# Patient Record
Sex: MALE | Race: WHITE | Employment: OTHER | URBAN - METROPOLITAN AREA
[De-identification: names, ages, dates, MRNs, and addresses within clinical notes are randomized per-mention and may not be internally consistent; named-entity substitution may affect disease eponyms.]

---

## 2017-01-09 ENCOUNTER — OFFICE VISIT (OUTPATIENT)
Dept: FAMILY MEDICINE CLINIC | Age: 72
End: 2017-01-09

## 2017-01-09 VITALS
DIASTOLIC BLOOD PRESSURE: 80 MMHG | HEART RATE: 56 BPM | BODY MASS INDEX: 34.99 KG/M2 | WEIGHT: 264 LBS | HEIGHT: 73 IN | OXYGEN SATURATION: 95 % | SYSTOLIC BLOOD PRESSURE: 134 MMHG

## 2017-01-09 DIAGNOSIS — M65.341 TRIGGER RING FINGER OF RIGHT HAND: ICD-10-CM

## 2017-01-09 DIAGNOSIS — R06.02 SHORTNESS OF BREATH: ICD-10-CM

## 2017-01-09 DIAGNOSIS — E11.9 TYPE 2 DIABETES MELLITUS WITHOUT COMPLICATION, WITHOUT LONG-TERM CURRENT USE OF INSULIN (HCC): Primary | ICD-10-CM

## 2017-01-09 LAB — HBA1C MFR BLD: 8.7 %

## 2017-01-09 PROCEDURE — 93000 ELECTROCARDIOGRAM COMPLETE: CPT | Performed by: FAMILY MEDICINE

## 2017-01-09 PROCEDURE — 99214 OFFICE O/P EST MOD 30 MIN: CPT | Performed by: FAMILY MEDICINE

## 2017-01-09 PROCEDURE — 83036 HEMOGLOBIN GLYCOSYLATED A1C: CPT | Performed by: FAMILY MEDICINE

## 2017-01-12 ENCOUNTER — OFFICE VISIT (OUTPATIENT)
Dept: ORTHOPEDIC SURGERY | Age: 72
End: 2017-01-12

## 2017-01-12 VITALS — HEIGHT: 73 IN | BODY MASS INDEX: 34.46 KG/M2 | WEIGHT: 260 LBS

## 2017-01-12 DIAGNOSIS — M65.30 TRIGGER FINGER (ACQUIRED): ICD-10-CM

## 2017-01-12 DIAGNOSIS — M72.0 DUPUYTREN'S CONTRACTURE: Primary | ICD-10-CM

## 2017-01-12 PROCEDURE — 20550 NJX 1 TENDON SHEATH/LIGAMENT: CPT | Performed by: ORTHOPAEDIC SURGERY

## 2017-01-12 PROCEDURE — 99203 OFFICE O/P NEW LOW 30 MIN: CPT | Performed by: ORTHOPAEDIC SURGERY

## 2017-02-13 RX ORDER — DAPAGLIFLOZIN 10 MG/1
TABLET, FILM COATED ORAL
Qty: 90 TABLET | Refills: 0 | Status: SHIPPED | OUTPATIENT
Start: 2017-02-13 | End: 2017-05-12 | Stop reason: SDUPTHER

## 2017-05-12 ENCOUNTER — OFFICE VISIT (OUTPATIENT)
Dept: FAMILY MEDICINE CLINIC | Age: 72
End: 2017-05-12

## 2017-05-12 VITALS
HEIGHT: 73 IN | BODY MASS INDEX: 31.14 KG/M2 | OXYGEN SATURATION: 98 % | SYSTOLIC BLOOD PRESSURE: 136 MMHG | WEIGHT: 235 LBS | DIASTOLIC BLOOD PRESSURE: 78 MMHG | HEART RATE: 62 BPM

## 2017-05-12 DIAGNOSIS — E78.2 MIXED HYPERLIPIDEMIA: ICD-10-CM

## 2017-05-12 DIAGNOSIS — I10 ESSENTIAL HYPERTENSION: ICD-10-CM

## 2017-05-12 DIAGNOSIS — E11.9 TYPE 2 DIABETES MELLITUS WITHOUT COMPLICATION, WITHOUT LONG-TERM CURRENT USE OF INSULIN (HCC): Primary | ICD-10-CM

## 2017-05-12 DIAGNOSIS — R20.2 ARM PARESTHESIA, LEFT: ICD-10-CM

## 2017-05-12 LAB — HBA1C MFR BLD: 7.4 %

## 2017-05-12 PROCEDURE — G8510 SCR DEP NEG, NO PLAN REQD: HCPCS | Performed by: FAMILY MEDICINE

## 2017-05-12 PROCEDURE — 99214 OFFICE O/P EST MOD 30 MIN: CPT | Performed by: FAMILY MEDICINE

## 2017-05-12 PROCEDURE — 3288F FALL RISK ASSESSMENT DOCD: CPT | Performed by: FAMILY MEDICINE

## 2017-05-12 PROCEDURE — 83036 HEMOGLOBIN GLYCOSYLATED A1C: CPT | Performed by: FAMILY MEDICINE

## 2017-05-12 RX ORDER — PIOGLITAZONEHYDROCHLORIDE 45 MG/1
45 TABLET ORAL DAILY
Qty: 90 TABLET | Refills: 3 | Status: SHIPPED | OUTPATIENT
Start: 2017-05-12 | End: 2017-09-25 | Stop reason: SDUPTHER

## 2017-05-12 RX ORDER — AMLODIPINE BESYLATE 5 MG/1
5 TABLET ORAL DAILY
Qty: 90 TABLET | Refills: 3 | Status: SHIPPED | OUTPATIENT
Start: 2017-05-12 | End: 2017-08-15

## 2017-05-12 RX ORDER — PRAVASTATIN SODIUM 40 MG
TABLET ORAL
Qty: 90 TABLET | Refills: 3 | Status: SHIPPED | OUTPATIENT
Start: 2017-05-12 | End: 2017-09-25 | Stop reason: SDUPTHER

## 2017-05-12 RX ORDER — GLIMEPIRIDE 4 MG/1
4 TABLET ORAL 2 TIMES DAILY WITH MEALS
Qty: 180 TABLET | Refills: 3 | Status: SHIPPED | OUTPATIENT
Start: 2017-05-12 | End: 2017-09-25 | Stop reason: SDUPTHER

## 2017-05-12 RX ORDER — RAMIPRIL 10 MG/1
10 CAPSULE ORAL DAILY
Qty: 90 CAPSULE | Refills: 3 | Status: SHIPPED | OUTPATIENT
Start: 2017-05-12 | End: 2018-01-26 | Stop reason: SDUPTHER

## 2017-05-12 RX ORDER — MIGLITOL 100 MG/1
100 TABLET, COATED ORAL 2 TIMES DAILY
Qty: 180 TABLET | Refills: 3 | Status: SHIPPED | OUTPATIENT
Start: 2017-05-12 | End: 2017-09-25 | Stop reason: SDUPTHER

## 2017-05-12 ASSESSMENT — PATIENT HEALTH QUESTIONNAIRE - PHQ9
2. FEELING DOWN, DEPRESSED OR HOPELESS: 0
1. LITTLE INTEREST OR PLEASURE IN DOING THINGS: 0
SUM OF ALL RESPONSES TO PHQ9 QUESTIONS 1 & 2: 0
SUM OF ALL RESPONSES TO PHQ QUESTIONS 1-9: 0

## 2017-05-14 ASSESSMENT — ENCOUNTER SYMPTOMS: SHORTNESS OF BREATH: 0

## 2017-05-16 ENCOUNTER — TELEPHONE (OUTPATIENT)
Dept: FAMILY MEDICINE CLINIC | Age: 72
End: 2017-05-16

## 2017-05-16 DIAGNOSIS — R20.2 LEFT HAND PARESTHESIA: Primary | ICD-10-CM

## 2017-05-26 ENCOUNTER — TELEPHONE (OUTPATIENT)
Dept: ORTHOPEDIC SURGERY | Age: 72
End: 2017-05-26

## 2017-06-12 ENCOUNTER — TELEPHONE (OUTPATIENT)
Dept: FAMILY MEDICINE CLINIC | Age: 72
End: 2017-06-12

## 2017-06-12 DIAGNOSIS — R20.2 LEFT HAND PARESTHESIA: Primary | ICD-10-CM

## 2017-06-15 ENCOUNTER — OFFICE VISIT (OUTPATIENT)
Dept: ORTHOPEDIC SURGERY | Age: 72
End: 2017-06-15

## 2017-06-15 VITALS — WEIGHT: 235.01 LBS | HEIGHT: 73 IN | BODY MASS INDEX: 31.15 KG/M2

## 2017-06-15 DIAGNOSIS — M65.341 TRIGGER RING FINGER OF RIGHT HAND: Primary | ICD-10-CM

## 2017-06-15 PROCEDURE — 99024 POSTOP FOLLOW-UP VISIT: CPT | Performed by: ORTHOPAEDIC SURGERY

## 2017-06-19 RX ORDER — DAPAGLIFLOZIN 10 MG/1
TABLET, FILM COATED ORAL
Qty: 90 TABLET | Refills: 0 | Status: SHIPPED | OUTPATIENT
Start: 2017-06-19 | End: 2017-09-25 | Stop reason: SDUPTHER

## 2017-08-15 ENCOUNTER — OFFICE VISIT (OUTPATIENT)
Dept: FAMILY MEDICINE CLINIC | Age: 72
End: 2017-08-15

## 2017-08-15 VITALS
HEART RATE: 67 BPM | OXYGEN SATURATION: 99 % | DIASTOLIC BLOOD PRESSURE: 68 MMHG | SYSTOLIC BLOOD PRESSURE: 120 MMHG | WEIGHT: 222 LBS | BODY MASS INDEX: 29.42 KG/M2 | HEIGHT: 73 IN

## 2017-08-15 DIAGNOSIS — I10 ESSENTIAL HYPERTENSION: ICD-10-CM

## 2017-08-15 DIAGNOSIS — E11.9 TYPE 2 DIABETES MELLITUS WITHOUT COMPLICATION, WITHOUT LONG-TERM CURRENT USE OF INSULIN (HCC): Primary | ICD-10-CM

## 2017-08-15 DIAGNOSIS — E78.2 MIXED HYPERLIPIDEMIA: ICD-10-CM

## 2017-08-15 LAB — HBA1C MFR BLD: 7.8 %

## 2017-08-15 PROCEDURE — 83036 HEMOGLOBIN GLYCOSYLATED A1C: CPT | Performed by: FAMILY MEDICINE

## 2017-08-15 PROCEDURE — 99214 OFFICE O/P EST MOD 30 MIN: CPT | Performed by: FAMILY MEDICINE

## 2017-08-25 ENCOUNTER — CARE COORDINATION (OUTPATIENT)
Dept: CARE COORDINATION | Age: 72
End: 2017-08-25

## 2017-09-09 DIAGNOSIS — E11.9 TYPE 2 DIABETES MELLITUS WITHOUT COMPLICATION, UNSPECIFIED LONG TERM INSULIN USE STATUS: ICD-10-CM

## 2017-09-09 DIAGNOSIS — I10 ESSENTIAL HYPERTENSION: ICD-10-CM

## 2017-09-11 RX ORDER — RAMIPRIL 10 MG/1
CAPSULE ORAL
Qty: 90 CAPSULE | Refills: 3 | Status: SHIPPED | OUTPATIENT
Start: 2017-09-11 | End: 2018-11-12 | Stop reason: SDUPTHER

## 2017-09-11 RX ORDER — AMLODIPINE BESYLATE 5 MG/1
TABLET ORAL
Qty: 90 TABLET | Refills: 3 | Status: SHIPPED | OUTPATIENT
Start: 2017-09-11 | End: 2018-09-12 | Stop reason: ALTCHOICE

## 2017-09-25 RX ORDER — GLIMEPIRIDE 4 MG/1
4 TABLET ORAL 2 TIMES DAILY WITH MEALS
Qty: 180 TABLET | Refills: 3 | Status: SHIPPED | OUTPATIENT
Start: 2017-09-25 | End: 2018-11-12 | Stop reason: SDUPTHER

## 2017-09-25 RX ORDER — PIOGLITAZONEHYDROCHLORIDE 45 MG/1
45 TABLET ORAL DAILY
Qty: 90 TABLET | Refills: 3 | Status: SHIPPED | OUTPATIENT
Start: 2017-09-25 | End: 2018-11-12 | Stop reason: SDUPTHER

## 2017-09-25 RX ORDER — MIGLITOL 100 MG/1
100 TABLET, COATED ORAL 2 TIMES DAILY
Qty: 180 TABLET | Refills: 3 | Status: SHIPPED | OUTPATIENT
Start: 2017-09-25 | End: 2018-04-10

## 2017-09-25 RX ORDER — PRAVASTATIN SODIUM 40 MG
40 TABLET ORAL DAILY
Qty: 90 TABLET | Refills: 3 | Status: SHIPPED | OUTPATIENT
Start: 2017-09-25 | End: 2018-11-12 | Stop reason: SDUPTHER

## 2017-12-13 RX ORDER — DAPAGLIFLOZIN 10 MG/1
TABLET, FILM COATED ORAL
Qty: 90 TABLET | Refills: 0 | Status: SHIPPED | OUTPATIENT
Start: 2017-12-13 | End: 2018-08-06

## 2018-01-26 ENCOUNTER — OFFICE VISIT (OUTPATIENT)
Dept: FAMILY MEDICINE CLINIC | Age: 73
End: 2018-01-26

## 2018-01-26 VITALS
BODY MASS INDEX: 30.51 KG/M2 | DIASTOLIC BLOOD PRESSURE: 64 MMHG | OXYGEN SATURATION: 98 % | SYSTOLIC BLOOD PRESSURE: 130 MMHG | HEIGHT: 73 IN | RESPIRATION RATE: 12 BRPM | HEART RATE: 73 BPM | TEMPERATURE: 97.9 F | WEIGHT: 230.2 LBS

## 2018-01-26 DIAGNOSIS — Z13.6 SCREENING FOR AAA (ABDOMINAL AORTIC ANEURYSM): ICD-10-CM

## 2018-01-26 DIAGNOSIS — Z12.11 COLON CANCER SCREENING: ICD-10-CM

## 2018-01-26 DIAGNOSIS — I10 ESSENTIAL HYPERTENSION: ICD-10-CM

## 2018-01-26 DIAGNOSIS — E78.2 MIXED HYPERLIPIDEMIA: ICD-10-CM

## 2018-01-26 DIAGNOSIS — E11.9 TYPE 2 DIABETES MELLITUS WITHOUT COMPLICATION, WITHOUT LONG-TERM CURRENT USE OF INSULIN (HCC): Primary | ICD-10-CM

## 2018-01-26 LAB — HBA1C MFR BLD: 6.4 %

## 2018-01-26 PROCEDURE — 99214 OFFICE O/P EST MOD 30 MIN: CPT | Performed by: FAMILY MEDICINE

## 2018-01-26 PROCEDURE — 83036 HEMOGLOBIN GLYCOSYLATED A1C: CPT | Performed by: FAMILY MEDICINE

## 2018-01-26 RX ORDER — SILDENAFIL 100 MG/1
100 TABLET, FILM COATED ORAL PRN
Qty: 5 TABLET | Refills: 11 | Status: SHIPPED | OUTPATIENT
Start: 2018-01-26 | End: 2018-05-30 | Stop reason: SDUPTHER

## 2018-02-19 NOTE — TELEPHONE ENCOUNTER
Last OV - 1/26/2018 Return in about 6 months (around 7/26/2018).    Next OV - no pending appointments  Last filled - 5/12/2017

## 2018-02-20 DIAGNOSIS — Z12.11 COLON CANCER SCREENING: ICD-10-CM

## 2018-02-20 LAB
CONTROL: POSITIVE
HEMOCCULT STL QL: NEGATIVE

## 2018-02-20 PROCEDURE — 82274 ASSAY TEST FOR BLOOD FECAL: CPT | Performed by: FAMILY MEDICINE

## 2018-04-10 RX ORDER — ACARBOSE 100 MG/1
100 TABLET ORAL 2 TIMES DAILY WITH MEALS
Qty: 180 TABLET | Refills: 1 | Status: SHIPPED | OUTPATIENT
Start: 2018-04-10 | End: 2018-11-12 | Stop reason: SDUPTHER

## 2018-04-10 RX ORDER — DAPAGLIFLOZIN 10 MG/1
TABLET, FILM COATED ORAL
Qty: 90 TABLET | Refills: 0 | OUTPATIENT
Start: 2018-04-10

## 2018-05-30 RX ORDER — SILDENAFIL 100 MG/1
100 TABLET, FILM COATED ORAL PRN
Qty: 16 TABLET | Refills: 0 | Status: SHIPPED | OUTPATIENT
Start: 2018-05-30 | End: 2019-07-01 | Stop reason: SDUPTHER

## 2018-07-23 NOTE — TELEPHONE ENCOUNTER
Last OV - 1/26/2018   Return in about 6 months (around 7/26/2018).    No pending appointments   [place] 5/12/2017

## 2018-08-06 ENCOUNTER — OFFICE VISIT (OUTPATIENT)
Dept: FAMILY MEDICINE CLINIC | Age: 73
End: 2018-08-06

## 2018-08-06 VITALS
DIASTOLIC BLOOD PRESSURE: 78 MMHG | SYSTOLIC BLOOD PRESSURE: 120 MMHG | HEART RATE: 96 BPM | HEIGHT: 73 IN | BODY MASS INDEX: 30.09 KG/M2 | WEIGHT: 227 LBS | OXYGEN SATURATION: 98 %

## 2018-08-06 DIAGNOSIS — E78.2 MIXED HYPERLIPIDEMIA: ICD-10-CM

## 2018-08-06 DIAGNOSIS — E11.9 TYPE 2 DIABETES MELLITUS WITHOUT COMPLICATION, WITHOUT LONG-TERM CURRENT USE OF INSULIN (HCC): ICD-10-CM

## 2018-08-06 DIAGNOSIS — I10 ESSENTIAL HYPERTENSION: ICD-10-CM

## 2018-08-06 DIAGNOSIS — E11.9 TYPE 2 DIABETES MELLITUS WITHOUT COMPLICATION, WITHOUT LONG-TERM CURRENT USE OF INSULIN (HCC): Primary | ICD-10-CM

## 2018-08-06 LAB
A/G RATIO: 1.6 (ref 1.1–2.2)
ALBUMIN SERPL-MCNC: 4 G/DL (ref 3.4–5)
ALP BLD-CCNC: 59 U/L (ref 40–129)
ALT SERPL-CCNC: 9 U/L (ref 10–40)
ANION GAP SERPL CALCULATED.3IONS-SCNC: 13 MMOL/L (ref 3–16)
AST SERPL-CCNC: 12 U/L (ref 15–37)
BILIRUB SERPL-MCNC: 0.4 MG/DL (ref 0–1)
BUN BLDV-MCNC: 30 MG/DL (ref 7–20)
CALCIUM SERPL-MCNC: 9.6 MG/DL (ref 8.3–10.6)
CHLORIDE BLD-SCNC: 101 MMOL/L (ref 99–110)
CHOLESTEROL, TOTAL: 194 MG/DL (ref 0–199)
CO2: 26 MMOL/L (ref 21–32)
CREAT SERPL-MCNC: 0.8 MG/DL (ref 0.8–1.3)
CREATININE URINE: 102.4 MG/DL (ref 39–259)
GFR AFRICAN AMERICAN: >60
GFR NON-AFRICAN AMERICAN: >60
GLOBULIN: 2.5 G/DL
GLUCOSE BLD-MCNC: 179 MG/DL (ref 70–99)
HDLC SERPL-MCNC: 79 MG/DL (ref 40–60)
LDL CHOLESTEROL CALCULATED: 92 MG/DL
MICROALBUMIN UR-MCNC: 15.3 MG/DL
MICROALBUMIN/CREAT UR-RTO: 149.4 MG/G (ref 0–30)
POTASSIUM SERPL-SCNC: 4.6 MMOL/L (ref 3.5–5.1)
SODIUM BLD-SCNC: 140 MMOL/L (ref 136–145)
TOTAL PROTEIN: 6.5 G/DL (ref 6.4–8.2)
TRIGL SERPL-MCNC: 117 MG/DL (ref 0–150)
VLDLC SERPL CALC-MCNC: 23 MG/DL

## 2018-08-06 PROCEDURE — G8510 SCR DEP NEG, NO PLAN REQD: HCPCS | Performed by: FAMILY MEDICINE

## 2018-08-06 PROCEDURE — 99214 OFFICE O/P EST MOD 30 MIN: CPT | Performed by: FAMILY MEDICINE

## 2018-08-06 PROCEDURE — 3288F FALL RISK ASSESSMENT DOCD: CPT | Performed by: FAMILY MEDICINE

## 2018-08-06 ASSESSMENT — PATIENT HEALTH QUESTIONNAIRE - PHQ9
SUM OF ALL RESPONSES TO PHQ9 QUESTIONS 1 & 2: 0
SUM OF ALL RESPONSES TO PHQ QUESTIONS 1-9: 0
1. LITTLE INTEREST OR PLEASURE IN DOING THINGS: 0
2. FEELING DOWN, DEPRESSED OR HOPELESS: 0

## 2018-08-06 NOTE — PROGRESS NOTES
Subjective:      Patient ID: Isa Apt is a 75 y.o. male. HPI   Pt is a of 75 y.o. male comes in today with   Chief Complaint   Patient presents with    Diabetes     Patient is here for DM check, is fasting. Hit his shin at work 12 weeks ago. Following with wound care. Healing as expected. Has been sedentary and gained wt. Diet okay. Sugars a little high; usually 140 in the am. Up to 180.  a1c in may was 7.6 at wound clinic  Hyperlipidemia and hypertension have been stable. Past Medical History:Reviewed  Medications:Reviewed. Allergies   Allergen Reactions    Dye [Iodides]       Social hx:Reviewed. History   Smoking Status    Former Smoker    Packs/day: 2.00    Years: 40.00    Types: Cigarettes    Quit date: 10/24/2001   Smokeless Tobacco    Never Used     Review of Systems     Vitals:    08/06/18 0905   BP: 120/78   Pulse: 96   SpO2: 98%      Objective:   Physical Exam   Constitutional: He is oriented to person, place, and time. He appears well-developed and well-nourished. HENT:   Head: Normocephalic. Mouth/Throat: Oropharynx is clear and moist.   Eyes: Conjunctivae are normal.   Neck: Neck supple. Cardiovascular: Normal rate and normal heart sounds. No murmur heard. Pulses:       Radial pulses are 2+ on the right side, and 2+ on the left side. Posterior tibial pulses are 2+ on the right side, and 2+ on the left side. Pulmonary/Chest: Effort normal and breath sounds normal. He has no rales. Lymphadenopathy:     He has no cervical adenopathy. Neurological: He is alert and oriented to person, place, and time. No cranial nerve deficit or sensory deficit. Skin: Skin is warm and dry. No cyanosis. Nails show no clubbing. Sensory exam of the foot is normal, tested with the monofilament. No lesions or ulcers. Assessment:       Diagnosis Orders   1.  Type 2 diabetes mellitus without complication, without long-term current use of insulin ([address]  Comprehensive

## 2018-08-07 LAB
ESTIMATED AVERAGE GLUCOSE: 159.9 MG/DL
HBA1C MFR BLD: 7.2 %

## 2018-09-12 ENCOUNTER — OFFICE VISIT (OUTPATIENT)
Dept: FAMILY MEDICINE CLINIC | Age: 73
End: 2018-09-12

## 2018-09-12 VITALS
DIASTOLIC BLOOD PRESSURE: 72 MMHG | HEIGHT: 73 IN | WEIGHT: 230.2 LBS | BODY MASS INDEX: 30.51 KG/M2 | SYSTOLIC BLOOD PRESSURE: 122 MMHG

## 2018-09-12 DIAGNOSIS — M54.50 ACUTE LEFT-SIDED LOW BACK PAIN WITHOUT SCIATICA: Primary | ICD-10-CM

## 2018-09-12 DIAGNOSIS — Z23 NEED FOR PROPHYLACTIC VACCINATION AND INOCULATION AGAINST VARICELLA: ICD-10-CM

## 2018-09-12 DIAGNOSIS — Z23 NEED FOR INFLUENZA VACCINATION: ICD-10-CM

## 2018-09-12 PROCEDURE — 99213 OFFICE O/P EST LOW 20 MIN: CPT | Performed by: FAMILY MEDICINE

## 2018-09-12 PROCEDURE — G0008 ADMIN INFLUENZA VIRUS VAC: HCPCS | Performed by: FAMILY MEDICINE

## 2018-09-12 PROCEDURE — 90662 IIV NO PRSV INCREASED AG IM: CPT | Performed by: FAMILY MEDICINE

## 2018-09-12 RX ORDER — BACLOFEN 10 MG/1
10 TABLET ORAL 3 TIMES DAILY
Qty: 50 TABLET | Refills: 1 | Status: SHIPPED | OUTPATIENT
Start: 2018-09-12 | End: 2018-09-26

## 2018-09-12 NOTE — PROGRESS NOTES
Subjective:      Patient ID: Ramesh Martinez is a 75 y.o. male. HPI   Pt is a of 75 y.o. male comes in today with   Chief Complaint   Patient presents with    Back Pain     pulled something in back Sunday Sunday pulled back. Not sure of injury. Ok to bend over. Hurts to stand up straight  No radiation  No numbness or weakness  Allergies   Allergen Reactions    Dye [Iodides]       Review of Systems  No f/c  Objective:   Physical Exam   Constitutional: He appears well-developed and well-nourished. No distress. HENT:   Head: Normocephalic and atraumatic. Eyes: No scleral icterus. Abdominal: He exhibits no distension. Musculoskeletal:        Lumbar back: He exhibits decreased range of motion, bony tenderness and spasm. Neurological: He is alert. He has normal strength. No cranial nerve deficit or sensory deficit. Reflex Scores:       Patellar reflexes are 2+ on the right side and 2+ on the left side. Achilles reflexes are 2+ on the right side and 2+ on the left side. Skin: Skin is warm and dry. He is not diaphoretic. Psychiatric: He has a normal mood and affect. His behavior is normal. Judgment and thought content normal.       Assessment:       Diagnosis Orders   1. Acute left-sided low back pain without sciatica     2. Need for influenza vaccination  INFLUENZA, HIGH DOSE, 65 YRS +, IM, PF, PREFILL SYR, 0.5ML (FLUZONE HD)   3. Need for prophylactic vaccination and inoculation against varicella  zoster recombinant adjuvanted vaccine (SHINGRIX) 50 MCG SUSR injection          Plan:      Home stretches. Prn baclofen. Medication side affects and adverse reactions reviewed.   Recommended nsaid avoidance        Augustine Tsang MD

## 2018-09-12 NOTE — PROGRESS NOTES
Vaccine Information Sheet, \"Influenza - Inactivated\"  given to Burnice Apt, or parent/legal guardian of  Burnice Apt and verbalized understanding. Patient responses:    Have you ever had a reaction to a flu vaccine? No  Are you able to eat eggs without adverse effects? Yes  Do you have any current illness? No  Have you ever had Guillian [place] Syndrome? No    Flu vaccine given per order. Please see immunization tab.

## 2018-09-24 ENCOUNTER — TELEPHONE (OUTPATIENT)
Dept: FAMILY MEDICINE CLINIC | Age: 73
End: 2018-09-24

## 2018-09-26 ENCOUNTER — TELEPHONE (OUTPATIENT)
Dept: FAMILY MEDICINE CLINIC | Age: 73
End: 2018-09-26

## 2018-09-26 RX ORDER — TIZANIDINE 4 MG/1
4 TABLET ORAL 3 TIMES DAILY
Qty: 30 TABLET | Refills: 0 | Status: SHIPPED | OUTPATIENT
Start: 2018-09-26 | End: 2020-02-04 | Stop reason: ALTCHOICE

## 2018-09-26 NOTE — TELEPHONE ENCOUNTER
Patients back pain is not improving with the (2) baclofen. Is in a lot of pain, would like to know what else he could do. Requesting call back tonight.

## 2018-09-27 NOTE — TELEPHONE ENCOUNTER
Patient is a  and is concerned about DOT authorization with medications. Is concerned that the Tizanidine will cause him to fail his drug test. He also is concerned because the pharmacist warned him about drowsiness and not to operate machinery while on medication. Verbally spoke with PCP who recommended DOT directly to make sure the medication does not violate any restrictions. Patient understood. Also suggested to first try medication when he does not have to drive to see how his body reacts. Patient will contact us with any more questions or concerns.

## 2018-10-02 ENCOUNTER — TELEPHONE (OUTPATIENT)
Dept: FAMILY MEDICINE CLINIC | Age: 73
End: 2018-10-02

## 2018-10-02 DIAGNOSIS — M54.5 ACUTE BILATERAL LOW BACK PAIN, WITH SCIATICA PRESENCE UNSPECIFIED: Primary | ICD-10-CM

## 2018-10-10 NOTE — TELEPHONE ENCOUNTER
Referral is pending, not sure of diagnosis code. Reports received from [address] [place] [address]

## 2018-10-15 ENCOUNTER — TELEPHONE (OUTPATIENT)
Dept: FAMILY MEDICINE CLINIC | Age: 73
End: 2018-10-15

## 2018-10-15 DIAGNOSIS — R20.0 LEFT LEG NUMBNESS: Primary | ICD-10-CM

## 2018-10-15 DIAGNOSIS — M54.10 RADICULAR SYNDROME OF LEFT LEG: ICD-10-CM

## 2018-10-17 ENCOUNTER — HOSPITAL ENCOUNTER (OUTPATIENT)
Dept: MRI IMAGING | Age: 73
Discharge: HOME | End: 2018-10-17
Payer: COMMERCIAL

## 2018-10-17 DIAGNOSIS — S32.010A CLOSED COMPRESSION FRACTURE OF FIRST LUMBAR VERTEBRA, INITIAL ENCOUNTER: ICD-10-CM

## 2018-10-17 DIAGNOSIS — M48.062 SPINAL STENOSIS OF LUMBAR REGION WITH NEUROGENIC CLAUDICATION: ICD-10-CM

## 2018-10-17 PROBLEM — M48.061 SPINAL STENOSIS OF LUMBAR REGION: Status: ACTIVE | Noted: 2018-10-17

## 2018-10-17 PROCEDURE — 72148 MRI LUMBAR SPINE W/O DYE: CPT

## 2018-10-18 ENCOUNTER — TELEPHONE (OUTPATIENT)
Dept: FAMILY MEDICINE CLINIC | Age: 73
End: 2018-10-18

## 2018-11-12 DIAGNOSIS — I10 ESSENTIAL HYPERTENSION: ICD-10-CM

## 2018-11-12 RX ORDER — PIOGLITAZONEHYDROCHLORIDE 45 MG/1
45 TABLET ORAL DAILY
Qty: 90 TABLET | Refills: 3 | Status: SHIPPED | OUTPATIENT
Start: 2018-11-12 | End: 2019-12-10 | Stop reason: SDUPTHER

## 2018-11-12 RX ORDER — PRAVASTATIN SODIUM 40 MG
40 TABLET ORAL DAILY
Qty: 90 TABLET | Refills: 3 | Status: SHIPPED | OUTPATIENT
Start: 2018-11-12 | End: 2019-12-10 | Stop reason: SDUPTHER

## 2018-11-12 RX ORDER — ACARBOSE 100 MG/1
100 TABLET ORAL 2 TIMES DAILY WITH MEALS
Qty: 180 TABLET | Refills: 3 | Status: SHIPPED | OUTPATIENT
Start: 2018-11-12 | End: 2019-12-10 | Stop reason: SDUPTHER

## 2018-11-12 RX ORDER — RAMIPRIL 10 MG/1
10 CAPSULE ORAL DAILY
Qty: 90 CAPSULE | Refills: 3 | Status: SHIPPED | OUTPATIENT
Start: 2018-11-12 | End: 2019-12-10 | Stop reason: SDUPTHER

## 2018-11-12 RX ORDER — GLIMEPIRIDE 4 MG/1
4 TABLET ORAL 2 TIMES DAILY WITH MEALS
Qty: 180 TABLET | Refills: 3 | Status: SHIPPED | OUTPATIENT
Start: 2018-11-12 | End: 2019-12-10 | Stop reason: SDUPTHER

## 2018-11-16 ENCOUNTER — OFFICE VISIT (OUTPATIENT)
Dept: FAMILY MEDICINE CLINIC | Age: 73
End: 2018-11-16
Payer: COMMERCIAL

## 2018-11-16 VITALS
WEIGHT: 230.6 LBS | SYSTOLIC BLOOD PRESSURE: 136 MMHG | DIASTOLIC BLOOD PRESSURE: 68 MMHG | OXYGEN SATURATION: 97 % | HEIGHT: 73 IN | BODY MASS INDEX: 30.56 KG/M2 | HEART RATE: 65 BPM

## 2018-11-16 DIAGNOSIS — E78.2 MIXED HYPERLIPIDEMIA: ICD-10-CM

## 2018-11-16 DIAGNOSIS — E11.9 TYPE 2 DIABETES MELLITUS WITHOUT COMPLICATION, WITHOUT LONG-TERM CURRENT USE OF INSULIN (HCC): ICD-10-CM

## 2018-11-16 DIAGNOSIS — M48.062 SPINAL STENOSIS OF LUMBAR REGION WITH NEUROGENIC CLAUDICATION: ICD-10-CM

## 2018-11-16 DIAGNOSIS — I10 ESSENTIAL HYPERTENSION: ICD-10-CM

## 2018-11-16 DIAGNOSIS — I65.22 CAROTID STENOSIS, LEFT: ICD-10-CM

## 2018-11-16 DIAGNOSIS — S32.010G CLOSED COMPRESSION FRACTURE OF L1 LUMBAR VERTEBRA WITH DELAYED HEALING, SUBSEQUENT ENCOUNTER: ICD-10-CM

## 2018-11-16 DIAGNOSIS — Z01.818 PRE-OP EXAM: Primary | ICD-10-CM

## 2018-11-16 LAB
ABO GROUPING: NORMAL
ANTIBODY SCREEN: NEGATIVE
EKG ATRIAL RATE: 73 MS
EKG I-40 FRONT AXIS: 82 DEG
EKG I-40 HORIZONTAL AXIS: 49 DEG
EKG P DURATION: 121 MS
EKG P FRONT AXIS: 72 DEG
EKG P HORIZONTAL AXIS: 1 DEG
EKG P-R INTERVAL: 158 MS
EKG Q ONSET: 506 MS
EKG Q-T INTERVAL: 392 MS
EKG QRS AXIS: 30 DEG
EKG QRS HORIZONTAL AXIS: -11 DEG
EKG QRSD INTERVAL: 92 MS
EKG QTCB: 432 MS
EKG QTCF: 418 MS
EKG RR INTERVAL: 822 MS
EKG S-T FRONT AXIS: 42 DEG
EKG S-T HORIZONTAL AXIS: 92 DEG
EKG T HORIZONTAL AXIS: 74 DEG
EKG T WAVE AXIS: 53 DEG
EKG T-40 FRONT AXIS: 22 DEG
EKG T-40 HORIZONTAL AXIS: -32 DEG
HEART RATE: 73 BPM
HEIGHT, INCHES: NORMAL IN
IDENTIFICATION: NORMAL
IDENTIFICATION: NORMAL
IMPRESSION: NORMAL
STAPH AUREUS CAPSULAR POLYSACCHARIDE ENZYME GENE: NEGATIVE
STAPH AUREUS.METHICILLIN RESISTANT DNA: NEGATIVE

## 2018-11-16 PROCEDURE — 99213 OFFICE O/P EST LOW 20 MIN: CPT | Performed by: NURSE PRACTITIONER

## 2018-11-16 RX ORDER — OXYCODONE AND ACETAMINOPHEN 10; 325 MG/1; MG/1
1-2 TABLET ORAL
COMMUNITY
End: 2020-02-04 | Stop reason: ALTCHOICE

## 2018-11-16 ASSESSMENT — ENCOUNTER SYMPTOMS
ABDOMINAL DISTENTION: 0
EYE REDNESS: 0
COUGH: 0
VOMITING: 0
CONSTIPATION: 0
WHEEZING: 0
RHINORRHEA: 0
BACK PAIN: 1
NAUSEA: 0
SINUS PRESSURE: 0
EYE PAIN: 0
SHORTNESS OF BREATH: 0
BLOOD IN STOOL: 0
CHEST TIGHTNESS: 0
ABDOMINAL PAIN: 0
DIARRHEA: 0
APNEA: 0

## 2018-11-16 NOTE — PROGRESS NOTES
11/16/2018    This is a 75 y.o. male   Chief Complaint   Patient presents with    Pre-op Exam     11/21, Merari Fonseca, [facility], KYPHOPLASTY WITH BONE BIOPSY L1 - had EKG and labs done this morning. HPI     Nick Lozoya is a 75 y.o.  male who comes for a preoperative exam.  He  is referred by Dr. Merari Fonseca for perioperative risk determination for upcoming surgery for kyphoplasty with bone biopsy L1 on 11/21/2018. Denies taking any asa, blood thinners, fish oil, NSAIDs or herbals. Denies any previous complications with anesthesia. Patient returns to the office for follow up of his diabetes. Hislast hemoglobin A1c was 7.2. He is compliant with His medications but admits to a lot of dietary noncompliance. Patient has no symptoms related to his condition such as blurred vision, slurred speech, chest pain or shortness of breath. Nick Lozoya returns for follow up of hypertension. Patient has been taking His medications as prescribed. Patient's blood pressure is  controlled. Side effects related to taking the medications include no medication side effects noted. Patient returns for follow up of hyperlipidemia. Patient has been taking His medications as prescribed. Patient's lipids are controlled. Side effects related to taking the medications include none. He has not had clinical consequences related to hyperlipidemia including, but not limited to acute coronary syndrome, stroke or chronic kidney disease. Had preadmission testing this am.   Labs and EKG complete- was okay per patient. Past Medical History:   Diagnosis Date    Carotid artery stenosis     Carotid stenosis, left 10/12/2011    Chronic back pain     Erectile dysfunction     Hyperlipidemia     Hypertension     Osteoarthritis     Type II or unspecified type diabetes mellitus without mention of complication, not stated as uncontrolled      No past surgical history on file.     Social History L1 lumbar vertebra with delayed healing, subsequent encounter  Continue with planned surgery. 3. Spinal stenosis of lumbar region with neurogenic claudication  Continue with planned surgery. 4. Type 2 diabetes mellitus without complication, without long-term current use of insulin (HCC)  Stable, controlled on current regimen. 5. Essential hypertension  Stable, controlled on current regimen. 6. Mixed hyperlipidemia  Stable, controlled on current regimen. 7. Carotid stenosis, left  Stable, controlled on current regimen.          Electronically signed by NATASHA Whitmore CNP on 11/16/2018 at 10:18 AM

## 2018-11-19 ENCOUNTER — TELEPHONE (OUTPATIENT)
Dept: FAMILY MEDICINE CLINIC | Age: 73
End: 2018-11-19

## 2019-02-04 ENCOUNTER — OFFICE VISIT (OUTPATIENT)
Dept: FAMILY MEDICINE CLINIC | Age: 74
End: 2019-02-04
Payer: COMMERCIAL

## 2019-02-04 VITALS
WEIGHT: 223.8 LBS | OXYGEN SATURATION: 98 % | DIASTOLIC BLOOD PRESSURE: 88 MMHG | HEIGHT: 73 IN | SYSTOLIC BLOOD PRESSURE: 120 MMHG | BODY MASS INDEX: 29.66 KG/M2 | HEART RATE: 88 BPM

## 2019-02-04 DIAGNOSIS — E78.2 MIXED HYPERLIPIDEMIA: ICD-10-CM

## 2019-02-04 DIAGNOSIS — Z12.11 COLON CANCER SCREENING: ICD-10-CM

## 2019-02-04 DIAGNOSIS — E11.9 TYPE 2 DIABETES MELLITUS WITHOUT COMPLICATION, WITHOUT LONG-TERM CURRENT USE OF INSULIN (HCC): Primary | ICD-10-CM

## 2019-02-04 DIAGNOSIS — I10 ESSENTIAL HYPERTENSION: ICD-10-CM

## 2019-02-04 DIAGNOSIS — E11.9 TYPE 2 DIABETES MELLITUS WITHOUT COMPLICATION, WITHOUT LONG-TERM CURRENT USE OF INSULIN (HCC): ICD-10-CM

## 2019-02-04 LAB
ANION GAP SERPL CALCULATED.3IONS-SCNC: 14 MMOL/L (ref 3–16)
BUN BLDV-MCNC: 27 MG/DL (ref 7–20)
CALCIUM SERPL-MCNC: 9.4 MG/DL (ref 8.3–10.6)
CHLORIDE BLD-SCNC: 102 MMOL/L (ref 99–110)
CO2: 24 MMOL/L (ref 21–32)
CREAT SERPL-MCNC: 0.8 MG/DL (ref 0.8–1.3)
GFR AFRICAN AMERICAN: >60
GFR NON-AFRICAN AMERICAN: >60
GLUCOSE BLD-MCNC: 229 MG/DL (ref 70–99)
POTASSIUM SERPL-SCNC: 5.1 MMOL/L (ref 3.5–5.1)
SODIUM BLD-SCNC: 140 MMOL/L (ref 136–145)

## 2019-02-04 PROCEDURE — 99214 OFFICE O/P EST MOD 30 MIN: CPT | Performed by: FAMILY MEDICINE

## 2019-02-05 LAB
ESTIMATED AVERAGE GLUCOSE: 182.9 MG/DL
HBA1C MFR BLD: 8 %

## 2019-03-04 DIAGNOSIS — Z12.11 COLON CANCER SCREENING: ICD-10-CM

## 2019-03-04 LAB
CONTROL: NORMAL
HEMOCCULT STL QL: NORMAL

## 2019-03-04 PROCEDURE — 82274 ASSAY TEST FOR BLOOD FECAL: CPT | Performed by: FAMILY MEDICINE

## 2019-04-10 ENCOUNTER — TELEPHONE (OUTPATIENT)
Dept: FAMILY MEDICINE CLINIC | Age: 74
End: 2019-04-10

## 2019-04-10 NOTE — TELEPHONE ENCOUNTER
Clarise Fothergill from [facility], [address] [place] [address] and [employer] is trying to make contact with someone to find out the status of this patient's records request to go to them. Fax number is [phone]. She called earlier today and got [facility]'s phone number. She called them and was told that they have not received our request and was told [facility] does not take faxed requests. Please look into this and make contact with Clarise Fothergill. Hearing is pending 4/19 depending on receipt of medical records.

## 2019-04-10 NOTE — TELEPHONE ENCOUNTER
Spoke with [facility], ref number for request is [id number]. Information given to Alyse Gutierrez.

## 2019-07-02 RX ORDER — SILDENAFIL 100 MG/1
100 TABLET, FILM COATED ORAL PRN
Qty: 16 TABLET | Refills: 0 | Status: SHIPPED | OUTPATIENT
Start: 2019-07-02 | End: 2020-07-25 | Stop reason: SDUPTHER

## 2019-07-29 ENCOUNTER — OFFICE VISIT (OUTPATIENT)
Dept: FAMILY MEDICINE CLINIC | Age: 74
End: 2019-07-29
Payer: COMMERCIAL

## 2019-07-29 VITALS
HEIGHT: 73 IN | SYSTOLIC BLOOD PRESSURE: 134 MMHG | DIASTOLIC BLOOD PRESSURE: 76 MMHG | OXYGEN SATURATION: 96 % | HEART RATE: 77 BPM | WEIGHT: 228 LBS | BODY MASS INDEX: 30.22 KG/M2

## 2019-07-29 DIAGNOSIS — I10 ESSENTIAL HYPERTENSION: ICD-10-CM

## 2019-07-29 DIAGNOSIS — E11.9 TYPE 2 DIABETES MELLITUS WITHOUT COMPLICATION, WITHOUT LONG-TERM CURRENT USE OF INSULIN (HCC): Primary | ICD-10-CM

## 2019-07-29 DIAGNOSIS — R06.02 SHORTNESS OF BREATH: ICD-10-CM

## 2019-07-29 LAB — HBA1C MFR BLD: 9.7 %

## 2019-07-29 PROCEDURE — 93000 ELECTROCARDIOGRAM COMPLETE: CPT | Performed by: FAMILY MEDICINE

## 2019-07-29 PROCEDURE — 83036 HEMOGLOBIN GLYCOSYLATED A1C: CPT | Performed by: FAMILY MEDICINE

## 2019-07-29 PROCEDURE — 99214 OFFICE O/P EST MOD 30 MIN: CPT | Performed by: FAMILY MEDICINE

## 2019-07-29 ASSESSMENT — PATIENT HEALTH QUESTIONNAIRE - PHQ9
1. LITTLE INTEREST OR PLEASURE IN DOING THINGS: 0
SUM OF ALL RESPONSES TO PHQ9 QUESTIONS 1 & 2: 0
SUM OF ALL RESPONSES TO PHQ QUESTIONS 1-9: 0
SUM OF ALL RESPONSES TO PHQ QUESTIONS 1-9: 0
2. FEELING DOWN, DEPRESSED OR HOPELESS: 0

## 2019-07-29 ASSESSMENT — ENCOUNTER SYMPTOMS: NAUSEA: 0

## 2019-07-29 NOTE — PROGRESS NOTES
Echocardiogram complete    EKG 12 Lead          Plan:      Samy Armenta was seen today for diabetes. Diagnoses and all orders for this visit:    Type 2 diabetes mellitus without complication, without long-term current use of insulin (HCC)  -     POCT glycosylated hemoglobin (Hb A1C)  -     Lipid Panel; Future  -     Hemoglobin A1C; Future  -     Comprehensive Metabolic Panel; Future  -     EKG 12 Lead; Future  -     (Gxt) Stress Test Exercise W Out Myoview; Future  -     EKG 12 Lead  Not well controlled. 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 Shortness of breath  -     CBC; Future  -     EKG 12 Lead; Future  -     (Gxt) Stress Test Exercise W Out Myoview; Future  -     Echocardiogram complete; Future  -     EKG 12 Lead  ekg nsr.   If echo normal will get stress test           Zulay Evans MD

## 2019-08-01 DIAGNOSIS — E11.9 TYPE 2 DIABETES MELLITUS WITHOUT COMPLICATION, WITHOUT LONG-TERM CURRENT USE OF INSULIN (HCC): ICD-10-CM

## 2019-08-01 DIAGNOSIS — I10 ESSENTIAL HYPERTENSION: ICD-10-CM

## 2019-08-01 DIAGNOSIS — R06.02 SHORTNESS OF BREATH: ICD-10-CM

## 2019-08-02 LAB
A/G RATIO: 1.8 (ref 1.1–2.2)
ALBUMIN SERPL-MCNC: 4.4 G/DL (ref 3.4–5)
ALP BLD-CCNC: 72 U/L (ref 40–129)
ALT SERPL-CCNC: 9 U/L (ref 10–40)
ANION GAP SERPL CALCULATED.3IONS-SCNC: 20 MMOL/L (ref 3–16)
AST SERPL-CCNC: 14 U/L (ref 15–37)
BILIRUB SERPL-MCNC: 0.4 MG/DL (ref 0–1)
BUN BLDV-MCNC: 21 MG/DL (ref 7–20)
CALCIUM SERPL-MCNC: 9.8 MG/DL (ref 8.3–10.6)
CHLORIDE BLD-SCNC: 100 MMOL/L (ref 99–110)
CHOLESTEROL, TOTAL: 202 MG/DL (ref 0–199)
CO2: 22 MMOL/L (ref 21–32)
CREAT SERPL-MCNC: 0.8 MG/DL (ref 0.8–1.3)
ESTIMATED AVERAGE GLUCOSE: 223.1 MG/DL
GFR AFRICAN AMERICAN: >60
GFR NON-AFRICAN AMERICAN: >60
GLOBULIN: 2.4 G/DL
GLUCOSE BLD-MCNC: 223 MG/DL (ref 70–99)
HBA1C MFR BLD: 9.4 %
HCT VFR BLD CALC: 45.2 % (ref 40.5–52.5)
HDLC SERPL-MCNC: 86 MG/DL (ref 40–60)
HEMOGLOBIN: 14.4 G/DL (ref 13.5–17.5)
LDL CHOLESTEROL CALCULATED: 99 MG/DL
MCH RBC QN AUTO: 28.7 PG (ref 26–34)
MCHC RBC AUTO-ENTMCNC: 31.8 G/DL (ref 31–36)
MCV RBC AUTO: 90.4 FL (ref 80–100)
PDW BLD-RTO: 15.6 % (ref 12.4–15.4)
PLATELET # BLD: 292 K/UL (ref 135–450)
PMV BLD AUTO: 9.2 FL (ref 5–10.5)
POTASSIUM SERPL-SCNC: 5 MMOL/L (ref 3.5–5.1)
RBC # BLD: 5 M/UL (ref 4.2–5.9)
SODIUM BLD-SCNC: 142 MMOL/L (ref 136–145)
TOTAL PROTEIN: 6.8 G/DL (ref 6.4–8.2)
TRIGL SERPL-MCNC: 83 MG/DL (ref 0–150)
VLDLC SERPL CALC-MCNC: 17 MG/DL
WBC # BLD: 7.3 K/UL (ref 4–11)

## 2019-08-05 ENCOUNTER — TELEPHONE (OUTPATIENT)
Dept: FAMILY MEDICINE CLINIC | Age: 74
End: 2019-08-05

## 2019-08-09 ENCOUNTER — HOSPITAL ENCOUNTER (OUTPATIENT)
Dept: NON INVASIVE DIAGNOSTICS | Age: 74
Discharge: HOME | End: 2019-08-09
Payer: COMMERCIAL

## 2019-08-09 DIAGNOSIS — R06.02 SHORTNESS OF BREATH: ICD-10-CM

## 2019-08-09 DIAGNOSIS — E11.9 TYPE 2 DIABETES MELLITUS WITHOUT COMPLICATION, WITHOUT LONG-TERM CURRENT USE OF INSULIN (HCC): ICD-10-CM

## 2019-08-09 DIAGNOSIS — I10 ESSENTIAL HYPERTENSION: ICD-10-CM

## 2019-08-09 LAB
LV EF: 58 %
LVEF MODALITY: NORMAL

## 2019-08-09 PROCEDURE — 93306 TTE W/DOPPLER COMPLETE: CPT

## 2019-08-09 PROCEDURE — 93017 CV STRESS TEST TRACING ONLY: CPT

## 2019-12-10 ENCOUNTER — OFFICE VISIT (OUTPATIENT)
Dept: FAMILY MEDICINE CLINIC | Age: 74
End: 2019-12-10
Payer: COMMERCIAL

## 2019-12-10 VITALS
DIASTOLIC BLOOD PRESSURE: 77 MMHG | HEART RATE: 79 BPM | HEIGHT: 73 IN | BODY MASS INDEX: 29.95 KG/M2 | SYSTOLIC BLOOD PRESSURE: 118 MMHG | WEIGHT: 226 LBS | OXYGEN SATURATION: 96 %

## 2019-12-10 DIAGNOSIS — Z00.00 ROUTINE GENERAL MEDICAL EXAMINATION AT A HEALTH CARE FACILITY: Primary | ICD-10-CM

## 2019-12-10 DIAGNOSIS — E11.9 TYPE 2 DIABETES MELLITUS WITHOUT COMPLICATION, WITHOUT LONG-TERM CURRENT USE OF INSULIN (HCC): ICD-10-CM

## 2019-12-10 DIAGNOSIS — I10 ESSENTIAL HYPERTENSION: ICD-10-CM

## 2019-12-10 LAB
A/G RATIO: 1.4 (ref 1.1–2.2)
ALBUMIN SERPL-MCNC: 4 G/DL (ref 3.4–5)
ALP BLD-CCNC: 64 U/L (ref 40–129)
ALT SERPL-CCNC: 8 U/L (ref 10–40)
ANION GAP SERPL CALCULATED.3IONS-SCNC: 14 MMOL/L (ref 3–16)
AST SERPL-CCNC: 12 U/L (ref 15–37)
BILIRUB SERPL-MCNC: 0.3 MG/DL (ref 0–1)
BUN BLDV-MCNC: 24 MG/DL (ref 7–20)
CALCIUM SERPL-MCNC: 9.8 MG/DL (ref 8.3–10.6)
CHLORIDE BLD-SCNC: 98 MMOL/L (ref 99–110)
CHOLESTEROL, TOTAL: 191 MG/DL (ref 0–199)
CO2: 26 MMOL/L (ref 21–32)
CREAT SERPL-MCNC: 0.7 MG/DL (ref 0.8–1.3)
GFR AFRICAN AMERICAN: >60
GFR NON-AFRICAN AMERICAN: >60
GLOBULIN: 2.9 G/DL
GLUCOSE BLD-MCNC: 219 MG/DL (ref 70–99)
HBA1C MFR BLD: 8.1 %
HDLC SERPL-MCNC: 99 MG/DL (ref 40–60)
LDL CHOLESTEROL CALCULATED: 74 MG/DL
POTASSIUM SERPL-SCNC: 4.8 MMOL/L (ref 3.5–5.1)
SODIUM BLD-SCNC: 138 MMOL/L (ref 136–145)
TOTAL PROTEIN: 6.9 G/DL (ref 6.4–8.2)
TRIGL SERPL-MCNC: 92 MG/DL (ref 0–150)
VLDLC SERPL CALC-MCNC: 18 MG/DL

## 2019-12-10 PROCEDURE — G0439 PPPS, SUBSEQ VISIT: HCPCS | Performed by: FAMILY MEDICINE

## 2019-12-10 PROCEDURE — 83036 HEMOGLOBIN GLYCOSYLATED A1C: CPT | Performed by: FAMILY MEDICINE

## 2019-12-10 RX ORDER — ACARBOSE 100 MG/1
100 TABLET ORAL 2 TIMES DAILY WITH MEALS
Qty: 180 TABLET | Refills: 3 | Status: SHIPPED | OUTPATIENT
Start: 2019-12-10 | End: 2021-01-20 | Stop reason: SDUPTHER

## 2019-12-10 RX ORDER — RAMIPRIL 10 MG/1
10 CAPSULE ORAL DAILY
Qty: 90 CAPSULE | Refills: 3 | Status: SHIPPED | OUTPATIENT
Start: 2019-12-10 | End: 2021-02-21 | Stop reason: SDUPTHER

## 2019-12-10 RX ORDER — PRAVASTATIN SODIUM 40 MG
40 TABLET ORAL DAILY
Qty: 90 TABLET | Refills: 3 | Status: SHIPPED | OUTPATIENT
Start: 2019-12-10 | End: 2021-01-20 | Stop reason: SDUPTHER

## 2019-12-10 RX ORDER — GLIMEPIRIDE 4 MG/1
4 TABLET ORAL 2 TIMES DAILY WITH MEALS
Qty: 180 TABLET | Refills: 3 | Status: SHIPPED | OUTPATIENT
Start: 2019-12-10 | End: 2021-01-20 | Stop reason: SDUPTHER

## 2019-12-10 RX ORDER — PIOGLITAZONEHYDROCHLORIDE 45 MG/1
45 TABLET ORAL DAILY
Qty: 90 TABLET | Refills: 3 | Status: SHIPPED | OUTPATIENT
Start: 2019-12-10 | End: 2021-02-21 | Stop reason: SDUPTHER

## 2019-12-10 ASSESSMENT — LIFESTYLE VARIABLES
HOW OFTEN DO YOU HAVE SIX OR MORE DRINKS ON ONE OCCASION: 0
HOW OFTEN DURING THE LAST YEAR HAVE YOU BEEN UNABLE TO REMEMBER WHAT HAPPENED THE NIGHT BEFORE BECAUSE YOU HAD BEEN DRINKING: 0
AUDIT TOTAL SCORE: 2
HOW OFTEN DURING THE LAST YEAR HAVE YOU HAD A FEELING OF GUILT OR REMORSE AFTER DRINKING: 0
HAVE YOU OR SOMEONE ELSE BEEN INJURED AS A RESULT OF YOUR DRINKING: 0
HOW OFTEN DURING THE LAST YEAR HAVE YOU FOUND THAT YOU WERE NOT ABLE TO STOP DRINKING ONCE YOU HAD STARTED: 0
HOW MANY STANDARD DRINKS CONTAINING ALCOHOL DO YOU HAVE ON A TYPICAL DAY: 0
HOW OFTEN DURING THE LAST YEAR HAVE YOU NEEDED AN ALCOHOLIC DRINK FIRST THING IN THE MORNING TO GET YOURSELF GOING AFTER A NIGHT OF HEAVY DRINKING: 0
HAS A RELATIVE, FRIEND, DOCTOR, OR ANOTHER HEALTH PROFESSIONAL EXPRESSED CONCERN ABOUT YOUR DRINKING OR SUGGESTED YOU CUT DOWN: 0
AUDIT-C TOTAL SCORE: 2
HOW OFTEN DURING THE LAST YEAR HAVE YOU FAILED TO DO WHAT WAS NORMALLY EXPECTED FROM YOU BECAUSE OF DRINKING: 0
HOW OFTEN DO YOU HAVE A DRINK CONTAINING ALCOHOL: 2

## 2019-12-10 ASSESSMENT — PATIENT HEALTH QUESTIONNAIRE - PHQ9
SUM OF ALL RESPONSES TO PHQ QUESTIONS 1-9: 2
SUM OF ALL RESPONSES TO PHQ QUESTIONS 1-9: 2

## 2020-01-26 ENCOUNTER — PATIENT MESSAGE (OUTPATIENT)
Dept: FAMILY MEDICINE CLINIC | Age: 75
End: 2020-01-26

## 2020-01-27 NOTE — TELEPHONE ENCOUNTER
From: Joanna Gould  To: Soniya Mazariegos MD  Sent: 1/26/2020 6:30 PM EST  Subject: Prescription Question    I did not receive Arjun Riegelwood with my recent medication refill. Was this discontinued? I didn't think that it was. If not, then please send a new script to [facility]. I am almost out.

## 2020-01-29 NOTE — TELEPHONE ENCOUNTER
Medication pending to [facility] - if you want patient to continue taking.     Last Labs DM   Lab Results   Component Value Date    LABA1C 8.1 12/10/2019

## 2020-02-03 ENCOUNTER — TELEPHONE (OUTPATIENT)
Dept: FAMILY MEDICINE CLINIC | Age: 75
End: 2020-02-03

## 2020-02-04 ENCOUNTER — OFFICE VISIT (OUTPATIENT)
Dept: FAMILY MEDICINE CLINIC | Age: 75
End: 2020-02-04
Payer: COMMERCIAL

## 2020-02-04 VITALS
SYSTOLIC BLOOD PRESSURE: 134 MMHG | TEMPERATURE: 98.7 F | BODY MASS INDEX: 30.22 KG/M2 | OXYGEN SATURATION: 93 % | DIASTOLIC BLOOD PRESSURE: 86 MMHG | HEIGHT: 73 IN | HEART RATE: 77 BPM | WEIGHT: 228 LBS

## 2020-02-04 PROCEDURE — G8510 SCR DEP NEG, NO PLAN REQD: HCPCS | Performed by: NURSE PRACTITIONER

## 2020-02-04 PROCEDURE — 99213 OFFICE O/P EST LOW 20 MIN: CPT | Performed by: NURSE PRACTITIONER

## 2020-02-04 RX ORDER — AZITHROMYCIN 250 MG/1
250 TABLET, FILM COATED ORAL SEE ADMIN INSTRUCTIONS
Qty: 6 TABLET | Refills: 0 | Status: SHIPPED | OUTPATIENT
Start: 2020-02-04 | End: 2020-02-09

## 2020-02-04 ASSESSMENT — PATIENT HEALTH QUESTIONNAIRE - PHQ9
SUM OF ALL RESPONSES TO PHQ QUESTIONS 1-9: 0
SUM OF ALL RESPONSES TO PHQ9 QUESTIONS 1 & 2: 0
2. FEELING DOWN, DEPRESSED OR HOPELESS: 0
SUM OF ALL RESPONSES TO PHQ QUESTIONS 1-9: 0
1. LITTLE INTEREST OR PLEASURE IN DOING THINGS: 0

## 2020-02-04 ASSESSMENT — ENCOUNTER SYMPTOMS
RHINORRHEA: 1
SHORTNESS OF BREATH: 0
SINUS PRESSURE: 0
COUGH: 1
CHEST TIGHTNESS: 0
WHEEZING: 1
SINUS PAIN: 0

## 2020-02-04 NOTE — PROGRESS NOTES
[facility] PRIMARY CARE  [facility] [address]: [phone]         2020     Jacky So (:  1945) is a 75 y.o. male, here for evaluation of the following medical concerns:    Chief Complaint   Patient presents with    Cough     Plugged ears, cough, achey, not able to sleep, nasal drainage, headache. Symptoms started Tuesday last week. HPI  Symptoms started last Tuesday  Plugged ears- popping  Cough- worse at night; coughing fits; seldom drainage  Runny nose and congestion  Wheezing  No SOB or CP  No chest tightening  Feeling achy- taking naproxen- helpful  Cough medication- not helpful  Previously needed cough medication with codeine    Review of Systems   Constitutional: Positive for activity change and appetite change. Negative for chills and fever. HENT: Positive for congestion and rhinorrhea. Negative for postnasal drip, sinus pressure and sinus pain. Respiratory: Positive for cough and wheezing. Negative for chest tightness and shortness of breath. Cardiovascular: Negative for chest pain. Neurological: Positive for headaches. Negative for dizziness. Prior to Visit Medications    Medication Sig Taking?  Authorizing Provider   azithromycin (ZITHROMAX) 250 MG tablet Take 1 tablet by mouth See Admin Instructions for 5 days 500mg on day 1 followed by 250mg on days 2 - 5 Yes NATASHA Lemons CNP   metFORMIN (GLUCOPHAGE) 500 MG tablet Take 2 tablets by mouth 2 times daily (with meals) Yes NATASHA Lemons CNP   dapagliflozin (FARXIGA) 10 MG tablet Take 1 tablet by mouth every morning Yes Andreia Anton MD   pioglitazone (ACTOS) 45 MG tablet Take 1 tablet by mouth daily Yes Andreia Anton MD   acarbose (PRECOSE) 100 MG tablet Take 1 tablet by mouth 2 times daily (with meals) Yes Andreia Anton MD   pravastatin (PRAVACHOL) 40 MG tablet Take 1 tablet by mouth daily Yes Andreia Anton MD

## 2020-02-07 ENCOUNTER — HOSPITAL ENCOUNTER (OUTPATIENT)
Age: 75
Discharge: HOME | End: 2020-02-07
Payer: COMMERCIAL

## 2020-02-07 ENCOUNTER — OFFICE VISIT (OUTPATIENT)
Dept: FAMILY MEDICINE CLINIC | Age: 75
End: 2020-02-07
Payer: COMMERCIAL

## 2020-02-07 ENCOUNTER — HOSPITAL ENCOUNTER (OUTPATIENT)
Dept: GENERAL RADIOLOGY | Age: 75
Discharge: HOME | End: 2020-02-07
Payer: COMMERCIAL

## 2020-02-07 VITALS
OXYGEN SATURATION: 95 % | HEIGHT: 73 IN | DIASTOLIC BLOOD PRESSURE: 72 MMHG | BODY MASS INDEX: 30.22 KG/M2 | WEIGHT: 228 LBS | SYSTOLIC BLOOD PRESSURE: 132 MMHG | HEART RATE: 100 BPM

## 2020-02-07 PROCEDURE — 71046 X-RAY EXAM CHEST 2 VIEWS: CPT

## 2020-02-07 PROCEDURE — 99213 OFFICE O/P EST LOW 20 MIN: CPT | Performed by: NURSE PRACTITIONER

## 2020-02-07 ASSESSMENT — ENCOUNTER SYMPTOMS
CHEST TIGHTNESS: 0
COUGH: 1
SHORTNESS OF BREATH: 0
RHINORRHEA: 1
WHEEZING: 0

## 2020-02-07 NOTE — PROGRESS NOTES
[facility] PRIMARY CARE  [address]: [phone]         2020     Dilan Krishna (:  1945) is a 75 y.o. male, here for evaluation of the following medical concerns:    No chief complaint on file. HPI  Productive cough- not much coming up  Coughing fits- have improved some  No fevers or chills  Almost dizzy this morning after exerting himself  No chest tightening, chest pain or SOB  Ears are feeling better- not popping, still feel clogged  Less runny nose, continues to be congested  Appetite is improving  Trying steam  Taking abx  Feeling better than did    Review of Systems   Constitutional: Negative for appetite change, chills and fever. HENT: Positive for congestion, ear pain and rhinorrhea. Respiratory: Positive for cough. Negative for chest tightness, shortness of breath and wheezing. Cardiovascular: Negative for chest pain. Neurological: Positive for dizziness and headaches. Prior to Visit Medications    Medication Sig Taking?  Authorizing Provider   azithromycin (ZITHROMAX) 250 MG tablet Take 1 tablet by mouth See Admin Instructions for 5 days 500mg on day 1 followed by 250mg on days 2 - 5 Yes NATASHA De Anda CNP   metFORMIN (GLUCOPHAGE) 500 MG tablet Take 2 tablets by mouth 2 times daily (with meals) Yes NATASHA De Anda CNP   dapagliflozin (FARXIGA) 10 MG tablet Take 1 tablet by mouth every morning Yes Robert Arce MD   pioglitazone (ACTOS) 45 MG tablet Take 1 tablet by mouth daily Yes Robert Arce MD   acarbose (PRECOSE) 100 MG tablet Take 1 tablet by mouth 2 times daily (with meals) Yes Robert Arce MD   pravastatin (PRAVACHOL) 40 MG tablet Take 1 tablet by mouth daily Yes Robert Arce MD   glimepiride (AMARYL) 4 MG tablet Take 1 tablet by mouth 2 times daily (with meals) Yes Robert Arce MD   ramipril (ALTACE) 10 MG capsule Take 1 capsule by mouth daily Yes Sharon Donohue MD   sildenafil (VIAGRA) 100 MG tablet Take 1 tablet by mouth as needed for Erectile Dysfunction Yes Sharon Donohue MD   glucose blood VI test strips (FREESTYLE LITE) strip Use to test blood sugar daily as needed. Yes Sharon Donohue MD   baclofen (LIORESAL) 10 MG tablet Take 1 tablet by mouth 3 times daily as needed Yes Sharon Donohue MD   Multiple Vitamin (MULTI VITAMIN MENS PO) Take  by mouth Daily. Yes Historical Provider, MD        Social History     Tobacco Use    Smoking status: Former Smoker     Packs/day: 2.00     Years: 40.00     Pack years: 80.00     Types: Cigarettes     Last attempt to quit: 10/24/2001     Years since quittin.3    Smokeless tobacco: Never Used   Substance Use Topics    Alcohol use: Yes     Alcohol/week: 0.0 standard drinks     Comment: rarely        Vitals:    20 1042 20 1100   BP: 132/72    Site: Right Upper Arm    Position: Sitting    Cuff Size: Large Adult    Pulse: 100    SpO2: 93% 95%   Weight: 228 lb (103.4 kg)    Height: 6' 1\" (1.854 m)      Estimated body mass index is 30.08 kg/m² as calculated from the following:    Height as of this encounter: 6' 1\" (1.854 m). Weight as of this encounter: 228 lb (103.4 kg). Physical Exam  Vitals signs reviewed. Constitutional:       General: He is not in acute distress. Appearance: Normal appearance. HENT:      Head: Normocephalic. Right Ear: Tympanic membrane, ear canal and external ear normal. Decreased hearing noted. Left Ear: Tympanic membrane, ear canal and external ear normal. Decreased hearing noted. Nose: Nose normal.      Mouth/Throat:      Lips: Pink. Mouth: Mucous membranes are moist.      Pharynx: Oropharynx is clear. Uvula midline. Cardiovascular:      Rate and Rhythm: Normal rate and regular rhythm. Pulses: Normal pulses.       Heart sounds: Normal heart sounds, S1 normal and S2 normal.   Pulmonary:      Effort: Pulmonary effort is normal. Breath sounds: Normal air entry. Rhonchi present. Comments: Clears some with coughing  Neurological:      Mental Status: He is alert. Psychiatric:         Mood and Affect: Mood normal.         ASSESSMENT/PLAN:  1. Abnormal breath sounds  Stable;  CXR ordered. Finish zithromax. Appears to be feeling better. Call if worsening symptoms. - XR CHEST STANDARD (2 VW); Future      Follow Up:     Return if symptoms worsen or fail to improve.

## 2020-07-27 RX ORDER — SILDENAFIL 100 MG/1
100 TABLET, FILM COATED ORAL PRN
Qty: 16 TABLET | Refills: 0 | Status: SHIPPED | OUTPATIENT
Start: 2020-07-27 | End: 2020-08-12 | Stop reason: SDUPTHER

## 2020-08-12 ENCOUNTER — TELEPHONE (OUTPATIENT)
Dept: FAMILY MEDICINE CLINIC | Age: 75
End: 2020-08-12

## 2020-08-12 RX ORDER — SILDENAFIL 100 MG/1
100 TABLET, FILM COATED ORAL PRN
Qty: 16 TABLET | Refills: 0 | Status: SHIPPED | OUTPATIENT
Start: 2020-08-12 | End: 2021-05-26 | Stop reason: SDUPTHER

## 2020-12-02 RX ORDER — DAPAGLIFLOZIN 10 MG/1
TABLET, FILM COATED ORAL
Qty: 90 TABLET | Refills: 1 | Status: SHIPPED | OUTPATIENT
Start: 2020-12-02 | End: 2021-05-17

## 2020-12-16 ENCOUNTER — TELEPHONE (OUTPATIENT)
Dept: FAMILY MEDICINE CLINIC | Age: 75
End: 2020-12-16

## 2020-12-16 LAB — SARS-COV-2: DETECTED

## 2020-12-16 NOTE — TELEPHONE ENCOUNTER
Pt received his COVID test results today. Symptoms as of 12/09/20:  Body aches, soreness of skin, loss of appetite, runny nose, light head and chest congestion.

## 2020-12-21 ENCOUNTER — TELEPHONE (OUTPATIENT)
Dept: FAMILY MEDICINE CLINIC | Age: 75
End: 2020-12-21

## 2020-12-21 NOTE — TELEPHONE ENCOUNTER
[facility] called with a question on the pt's test strips. The last fill date they could find was from 2013. Advised pharmacy that I believe  only wanted a 30 day supply called in until the pt NOV on 12/31. Pharmacist states box only comes in a quantity of 50 and they're unable to break the box. Advised pharmacist to give complete box of 50 if insurance would cover it and future Rx's would be discussed at pt's NOV. If this is incorrect, please call and advise pharmacy.

## 2020-12-21 NOTE — TELEPHONE ENCOUNTER
I don't see what needles he'd need. Overdue for follow up. pls make sure he's scheduled.  Let me know if not done in the next month

## 2021-01-07 DIAGNOSIS — E11.9 TYPE 2 DIABETES MELLITUS WITHOUT COMPLICATION, WITHOUT LONG-TERM CURRENT USE OF INSULIN (HCC): ICD-10-CM

## 2021-01-07 LAB
A/G RATIO: 1.5 (ref 1.1–2.2)
ALBUMIN SERPL-MCNC: 4 G/DL (ref 3.4–5)
ALP BLD-CCNC: 81 U/L (ref 40–129)
ALT SERPL-CCNC: 7 U/L (ref 10–40)
ANION GAP SERPL CALCULATED.3IONS-SCNC: 9 MMOL/L (ref 3–16)
AST SERPL-CCNC: 13 U/L (ref 15–37)
BILIRUB SERPL-MCNC: <0.2 MG/DL (ref 0–1)
BUN BLDV-MCNC: 24 MG/DL (ref 7–20)
CALCIUM SERPL-MCNC: 9.7 MG/DL (ref 8.3–10.6)
CHLORIDE BLD-SCNC: 102 MMOL/L (ref 99–110)
CHOLESTEROL, TOTAL: 189 MG/DL (ref 0–199)
CO2: 27 MMOL/L (ref 21–32)
CREAT SERPL-MCNC: 0.9 MG/DL (ref 0.8–1.3)
CREATININE URINE: 60.8 MG/DL (ref 39–259)
GFR AFRICAN AMERICAN: >60
GFR NON-AFRICAN AMERICAN: >60
GLOBULIN: 2.6 G/DL
GLUCOSE BLD-MCNC: 152 MG/DL (ref 70–99)
HDLC SERPL-MCNC: 74 MG/DL (ref 40–60)
LDL CHOLESTEROL CALCULATED: 96 MG/DL
MICROALBUMIN UR-MCNC: 87.5 MG/DL
MICROALBUMIN/CREAT UR-RTO: 1439.1 MG/G (ref 0–30)
POTASSIUM SERPL-SCNC: 5.2 MMOL/L (ref 3.5–5.1)
SODIUM BLD-SCNC: 138 MMOL/L (ref 136–145)
TOTAL PROTEIN: 6.6 G/DL (ref 6.4–8.2)
TRIGL SERPL-MCNC: 96 MG/DL (ref 0–150)
VLDLC SERPL CALC-MCNC: 19 MG/DL

## 2021-01-08 LAB
ESTIMATED AVERAGE GLUCOSE: 240.3 MG/DL
HBA1C MFR BLD: 10 %

## 2021-01-13 ASSESSMENT — LIFESTYLE VARIABLES
HOW OFTEN DURING THE LAST YEAR HAVE YOU HAD A FEELING OF GUILT OR REMORSE AFTER DRINKING: NEVER
HOW OFTEN DURING THE LAST YEAR HAVE YOU FOUND THAT YOU WERE NOT ABLE TO STOP DRINKING ONCE YOU HAD STARTED: NEVER
HOW OFTEN DURING THE LAST YEAR HAVE YOU BEEN UNABLE TO REMEMBER WHAT HAPPENED THE NIGHT BEFORE BECAUSE YOU HAD BEEN DRINKING: NEVER
HOW MANY STANDARD DRINKS CONTAINING ALCOHOL DO YOU HAVE ON A TYPICAL DAY: ONE OR TWO
HOW OFTEN DURING THE LAST YEAR HAVE YOU FAILED TO DO WHAT WAS NORMALLY EXPECTED FROM YOU BECAUSE OF DRINKING: NEVER
HOW OFTEN DO YOU HAVE A DRINK CONTAINING ALCOHOL: TWO TO THREE TIMES A WEEK
HOW OFTEN DURING THE LAST YEAR HAVE YOU NEEDED AN ALCOHOLIC DRINK FIRST THING IN THE MORNING TO GET YOURSELF GOING AFTER A NIGHT OF HEAVY DRINKING: NEVER
HAVE YOU OR SOMEONE ELSE BEEN INJURED AS A RESULT OF YOUR DRINKING: NO
HOW OFTEN DO YOU HAVE SIX OR MORE DRINKS ON ONE OCCASION: NEVER
AUDIT TOTAL SCORE: 0
AUDIT-C TOTAL SCORE: 0
HAS A RELATIVE, FRIEND, DOCTOR, OR ANOTHER HEALTH PROFESSIONAL EXPRESSED CONCERN ABOUT YOUR DRINKING OR SUGGESTED YOU CUT DOWN: NO

## 2021-01-20 ENCOUNTER — OFFICE VISIT (OUTPATIENT)
Dept: FAMILY MEDICINE CLINIC | Age: 76
End: 2021-01-20
Payer: COMMERCIAL

## 2021-01-20 VITALS
HEART RATE: 72 BPM | DIASTOLIC BLOOD PRESSURE: 80 MMHG | OXYGEN SATURATION: 98 % | WEIGHT: 223 LBS | TEMPERATURE: 96.7 F | SYSTOLIC BLOOD PRESSURE: 158 MMHG | BODY MASS INDEX: 29.55 KG/M2 | HEIGHT: 73 IN

## 2021-01-20 DIAGNOSIS — Z00.00 ROUTINE GENERAL MEDICAL EXAMINATION AT A HEALTH CARE FACILITY: Primary | ICD-10-CM

## 2021-01-20 DIAGNOSIS — E11.9 TYPE 2 DIABETES MELLITUS WITHOUT COMPLICATION, WITHOUT LONG-TERM CURRENT USE OF INSULIN (HCC): ICD-10-CM

## 2021-01-20 PROCEDURE — G0439 PPPS, SUBSEQ VISIT: HCPCS | Performed by: FAMILY MEDICINE

## 2021-01-20 RX ORDER — LANCETS 30 GAUGE
1 EACH MISCELLANEOUS 2 TIMES DAILY
Qty: 300 EACH | Refills: 1 | Status: SHIPPED | OUTPATIENT
Start: 2021-01-20 | End: 2022-06-01 | Stop reason: CLARIF

## 2021-01-20 RX ORDER — GLIMEPIRIDE 4 MG/1
4 TABLET ORAL 2 TIMES DAILY WITH MEALS
Qty: 180 TABLET | Refills: 3 | Status: SHIPPED | OUTPATIENT
Start: 2021-01-20 | End: 2021-02-24 | Stop reason: SDUPTHER

## 2021-01-20 RX ORDER — ACARBOSE 100 MG/1
100 TABLET ORAL 2 TIMES DAILY WITH MEALS
Qty: 180 TABLET | Refills: 3 | Status: SHIPPED | OUTPATIENT
Start: 2021-01-20 | End: 2021-03-12

## 2021-01-20 RX ORDER — PRAVASTATIN SODIUM 40 MG
40 TABLET ORAL DAILY
Qty: 90 TABLET | Refills: 3 | Status: SHIPPED | OUTPATIENT
Start: 2021-01-20 | End: 2021-02-24 | Stop reason: SDUPTHER

## 2021-01-20 ASSESSMENT — LIFESTYLE VARIABLES
HOW MANY STANDARD DRINKS CONTAINING ALCOHOL DO YOU HAVE ON A TYPICAL DAY: 0
HAS A RELATIVE, FRIEND, DOCTOR, OR ANOTHER HEALTH PROFESSIONAL EXPRESSED CONCERN ABOUT YOUR DRINKING OR SUGGESTED YOU CUT DOWN: 0
HOW OFTEN DO YOU HAVE SIX OR MORE DRINKS ON ONE OCCASION: 0
AUDIT TOTAL SCORE: 2
HOW OFTEN DURING THE LAST YEAR HAVE YOU BEEN UNABLE TO REMEMBER WHAT HAPPENED THE NIGHT BEFORE BECAUSE YOU HAD BEEN DRINKING: 0
HOW OFTEN DURING THE LAST YEAR HAVE YOU HAD A FEELING OF GUILT OR REMORSE AFTER DRINKING: 0
HOW OFTEN DURING THE LAST YEAR HAVE YOU NEEDED AN ALCOHOLIC DRINK FIRST THING IN THE MORNING TO GET YOURSELF GOING AFTER A NIGHT OF HEAVY DRINKING: 0
AUDIT-C TOTAL SCORE: 2

## 2021-01-20 ASSESSMENT — PATIENT HEALTH QUESTIONNAIRE - PHQ9
SUM OF ALL RESPONSES TO PHQ QUESTIONS 1-9: 0
2. FEELING DOWN, DEPRESSED OR HOPELESS: 0
SUM OF ALL RESPONSES TO PHQ QUESTIONS 1-9: 0
SUM OF ALL RESPONSES TO PHQ QUESTIONS 1-9: 0
SUM OF ALL RESPONSES TO PHQ9 QUESTIONS 1 & 2: 0
1. LITTLE INTEREST OR PLEASURE IN DOING THINGS: 0

## 2021-01-20 NOTE — PROGRESS NOTES
Medicare Annual Wellness Visit  Name: Talya Blount Date: 2021   MRN: <[id number]> Sex: Male   Age: 67 y.o. Ethnicity: Non-/Non    : 1945 Race: Shu Herbert is here for Medicare AWV    Screenings for behavioral, psychosocial and functional/safety risks, and cognitive dysfunction are all negative except as indicated below. These results, as well as other patient data from the [email or web address] form, are documented in Flowsheets linked to this Encounter. Allergies   Allergen Reactions    Dye [Iodides]        Prior to Visit Medications    Medication Sig Taking? Authorizing Provider   Lancets MISC 1 each by Does not apply route 2 times daily Yes Mohit Lundberg MD   blood glucose test strips (ASCENSIA AUTODISC VI;ONE TOUCH ULTRA TEST VI) strip 1 each by In Vitro route daily As needed. Yes Mohit Lundberg MD   FARXIGA 10 MG tablet TAKE 1 TABLET EVERY MORNING Yes Mohit Lundberg MD   sildenafil (VIAGRA) 100 MG tablet Take 1 tablet by mouth as needed for Erectile Dysfunction Max daily dose 100mg. Yes Mohit Lundberg MD   metFORMIN (GLUCOPHAGE) 500 MG tablet Take 2 tablets by mouth 2 times daily (with meals) Yes NATASHA Martinez - CNP   acarbose (PRECOSE) 100 MG tablet Take 1 tablet by mouth 2 times daily (with meals) Yes Mohit Lundberg MD   pravastatin (PRAVACHOL) 40 MG tablet Take 1 tablet by mouth daily Yes Mohit Lundberg MD   glimepiride (AMARYL) 4 MG tablet Take 1 tablet by mouth 2 times daily (with meals) Yes Mohit Lundberg MD   baclofen (LIORESAL) 10 MG tablet Take 1 tablet by mouth 3 times daily as needed Yes Mohit Lundberg MD   Multiple Vitamin (MULTI VITAMIN MENS PO) Take  by mouth Daily.    Yes Historical Provider, MD   pioglitazone (ACTOS) 45 MG tablet Take 1 tablet by mouth daily  Mohit Lundberg MD   ramipril (ALTACE) 10 MG capsule Take 1 capsule by mouth daily  Mohit Lundberg MD       Past Medical History: Diagnosis Date    Carotid artery stenosis     Carotid stenosis, left 10/12/2011    Chronic back pain     Erectile dysfunction     Hyperlipidemia     Hypertension     Osteoarthritis     Type II or unspecified type diabetes mellitus without mention of complication, not stated as uncontrolled        No past surgical history on file. Family History   Problem Relation Age of Onset    Hearing Loss Father     Heart Disease Father 70        MI    High Blood Pressure Father     Diabetes Maternal Grandmother         hypoglycemic    Hearing Loss Maternal Grandmother     Arthritis Maternal Grandfather        CareTeam (Including outside providers/suppliers regularly involved in providing care):   Patient Care Team:  Sirena Jacome MD as PCP - General (Family Medicine)  Si[facility] Jac[facility]  Sirena Jacome MD (Family Medicine)    Wt Readings from Last 3 Encounters:   01/20/21 223 lb (101.2 kg)   02/07/20 228 lb (103.4 kg)   02/04/20 228 lb (103.4 kg)     Vitals:    01/20/21 1305   BP: (!) 160/80   Site: Left Upper Arm   Position: Sitting   Cuff Size: Medium Adult   Pulse: 72   Temp: 96.7 °F (35.9 °C)   TempSrc: Temporal   SpO2: 98%   Weight: 223 lb (101.2 kg)   Height: 6' 1\" (1.854 m)     Body mass index is 29.42 kg/m². Based upon direct observation of the patient, evaluation of cognition reveals remote memory intact, recent memory impaired.     General Appearance: alert and oriented to person, place and time, well-developed and well-nourished, in no acute distress  Pulmonary/Chest: clear to auscultation bilaterally- no wheezes, rales or rhonchi, normal air movement, no respiratory distress  Cardiovascular: normal rate, normal S1 and S2, no gallops, intact distal pulses and no carotid bruits Patient's complete Health Risk Assessment and screening values have been reviewed and are found in Flowsheets. The following problems were reviewed today and where indicated follow up appointments were made and/or referrals ordered. Positive Risk Factor Screenings with Interventions:     Fall Risk:  2 or more falls in past year?: (!) yes  Fall with injury in past year?: no  Fall Risk Interventions:    · starting PT    Cognitive: Words recalled: 2 Words Recalled  Total Score Interpretation: Positive Mini-Cog  Cognitive Impairment Interventions:  · Patient declines any further evaluation/treatment for cognitive impairment       General Health and ACP:  General  In general, how would you say your health is?: Very Good  In the past 7 days, have you experienced any of the following?  New or Increased Pain, New or Increased Fatigue, Loneliness, Social Isolation, Stress or Anger?: None of These  Do you get the social and emotional support that you need?: Yes  Do you have a Living Will?: Yes  Advance Directives     Power of [address] [place] [address] Will ACP-Advance Directive ACP-Power of     Not on File Not on File Not on File Not on File      General Health Risk Interventions:  · Will be starting PT   · Following with pain management for injections    Health Habits/Nutrition:  Health Habits/Nutrition  Do you exercise for at least 20 minutes 2-3 times per week?: (!) No  Have you lost any weight without trying in the past 3 months?: (!) Yes  Do you eat fewer than 2 meals per day?: No  Have you seen a dentist within the past year?: Yes  Body mass index: (!) 29.42  Health Habits/Nutrition Interventions:  · Inadequate physical activity:  patient agrees to exercise for at least 150 minutes/week    Hearing/Vision:  No exam data present  Hearing/Vision  Do you or your family notice any trouble with your hearing?: (!) Yes Do you have difficulty driving, watching TV, or doing any of your daily activities because of your eyesight?: No  Have you had an eye exam within the past year?: Yes  Hearing/Vision Interventions:  · Hearing concerns:  patient declines any further evaluation/treatment for hearing issues      Personalized Preventive Plan   Current Health Maintenance Status  Immunization History   Administered Date(s) Administered    Influenza 10/24/2011    Influenza Virus Vaccine 10/27/2014    Influenza, High Dose (Fluzone 65 yrs and older) 10/15/2015, 10/10/2016, 11/11/2017, 09/12/2018    Pneumococcal Conjugate 13-valent (Hadqtxu77) 06/05/2015    Pneumococcal Conjugate 7-valent (Prevnar7) 09/03/2013    Pneumococcal Polysaccharide ([id number]) 09/03/2013    Tdap (Boostrix, Adacel) 08/20/2012    Zoster Live (Zostavax) 09/01/2013        Health Maintenance   Topic Date Due    Hepatitis C screen  1945    Shingles Vaccine (2 of 3) 10/27/2013    Annual Wellness Visit (AWV)  05/29/2019    Flu vaccine (1) 09/01/2020    Lipid screen  01/07/2022    DTaP/Tdap/Td vaccine (2 - Td) 08/20/2022    Pneumococcal 65+ years Vaccine  Completed    Hepatitis A vaccine  Aged Out    Hib vaccine  Aged Out    Meningococcal (ACWY) vaccine  Aged Out     Recommendations for [employer] Due: see orders and patient instructions/AVS.  . Recommended screening schedule for the next 5-10 years is provided to the patient in written form: see Patient Instructions/AVS.    Ab Brandon was seen today for medicare awv. 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 Will restart with samples of ozempic and recheck in 3-4 months  Other orders  -     Lancets MISC; 1 each by Does not apply route 2 times daily  -     glimepiride (AMARYL) 4 MG tablet; Take 1 tablet by mouth 2 times daily (with meals)  -     pravastatin (PRAVACHOL) 40 MG tablet; Take 1 tablet by mouth daily  -     acarbose (PRECOSE) 100 MG tablet; Take 1 tablet by mouth 2 times daily (with meals)  -     Semaglutide, 1 MG/DOSE, 2 MG/1.5ML SOPN; Inject as directed by physician.

## 2021-01-20 NOTE — LETTER
[facility]  [address] [place] [address],[address]  Phone: [phone]  Fax: [phone]    Silvia Pereira MD        January 20, 2021     Patient: Jamey Henderson   YOB: 1945   Date of Visit: 1/20/2021       To Whom It May Concern: It is my medical opinion that Nedra La requires a disability parking placard for the following reasons:  He has limited walking ability due to an orthopedic condition. Duration of need: 1 year    If you have any questions or concerns, please don't hesitate to call.     Sincerely,    Electronically signed by Silvia Pereira MD on 1/20/2021 at 1:23 PM      Silvia Pereira MD

## 2021-01-20 NOTE — PATIENT INSTRUCTIONS
Personalized Preventive Plan for Alina Running - 1/20/2021  Medicare offers a range of preventive health benefits. Some of the tests and screenings are paid in full while other may be subject to a deductible, co-insurance, and/or copay. Some of these benefits include a comprehensive review of your medical history including lifestyle, illnesses that may run in your family, and various assessments and screenings as appropriate. After reviewing your medical record and screening and assessments performed today your provider may have ordered immunizations, labs, imaging, and/or referrals for you. A list of these orders (if applicable) as well as your Preventive Care list are included within your After Visit Summary for your review. Other Preventive Recommendations:    · A preventive eye exam performed by an eye specialist is recommended every 1-2 years to screen for glaucoma; cataracts, macular degeneration, and other eye disorders. · A preventive dental visit is recommended every 6 months. · Try to get at least 150 minutes of exercise per week or 10,000 steps per day on a pedometer . · Order or download the FREE \"Exercise & Physical Activity: Your Everyday Guide\" from [employer] [place] [employer]. Call 1-485.622.9621 or search [employer] [place] [employer] online. · You need 8034-2222 mg of calcium and 3006-4870 IU of vitamin D per day. It is possible to meet your calcium requirement with diet alone, but a vitamin D supplement is usually necessary to meet this goal.  · When exposed to the sun, use a sunscreen that protects against both UVA and UVB radiation with an SPF of 30 or greater. Reapply every 2 to 3 hours or after sweating, drying off with a towel, or swimming. · Always wear a seat belt when traveling in a car. Always wear a helmet when riding a bicycle or motorcycle.

## 2021-02-21 DIAGNOSIS — I10 ESSENTIAL HYPERTENSION: ICD-10-CM

## 2021-02-22 RX ORDER — ACARBOSE 100 MG/1
100 TABLET ORAL 2 TIMES DAILY WITH MEALS
Qty: 180 TABLET | Refills: 1 | Status: CANCELLED | OUTPATIENT
Start: 2021-02-22

## 2021-02-22 RX ORDER — RAMIPRIL 10 MG/1
10 CAPSULE ORAL DAILY
Qty: 90 CAPSULE | Refills: 1 | Status: SHIPPED | OUTPATIENT
Start: 2021-02-22 | End: 2021-08-25

## 2021-02-22 RX ORDER — PRAVASTATIN SODIUM 40 MG
40 TABLET ORAL DAILY
Qty: 90 TABLET | Refills: 1 | Status: CANCELLED | OUTPATIENT
Start: 2021-02-22

## 2021-02-22 RX ORDER — PIOGLITAZONEHYDROCHLORIDE 45 MG/1
45 TABLET ORAL DAILY
Qty: 90 TABLET | Refills: 1 | Status: SHIPPED | OUTPATIENT
Start: 2021-02-22 | End: 2021-08-25

## 2021-02-22 RX ORDER — GLIMEPIRIDE 4 MG/1
4 TABLET ORAL 2 TIMES DAILY WITH MEALS
Qty: 180 TABLET | Refills: 1 | Status: CANCELLED | OUTPATIENT
Start: 2021-02-22

## 2021-02-22 NOTE — TELEPHONE ENCOUNTER
Precose, Pravastatin & Glimepiride 1/20/21 (90 day w/refills)  Actos & Ramipril - 12/10/19  Last Office Visit 1/20/21   Return for Medicare Annual Wellness Visit in 1 year.   No Pending Appointments

## 2021-02-24 ENCOUNTER — PATIENT MESSAGE (OUTPATIENT)
Dept: FAMILY MEDICINE CLINIC | Age: 76
End: 2021-02-24

## 2021-02-24 DIAGNOSIS — I10 ESSENTIAL HYPERTENSION: ICD-10-CM

## 2021-02-24 RX ORDER — GLIMEPIRIDE 4 MG/1
4 TABLET ORAL 2 TIMES DAILY WITH MEALS
Qty: 180 TABLET | Refills: 3 | Status: SHIPPED | OUTPATIENT
Start: 2021-02-24 | End: 2021-10-20 | Stop reason: SDUPTHER

## 2021-02-24 RX ORDER — RAMIPRIL 10 MG/1
10 CAPSULE ORAL DAILY
Qty: 90 CAPSULE | Refills: 1 | Status: CANCELLED | OUTPATIENT
Start: 2021-02-24 | End: 2022-02-24

## 2021-02-24 RX ORDER — PRAVASTATIN SODIUM 40 MG
40 TABLET ORAL DAILY
Qty: 90 TABLET | Refills: 3 | Status: SHIPPED | OUTPATIENT
Start: 2021-02-24 | End: 2021-10-20 | Stop reason: SDUPTHER

## 2021-02-24 NOTE — TELEPHONE ENCOUNTER
From: Nayana Rios  Sent: 2/24/2021 11:08 AM EST  To: Trent Hurt  Practice Support  Subject: RE: Prescription Question    The problem seams to be that [facility], the mail order co. i order from has all three of those meds listed as \"0\" refills. Now what?

## 2021-03-11 NOTE — TELEPHONE ENCOUNTER
Last OV 1/20/2021   Next OV Visit date not found    Requested Prescriptions     Pending Prescriptions Disp Refills    acarbose (PRECOSE) 100 MG tablet [Pharmacy Med Name: ACARBOSE TAB 100MG] 180 tablet 3     Sig: TAKE 1 TABLET TWICE DAILY  WITH MEALS

## 2021-03-12 RX ORDER — ACARBOSE 100 MG/1
TABLET ORAL
Qty: 180 TABLET | Refills: 3 | Status: SHIPPED | OUTPATIENT
Start: 2021-03-12 | End: 2021-10-20 | Stop reason: SDUPTHER

## 2021-03-15 RX ORDER — BLOOD-GLUCOSE METER
KIT MISCELLANEOUS
Qty: 100 EACH | Refills: 0 | Status: SHIPPED | OUTPATIENT
Start: 2021-03-15 | End: 2021-07-23

## 2021-04-06 ENCOUNTER — OFFICE VISIT (OUTPATIENT)
Dept: FAMILY MEDICINE CLINIC | Age: 76
End: 2021-04-06
Payer: COMMERCIAL

## 2021-04-06 VITALS
DIASTOLIC BLOOD PRESSURE: 78 MMHG | SYSTOLIC BLOOD PRESSURE: 138 MMHG | BODY MASS INDEX: 28.89 KG/M2 | OXYGEN SATURATION: 98 % | WEIGHT: 218 LBS | HEIGHT: 73 IN | HEART RATE: 76 BPM

## 2021-04-06 DIAGNOSIS — Z01.818 PREOP EXAMINATION: Primary | ICD-10-CM

## 2021-04-06 DIAGNOSIS — I10 ESSENTIAL HYPERTENSION: ICD-10-CM

## 2021-04-06 DIAGNOSIS — E11.9 TYPE 2 DIABETES MELLITUS WITHOUT COMPLICATION, WITHOUT LONG-TERM CURRENT USE OF INSULIN (HCC): ICD-10-CM

## 2021-04-06 LAB
ANION GAP SERPL CALCULATED.3IONS-SCNC: 8 MMOL/L (ref 3–16)
BUN BLDV-MCNC: 24 MG/DL (ref 7–20)
CALCIUM SERPL-MCNC: 9.1 MG/DL (ref 8.3–10.6)
CHLORIDE BLD-SCNC: 100 MMOL/L (ref 99–110)
CO2: 30 MMOL/L (ref 21–32)
CREAT SERPL-MCNC: 0.8 MG/DL (ref 0.8–1.3)
ESTIMATED AVERAGE GLUCOSE: 197.3 MG/DL
GFR AFRICAN AMERICAN: >60
GFR NON-AFRICAN AMERICAN: >60
GLUCOSE BLD-MCNC: 179 MG/DL (ref 70–99)
HBA1C MFR BLD: 8.5 %
POTASSIUM SERPL-SCNC: 5.1 MMOL/L (ref 3.5–5.1)
SODIUM BLD-SCNC: 138 MMOL/L (ref 136–145)

## 2021-04-06 PROCEDURE — 99214 OFFICE O/P EST MOD 30 MIN: CPT | Performed by: FAMILY MEDICINE

## 2021-04-06 RX ORDER — SEMAGLUTIDE 1.34 MG/ML
INJECTION, SOLUTION SUBCUTANEOUS
Qty: 3 PEN | Refills: 0 | COMMUNITY
Start: 2021-04-06 | End: 2021-07-12 | Stop reason: SDUPTHER

## 2021-04-06 ASSESSMENT — ENCOUNTER SYMPTOMS: RESPIRATORY NEGATIVE: 1

## 2021-04-06 NOTE — PROGRESS NOTES
capsule Take 1 capsule by mouth daily 90 capsule 1    Lancets MISC 1 each by Does not apply route 2 times daily 300 each 1    FARXIGA 10 MG tablet TAKE 1 TABLET EVERY MORNING 90 tablet 1    sildenafil (VIAGRA) 100 MG tablet Take 1 tablet by mouth as needed for Erectile Dysfunction Max daily dose 100mg. 16 tablet 0    metFORMIN (GLUCOPHAGE) 500 MG tablet Take 2 tablets by mouth 2 times daily (with meals) 360 tablet 3    baclofen (LIORESAL) 10 MG tablet Take 1 tablet by mouth 3 times daily as needed 30 tablet 1    Multiple Vitamin (MULTI VITAMIN MENS PO) Take  by mouth Daily. No current facility-administered medications on file prior to visit. Past Medical History:   Diagnosis Date    Carotid artery stenosis     Carotid stenosis, left 10/12/2011    Chronic back pain     Erectile dysfunction     Hyperlipidemia     Hypertension     Osteoarthritis     Type II or unspecified type diabetes mellitus without mention of complication, not stated as uncontrolled        No past surgical history on file. Social History     Socioeconomic History    Marital status:      Spouse name: None    Number of children: None    Years of education: None    Highest education level: None   Occupational History    None   Social Needs    Financial resource strain: Not hard at all   Ronen-Panfilo insecurity     Worry: Never true     Inability: Never true    Transportation needs     Medical: No     Non-medical: No   Tobacco Use    Smoking status: Former Smoker     Packs/day: 2.00     Years: 40.00     Pack years: 80.00     Types: Cigarettes     Quit date: 10/24/2001     Years since quittin.4    Smokeless tobacco: Never Used   Substance and Sexual Activity    Alcohol use:  Yes     Alcohol/week: 0.0 standard drinks     Comment: rarely    Drug use: No    Sexual activity: Yes     Partners: Female   Lifestyle    Physical activity     Days per week: None     Minutes per session: None    Stress: None cranial nerve deficit. Psychiatric:         Behavior: Behavior normal.         Thought Content: Thought content normal.         Judgment: Judgment normal.                 An electronic signature was used to authenticate this note.     --Sofia Quigley MD

## 2021-04-08 ENCOUNTER — TELEPHONE (OUTPATIENT)
Dept: FAMILY MEDICINE CLINIC | Age: 76
End: 2021-04-08

## 2021-05-17 RX ORDER — DAPAGLIFLOZIN 10 MG/1
TABLET, FILM COATED ORAL
Qty: 90 TABLET | Refills: 1 | Status: SHIPPED | OUTPATIENT
Start: 2021-05-17 | End: 2021-10-20 | Stop reason: SDUPTHER

## 2021-05-17 NOTE — TELEPHONE ENCOUNTER
Last OV 4/6/2021   Next OV 5/17/2021    Requested Prescriptions     Pending Prescriptions Disp Refills    metFORMIN (GLUCOPHAGE) 500 MG tablet [Pharmacy Med Name: METFORMIN TAB 500MG] 360 tablet 3     Sig: TAKE 2 TABLETS 2 TIMES     DAILY WITH MEALS

## 2021-05-26 RX ORDER — SILDENAFIL 100 MG/1
100 TABLET, FILM COATED ORAL PRN
Qty: 16 TABLET | Refills: 0 | Status: SHIPPED | OUTPATIENT
Start: 2021-05-26 | End: 2022-07-01 | Stop reason: SDUPTHER

## 2021-05-27 ENCOUNTER — TELEPHONE (OUTPATIENT)
Dept: FAMILY MEDICINE CLINIC | Age: 76
End: 2021-05-27

## 2021-07-12 DIAGNOSIS — E11.9 TYPE 2 DIABETES MELLITUS WITHOUT COMPLICATION, WITHOUT LONG-TERM CURRENT USE OF INSULIN (HCC): ICD-10-CM

## 2021-07-12 RX ORDER — SEMAGLUTIDE 1.34 MG/ML
INJECTION, SOLUTION SUBCUTANEOUS
Qty: 3 PEN | Refills: 0 | COMMUNITY
Start: 2021-07-12 | End: 2021-10-13 | Stop reason: SDUPTHER

## 2021-07-23 RX ORDER — BLOOD-GLUCOSE METER
KIT MISCELLANEOUS
Qty: 100 EACH | Refills: 0 | Status: SHIPPED | OUTPATIENT
Start: 2021-07-23 | End: 2022-06-01 | Stop reason: CLARIF

## 2021-08-24 DIAGNOSIS — I10 ESSENTIAL HYPERTENSION: ICD-10-CM

## 2021-08-25 RX ORDER — RAMIPRIL 10 MG/1
CAPSULE ORAL
Qty: 90 CAPSULE | Refills: 1 | Status: SHIPPED | OUTPATIENT
Start: 2021-08-25 | End: 2021-10-20 | Stop reason: SDUPTHER

## 2021-08-25 RX ORDER — PIOGLITAZONEHYDROCHLORIDE 45 MG/1
TABLET ORAL
Qty: 90 TABLET | Refills: 1 | Status: SHIPPED | OUTPATIENT
Start: 2021-08-25 | End: 2021-10-20 | Stop reason: SDUPTHER

## 2021-10-13 ENCOUNTER — TELEPHONE (OUTPATIENT)
Dept: FAMILY MEDICINE CLINIC | Age: 76
End: 2021-10-13

## 2021-10-13 DIAGNOSIS — E11.9 TYPE 2 DIABETES MELLITUS WITHOUT COMPLICATION, WITHOUT LONG-TERM CURRENT USE OF INSULIN (HCC): ICD-10-CM

## 2021-10-13 RX ORDER — SEMAGLUTIDE 1.34 MG/ML
INJECTION, SOLUTION SUBCUTANEOUS
Qty: 3 PEN | Refills: 0 | COMMUNITY
Start: 2021-10-13 | End: 2022-01-05 | Stop reason: SDUPTHER

## 2021-10-20 ENCOUNTER — OFFICE VISIT (OUTPATIENT)
Dept: FAMILY MEDICINE CLINIC | Age: 76
End: 2021-10-20
Payer: COMMERCIAL

## 2021-10-20 VITALS
OXYGEN SATURATION: 98 % | BODY MASS INDEX: 34.46 KG/M2 | TEMPERATURE: 98.3 F | RESPIRATION RATE: 18 BRPM | HEIGHT: 73 IN | SYSTOLIC BLOOD PRESSURE: 125 MMHG | HEART RATE: 88 BPM | WEIGHT: 260 LBS | DIASTOLIC BLOOD PRESSURE: 75 MMHG

## 2021-10-20 DIAGNOSIS — E11.9 TYPE 2 DIABETES MELLITUS WITHOUT COMPLICATION, WITHOUT LONG-TERM CURRENT USE OF INSULIN (HCC): Primary | ICD-10-CM

## 2021-10-20 DIAGNOSIS — E11.9 TYPE 2 DIABETES MELLITUS WITHOUT COMPLICATION, WITHOUT LONG-TERM CURRENT USE OF INSULIN (HCC): ICD-10-CM

## 2021-10-20 DIAGNOSIS — I10 PRIMARY HYPERTENSION: ICD-10-CM

## 2021-10-20 DIAGNOSIS — E78.2 MIXED HYPERLIPIDEMIA: ICD-10-CM

## 2021-10-20 LAB
A/G RATIO: 1.2 (ref 1.1–2.2)
ALBUMIN SERPL-MCNC: 3.6 G/DL (ref 3.4–5)
ALP BLD-CCNC: 111 U/L (ref 40–129)
ALT SERPL-CCNC: <5 U/L (ref 10–40)
ANION GAP SERPL CALCULATED.3IONS-SCNC: 11 MMOL/L (ref 3–16)
AST SERPL-CCNC: 11 U/L (ref 15–37)
BILIRUB SERPL-MCNC: <0.2 MG/DL (ref 0–1)
BUN BLDV-MCNC: 21 MG/DL (ref 7–20)
CALCIUM SERPL-MCNC: 9.4 MG/DL (ref 8.3–10.6)
CHLORIDE BLD-SCNC: 100 MMOL/L (ref 99–110)
CO2: 28 MMOL/L (ref 21–32)
CREAT SERPL-MCNC: 0.8 MG/DL (ref 0.8–1.3)
GFR AFRICAN AMERICAN: >60
GFR NON-AFRICAN AMERICAN: >60
GLOBULIN: 3 G/DL
GLUCOSE BLD-MCNC: 213 MG/DL (ref 70–99)
POTASSIUM SERPL-SCNC: 4.9 MMOL/L (ref 3.5–5.1)
SODIUM BLD-SCNC: 139 MMOL/L (ref 136–145)
TOTAL PROTEIN: 6.6 G/DL (ref 6.4–8.2)

## 2021-10-20 PROCEDURE — 99214 OFFICE O/P EST MOD 30 MIN: CPT | Performed by: NURSE PRACTITIONER

## 2021-10-20 PROCEDURE — 3052F HG A1C>EQUAL 8.0%<EQUAL 9.0%: CPT | Performed by: NURSE PRACTITIONER

## 2021-10-20 RX ORDER — PRAVASTATIN SODIUM 40 MG
40 TABLET ORAL DAILY
Qty: 90 TABLET | Refills: 3 | Status: SHIPPED | OUTPATIENT
Start: 2021-10-20

## 2021-10-20 RX ORDER — RAMIPRIL 10 MG/1
CAPSULE ORAL
Qty: 90 CAPSULE | Refills: 1 | Status: SHIPPED | OUTPATIENT
Start: 2021-10-20 | End: 2021-11-13 | Stop reason: SDUPTHER

## 2021-10-20 RX ORDER — GLIMEPIRIDE 4 MG/1
4 TABLET ORAL 2 TIMES DAILY WITH MEALS
Qty: 180 TABLET | Refills: 3 | Status: SHIPPED | OUTPATIENT
Start: 2021-10-20 | End: 2022-06-07

## 2021-10-20 RX ORDER — ACARBOSE 100 MG/1
TABLET ORAL
Qty: 180 TABLET | Refills: 3 | Status: SHIPPED | OUTPATIENT
Start: 2021-10-20 | End: 2022-06-07

## 2021-10-20 RX ORDER — PIOGLITAZONEHYDROCHLORIDE 45 MG/1
TABLET ORAL
Qty: 90 TABLET | Refills: 1 | Status: SHIPPED | OUTPATIENT
Start: 2021-10-20 | End: 2021-11-13 | Stop reason: SDUPTHER

## 2021-10-20 ASSESSMENT — ENCOUNTER SYMPTOMS
CHEST TIGHTNESS: 0
VOMITING: 0
ABDOMINAL PAIN: 0
SORE THROAT: 0
BLOOD IN STOOL: 0
COLOR CHANGE: 0
CONSTIPATION: 0
VOICE CHANGE: 0
STRIDOR: 0
EYE DISCHARGE: 0
COUGH: 0
EYE PAIN: 0
DIARRHEA: 0
SINUS PAIN: 0
EYE ITCHING: 0
BACK PAIN: 0
NAUSEA: 0
TROUBLE SWALLOWING: 0
CHOKING: 0
RHINORRHEA: 0
EYE REDNESS: 0
SHORTNESS OF BREATH: 0
WHEEZING: 0
SINUS PRESSURE: 0
PHOTOPHOBIA: 0

## 2021-10-20 NOTE — PROGRESS NOTES
Chief Complaint   Patient presents with    Medication Refill    Blood Work       HPI:  China Ying is a 67 y.o. (: 1945) here today   for   HPI    Patient's medications, allergies, past medical, surgical, social and family histories were reviewed and updated as appropriate. Denies current concerns and complaints. Chronic Concerns:    DM: He is currently on Ozempic (GLP-1) (got samples yesterday in office), Farxiga 10 mg (SGLT-2), Precose 100 mg ( Alpha Glucosidase inhibitor), Amaryl 4mg BID (Sulfonylurea) , Metformin 500 mg BID (Biguanides),  Actos 45 mg daily. His last A1C was 8.5 in April which improved from 10. Last BUN was slightly elevated but GFR WNL in April. His DM is being managed by PCP. He was doing well on the ozembic 0.5mg and lost 15 lbs but now blood sugars are creeping up slowly. He is slowly gaining weight. He is a  part time. HTN: He is on ramoipril 10 mg. BP well controlled in office. HLD: He is currently on pravastatin 40 mg daily. Last Lipid panel was in January and was essentially normal. Will get cholesterol labs at next visit. He is due for his flu shot but denies at this time, CMP and A1C. He needs refills on all meds. ROS:  Review of Systems   Constitutional: Negative for activity change, appetite change, chills, diaphoresis, fatigue, fever and unexpected weight change. HENT: Negative for congestion, ear discharge, ear pain, hearing loss, nosebleeds, postnasal drip, rhinorrhea, sinus pressure, sinus pain, sneezing, sore throat, tinnitus, trouble swallowing and voice change. Eyes: Negative for photophobia, pain, discharge, redness and itching. Respiratory: Negative for cough, choking, chest tightness, shortness of breath, wheezing and stridor. Cardiovascular: Negative for chest pain, palpitations and leg swelling. Gastrointestinal: Negative for abdominal pain, blood in stool, constipation, diarrhea, nausea and vomiting. Endocrine: Negative for cold intolerance, heat intolerance, polydipsia and polyuria. Genitourinary: Negative for difficulty urinating, dysuria, enuresis, flank pain, frequency, hematuria and urgency. Musculoskeletal: Negative for back pain, gait problem, joint swelling, neck pain and neck stiffness. Skin: Negative for color change, pallor, rash and wound. Allergic/Immunologic: Negative for environmental allergies and food allergies. Neurological: Negative for dizziness, tremors, syncope, speech difficulty, weakness, light-headedness, numbness and headaches. Hematological: Negative for adenopathy. Does not bruise/bleed easily. Psychiatric/Behavioral: Negative for agitation, behavioral problems, confusion, decreased concentration, dysphoric mood, hallucinations, self-injury, sleep disturbance and suicidal ideas. The patient is not nervous/anxious and is not hyperactive.           Hemoglobin A1C (%)   Date Value   04/06/2021 8.5     Microalbumin, Random Urine (mg/dL)   Date Value   01/07/2021 87.50 (H)     LDL Calculated (mg/dL)   Date Value   01/07/2021 96       Past Medical History:   Diagnosis Date    Carotid artery stenosis     Carotid stenosis, left 10/12/2011    Chronic back pain     Erectile dysfunction     Hyperlipidemia     Hypertension     Osteoarthritis     Type II or unspecified type diabetes mellitus without mention of complication, not stated as uncontrolled        Family History   Problem Relation Age of Onset    Hearing Loss Father     Heart Disease Father 70        MI    High Blood Pressure Father     Diabetes Maternal Grandmother         hypoglycemic    Hearing Loss Maternal Grandmother     Arthritis Maternal Grandfather        Social History     Socioeconomic History    Marital status:      Spouse name: Not on file    Number of children: Not on file    Years of education: Not on file    Highest education level: Not on file   Occupational History    Not on file Tobacco Use    Smoking status: Former Smoker     Packs/day: 2.00     Years: 40.00     Pack years: 80.00     Types: Cigarettes     Quit date: 10/24/2001     Years since quittin.0    Smokeless tobacco: Never Used   Substance and Sexual Activity    Alcohol use: Yes     Alcohol/week: 0.0 standard drinks     Comment: rarely    Drug use: No    Sexual activity: Yes     Partners: Female   Other Topics Concern    Not on file   Social History Narrative    Not on file     Social Determinants of Health     Financial Resource Strain: Low Risk     Difficulty of Paying Living Expenses: Not hard at all   Food Insecurity: No Food Insecurity    Worried About [address] in the Last Year: Never true    [address] [employer] in the Last Year: Never true   Transportation Needs: No Transportation Needs    Lack of Transportation (Medical): No    Lack of Transportation (Non-Medical): No   Physical Activity:     Days of Exercise per Week:     Minutes of Exercise per Session:    Stress:     Feeling of Stress :    Social Connections:     Frequency of Communication with Friends and Family:     Frequency of Social Gatherings with Friends and Family:     Attends Religion Services:     Active Member of Clubs or Organizations:     Attends Club or Organization Meetings:     Marital Status:    Intimate Partner Violence:     Fear of Current or Ex-Partner:     Emotionally Abused:     Physically Abused:     Sexually Abused:        Prior to Visit Medications    Medication Sig Taking?  Authorizing Provider   ramipril (ALTACE) 10 MG capsule TAKE 1 CAPSULE DAILY Yes NATASHA Simpson CNP   pioglitazone (ACTOS) 45 MG tablet TAKE 1 TABLET DAILY Yes NATASHA Simpson CNP   metFORMIN (GLUCOPHAGE) 500 MG tablet TAKE 2 TABLETS 2 TIMES     DAILY WITH MEALS Yes NATASHA Simpson CNP   dapagliflozin (FARXIGA) 10 MG tablet TAKE 1 TABLET EVERY MORNING Yes NATASHA Simpson CNP   acarbose (PRECOSE) 100 MG tablet TAKE 1 tenderness or frontal sinus tenderness. Mouth/Throat:      Pharynx: No oropharyngeal exudate. Eyes:      General:         Right eye: No discharge. Left eye: No discharge. Conjunctiva/sclera: Conjunctivae normal.   Neck:      Thyroid: No thyromegaly. Vascular: No JVD. Trachea: No tracheal deviation. Cardiovascular:      Rate and Rhythm: Normal rate and regular rhythm. Heart sounds: Normal heart sounds. No murmur heard. No friction rub. Pulmonary:      Effort: Pulmonary effort is normal. No respiratory distress. Breath sounds: Normal breath sounds. No stridor. No decreased breath sounds, wheezing, rhonchi or rales. Musculoskeletal:         General: No tenderness. Normal range of motion. Cervical back: Normal range of motion. Lymphadenopathy:      Cervical: No cervical adenopathy. Skin:     General: Skin is warm and dry. Capillary Refill: Capillary refill takes less than 2 seconds. Findings: No rash. Neurological:      Mental Status: He is alert and oriented to person, place, and time. Sensory: Sensation is intact. Motor: Motor function is intact. Coordination: Coordination normal.   Psychiatric:         Attention and Perception: Attention and perception normal.         Mood and Affect: Mood normal.         Speech: Speech normal.         Behavior: Behavior normal. Behavior is cooperative. Thought Content: Thought content normal.         Cognition and Memory: Cognition normal.         Judgment: Judgment normal.         ASSESSMENT/PLAN:    1. Type 2 diabetes mellitus without complication, without long-term current use of insulin ([facility])    - Comprehensive Metabolic Panel; Future  - Hemoglobin A1C; Future  - pioglitazone (ACTOS) 45 MG tablet; TAKE 1 TABLET DAILY  Dispense: 90 tablet; Refill: 1  - metFORMIN (GLUCOPHAGE) 500 MG tablet; TAKE 2 TABLETS 2 TIMES     DAILY WITH MEALS  Dispense: 360 tablet;  Refill: 3  - dapagliflozin (FARXIGA) 10 MG tablet; TAKE 1 TABLET EVERY MORNING  Dispense: 90 tablet; Refill: 1  - acarbose (PRECOSE) 100 MG tablet; TAKE 1 TABLET TWICE DAILY  WITH MEALS  Dispense: 180 tablet; Refill: 3  - glimepiride (AMARYL) 4 MG tablet; Take 1 tablet by mouth 2 times daily (with meals)  Dispense: 180 tablet; Refill: 3    2. Primary hypertension    - Comprehensive Metabolic Panel; Future  - ramipril (ALTACE) 10 MG capsule; TAKE 1 CAPSULE DAILY  Dispense: 90 capsule; Refill: 1  - Patient is doing well on current medical regimen and will continue current medical regimen at this time. 3. Mixed hyperlipidemia    - Comprehensive Metabolic Panel; Future  - pravastatin (PRAVACHOL) 40 MG tablet; Take 1 tablet by mouth daily  Dispense: 90 tablet; Refill: 3  - Patient is doing well on current medical regimen and will continue current medical regimen at this time. He will need a 3 month follow up to really stay on top of his DM. We discussed there is minimal wiggle room to increase his Diabetic Meds without him being on insulin. Will get CMP today with the A1C to check his kidneys and if we need to increase metformin will need to recheck CMP and A1C in 3 months. He verbalized understanding and was very pleasant. Return in about 3 months (around 1/20/2022).

## 2021-10-21 LAB
ESTIMATED AVERAGE GLUCOSE: 180 MG/DL
HBA1C MFR BLD: 7.9 %

## 2021-11-01 ENCOUNTER — HOSPITAL ENCOUNTER (OUTPATIENT)
Dept: GENERAL RADIOLOGY | Age: 76
Discharge: HOME | End: 2021-11-01
Payer: COMMERCIAL

## 2021-11-01 ENCOUNTER — OFFICE VISIT (OUTPATIENT)
Dept: URGENT CARE | Age: 76
End: 2021-11-01
Payer: COMMERCIAL

## 2021-11-01 VITALS
BODY MASS INDEX: 28.85 KG/M2 | WEIGHT: 213 LBS | HEART RATE: 80 BPM | SYSTOLIC BLOOD PRESSURE: 138 MMHG | HEIGHT: 72 IN | TEMPERATURE: 98.4 F | DIASTOLIC BLOOD PRESSURE: 86 MMHG | RESPIRATION RATE: 22 BRPM

## 2021-11-01 DIAGNOSIS — S99.912A ANKLE INJURY, LEFT, INITIAL ENCOUNTER: Primary | ICD-10-CM

## 2021-11-01 DIAGNOSIS — S99.912A ANKLE INJURY, LEFT, INITIAL ENCOUNTER: ICD-10-CM

## 2021-11-01 PROCEDURE — 99203 OFFICE O/P NEW LOW 30 MIN: CPT | Performed by: PHYSICIAN ASSISTANT

## 2021-11-01 PROCEDURE — 73610 X-RAY EXAM OF ANKLE: CPT
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
## 2021-11-01 NOTE — RESULT ENCOUNTER NOTE
Positive for fracture. [facility] [employer] [facility] [employer] [facility] in [facility] who stated they could see patient as early as tomorrow and requested boot + complete non-weight-bearing until ortho evaluation. Discussed results with patient, and MA gave referral instructions. Patient voiced understanding and agreement with plan. Statement Selected

## 2021-11-01 NOTE — PATIENT INSTRUCTIONS
Get X-Ray as discussed - we will call with results    Rest injury - try to avoid or reduce activities that cause pain  Apply an ice compress to the affected area 20mins on, 20mins off  Wear a boot, compression sleeve or ace wrap for added stability and support  Take OTC tylenol as needed for pain  Return if symptoms persist or worsen, or if new symptoms appear  We will consider ortho referral pending x-ray results and degree of improvement with conservative treatment

## 2021-11-02 ENCOUNTER — OFFICE VISIT (OUTPATIENT)
Dept: ORTHOPEDIC SURGERY | Age: 76
End: 2021-11-02
Payer: COMMERCIAL

## 2021-11-02 VITALS — WEIGHT: 213 LBS | BODY MASS INDEX: 28.85 KG/M2 | RESPIRATION RATE: 16 BRPM | HEIGHT: 72 IN

## 2021-11-02 DIAGNOSIS — S82.62XA CLOSED DISPLACED FRACTURE OF LATERAL MALLEOLUS OF LEFT FIBULA, INITIAL ENCOUNTER: Primary | ICD-10-CM

## 2021-11-02 PROCEDURE — 27786 TREATMENT OF ANKLE FRACTURE: CPT | Performed by: ORTHOPAEDIC SURGERY

## 2021-11-02 PROCEDURE — 99203 OFFICE O/P NEW LOW 30 MIN: CPT | Performed by: ORTHOPAEDIC SURGERY
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
## 2021-11-13 DIAGNOSIS — E11.9 TYPE 2 DIABETES MELLITUS WITHOUT COMPLICATION, WITHOUT LONG-TERM CURRENT USE OF INSULIN (HCC): ICD-10-CM

## 2021-11-13 DIAGNOSIS — I10 PRIMARY HYPERTENSION: ICD-10-CM

## 2021-11-14 PROBLEM — S82.62XA CLOSED DISPLACED FRACTURE OF LATERAL MALLEOLUS OF LEFT FIBULA: Status: ACTIVE | Noted: 2021-11-14

## 2021-11-15 RX ORDER — PIOGLITAZONEHYDROCHLORIDE 45 MG/1
TABLET ORAL
Qty: 90 TABLET | Refills: 1 | Status: SHIPPED | OUTPATIENT
Start: 2021-11-15 | End: 2022-06-07

## 2021-11-15 RX ORDER — RAMIPRIL 10 MG/1
CAPSULE ORAL
Qty: 90 CAPSULE | Refills: 1 | Status: SHIPPED | OUTPATIENT
Start: 2021-11-15 | End: 2022-07-01

## 2021-11-15 NOTE — TELEPHONE ENCOUNTER
Farxiga 10 mg  Last OV 10/20/2021   Next OV 1/19/2022  Last Fill 10/20/2021      Acots 45 mg  Last OV 10/20/2021   Next OV 1/19/2022  Last Fill 10/20/2021        Altace 10   Last OV 10/20/2021   Next OV 1/19/2022  Last Fill 10/20/2021

## 2021-12-14 ENCOUNTER — OFFICE VISIT (OUTPATIENT)
Dept: ORTHOPEDIC SURGERY | Age: 76
End: 2021-12-14

## 2021-12-14 DIAGNOSIS — S82.62XA CLOSED DISPLACED FRACTURE OF LATERAL MALLEOLUS OF LEFT FIBULA, INITIAL ENCOUNTER: Primary | ICD-10-CM

## 2021-12-14 PROCEDURE — APPNB15 APP NON BILLABLE TIME 0-15 MINS: Performed by: NURSE PRACTITIONER

## 2021-12-14 PROCEDURE — 99024 POSTOP FOLLOW-UP VISIT: CPT | Performed by: NURSE PRACTITIONER
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
## 2022-01-01 ENCOUNTER — APPOINTMENT (OUTPATIENT)
Dept: CT IMAGING | Age: 77
DRG: 003 | End: 2022-01-01
Attending: THORACIC SURGERY (CARDIOTHORACIC VASCULAR SURGERY)
Payer: COMMERCIAL

## 2022-01-01 ENCOUNTER — APPOINTMENT (OUTPATIENT)
Dept: MRI IMAGING | Age: 77
DRG: 003 | End: 2022-01-01
Attending: THORACIC SURGERY (CARDIOTHORACIC VASCULAR SURGERY)
Payer: COMMERCIAL

## 2022-01-01 ENCOUNTER — APPOINTMENT (OUTPATIENT)
Dept: GENERAL RADIOLOGY | Age: 77
DRG: 003 | End: 2022-01-01
Attending: THORACIC SURGERY (CARDIOTHORACIC VASCULAR SURGERY)
Payer: COMMERCIAL

## 2022-01-01 ENCOUNTER — HOSPITAL ENCOUNTER (INPATIENT)
Age: 77
LOS: 44 days | Discharge: STILL ADMITTED OR ADMINISTRATIVE | DRG: 003 | End: 2022-11-02
Attending: THORACIC SURGERY (CARDIOTHORACIC VASCULAR SURGERY) | Admitting: THORACIC SURGERY (CARDIOTHORACIC VASCULAR SURGERY)
Payer: COMMERCIAL

## 2022-01-01 ENCOUNTER — HOSPITAL ENCOUNTER (INPATIENT)
Age: 77
LOS: 1 days | DRG: 003 | End: 2022-11-02
Attending: THORACIC SURGERY (CARDIOTHORACIC VASCULAR SURGERY) | Admitting: THORACIC SURGERY (CARDIOTHORACIC VASCULAR SURGERY)
Payer: COMMERCIAL

## 2022-01-01 ENCOUNTER — TELEPHONE (OUTPATIENT)
Dept: FAMILY MEDICINE CLINIC | Age: 77
End: 2022-01-01

## 2022-01-01 VITALS
RESPIRATION RATE: 15 BRPM | BODY MASS INDEX: 30.19 KG/M2 | TEMPERATURE: 97.7 F | SYSTOLIC BLOOD PRESSURE: 121 MMHG | HEIGHT: 72 IN | WEIGHT: 222.88 LBS | HEART RATE: 82 BPM | DIASTOLIC BLOOD PRESSURE: 56 MMHG | OXYGEN SATURATION: 100 %

## 2022-01-01 VITALS — OXYGEN SATURATION: 96 % | SYSTOLIC BLOOD PRESSURE: 106 MMHG | DIASTOLIC BLOOD PRESSURE: 50 MMHG | TEMPERATURE: 97.4 F

## 2022-01-01 DIAGNOSIS — I48.91 ATRIAL FIBRILLATION, UNSPECIFIED TYPE (HCC): ICD-10-CM

## 2022-01-01 DIAGNOSIS — J96.21 ACUTE ON CHRONIC RESPIRATORY FAILURE WITH HYPOXIA AND HYPERCAPNIA (HCC): ICD-10-CM

## 2022-01-01 DIAGNOSIS — R09.02 HYPOXIA: ICD-10-CM

## 2022-01-01 DIAGNOSIS — J96.22 ACUTE ON CHRONIC RESPIRATORY FAILURE WITH HYPOXIA AND HYPERCAPNIA (HCC): ICD-10-CM

## 2022-01-01 DIAGNOSIS — E11.9 TYPE 2 DIABETES MELLITUS WITHOUT COMPLICATION, WITHOUT LONG-TERM CURRENT USE OF INSULIN (HCC): ICD-10-CM

## 2022-01-01 DIAGNOSIS — I50.32 CHRONIC HEART FAILURE WITH PRESERVED EJECTION FRACTION (HFPEF) (HCC): ICD-10-CM

## 2022-01-01 DIAGNOSIS — Z79.899 LONG TERM USE OF DRUG: ICD-10-CM

## 2022-01-01 DIAGNOSIS — I50.32 CHRONIC HEART FAILURE WITH PRESERVED EJECTION FRACTION (HFPEF) (HCC): Primary | ICD-10-CM

## 2022-01-01 LAB
A/G RATIO: 1.2 (ref 1.1–2.2)
ABO/RH: NORMAL
ACETYLCHOLINE BINDING ANTIBODY: 0 NMOL/L (ref 0–0.4)
ACETYLCHOLINE BLOCKING AB: 22 % (ref 0–26)
ALBUMIN SERPL-MCNC: 1.7 G/DL (ref 3.4–5)
ALBUMIN SERPL-MCNC: 1.8 G/DL (ref 3.4–5)
ALBUMIN SERPL-MCNC: 2 G/DL (ref 3.4–5)
ALBUMIN SERPL-MCNC: 2.2 G/DL (ref 3.4–5)
ALBUMIN SERPL-MCNC: 2.3 G/DL (ref 3.4–5)
ALBUMIN SERPL-MCNC: 2.3 G/DL (ref 3.4–5)
ALBUMIN SERPL-MCNC: 2.4 G/DL (ref 3.1–4.9)
ALBUMIN SERPL-MCNC: 2.4 G/DL (ref 3.4–5)
ALBUMIN SERPL-MCNC: 2.5 G/DL (ref 3.4–5)
ALBUMIN SERPL-MCNC: 2.6 G/DL (ref 3.4–5)
ALBUMIN SERPL-MCNC: 2.7 G/DL (ref 3.4–5)
ALBUMIN SERPL-MCNC: 2.9 G/DL (ref 3.4–5)
ALBUMIN SERPL-MCNC: 2.9 G/DL (ref 3.4–5)
ALBUMIN SERPL-MCNC: 3 G/DL (ref 3.4–5)
ALBUMIN SERPL-MCNC: 3.1 G/DL (ref 3.4–5)
ALBUMIN SERPL-MCNC: 3.2 G/DL (ref 3.4–5)
ALBUMIN SERPL-MCNC: 3.2 G/DL (ref 3.4–5)
ALBUMIN SERPL-MCNC: 3.3 G/DL (ref 3.4–5)
ALBUMIN SERPL-MCNC: 3.4 G/DL (ref 3.4–5)
ALBUMIN SERPL-MCNC: 3.4 G/DL (ref 3.4–5)
ALBUMIN SERPL-MCNC: 3.5 G/DL (ref 3.4–5)
ALBUMIN SERPL-MCNC: 3.5 G/DL (ref 3.4–5)
ALBUMIN SERPL-MCNC: 3.6 G/DL (ref 3.4–5)
ALP BLD-CCNC: 118 U/L (ref 40–129)
ALP BLD-CCNC: 91 U/L (ref 40–129)
ALPHA-1-GLOBULIN: 0.4 G/DL (ref 0.2–0.4)
ALPHA-2-GLOBULIN: 0.9 G/DL (ref 0.4–1.1)
ALT SERPL-CCNC: 6 U/L (ref 10–40)
ALT SERPL-CCNC: <5 U/L (ref 10–40)
ANION GAP SERPL CALCULATED.3IONS-SCNC: 10 MMOL/L (ref 3–16)
ANION GAP SERPL CALCULATED.3IONS-SCNC: 11 MMOL/L (ref 3–16)
ANION GAP SERPL CALCULATED.3IONS-SCNC: 12 MMOL/L (ref 3–16)
ANION GAP SERPL CALCULATED.3IONS-SCNC: 13 MMOL/L (ref 3–16)
ANION GAP SERPL CALCULATED.3IONS-SCNC: 16 MMOL/L (ref 3–16)
ANION GAP SERPL CALCULATED.3IONS-SCNC: 18 MMOL/L (ref 3–16)
ANION GAP SERPL CALCULATED.3IONS-SCNC: 4 MMOL/L (ref 3–16)
ANION GAP SERPL CALCULATED.3IONS-SCNC: 6 MMOL/L (ref 3–16)
ANION GAP SERPL CALCULATED.3IONS-SCNC: 7 MMOL/L (ref 3–16)
ANION GAP SERPL CALCULATED.3IONS-SCNC: 8 MMOL/L (ref 3–16)
ANION GAP SERPL CALCULATED.3IONS-SCNC: 9 MMOL/L (ref 3–16)
ANISOCYTOSIS: ABNORMAL
ANTI-NUCLEAR ANTIBODY (ANA): POSITIVE
ANTIBODY SCREEN: NORMAL
ANTINUCLEAR AB INTERPRETIVE COMMENT: NORMAL
ANTINUCLEAR ANTIBODY, HEP-2, IGG: NORMAL
APPEARANCE FLUID: CLEAR
APPEARANCE FLUID: NORMAL
APTT: 31 SEC (ref 23–34.3)
APTT: 39 SEC (ref 23–34.3)
AST SERPL-CCNC: 10 U/L (ref 15–37)
AST SERPL-CCNC: 12 U/L (ref 15–37)
BANDED NEUTROPHILS RELATIVE PERCENT: 1 % (ref 0–7)
BANDED NEUTROPHILS RELATIVE PERCENT: 2 % (ref 0–7)
BANDED NEUTROPHILS RELATIVE PERCENT: 2 % (ref 0–7)
BASE EXCESS ARTERIAL: 14 (ref -3–3)
BASE EXCESS ARTERIAL: 14.1 MMOL/L (ref -3–3)
BASE EXCESS ARTERIAL: 16 (ref -3–3)
BASE EXCESS ARTERIAL: 16.7 MMOL/L (ref -3–3)
BASE EXCESS ARTERIAL: 17 (ref -3–3)
BASE EXCESS ARTERIAL: 17.2 MMOL/L (ref -3–3)
BASE EXCESS ARTERIAL: 17.2 MMOL/L (ref -3–3)
BASE EXCESS ARTERIAL: 18.3 MMOL/L (ref -3–3)
BASE EXCESS ARTERIAL: 23 (ref -3–3)
BASE EXCESS ARTERIAL: 23.3 MMOL/L (ref -3–3)
BASE EXCESS ARTERIAL: 25 (ref -3–3)
BASE EXCESS ARTERIAL: 8 (ref -3–3)
BASE EXCESS ARTERIAL: 9 (ref -3–3)
BASE EXCESS VENOUS: 10.7 MMOL/L (ref -2–3)
BASE EXCESS VENOUS: 12 (ref -3–3)
BASE EXCESS VENOUS: 14.6 MMOL/L (ref -2–3)
BASE EXCESS VENOUS: 19.6 MMOL/L (ref -2–3)
BASOPHILS ABSOLUTE: 0 K/UL (ref 0–0.2)
BASOPHILS ABSOLUTE: 0.1 K/UL (ref 0–0.2)
BASOPHILS ABSOLUTE: 0.2 K/UL (ref 0–0.2)
BASOPHILS ABSOLUTE: 0.3 K/UL (ref 0–0.2)
BASOPHILS ABSOLUTE: 0.4 K/UL (ref 0–0.2)
BASOPHILS RELATIVE PERCENT: 0 %
BASOPHILS RELATIVE PERCENT: 0.1 %
BASOPHILS RELATIVE PERCENT: 0.2 %
BASOPHILS RELATIVE PERCENT: 0.3 %
BASOPHILS RELATIVE PERCENT: 0.3 %
BASOPHILS RELATIVE PERCENT: 0.4 %
BASOPHILS RELATIVE PERCENT: 0.5 %
BASOPHILS RELATIVE PERCENT: 0.6 %
BASOPHILS RELATIVE PERCENT: 0.7 %
BASOPHILS RELATIVE PERCENT: 0.9 %
BASOPHILS RELATIVE PERCENT: 0.9 %
BASOPHILS RELATIVE PERCENT: 1.1 %
BASOPHILS RELATIVE PERCENT: 1.2 %
BASOPHILS RELATIVE PERCENT: 2 %
BETA GLOBULIN: 0.7 G/DL (ref 0.9–1.6)
BILIRUB SERPL-MCNC: 0.3 MG/DL (ref 0–1)
BILIRUB SERPL-MCNC: 0.3 MG/DL (ref 0–1)
BILIRUBIN DIRECT: <0.2 MG/DL (ref 0–0.3)
BILIRUBIN URINE: NEGATIVE
BILIRUBIN, INDIRECT: ABNORMAL MG/DL (ref 0–1)
BLOOD BANK DISPENSE STATUS: NORMAL
BLOOD BANK PRODUCT CODE: NORMAL
BLOOD, URINE: ABNORMAL
BODY FLUID CULTURE, STERILE: NORMAL
BODY FLUID CULTURE, STERILE: NORMAL
BPU ID: NORMAL
BUN BLDV-MCNC: 101 MG/DL (ref 7–20)
BUN BLDV-MCNC: 102 MG/DL (ref 7–20)
BUN BLDV-MCNC: 103 MG/DL (ref 7–20)
BUN BLDV-MCNC: 105 MG/DL (ref 7–20)
BUN BLDV-MCNC: 107 MG/DL (ref 7–20)
BUN BLDV-MCNC: 108 MG/DL (ref 7–20)
BUN BLDV-MCNC: 108 MG/DL (ref 7–20)
BUN BLDV-MCNC: 109 MG/DL (ref 7–20)
BUN BLDV-MCNC: 110 MG/DL (ref 7–20)
BUN BLDV-MCNC: 110 MG/DL (ref 7–20)
BUN BLDV-MCNC: 111 MG/DL (ref 7–20)
BUN BLDV-MCNC: 112 MG/DL (ref 7–20)
BUN BLDV-MCNC: 112 MG/DL (ref 7–20)
BUN BLDV-MCNC: 113 MG/DL (ref 7–20)
BUN BLDV-MCNC: 114 MG/DL (ref 7–20)
BUN BLDV-MCNC: 119 MG/DL (ref 7–20)
BUN BLDV-MCNC: 119 MG/DL (ref 7–20)
BUN BLDV-MCNC: 123 MG/DL (ref 7–20)
BUN BLDV-MCNC: 124 MG/DL (ref 7–20)
BUN BLDV-MCNC: 125 MG/DL (ref 7–20)
BUN BLDV-MCNC: 125 MG/DL (ref 7–20)
BUN BLDV-MCNC: 15 MG/DL (ref 7–20)
BUN BLDV-MCNC: 15 MG/DL (ref 7–20)
BUN BLDV-MCNC: 19 MG/DL (ref 7–20)
BUN BLDV-MCNC: 29 MG/DL (ref 7–20)
BUN BLDV-MCNC: 33 MG/DL (ref 7–20)
BUN BLDV-MCNC: 36 MG/DL (ref 7–20)
BUN BLDV-MCNC: 46 MG/DL (ref 7–20)
BUN BLDV-MCNC: 50 MG/DL (ref 7–20)
BUN BLDV-MCNC: 54 MG/DL (ref 7–20)
BUN BLDV-MCNC: 56 MG/DL (ref 7–20)
BUN BLDV-MCNC: 57 MG/DL (ref 7–20)
BUN BLDV-MCNC: 57 MG/DL (ref 7–20)
BUN BLDV-MCNC: 58 MG/DL (ref 7–20)
BUN BLDV-MCNC: 58 MG/DL (ref 7–20)
BUN BLDV-MCNC: 60 MG/DL (ref 7–20)
BUN BLDV-MCNC: 62 MG/DL (ref 7–20)
BUN BLDV-MCNC: 69 MG/DL (ref 7–20)
BUN BLDV-MCNC: 71 MG/DL (ref 7–20)
BUN BLDV-MCNC: 71 MG/DL (ref 7–20)
BUN BLDV-MCNC: 72 MG/DL (ref 7–20)
BUN BLDV-MCNC: 72 MG/DL (ref 7–20)
BUN BLDV-MCNC: 74 MG/DL (ref 7–20)
BUN BLDV-MCNC: 74 MG/DL (ref 7–20)
BUN BLDV-MCNC: 77 MG/DL (ref 7–20)
BUN BLDV-MCNC: 79 MG/DL (ref 7–20)
BUN BLDV-MCNC: 83 MG/DL (ref 7–20)
BUN BLDV-MCNC: 86 MG/DL (ref 7–20)
BUN BLDV-MCNC: 90 MG/DL (ref 7–20)
BUN BLDV-MCNC: 94 MG/DL (ref 7–20)
BUN BLDV-MCNC: 98 MG/DL (ref 7–20)
BUN BLDV-MCNC: 99 MG/DL (ref 7–20)
C-REACTIVE PROTEIN: 107.5 MG/L (ref 0–5.1)
C-REACTIVE PROTEIN: 124.4 MG/L (ref 0–5.1)
C-REACTIVE PROTEIN: 172.2 MG/L (ref 0–5.1)
CALCIUM IONIZED: 1.21 MMOL/L (ref 1.12–1.32)
CALCIUM SERPL-MCNC: 6.9 MG/DL (ref 8.3–10.6)
CALCIUM SERPL-MCNC: 7.5 MG/DL (ref 8.3–10.6)
CALCIUM SERPL-MCNC: 7.6 MG/DL (ref 8.3–10.6)
CALCIUM SERPL-MCNC: 7.7 MG/DL (ref 8.3–10.6)
CALCIUM SERPL-MCNC: 7.8 MG/DL (ref 8.3–10.6)
CALCIUM SERPL-MCNC: 7.9 MG/DL (ref 8.3–10.6)
CALCIUM SERPL-MCNC: 8 MG/DL (ref 8.3–10.6)
CALCIUM SERPL-MCNC: 8 MG/DL (ref 8.3–10.6)
CALCIUM SERPL-MCNC: 8.1 MG/DL (ref 8.3–10.6)
CALCIUM SERPL-MCNC: 8.2 MG/DL (ref 8.3–10.6)
CALCIUM SERPL-MCNC: 8.3 MG/DL (ref 8.3–10.6)
CALCIUM SERPL-MCNC: 8.4 MG/DL (ref 8.3–10.6)
CALCIUM SERPL-MCNC: 8.5 MG/DL (ref 8.3–10.6)
CALCIUM SERPL-MCNC: 8.5 MG/DL (ref 8.3–10.6)
CALCIUM SERPL-MCNC: 8.6 MG/DL (ref 8.3–10.6)
CALCIUM SERPL-MCNC: 8.7 MG/DL (ref 8.3–10.6)
CALCIUM SERPL-MCNC: 8.7 MG/DL (ref 8.3–10.6)
CALCIUM SERPL-MCNC: 9 MG/DL (ref 8.3–10.6)
CALCIUM SERPL-MCNC: 9.1 MG/DL (ref 8.3–10.6)
CALCIUM SERPL-MCNC: 9.1 MG/DL (ref 8.3–10.6)
CALCIUM SERPL-MCNC: 9.2 MG/DL (ref 8.3–10.6)
CALCIUM SERPL-MCNC: 9.2 MG/DL (ref 8.3–10.6)
CALCIUM SERPL-MCNC: 9.3 MG/DL (ref 8.3–10.6)
CALCIUM SERPL-MCNC: 9.6 MG/DL (ref 8.3–10.6)
CARBOXYHEMOGLOBIN ARTERIAL: 0.6 % (ref 0–1.5)
CARBOXYHEMOGLOBIN ARTERIAL: 0.6 % (ref 0–1.5)
CARBOXYHEMOGLOBIN ARTERIAL: 0.8 % (ref 0–1.5)
CARBOXYHEMOGLOBIN ARTERIAL: 1.2 % (ref 0–1.5)
CARBOXYHEMOGLOBIN: 0.5 % (ref 0–1.5)
CARBOXYHEMOGLOBIN: 1.3 % (ref 0–1.5)
CARBOXYHEMOGLOBIN: 1.7 % (ref 0–1.5)
CEA: 6.9 NG/ML (ref 0–5)
CELL COUNT FLUID TYPE: NORMAL
CELL COUNT FLUID TYPE: NORMAL
CHLORIDE BLD-SCNC: 100 MMOL/L (ref 99–110)
CHLORIDE BLD-SCNC: 101 MMOL/L (ref 99–110)
CHLORIDE BLD-SCNC: 102 MMOL/L (ref 99–110)
CHLORIDE BLD-SCNC: 103 MMOL/L (ref 99–110)
CHLORIDE BLD-SCNC: 105 MMOL/L (ref 99–110)
CHLORIDE BLD-SCNC: 106 MMOL/L (ref 99–110)
CHLORIDE BLD-SCNC: 106 MMOL/L (ref 99–110)
CHLORIDE BLD-SCNC: 107 MMOL/L (ref 99–110)
CHLORIDE BLD-SCNC: 108 MMOL/L (ref 99–110)
CHLORIDE BLD-SCNC: 108 MMOL/L (ref 99–110)
CHLORIDE BLD-SCNC: 80 MMOL/L (ref 99–110)
CHLORIDE BLD-SCNC: 81 MMOL/L (ref 99–110)
CHLORIDE BLD-SCNC: 81 MMOL/L (ref 99–110)
CHLORIDE BLD-SCNC: 83 MMOL/L (ref 99–110)
CHLORIDE BLD-SCNC: 84 MMOL/L (ref 99–110)
CHLORIDE BLD-SCNC: 84 MMOL/L (ref 99–110)
CHLORIDE BLD-SCNC: 86 MMOL/L (ref 99–110)
CHLORIDE BLD-SCNC: 88 MMOL/L (ref 99–110)
CHLORIDE BLD-SCNC: 88 MMOL/L (ref 99–110)
CHLORIDE BLD-SCNC: 89 MMOL/L (ref 99–110)
CHLORIDE BLD-SCNC: 89 MMOL/L (ref 99–110)
CHLORIDE BLD-SCNC: 90 MMOL/L (ref 99–110)
CHLORIDE BLD-SCNC: 91 MMOL/L (ref 99–110)
CHLORIDE BLD-SCNC: 91 MMOL/L (ref 99–110)
CHLORIDE BLD-SCNC: 92 MMOL/L (ref 99–110)
CHLORIDE BLD-SCNC: 93 MMOL/L (ref 99–110)
CHLORIDE BLD-SCNC: 94 MMOL/L (ref 99–110)
CHLORIDE BLD-SCNC: 95 MMOL/L (ref 99–110)
CHLORIDE BLD-SCNC: 96 MMOL/L (ref 99–110)
CHLORIDE BLD-SCNC: 97 MMOL/L (ref 99–110)
CHLORIDE BLD-SCNC: 98 MMOL/L (ref 99–110)
CHLORIDE BLD-SCNC: 98 MMOL/L (ref 99–110)
CHLORIDE BLD-SCNC: 99 MMOL/L (ref 99–110)
CHOLESTEROL, TOTAL: 198 MG/DL (ref 0–199)
CLARITY: CLEAR
CLOT EVALUATION: NORMAL
CLOT EVALUATION: NORMAL
CO2: 26 MMOL/L (ref 21–32)
CO2: 27 MMOL/L (ref 21–32)
CO2: 28 MMOL/L (ref 21–32)
CO2: 29 MMOL/L (ref 21–32)
CO2: 30 MMOL/L (ref 21–32)
CO2: 30 MMOL/L (ref 21–32)
CO2: 31 MMOL/L (ref 21–32)
CO2: 32 MMOL/L (ref 21–32)
CO2: 33 MMOL/L (ref 21–32)
CO2: 34 MMOL/L (ref 21–32)
CO2: 34 MMOL/L (ref 21–32)
CO2: 35 MMOL/L (ref 21–32)
CO2: 36 MMOL/L (ref 21–32)
CO2: 37 MMOL/L (ref 21–32)
CO2: 37 MMOL/L (ref 21–32)
CO2: 38 MMOL/L (ref 21–32)
CO2: 39 MMOL/L (ref 21–32)
CO2: 40 MMOL/L (ref 21–32)
CO2: 43 MMOL/L (ref 21–32)
CO2: 44 MMOL/L (ref 21–32)
CO2: 45 MMOL/L (ref 21–32)
CO2: >50 MMOL/L (ref 21–32)
COLOR FLUID: NORMAL
COLOR FLUID: YELLOW
COLOR: YELLOW
CREAT SERPL-MCNC: 0.6 MG/DL (ref 0.8–1.3)
CREAT SERPL-MCNC: 0.6 MG/DL (ref 0.8–1.3)
CREAT SERPL-MCNC: 0.7 MG/DL (ref 0.8–1.3)
CREAT SERPL-MCNC: 0.8 MG/DL (ref 0.8–1.3)
CREAT SERPL-MCNC: 0.9 MG/DL (ref 0.8–1.3)
CREAT SERPL-MCNC: 1 MG/DL (ref 0.8–1.3)
CREAT SERPL-MCNC: 1.1 MG/DL (ref 0.8–1.3)
CREAT SERPL-MCNC: 1.2 MG/DL (ref 0.8–1.3)
CREAT SERPL-MCNC: 1.3 MG/DL (ref 0.8–1.3)
CREAT SERPL-MCNC: 1.4 MG/DL (ref 0.8–1.3)
CREAT SERPL-MCNC: 1.5 MG/DL (ref 0.8–1.3)
CREAT SERPL-MCNC: 1.6 MG/DL (ref 0.8–1.3)
CREAT SERPL-MCNC: 1.7 MG/DL (ref 0.8–1.3)
CREAT SERPL-MCNC: 1.8 MG/DL (ref 0.8–1.3)
CREAT SERPL-MCNC: 1.8 MG/DL (ref 0.8–1.3)
CREAT SERPL-MCNC: 1.9 MG/DL (ref 0.8–1.3)
CREATININE URINE: 51.2 MG/DL (ref 39–259)
CREATININE URINE: 63.2 MG/DL (ref 39–259)
CREATININE URINE: 66.3 MG/DL (ref 39–259)
CULTURE, RESPIRATORY: NORMAL
DESCRIPTION BLOOD BANK: NORMAL
EKG ATRIAL RATE: 104 BPM
EKG ATRIAL RATE: 312 BPM
EKG ATRIAL RATE: 340 BPM
EKG ATRIAL RATE: 340 BPM
EKG ATRIAL RATE: 375 BPM
EKG ATRIAL RATE: 63 BPM
EKG ATRIAL RATE: 80 BPM
EKG ATRIAL RATE: 84 BPM
EKG DIAGNOSIS: NORMAL
EKG P AXIS: -86 DEGREES
EKG P AXIS: -89 DEGREES
EKG P AXIS: 244 DEGREES
EKG P AXIS: 55 DEGREES
EKG P AXIS: 63 DEGREES
EKG P AXIS: 68 DEGREES
EKG P-R INTERVAL: 142 MS
EKG P-R INTERVAL: 148 MS
EKG P-R INTERVAL: 166 MS
EKG Q-T INTERVAL: 234 MS
EKG Q-T INTERVAL: 268 MS
EKG Q-T INTERVAL: 328 MS
EKG Q-T INTERVAL: 354 MS
EKG Q-T INTERVAL: 360 MS
EKG Q-T INTERVAL: 400 MS
EKG Q-T INTERVAL: 404 MS
EKG Q-T INTERVAL: 452 MS
EKG QRS DURATION: 82 MS
EKG QRS DURATION: 86 MS
EKG QRS DURATION: 86 MS
EKG QRS DURATION: 88 MS
EKG QRS DURATION: 90 MS
EKG QRS DURATION: 96 MS
EKG QTC CALCULATION (BAZETT): 395 MS
EKG QTC CALCULATION (BAZETT): 398 MS
EKG QTC CALCULATION (BAZETT): 452 MS
EKG QTC CALCULATION (BAZETT): 460 MS
EKG QTC CALCULATION (BAZETT): 461 MS
EKG QTC CALCULATION (BAZETT): 462 MS
EKG QTC CALCULATION (BAZETT): 464 MS
EKG QTC CALCULATION (BAZETT): 485 MS
EKG R AXIS: 22 DEGREES
EKG R AXIS: 23 DEGREES
EKG R AXIS: 37 DEGREES
EKG R AXIS: 49 DEGREES
EKG R AXIS: 58 DEGREES
EKG R AXIS: 60 DEGREES
EKG R AXIS: 78 DEGREES
EKG R AXIS: 87 DEGREES
EKG T AXIS: -43 DEGREES
EKG T AXIS: -85 DEGREES
EKG T AXIS: 104 DEGREES
EKG T AXIS: 11 DEGREES
EKG T AXIS: 201 DEGREES
EKG T AXIS: 24 DEGREES
EKG T AXIS: 252 DEGREES
EKG T AXIS: 46 DEGREES
EKG VENTRICULAR RATE: 100 BPM
EKG VENTRICULAR RATE: 113 BPM
EKG VENTRICULAR RATE: 114 BPM
EKG VENTRICULAR RATE: 131 BPM
EKG VENTRICULAR RATE: 174 BPM
EKG VENTRICULAR RATE: 63 BPM
EKG VENTRICULAR RATE: 78 BPM
EKG VENTRICULAR RATE: 80 BPM
EOSINOPHILS ABSOLUTE: 0 K/UL (ref 0–0.6)
EOSINOPHILS ABSOLUTE: 0.1 K/UL (ref 0–0.6)
EOSINOPHILS ABSOLUTE: 0.3 K/UL (ref 0–0.6)
EOSINOPHILS RELATIVE PERCENT: 0 %
EOSINOPHILS RELATIVE PERCENT: 0.1 %
EOSINOPHILS RELATIVE PERCENT: 0.1 %
EOSINOPHILS RELATIVE PERCENT: 0.2 %
EOSINOPHILS RELATIVE PERCENT: 0.3 %
EOSINOPHILS RELATIVE PERCENT: 0.4 %
EOSINOPHILS RELATIVE PERCENT: 0.5 %
EOSINOPHILS RELATIVE PERCENT: 0.5 %
EOSINOPHILS RELATIVE PERCENT: 0.6 %
EOSINOPHILS RELATIVE PERCENT: 0.7 %
EOSINOPHILS RELATIVE PERCENT: 0.7 %
EOSINOPHILS RELATIVE PERCENT: 0.8 %
EOSINOPHILS RELATIVE PERCENT: 0.9 %
EOSINOPHILS RELATIVE PERCENT: 1 %
EOSINOPHILS RELATIVE PERCENT: 1.1 %
EOSINOPHILS RELATIVE PERCENT: 1.1 %
EOSINOPHILS RELATIVE PERCENT: 1.2 %
EOSINOPHILS RELATIVE PERCENT: 1.4 %
EOSINOPHILS RELATIVE PERCENT: 2 %
ESTIMATED AVERAGE GLUCOSE: 139.9 MG/DL
ESTIMATED AVERAGE GLUCOSE: 185.8 MG/DL
FERRITIN: 792.7 NG/ML (ref 30–400)
FLUID TYPE: NORMAL
FOLATE: 7.09 NG/ML (ref 4.78–24.2)
GAMMA GLOBULIN: 1.1 G/DL (ref 0.6–1.8)
GFR AFRICAN AMERICAN: 42
GFR AFRICAN AMERICAN: 44
GFR AFRICAN AMERICAN: 44
GFR AFRICAN AMERICAN: 47
GFR AFRICAN AMERICAN: 51
GFR AFRICAN AMERICAN: 55
GFR AFRICAN AMERICAN: 59
GFR AFRICAN AMERICAN: >60
GFR NON-AFRICAN AMERICAN: 34
GFR NON-AFRICAN AMERICAN: 37
GFR NON-AFRICAN AMERICAN: 37
GFR NON-AFRICAN AMERICAN: 39
GFR NON-AFRICAN AMERICAN: 42
GFR NON-AFRICAN AMERICAN: 45
GFR NON-AFRICAN AMERICAN: 49
GFR NON-AFRICAN AMERICAN: 53
GFR NON-AFRICAN AMERICAN: >60
GFR SERPL CREATININE-BSD FRML MDRD: 58 ML/MIN/{1.73_M2}
GFR SERPL CREATININE-BSD FRML MDRD: >60 ML/MIN/{1.73_M2}
GLUCOSE BLD-MCNC: 104 MG/DL (ref 70–99)
GLUCOSE BLD-MCNC: 104 MG/DL (ref 70–99)
GLUCOSE BLD-MCNC: 105 MG/DL (ref 70–99)
GLUCOSE BLD-MCNC: 106 MG/DL (ref 70–99)
GLUCOSE BLD-MCNC: 107 MG/DL (ref 70–99)
GLUCOSE BLD-MCNC: 107 MG/DL (ref 70–99)
GLUCOSE BLD-MCNC: 108 MG/DL (ref 70–99)
GLUCOSE BLD-MCNC: 110 MG/DL (ref 70–99)
GLUCOSE BLD-MCNC: 111 MG/DL (ref 70–99)
GLUCOSE BLD-MCNC: 111 MG/DL (ref 70–99)
GLUCOSE BLD-MCNC: 112 MG/DL (ref 70–99)
GLUCOSE BLD-MCNC: 112 MG/DL (ref 70–99)
GLUCOSE BLD-MCNC: 113 MG/DL (ref 70–99)
GLUCOSE BLD-MCNC: 114 MG/DL (ref 70–99)
GLUCOSE BLD-MCNC: 114 MG/DL (ref 70–99)
GLUCOSE BLD-MCNC: 115 MG/DL (ref 70–99)
GLUCOSE BLD-MCNC: 116 MG/DL (ref 70–99)
GLUCOSE BLD-MCNC: 117 MG/DL (ref 70–99)
GLUCOSE BLD-MCNC: 118 MG/DL (ref 70–99)
GLUCOSE BLD-MCNC: 119 MG/DL (ref 70–99)
GLUCOSE BLD-MCNC: 120 MG/DL (ref 70–99)
GLUCOSE BLD-MCNC: 121 MG/DL (ref 70–99)
GLUCOSE BLD-MCNC: 122 MG/DL (ref 70–99)
GLUCOSE BLD-MCNC: 122 MG/DL (ref 70–99)
GLUCOSE BLD-MCNC: 123 MG/DL (ref 70–99)
GLUCOSE BLD-MCNC: 124 MG/DL (ref 70–99)
GLUCOSE BLD-MCNC: 124 MG/DL (ref 70–99)
GLUCOSE BLD-MCNC: 125 MG/DL (ref 70–99)
GLUCOSE BLD-MCNC: 125 MG/DL (ref 70–99)
GLUCOSE BLD-MCNC: 126 MG/DL (ref 70–99)
GLUCOSE BLD-MCNC: 126 MG/DL (ref 70–99)
GLUCOSE BLD-MCNC: 127 MG/DL (ref 70–99)
GLUCOSE BLD-MCNC: 127 MG/DL (ref 70–99)
GLUCOSE BLD-MCNC: 128 MG/DL (ref 70–99)
GLUCOSE BLD-MCNC: 129 MG/DL (ref 70–99)
GLUCOSE BLD-MCNC: 129 MG/DL (ref 70–99)
GLUCOSE BLD-MCNC: 131 MG/DL (ref 70–99)
GLUCOSE BLD-MCNC: 132 MG/DL (ref 70–99)
GLUCOSE BLD-MCNC: 133 MG/DL (ref 70–99)
GLUCOSE BLD-MCNC: 133 MG/DL (ref 70–99)
GLUCOSE BLD-MCNC: 134 MG/DL (ref 70–99)
GLUCOSE BLD-MCNC: 135 MG/DL (ref 70–99)
GLUCOSE BLD-MCNC: 136 MG/DL (ref 70–99)
GLUCOSE BLD-MCNC: 136 MG/DL (ref 70–99)
GLUCOSE BLD-MCNC: 137 MG/DL (ref 70–99)
GLUCOSE BLD-MCNC: 137 MG/DL (ref 70–99)
GLUCOSE BLD-MCNC: 138 MG/DL (ref 70–99)
GLUCOSE BLD-MCNC: 139 MG/DL (ref 70–99)
GLUCOSE BLD-MCNC: 140 MG/DL (ref 70–99)
GLUCOSE BLD-MCNC: 141 MG/DL (ref 70–99)
GLUCOSE BLD-MCNC: 141 MG/DL (ref 70–99)
GLUCOSE BLD-MCNC: 142 MG/DL (ref 70–99)
GLUCOSE BLD-MCNC: 142 MG/DL (ref 70–99)
GLUCOSE BLD-MCNC: 143 MG/DL (ref 70–99)
GLUCOSE BLD-MCNC: 144 MG/DL (ref 70–99)
GLUCOSE BLD-MCNC: 145 MG/DL (ref 70–99)
GLUCOSE BLD-MCNC: 145 MG/DL (ref 70–99)
GLUCOSE BLD-MCNC: 146 MG/DL (ref 70–99)
GLUCOSE BLD-MCNC: 147 MG/DL (ref 70–99)
GLUCOSE BLD-MCNC: 148 MG/DL (ref 70–99)
GLUCOSE BLD-MCNC: 149 MG/DL (ref 70–99)
GLUCOSE BLD-MCNC: 150 MG/DL (ref 70–99)
GLUCOSE BLD-MCNC: 150 MG/DL (ref 70–99)
GLUCOSE BLD-MCNC: 151 MG/DL (ref 70–99)
GLUCOSE BLD-MCNC: 152 MG/DL (ref 70–99)
GLUCOSE BLD-MCNC: 153 MG/DL (ref 70–99)
GLUCOSE BLD-MCNC: 153 MG/DL (ref 70–99)
GLUCOSE BLD-MCNC: 154 MG/DL (ref 70–99)
GLUCOSE BLD-MCNC: 155 MG/DL (ref 70–99)
GLUCOSE BLD-MCNC: 156 MG/DL (ref 70–99)
GLUCOSE BLD-MCNC: 157 MG/DL (ref 70–99)
GLUCOSE BLD-MCNC: 158 MG/DL (ref 70–99)
GLUCOSE BLD-MCNC: 158 MG/DL (ref 70–99)
GLUCOSE BLD-MCNC: 159 MG/DL (ref 70–99)
GLUCOSE BLD-MCNC: 159 MG/DL (ref 70–99)
GLUCOSE BLD-MCNC: 160 MG/DL (ref 70–99)
GLUCOSE BLD-MCNC: 161 MG/DL (ref 70–99)
GLUCOSE BLD-MCNC: 162 MG/DL (ref 70–99)
GLUCOSE BLD-MCNC: 162 MG/DL (ref 70–99)
GLUCOSE BLD-MCNC: 163 MG/DL (ref 70–99)
GLUCOSE BLD-MCNC: 164 MG/DL (ref 70–99)
GLUCOSE BLD-MCNC: 164 MG/DL (ref 70–99)
GLUCOSE BLD-MCNC: 165 MG/DL (ref 70–99)
GLUCOSE BLD-MCNC: 166 MG/DL (ref 70–99)
GLUCOSE BLD-MCNC: 167 MG/DL (ref 70–99)
GLUCOSE BLD-MCNC: 168 MG/DL (ref 70–99)
GLUCOSE BLD-MCNC: 169 MG/DL (ref 70–99)
GLUCOSE BLD-MCNC: 169 MG/DL (ref 70–99)
GLUCOSE BLD-MCNC: 170 MG/DL (ref 70–99)
GLUCOSE BLD-MCNC: 173 MG/DL (ref 70–99)
GLUCOSE BLD-MCNC: 174 MG/DL (ref 70–99)
GLUCOSE BLD-MCNC: 174 MG/DL (ref 70–99)
GLUCOSE BLD-MCNC: 175 MG/DL (ref 70–99)
GLUCOSE BLD-MCNC: 176 MG/DL (ref 70–99)
GLUCOSE BLD-MCNC: 177 MG/DL (ref 70–99)
GLUCOSE BLD-MCNC: 178 MG/DL (ref 70–99)
GLUCOSE BLD-MCNC: 179 MG/DL (ref 70–99)
GLUCOSE BLD-MCNC: 180 MG/DL (ref 70–99)
GLUCOSE BLD-MCNC: 182 MG/DL (ref 70–99)
GLUCOSE BLD-MCNC: 182 MG/DL (ref 70–99)
GLUCOSE BLD-MCNC: 183 MG/DL (ref 70–99)
GLUCOSE BLD-MCNC: 184 MG/DL (ref 70–99)
GLUCOSE BLD-MCNC: 185 MG/DL (ref 70–99)
GLUCOSE BLD-MCNC: 185 MG/DL (ref 70–99)
GLUCOSE BLD-MCNC: 187 MG/DL (ref 70–99)
GLUCOSE BLD-MCNC: 187 MG/DL (ref 70–99)
GLUCOSE BLD-MCNC: 188 MG/DL (ref 70–99)
GLUCOSE BLD-MCNC: 189 MG/DL (ref 70–99)
GLUCOSE BLD-MCNC: 191 MG/DL (ref 70–99)
GLUCOSE BLD-MCNC: 192 MG/DL (ref 70–99)
GLUCOSE BLD-MCNC: 192 MG/DL (ref 70–99)
GLUCOSE BLD-MCNC: 193 MG/DL (ref 70–99)
GLUCOSE BLD-MCNC: 194 MG/DL (ref 70–99)
GLUCOSE BLD-MCNC: 195 MG/DL (ref 70–99)
GLUCOSE BLD-MCNC: 195 MG/DL (ref 70–99)
GLUCOSE BLD-MCNC: 196 MG/DL (ref 70–99)
GLUCOSE BLD-MCNC: 197 MG/DL (ref 70–99)
GLUCOSE BLD-MCNC: 198 MG/DL (ref 70–99)
GLUCOSE BLD-MCNC: 199 MG/DL (ref 70–99)
GLUCOSE BLD-MCNC: 199 MG/DL (ref 70–99)
GLUCOSE BLD-MCNC: 200 MG/DL (ref 70–99)
GLUCOSE BLD-MCNC: 200 MG/DL (ref 70–99)
GLUCOSE BLD-MCNC: 201 MG/DL (ref 70–99)
GLUCOSE BLD-MCNC: 202 MG/DL (ref 70–99)
GLUCOSE BLD-MCNC: 202 MG/DL (ref 70–99)
GLUCOSE BLD-MCNC: 205 MG/DL (ref 70–99)
GLUCOSE BLD-MCNC: 206 MG/DL (ref 70–99)
GLUCOSE BLD-MCNC: 206 MG/DL (ref 70–99)
GLUCOSE BLD-MCNC: 207 MG/DL (ref 70–99)
GLUCOSE BLD-MCNC: 208 MG/DL (ref 70–99)
GLUCOSE BLD-MCNC: 209 MG/DL (ref 70–99)
GLUCOSE BLD-MCNC: 212 MG/DL (ref 70–99)
GLUCOSE BLD-MCNC: 214 MG/DL (ref 70–99)
GLUCOSE BLD-MCNC: 216 MG/DL (ref 70–99)
GLUCOSE BLD-MCNC: 218 MG/DL (ref 70–99)
GLUCOSE BLD-MCNC: 218 MG/DL (ref 70–99)
GLUCOSE BLD-MCNC: 219 MG/DL (ref 70–99)
GLUCOSE BLD-MCNC: 223 MG/DL (ref 70–99)
GLUCOSE BLD-MCNC: 224 MG/DL (ref 70–99)
GLUCOSE BLD-MCNC: 225 MG/DL (ref 70–99)
GLUCOSE BLD-MCNC: 226 MG/DL (ref 70–99)
GLUCOSE BLD-MCNC: 227 MG/DL (ref 70–99)
GLUCOSE BLD-MCNC: 227 MG/DL (ref 70–99)
GLUCOSE BLD-MCNC: 228 MG/DL (ref 70–99)
GLUCOSE BLD-MCNC: 229 MG/DL (ref 70–99)
GLUCOSE BLD-MCNC: 229 MG/DL (ref 70–99)
GLUCOSE BLD-MCNC: 230 MG/DL (ref 70–99)
GLUCOSE BLD-MCNC: 232 MG/DL (ref 70–99)
GLUCOSE BLD-MCNC: 232 MG/DL (ref 70–99)
GLUCOSE BLD-MCNC: 233 MG/DL (ref 70–99)
GLUCOSE BLD-MCNC: 234 MG/DL (ref 70–99)
GLUCOSE BLD-MCNC: 234 MG/DL (ref 70–99)
GLUCOSE BLD-MCNC: 236 MG/DL (ref 70–99)
GLUCOSE BLD-MCNC: 236 MG/DL (ref 70–99)
GLUCOSE BLD-MCNC: 239 MG/DL (ref 70–99)
GLUCOSE BLD-MCNC: 240 MG/DL (ref 70–99)
GLUCOSE BLD-MCNC: 240 MG/DL (ref 70–99)
GLUCOSE BLD-MCNC: 243 MG/DL (ref 70–99)
GLUCOSE BLD-MCNC: 246 MG/DL (ref 70–99)
GLUCOSE BLD-MCNC: 246 MG/DL (ref 70–99)
GLUCOSE BLD-MCNC: 247 MG/DL (ref 70–99)
GLUCOSE BLD-MCNC: 248 MG/DL (ref 70–99)
GLUCOSE BLD-MCNC: 249 MG/DL (ref 70–99)
GLUCOSE BLD-MCNC: 250 MG/DL (ref 70–99)
GLUCOSE BLD-MCNC: 251 MG/DL (ref 70–99)
GLUCOSE BLD-MCNC: 251 MG/DL (ref 70–99)
GLUCOSE BLD-MCNC: 253 MG/DL (ref 70–99)
GLUCOSE BLD-MCNC: 253 MG/DL (ref 70–99)
GLUCOSE BLD-MCNC: 254 MG/DL (ref 70–99)
GLUCOSE BLD-MCNC: 257 MG/DL (ref 70–99)
GLUCOSE BLD-MCNC: 261 MG/DL (ref 70–99)
GLUCOSE BLD-MCNC: 261 MG/DL (ref 70–99)
GLUCOSE BLD-MCNC: 265 MG/DL (ref 70–99)
GLUCOSE BLD-MCNC: 266 MG/DL (ref 70–99)
GLUCOSE BLD-MCNC: 266 MG/DL (ref 70–99)
GLUCOSE BLD-MCNC: 268 MG/DL (ref 70–99)
GLUCOSE BLD-MCNC: 271 MG/DL (ref 70–99)
GLUCOSE BLD-MCNC: 272 MG/DL (ref 70–99)
GLUCOSE BLD-MCNC: 275 MG/DL (ref 70–99)
GLUCOSE BLD-MCNC: 276 MG/DL (ref 70–99)
GLUCOSE BLD-MCNC: 277 MG/DL (ref 70–99)
GLUCOSE BLD-MCNC: 277 MG/DL (ref 70–99)
GLUCOSE BLD-MCNC: 279 MG/DL (ref 70–99)
GLUCOSE BLD-MCNC: 279 MG/DL (ref 70–99)
GLUCOSE BLD-MCNC: 280 MG/DL (ref 70–99)
GLUCOSE BLD-MCNC: 281 MG/DL (ref 70–99)
GLUCOSE BLD-MCNC: 282 MG/DL (ref 70–99)
GLUCOSE BLD-MCNC: 282 MG/DL (ref 70–99)
GLUCOSE BLD-MCNC: 284 MG/DL (ref 70–99)
GLUCOSE BLD-MCNC: 284 MG/DL (ref 70–99)
GLUCOSE BLD-MCNC: 286 MG/DL (ref 70–99)
GLUCOSE BLD-MCNC: 287 MG/DL (ref 70–99)
GLUCOSE BLD-MCNC: 287 MG/DL (ref 70–99)
GLUCOSE BLD-MCNC: 288 MG/DL (ref 70–99)
GLUCOSE BLD-MCNC: 288 MG/DL (ref 70–99)
GLUCOSE BLD-MCNC: 289 MG/DL (ref 70–99)
GLUCOSE BLD-MCNC: 291 MG/DL (ref 70–99)
GLUCOSE BLD-MCNC: 295 MG/DL (ref 70–99)
GLUCOSE BLD-MCNC: 297 MG/DL (ref 70–99)
GLUCOSE BLD-MCNC: 298 MG/DL (ref 70–99)
GLUCOSE BLD-MCNC: 299 MG/DL (ref 70–99)
GLUCOSE BLD-MCNC: 30 MG/DL (ref 70–99)
GLUCOSE BLD-MCNC: 302 MG/DL (ref 70–99)
GLUCOSE BLD-MCNC: 304 MG/DL (ref 70–99)
GLUCOSE BLD-MCNC: 305 MG/DL (ref 70–99)
GLUCOSE BLD-MCNC: 309 MG/DL (ref 70–99)
GLUCOSE BLD-MCNC: 31 MG/DL (ref 70–99)
GLUCOSE BLD-MCNC: 311 MG/DL (ref 70–99)
GLUCOSE BLD-MCNC: 314 MG/DL (ref 70–99)
GLUCOSE BLD-MCNC: 316 MG/DL (ref 70–99)
GLUCOSE BLD-MCNC: 316 MG/DL (ref 70–99)
GLUCOSE BLD-MCNC: 322 MG/DL (ref 70–99)
GLUCOSE BLD-MCNC: 325 MG/DL (ref 70–99)
GLUCOSE BLD-MCNC: 331 MG/DL (ref 70–99)
GLUCOSE BLD-MCNC: 343 MG/DL (ref 70–99)
GLUCOSE BLD-MCNC: 348 MG/DL (ref 70–99)
GLUCOSE BLD-MCNC: 35 MG/DL (ref 70–99)
GLUCOSE BLD-MCNC: 35 MG/DL (ref 70–99)
GLUCOSE BLD-MCNC: 353 MG/DL (ref 70–99)
GLUCOSE BLD-MCNC: 356 MG/DL (ref 70–99)
GLUCOSE BLD-MCNC: 366 MG/DL (ref 70–99)
GLUCOSE BLD-MCNC: 366 MG/DL (ref 70–99)
GLUCOSE BLD-MCNC: 368 MG/DL (ref 70–99)
GLUCOSE BLD-MCNC: 368 MG/DL (ref 70–99)
GLUCOSE BLD-MCNC: 373 MG/DL (ref 70–99)
GLUCOSE BLD-MCNC: 375 MG/DL (ref 70–99)
GLUCOSE BLD-MCNC: 376 MG/DL (ref 70–99)
GLUCOSE BLD-MCNC: 38 MG/DL (ref 70–99)
GLUCOSE BLD-MCNC: 381 MG/DL (ref 70–99)
GLUCOSE BLD-MCNC: 381 MG/DL (ref 70–99)
GLUCOSE BLD-MCNC: 382 MG/DL (ref 70–99)
GLUCOSE BLD-MCNC: 383 MG/DL (ref 70–99)
GLUCOSE BLD-MCNC: 395 MG/DL (ref 70–99)
GLUCOSE BLD-MCNC: 405 MG/DL (ref 70–99)
GLUCOSE BLD-MCNC: 414 MG/DL (ref 70–99)
GLUCOSE BLD-MCNC: 417 MG/DL (ref 70–99)
GLUCOSE BLD-MCNC: 420 MG/DL (ref 70–99)
GLUCOSE BLD-MCNC: 423 MG/DL (ref 70–99)
GLUCOSE BLD-MCNC: 430 MG/DL (ref 70–99)
GLUCOSE BLD-MCNC: 458 MG/DL (ref 70–99)
GLUCOSE BLD-MCNC: 476 MG/DL (ref 70–99)
GLUCOSE BLD-MCNC: 482 MG/DL (ref 70–99)
GLUCOSE BLD-MCNC: 490 MG/DL (ref 70–99)
GLUCOSE BLD-MCNC: 50 MG/DL (ref 70–99)
GLUCOSE BLD-MCNC: 50 MG/DL (ref 70–99)
GLUCOSE BLD-MCNC: 54 MG/DL (ref 70–99)
GLUCOSE BLD-MCNC: 58 MG/DL (ref 70–99)
GLUCOSE BLD-MCNC: 58 MG/DL (ref 70–99)
GLUCOSE BLD-MCNC: 60 MG/DL (ref 70–99)
GLUCOSE BLD-MCNC: 61 MG/DL (ref 70–99)
GLUCOSE BLD-MCNC: 62 MG/DL (ref 70–99)
GLUCOSE BLD-MCNC: 64 MG/DL (ref 70–99)
GLUCOSE BLD-MCNC: 66 MG/DL (ref 70–99)
GLUCOSE BLD-MCNC: 68 MG/DL (ref 70–99)
GLUCOSE BLD-MCNC: 68 MG/DL (ref 70–99)
GLUCOSE BLD-MCNC: 69 MG/DL (ref 70–99)
GLUCOSE BLD-MCNC: 69 MG/DL (ref 70–99)
GLUCOSE BLD-MCNC: 71 MG/DL (ref 70–99)
GLUCOSE BLD-MCNC: 72 MG/DL (ref 70–99)
GLUCOSE BLD-MCNC: 73 MG/DL (ref 70–99)
GLUCOSE BLD-MCNC: 75 MG/DL (ref 70–99)
GLUCOSE BLD-MCNC: 75 MG/DL (ref 70–99)
GLUCOSE BLD-MCNC: 76 MG/DL (ref 70–99)
GLUCOSE BLD-MCNC: 77 MG/DL (ref 70–99)
GLUCOSE BLD-MCNC: 80 MG/DL (ref 70–99)
GLUCOSE BLD-MCNC: 82 MG/DL (ref 70–99)
GLUCOSE BLD-MCNC: 83 MG/DL (ref 70–99)
GLUCOSE BLD-MCNC: 83 MG/DL (ref 70–99)
GLUCOSE BLD-MCNC: 84 MG/DL (ref 70–99)
GLUCOSE BLD-MCNC: 85 MG/DL (ref 70–99)
GLUCOSE BLD-MCNC: 85 MG/DL (ref 70–99)
GLUCOSE BLD-MCNC: 89 MG/DL (ref 70–99)
GLUCOSE BLD-MCNC: 89 MG/DL (ref 70–99)
GLUCOSE BLD-MCNC: 91 MG/DL (ref 70–99)
GLUCOSE BLD-MCNC: 91 MG/DL (ref 70–99)
GLUCOSE BLD-MCNC: 94 MG/DL (ref 70–99)
GLUCOSE BLD-MCNC: 954 MG/DL (ref 70–99)
GLUCOSE BLD-MCNC: 96 MG/DL (ref 70–99)
GLUCOSE BLD-MCNC: 97 MG/DL (ref 70–99)
GLUCOSE BLD-MCNC: 97 MG/DL (ref 70–99)
GLUCOSE BLD-MCNC: 98 MG/DL (ref 70–99)
GLUCOSE BLD-MCNC: 99 MG/DL (ref 70–99)
GLUCOSE URINE: 250 MG/DL
GRAM STAIN RESULT: NORMAL
HAV IGM SER IA-ACNC: NORMAL
HBA1C MFR BLD: 6.5 %
HBA1C MFR BLD: 8.1 %
HCO3 ARTERIAL: 32.1 MMOL/L (ref 21–29)
HCO3 ARTERIAL: 36.2 MMOL/L (ref 21–29)
HCO3 ARTERIAL: 37 MMOL/L (ref 21–29)
HCO3 ARTERIAL: 37.8 MMOL/L (ref 21–29)
HCO3 ARTERIAL: 38.3 MMOL/L (ref 21–29)
HCO3 ARTERIAL: 40.5 MMOL/L (ref 21–29)
HCO3 ARTERIAL: 41 MMOL/L (ref 21–29)
HCO3 ARTERIAL: 41.4 MMOL/L (ref 21–29)
HCO3 ARTERIAL: 41.8 MMOL/L (ref 21–29)
HCO3 ARTERIAL: 42 MMOL/L (ref 21–29)
HCO3 ARTERIAL: 44.5 MMOL/L (ref 21–29)
HCO3 ARTERIAL: 45 MMOL/L (ref 21–29)
HCO3 ARTERIAL: 46 MMOL/L (ref 21–29)
HCO3 ARTERIAL: 51.5 MMOL/L (ref 21–29)
HCO3 ARTERIAL: 51.8 MMOL/L (ref 21–29)
HCO3 ARTERIAL: 52 MMOL/L (ref 21–29)
HCO3 VENOUS: 36.1 MMOL/L (ref 24–28)
HCO3 VENOUS: 37.8 MMOL/L (ref 23–29)
HCO3 VENOUS: 39.9 MMOL/L (ref 24–28)
HCO3 VENOUS: 48.2 MMOL/L (ref 24–28)
HCT VFR BLD CALC: 20.7 % (ref 40.5–52.5)
HCT VFR BLD CALC: 21.3 % (ref 40.5–52.5)
HCT VFR BLD CALC: 21.3 % (ref 40.5–52.5)
HCT VFR BLD CALC: 21.4 % (ref 40.5–52.5)
HCT VFR BLD CALC: 21.7 % (ref 40.5–52.5)
HCT VFR BLD CALC: 22.3 % (ref 40.5–52.5)
HCT VFR BLD CALC: 22.4 % (ref 40.5–52.5)
HCT VFR BLD CALC: 22.6 % (ref 40.5–52.5)
HCT VFR BLD CALC: 22.8 % (ref 40.5–52.5)
HCT VFR BLD CALC: 22.9 % (ref 40.5–52.5)
HCT VFR BLD CALC: 23 % (ref 40.5–52.5)
HCT VFR BLD CALC: 23 % (ref 40.5–52.5)
HCT VFR BLD CALC: 23.1 % (ref 40.5–52.5)
HCT VFR BLD CALC: 23.2 % (ref 40.5–52.5)
HCT VFR BLD CALC: 23.3 % (ref 40.5–52.5)
HCT VFR BLD CALC: 23.5 % (ref 40.5–52.5)
HCT VFR BLD CALC: 23.6 % (ref 40.5–52.5)
HCT VFR BLD CALC: 23.6 % (ref 40.5–52.5)
HCT VFR BLD CALC: 23.7 % (ref 40.5–52.5)
HCT VFR BLD CALC: 23.7 % (ref 40.5–52.5)
HCT VFR BLD CALC: 23.9 % (ref 40.5–52.5)
HCT VFR BLD CALC: 24 % (ref 40.5–52.5)
HCT VFR BLD CALC: 24 % (ref 40.5–52.5)
HCT VFR BLD CALC: 24.1 % (ref 40.5–52.5)
HCT VFR BLD CALC: 24.3 % (ref 40.5–52.5)
HCT VFR BLD CALC: 24.4 % (ref 40.5–52.5)
HCT VFR BLD CALC: 24.5 % (ref 40.5–52.5)
HCT VFR BLD CALC: 24.5 % (ref 40.5–52.5)
HCT VFR BLD CALC: 24.6 % (ref 40.5–52.5)
HCT VFR BLD CALC: 24.7 % (ref 40.5–52.5)
HCT VFR BLD CALC: 24.8 % (ref 40.5–52.5)
HCT VFR BLD CALC: 24.8 % (ref 40.5–52.5)
HCT VFR BLD CALC: 25.1 % (ref 40.5–52.5)
HCT VFR BLD CALC: 25.3 % (ref 40.5–52.5)
HCT VFR BLD CALC: 25.3 % (ref 40.5–52.5)
HCT VFR BLD CALC: 25.4 % (ref 40.5–52.5)
HCT VFR BLD CALC: 25.6 % (ref 40.5–52.5)
HCT VFR BLD CALC: 25.7 % (ref 40.5–52.5)
HCT VFR BLD CALC: 25.8 % (ref 40.5–52.5)
HCT VFR BLD CALC: 26.3 % (ref 40.5–52.5)
HCT VFR BLD CALC: 26.5 % (ref 40.5–52.5)
HCT VFR BLD CALC: 26.7 % (ref 40.5–52.5)
HCT VFR BLD CALC: 27.2 % (ref 40.5–52.5)
HCT VFR BLD CALC: 27.3 % (ref 40.5–52.5)
HCT VFR BLD CALC: 27.7 % (ref 40.5–52.5)
HCT VFR BLD CALC: 28.3 % (ref 40.5–52.5)
HCT VFR BLD CALC: 28.7 % (ref 40.5–52.5)
HCT VFR BLD CALC: 29 % (ref 40.5–52.5)
HCT VFR BLD CALC: 29.3 % (ref 40.5–52.5)
HCT VFR BLD CALC: 42.8 % (ref 40.5–52.5)
HDLC SERPL-MCNC: 81 MG/DL (ref 40–60)
HEMOGLOBIN, ART, EXTENDED: 10.4 G/DL
HEMOGLOBIN, ART, EXTENDED: 7.7 G/DL
HEMOGLOBIN, ART, EXTENDED: 7.9 G/DL
HEMOGLOBIN, ART, EXTENDED: 8.1 G/DL
HEMOGLOBIN, ART, EXTENDED: 8.7 G/DL
HEMOGLOBIN, ART, EXTENDED: 9.2 G/DL
HEMOGLOBIN, VEN, REDUCED: 2.4 %
HEMOGLOBIN, VEN, REDUCED: 3.5 %
HEMOGLOBIN, VEN, REDUCED: 67.8 %
HEMOGLOBIN: 13.5 G/DL (ref 13.5–17.5)
HEMOGLOBIN: 6.6 G/DL (ref 13.5–17.5)
HEMOGLOBIN: 6.6 G/DL (ref 13.5–17.5)
HEMOGLOBIN: 6.8 G/DL (ref 13.5–17.5)
HEMOGLOBIN: 6.9 G/DL (ref 13.5–17.5)
HEMOGLOBIN: 7 G/DL (ref 13.5–17.5)
HEMOGLOBIN: 7.1 G/DL (ref 13.5–17.5)
HEMOGLOBIN: 7.1 G/DL (ref 13.5–17.5)
HEMOGLOBIN: 7.2 G/DL (ref 13.5–17.5)
HEMOGLOBIN: 7.2 G/DL (ref 13.5–17.5)
HEMOGLOBIN: 7.3 G/DL (ref 13.5–17.5)
HEMOGLOBIN: 7.4 G/DL (ref 13.5–17.5)
HEMOGLOBIN: 7.5 G/DL (ref 13.5–17.5)
HEMOGLOBIN: 7.6 G/DL (ref 13.5–17.5)
HEMOGLOBIN: 7.7 G/DL (ref 13.5–17.5)
HEMOGLOBIN: 7.7 G/DL (ref 13.5–17.5)
HEMOGLOBIN: 7.8 G/DL (ref 13.5–17.5)
HEMOGLOBIN: 7.9 G/DL (ref 13.5–17.5)
HEMOGLOBIN: 8 G/DL (ref 13.5–17.5)
HEMOGLOBIN: 8.1 G/DL (ref 13.5–17.5)
HEMOGLOBIN: 8.1 G/DL (ref 13.5–17.5)
HEMOGLOBIN: 8.2 G/DL (ref 13.5–17.5)
HEMOGLOBIN: 8.2 G/DL (ref 13.5–17.5)
HEMOGLOBIN: 8.3 G/DL (ref 13.5–17.5)
HEMOGLOBIN: 8.5 G/DL (ref 13.5–17.5)
HEMOGLOBIN: 8.5 G/DL (ref 13.5–17.5)
HEMOGLOBIN: 8.7 G/DL (ref 13.5–17.5)
HEMOGLOBIN: 8.7 G/DL (ref 13.5–17.5)
HEMOGLOBIN: 8.9 G/DL (ref 13.5–17.5)
HEMOGLOBIN: 9 G/DL (ref 13.5–17.5)
HEMOGLOBIN: 9.1 G/DL (ref 13.5–17.5)
HEPATITIS B CORE IGM ANTIBODY: NORMAL
HEPATITIS B SURFACE ANTIGEN INTERPRETATION: NORMAL
HEPATITIS C ANTIBODY INTERPRETATION: NORMAL
HIV AG/AB: NORMAL
HIV ANTIGEN: NORMAL
HIV-1 ANTIBODY: NORMAL
HIV-2 AB: NORMAL
HOMOCYSTEINE: 11 UMOL/L (ref 0–10)
HYPOCHROMIA: ABNORMAL
HYPOCHROMIA: ABNORMAL
INR BLD: 1.01 (ref 0.87–1.14)
INR BLD: 1.33 (ref 0.87–1.14)
IRON SATURATION: 12 % (ref 20–50)
IRON SATURATION: 18 % (ref 20–50)
IRON: 15 UG/DL (ref 59–158)
IRON: 24 UG/DL (ref 59–158)
KETONES, URINE: NEGATIVE MG/DL
LACTATE DEHYDROGENASE: 238 U/L (ref 100–190)
LACTATE: 0.72 MMOL/L (ref 0.4–2)
LACTATE: 0.87 MMOL/L (ref 0.4–2)
LD, FLUID: 305 U/L
LDL CHOLESTEROL CALCULATED: 99 MG/DL
LEUKOCYTE ESTERASE, URINE: NEGATIVE
LYMPHOCYTES ABSOLUTE: 0.1 K/UL (ref 1–5.1)
LYMPHOCYTES ABSOLUTE: 0.1 K/UL (ref 1–5.1)
LYMPHOCYTES ABSOLUTE: 0.2 K/UL (ref 1–5.1)
LYMPHOCYTES ABSOLUTE: 0.3 K/UL (ref 1–5.1)
LYMPHOCYTES ABSOLUTE: 0.4 K/UL (ref 1–5.1)
LYMPHOCYTES ABSOLUTE: 0.5 K/UL (ref 1–5.1)
LYMPHOCYTES ABSOLUTE: 0.6 K/UL (ref 1–5.1)
LYMPHOCYTES ABSOLUTE: 0.7 K/UL (ref 1–5.1)
LYMPHOCYTES ABSOLUTE: 0.7 K/UL (ref 1–5.1)
LYMPHOCYTES ABSOLUTE: 0.8 K/UL (ref 1–5.1)
LYMPHOCYTES ABSOLUTE: 1.1 K/UL (ref 1–5.1)
LYMPHOCYTES ABSOLUTE: 1.1 K/UL (ref 1–5.1)
LYMPHOCYTES RELATIVE PERCENT: 0.6 %
LYMPHOCYTES RELATIVE PERCENT: 0.8 %
LYMPHOCYTES RELATIVE PERCENT: 0.9 %
LYMPHOCYTES RELATIVE PERCENT: 1 %
LYMPHOCYTES RELATIVE PERCENT: 1.1 %
LYMPHOCYTES RELATIVE PERCENT: 1.2 %
LYMPHOCYTES RELATIVE PERCENT: 1.3 %
LYMPHOCYTES RELATIVE PERCENT: 1.3 %
LYMPHOCYTES RELATIVE PERCENT: 1.4 %
LYMPHOCYTES RELATIVE PERCENT: 1.5 %
LYMPHOCYTES RELATIVE PERCENT: 1.6 %
LYMPHOCYTES RELATIVE PERCENT: 1.8 %
LYMPHOCYTES RELATIVE PERCENT: 1.9 %
LYMPHOCYTES RELATIVE PERCENT: 1.9 %
LYMPHOCYTES RELATIVE PERCENT: 2 %
LYMPHOCYTES RELATIVE PERCENT: 2 %
LYMPHOCYTES RELATIVE PERCENT: 2.1 %
LYMPHOCYTES RELATIVE PERCENT: 2.4 %
LYMPHOCYTES RELATIVE PERCENT: 2.5 %
LYMPHOCYTES RELATIVE PERCENT: 3.4 %
LYMPHOCYTES RELATIVE PERCENT: 3.5 %
LYMPHOCYTES RELATIVE PERCENT: 3.6 %
LYMPHOCYTES RELATIVE PERCENT: 3.7 %
LYMPHOCYTES RELATIVE PERCENT: 3.8 %
LYMPHOCYTES RELATIVE PERCENT: 4 %
LYMPHOCYTES RELATIVE PERCENT: 4 %
LYMPHOCYTES RELATIVE PERCENT: 4.1 %
LYMPHOCYTES RELATIVE PERCENT: 4.2 %
LYMPHOCYTES RELATIVE PERCENT: 4.4 %
LYMPHOCYTES RELATIVE PERCENT: 4.6 %
LYMPHOCYTES RELATIVE PERCENT: 4.7 %
LYMPHOCYTES RELATIVE PERCENT: 4.8 %
LYMPHOCYTES RELATIVE PERCENT: 5.2 %
LYMPHOCYTES RELATIVE PERCENT: 5.2 %
LYMPHOCYTES RELATIVE PERCENT: 5.3 %
LYMPHOCYTES RELATIVE PERCENT: 6 %
LYMPHOCYTES, BODY FLUID: 28 %
LYMPHOCYTES, BODY FLUID: 87 %
M SPIKE 1 SERUM PROTEIN ELEC: 0.3 G/DL
MACROCYTES: ABNORMAL
MACROCYTES: ABNORMAL
MACROPHAGE FLUID: 1 %
MAGNESIUM: 1.8 MG/DL (ref 1.8–2.4)
MAGNESIUM: 2 MG/DL (ref 1.8–2.4)
MAGNESIUM: 2.1 MG/DL (ref 1.8–2.4)
MAGNESIUM: 2.3 MG/DL (ref 1.8–2.4)
MAGNESIUM: 2.3 MG/DL (ref 1.8–2.4)
MAGNESIUM: 2.4 MG/DL (ref 1.8–2.4)
MAGNESIUM: 2.5 MG/DL (ref 1.8–2.4)
MAGNESIUM: 2.6 MG/DL (ref 1.8–2.4)
MAGNESIUM: 2.7 MG/DL (ref 1.8–2.4)
MAGNESIUM: 2.8 MG/DL (ref 1.8–2.4)
MAGNESIUM: 2.9 MG/DL (ref 1.8–2.4)
MAGNESIUM: 2.9 MG/DL (ref 1.8–2.4)
MAGNESIUM: 3 MG/DL (ref 1.8–2.4)
MAGNESIUM: 3.1 MG/DL (ref 1.8–2.4)
MAGNESIUM: 3.2 MG/DL (ref 1.8–2.4)
MAGNESIUM: 3.3 MG/DL (ref 1.8–2.4)
MAGNESIUM: 3.4 MG/DL (ref 1.8–2.4)
MAGNESIUM: 3.5 MG/DL (ref 1.8–2.4)
MAGNESIUM: 3.5 MG/DL (ref 1.8–2.4)
MAGNESIUM: 3.6 MG/DL (ref 1.8–2.4)
MCH RBC QN AUTO: 25.7 PG (ref 26–34)
MCH RBC QN AUTO: 25.9 PG (ref 26–34)
MCH RBC QN AUTO: 25.9 PG (ref 26–34)
MCH RBC QN AUTO: 26 PG (ref 26–34)
MCH RBC QN AUTO: 26.1 PG (ref 26–34)
MCH RBC QN AUTO: 26.2 PG (ref 26–34)
MCH RBC QN AUTO: 26.2 PG (ref 26–34)
MCH RBC QN AUTO: 26.3 PG (ref 26–34)
MCH RBC QN AUTO: 26.3 PG (ref 26–34)
MCH RBC QN AUTO: 26.4 PG (ref 26–34)
MCH RBC QN AUTO: 26.6 PG (ref 26–34)
MCH RBC QN AUTO: 26.6 PG (ref 26–34)
MCH RBC QN AUTO: 26.8 PG (ref 26–34)
MCH RBC QN AUTO: 27.1 PG (ref 26–34)
MCH RBC QN AUTO: 27.1 PG (ref 26–34)
MCH RBC QN AUTO: 27.2 PG (ref 26–34)
MCH RBC QN AUTO: 27.4 PG (ref 26–34)
MCH RBC QN AUTO: 27.5 PG (ref 26–34)
MCH RBC QN AUTO: 27.7 PG (ref 26–34)
MCH RBC QN AUTO: 27.8 PG (ref 26–34)
MCH RBC QN AUTO: 27.8 PG (ref 26–34)
MCH RBC QN AUTO: 28 PG (ref 26–34)
MCH RBC QN AUTO: 28.1 PG (ref 26–34)
MCH RBC QN AUTO: 28.2 PG (ref 26–34)
MCH RBC QN AUTO: 28.2 PG (ref 26–34)
MCH RBC QN AUTO: 28.3 PG (ref 26–34)
MCH RBC QN AUTO: 28.5 PG (ref 26–34)
MCH RBC QN AUTO: 28.6 PG (ref 26–34)
MCH RBC QN AUTO: 28.6 PG (ref 26–34)
MCH RBC QN AUTO: 28.9 PG (ref 26–34)
MCHC RBC AUTO-ENTMCNC: 28.4 G/DL (ref 31–36)
MCHC RBC AUTO-ENTMCNC: 30 G/DL (ref 31–36)
MCHC RBC AUTO-ENTMCNC: 30 G/DL (ref 31–36)
MCHC RBC AUTO-ENTMCNC: 30.2 G/DL (ref 31–36)
MCHC RBC AUTO-ENTMCNC: 30.3 G/DL (ref 31–36)
MCHC RBC AUTO-ENTMCNC: 30.5 G/DL (ref 31–36)
MCHC RBC AUTO-ENTMCNC: 30.7 G/DL (ref 31–36)
MCHC RBC AUTO-ENTMCNC: 30.9 G/DL (ref 31–36)
MCHC RBC AUTO-ENTMCNC: 30.9 G/DL (ref 31–36)
MCHC RBC AUTO-ENTMCNC: 31 G/DL (ref 31–36)
MCHC RBC AUTO-ENTMCNC: 31 G/DL (ref 31–36)
MCHC RBC AUTO-ENTMCNC: 31.1 G/DL (ref 31–36)
MCHC RBC AUTO-ENTMCNC: 31.2 G/DL (ref 31–36)
MCHC RBC AUTO-ENTMCNC: 31.3 G/DL (ref 31–36)
MCHC RBC AUTO-ENTMCNC: 31.4 G/DL (ref 31–36)
MCHC RBC AUTO-ENTMCNC: 31.4 G/DL (ref 31–36)
MCHC RBC AUTO-ENTMCNC: 31.5 G/DL (ref 31–36)
MCHC RBC AUTO-ENTMCNC: 31.6 G/DL (ref 31–36)
MCHC RBC AUTO-ENTMCNC: 31.7 G/DL (ref 31–36)
MCHC RBC AUTO-ENTMCNC: 31.8 G/DL (ref 31–36)
MCHC RBC AUTO-ENTMCNC: 31.8 G/DL (ref 31–36)
MCHC RBC AUTO-ENTMCNC: 31.9 G/DL (ref 31–36)
MCHC RBC AUTO-ENTMCNC: 32 G/DL (ref 31–36)
MCHC RBC AUTO-ENTMCNC: 32.1 G/DL (ref 31–36)
MCHC RBC AUTO-ENTMCNC: 32.1 G/DL (ref 31–36)
MCHC RBC AUTO-ENTMCNC: 32.3 G/DL (ref 31–36)
MCHC RBC AUTO-ENTMCNC: 32.3 G/DL (ref 31–36)
MCHC RBC AUTO-ENTMCNC: 32.5 G/DL (ref 31–36)
MCHC RBC AUTO-ENTMCNC: 32.5 G/DL (ref 31–36)
MCHC RBC AUTO-ENTMCNC: 32.6 G/DL (ref 31–36)
MCHC RBC AUTO-ENTMCNC: 32.7 G/DL (ref 31–36)
MCHC RBC AUTO-ENTMCNC: 32.8 G/DL (ref 31–36)
MCV RBC AUTO: 81.8 FL (ref 80–100)
MCV RBC AUTO: 82 FL (ref 80–100)
MCV RBC AUTO: 82.7 FL (ref 80–100)
MCV RBC AUTO: 82.8 FL (ref 80–100)
MCV RBC AUTO: 82.9 FL (ref 80–100)
MCV RBC AUTO: 83 FL (ref 80–100)
MCV RBC AUTO: 83.4 FL (ref 80–100)
MCV RBC AUTO: 83.4 FL (ref 80–100)
MCV RBC AUTO: 83.5 FL (ref 80–100)
MCV RBC AUTO: 83.6 FL (ref 80–100)
MCV RBC AUTO: 83.7 FL (ref 80–100)
MCV RBC AUTO: 84.4 FL (ref 80–100)
MCV RBC AUTO: 84.6 FL (ref 80–100)
MCV RBC AUTO: 84.7 FL (ref 80–100)
MCV RBC AUTO: 84.8 FL (ref 80–100)
MCV RBC AUTO: 85 FL (ref 80–100)
MCV RBC AUTO: 85 FL (ref 80–100)
MCV RBC AUTO: 85.1 FL (ref 80–100)
MCV RBC AUTO: 85.7 FL (ref 80–100)
MCV RBC AUTO: 85.7 FL (ref 80–100)
MCV RBC AUTO: 85.9 FL (ref 80–100)
MCV RBC AUTO: 85.9 FL (ref 80–100)
MCV RBC AUTO: 86.3 FL (ref 80–100)
MCV RBC AUTO: 86.5 FL (ref 80–100)
MCV RBC AUTO: 86.6 FL (ref 80–100)
MCV RBC AUTO: 86.6 FL (ref 80–100)
MCV RBC AUTO: 86.8 FL (ref 80–100)
MCV RBC AUTO: 86.9 FL (ref 80–100)
MCV RBC AUTO: 87 FL (ref 80–100)
MCV RBC AUTO: 87 FL (ref 80–100)
MCV RBC AUTO: 87.1 FL (ref 80–100)
MCV RBC AUTO: 87.1 FL (ref 80–100)
MCV RBC AUTO: 87.2 FL (ref 80–100)
MCV RBC AUTO: 87.5 FL (ref 80–100)
MCV RBC AUTO: 87.5 FL (ref 80–100)
MCV RBC AUTO: 87.7 FL (ref 80–100)
MCV RBC AUTO: 88 FL (ref 80–100)
MCV RBC AUTO: 88.2 FL (ref 80–100)
MCV RBC AUTO: 88.5 FL (ref 80–100)
MCV RBC AUTO: 88.9 FL (ref 80–100)
MCV RBC AUTO: 88.9 FL (ref 80–100)
MCV RBC AUTO: 89 FL (ref 80–100)
MCV RBC AUTO: 89.2 FL (ref 80–100)
MCV RBC AUTO: 89.7 FL (ref 80–100)
MCV RBC AUTO: 90.8 FL (ref 80–100)
MCV RBC AUTO: 91.6 FL (ref 80–100)
METAMYELOCYTES RELATIVE PERCENT: 1 %
METHEMOGLOBIN ARTERIAL: 0.1 % (ref 0–1.4)
METHEMOGLOBIN ARTERIAL: 0.2 % (ref 0–1.4)
METHEMOGLOBIN ARTERIAL: 0.6 % (ref 0–1.4)
METHEMOGLOBIN ARTERIAL: 0.7 % (ref 0–1.4)
METHEMOGLOBIN VENOUS: 0 % (ref 0–1.5)
METHEMOGLOBIN VENOUS: 0.2 % (ref 0–1.5)
METHEMOGLOBIN VENOUS: 0.5 % (ref 0–1.5)
METHYLMALONIC ACID: 1.17 UMOL/L (ref 0–0.4)
MICROALBUMIN UR-MCNC: 153.9 MG/DL
MICROALBUMIN/CREAT UR-RTO: 2435.1 MG/G (ref 0–30)
MICROCYTES: ABNORMAL
MICROSCOPIC EXAMINATION: YES
MONOCYTE, FLUID: 20 %
MONOCYTE, FLUID: 3 %
MONOCYTES ABSOLUTE: 0.3 K/UL (ref 0–1.3)
MONOCYTES ABSOLUTE: 0.3 K/UL (ref 0–1.3)
MONOCYTES ABSOLUTE: 0.4 K/UL (ref 0–1.3)
MONOCYTES ABSOLUTE: 0.4 K/UL (ref 0–1.3)
MONOCYTES ABSOLUTE: 0.5 K/UL (ref 0–1.3)
MONOCYTES ABSOLUTE: 0.6 K/UL (ref 0–1.3)
MONOCYTES ABSOLUTE: 0.7 K/UL (ref 0–1.3)
MONOCYTES ABSOLUTE: 0.8 K/UL (ref 0–1.3)
MONOCYTES ABSOLUTE: 0.9 K/UL (ref 0–1.3)
MONOCYTES ABSOLUTE: 1 K/UL (ref 0–1.3)
MONOCYTES ABSOLUTE: 1.1 K/UL (ref 0–1.3)
MONOCYTES ABSOLUTE: 1.1 K/UL (ref 0–1.3)
MONOCYTES ABSOLUTE: 1.2 K/UL (ref 0–1.3)
MONOCYTES ABSOLUTE: 1.2 K/UL (ref 0–1.3)
MONOCYTES ABSOLUTE: 1.3 K/UL (ref 0–1.3)
MONOCYTES ABSOLUTE: 1.4 K/UL (ref 0–1.3)
MONOCYTES ABSOLUTE: 1.5 K/UL (ref 0–1.3)
MONOCYTES ABSOLUTE: 1.5 K/UL (ref 0–1.3)
MONOCYTES ABSOLUTE: 1.6 K/UL (ref 0–1.3)
MONOCYTES ABSOLUTE: 1.6 K/UL (ref 0–1.3)
MONOCYTES ABSOLUTE: 1.7 K/UL (ref 0–1.3)
MONOCYTES ABSOLUTE: 2.4 K/UL (ref 0–1.3)
MONOCYTES RELATIVE PERCENT: 1.7 %
MONOCYTES RELATIVE PERCENT: 10 %
MONOCYTES RELATIVE PERCENT: 2 %
MONOCYTES RELATIVE PERCENT: 2.7 %
MONOCYTES RELATIVE PERCENT: 2.8 %
MONOCYTES RELATIVE PERCENT: 3.1 %
MONOCYTES RELATIVE PERCENT: 3.4 %
MONOCYTES RELATIVE PERCENT: 3.8 %
MONOCYTES RELATIVE PERCENT: 3.9 %
MONOCYTES RELATIVE PERCENT: 4.2 %
MONOCYTES RELATIVE PERCENT: 4.2 %
MONOCYTES RELATIVE PERCENT: 4.6 %
MONOCYTES RELATIVE PERCENT: 4.6 %
MONOCYTES RELATIVE PERCENT: 5 %
MONOCYTES RELATIVE PERCENT: 5.2 %
MONOCYTES RELATIVE PERCENT: 5.5 %
MONOCYTES RELATIVE PERCENT: 6.1 %
MONOCYTES RELATIVE PERCENT: 6.2 %
MONOCYTES RELATIVE PERCENT: 6.3 %
MONOCYTES RELATIVE PERCENT: 6.4 %
MONOCYTES RELATIVE PERCENT: 6.7 %
MONOCYTES RELATIVE PERCENT: 7.2 %
MONOCYTES RELATIVE PERCENT: 7.3 %
MONOCYTES RELATIVE PERCENT: 7.4 %
MONOCYTES RELATIVE PERCENT: 7.4 %
MONOCYTES RELATIVE PERCENT: 7.5 %
MONOCYTES RELATIVE PERCENT: 7.6 %
MONOCYTES RELATIVE PERCENT: 7.7 %
MONOCYTES RELATIVE PERCENT: 8 %
MONOCYTES RELATIVE PERCENT: 8.5 %
MONOCYTES RELATIVE PERCENT: 8.6 %
MONOCYTES RELATIVE PERCENT: 8.6 %
MONOCYTES RELATIVE PERCENT: 8.8 %
MONOCYTES RELATIVE PERCENT: 9 %
MONOCYTES RELATIVE PERCENT: 9 %
MONOCYTES RELATIVE PERCENT: 9.1 %
MONOCYTES RELATIVE PERCENT: 9.2 %
MONOCYTES RELATIVE PERCENT: 9.3 %
MONOCYTES RELATIVE PERCENT: 9.6 %
MONOCYTES RELATIVE PERCENT: 9.8 %
MRSA SCREEN RT-PCR: NORMAL
MYELOCYTE PERCENT: 1 %
MYELOCYTE PERCENT: 1 %
MYELOCYTE PERCENT: 2 %
NEUTROPHIL, FLUID: 52 %
NEUTROPHIL, FLUID: 9 %
NEUTROPHILS ABSOLUTE: 11 K/UL (ref 1.7–7.7)
NEUTROPHILS ABSOLUTE: 11.5 K/UL (ref 1.7–7.7)
NEUTROPHILS ABSOLUTE: 12 K/UL (ref 1.7–7.7)
NEUTROPHILS ABSOLUTE: 12.4 K/UL (ref 1.7–7.7)
NEUTROPHILS ABSOLUTE: 12.6 K/UL (ref 1.7–7.7)
NEUTROPHILS ABSOLUTE: 12.7 K/UL (ref 1.7–7.7)
NEUTROPHILS ABSOLUTE: 13 K/UL (ref 1.7–7.7)
NEUTROPHILS ABSOLUTE: 13.2 K/UL (ref 1.7–7.7)
NEUTROPHILS ABSOLUTE: 13.3 K/UL (ref 1.7–7.7)
NEUTROPHILS ABSOLUTE: 14 K/UL (ref 1.7–7.7)
NEUTROPHILS ABSOLUTE: 14.1 K/UL (ref 1.7–7.7)
NEUTROPHILS ABSOLUTE: 14.6 K/UL (ref 1.7–7.7)
NEUTROPHILS ABSOLUTE: 14.8 K/UL (ref 1.7–7.7)
NEUTROPHILS ABSOLUTE: 15 K/UL (ref 1.7–7.7)
NEUTROPHILS ABSOLUTE: 16.3 K/UL (ref 1.7–7.7)
NEUTROPHILS ABSOLUTE: 16.3 K/UL (ref 1.7–7.7)
NEUTROPHILS ABSOLUTE: 16.7 K/UL (ref 1.7–7.7)
NEUTROPHILS ABSOLUTE: 17.6 K/UL (ref 1.7–7.7)
NEUTROPHILS ABSOLUTE: 18 K/UL (ref 1.7–7.7)
NEUTROPHILS ABSOLUTE: 18.1 K/UL (ref 1.7–7.7)
NEUTROPHILS ABSOLUTE: 18.4 K/UL (ref 1.7–7.7)
NEUTROPHILS ABSOLUTE: 18.6 K/UL (ref 1.7–7.7)
NEUTROPHILS ABSOLUTE: 19.6 K/UL (ref 1.7–7.7)
NEUTROPHILS ABSOLUTE: 19.8 K/UL (ref 1.7–7.7)
NEUTROPHILS ABSOLUTE: 20 K/UL (ref 1.7–7.7)
NEUTROPHILS ABSOLUTE: 20.5 K/UL (ref 1.7–7.7)
NEUTROPHILS ABSOLUTE: 21.1 K/UL (ref 1.7–7.7)
NEUTROPHILS ABSOLUTE: 22.5 K/UL (ref 1.7–7.7)
NEUTROPHILS ABSOLUTE: 22.7 K/UL (ref 1.7–7.7)
NEUTROPHILS ABSOLUTE: 23.7 K/UL (ref 1.7–7.7)
NEUTROPHILS ABSOLUTE: 24.2 K/UL (ref 1.7–7.7)
NEUTROPHILS ABSOLUTE: 4.6 K/UL (ref 1.7–7.7)
NEUTROPHILS ABSOLUTE: 5.9 K/UL (ref 1.7–7.7)
NEUTROPHILS ABSOLUTE: 6.1 K/UL (ref 1.7–7.7)
NEUTROPHILS ABSOLUTE: 6.3 K/UL (ref 1.7–7.7)
NEUTROPHILS ABSOLUTE: 6.3 K/UL (ref 1.7–7.7)
NEUTROPHILS ABSOLUTE: 7.7 K/UL (ref 1.7–7.7)
NEUTROPHILS ABSOLUTE: 7.8 K/UL (ref 1.7–7.7)
NEUTROPHILS ABSOLUTE: 8 K/UL (ref 1.7–7.7)
NEUTROPHILS ABSOLUTE: 8.1 K/UL (ref 1.7–7.7)
NEUTROPHILS ABSOLUTE: 8.9 K/UL (ref 1.7–7.7)
NEUTROPHILS ABSOLUTE: 9.9 K/UL (ref 1.7–7.7)
NEUTROPHILS RELATIVE PERCENT: 82 %
NEUTROPHILS RELATIVE PERCENT: 84 %
NEUTROPHILS RELATIVE PERCENT: 84.2 %
NEUTROPHILS RELATIVE PERCENT: 84.2 %
NEUTROPHILS RELATIVE PERCENT: 84.4 %
NEUTROPHILS RELATIVE PERCENT: 84.5 %
NEUTROPHILS RELATIVE PERCENT: 85 %
NEUTROPHILS RELATIVE PERCENT: 85.1 %
NEUTROPHILS RELATIVE PERCENT: 85.6 %
NEUTROPHILS RELATIVE PERCENT: 85.6 %
NEUTROPHILS RELATIVE PERCENT: 85.9 %
NEUTROPHILS RELATIVE PERCENT: 86.7 %
NEUTROPHILS RELATIVE PERCENT: 86.8 %
NEUTROPHILS RELATIVE PERCENT: 86.9 %
NEUTROPHILS RELATIVE PERCENT: 86.9 %
NEUTROPHILS RELATIVE PERCENT: 87.5 %
NEUTROPHILS RELATIVE PERCENT: 88.1 %
NEUTROPHILS RELATIVE PERCENT: 88.2 %
NEUTROPHILS RELATIVE PERCENT: 88.4 %
NEUTROPHILS RELATIVE PERCENT: 88.8 %
NEUTROPHILS RELATIVE PERCENT: 89 %
NEUTROPHILS RELATIVE PERCENT: 90.7 %
NEUTROPHILS RELATIVE PERCENT: 91 %
NEUTROPHILS RELATIVE PERCENT: 91 %
NEUTROPHILS RELATIVE PERCENT: 91.6 %
NEUTROPHILS RELATIVE PERCENT: 91.7 %
NEUTROPHILS RELATIVE PERCENT: 91.7 %
NEUTROPHILS RELATIVE PERCENT: 92.5 %
NEUTROPHILS RELATIVE PERCENT: 93.4 %
NEUTROPHILS RELATIVE PERCENT: 93.7 %
NEUTROPHILS RELATIVE PERCENT: 94.1 %
NEUTROPHILS RELATIVE PERCENT: 94.4 %
NEUTROPHILS RELATIVE PERCENT: 94.6 %
NEUTROPHILS RELATIVE PERCENT: 94.6 %
NEUTROPHILS RELATIVE PERCENT: 94.7 %
NEUTROPHILS RELATIVE PERCENT: 95.4 %
NEUTROPHILS RELATIVE PERCENT: 95.7 %
NEUTROPHILS RELATIVE PERCENT: 95.8 %
NEUTROPHILS RELATIVE PERCENT: 96.2 %
NEUTROPHILS RELATIVE PERCENT: 97.1 %
NITRITE, URINE: NEGATIVE
NUCLEATED CELLS FLUID: 320 /CUMM
NUCLEATED CELLS FLUID: 71 /CUMM
NUMBER OF CELLS COUNTED FLUID: 100
NUMBER OF CELLS COUNTED FLUID: 100
O2 SAT, ARTERIAL: 100 % (ref 93–100)
O2 SAT, ARTERIAL: 62 % (ref 93–100)
O2 SAT, ARTERIAL: 72 % (ref 93–100)
O2 SAT, ARTERIAL: 89 % (ref 93–100)
O2 SAT, ARTERIAL: 93 % (ref 93–100)
O2 SAT, ARTERIAL: 93 % (ref 93–100)
O2 SAT, ARTERIAL: 94 % (ref 93–100)
O2 SAT, ARTERIAL: 95 % (ref 93–100)
O2 SAT, ARTERIAL: 96 % (ref 93–100)
O2 SAT, ARTERIAL: 97 % (ref 93–100)
O2 SAT, ARTERIAL: 98 % (ref 93–100)
O2 SAT, VEN: 31 %
O2 SAT, VEN: 54 %
O2 SAT, VEN: 96 %
O2 SAT, VEN: 98 %
OCCULT BLOOD DIAGNOSTIC: ABNORMAL
OVALOCYTES: ABNORMAL
PCO2 ARTERIAL: 101.2 MM HG (ref 35–45)
PCO2 ARTERIAL: 108.3 MM HG (ref 35–45)
PCO2 ARTERIAL: 150.9 MM HG (ref 35–45)
PCO2 ARTERIAL: 38.7 MM HG (ref 35–45)
PCO2 ARTERIAL: 40.1 MMHG (ref 35–45)
PCO2 ARTERIAL: 43.5 MMHG (ref 35–45)
PCO2 ARTERIAL: 46.9 MMHG (ref 35–45)
PCO2 ARTERIAL: 47.1 MM HG (ref 35–45)
PCO2 ARTERIAL: 53.8 MM HG (ref 35–45)
PCO2 ARTERIAL: 63.3 MM HG (ref 35–45)
PCO2 ARTERIAL: 66.1 MM HG (ref 35–45)
PCO2 ARTERIAL: 68.1 MM HG (ref 35–45)
PCO2 ARTERIAL: 75.9 MMHG (ref 35–45)
PCO2 ARTERIAL: 78.1 MM HG (ref 35–45)
PCO2 ARTERIAL: 85.4 MMHG (ref 35–45)
PCO2 ARTERIAL: 91.1 MMHG (ref 35–45)
PCO2, VEN: 51.8 MMHG (ref 41–51)
PCO2, VEN: 56.1 MMHG (ref 41–51)
PCO2, VEN: 71.5 MM HG (ref 40–50)
PCO2, VEN: 87.9 MMHG (ref 41–51)
PDW BLD-RTO: 15.6 % (ref 12.4–15.4)
PDW BLD-RTO: 15.7 % (ref 12.4–15.4)
PDW BLD-RTO: 15.7 % (ref 12.4–15.4)
PDW BLD-RTO: 15.8 % (ref 12.4–15.4)
PDW BLD-RTO: 15.8 % (ref 12.4–15.4)
PDW BLD-RTO: 15.9 % (ref 12.4–15.4)
PDW BLD-RTO: 16 % (ref 12.4–15.4)
PDW BLD-RTO: 16 % (ref 12.4–15.4)
PDW BLD-RTO: 16.1 % (ref 12.4–15.4)
PDW BLD-RTO: 16.1 % (ref 12.4–15.4)
PDW BLD-RTO: 16.2 % (ref 12.4–15.4)
PDW BLD-RTO: 16.3 % (ref 12.4–15.4)
PDW BLD-RTO: 16.3 % (ref 12.4–15.4)
PDW BLD-RTO: 16.4 % (ref 12.4–15.4)
PDW BLD-RTO: 16.5 % (ref 12.4–15.4)
PDW BLD-RTO: 16.5 % (ref 12.4–15.4)
PDW BLD-RTO: 16.7 % (ref 12.4–15.4)
PDW BLD-RTO: 16.8 % (ref 12.4–15.4)
PDW BLD-RTO: 16.8 % (ref 12.4–15.4)
PDW BLD-RTO: 16.9 % (ref 12.4–15.4)
PDW BLD-RTO: 17 % (ref 12.4–15.4)
PDW BLD-RTO: 17.1 % (ref 12.4–15.4)
PDW BLD-RTO: 17.1 % (ref 12.4–15.4)
PDW BLD-RTO: 17.2 % (ref 12.4–15.4)
PDW BLD-RTO: 17.3 % (ref 12.4–15.4)
PDW BLD-RTO: 17.5 % (ref 12.4–15.4)
PDW BLD-RTO: 17.6 % (ref 12.4–15.4)
PERFORMED ON: ABNORMAL
PERFORMED ON: NORMAL
PH ARTERIAL: 7.14 (ref 7.35–7.45)
PH ARTERIAL: 7.25 (ref 7.35–7.45)
PH ARTERIAL: 7.27 (ref 7.35–7.45)
PH ARTERIAL: 7.29 (ref 7.35–7.45)
PH ARTERIAL: 7.34 (ref 7.35–7.45)
PH ARTERIAL: 7.37 (ref 7.35–7.45)
PH ARTERIAL: 7.38 (ref 7.35–7.45)
PH ARTERIAL: 7.39 (ref 7.35–7.45)
PH ARTERIAL: 7.39 (ref 7.35–7.45)
PH ARTERIAL: 7.42 (ref 7.35–7.45)
PH ARTERIAL: 7.44 (ref 7.35–7.45)
PH ARTERIAL: 7.45 (ref 7.35–7.45)
PH ARTERIAL: 7.56 (ref 7.35–7.45)
PH ARTERIAL: 7.58 (ref 7.35–7.45)
PH ARTERIAL: 7.58 (ref 7.35–7.45)
PH ARTERIAL: 7.6 (ref 7.35–7.45)
PH UA: 6 (ref 5–8)
PH VENOUS: 7.33 (ref 7.35–7.45)
PH VENOUS: 7.35 (ref 7.35–7.45)
PH VENOUS: 7.45 (ref 7.35–7.45)
PH VENOUS: 7.46 (ref 7.35–7.45)
PHOSPHORUS: 1.5 MG/DL (ref 2.5–4.9)
PHOSPHORUS: 2 MG/DL (ref 2.5–4.9)
PHOSPHORUS: 2.4 MG/DL (ref 2.5–4.9)
PHOSPHORUS: 2.5 MG/DL (ref 2.5–4.9)
PHOSPHORUS: 2.6 MG/DL (ref 2.5–4.9)
PHOSPHORUS: 2.6 MG/DL (ref 2.5–4.9)
PHOSPHORUS: 2.7 MG/DL (ref 2.5–4.9)
PHOSPHORUS: 2.8 MG/DL (ref 2.5–4.9)
PHOSPHORUS: 2.8 MG/DL (ref 2.5–4.9)
PHOSPHORUS: 2.9 MG/DL (ref 2.5–4.9)
PHOSPHORUS: 3 MG/DL (ref 2.5–4.9)
PHOSPHORUS: 3.1 MG/DL (ref 2.5–4.9)
PHOSPHORUS: 3.2 MG/DL (ref 2.5–4.9)
PHOSPHORUS: 3.3 MG/DL (ref 2.5–4.9)
PHOSPHORUS: 3.3 MG/DL (ref 2.5–4.9)
PHOSPHORUS: 3.4 MG/DL (ref 2.5–4.9)
PHOSPHORUS: 3.5 MG/DL (ref 2.5–4.9)
PHOSPHORUS: 3.7 MG/DL (ref 2.5–4.9)
PHOSPHORUS: 3.8 MG/DL (ref 2.5–4.9)
PHOSPHORUS: 3.9 MG/DL (ref 2.5–4.9)
PHOSPHORUS: 3.9 MG/DL (ref 2.5–4.9)
PHOSPHORUS: 4 MG/DL (ref 2.5–4.9)
PHOSPHORUS: 4.1 MG/DL (ref 2.5–4.9)
PHOSPHORUS: 4.2 MG/DL (ref 2.5–4.9)
PHOSPHORUS: 4.3 MG/DL (ref 2.5–4.9)
PHOSPHORUS: 4.4 MG/DL (ref 2.5–4.9)
PHOSPHORUS: 4.5 MG/DL (ref 2.5–4.9)
PHOSPHORUS: 4.7 MG/DL (ref 2.5–4.9)
PHOSPHORUS: 4.7 MG/DL (ref 2.5–4.9)
PHOSPHORUS: 5.2 MG/DL (ref 2.5–4.9)
PHOSPHORUS: 5.3 MG/DL (ref 2.5–4.9)
PHOSPHORUS: 6.3 MG/DL (ref 2.5–4.9)
PHOSPHORUS: 6.3 MG/DL (ref 2.5–4.9)
PLATELET # BLD: 238 K/UL (ref 135–450)
PLATELET # BLD: 241 K/UL (ref 135–450)
PLATELET # BLD: 257 K/UL (ref 135–450)
PLATELET # BLD: 261 K/UL (ref 135–450)
PLATELET # BLD: 269 K/UL (ref 135–450)
PLATELET # BLD: 280 K/UL (ref 135–450)
PLATELET # BLD: 287 K/UL (ref 135–450)
PLATELET # BLD: 295 K/UL (ref 135–450)
PLATELET # BLD: 302 K/UL (ref 135–450)
PLATELET # BLD: 302 K/UL (ref 135–450)
PLATELET # BLD: 312 K/UL (ref 135–450)
PLATELET # BLD: 315 K/UL (ref 135–450)
PLATELET # BLD: 324 K/UL (ref 135–450)
PLATELET # BLD: 325 K/UL (ref 135–450)
PLATELET # BLD: 326 K/UL (ref 135–450)
PLATELET # BLD: 327 K/UL (ref 135–450)
PLATELET # BLD: 332 K/UL (ref 135–450)
PLATELET # BLD: 335 K/UL (ref 135–450)
PLATELET # BLD: 342 K/UL (ref 135–450)
PLATELET # BLD: 354 K/UL (ref 135–450)
PLATELET # BLD: 359 K/UL (ref 135–450)
PLATELET # BLD: 362 K/UL (ref 135–450)
PLATELET # BLD: 366 K/UL (ref 135–450)
PLATELET # BLD: 372 K/UL (ref 135–450)
PLATELET # BLD: 374 K/UL (ref 135–450)
PLATELET # BLD: 389 K/UL (ref 135–450)
PLATELET # BLD: 389 K/UL (ref 135–450)
PLATELET # BLD: 403 K/UL (ref 135–450)
PLATELET # BLD: 435 K/UL (ref 135–450)
PLATELET # BLD: 442 K/UL (ref 135–450)
PLATELET # BLD: 444 K/UL (ref 135–450)
PLATELET # BLD: 445 K/UL (ref 135–450)
PLATELET # BLD: 446 K/UL (ref 135–450)
PLATELET # BLD: 449 K/UL (ref 135–450)
PLATELET # BLD: 453 K/UL (ref 135–450)
PLATELET # BLD: 454 K/UL (ref 135–450)
PLATELET # BLD: 458 K/UL (ref 135–450)
PLATELET # BLD: 474 K/UL (ref 135–450)
PLATELET # BLD: 481 K/UL (ref 135–450)
PLATELET # BLD: 486 K/UL (ref 135–450)
PLATELET # BLD: 486 K/UL (ref 135–450)
PLATELET # BLD: 491 K/UL (ref 135–450)
PLATELET # BLD: 493 K/UL (ref 135–450)
PLATELET # BLD: 498 K/UL (ref 135–450)
PLATELET # BLD: 520 K/UL (ref 135–450)
PLATELET # BLD: 533 K/UL (ref 135–450)
PMV BLD AUTO: 6.9 FL (ref 5–10.5)
PMV BLD AUTO: 7.2 FL (ref 5–10.5)
PMV BLD AUTO: 7.2 FL (ref 5–10.5)
PMV BLD AUTO: 7.3 FL (ref 5–10.5)
PMV BLD AUTO: 7.3 FL (ref 5–10.5)
PMV BLD AUTO: 7.4 FL (ref 5–10.5)
PMV BLD AUTO: 7.5 FL (ref 5–10.5)
PMV BLD AUTO: 7.5 FL (ref 5–10.5)
PMV BLD AUTO: 7.6 FL (ref 5–10.5)
PMV BLD AUTO: 7.7 FL (ref 5–10.5)
PMV BLD AUTO: 7.7 FL (ref 5–10.5)
PMV BLD AUTO: 7.8 FL (ref 5–10.5)
PMV BLD AUTO: 7.9 FL (ref 5–10.5)
PMV BLD AUTO: 8 FL (ref 5–10.5)
PMV BLD AUTO: 8.1 FL (ref 5–10.5)
PMV BLD AUTO: 8.2 FL (ref 5–10.5)
PMV BLD AUTO: 8.2 FL (ref 5–10.5)
PMV BLD AUTO: 8.3 FL (ref 5–10.5)
PMV BLD AUTO: 8.4 FL (ref 5–10.5)
PMV BLD AUTO: 8.6 FL (ref 5–10.5)
PMV BLD AUTO: 8.6 FL (ref 5–10.5)
PMV BLD AUTO: 8.9 FL (ref 5–10.5)
PO2 ARTERIAL: 108 MMHG (ref 75–108)
PO2 ARTERIAL: 236.8 MM HG (ref 75–108)
PO2 ARTERIAL: 45.2 MM HG (ref 75–108)
PO2 ARTERIAL: 46.4 MM HG (ref 75–108)
PO2 ARTERIAL: 58.5 MM HG (ref 75–108)
PO2 ARTERIAL: 65.4 MM HG (ref 75–108)
PO2 ARTERIAL: 67 MM HG (ref 75–108)
PO2 ARTERIAL: 69.9 MMHG (ref 75–108)
PO2 ARTERIAL: 72.9 MMHG (ref 75–108)
PO2 ARTERIAL: 77.1 MMHG (ref 75–108)
PO2 ARTERIAL: 81.7 MM HG (ref 75–108)
PO2 ARTERIAL: 83.2 MM HG (ref 75–108)
PO2 ARTERIAL: 89.9 MM HG (ref 75–108)
PO2 ARTERIAL: 93.5 MM HG (ref 75–108)
PO2 ARTERIAL: 96.2 MMHG (ref 75–108)
PO2 ARTERIAL: 99.3 MMHG (ref 75–108)
PO2, VEN: 101 MMHG (ref 25–40)
PO2, VEN: 32 MM HG
PO2, VEN: 79.5 MMHG (ref 25–40)
PO2, VEN: <30 MMHG (ref 25–40)
POC POTASSIUM: 4.2 MMOL/L (ref 3.5–5.1)
POC SAMPLE TYPE: ABNORMAL
POC SODIUM: 136 MMOL/L (ref 136–145)
POIKILOCYTES: ABNORMAL
POLYCHROMASIA: ABNORMAL
POTASSIUM SERPL-SCNC: 3.4 MMOL/L (ref 3.5–5.1)
POTASSIUM SERPL-SCNC: 3.5 MMOL/L (ref 3.5–5.1)
POTASSIUM SERPL-SCNC: 3.6 MMOL/L (ref 3.5–5.1)
POTASSIUM SERPL-SCNC: 3.7 MMOL/L (ref 3.5–5.1)
POTASSIUM SERPL-SCNC: 3.8 MMOL/L (ref 3.5–5.1)
POTASSIUM SERPL-SCNC: 3.9 MMOL/L (ref 3.5–5.1)
POTASSIUM SERPL-SCNC: 4 MMOL/L (ref 3.5–5.1)
POTASSIUM SERPL-SCNC: 4.1 MMOL/L (ref 3.5–5.1)
POTASSIUM SERPL-SCNC: 4.2 MMOL/L (ref 3.5–5.1)
POTASSIUM SERPL-SCNC: 4.3 MMOL/L (ref 3.5–5.1)
POTASSIUM SERPL-SCNC: 4.4 MMOL/L (ref 3.5–5.1)
POTASSIUM SERPL-SCNC: 4.5 MMOL/L (ref 3.5–5.1)
POTASSIUM SERPL-SCNC: 4.6 MMOL/L (ref 3.5–5.1)
POTASSIUM SERPL-SCNC: 4.7 MMOL/L (ref 3.5–5.1)
POTASSIUM SERPL-SCNC: 4.8 MMOL/L (ref 3.5–5.1)
POTASSIUM SERPL-SCNC: 4.8 MMOL/L (ref 3.5–5.1)
POTASSIUM SERPL-SCNC: 4.9 MMOL/L (ref 3.5–5.1)
POTASSIUM SERPL-SCNC: 5 MMOL/L (ref 3.5–5.1)
POTASSIUM SERPL-SCNC: 5 MMOL/L (ref 3.5–5.1)
POTASSIUM SERPL-SCNC: 5.1 MMOL/L (ref 3.5–5.1)
POTASSIUM SERPL-SCNC: 5.1 MMOL/L (ref 3.5–5.1)
POTASSIUM SERPL-SCNC: 5.2 MMOL/L (ref 3.5–5.1)
POTASSIUM SERPL-SCNC: 5.3 MMOL/L (ref 3.5–5.1)
POTASSIUM SERPL-SCNC: 5.4 MMOL/L (ref 3.5–5.1)
POTASSIUM SERPL-SCNC: 5.6 MMOL/L (ref 3.5–5.1)
POTASSIUM SERPL-SCNC: 5.7 MMOL/L (ref 3.5–5.1)
POTASSIUM SERPL-SCNC: 5.8 MMOL/L (ref 3.5–5.1)
PREALBUMIN: 11.2 MG/DL (ref 20–40)
PREALBUMIN: 13.7 MG/DL (ref 20–40)
PREALBUMIN: 16.9 MG/DL (ref 20–40)
PREALBUMIN: 6.2 MG/DL (ref 20–40)
PREALBUMIN: 8.4 MG/DL (ref 20–40)
PRO-BNP: 3428 PG/ML (ref 0–449)
PRO-BNP: 4139 PG/ML (ref 0–449)
PRO-BNP: 5908 PG/ML (ref 0–449)
PRO-BNP: 6764 PG/ML (ref 0–449)
PRO-BNP: 7367 PG/ML (ref 0–449)
PRO-BNP: ABNORMAL PG/ML (ref 0–449)
PROCALCITONIN: 0.36 NG/ML (ref 0–0.15)
PROSTATE SPECIFIC ANTIGEN: 1.15 NG/ML (ref 0–4)
PROTEIN FLUID: 1.2 G/DL
PROTEIN PROTEIN: 14 MG/DL
PROTEIN UA: 30 MG/DL
PROTEIN/CREAT RATIO: 0.3 MG/DL
PROTHROMBIN TIME: 13.2 SEC (ref 11.7–14.5)
PROTHROMBIN TIME: 16.4 SEC (ref 11.7–14.5)
RBC # BLD: 2.34 M/UL (ref 4.2–5.9)
RBC # BLD: 2.46 M/UL (ref 4.2–5.9)
RBC # BLD: 2.47 M/UL (ref 4.2–5.9)
RBC # BLD: 2.5 M/UL (ref 4.2–5.9)
RBC # BLD: 2.54 M/UL (ref 4.2–5.9)
RBC # BLD: 2.57 M/UL (ref 4.2–5.9)
RBC # BLD: 2.57 M/UL (ref 4.2–5.9)
RBC # BLD: 2.6 M/UL (ref 4.2–5.9)
RBC # BLD: 2.61 M/UL (ref 4.2–5.9)
RBC # BLD: 2.63 M/UL (ref 4.2–5.9)
RBC # BLD: 2.64 M/UL (ref 4.2–5.9)
RBC # BLD: 2.66 M/UL (ref 4.2–5.9)
RBC # BLD: 2.66 M/UL (ref 4.2–5.9)
RBC # BLD: 2.67 M/UL (ref 4.2–5.9)
RBC # BLD: 2.68 M/UL (ref 4.2–5.9)
RBC # BLD: 2.7 M/UL (ref 4.2–5.9)
RBC # BLD: 2.71 M/UL (ref 4.2–5.9)
RBC # BLD: 2.73 M/UL (ref 4.2–5.9)
RBC # BLD: 2.77 M/UL (ref 4.2–5.9)
RBC # BLD: 2.78 M/UL (ref 4.2–5.9)
RBC # BLD: 2.78 M/UL (ref 4.2–5.9)
RBC # BLD: 2.8 M/UL (ref 4.2–5.9)
RBC # BLD: 2.81 M/UL (ref 4.2–5.9)
RBC # BLD: 2.82 M/UL (ref 4.2–5.9)
RBC # BLD: 2.85 M/UL (ref 4.2–5.9)
RBC # BLD: 2.89 M/UL (ref 4.2–5.9)
RBC # BLD: 2.9 M/UL (ref 4.2–5.9)
RBC # BLD: 2.9 M/UL (ref 4.2–5.9)
RBC # BLD: 2.91 M/UL (ref 4.2–5.9)
RBC # BLD: 2.91 M/UL (ref 4.2–5.9)
RBC # BLD: 2.95 M/UL (ref 4.2–5.9)
RBC # BLD: 2.97 M/UL (ref 4.2–5.9)
RBC # BLD: 3 M/UL (ref 4.2–5.9)
RBC # BLD: 3.03 M/UL (ref 4.2–5.9)
RBC # BLD: 3.08 M/UL (ref 4.2–5.9)
RBC # BLD: 3.08 M/UL (ref 4.2–5.9)
RBC # BLD: 3.09 M/UL (ref 4.2–5.9)
RBC # BLD: 3.09 M/UL (ref 4.2–5.9)
RBC # BLD: 3.26 M/UL (ref 4.2–5.9)
RBC # BLD: 3.27 M/UL (ref 4.2–5.9)
RBC # BLD: 3.32 M/UL (ref 4.2–5.9)
RBC # BLD: 3.35 M/UL (ref 4.2–5.9)
RBC # BLD: 3.38 M/UL (ref 4.2–5.9)
RBC # BLD: 3.46 M/UL (ref 4.2–5.9)
RBC # BLD: 3.5 M/UL (ref 4.2–5.9)
RBC # BLD: 5.24 M/UL (ref 4.2–5.9)
RBC FLUID: <1000 /CUMM
RBC FLUID: NORMAL /CUMM
RBC UA: NORMAL /HPF (ref 0–4)
RHEUMATOID FACTOR: <10 IU/ML
SCHISTOCYTES: ABNORMAL
SEDIMENTATION RATE, ERYTHROCYTE: 70 MM/HR (ref 0–20)
SODIUM BLD-SCNC: 129 MMOL/L (ref 136–145)
SODIUM BLD-SCNC: 131 MMOL/L (ref 136–145)
SODIUM BLD-SCNC: 132 MMOL/L (ref 136–145)
SODIUM BLD-SCNC: 133 MMOL/L (ref 136–145)
SODIUM BLD-SCNC: 133 MMOL/L (ref 136–145)
SODIUM BLD-SCNC: 134 MMOL/L (ref 136–145)
SODIUM BLD-SCNC: 135 MMOL/L (ref 136–145)
SODIUM BLD-SCNC: 136 MMOL/L (ref 136–145)
SODIUM BLD-SCNC: 137 MMOL/L (ref 136–145)
SODIUM BLD-SCNC: 138 MMOL/L (ref 136–145)
SODIUM BLD-SCNC: 139 MMOL/L (ref 136–145)
SODIUM BLD-SCNC: 140 MMOL/L (ref 136–145)
SODIUM BLD-SCNC: 140 MMOL/L (ref 136–145)
SODIUM BLD-SCNC: 141 MMOL/L (ref 136–145)
SODIUM BLD-SCNC: 142 MMOL/L (ref 136–145)
SODIUM BLD-SCNC: 143 MMOL/L (ref 136–145)
SODIUM BLD-SCNC: 143 MMOL/L (ref 136–145)
SODIUM BLD-SCNC: 144 MMOL/L (ref 136–145)
SODIUM BLD-SCNC: 146 MMOL/L (ref 136–145)
SODIUM BLD-SCNC: 147 MMOL/L (ref 136–145)
SODIUM BLD-SCNC: 148 MMOL/L (ref 136–145)
SODIUM BLD-SCNC: 149 MMOL/L (ref 136–145)
SODIUM BLD-SCNC: 149 MMOL/L (ref 136–145)
SODIUM BLD-SCNC: 151 MMOL/L (ref 136–145)
SODIUM URINE: <20 MMOL/L
SPE/IFE INTERPRETATION: NORMAL
SPECIFIC GRAVITY UA: 1.01 (ref 1–1.03)
T PALLIDUM ANTIBODIES (TP-PA): NON REACTIVE
TARGET CELLS: ABNORMAL
TCO2 ARTERIAL: 34 MMOL/L
TCO2 ARTERIAL: 39 MMOL/L
TCO2 ARTERIAL: 40 MMOL/L
TCO2 ARTERIAL: 42 MMOL/L
TCO2 ARTERIAL: 43 MMOL/L
TCO2 ARTERIAL: 43 MMOL/L
TCO2 ARTERIAL: 44 MMOL/L
TCO2 ARTERIAL: 44 MMOL/L
TCO2 ARTERIAL: 47 MMOL/L
TCO2 ARTERIAL: 48 MMOL/L
TCO2 ARTERIAL: 48 MMOL/L
TCO2 ARTERIAL: 55 MMOL/L
TCO2 ARTERIAL: >50 MMOL/L
TCO2 ARTERIAL: >50 MMOL/L
TCO2 CALC VENOUS: 38 MMOL/L
TCO2 CALC VENOUS: 40 MMOL/L
TCO2 CALC VENOUS: 42 MMOL/L
TCO2 CALC VENOUS: 51 MMOL/L
TOTAL IRON BINDING CAPACITY: 129 UG/DL (ref 260–445)
TOTAL IRON BINDING CAPACITY: 135 UG/DL (ref 260–445)
TOTAL PROTEIN: 5.4 G/DL (ref 6.4–8.2)
TOTAL PROTEIN: 6.2 G/DL (ref 6.4–8.2)
TOTAL PROTEIN: 6.7 G/DL (ref 6.4–8.2)
TOTAL PROTEIN: 6.7 G/DL (ref 6.4–8.2)
TRANSFERRIN: 82 MG/DL (ref 200–360)
TRANSFERRIN: 84 MG/DL (ref 200–360)
TRANSFERRIN: 99 MG/DL (ref 200–360)
TRIGL SERPL-MCNC: 59 MG/DL (ref 0–150)
TRIGL SERPL-MCNC: 63 MG/DL (ref 0–150)
TRIGL SERPL-MCNC: 67 MG/DL (ref 0–150)
TRIGL SERPL-MCNC: 77 MG/DL (ref 0–150)
TRIGL SERPL-MCNC: 78 MG/DL (ref 0–150)
TRIGL SERPL-MCNC: 79 MG/DL (ref 0–150)
TRIGL SERPL-MCNC: 82 MG/DL (ref 0–150)
TRIGL SERPL-MCNC: 89 MG/DL (ref 0–150)
TSH REFLEX: 0.36 UIU/ML (ref 0.27–4.2)
TSH REFLEX: 3.07 UIU/ML (ref 0.27–4.2)
URINE CULTURE, ROUTINE: NORMAL
URINE REFLEX TO CULTURE: ABNORMAL
URINE TYPE: ABNORMAL
UROBILINOGEN, URINE: 0.2 E.U./DL
VANCOMYCIN RANDOM: 13.2 UG/ML
VITAMIN B-12: 843 PG/ML (ref 211–911)
VITAMIN B1 WHOLE BLOOD: 125 NMOL/L (ref 70–180)
VLDLC SERPL CALC-MCNC: 18 MG/DL
WBC # BLD: 10.1 K/UL (ref 4–11)
WBC # BLD: 10.4 K/UL (ref 4–11)
WBC # BLD: 11.6 K/UL (ref 4–11)
WBC # BLD: 12 K/UL (ref 4–11)
WBC # BLD: 13.3 K/UL (ref 4–11)
WBC # BLD: 13.5 K/UL (ref 4–11)
WBC # BLD: 13.6 K/UL (ref 4–11)
WBC # BLD: 14.4 K/UL (ref 4–11)
WBC # BLD: 14.5 K/UL (ref 4–11)
WBC # BLD: 14.7 K/UL (ref 4–11)
WBC # BLD: 15.4 K/UL (ref 4–11)
WBC # BLD: 15.4 K/UL (ref 4–11)
WBC # BLD: 15.5 K/UL (ref 4–11)
WBC # BLD: 15.7 K/UL (ref 4–11)
WBC # BLD: 15.7 K/UL (ref 4–11)
WBC # BLD: 16.4 K/UL (ref 4–11)
WBC # BLD: 16.9 K/UL (ref 4–11)
WBC # BLD: 17.1 K/UL (ref 4–11)
WBC # BLD: 18.3 K/UL (ref 4–11)
WBC # BLD: 19 K/UL (ref 4–11)
WBC # BLD: 19.1 K/UL (ref 4–11)
WBC # BLD: 19.6 K/UL (ref 4–11)
WBC # BLD: 19.7 K/UL (ref 4–11)
WBC # BLD: 20 K/UL (ref 4–11)
WBC # BLD: 20.3 K/UL (ref 4–11)
WBC # BLD: 20.6 K/UL (ref 4–11)
WBC # BLD: 21.6 K/UL (ref 4–11)
WBC # BLD: 21.7 K/UL (ref 4–11)
WBC # BLD: 22.4 K/UL (ref 4–11)
WBC # BLD: 22.4 K/UL (ref 4–11)
WBC # BLD: 22.6 K/UL (ref 4–11)
WBC # BLD: 23.6 K/UL (ref 4–11)
WBC # BLD: 24.1 K/UL (ref 4–11)
WBC # BLD: 25.8 K/UL (ref 4–11)
WBC # BLD: 27 K/UL (ref 4–11)
WBC # BLD: 27.2 K/UL (ref 4–11)
WBC # BLD: 5.4 K/UL (ref 4–11)
WBC # BLD: 6.9 K/UL (ref 4–11)
WBC # BLD: 6.9 K/UL (ref 4–11)
WBC # BLD: 7.1 K/UL (ref 4–11)
WBC # BLD: 7.3 K/UL (ref 4–11)
WBC # BLD: 8 K/UL (ref 4–11)
WBC # BLD: 8.9 K/UL (ref 4–11)
WBC # BLD: 8.9 K/UL (ref 4–11)
WBC # BLD: 9.5 K/UL (ref 4–11)
WBC # BLD: 9.7 K/UL (ref 4–11)
WBC UA: NORMAL /HPF (ref 0–5)

## 2022-01-01 PROCEDURE — 2580000003 HC RX 258: Performed by: STUDENT IN AN ORGANIZED HEALTH CARE EDUCATION/TRAINING PROGRAM

## 2022-01-01 PROCEDURE — 99232 SBSQ HOSP IP/OBS MODERATE 35: CPT | Performed by: SURGERY

## 2022-01-01 PROCEDURE — 94003 VENT MGMT INPAT SUBQ DAY: CPT

## 2022-01-01 PROCEDURE — 99231 SBSQ HOSP IP/OBS SF/LOW 25: CPT | Performed by: SURGERY

## 2022-01-01 PROCEDURE — 6370000000 HC RX 637 (ALT 250 FOR IP): Performed by: STUDENT IN AN ORGANIZED HEALTH CARE EDUCATION/TRAINING PROGRAM

## 2022-01-01 PROCEDURE — 94761 N-INVAS EAR/PLS OXIMETRY MLT: CPT

## 2022-01-01 PROCEDURE — 2580000003 HC RX 258: Performed by: OTOLARYNGOLOGY

## 2022-01-01 PROCEDURE — 87070 CULTURE OTHR SPECIMN AEROBIC: CPT

## 2022-01-01 PROCEDURE — 83735 ASSAY OF MAGNESIUM: CPT

## 2022-01-01 PROCEDURE — 99233 SBSQ HOSP IP/OBS HIGH 50: CPT | Performed by: INTERNAL MEDICINE

## 2022-01-01 PROCEDURE — 92507 TX SP LANG VOICE COMM INDIV: CPT

## 2022-01-01 PROCEDURE — 2580000003 HC RX 258

## 2022-01-01 PROCEDURE — 2500000003 HC RX 250 WO HCPCS

## 2022-01-01 PROCEDURE — 2500000003 HC RX 250 WO HCPCS: Performed by: STUDENT IN AN ORGANIZED HEALTH CARE EDUCATION/TRAINING PROGRAM

## 2022-01-01 PROCEDURE — 6370000000 HC RX 637 (ALT 250 FOR IP): Performed by: SURGERY

## 2022-01-01 PROCEDURE — 86780 TREPONEMA PALLIDUM: CPT

## 2022-01-01 PROCEDURE — 2000000000 HC ICU R&B

## 2022-01-01 PROCEDURE — 37799 UNLISTED PX VASCULAR SURGERY: CPT

## 2022-01-01 PROCEDURE — 84132 ASSAY OF SERUM POTASSIUM: CPT

## 2022-01-01 PROCEDURE — 83880 ASSAY OF NATRIURETIC PEPTIDE: CPT

## 2022-01-01 PROCEDURE — 82803 BLOOD GASES ANY COMBINATION: CPT

## 2022-01-01 PROCEDURE — 2700000000 HC OXYGEN THERAPY PER DAY

## 2022-01-01 PROCEDURE — APPNB15 APP NON BILLABLE TIME 0-15 MINS: Performed by: NURSE PRACTITIONER

## 2022-01-01 PROCEDURE — 6370000000 HC RX 637 (ALT 250 FOR IP): Performed by: OTOLARYNGOLOGY

## 2022-01-01 PROCEDURE — 87641 MR-STAPH DNA AMP PROBE: CPT

## 2022-01-01 PROCEDURE — 87205 SMEAR GRAM STAIN: CPT

## 2022-01-01 PROCEDURE — 99291 CRITICAL CARE FIRST HOUR: CPT | Performed by: INTERNAL MEDICINE

## 2022-01-01 PROCEDURE — 6370000000 HC RX 637 (ALT 250 FOR IP)

## 2022-01-01 PROCEDURE — 6360000002 HC RX W HCPCS: Performed by: STUDENT IN AN ORGANIZED HEALTH CARE EDUCATION/TRAINING PROGRAM

## 2022-01-01 PROCEDURE — 85014 HEMATOCRIT: CPT

## 2022-01-01 PROCEDURE — 80069 RENAL FUNCTION PANEL: CPT

## 2022-01-01 PROCEDURE — 94640 AIRWAY INHALATION TREATMENT: CPT

## 2022-01-01 PROCEDURE — 36620 INSERTION CATHETER ARTERY: CPT

## 2022-01-01 PROCEDURE — 97110 THERAPEUTIC EXERCISES: CPT

## 2022-01-01 PROCEDURE — 6360000002 HC RX W HCPCS

## 2022-01-01 PROCEDURE — 84156 ASSAY OF PROTEIN URINE: CPT

## 2022-01-01 PROCEDURE — 85025 COMPLETE CBC W/AUTO DIFF WBC: CPT

## 2022-01-01 PROCEDURE — 86923 COMPATIBILITY TEST ELECTRIC: CPT

## 2022-01-01 PROCEDURE — 92526 ORAL FUNCTION THERAPY: CPT

## 2022-01-01 PROCEDURE — 6360000002 HC RX W HCPCS: Performed by: OTOLARYNGOLOGY

## 2022-01-01 PROCEDURE — 86431 RHEUMATOID FACTOR QUANT: CPT

## 2022-01-01 PROCEDURE — 3600000004 HC SURGERY LEVEL 4 BASE: Performed by: THORACIC SURGERY (CARDIOTHORACIC VASCULAR SURGERY)

## 2022-01-01 PROCEDURE — 84466 ASSAY OF TRANSFERRIN: CPT

## 2022-01-01 PROCEDURE — 93005 ELECTROCARDIOGRAM TRACING: CPT | Performed by: STUDENT IN AN ORGANIZED HEALTH CARE EDUCATION/TRAINING PROGRAM

## 2022-01-01 PROCEDURE — 6370000000 HC RX 637 (ALT 250 FOR IP): Performed by: INTERNAL MEDICINE

## 2022-01-01 PROCEDURE — 97530 THERAPEUTIC ACTIVITIES: CPT

## 2022-01-01 PROCEDURE — P9016 RBC LEUKOCYTES REDUCED: HCPCS

## 2022-01-01 PROCEDURE — 86900 BLOOD TYPING SEROLOGIC ABO: CPT

## 2022-01-01 PROCEDURE — 6360000002 HC RX W HCPCS: Performed by: INTERNAL MEDICINE

## 2022-01-01 PROCEDURE — 2060000000 HC ICU INTERMEDIATE R&B

## 2022-01-01 PROCEDURE — 36415 COLL VENOUS BLD VENIPUNCTURE: CPT

## 2022-01-01 PROCEDURE — 97167 OT EVAL HIGH COMPLEX 60 MIN: CPT

## 2022-01-01 PROCEDURE — 80076 HEPATIC FUNCTION PANEL: CPT

## 2022-01-01 PROCEDURE — 84155 ASSAY OF PROTEIN SERUM: CPT

## 2022-01-01 PROCEDURE — 85018 HEMOGLOBIN: CPT

## 2022-01-01 PROCEDURE — APPNB45 APP NON BILLABLE 31-45 MINUTES

## 2022-01-01 PROCEDURE — 2720000010 HC SURG SUPPLY STERILE: Performed by: INTERNAL MEDICINE

## 2022-01-01 PROCEDURE — 36556 INSERT NON-TUNNEL CV CATH: CPT

## 2022-01-01 PROCEDURE — 99024 POSTOP FOLLOW-UP VISIT: CPT | Performed by: THORACIC SURGERY (CARDIOTHORACIC VASCULAR SURGERY)

## 2022-01-01 PROCEDURE — 71045 X-RAY EXAM CHEST 1 VIEW: CPT

## 2022-01-01 PROCEDURE — 99232 SBSQ HOSP IP/OBS MODERATE 35: CPT | Performed by: INTERNAL MEDICINE

## 2022-01-01 PROCEDURE — 84157 ASSAY OF PROTEIN OTHER: CPT

## 2022-01-01 PROCEDURE — 7100000001 HC PACU RECOVERY - ADDTL 15 MIN: Performed by: THORACIC SURGERY (CARDIOTHORACIC VASCULAR SURGERY)

## 2022-01-01 PROCEDURE — 99222 1ST HOSP IP/OBS MODERATE 55: CPT | Performed by: SURGERY

## 2022-01-01 PROCEDURE — C9113 INJ PANTOPRAZOLE SODIUM, VIA: HCPCS | Performed by: STUDENT IN AN ORGANIZED HEALTH CARE EDUCATION/TRAINING PROGRAM

## 2022-01-01 PROCEDURE — 82947 ASSAY GLUCOSE BLOOD QUANT: CPT

## 2022-01-01 PROCEDURE — 84425 ASSAY OF VITAMIN B-1: CPT

## 2022-01-01 PROCEDURE — 86140 C-REACTIVE PROTEIN: CPT

## 2022-01-01 PROCEDURE — 3700000000 HC ANESTHESIA ATTENDED CARE: Performed by: INTERNAL MEDICINE

## 2022-01-01 PROCEDURE — P9047 ALBUMIN (HUMAN), 25%, 50ML: HCPCS | Performed by: CLINICAL NURSE SPECIALIST

## 2022-01-01 PROCEDURE — 51701 INSERT BLADDER CATHETER: CPT

## 2022-01-01 PROCEDURE — 3600000014 HC SURGERY LEVEL 4 ADDTL 15MIN: Performed by: THORACIC SURGERY (CARDIOTHORACIC VASCULAR SURGERY)

## 2022-01-01 PROCEDURE — 2580000003 HC RX 258: Performed by: THORACIC SURGERY (CARDIOTHORACIC VASCULAR SURGERY)

## 2022-01-01 PROCEDURE — 6360000002 HC RX W HCPCS: Performed by: CLINICAL NURSE SPECIALIST

## 2022-01-01 PROCEDURE — 74018 RADEX ABDOMEN 1 VIEW: CPT

## 2022-01-01 PROCEDURE — 0DH63UZ INSERTION OF FEEDING DEVICE INTO STOMACH, PERCUTANEOUS APPROACH: ICD-10-PCS | Performed by: INTERNAL MEDICINE

## 2022-01-01 PROCEDURE — A4216 STERILE WATER/SALINE, 10 ML: HCPCS | Performed by: STUDENT IN AN ORGANIZED HEALTH CARE EDUCATION/TRAINING PROGRAM

## 2022-01-01 PROCEDURE — 84478 ASSAY OF TRIGLYCERIDES: CPT

## 2022-01-01 PROCEDURE — 2500000003 HC RX 250 WO HCPCS: Performed by: THORACIC SURGERY (CARDIOTHORACIC VASCULAR SURGERY)

## 2022-01-01 PROCEDURE — 94660 CPAP INITIATION&MGMT: CPT

## 2022-01-01 PROCEDURE — 83036 HEMOGLOBIN GLYCOSYLATED A1C: CPT

## 2022-01-01 PROCEDURE — 51798 US URINE CAPACITY MEASURE: CPT

## 2022-01-01 PROCEDURE — 36592 COLLECT BLOOD FROM PICC: CPT

## 2022-01-01 PROCEDURE — 31645 BRNCHSC W/THER ASPIR 1ST: CPT | Performed by: INTERNAL MEDICINE

## 2022-01-01 PROCEDURE — 3700000000 HC ANESTHESIA ATTENDED CARE: Performed by: OTOLARYNGOLOGY

## 2022-01-01 PROCEDURE — 36430 TRANSFUSION BLD/BLD COMPNT: CPT

## 2022-01-01 PROCEDURE — 3609013300 HC EGD TUBE PLACEMENT: Performed by: INTERNAL MEDICINE

## 2022-01-01 PROCEDURE — 82570 ASSAY OF URINE CREATININE: CPT

## 2022-01-01 PROCEDURE — 93010 ELECTROCARDIOGRAM REPORT: CPT | Performed by: INTERNAL MEDICINE

## 2022-01-01 PROCEDURE — 2500000003 HC RX 250 WO HCPCS: Performed by: OTOLARYNGOLOGY

## 2022-01-01 PROCEDURE — L8501 TRACHEOSTOMY SPEAKING VALVE: HCPCS

## 2022-01-01 PROCEDURE — 6360000002 HC RX W HCPCS: Performed by: SURGERY

## 2022-01-01 PROCEDURE — 71260 CT THORAX DX C+: CPT

## 2022-01-01 PROCEDURE — 99223 1ST HOSP IP/OBS HIGH 75: CPT | Performed by: PSYCHIATRY & NEUROLOGY

## 2022-01-01 PROCEDURE — 03HY32Z INSERTION OF MONITORING DEVICE INTO UPPER ARTERY, PERCUTANEOUS APPROACH: ICD-10-PCS | Performed by: THORACIC SURGERY (CARDIOTHORACIC VASCULAR SURGERY)

## 2022-01-01 PROCEDURE — 89051 BODY FLUID CELL COUNT: CPT

## 2022-01-01 PROCEDURE — 84300 ASSAY OF URINE SODIUM: CPT

## 2022-01-01 PROCEDURE — 2500000003 HC RX 250 WO HCPCS: Performed by: INTERNAL MEDICINE

## 2022-01-01 PROCEDURE — 86039 ANTINUCLEAR ANTIBODIES (ANA): CPT

## 2022-01-01 PROCEDURE — 86850 RBC ANTIBODY SCREEN: CPT

## 2022-01-01 PROCEDURE — 0W9930Z DRAINAGE OF RIGHT PLEURAL CAVITY WITH DRAINAGE DEVICE, PERCUTANEOUS APPROACH: ICD-10-PCS | Performed by: THORACIC SURGERY (CARDIOTHORACIC VASCULAR SURGERY)

## 2022-01-01 PROCEDURE — 86901 BLOOD TYPING SEROLOGIC RH(D): CPT

## 2022-01-01 PROCEDURE — 3700000000 HC ANESTHESIA ATTENDED CARE: Performed by: THORACIC SURGERY (CARDIOTHORACIC VASCULAR SURGERY)

## 2022-01-01 PROCEDURE — 93005 ELECTROCARDIOGRAM TRACING: CPT | Performed by: SURGERY

## 2022-01-01 PROCEDURE — 82728 ASSAY OF FERRITIN: CPT

## 2022-01-01 PROCEDURE — 86701 HIV-1ANTIBODY: CPT

## 2022-01-01 PROCEDURE — A4217 STERILE WATER/SALINE, 500 ML: HCPCS | Performed by: OTOLARYNGOLOGY

## 2022-01-01 PROCEDURE — 83605 ASSAY OF LACTIC ACID: CPT

## 2022-01-01 PROCEDURE — 86038 ANTINUCLEAR ANTIBODIES: CPT

## 2022-01-01 PROCEDURE — 83519 RIA NONANTIBODY: CPT

## 2022-01-01 PROCEDURE — 2580000003 HC RX 258: Performed by: FAMILY MEDICINE

## 2022-01-01 PROCEDURE — 97112 NEUROMUSCULAR REEDUCATION: CPT

## 2022-01-01 PROCEDURE — 6370000000 HC RX 637 (ALT 250 FOR IP): Performed by: NURSE PRACTITIONER

## 2022-01-01 PROCEDURE — 6360000002 HC RX W HCPCS: Performed by: THORACIC SURGERY (CARDIOTHORACIC VASCULAR SURGERY)

## 2022-01-01 PROCEDURE — 97163 PT EVAL HIGH COMPLEX 45 MIN: CPT

## 2022-01-01 PROCEDURE — 2580000003 HC RX 258: Performed by: INTERNAL MEDICINE

## 2022-01-01 PROCEDURE — 6370000000 HC RX 637 (ALT 250 FOR IP): Performed by: THORACIC SURGERY (CARDIOTHORACIC VASCULAR SURGERY)

## 2022-01-01 PROCEDURE — 84134 ASSAY OF PREALBUMIN: CPT

## 2022-01-01 PROCEDURE — 2709999900 HC NON-CHARGEABLE SUPPLY: Performed by: THORACIC SURGERY (CARDIOTHORACIC VASCULAR SURGERY)

## 2022-01-01 PROCEDURE — 3700000001 HC ADD 15 MINUTES (ANESTHESIA): Performed by: THORACIC SURGERY (CARDIOTHORACIC VASCULAR SURGERY)

## 2022-01-01 PROCEDURE — 92611 MOTION FLUOROSCOPY/SWALLOW: CPT

## 2022-01-01 PROCEDURE — 84443 ASSAY THYROID STIM HORMONE: CPT

## 2022-01-01 PROCEDURE — 80202 ASSAY OF VANCOMYCIN: CPT

## 2022-01-01 PROCEDURE — APPNB30 APP NON BILLABLE TIME 0-30 MINS

## 2022-01-01 PROCEDURE — 82607 VITAMIN B-12: CPT

## 2022-01-01 PROCEDURE — 85652 RBC SED RATE AUTOMATED: CPT

## 2022-01-01 PROCEDURE — 92610 EVALUATE SWALLOWING FUNCTION: CPT

## 2022-01-01 PROCEDURE — 6360000002 HC RX W HCPCS: Performed by: NURSE PRACTITIONER

## 2022-01-01 PROCEDURE — 2580000003 HC RX 258: Performed by: SURGERY

## 2022-01-01 PROCEDURE — 86702 HIV-2 ANTIBODY: CPT

## 2022-01-01 PROCEDURE — 94002 VENT MGMT INPAT INIT DAY: CPT

## 2022-01-01 PROCEDURE — 31622 DX BRONCHOSCOPE/WASH: CPT

## 2022-01-01 PROCEDURE — 0W994ZZ DRAINAGE OF RIGHT PLEURAL CAVITY, PERCUTANEOUS ENDOSCOPIC APPROACH: ICD-10-PCS | Performed by: THORACIC SURGERY (CARDIOTHORACIC VASCULAR SURGERY)

## 2022-01-01 PROCEDURE — 32550 INSERT PLEURAL CATH: CPT | Performed by: THORACIC SURGERY (CARDIOTHORACIC VASCULAR SURGERY)

## 2022-01-01 PROCEDURE — 82746 ASSAY OF FOLIC ACID SERUM: CPT

## 2022-01-01 PROCEDURE — 74176 CT ABD & PELVIS W/O CONTRAST: CPT

## 2022-01-01 PROCEDURE — 3E0T3BZ INTRODUCTION OF ANESTHETIC AGENT INTO PERIPHERAL NERVES AND PLEXI, PERCUTANEOUS APPROACH: ICD-10-PCS | Performed by: THORACIC SURGERY (CARDIOTHORACIC VASCULAR SURGERY)

## 2022-01-01 PROCEDURE — 6360000002 HC RX W HCPCS: Performed by: FAMILY MEDICINE

## 2022-01-01 PROCEDURE — 81001 URINALYSIS AUTO W/SCOPE: CPT

## 2022-01-01 PROCEDURE — 2709999900 HC NON-CHARGEABLE SUPPLY: Performed by: OTOLARYNGOLOGY

## 2022-01-01 PROCEDURE — C1729 CATH, DRAINAGE: HCPCS | Performed by: THORACIC SURGERY (CARDIOTHORACIC VASCULAR SURGERY)

## 2022-01-01 PROCEDURE — 99223 1ST HOSP IP/OBS HIGH 75: CPT | Performed by: INTERNAL MEDICINE

## 2022-01-01 PROCEDURE — 02HV33Z INSERTION OF INFUSION DEVICE INTO SUPERIOR VENA CAVA, PERCUTANEOUS APPROACH: ICD-10-PCS | Performed by: THORACIC SURGERY (CARDIOTHORACIC VASCULAR SURGERY)

## 2022-01-01 PROCEDURE — 71250 CT THORAX DX C-: CPT

## 2022-01-01 PROCEDURE — 3600000002 HC SURGERY LEVEL 2 BASE: Performed by: OTOLARYNGOLOGY

## 2022-01-01 PROCEDURE — 83090 ASSAY OF HOMOCYSTEINE: CPT

## 2022-01-01 PROCEDURE — 0BCJ8ZZ EXTIRPATION OF MATTER FROM LEFT LOWER LUNG LOBE, VIA NATURAL OR ARTIFICIAL OPENING ENDOSCOPIC: ICD-10-PCS | Performed by: INTERNAL MEDICINE

## 2022-01-01 PROCEDURE — 32551 INSERTION OF CHEST TUBE: CPT

## 2022-01-01 PROCEDURE — 0BCC8ZZ EXTIRPATION OF MATTER FROM RIGHT UPPER LUNG LOBE, VIA NATURAL OR ARTIFICIAL OPENING ENDOSCOPIC: ICD-10-PCS | Performed by: INTERNAL MEDICINE

## 2022-01-01 PROCEDURE — 93005 ELECTROCARDIOGRAM TRACING: CPT

## 2022-01-01 PROCEDURE — A4217 STERILE WATER/SALINE, 500 ML: HCPCS | Performed by: THORACIC SURGERY (CARDIOTHORACIC VASCULAR SURGERY)

## 2022-01-01 PROCEDURE — 93005 ELECTROCARDIOGRAM TRACING: CPT | Performed by: THORACIC SURGERY (CARDIOTHORACIC VASCULAR SURGERY)

## 2022-01-01 PROCEDURE — 80074 ACUTE HEPATITIS PANEL: CPT

## 2022-01-01 PROCEDURE — 85610 PROTHROMBIN TIME: CPT

## 2022-01-01 PROCEDURE — 84145 PROCALCITONIN (PCT): CPT

## 2022-01-01 PROCEDURE — 3700000001 HC ADD 15 MINUTES (ANESTHESIA): Performed by: OTOLARYNGOLOGY

## 2022-01-01 PROCEDURE — 84153 ASSAY OF PSA TOTAL: CPT

## 2022-01-01 PROCEDURE — 6360000004 HC RX CONTRAST MEDICATION: Performed by: NURSE PRACTITIONER

## 2022-01-01 PROCEDURE — 3E0G76Z INTRODUCTION OF NUTRITIONAL SUBSTANCE INTO UPPER GI, VIA NATURAL OR ARTIFICIAL OPENING: ICD-10-PCS | Performed by: INTERNAL MEDICINE

## 2022-01-01 PROCEDURE — 70551 MRI BRAIN STEM W/O DYE: CPT

## 2022-01-01 PROCEDURE — 2720000010 HC SURG SUPPLY STERILE: Performed by: THORACIC SURGERY (CARDIOTHORACIC VASCULAR SURGERY)

## 2022-01-01 PROCEDURE — 3700000001 HC ADD 15 MINUTES (ANESTHESIA): Performed by: INTERNAL MEDICINE

## 2022-01-01 PROCEDURE — 84100 ASSAY OF PHOSPHORUS: CPT

## 2022-01-01 PROCEDURE — 72125 CT NECK SPINE W/O DYE: CPT

## 2022-01-01 PROCEDURE — 36600 WITHDRAWAL OF ARTERIAL BLOOD: CPT

## 2022-01-01 PROCEDURE — 0B110F4 BYPASS TRACHEA TO CUTANEOUS WITH TRACHEOSTOMY DEVICE, OPEN APPROACH: ICD-10-PCS | Performed by: OTOLARYNGOLOGY

## 2022-01-01 PROCEDURE — 84165 PROTEIN E-PHORESIS SERUM: CPT

## 2022-01-01 PROCEDURE — 51702 INSERT TEMP BLADDER CATH: CPT

## 2022-01-01 PROCEDURE — 72146 MRI CHEST SPINE W/O DYE: CPT

## 2022-01-01 PROCEDURE — 87086 URINE CULTURE/COLONY COUNT: CPT

## 2022-01-01 PROCEDURE — 2500000003 HC RX 250 WO HCPCS: Performed by: SURGERY

## 2022-01-01 PROCEDURE — 92523 SPEECH SOUND LANG COMPREHEN: CPT

## 2022-01-01 PROCEDURE — 32601 THORACOSCOPY DIAGNOSTIC: CPT | Performed by: THORACIC SURGERY (CARDIOTHORACIC VASCULAR SURGERY)

## 2022-01-01 PROCEDURE — 5A1955Z RESPIRATORY VENTILATION, GREATER THAN 96 CONSECUTIVE HOURS: ICD-10-PCS | Performed by: INTERNAL MEDICINE

## 2022-01-01 PROCEDURE — 87040 BLOOD CULTURE FOR BACTERIA: CPT

## 2022-01-01 PROCEDURE — 85027 COMPLETE CBC AUTOMATED: CPT

## 2022-01-01 PROCEDURE — 83540 ASSAY OF IRON: CPT

## 2022-01-01 PROCEDURE — 1200000000 HC SEMI PRIVATE

## 2022-01-01 PROCEDURE — 99233 SBSQ HOSP IP/OBS HIGH 50: CPT | Performed by: SURGERY

## 2022-01-01 PROCEDURE — 31500 INSERT EMERGENCY AIRWAY: CPT | Performed by: INTERNAL MEDICINE

## 2022-01-01 PROCEDURE — 82270 OCCULT BLOOD FECES: CPT

## 2022-01-01 PROCEDURE — 3600000012 HC SURGERY LEVEL 2 ADDTL 15MIN: Performed by: OTOLARYNGOLOGY

## 2022-01-01 PROCEDURE — 72141 MRI NECK SPINE W/O DYE: CPT

## 2022-01-01 PROCEDURE — APPNB60 APP NON BILLABLE TIME 46-60 MINS

## 2022-01-01 PROCEDURE — 87390 HIV-1 AG IA: CPT

## 2022-01-01 PROCEDURE — 0BH17EZ INSERTION OF ENDOTRACHEAL AIRWAY INTO TRACHEA, VIA NATURAL OR ARTIFICIAL OPENING: ICD-10-PCS | Performed by: INTERNAL MEDICINE

## 2022-01-01 PROCEDURE — 70450 CT HEAD/BRAIN W/O DYE: CPT

## 2022-01-01 PROCEDURE — 80048 BASIC METABOLIC PNL TOTAL CA: CPT

## 2022-01-01 PROCEDURE — 84295 ASSAY OF SERUM SODIUM: CPT

## 2022-01-01 PROCEDURE — 72148 MRI LUMBAR SPINE W/O DYE: CPT

## 2022-01-01 PROCEDURE — 83615 LACTATE (LD) (LDH) ENZYME: CPT

## 2022-01-01 PROCEDURE — 7100000000 HC PACU RECOVERY - FIRST 15 MIN: Performed by: THORACIC SURGERY (CARDIOTHORACIC VASCULAR SURGERY)

## 2022-01-01 PROCEDURE — 83516 IMMUNOASSAY NONANTIBODY: CPT

## 2022-01-01 PROCEDURE — 97535 SELF CARE MNGMENT TRAINING: CPT

## 2022-01-01 PROCEDURE — 82378 CARCINOEMBRYONIC ANTIGEN: CPT

## 2022-01-01 PROCEDURE — 83921 ORGANIC ACID SINGLE QUANT: CPT

## 2022-01-01 PROCEDURE — 85730 THROMBOPLASTIN TIME PARTIAL: CPT

## 2022-01-01 PROCEDURE — 0BCF8ZZ EXTIRPATION OF MATTER FROM RIGHT LOWER LUNG LOBE, VIA NATURAL OR ARTIFICIAL OPENING ENDOSCOPIC: ICD-10-PCS | Performed by: INTERNAL MEDICINE

## 2022-01-01 PROCEDURE — 2580000003 HC RX 258: Performed by: ANESTHESIOLOGY

## 2022-01-01 PROCEDURE — 74019 RADEX ABDOMEN 2 VIEWS: CPT

## 2022-01-01 PROCEDURE — 6360000002 HC RX W HCPCS: Performed by: ANESTHESIOLOGY

## 2022-01-01 PROCEDURE — 82330 ASSAY OF CALCIUM: CPT

## 2022-01-01 PROCEDURE — 0BCG8ZZ EXTIRPATION OF MATTER FROM LEFT UPPER LUNG LOBE, VIA NATURAL OR ARTIFICIAL OPENING ENDOSCOPIC: ICD-10-PCS | Performed by: INTERNAL MEDICINE

## 2022-01-01 RX ORDER — INSULIN LISPRO 100 [IU]/ML
0-4 INJECTION, SOLUTION INTRAVENOUS; SUBCUTANEOUS NIGHTLY
Status: DISCONTINUED | OUTPATIENT
Start: 2022-01-01 | End: 2022-01-01

## 2022-01-01 RX ORDER — MORPHINE SULFATE 2 MG/ML
2 INJECTION, SOLUTION INTRAMUSCULAR; INTRAVENOUS
Status: DISCONTINUED | OUTPATIENT
Start: 2022-01-01 | End: 2022-11-03 | Stop reason: HOSPADM

## 2022-01-01 RX ORDER — ALBUTEROL SULFATE 2.5 MG/3ML
2.5 SOLUTION RESPIRATORY (INHALATION) EVERY 4 HOURS PRN
Status: DISCONTINUED | OUTPATIENT
Start: 2022-01-01 | End: 2022-01-01 | Stop reason: SDUPTHER

## 2022-01-01 RX ORDER — 0.9 % SODIUM CHLORIDE 0.9 %
1000 INTRAVENOUS SOLUTION INTRAVENOUS ONCE
Status: DISCONTINUED | OUTPATIENT
Start: 2022-01-01 | End: 2022-01-01

## 2022-01-01 RX ORDER — AMIODARONE HYDROCHLORIDE 200 MG/1
200 TABLET ORAL DAILY
Status: DISCONTINUED | OUTPATIENT
Start: 2022-01-01 | End: 2022-01-01

## 2022-01-01 RX ORDER — MORPHINE SULFATE 4 MG/ML
4 INJECTION, SOLUTION INTRAMUSCULAR; INTRAVENOUS
Status: CANCELLED | OUTPATIENT
Start: 2022-01-01

## 2022-01-01 RX ORDER — ALBUMIN (HUMAN) 12.5 G/50ML
12.5 SOLUTION INTRAVENOUS EVERY 8 HOURS
Status: COMPLETED | OUTPATIENT
Start: 2022-01-01 | End: 2022-01-01

## 2022-01-01 RX ORDER — DEXAMETHASONE SODIUM PHOSPHATE 4 MG/ML
INJECTION, SOLUTION INTRA-ARTICULAR; INTRALESIONAL; INTRAMUSCULAR; INTRAVENOUS; SOFT TISSUE
Status: COMPLETED
Start: 2022-01-01 | End: 2022-01-01

## 2022-01-01 RX ORDER — HALOPERIDOL 5 MG/ML
2 INJECTION INTRAMUSCULAR EVERY 6 HOURS PRN
Status: DISCONTINUED | OUTPATIENT
Start: 2022-01-01 | End: 2022-01-01

## 2022-01-01 RX ORDER — IPRATROPIUM BROMIDE AND ALBUTEROL SULFATE 2.5; .5 MG/3ML; MG/3ML
1 SOLUTION RESPIRATORY (INHALATION)
Status: DISCONTINUED | OUTPATIENT
Start: 2022-01-01 | End: 2022-01-01 | Stop reason: HOSPADM

## 2022-01-01 RX ORDER — MIDAZOLAM HYDROCHLORIDE 5 MG/ML
INJECTION INTRAMUSCULAR; INTRAVENOUS
Status: COMPLETED
Start: 2022-01-01 | End: 2022-01-01

## 2022-01-01 RX ORDER — INSULIN LISPRO 100 [IU]/ML
0-4 INJECTION, SOLUTION INTRAVENOUS; SUBCUTANEOUS
Status: DISCONTINUED | OUTPATIENT
Start: 2022-01-01 | End: 2022-01-01

## 2022-01-01 RX ORDER — SODIUM CHLORIDE 0.9 % (FLUSH) 0.9 %
5-40 SYRINGE (ML) INJECTION EVERY 12 HOURS SCHEDULED
Status: DISCONTINUED | OUTPATIENT
Start: 2022-01-01 | End: 2022-01-01 | Stop reason: HOSPADM

## 2022-01-01 RX ORDER — LEVALBUTEROL INHALATION SOLUTION 0.63 MG/3ML
0.63 SOLUTION RESPIRATORY (INHALATION) EVERY 4 HOURS PRN
Status: DISCONTINUED | OUTPATIENT
Start: 2022-01-01 | End: 2022-01-01 | Stop reason: SDUPTHER

## 2022-01-01 RX ORDER — METHYLPREDNISOLONE SODIUM SUCCINATE 125 MG/2ML
75 INJECTION, POWDER, LYOPHILIZED, FOR SOLUTION INTRAMUSCULAR; INTRAVENOUS EVERY 8 HOURS
Status: DISCONTINUED | OUTPATIENT
Start: 2022-01-01 | End: 2022-01-01

## 2022-01-01 RX ORDER — POLYETHYLENE GLYCOL 3350 17 G/17G
17 POWDER, FOR SOLUTION ORAL DAILY
Status: DISCONTINUED | OUTPATIENT
Start: 2022-01-01 | End: 2022-01-01

## 2022-01-01 RX ORDER — SODIUM CHLORIDE 0.9 % (FLUSH) 0.9 %
5-40 SYRINGE (ML) INJECTION PRN
Status: DISCONTINUED | OUTPATIENT
Start: 2022-01-01 | End: 2022-01-01 | Stop reason: HOSPADM

## 2022-01-01 RX ORDER — OXYCODONE HCL 5 MG/5 ML
7.5 SOLUTION, ORAL ORAL EVERY 4 HOURS PRN
Status: DISCONTINUED | OUTPATIENT
Start: 2022-01-01 | End: 2022-01-01 | Stop reason: HOSPADM

## 2022-01-01 RX ORDER — BISACODYL 10 MG
10 SUPPOSITORY, RECTAL RECTAL DAILY
Status: DISCONTINUED | OUTPATIENT
Start: 2022-01-01 | End: 2022-01-01 | Stop reason: HOSPADM

## 2022-01-01 RX ORDER — MECOBALAMIN 5000 MCG
5 TABLET,DISINTEGRATING ORAL NIGHTLY
Status: DISCONTINUED | OUTPATIENT
Start: 2022-01-01 | End: 2022-01-01 | Stop reason: HOSPADM

## 2022-01-01 RX ORDER — 0.9 % SODIUM CHLORIDE 0.9 %
500 INTRAVENOUS SOLUTION INTRAVENOUS ONCE
Status: COMPLETED | OUTPATIENT
Start: 2022-01-01 | End: 2022-01-01

## 2022-01-01 RX ORDER — MORPHINE SULFATE 4 MG/ML
4 INJECTION, SOLUTION INTRAMUSCULAR; INTRAVENOUS
Status: DISCONTINUED | OUTPATIENT
Start: 2022-01-01 | End: 2022-11-03 | Stop reason: HOSPADM

## 2022-01-01 RX ORDER — POTASSIUM CHLORIDE 7.45 MG/ML
10 INJECTION INTRAVENOUS
Status: COMPLETED | OUTPATIENT
Start: 2022-01-01 | End: 2022-01-01

## 2022-01-01 RX ORDER — MORPHINE SULFATE 2 MG/ML
2 INJECTION, SOLUTION INTRAMUSCULAR; INTRAVENOUS EVERY 4 HOURS PRN
Status: DISCONTINUED | OUTPATIENT
Start: 2022-01-01 | End: 2022-01-01

## 2022-01-01 RX ORDER — POTASSIUM CHLORIDE 29.8 MG/ML
20 INJECTION INTRAVENOUS ONCE
Status: COMPLETED | OUTPATIENT
Start: 2022-01-01 | End: 2022-01-01

## 2022-01-01 RX ORDER — FENTANYL CITRATE-0.9 % NACL/PF 10 MCG/ML
25-200 PLASTIC BAG, INJECTION (ML) INTRAVENOUS CONTINUOUS
Status: DISCONTINUED | OUTPATIENT
Start: 2022-01-01 | End: 2022-01-01

## 2022-01-01 RX ORDER — INSULIN LISPRO 100 [IU]/ML
5 INJECTION, SOLUTION INTRAVENOUS; SUBCUTANEOUS EVERY 8 HOURS
Status: DISCONTINUED | OUTPATIENT
Start: 2022-01-01 | End: 2022-01-01

## 2022-01-01 RX ORDER — OXYCODONE HYDROCHLORIDE 5 MG/1
10 TABLET ORAL PRN
Status: DISCONTINUED | OUTPATIENT
Start: 2022-01-01 | End: 2022-01-01 | Stop reason: HOSPADM

## 2022-01-01 RX ORDER — OXYCODONE HYDROCHLORIDE 5 MG/1
5 TABLET ORAL PRN
Status: DISCONTINUED | OUTPATIENT
Start: 2022-01-01 | End: 2022-01-01 | Stop reason: HOSPADM

## 2022-01-01 RX ORDER — GUAR GUM
1 PACKET (EA) ORAL DAILY
Status: DISCONTINUED | OUTPATIENT
Start: 2022-01-01 | End: 2022-01-01

## 2022-01-01 RX ORDER — CHLORHEXIDINE GLUCONATE 0.12 MG/ML
15 RINSE ORAL 2 TIMES DAILY
Status: DISCONTINUED | OUTPATIENT
Start: 2022-01-01 | End: 2022-01-01

## 2022-01-01 RX ORDER — FENTANYL CITRATE 50 UG/ML
12.5 INJECTION, SOLUTION INTRAMUSCULAR; INTRAVENOUS ONCE
Status: DISCONTINUED | OUTPATIENT
Start: 2022-01-01 | End: 2022-01-01

## 2022-01-01 RX ORDER — AMIODARONE HYDROCHLORIDE 200 MG/1
200 TABLET ORAL DAILY
Status: DISCONTINUED | OUTPATIENT
Start: 2022-01-01 | End: 2022-01-01 | Stop reason: SDUPTHER

## 2022-01-01 RX ORDER — OXYCODONE HYDROCHLORIDE 5 MG/1
5 TABLET ORAL EVERY 4 HOURS PRN
Status: DISCONTINUED | OUTPATIENT
Start: 2022-01-01 | End: 2022-01-01

## 2022-01-01 RX ORDER — POTASSIUM CHLORIDE 7.45 MG/ML
10 INJECTION INTRAVENOUS
Status: DISCONTINUED | OUTPATIENT
Start: 2022-01-01 | End: 2022-01-01

## 2022-01-01 RX ORDER — FENTANYL CITRATE 50 UG/ML
50 INJECTION, SOLUTION INTRAMUSCULAR; INTRAVENOUS EVERY 5 MIN PRN
Status: DISCONTINUED | OUTPATIENT
Start: 2022-01-01 | End: 2022-01-01 | Stop reason: HOSPADM

## 2022-01-01 RX ORDER — METOPROLOL SUCCINATE 50 MG/1
50 TABLET, EXTENDED RELEASE ORAL 2 TIMES DAILY
Status: DISCONTINUED | OUTPATIENT
Start: 2022-01-01 | End: 2022-01-01

## 2022-01-01 RX ORDER — FENTANYL CITRATE 50 UG/ML
25 INJECTION, SOLUTION INTRAMUSCULAR; INTRAVENOUS EVERY 5 MIN PRN
Status: DISCONTINUED | OUTPATIENT
Start: 2022-01-01 | End: 2022-01-01

## 2022-01-01 RX ORDER — QUETIAPINE FUMARATE 25 MG/1
25 TABLET, FILM COATED ORAL 2 TIMES DAILY
Status: DISCONTINUED | OUTPATIENT
Start: 2022-01-01 | End: 2022-01-01

## 2022-01-01 RX ORDER — POTASSIUM CHLORIDE 29.8 MG/ML
20 INJECTION INTRAVENOUS
Status: COMPLETED | OUTPATIENT
Start: 2022-01-01 | End: 2022-01-01

## 2022-01-01 RX ORDER — AMIODARONE HYDROCHLORIDE 200 MG/1
400 TABLET ORAL 2 TIMES DAILY
Status: DISCONTINUED | OUTPATIENT
Start: 2022-01-01 | End: 2022-01-01 | Stop reason: SDUPTHER

## 2022-01-01 RX ORDER — PRAVASTATIN SODIUM 40 MG
40 TABLET ORAL
Status: DISCONTINUED | OUTPATIENT
Start: 2022-01-01 | End: 2022-01-01

## 2022-01-01 RX ORDER — INSULIN LISPRO 100 [IU]/ML
15 INJECTION, SOLUTION INTRAVENOUS; SUBCUTANEOUS ONCE
Status: COMPLETED | OUTPATIENT
Start: 2022-01-01 | End: 2022-01-01

## 2022-01-01 RX ORDER — ACETAMINOPHEN 160 MG/5ML
650 SOLUTION ORAL EVERY 6 HOURS
Status: DISCONTINUED | OUTPATIENT
Start: 2022-01-01 | End: 2022-01-01 | Stop reason: HOSPADM

## 2022-01-01 RX ORDER — INSULIN LISPRO 100 [IU]/ML
0-4 INJECTION, SOLUTION INTRAVENOUS; SUBCUTANEOUS EVERY 6 HOURS
Status: DISCONTINUED | OUTPATIENT
Start: 2022-01-01 | End: 2022-01-01

## 2022-01-01 RX ORDER — FAMOTIDINE 10 MG/ML
INJECTION, SOLUTION INTRAVENOUS
Status: COMPLETED
Start: 2022-01-01 | End: 2022-01-01

## 2022-01-01 RX ORDER — METHYLPREDNISOLONE SODIUM SUCCINATE 40 MG/ML
40 INJECTION, POWDER, LYOPHILIZED, FOR SOLUTION INTRAMUSCULAR; INTRAVENOUS EVERY 12 HOURS
Status: DISCONTINUED | OUTPATIENT
Start: 2022-01-01 | End: 2022-01-01

## 2022-01-01 RX ORDER — INSULIN LISPRO 100 [IU]/ML
0-16 INJECTION, SOLUTION INTRAVENOUS; SUBCUTANEOUS EVERY 4 HOURS
Status: DISCONTINUED | OUTPATIENT
Start: 2022-01-01 | End: 2022-01-01 | Stop reason: HOSPADM

## 2022-01-01 RX ORDER — SODIUM CHLORIDE 9 MG/ML
INJECTION, SOLUTION INTRAVENOUS PRN
Status: DISCONTINUED | OUTPATIENT
Start: 2022-01-01 | End: 2022-01-01 | Stop reason: HOSPADM

## 2022-01-01 RX ORDER — METHOCARBAMOL 500 MG/1
1000 TABLET, FILM COATED ORAL 3 TIMES DAILY
Status: DISCONTINUED | OUTPATIENT
Start: 2022-01-01 | End: 2022-01-01

## 2022-01-01 RX ORDER — METHYLPREDNISOLONE SODIUM SUCCINATE 125 MG/2ML
80 INJECTION, POWDER, LYOPHILIZED, FOR SOLUTION INTRAMUSCULAR; INTRAVENOUS EVERY 8 HOURS
Status: DISCONTINUED | OUTPATIENT
Start: 2022-01-01 | End: 2022-01-01

## 2022-01-01 RX ORDER — SENNA AND DOCUSATE SODIUM 50; 8.6 MG/1; MG/1
1 TABLET, FILM COATED ORAL 2 TIMES DAILY
Status: DISCONTINUED | OUTPATIENT
Start: 2022-01-01 | End: 2022-01-01

## 2022-01-01 RX ORDER — FENTANYL CITRATE 50 UG/ML
100 INJECTION, SOLUTION INTRAMUSCULAR; INTRAVENOUS
Status: ACTIVE | OUTPATIENT
Start: 2022-01-01 | End: 2022-01-01

## 2022-01-01 RX ORDER — FUROSEMIDE 10 MG/ML
20 INJECTION INTRAMUSCULAR; INTRAVENOUS ONCE
Status: COMPLETED | OUTPATIENT
Start: 2022-01-01 | End: 2022-01-01

## 2022-01-01 RX ORDER — MAGNESIUM HYDROXIDE 1200 MG/15ML
LIQUID ORAL CONTINUOUS PRN
Status: COMPLETED | OUTPATIENT
Start: 2022-01-01 | End: 2022-01-01

## 2022-01-01 RX ORDER — METOPROLOL TARTRATE 5 MG/5ML
5 INJECTION INTRAVENOUS EVERY 6 HOURS
Status: DISCONTINUED | OUTPATIENT
Start: 2022-01-01 | End: 2022-01-01 | Stop reason: ALTCHOICE

## 2022-01-01 RX ORDER — ONDANSETRON 4 MG/1
4 TABLET, ORALLY DISINTEGRATING ORAL EVERY 8 HOURS PRN
Status: DISCONTINUED | OUTPATIENT
Start: 2022-01-01 | End: 2022-11-03 | Stop reason: HOSPADM

## 2022-01-01 RX ORDER — FENTANYL CITRATE 50 UG/ML
INJECTION, SOLUTION INTRAMUSCULAR; INTRAVENOUS
Status: COMPLETED
Start: 2022-01-01 | End: 2022-01-01

## 2022-01-01 RX ORDER — BISACODYL 10 MG
10 SUPPOSITORY, RECTAL RECTAL DAILY PRN
Status: DISCONTINUED | OUTPATIENT
Start: 2022-01-01 | End: 2022-01-01

## 2022-01-01 RX ORDER — BUMETANIDE 1 MG/1
1 TABLET ORAL 2 TIMES DAILY
Status: DISCONTINUED | OUTPATIENT
Start: 2022-01-01 | End: 2022-01-01

## 2022-01-01 RX ORDER — OXYCODONE HYDROCHLORIDE 5 MG/1
5 TABLET ORAL PRN
Status: ACTIVE | OUTPATIENT
Start: 2022-01-01 | End: 2022-01-01

## 2022-01-01 RX ORDER — DEXTROSE MONOHYDRATE 50 MG/ML
INJECTION, SOLUTION INTRAVENOUS CONTINUOUS
Status: DISCONTINUED | OUTPATIENT
Start: 2022-01-01 | End: 2022-01-01

## 2022-01-01 RX ORDER — FENTANYL CITRATE 50 UG/ML
INJECTION, SOLUTION INTRAMUSCULAR; INTRAVENOUS
Status: DISCONTINUED
Start: 2022-01-01 | End: 2022-01-01 | Stop reason: WASHOUT

## 2022-01-01 RX ORDER — METHYLPREDNISOLONE SODIUM SUCCINATE 125 MG/2ML
60 INJECTION, POWDER, LYOPHILIZED, FOR SOLUTION INTRAMUSCULAR; INTRAVENOUS EVERY 8 HOURS
Status: DISCONTINUED | OUTPATIENT
Start: 2022-01-01 | End: 2022-01-01

## 2022-01-01 RX ORDER — OXYCODONE HCL 5 MG/5 ML
7.5 SOLUTION, ORAL ORAL EVERY 6 HOURS PRN
Status: DISCONTINUED | OUTPATIENT
Start: 2022-01-01 | End: 2022-01-01

## 2022-01-01 RX ORDER — MORPHINE SULFATE 2 MG/ML
1 INJECTION, SOLUTION INTRAMUSCULAR; INTRAVENOUS EVERY 4 HOURS PRN
Status: DISCONTINUED | OUTPATIENT
Start: 2022-01-01 | End: 2022-01-01

## 2022-01-01 RX ORDER — METHYLPREDNISOLONE SODIUM SUCCINATE 125 MG/2ML
60 INJECTION, POWDER, LYOPHILIZED, FOR SOLUTION INTRAMUSCULAR; INTRAVENOUS EVERY 12 HOURS
Status: DISCONTINUED | OUTPATIENT
Start: 2022-01-01 | End: 2022-01-01

## 2022-01-01 RX ORDER — DEXTROSE, SODIUM CHLORIDE, SODIUM LACTATE, POTASSIUM CHLORIDE, AND CALCIUM CHLORIDE 5; .6; .31; .03; .02 G/100ML; G/100ML; G/100ML; G/100ML; G/100ML
INJECTION, SOLUTION INTRAVENOUS CONTINUOUS
Status: DISCONTINUED | OUTPATIENT
Start: 2022-01-01 | End: 2022-01-01

## 2022-01-01 RX ORDER — MIDAZOLAM HYDROCHLORIDE 1 MG/ML
2 INJECTION INTRAMUSCULAR; INTRAVENOUS
Status: DISCONTINUED | OUTPATIENT
Start: 2022-01-01 | End: 2022-01-01 | Stop reason: HOSPADM

## 2022-01-01 RX ORDER — ONDANSETRON 4 MG/1
4 TABLET, ORALLY DISINTEGRATING ORAL EVERY 8 HOURS PRN
Status: CANCELLED | OUTPATIENT
Start: 2022-01-01

## 2022-01-01 RX ORDER — ALBUTEROL SULFATE 2.5 MG/3ML
2.5 SOLUTION RESPIRATORY (INHALATION) 4 TIMES DAILY
Status: DISCONTINUED | OUTPATIENT
Start: 2022-01-01 | End: 2022-01-01 | Stop reason: HOSPADM

## 2022-01-01 RX ORDER — SODIUM CHLORIDE 9 MG/ML
25 INJECTION, SOLUTION INTRAVENOUS PRN
Status: DISCONTINUED | OUTPATIENT
Start: 2022-01-01 | End: 2022-01-01 | Stop reason: HOSPADM

## 2022-01-01 RX ORDER — ONDANSETRON 2 MG/ML
4 INJECTION INTRAMUSCULAR; INTRAVENOUS
Status: DISCONTINUED | OUTPATIENT
Start: 2022-01-01 | End: 2022-01-01 | Stop reason: HOSPADM

## 2022-01-01 RX ORDER — MORPHINE SULFATE 2 MG/ML
2 INJECTION, SOLUTION INTRAMUSCULAR; INTRAVENOUS
Status: CANCELLED | OUTPATIENT
Start: 2022-01-01

## 2022-01-01 RX ORDER — LANOLIN ALCOHOL/MO/W.PET/CERES
400 CREAM (GRAM) TOPICAL 2 TIMES DAILY
Status: DISCONTINUED | OUTPATIENT
Start: 2022-01-01 | End: 2022-01-01

## 2022-01-01 RX ORDER — FUROSEMIDE 10 MG/ML
80 INJECTION INTRAMUSCULAR; INTRAVENOUS ONCE
Status: COMPLETED | OUTPATIENT
Start: 2022-01-01 | End: 2022-01-01

## 2022-01-01 RX ORDER — SODIUM CHLORIDE 9 MG/ML
INJECTION, SOLUTION INTRAVENOUS PRN
Status: DISCONTINUED | OUTPATIENT
Start: 2022-01-01 | End: 2022-01-01

## 2022-01-01 RX ORDER — SODIUM CHLORIDE, SODIUM LACTATE, POTASSIUM CHLORIDE, CALCIUM CHLORIDE 600; 310; 30; 20 MG/100ML; MG/100ML; MG/100ML; MG/100ML
INJECTION, SOLUTION INTRAVENOUS CONTINUOUS
Status: DISCONTINUED | OUTPATIENT
Start: 2022-01-01 | End: 2022-01-01

## 2022-01-01 RX ORDER — ATROPINE SULFATE 10 MG/ML
2 SOLUTION/ DROPS OPHTHALMIC EVERY 4 HOURS PRN
Status: DISCONTINUED | OUTPATIENT
Start: 2022-01-01 | End: 2022-11-03 | Stop reason: HOSPADM

## 2022-01-01 RX ORDER — FUROSEMIDE 10 MG/ML
40 INJECTION INTRAMUSCULAR; INTRAVENOUS 2 TIMES DAILY
Status: DISCONTINUED | OUTPATIENT
Start: 2022-01-01 | End: 2022-01-01

## 2022-01-01 RX ORDER — PROPOFOL 10 MG/ML
INJECTION, EMULSION INTRAVENOUS
Status: DISPENSED
Start: 2022-01-01 | End: 2022-01-01

## 2022-01-01 RX ORDER — PROPOFOL 10 MG/ML
5-50 INJECTION, EMULSION INTRAVENOUS CONTINUOUS
Status: DISCONTINUED | OUTPATIENT
Start: 2022-01-01 | End: 2022-01-01

## 2022-01-01 RX ORDER — METHYLPREDNISOLONE SODIUM SUCCINATE 125 MG/2ML
125 INJECTION, POWDER, LYOPHILIZED, FOR SOLUTION INTRAMUSCULAR; INTRAVENOUS EVERY 8 HOURS
Status: DISCONTINUED | OUTPATIENT
Start: 2022-01-01 | End: 2022-01-01

## 2022-01-01 RX ORDER — SODIUM CHLORIDE FOR INHALATION 3 %
4 VIAL, NEBULIZER (ML) INHALATION EVERY 4 HOURS
Status: DISCONTINUED | OUTPATIENT
Start: 2022-01-01 | End: 2022-01-01

## 2022-01-01 RX ORDER — LIDOCAINE HYDROCHLORIDE 10 MG/ML
INJECTION, SOLUTION EPIDURAL; INFILTRATION; INTRACAUDAL; PERINEURAL
Status: COMPLETED
Start: 2022-01-01 | End: 2022-01-01

## 2022-01-01 RX ORDER — ONDANSETRON 2 MG/ML
4 INJECTION INTRAMUSCULAR; INTRAVENOUS EVERY 6 HOURS PRN
Status: DISCONTINUED | OUTPATIENT
Start: 2022-01-01 | End: 2022-11-03 | Stop reason: HOSPADM

## 2022-01-01 RX ORDER — SODIUM CHLORIDE FOR INHALATION 3 %
4 VIAL, NEBULIZER (ML) INHALATION ONCE
Status: COMPLETED | OUTPATIENT
Start: 2022-01-01 | End: 2022-01-01

## 2022-01-01 RX ORDER — ONDANSETRON 4 MG/1
4 TABLET, ORALLY DISINTEGRATING ORAL EVERY 8 HOURS PRN
Status: DISCONTINUED | OUTPATIENT
Start: 2022-01-01 | End: 2022-01-01 | Stop reason: HOSPADM

## 2022-01-01 RX ORDER — MORPHINE SULFATE 2 MG/ML
1 INJECTION, SOLUTION INTRAMUSCULAR; INTRAVENOUS ONCE
Status: COMPLETED | OUTPATIENT
Start: 2022-01-01 | End: 2022-01-01

## 2022-01-01 RX ORDER — FUROSEMIDE 10 MG/ML
40 INJECTION INTRAMUSCULAR; INTRAVENOUS ONCE
Status: DISCONTINUED | OUTPATIENT
Start: 2022-01-01 | End: 2022-01-01

## 2022-01-01 RX ORDER — PROPOFOL 10 MG/ML
10 INJECTION, EMULSION INTRAVENOUS
Status: DISCONTINUED | OUTPATIENT
Start: 2022-01-01 | End: 2022-01-01

## 2022-01-01 RX ORDER — LORAZEPAM 2 MG/ML
1 CONCENTRATE ORAL
Status: DISCONTINUED | OUTPATIENT
Start: 2022-01-01 | End: 2022-01-01 | Stop reason: HOSPADM

## 2022-01-01 RX ORDER — POTASSIUM CHLORIDE 20 MEQ/1
40 TABLET, EXTENDED RELEASE ORAL
Status: DISCONTINUED | OUTPATIENT
Start: 2022-01-01 | End: 2022-01-01

## 2022-01-01 RX ORDER — OXYCODONE HCL 5 MG/5 ML
5 SOLUTION, ORAL ORAL EVERY 6 HOURS
Status: DISCONTINUED | OUTPATIENT
Start: 2022-01-01 | End: 2022-01-01

## 2022-01-01 RX ORDER — OXYCODONE HYDROCHLORIDE 5 MG/1
10 TABLET ORAL PRN
Status: ACTIVE | OUTPATIENT
Start: 2022-01-01 | End: 2022-01-01

## 2022-01-01 RX ORDER — LABETALOL HYDROCHLORIDE 5 MG/ML
10 INJECTION, SOLUTION INTRAVENOUS
Status: DISCONTINUED | OUTPATIENT
Start: 2022-01-01 | End: 2022-01-01 | Stop reason: HOSPADM

## 2022-01-01 RX ORDER — MORPHINE SULFATE 4 MG/ML
4 INJECTION, SOLUTION INTRAMUSCULAR; INTRAVENOUS
Status: DISCONTINUED | OUTPATIENT
Start: 2022-01-01 | End: 2022-01-01 | Stop reason: HOSPADM

## 2022-01-01 RX ORDER — LIDOCAINE HYDROCHLORIDE AND EPINEPHRINE 10; 10 MG/ML; UG/ML
20 INJECTION, SOLUTION INFILTRATION; PERINEURAL ONCE
Status: COMPLETED | OUTPATIENT
Start: 2022-01-01 | End: 2022-01-01

## 2022-01-01 RX ORDER — DOXAZOSIN 2 MG/1
1 TABLET ORAL DAILY
Status: DISCONTINUED | OUTPATIENT
Start: 2022-01-01 | End: 2022-01-01 | Stop reason: HOSPADM

## 2022-01-01 RX ORDER — SCOLOPAMINE TRANSDERMAL SYSTEM 1 MG/1
1 PATCH, EXTENDED RELEASE TRANSDERMAL
Status: DISCONTINUED | OUTPATIENT
Start: 2022-11-03 | End: 2022-11-03 | Stop reason: HOSPADM

## 2022-01-01 RX ORDER — OXYCODONE HCL 5 MG/5 ML
5 SOLUTION, ORAL ORAL EVERY 4 HOURS PRN
Status: DISCONTINUED | OUTPATIENT
Start: 2022-01-01 | End: 2022-01-01

## 2022-01-01 RX ORDER — OXYCODONE HCL 5 MG/5 ML
5 SOLUTION, ORAL ORAL EVERY 6 HOURS PRN
Status: DISCONTINUED | OUTPATIENT
Start: 2022-01-01 | End: 2022-01-01

## 2022-01-01 RX ORDER — OXYCODONE HCL 5 MG/5 ML
5 SOLUTION, ORAL ORAL EVERY 4 HOURS
Status: DISCONTINUED | OUTPATIENT
Start: 2022-01-01 | End: 2022-01-01

## 2022-01-01 RX ORDER — FUROSEMIDE 10 MG/ML
40 INJECTION INTRAMUSCULAR; INTRAVENOUS ONCE
Status: COMPLETED | OUTPATIENT
Start: 2022-01-01 | End: 2022-01-01

## 2022-01-01 RX ORDER — SIMETHICONE 80 MG
80 TABLET,CHEWABLE ORAL EVERY 6 HOURS PRN
Status: DISCONTINUED | OUTPATIENT
Start: 2022-01-01 | End: 2022-01-01 | Stop reason: HOSPADM

## 2022-01-01 RX ORDER — OXYCODONE HCL 5 MG/5 ML
10 SOLUTION, ORAL ORAL EVERY 4 HOURS PRN
Status: DISCONTINUED | OUTPATIENT
Start: 2022-01-01 | End: 2022-01-01

## 2022-01-01 RX ORDER — DEXTROSE MONOHYDRATE 100 MG/ML
INJECTION, SOLUTION INTRAVENOUS CONTINUOUS PRN
Status: DISCONTINUED | OUTPATIENT
Start: 2022-01-01 | End: 2022-01-01 | Stop reason: SDUPTHER

## 2022-01-01 RX ORDER — OXYCODONE HYDROCHLORIDE 5 MG/1
10 TABLET ORAL EVERY 4 HOURS PRN
Status: DISCONTINUED | OUTPATIENT
Start: 2022-01-01 | End: 2022-01-01

## 2022-01-01 RX ORDER — SODIUM CHLORIDE 9 MG/ML
INJECTION, SOLUTION INTRAVENOUS CONTINUOUS
Status: DISCONTINUED | OUTPATIENT
Start: 2022-01-01 | End: 2022-01-01

## 2022-01-01 RX ORDER — INSULIN LISPRO 100 [IU]/ML
0-8 INJECTION, SOLUTION INTRAVENOUS; SUBCUTANEOUS
Status: DISCONTINUED | OUTPATIENT
Start: 2022-01-01 | End: 2022-01-01

## 2022-01-01 RX ORDER — AMIODARONE HYDROCHLORIDE 200 MG/1
400 TABLET ORAL 2 TIMES DAILY
Status: DISCONTINUED | OUTPATIENT
Start: 2022-01-01 | End: 2022-01-01

## 2022-01-01 RX ORDER — DIPHENHYDRAMINE HYDROCHLORIDE 50 MG/ML
INJECTION INTRAMUSCULAR; INTRAVENOUS
Status: COMPLETED
Start: 2022-01-01 | End: 2022-01-01

## 2022-01-01 RX ORDER — ALBUTEROL SULFATE 2.5 MG/3ML
2.5 SOLUTION RESPIRATORY (INHALATION) EVERY 4 HOURS
Status: DISCONTINUED | OUTPATIENT
Start: 2022-01-01 | End: 2022-01-01

## 2022-01-01 RX ORDER — ACETAMINOPHEN 500 MG
1000 TABLET ORAL EVERY 6 HOURS
Status: DISCONTINUED | OUTPATIENT
Start: 2022-01-01 | End: 2022-01-01

## 2022-01-01 RX ORDER — INSULIN LISPRO 100 [IU]/ML
0-16 INJECTION, SOLUTION INTRAVENOUS; SUBCUTANEOUS
Status: DISCONTINUED | OUTPATIENT
Start: 2022-01-01 | End: 2022-01-01

## 2022-01-01 RX ORDER — OXYCODONE HCL 5 MG/5 ML
10 SOLUTION, ORAL ORAL EVERY 4 HOURS PRN
Status: DISCONTINUED | OUTPATIENT
Start: 2022-01-01 | End: 2022-01-01 | Stop reason: HOSPADM

## 2022-01-01 RX ORDER — CALCIUM GLUCONATE 94 MG/ML
1000 INJECTION, SOLUTION INTRAVENOUS ONCE
Status: COMPLETED | OUTPATIENT
Start: 2022-01-01 | End: 2022-01-01

## 2022-01-01 RX ORDER — METOCLOPRAMIDE HYDROCHLORIDE 5 MG/ML
10 INJECTION INTRAMUSCULAR; INTRAVENOUS
Status: DISCONTINUED | OUTPATIENT
Start: 2022-01-01 | End: 2022-01-01 | Stop reason: HOSPADM

## 2022-01-01 RX ORDER — DOCUSATE SODIUM 100 MG/1
100 CAPSULE, LIQUID FILLED ORAL 2 TIMES DAILY
Status: DISCONTINUED | OUTPATIENT
Start: 2022-01-01 | End: 2022-01-01

## 2022-01-01 RX ORDER — DEXTROSE MONOHYDRATE 100 MG/ML
INJECTION, SOLUTION INTRAVENOUS CONTINUOUS PRN
Status: DISCONTINUED | OUTPATIENT
Start: 2022-01-01 | End: 2022-01-01 | Stop reason: HOSPADM

## 2022-01-01 RX ORDER — LEVALBUTEROL TARTRATE 45 UG/1
1 AEROSOL, METERED ORAL EVERY 4 HOURS PRN
Status: DISCONTINUED | OUTPATIENT
Start: 2022-01-01 | End: 2022-01-01

## 2022-01-01 RX ORDER — OXYCODONE HCL 5 MG/5 ML
10 SOLUTION, ORAL ORAL EVERY 4 HOURS
Status: DISCONTINUED | OUTPATIENT
Start: 2022-01-01 | End: 2022-01-01

## 2022-01-01 RX ORDER — PROCHLORPERAZINE EDISYLATE 5 MG/ML
5 INJECTION INTRAMUSCULAR; INTRAVENOUS
Status: ACTIVE | OUTPATIENT
Start: 2022-01-01 | End: 2022-01-01

## 2022-01-01 RX ORDER — DIAZEPAM 5 MG/ML
5 INJECTION, SOLUTION INTRAMUSCULAR; INTRAVENOUS EVERY 4 HOURS PRN
Status: DISCONTINUED | OUTPATIENT
Start: 2022-01-01 | End: 2022-11-03 | Stop reason: HOSPADM

## 2022-01-01 RX ORDER — ETOMIDATE 2 MG/ML
0.3 INJECTION INTRAVENOUS ONCE
Status: COMPLETED | OUTPATIENT
Start: 2022-01-01 | End: 2022-01-01

## 2022-01-01 RX ORDER — ONDANSETRON 2 MG/ML
4 INJECTION INTRAMUSCULAR; INTRAVENOUS
Status: ACTIVE | OUTPATIENT
Start: 2022-01-01 | End: 2022-01-01

## 2022-01-01 RX ORDER — INSULIN LISPRO 100 [IU]/ML
5 INJECTION, SOLUTION INTRAVENOUS; SUBCUTANEOUS
Status: DISCONTINUED | OUTPATIENT
Start: 2022-01-01 | End: 2022-01-01

## 2022-01-01 RX ORDER — FAMOTIDINE 20 MG/1
20 TABLET, FILM COATED ORAL DAILY
Status: DISCONTINUED | OUTPATIENT
Start: 2022-01-01 | End: 2022-01-01

## 2022-01-01 RX ORDER — METHOCARBAMOL 500 MG/1
500 TABLET, FILM COATED ORAL 4 TIMES DAILY
Status: DISCONTINUED | OUTPATIENT
Start: 2022-01-01 | End: 2022-01-01 | Stop reason: HOSPADM

## 2022-01-01 RX ORDER — AMIODARONE HYDROCHLORIDE 200 MG/1
400 TABLET ORAL 2 TIMES DAILY
Status: DISPENSED | OUTPATIENT
Start: 2022-01-01 | End: 2022-01-01

## 2022-01-01 RX ORDER — ONDANSETRON 2 MG/ML
4 INJECTION INTRAMUSCULAR; INTRAVENOUS EVERY 6 HOURS PRN
Status: DISCONTINUED | OUTPATIENT
Start: 2022-01-01 | End: 2022-01-01 | Stop reason: HOSPADM

## 2022-01-01 RX ORDER — GUAR GUM
1 PACKET (EA) ORAL 3 TIMES DAILY
Status: DISCONTINUED | OUTPATIENT
Start: 2022-01-01 | End: 2022-01-01 | Stop reason: HOSPADM

## 2022-01-01 RX ORDER — SODIUM CHLORIDE 9 MG/ML
INJECTION, SOLUTION INTRAVENOUS CONTINUOUS
Status: DISCONTINUED | OUTPATIENT
Start: 2022-01-01 | End: 2022-01-01 | Stop reason: HOSPADM

## 2022-01-01 RX ORDER — CASTOR OIL AND BALSAM, PERU 788; 87 MG/G; MG/G
OINTMENT TOPICAL 2 TIMES DAILY
Status: DISCONTINUED | OUTPATIENT
Start: 2022-01-01 | End: 2022-01-01 | Stop reason: HOSPADM

## 2022-01-01 RX ORDER — BUPIVACAINE HYDROCHLORIDE 5 MG/ML
INJECTION, SOLUTION EPIDURAL; INTRACAUDAL PRN
Status: DISCONTINUED | OUTPATIENT
Start: 2022-01-01 | End: 2022-01-01 | Stop reason: HOSPADM

## 2022-01-01 RX ORDER — HYDRALAZINE HYDROCHLORIDE 20 MG/ML
5 INJECTION INTRAMUSCULAR; INTRAVENOUS
Status: DISCONTINUED | OUTPATIENT
Start: 2022-01-01 | End: 2022-01-01 | Stop reason: HOSPADM

## 2022-01-01 RX ORDER — SCOLOPAMINE TRANSDERMAL SYSTEM 1 MG/1
1 PATCH, EXTENDED RELEASE TRANSDERMAL
Status: DISCONTINUED | OUTPATIENT
Start: 2022-01-01 | End: 2022-01-01 | Stop reason: HOSPADM

## 2022-01-01 RX ORDER — LANSOPRAZOLE
30 KIT 2 TIMES DAILY
Status: DISCONTINUED | OUTPATIENT
Start: 2022-01-01 | End: 2022-01-01 | Stop reason: HOSPADM

## 2022-01-01 RX ORDER — OXYCODONE HCL 5 MG/5 ML
7.5 SOLUTION, ORAL ORAL EVERY 4 HOURS
Status: DISCONTINUED | OUTPATIENT
Start: 2022-01-01 | End: 2022-01-01

## 2022-01-01 RX ORDER — ACETAMINOPHEN 160 MG/5ML
650 SOLUTION ORAL EVERY 6 HOURS PRN
Status: DISCONTINUED | OUTPATIENT
Start: 2022-01-01 | End: 2022-01-01

## 2022-01-01 RX ORDER — INSULIN LISPRO 100 [IU]/ML
0-16 INJECTION, SOLUTION INTRAVENOUS; SUBCUTANEOUS EVERY 6 HOURS
Status: DISCONTINUED | OUTPATIENT
Start: 2022-01-01 | End: 2022-01-01

## 2022-01-01 RX ORDER — DEXTROSE MONOHYDRATE 100 MG/ML
250 INJECTION, SOLUTION INTRAVENOUS ONCE
Status: DISCONTINUED | OUTPATIENT
Start: 2022-01-01 | End: 2022-01-01

## 2022-01-01 RX ORDER — LORAZEPAM 2 MG/ML
0.5 INJECTION INTRAMUSCULAR
Status: DISPENSED | OUTPATIENT
Start: 2022-01-01 | End: 2022-01-01

## 2022-01-01 RX ORDER — INSULIN LISPRO 100 [IU]/ML
10 INJECTION, SOLUTION INTRAVENOUS; SUBCUTANEOUS ONCE
Status: COMPLETED | OUTPATIENT
Start: 2022-01-01 | End: 2022-01-01

## 2022-01-01 RX ORDER — DIPHENHYDRAMINE HYDROCHLORIDE 50 MG/ML
12.5 INJECTION INTRAMUSCULAR; INTRAVENOUS
Status: ACTIVE | OUTPATIENT
Start: 2022-01-01 | End: 2022-01-01

## 2022-01-01 RX ORDER — POTASSIUM CHLORIDE 29.8 MG/ML
20 INJECTION INTRAVENOUS
Status: DISCONTINUED | OUTPATIENT
Start: 2022-01-01 | End: 2022-01-01

## 2022-01-01 RX ORDER — MORPHINE SULFATE 4 MG/ML
4 INJECTION, SOLUTION INTRAMUSCULAR; INTRAVENOUS ONCE
Status: DISCONTINUED | OUTPATIENT
Start: 2022-01-01 | End: 2022-11-03 | Stop reason: HOSPADM

## 2022-01-01 RX ORDER — SODIUM CHLORIDE FOR INHALATION 3 %
15 VIAL, NEBULIZER (ML) INHALATION EVERY 4 HOURS
Status: DISCONTINUED | OUTPATIENT
Start: 2022-01-01 | End: 2022-01-01

## 2022-01-01 RX ORDER — HEPARIN SODIUM 5000 [USP'U]/ML
5000 INJECTION, SOLUTION INTRAVENOUS; SUBCUTANEOUS EVERY 8 HOURS SCHEDULED
Status: DISCONTINUED | OUTPATIENT
Start: 2022-01-01 | End: 2022-01-01

## 2022-01-01 RX ORDER — HALOPERIDOL 5 MG/ML
2 INJECTION INTRAMUSCULAR EVERY 6 HOURS PRN
Status: DISCONTINUED | OUTPATIENT
Start: 2022-01-01 | End: 2022-01-01 | Stop reason: ALTCHOICE

## 2022-01-01 RX ORDER — GUAR GUM
1 PACKET (EA) ORAL 2 TIMES DAILY
Status: DISCONTINUED | OUTPATIENT
Start: 2022-01-01 | End: 2022-01-01

## 2022-01-01 RX ORDER — MAGNESIUM HYDROXIDE 1200 MG/15ML
LIQUID ORAL CONTINUOUS PRN
Status: DISCONTINUED | OUTPATIENT
Start: 2022-01-01 | End: 2022-01-01 | Stop reason: HOSPADM

## 2022-01-01 RX ORDER — CLOTRIMAZOLE 1 %
CREAM (GRAM) TOPICAL 2 TIMES DAILY
Status: DISCONTINUED | OUTPATIENT
Start: 2022-01-01 | End: 2022-01-01 | Stop reason: HOSPADM

## 2022-01-01 RX ORDER — MIDAZOLAM HYDROCHLORIDE 1 MG/ML
5 INJECTION, SOLUTION INTRAMUSCULAR; INTRAVENOUS
Status: ACTIVE | OUTPATIENT
Start: 2022-01-01 | End: 2022-01-01

## 2022-01-01 RX ORDER — ONDANSETRON 2 MG/ML
4 INJECTION INTRAMUSCULAR; INTRAVENOUS EVERY 6 HOURS PRN
Status: CANCELLED | OUTPATIENT
Start: 2022-01-01

## 2022-01-01 RX ORDER — SODIUM CHLORIDE, SODIUM LACTATE, POTASSIUM CHLORIDE, CALCIUM CHLORIDE 600; 310; 30; 20 MG/100ML; MG/100ML; MG/100ML; MG/100ML
INJECTION, SOLUTION INTRAVENOUS CONTINUOUS
Status: DISCONTINUED | OUTPATIENT
Start: 2022-01-01 | End: 2022-01-01 | Stop reason: HOSPADM

## 2022-01-01 RX ORDER — PANTOPRAZOLE SODIUM 40 MG/10ML
40 INJECTION, POWDER, LYOPHILIZED, FOR SOLUTION INTRAVENOUS 2 TIMES DAILY
Status: DISCONTINUED | OUTPATIENT
Start: 2022-01-01 | End: 2022-01-01 | Stop reason: SDUPTHER

## 2022-01-01 RX ORDER — MORPHINE SULFATE 2 MG/ML
2 INJECTION, SOLUTION INTRAMUSCULAR; INTRAVENOUS
Status: DISCONTINUED | OUTPATIENT
Start: 2022-01-01 | End: 2022-01-01 | Stop reason: HOSPADM

## 2022-01-01 RX ORDER — LIDOCAINE HYDROCHLORIDE AND EPINEPHRINE 10; 10 MG/ML; UG/ML
INJECTION, SOLUTION INFILTRATION; PERINEURAL PRN
Status: DISCONTINUED | OUTPATIENT
Start: 2022-01-01 | End: 2022-01-01 | Stop reason: ALTCHOICE

## 2022-01-01 RX ORDER — SCOLOPAMINE TRANSDERMAL SYSTEM 1 MG/1
1 PATCH, EXTENDED RELEASE TRANSDERMAL
Status: CANCELLED | OUTPATIENT
Start: 2022-11-03

## 2022-01-01 RX ADMIN — Medication 15 ML: at 21:17

## 2022-01-01 RX ADMIN — POTASSIUM PHOSPHATE, MONOBASIC AND POTASSIUM PHOSPHATE, DIBASIC 20 MMOL: 224; 236 INJECTION, SOLUTION, CONCENTRATE INTRAVENOUS at 09:44

## 2022-01-01 RX ADMIN — METOPROLOL SUCCINATE 50 MG: 50 TABLET, FILM COATED, EXTENDED RELEASE ORAL at 09:32

## 2022-01-01 RX ADMIN — DOCUSATE SODIUM 100 MG: 50 LIQUID ORAL at 21:17

## 2022-01-01 RX ADMIN — SODIUM CHLORIDE: 9 INJECTION, SOLUTION INTRAVENOUS at 23:12

## 2022-01-01 RX ADMIN — NOREPINEPHRINE BITARTRATE 1 MCG/MIN: 1 SOLUTION INTRAVENOUS at 12:30

## 2022-01-01 RX ADMIN — INSULIN LISPRO 16 UNITS: 100 INJECTION, SOLUTION INTRAVENOUS; SUBCUTANEOUS at 11:06

## 2022-01-01 RX ADMIN — ALBUTEROL SULFATE 2.5 MG: 2.5 SOLUTION RESPIRATORY (INHALATION) at 19:49

## 2022-01-01 RX ADMIN — ALBUTEROL SULFATE 2.5 MG: 2.5 SOLUTION RESPIRATORY (INHALATION) at 23:36

## 2022-01-01 RX ADMIN — HYDROMORPHONE HYDROCHLORIDE 0.5 MG: 1 INJECTION, SOLUTION INTRAMUSCULAR; INTRAVENOUS; SUBCUTANEOUS at 06:21

## 2022-01-01 RX ADMIN — Medication 15 ML: at 09:00

## 2022-01-01 RX ADMIN — COLLAGENASE SANTYL: 250 OINTMENT TOPICAL at 09:53

## 2022-01-01 RX ADMIN — Medication 15 ML: at 08:25

## 2022-01-01 RX ADMIN — SODIUM CHLORIDE, PRESERVATIVE FREE 10 ML: 5 INJECTION INTRAVENOUS at 09:52

## 2022-01-01 RX ADMIN — Medication 400 MG: at 12:34

## 2022-01-01 RX ADMIN — INSULIN LISPRO 4 UNITS: 100 INJECTION, SOLUTION INTRAVENOUS; SUBCUTANEOUS at 05:30

## 2022-01-01 RX ADMIN — SODIUM CHLORIDE: 9 INJECTION, SOLUTION INTRAVENOUS at 16:07

## 2022-01-01 RX ADMIN — Medication: at 08:47

## 2022-01-01 RX ADMIN — ALBUTEROL SULFATE 2.5 MG: 2.5 SOLUTION RESPIRATORY (INHALATION) at 08:56

## 2022-01-01 RX ADMIN — ALBUTEROL SULFATE 2.5 MG: 2.5 SOLUTION RESPIRATORY (INHALATION) at 20:51

## 2022-01-01 RX ADMIN — DEXTROSE MONOHYDRATE 125 ML: 10 INJECTION, SOLUTION INTRAVENOUS at 10:27

## 2022-01-01 RX ADMIN — INSULIN LISPRO 4 UNITS: 100 INJECTION, SOLUTION INTRAVENOUS; SUBCUTANEOUS at 12:26

## 2022-01-01 RX ADMIN — DOCUSATE SODIUM 100 MG: 50 LIQUID ORAL at 21:24

## 2022-01-01 RX ADMIN — CLOTRIMAZOLE: 10 CREAM TOPICAL at 20:43

## 2022-01-01 RX ADMIN — CEFEPIME HYDROCHLORIDE 2000 MG: 2 INJECTION, POWDER, FOR SOLUTION INTRAMUSCULAR; INTRAVENOUS at 22:07

## 2022-01-01 RX ADMIN — Medication 15 ML: at 21:01

## 2022-01-01 RX ADMIN — Medication: at 21:16

## 2022-01-01 RX ADMIN — ALBUTEROL SULFATE 2.5 MG: 2.5 SOLUTION RESPIRATORY (INHALATION) at 13:35

## 2022-01-01 RX ADMIN — Medication 15 ML: at 09:02

## 2022-01-01 RX ADMIN — PANTOPRAZOLE SODIUM 40 MG: 40 INJECTION, POWDER, LYOPHILIZED, FOR SOLUTION INTRAVENOUS at 08:31

## 2022-01-01 RX ADMIN — ACETAMINOPHEN 650 MG: 650 SOLUTION ORAL at 03:55

## 2022-01-01 RX ADMIN — OXYCODONE HYDROCHLORIDE 5 MG: 5 SOLUTION ORAL at 18:20

## 2022-01-01 RX ADMIN — CEFEPIME HYDROCHLORIDE 2000 MG: 2 INJECTION, POWDER, FOR SOLUTION INTRAVENOUS at 11:23

## 2022-01-01 RX ADMIN — Medication 15 ML: at 21:29

## 2022-01-01 RX ADMIN — OXYCODONE HYDROCHLORIDE 7.5 MG: 5 SOLUTION ORAL at 04:13

## 2022-01-01 RX ADMIN — FUROSEMIDE 40 MG: 10 INJECTION, SOLUTION INTRAMUSCULAR; INTRAVENOUS at 16:38

## 2022-01-01 RX ADMIN — DOXAZOSIN 1 MG: 2 TABLET ORAL at 09:25

## 2022-01-01 RX ADMIN — Medication: at 21:19

## 2022-01-01 RX ADMIN — ALBUMIN (HUMAN) 12.5 G: 0.25 INJECTION, SOLUTION INTRAVENOUS at 17:42

## 2022-01-01 RX ADMIN — SODIUM CHLORIDE 0.3 MCG/KG/HR: 9 INJECTION, SOLUTION INTRAVENOUS at 11:19

## 2022-01-01 RX ADMIN — FAMOTIDINE 20 MG: 20 TABLET ORAL at 08:27

## 2022-01-01 RX ADMIN — INSULIN LISPRO 4 UNITS: 100 INJECTION, SOLUTION INTRAVENOUS; SUBCUTANEOUS at 01:36

## 2022-01-01 RX ADMIN — TIOTROPIUM BROMIDE AND OLODATEROL 2 PUFF: 3.124; 2.736 SPRAY, METERED RESPIRATORY (INHALATION) at 09:42

## 2022-01-01 RX ADMIN — Medication: at 20:07

## 2022-01-01 RX ADMIN — CLOTRIMAZOLE: 10 CREAM TOPICAL at 08:57

## 2022-01-01 RX ADMIN — MORPHINE SULFATE 1 MG: 2 INJECTION, SOLUTION INTRAMUSCULAR; INTRAVENOUS at 11:34

## 2022-01-01 RX ADMIN — PANTOPRAZOLE SODIUM 40 MG: 40 INJECTION, POWDER, LYOPHILIZED, FOR SOLUTION INTRAVENOUS at 09:03

## 2022-01-01 RX ADMIN — INSULIN LISPRO 16 UNITS: 100 INJECTION, SOLUTION INTRAVENOUS; SUBCUTANEOUS at 21:08

## 2022-01-01 RX ADMIN — INSULIN LISPRO 5 UNITS: 100 INJECTION, SOLUTION INTRAVENOUS; SUBCUTANEOUS at 05:15

## 2022-01-01 RX ADMIN — INSULIN LISPRO 12 UNITS: 100 INJECTION, SOLUTION INTRAVENOUS; SUBCUTANEOUS at 18:33

## 2022-01-01 RX ADMIN — SODIUM CHLORIDE, PRESERVATIVE FREE 10 ML: 5 INJECTION INTRAVENOUS at 20:26

## 2022-01-01 RX ADMIN — CLOTRIMAZOLE: 10 CREAM TOPICAL at 10:12

## 2022-01-01 RX ADMIN — LANSOPRAZOLE 30 MG: KIT at 08:13

## 2022-01-01 RX ADMIN — FAMOTIDINE 20 MG: 20 TABLET ORAL at 08:12

## 2022-01-01 RX ADMIN — LANSOPRAZOLE 30 MG: KIT at 09:00

## 2022-01-01 RX ADMIN — Medication 15 ML: at 20:44

## 2022-01-01 RX ADMIN — Medication 15 ML: at 09:49

## 2022-01-01 RX ADMIN — INSULIN LISPRO 8 UNITS: 100 INJECTION, SOLUTION INTRAVENOUS; SUBCUTANEOUS at 01:31

## 2022-01-01 RX ADMIN — ALBUTEROL SULFATE 2.5 MG: 2.5 SOLUTION RESPIRATORY (INHALATION) at 13:54

## 2022-01-01 RX ADMIN — SODIUM CHLORIDE, PRESERVATIVE FREE 10 ML: 5 INJECTION INTRAVENOUS at 20:52

## 2022-01-01 RX ADMIN — INSULIN GLARGINE 13 UNITS: 100 INJECTION, SOLUTION SUBCUTANEOUS at 21:01

## 2022-01-01 RX ADMIN — ALBUTEROL SULFATE 2.5 MG: 2.5 SOLUTION RESPIRATORY (INHALATION) at 21:03

## 2022-01-01 RX ADMIN — LANSOPRAZOLE 30 MG: KIT at 20:39

## 2022-01-01 RX ADMIN — PANTOPRAZOLE SODIUM 40 MG: 40 INJECTION, POWDER, LYOPHILIZED, FOR SOLUTION INTRAVENOUS at 20:07

## 2022-01-01 RX ADMIN — Medication 15 ML: at 07:58

## 2022-01-01 RX ADMIN — SODIUM CHLORIDE, PRESERVATIVE FREE 10 ML: 5 INJECTION INTRAVENOUS at 08:00

## 2022-01-01 RX ADMIN — SODIUM CHLORIDE, PRESERVATIVE FREE 10 ML: 5 INJECTION INTRAVENOUS at 21:25

## 2022-01-01 RX ADMIN — ALBUTEROL SULFATE 2.5 MG: 2.5 SOLUTION RESPIRATORY (INHALATION) at 19:57

## 2022-01-01 RX ADMIN — Medication: at 09:34

## 2022-01-01 RX ADMIN — HYDROMORPHONE HYDROCHLORIDE 0.25 MG: 1 INJECTION, SOLUTION INTRAMUSCULAR; INTRAVENOUS; SUBCUTANEOUS at 23:40

## 2022-01-01 RX ADMIN — LANSOPRAZOLE 30 MG: KIT at 21:00

## 2022-01-01 RX ADMIN — Medication 5 MG: at 21:24

## 2022-01-01 RX ADMIN — Medication 15 ML: at 09:32

## 2022-01-01 RX ADMIN — Medication: at 10:11

## 2022-01-01 RX ADMIN — INSULIN HUMAN 18 UNITS: 100 INJECTION, SOLUTION PARENTERAL at 14:30

## 2022-01-01 RX ADMIN — SODIUM CHLORIDE, PRESERVATIVE FREE 10 ML: 5 INJECTION INTRAVENOUS at 08:50

## 2022-01-01 RX ADMIN — Medication: at 09:00

## 2022-01-01 RX ADMIN — FAMOTIDINE 20 MG: 20 TABLET ORAL at 08:03

## 2022-01-01 RX ADMIN — APIXABAN 5 MG: 5 TABLET, FILM COATED ORAL at 10:00

## 2022-01-01 RX ADMIN — SODIUM CHLORIDE, PRESERVATIVE FREE 10 ML: 5 INJECTION INTRAVENOUS at 21:15

## 2022-01-01 RX ADMIN — ALBUTEROL SULFATE 2.5 MG: 2.5 SOLUTION RESPIRATORY (INHALATION) at 20:20

## 2022-01-01 RX ADMIN — INSULIN LISPRO 8 UNITS: 100 INJECTION, SOLUTION INTRAVENOUS; SUBCUTANEOUS at 17:49

## 2022-01-01 RX ADMIN — ALBUTEROL SULFATE 2.5 MG: 2.5 SOLUTION RESPIRATORY (INHALATION) at 17:16

## 2022-01-01 RX ADMIN — Medication 15 ML: at 08:40

## 2022-01-01 RX ADMIN — INSULIN HUMAN 10 UNITS: 100 INJECTION, SOLUTION PARENTERAL at 02:41

## 2022-01-01 RX ADMIN — OXYCODONE HYDROCHLORIDE 10 MG: 5 SOLUTION ORAL at 20:08

## 2022-01-01 RX ADMIN — ALBUTEROL SULFATE 2.5 MG: 2.5 SOLUTION RESPIRATORY (INHALATION) at 03:52

## 2022-01-01 RX ADMIN — ACETAMINOPHEN 650 MG: 650 SOLUTION ORAL at 05:23

## 2022-01-01 RX ADMIN — SODIUM CHLORIDE: 9 INJECTION, SOLUTION INTRAVENOUS at 09:25

## 2022-01-01 RX ADMIN — AMIODARONE HYDROCHLORIDE 400 MG: 200 TABLET ORAL at 20:53

## 2022-01-01 RX ADMIN — IOPAMIDOL 75 ML: 755 INJECTION, SOLUTION INTRAVENOUS at 14:20

## 2022-01-01 RX ADMIN — ALBUTEROL SULFATE 2.5 MG: 2.5 SOLUTION RESPIRATORY (INHALATION) at 15:45

## 2022-01-01 RX ADMIN — HYDROMORPHONE HYDROCHLORIDE 0.5 MG: 1 INJECTION, SOLUTION INTRAMUSCULAR; INTRAVENOUS; SUBCUTANEOUS at 12:24

## 2022-01-01 RX ADMIN — ACETAMINOPHEN 1000 MG: 500 TABLET ORAL at 08:33

## 2022-01-01 RX ADMIN — SIMETHICONE 80 MG: 80 TABLET, CHEWABLE ORAL at 13:32

## 2022-01-01 RX ADMIN — Medication: at 21:13

## 2022-01-01 RX ADMIN — OXYCODONE HYDROCHLORIDE 7.5 MG: 5 SOLUTION ORAL at 17:45

## 2022-01-01 RX ADMIN — Medication: at 09:02

## 2022-01-01 RX ADMIN — ALBUTEROL SULFATE 2.5 MG: 2.5 SOLUTION RESPIRATORY (INHALATION) at 11:54

## 2022-01-01 RX ADMIN — ALTEPLASE 1 MG: 2.2 INJECTION, POWDER, LYOPHILIZED, FOR SOLUTION INTRAVENOUS at 12:23

## 2022-01-01 RX ADMIN — CLOTRIMAZOLE: 10 CREAM TOPICAL at 08:13

## 2022-01-01 RX ADMIN — APIXABAN 5 MG: 5 TABLET, FILM COATED ORAL at 21:16

## 2022-01-01 RX ADMIN — Medication: at 09:55

## 2022-01-01 RX ADMIN — QUETIAPINE FUMARATE 25 MG: 25 TABLET ORAL at 20:07

## 2022-01-01 RX ADMIN — INSULIN LISPRO 8 UNITS: 100 INJECTION, SOLUTION INTRAVENOUS; SUBCUTANEOUS at 18:49

## 2022-01-01 RX ADMIN — INSULIN HUMAN 18 UNITS: 100 INJECTION, SOLUTION PARENTERAL at 17:32

## 2022-01-01 RX ADMIN — ETOMIDATE 22.8 MG: 2 INJECTION INTRAVENOUS at 12:43

## 2022-01-01 RX ADMIN — POTASSIUM PHOSPHATE, MONOBASIC AND POTASSIUM PHOSPHATE, DIBASIC 30 MMOL: 224; 236 INJECTION, SOLUTION, CONCENTRATE INTRAVENOUS at 09:04

## 2022-01-01 RX ADMIN — METHOCARBAMOL 500 MG: 500 TABLET ORAL at 20:42

## 2022-01-01 RX ADMIN — ALBUTEROL SULFATE 2.5 MG: 2.5 SOLUTION RESPIRATORY (INHALATION) at 17:08

## 2022-01-01 RX ADMIN — ALBUTEROL SULFATE 2.5 MG: 2.5 SOLUTION RESPIRATORY (INHALATION) at 22:30

## 2022-01-01 RX ADMIN — METHOCARBAMOL 500 MG: 500 TABLET ORAL at 09:34

## 2022-01-01 RX ADMIN — SIMETHICONE 80 MG: 80 TABLET, CHEWABLE ORAL at 22:38

## 2022-01-01 RX ADMIN — METHOCARBAMOL 1000 MG: 500 TABLET ORAL at 21:37

## 2022-01-01 RX ADMIN — Medication 15 ML: at 08:35

## 2022-01-01 RX ADMIN — INSULIN LISPRO 5 UNITS: 100 INJECTION, SOLUTION INTRAVENOUS; SUBCUTANEOUS at 20:37

## 2022-01-01 RX ADMIN — INSULIN LISPRO 8 UNITS: 100 INJECTION, SOLUTION INTRAVENOUS; SUBCUTANEOUS at 01:00

## 2022-01-01 RX ADMIN — AMIODARONE HYDROCHLORIDE 200 MG: 200 TABLET ORAL at 08:25

## 2022-01-01 RX ADMIN — METHYLPREDNISOLONE SODIUM SUCCINATE 80 MG: 125 INJECTION, POWDER, FOR SOLUTION INTRAMUSCULAR; INTRAVENOUS at 00:27

## 2022-01-01 RX ADMIN — POTASSIUM BICARBONATE 40 MEQ: 782 TABLET, EFFERVESCENT ORAL at 16:31

## 2022-01-01 RX ADMIN — Medication: at 08:57

## 2022-01-01 RX ADMIN — SODIUM CHLORIDE, PRESERVATIVE FREE 10 ML: 5 INJECTION INTRAVENOUS at 09:26

## 2022-01-01 RX ADMIN — ALBUTEROL SULFATE 2.5 MG: 2.5 SOLUTION RESPIRATORY (INHALATION) at 08:41

## 2022-01-01 RX ADMIN — METHOCARBAMOL 500 MG: 500 TABLET ORAL at 12:52

## 2022-01-01 RX ADMIN — ALBUTEROL SULFATE 2.5 MG: 2.5 SOLUTION RESPIRATORY (INHALATION) at 23:56

## 2022-01-01 RX ADMIN — METHOCARBAMOL 500 MG: 500 TABLET ORAL at 10:14

## 2022-01-01 RX ADMIN — PROPOFOL 10 MCG/KG/MIN: 10 INJECTION, EMULSION INTRAVENOUS at 03:30

## 2022-01-01 RX ADMIN — Medication: at 10:14

## 2022-01-01 RX ADMIN — METHOCARBAMOL 500 MG: 500 TABLET ORAL at 20:34

## 2022-01-01 RX ADMIN — Medication 15 ML: at 08:33

## 2022-01-01 RX ADMIN — CLOTRIMAZOLE: 10 CREAM TOPICAL at 21:20

## 2022-01-01 RX ADMIN — Medication: at 20:48

## 2022-01-01 RX ADMIN — POTASSIUM CHLORIDE 20 MEQ: 29.8 INJECTION, SOLUTION INTRAVENOUS at 12:21

## 2022-01-01 RX ADMIN — PROPOFOL 10 MCG/KG/MIN: 10 INJECTION, EMULSION INTRAVENOUS at 10:50

## 2022-01-01 RX ADMIN — DOXAZOSIN 1 MG: 2 TABLET ORAL at 12:25

## 2022-01-01 RX ADMIN — HYDROMORPHONE HYDROCHLORIDE 0.5 MG: 1 INJECTION, SOLUTION INTRAMUSCULAR; INTRAVENOUS; SUBCUTANEOUS at 21:29

## 2022-01-01 RX ADMIN — CLOTRIMAZOLE: 10 CREAM TOPICAL at 10:22

## 2022-01-01 RX ADMIN — ALBUTEROL SULFATE 2.5 MG: 2.5 SOLUTION RESPIRATORY (INHALATION) at 18:16

## 2022-01-01 RX ADMIN — VANCOMYCIN HYDROCHLORIDE 1250 MG: 1 INJECTION, POWDER, LYOPHILIZED, FOR SOLUTION INTRAVENOUS at 07:37

## 2022-01-01 RX ADMIN — POTASSIUM BICARBONATE 20 MEQ: 782 TABLET, EFFERVESCENT ORAL at 10:00

## 2022-01-01 RX ADMIN — OXYCODONE HYDROCHLORIDE 7.5 MG: 5 SOLUTION ORAL at 00:25

## 2022-01-01 RX ADMIN — ALBUTEROL SULFATE 2.5 MG: 2.5 SOLUTION RESPIRATORY (INHALATION) at 11:43

## 2022-01-01 RX ADMIN — ACETAMINOPHEN 650 MG: 160 SOLUTION ORAL at 07:27

## 2022-01-01 RX ADMIN — Medication 15 ML: at 22:16

## 2022-01-01 RX ADMIN — PANTOPRAZOLE SODIUM 40 MG: 40 INJECTION, POWDER, LYOPHILIZED, FOR SOLUTION INTRAVENOUS at 21:49

## 2022-01-01 RX ADMIN — SODIUM CHLORIDE 30 MG/ML INHALATION SOLUTION 4 ML: 30 SOLUTION INHALANT at 20:51

## 2022-01-01 RX ADMIN — NOREPINEPHRINE BITARTRATE 5 MCG/MIN: 1 SOLUTION INTRAVENOUS at 15:43

## 2022-01-01 RX ADMIN — Medication 15 ML: at 08:14

## 2022-01-01 RX ADMIN — LANSOPRAZOLE 30 MG: KIT at 08:45

## 2022-01-01 RX ADMIN — INSULIN LISPRO 4 UNITS: 100 INJECTION, SOLUTION INTRAVENOUS; SUBCUTANEOUS at 05:59

## 2022-01-01 RX ADMIN — OXYCODONE HYDROCHLORIDE 7.5 MG: 5 SOLUTION ORAL at 22:07

## 2022-01-01 RX ADMIN — POTASSIUM CHLORIDE 10 MEQ: 7.46 INJECTION, SOLUTION INTRAVENOUS at 10:36

## 2022-01-01 RX ADMIN — METHOCARBAMOL 500 MG: 500 TABLET ORAL at 21:18

## 2022-01-01 RX ADMIN — POLYETHYLENE GLYCOL 3350 17 G: 17 POWDER, FOR SOLUTION ORAL at 07:52

## 2022-01-01 RX ADMIN — MORPHINE SULFATE 4 MG: 4 INJECTION INTRAVENOUS at 16:26

## 2022-01-01 RX ADMIN — Medication 15 ML: at 20:49

## 2022-01-01 RX ADMIN — ACETAMINOPHEN 650 MG: 650 SOLUTION ORAL at 09:35

## 2022-01-01 RX ADMIN — Medication: at 20:47

## 2022-01-01 RX ADMIN — OXYCODONE HYDROCHLORIDE 7.5 MG: 5 SOLUTION ORAL at 01:39

## 2022-01-01 RX ADMIN — LANSOPRAZOLE 30 MG: KIT at 22:01

## 2022-01-01 RX ADMIN — CALCIUM GLUCONATE 1000 MG: 98 INJECTION, SOLUTION INTRAVENOUS at 04:45

## 2022-01-01 RX ADMIN — LANSOPRAZOLE 30 MG: KIT at 09:11

## 2022-01-01 RX ADMIN — Medication 15 ML: at 20:23

## 2022-01-01 RX ADMIN — POTASSIUM CHLORIDE 10 MEQ: 10 INJECTION, SOLUTION INTRAVENOUS at 09:53

## 2022-01-01 RX ADMIN — OXYCODONE HYDROCHLORIDE 7.5 MG: 5 SOLUTION ORAL at 03:14

## 2022-01-01 RX ADMIN — SODIUM CHLORIDE, PRESERVATIVE FREE 10 ML: 5 INJECTION INTRAVENOUS at 19:55

## 2022-01-01 RX ADMIN — Medication 40 MG: at 15:33

## 2022-01-01 RX ADMIN — ACETAMINOPHEN 650 MG: 160 SOLUTION ORAL at 22:43

## 2022-01-01 RX ADMIN — ALBUMIN (HUMAN) 12.5 G: 0.25 INJECTION, SOLUTION INTRAVENOUS at 08:27

## 2022-01-01 RX ADMIN — METHYLPREDNISOLONE SODIUM SUCCINATE 125 MG: 125 INJECTION, POWDER, FOR SOLUTION INTRAMUSCULAR; INTRAVENOUS at 09:39

## 2022-01-01 RX ADMIN — INSULIN HUMAN 9 UNITS: 100 INJECTION, SOLUTION PARENTERAL at 17:14

## 2022-01-01 RX ADMIN — APIXABAN 5 MG: 5 TABLET, FILM COATED ORAL at 08:33

## 2022-01-01 RX ADMIN — SODIUM CHLORIDE, PRESERVATIVE FREE 10 ML: 5 INJECTION INTRAVENOUS at 21:12

## 2022-01-01 RX ADMIN — APIXABAN 5 MG: 5 TABLET, FILM COATED ORAL at 20:23

## 2022-01-01 RX ADMIN — Medication 1 PACKET: at 09:48

## 2022-01-01 RX ADMIN — OXYCODONE HYDROCHLORIDE 7.5 MG: 5 SOLUTION ORAL at 20:43

## 2022-01-01 RX ADMIN — FAMOTIDINE 20 MG: 20 TABLET ORAL at 07:58

## 2022-01-01 RX ADMIN — ALBUTEROL SULFATE 2.5 MG: 2.5 SOLUTION RESPIRATORY (INHALATION) at 16:04

## 2022-01-01 RX ADMIN — ALBUTEROL SULFATE 2.5 MG: 2.5 SOLUTION RESPIRATORY (INHALATION) at 04:26

## 2022-01-01 RX ADMIN — INSULIN LISPRO 4 UNITS: 100 INJECTION, SOLUTION INTRAVENOUS; SUBCUTANEOUS at 21:41

## 2022-01-01 RX ADMIN — INSULIN HUMAN 5 UNITS: 100 INJECTION, SOLUTION PARENTERAL at 18:46

## 2022-01-01 RX ADMIN — Medication 400 MG: at 09:46

## 2022-01-01 RX ADMIN — SODIUM CHLORIDE, PRESERVATIVE FREE 10 ML: 5 INJECTION INTRAVENOUS at 08:42

## 2022-01-01 RX ADMIN — SODIUM CHLORIDE, PRESERVATIVE FREE 10 ML: 5 INJECTION INTRAVENOUS at 10:03

## 2022-01-01 RX ADMIN — SODIUM CHLORIDE, SODIUM LACTATE, POTASSIUM CHLORIDE, CALCIUM CHLORIDE AND DEXTROSE MONOHYDRATE: 5; 600; 310; 30; 20 INJECTION, SOLUTION INTRAVENOUS at 13:59

## 2022-01-01 RX ADMIN — Medication: at 21:30

## 2022-01-01 RX ADMIN — PROPOFOL 25 MCG/KG/MIN: 10 INJECTION, EMULSION INTRAVENOUS at 02:20

## 2022-01-01 RX ADMIN — SODIUM CHLORIDE, PRESERVATIVE FREE 10 ML: 5 INJECTION INTRAVENOUS at 08:31

## 2022-01-01 RX ADMIN — CLOTRIMAZOLE: 10 CREAM TOPICAL at 08:48

## 2022-01-01 RX ADMIN — METHOCARBAMOL 500 MG: 500 TABLET ORAL at 08:58

## 2022-01-01 RX ADMIN — ALBUTEROL SULFATE 2.5 MG: 2.5 SOLUTION RESPIRATORY (INHALATION) at 11:25

## 2022-01-01 RX ADMIN — ALBUMIN (HUMAN) 12.5 G: 0.25 INJECTION, SOLUTION INTRAVENOUS at 16:42

## 2022-01-01 RX ADMIN — INSULIN LISPRO 4 UNITS: 100 INJECTION, SOLUTION INTRAVENOUS; SUBCUTANEOUS at 17:48

## 2022-01-01 RX ADMIN — LANSOPRAZOLE 30 MG: KIT at 09:50

## 2022-01-01 RX ADMIN — OXYCODONE HYDROCHLORIDE 7.5 MG: 5 SOLUTION ORAL at 15:46

## 2022-01-01 RX ADMIN — PANTOPRAZOLE SODIUM 40 MG: 40 INJECTION, POWDER, LYOPHILIZED, FOR SOLUTION INTRAVENOUS at 20:46

## 2022-01-01 RX ADMIN — INSULIN HUMAN 2 UNITS: 100 INJECTION, SOLUTION PARENTERAL at 05:29

## 2022-01-01 RX ADMIN — QUETIAPINE FUMARATE 25 MG: 25 TABLET ORAL at 09:24

## 2022-01-01 RX ADMIN — METHOCARBAMOL 500 MG: 500 TABLET ORAL at 13:15

## 2022-01-01 RX ADMIN — CLOTRIMAZOLE: 10 CREAM TOPICAL at 22:18

## 2022-01-01 RX ADMIN — INSULIN LISPRO 12 UNITS: 100 INJECTION, SOLUTION INTRAVENOUS; SUBCUTANEOUS at 22:38

## 2022-01-01 RX ADMIN — ACETAMINOPHEN 650 MG: 650 SOLUTION ORAL at 09:58

## 2022-01-01 RX ADMIN — POTASSIUM BICARBONATE 20 MEQ: 782 TABLET, EFFERVESCENT ORAL at 09:35

## 2022-01-01 RX ADMIN — Medication 1 PACKET: at 21:57

## 2022-01-01 RX ADMIN — PIPERACILLIN AND TAZOBACTAM 3375 MG: 3; .375 INJECTION, POWDER, FOR SOLUTION INTRAVENOUS at 23:34

## 2022-01-01 RX ADMIN — Medication 40 MG: at 03:53

## 2022-01-01 RX ADMIN — CEFEPIME HYDROCHLORIDE 2000 MG: 2 INJECTION, POWDER, FOR SOLUTION INTRAMUSCULAR; INTRAVENOUS at 02:35

## 2022-01-01 RX ADMIN — ALBUTEROL SULFATE 2.5 MG: 2.5 SOLUTION RESPIRATORY (INHALATION) at 16:37

## 2022-01-01 RX ADMIN — ALBUTEROL SULFATE 2.5 MG: 2.5 SOLUTION RESPIRATORY (INHALATION) at 20:19

## 2022-01-01 RX ADMIN — DOCUSATE SODIUM 100 MG: 50 LIQUID ORAL at 12:49

## 2022-01-01 RX ADMIN — SODIUM CHLORIDE, PRESERVATIVE FREE 10 ML: 5 INJECTION INTRAVENOUS at 21:19

## 2022-01-01 RX ADMIN — APIXABAN 5 MG: 5 TABLET, FILM COATED ORAL at 20:44

## 2022-01-01 RX ADMIN — DOXAZOSIN 1 MG: 2 TABLET ORAL at 08:58

## 2022-01-01 RX ADMIN — LANSOPRAZOLE 30 MG: KIT at 09:25

## 2022-01-01 RX ADMIN — CLOTRIMAZOLE: 10 CREAM TOPICAL at 09:45

## 2022-01-01 RX ADMIN — APIXABAN 5 MG: 5 TABLET, FILM COATED ORAL at 07:57

## 2022-01-01 RX ADMIN — FENTANYL CITRATE 100 MCG: 50 INJECTION, SOLUTION INTRAMUSCULAR; INTRAVENOUS at 15:56

## 2022-01-01 RX ADMIN — METHYLPREDNISOLONE SODIUM SUCCINATE 80 MG: 125 INJECTION, POWDER, FOR SOLUTION INTRAMUSCULAR; INTRAVENOUS at 16:44

## 2022-01-01 RX ADMIN — APIXABAN 5 MG: 5 TABLET, FILM COATED ORAL at 21:11

## 2022-01-01 RX ADMIN — SODIUM CHLORIDE, SODIUM LACTATE, POTASSIUM CHLORIDE, CALCIUM CHLORIDE AND DEXTROSE MONOHYDRATE: 5; 600; 310; 30; 20 INJECTION, SOLUTION INTRAVENOUS at 15:42

## 2022-01-01 RX ADMIN — SODIUM CHLORIDE, PRESERVATIVE FREE 10 ML: 5 INJECTION INTRAVENOUS at 21:10

## 2022-01-01 RX ADMIN — BUMETANIDE 1 MG: 1 TABLET ORAL at 12:35

## 2022-01-01 RX ADMIN — INSULIN LISPRO 8 UNITS: 100 INJECTION, SOLUTION INTRAVENOUS; SUBCUTANEOUS at 17:01

## 2022-01-01 RX ADMIN — FAMOTIDINE 20 MG: 10 INJECTION INTRAVENOUS at 13:54

## 2022-01-01 RX ADMIN — OXYCODONE HYDROCHLORIDE 7.5 MG: 5 SOLUTION ORAL at 15:26

## 2022-01-01 RX ADMIN — Medication 1 PACKET: at 16:12

## 2022-01-01 RX ADMIN — Medication: at 21:43

## 2022-01-01 RX ADMIN — DEXTROSE MONOHYDRATE: 100 INJECTION, SOLUTION INTRAVENOUS at 05:05

## 2022-01-01 RX ADMIN — LANSOPRAZOLE 30 MG: KIT at 08:58

## 2022-01-01 RX ADMIN — INSULIN HUMAN 9 UNITS: 100 INJECTION, SOLUTION PARENTERAL at 06:04

## 2022-01-01 RX ADMIN — HYDROMORPHONE HYDROCHLORIDE 0.25 MG: 1 INJECTION, SOLUTION INTRAMUSCULAR; INTRAVENOUS; SUBCUTANEOUS at 16:29

## 2022-01-01 RX ADMIN — Medication 400 MG: at 08:33

## 2022-01-01 RX ADMIN — FUROSEMIDE 15 MG/HR: 10 INJECTION INTRAMUSCULAR; INTRAVENOUS at 22:56

## 2022-01-01 RX ADMIN — SODIUM CHLORIDE 30 MG/ML INHALATION SOLUTION 4 ML: 30 SOLUTION INHALANT at 08:13

## 2022-01-01 RX ADMIN — ALBUMIN (HUMAN) 12.5 G: 0.25 INJECTION, SOLUTION INTRAVENOUS at 08:43

## 2022-01-01 RX ADMIN — ALBUMIN (HUMAN) 12.5 G: 0.25 INJECTION, SOLUTION INTRAVENOUS at 23:40

## 2022-01-01 RX ADMIN — APIXABAN 5 MG: 5 TABLET, FILM COATED ORAL at 09:31

## 2022-01-01 RX ADMIN — Medication 15 ML: at 08:53

## 2022-01-01 RX ADMIN — Medication 15 ML: at 20:06

## 2022-01-01 RX ADMIN — LANSOPRAZOLE 30 MG: KIT at 23:55

## 2022-01-01 RX ADMIN — SODIUM CHLORIDE, PRESERVATIVE FREE 10 ML: 5 INJECTION INTRAVENOUS at 20:45

## 2022-01-01 RX ADMIN — Medication 15 ML: at 09:23

## 2022-01-01 RX ADMIN — BISACODYL 10 MG: 10 SUPPOSITORY RECTAL at 08:58

## 2022-01-01 RX ADMIN — LANSOPRAZOLE 30 MG: KIT at 21:29

## 2022-01-01 RX ADMIN — INSULIN LISPRO 8 UNITS: 100 INJECTION, SOLUTION INTRAVENOUS; SUBCUTANEOUS at 20:39

## 2022-01-01 RX ADMIN — SENNOSIDES AND DOCUSATE SODIUM 1 TABLET: 50; 8.6 TABLET ORAL at 09:31

## 2022-01-01 RX ADMIN — OXYCODONE HYDROCHLORIDE 7.5 MG: 5 SOLUTION ORAL at 09:06

## 2022-01-01 RX ADMIN — HYDROMORPHONE HYDROCHLORIDE 0.25 MG: 1 INJECTION, SOLUTION INTRAMUSCULAR; INTRAVENOUS; SUBCUTANEOUS at 07:28

## 2022-01-01 RX ADMIN — SODIUM CHLORIDE 3.2 UNITS/HR: 9 INJECTION, SOLUTION INTRAVENOUS at 20:41

## 2022-01-01 RX ADMIN — DOXAZOSIN 1 MG: 2 TABLET ORAL at 09:40

## 2022-01-01 RX ADMIN — APIXABAN 5 MG: 5 TABLET, FILM COATED ORAL at 21:05

## 2022-01-01 RX ADMIN — ACETAMINOPHEN 650 MG: 160 SOLUTION ORAL at 16:54

## 2022-01-01 RX ADMIN — Medication 15 ML: at 21:08

## 2022-01-01 RX ADMIN — INSULIN HUMAN 9 UNITS: 100 INJECTION, SOLUTION PARENTERAL at 01:56

## 2022-01-01 RX ADMIN — DOXAZOSIN 1 MG: 2 TABLET ORAL at 10:23

## 2022-01-01 RX ADMIN — ALBUTEROL SULFATE 2.5 MG: 2.5 SOLUTION RESPIRATORY (INHALATION) at 08:57

## 2022-01-01 RX ADMIN — Medication 15 ML: at 08:46

## 2022-01-01 RX ADMIN — APIXABAN 5 MG: 5 TABLET, FILM COATED ORAL at 08:47

## 2022-01-01 RX ADMIN — APIXABAN 5 MG: 5 TABLET, FILM COATED ORAL at 21:07

## 2022-01-01 RX ADMIN — POTASSIUM CHLORIDE 20 MEQ: 29.8 INJECTION, SOLUTION INTRAVENOUS at 13:52

## 2022-01-01 RX ADMIN — OXYCODONE HYDROCHLORIDE 7.5 MG: 5 SOLUTION ORAL at 23:56

## 2022-01-01 RX ADMIN — CEFEPIME HYDROCHLORIDE 2000 MG: 2 INJECTION, POWDER, FOR SOLUTION INTRAMUSCULAR; INTRAVENOUS at 10:46

## 2022-01-01 RX ADMIN — Medication: at 23:11

## 2022-01-01 RX ADMIN — COLLAGENASE SANTYL: 250 OINTMENT TOPICAL at 09:44

## 2022-01-01 RX ADMIN — INSULIN LISPRO 5 UNITS: 100 INJECTION, SOLUTION INTRAVENOUS; SUBCUTANEOUS at 17:30

## 2022-01-01 RX ADMIN — PIPERACILLIN AND TAZOBACTAM 3375 MG: 3; .375 INJECTION, POWDER, FOR SOLUTION INTRAVENOUS at 23:43

## 2022-01-01 RX ADMIN — Medication: at 10:23

## 2022-01-01 RX ADMIN — Medication 1 PACKET: at 21:17

## 2022-01-01 RX ADMIN — ACETAMINOPHEN 650 MG: 160 SOLUTION ORAL at 17:40

## 2022-01-01 RX ADMIN — FUROSEMIDE 20 MG: 10 INJECTION, SOLUTION INTRAMUSCULAR; INTRAVENOUS at 10:19

## 2022-01-01 RX ADMIN — SODIUM CHLORIDE 25 ML: 9 INJECTION, SOLUTION INTRAVENOUS at 00:13

## 2022-01-01 RX ADMIN — INSULIN HUMAN 5 UNITS: 100 INJECTION, SOLUTION PARENTERAL at 05:26

## 2022-01-01 RX ADMIN — MORPHINE SULFATE 4 MG: 4 INJECTION INTRAVENOUS at 19:29

## 2022-01-01 RX ADMIN — METHOCARBAMOL 500 MG: 500 TABLET ORAL at 09:50

## 2022-01-01 RX ADMIN — DESMOPRESSIN ACETATE 34.36 MCG: 4 INJECTION, SOLUTION INTRAVENOUS; SUBCUTANEOUS at 09:41

## 2022-01-01 RX ADMIN — INSULIN LISPRO 5 UNITS: 100 INJECTION, SOLUTION INTRAVENOUS; SUBCUTANEOUS at 04:13

## 2022-01-01 RX ADMIN — POTASSIUM CHLORIDE 10 MEQ: 10 INJECTION, SOLUTION INTRAVENOUS at 15:40

## 2022-01-01 RX ADMIN — METHOCARBAMOL 500 MG: 500 TABLET ORAL at 16:28

## 2022-01-01 RX ADMIN — INSULIN LISPRO 8 UNITS: 100 INJECTION, SOLUTION INTRAVENOUS; SUBCUTANEOUS at 21:00

## 2022-01-01 RX ADMIN — ACETAMINOPHEN 650 MG: 650 SOLUTION ORAL at 20:34

## 2022-01-01 RX ADMIN — ALBUMIN (HUMAN) 12.5 G: 0.25 INJECTION, SOLUTION INTRAVENOUS at 16:49

## 2022-01-01 RX ADMIN — NOREPINEPHRINE BITARTRATE 2 MCG/MIN: 1 SOLUTION INTRAVENOUS at 19:32

## 2022-01-01 RX ADMIN — ALBUTEROL SULFATE 2.5 MG: 2.5 SOLUTION RESPIRATORY (INHALATION) at 03:41

## 2022-01-01 RX ADMIN — FAMOTIDINE: 10 INJECTION, SOLUTION INTRAVENOUS at 16:18

## 2022-01-01 RX ADMIN — NOREPINEPHRINE BITARTRATE 2 MCG/MIN: 1 SOLUTION INTRAVENOUS at 19:41

## 2022-01-01 RX ADMIN — Medication 15 ML: at 21:18

## 2022-01-01 RX ADMIN — ALBUMIN (HUMAN) 12.5 G: 0.25 INJECTION, SOLUTION INTRAVENOUS at 16:54

## 2022-01-01 RX ADMIN — APIXABAN 5 MG: 5 TABLET, FILM COATED ORAL at 22:29

## 2022-01-01 RX ADMIN — PIPERACILLIN AND TAZOBACTAM 3375 MG: 3; .375 INJECTION, POWDER, FOR SOLUTION INTRAVENOUS at 06:42

## 2022-01-01 RX ADMIN — PANTOPRAZOLE SODIUM 40 MG: 40 INJECTION, POWDER, LYOPHILIZED, FOR SOLUTION INTRAVENOUS at 20:16

## 2022-01-01 RX ADMIN — HYDROMORPHONE HYDROCHLORIDE 0.5 MG: 1 INJECTION, SOLUTION INTRAMUSCULAR; INTRAVENOUS; SUBCUTANEOUS at 05:57

## 2022-01-01 RX ADMIN — PROPOFOL 10 MCG/KG/MIN: 10 INJECTION, EMULSION INTRAVENOUS at 20:21

## 2022-01-01 RX ADMIN — INSULIN LISPRO 8 UNITS: 100 INJECTION, SOLUTION INTRAVENOUS; SUBCUTANEOUS at 21:11

## 2022-01-01 RX ADMIN — HALOPERIDOL LACTATE 2 MG: 5 INJECTION, SOLUTION INTRAMUSCULAR at 22:29

## 2022-01-01 RX ADMIN — Medication 15 ML: at 20:42

## 2022-01-01 RX ADMIN — CLOTRIMAZOLE: 10 CREAM TOPICAL at 09:49

## 2022-01-01 RX ADMIN — SODIUM CHLORIDE, PRESERVATIVE FREE 10 ML: 5 INJECTION INTRAVENOUS at 22:53

## 2022-01-01 RX ADMIN — FUROSEMIDE 40 MG: 10 INJECTION, SOLUTION INTRAMUSCULAR; INTRAVENOUS at 17:55

## 2022-01-01 RX ADMIN — HALOPERIDOL LACTATE 2 MG: 5 INJECTION, SOLUTION INTRAMUSCULAR at 06:33

## 2022-01-01 RX ADMIN — CEFEPIME HYDROCHLORIDE 2000 MG: 2 INJECTION, POWDER, FOR SOLUTION INTRAMUSCULAR; INTRAVENOUS at 18:31

## 2022-01-01 RX ADMIN — METHOCARBAMOL 500 MG: 500 TABLET ORAL at 16:11

## 2022-01-01 RX ADMIN — POLYETHYLENE GLYCOL 3350 17 G: 17 POWDER, FOR SOLUTION ORAL at 08:25

## 2022-01-01 RX ADMIN — SODIUM CHLORIDE, PRESERVATIVE FREE 10 ML: 5 INJECTION INTRAVENOUS at 08:34

## 2022-01-01 RX ADMIN — INSULIN HUMAN 9 UNITS: 100 INJECTION, SOLUTION PARENTERAL at 20:01

## 2022-01-01 RX ADMIN — ALBUTEROL SULFATE 2.5 MG: 2.5 SOLUTION RESPIRATORY (INHALATION) at 07:25

## 2022-01-01 RX ADMIN — DEXTROSE MONOHYDRATE 125 ML: 100 INJECTION, SOLUTION INTRAVENOUS at 15:07

## 2022-01-01 RX ADMIN — Medication 1 PACKET: at 09:52

## 2022-01-01 RX ADMIN — HALOPERIDOL LACTATE 2 MG: 5 INJECTION, SOLUTION INTRAMUSCULAR at 12:44

## 2022-01-01 RX ADMIN — ACETAMINOPHEN 1000 MG: 500 TABLET ORAL at 10:00

## 2022-01-01 RX ADMIN — OXYCODONE HYDROCHLORIDE 7.5 MG: 5 SOLUTION ORAL at 07:35

## 2022-01-01 RX ADMIN — POTASSIUM CHLORIDE 10 MEQ: 7.46 INJECTION, SOLUTION INTRAVENOUS at 09:15

## 2022-01-01 RX ADMIN — INSULIN LISPRO 4 UNITS: 100 INJECTION, SOLUTION INTRAVENOUS; SUBCUTANEOUS at 21:33

## 2022-01-01 RX ADMIN — CEFEPIME HYDROCHLORIDE 2000 MG: 2 INJECTION, POWDER, FOR SOLUTION INTRAMUSCULAR; INTRAVENOUS at 21:05

## 2022-01-01 RX ADMIN — BUMETANIDE 1 MG: 1 TABLET ORAL at 21:35

## 2022-01-01 RX ADMIN — CLOTRIMAZOLE: 10 CREAM TOPICAL at 09:33

## 2022-01-01 RX ADMIN — OXYCODONE HYDROCHLORIDE 7.5 MG: 5 SOLUTION ORAL at 09:33

## 2022-01-01 RX ADMIN — METHOCARBAMOL 500 MG: 500 TABLET ORAL at 12:25

## 2022-01-01 RX ADMIN — OXYCODONE HYDROCHLORIDE 7.5 MG: 5 SOLUTION ORAL at 18:10

## 2022-01-01 RX ADMIN — SODIUM CHLORIDE, PRESERVATIVE FREE 10 ML: 5 INJECTION INTRAVENOUS at 21:00

## 2022-01-01 RX ADMIN — ALBUMIN (HUMAN) 12.5 G: 0.25 INJECTION, SOLUTION INTRAVENOUS at 09:03

## 2022-01-01 RX ADMIN — APIXABAN 5 MG: 5 TABLET, FILM COATED ORAL at 21:54

## 2022-01-01 RX ADMIN — LANSOPRAZOLE 30 MG: KIT at 12:25

## 2022-01-01 RX ADMIN — APIXABAN 5 MG: 5 TABLET, FILM COATED ORAL at 21:37

## 2022-01-01 RX ADMIN — INSULIN LISPRO 4 UNITS: 100 INJECTION, SOLUTION INTRAVENOUS; SUBCUTANEOUS at 21:10

## 2022-01-01 RX ADMIN — Medication: at 13:20

## 2022-01-01 RX ADMIN — SODIUM CHLORIDE, PRESERVATIVE FREE 10 ML: 5 INJECTION INTRAVENOUS at 20:38

## 2022-01-01 RX ADMIN — SODIUM CHLORIDE, PRESERVATIVE FREE 10 ML: 5 INJECTION INTRAVENOUS at 09:08

## 2022-01-01 RX ADMIN — ALBUTEROL SULFATE 2.5 MG: 2.5 SOLUTION RESPIRATORY (INHALATION) at 20:01

## 2022-01-01 RX ADMIN — METHOCARBAMOL 500 MG: 500 TABLET ORAL at 21:24

## 2022-01-01 RX ADMIN — Medication 15 ML: at 09:29

## 2022-01-01 RX ADMIN — SODIUM CHLORIDE: 9 INJECTION, SOLUTION INTRAVENOUS at 15:16

## 2022-01-01 RX ADMIN — AMIODARONE HYDROCHLORIDE 150 MG: 50 INJECTION, SOLUTION INTRAVENOUS at 18:32

## 2022-01-01 RX ADMIN — ACETAMINOPHEN 650 MG: 650 SOLUTION ORAL at 22:34

## 2022-01-01 RX ADMIN — APIXABAN 5 MG: 5 TABLET, FILM COATED ORAL at 21:00

## 2022-01-01 RX ADMIN — DEXTROSE MONOHYDRATE 125 ML: 10 INJECTION, SOLUTION INTRAVENOUS at 18:00

## 2022-01-01 RX ADMIN — METHOCARBAMOL 500 MG: 500 TABLET ORAL at 11:29

## 2022-01-01 RX ADMIN — MORPHINE SULFATE 4 MG: 4 INJECTION INTRAVENOUS at 14:22

## 2022-01-01 RX ADMIN — APIXABAN 5 MG: 5 TABLET, FILM COATED ORAL at 20:57

## 2022-01-01 RX ADMIN — INSULIN LISPRO 16 UNITS: 100 INJECTION, SOLUTION INTRAVENOUS; SUBCUTANEOUS at 17:50

## 2022-01-01 RX ADMIN — ALBUTEROL SULFATE 2.5 MG: 2.5 SOLUTION RESPIRATORY (INHALATION) at 17:10

## 2022-01-01 RX ADMIN — ALBUTEROL SULFATE 2.5 MG: 2.5 SOLUTION RESPIRATORY (INHALATION) at 08:01

## 2022-01-01 RX ADMIN — INSULIN LISPRO 8 UNITS: 100 INJECTION, SOLUTION INTRAVENOUS; SUBCUTANEOUS at 18:03

## 2022-01-01 RX ADMIN — OXYCODONE HYDROCHLORIDE 7.5 MG: 5 SOLUTION ORAL at 02:05

## 2022-01-01 RX ADMIN — ALBUTEROL SULFATE 2.5 MG: 2.5 SOLUTION RESPIRATORY (INHALATION) at 20:05

## 2022-01-01 RX ADMIN — Medication 15 ML: at 08:13

## 2022-01-01 RX ADMIN — SODIUM CHLORIDE, PRESERVATIVE FREE 5 ML: 5 INJECTION INTRAVENOUS at 10:00

## 2022-01-01 RX ADMIN — INSULIN HUMAN 12 UNITS: 100 INJECTION, SOLUTION PARENTERAL at 21:29

## 2022-01-01 RX ADMIN — SODIUM CHLORIDE, PRESERVATIVE FREE 10 ML: 5 INJECTION INTRAVENOUS at 08:25

## 2022-01-01 RX ADMIN — CLOTRIMAZOLE: 10 CREAM TOPICAL at 08:56

## 2022-01-01 RX ADMIN — METHOCARBAMOL 500 MG: 500 TABLET ORAL at 20:31

## 2022-01-01 RX ADMIN — APIXABAN 5 MG: 5 TABLET, FILM COATED ORAL at 08:58

## 2022-01-01 RX ADMIN — SODIUM CHLORIDE, PRESERVATIVE FREE 10 ML: 5 INJECTION INTRAVENOUS at 20:18

## 2022-01-01 RX ADMIN — ALBUTEROL SULFATE 2.5 MG: 2.5 SOLUTION RESPIRATORY (INHALATION) at 21:04

## 2022-01-01 RX ADMIN — INSULIN LISPRO 12 UNITS: 100 INJECTION, SOLUTION INTRAVENOUS; SUBCUTANEOUS at 05:42

## 2022-01-01 RX ADMIN — SODIUM CHLORIDE: 9 INJECTION, SOLUTION INTRAVENOUS at 02:16

## 2022-01-01 RX ADMIN — POLYETHYLENE GLYCOL 3350 17 G: 17 POWDER, FOR SOLUTION ORAL at 09:05

## 2022-01-01 RX ADMIN — SODIUM CHLORIDE, PRESERVATIVE FREE 10 ML: 5 INJECTION INTRAVENOUS at 10:18

## 2022-01-01 RX ADMIN — ALBUTEROL SULFATE 2.5 MG: 2.5 SOLUTION RESPIRATORY (INHALATION) at 19:59

## 2022-01-01 RX ADMIN — INSULIN LISPRO 4 UNITS: 100 INJECTION, SOLUTION INTRAVENOUS; SUBCUTANEOUS at 10:10

## 2022-01-01 RX ADMIN — POTASSIUM CHLORIDE 10 MEQ: 10 INJECTION, SOLUTION INTRAVENOUS at 12:31

## 2022-01-01 RX ADMIN — INSULIN LISPRO 12 UNITS: 100 INJECTION, SOLUTION INTRAVENOUS; SUBCUTANEOUS at 20:22

## 2022-01-01 RX ADMIN — AMIODARONE HYDROCHLORIDE 200 MG: 200 TABLET ORAL at 09:03

## 2022-01-01 RX ADMIN — INSULIN GLARGINE 15 UNITS: 100 INJECTION, SOLUTION SUBCUTANEOUS at 15:19

## 2022-01-01 RX ADMIN — INSULIN LISPRO 4 UNITS: 100 INJECTION, SOLUTION INTRAVENOUS; SUBCUTANEOUS at 21:28

## 2022-01-01 RX ADMIN — CEFEPIME HYDROCHLORIDE 2000 MG: 2 INJECTION, POWDER, FOR SOLUTION INTRAMUSCULAR; INTRAVENOUS at 10:26

## 2022-01-01 RX ADMIN — Medication: at 09:53

## 2022-01-01 RX ADMIN — CLOTRIMAZOLE: 10 CREAM TOPICAL at 21:13

## 2022-01-01 RX ADMIN — OXYCODONE HYDROCHLORIDE 5 MG: 5 SOLUTION ORAL at 21:07

## 2022-01-01 RX ADMIN — Medication 1 PACKET: at 08:50

## 2022-01-01 RX ADMIN — Medication 15 ML: at 22:47

## 2022-01-01 RX ADMIN — HYDROMORPHONE HYDROCHLORIDE 0.25 MG: 1 INJECTION, SOLUTION INTRAMUSCULAR; INTRAVENOUS; SUBCUTANEOUS at 21:18

## 2022-01-01 RX ADMIN — METHOCARBAMOL 500 MG: 500 TABLET ORAL at 09:59

## 2022-01-01 RX ADMIN — INSULIN LISPRO 12 UNITS: 100 INJECTION, SOLUTION INTRAVENOUS; SUBCUTANEOUS at 18:15

## 2022-01-01 RX ADMIN — INSULIN HUMAN 5 UNITS: 100 INJECTION, SOLUTION PARENTERAL at 00:19

## 2022-01-01 RX ADMIN — METHYLPREDNISOLONE SODIUM SUCCINATE 60 MG: 125 INJECTION, POWDER, FOR SOLUTION INTRAMUSCULAR; INTRAVENOUS at 04:35

## 2022-01-01 RX ADMIN — ALBUTEROL SULFATE 2.5 MG: 2.5 SOLUTION RESPIRATORY (INHALATION) at 15:03

## 2022-01-01 RX ADMIN — SODIUM CHLORIDE, PRESERVATIVE FREE 10 ML: 5 INJECTION INTRAVENOUS at 10:32

## 2022-01-01 RX ADMIN — POTASSIUM CHLORIDE 10 MEQ: 10 INJECTION, SOLUTION INTRAVENOUS at 10:58

## 2022-01-01 RX ADMIN — ALBUTEROL SULFATE 2.5 MG: 2.5 SOLUTION RESPIRATORY (INHALATION) at 08:34

## 2022-01-01 RX ADMIN — AMIODARONE HYDROCHLORIDE 400 MG: 200 TABLET ORAL at 07:52

## 2022-01-01 RX ADMIN — POLYETHYLENE GLYCOL 3350 17 G: 17 POWDER, FOR SOLUTION ORAL at 08:33

## 2022-01-01 RX ADMIN — Medication 15 ML: at 10:14

## 2022-01-01 RX ADMIN — CLOTRIMAZOLE: 10 CREAM TOPICAL at 20:58

## 2022-01-01 RX ADMIN — APIXABAN 5 MG: 5 TABLET, FILM COATED ORAL at 20:52

## 2022-01-01 RX ADMIN — ALBUTEROL SULFATE 2.5 MG: 2.5 SOLUTION RESPIRATORY (INHALATION) at 10:55

## 2022-01-01 RX ADMIN — HYDROMORPHONE HYDROCHLORIDE 0.25 MG: 1 INJECTION, SOLUTION INTRAMUSCULAR; INTRAVENOUS; SUBCUTANEOUS at 08:30

## 2022-01-01 RX ADMIN — INSULIN LISPRO 4 UNITS: 100 INJECTION, SOLUTION INTRAVENOUS; SUBCUTANEOUS at 21:30

## 2022-01-01 RX ADMIN — AMIODARONE HYDROCHLORIDE 1 MG/MIN: 50 INJECTION, SOLUTION INTRAVENOUS at 18:49

## 2022-01-01 RX ADMIN — SODIUM CHLORIDE, PRESERVATIVE FREE 10 ML: 5 INJECTION INTRAVENOUS at 21:08

## 2022-01-01 RX ADMIN — DEXAMETHASONE SODIUM PHOSPHATE: 4 INJECTION, SOLUTION INTRAMUSCULAR; INTRAVENOUS at 16:18

## 2022-01-01 RX ADMIN — ALBUTEROL SULFATE 2.5 MG: 2.5 SOLUTION RESPIRATORY (INHALATION) at 13:25

## 2022-01-01 RX ADMIN — PANTOPRAZOLE SODIUM 40 MG: 40 INJECTION, POWDER, LYOPHILIZED, FOR SOLUTION INTRAVENOUS at 09:43

## 2022-01-01 RX ADMIN — CHLOROTHIAZIDE SODIUM 250 MG: 500 INJECTION, POWDER, LYOPHILIZED, FOR SOLUTION INTRAVENOUS at 12:30

## 2022-01-01 RX ADMIN — METOPROLOL SUCCINATE 50 MG: 50 TABLET, FILM COATED, EXTENDED RELEASE ORAL at 22:31

## 2022-01-01 RX ADMIN — METHOCARBAMOL 500 MG: 500 TABLET ORAL at 13:52

## 2022-01-01 RX ADMIN — OXYCODONE HYDROCHLORIDE 5 MG: 5 SOLUTION ORAL at 21:45

## 2022-01-01 RX ADMIN — PANTOPRAZOLE SODIUM 40 MG: 40 INJECTION, POWDER, LYOPHILIZED, FOR SOLUTION INTRAVENOUS at 09:50

## 2022-01-01 RX ADMIN — ACETAMINOPHEN 650 MG: 160 SOLUTION ORAL at 08:39

## 2022-01-01 RX ADMIN — DEXTROSE MONOHYDRATE: 100 INJECTION, SOLUTION INTRAVENOUS at 09:57

## 2022-01-01 RX ADMIN — Medication 15 ML: at 21:15

## 2022-01-01 RX ADMIN — SODIUM CHLORIDE 500 ML: 9 INJECTION, SOLUTION INTRAVENOUS at 15:30

## 2022-01-01 RX ADMIN — METHOCARBAMOL 500 MG: 500 TABLET ORAL at 22:40

## 2022-01-01 RX ADMIN — INSULIN LISPRO 4 UNITS: 100 INJECTION, SOLUTION INTRAVENOUS; SUBCUTANEOUS at 13:30

## 2022-01-01 RX ADMIN — ACETAMINOPHEN 650 MG: 650 SOLUTION ORAL at 08:56

## 2022-01-01 RX ADMIN — INSULIN LISPRO 8 UNITS: 100 INJECTION, SOLUTION INTRAVENOUS; SUBCUTANEOUS at 13:46

## 2022-01-01 RX ADMIN — Medication 1 PACKET: at 15:15

## 2022-01-01 RX ADMIN — Medication: at 09:29

## 2022-01-01 RX ADMIN — INSULIN LISPRO 8 UNITS: 100 INJECTION, SOLUTION INTRAVENOUS; SUBCUTANEOUS at 22:37

## 2022-01-01 RX ADMIN — FUROSEMIDE 80 MG: 10 INJECTION, SOLUTION INTRAMUSCULAR; INTRAVENOUS at 15:57

## 2022-01-01 RX ADMIN — DOCUSATE SODIUM 100 MG: 50 LIQUID ORAL at 09:42

## 2022-01-01 RX ADMIN — METHOCARBAMOL 500 MG: 500 TABLET ORAL at 09:48

## 2022-01-01 RX ADMIN — OXYCODONE HYDROCHLORIDE 7.5 MG: 5 SOLUTION ORAL at 16:17

## 2022-01-01 RX ADMIN — CLOTRIMAZOLE: 10 CREAM TOPICAL at 09:54

## 2022-01-01 RX ADMIN — Medication: at 19:25

## 2022-01-01 RX ADMIN — HYDROMORPHONE HYDROCHLORIDE 0.25 MG: 1 INJECTION, SOLUTION INTRAMUSCULAR; INTRAVENOUS; SUBCUTANEOUS at 07:39

## 2022-01-01 RX ADMIN — APIXABAN 5 MG: 5 TABLET, FILM COATED ORAL at 21:15

## 2022-01-01 RX ADMIN — SODIUM CHLORIDE, PRESERVATIVE FREE 10 ML: 5 INJECTION INTRAVENOUS at 10:45

## 2022-01-01 RX ADMIN — INSULIN LISPRO 12 UNITS: 100 INJECTION, SOLUTION INTRAVENOUS; SUBCUTANEOUS at 02:00

## 2022-01-01 RX ADMIN — ALBUTEROL SULFATE 2.5 MG: 2.5 SOLUTION RESPIRATORY (INHALATION) at 19:52

## 2022-01-01 RX ADMIN — PANTOPRAZOLE SODIUM 40 MG: 40 INJECTION, POWDER, LYOPHILIZED, FOR SOLUTION INTRAVENOUS at 09:29

## 2022-01-01 RX ADMIN — ALBUMIN (HUMAN) 12.5 G: 0.25 INJECTION, SOLUTION INTRAVENOUS at 00:14

## 2022-01-01 RX ADMIN — OXYCODONE HYDROCHLORIDE 7.5 MG: 5 SOLUTION ORAL at 22:46

## 2022-01-01 RX ADMIN — SODIUM CHLORIDE, PRESERVATIVE FREE 10 ML: 5 INJECTION INTRAVENOUS at 08:46

## 2022-01-01 RX ADMIN — Medication 400 MG: at 11:55

## 2022-01-01 RX ADMIN — AMIODARONE HYDROCHLORIDE 400 MG: 200 TABLET ORAL at 08:03

## 2022-01-01 RX ADMIN — ALBUTEROL SULFATE 2.5 MG: 2.5 SOLUTION RESPIRATORY (INHALATION) at 09:27

## 2022-01-01 RX ADMIN — COLLAGENASE SANTYL: 250 OINTMENT TOPICAL at 10:15

## 2022-01-01 RX ADMIN — Medication 15 ML: at 08:28

## 2022-01-01 RX ADMIN — ALBUTEROL SULFATE 2.5 MG: 2.5 SOLUTION RESPIRATORY (INHALATION) at 04:40

## 2022-01-01 RX ADMIN — MORPHINE SULFATE 4 MG: 4 INJECTION INTRAVENOUS at 15:32

## 2022-01-01 RX ADMIN — OXYCODONE HYDROCHLORIDE 5 MG: 5 SOLUTION ORAL at 00:53

## 2022-01-01 RX ADMIN — Medication 400 MG: at 13:44

## 2022-01-01 RX ADMIN — SODIUM CHLORIDE, PRESERVATIVE FREE 10 ML: 5 INJECTION INTRAVENOUS at 10:15

## 2022-01-01 RX ADMIN — INSULIN HUMAN 9 UNITS: 100 INJECTION, SOLUTION PARENTERAL at 10:03

## 2022-01-01 RX ADMIN — ACETAMINOPHEN 650 MG: 650 SOLUTION ORAL at 21:15

## 2022-01-01 RX ADMIN — ALBUTEROL SULFATE 2.5 MG: 2.5 SOLUTION RESPIRATORY (INHALATION) at 04:03

## 2022-01-01 RX ADMIN — Medication: at 20:30

## 2022-01-01 RX ADMIN — METOPROLOL SUCCINATE 50 MG: 50 TABLET, FILM COATED, EXTENDED RELEASE ORAL at 09:46

## 2022-01-01 RX ADMIN — INSULIN LISPRO 4 UNITS: 100 INJECTION, SOLUTION INTRAVENOUS; SUBCUTANEOUS at 01:34

## 2022-01-01 RX ADMIN — INSULIN LISPRO 16 UNITS: 100 INJECTION, SOLUTION INTRAVENOUS; SUBCUTANEOUS at 18:09

## 2022-01-01 RX ADMIN — Medication: at 09:05

## 2022-01-01 RX ADMIN — METHYLPREDNISOLONE SODIUM SUCCINATE 125 MG: 125 INJECTION, POWDER, FOR SOLUTION INTRAMUSCULAR; INTRAVENOUS at 02:38

## 2022-01-01 RX ADMIN — METHOCARBAMOL 500 MG: 500 TABLET ORAL at 12:27

## 2022-01-01 RX ADMIN — Medication 15 ML: at 20:29

## 2022-01-01 RX ADMIN — OXYCODONE HYDROCHLORIDE 10 MG: 5 SOLUTION ORAL at 23:29

## 2022-01-01 RX ADMIN — Medication 15 ML: at 21:24

## 2022-01-01 RX ADMIN — Medication 1 PACKET: at 13:32

## 2022-01-01 RX ADMIN — SODIUM CHLORIDE, PRESERVATIVE FREE 10 ML: 5 INJECTION INTRAVENOUS at 21:30

## 2022-01-01 RX ADMIN — CLOTRIMAZOLE: 10 CREAM TOPICAL at 20:38

## 2022-01-01 RX ADMIN — ACETAMINOPHEN 650 MG: 160 SOLUTION ORAL at 11:29

## 2022-01-01 RX ADMIN — ACETAMINOPHEN 650 MG: 650 SOLUTION ORAL at 16:28

## 2022-01-01 RX ADMIN — ACETAMINOPHEN 650 MG: 160 SOLUTION ORAL at 13:15

## 2022-01-01 RX ADMIN — ACETAMINOPHEN 1000 MG: 500 TABLET ORAL at 20:24

## 2022-01-01 RX ADMIN — SODIUM CHLORIDE, PRESERVATIVE FREE 10 ML: 5 INJECTION INTRAVENOUS at 08:33

## 2022-01-01 RX ADMIN — Medication 1 PACKET: at 20:39

## 2022-01-01 RX ADMIN — ALBUTEROL SULFATE 2.5 MG: 2.5 SOLUTION RESPIRATORY (INHALATION) at 00:22

## 2022-01-01 RX ADMIN — DEXTROSE MONOHYDRATE 125 ML: 100 INJECTION, SOLUTION INTRAVENOUS at 18:24

## 2022-01-01 RX ADMIN — Medication 1 PACKET: at 10:03

## 2022-01-01 RX ADMIN — Medication 15 ML: at 08:59

## 2022-01-01 RX ADMIN — ACETAMINOPHEN 650 MG: 650 SOLUTION ORAL at 15:49

## 2022-01-01 RX ADMIN — APIXABAN 5 MG: 5 TABLET, FILM COATED ORAL at 11:34

## 2022-01-01 RX ADMIN — LANSOPRAZOLE 30 MG: KIT at 10:08

## 2022-01-01 RX ADMIN — INSULIN LISPRO 4 UNITS: 100 INJECTION, SOLUTION INTRAVENOUS; SUBCUTANEOUS at 09:37

## 2022-01-01 RX ADMIN — OXYCODONE HYDROCHLORIDE 7.5 MG: 5 SOLUTION ORAL at 22:30

## 2022-01-01 RX ADMIN — POTASSIUM BICARBONATE 20 MEQ: 782 TABLET, EFFERVESCENT ORAL at 08:13

## 2022-01-01 RX ADMIN — LEVALBUTEROL HYDROCHLORIDE 0.63 MG: 0.63 SOLUTION RESPIRATORY (INHALATION) at 09:22

## 2022-01-01 RX ADMIN — INSULIN LISPRO 5 UNITS: 100 INJECTION, SOLUTION INTRAVENOUS; SUBCUTANEOUS at 12:48

## 2022-01-01 RX ADMIN — INSULIN LISPRO 4 UNITS: 100 INJECTION, SOLUTION INTRAVENOUS; SUBCUTANEOUS at 17:20

## 2022-01-01 RX ADMIN — OXYCODONE HYDROCHLORIDE 10 MG: 5 SOLUTION ORAL at 04:59

## 2022-01-01 RX ADMIN — AMIODARONE HYDROCHLORIDE 400 MG: 200 TABLET ORAL at 20:44

## 2022-01-01 RX ADMIN — METHOCARBAMOL 500 MG: 500 TABLET ORAL at 17:40

## 2022-01-01 RX ADMIN — PANTOPRAZOLE SODIUM 40 MG: 40 INJECTION, POWDER, LYOPHILIZED, FOR SOLUTION INTRAVENOUS at 11:01

## 2022-01-01 RX ADMIN — SODIUM CHLORIDE, PRESERVATIVE FREE 10 ML: 5 INJECTION INTRAVENOUS at 09:05

## 2022-01-01 RX ADMIN — POLYETHYLENE GLYCOL 3350 17 G: 17 POWDER, FOR SOLUTION ORAL at 08:12

## 2022-01-01 RX ADMIN — POTASSIUM CHLORIDE 10 MEQ: 10 INJECTION, SOLUTION INTRAVENOUS at 13:51

## 2022-01-01 RX ADMIN — Medication 15 ML: at 21:37

## 2022-01-01 RX ADMIN — Medication 1 PACKET: at 09:25

## 2022-01-01 RX ADMIN — FUROSEMIDE 10 MG/HR: 10 INJECTION, SOLUTION INTRAMUSCULAR; INTRAVENOUS at 18:31

## 2022-01-01 RX ADMIN — INSULIN LISPRO 4 UNITS: 100 INJECTION, SOLUTION INTRAVENOUS; SUBCUTANEOUS at 02:15

## 2022-01-01 RX ADMIN — APIXABAN 5 MG: 5 TABLET, FILM COATED ORAL at 09:00

## 2022-01-01 RX ADMIN — Medication 15 ML: at 13:54

## 2022-01-01 RX ADMIN — METHYLPREDNISOLONE SODIUM SUCCINATE 60 MG: 125 INJECTION, POWDER, FOR SOLUTION INTRAMUSCULAR; INTRAVENOUS at 03:50

## 2022-01-01 RX ADMIN — COLLAGENASE SANTYL: 250 OINTMENT TOPICAL at 10:12

## 2022-01-01 RX ADMIN — LIDOCAINE HYDROCHLORIDE,EPINEPHRINE BITARTRATE 20 ML: 10; .01 INJECTION, SOLUTION INFILTRATION; PERINEURAL at 15:00

## 2022-01-01 RX ADMIN — SODIUM CHLORIDE 30 MG/ML INHALATION SOLUTION 4 ML: 30 SOLUTION INHALANT at 16:07

## 2022-01-01 RX ADMIN — ACETAMINOPHEN 650 MG: 160 SOLUTION ORAL at 17:45

## 2022-01-01 RX ADMIN — ALBUTEROL SULFATE 2.5 MG: 2.5 SOLUTION RESPIRATORY (INHALATION) at 10:33

## 2022-01-01 RX ADMIN — ALBUTEROL SULFATE 2.5 MG: 2.5 SOLUTION RESPIRATORY (INHALATION) at 20:09

## 2022-01-01 RX ADMIN — OXYCODONE HYDROCHLORIDE 10 MG: 5 SOLUTION ORAL at 11:47

## 2022-01-01 RX ADMIN — HYDRALAZINE HYDROCHLORIDE 5 MG: 20 INJECTION INTRAMUSCULAR; INTRAVENOUS at 09:18

## 2022-01-01 RX ADMIN — ALBUTEROL SULFATE 2.5 MG: 2.5 SOLUTION RESPIRATORY (INHALATION) at 05:00

## 2022-01-01 RX ADMIN — SODIUM CHLORIDE, PRESERVATIVE FREE 10 ML: 5 INJECTION INTRAVENOUS at 21:14

## 2022-01-01 RX ADMIN — SODIUM CHLORIDE, PRESERVATIVE FREE 10 ML: 5 INJECTION INTRAVENOUS at 09:36

## 2022-01-01 RX ADMIN — INSULIN LISPRO 5 UNITS: 100 INJECTION, SOLUTION INTRAVENOUS; SUBCUTANEOUS at 04:50

## 2022-01-01 RX ADMIN — INSULIN LISPRO 4 UNITS: 100 INJECTION, SOLUTION INTRAVENOUS; SUBCUTANEOUS at 10:14

## 2022-01-01 RX ADMIN — SODIUM CHLORIDE, PRESERVATIVE FREE 10 ML: 5 INJECTION INTRAVENOUS at 20:47

## 2022-01-01 RX ADMIN — APIXABAN 5 MG: 5 TABLET, FILM COATED ORAL at 20:20

## 2022-01-01 RX ADMIN — ACETAMINOPHEN 650 MG: 160 SOLUTION ORAL at 02:44

## 2022-01-01 RX ADMIN — MORPHINE SULFATE 1 MG: 2 INJECTION, SOLUTION INTRAMUSCULAR; INTRAVENOUS at 10:47

## 2022-01-01 RX ADMIN — METHYLPREDNISOLONE SODIUM SUCCINATE 125 MG: 125 INJECTION, POWDER, FOR SOLUTION INTRAMUSCULAR; INTRAVENOUS at 09:46

## 2022-01-01 RX ADMIN — INSULIN HUMAN 15 UNITS: 100 INJECTION, SOLUTION PARENTERAL at 05:58

## 2022-01-01 RX ADMIN — METHOCARBAMOL 500 MG: 500 TABLET ORAL at 16:02

## 2022-01-01 RX ADMIN — APIXABAN 5 MG: 5 TABLET, FILM COATED ORAL at 08:14

## 2022-01-01 RX ADMIN — SODIUM CHLORIDE 25 ML: 9 INJECTION, SOLUTION INTRAVENOUS at 17:40

## 2022-01-01 RX ADMIN — DEXTROSE MONOHYDRATE 250 ML: 100 INJECTION, SOLUTION INTRAVENOUS at 04:52

## 2022-01-01 RX ADMIN — Medication 1 PACKET: at 09:43

## 2022-01-01 RX ADMIN — METHYLPREDNISOLONE SODIUM SUCCINATE 60 MG: 125 INJECTION, POWDER, FOR SOLUTION INTRAMUSCULAR; INTRAVENOUS at 20:09

## 2022-01-01 RX ADMIN — CEFEPIME HYDROCHLORIDE 2000 MG: 2 INJECTION, POWDER, FOR SOLUTION INTRAMUSCULAR; INTRAVENOUS at 21:32

## 2022-01-01 RX ADMIN — METHOCARBAMOL 500 MG: 500 TABLET ORAL at 21:03

## 2022-01-01 RX ADMIN — ALBUTEROL SULFATE 2.5 MG: 2.5 SOLUTION RESPIRATORY (INHALATION) at 00:15

## 2022-01-01 RX ADMIN — APIXABAN 5 MG: 5 TABLET, FILM COATED ORAL at 08:46

## 2022-01-01 RX ADMIN — HYDROMORPHONE HYDROCHLORIDE 1 MG: 1 INJECTION, SOLUTION INTRAMUSCULAR; INTRAVENOUS; SUBCUTANEOUS at 13:47

## 2022-01-01 RX ADMIN — LANSOPRAZOLE 30 MG: KIT at 08:53

## 2022-01-01 RX ADMIN — CEFEPIME HYDROCHLORIDE 2000 MG: 2 INJECTION, POWDER, FOR SOLUTION INTRAMUSCULAR; INTRAVENOUS at 09:32

## 2022-01-01 RX ADMIN — ALBUTEROL SULFATE 2.5 MG: 2.5 SOLUTION RESPIRATORY (INHALATION) at 09:17

## 2022-01-01 RX ADMIN — AMIODARONE HYDROCHLORIDE 200 MG: 200 TABLET ORAL at 09:45

## 2022-01-01 RX ADMIN — POLYETHYLENE GLYCOL 3350 17 G: 17 POWDER, FOR SOLUTION ORAL at 08:28

## 2022-01-01 RX ADMIN — HYDROMORPHONE HYDROCHLORIDE 0.25 MG: 1 INJECTION, SOLUTION INTRAMUSCULAR; INTRAVENOUS; SUBCUTANEOUS at 20:04

## 2022-01-01 RX ADMIN — ALBUTEROL SULFATE 2.5 MG: 2.5 SOLUTION RESPIRATORY (INHALATION) at 17:15

## 2022-01-01 RX ADMIN — INSULIN HUMAN 10 UNITS: 100 INJECTION, SOLUTION PARENTERAL at 17:31

## 2022-01-01 RX ADMIN — SODIUM CHLORIDE, PRESERVATIVE FREE 10 ML: 5 INJECTION INTRAVENOUS at 20:46

## 2022-01-01 RX ADMIN — INSULIN LISPRO 8 UNITS: 100 INJECTION, SOLUTION INTRAVENOUS; SUBCUTANEOUS at 22:25

## 2022-01-01 RX ADMIN — Medication 15 ML: at 09:43

## 2022-01-01 RX ADMIN — INSULIN LISPRO 8 UNITS: 100 INJECTION, SOLUTION INTRAVENOUS; SUBCUTANEOUS at 15:38

## 2022-01-01 RX ADMIN — ACETAMINOPHEN 650 MG: 160 SOLUTION ORAL at 04:27

## 2022-01-01 RX ADMIN — INSULIN HUMAN 2 UNITS: 100 INJECTION, SOLUTION PARENTERAL at 02:54

## 2022-01-01 RX ADMIN — DIPHENHYDRAMINE HYDROCHLORIDE: 50 INJECTION, SOLUTION INTRAMUSCULAR; INTRAVENOUS at 16:18

## 2022-01-01 RX ADMIN — HYDROMORPHONE HYDROCHLORIDE 0.5 MG: 1 INJECTION, SOLUTION INTRAMUSCULAR; INTRAVENOUS; SUBCUTANEOUS at 11:31

## 2022-01-01 RX ADMIN — COLLAGENASE SANTYL: 250 OINTMENT TOPICAL at 09:00

## 2022-01-01 RX ADMIN — PANTOPRAZOLE SODIUM 40 MG: 40 INJECTION, POWDER, LYOPHILIZED, FOR SOLUTION INTRAVENOUS at 08:25

## 2022-01-01 RX ADMIN — APIXABAN 5 MG: 5 TABLET, FILM COATED ORAL at 20:30

## 2022-01-01 RX ADMIN — Medication: at 21:38

## 2022-01-01 RX ADMIN — DOXAZOSIN 1 MG: 2 TABLET ORAL at 09:51

## 2022-01-01 RX ADMIN — OXYCODONE HYDROCHLORIDE 10 MG: 5 SOLUTION ORAL at 03:46

## 2022-01-01 RX ADMIN — Medication 15 ML: at 09:50

## 2022-01-01 RX ADMIN — OXYCODONE HYDROCHLORIDE 5 MG: 5 SOLUTION ORAL at 06:48

## 2022-01-01 RX ADMIN — Medication: at 21:18

## 2022-01-01 RX ADMIN — ACETAMINOPHEN 1000 MG: 500 TABLET ORAL at 14:05

## 2022-01-01 RX ADMIN — METOPROLOL SUCCINATE 50 MG: 50 TABLET, FILM COATED, EXTENDED RELEASE ORAL at 20:35

## 2022-01-01 RX ADMIN — METHOCARBAMOL 500 MG: 500 TABLET ORAL at 17:33

## 2022-01-01 RX ADMIN — HYDROMORPHONE HYDROCHLORIDE 0.25 MG: 1 INJECTION, SOLUTION INTRAMUSCULAR; INTRAVENOUS; SUBCUTANEOUS at 22:19

## 2022-01-01 RX ADMIN — Medication 1 PACKET: at 10:11

## 2022-01-01 RX ADMIN — INSULIN LISPRO 8 UNITS: 100 INJECTION, SOLUTION INTRAVENOUS; SUBCUTANEOUS at 13:39

## 2022-01-01 RX ADMIN — CLOTRIMAZOLE: 10 CREAM TOPICAL at 20:55

## 2022-01-01 RX ADMIN — CLOTRIMAZOLE: 10 CREAM TOPICAL at 20:30

## 2022-01-01 RX ADMIN — APIXABAN 5 MG: 5 TABLET, FILM COATED ORAL at 08:56

## 2022-01-01 RX ADMIN — PIPERACILLIN AND TAZOBACTAM 4500 MG: 4; .5 INJECTION, POWDER, FOR SOLUTION INTRAVENOUS at 23:13

## 2022-01-01 RX ADMIN — DOXAZOSIN 1 MG: 2 TABLET ORAL at 08:46

## 2022-01-01 RX ADMIN — SODIUM CHLORIDE, PRESERVATIVE FREE 10 ML: 5 INJECTION INTRAVENOUS at 20:56

## 2022-01-01 RX ADMIN — APIXABAN 5 MG: 5 TABLET, FILM COATED ORAL at 22:41

## 2022-01-01 RX ADMIN — LANSOPRAZOLE 30 MG: KIT at 22:52

## 2022-01-01 RX ADMIN — AMIODARONE HYDROCHLORIDE 200 MG: 200 TABLET ORAL at 09:10

## 2022-01-01 RX ADMIN — ALBUTEROL SULFATE 2.5 MG: 2.5 SOLUTION RESPIRATORY (INHALATION) at 12:44

## 2022-01-01 RX ADMIN — Medication 1 PACKET: at 08:35

## 2022-01-01 RX ADMIN — Medication: at 11:13

## 2022-01-01 RX ADMIN — APIXABAN 5 MG: 5 TABLET, FILM COATED ORAL at 09:10

## 2022-01-01 RX ADMIN — ALBUTEROL SULFATE 2.5 MG: 2.5 SOLUTION RESPIRATORY (INHALATION) at 11:49

## 2022-01-01 RX ADMIN — SODIUM CHLORIDE, PRESERVATIVE FREE 10 ML: 5 INJECTION INTRAVENOUS at 20:06

## 2022-01-01 RX ADMIN — INSULIN LISPRO 8 UNITS: 100 INJECTION, SOLUTION INTRAVENOUS; SUBCUTANEOUS at 17:43

## 2022-01-01 RX ADMIN — DOCUSATE SODIUM 100 MG: 50 LIQUID ORAL at 08:57

## 2022-01-01 RX ADMIN — FUROSEMIDE 10 MG/HR: 10 INJECTION, SOLUTION INTRAMUSCULAR; INTRAVENOUS at 02:20

## 2022-01-01 RX ADMIN — Medication: at 21:07

## 2022-01-01 RX ADMIN — ALBUTEROL SULFATE 2.5 MG: 2.5 SOLUTION RESPIRATORY (INHALATION) at 16:50

## 2022-01-01 RX ADMIN — PROPOFOL 5 MCG/KG/MIN: 10 INJECTION, EMULSION INTRAVENOUS at 13:25

## 2022-01-01 RX ADMIN — ALBUTEROL SULFATE 2.5 MG: 2.5 SOLUTION RESPIRATORY (INHALATION) at 12:25

## 2022-01-01 RX ADMIN — Medication 5 MG: at 21:30

## 2022-01-01 RX ADMIN — APIXABAN 5 MG: 5 TABLET, FILM COATED ORAL at 22:52

## 2022-01-01 RX ADMIN — Medication 1 PACKET: at 10:24

## 2022-01-01 RX ADMIN — Medication: at 21:32

## 2022-01-01 RX ADMIN — COLLAGENASE SANTYL: 250 OINTMENT TOPICAL at 09:49

## 2022-01-01 RX ADMIN — Medication 1 PACKET: at 21:20

## 2022-01-01 RX ADMIN — HYDRALAZINE HYDROCHLORIDE 5 MG: 20 INJECTION INTRAMUSCULAR; INTRAVENOUS at 09:02

## 2022-01-01 RX ADMIN — Medication 1 PACKET: at 20:30

## 2022-01-01 RX ADMIN — DEXTROSE MONOHYDRATE: 100 INJECTION, SOLUTION INTRAVENOUS at 13:57

## 2022-01-01 RX ADMIN — ALBUTEROL SULFATE 2.5 MG: 2.5 SOLUTION RESPIRATORY (INHALATION) at 06:39

## 2022-01-01 RX ADMIN — SODIUM CHLORIDE, PRESERVATIVE FREE 10 ML: 5 INJECTION INTRAVENOUS at 21:26

## 2022-01-01 RX ADMIN — HYDROMORPHONE HYDROCHLORIDE 0.25 MG: 1 INJECTION, SOLUTION INTRAMUSCULAR; INTRAVENOUS; SUBCUTANEOUS at 13:27

## 2022-01-01 RX ADMIN — Medication 15 ML: at 08:56

## 2022-01-01 RX ADMIN — PANTOPRAZOLE SODIUM 40 MG: 40 INJECTION, POWDER, LYOPHILIZED, FOR SOLUTION INTRAVENOUS at 21:20

## 2022-01-01 RX ADMIN — INSULIN LISPRO 12 UNITS: 100 INJECTION, SOLUTION INTRAVENOUS; SUBCUTANEOUS at 08:50

## 2022-01-01 RX ADMIN — DEXTROSE MONOHYDRATE 125 ML: 100 INJECTION, SOLUTION INTRAVENOUS at 10:09

## 2022-01-01 RX ADMIN — Medication: at 20:55

## 2022-01-01 RX ADMIN — INSULIN GLARGINE 35 UNITS: 100 INJECTION, SOLUTION SUBCUTANEOUS at 21:25

## 2022-01-01 RX ADMIN — FAMOTIDINE 20 MG: 10 INJECTION INTRAVENOUS at 08:53

## 2022-01-01 RX ADMIN — INSULIN LISPRO 12 UNITS: 100 INJECTION, SOLUTION INTRAVENOUS; SUBCUTANEOUS at 13:20

## 2022-01-01 RX ADMIN — INSULIN LISPRO 4 UNITS: 100 INJECTION, SOLUTION INTRAVENOUS; SUBCUTANEOUS at 11:44

## 2022-01-01 RX ADMIN — INSULIN HUMAN 12 UNITS: 100 INJECTION, SOLUTION PARENTERAL at 09:46

## 2022-01-01 RX ADMIN — MIDAZOLAM HYDROCHLORIDE 3 MG: 5 INJECTION, SOLUTION INTRAMUSCULAR; INTRAVENOUS at 15:55

## 2022-01-01 RX ADMIN — AMIODARONE HYDROCHLORIDE 400 MG: 200 TABLET ORAL at 08:33

## 2022-01-01 RX ADMIN — Medication 15 ML: at 09:26

## 2022-01-01 RX ADMIN — ALBUTEROL SULFATE 2.5 MG: 2.5 SOLUTION RESPIRATORY (INHALATION) at 09:13

## 2022-01-01 RX ADMIN — ACETAMINOPHEN 1000 MG: 500 TABLET ORAL at 14:22

## 2022-01-01 RX ADMIN — Medication 1 PACKET: at 17:40

## 2022-01-01 RX ADMIN — ALBUTEROL SULFATE 2.5 MG: 2.5 SOLUTION RESPIRATORY (INHALATION) at 00:26

## 2022-01-01 RX ADMIN — SODIUM CHLORIDE, POTASSIUM CHLORIDE, SODIUM LACTATE AND CALCIUM CHLORIDE: 600; 310; 30; 20 INJECTION, SOLUTION INTRAVENOUS at 12:33

## 2022-01-01 RX ADMIN — ALBUMIN (HUMAN) 12.5 G: 0.25 INJECTION, SOLUTION INTRAVENOUS at 08:33

## 2022-01-01 RX ADMIN — Medication 400 MG: at 20:25

## 2022-01-01 RX ADMIN — Medication 15 ML: at 20:38

## 2022-01-01 RX ADMIN — HEPARIN SODIUM 5000 UNITS: 5000 INJECTION INTRAVENOUS; SUBCUTANEOUS at 06:32

## 2022-01-01 RX ADMIN — Medication: at 20:17

## 2022-01-01 RX ADMIN — Medication 15 ML: at 20:51

## 2022-01-01 RX ADMIN — APIXABAN 5 MG: 5 TABLET, FILM COATED ORAL at 09:46

## 2022-01-01 RX ADMIN — ALBUTEROL SULFATE 2.5 MG: 2.5 SOLUTION RESPIRATORY (INHALATION) at 15:32

## 2022-01-01 RX ADMIN — POLYETHYLENE GLYCOL 3350 17 G: 17 POWDER, FOR SOLUTION ORAL at 09:04

## 2022-01-01 RX ADMIN — LANSOPRAZOLE 30 MG: KIT at 20:57

## 2022-01-01 RX ADMIN — TIOTROPIUM BROMIDE AND OLODATEROL 2 PUFF: 3.124; 2.736 SPRAY, METERED RESPIRATORY (INHALATION) at 13:35

## 2022-01-01 RX ADMIN — COLLAGENASE SANTYL: 250 OINTMENT TOPICAL at 10:45

## 2022-01-01 RX ADMIN — ALBUTEROL SULFATE 2.5 MG: 2.5 SOLUTION RESPIRATORY (INHALATION) at 13:50

## 2022-01-01 RX ADMIN — ALBUTEROL SULFATE 2.5 MG: 2.5 SOLUTION RESPIRATORY (INHALATION) at 08:16

## 2022-01-01 RX ADMIN — INSULIN LISPRO 16 UNITS: 100 INJECTION, SOLUTION INTRAVENOUS; SUBCUTANEOUS at 10:33

## 2022-01-01 RX ADMIN — PIPERACILLIN AND TAZOBACTAM 3375 MG: 3; .375 INJECTION, POWDER, FOR SOLUTION INTRAVENOUS at 06:02

## 2022-01-01 RX ADMIN — SODIUM CHLORIDE, PRESERVATIVE FREE 10 ML: 5 INJECTION INTRAVENOUS at 11:40

## 2022-01-01 RX ADMIN — HYDROMORPHONE HYDROCHLORIDE 1 MG: 1 INJECTION, SOLUTION INTRAMUSCULAR; INTRAVENOUS; SUBCUTANEOUS at 15:09

## 2022-01-01 RX ADMIN — METHOCARBAMOL 500 MG: 500 TABLET ORAL at 20:38

## 2022-01-01 RX ADMIN — Medication: at 08:44

## 2022-01-01 RX ADMIN — LANSOPRAZOLE 30 MG: KIT at 22:13

## 2022-01-01 RX ADMIN — ALBUTEROL SULFATE 2.5 MG: 2.5 SOLUTION RESPIRATORY (INHALATION) at 12:07

## 2022-01-01 RX ADMIN — OXYCODONE HYDROCHLORIDE 7.5 MG: 5 SOLUTION ORAL at 14:45

## 2022-01-01 RX ADMIN — POTASSIUM BICARBONATE 20 MEQ: 782 TABLET, EFFERVESCENT ORAL at 08:27

## 2022-01-01 RX ADMIN — OXYCODONE HYDROCHLORIDE 5 MG: 5 SOLUTION ORAL at 14:01

## 2022-01-01 RX ADMIN — ACETAMINOPHEN 650 MG: 160 SOLUTION ORAL at 00:27

## 2022-01-01 RX ADMIN — INSULIN LISPRO 15 UNITS: 100 INJECTION, SOLUTION INTRAVENOUS; SUBCUTANEOUS at 19:22

## 2022-01-01 RX ADMIN — ALBUTEROL SULFATE 2.5 MG: 2.5 SOLUTION RESPIRATORY (INHALATION) at 13:06

## 2022-01-01 RX ADMIN — AMIODARONE HYDROCHLORIDE 400 MG: 200 TABLET ORAL at 20:48

## 2022-01-01 RX ADMIN — AMIODARONE HYDROCHLORIDE 200 MG: 200 TABLET ORAL at 07:57

## 2022-01-01 RX ADMIN — OXYCODONE HYDROCHLORIDE 7.5 MG: 5 SOLUTION ORAL at 22:10

## 2022-01-01 RX ADMIN — MORPHINE SULFATE 1 MG: 2 INJECTION, SOLUTION INTRAMUSCULAR; INTRAVENOUS at 06:03

## 2022-01-01 RX ADMIN — METOPROLOL TARTRATE 5 MG: 5 INJECTION INTRAVENOUS at 09:35

## 2022-01-01 RX ADMIN — APIXABAN 5 MG: 5 TABLET, FILM COATED ORAL at 20:48

## 2022-01-01 RX ADMIN — INSULIN LISPRO 4 UNITS: 100 INJECTION, SOLUTION INTRAVENOUS; SUBCUTANEOUS at 09:31

## 2022-01-01 RX ADMIN — QUETIAPINE FUMARATE 25 MG: 25 TABLET ORAL at 20:25

## 2022-01-01 RX ADMIN — SODIUM CHLORIDE 500 ML: 9 INJECTION, SOLUTION INTRAVENOUS at 13:12

## 2022-01-01 RX ADMIN — OXYCODONE HYDROCHLORIDE 10 MG: 5 SOLUTION ORAL at 23:51

## 2022-01-01 RX ADMIN — INSULIN LISPRO 4 UNITS: 100 INJECTION, SOLUTION INTRAVENOUS; SUBCUTANEOUS at 21:44

## 2022-01-01 RX ADMIN — INSULIN LISPRO 4 UNITS: 100 INJECTION, SOLUTION INTRAVENOUS; SUBCUTANEOUS at 14:57

## 2022-01-01 RX ADMIN — CLOTRIMAZOLE: 10 CREAM TOPICAL at 21:19

## 2022-01-01 RX ADMIN — ALBUTEROL SULFATE 2.5 MG: 2.5 SOLUTION RESPIRATORY (INHALATION) at 21:20

## 2022-01-01 RX ADMIN — OXYCODONE HYDROCHLORIDE 7.5 MG: 5 SOLUTION ORAL at 11:03

## 2022-01-01 RX ADMIN — HYDROMORPHONE HYDROCHLORIDE 0.5 MG: 1 INJECTION, SOLUTION INTRAMUSCULAR; INTRAVENOUS; SUBCUTANEOUS at 11:00

## 2022-01-01 RX ADMIN — ALBUTEROL SULFATE 2.5 MG: 2.5 SOLUTION RESPIRATORY (INHALATION) at 12:34

## 2022-01-01 RX ADMIN — Medication 1 PACKET: at 15:00

## 2022-01-01 RX ADMIN — METHOCARBAMOL 500 MG: 500 TABLET ORAL at 12:50

## 2022-01-01 RX ADMIN — ALBUTEROL SULFATE 2.5 MG: 2.5 SOLUTION RESPIRATORY (INHALATION) at 12:01

## 2022-01-01 RX ADMIN — Medication 1 PACKET: at 08:46

## 2022-01-01 RX ADMIN — INSULIN LISPRO 4 UNITS: 100 INJECTION, SOLUTION INTRAVENOUS; SUBCUTANEOUS at 21:40

## 2022-01-01 RX ADMIN — HYDROMORPHONE HYDROCHLORIDE 0.5 MG: 1 INJECTION, SOLUTION INTRAMUSCULAR; INTRAVENOUS; SUBCUTANEOUS at 12:27

## 2022-01-01 RX ADMIN — SODIUM CHLORIDE, PRESERVATIVE FREE 10 ML: 5 INJECTION INTRAVENOUS at 09:45

## 2022-01-01 RX ADMIN — SODIUM CHLORIDE, PRESERVATIVE FREE 10 ML: 5 INJECTION INTRAVENOUS at 21:13

## 2022-01-01 RX ADMIN — APIXABAN 5 MG: 5 TABLET, FILM COATED ORAL at 20:25

## 2022-01-01 RX ADMIN — LIDOCAINE HYDROCHLORIDE 2 ML: 10 INJECTION, SOLUTION EPIDURAL; INFILTRATION; INTRACAUDAL; PERINEURAL at 15:57

## 2022-01-01 RX ADMIN — OXYCODONE HYDROCHLORIDE 7.5 MG: 5 SOLUTION ORAL at 13:14

## 2022-01-01 RX ADMIN — BISACODYL 10 MG: 10 SUPPOSITORY RECTAL at 10:19

## 2022-01-01 RX ADMIN — MINERAL OIL: 1000 LIQUID ORAL at 10:20

## 2022-01-01 RX ADMIN — Medication: at 09:10

## 2022-01-01 RX ADMIN — FUROSEMIDE 15 MG/HR: 10 INJECTION INTRAMUSCULAR; INTRAVENOUS at 12:40

## 2022-01-01 RX ADMIN — SODIUM CHLORIDE, PRESERVATIVE FREE 10 ML: 5 INJECTION INTRAVENOUS at 08:12

## 2022-01-01 RX ADMIN — MORPHINE SULFATE 1 MG: 2 INJECTION, SOLUTION INTRAMUSCULAR; INTRAVENOUS at 17:07

## 2022-01-01 RX ADMIN — LANSOPRAZOLE 30 MG: KIT at 23:43

## 2022-01-01 RX ADMIN — Medication 15 ML: at 21:11

## 2022-01-01 RX ADMIN — INSULIN GLARGINE 35 UNITS: 100 INJECTION, SOLUTION SUBCUTANEOUS at 21:43

## 2022-01-01 RX ADMIN — DOXAZOSIN 1 MG: 2 TABLET ORAL at 09:12

## 2022-01-01 RX ADMIN — POTASSIUM CHLORIDE 20 MEQ: 29.8 INJECTION, SOLUTION INTRAVENOUS at 07:53

## 2022-01-01 RX ADMIN — Medication: at 20:42

## 2022-01-01 RX ADMIN — Medication 5 MG: at 21:15

## 2022-01-01 RX ADMIN — FUROSEMIDE 40 MG: 10 INJECTION, SOLUTION INTRAMUSCULAR; INTRAVENOUS at 09:46

## 2022-01-01 RX ADMIN — OXYCODONE HYDROCHLORIDE 7.5 MG: 5 SOLUTION ORAL at 15:30

## 2022-01-01 RX ADMIN — Medication 40 MG: at 04:46

## 2022-01-01 RX ADMIN — CLOTRIMAZOLE: 10 CREAM TOPICAL at 08:44

## 2022-01-01 RX ADMIN — PROPOFOL 10 MCG/KG/MIN: 10 INJECTION, EMULSION INTRAVENOUS at 12:45

## 2022-01-01 RX ADMIN — APIXABAN 5 MG: 5 TABLET, FILM COATED ORAL at 20:24

## 2022-01-01 RX ADMIN — CEFEPIME HYDROCHLORIDE 2000 MG: 2 INJECTION, POWDER, FOR SOLUTION INTRAMUSCULAR; INTRAVENOUS at 22:13

## 2022-01-01 RX ADMIN — ALBUTEROL SULFATE 2.5 MG: 2.5 SOLUTION RESPIRATORY (INHALATION) at 07:26

## 2022-01-01 RX ADMIN — SODIUM CHLORIDE 25 ML: 9 INJECTION, SOLUTION INTRAVENOUS at 00:29

## 2022-01-01 RX ADMIN — ACETAMINOPHEN 650 MG: 650 SOLUTION ORAL at 16:10

## 2022-01-01 RX ADMIN — ACETAMINOPHEN 650 MG: 160 SOLUTION ORAL at 08:03

## 2022-01-01 RX ADMIN — OXYCODONE HYDROCHLORIDE 10 MG: 5 SOLUTION ORAL at 08:15

## 2022-01-01 RX ADMIN — ALBUTEROL SULFATE 2.5 MG: 2.5 SOLUTION RESPIRATORY (INHALATION) at 09:46

## 2022-01-01 RX ADMIN — INSULIN LISPRO 2 UNITS: 100 INJECTION, SOLUTION INTRAVENOUS; SUBCUTANEOUS at 12:37

## 2022-01-01 RX ADMIN — SODIUM CHLORIDE, PRESERVATIVE FREE 10 ML: 5 INJECTION INTRAVENOUS at 20:31

## 2022-01-01 RX ADMIN — ALBUTEROL SULFATE 2.5 MG: 2.5 SOLUTION RESPIRATORY (INHALATION) at 08:00

## 2022-01-01 RX ADMIN — ONDANSETRON 4 MG: 2 INJECTION INTRAMUSCULAR; INTRAVENOUS at 09:50

## 2022-01-01 RX ADMIN — METHOCARBAMOL 500 MG: 500 TABLET ORAL at 18:48

## 2022-01-01 RX ADMIN — HYDROMORPHONE HYDROCHLORIDE 0.25 MG: 1 INJECTION, SOLUTION INTRAMUSCULAR; INTRAVENOUS; SUBCUTANEOUS at 10:52

## 2022-01-01 RX ADMIN — LANSOPRAZOLE 30 MG: KIT at 10:00

## 2022-01-01 RX ADMIN — COLLAGENASE SANTYL: 250 OINTMENT TOPICAL at 08:48

## 2022-01-01 RX ADMIN — LANSOPRAZOLE 30 MG: KIT at 10:19

## 2022-01-01 RX ADMIN — SODIUM CHLORIDE 30 MG/ML INHALATION SOLUTION 4 ML: 30 SOLUTION INHALANT at 03:51

## 2022-01-01 RX ADMIN — INSULIN LISPRO 2 UNITS: 100 INJECTION, SOLUTION INTRAVENOUS; SUBCUTANEOUS at 08:10

## 2022-01-01 RX ADMIN — Medication: at 20:56

## 2022-01-01 RX ADMIN — INSULIN LISPRO 16 UNITS: 100 INJECTION, SOLUTION INTRAVENOUS; SUBCUTANEOUS at 14:11

## 2022-01-01 RX ADMIN — ACETAMINOPHEN 650 MG: 160 SOLUTION ORAL at 20:07

## 2022-01-01 RX ADMIN — ACETAMINOPHEN 650 MG: 160 SOLUTION ORAL at 15:06

## 2022-01-01 RX ADMIN — SODIUM CHLORIDE, POTASSIUM CHLORIDE, SODIUM LACTATE AND CALCIUM CHLORIDE: 600; 310; 30; 20 INJECTION, SOLUTION INTRAVENOUS at 06:15

## 2022-01-01 RX ADMIN — APIXABAN 5 MG: 5 TABLET, FILM COATED ORAL at 08:27

## 2022-01-01 RX ADMIN — INSULIN LISPRO 4 UNITS: 100 INJECTION, SOLUTION INTRAVENOUS; SUBCUTANEOUS at 05:39

## 2022-01-01 RX ADMIN — INSULIN LISPRO 16 UNITS: 100 INJECTION, SOLUTION INTRAVENOUS; SUBCUTANEOUS at 01:48

## 2022-01-01 RX ADMIN — Medication 15 ML: at 21:12

## 2022-01-01 RX ADMIN — ACETAMINOPHEN 650 MG: 160 SOLUTION ORAL at 22:40

## 2022-01-01 RX ADMIN — SODIUM CHLORIDE, PRESERVATIVE FREE 10 ML: 5 INJECTION INTRAVENOUS at 09:02

## 2022-01-01 RX ADMIN — AMIODARONE HYDROCHLORIDE 200 MG: 200 TABLET ORAL at 09:50

## 2022-01-01 RX ADMIN — SODIUM CHLORIDE, PRESERVATIVE FREE 10 ML: 5 INJECTION INTRAVENOUS at 10:21

## 2022-01-01 RX ADMIN — ALBUTEROL SULFATE 2.5 MG: 2.5 SOLUTION RESPIRATORY (INHALATION) at 11:12

## 2022-01-01 RX ADMIN — SODIUM CHLORIDE, PRESERVATIVE FREE 10 ML: 5 INJECTION INTRAVENOUS at 12:56

## 2022-01-01 RX ADMIN — Medication 15 ML: at 10:12

## 2022-01-01 RX ADMIN — COLLAGENASE SANTYL: 250 OINTMENT TOPICAL at 06:39

## 2022-01-01 RX ADMIN — CLOTRIMAZOLE: 10 CREAM TOPICAL at 10:15

## 2022-01-01 RX ADMIN — METHYLPREDNISOLONE SODIUM SUCCINATE 80 MG: 125 INJECTION, POWDER, FOR SOLUTION INTRAMUSCULAR; INTRAVENOUS at 23:50

## 2022-01-01 RX ADMIN — CLOTRIMAZOLE: 10 CREAM TOPICAL at 22:47

## 2022-01-01 RX ADMIN — SENNOSIDES AND DOCUSATE SODIUM 1 TABLET: 50; 8.6 TABLET ORAL at 10:00

## 2022-01-01 RX ADMIN — Medication 1 PACKET: at 15:06

## 2022-01-01 RX ADMIN — Medication: at 08:42

## 2022-01-01 RX ADMIN — ALBUTEROL SULFATE 2.5 MG: 2.5 SOLUTION RESPIRATORY (INHALATION) at 15:19

## 2022-01-01 RX ADMIN — ALBUTEROL SULFATE 2.5 MG: 2.5 SOLUTION RESPIRATORY (INHALATION) at 08:10

## 2022-01-01 RX ADMIN — METHYLPREDNISOLONE SODIUM SUCCINATE 60 MG: 125 INJECTION, POWDER, FOR SOLUTION INTRAMUSCULAR; INTRAVENOUS at 11:45

## 2022-01-01 RX ADMIN — POLYETHYLENE GLYCOL 3350 17 G: 17 POWDER, FOR SOLUTION ORAL at 10:00

## 2022-01-01 RX ADMIN — PANTOPRAZOLE SODIUM 40 MG: 40 INJECTION, POWDER, LYOPHILIZED, FOR SOLUTION INTRAVENOUS at 21:08

## 2022-01-01 RX ADMIN — ALBUTEROL SULFATE 2.5 MG: 2.5 SOLUTION RESPIRATORY (INHALATION) at 19:38

## 2022-01-01 RX ADMIN — ALBUTEROL SULFATE 2.5 MG: 2.5 SOLUTION RESPIRATORY (INHALATION) at 00:10

## 2022-01-01 RX ADMIN — Medication: at 09:43

## 2022-01-01 RX ADMIN — ALBUTEROL SULFATE 2.5 MG: 2.5 SOLUTION RESPIRATORY (INHALATION) at 07:41

## 2022-01-01 RX ADMIN — SODIUM CHLORIDE 30 MG/ML INHALATION SOLUTION 4 ML: 30 SOLUTION INHALANT at 11:54

## 2022-01-01 RX ADMIN — INSULIN LISPRO 16 UNITS: 100 INJECTION, SOLUTION INTRAVENOUS; SUBCUTANEOUS at 05:23

## 2022-01-01 RX ADMIN — CLOTRIMAZOLE: 10 CREAM TOPICAL at 10:13

## 2022-01-01 RX ADMIN — OXYCODONE HYDROCHLORIDE 7.5 MG: 5 SOLUTION ORAL at 06:19

## 2022-01-01 RX ADMIN — SODIUM CHLORIDE 500 ML: 9 INJECTION, SOLUTION INTRAVENOUS at 15:34

## 2022-01-01 RX ADMIN — CLOTRIMAZOLE: 10 CREAM TOPICAL at 20:39

## 2022-01-01 RX ADMIN — OXYCODONE HYDROCHLORIDE 10 MG: 5 SOLUTION ORAL at 03:05

## 2022-01-01 RX ADMIN — SODIUM CHLORIDE, PRESERVATIVE FREE 10 ML: 5 INJECTION INTRAVENOUS at 07:58

## 2022-01-01 RX ADMIN — SODIUM CHLORIDE 30 MG/ML INHALATION SOLUTION 15 ML: 30 SOLUTION INHALANT at 15:32

## 2022-01-01 RX ADMIN — APIXABAN 5 MG: 5 TABLET, FILM COATED ORAL at 20:18

## 2022-01-01 RX ADMIN — SODIUM CHLORIDE 25 ML: 9 INJECTION, SOLUTION INTRAVENOUS at 08:42

## 2022-01-01 RX ADMIN — Medication 15 ML: at 21:00

## 2022-01-01 RX ADMIN — DOCUSATE SODIUM 100 MG: 100 CAPSULE, LIQUID FILLED ORAL at 13:52

## 2022-01-01 RX ADMIN — OXYCODONE HYDROCHLORIDE 10 MG: 5 SOLUTION ORAL at 23:00

## 2022-01-01 RX ADMIN — INSULIN LISPRO 4 UNITS: 100 INJECTION, SOLUTION INTRAVENOUS; SUBCUTANEOUS at 13:01

## 2022-01-01 RX ADMIN — AMIODARONE HYDROCHLORIDE 200 MG: 200 TABLET ORAL at 09:42

## 2022-01-01 RX ADMIN — ALBUTEROL SULFATE 2.5 MG: 2.5 SOLUTION RESPIRATORY (INHALATION) at 21:17

## 2022-01-01 RX ADMIN — METHYLPREDNISOLONE SODIUM SUCCINATE 125 MG: 125 INJECTION, POWDER, FOR SOLUTION INTRAMUSCULAR; INTRAVENOUS at 17:03

## 2022-01-01 RX ADMIN — DEXTROSE MONOHYDRATE 125 ML: 10 INJECTION, SOLUTION INTRAVENOUS at 13:40

## 2022-01-01 RX ADMIN — SODIUM CHLORIDE, PRESERVATIVE FREE 10 ML: 5 INJECTION INTRAVENOUS at 08:13

## 2022-01-01 RX ADMIN — INSULIN LISPRO 8 UNITS: 100 INJECTION, SOLUTION INTRAVENOUS; SUBCUTANEOUS at 18:21

## 2022-01-01 RX ADMIN — ACETAMINOPHEN 650 MG: 160 SOLUTION ORAL at 04:40

## 2022-01-01 RX ADMIN — SODIUM CHLORIDE, PRESERVATIVE FREE 10 ML: 5 INJECTION INTRAVENOUS at 09:48

## 2022-01-01 RX ADMIN — INSULIN LISPRO 4 UNITS: 100 INJECTION, SOLUTION INTRAVENOUS; SUBCUTANEOUS at 18:26

## 2022-01-01 RX ADMIN — PANTOPRAZOLE SODIUM 40 MG: 40 INJECTION, POWDER, LYOPHILIZED, FOR SOLUTION INTRAVENOUS at 20:51

## 2022-01-01 RX ADMIN — METHOCARBAMOL 500 MG: 500 TABLET ORAL at 15:14

## 2022-01-01 RX ADMIN — POLYETHYLENE GLYCOL 3350 17 G: 17 POWDER, FOR SOLUTION ORAL at 07:58

## 2022-01-01 RX ADMIN — INSULIN LISPRO 8 UNITS: 100 INJECTION, SOLUTION INTRAVENOUS; SUBCUTANEOUS at 21:12

## 2022-01-01 RX ADMIN — FAMOTIDINE 20 MG: 20 TABLET ORAL at 08:34

## 2022-01-01 RX ADMIN — ALBUTEROL SULFATE 2.5 MG: 2.5 SOLUTION RESPIRATORY (INHALATION) at 00:02

## 2022-01-01 RX ADMIN — Medication 15 ML: at 20:46

## 2022-01-01 RX ADMIN — ALBUTEROL SULFATE 2.5 MG: 2.5 SOLUTION RESPIRATORY (INHALATION) at 15:29

## 2022-01-01 RX ADMIN — CLOTRIMAZOLE: 10 CREAM TOPICAL at 21:18

## 2022-01-01 RX ADMIN — PROPOFOL 10 MCG/KG/MIN: 10 INJECTION, EMULSION INTRAVENOUS at 10:10

## 2022-01-01 RX ADMIN — Medication 1 PACKET: at 09:38

## 2022-01-01 RX ADMIN — ACETAMINOPHEN 650 MG: 650 SOLUTION ORAL at 05:04

## 2022-01-01 RX ADMIN — SENNOSIDES AND DOCUSATE SODIUM 1 TABLET: 50; 8.6 TABLET ORAL at 21:37

## 2022-01-01 RX ADMIN — APIXABAN 5 MG: 5 TABLET, FILM COATED ORAL at 10:01

## 2022-01-01 RX ADMIN — OXYCODONE HYDROCHLORIDE 7.5 MG: 5 SOLUTION ORAL at 09:50

## 2022-01-01 RX ADMIN — OXYCODONE HYDROCHLORIDE 10 MG: 5 SOLUTION ORAL at 10:12

## 2022-01-01 RX ADMIN — INSULIN LISPRO 4 UNITS: 100 INJECTION, SOLUTION INTRAVENOUS; SUBCUTANEOUS at 09:11

## 2022-01-01 RX ADMIN — INSULIN LISPRO 5 UNITS: 100 INJECTION, SOLUTION INTRAVENOUS; SUBCUTANEOUS at 12:26

## 2022-01-01 RX ADMIN — ALBUTEROL SULFATE 2.5 MG: 2.5 SOLUTION RESPIRATORY (INHALATION) at 09:55

## 2022-01-01 RX ADMIN — INSULIN HUMAN 10 UNITS: 100 INJECTION, SOLUTION PARENTERAL at 04:38

## 2022-01-01 RX ADMIN — Medication 15 ML: at 08:43

## 2022-01-01 RX ADMIN — CEFEPIME HYDROCHLORIDE 2000 MG: 2 INJECTION, POWDER, FOR SOLUTION INTRAMUSCULAR; INTRAVENOUS at 10:30

## 2022-01-01 RX ADMIN — OXYCODONE HYDROCHLORIDE 7.5 MG: 5 SOLUTION ORAL at 00:06

## 2022-01-01 RX ADMIN — ALBUTEROL SULFATE 2.5 MG: 2.5 SOLUTION RESPIRATORY (INHALATION) at 15:11

## 2022-01-01 RX ADMIN — METHOCARBAMOL 500 MG: 500 TABLET ORAL at 16:14

## 2022-01-01 RX ADMIN — Medication: at 10:00

## 2022-01-01 RX ADMIN — Medication 15 ML: at 09:52

## 2022-01-01 RX ADMIN — ALBUTEROL SULFATE 2.5 MG: 2.5 SOLUTION RESPIRATORY (INHALATION) at 16:07

## 2022-01-01 RX ADMIN — FUROSEMIDE 40 MG: 10 INJECTION, SOLUTION INTRAMUSCULAR; INTRAVENOUS at 08:58

## 2022-01-01 RX ADMIN — SODIUM CHLORIDE, SODIUM LACTATE, POTASSIUM CHLORIDE, CALCIUM CHLORIDE AND DEXTROSE MONOHYDRATE: 5; 600; 310; 30; 20 INJECTION, SOLUTION INTRAVENOUS at 16:36

## 2022-01-01 RX ADMIN — METOPROLOL TARTRATE 5 MG: 5 INJECTION INTRAVENOUS at 23:10

## 2022-01-01 RX ADMIN — ONDANSETRON 4 MG: 2 INJECTION INTRAMUSCULAR; INTRAVENOUS at 05:10

## 2022-01-01 RX ADMIN — METHYLPREDNISOLONE SODIUM SUCCINATE 125 MG: 125 INJECTION, POWDER, FOR SOLUTION INTRAMUSCULAR; INTRAVENOUS at 23:46

## 2022-01-01 RX ADMIN — PIPERACILLIN AND TAZOBACTAM 3375 MG: 3; .375 INJECTION, POWDER, FOR SOLUTION INTRAVENOUS at 09:15

## 2022-01-01 RX ADMIN — SODIUM CHLORIDE, PRESERVATIVE FREE 10 ML: 5 INJECTION INTRAVENOUS at 09:07

## 2022-01-01 RX ADMIN — LANSOPRAZOLE 30 MG: KIT at 09:47

## 2022-01-01 RX ADMIN — Medication 1 PACKET: at 21:24

## 2022-01-01 RX ADMIN — DEXTROSE MONOHYDRATE 250 ML: 100 INJECTION, SOLUTION INTRAVENOUS at 14:01

## 2022-01-01 RX ADMIN — METHYLPREDNISOLONE SODIUM SUCCINATE 125 MG: 125 INJECTION, POWDER, FOR SOLUTION INTRAMUSCULAR; INTRAVENOUS at 01:00

## 2022-01-01 RX ADMIN — Medication 15 ML: at 20:25

## 2022-01-01 RX ADMIN — CLOTRIMAZOLE: 10 CREAM TOPICAL at 10:04

## 2022-01-01 RX ADMIN — OXYCODONE HYDROCHLORIDE 5 MG: 5 SOLUTION ORAL at 19:25

## 2022-01-01 RX ADMIN — NOREPINEPHRINE BITARTRATE 3 MCG/MIN: 1 SOLUTION INTRAVENOUS at 17:48

## 2022-01-01 RX ADMIN — OXYCODONE HYDROCHLORIDE 10 MG: 5 SOLUTION ORAL at 20:12

## 2022-01-01 RX ADMIN — METHOCARBAMOL 500 MG: 500 TABLET ORAL at 15:00

## 2022-01-01 RX ADMIN — ALBUTEROL SULFATE 2.5 MG: 2.5 SOLUTION RESPIRATORY (INHALATION) at 11:41

## 2022-01-01 RX ADMIN — FAMOTIDINE 20 MG: 10 INJECTION INTRAVENOUS at 08:34

## 2022-01-01 RX ADMIN — ALBUTEROL SULFATE 2.5 MG: 2.5 SOLUTION RESPIRATORY (INHALATION) at 00:04

## 2022-01-01 RX ADMIN — AMIODARONE HYDROCHLORIDE 0.25 MG/MIN: 50 INJECTION, SOLUTION INTRAVENOUS at 17:45

## 2022-01-01 RX ADMIN — Medication: at 20:31

## 2022-01-01 RX ADMIN — Medication 400 MG: at 20:24

## 2022-01-01 RX ADMIN — FAMOTIDINE 20 MG: 10 INJECTION INTRAVENOUS at 08:27

## 2022-01-01 RX ADMIN — Medication 15 ML: at 21:09

## 2022-01-01 RX ADMIN — INSULIN LISPRO 4 UNITS: 100 INJECTION, SOLUTION INTRAVENOUS; SUBCUTANEOUS at 01:29

## 2022-01-01 RX ADMIN — PROPOFOL 10 MCG/KG/MIN: 10 INJECTION, EMULSION INTRAVENOUS at 06:16

## 2022-01-01 RX ADMIN — SODIUM CHLORIDE: 9 INJECTION, SOLUTION INTRAVENOUS at 06:41

## 2022-01-01 RX ADMIN — INSULIN LISPRO 4 UNITS: 100 INJECTION, SOLUTION INTRAVENOUS; SUBCUTANEOUS at 12:08

## 2022-01-01 RX ADMIN — CLOTRIMAZOLE: 10 CREAM TOPICAL at 21:37

## 2022-01-01 RX ADMIN — METOPROLOL SUCCINATE 50 MG: 50 TABLET, FILM COATED, EXTENDED RELEASE ORAL at 09:59

## 2022-01-01 RX ADMIN — VANCOMYCIN HYDROCHLORIDE 1250 MG: 1 INJECTION, POWDER, LYOPHILIZED, FOR SOLUTION INTRAVENOUS at 09:03

## 2022-01-01 RX ADMIN — ALBUTEROL SULFATE 2.5 MG: 2.5 SOLUTION RESPIRATORY (INHALATION) at 16:57

## 2022-01-01 RX ADMIN — APIXABAN 5 MG: 5 TABLET, FILM COATED ORAL at 08:29

## 2022-01-01 RX ADMIN — SODIUM PHOSPHATE, MONOBASIC, MONOHYDRATE 20 MMOL: 276; 142 INJECTION, SOLUTION INTRAVENOUS at 10:38

## 2022-01-01 RX ADMIN — COLLAGENASE SANTYL: 250 OINTMENT TOPICAL at 08:55

## 2022-01-01 RX ADMIN — ALBUMIN (HUMAN) 12.5 G: 0.25 INJECTION, SOLUTION INTRAVENOUS at 09:09

## 2022-01-01 RX ADMIN — LANSOPRAZOLE 30 MG: KIT at 09:39

## 2022-01-01 RX ADMIN — ALBUTEROL SULFATE 2.5 MG: 2.5 SOLUTION RESPIRATORY (INHALATION) at 20:45

## 2022-01-01 RX ADMIN — APIXABAN 5 MG: 5 TABLET, FILM COATED ORAL at 20:51

## 2022-01-01 RX ADMIN — METHYLPREDNISOLONE SODIUM SUCCINATE 125 MG: 125 INJECTION, POWDER, FOR SOLUTION INTRAMUSCULAR; INTRAVENOUS at 17:55

## 2022-01-01 RX ADMIN — PROPOFOL 10 MCG/KG/MIN: 10 INJECTION, EMULSION INTRAVENOUS at 11:36

## 2022-01-01 RX ADMIN — SODIUM CHLORIDE: 9 INJECTION, SOLUTION INTRAVENOUS at 23:33

## 2022-01-01 RX ADMIN — HEPARIN SODIUM 5000 UNITS: 5000 INJECTION INTRAVENOUS; SUBCUTANEOUS at 20:53

## 2022-01-01 RX ADMIN — OXYCODONE HYDROCHLORIDE 7.5 MG: 5 SOLUTION ORAL at 10:18

## 2022-01-01 RX ADMIN — INSULIN HUMAN 9 UNITS: 100 INJECTION, SOLUTION PARENTERAL at 18:32

## 2022-01-01 RX ADMIN — CEFEPIME HYDROCHLORIDE 2000 MG: 2 INJECTION, POWDER, FOR SOLUTION INTRAMUSCULAR; INTRAVENOUS at 21:45

## 2022-01-01 RX ADMIN — ALBUMIN (HUMAN) 12.5 G: 0.25 INJECTION, SOLUTION INTRAVENOUS at 00:32

## 2022-01-01 RX ADMIN — Medication 15 ML: at 08:04

## 2022-01-01 RX ADMIN — MORPHINE SULFATE 1 MG/HR: 10 INJECTION INTRAVENOUS at 16:44

## 2022-01-01 RX ADMIN — LANSOPRAZOLE 30 MG: KIT at 20:23

## 2022-01-01 RX ADMIN — ALBUTEROL SULFATE 2.5 MG: 2.5 SOLUTION RESPIRATORY (INHALATION) at 00:45

## 2022-01-01 RX ADMIN — APIXABAN 5 MG: 5 TABLET, FILM COATED ORAL at 20:38

## 2022-01-01 RX ADMIN — ALBUTEROL SULFATE 2.5 MG: 2.5 SOLUTION RESPIRATORY (INHALATION) at 19:30

## 2022-01-01 RX ADMIN — QUETIAPINE FUMARATE 25 MG: 25 TABLET ORAL at 20:51

## 2022-01-01 RX ADMIN — ALBUTEROL SULFATE 2.5 MG: 2.5 SOLUTION RESPIRATORY (INHALATION) at 04:20

## 2022-01-01 RX ADMIN — ALBUTEROL SULFATE 2.5 MG: 2.5 SOLUTION RESPIRATORY (INHALATION) at 12:19

## 2022-01-01 RX ADMIN — Medication 15 ML: at 08:50

## 2022-01-01 RX ADMIN — INSULIN LISPRO 16 UNITS: 100 INJECTION, SOLUTION INTRAVENOUS; SUBCUTANEOUS at 15:19

## 2022-01-01 RX ADMIN — LANSOPRAZOLE 30 MG: KIT at 21:20

## 2022-01-01 RX ADMIN — CEFEPIME HYDROCHLORIDE 2000 MG: 2 INJECTION, POWDER, FOR SOLUTION INTRAMUSCULAR; INTRAVENOUS at 17:38

## 2022-01-01 RX ADMIN — SODIUM CHLORIDE, PRESERVATIVE FREE 10 ML: 5 INJECTION INTRAVENOUS at 09:28

## 2022-01-01 RX ADMIN — HYDROMORPHONE HYDROCHLORIDE 0.5 MG: 1 INJECTION, SOLUTION INTRAMUSCULAR; INTRAVENOUS; SUBCUTANEOUS at 15:16

## 2022-01-01 RX ADMIN — DOXAZOSIN 1 MG: 2 TABLET ORAL at 10:07

## 2022-01-01 RX ADMIN — SODIUM CHLORIDE, PRESERVATIVE FREE 10 ML: 5 INJECTION INTRAVENOUS at 20:43

## 2022-01-01 RX ADMIN — AMIODARONE HYDROCHLORIDE 400 MG: 200 TABLET ORAL at 21:11

## 2022-01-01 RX ADMIN — OXYCODONE HYDROCHLORIDE 5 MG: 5 SOLUTION ORAL at 02:07

## 2022-01-01 RX ADMIN — CLOTRIMAZOLE: 10 CREAM TOPICAL at 09:28

## 2022-01-01 RX ADMIN — LANSOPRAZOLE 30 MG: KIT at 10:27

## 2022-01-01 RX ADMIN — Medication 1 PACKET: at 14:30

## 2022-01-01 RX ADMIN — INSULIN LISPRO 5 UNITS: 100 INJECTION, SOLUTION INTRAVENOUS; SUBCUTANEOUS at 05:28

## 2022-01-01 RX ADMIN — METHOCARBAMOL 500 MG: 500 TABLET ORAL at 18:45

## 2022-01-01 RX ADMIN — DOXAZOSIN 1 MG: 2 TABLET ORAL at 09:36

## 2022-01-01 RX ADMIN — POLYETHYLENE GLYCOL 3350 17 G: 17 POWDER, FOR SOLUTION ORAL at 09:50

## 2022-01-01 RX ADMIN — ALBUTEROL SULFATE 2.5 MG: 2.5 SOLUTION RESPIRATORY (INHALATION) at 00:30

## 2022-01-01 RX ADMIN — Medication: at 07:58

## 2022-01-01 RX ADMIN — SODIUM CHLORIDE 30 MG/ML INHALATION SOLUTION 4 ML: 30 SOLUTION INHALANT at 20:09

## 2022-01-01 RX ADMIN — VANCOMYCIN HYDROCHLORIDE 2000 MG: 10 INJECTION, POWDER, LYOPHILIZED, FOR SOLUTION INTRAVENOUS at 12:19

## 2022-01-01 RX ADMIN — SODIUM CHLORIDE 6.72 UNITS/HR: 9 INJECTION, SOLUTION INTRAVENOUS at 21:52

## 2022-01-01 RX ADMIN — OXYCODONE HYDROCHLORIDE 7.5 MG: 5 SOLUTION ORAL at 16:41

## 2022-01-01 RX ADMIN — NOREPINEPHRINE BITARTRATE 4 MCG/MIN: 1 SOLUTION INTRAVENOUS at 11:20

## 2022-01-01 RX ADMIN — SODIUM CHLORIDE: 9 INJECTION, SOLUTION INTRAVENOUS at 05:41

## 2022-01-01 RX ADMIN — HYDROMORPHONE HYDROCHLORIDE 0.25 MG: 1 INJECTION, SOLUTION INTRAMUSCULAR; INTRAVENOUS; SUBCUTANEOUS at 05:10

## 2022-01-01 RX ADMIN — APIXABAN 5 MG: 5 TABLET, FILM COATED ORAL at 10:14

## 2022-01-01 RX ADMIN — ALBUTEROL SULFATE 2.5 MG: 2.5 SOLUTION RESPIRATORY (INHALATION) at 23:24

## 2022-01-01 RX ADMIN — DEXTROSE MONOHYDRATE 125 ML: 10 INJECTION, SOLUTION INTRAVENOUS at 02:19

## 2022-01-01 RX ADMIN — SODIUM CHLORIDE 9.45 UNITS/HR: 9 INJECTION, SOLUTION INTRAVENOUS at 04:04

## 2022-01-01 RX ADMIN — ALBUMIN (HUMAN) 12.5 G: 0.25 INJECTION, SOLUTION INTRAVENOUS at 10:45

## 2022-01-01 RX ADMIN — Medication 15 ML: at 20:09

## 2022-01-01 RX ADMIN — ACETAMINOPHEN 650 MG: 650 SOLUTION ORAL at 03:05

## 2022-01-01 RX ADMIN — ACETAMINOPHEN 650 MG: 160 SOLUTION ORAL at 05:00

## 2022-01-01 RX ADMIN — FENTANYL CITRATE 25 MCG/HR: 0.05 INJECTION, SOLUTION INTRAMUSCULAR; INTRAVENOUS at 11:45

## 2022-01-01 RX ADMIN — INSULIN LISPRO 4 UNITS: 100 INJECTION, SOLUTION INTRAVENOUS; SUBCUTANEOUS at 02:35

## 2022-01-01 RX ADMIN — ACETAMINOPHEN 1000 MG: 500 TABLET ORAL at 12:34

## 2022-01-01 RX ADMIN — COLLAGENASE SANTYL: 250 OINTMENT TOPICAL at 13:52

## 2022-01-01 RX ADMIN — OXYCODONE HYDROCHLORIDE 7.5 MG: 5 SOLUTION ORAL at 18:50

## 2022-01-01 RX ADMIN — ONDANSETRON 4 MG: 2 INJECTION INTRAMUSCULAR; INTRAVENOUS at 01:11

## 2022-01-01 RX ADMIN — AMIODARONE HYDROCHLORIDE 200 MG: 200 TABLET ORAL at 09:00

## 2022-01-01 RX ADMIN — DOCUSATE SODIUM 100 MG: 50 LIQUID ORAL at 20:57

## 2022-01-01 RX ADMIN — AMIODARONE HYDROCHLORIDE 0.5 MG/MIN: 50 INJECTION, SOLUTION INTRAVENOUS at 01:21

## 2022-01-01 RX ADMIN — PIPERACILLIN AND TAZOBACTAM 3375 MG: 3; .375 INJECTION, POWDER, FOR SOLUTION INTRAVENOUS at 17:56

## 2022-01-01 RX ADMIN — OXYCODONE HYDROCHLORIDE 7.5 MG: 5 SOLUTION ORAL at 01:44

## 2022-01-01 RX ADMIN — Medication 1 PACKET: at 12:13

## 2022-01-01 RX ADMIN — NOREPINEPHRINE BITARTRATE 2 MCG/MIN: 1 SOLUTION INTRAVENOUS at 18:05

## 2022-01-01 RX ADMIN — SODIUM CHLORIDE, PRESERVATIVE FREE 10 ML: 5 INJECTION INTRAVENOUS at 08:35

## 2022-01-01 RX ADMIN — PIPERACILLIN AND TAZOBACTAM 3375 MG: 3; .375 INJECTION, POWDER, FOR SOLUTION INTRAVENOUS at 09:30

## 2022-01-01 RX ADMIN — INSULIN LISPRO 8 UNITS: 100 INJECTION, SOLUTION INTRAVENOUS; SUBCUTANEOUS at 16:54

## 2022-01-01 RX ADMIN — ACETAMINOPHEN 650 MG: 650 SOLUTION ORAL at 09:40

## 2022-01-01 RX ADMIN — APIXABAN 5 MG: 5 TABLET, FILM COATED ORAL at 08:13

## 2022-01-01 RX ADMIN — ACETAMINOPHEN 650 MG: 650 SOLUTION ORAL at 21:25

## 2022-01-01 RX ADMIN — POTASSIUM PHOSPHATE, MONOBASIC AND POTASSIUM PHOSPHATE, DIBASIC 30 MMOL: 224; 236 INJECTION, SOLUTION, CONCENTRATE INTRAVENOUS at 11:10

## 2022-01-01 RX ADMIN — OXYCODONE HYDROCHLORIDE 7.5 MG: 5 SOLUTION ORAL at 14:50

## 2022-01-01 RX ADMIN — DOXAZOSIN 1 MG: 2 TABLET ORAL at 08:13

## 2022-01-01 RX ADMIN — METHOCARBAMOL 500 MG: 500 TABLET ORAL at 18:13

## 2022-01-01 RX ADMIN — Medication 400 MG: at 21:37

## 2022-01-01 RX ADMIN — APIXABAN 5 MG: 5 TABLET, FILM COATED ORAL at 09:04

## 2022-01-01 RX ADMIN — ALBUTEROL SULFATE 2.5 MG: 2.5 SOLUTION RESPIRATORY (INHALATION) at 11:57

## 2022-01-01 RX ADMIN — CLOTRIMAZOLE: 10 CREAM TOPICAL at 11:00

## 2022-01-01 RX ADMIN — ALBUTEROL SULFATE 2.5 MG: 2.5 SOLUTION RESPIRATORY (INHALATION) at 07:33

## 2022-01-01 RX ADMIN — LANSOPRAZOLE 30 MG: KIT at 21:13

## 2022-01-01 RX ADMIN — CLOTRIMAZOLE: 10 CREAM TOPICAL at 21:25

## 2022-01-01 RX ADMIN — Medication: at 12:46

## 2022-01-01 RX ADMIN — Medication 15 ML: at 20:45

## 2022-01-01 RX ADMIN — Medication: at 22:47

## 2022-01-01 RX ADMIN — SODIUM CHLORIDE 30 MG/ML INHALATION SOLUTION 4 ML: 30 SOLUTION INHALANT at 04:20

## 2022-01-01 RX ADMIN — LANSOPRAZOLE 30 MG: KIT at 09:42

## 2022-01-01 RX ADMIN — LANSOPRAZOLE 30 MG: KIT at 21:37

## 2022-01-01 RX ADMIN — AMIODARONE HYDROCHLORIDE 400 MG: 200 TABLET ORAL at 07:58

## 2022-01-01 RX ADMIN — Medication: at 20:50

## 2022-01-01 RX ADMIN — ALBUTEROL SULFATE 2.5 MG: 2.5 SOLUTION RESPIRATORY (INHALATION) at 16:25

## 2022-01-01 RX ADMIN — FUROSEMIDE 15 MG/HR: 10 INJECTION INTRAMUSCULAR; INTRAVENOUS at 05:56

## 2022-01-01 RX ADMIN — COLLAGENASE SANTYL: 250 OINTMENT TOPICAL at 10:23

## 2022-01-01 RX ADMIN — Medication 1 PACKET: at 08:57

## 2022-01-01 RX ADMIN — METHYLPREDNISOLONE SODIUM SUCCINATE 80 MG: 125 INJECTION, POWDER, FOR SOLUTION INTRAMUSCULAR; INTRAVENOUS at 02:23

## 2022-01-01 RX ADMIN — METOPROLOL TARTRATE 5 MG: 5 INJECTION INTRAVENOUS at 04:59

## 2022-01-01 RX ADMIN — SENNOSIDES AND DOCUSATE SODIUM 1 TABLET: 50; 8.6 TABLET ORAL at 20:24

## 2022-01-01 RX ADMIN — BISACODYL 10 MG: 10 SUPPOSITORY RECTAL at 08:53

## 2022-01-01 RX ADMIN — Medication: at 20:54

## 2022-01-01 RX ADMIN — LANSOPRAZOLE 30 MG: KIT at 21:19

## 2022-01-01 RX ADMIN — ALBUMIN (HUMAN) 12.5 G: 0.25 INJECTION, SOLUTION INTRAVENOUS at 07:56

## 2022-01-01 RX ADMIN — SODIUM CHLORIDE 30 MG/ML INHALATION SOLUTION 4 ML: 30 SOLUTION INHALANT at 00:02

## 2022-01-01 RX ADMIN — Medication: at 21:24

## 2022-01-01 RX ADMIN — Medication: at 20:59

## 2022-01-01 RX ADMIN — ACETAMINOPHEN 650 MG: 160 SOLUTION ORAL at 12:51

## 2022-01-01 RX ADMIN — METHOCARBAMOL 1000 MG: 500 TABLET ORAL at 12:34

## 2022-01-01 RX ADMIN — SODIUM CHLORIDE: 9 INJECTION, SOLUTION INTRAVENOUS at 00:42

## 2022-01-01 RX ADMIN — Medication: at 08:40

## 2022-01-01 RX ADMIN — ALBUTEROL SULFATE 2.5 MG: 2.5 SOLUTION RESPIRATORY (INHALATION) at 12:37

## 2022-01-01 RX ADMIN — SODIUM CHLORIDE 30 MG/ML INHALATION SOLUTION 4 ML: 30 SOLUTION INHALANT at 00:30

## 2022-01-01 RX ADMIN — Medication 1 PACKET: at 13:11

## 2022-01-01 RX ADMIN — SODIUM CHLORIDE, PRESERVATIVE FREE 10 ML: 5 INJECTION INTRAVENOUS at 09:37

## 2022-01-01 RX ADMIN — FAMOTIDINE 20 MG: 10 INJECTION INTRAVENOUS at 09:45

## 2022-01-01 RX ADMIN — HYDROMORPHONE HYDROCHLORIDE 0.25 MG: 1 INJECTION, SOLUTION INTRAMUSCULAR; INTRAVENOUS; SUBCUTANEOUS at 10:16

## 2022-01-01 RX ADMIN — OXYCODONE HYDROCHLORIDE 10 MG: 5 SOLUTION ORAL at 15:00

## 2022-01-01 RX ADMIN — AMIODARONE HYDROCHLORIDE 200 MG: 200 TABLET ORAL at 09:27

## 2022-01-01 RX ADMIN — ACETAMINOPHEN 650 MG: 650 SOLUTION ORAL at 17:40

## 2022-01-01 RX ADMIN — ALBUTEROL SULFATE 2.5 MG: 2.5 SOLUTION RESPIRATORY (INHALATION) at 03:45

## 2022-01-01 RX ADMIN — PROPOFOL 10 MCG/KG/MIN: 10 INJECTION, EMULSION INTRAVENOUS at 03:21

## 2022-01-01 RX ADMIN — INSULIN LISPRO 4 UNITS: 100 INJECTION, SOLUTION INTRAVENOUS; SUBCUTANEOUS at 05:54

## 2022-01-01 RX ADMIN — PANTOPRAZOLE SODIUM 40 MG: 40 INJECTION, POWDER, LYOPHILIZED, FOR SOLUTION INTRAVENOUS at 09:10

## 2022-01-01 RX ADMIN — OXYCODONE HYDROCHLORIDE 7.5 MG: 5 SOLUTION ORAL at 17:52

## 2022-01-01 RX ADMIN — POLYETHYLENE GLYCOL 3350 17 G: 17 POWDER, FOR SOLUTION ORAL at 08:34

## 2022-01-01 RX ADMIN — Medication: at 20:25

## 2022-01-01 RX ADMIN — SODIUM CHLORIDE, PRESERVATIVE FREE 10 ML: 5 INJECTION INTRAVENOUS at 08:54

## 2022-01-01 RX ADMIN — Medication 15 ML: at 19:55

## 2022-01-01 RX ADMIN — POTASSIUM BICARBONATE 20 MEQ: 782 TABLET, EFFERVESCENT ORAL at 10:13

## 2022-01-01 RX ADMIN — METHYLPREDNISOLONE SODIUM SUCCINATE 125 MG: 125 INJECTION, POWDER, FOR SOLUTION INTRAMUSCULAR; INTRAVENOUS at 08:33

## 2022-01-01 RX ADMIN — METHYLPREDNISOLONE SODIUM SUCCINATE 80 MG: 125 INJECTION, POWDER, FOR SOLUTION INTRAMUSCULAR; INTRAVENOUS at 17:30

## 2022-01-01 RX ADMIN — SODIUM CHLORIDE, PRESERVATIVE FREE 10 ML: 5 INJECTION INTRAVENOUS at 20:50

## 2022-01-01 RX ADMIN — ALBUTEROL SULFATE 2.5 MG: 2.5 SOLUTION RESPIRATORY (INHALATION) at 16:11

## 2022-01-01 RX ADMIN — Medication 1 MG: at 12:26

## 2022-01-01 RX ADMIN — DOCUSATE SODIUM 100 MG: 50 LIQUID ORAL at 09:38

## 2022-01-01 RX ADMIN — SODIUM CHLORIDE 0.2 MCG/KG/HR: 9 INJECTION, SOLUTION INTRAVENOUS at 12:29

## 2022-01-01 RX ADMIN — INSULIN LISPRO 12 UNITS: 100 INJECTION, SOLUTION INTRAVENOUS; SUBCUTANEOUS at 01:24

## 2022-01-01 RX ADMIN — AMIODARONE HYDROCHLORIDE 200 MG: 200 TABLET ORAL at 10:01

## 2022-01-01 RX ADMIN — APIXABAN 5 MG: 5 TABLET, FILM COATED ORAL at 09:24

## 2022-01-01 RX ADMIN — METHOCARBAMOL 500 MG: 500 TABLET ORAL at 09:40

## 2022-01-01 RX ADMIN — METHYLPREDNISOLONE SODIUM SUCCINATE 60 MG: 125 INJECTION, POWDER, FOR SOLUTION INTRAMUSCULAR; INTRAVENOUS at 16:01

## 2022-01-01 RX ADMIN — Medication 1 PACKET: at 15:07

## 2022-01-01 RX ADMIN — METHOCARBAMOL 500 MG: 500 TABLET ORAL at 22:52

## 2022-01-01 RX ADMIN — APIXABAN 5 MG: 5 TABLET, FILM COATED ORAL at 20:42

## 2022-01-01 RX ADMIN — INSULIN LISPRO 10 UNITS: 100 INJECTION, SOLUTION INTRAVENOUS; SUBCUTANEOUS at 17:26

## 2022-01-01 RX ADMIN — Medication: at 21:09

## 2022-01-01 RX ADMIN — FAMOTIDINE 20 MG: 20 TABLET ORAL at 07:52

## 2022-01-01 RX ADMIN — SODIUM CHLORIDE, PRESERVATIVE FREE 10 ML: 5 INJECTION INTRAVENOUS at 21:37

## 2022-01-01 RX ADMIN — Medication: at 09:01

## 2022-01-01 RX ADMIN — PROPOFOL 20 MCG/KG/MIN: 10 INJECTION, EMULSION INTRAVENOUS at 22:39

## 2022-01-01 RX ADMIN — CEFEPIME HYDROCHLORIDE 2000 MG: 2 INJECTION, POWDER, FOR SOLUTION INTRAMUSCULAR; INTRAVENOUS at 14:42

## 2022-01-01 RX ADMIN — AMIODARONE HYDROCHLORIDE 0.5 MG/MIN: 50 INJECTION, SOLUTION INTRAVENOUS at 02:15

## 2022-01-01 RX ADMIN — ACETAMINOPHEN 1000 MG: 500 TABLET ORAL at 03:32

## 2022-01-01 RX ADMIN — ALBUTEROL SULFATE 2.5 MG: 2.5 SOLUTION RESPIRATORY (INHALATION) at 00:35

## 2022-01-01 RX ADMIN — PANTOPRAZOLE SODIUM 40 MG: 40 INJECTION, POWDER, LYOPHILIZED, FOR SOLUTION INTRAVENOUS at 10:01

## 2022-01-01 RX ADMIN — Medication: at 09:27

## 2022-01-01 RX ADMIN — INSULIN GLARGINE 2 UNITS: 100 INJECTION, SOLUTION SUBCUTANEOUS at 01:28

## 2022-01-01 RX ADMIN — INSULIN HUMAN 5 UNITS: 100 INJECTION, SOLUTION PARENTERAL at 15:58

## 2022-01-01 RX ADMIN — BISACODYL 10 MG: 10 SUPPOSITORY RECTAL at 09:26

## 2022-01-01 RX ADMIN — BISACODYL 10 MG: 10 SUPPOSITORY RECTAL at 09:41

## 2022-01-01 RX ADMIN — SODIUM CHLORIDE, PRESERVATIVE FREE 10 ML: 5 INJECTION INTRAVENOUS at 20:54

## 2022-01-01 RX ADMIN — Medication 15 ML: at 09:10

## 2022-01-01 RX ADMIN — INSULIN LISPRO 8 UNITS: 100 INJECTION, SOLUTION INTRAVENOUS; SUBCUTANEOUS at 20:45

## 2022-01-01 RX ADMIN — LANSOPRAZOLE 30 MG: KIT at 09:28

## 2022-01-01 RX ADMIN — METHOCARBAMOL 500 MG: 500 TABLET ORAL at 13:09

## 2022-01-01 RX ADMIN — Medication 1 PACKET: at 21:09

## 2022-01-01 RX ADMIN — INSULIN LISPRO 4 UNITS: 100 INJECTION, SOLUTION INTRAVENOUS; SUBCUTANEOUS at 06:12

## 2022-01-01 RX ADMIN — LANSOPRAZOLE 30 MG: KIT at 10:14

## 2022-01-01 RX ADMIN — SODIUM CHLORIDE, PRESERVATIVE FREE 10 ML: 5 INJECTION INTRAVENOUS at 10:13

## 2022-01-01 RX ADMIN — Medication: at 20:49

## 2022-01-01 RX ADMIN — ALBUTEROL SULFATE 2.5 MG: 2.5 SOLUTION RESPIRATORY (INHALATION) at 23:46

## 2022-01-01 RX ADMIN — PIPERACILLIN AND TAZOBACTAM 3375 MG: 3; .375 INJECTION, POWDER, FOR SOLUTION INTRAVENOUS at 16:26

## 2022-01-01 RX ADMIN — ALBUTEROL SULFATE 2.5 MG: 2.5 SOLUTION RESPIRATORY (INHALATION) at 22:21

## 2022-01-01 RX ADMIN — OXYCODONE HYDROCHLORIDE 7.5 MG: 5 SOLUTION ORAL at 13:29

## 2022-01-01 RX ADMIN — Medication: at 20:38

## 2022-01-01 RX ADMIN — Medication 1 PACKET: at 22:51

## 2022-01-01 RX ADMIN — ALBUTEROL SULFATE 2.5 MG: 2.5 SOLUTION RESPIRATORY (INHALATION) at 16:00

## 2022-01-01 RX ADMIN — ONDANSETRON 4 MG: 2 INJECTION INTRAMUSCULAR; INTRAVENOUS at 15:09

## 2022-01-01 RX ADMIN — ALBUMIN (HUMAN) 12.5 G: 0.25 INJECTION, SOLUTION INTRAVENOUS at 16:35

## 2022-01-01 RX ADMIN — Medication: at 07:53

## 2022-01-01 RX ADMIN — METHOCARBAMOL 500 MG: 500 TABLET ORAL at 17:55

## 2022-01-01 RX ADMIN — Medication: at 10:20

## 2022-01-01 RX ADMIN — INSULIN LISPRO 8 UNITS: 100 INJECTION, SOLUTION INTRAVENOUS; SUBCUTANEOUS at 02:13

## 2022-01-01 RX ADMIN — CLOTRIMAZOLE: 10 CREAM TOPICAL at 09:05

## 2022-01-01 RX ADMIN — ACETAMINOPHEN 650 MG: 160 SOLUTION ORAL at 09:00

## 2022-01-01 RX ADMIN — METHYLPREDNISOLONE SODIUM SUCCINATE 80 MG: 125 INJECTION, POWDER, FOR SOLUTION INTRAMUSCULAR; INTRAVENOUS at 08:52

## 2022-01-01 RX ADMIN — AMIODARONE HYDROCHLORIDE 200 MG: 200 TABLET ORAL at 08:27

## 2022-01-01 RX ADMIN — SODIUM CHLORIDE, PRESERVATIVE FREE 10 ML: 5 INJECTION INTRAVENOUS at 10:19

## 2022-01-01 RX ADMIN — CLOTRIMAZOLE: 10 CREAM TOPICAL at 20:31

## 2022-01-01 RX ADMIN — CEFEPIME HYDROCHLORIDE 2000 MG: 2 INJECTION, POWDER, FOR SOLUTION INTRAMUSCULAR; INTRAVENOUS at 10:40

## 2022-01-01 RX ADMIN — SODIUM CHLORIDE, PRESERVATIVE FREE 10 ML: 5 INJECTION INTRAVENOUS at 22:49

## 2022-01-01 RX ADMIN — ALBUTEROL SULFATE 2.5 MG: 2.5 SOLUTION RESPIRATORY (INHALATION) at 12:36

## 2022-01-01 RX ADMIN — APIXABAN 5 MG: 5 TABLET, FILM COATED ORAL at 09:25

## 2022-01-01 RX ADMIN — Medication 15 ML: at 20:50

## 2022-01-01 RX ADMIN — LANSOPRAZOLE 30 MG: KIT at 08:47

## 2022-01-01 RX ADMIN — ALBUTEROL SULFATE 2.5 MG: 2.5 SOLUTION RESPIRATORY (INHALATION) at 21:15

## 2022-01-01 RX ADMIN — LANSOPRAZOLE 30 MG: KIT at 20:31

## 2022-01-01 RX ADMIN — ACETAMINOPHEN 1000 MG: 500 TABLET ORAL at 02:37

## 2022-01-01 RX ADMIN — ALBUTEROL SULFATE 2.5 MG: 2.5 SOLUTION RESPIRATORY (INHALATION) at 03:51

## 2022-01-01 RX ADMIN — METHOCARBAMOL 500 MG: 500 TABLET ORAL at 08:47

## 2022-01-01 RX ADMIN — PRAVASTATIN SODIUM 40 MG: 40 TABLET ORAL at 18:46

## 2022-01-01 RX ADMIN — METHOCARBAMOL 500 MG: 500 TABLET ORAL at 12:46

## 2022-01-01 RX ADMIN — APIXABAN 5 MG: 5 TABLET, FILM COATED ORAL at 09:59

## 2022-01-01 RX ADMIN — ALBUMIN (HUMAN) 12.5 G: 0.25 INJECTION, SOLUTION INTRAVENOUS at 01:16

## 2022-01-01 RX ADMIN — PANTOPRAZOLE SODIUM 40 MG: 40 INJECTION, POWDER, LYOPHILIZED, FOR SOLUTION INTRAVENOUS at 20:25

## 2022-01-01 RX ADMIN — ALBUTEROL SULFATE 2.5 MG: 2.5 SOLUTION RESPIRATORY (INHALATION) at 21:31

## 2022-01-01 RX ADMIN — SODIUM CHLORIDE, PRESERVATIVE FREE 10 ML: 5 INJECTION INTRAVENOUS at 08:11

## 2022-01-01 RX ADMIN — LANSOPRAZOLE 30 MG: KIT at 21:07

## 2022-01-01 RX ADMIN — FUROSEMIDE 15 MG/HR: 10 INJECTION INTRAMUSCULAR; INTRAVENOUS at 17:40

## 2022-01-01 RX ADMIN — AMIODARONE HYDROCHLORIDE 400 MG: 200 TABLET ORAL at 21:09

## 2022-01-01 RX ADMIN — SODIUM CHLORIDE, PRESERVATIVE FREE 10 ML: 5 INJECTION INTRAVENOUS at 20:15

## 2022-01-01 RX ADMIN — BISACODYL 10 MG: 10 SUPPOSITORY RECTAL at 09:51

## 2022-01-01 RX ADMIN — ALBUMIN (HUMAN) 12.5 G: 0.25 INJECTION, SOLUTION INTRAVENOUS at 17:24

## 2022-01-01 RX ADMIN — OXYCODONE HYDROCHLORIDE 5 MG: 5 SOLUTION ORAL at 06:26

## 2022-01-01 RX ADMIN — DOXAZOSIN 1 MG: 2 TABLET ORAL at 08:53

## 2022-01-01 RX ADMIN — ALBUTEROL SULFATE 2.5 MG: 2.5 SOLUTION RESPIRATORY (INHALATION) at 08:12

## 2022-01-01 RX ADMIN — PANTOPRAZOLE SODIUM 40 MG: 40 INJECTION, POWDER, LYOPHILIZED, FOR SOLUTION INTRAVENOUS at 20:13

## 2022-01-01 RX ADMIN — Medication 15 ML: at 20:58

## 2022-01-01 RX ADMIN — APIXABAN 5 MG: 5 TABLET, FILM COATED ORAL at 22:11

## 2022-01-01 RX ADMIN — Medication: at 21:00

## 2022-01-01 RX ADMIN — PIPERACILLIN AND TAZOBACTAM 3375 MG: 3; .375 INJECTION, POWDER, FOR SOLUTION INTRAVENOUS at 07:01

## 2022-01-01 RX ADMIN — APIXABAN 5 MG: 5 TABLET, FILM COATED ORAL at 08:53

## 2022-01-01 RX ADMIN — DOXAZOSIN 1 MG: 2 TABLET ORAL at 10:14

## 2022-01-01 RX ADMIN — ACETAMINOPHEN 650 MG: 160 SOLUTION ORAL at 13:16

## 2022-01-01 RX ADMIN — LANSOPRAZOLE 30 MG: KIT at 21:24

## 2022-01-01 RX ADMIN — SODIUM CHLORIDE, PRESERVATIVE FREE 10 ML: 5 INJECTION INTRAVENOUS at 21:35

## 2022-01-01 RX ADMIN — ALBUTEROL SULFATE 2.5 MG: 2.5 SOLUTION RESPIRATORY (INHALATION) at 04:29

## 2022-01-01 RX ADMIN — OXYCODONE HYDROCHLORIDE 5 MG: 5 SOLUTION ORAL at 12:54

## 2022-01-01 RX ADMIN — ALBUTEROL SULFATE 2.5 MG: 2.5 SOLUTION RESPIRATORY (INHALATION) at 16:59

## 2022-01-01 RX ADMIN — FUROSEMIDE 40 MG: 10 INJECTION, SOLUTION INTRAMUSCULAR; INTRAVENOUS at 14:06

## 2022-01-01 RX ADMIN — POTASSIUM BICARBONATE 40 MEQ: 782 TABLET, EFFERVESCENT ORAL at 14:04

## 2022-01-01 RX ADMIN — APIXABAN 5 MG: 5 TABLET, FILM COATED ORAL at 21:24

## 2022-01-01 RX ADMIN — METHOCARBAMOL 500 MG: 500 TABLET ORAL at 08:53

## 2022-01-01 RX ADMIN — ALBUTEROL SULFATE 2.5 MG: 2.5 SOLUTION RESPIRATORY (INHALATION) at 04:13

## 2022-01-01 RX ADMIN — OXYCODONE HYDROCHLORIDE 7.5 MG: 5 SOLUTION ORAL at 18:12

## 2022-01-01 RX ADMIN — ALBUMIN (HUMAN) 12.5 G: 0.25 INJECTION, SOLUTION INTRAVENOUS at 18:30

## 2022-01-01 RX ADMIN — METHOCARBAMOL 500 MG: 500 TABLET ORAL at 13:37

## 2022-01-01 RX ADMIN — SODIUM CHLORIDE 1 UNITS/HR: 9 INJECTION, SOLUTION INTRAVENOUS at 06:22

## 2022-01-01 RX ADMIN — PANTOPRAZOLE SODIUM 40 MG: 40 INJECTION, POWDER, LYOPHILIZED, FOR SOLUTION INTRAVENOUS at 09:24

## 2022-01-01 RX ADMIN — ALBUTEROL SULFATE 2.5 MG: 2.5 SOLUTION RESPIRATORY (INHALATION) at 15:58

## 2022-01-01 RX ADMIN — SODIUM CHLORIDE, PRESERVATIVE FREE 10 ML: 5 INJECTION INTRAVENOUS at 22:33

## 2022-01-01 RX ADMIN — POTASSIUM BICARBONATE 20 MEQ: 782 TABLET, EFFERVESCENT ORAL at 10:09

## 2022-01-01 RX ADMIN — SODIUM CHLORIDE, PRESERVATIVE FREE 5 ML: 5 INJECTION INTRAVENOUS at 11:08

## 2022-01-01 RX ADMIN — SODIUM CHLORIDE, PRESERVATIVE FREE 10 ML: 5 INJECTION INTRAVENOUS at 20:55

## 2022-01-01 RX ADMIN — INSULIN LISPRO 4 UNITS: 100 INJECTION, SOLUTION INTRAVENOUS; SUBCUTANEOUS at 01:42

## 2022-01-01 RX ADMIN — POTASSIUM BICARBONATE 20 MEQ: 782 TABLET, EFFERVESCENT ORAL at 23:50

## 2022-01-01 RX ADMIN — INSULIN LISPRO 5 UNITS: 100 INJECTION, SOLUTION INTRAVENOUS; SUBCUTANEOUS at 20:45

## 2022-01-01 RX ADMIN — APIXABAN 5 MG: 5 TABLET, FILM COATED ORAL at 10:12

## 2022-01-01 RX ADMIN — INSULIN HUMAN 9 UNITS: 100 INJECTION, SOLUTION PARENTERAL at 17:55

## 2022-01-01 RX ADMIN — METHOCARBAMOL 500 MG: 500 TABLET ORAL at 17:45

## 2022-01-01 RX ADMIN — AMIODARONE HYDROCHLORIDE 0.5 MG/MIN: 50 INJECTION, SOLUTION INTRAVENOUS at 05:07

## 2022-01-01 RX ADMIN — FAMOTIDINE 20 MG: 10 INJECTION INTRAVENOUS at 08:33

## 2022-01-01 RX ADMIN — METHOCARBAMOL 500 MG: 500 TABLET ORAL at 20:57

## 2022-01-01 RX ADMIN — INSULIN HUMAN 5 UNITS: 100 INJECTION, SOLUTION PARENTERAL at 21:15

## 2022-01-01 RX ADMIN — LANSOPRAZOLE 30 MG: KIT at 00:03

## 2022-01-01 RX ADMIN — INSULIN HUMAN 12 UNITS: 100 INJECTION, SOLUTION PARENTERAL at 12:32

## 2022-01-01 RX ADMIN — Medication: at 08:55

## 2022-01-01 RX ADMIN — POTASSIUM PHOSPHATE, MONOBASIC AND POTASSIUM PHOSPHATE, DIBASIC 10 MMOL: 224; 236 INJECTION, SOLUTION, CONCENTRATE INTRAVENOUS at 10:13

## 2022-01-01 RX ADMIN — HYDROMORPHONE HYDROCHLORIDE 0.5 MG: 1 INJECTION, SOLUTION INTRAMUSCULAR; INTRAVENOUS; SUBCUTANEOUS at 20:12

## 2022-01-01 RX ADMIN — SODIUM CHLORIDE, PRESERVATIVE FREE 10 ML: 5 INJECTION INTRAVENOUS at 21:09

## 2022-01-01 RX ADMIN — ACETAMINOPHEN 1000 MG: 500 TABLET ORAL at 05:45

## 2022-01-01 RX ADMIN — HYDROMORPHONE HYDROCHLORIDE 0.25 MG: 1 INJECTION, SOLUTION INTRAMUSCULAR; INTRAVENOUS; SUBCUTANEOUS at 12:05

## 2022-01-01 RX ADMIN — SODIUM CHLORIDE, PRESERVATIVE FREE 10 ML: 5 INJECTION INTRAVENOUS at 08:04

## 2022-01-01 RX ADMIN — INSULIN LISPRO 4 UNITS: 100 INJECTION, SOLUTION INTRAVENOUS; SUBCUTANEOUS at 00:34

## 2022-01-01 RX ADMIN — PRAVASTATIN SODIUM 40 MG: 40 TABLET ORAL at 17:03

## 2022-01-01 RX ADMIN — METHOCARBAMOL 500 MG: 500 TABLET ORAL at 18:43

## 2022-01-01 RX ADMIN — ALBUTEROL SULFATE 2.5 MG: 2.5 SOLUTION RESPIRATORY (INHALATION) at 16:43

## 2022-01-01 RX ADMIN — METHYLPREDNISOLONE SODIUM SUCCINATE 125 MG: 125 INJECTION, POWDER, FOR SOLUTION INTRAMUSCULAR; INTRAVENOUS at 16:38

## 2022-01-01 RX ADMIN — INSULIN LISPRO 8 UNITS: 100 INJECTION, SOLUTION INTRAVENOUS; SUBCUTANEOUS at 01:30

## 2022-01-01 RX ADMIN — METHOCARBAMOL 500 MG: 500 TABLET ORAL at 21:21

## 2022-01-01 RX ADMIN — INSULIN LISPRO 16 UNITS: 100 INJECTION, SOLUTION INTRAVENOUS; SUBCUTANEOUS at 20:55

## 2022-01-01 RX ADMIN — Medication 40 MG: at 16:25

## 2022-01-01 RX ADMIN — INSULIN HUMAN 15 UNITS: 100 INJECTION, SOLUTION PARENTERAL at 10:24

## 2022-01-01 RX ADMIN — Medication 15 ML: at 20:07

## 2022-01-01 RX ADMIN — PROPOFOL 20 MCG/KG/MIN: 10 INJECTION, EMULSION INTRAVENOUS at 17:32

## 2022-01-01 RX ADMIN — ALBUTEROL SULFATE 2.5 MG: 2.5 SOLUTION RESPIRATORY (INHALATION) at 21:25

## 2022-01-01 RX ADMIN — Medication 15 ML: at 09:05

## 2022-01-01 RX ADMIN — Medication 1 PACKET: at 12:50

## 2022-01-01 RX ADMIN — BISACODYL 10 MG: 10 SUPPOSITORY RECTAL at 18:43

## 2022-01-01 RX ADMIN — ALBUTEROL SULFATE 2.5 MG: 2.5 SOLUTION RESPIRATORY (INHALATION) at 04:15

## 2022-01-01 RX ADMIN — SODIUM CHLORIDE, PRESERVATIVE FREE 10 ML: 5 INJECTION INTRAVENOUS at 21:05

## 2022-01-01 RX ADMIN — ALBUTEROL SULFATE 2.5 MG: 2.5 SOLUTION RESPIRATORY (INHALATION) at 19:48

## 2022-01-01 RX ADMIN — Medication: at 09:32

## 2022-01-01 RX ADMIN — CLOTRIMAZOLE: 10 CREAM TOPICAL at 22:58

## 2022-01-01 RX ADMIN — LANSOPRAZOLE 30 MG: KIT at 08:02

## 2022-01-01 RX ADMIN — APIXABAN 5 MG: 5 TABLET, FILM COATED ORAL at 09:34

## 2022-01-01 RX ADMIN — HYDROMORPHONE HYDROCHLORIDE 0.25 MG: 1 INJECTION, SOLUTION INTRAMUSCULAR; INTRAVENOUS; SUBCUTANEOUS at 07:32

## 2022-01-01 RX ADMIN — SODIUM CHLORIDE, PRESERVATIVE FREE 10 ML: 5 INJECTION INTRAVENOUS at 20:09

## 2022-01-01 RX ADMIN — Medication: at 09:44

## 2022-01-01 RX ADMIN — Medication 15 ML: at 20:16

## 2022-01-01 RX ADMIN — OXYCODONE HYDROCHLORIDE 7.5 MG: 5 SOLUTION ORAL at 05:09

## 2022-01-01 RX ADMIN — APIXABAN 5 MG: 5 TABLET, FILM COATED ORAL at 08:35

## 2022-01-01 RX ADMIN — ACETAMINOPHEN 1000 MG: 500 TABLET ORAL at 00:18

## 2022-01-01 RX ADMIN — POLYETHYLENE GLYCOL 3350 17 G: 17 POWDER, FOR SOLUTION ORAL at 09:24

## 2022-01-01 RX ADMIN — ALBUTEROL SULFATE 2.5 MG: 2.5 SOLUTION RESPIRATORY (INHALATION) at 04:06

## 2022-01-01 RX ADMIN — PIPERACILLIN AND TAZOBACTAM 3375 MG: 3; .375 INJECTION, POWDER, FOR SOLUTION INTRAVENOUS at 15:19

## 2022-01-01 RX ADMIN — DEXTROSE MONOHYDRATE 250 ML: 100 INJECTION, SOLUTION INTRAVENOUS at 04:38

## 2022-01-01 RX ADMIN — SODIUM CHLORIDE, PRESERVATIVE FREE 10 ML: 5 INJECTION INTRAVENOUS at 10:46

## 2022-01-01 RX ADMIN — PROPOFOL 25 MCG/KG/MIN: 10 INJECTION, EMULSION INTRAVENOUS at 09:03

## 2022-01-01 RX ADMIN — BISACODYL 10 MG: 10 SUPPOSITORY RECTAL at 09:48

## 2022-01-01 RX ADMIN — PIPERACILLIN AND TAZOBACTAM 3375 MG: 3; .375 INJECTION, POWDER, FOR SOLUTION INTRAVENOUS at 23:28

## 2022-01-01 RX ADMIN — INSULIN LISPRO 5 UNITS: 100 INJECTION, SOLUTION INTRAVENOUS; SUBCUTANEOUS at 14:02

## 2022-01-01 RX ADMIN — PIPERACILLIN AND TAZOBACTAM 3375 MG: 3; .375 INJECTION, POWDER, FOR SOLUTION INTRAVENOUS at 00:46

## 2022-01-01 RX ADMIN — ALBUTEROL SULFATE 2.5 MG: 2.5 SOLUTION RESPIRATORY (INHALATION) at 00:00

## 2022-01-01 RX ADMIN — METHOCARBAMOL 500 MG: 500 TABLET ORAL at 08:35

## 2022-01-01 RX ADMIN — Medication: at 10:03

## 2022-01-01 RX ADMIN — ALBUTEROL SULFATE 2.5 MG: 2.5 SOLUTION RESPIRATORY (INHALATION) at 21:00

## 2022-01-01 RX ADMIN — ALBUTEROL SULFATE 2.5 MG: 2.5 SOLUTION RESPIRATORY (INHALATION) at 12:15

## 2022-01-01 RX ADMIN — OXYCODONE HYDROCHLORIDE 10 MG: 5 SOLUTION ORAL at 08:57

## 2022-01-01 RX ADMIN — SODIUM CHLORIDE 30 MG/ML INHALATION SOLUTION 4 ML: 30 SOLUTION INHALANT at 11:45

## 2022-01-01 RX ADMIN — APIXABAN 5 MG: 5 TABLET, FILM COATED ORAL at 09:43

## 2022-01-01 RX ADMIN — ALBUTEROL SULFATE 2.5 MG: 2.5 SOLUTION RESPIRATORY (INHALATION) at 07:56

## 2022-01-01 RX ADMIN — ACETAMINOPHEN 650 MG: 160 SOLUTION ORAL at 12:30

## 2022-01-01 RX ADMIN — COLLAGENASE SANTYL: 250 OINTMENT TOPICAL at 16:42

## 2022-01-01 RX ADMIN — ACETAMINOPHEN 650 MG: 650 SOLUTION ORAL at 10:27

## 2022-01-01 RX ADMIN — LANSOPRAZOLE 30 MG: KIT at 21:57

## 2022-01-01 RX ADMIN — OXYCODONE HYDROCHLORIDE 7.5 MG: 5 SOLUTION ORAL at 15:08

## 2022-01-01 RX ADMIN — INSULIN HUMAN 10 UNITS: 100 INJECTION, SOLUTION PARENTERAL at 22:13

## 2022-01-01 RX ADMIN — MORPHINE SULFATE 1 MG: 2 INJECTION, SOLUTION INTRAMUSCULAR; INTRAVENOUS at 22:23

## 2022-01-01 RX ADMIN — ALBUTEROL SULFATE 2.5 MG: 2.5 SOLUTION RESPIRATORY (INHALATION) at 08:42

## 2022-01-01 RX ADMIN — CLOTRIMAZOLE: 10 CREAM TOPICAL at 20:44

## 2022-01-01 RX ADMIN — OXYCODONE HYDROCHLORIDE 7.5 MG: 5 SOLUTION ORAL at 06:34

## 2022-01-01 RX ADMIN — PROPOFOL 15 MCG/KG/MIN: 10 INJECTION, EMULSION INTRAVENOUS at 23:48

## 2022-01-01 RX ADMIN — Medication: at 08:19

## 2022-01-01 RX ADMIN — Medication: at 20:46

## 2022-01-01 RX ADMIN — ACETAMINOPHEN 650 MG: 160 SOLUTION ORAL at 20:32

## 2022-01-01 RX ADMIN — SODIUM CHLORIDE, PRESERVATIVE FREE 10 ML: 5 INJECTION INTRAVENOUS at 10:12

## 2022-01-01 RX ADMIN — NOREPINEPHRINE BITARTRATE 1 MCG/MIN: 1 INJECTION, SOLUTION, CONCENTRATE INTRAVENOUS at 22:15

## 2022-01-01 RX ADMIN — METHOCARBAMOL 1000 MG: 500 TABLET ORAL at 09:32

## 2022-01-01 RX ADMIN — ALBUTEROL SULFATE 2.5 MG: 2.5 SOLUTION RESPIRATORY (INHALATION) at 16:56

## 2022-01-01 RX ADMIN — Medication 1 PACKET: at 20:57

## 2022-01-01 RX ADMIN — INSULIN LISPRO 4 UNITS: 100 INJECTION, SOLUTION INTRAVENOUS; SUBCUTANEOUS at 17:30

## 2022-01-01 RX ADMIN — METHOCARBAMOL 500 MG: 500 TABLET ORAL at 22:43

## 2022-01-01 RX ADMIN — INSULIN LISPRO 4 UNITS: 100 INJECTION, SOLUTION INTRAVENOUS; SUBCUTANEOUS at 05:43

## 2022-01-01 RX ADMIN — Medication 15 ML: at 09:04

## 2022-01-01 RX ADMIN — INSULIN LISPRO 8 UNITS: 100 INJECTION, SOLUTION INTRAVENOUS; SUBCUTANEOUS at 05:16

## 2022-01-01 RX ADMIN — Medication 1 PACKET: at 08:58

## 2022-01-01 RX ADMIN — OXYCODONE HYDROCHLORIDE 7.5 MG: 5 SOLUTION ORAL at 00:16

## 2022-01-01 RX ADMIN — ALBUTEROL SULFATE 2.5 MG: 2.5 SOLUTION RESPIRATORY (INHALATION) at 11:44

## 2022-01-01 RX ADMIN — HEPARIN SODIUM 5000 UNITS: 5000 INJECTION INTRAVENOUS; SUBCUTANEOUS at 13:59

## 2022-01-01 RX ADMIN — ALBUMIN (HUMAN) 12.5 G: 0.25 INJECTION, SOLUTION INTRAVENOUS at 00:33

## 2022-01-01 RX ADMIN — OXYCODONE HYDROCHLORIDE 7.5 MG: 5 SOLUTION ORAL at 08:34

## 2022-01-01 RX ADMIN — PIPERACILLIN AND TAZOBACTAM 3375 MG: 3; .375 INJECTION, POWDER, FOR SOLUTION INTRAVENOUS at 16:14

## 2022-01-01 RX ADMIN — CLOTRIMAZOLE: 10 CREAM TOPICAL at 14:05

## 2022-01-01 RX ADMIN — APIXABAN 5 MG: 5 TABLET, FILM COATED ORAL at 20:09

## 2022-01-01 RX ADMIN — ALBUTEROL SULFATE 2.5 MG: 2.5 SOLUTION RESPIRATORY (INHALATION) at 09:45

## 2022-01-01 RX ADMIN — SODIUM CHLORIDE, PRESERVATIVE FREE 10 ML: 5 INJECTION INTRAVENOUS at 11:13

## 2022-01-01 RX ADMIN — Medication 15 ML: at 09:01

## 2022-01-01 RX ADMIN — SODIUM CHLORIDE, PRESERVATIVE FREE 10 ML: 5 INJECTION INTRAVENOUS at 09:50

## 2022-01-01 RX ADMIN — METHOCARBAMOL 500 MG: 500 TABLET ORAL at 21:05

## 2022-01-01 RX ADMIN — INSULIN LISPRO 8 UNITS: 100 INJECTION, SOLUTION INTRAVENOUS; SUBCUTANEOUS at 05:32

## 2022-01-01 RX ADMIN — Medication: at 22:20

## 2022-01-01 RX ADMIN — HYDROMORPHONE HYDROCHLORIDE 0.25 MG: 1 INJECTION, SOLUTION INTRAMUSCULAR; INTRAVENOUS; SUBCUTANEOUS at 12:54

## 2022-01-01 RX ADMIN — ALBUTEROL SULFATE 2.5 MG: 2.5 SOLUTION RESPIRATORY (INHALATION) at 16:27

## 2022-01-01 RX ADMIN — SODIUM CHLORIDE, PRESERVATIVE FREE 10 ML: 5 INJECTION INTRAVENOUS at 08:58

## 2022-01-01 RX ADMIN — SODIUM CHLORIDE, PRESERVATIVE FREE 10 ML: 5 INJECTION INTRAVENOUS at 21:38

## 2022-01-01 RX ADMIN — INSULIN LISPRO 8 UNITS: 100 INJECTION, SOLUTION INTRAVENOUS; SUBCUTANEOUS at 17:22

## 2022-01-01 RX ADMIN — INSULIN HUMAN 9 UNITS: 100 INJECTION, SOLUTION PARENTERAL at 06:33

## 2022-01-01 RX ADMIN — ALBUMIN (HUMAN) 12.5 G: 0.25 INJECTION, SOLUTION INTRAVENOUS at 00:36

## 2022-01-01 RX ADMIN — INSULIN LISPRO 8 UNITS: 100 INJECTION, SOLUTION INTRAVENOUS; SUBCUTANEOUS at 21:49

## 2022-01-01 RX ADMIN — METHOCARBAMOL 500 MG: 500 TABLET ORAL at 21:15

## 2022-01-01 RX ADMIN — QUETIAPINE FUMARATE 25 MG: 25 TABLET ORAL at 22:46

## 2022-01-01 RX ADMIN — Medication 15 ML: at 20:18

## 2022-01-01 RX ADMIN — INSULIN LISPRO 8 UNITS: 100 INJECTION, SOLUTION INTRAVENOUS; SUBCUTANEOUS at 12:49

## 2022-01-01 RX ADMIN — QUETIAPINE FUMARATE 25 MG: 25 TABLET ORAL at 12:29

## 2022-01-01 RX ADMIN — METHOCARBAMOL 500 MG: 500 TABLET ORAL at 10:20

## 2022-01-01 RX ADMIN — SODIUM CHLORIDE, PRESERVATIVE FREE 10 ML: 5 INJECTION INTRAVENOUS at 20:16

## 2022-01-01 RX ADMIN — FUROSEMIDE 15 MG/HR: 10 INJECTION INTRAMUSCULAR; INTRAVENOUS at 19:38

## 2022-01-01 RX ADMIN — METHOCARBAMOL 500 MG: 500 TABLET ORAL at 18:17

## 2022-01-01 RX ADMIN — PROPOFOL 25 MCG/KG/MIN: 10 INJECTION, EMULSION INTRAVENOUS at 02:00

## 2022-01-01 RX ADMIN — Medication 1 PACKET: at 22:45

## 2022-01-01 RX ADMIN — Medication 15 ML: at 20:53

## 2022-01-01 RX ADMIN — ALBUTEROL SULFATE 2.5 MG: 2.5 SOLUTION RESPIRATORY (INHALATION) at 07:35

## 2022-01-01 RX ADMIN — OXYCODONE HYDROCHLORIDE 7.5 MG: 5 SOLUTION ORAL at 08:59

## 2022-01-01 RX ADMIN — DOXAZOSIN 1 MG: 2 TABLET ORAL at 09:48

## 2022-01-01 RX ADMIN — SODIUM CHLORIDE, PRESERVATIVE FREE 10 ML: 5 INJECTION INTRAVENOUS at 22:48

## 2022-01-01 RX ADMIN — INSULIN HUMAN 5 UNITS: 100 INJECTION, SOLUTION PARENTERAL at 14:07

## 2022-01-01 RX ADMIN — METHOCARBAMOL 500 MG: 500 TABLET ORAL at 10:12

## 2022-01-01 RX ADMIN — METHYLPREDNISOLONE SODIUM SUCCINATE 80 MG: 125 INJECTION, POWDER, FOR SOLUTION INTRAMUSCULAR; INTRAVENOUS at 08:33

## 2022-01-01 RX ADMIN — METHOCARBAMOL 500 MG: 500 TABLET ORAL at 09:25

## 2022-01-01 RX ADMIN — ALBUTEROL SULFATE 2.5 MG: 2.5 SOLUTION RESPIRATORY (INHALATION) at 12:58

## 2022-01-01 RX ADMIN — HEPARIN SODIUM 5000 UNITS: 5000 INJECTION INTRAVENOUS; SUBCUTANEOUS at 06:14

## 2022-01-01 RX ADMIN — HYDROMORPHONE HYDROCHLORIDE 0.5 MG: 1 INJECTION, SOLUTION INTRAMUSCULAR; INTRAVENOUS; SUBCUTANEOUS at 01:26

## 2022-01-01 RX ADMIN — PROPOFOL 20 MCG/KG/MIN: 10 INJECTION, EMULSION INTRAVENOUS at 15:18

## 2022-01-01 ASSESSMENT — PULMONARY FUNCTION TESTS
PIF_VALUE: 25
PIF_VALUE: 37
PIF_VALUE: 23
PIF_VALUE: 37
PIF_VALUE: 36
PIF_VALUE: 34
PIF_VALUE: 34
PIF_VALUE: 36
PIF_VALUE: 39
PIF_VALUE: 34
PIF_VALUE: 36
PIF_VALUE: 34
PIF_VALUE: 22
PIF_VALUE: 36
PIF_VALUE: 34
PIF_VALUE: 39
PIF_VALUE: 38
PIF_VALUE: 32
PIF_VALUE: 33
PIF_VALUE: 35
PIF_VALUE: 33
PIF_VALUE: 34
PIF_VALUE: 28
PIF_VALUE: 34
PIF_VALUE: 33
PIF_VALUE: 37
PIF_VALUE: 34
PIF_VALUE: 36
PIF_VALUE: 31
PIF_VALUE: 28
PIF_VALUE: 37
PIF_VALUE: 38
PIF_VALUE: 37
PIF_VALUE: 35
PIF_VALUE: 35
PIF_VALUE: 32
PIF_VALUE: 38
PIF_VALUE: 32
PIF_VALUE: 35
PIF_VALUE: 35
PIF_VALUE: 32
PIF_VALUE: 38
PIF_VALUE: 31
PIF_VALUE: 35
PIF_VALUE: 35
PIF_VALUE: 36
PIF_VALUE: 37
PIF_VALUE: 32
PIF_VALUE: 38
PIF_VALUE: 26
PIF_VALUE: 37
PIF_VALUE: 38
PIF_VALUE: 36
PIF_VALUE: 27
PIF_VALUE: 33
PIF_VALUE: 27
PIF_VALUE: 35
PIF_VALUE: 35
PIF_VALUE: 26
PIF_VALUE: 36
PIF_VALUE: 35
PIF_VALUE: 30
PIF_VALUE: 28
PIF_VALUE: 33
PIF_VALUE: 35
PIF_VALUE: 38
PIF_VALUE: 32
PIF_VALUE: 33
PIF_VALUE: 26
PIF_VALUE: 19
PIF_VALUE: 22
PIF_VALUE: 34
PIF_VALUE: 26
PIF_VALUE: 30
PIF_VALUE: 37
PIF_VALUE: 25
PIF_VALUE: 36
PIF_VALUE: 39
PIF_VALUE: 34
PIF_VALUE: 43
PIF_VALUE: 29
PIF_VALUE: 40
PIF_VALUE: 50
PIF_VALUE: 50
PIF_VALUE: 22
PIF_VALUE: 37
PIF_VALUE: 33
PIF_VALUE: 24
PIF_VALUE: 37
PIF_VALUE: 36
PIF_VALUE: 32
PIF_VALUE: 33
PIF_VALUE: 34
PIF_VALUE: 37
PIF_VALUE: 33
PIF_VALUE: 38
PIF_VALUE: 39
PIF_VALUE: 33
PIF_VALUE: 37
PIF_VALUE: 16
PIF_VALUE: 37
PIF_VALUE: 23
PIF_VALUE: 34
PIF_VALUE: 33
PIF_VALUE: 18
PIF_VALUE: 35
PIF_VALUE: 39
PIF_VALUE: 35
PIF_VALUE: 32
PIF_VALUE: 37
PIF_VALUE: 27
PIF_VALUE: 21
PIF_VALUE: 33
PIF_VALUE: 37
PIF_VALUE: 33
PIF_VALUE: 31
PIF_VALUE: 33
PIF_VALUE: 36
PIF_VALUE: 41
PIF_VALUE: 36
PIF_VALUE: 34
PIF_VALUE: 22
PIF_VALUE: 35
PIF_VALUE: 33
PIF_VALUE: 32
PIF_VALUE: 31
PIF_VALUE: 28
PIF_VALUE: 36
PIF_VALUE: 18
PIF_VALUE: 21
PIF_VALUE: 29
PIF_VALUE: 19
PIF_VALUE: 30
PIF_VALUE: 33
PIF_VALUE: 30
PIF_VALUE: 43
PIF_VALUE: 17
PIF_VALUE: 36
PIF_VALUE: 23
PIF_VALUE: 26
PIF_VALUE: 33
PIF_VALUE: 33
PIF_VALUE: 32
PIF_VALUE: 37
PIF_VALUE: 34
PIF_VALUE: 42
PIF_VALUE: 35
PIF_VALUE: 37
PIF_VALUE: 26
PIF_VALUE: 34
PIF_VALUE: 35
PIF_VALUE: 36
PIF_VALUE: 32
PIF_VALUE: 18
PIF_VALUE: 38
PIF_VALUE: 26
PIF_VALUE: 30
PIF_VALUE: 35
PIF_VALUE: 30
PIF_VALUE: 29
PIF_VALUE: 33
PIF_VALUE: 21
PIF_VALUE: 37
PIF_VALUE: 33
PIF_VALUE: 38
PIF_VALUE: 33
PIF_VALUE: 27
PIF_VALUE: 39
PIF_VALUE: 32
PIF_VALUE: 35
PIF_VALUE: 38
PIF_VALUE: 38
PIF_VALUE: 40
PIF_VALUE: 36
PIF_VALUE: 31
PIF_VALUE: 36
PIF_VALUE: 36
PIF_VALUE: 30
PIF_VALUE: 33
PIF_VALUE: 32
PIF_VALUE: 35
PIF_VALUE: 37
PIF_VALUE: 32
PIF_VALUE: 29
PIF_VALUE: 34
PIF_VALUE: 37
PIF_VALUE: 27
PIF_VALUE: 36
PIF_VALUE: 38
PIF_VALUE: 38
PIF_VALUE: 36
PIF_VALUE: 23
PIF_VALUE: 37
PIF_VALUE: 23
PIF_VALUE: 47
PIF_VALUE: 33
PIF_VALUE: 33
PIF_VALUE: 34
PIF_VALUE: 33
PIF_VALUE: 35
PIF_VALUE: 46
PIF_VALUE: 38
PIF_VALUE: 37
PIF_VALUE: 36
PIF_VALUE: 35
PIF_VALUE: 33
PIF_VALUE: 38
PIF_VALUE: 34
PIF_VALUE: 32
PIF_VALUE: 39
PIF_VALUE: 50
PIF_VALUE: 38
PIF_VALUE: 33
PIF_VALUE: 34
PIF_VALUE: 26
PIF_VALUE: 35
PIF_VALUE: 32
PIF_VALUE: 33
PIF_VALUE: 38
PIF_VALUE: 39
PIF_VALUE: 33
PIF_VALUE: 26
PIF_VALUE: 30
PIF_VALUE: 36
PIF_VALUE: 21
PIF_VALUE: 33
PIF_VALUE: 31
PIF_VALUE: 41
PIF_VALUE: 39
PIF_VALUE: 36
PIF_VALUE: 36
PIF_VALUE: 34
PIF_VALUE: 36
PIF_VALUE: 40
PIF_VALUE: 37
PIF_VALUE: 38
PIF_VALUE: 28
PIF_VALUE: 37
PIF_VALUE: 30
PIF_VALUE: 33
PIF_VALUE: 48
PIF_VALUE: 14
PIF_VALUE: 36
PIF_VALUE: 35
PIF_VALUE: 41
PIF_VALUE: 33
PIF_VALUE: 37
PIF_VALUE: 34
PIF_VALUE: 38
PIF_VALUE: 37
PIF_VALUE: 36
PIF_VALUE: 33
PIF_VALUE: 25
PIF_VALUE: 29
PIF_VALUE: 32
PIF_VALUE: 32
PIF_VALUE: 36
PIF_VALUE: 32
PIF_VALUE: 30
PIF_VALUE: 36
PIF_VALUE: 24
PIF_VALUE: 27
PIF_VALUE: 34
PIF_VALUE: 32
PIF_VALUE: 36
PIF_VALUE: 33
PIF_VALUE: 39
PIF_VALUE: 37
PIF_VALUE: 20
PIF_VALUE: 32
PIF_VALUE: 30
PIF_VALUE: 36
PIF_VALUE: 39
PIF_VALUE: 33
PIF_VALUE: 38
PIF_VALUE: 34
PIF_VALUE: 38
PIF_VALUE: 35
PIF_VALUE: 39
PIF_VALUE: 38
PIF_VALUE: 30
PIF_VALUE: 36
PIF_VALUE: 37
PIF_VALUE: 38
PIF_VALUE: 33
PIF_VALUE: 34
PIF_VALUE: 33
PIF_VALUE: 36
PIF_VALUE: 40
PIF_VALUE: 34
PIF_VALUE: 33
PIF_VALUE: 34
PIF_VALUE: 38
PIF_VALUE: 37
PIF_VALUE: 33
PIF_VALUE: 24
PIF_VALUE: 29
PIF_VALUE: 20
PIF_VALUE: 34
PIF_VALUE: 35
PIF_VALUE: 22
PIF_VALUE: 36
PIF_VALUE: 35
PIF_VALUE: 30
PIF_VALUE: 35
PIF_VALUE: 34
PIF_VALUE: 31
PIF_VALUE: 29
PIF_VALUE: 33
PIF_VALUE: 25
PIF_VALUE: 35
PIF_VALUE: 32
PIF_VALUE: 28
PIF_VALUE: 33
PIF_VALUE: 35
PIF_VALUE: 33
PIF_VALUE: 39
PIF_VALUE: 44
PIF_VALUE: 38
PIF_VALUE: 37
PIF_VALUE: 39
PIF_VALUE: 32
PIF_VALUE: 41
PIF_VALUE: 37
PIF_VALUE: 47
PIF_VALUE: 21
PIF_VALUE: 33
PIF_VALUE: 34
PIF_VALUE: 36
PIF_VALUE: 35
PIF_VALUE: 24
PIF_VALUE: 37
PIF_VALUE: 30
PIF_VALUE: 37
PIF_VALUE: 37
PIF_VALUE: 29
PIF_VALUE: 38
PIF_VALUE: 35
PIF_VALUE: 35
PIF_VALUE: 37
PIF_VALUE: 31
PIF_VALUE: 36
PIF_VALUE: 36
PIF_VALUE: 9
PIF_VALUE: 36
PIF_VALUE: 29
PIF_VALUE: 33
PIF_VALUE: 25
PIF_VALUE: 35
PIF_VALUE: 34
PIF_VALUE: 24
PIF_VALUE: 30
PIF_VALUE: 35
PIF_VALUE: 35
PIF_VALUE: 39
PIF_VALUE: 40
PIF_VALUE: 33
PIF_VALUE: 32
PIF_VALUE: 37
PIF_VALUE: 40
PIF_VALUE: 37
PIF_VALUE: 36
PIF_VALUE: 35
PIF_VALUE: 32
PIF_VALUE: 34
PIF_VALUE: 35
PIF_VALUE: 28
PIF_VALUE: 47
PIF_VALUE: 37
PIF_VALUE: 36
PIF_VALUE: 39
PIF_VALUE: 40
PIF_VALUE: 33
PIF_VALUE: 36
PIF_VALUE: 34
PIF_VALUE: 33
PIF_VALUE: 20
PIF_VALUE: 36
PIF_VALUE: 34
PIF_VALUE: 27
PIF_VALUE: 22
PIF_VALUE: 31
PIF_VALUE: 34
PIF_VALUE: 31
PIF_VALUE: 37
PIF_VALUE: 36
PIF_VALUE: 39
PIF_VALUE: 38
PIF_VALUE: 36
PIF_VALUE: 34
PIF_VALUE: 35
PIF_VALUE: 37
PIF_VALUE: 36
PIF_VALUE: 40
PIF_VALUE: 31
PIF_VALUE: 50
PIF_VALUE: 31
PIF_VALUE: 27
PIF_VALUE: 29
PIF_VALUE: 37
PIF_VALUE: 29
PIF_VALUE: 32
PIF_VALUE: 25
PIF_VALUE: 20
PIF_VALUE: 41
PIF_VALUE: 35
PIF_VALUE: 39
PIF_VALUE: 37
PIF_VALUE: 33
PIF_VALUE: 30
PIF_VALUE: 33
PIF_VALUE: 19
PIF_VALUE: 22
PIF_VALUE: 33
PIF_VALUE: 32
PIF_VALUE: 33
PIF_VALUE: 31
PIF_VALUE: 35
PIF_VALUE: 27
PIF_VALUE: 36
PIF_VALUE: 33
PIF_VALUE: 31
PIF_VALUE: 24
PIF_VALUE: 35
PIF_VALUE: 35
PIF_VALUE: 38
PIF_VALUE: 34
PIF_VALUE: 32
PIF_VALUE: 36
PIF_VALUE: 33
PIF_VALUE: 39
PIF_VALUE: 39
PIF_VALUE: 37
PIF_VALUE: 36
PIF_VALUE: 38
PIF_VALUE: 33
PIF_VALUE: 36
PIF_VALUE: 29
PIF_VALUE: 26
PIF_VALUE: 33
PIF_VALUE: 27
PIF_VALUE: 38
PIF_VALUE: 39
PIF_VALUE: 41
PIF_VALUE: 40
PIF_VALUE: 34
PIF_VALUE: 27
PIF_VALUE: 30
PIF_VALUE: 34
PIF_VALUE: 30
PIF_VALUE: 34
PIF_VALUE: 38
PIF_VALUE: 35
PIF_VALUE: 35
PIF_VALUE: 34
PIF_VALUE: 31
PIF_VALUE: 40
PIF_VALUE: 32
PIF_VALUE: 37
PIF_VALUE: 39
PIF_VALUE: 17
PIF_VALUE: 32
PIF_VALUE: 35
PIF_VALUE: 37
PIF_VALUE: 39
PIF_VALUE: 18
PIF_VALUE: 35
PIF_VALUE: 40
PIF_VALUE: 32
PIF_VALUE: 34
PIF_VALUE: 44
PIF_VALUE: 37
PIF_VALUE: 38
PIF_VALUE: 25
PIF_VALUE: 25
PIF_VALUE: 34
PIF_VALUE: 26
PIF_VALUE: 37
PIF_VALUE: 37
PIF_VALUE: 34
PIF_VALUE: 36
PIF_VALUE: 34
PIF_VALUE: 34
PIF_VALUE: 30
PIF_VALUE: 36
PIF_VALUE: 39
PIF_VALUE: 20
PIF_VALUE: 37
PIF_VALUE: 45
PIF_VALUE: 31
PIF_VALUE: 43
PIF_VALUE: 36
PIF_VALUE: 33
PIF_VALUE: 27
PIF_VALUE: 32
PIF_VALUE: 35
PIF_VALUE: 37
PIF_VALUE: 38
PIF_VALUE: 33
PIF_VALUE: 36
PIF_VALUE: 27
PIF_VALUE: 33
PIF_VALUE: 35
PIF_VALUE: 33
PIF_VALUE: 31
PIF_VALUE: 33
PIF_VALUE: 36
PIF_VALUE: 23
PIF_VALUE: 27
PIF_VALUE: 22
PIF_VALUE: 34
PIF_VALUE: 35
PIF_VALUE: 21
PIF_VALUE: 37
PIF_VALUE: 50
PIF_VALUE: 27
PIF_VALUE: 40
PIF_VALUE: 33
PIF_VALUE: 19
PIF_VALUE: 38
PIF_VALUE: 38
PIF_VALUE: 35
PIF_VALUE: 39
PIF_VALUE: 36
PIF_VALUE: 35
PIF_VALUE: 34
PIF_VALUE: 41
PIF_VALUE: 32
PIF_VALUE: 30
PIF_VALUE: 32
PIF_VALUE: 22
PIF_VALUE: 31
PIF_VALUE: 32
PIF_VALUE: 36
PIF_VALUE: 24
PIF_VALUE: 36
PIF_VALUE: 34
PIF_VALUE: 38
PIF_VALUE: 36
PIF_VALUE: 31
PIF_VALUE: 22
PIF_VALUE: 33
PIF_VALUE: 29
PIF_VALUE: 40
PIF_VALUE: 36
PIF_VALUE: 24
PIF_VALUE: 38
PIF_VALUE: 30
PIF_VALUE: 37
PIF_VALUE: 35
PIF_VALUE: 32
PIF_VALUE: 42
PIF_VALUE: 17
PIF_VALUE: 37
PIF_VALUE: 29
PIF_VALUE: 40
PIF_VALUE: 36
PIF_VALUE: 26
PIF_VALUE: 33
PIF_VALUE: 35
PIF_VALUE: 26
PIF_VALUE: 38
PIF_VALUE: 34
PIF_VALUE: 40
PIF_VALUE: 34
PIF_VALUE: 24
PIF_VALUE: 32
PIF_VALUE: 44
PIF_VALUE: 32
PIF_VALUE: 27
PIF_VALUE: 33
PIF_VALUE: 36
PIF_VALUE: 31
PIF_VALUE: 37
PIF_VALUE: 35
PIF_VALUE: 36
PIF_VALUE: 35
PIF_VALUE: 32
PIF_VALUE: 24
PIF_VALUE: 38
PIF_VALUE: 38
PIF_VALUE: 34
PIF_VALUE: 37
PIF_VALUE: 34
PIF_VALUE: 37
PIF_VALUE: 28
PIF_VALUE: 35
PIF_VALUE: 18
PIF_VALUE: 33
PIF_VALUE: 24
PIF_VALUE: 19
PIF_VALUE: 32
PIF_VALUE: 53
PIF_VALUE: 35
PIF_VALUE: 37
PIF_VALUE: 35
PIF_VALUE: 34
PIF_VALUE: 28
PIF_VALUE: 35
PIF_VALUE: 33
PIF_VALUE: 30
PIF_VALUE: 28
PIF_VALUE: 39
PIF_VALUE: 36
PIF_VALUE: 33
PIF_VALUE: 35
PIF_VALUE: 30
PIF_VALUE: 31
PIF_VALUE: 35
PIF_VALUE: 35
PIF_VALUE: 21
PIF_VALUE: 33
PIF_VALUE: 41
PIF_VALUE: 34
PIF_VALUE: 36
PIF_VALUE: 30
PIF_VALUE: 35
PIF_VALUE: 28
PIF_VALUE: 36
PIF_VALUE: 31
PIF_VALUE: 36
PIF_VALUE: 25
PIF_VALUE: 39
PIF_VALUE: 39
PIF_VALUE: 25
PIF_VALUE: 37
PIF_VALUE: 32
PIF_VALUE: 27
PIF_VALUE: 35
PIF_VALUE: 32
PIF_VALUE: 34
PIF_VALUE: 39
PIF_VALUE: 43
PIF_VALUE: 39
PIF_VALUE: 35
PIF_VALUE: 36
PIF_VALUE: 29
PIF_VALUE: 37
PIF_VALUE: 37
PIF_VALUE: 35
PIF_VALUE: 31
PIF_VALUE: 40
PIF_VALUE: 33
PIF_VALUE: 38
PIF_VALUE: 38
PIF_VALUE: 34
PIF_VALUE: 35
PIF_VALUE: 38
PIF_VALUE: 28
PIF_VALUE: 35
PIF_VALUE: 36
PIF_VALUE: 25
PIF_VALUE: 36
PIF_VALUE: 31
PIF_VALUE: 32
PIF_VALUE: 37
PIF_VALUE: 37
PIF_VALUE: 31
PIF_VALUE: 37
PIF_VALUE: 36
PIF_VALUE: 40
PIF_VALUE: 31
PIF_VALUE: 33
PIF_VALUE: 44
PIF_VALUE: 41
PIF_VALUE: 32
PIF_VALUE: 35
PIF_VALUE: 42
PIF_VALUE: 50
PIF_VALUE: 39
PIF_VALUE: 24
PIF_VALUE: 37
PIF_VALUE: 39
PIF_VALUE: 31
PIF_VALUE: 34
PIF_VALUE: 23
PIF_VALUE: 36
PIF_VALUE: 35
PIF_VALUE: 55
PIF_VALUE: 33
PIF_VALUE: 35
PIF_VALUE: 50
PIF_VALUE: 24
PIF_VALUE: 32
PIF_VALUE: 35
PIF_VALUE: 32
PIF_VALUE: 32
PIF_VALUE: 31
PIF_VALUE: 32
PIF_VALUE: 37
PIF_VALUE: 25
PIF_VALUE: 37
PIF_VALUE: 33
PIF_VALUE: 29
PIF_VALUE: 24
PIF_VALUE: 48
PIF_VALUE: 35
PIF_VALUE: 32
PIF_VALUE: 33
PIF_VALUE: 37
PIF_VALUE: 31
PIF_VALUE: 37
PIF_VALUE: 32
PIF_VALUE: 26
PIF_VALUE: 36
PIF_VALUE: 23
PIF_VALUE: 39
PIF_VALUE: 34
PIF_VALUE: 36
PIF_VALUE: 28
PIF_VALUE: 38
PIF_VALUE: 37
PIF_VALUE: 44
PIF_VALUE: 38
PIF_VALUE: 36
PIF_VALUE: 31
PIF_VALUE: 36
PIF_VALUE: 28
PIF_VALUE: 32
PIF_VALUE: 29
PIF_VALUE: 37
PIF_VALUE: 39
PIF_VALUE: 35
PIF_VALUE: 39
PIF_VALUE: 32
PIF_VALUE: 29
PIF_VALUE: 35
PIF_VALUE: 34
PIF_VALUE: 28
PIF_VALUE: 36
PIF_VALUE: 36
PIF_VALUE: 33
PIF_VALUE: 39
PIF_VALUE: 38
PIF_VALUE: 29
PIF_VALUE: 21
PIF_VALUE: 35
PIF_VALUE: 25
PIF_VALUE: 19
PIF_VALUE: 26
PIF_VALUE: 31
PIF_VALUE: 26
PIF_VALUE: 33
PIF_VALUE: 34
PIF_VALUE: 34
PIF_VALUE: 18
PIF_VALUE: 37
PIF_VALUE: 37
PIF_VALUE: 35
PIF_VALUE: 40
PIF_VALUE: 22
PIF_VALUE: 23
PIF_VALUE: 36
PIF_VALUE: 32
PIF_VALUE: 40
PIF_VALUE: 32
PIF_VALUE: 36
PIF_VALUE: 37
PIF_VALUE: 38
PIF_VALUE: 35
PIF_VALUE: 35
PIF_VALUE: 21
PIF_VALUE: 36
PIF_VALUE: 30
PIF_VALUE: 33
PIF_VALUE: 35
PIF_VALUE: 37
PIF_VALUE: 24
PIF_VALUE: 36
PIF_VALUE: 32
PIF_VALUE: 40
PIF_VALUE: 25
PIF_VALUE: 25
PIF_VALUE: 39
PIF_VALUE: 32
PIF_VALUE: 35
PIF_VALUE: 34
PIF_VALUE: 29
PIF_VALUE: 37
PIF_VALUE: 19
PIF_VALUE: 33
PIF_VALUE: 40
PIF_VALUE: 33
PIF_VALUE: 30
PIF_VALUE: 34
PIF_VALUE: 29
PIF_VALUE: 34
PIF_VALUE: 41
PIF_VALUE: 35
PIF_VALUE: 32
PIF_VALUE: 34
PIF_VALUE: 37
PIF_VALUE: 28
PIF_VALUE: 26
PIF_VALUE: 33
PIF_VALUE: 31
PIF_VALUE: 36
PIF_VALUE: 35
PIF_VALUE: 37
PIF_VALUE: 33
PIF_VALUE: 32
PIF_VALUE: 22
PIF_VALUE: 35
PIF_VALUE: 22
PIF_VALUE: 36
PIF_VALUE: 30
PIF_VALUE: 25
PIF_VALUE: 39
PIF_VALUE: 38
PIF_VALUE: 33
PIF_VALUE: 22
PIF_VALUE: 35
PIF_VALUE: 23
PIF_VALUE: 38
PIF_VALUE: 25
PIF_VALUE: 37
PIF_VALUE: 28
PIF_VALUE: 21
PIF_VALUE: 38
PIF_VALUE: 36
PIF_VALUE: 34
PIF_VALUE: 35
PIF_VALUE: 36
PIF_VALUE: 41
PIF_VALUE: 31
PIF_VALUE: 29
PIF_VALUE: 37
PIF_VALUE: 35
PIF_VALUE: 39
PIF_VALUE: 21
PIF_VALUE: 29
PIF_VALUE: 22
PIF_VALUE: 35
PIF_VALUE: 38
PIF_VALUE: 30
PIF_VALUE: 34
PIF_VALUE: 27
PIF_VALUE: 33
PIF_VALUE: 49
PIF_VALUE: 28
PIF_VALUE: 37
PIF_VALUE: 42
PIF_VALUE: 25
PIF_VALUE: 40
PIF_VALUE: 28
PIF_VALUE: 35
PIF_VALUE: 33
PIF_VALUE: 32
PIF_VALUE: 31
PIF_VALUE: 32
PIF_VALUE: 42
PIF_VALUE: 33
PIF_VALUE: 27
PIF_VALUE: 35
PIF_VALUE: 32
PIF_VALUE: 29
PIF_VALUE: 35
PIF_VALUE: 37
PIF_VALUE: 26
PIF_VALUE: 49
PIF_VALUE: 23
PIF_VALUE: 36
PIF_VALUE: 30
PIF_VALUE: 29
PIF_VALUE: 35
PIF_VALUE: 33
PIF_VALUE: 39
PIF_VALUE: 41
PIF_VALUE: 38
PIF_VALUE: 35
PIF_VALUE: 33
PIF_VALUE: 36
PIF_VALUE: 35
PIF_VALUE: 32
PIF_VALUE: 33
PIF_VALUE: 25
PIF_VALUE: 32
PIF_VALUE: 35
PIF_VALUE: 35
PIF_VALUE: 33
PIF_VALUE: 50
PIF_VALUE: 37
PIF_VALUE: 33
PIF_VALUE: 35
PIF_VALUE: 26
PIF_VALUE: 35
PIF_VALUE: 24
PIF_VALUE: 24
PIF_VALUE: 34
PIF_VALUE: 37
PIF_VALUE: 23
PIF_VALUE: 26
PIF_VALUE: 35
PIF_VALUE: 41
PIF_VALUE: 38
PIF_VALUE: 41
PIF_VALUE: 37
PIF_VALUE: 39
PIF_VALUE: 38
PIF_VALUE: 35
PIF_VALUE: 34
PIF_VALUE: 33
PIF_VALUE: 35
PIF_VALUE: 29
PIF_VALUE: 38
PIF_VALUE: 39
PIF_VALUE: 40
PIF_VALUE: 37
PIF_VALUE: 38
PIF_VALUE: 34
PIF_VALUE: 37
PIF_VALUE: 37
PIF_VALUE: 36
PIF_VALUE: 36
PIF_VALUE: 33
PIF_VALUE: 33
PIF_VALUE: 30
PIF_VALUE: 38
PIF_VALUE: 32
PIF_VALUE: 36
PIF_VALUE: 36
PIF_VALUE: 37
PIF_VALUE: 31
PIF_VALUE: 30
PIF_VALUE: 36
PIF_VALUE: 26
PIF_VALUE: 21
PIF_VALUE: 44
PIF_VALUE: 29
PIF_VALUE: 32
PIF_VALUE: 29
PIF_VALUE: 23
PIF_VALUE: 35
PIF_VALUE: 34
PIF_VALUE: 29
PIF_VALUE: 38
PIF_VALUE: 37
PIF_VALUE: 27
PIF_VALUE: 38
PIF_VALUE: 35
PIF_VALUE: 32
PIF_VALUE: 23
PIF_VALUE: 47
PIF_VALUE: 37
PIF_VALUE: 32
PIF_VALUE: 36
PIF_VALUE: 39
PIF_VALUE: 44
PIF_VALUE: 31
PIF_VALUE: 37
PIF_VALUE: 35
PIF_VALUE: 26
PIF_VALUE: 37
PIF_VALUE: 35
PIF_VALUE: 30
PIF_VALUE: 33
PIF_VALUE: 36
PIF_VALUE: 29
PIF_VALUE: 37
PIF_VALUE: 20
PIF_VALUE: 37
PIF_VALUE: 38
PIF_VALUE: 19
PIF_VALUE: 29
PIF_VALUE: 32
PIF_VALUE: 39
PIF_VALUE: 50
PIF_VALUE: 25
PIF_VALUE: 30
PIF_VALUE: 36
PIF_VALUE: 36
PIF_VALUE: 35
PIF_VALUE: 38
PIF_VALUE: 32
PIF_VALUE: 50
PIF_VALUE: 39
PIF_VALUE: 26
PIF_VALUE: 25
PIF_VALUE: 38
PIF_VALUE: 36
PIF_VALUE: 36
PIF_VALUE: 37
PIF_VALUE: 33
PIF_VALUE: 32
PIF_VALUE: 26
PIF_VALUE: 36
PIF_VALUE: 29
PIF_VALUE: 31
PIF_VALUE: 34
PIF_VALUE: 37
PIF_VALUE: 35
PIF_VALUE: 38
PIF_VALUE: 34
PIF_VALUE: 36
PIF_VALUE: 40
PIF_VALUE: 35
PIF_VALUE: 34
PIF_VALUE: 35
PIF_VALUE: 35
PIF_VALUE: 40
PIF_VALUE: 32
PIF_VALUE: 31
PIF_VALUE: 34
PIF_VALUE: 36
PIF_VALUE: 34
PIF_VALUE: 49
PIF_VALUE: 32
PIF_VALUE: 36
PIF_VALUE: 40
PIF_VALUE: 35
PIF_VALUE: 39
PIF_VALUE: 31
PIF_VALUE: 38
PIF_VALUE: 31
PIF_VALUE: 36
PIF_VALUE: 33
PIF_VALUE: 30
PIF_VALUE: 37
PIF_VALUE: 36
PIF_VALUE: 23
PIF_VALUE: 33
PIF_VALUE: 47
PIF_VALUE: 32
PIF_VALUE: 34
PIF_VALUE: 26
PIF_VALUE: 34
PIF_VALUE: 33
PIF_VALUE: 30
PIF_VALUE: 38
PIF_VALUE: 39
PIF_VALUE: 38
PIF_VALUE: 37
PIF_VALUE: 35
PIF_VALUE: 38
PIF_VALUE: 34
PIF_VALUE: 36
PIF_VALUE: 40
PIF_VALUE: 34
PIF_VALUE: 20
PIF_VALUE: 46
PIF_VALUE: 27
PIF_VALUE: 21
PIF_VALUE: 34
PIF_VALUE: 33
PIF_VALUE: 26
PIF_VALUE: 34
PIF_VALUE: 33
PIF_VALUE: 41
PIF_VALUE: 33
PIF_VALUE: 35
PIF_VALUE: 36
PIF_VALUE: 37
PIF_VALUE: 30
PIF_VALUE: 40
PIF_VALUE: 39
PIF_VALUE: 22
PIF_VALUE: 37
PIF_VALUE: 35
PIF_VALUE: 34
PIF_VALUE: 38
PIF_VALUE: 40
PIF_VALUE: 33
PIF_VALUE: 17
PIF_VALUE: 37
PIF_VALUE: 18
PIF_VALUE: 32
PIF_VALUE: 33
PIF_VALUE: 38
PIF_VALUE: 31
PIF_VALUE: 42
PIF_VALUE: 37
PIF_VALUE: 36
PIF_VALUE: 36
PIF_VALUE: 22
PIF_VALUE: 35
PIF_VALUE: 23
PIF_VALUE: 25
PIF_VALUE: 37
PIF_VALUE: 33
PIF_VALUE: 36
PIF_VALUE: 21
PIF_VALUE: 49
PIF_VALUE: 35
PIF_VALUE: 38
PIF_VALUE: 35
PIF_VALUE: 34
PIF_VALUE: 37
PIF_VALUE: 31
PIF_VALUE: 35
PIF_VALUE: 29
PIF_VALUE: 37
PIF_VALUE: 33
PIF_VALUE: 36
PIF_VALUE: 28
PIF_VALUE: 41
PIF_VALUE: 33
PIF_VALUE: 36
PIF_VALUE: 28
PIF_VALUE: 38
PIF_VALUE: 39
PIF_VALUE: 36
PIF_VALUE: 34
PIF_VALUE: 43
PIF_VALUE: 36
PIF_VALUE: 37
PIF_VALUE: 38
PIF_VALUE: 29
PIF_VALUE: 38
PIF_VALUE: 31
PIF_VALUE: 48
PIF_VALUE: 22
PIF_VALUE: 49
PIF_VALUE: 36
PIF_VALUE: 38
PIF_VALUE: 32
PIF_VALUE: 31
PIF_VALUE: 40
PIF_VALUE: 35
PIF_VALUE: 37
PIF_VALUE: 30
PIF_VALUE: 33
PIF_VALUE: 37
PIF_VALUE: 37
PIF_VALUE: 25
PIF_VALUE: 35
PIF_VALUE: 29
PIF_VALUE: 27
PIF_VALUE: 35
PIF_VALUE: 34
PIF_VALUE: 36
PIF_VALUE: 37
PIF_VALUE: 39
PIF_VALUE: 26
PIF_VALUE: 32
PIF_VALUE: 33
PIF_VALUE: 43
PIF_VALUE: 35
PIF_VALUE: 40
PIF_VALUE: 32
PIF_VALUE: 36
PIF_VALUE: 39
PIF_VALUE: 29
PIF_VALUE: 29
PIF_VALUE: 34
PIF_VALUE: 39
PIF_VALUE: 50
PIF_VALUE: 26
PIF_VALUE: 38
PIF_VALUE: 37
PIF_VALUE: 48
PIF_VALUE: 45
PIF_VALUE: 47
PIF_VALUE: 23
PIF_VALUE: 35
PIF_VALUE: 34
PIF_VALUE: 34
PIF_VALUE: 30
PIF_VALUE: 49
PIF_VALUE: 37
PIF_VALUE: 30
PIF_VALUE: 32
PIF_VALUE: 36
PIF_VALUE: 34
PIF_VALUE: 32
PIF_VALUE: 34
PIF_VALUE: 38
PIF_VALUE: 24
PIF_VALUE: 29
PIF_VALUE: 37
PIF_VALUE: 32
PIF_VALUE: 30
PIF_VALUE: 27
PIF_VALUE: 43
PIF_VALUE: 31
PIF_VALUE: 36
PIF_VALUE: 41
PIF_VALUE: 31
PIF_VALUE: 27
PIF_VALUE: 35
PIF_VALUE: 35
PIF_VALUE: 29
PIF_VALUE: 37
PIF_VALUE: 32
PIF_VALUE: 28
PIF_VALUE: 24
PIF_VALUE: 33
PIF_VALUE: 29
PIF_VALUE: 27
PIF_VALUE: 40
PIF_VALUE: 37
PIF_VALUE: 33
PIF_VALUE: 36
PIF_VALUE: 29
PIF_VALUE: 34
PIF_VALUE: 35
PIF_VALUE: 25
PIF_VALUE: 38
PIF_VALUE: 43
PIF_VALUE: 38
PIF_VALUE: 18
PIF_VALUE: 32
PIF_VALUE: 36
PIF_VALUE: 35
PIF_VALUE: 34
PIF_VALUE: 44
PIF_VALUE: 39
PIF_VALUE: 36
PIF_VALUE: 30
PIF_VALUE: 39
PIF_VALUE: 38
PIF_VALUE: 27
PIF_VALUE: 35
PIF_VALUE: 39
PIF_VALUE: 34
PIF_VALUE: 31
PIF_VALUE: 35
PIF_VALUE: 27
PIF_VALUE: 35
PIF_VALUE: 37
PIF_VALUE: 29
PIF_VALUE: 38
PIF_VALUE: 34
PIF_VALUE: 32
PIF_VALUE: 32
PIF_VALUE: 37
PIF_VALUE: 31
PIF_VALUE: 35
PIF_VALUE: 37
PIF_VALUE: 34
PIF_VALUE: 44
PIF_VALUE: 31
PIF_VALUE: 30
PIF_VALUE: 21
PIF_VALUE: 19
PIF_VALUE: 32
PIF_VALUE: 23
PIF_VALUE: 22
PIF_VALUE: 34
PIF_VALUE: 31
PIF_VALUE: 36
PIF_VALUE: 33
PIF_VALUE: 41
PIF_VALUE: 37
PIF_VALUE: 37
PIF_VALUE: 27
PIF_VALUE: 34
PIF_VALUE: 30
PIF_VALUE: 29
PIF_VALUE: 27
PIF_VALUE: 39
PIF_VALUE: 35
PIF_VALUE: 33
PIF_VALUE: 36
PIF_VALUE: 31
PIF_VALUE: 31
PIF_VALUE: 33
PIF_VALUE: 38
PIF_VALUE: 38
PIF_VALUE: 34
PIF_VALUE: 34
PIF_VALUE: 35
PIF_VALUE: 32
PIF_VALUE: 36
PIF_VALUE: 37
PIF_VALUE: 37
PIF_VALUE: 35
PIF_VALUE: 36
PIF_VALUE: 37
PIF_VALUE: 24
PIF_VALUE: 35
PIF_VALUE: 32
PIF_VALUE: 35
PIF_VALUE: 38
PIF_VALUE: 36
PIF_VALUE: 28
PIF_VALUE: 43
PIF_VALUE: 35
PIF_VALUE: 31
PIF_VALUE: 35
PIF_VALUE: 33
PIF_VALUE: 41
PIF_VALUE: 39
PIF_VALUE: 33
PIF_VALUE: 30
PIF_VALUE: 32
PIF_VALUE: 28
PIF_VALUE: 38
PIF_VALUE: 37
PIF_VALUE: 34
PIF_VALUE: 38
PIF_VALUE: 33
PIF_VALUE: 40
PIF_VALUE: 33
PIF_VALUE: 35
PIF_VALUE: 36
PIF_VALUE: 16
PIF_VALUE: 29
PIF_VALUE: 33
PIF_VALUE: 34
PIF_VALUE: 37
PIF_VALUE: 35
PIF_VALUE: 29
PIF_VALUE: 32
PIF_VALUE: 40
PIF_VALUE: 33
PIF_VALUE: 32
PIF_VALUE: 34
PIF_VALUE: 36
PIF_VALUE: 29
PIF_VALUE: 27
PIF_VALUE: 35
PIF_VALUE: 30
PIF_VALUE: 30
PIF_VALUE: 24
PIF_VALUE: 36
PIF_VALUE: 36
PIF_VALUE: 25
PIF_VALUE: 23
PIF_VALUE: 26
PIF_VALUE: 21
PIF_VALUE: 36
PIF_VALUE: 35
PIF_VALUE: 36
PIF_VALUE: 27
PIF_VALUE: 31
PIF_VALUE: 27
PIF_VALUE: 36
PIF_VALUE: 34
PIF_VALUE: 33
PIF_VALUE: 31
PIF_VALUE: 34
PIF_VALUE: 28
PIF_VALUE: 25
PIF_VALUE: 33
PIF_VALUE: 38
PIF_VALUE: 23
PIF_VALUE: 32
PIF_VALUE: 37
PIF_VALUE: 39
PIF_VALUE: 31
PIF_VALUE: 31
PIF_VALUE: 21
PIF_VALUE: 44
PIF_VALUE: 31
PIF_VALUE: 38
PIF_VALUE: 35
PIF_VALUE: 32
PIF_VALUE: 35
PIF_VALUE: 33
PIF_VALUE: 40
PIF_VALUE: 27
PIF_VALUE: 33
PIF_VALUE: 35
PIF_VALUE: 23
PIF_VALUE: 49
PIF_VALUE: 9
PIF_VALUE: 34
PIF_VALUE: 39
PIF_VALUE: 27
PIF_VALUE: 28
PIF_VALUE: 26
PIF_VALUE: 40
PIF_VALUE: 37
PIF_VALUE: 38
PIF_VALUE: 18
PIF_VALUE: 36
PIF_VALUE: 36
PIF_VALUE: 32
PIF_VALUE: 21
PIF_VALUE: 35
PIF_VALUE: 35
PIF_VALUE: 38

## 2022-01-01 ASSESSMENT — PAIN SCALES - GENERAL
PAINLEVEL_OUTOF10: 10
PAINLEVEL_OUTOF10: 0
PAINLEVEL_OUTOF10: 8
PAINLEVEL_OUTOF10: 6
PAINLEVEL_OUTOF10: 6
PAINLEVEL_OUTOF10: 0
PAINLEVEL_OUTOF10: 7
PAINLEVEL_OUTOF10: 0
PAINLEVEL_OUTOF10: 7
PAINLEVEL_OUTOF10: 5
PAINLEVEL_OUTOF10: 7
PAINLEVEL_OUTOF10: 0
PAINLEVEL_OUTOF10: 10
PAINLEVEL_OUTOF10: 7
PAINLEVEL_OUTOF10: 4
PAINLEVEL_OUTOF10: 4
PAINLEVEL_OUTOF10: 0
PAINLEVEL_OUTOF10: 4
PAINLEVEL_OUTOF10: 7
PAINLEVEL_OUTOF10: 0
PAINLEVEL_OUTOF10: 0
PAINLEVEL_OUTOF10: 5
PAINLEVEL_OUTOF10: 0
PAINLEVEL_OUTOF10: 7
PAINLEVEL_OUTOF10: 0
PAINLEVEL_OUTOF10: 9
PAINLEVEL_OUTOF10: 0
PAINLEVEL_OUTOF10: 0
PAINLEVEL_OUTOF10: 10
PAINLEVEL_OUTOF10: 7
PAINLEVEL_OUTOF10: 6
PAINLEVEL_OUTOF10: 7
PAINLEVEL_OUTOF10: 7
PAINLEVEL_OUTOF10: 0
PAINLEVEL_OUTOF10: 3
PAINLEVEL_OUTOF10: 0
PAINLEVEL_OUTOF10: 7
PAINLEVEL_OUTOF10: 7
PAINLEVEL_OUTOF10: 9
PAINLEVEL_OUTOF10: 5
PAINLEVEL_OUTOF10: 0
PAINLEVEL_OUTOF10: 0
PAINLEVEL_OUTOF10: 7
PAINLEVEL_OUTOF10: 9
PAINLEVEL_OUTOF10: 7
PAINLEVEL_OUTOF10: 0
PAINLEVEL_OUTOF10: 7
PAINLEVEL_OUTOF10: 7
PAINLEVEL_OUTOF10: 0
PAINLEVEL_OUTOF10: 7
PAINLEVEL_OUTOF10: 7
PAINLEVEL_OUTOF10: 0
PAINLEVEL_OUTOF10: 7
PAINLEVEL_OUTOF10: 8
PAINLEVEL_OUTOF10: 0
PAINLEVEL_OUTOF10: 7
PAINLEVEL_OUTOF10: 0
PAINLEVEL_OUTOF10: 0
PAINLEVEL_OUTOF10: 7
PAINLEVEL_OUTOF10: 0
PAINLEVEL_OUTOF10: 0
PAINLEVEL_OUTOF10: 7
PAINLEVEL_OUTOF10: 10
PAINLEVEL_OUTOF10: 0
PAINLEVEL_OUTOF10: 5
PAINLEVEL_OUTOF10: 0
PAINLEVEL_OUTOF10: 3
PAINLEVEL_OUTOF10: 0
PAINLEVEL_OUTOF10: 7
PAINLEVEL_OUTOF10: 0
PAINLEVEL_OUTOF10: 10
PAINLEVEL_OUTOF10: 0
PAINLEVEL_OUTOF10: 0
PAINLEVEL_OUTOF10: 7
PAINLEVEL_OUTOF10: 3
PAINLEVEL_OUTOF10: 2
PAINLEVEL_OUTOF10: 9
PAINLEVEL_OUTOF10: 0
PAINLEVEL_OUTOF10: 6
PAINLEVEL_OUTOF10: 7
PAINLEVEL_OUTOF10: 0
PAINLEVEL_OUTOF10: 7
PAINLEVEL_OUTOF10: 0
PAINLEVEL_OUTOF10: 5
PAINLEVEL_OUTOF10: 6
PAINLEVEL_OUTOF10: 10
PAINLEVEL_OUTOF10: 6
PAINLEVEL_OUTOF10: 0
PAINLEVEL_OUTOF10: 0
PAINLEVEL_OUTOF10: 6
PAINLEVEL_OUTOF10: 0
PAINLEVEL_OUTOF10: 0
PAINLEVEL_OUTOF10: 7
PAINLEVEL_OUTOF10: 6
PAINLEVEL_OUTOF10: 9
PAINLEVEL_OUTOF10: 6
PAINLEVEL_OUTOF10: 0
PAINLEVEL_OUTOF10: 0
PAINLEVEL_OUTOF10: 6
PAINLEVEL_OUTOF10: 7
PAINLEVEL_OUTOF10: 0
PAINLEVEL_OUTOF10: 0
PAINLEVEL_OUTOF10: 6
PAINLEVEL_OUTOF10: 3
PAINLEVEL_OUTOF10: 3
PAINLEVEL_OUTOF10: 5
PAINLEVEL_OUTOF10: 7
PAINLEVEL_OUTOF10: 8
PAINLEVEL_OUTOF10: 0
PAINLEVEL_OUTOF10: 0
PAINLEVEL_OUTOF10: 7
PAINLEVEL_OUTOF10: 6
PAINLEVEL_OUTOF10: 6
PAINLEVEL_OUTOF10: 10
PAINLEVEL_OUTOF10: 6
PAINLEVEL_OUTOF10: 7
PAINLEVEL_OUTOF10: 0
PAINLEVEL_OUTOF10: 0
PAINLEVEL_OUTOF10: 3
PAINLEVEL_OUTOF10: 0
PAINLEVEL_OUTOF10: 0
PAINLEVEL_OUTOF10: 5
PAINLEVEL_OUTOF10: 7
PAINLEVEL_OUTOF10: 0
PAINLEVEL_OUTOF10: 0
PAINLEVEL_OUTOF10: 5
PAINLEVEL_OUTOF10: 0
PAINLEVEL_OUTOF10: 5
PAINLEVEL_OUTOF10: 7
PAINLEVEL_OUTOF10: 0
PAINLEVEL_OUTOF10: 0
PAINLEVEL_OUTOF10: 2
PAINLEVEL_OUTOF10: 0
PAINLEVEL_OUTOF10: 6
PAINLEVEL_OUTOF10: 0
PAINLEVEL_OUTOF10: 8
PAINLEVEL_OUTOF10: 0
PAINLEVEL_OUTOF10: 0
PAINLEVEL_OUTOF10: 8
PAINLEVEL_OUTOF10: 0
PAINLEVEL_OUTOF10: 6
PAINLEVEL_OUTOF10: 9
PAINLEVEL_OUTOF10: 0
PAINLEVEL_OUTOF10: 0
PAINLEVEL_OUTOF10: 7
PAINLEVEL_OUTOF10: 0
PAINLEVEL_OUTOF10: 0
PAINLEVEL_OUTOF10: 6
PAINLEVEL_OUTOF10: 0
PAINLEVEL_OUTOF10: 7
PAINLEVEL_OUTOF10: 3
PAINLEVEL_OUTOF10: 0
PAINLEVEL_OUTOF10: 10
PAINLEVEL_OUTOF10: 7
PAINLEVEL_OUTOF10: 0
PAINLEVEL_OUTOF10: 0
PAINLEVEL_OUTOF10: 4
PAINLEVEL_OUTOF10: 0
PAINLEVEL_OUTOF10: 0
PAINLEVEL_OUTOF10: 6
PAINLEVEL_OUTOF10: 6
PAINLEVEL_OUTOF10: 0
PAINLEVEL_OUTOF10: 0
PAINLEVEL_OUTOF10: 10
PAINLEVEL_OUTOF10: 0
PAINLEVEL_OUTOF10: 6
PAINLEVEL_OUTOF10: 2
PAINLEVEL_OUTOF10: 0
PAINLEVEL_OUTOF10: 7
PAINLEVEL_OUTOF10: 8
PAINLEVEL_OUTOF10: 1
PAINLEVEL_OUTOF10: 7
PAINLEVEL_OUTOF10: 0
PAINLEVEL_OUTOF10: 8
PAINLEVEL_OUTOF10: 0
PAINLEVEL_OUTOF10: 7
PAINLEVEL_OUTOF10: 9
PAINLEVEL_OUTOF10: 0
PAINLEVEL_OUTOF10: 7
PAINLEVEL_OUTOF10: 0
PAINLEVEL_OUTOF10: 0
PAINLEVEL_OUTOF10: 6
PAINLEVEL_OUTOF10: 7
PAINLEVEL_OUTOF10: 7
PAINLEVEL_OUTOF10: 8
PAINLEVEL_OUTOF10: 5
PAINLEVEL_OUTOF10: 0
PAINLEVEL_OUTOF10: 7
PAINLEVEL_OUTOF10: 0
PAINLEVEL_OUTOF10: 6
PAINLEVEL_OUTOF10: 0
PAINLEVEL_OUTOF10: 3
PAINLEVEL_OUTOF10: 0
PAINLEVEL_OUTOF10: 5
PAINLEVEL_OUTOF10: 6
PAINLEVEL_OUTOF10: 7
PAINLEVEL_OUTOF10: 0
PAINLEVEL_OUTOF10: 7
PAINLEVEL_OUTOF10: 8
PAINLEVEL_OUTOF10: 0
PAINLEVEL_OUTOF10: 5
PAINLEVEL_OUTOF10: 0
PAINLEVEL_OUTOF10: 0
PAINLEVEL_OUTOF10: 7
PAINLEVEL_OUTOF10: 9
PAINLEVEL_OUTOF10: 7
PAINLEVEL_OUTOF10: 4
PAINLEVEL_OUTOF10: 0
PAINLEVEL_OUTOF10: 3
PAINLEVEL_OUTOF10: 0
PAINLEVEL_OUTOF10: 4
PAINLEVEL_OUTOF10: 0
PAINLEVEL_OUTOF10: 0
PAINLEVEL_OUTOF10: 7
PAINLEVEL_OUTOF10: 7
PAINLEVEL_OUTOF10: 0
PAINLEVEL_OUTOF10: 7
PAINLEVEL_OUTOF10: 0
PAINLEVEL_OUTOF10: 6
PAINLEVEL_OUTOF10: 0
PAINLEVEL_OUTOF10: 0
PAINLEVEL_OUTOF10: 7
PAINLEVEL_OUTOF10: 6
PAINLEVEL_OUTOF10: 6
PAINLEVEL_OUTOF10: 0
PAINLEVEL_OUTOF10: 7
PAINLEVEL_OUTOF10: 6
PAINLEVEL_OUTOF10: 0
PAINLEVEL_OUTOF10: 0
PAINLEVEL_OUTOF10: 5
PAINLEVEL_OUTOF10: 7
PAINLEVEL_OUTOF10: 0
PAINLEVEL_OUTOF10: 7
PAINLEVEL_OUTOF10: 5
PAINLEVEL_OUTOF10: 0
PAINLEVEL_OUTOF10: 7
PAINLEVEL_OUTOF10: 0

## 2022-01-01 ASSESSMENT — PAIN DESCRIPTION - FREQUENCY
FREQUENCY: CONTINUOUS
FREQUENCY: INTERMITTENT
FREQUENCY: CONTINUOUS
FREQUENCY: INTERMITTENT
FREQUENCY: CONTINUOUS
FREQUENCY: CONTINUOUS
FREQUENCY: INTERMITTENT
FREQUENCY: CONTINUOUS
FREQUENCY: INTERMITTENT
FREQUENCY: CONTINUOUS
FREQUENCY: INTERMITTENT
FREQUENCY: INTERMITTENT
FREQUENCY: CONTINUOUS
FREQUENCY: INTERMITTENT
FREQUENCY: CONTINUOUS
FREQUENCY: INTERMITTENT
FREQUENCY: CONTINUOUS
FREQUENCY: INTERMITTENT
FREQUENCY: CONTINUOUS
FREQUENCY: CONTINUOUS
FREQUENCY: INTERMITTENT
FREQUENCY: CONTINUOUS
FREQUENCY: CONTINUOUS
FREQUENCY: INTERMITTENT
FREQUENCY: CONTINUOUS
FREQUENCY: INTERMITTENT
FREQUENCY: CONTINUOUS
FREQUENCY: CONTINUOUS
FREQUENCY: INTERMITTENT
FREQUENCY: CONTINUOUS
FREQUENCY: CONTINUOUS
FREQUENCY: INTERMITTENT
FREQUENCY: CONTINUOUS
FREQUENCY: INTERMITTENT
FREQUENCY: CONTINUOUS
FREQUENCY: CONTINUOUS
FREQUENCY: OTHER (COMMENT)
FREQUENCY: CONTINUOUS
FREQUENCY: INTERMITTENT
FREQUENCY: INTERMITTENT
FREQUENCY: CONTINUOUS

## 2022-01-01 ASSESSMENT — PAIN SCALES - WONG BAKER
WONGBAKER_NUMERICALRESPONSE: 0
WONGBAKER_NUMERICALRESPONSE: 4
WONGBAKER_NUMERICALRESPONSE: 0
WONGBAKER_NUMERICALRESPONSE: 4
WONGBAKER_NUMERICALRESPONSE: 0
WONGBAKER_NUMERICALRESPONSE: 0
WONGBAKER_NUMERICALRESPONSE: 4
WONGBAKER_NUMERICALRESPONSE: 0
WONGBAKER_NUMERICALRESPONSE: 4
WONGBAKER_NUMERICALRESPONSE: 0
WONGBAKER_NUMERICALRESPONSE: 2
WONGBAKER_NUMERICALRESPONSE: 0
WONGBAKER_NUMERICALRESPONSE: 0
WONGBAKER_NUMERICALRESPONSE: 4
WONGBAKER_NUMERICALRESPONSE: 0
WONGBAKER_NUMERICALRESPONSE: 4
WONGBAKER_NUMERICALRESPONSE: 0
WONGBAKER_NUMERICALRESPONSE: 2
WONGBAKER_NUMERICALRESPONSE: 0

## 2022-01-01 ASSESSMENT — PAIN - FUNCTIONAL ASSESSMENT
PAIN_FUNCTIONAL_ASSESSMENT: PREVENTS OR INTERFERES SOME ACTIVE ACTIVITIES AND ADLS
PAIN_FUNCTIONAL_ASSESSMENT: PREVENTS OR INTERFERES SOME ACTIVE ACTIVITIES AND ADLS
PAIN_FUNCTIONAL_ASSESSMENT: PREVENTS OR INTERFERES WITH MANY ACTIVE NOT PASSIVE ACTIVITIES
PAIN_FUNCTIONAL_ASSESSMENT: PREVENTS OR INTERFERES SOME ACTIVE ACTIVITIES AND ADLS
PAIN_FUNCTIONAL_ASSESSMENT: PREVENTS OR INTERFERES SOME ACTIVE ACTIVITIES AND ADLS
PAIN_FUNCTIONAL_ASSESSMENT: ACTIVITIES ARE NOT PREVENTED
PAIN_FUNCTIONAL_ASSESSMENT: PREVENTS OR INTERFERES WITH ALL ACTIVE AND SOME PASSIVE ACTIVITIES
PAIN_FUNCTIONAL_ASSESSMENT: PREVENTS OR INTERFERES SOME ACTIVE ACTIVITIES AND ADLS
PAIN_FUNCTIONAL_ASSESSMENT: ACTIVITIES ARE NOT PREVENTED
PAIN_FUNCTIONAL_ASSESSMENT: ACTIVITIES ARE NOT PREVENTED
PAIN_FUNCTIONAL_ASSESSMENT: PREVENTS OR INTERFERES WITH ALL ACTIVE AND SOME PASSIVE ACTIVITIES
PAIN_FUNCTIONAL_ASSESSMENT: PREVENTS OR INTERFERES WITH MANY ACTIVE NOT PASSIVE ACTIVITIES
PAIN_FUNCTIONAL_ASSESSMENT: PREVENTS OR INTERFERES SOME ACTIVE ACTIVITIES AND ADLS
PAIN_FUNCTIONAL_ASSESSMENT: ACTIVITIES ARE NOT PREVENTED
PAIN_FUNCTIONAL_ASSESSMENT: 0-10
PAIN_FUNCTIONAL_ASSESSMENT: PREVENTS OR INTERFERES SOME ACTIVE ACTIVITIES AND ADLS
PAIN_FUNCTIONAL_ASSESSMENT: PREVENTS OR INTERFERES SOME ACTIVE ACTIVITIES AND ADLS
PAIN_FUNCTIONAL_ASSESSMENT: ACTIVITIES ARE NOT PREVENTED
PAIN_FUNCTIONAL_ASSESSMENT: PREVENTS OR INTERFERES SOME ACTIVE ACTIVITIES AND ADLS
PAIN_FUNCTIONAL_ASSESSMENT: ACTIVITIES ARE NOT PREVENTED
PAIN_FUNCTIONAL_ASSESSMENT: INTOLERABLE, UNABLE TO DO ANY ACTIVE OR PASSIVE ACTIVITIES
PAIN_FUNCTIONAL_ASSESSMENT: PREVENTS OR INTERFERES SOME ACTIVE ACTIVITIES AND ADLS
PAIN_FUNCTIONAL_ASSESSMENT: ACTIVITIES ARE NOT PREVENTED
PAIN_FUNCTIONAL_ASSESSMENT: PREVENTS OR INTERFERES SOME ACTIVE ACTIVITIES AND ADLS
PAIN_FUNCTIONAL_ASSESSMENT: ACTIVITIES ARE NOT PREVENTED
PAIN_FUNCTIONAL_ASSESSMENT: PREVENTS OR INTERFERES SOME ACTIVE ACTIVITIES AND ADLS
PAIN_FUNCTIONAL_ASSESSMENT: ACTIVITIES ARE NOT PREVENTED
PAIN_FUNCTIONAL_ASSESSMENT: PREVENTS OR INTERFERES SOME ACTIVE ACTIVITIES AND ADLS
PAIN_FUNCTIONAL_ASSESSMENT: ACTIVITIES ARE NOT PREVENTED
PAIN_FUNCTIONAL_ASSESSMENT: PREVENTS OR INTERFERES WITH ALL ACTIVE AND SOME PASSIVE ACTIVITIES
PAIN_FUNCTIONAL_ASSESSMENT: PREVENTS OR INTERFERES SOME ACTIVE ACTIVITIES AND ADLS
PAIN_FUNCTIONAL_ASSESSMENT: PREVENTS OR INTERFERES WITH ALL ACTIVE AND SOME PASSIVE ACTIVITIES
PAIN_FUNCTIONAL_ASSESSMENT: PREVENTS OR INTERFERES SOME ACTIVE ACTIVITIES AND ADLS
PAIN_FUNCTIONAL_ASSESSMENT: PREVENTS OR INTERFERES WITH MANY ACTIVE NOT PASSIVE ACTIVITIES
PAIN_FUNCTIONAL_ASSESSMENT: ACTIVITIES ARE NOT PREVENTED
PAIN_FUNCTIONAL_ASSESSMENT: ACTIVITIES ARE NOT PREVENTED
PAIN_FUNCTIONAL_ASSESSMENT: PREVENTS OR INTERFERES WITH MANY ACTIVE NOT PASSIVE ACTIVITIES
PAIN_FUNCTIONAL_ASSESSMENT: PREVENTS OR INTERFERES SOME ACTIVE ACTIVITIES AND ADLS
PAIN_FUNCTIONAL_ASSESSMENT: PREVENTS OR INTERFERES SOME ACTIVE ACTIVITIES AND ADLS
PAIN_FUNCTIONAL_ASSESSMENT: ACTIVITIES ARE NOT PREVENTED
PAIN_FUNCTIONAL_ASSESSMENT: PREVENTS OR INTERFERES SOME ACTIVE ACTIVITIES AND ADLS
PAIN_FUNCTIONAL_ASSESSMENT: PREVENTS OR INTERFERES WITH MANY ACTIVE NOT PASSIVE ACTIVITIES
PAIN_FUNCTIONAL_ASSESSMENT: ACTIVITIES ARE NOT PREVENTED
PAIN_FUNCTIONAL_ASSESSMENT: ACTIVITIES ARE NOT PREVENTED
PAIN_FUNCTIONAL_ASSESSMENT: PREVENTS OR INTERFERES SOME ACTIVE ACTIVITIES AND ADLS
PAIN_FUNCTIONAL_ASSESSMENT: ACTIVITIES ARE NOT PREVENTED
PAIN_FUNCTIONAL_ASSESSMENT: PREVENTS OR INTERFERES SOME ACTIVE ACTIVITIES AND ADLS
PAIN_FUNCTIONAL_ASSESSMENT: ACTIVITIES ARE NOT PREVENTED
PAIN_FUNCTIONAL_ASSESSMENT: PREVENTS OR INTERFERES SOME ACTIVE ACTIVITIES AND ADLS
PAIN_FUNCTIONAL_ASSESSMENT: ACTIVITIES ARE NOT PREVENTED
PAIN_FUNCTIONAL_ASSESSMENT: PREVENTS OR INTERFERES WITH ALL ACTIVE AND SOME PASSIVE ACTIVITIES
PAIN_FUNCTIONAL_ASSESSMENT: ACTIVITIES ARE NOT PREVENTED
PAIN_FUNCTIONAL_ASSESSMENT: ACTIVITIES ARE NOT PREVENTED
PAIN_FUNCTIONAL_ASSESSMENT: PREVENTS OR INTERFERES WITH MANY ACTIVE NOT PASSIVE ACTIVITIES
PAIN_FUNCTIONAL_ASSESSMENT: PREVENTS OR INTERFERES SOME ACTIVE ACTIVITIES AND ADLS
PAIN_FUNCTIONAL_ASSESSMENT: PREVENTS OR INTERFERES WITH MANY ACTIVE NOT PASSIVE ACTIVITIES
PAIN_FUNCTIONAL_ASSESSMENT: PREVENTS OR INTERFERES SOME ACTIVE ACTIVITIES AND ADLS
PAIN_FUNCTIONAL_ASSESSMENT: PREVENTS OR INTERFERES SOME ACTIVE ACTIVITIES AND ADLS
PAIN_FUNCTIONAL_ASSESSMENT: ACTIVITIES ARE NOT PREVENTED
PAIN_FUNCTIONAL_ASSESSMENT: ACTIVITIES ARE NOT PREVENTED
PAIN_FUNCTIONAL_ASSESSMENT: PREVENTS OR INTERFERES SOME ACTIVE ACTIVITIES AND ADLS
PAIN_FUNCTIONAL_ASSESSMENT: ACTIVITIES ARE NOT PREVENTED
PAIN_FUNCTIONAL_ASSESSMENT: ACTIVITIES ARE NOT PREVENTED
PAIN_FUNCTIONAL_ASSESSMENT: PREVENTS OR INTERFERES SOME ACTIVE ACTIVITIES AND ADLS
PAIN_FUNCTIONAL_ASSESSMENT: PREVENTS OR INTERFERES WITH MANY ACTIVE NOT PASSIVE ACTIVITIES
PAIN_FUNCTIONAL_ASSESSMENT: PREVENTS OR INTERFERES SOME ACTIVE ACTIVITIES AND ADLS
PAIN_FUNCTIONAL_ASSESSMENT: PREVENTS OR INTERFERES SOME ACTIVE ACTIVITIES AND ADLS
PAIN_FUNCTIONAL_ASSESSMENT: ACTIVITIES ARE NOT PREVENTED
PAIN_FUNCTIONAL_ASSESSMENT: PREVENTS OR INTERFERES SOME ACTIVE ACTIVITIES AND ADLS
PAIN_FUNCTIONAL_ASSESSMENT: ACTIVITIES ARE NOT PREVENTED

## 2022-01-01 ASSESSMENT — PAIN DESCRIPTION - ORIENTATION
ORIENTATION: POSTERIOR;MID
ORIENTATION: MID
ORIENTATION: POSTERIOR
ORIENTATION: MID
ORIENTATION: POSTERIOR
ORIENTATION: MID
ORIENTATION: RIGHT
ORIENTATION: MID
ORIENTATION: RIGHT;LEFT;MID
ORIENTATION: LOWER
ORIENTATION: MID
ORIENTATION: LOWER
ORIENTATION: LOWER
ORIENTATION: POSTERIOR
ORIENTATION: MID
ORIENTATION: POSTERIOR
ORIENTATION: MID
ORIENTATION: MID
ORIENTATION: LEFT
ORIENTATION: RIGHT;LEFT;MID
ORIENTATION: LOWER
ORIENTATION: LOWER
ORIENTATION: RIGHT;LEFT;MID
ORIENTATION: LOWER
ORIENTATION: MID
ORIENTATION: RIGHT;LEFT;MID
ORIENTATION: LOWER
ORIENTATION: LOWER
ORIENTATION: POSTERIOR
ORIENTATION: RIGHT;LEFT;MID
ORIENTATION: MID
ORIENTATION: LEFT
ORIENTATION: MID
ORIENTATION: POSTERIOR;MID
ORIENTATION: MID
ORIENTATION: RIGHT;LEFT;MID
ORIENTATION: RIGHT;LEFT
ORIENTATION: MID
ORIENTATION: MID
ORIENTATION: LOWER
ORIENTATION: RIGHT;LEFT;MID
ORIENTATION: RIGHT;LEFT
ORIENTATION: MID
ORIENTATION: LOWER
ORIENTATION: RIGHT;LEFT;MID
ORIENTATION: POSTERIOR;MID
ORIENTATION: MID;LOWER
ORIENTATION: POSTERIOR
ORIENTATION: POSTERIOR
ORIENTATION: MID
ORIENTATION: LEFT;RIGHT
ORIENTATION: MID
ORIENTATION: MID
ORIENTATION: POSTERIOR
ORIENTATION: RIGHT
ORIENTATION: LOWER
ORIENTATION: MID
ORIENTATION: LOWER;LEFT
ORIENTATION: MID
ORIENTATION: MID
ORIENTATION: RIGHT
ORIENTATION: LOWER
ORIENTATION: POSTERIOR
ORIENTATION: MID
ORIENTATION: RIGHT;LEFT;MID
ORIENTATION: MID
ORIENTATION: MID;LEFT
ORIENTATION: POSTERIOR
ORIENTATION: OTHER (COMMENT)
ORIENTATION: MID

## 2022-01-01 ASSESSMENT — PAIN DESCRIPTION - DESCRIPTORS
DESCRIPTORS: ACHING;DISCOMFORT
DESCRIPTORS: ACHING;DISCOMFORT
DESCRIPTORS: ACHING;CRAMPING
DESCRIPTORS: ACHING;DISCOMFORT
DESCRIPTORS: DISCOMFORT
DESCRIPTORS: ACHING
DESCRIPTORS: BURNING
DESCRIPTORS: ACHING;BURNING
DESCRIPTORS: ACHING
DESCRIPTORS: BURNING
DESCRIPTORS: ACHING
DESCRIPTORS: ACHING
DESCRIPTORS: DISCOMFORT
DESCRIPTORS: ACHING
DESCRIPTORS: ACHING;DISCOMFORT
DESCRIPTORS: ACHING
DESCRIPTORS: ACHING;PRESSURE
DESCRIPTORS: SHARP
DESCRIPTORS: CRAMPING;DISCOMFORT
DESCRIPTORS: BURNING;ACHING
DESCRIPTORS: BURNING
DESCRIPTORS: ACHING;CRAMPING
DESCRIPTORS: DISCOMFORT
DESCRIPTORS: SHARP
DESCRIPTORS: ACHING
DESCRIPTORS: ACHING
DESCRIPTORS: ACHING;DISCOMFORT
DESCRIPTORS: ACHING;NAGGING
DESCRIPTORS: ACHING
DESCRIPTORS: SHARP
DESCRIPTORS: BURNING
DESCRIPTORS: ACHING;CRAMPING
DESCRIPTORS: ACHING;NAGGING
DESCRIPTORS: ACHING
DESCRIPTORS: ACHING;PRESSURE
DESCRIPTORS: TIGHTNESS;DISCOMFORT
DESCRIPTORS: ACHING;PRESSURE;SHARP
DESCRIPTORS: BURNING
DESCRIPTORS: ACHING
DESCRIPTORS: DISCOMFORT
DESCRIPTORS: BURNING
DESCRIPTORS: ACHING;NAGGING
DESCRIPTORS: ACHING;NAGGING
DESCRIPTORS: ACHING
DESCRIPTORS: ACHING;CRAMPING
DESCRIPTORS: SHARP
DESCRIPTORS: ACHING;DISCOMFORT
DESCRIPTORS: ACHING;DISCOMFORT
DESCRIPTORS: BURNING
DESCRIPTORS: DISCOMFORT
DESCRIPTORS: SHARP
DESCRIPTORS: ACHING
DESCRIPTORS: ACHING
DESCRIPTORS: BURNING
DESCRIPTORS: ACHING
DESCRIPTORS: ACHING
DESCRIPTORS: BURNING
DESCRIPTORS: ACHING;THROBBING
DESCRIPTORS: ACHING
DESCRIPTORS: ACHING;DISCOMFORT
DESCRIPTORS: BURNING
DESCRIPTORS: ACHING
DESCRIPTORS: BURNING
DESCRIPTORS: ACHING;DISCOMFORT;BURNING
DESCRIPTORS: SHARP
DESCRIPTORS: ACHING
DESCRIPTORS: ACHING
DESCRIPTORS: SHARP
DESCRIPTORS: ACHING;DISCOMFORT
DESCRIPTORS: DULL
DESCRIPTORS: BURNING
DESCRIPTORS: ACHING;SHOOTING
DESCRIPTORS: ACHING;PRESSURE;JABBING
DESCRIPTORS: SHARP
DESCRIPTORS: BURNING
DESCRIPTORS: BURNING
DESCRIPTORS: ACHING;DISCOMFORT
DESCRIPTORS: ACHING
DESCRIPTORS: ACHING
DESCRIPTORS: BURNING
DESCRIPTORS: ACHING
DESCRIPTORS: BURNING
DESCRIPTORS: SHARP;STABBING
DESCRIPTORS: ACHING
DESCRIPTORS: PATIENT UNABLE TO DESCRIBE
DESCRIPTORS: ACHING;DISCOMFORT
DESCRIPTORS: ACHING
DESCRIPTORS: ACHING;DISCOMFORT
DESCRIPTORS: ACHING
DESCRIPTORS: ACHING;NAGGING

## 2022-01-01 ASSESSMENT — PAIN DESCRIPTION - LOCATION
LOCATION: GENERALIZED
LOCATION: BUTTOCKS
LOCATION: GENERALIZED
LOCATION: BUTTOCKS;SACRUM
LOCATION: ABDOMEN;BACK
LOCATION: CHEST;COCCYX
LOCATION: BUTTOCKS
LOCATION: BUTTOCKS
LOCATION: SACRUM
LOCATION: BUTTOCKS
LOCATION: BUTTOCKS
LOCATION: SACRUM
LOCATION: SACRUM
LOCATION: BUTTOCKS
LOCATION: BUTTOCKS
LOCATION: HEAD
LOCATION: COCCYX
LOCATION: LEG
LOCATION: COCCYX
LOCATION: BUTTOCKS
LOCATION: ABDOMEN
LOCATION: GENERALIZED
LOCATION: BUTTOCKS
LOCATION: EAR
LOCATION: SACRUM
LOCATION: BUTTOCKS
LOCATION: COCCYX
LOCATION: COCCYX;SACRUM
LOCATION: ABDOMEN
LOCATION: GENERALIZED;SACRUM
LOCATION: CHEST
LOCATION: ABDOMEN
LOCATION: ABDOMEN
LOCATION: BUTTOCKS;SACRUM
LOCATION: BUTTOCKS
LOCATION: LEG
LOCATION: COCCYX
LOCATION: ABDOMEN
LOCATION: NECK;BUTTOCKS
LOCATION: COCCYX
LOCATION: SACRUM
LOCATION: COCCYX
LOCATION: ABDOMEN
LOCATION: BUTTOCKS
LOCATION: CHEST;COCCYX
LOCATION: SACRUM
LOCATION: COCCYX
LOCATION: BUTTOCKS
LOCATION: CHEST;COCCYX
LOCATION: BACK
LOCATION: ABDOMEN
LOCATION: BUTTOCKS
LOCATION: GENERALIZED
LOCATION: COCCYX
LOCATION: SACRUM
LOCATION: COCCYX
LOCATION: GENERALIZED
LOCATION: BUTTOCKS
LOCATION: ABDOMEN
LOCATION: BUTTOCKS
LOCATION: COCCYX
LOCATION: CHEST;COCCYX
LOCATION: RIB CAGE
LOCATION: BUTTOCKS
LOCATION: GENERALIZED
LOCATION: BUTTOCKS
LOCATION: BUTTOCKS;COCCYX
LOCATION: BUTTOCKS;LEG
LOCATION: BUTTOCKS
LOCATION: BACK
LOCATION: ABDOMEN
LOCATION: COCCYX;LEG
LOCATION: COCCYX
LOCATION: BACK
LOCATION: BUTTOCKS;NECK
LOCATION: COCCYX;SACRUM
LOCATION: SACRUM
LOCATION: COCCYX
LOCATION: COCCYX;LEG
LOCATION: COCCYX
LOCATION: BUTTOCKS
LOCATION: ABDOMEN;BACK
LOCATION: ABDOMEN
LOCATION: BUTTOCKS
LOCATION: CHEST;COCCYX
LOCATION: BUTTOCKS
LOCATION: LEG
LOCATION: COCCYX
LOCATION: BUTTOCKS
LOCATION: ABDOMEN
LOCATION: NECK;CHEST
LOCATION: ABDOMEN
LOCATION: EAR
LOCATION: COCCYX
LOCATION: SACRUM;BUTTOCKS
LOCATION: ABDOMEN;LEG
LOCATION: BUTTOCKS
LOCATION: GENERALIZED;SACRUM
LOCATION: BUTTOCKS
LOCATION: GENERALIZED

## 2022-01-01 ASSESSMENT — PAIN DESCRIPTION - ONSET
ONSET: ON-GOING
ONSET: AWAKENED FROM SLEEP
ONSET: ON-GOING
ONSET: GRADUAL
ONSET: ON-GOING
ONSET: AWAKENED FROM SLEEP
ONSET: GRADUAL
ONSET: ON-GOING
ONSET: GRADUAL
ONSET: ON-GOING
ONSET: GRADUAL
ONSET: ON-GOING
ONSET: AWAKENED FROM SLEEP
ONSET: ON-GOING
ONSET: ON-GOING
ONSET: AWAKENED FROM SLEEP
ONSET: ON-GOING
ONSET: AWAKENED FROM SLEEP
ONSET: GRADUAL
ONSET: ON-GOING
ONSET: GRADUAL
ONSET: GRADUAL
ONSET: ON-GOING
ONSET: ON-GOING
ONSET: GRADUAL
ONSET: ON-GOING
ONSET: GRADUAL
ONSET: GRADUAL
ONSET: ON-GOING
ONSET: AWAKENED FROM SLEEP
ONSET: ON-GOING
ONSET: GRADUAL
ONSET: GRADUAL
ONSET: ON-GOING
ONSET: GRADUAL
ONSET: ON-GOING
ONSET: GRADUAL
ONSET: GRADUAL
ONSET: ON-GOING

## 2022-01-01 ASSESSMENT — PAIN DESCRIPTION - PAIN TYPE
TYPE: ACUTE PAIN

## 2022-01-01 ASSESSMENT — ENCOUNTER SYMPTOMS
SHORTNESS OF BREATH: 1
ABDOMINAL PAIN: 0

## 2022-01-05 ENCOUNTER — TELEPHONE (OUTPATIENT)
Dept: FAMILY MEDICINE CLINIC | Age: 77
End: 2022-01-05

## 2022-01-05 DIAGNOSIS — E11.9 TYPE 2 DIABETES MELLITUS WITHOUT COMPLICATION, WITHOUT LONG-TERM CURRENT USE OF INSULIN (HCC): ICD-10-CM

## 2022-01-05 RX ORDER — SEMAGLUTIDE 1.34 MG/ML
INJECTION, SOLUTION SUBCUTANEOUS
Qty: 2 PEN | Refills: 0 | COMMUNITY
Start: 2022-01-05 | End: 2022-06-07

## 2022-01-05 NOTE — TELEPHONE ENCOUNTER
Please contact patient when we receive ozepmic samples. Patient stopped by the office and we are out.

## 2022-01-19 ENCOUNTER — OFFICE VISIT (OUTPATIENT)
Dept: FAMILY MEDICINE CLINIC | Age: 77
End: 2022-01-19
Payer: COMMERCIAL

## 2022-01-19 VITALS
HEART RATE: 97 BPM | HEIGHT: 72 IN | OXYGEN SATURATION: 96 % | WEIGHT: 200 LBS | DIASTOLIC BLOOD PRESSURE: 80 MMHG | BODY MASS INDEX: 27.09 KG/M2 | SYSTOLIC BLOOD PRESSURE: 130 MMHG

## 2022-01-19 DIAGNOSIS — I48.91 ATRIAL FIBRILLATION, UNSPECIFIED TYPE (HCC): Primary | ICD-10-CM

## 2022-01-19 DIAGNOSIS — E11.9 TYPE 2 DIABETES MELLITUS WITHOUT COMPLICATION, WITHOUT LONG-TERM CURRENT USE OF INSULIN (HCC): ICD-10-CM

## 2022-01-19 DIAGNOSIS — R06.02 SHORTNESS OF BREATH: ICD-10-CM

## 2022-01-19 PROBLEM — S82.62XA CLOSED DISPLACED FRACTURE OF LATERAL MALLEOLUS OF LEFT FIBULA: Status: RESOLVED | Noted: 2021-01-01 | Resolved: 2022-01-01

## 2022-01-19 PROCEDURE — 99214 OFFICE O/P EST MOD 30 MIN: CPT | Performed by: FAMILY MEDICINE

## 2022-01-19 PROCEDURE — 93000 ELECTROCARDIOGRAM COMPLETE: CPT | Performed by: FAMILY MEDICINE

## 2022-01-19 RX ORDER — SEMAGLUTIDE 1.34 MG/ML
INJECTION, SOLUTION SUBCUTANEOUS
Qty: 3 PEN | Refills: 0 | COMMUNITY
Start: 2022-01-19 | End: 2022-05-11

## 2022-01-19 NOTE — LETTER
[facility]  [address] [place] [address],[address]  Phone: [phone]  Fax: [phone]    Ysabel Austin MD         January 19, 2022     Patient: Wilner Barrientos   YOB: 1945   Date of Visit: 1/19/2022       To Whom It May Concern: It is my medical opinion that Natividad Hernandez requires a disability parking placard for the following reasons:  He cannot walk without assistance from another person or the use of an assistance device (cane, crutch, prosthetic device, wheelchair, etc.). Duration of need: 5 years    If you have any questions or concerns, please don't hesitate to call.     Sincerely,        Ysabel Austin MD

## 2022-01-19 NOTE — LETTER
[facility]  [address] [place] [address],[address]  Phone: [phone]  Fax: [phone]    Adria Zuniga MD         January 19, 2022     Patient: Jason Armas   YOB: 1945   Date of Visit: 1/19/2022       To Whom It May Concern: It is my medical opinion that Edgar Rouse requires a disability parking placard for the following reasons:  He cannot walk 200 feet without stopping to rest.  Duration of need: 5 years    If you have any questions or concerns, please don't hesitate to call.     Sincerely,        Adria Zuniga MD

## 2022-01-19 NOTE — PROGRESS NOTES
Ginna Ware (:  1945) is a 67 y.o. male,Established patient, here for evaluation of the following chief complaint(s):  3 Month Follow-Up         ASSESSMENT/PLAN:  Ehsan Thorne was seen today for 3 month follow-up. Diagnoses and all orders for this visit:    Atrial fibrillation, unspecified type ([facility] [address]  -     TSH with Reflex; Future  -     CBC; Future  -     Echocardiogram complete; Future  -     Juana Frazier MD, Cardiology, [place]  -     apixaban (ELIQUIS) 5 MG TABS tablet; Take 1 tablet by mouth 2 times daily  New dx  [id number] score is 4  Starting anticoagulation with eliquis  Echo and referred to cardiology  Type 2 diabetes mellitus without complication, without long-term current use of insulin ([facility])  -     Hemoglobin A1C; Future  -     Lipid Panel; Future  -     Comprehensive Metabolic Panel; Future  -     Microalbumin / Creatinine Urine Ratio; Future  -     Semaglutide,0.25 or 0.5MG/DOS, (OZEMPIC, 0.25 OR 0.5 MG/DOSE,) 2 MG/1.5ML SOPN; Inject as directed by physician. Improved on ozempic  Too expensive so just on samples  Can cut to 0.25 to extend use  Shortness of breath  -     EKG 12 Lead  -     Cancel: CARDIAC STRESS TEST EXERCISE ONLY; Future  -     Cancel: Full PFT Study With Bronchodilator; Future  -     Echocardiogram complete; Future  A fib on ekg  Primary hypertension         No follow-ups on file. Subjective   SUBJECTIVE/OBJECTIVE:  HPI   Pt is a of 67 y.o. male comes in today with   Chief Complaint   Patient presents with    3 Month Follow-Up     blood sugars have been better since being on ozempic. On 0.5 mg  Notes wt loss with that as well. 4-5 weeks ago had shortness of breath with exertion.   No chest pain     Vitals:    22 1304   BP: 130/80   Site: Left Upper Arm   Position: Sitting   Cuff Size: Medium Adult   Pulse: 97   SpO2: 96%   Weight: 200 lb (90.7 kg)   Height: 6' (1.829 m)        Review of Systems       Objective   Physical Exam  Constitutional:       General: He is not in acute distress. Appearance: Normal appearance. He is not ill-appearing. Cardiovascular:      Rate and Rhythm: Normal rate. Rhythm irregular. Pulmonary:      Effort: Pulmonary effort is normal.      Breath sounds: Normal breath sounds. No wheezing or rales. Skin:     Capillary Refill: Capillary refill takes less than 2 seconds. Neurological:      General: No focal deficit present. Mental Status: He is alert. Psychiatric:         Mood and Affect: Mood normal.              An electronic signature was used to authenticate this note.     --Heriberto Fuller MD

## 2022-01-25 ENCOUNTER — PROCEDURE VISIT (OUTPATIENT)
Dept: CARDIOLOGY CLINIC | Age: 77
End: 2022-01-25
Payer: COMMERCIAL

## 2022-01-25 DIAGNOSIS — R06.02 SHORTNESS OF BREATH: ICD-10-CM

## 2022-01-25 DIAGNOSIS — I48.91 ATRIAL FIBRILLATION, UNSPECIFIED TYPE (HCC): ICD-10-CM

## 2022-01-25 LAB
LV EF: 58 %
LVEF MODALITY: NORMAL

## 2022-01-25 PROCEDURE — 93356 MYOCRD STRAIN IMG SPCKL TRCK: CPT | Performed by: INTERNAL MEDICINE

## 2022-01-25 PROCEDURE — 93306 TTE W/DOPPLER COMPLETE: CPT | Performed by: INTERNAL MEDICINE

## 2022-01-27 ENCOUNTER — NURSE ONLY (OUTPATIENT)
Dept: CARDIOLOGY CLINIC | Age: 77
End: 2022-01-27

## 2022-01-27 ENCOUNTER — OFFICE VISIT (OUTPATIENT)
Dept: CARDIOLOGY CLINIC | Age: 77
End: 2022-01-27
Payer: COMMERCIAL

## 2022-01-27 VITALS
HEART RATE: 98 BPM | WEIGHT: 198.8 LBS | DIASTOLIC BLOOD PRESSURE: 76 MMHG | BODY MASS INDEX: 26.96 KG/M2 | SYSTOLIC BLOOD PRESSURE: 140 MMHG

## 2022-01-27 DIAGNOSIS — R06.09 EXERTIONAL DYSPNEA: ICD-10-CM

## 2022-01-27 DIAGNOSIS — R06.09 DYSPNEA ON EXERTION: Primary | ICD-10-CM

## 2022-01-27 PROCEDURE — 93246 EXT ECG>7D<15D RECORDING: CPT | Performed by: INTERNAL MEDICINE

## 2022-01-27 PROCEDURE — 99204 OFFICE O/P NEW MOD 45 MIN: CPT | Performed by: INTERNAL MEDICINE

## 2022-01-27 RX ORDER — METOPROLOL SUCCINATE 25 MG/1
25 TABLET, EXTENDED RELEASE ORAL DAILY
Qty: 90 TABLET | Refills: 3 | Status: SHIPPED | OUTPATIENT
Start: 2022-01-27 | End: 2022-03-03

## 2022-01-27 NOTE — PROGRESS NOTES
Cc: HTN, exertional dyspnea, r/o AFib    HPI:     Patient is a 76-year-old man with history of HTN, HLP, poorly controlled diabetes, possible A. fib. Patient was referred to me by his PCP for assessment in view of his exertional dyspnea and possible A. fib. Echo 1/25/2022: Normal LV size, mild LVH, LVEF 50%, diastolic grade 2, mild LAE, normal RV, mild valvular disease. ECG 1/19/2022: Normal sinus rhythm with frequent PACs, poor R wave progression, cannot exclude old anterior MI. No prior ischemic evaluation    Labs 1/24/2022: TSH 3.0 normal, creatinine 1.8, K5.0, FLP: , HDL 81, LDL 99, TG 89 on pravachol 40 daily. Hemoglobin A1c 8.1. Patient has noticed increasing shortness of breath over the last 6 to 8 weeks with exertion. He denies any rabia chest pains or palpitations. He admits to drinking 4 to 6 cups of coffee daily and using excess salt in his diet. He is compliant with his medications. He was started on Eliquis 5 mg twice daily about a week ago by his PCP in view of concern of A. fib per ECG. Histories     Past Medical History:   has a past medical history of Carotid artery stenosis, Carotid stenosis, left, Chronic back pain, Erectile dysfunction, Hyperlipidemia, Hypertension, Osteoarthritis, and Type II or unspecified type diabetes mellitus without mention of complication, not stated as uncontrolled. Surgical History:   has no past surgical history on file. Social History:   reports that he quit smoking about 20 years ago. His smoking use included cigarettes. He has a 80.00 pack-year smoking history. He has never used smokeless tobacco. He reports current alcohol use. He reports that he does not use drugs. Family History:  No evidence for sudden cardiac death or premature CAD      Medications:     Home medications were reviewed and are listed below    Prior to Admission medications    Medication Sig Start Date End Date Taking?  Authorizing Provider   metoprolol succinate (TOPROL XL) 25 MG extended release tablet Take 1 tablet by mouth daily 1/27/22  Yes Angélica Dove MD   apixaban (ELIQUIS) 5 MG TABS tablet Take 1 tablet by mouth 2 times daily 1/19/22  Yes Ginny Morrissey MD   Semaglutide,0.25 or 0.5MG/DOS, (OZEMPIC, 0.25 OR 0.5 MG/DOSE,) 2 MG/1.5ML SOPN Inject as directed by physician. 1/19/22  Yes Ginny Morrissey MD   Semaglutide,0.25 or 0.5MG/DOS, (OZEMPIC, 0.25 OR 0.5 MG/DOSE,) 2 MG/1.5ML SOPN Inject as directed by physician. 1/5/22  Yes Ginny Morrissey MD   ramipril (ALTACE) 10 MG capsule TAKE 1 CAPSULE DAILY 11/15/21  Yes Ginny Morrissey MD   pioglitazone (ACTOS) 45 MG tablet TAKE 1 TABLET DAILY 11/15/21  Yes Ginny Morrissey MD   dapagliflozin (FARXIGA) 10 MG tablet TAKE 1 TABLET EVERY MORNING 11/15/21  Yes Ginny Morrissey MD   metFORMIN (GLUCOPHAGE) 500 MG tablet TAKE 2 TABLETS 2 TIMES     DAILY WITH MEALS 10/20/21  Yes NATASHA Esquivel CNP   acarbose (PRECOSE) 100 MG tablet TAKE 1 TABLET TWICE DAILY  WITH MEALS 10/20/21  Yes NATASHA Esquivel - CNP   pravastatin (PRAVACHOL) 40 MG tablet Take 1 tablet by mouth daily 10/20/21  Yes NATASHA Esquivel CNP   glimepiride (AMARYL) 4 MG tablet Take 1 tablet by mouth 2 times daily (with meals) 10/20/21  Yes NATASHA Esquivel - CNP   FREESTYLE LITE strip USE TO TEST BLOOD SUGAR LEVEL ONCE DAILY AS NEEDED  7/23/21  Yes Ginny Morrissey MD   sildenafil (VIAGRA) 100 MG tablet Take 1 tablet by mouth as needed for Erectile Dysfunction Max daily dose 100mg. 5/26/21 5/21/22 Yes Ginny Morrissey MD   Lancets MISC 1 each by Does not apply route 2 times daily 1/20/21  Yes Ginny Morrissey MD   Multiple Vitamin (MULTI VITAMIN MENS PO) Take by mouth Daily     Yes Historical Provider, MD   baclofen (LIORESAL) 10 MG tablet Take 1 tablet by mouth 3 times daily as needed 10/15/15   Ginny Morrissey MD          Allergy:     Dye [iodides]       Review of Systems:     All 12 point review of symptoms completed. Pertinent positives identified in the HPI, all other review of symptoms negative as below. CONSTITUTIONAL: No fatigue  SKIN: No rash or pruritis. EYES: No visual changes or diplopia. No scleral icterus. ENT: No Headaches, hearing loss or vertigo. No mouth sores or sore throat. CARDIOVASCULAR: No chest pain/chest pressure/chest discomfort. No palpitations. No edema. RESPIRATORY: + dyspnea. No cough or wheezing, no sputum production. GASTROINTESTINAL: No N/V/D. No abdominal pain, appetite loss, blood in stools. GENITOURINARY: No dysuria, trouble voiding, or hematuria. MUSCULOSKELETAL:  No gait disturbance, weakness or joint complaints. NEUROLOGICAL: No headache, diplopia, change in muscle strength, numbness or tingling. No change in gait, balance, coordination, mood, affect, memory, mentation, behavior. PSHYCH: No anxiety, loss of interest, change in sexual behavior, feelings of self-harm, or confusion. ENDOCRINE: No excessive thirst, fluid intake, or urination. No tremor. HEMATOLOGIC: No abnormal bruising or bleeding. ALLERGY: No nasal congestion or hives.       Physical Examination:     Vitals:    01/27/22 1021 01/27/22 1022   BP: (!) 140/76 (!) 140/76   Pulse: 98    Weight: 198 lb 12.8 oz (90.2 kg)        Wt Readings from Last 3 Encounters:   01/27/22 198 lb 12.8 oz (90.2 kg)   01/19/22 200 lb (90.7 kg)   11/02/21 213 lb (96.6 kg)         General Appearance:  Alert, cooperative, no distress, appears stated age Appropriate weight   Head:  Normocephalic, without obvious abnormality, atraumatic   Eyes:  PERRL, conjunctiva/corneas clear EOM intact  Ears normal   Throat no lesions       Nose: Nares normal, no drainage or sinus tenderness   Throat: Lips, mucosa, and tongue normal   Neck: Supple, symmetrical, trachea midline, no adenopathy, thyroid: not enlarged, symmetric, no tenderness/mass/nodules, no carotid bruit       Lungs:   Clear to auscultation bilaterally, respirations unlabored   Chest Wall:  No tenderness or deformity   Heart:  Regular rhythm with frequent ectopic beats, rate is controlled, S1, S2 normal, there is no murmur, there is no rub or gallop, cannot assess jvd, no bilateral lower extremity edema   Abdomen:   Soft, non-tender, bowel sounds active all four quadrants,  no masses, no organomegaly       Extremities: Extremities normal, atraumatic, no cyanosis   Pulses: 2+ and symmetric   Skin: Skin color, texture, turgor normal, no rashes or lesions   Pysch: Normal mood and affect   Neurologic: Normal gross motor and sensory exam.  Cranial nerves intact        Labs:     Lab Results   Component Value Date    WBC 8.0 01/24/2022    HGB 13.5 01/24/2022    HCT 42.8 01/24/2022    MCV 81.8 01/24/2022     (H) 01/24/2022     Lab Results   Component Value Date     01/24/2022    K 5.0 01/24/2022    CL 96 (L) 01/24/2022    CO2 27 01/24/2022    BUN 15 01/24/2022    CREATININE 0.8 01/24/2022    GLUCOSE 144 (H) 01/24/2022    CALCIUM 9.6 01/24/2022    PROT 6.7 01/24/2022    LABALBU 3.6 01/24/2022    BILITOT 0.3 01/24/2022    ALKPHOS 118 01/24/2022    AST 12 (L) 01/24/2022    ALT 6 (L) 01/24/2022    LABGLOM >60 01/24/2022    GFRAA >60 01/24/2022    AGRATIO 1.2 01/24/2022    GLOB 3.0 10/20/2021         Lab Results   Component Value Date    CHOL 198 01/24/2022    CHOL 189 01/07/2021    CHOL 191 12/10/2019     Lab Results   Component Value Date    TRIG 89 01/24/2022    TRIG 96 01/07/2021    TRIG 92 12/10/2019     Lab Results   Component Value Date    HDL 81 (H) 01/24/2022    HDL 74 (H) 01/07/2021    HDL 99 (H) 12/10/2019     Lab Results   Component Value Date    LDLCALC 99 01/24/2022    LDLCALC 96 01/07/2021    LDLCALC 74 12/10/2019     Lab Results   Component Value Date    LABVLDL 18 01/24/2022    LABVLDL 19 01/07/2021    LABVLDL 18 12/10/2019     No results found for: CHOLHDLRATIO    No results found for: INR, PROTIME    The 10-year ASCVD risk score (Megha Hendrickson, et al., 2013) is: 52.6%    Values used to calculate the score:      Age: 67 years      Sex: Male      Is Non- : No      Diabetic: Yes      Tobacco smoker: No      Systolic Blood Pressure: 417 mmHg      Is BP treated: Yes      HDL Cholesterol: 81 mg/dL      Total Cholesterol: 198 mg/dL      Assessment / Plan:      Diagnosis Orders   1. Dyspnea on exertion  Stress test (Lexiscan)   2. Exertional dyspnea          1. Exertional dyspnea:  I cannot exclude significant underlying CAD in this high risk patient. We will obtain a Lexiscan nuclear stress test    2. Rule out A. fib:  Personal review of patient's ECG suggest normal sinus rhythm with frequent PACs rather than A. fib with controlled ventricular response. However I cannot exclude silent episodes of A. fib at home. We will obtain a 2-week CAM monitor for further assessment  I recommend the patient significantly decreases the amount of coffee and salt in his diet  We will continue with Eliquis 5 mg twice daily for now until after monitor. If A. fib discovered, he will continue on anticoagulation. Will start Toprol 25 mg daily, will uptitrate as tolerated in order to suppress PACs, prevent A. fib, controlled blood pressure. 3.  Hypertension:  Is most likely due to age and excess salt in his diet. Continue with ramipril 10 mg daily and will start Toprol 25 mg daily  Follow a low-salt diet  Patient will start checking his blood pressure at home frequently and call us in 2 weeks for further medication adjustments. 4.  Hyperlipidemia:  Patient's lipid profile was reviewed. It is acceptable if no evidence of CAD. Continue with Pravachol 40 mg daily  Low-cholesterol diet            Return in about 4 weeks (around 2/24/2022). I have spent 62 minutes of face to face time with the patient with more than 50% spent counseling and coordinating care.        I have personally reviewed the reports and images of labs, radiological studies, cardiac studies including ECG's and telemetry, current and old medical records. The note was completed using EMR and Dragon dictation system. Every effort was made to ensure accuracy; however, inadvertent computerized transcription errors may be present. All questions and concerns were addressed to the patient/family. Alternatives to my treatment were discussed. I would like to thank you for providing me the opportunity to participate in the care of your patient. If you have any questions, please do not hesitate to contact me.      Kenzie Dilladr MD, [place] [address] Phone: [phone]  Heart Failure Hotline: [phone]  Fax: [phone]

## 2022-02-15 ENCOUNTER — OFFICE VISIT (OUTPATIENT)
Dept: ORTHOPEDIC SURGERY | Age: 77
End: 2022-02-15
Payer: COMMERCIAL

## 2022-02-15 VITALS — BODY MASS INDEX: 26.82 KG/M2 | HEIGHT: 72 IN | WEIGHT: 198 LBS

## 2022-02-15 DIAGNOSIS — S82.62XA CLOSED DISPLACED FRACTURE OF LATERAL MALLEOLUS OF LEFT FIBULA, INITIAL ENCOUNTER: Primary | ICD-10-CM

## 2022-02-15 PROCEDURE — 99213 OFFICE O/P EST LOW 20 MIN: CPT | Performed by: ORTHOPAEDIC SURGERY
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
## 2022-03-02 DIAGNOSIS — R00.2 PALPITATION: ICD-10-CM

## 2022-03-03 ENCOUNTER — OFFICE VISIT (OUTPATIENT)
Dept: CARDIOLOGY CLINIC | Age: 77
End: 2022-03-03
Payer: COMMERCIAL

## 2022-03-03 VITALS
HEART RATE: 72 BPM | DIASTOLIC BLOOD PRESSURE: 78 MMHG | SYSTOLIC BLOOD PRESSURE: 138 MMHG | BODY MASS INDEX: 27.04 KG/M2 | WEIGHT: 199.4 LBS

## 2022-03-03 DIAGNOSIS — I48.91 ATRIAL FIBRILLATION WITH RAPID VENTRICULAR RESPONSE (HCC): ICD-10-CM

## 2022-03-03 PROCEDURE — 99214 OFFICE O/P EST MOD 30 MIN: CPT | Performed by: INTERNAL MEDICINE

## 2022-03-03 PROCEDURE — 93248 EXT ECG>7D<15D REV&INTERPJ: CPT | Performed by: INTERNAL MEDICINE

## 2022-03-03 RX ORDER — METOPROLOL SUCCINATE 50 MG/1
50 TABLET, EXTENDED RELEASE ORAL DAILY
Qty: 90 TABLET | Refills: 3 | Status: ON HOLD | OUTPATIENT
Start: 2022-03-03 | End: 2022-04-02 | Stop reason: HOSPADM

## 2022-03-04 PROBLEM — I48.91 ATRIAL FIBRILLATION WITH RAPID VENTRICULAR RESPONSE (HCC): Status: ACTIVE | Noted: 2022-01-01

## 2022-03-04 NOTE — PROGRESS NOTES
Cc: HTN, PAFRVR    HPI:     Patient is a 65-year-old man with history of HTN, HLP, poorly controlled diabetes, PAFRVR     Patient was referred to me by his PCP for assessment in view of his exertional dyspnea and possible A. fib.     Echo 1/25/2022: Normal LV size, mild LVH, LVEF 59%, diastolic grade 2, mild LAE, normal RV, mild valvular disease.     ECG 1/19/2022: Normal sinus rhythm with frequent PACs, poor R wave progression, cannot exclude old anterior MI.     No prior ischemic evaluation     Labs 1/24/2022: TSH 3.0 normal, creatinine 1.8, K5.0, FLP: , HDL 81, LDL 99, TG 89 on pravachol 40 daily. Hemoglobin A1c 8.1.     Patient noticed increasing shortness of breath with exertion since January 2022. He denies any rabia chest pains or palpitations. He is compliant with his medications. Patient has been on Toprol 25 mg daily and Eliquis 5 twice daily since 01/2022. CAM 2-week 1/272/10/2022: NSR with frequent episodes of SVT, couple of those episodes appear to be AF RVR. Carleen has not been done yet. Patient continues to have above symptoms. Histories     Past Medical History:   has a past medical history of Carotid artery stenosis, Carotid stenosis, left, Chronic back pain, Erectile dysfunction, Hyperlipidemia, Hypertension, Osteoarthritis, and Type II or unspecified type diabetes mellitus without mention of complication, not stated as uncontrolled. Surgical History:   has no past surgical history on file. Social History:   reports that he quit smoking about 20 years ago. His smoking use included cigarettes. He has a 80.00 pack-year smoking history. He has never used smokeless tobacco. He reports current alcohol use. He reports that he does not use drugs. Family History:  No evidence for sudden cardiac death or premature CAD      Medications:     Home medications were reviewed and are listed below    Prior to Admission medications    Medication Sig Start Date End Date Taking? Authorizing Provider   metoprolol succinate (TOPROL XL) 50 MG extended release tablet Take 1 tablet by mouth daily 3/3/22  Yes Dariel uSllivan MD   apixaban (ELIQUIS) 5 MG TABS tablet Take 1 tablet by mouth 2 times daily 1/19/22  Yes Leslie Zuniga MD   Semaglutide,0.25 or 0.5MG/DOS, (OZEMPIC, 0.25 OR 0.5 MG/DOSE,) 2 MG/1.5ML SOPN Inject as directed by physician. 1/19/22  Yes Leslie Zuniga MD   Semaglutide,0.25 or 0.5MG/DOS, (OZEMPIC, 0.25 OR 0.5 MG/DOSE,) 2 MG/1.5ML SOPN Inject as directed by physician.  1/5/22  Yes Leslie Zuniga MD   ramipril (ALTACE) 10 MG capsule TAKE 1 CAPSULE DAILY 11/15/21  Yes Leslie Zuniga MD   pioglitazone (ACTOS) 45 MG tablet TAKE 1 TABLET DAILY 11/15/21  Yes Leslie Zuniga MD   dapagliflozin (FARXIGA) 10 MG tablet TAKE 1 TABLET EVERY MORNING 11/15/21  Yes Leslie Zuniga MD   metFORMIN (GLUCOPHAGE) 500 MG tablet TAKE 2 TABLETS 2 TIMES     DAILY WITH MEALS 10/20/21  Yes NATASHA Meza CNP   acarbose (PRECOSE) 100 MG tablet TAKE 1 TABLET TWICE DAILY  WITH MEALS 10/20/21  Yes NATASHA Meza CNP   pravastatin (PRAVACHOL) 40 MG tablet Take 1 tablet by mouth daily 10/20/21  Yes NATASHA Meza CNP   glimepiride (AMARYL) 4 MG tablet Take 1 tablet by mouth 2 times daily (with meals) 10/20/21  Yes NATASHA Meza CNP   FREESTYLE LITE strip USE TO TEST BLOOD SUGAR LEVEL ONCE DAILY AS NEEDED  7/23/21  Yes Leslie Zuniga MD   sildenafil (VIAGRA) 100 MG tablet Take 1 tablet by mouth as needed for Erectile Dysfunction Max daily dose 100mg. 5/26/21 5/21/22 Yes Leslie Zuniga MD   Lancets MISC 1 each by Does not apply route 2 times daily 1/20/21  Yes Leslie Zuniga MD   Multiple Vitamin (MULTI VITAMIN MENS PO) Take by mouth Daily     Yes Historical Provider, MD   baclofen (LIORESAL) 10 MG tablet Take 1 tablet by mouth 3 times daily as needed 10/15/15   Leslie Zuniga MD          Allergy:     Dye [iodides]       Review of Systems:     All 12 point review of symptoms completed. Pertinent positives identified in the HPI, all other review of symptoms negative as below. CONSTITUTIONAL: No fatigue  SKIN: No rash or pruritis. EYES: No visual changes or diplopia. No scleral icterus. ENT: No Headaches, hearing loss or vertigo. No mouth sores or sore throat. CARDIOVASCULAR: No chest pain/chest pressure/chest discomfort. No palpitations. No edema. RESPIRATORY: No dyspnea. No cough or wheezing, no sputum production. GASTROINTESTINAL: No N/V/D. No abdominal pain, appetite loss, blood in stools. GENITOURINARY: No dysuria, trouble voiding, or hematuria. MUSCULOSKELETAL:  No gait disturbance, weakness or joint complaints. NEUROLOGICAL: No headache, diplopia, change in muscle strength, numbness or tingling. No change in gait, balance, coordination, mood, affect, memory, mentation, behavior. PSHYCH: No anxiety, loss of interest, change in sexual behavior, feelings of self-harm, or confusion. ENDOCRINE: No excessive thirst, fluid intake, or urination. No tremor. HEMATOLOGIC: No abnormal bruising or bleeding. ALLERGY: No nasal congestion or hives.       Physical Examination:     Vitals:    03/03/22 1101   BP: 138/78   Pulse: 72   Weight: 199 lb 6.4 oz (90.4 kg)       Wt Readings from Last 3 Encounters:   03/03/22 199 lb 6.4 oz (90.4 kg)   02/15/22 198 lb (89.8 kg)   01/27/22 198 lb 12.8 oz (90.2 kg)         General Appearance:  Alert, cooperative, no distress, appears stated age Appropriate weight   Head:  Normocephalic, without obvious abnormality, atraumatic   Eyes:  PERRL, conjunctiva/corneas clear EOM intact  Ears normal   Throat no lesions       Nose: Nares normal, no drainage or sinus tenderness   Throat: Lips, mucosa, and tongue normal   Neck: Supple, symmetrical, trachea midline, no adenopathy, thyroid: not enlarged, symmetric, no tenderness/mass/nodules, no carotid bruit       Lungs:   Clear to auscultation bilaterally, respirations unlabored   Chest Wall:  No tenderness or deformity   Heart:  Regular rhythm, rate is controlled, S1, S2 normal, there is no murmur, there is no rub or gallop, cannot assess jvd, no bilateral lower extremity edema   Abdomen:   Soft, non-tender, bowel sounds active all four quadrants,  no masses, no organomegaly       Extremities: Extremities normal, atraumatic, no cyanosis   Pulses: 2+ and symmetric   Skin: Skin color, texture, turgor normal, no rashes or lesions   Pysch: Normal mood and affect   Neurologic: Normal gross motor and sensory exam.  Cranial nerves intact        Labs:     Lab Results   Component Value Date    WBC 8.0 01/24/2022    HGB 13.5 01/24/2022    HCT 42.8 01/24/2022    MCV 81.8 01/24/2022     (H) 01/24/2022     Lab Results   Component Value Date     01/24/2022    K 5.0 01/24/2022    CL 96 (L) 01/24/2022    CO2 27 01/24/2022    BUN 15 01/24/2022    CREATININE 0.8 01/24/2022    GLUCOSE 144 (H) 01/24/2022    CALCIUM 9.6 01/24/2022    PROT 6.7 01/24/2022    LABALBU 3.6 01/24/2022    BILITOT 0.3 01/24/2022    ALKPHOS 118 01/24/2022    AST 12 (L) 01/24/2022    ALT 6 (L) 01/24/2022    LABGLOM >60 01/24/2022    GFRAA >60 01/24/2022    AGRATIO 1.2 01/24/2022    GLOB 3.0 10/20/2021         Lab Results   Component Value Date    CHOL 198 01/24/2022    CHOL 189 01/07/2021    CHOL 191 12/10/2019     Lab Results   Component Value Date    TRIG 89 01/24/2022    TRIG 96 01/07/2021    TRIG 92 12/10/2019     Lab Results   Component Value Date    HDL 81 (H) 01/24/2022    HDL 74 (H) 01/07/2021    HDL 99 (H) 12/10/2019     Lab Results   Component Value Date    LDLCALC 99 01/24/2022    LDLCALC 96 01/07/2021    LDLCALC 74 12/10/2019     Lab Results   Component Value Date    LABVLDL 18 01/24/2022    LABVLDL 19 01/07/2021    LABVLDL 18 12/10/2019     No results found for: CHOLHDLRATIO    No results found for: INR, PROTIME    The 10-year ASCVD risk score (Breanne Robertson, et al., 2013) is: 51.7%    Values used to calculate the score:      Age: 67 years      Sex: Male      Is Non- : No      Diabetic: Yes      Tobacco smoker: No      Systolic Blood Pressure: 691 mmHg      Is BP treated: Yes      HDL Cholesterol: 81 mg/dL      Total Cholesterol: 198 mg/dL      Assessment / Plan:      Diagnosis Orders   1. Atrial fibrillation with rapid ventricular response ([address]          1. Exertional dyspnea:  I cannot exclude significant underlying CAD in this high risk patient.     We will obtain a Lexiscan nuclear stress test     2. PAFRVR:   Personal review of patient's ECG suggest normal sinus rhythm with frequent PACs rather than A. fib with controlled ventricular response. CAM monitor reveals episodes of SVT, couple of them look like AF RVR.     I recommend the patient significantly decreases the amount of coffee and salt in his diet  We will continue with Eliquis 5 mg twice daily and increase Toprol to 50 mg daily.     3. Hypertension:  Is most likely due to age and excess salt in his diet. BP is borderline high normal at this time.     Continue with ramipril 10 mg daily and will start Toprol 25 mg daily  Follow a low-salt diet  Patient will start checking his blood pressure at home frequently and call us in 2 weeks for further medication adjustments.     4. Hyperlipidemia:  Patient's lipid profile was reviewed. It is acceptable if no evidence of CAD.     Continue with Pravachol 40 mg daily  Low-cholesterol diet        Return in about 4 weeks (around 3/31/2022). I have spent 42 minutes of face to face time with the patient with more than 50% spent counseling and coordinating care. I have personally reviewed the reports and images of labs, radiological studies, cardiac studies including ECG's and telemetry, current and old medical records. The note was completed using EMR and Dragon dictation system.  Every effort was made to ensure accuracy; however, inadvertent computerized transcription errors may be present. All questions and concerns were addressed to the patient/family. Alternatives to my treatment were discussed. I would like to thank you for providing me the opportunity to participate in the care of your patient. If you have any questions, please do not hesitate to contact me.      Chuy Garcia MD, [address] Office Phone: [phone]  Fax: [phone]

## 2022-03-10 ENCOUNTER — HOSPITAL ENCOUNTER (OUTPATIENT)
Dept: NON INVASIVE DIAGNOSTICS | Age: 77
Discharge: HOME | End: 2022-03-10
Payer: COMMERCIAL

## 2022-03-10 DIAGNOSIS — R06.09 DYSPNEA ON EXERTION: ICD-10-CM

## 2022-03-10 LAB
LV EF: 60 %
LVEF MODALITY: NORMAL

## 2022-03-10 PROCEDURE — 78452 HT MUSCLE IMAGE SPECT MULT: CPT

## 2022-03-10 PROCEDURE — 2580000003 HC RX 258: Performed by: INTERNAL MEDICINE

## 2022-03-10 PROCEDURE — 3430000000 HC RX DIAGNOSTIC RADIOPHARMACEUTICAL: Performed by: INTERNAL MEDICINE

## 2022-03-10 PROCEDURE — 93017 CV STRESS TEST TRACING ONLY: CPT

## 2022-03-10 PROCEDURE — 6360000002 HC RX W HCPCS: Performed by: INTERNAL MEDICINE

## 2022-03-10 PROCEDURE — A9502 TC99M TETROFOSMIN: HCPCS | Performed by: INTERNAL MEDICINE

## 2022-03-10 RX ORDER — SODIUM CHLORIDE 0.9 % (FLUSH) 0.9 %
10 SYRINGE (ML) INJECTION 2 TIMES DAILY
Status: DISCONTINUED | OUTPATIENT
Start: 2022-03-10 | End: 2022-03-11 | Stop reason: HOSPADM

## 2022-03-10 RX ADMIN — REGADENOSON 0.4 MG: 0.08 INJECTION, SOLUTION INTRAVENOUS at 14:09

## 2022-03-10 RX ADMIN — TETROFOSMIN 30 MILLICURIE: 1.38 INJECTION, POWDER, LYOPHILIZED, FOR SOLUTION INTRAVENOUS at 14:09

## 2022-03-10 RX ADMIN — Medication 10 ML: at 13:03

## 2022-03-10 RX ADMIN — TETROFOSMIN 10 MILLICURIE: 1.38 INJECTION, POWDER, LYOPHILIZED, FOR SOLUTION INTRAVENOUS at 13:03

## 2022-03-10 RX ADMIN — Medication 10 ML: at 14:09

## 2022-03-28 ENCOUNTER — HOSPITAL ENCOUNTER (OUTPATIENT)
Dept: GENERAL RADIOLOGY | Age: 77
Discharge: HOME | End: 2022-03-28
Payer: COMMERCIAL

## 2022-03-28 ENCOUNTER — OFFICE VISIT (OUTPATIENT)
Dept: FAMILY MEDICINE CLINIC | Age: 77
End: 2022-03-28
Payer: COMMERCIAL

## 2022-03-28 ENCOUNTER — NURSE TRIAGE (OUTPATIENT)
Dept: OTHER | Facility: CLINIC | Age: 77
End: 2022-03-28

## 2022-03-28 VITALS
RESPIRATION RATE: 16 BRPM | BODY MASS INDEX: 26.23 KG/M2 | HEART RATE: 73 BPM | OXYGEN SATURATION: 95 % | WEIGHT: 193.4 LBS | SYSTOLIC BLOOD PRESSURE: 150 MMHG | TEMPERATURE: 97 F | DIASTOLIC BLOOD PRESSURE: 60 MMHG

## 2022-03-28 DIAGNOSIS — R06.02 SHORTNESS OF BREATH: Primary | ICD-10-CM

## 2022-03-28 DIAGNOSIS — R06.02 SHORTNESS OF BREATH: ICD-10-CM

## 2022-03-28 DIAGNOSIS — I48.91 ATRIAL FIBRILLATION WITH RAPID VENTRICULAR RESPONSE (HCC): ICD-10-CM

## 2022-03-28 DIAGNOSIS — M79.89 SWELLING OF LOWER EXTREMITY: ICD-10-CM

## 2022-03-28 LAB
HCT VFR BLD CALC: 42.8 % (ref 40.5–52.5)
HEMOGLOBIN: 13.2 G/DL (ref 13.5–17.5)
MCH RBC QN AUTO: 25.1 PG (ref 26–34)
MCHC RBC AUTO-ENTMCNC: 30.8 G/DL (ref 31–36)
MCV RBC AUTO: 81.5 FL (ref 80–100)
PDW BLD-RTO: 16.8 % (ref 12.4–15.4)
PLATELET # BLD: 428 K/UL (ref 135–450)
PMV BLD AUTO: 7.6 FL (ref 5–10.5)
PRO-BNP: 2080 PG/ML (ref 0–449)
RBC # BLD: 5.25 M/UL (ref 4.2–5.9)
WBC # BLD: 12.4 K/UL (ref 4–11)

## 2022-03-28 PROCEDURE — 71046 X-RAY EXAM CHEST 2 VIEWS: CPT

## 2022-03-28 PROCEDURE — 93000 ELECTROCARDIOGRAM COMPLETE: CPT | Performed by: NURSE PRACTITIONER

## 2022-03-28 PROCEDURE — 99214 OFFICE O/P EST MOD 30 MIN: CPT | Performed by: NURSE PRACTITIONER

## 2022-03-28 RX ORDER — FUROSEMIDE 20 MG/1
20 TABLET ORAL DAILY
Qty: 30 TABLET | Refills: 2 | Status: ON HOLD | OUTPATIENT
Start: 2022-03-28 | End: 2022-04-02 | Stop reason: HOSPADM

## 2022-03-28 SDOH — ECONOMIC STABILITY: FOOD INSECURITY: WITHIN THE PAST 12 MONTHS, YOU WORRIED THAT YOUR FOOD WOULD RUN OUT BEFORE YOU GOT MONEY TO BUY MORE.: NEVER TRUE

## 2022-03-28 SDOH — ECONOMIC STABILITY: TRANSPORTATION INSECURITY
IN THE PAST 12 MONTHS, HAS LACK OF TRANSPORTATION KEPT YOU FROM MEETINGS, WORK, OR FROM GETTING THINGS NEEDED FOR DAILY LIVING?: NO

## 2022-03-28 SDOH — ECONOMIC STABILITY: TRANSPORTATION INSECURITY
IN THE PAST 12 MONTHS, HAS THE LACK OF TRANSPORTATION KEPT YOU FROM MEDICAL APPOINTMENTS OR FROM GETTING MEDICATIONS?: NO

## 2022-03-28 SDOH — ECONOMIC STABILITY: FOOD INSECURITY: WITHIN THE PAST 12 MONTHS, THE FOOD YOU BOUGHT JUST DIDN'T LAST AND YOU DIDN'T HAVE MONEY TO GET MORE.: NEVER TRUE

## 2022-03-28 ASSESSMENT — SOCIAL DETERMINANTS OF HEALTH (SDOH): HOW HARD IS IT FOR YOU TO PAY FOR THE VERY BASICS LIKE FOOD, HOUSING, MEDICAL CARE, AND HEATING?: NOT HARD AT ALL

## 2022-03-28 NOTE — PROGRESS NOTES
Daniele Martins (:  1945) is a 67 y.o. male,Established patient, here for evaluation of the following chief complaint(s):  Medication Check and Shortness of Breath (onset 1 month)       ASSESSMENT/PLAN:  1. Shortness of breath  Stable; Not progressing;  EKG: sinus rhythm with short MN interval.  Previous echo with small pericardial effusion- will recheck echo to make sure has not gotten larger. Will check CXR and lab work- BNP and CBC. Begin Lasix 20 mg, discussed if does not urinate much ok to take another 20 mg tablet. ER precautions discussed. -     Brain Natriuretic Peptide; Future  -     XR CHEST (2 VW); Future  -     Full PFT Study With Bronchodilator; Future  -     Echocardiogram complete; Future  -     furosemide (LASIX) 20 MG tablet; Take 1 tablet by mouth daily, Disp-30 tablet, R-2Normal  -     EKG 12 Lead  -     CBC; Future  2. Swelling of lower extremity  Stable;  Discussed DD including CHF. See 1  3. Atrial fibrillation with rapid ventricular response (HCC)  Stable;  See 1. Continue current regimen with metoprolol.  -     EKG 12 Lead      Return in about 4 weeks (around 2022), or if symptoms worsen or fail to improve.          Subjective   SUBJECTIVE/OBJECTIVE:  HPI  Last time was in here- milder breathing problems  Irregular heart beat- EKG; has had irregular heart beat x years  Echo, stress test, wore a monitor  Breathing problem started getting worse- - any movement- getting up, tying shoes- would get shortness of breath  Next couple days ok- bad days are increasing  Consistent- came home from work early  No fevers  Inhale- but cannot get in a full breath  Voice is weak  No chest pain or chest tightening  No cough  Just sitting today is having it  Has to sleep in lazy boy x 3 nights; normally sleeps on 1 pillow  Started on metoprolol; increased to 50 mg    Last 2 weeks- AM sugars  BP ; last 2 weeks 180s    Was a smoker- quit 20 years ago  2020- COVID    Review of Systems       Objective   Physical Exam  Vitals reviewed. Constitutional:       Appearance: Normal appearance. HENT:      Head: Normocephalic. Right Ear: External ear normal.      Left Ear: External ear normal.   Cardiovascular:      Rate and Rhythm: Normal rate. Rhythm irregular. Pulses: Normal pulses. Heart sounds: Normal heart sounds, S1 normal and S2 normal.   Pulmonary:      Breath sounds: Normal air entry. Decreased breath sounds present. Comments: Appears SOB  Musculoskeletal:      Right lower le+ Pitting Edema present. Left lower le+ Pitting Edema present. Neurological:      Mental Status: He is alert. Psychiatric:         Mood and Affect: Mood normal.       An electronic signature was used to authenticate this note.     --NATASHA Quevedo - CNP

## 2022-03-28 NOTE — TELEPHONE ENCOUNTER
Received call from [facility] at [facility] with Red Flag Complaint. Subjective: Caller states \" I think the past couple of weeks my BS have been becoming elevated due to a change in medications. Since starting Eliquis I am getting readings of 150s-180s in the morning. I've been on Ozempic historically. We had a bout with COVID in 12/20. This started in February. I do a fair amount of walking. I've noticed just going across the parking light, my breathing gets heavy. I've been evaluated my by MD and a cardioligist and had ECHO and stress test. Wore a monitor for a couple of weeks. I see him next week. I just think something else is going on. \"    Per wife, \"The shortness of breath he has been tolerating well. In the last week to ten days, it has gotten consideraibly worse. He is out breath just getting off the couch. \"      Current Symptoms: shortness of breath    Onset: couple of months but worsening over the past week to ten days    Associated Symptoms: elevated BS    Pain Severity: denies pain    Temperature: denies fever    What has been tried: N/A    LMP: NA Pregnant: NA    Recommended disposition: Go to [facility]    Care advice provided, patient verbalizes understanding; denies any other questions or concerns; instructed to call back for any new or worsening symptoms. Patient/Caller agrees with recommended disposition; writer provided warm transfer to [place] [facility] for appointment scheduling     Attention Provider: Thank you for allowing me to participate in the care of your patient. The patient was connected to triage in response to information provided to the ECC/PSC. Please do not respond through this encounter as the response is not directed to a shared pool.     Reason for Disposition   MILD difficulty breathing (e.g., minimal/no SOB at rest, SOB with walking, pulse < 100) of new-onset or worse than normal    Protocols used: BREATHING DIFFICULTY-ADULT-OH

## 2022-03-29 DIAGNOSIS — M79.89 SWELLING OF LOWER EXTREMITY: Primary | ICD-10-CM

## 2022-03-29 NOTE — PROGRESS NOTES
Bmp Mohs Rapid Report Verbiage: The area of clinically evident tumor was marked with skin marking ink and appropriately hatched.  The initial incision was made following the Mohs approach through the skin.  The specimen was taken to the lab, divided into the necessary number of pieces, chromacoded and processed according to the Mohs protocol.  This was repeated in successive stages until a tumor free defect was achieved.

## 2022-03-31 ENCOUNTER — OFFICE VISIT (OUTPATIENT)
Dept: CARDIOLOGY CLINIC | Age: 77
End: 2022-03-31
Payer: COMMERCIAL

## 2022-03-31 ENCOUNTER — HOSPITAL ENCOUNTER (INPATIENT)
Age: 77
LOS: 2 days | Discharge: HOME | DRG: 291 | End: 2022-04-02
Attending: EMERGENCY MEDICINE | Admitting: INTERNAL MEDICINE
Payer: COMMERCIAL

## 2022-03-31 ENCOUNTER — APPOINTMENT (OUTPATIENT)
Dept: GENERAL RADIOLOGY | Age: 77
DRG: 291 | End: 2022-03-31
Payer: COMMERCIAL

## 2022-03-31 VITALS
OXYGEN SATURATION: 92 % | BODY MASS INDEX: 25.77 KG/M2 | HEART RATE: 77 BPM | SYSTOLIC BLOOD PRESSURE: 144 MMHG | WEIGHT: 190 LBS | DIASTOLIC BLOOD PRESSURE: 80 MMHG

## 2022-03-31 DIAGNOSIS — R09.02 HYPOXIA: Primary | ICD-10-CM

## 2022-03-31 DIAGNOSIS — I50.31 ACUTE HEART FAILURE WITH PRESERVED EJECTION FRACTION (HFPEF) (HCC): ICD-10-CM

## 2022-03-31 DIAGNOSIS — E87.70 HYPERVOLEMIA, UNSPECIFIED HYPERVOLEMIA TYPE: ICD-10-CM

## 2022-03-31 PROBLEM — I50.9 ACUTE HEART FAILURE (HCC): Status: ACTIVE | Noted: 2022-01-01

## 2022-03-31 LAB
ALBUMIN SERPL-MCNC: 3.4 G/DL (ref 3.4–5)
ALP BLD-CCNC: 124 U/L (ref 40–129)
ALT SERPL-CCNC: <5 U/L (ref 10–40)
ANION GAP SERPL CALCULATED.3IONS-SCNC: 14 MMOL/L (ref 3–16)
AST SERPL-CCNC: 13 U/L (ref 15–37)
BASE EXCESS VENOUS: 7.2 MMOL/L (ref -2–3)
BASOPHILS ABSOLUTE: 0.1 K/UL (ref 0–0.2)
BASOPHILS RELATIVE PERCENT: 1.1 %
BILIRUB SERPL-MCNC: 0.3 MG/DL (ref 0–1)
BILIRUBIN DIRECT: <0.2 MG/DL (ref 0–0.3)
BILIRUBIN, INDIRECT: ABNORMAL MG/DL (ref 0–1)
BUN BLDV-MCNC: 20 MG/DL (ref 7–20)
CALCIUM SERPL-MCNC: 9.2 MG/DL (ref 8.3–10.6)
CARBOXYHEMOGLOBIN: 1.6 % (ref 0–1.5)
CHLORIDE BLD-SCNC: 93 MMOL/L (ref 99–110)
CO2: 29 MMOL/L (ref 21–32)
CREAT SERPL-MCNC: 0.8 MG/DL (ref 0.8–1.3)
EKG ATRIAL RATE: 82 BPM
EKG DIAGNOSIS: NORMAL
EKG P AXIS: 23 DEGREES
EKG P-R INTERVAL: 134 MS
EKG Q-T INTERVAL: 418 MS
EKG QRS DURATION: 94 MS
EKG QTC CALCULATION (BAZETT): 488 MS
EKG R AXIS: 75 DEGREES
EKG T AXIS: 23 DEGREES
EKG VENTRICULAR RATE: 82 BPM
EOSINOPHILS ABSOLUTE: 0.1 K/UL (ref 0–0.6)
EOSINOPHILS RELATIVE PERCENT: 0.8 %
GFR AFRICAN AMERICAN: >60
GFR NON-AFRICAN AMERICAN: >60
GLUCOSE BLD-MCNC: 208 MG/DL (ref 70–99)
GLUCOSE BLD-MCNC: 213 MG/DL (ref 70–99)
GLUCOSE BLD-MCNC: 229 MG/DL (ref 70–99)
HCO3 VENOUS: 35.8 MMOL/L (ref 24–28)
HCT VFR BLD CALC: 44.5 % (ref 40.5–52.5)
HEMOGLOBIN, VEN, REDUCED: 70.8 %
HEMOGLOBIN: 13.9 G/DL (ref 13.5–17.5)
LYMPHOCYTES ABSOLUTE: 0.7 K/UL (ref 1–5.1)
LYMPHOCYTES RELATIVE PERCENT: 5.8 %
MCH RBC QN AUTO: 25.3 PG (ref 26–34)
MCHC RBC AUTO-ENTMCNC: 31.3 G/DL (ref 31–36)
MCV RBC AUTO: 80.7 FL (ref 80–100)
METHEMOGLOBIN VENOUS: 0.3 % (ref 0–1.5)
MONOCYTES ABSOLUTE: 1 K/UL (ref 0–1.3)
MONOCYTES RELATIVE PERCENT: 8.2 %
NEUTROPHILS ABSOLUTE: 10 K/UL (ref 1.7–7.7)
NEUTROPHILS RELATIVE PERCENT: 84.1 %
O2 SAT, VEN: 28 %
PCO2, VEN: 67.2 MMHG (ref 41–51)
PDW BLD-RTO: 16.8 % (ref 12.4–15.4)
PERFORMED ON: ABNORMAL
PERFORMED ON: ABNORMAL
PH VENOUS: 7.34 (ref 7.35–7.45)
PLATELET # BLD: 415 K/UL (ref 135–450)
PMV BLD AUTO: 7.5 FL (ref 5–10.5)
PO2, VEN: <30 MMHG (ref 25–40)
POTASSIUM REFLEX MAGNESIUM: 4.7 MMOL/L (ref 3.5–5.1)
PRO-BNP: 2238 PG/ML (ref 0–449)
RBC # BLD: 5.51 M/UL (ref 4.2–5.9)
SODIUM BLD-SCNC: 136 MMOL/L (ref 136–145)
TCO2 CALC VENOUS: 38 MMOL/L
TOTAL PROTEIN: 6.7 G/DL (ref 6.4–8.2)
TROPONIN: 0.02 NG/ML
WBC # BLD: 11.9 K/UL (ref 4–11)

## 2022-03-31 PROCEDURE — 1200000000 HC SEMI PRIVATE

## 2022-03-31 PROCEDURE — 85025 COMPLETE CBC W/AUTO DIFF WBC: CPT

## 2022-03-31 PROCEDURE — 93005 ELECTROCARDIOGRAM TRACING: CPT | Performed by: STUDENT IN AN ORGANIZED HEALTH CARE EDUCATION/TRAINING PROGRAM

## 2022-03-31 PROCEDURE — 96376 TX/PRO/DX INJ SAME DRUG ADON: CPT

## 2022-03-31 PROCEDURE — G0378 HOSPITAL OBSERVATION PER HR: HCPCS

## 2022-03-31 PROCEDURE — 83880 ASSAY OF NATRIURETIC PEPTIDE: CPT

## 2022-03-31 PROCEDURE — 6360000002 HC RX W HCPCS: Performed by: INTERNAL MEDICINE

## 2022-03-31 PROCEDURE — 99283 EMERGENCY DEPT VISIT LOW MDM: CPT

## 2022-03-31 PROCEDURE — 80048 BASIC METABOLIC PNL TOTAL CA: CPT

## 2022-03-31 PROCEDURE — 71046 X-RAY EXAM CHEST 2 VIEWS: CPT

## 2022-03-31 PROCEDURE — 80076 HEPATIC FUNCTION PANEL: CPT

## 2022-03-31 PROCEDURE — 2580000003 HC RX 258: Performed by: INTERNAL MEDICINE

## 2022-03-31 PROCEDURE — 99215 OFFICE O/P EST HI 40 MIN: CPT | Performed by: INTERNAL MEDICINE

## 2022-03-31 PROCEDURE — 6370000000 HC RX 637 (ALT 250 FOR IP): Performed by: INTERNAL MEDICINE

## 2022-03-31 PROCEDURE — 6360000002 HC RX W HCPCS: Performed by: STUDENT IN AN ORGANIZED HEALTH CARE EDUCATION/TRAINING PROGRAM

## 2022-03-31 PROCEDURE — 84484 ASSAY OF TROPONIN QUANT: CPT

## 2022-03-31 PROCEDURE — 96374 THER/PROPH/DIAG INJ IV PUSH: CPT

## 2022-03-31 PROCEDURE — 82803 BLOOD GASES ANY COMBINATION: CPT

## 2022-03-31 RX ORDER — FUROSEMIDE 10 MG/ML
40 INJECTION INTRAMUSCULAR; INTRAVENOUS 2 TIMES DAILY
Status: DISCONTINUED | OUTPATIENT
Start: 2022-03-31 | End: 2022-04-02 | Stop reason: HOSPADM

## 2022-03-31 RX ORDER — INSULIN LISPRO 100 [IU]/ML
0-6 INJECTION, SOLUTION INTRAVENOUS; SUBCUTANEOUS NIGHTLY
Status: DISCONTINUED | OUTPATIENT
Start: 2022-03-31 | End: 2022-04-02 | Stop reason: HOSPADM

## 2022-03-31 RX ORDER — INSULIN LISPRO 100 [IU]/ML
0-12 INJECTION, SOLUTION INTRAVENOUS; SUBCUTANEOUS
Status: DISCONTINUED | OUTPATIENT
Start: 2022-03-31 | End: 2022-04-02 | Stop reason: HOSPADM

## 2022-03-31 RX ORDER — RAMIPRIL 5 MG/1
10 CAPSULE ORAL DAILY
Status: DISCONTINUED | OUTPATIENT
Start: 2022-03-31 | End: 2022-04-02 | Stop reason: HOSPADM

## 2022-03-31 RX ORDER — ACETAMINOPHEN 325 MG/1
650 TABLET ORAL EVERY 6 HOURS PRN
Status: DISCONTINUED | OUTPATIENT
Start: 2022-03-31 | End: 2022-04-02 | Stop reason: HOSPADM

## 2022-03-31 RX ORDER — METOPROLOL SUCCINATE 50 MG/1
50 TABLET, EXTENDED RELEASE ORAL DAILY
Status: DISCONTINUED | OUTPATIENT
Start: 2022-03-31 | End: 2022-04-01

## 2022-03-31 RX ORDER — SODIUM CHLORIDE 0.9 % (FLUSH) 0.9 %
5-40 SYRINGE (ML) INJECTION PRN
Status: DISCONTINUED | OUTPATIENT
Start: 2022-03-31 | End: 2022-04-02 | Stop reason: HOSPADM

## 2022-03-31 RX ORDER — SODIUM CHLORIDE 0.9 % (FLUSH) 0.9 %
5-40 SYRINGE (ML) INJECTION EVERY 12 HOURS SCHEDULED
Status: DISCONTINUED | OUTPATIENT
Start: 2022-03-31 | End: 2022-04-02 | Stop reason: HOSPADM

## 2022-03-31 RX ORDER — ONDANSETRON 2 MG/ML
4 INJECTION INTRAMUSCULAR; INTRAVENOUS EVERY 6 HOURS PRN
Status: DISCONTINUED | OUTPATIENT
Start: 2022-03-31 | End: 2022-04-02 | Stop reason: HOSPADM

## 2022-03-31 RX ORDER — POLYETHYLENE GLYCOL 3350 17 G/17G
17 POWDER, FOR SOLUTION ORAL DAILY PRN
Status: DISCONTINUED | OUTPATIENT
Start: 2022-03-31 | End: 2022-04-02 | Stop reason: HOSPADM

## 2022-03-31 RX ORDER — ACETAMINOPHEN 650 MG/1
650 SUPPOSITORY RECTAL EVERY 6 HOURS PRN
Status: DISCONTINUED | OUTPATIENT
Start: 2022-03-31 | End: 2022-04-02 | Stop reason: HOSPADM

## 2022-03-31 RX ORDER — ONDANSETRON 4 MG/1
4 TABLET, ORALLY DISINTEGRATING ORAL EVERY 8 HOURS PRN
Status: DISCONTINUED | OUTPATIENT
Start: 2022-03-31 | End: 2022-04-02 | Stop reason: HOSPADM

## 2022-03-31 RX ORDER — FUROSEMIDE 10 MG/ML
20 INJECTION INTRAMUSCULAR; INTRAVENOUS ONCE
Status: COMPLETED | OUTPATIENT
Start: 2022-03-31 | End: 2022-03-31

## 2022-03-31 RX ORDER — SODIUM CHLORIDE 9 MG/ML
INJECTION, SOLUTION INTRAVENOUS PRN
Status: DISCONTINUED | OUTPATIENT
Start: 2022-03-31 | End: 2022-04-02 | Stop reason: HOSPADM

## 2022-03-31 RX ADMIN — INSULIN LISPRO 4 UNITS: 100 INJECTION, SOLUTION INTRAVENOUS; SUBCUTANEOUS at 18:23

## 2022-03-31 RX ADMIN — RAMIPRIL 10 MG: 5 CAPSULE ORAL at 18:21

## 2022-03-31 RX ADMIN — INSULIN GLARGINE 10 UNITS: 100 INJECTION, SOLUTION SUBCUTANEOUS at 21:07

## 2022-03-31 RX ADMIN — APIXABAN 5 MG: 5 TABLET, FILM COATED ORAL at 21:08

## 2022-03-31 RX ADMIN — FUROSEMIDE 20 MG: 10 INJECTION, SOLUTION INTRAMUSCULAR; INTRAVENOUS at 14:14

## 2022-03-31 RX ADMIN — INSULIN LISPRO 2 UNITS: 100 INJECTION, SOLUTION INTRAVENOUS; SUBCUTANEOUS at 21:07

## 2022-03-31 RX ADMIN — METOPROLOL SUCCINATE 50 MG: 50 TABLET, EXTENDED RELEASE ORAL at 17:55

## 2022-03-31 RX ADMIN — FUROSEMIDE 40 MG: 10 INJECTION, SOLUTION INTRAMUSCULAR; INTRAVENOUS at 17:55

## 2022-03-31 RX ADMIN — SODIUM CHLORIDE, PRESERVATIVE FREE 10 ML: 5 INJECTION INTRAVENOUS at 21:08

## 2022-03-31 ASSESSMENT — ENCOUNTER SYMPTOMS
ABDOMINAL DISTENTION: 0
VOMITING: 0
SHORTNESS OF BREATH: 1
WHEEZING: 0
COUGH: 0
BLOOD IN STOOL: 0
ALLERGIC/IMMUNOLOGIC NEGATIVE: 1
ABDOMINAL PAIN: 0
EYES NEGATIVE: 1
CHEST TIGHTNESS: 0
NAUSEA: 0

## 2022-03-31 ASSESSMENT — PAIN SCALES - GENERAL
PAINLEVEL_OUTOF10: 0
PAINLEVEL_OUTOF10: 0

## 2022-03-31 NOTE — PROGRESS NOTES
Admitted to [phone]. A+Ox4, vss on 2L 02 (BP elevated-given ordered bp meds). Given IV 40mg Lasix per order. Voided 450 ml already. Denies pain (just discomfort associated with SOB w/ exertion). BLE with pitting edema (+1/+2). Lungs clear anteriorly, fine crackles posteriorly. Family at bedside eating dinner with pt.  Will cont to monitor

## 2022-03-31 NOTE — PROGRESS NOTES
Patient was seen in the clinic earlier today. He is being admitted for further evaluation of dyspnea. He will be seen tomorrow again.

## 2022-03-31 NOTE — ED TRIAGE NOTES
Patient sent from Dr. Jerome Doyle office. Gradually worsening dyspnea over the past week with SpO2 at home in the high 80s. Worse with exertion. NAD at this time, pleaseant, speaking in full sentences.

## 2022-03-31 NOTE — ED PROVIDER NOTES
[address] [facility] RESIDENT NOTE       Date of evaluation: 3/31/2022    Chief Complaint     Shortness of Breath    History of Present Illness     Elmer Bryant is a 67 y.o. male hypertension, hyperlipidemia, type 2 diabetes, newly diagnosed HFpEF,? A. fib versus frequent PACs who presents the emergency department with worsening shortness of breath. Patient states that since January, he has had slow onset of worsening, progressive shortness of breath. It is exertional in nature. It is not accompanied by chest pain, diaphoresis, neck pain, jaw pain, shoulder pain. He also describes orthopnea, no PND. His symptoms have progressed and are interfering with his quality of life. He was evaluated by cardiology earlier today after starting Lasix on Monday but states that over the last week, his symptoms have gotten significantly worse. He also notes bilateral lower extremity edema that is worsening. Today in the office, his cardiologist noted mild hypoxia, and his wife, retired RN, noted hypoxia to the mid high 80s as well. Due to concern for acute decompensated heart failure as well as diminished breath sounds bilaterally at the bases with some concern for possible pleural effusion, cardiologist referred patient to the ED for further work-up and evaluation and likely admission for acute exacerbation of HFpEF. He denies any previous cardiac history, notes that an irregular heartbeat was noted in the past he had a stress test and was worked up for A. fib but was told at the time that he just had extra beats. His heart rate was noted to be irregular with a cardiologist today. Patient denies any cough, fever, chills, nausea, vomiting, hematuria, dysuria, melena, hematochezia, sick contacts, congestion, chest pain, diaphoresis but just continues to endorse worsening exertional dyspnea. He is not normally on oxygen.   He was placed on 2 L nasal cannula when he first arrived for oxygen saturations in the 90-93 with improvement to 97 to 100%. Review of Systems     Review of Systems   Constitutional: Positive for fatigue. Negative for chills, diaphoresis, fever and unexpected weight change. HENT: Negative. Eyes: Negative. Respiratory: Positive for shortness of breath. Negative for cough, chest tightness and wheezing. Cardiovascular: Positive for leg swelling. Negative for chest pain and palpitations. Gastrointestinal: Negative for abdominal distention, abdominal pain, blood in stool, nausea and vomiting. Endocrine: Negative. Genitourinary: Negative. Musculoskeletal: Negative. Skin: Negative. Allergic/Immunologic: Negative. Neurological: Positive for weakness (generalized). Hematological: Negative. Psychiatric/Behavioral: Negative. Past Medical, Surgical, Family, and Social History     He has a past medical history of Carotid artery stenosis, Carotid stenosis, left, Chronic back pain, Erectile dysfunction, Frequent falls, Hyperlipidemia, Hypertension, Osteoarthritis, and Type II or unspecified type diabetes mellitus without mention of complication, not stated as uncontrolled. He has a past surgical history that includes Rotator cuff repair (Bilateral) and knee surgery. His family history includes Arthritis in his maternal grandfather; Diabetes in his maternal grandmother; Hearing Loss in his father and maternal grandmother; Heart Disease (age of onset: 70) in his father; High Blood Pressure in his father. He reports that he quit smoking about 20 years ago. His smoking use included cigarettes. He has a 80.00 pack-year smoking history. He has never used smokeless tobacco. He reports current alcohol use. He reports that he does not use drugs.     Medications     Current Discharge Medication List      CONTINUE these medications which have NOT CHANGED    Details   furosemide (LASIX) 20 MG tablet Take 1 tablet by mouth daily  Qty: 30 tablet, Refills: 2    Associated Diagnoses: Shortness of breath      metoprolol succinate (TOPROL XL) 50 MG extended release tablet Take 1 tablet by mouth daily  Qty: 90 tablet, Refills: 3      apixaban (ELIQUIS) 5 MG TABS tablet Take 1 tablet by mouth 2 times daily  Qty: 60 tablet, Refills: 2    Associated Diagnoses: Atrial fibrillation, unspecified type ([facility]      ! ! Semaglutide,0.25 or 0.5MG/DOS, (OZEMPIC, 0.25 OR 0.5 MG/DOSE,) 2 MG/1.5ML SOPN Inject as directed by physician. Qty: 3 pen, Refills: 0    Comments: NDC: 0790-2272-41  LOT: [id number]  EXP: 06/2024  Associated Diagnoses: Type 2 diabetes mellitus without complication, without long-term current use of insulin ([facility]      ! ! Semaglutide,0.25 or 0.5MG/DOS, (OZEMPIC, 0.25 OR 0.5 MG/DOSE,) 2 MG/1.5ML SOPN Inject as directed by physician.   Qty: 2 pen, Refills: 0    Comments: NDC: 7553-7046-41  LOT: [id number]  EXP: 10/2023  Associated Diagnoses: Type 2 diabetes mellitus without complication, without long-term current use of insulin ([facility])      ramipril (ALTACE) 10 MG capsule TAKE 1 CAPSULE DAILY  Qty: 90 capsule, Refills: 1    Associated Diagnoses: Primary hypertension      pioglitazone (ACTOS) 45 MG tablet TAKE 1 TABLET DAILY  Qty: 90 tablet, Refills: 1    Associated Diagnoses: Type 2 diabetes mellitus without complication, without long-term current use of insulin ([facility])      dapagliflozin (FARXIGA) 10 MG tablet TAKE 1 TABLET EVERY MORNING  Qty: 90 tablet, Refills: 1    Associated Diagnoses: Type 2 diabetes mellitus without complication, without long-term current use of insulin ([facility])      metFORMIN (GLUCOPHAGE) 500 MG tablet TAKE 2 TABLETS 2 TIMES     DAILY WITH MEALS  Qty: 360 tablet, Refills: 3    Associated Diagnoses: Type 2 diabetes mellitus without complication, without long-term current use of insulin ([facility])      acarbose (PRECOSE) 100 MG tablet TAKE 1 TABLET TWICE DAILY  WITH MEALS  Qty: 180 tablet, Refills: 3    Associated Diagnoses: Type 2 diabetes mellitus without complication, without long-term current use of insulin ([facility])      pravastatin (PRAVACHOL) 40 MG tablet Take 1 tablet by mouth daily  Qty: 90 tablet, Refills: 3    Associated Diagnoses: Mixed hyperlipidemia      glimepiride (AMARYL) 4 MG tablet Take 1 tablet by mouth 2 times daily (with meals)  Qty: 180 tablet, Refills: 3    Associated Diagnoses: Type 2 diabetes mellitus without complication, without long-term current use of insulin ([facility])      FREESTYLE LITE strip USE TO TEST BLOOD SUGAR LEVEL ONCE DAILY AS NEEDED   Qty: 100 each, Refills: 0      sildenafil (VIAGRA) 100 MG tablet Take 1 tablet by mouth as needed for Erectile Dysfunction Max daily dose 100mg. Qty: 16 tablet, Refills: 0      Lancets MISC 1 each by Does not apply route 2 times daily  Qty: 300 each, Refills: 1      Multiple Vitamin (MULTI VITAMIN MENS PO) Take by mouth Daily         ! ! - Potential duplicate medications found. Please discuss with provider. Allergies     He is allergic to dye [iodides]. Physical Exam     INITIAL VITALS: BP: (!) 168/96, Temp: 98.8 °F (37.1 °C), Pulse: 71, Resp: 18, SpO2: 95 %   Physical Exam  Constitutional:       General: He is not in acute distress. Appearance: He is well-developed. He is not ill-appearing, toxic-appearing or diaphoretic. HENT:      Head: Normocephalic and atraumatic. Mouth/Throat:      Mouth: Mucous membranes are moist.      Pharynx: Oropharynx is clear. Eyes:      Extraocular Movements: Extraocular movements intact. Cardiovascular:      Rate and Rhythm: Normal rate. Rhythm irregular. Heart sounds: Murmur (Split S1) heard. Pulmonary:      Effort: Pulmonary effort is normal. No respiratory distress. Breath sounds: No stridor. Examination of the right-lower field reveals decreased breath sounds. Examination of the left-lower field reveals decreased breath sounds. Decreased breath sounds present. No wheezing, rhonchi or rales.       Comments: On 2L NC, satting 100%  Chest:      Chest wall: No tenderness or crepitus. Abdominal:      Palpations: Abdomen is soft. Tenderness: There is no abdominal tenderness. Musculoskeletal:         General: Normal range of motion. Cervical back: Normal range of motion and neck supple. Right lower leg: Edema present. Left lower leg: Edema (1+ pitting edema bilaterally) present. Skin:     General: Skin is warm and dry. Capillary Refill: Capillary refill takes less than 2 seconds. Neurological:      General: No focal deficit present. Mental Status: He is alert and oriented to person, place, and time. Motor: No weakness. Psychiatric:         Mood and Affect: Mood normal.         Behavior: Behavior normal.         DiagnosticResults     EKG   Interpreted in conjunction with emergencydepartment physician Aniya Cortez MD  Rhythm: normal sinus   Rate: normal  Axis: normal  Ectopy: premature atrial contraction  Conduction: normal  ST Segments: no acute change  T Jaqui Erin in  III  Q Waves: none  Clinical Impression: no acute changes, sinus rhythm with frequent PACs  Comparison: No significant change from prior on 3/28/2022 except for more frequent PACs. Previous EKG had some morphology suggestive of possible fibrillation or flutter, however there was some background that makes this difficult to distinguish from artifact and was not present in every lead. This is not visualized today. RADIOLOGY:  XR CHEST (2 VW)   Final Result   Impression: Bibasilar pleural-parenchymal opacities, unchanged.           LABS:   Results for orders placed or performed during the hospital encounter of 03/31/22   CBC with Auto Differential   Result Value Ref Range    WBC 11.9 (H) 4.0 - 11.0 K/uL    RBC 5.51 4.20 - 5.90 M/uL    Hemoglobin 13.9 13.5 - 17.5 g/dL    Hematocrit 44.5 40.5 - 52.5 %    MCV 80.7 80.0 - 100.0 fL    MCH 25.3 (L) 26.0 - 34.0 pg    MCHC 31.3 31.0 - 36.0 g/dL    RDW 16.8 (H) 12.4 - 15.4 % Platelets 168 345 - 734 K/uL    MPV 7.5 5.0 - 10.5 fL    Neutrophils % 84.1 %    Lymphocytes % 5.8 %    Monocytes % 8.2 %    Eosinophils % 0.8 %    Basophils % 1.1 %    Neutrophils Absolute 10.0 (H) 1.7 - 7.7 K/uL    Lymphocytes Absolute 0.7 (L) 1.0 - 5.1 K/uL    Monocytes Absolute 1.0 0.0 - 1.3 K/uL    Eosinophils Absolute 0.1 0.0 - 0.6 K/uL    Basophils Absolute 0.1 0.0 - 0.2 K/uL   Basic Metabolic Panel w/ Reflex to MG   Result Value Ref Range    Sodium 136 136 - 145 mmol/L    Potassium reflex Magnesium 4.7 3.5 - 5.1 mmol/L    Chloride 93 (L) 99 - 110 mmol/L    CO2 29 21 - 32 mmol/L    Anion Gap 14 3 - 16    Glucose 208 (H) 70 - 99 mg/dL    BUN 20 7 - 20 mg/dL    CREATININE 0.8 0.8 - 1.3 mg/dL    GFR Non-African American >60 >60    GFR African American >60 >60    Calcium 9.2 8.3 - 10.6 mg/dL   Troponin   Result Value Ref Range    Troponin 0.02 (H) <0.01 ng/mL   Brain Natriuretic Peptide   Result Value Ref Range    Pro-BNP 2,238 (H) 0 - 449 pg/mL   Blood Gas, Venous   Result Value Ref Range    pH, John 7.336 (L) 7.350 - 7.450    pCO2, John 67.2 (H) 41.0 - 51.0 mmHg    pO2, John <30.0 25.0 - 40.0 mmHg    HCO3, Venous 35.8 (H) 24.0 - 28.0 mmol/L    Base Excess, John 7.2 (H) -2.0 - 3.0 mmol/L    O2 Sat, John 28 Not established %    Carboxyhemoglobin 1.6 (H) 0.0 - 1.5 %    MetHgb, John 0.3 0.0 - 1.5 %    TC02 (Calc), John 38 mmol/L    Hemoglobin, John, Reduced 70.80 %   Hepatic Function Panel   Result Value Ref Range    Total Protein 6.7 6.4 - 8.2 g/dL    Albumin 3.4 3.4 - 5.0 g/dL    Alkaline Phosphatase 124 40 - 129 U/L    ALT <5 (L) 10 - 40 U/L    AST 13 (L) 15 - 37 U/L    Total Bilirubin 0.3 0.0 - 1.0 mg/dL    Bilirubin, Direct <0.2 0.0 - 0.3 mg/dL    Bilirubin, Indirect see below 0.0 - 1.0 mg/dL   POCT Glucose   Result Value Ref Range    POC Glucose 229 (H) 70 - 99 mg/dl    Performed on ACCU-CHEK    EKG 12 Lead   Result Value Ref Range    Ventricular Rate 82 BPM    Atrial Rate 82 BPM    P-R Interval 134 ms    QRS Duration 94 ms    Q-T Interval 418 ms    QTc Calculation (Bazett) 488 ms    P Axis 23 degrees    R Axis 75 degrees    T Axis 23 degrees    Diagnosis       EKG performed in ER and to be interpreted by ER physician. Confirmed by MD, ER ([id number]),  Goldy Garcia ([id number]) on 3/31/2022 1:07:02 PM       ED BEDSIDE ULTRASOUND:    RECENT VITALS:  BP: (!) 166/75, Temp: 97.6 °F (36.4 °C), Pulse: 81,Resp: 18, SpO2: 97 %     Procedures       ED Course     Nursing Notes, Past Medical Hx, Past Surgical Hx, Social Hx, Allergies, and Family Hx were reviewed. The patient was given the followingmedications:  Orders Placed This Encounter   Medications    furosemide (LASIX) injection 20 mg    sodium chloride flush 0.9 % injection 5-40 mL    sodium chloride flush 0.9 % injection 5-40 mL    0.9 % sodium chloride infusion    OR Linked Order Group     ondansetron (ZOFRAN-ODT) disintegrating tablet 4 mg     ondansetron (ZOFRAN) injection 4 mg    polyethylene glycol (GLYCOLAX) packet 17 g    OR Linked Order Group     acetaminophen (TYLENOL) tablet 650 mg     acetaminophen (TYLENOL) suppository 650 mg    apixaban (ELIQUIS) tablet 5 mg    metoprolol succinate (TOPROL XL) extended release tablet 50 mg    ramipril (ALTACE) capsule 10 mg    furosemide (LASIX) injection 40 mg    insulin glargine (LANTUS;BASAGLAR) injection pen 10 Units    insulin lispro (1 Unit Dial) 0-12 Units    insulin lispro (1 Unit Dial) 0-6 Units       CONSULTS:  IP CONSULT TO HOSPITALIST  IP CONSULT TO [address] [place] [address] / MARISELA / Natalia Sophie is a 67 y.o. male who presents to the ED with worsening shortness of breath in the setting of relatively newly diagnosed HFpEF. Upon initial evaluation, patient was hemodynamically stable and in no acute distress but hypoxic to 92% and subsequently placed on 2 L nasal cannula, which is a new oxygen requirement for him. BNP 2238.   Troponin positive at 0.02 with patient without any chest pain or symptoms at this time. VBG shows some retaining with a pH of 7.34 with a PCO2 of 67. Patient is a remote smoker, quit 25 years ago, no known diagnosis of COPD. LFTs unremarkable. BMP without significant electrolyte abnormality or ZANA. EKG appears to be normal sinus rhythm with frequent PACs as opposed to atrial fibrillation. Patient appears mildly volume up on exam with 1+ bilateral pitting edema and a new oxygen requirement. Patient will be admitted for further work-up and evaluation. Given 20 mg of IV Lasix as patient only takes 20 mg p.o. at home. On chest x-ray, patient does have bibasilar opacities that are consistent with chest x-ray taken a few days ago but they were not present for years ago. Does have diminished breath sounds at the bases. No infectious symptoms at this time, suspect secondary to volume overload. This patient was also evaluated by the attending physician. All care plans were discussed and agreed upon. Clinical Impression     1. Hypoxia    2. Hypervolemia, unspecified hypervolemia type        Disposition     PATIENT REFERRED TO:  No follow-up provider specified.     DISCHARGE MEDICATIONS:  Current Discharge Medication List          DISPOSITION Admitted 03/31/2022 02:23:36 Iona Caban MD  Resident  03/31/22 [id number]

## 2022-03-31 NOTE — H&P
HOSPITALISTS HISTORY AND PHYSICAL    3/31/2022 2:47 PM    Patient Information:  Madeline Mcfarland is a 67 y.o. male [id number]  PCP:  Michael Guerrero MD (Tel: [phone] )    Chief complaint:    Chief Complaint   Patient presents with    Shortness of Breath       Problem List  Principal Problem:    Acute heart failure ([address]  Resolved Problems:    * No resolved hospital problems. *        Assessment/Plan:   Acute on chronic diastolic heart failure  IV diuretics, monitor input output, daily weights, cardiology consultation    Mention of history of atrial fibrillation  On Eliquis      Diabetes mellitus  Insulin sliding scale hold oral hypoglycemics for now and use Lantus and medium dose sliding scale, A1c 8.1 checked in January 2022    DVT prophylaxis Eliquis  Code status full  Diet carb consistent, cardiac  IV access peripheral  Ansari Catheter no    Admit as inpatient. I anticipate hospitalization spanning more than two midnights for investigation and treatment of the above medically necessary diagnoses. History of Present Illness:  Terra Miller is a 67 y.o. male with history of hypertension hyperlipidemia paroxysmal atrial fibrillation chronic diastolic failure diabetes mellitus presenting with symptoms of exertional dyspnea can walk up to 10 m before becoming short of breath. Symptoms has been going on for couple months but worse for the last 2 weeks. Patient started taking Lasix about 4 days did help for initial couple of days but again feeling worse symptoms of dyspnea. Patient seen at the cardiology office today and was recommended to come to the ER for further evaluation. Patient does also mention symptoms of worsening leg swelling. Patient does mention that he is sleeping in his lazy boy for last 1 week. Denies any chest pain or palpitations otherwise.   He denies any concerns of for nausea vomiting diarrhea cough phlegm. He does mention that he is vaccinated and boosted. History obtained from patient and going over the chart         REVIEW OF SYSTEMS:   All other ROS negative except mentioned in [address]      Past Medical History:   has a past medical history of Carotid artery stenosis, Carotid stenosis, left, Chronic back pain, Erectile dysfunction, Frequent falls, Hyperlipidemia, Hypertension, Osteoarthritis, and Type II or unspecified type diabetes mellitus without mention of complication, not stated as uncontrolled. Past Surgical History:   has a past surgical history that includes Rotator cuff repair (Bilateral) and knee surgery. Medications:  No current facility-administered medications on file prior to encounter. Current Outpatient Medications on File Prior to Encounter   Medication Sig Dispense Refill    furosemide (LASIX) 20 MG tablet Take 1 tablet by mouth daily 30 tablet 2    metoprolol succinate (TOPROL XL) 50 MG extended release tablet Take 1 tablet by mouth daily 90 tablet 3    apixaban (ELIQUIS) 5 MG TABS tablet Take 1 tablet by mouth 2 times daily 60 tablet 2    Semaglutide,0.25 or 0.5MG/DOS, (OZEMPIC, 0.25 OR 0.5 MG/DOSE,) 2 MG/1.5ML SOPN Inject as directed by physician. 3 pen 0    Semaglutide,0.25 or 0.5MG/DOS, (OZEMPIC, 0.25 OR 0.5 MG/DOSE,) 2 MG/1.5ML SOPN Inject as directed by physician.  2 pen 0    ramipril (ALTACE) 10 MG capsule TAKE 1 CAPSULE DAILY 90 capsule 1    pioglitazone (ACTOS) 45 MG tablet TAKE 1 TABLET DAILY 90 tablet 1    dapagliflozin (FARXIGA) 10 MG tablet TAKE 1 TABLET EVERY MORNING 90 tablet 1    metFORMIN (GLUCOPHAGE) 500 MG tablet TAKE 2 TABLETS 2 TIMES     DAILY WITH MEALS 360 tablet 3    acarbose (PRECOSE) 100 MG tablet TAKE 1 TABLET TWICE DAILY  WITH MEALS 180 tablet 3    pravastatin (PRAVACHOL) 40 MG tablet Take 1 tablet by mouth daily 90 tablet 3    glimepiride (AMARYL) 4 MG tablet Take 1 tablet by mouth 2 times daily (with meals) 180 tablet 3    FREESTYLE LITE strip USE TO TEST BLOOD SUGAR LEVEL ONCE DAILY AS NEEDED  100 each 0    sildenafil (VIAGRA) 100 MG tablet Take 1 tablet by mouth as needed for Erectile Dysfunction Max daily dose 100mg. 16 tablet 0    Lancets MISC 1 each by Does not apply route 2 times daily 300 each 1    Multiple Vitamin (MULTI VITAMIN MENS PO) Take by mouth Daily           Allergies: Allergies   Allergen Reactions    Dye [Iodides]         Social History:  Patient Lives with his wife   reports that he quit smoking about 20 years ago. His smoking use included cigarettes. He has a 80.00 pack-year smoking history. He has never used smokeless tobacco. He reports current alcohol use. He reports that he does not use drugs. Family History:  family history includes Arthritis in his maternal grandfather; Diabetes in his maternal grandmother; Hearing Loss in his father and maternal grandmother; Heart Disease (age of onset: 70) in his father; High Blood Pressure in his father. Physical Exam:  BP (!) 167/96   Pulse 72   Temp 98.8 °F (37.1 °C) (Oral)   Resp 18   Ht 6' 1\" (1.854 m)   Wt 190 lb (86.2 kg)   SpO2 99%   BMI 25.07 kg/m²   Leg edema  Diminished breath sounds more on the left lower lung base no evident crackles  No JVD  General appearance:  Appears comfortable. Well nourished  Eyes: Sclera clear, pupils equal  ENT: Moist mucus membranes, no thrush. Trachea midline. Cardiovascular: Regular rhythm, normal S1, S2. No murmur, gallop, rub. Respiratory: no wheeze, good inspiratory effort  Gastrointestinal: Abdomen soft, non-tender, not distended, normal bowel sounds  Musculoskeletal: No cyanosis in digits, neck supple  Neurology: Cranial nerves grossly intact. Alert and oriented in time, place and person. No speech or motor deficits  Psychiatry: Appropriate affect.  Not agitated  Skin: Warm, dry, normal turgor, no rash  Brisk capillary refill, peripheral pulses palpable   Labs:  CBC:   Lab Results   Component Value Date    WBC 11.9 03/31/2022    RBC 5.51 03/31/2022    HGB 13.9 03/31/2022    HCT 44.5 03/31/2022    MCV 80.7 03/31/2022    MCH 25.3 03/31/2022    MCHC 31.3 03/31/2022    RDW 16.8 03/31/2022     03/31/2022    MPV 7.5 03/31/2022     BMP:    Lab Results   Component Value Date     03/31/2022    K 4.7 03/31/2022    CL 93 03/31/2022    CO2 29 03/31/2022    BUN 20 03/31/2022    CREATININE 0.8 03/31/2022    CALCIUM 9.2 03/31/2022    GFRAA >60 03/31/2022    LABGLOM >60 03/31/2022    LABGLOM >90 07/11/2014    GLUCOSE 208 03/31/2022    GLUCOSE 154 05/18/2012     XR CHEST (2 VW)   Final Result   Impression: Bibasilar pleural-parenchymal opacities, unchanged. Chest Xray:   EKG:    I visualized CXR images and EKG strips sinus rhythm with PACs    Discussed case  with ER provider      Please note that some part of this chart was generated using Dragon dictation software. Although every effort was made to ensure the accuracy of this automated transcription, some errors in transcription may have occurred inadvertently. If you may need any clarification, please do not hesitate to contact me through [facility].        Fran Lindquist MD    3/31/2022 2:47 PM

## 2022-03-31 NOTE — PROGRESS NOTES
Cc: HFpEF, HTN, PAFRVR    HPI:     Patient is a 48-year-old man with history of HTN, HLP, poorly controlled diabetes, PAFRVR, HFpEF.      Patient was referred to me by his PCP for assessment in view of his exertional dyspnea and possible A. fib.     Echo 1/25/2022: Normal LV size, mild LVH, LVEF 35%, diastolic grade 2, mild LAE, normal RV, mild valvular disease.     ECG 1/19/2022: Normal sinus rhythm with frequent PACs, poor R wave progression, cannot exclude old anterior MI.     No prior ischemic evaluation     Labs 1/24/2022: TSH 3.0 normal, creatinine 1.8, K5.0, FLP: , HDL 81, LDL 99, TG 89 on pravachol 40 daily.  Hemoglobin A1c 8.1.     Patient noticed increasing shortness of breath with exertion since January 2022. [facility] denies any rabia chest pains or palpitations. He is compliant with his medications. Patient has been on Toprol 25 mg daily and Eliquis 5 twice daily since 01/2022.     CAM 2-week 1/272/10/2022: NSR with frequent episodes of SVT, couple of those episodes appear to be AF RVR. Carleen 3/10/2022: Normal    Patient is here for a follow-up with his wife. On 3/4 his Toprol was increased to 50 mg daily. Last Monday he was started on Lasix 20 mg daily due to progressively worse shortness of breath. He found mild relief for couple of days and now reports much worse shortness of breath over the last 1 day. He is now short of breath even walking from room to room. His pulse ox at home yesterday was in the 80s. He admits to mild lower extremity edema. Histories     Past Medical History:   has a past medical history of Carotid artery stenosis, Carotid stenosis, left, Chronic back pain, Erectile dysfunction, Hyperlipidemia, Hypertension, Osteoarthritis, and Type II or unspecified type diabetes mellitus without mention of complication, not stated as uncontrolled. Surgical History:   has no past surgical history on file. Social History:   reports that he quit smoking about 20 years ago. His smoking use included cigarettes. He has a 80.00 pack-year smoking history. He has never used smokeless tobacco. He reports current alcohol use. He reports that he does not use drugs. Family History:  No evidence for sudden cardiac death or premature CAD      Medications:     Home medications were reviewed and are listed below    Prior to Admission medications    Medication Sig Start Date End Date Taking? Authorizing Provider   furosemide (LASIX) 20 MG tablet Take 1 tablet by mouth daily 3/28/22   NATASHA Ferraro CNP   metoprolol succinate (TOPROL XL) 50 MG extended release tablet Take 1 tablet by mouth daily 3/3/22   Garry Concepcion MD   apixaban [place]) 5 MG TABS tablet Take 1 tablet by mouth 2 times daily 1/19/22   Katelyn Collier MD   Semaglutide,0.25 or 0.5MG/DOS, (OZEMPIC, 0.25 OR 0.5 MG/DOSE,) 2 MG/1.5ML SOPN Inject as directed by physician. 1/19/22   Katelyn Collier MD   Semaglutide,0.25 or 0.5MG/DOS, (OZEMPIC, 0.25 OR 0.5 MG/DOSE,) 2 MG/1.5ML SOPN Inject as directed by physician.  1/5/22   Katelyn Collier MD   ramipril (ALTACE) 10 MG capsule TAKE 1 CAPSULE DAILY 11/15/21   Katelyn Collier MD   pioglitazone (ACTOS) 45 MG tablet TAKE 1 TABLET DAILY 11/15/21   Katelyn Collier MD   dapagliflozin (FARXIGA) 10 MG tablet TAKE 1 TABLET EVERY MORNING 11/15/21   Katelyn Collier MD   metFORMIN (GLUCOPHAGE) 500 MG tablet TAKE 2 TABLETS 2 TIMES     DAILY WITH MEALS 10/20/21   NATASHA Mcallister CNP   acarbose (PRECOSE) 100 MG tablet TAKE 1 TABLET TWICE DAILY  WITH MEALS 10/20/21   NATASHA Mcallister CNP   pravastatin (PRAVACHOL) 40 MG tablet Take 1 tablet by mouth daily 10/20/21   NATASHA Mcallister CNP   glimepiride (AMARYL) 4 MG tablet Take 1 tablet by mouth 2 times daily (with meals) 10/20/21   NATASHA Mcallister CNP   FREESTYLE LITE strip USE TO TEST BLOOD SUGAR LEVEL ONCE DAILY AS NEEDED  7/23/21   Katelyn Collier MD   sildenafil (VIAGRA) 100 MG tablet Take 1 tablet by mouth as needed for Erectile Dysfunction Max daily dose 100mg. 5/26/21 5/21/22  Ginny Morrissey MD   Lancets MISC 1 each by Does not apply route 2 times daily 1/20/21   Ginny Morrissey MD   Multiple Vitamin (MULTI VITAMIN MENS PO) Take by mouth Daily      Historical Provider, MD          Allergy:     Dye [iodides]       Review of Systems:     All 12 point review of symptoms completed. Pertinent positives identified in the HPI, all other review of symptoms negative as below. CONSTITUTIONAL: No fatigue  SKIN: No rash or pruritis. EYES: No visual changes or diplopia. No scleral icterus. ENT: No Headaches, hearing loss or vertigo. No mouth sores or sore throat. CARDIOVASCULAR: No chest pain/chest pressure/chest discomfort. No palpitations. + edema. RESPIRATORY: ++ dyspnea. No cough or wheezing, no sputum production. GASTROINTESTINAL: No N/V/D. No abdominal pain, appetite loss, blood in stools. GENITOURINARY: No dysuria, trouble voiding, or hematuria. MUSCULOSKELETAL:  No gait disturbance, weakness or joint complaints. NEUROLOGICAL: No headache, diplopia, change in muscle strength, numbness or tingling. No change in gait, balance, coordination, mood, affect, memory, mentation, behavior. PSHYCH: No anxiety, loss of interest, change in sexual behavior, feelings of self-harm, or confusion. ENDOCRINE: No excessive thirst, fluid intake, or urination. No tremor. HEMATOLOGIC: No abnormal bruising or bleeding. ALLERGY: No nasal congestion or hives.       Physical Examination:     Vitals:    03/31/22 1058   BP: (!) 144/80   Pulse: 77   SpO2: 92%   Weight: 190 lb (86.2 kg)       Wt Readings from Last 3 Encounters:   03/31/22 190 lb (86.2 kg)   03/28/22 193 lb 6.4 oz (87.7 kg)   03/03/22 199 lb 6.4 oz (90.4 kg)         General Appearance:  Alert, cooperative, no distress, appears stated age Appropriate weight   Head:  Normocephalic, without obvious abnormality, atraumatic   Eyes:  PERRL, conjunctiva/corneas clear EOM intact  Ears normal   Throat no lesions       Nose: Nares normal, no drainage or sinus tenderness   Throat: Lips, mucosa, and tongue normal   Neck: Supple, symmetrical, trachea midline, no adenopathy, thyroid: not enlarged, symmetric, no tenderness/mass/nodules, no carotid bruit       Lungs:   Mildly labored, absent basilar sounds posteriorly, inspiratory crackles mid-way up posteriorly.     Chest Wall:  No tenderness or deformity   Heart:  Irregular rhythm, rate is controlled, S1, S2 normal, there is no murmur, there is no rub or gallop, cannot assess jvd, 1+ bilateral lower extremity edema   Abdomen:   Soft, non-tender, bowel sounds active all four quadrants,  no masses, no organomegaly       Extremities: Extremities normal, atraumatic, no cyanosis   Pulses: 2+ and symmetric   Skin: Skin color, texture, turgor normal, no rashes or lesions   Pysch: Normal mood and affect   Neurologic: Normal gross motor and sensory exam.  Cranial nerves intact        Labs:     Lab Results   Component Value Date    WBC 12.4 (H) 03/28/2022    HGB 13.2 (L) 03/28/2022    HCT 42.8 03/28/2022    MCV 81.5 03/28/2022     03/28/2022     Lab Results   Component Value Date     01/24/2022    K 5.0 01/24/2022    CL 96 (L) 01/24/2022    CO2 27 01/24/2022    BUN 15 01/24/2022    CREATININE 0.8 01/24/2022    GLUCOSE 144 (H) 01/24/2022    CALCIUM 9.6 01/24/2022    PROT 6.7 01/24/2022    LABALBU 3.6 01/24/2022    BILITOT 0.3 01/24/2022    ALKPHOS 118 01/24/2022    AST 12 (L) 01/24/2022    ALT 6 (L) 01/24/2022    LABGLOM >60 01/24/2022    GFRAA >60 01/24/2022    AGRATIO 1.2 01/24/2022    GLOB 3.0 10/20/2021         Lab Results   Component Value Date    CHOL 198 01/24/2022    CHOL 189 01/07/2021    CHOL 191 12/10/2019     Lab Results   Component Value Date    TRIG 89 01/24/2022    TRIG 96 01/07/2021    TRIG 92 12/10/2019     Lab Results   Component Value Date    HDL 81 (H) 01/24/2022    HDL 74 (H) 01/07/2021    HDL 99 (H) 12/10/2019     Lab Results   Component Value Date    LDLCALC 99 01/24/2022    LDLCALC 96 01/07/2021    LDLCALC 74 12/10/2019     Lab Results   Component Value Date    LABVLDL 18 01/24/2022    LABVLDL 19 01/07/2021    LABVLDL 18 12/10/2019     No results found for: CHOLHDLRATIO    No results found for: INR, PROTIME    The 10-year ASCVD risk score (Harpal Black, et al., 2013) is: 54.4%    Values used to calculate the score:      Age: 67 years      Sex: Male      Is Non- : No      Diabetic: Yes      Tobacco smoker: No      Systolic Blood Pressure: 629 mmHg      Is BP treated: Yes      HDL Cholesterol: 81 mg/dL      Total Cholesterol: 198 mg/dL      Assessment / Plan:      Diagnosis Orders   1. Acute heart failure with preserved ejection fraction (HFpEF) (HCC)          1.  Acute on chronic HFpEF:   Patient with progressively worse congestive symptoms and hypoxia per history. Carelen within normal limits excludes significant underlying CAD. Per my physical exam I cannot exclude concurrent significant pleural effusions bilaterally. I recommended patient proceeds to the emergency room for further evaluation and most likely admission for acute on chronic HFpEF, rule out pleural effusions, atrial fibrillation. Patient and wife agreeable and will go to the [facility] emergency room after this clinic visit.     2. PAFRVR:   Personal review of patient's ECG suggest normal sinus rhythm with frequent PACs rather than A. fib with controlled ventricular response. CAM monitor reveals episodes of SVT, couple of them look like AF RVR. Patient today appears to be in A. fib with controlled ventricular response.     We will continue with Eliquis 5 mg twice daily and Toprol to 50 mg daily.     3.  Hypertension:  Is most likely due to age and excess salt in his diet. BP is borderline high normal at this time.     Continue with ramipril 10 mg daily and Toprol  Follow a low-salt diet     4.  Hyperlipidemia:  Patient's lipid profile was reviewed.  It is acceptable if no evidence of CAD.     Continue with Pravachol 40 mg daily  Low-cholesterol diet        Return in about 2 weeks (around 4/14/2022). I have spent 45 minutes of face to face time with the patient with more than 50% spent counseling and coordinating care. I have personally reviewed the reports and images of labs, radiological studies, cardiac studies including ECG's and telemetry, current and old medical records. The note was completed using EMR and Dragon dictation system. Every effort was made to ensure accuracy; however, inadvertent computerized transcription errors may be present. All questions and concerns were addressed to the patient/family. Alternatives to my treatment were discussed. I would like to thank you for providing me the opportunity to participate in the care of your patient. If you have any questions, please do not hesitate to contact me.      Candido Blackwell MD, [address] Office Phone: [phone]  Fax: [phone]

## 2022-03-31 NOTE — PROGRESS NOTES
4 Eyes Admission Assessment     I agree as the admission nurse that 2 RN's have performed a thorough Head to Toe Skin Assessment on the patient. ALL assessment sites listed below have been assessed on admission. Areas assessed by both nurses:   [x]   Head, Face, and Ears   [x]   Shoulders, Back, and Chest  [x]   Arms, Elbows, and Hands   [x]   Coccyx, Sacrum, and Ischium  [x]   Legs, Feet, and Heels        Does the Patient have Skin Breakdown?   No  (independent from home with wife)      Bryson Prevention initiated:  No   Wound Care Orders initiated:  No      [facility] nurse consulted for Pressure Injury (Stage 3,4, Unstageable, DTI, NWPT, and Complex wounds) or Bryson score 18 or lower:  No      Scattered scabs/abrasions  BLE pitting edema (+1/+2)    Nurse 1 eSignature: Electronically signed by Kayla Fletcher RN on 3/31/22 at 6:15 PM EDT    **SHARE this note so that the co-signing nurse is able to place an eSignature**    Nurse 2 eSignature: Electronically signed by Phyllis Gant RN on 4/1/22 at 5:07 AM EDT

## 2022-04-01 LAB
ANION GAP SERPL CALCULATED.3IONS-SCNC: 8 MMOL/L (ref 3–16)
BASOPHILS ABSOLUTE: 0.1 K/UL (ref 0–0.2)
BASOPHILS RELATIVE PERCENT: 1.3 %
BUN BLDV-MCNC: 22 MG/DL (ref 7–20)
CALCIUM SERPL-MCNC: 9.2 MG/DL (ref 8.3–10.6)
CHLORIDE BLD-SCNC: 95 MMOL/L (ref 99–110)
CO2: 36 MMOL/L (ref 21–32)
CREAT SERPL-MCNC: 0.9 MG/DL (ref 0.8–1.3)
EOSINOPHILS ABSOLUTE: 0.2 K/UL (ref 0–0.6)
EOSINOPHILS RELATIVE PERCENT: 1.9 %
GFR AFRICAN AMERICAN: >60
GFR NON-AFRICAN AMERICAN: >60
GLUCOSE BLD-MCNC: 136 MG/DL (ref 70–99)
GLUCOSE BLD-MCNC: 158 MG/DL (ref 70–99)
GLUCOSE BLD-MCNC: 172 MG/DL (ref 70–99)
GLUCOSE BLD-MCNC: 236 MG/DL (ref 70–99)
GLUCOSE BLD-MCNC: 286 MG/DL (ref 70–99)
HCT VFR BLD CALC: 40.5 % (ref 40.5–52.5)
HEMOGLOBIN: 12.7 G/DL (ref 13.5–17.5)
LYMPHOCYTES ABSOLUTE: 0.7 K/UL (ref 1–5.1)
LYMPHOCYTES RELATIVE PERCENT: 7.9 %
MCH RBC QN AUTO: 25.3 PG (ref 26–34)
MCHC RBC AUTO-ENTMCNC: 31.4 G/DL (ref 31–36)
MCV RBC AUTO: 80.5 FL (ref 80–100)
MONOCYTES ABSOLUTE: 1.3 K/UL (ref 0–1.3)
MONOCYTES RELATIVE PERCENT: 14.8 %
NEUTROPHILS ABSOLUTE: 6.6 K/UL (ref 1.7–7.7)
NEUTROPHILS RELATIVE PERCENT: 74.1 %
PDW BLD-RTO: 16.5 % (ref 12.4–15.4)
PERFORMED ON: ABNORMAL
PLATELET # BLD: 368 K/UL (ref 135–450)
PMV BLD AUTO: 7.4 FL (ref 5–10.5)
POTASSIUM REFLEX MAGNESIUM: 4.2 MMOL/L (ref 3.5–5.1)
RBC # BLD: 5.03 M/UL (ref 4.2–5.9)
SODIUM BLD-SCNC: 139 MMOL/L (ref 136–145)
WBC # BLD: 8.9 K/UL (ref 4–11)

## 2022-04-01 PROCEDURE — 1200000000 HC SEMI PRIVATE

## 2022-04-01 PROCEDURE — 99223 1ST HOSP IP/OBS HIGH 75: CPT | Performed by: INTERNAL MEDICINE

## 2022-04-01 PROCEDURE — 6360000002 HC RX W HCPCS: Performed by: INTERNAL MEDICINE

## 2022-04-01 PROCEDURE — G0378 HOSPITAL OBSERVATION PER HR: HCPCS

## 2022-04-01 PROCEDURE — 96376 TX/PRO/DX INJ SAME DRUG ADON: CPT

## 2022-04-01 PROCEDURE — 2580000003 HC RX 258: Performed by: INTERNAL MEDICINE

## 2022-04-01 PROCEDURE — 85025 COMPLETE CBC W/AUTO DIFF WBC: CPT

## 2022-04-01 PROCEDURE — 36415 COLL VENOUS BLD VENIPUNCTURE: CPT

## 2022-04-01 PROCEDURE — 6370000000 HC RX 637 (ALT 250 FOR IP): Performed by: INTERNAL MEDICINE

## 2022-04-01 PROCEDURE — 80048 BASIC METABOLIC PNL TOTAL CA: CPT

## 2022-04-01 RX ORDER — NICOTINE POLACRILEX 4 MG
15 LOZENGE BUCCAL PRN
Status: DISCONTINUED | OUTPATIENT
Start: 2022-04-01 | End: 2022-04-02 | Stop reason: HOSPADM

## 2022-04-01 RX ORDER — DEXTROSE MONOHYDRATE 50 MG/ML
100 INJECTION, SOLUTION INTRAVENOUS PRN
Status: DISCONTINUED | OUTPATIENT
Start: 2022-04-01 | End: 2022-04-02 | Stop reason: HOSPADM

## 2022-04-01 RX ORDER — DEXTROSE MONOHYDRATE 25 G/50ML
12.5 INJECTION, SOLUTION INTRAVENOUS PRN
Status: DISCONTINUED | OUTPATIENT
Start: 2022-04-01 | End: 2022-04-02 | Stop reason: HOSPADM

## 2022-04-01 RX ORDER — INSULIN LISPRO 100 [IU]/ML
5 INJECTION, SOLUTION INTRAVENOUS; SUBCUTANEOUS
Status: DISCONTINUED | OUTPATIENT
Start: 2022-04-01 | End: 2022-04-02 | Stop reason: HOSPADM

## 2022-04-01 RX ORDER — METOPROLOL SUCCINATE 50 MG/1
50 TABLET, EXTENDED RELEASE ORAL 2 TIMES DAILY
Status: DISCONTINUED | OUTPATIENT
Start: 2022-04-01 | End: 2022-04-02 | Stop reason: HOSPADM

## 2022-04-01 RX ADMIN — SODIUM CHLORIDE, PRESERVATIVE FREE 10 ML: 5 INJECTION INTRAVENOUS at 08:11

## 2022-04-01 RX ADMIN — INSULIN LISPRO 5 UNITS: 100 INJECTION, SOLUTION INTRAVENOUS; SUBCUTANEOUS at 17:08

## 2022-04-01 RX ADMIN — INSULIN LISPRO 2 UNITS: 100 INJECTION, SOLUTION INTRAVENOUS; SUBCUTANEOUS at 21:01

## 2022-04-01 RX ADMIN — FUROSEMIDE 40 MG: 10 INJECTION, SOLUTION INTRAMUSCULAR; INTRAVENOUS at 08:11

## 2022-04-01 RX ADMIN — SODIUM CHLORIDE, PRESERVATIVE FREE 10 ML: 5 INJECTION INTRAVENOUS at 21:06

## 2022-04-01 RX ADMIN — RAMIPRIL 10 MG: 5 CAPSULE ORAL at 08:11

## 2022-04-01 RX ADMIN — FUROSEMIDE 40 MG: 10 INJECTION, SOLUTION INTRAMUSCULAR; INTRAVENOUS at 17:07

## 2022-04-01 RX ADMIN — METOPROLOL SUCCINATE 50 MG: 50 TABLET, EXTENDED RELEASE ORAL at 20:59

## 2022-04-01 RX ADMIN — INSULIN LISPRO 6 UNITS: 100 INJECTION, SOLUTION INTRAVENOUS; SUBCUTANEOUS at 11:58

## 2022-04-01 RX ADMIN — APIXABAN 5 MG: 5 TABLET, FILM COATED ORAL at 20:57

## 2022-04-01 RX ADMIN — INSULIN LISPRO 4 UNITS: 100 INJECTION, SOLUTION INTRAVENOUS; SUBCUTANEOUS at 17:08

## 2022-04-01 RX ADMIN — APIXABAN 5 MG: 5 TABLET, FILM COATED ORAL at 08:11

## 2022-04-01 RX ADMIN — METOPROLOL SUCCINATE 50 MG: 50 TABLET, EXTENDED RELEASE ORAL at 08:11

## 2022-04-01 ASSESSMENT — PAIN SCALES - GENERAL: PAINLEVEL_OUTOF10: 0

## 2022-04-01 NOTE — CONSULTS
Cardiology Consultation                                                                    Pt Name: Arsenio Forte  Age: 67 y.o. Sex: male  : 1945  Location: [facility]6/0483-71    Referring Physician: Moses Soriano MD  Primary cardiologist: Dr Elia Montgomery      Reason for Consult:     Reason for Consultation/Chief Complaint: AHF    HPI:     Patient is a 65-year-old man with history of HTN, HLP, poorly controlled diabetes, PAFRVR, HFpEF.      Echo 2022: Normal LV size, mild LVH, LVEF 50%, diastolic grade 2, mild LAE, normal RV, mild valvular disease.     ECG 2022: Normal sinus rhythm with frequent PACs, poor R wave progression, cannot exclude old anterior MI.     Labs 2022: TSH 3.0 normal, creatinine 1.8, K5.0, FLP: , HDL 81, LDL 99, TG 89 on pravachol 40 daily.  Hemoglobin A1c 8.1.     CAM 2-week -2/10/2022: NSR with frequent episodes of SVT, couple of those episodes appear to be AF RVR.     Carleen 3/10/2022: Normal    Patient presented to my clinic on 3/31 with progressively worse shortness of breath briefly improved on Lasix 20 mg daily. He had inspiratory crackles and lower extremity edema on exam and therefore was referred to the emergency room for further evaluation and possible admission for acute heart failure. Patient was admitted for acute heart failure. He was started on Lasix 40 mg IV twice daily after receiving 20 mg IV x1 at the emergency room. Cardiology was consulted. Patient today reports improving breathing. His oxygen requirements have dropped from 2 L on admission to 1 L today. I's and O's -2.6 L total, creatinine stable at 0.9 with increasing bicarbonate.   Telemetry was personally reviewed, reveals NSR with frequent PACs.         Histories     Past Medical History:   has a past medical history of Carotid stenosis, left, Chronic back pain, Diastolic CHF ([address] Erectile dysfunction, Hyperlipidemia, Hypertension, Osteoarthritis, and Type II or unspecified type diabetes mellitus without mention of complication, not stated as uncontrolled. Surgical History:   has a past surgical history that includes Rotator cuff repair (Bilateral) and knee surgery. Social History:   reports that he quit smoking about 20 years ago. His smoking use included cigarettes. He has a 80.00 pack-year smoking history. He has never used smokeless tobacco. He reports current alcohol use. He reports that he does not use drugs. Family History:  No evidence for sudden cardiac death or premature CAD      Medications:       Home Medications  Were reviewed and are listed in nursing record. and/or listed below  Prior to Admission medications    Medication Sig Start Date End Date Taking? Authorizing Provider   furosemide (LASIX) 20 MG tablet Take 1 tablet by mouth daily 3/28/22   NATASHA Sultana CNP   metoprolol succinate (TOPROL XL) 50 MG extended release tablet Take 1 tablet by mouth daily 3/3/22   Liz Gant MD   apixaban [place]) 5 MG TABS tablet Take 1 tablet by mouth 2 times daily 1/19/22   La Butler MD   Semaglutide,0.25 or 0.5MG/DOS, (OZEMPIC, 0.25 OR 0.5 MG/DOSE,) 2 MG/1.5ML SOPN Inject as directed by physician. 1/19/22   La Butler MD   Semaglutide,0.25 or 0.5MG/DOS, (OZEMPIC, 0.25 OR 0.5 MG/DOSE,) 2 MG/1.5ML SOPN Inject as directed by physician.  1/5/22   La Butler MD   ramipril (ALTACE) 10 MG capsule TAKE 1 CAPSULE DAILY 11/15/21   La Butler MD   pioglitazone (ACTOS) 45 MG tablet TAKE 1 TABLET DAILY 11/15/21   La Butler MD   dapagliflozin (FARXIGA) 10 MG tablet TAKE 1 TABLET EVERY MORNING 11/15/21   La Butler MD   metFORMIN (GLUCOPHAGE) 500 MG tablet TAKE 2 TABLETS 2 TIMES     DAILY WITH MEALS 10/20/21   NATASHA Daily CNP   acarbose (PRECOSE) 100 MG tablet TAKE 1 TABLET TWICE DAILY  WITH MEALS 10/20/21   NATASHA Daily CNP   pravastatin (PRAVACHOL) 40 MG tablet Take 1 tablet by mouth daily 10/20/21   Gabriel Toney Ross Red APRN - CNP   glimepiride (AMARYL) 4 MG tablet Take 1 tablet by mouth 2 times daily (with meals) 10/20/21   NATASHA Hoffman CNP   FREESTYLE LITE strip USE TO TEST BLOOD SUGAR LEVEL ONCE DAILY AS NEEDED  7/23/21   Dot Casas MD   sildenafil (VIAGRA) 100 MG tablet Take 1 tablet by mouth as needed for Erectile Dysfunction Max daily dose 100mg. 5/26/21 5/21/22  Dot Casas MD   Lancets MISC 1 each by Does not apply route 2 times daily 1/20/21   Dot Casas MD   Multiple Vitamin (MULTI VITAMIN MENS PO) Take by mouth Daily      Historical Provider, MD          Inpatient Medications:   metoprolol succinate  50 mg Oral BID    sodium chloride flush  5-40 mL IntraVENous 2 times per day    apixaban  5 mg Oral BID    ramipril  10 mg Oral Daily    furosemide  40 mg IntraVENous BID    insulin glargine  10 Units SubCUTAneous Nightly    insulin lispro  0-12 Units SubCUTAneous TID WC    insulin lispro  0-6 Units SubCUTAneous Nightly       IV drips:   sodium chloride         PRN:  sodium chloride flush, sodium chloride, ondansetron **OR** ondansetron, polyethylene glycol, acetaminophen **OR** acetaminophen    Allergy:     Dye [iodides]       Review of Systems:     All 12 point review of symptoms completed. Pertinent positives identified in the HPI, all other review of symptoms negative as below. CONSTITUTIONAL: No fatigue  SKIN: No rash or pruritis. EYES: No visual changes or diplopia. No scleral icterus. ENT: No Headaches, hearing loss or vertigo. No mouth sores or sore throat. CARDIOVASCULAR: No chest pain/chest pressure/chest discomfort. No palpitations. + edema. RESPIRATORY: + dyspnea. No cough or wheezing, no sputum production. GASTROINTESTINAL: No N/V/D. No abdominal pain, appetite loss, blood in stools. GENITOURINARY: No dysuria, trouble voiding, or hematuria. MUSCULOSKELETAL:  No gait disturbance, weakness or joint complaints.   NEUROLOGICAL: No headache, diplopia, change in muscle strength, numbness or tingling. No change in gait, balance, coordination, mood, affect, memory, mentation, behavior. ENDOCRINE: No excessive thirst, fluid intake, or urination. No tremor. HEMATOLOGIC: No abnormal bruising or bleeding. ALLERGY: No nasal congestion or hives. Physical Examination:     Vitals:    04/01/22 0810 04/01/22 0813 04/01/22 0814 04/01/22 1157   BP:    135/74   Pulse:    63   Resp:    16   Temp:    98 °F (36.7 °C)   TempSrc:    Oral   SpO2: 92% 96%  94%   Weight:   182 lb 1.6 oz (82.6 kg)    Height:           Wt Readings from Last 3 Encounters:   04/01/22 182 lb 1.6 oz (82.6 kg)   03/31/22 190 lb (86.2 kg)   03/28/22 193 lb 6.4 oz (87.7 kg)         General Appearance:  Alert, cooperative, no distress, appears stated age Appropriate weight   Head:  Normocephalic, without obvious abnormality, atraumatic   Eyes:  PERRL, conjunctiva/corneas clear EOM intact  Ears normal   Throat no lesions       Nose: Nares normal, no drainage or sinus tenderness   Throat: Lips, mucosa, and tongue normal   Neck: Supple, symmetrical, trachea midline, no adenopathy, thyroid: not enlarged, symmetric, no tenderness/mass/nodules, no carotid bruit. Lungs:   Respirations unlabored, basilar ronchi. Chest Wall:  No tenderness or deformity   Heart:  Regular rhythm, rate is controlled, S1, S2 normal, there is no murmur, there is no rub or gallop, cannot assess jvd, 1+ bilateral lower extremity edema   Abdomen:   Soft, non-tender, bowel sounds active all four quadrants,  no masses, no organomegaly       Extremities: Extremities normal, atraumatic, no cyanosis.     Pulses: 2+ and symmetric   Skin: Skin color, texture, turgor normal, no rashes or lesions   Pysch: Normal mood and affect   Neurologic: Normal gross motor and sensory exam.  Cranial nerves intact        Labs:     Recent Labs     03/31/22  1259 04/01/22  0614    139   K 4.7 4.2   BUN 20 22*   CREATININE 0.8 0.9   CL 93* 95*   CO2 29 36* GLUCOSE 208* 158*   CALCIUM 9.2 9.2     Recent Labs     03/31/22  1259 03/31/22  1259 04/01/22  0614   WBC 11.9*  --  8.9   HGB 13.9  --  12.7*   HCT 44.5  --  40.5     --  368   MCV 80.7   < > 80.5    < > = values in this interval not displayed. No results for input(s): CHOLTOT, TRIG, HDL, LDL in the last 72 hours. Invalid input(s): LIPIDCOMM, CHOLHDL, VLDCHOL  No results for input(s): PTT, INR in the last 72 hours. Invalid input(s): PT  Recent Labs     03/31/22  1259   TROPONINI 0.02*     No results for input(s): BNP in the last 72 hours. No results for input(s): TSH in the last 72 hours. No results for input(s): CHOL, HDL, LDLCALC, TRIG in the last 72 hours.]    Lab Results   Component Value Date    TROPONINI 0.02 (H) 03/31/2022         Imaging:     Telemetry personally reviewed:  NSR with frequent PACs, no evidence of A. fib. I have personally reviewed patient's ECG which shows NSR, no evidence of ischemia. Assessment / Plan:     1. Acute on chronic HFpEF. Patient presented volume overloaded but hemodynamically stable. 2.  PAF with RVR. Patient remains in sinus rhythm however with frequent PACs which are precursors to AF RVR. 3.  Hypertension. It was elevated on admission, better controlled now. -Continue with Lasix 40 mg IV twice daily today. - Strict I's and O's every shift and standing weights if possible, low-salt diet and daily BMP with reflex to Mg, wean supplemental oxygen to off for sats greater than 94%. -Once patient is stable off supplemental oxygen at rest and with ambulation, would change his Lasix IV to torsemide 20 mg p.o. daily (of note patient was on Lasix 20 mg daily at home) and release him home.  -We will increase Toprol to 50 mg p.o. twice daily to prevent A. fib  -Continue with Eliquis and ramipril 10 mg daily.           I have personally reviewed the reports and images of labs, radiological studies, cardiac studies including ECG's and telemetry, current and old medical records. The note was completed using EMR and Dragon dictation system. Every effort was made to ensure accuracy; however, inadvertent computerized transcription errors may be present. All questions and concerns were addressed to the patient/family. Alternatives to my treatment were discussed. I would like to thank you for providing me the opportunity to participate in the care of your patient. If you have any questions, please do not hesitate to contact me.      Mary Morales MD, [facility], [address]  [place] [address] [address] [address]  Ph: [phone]  Fax: [phone]

## 2022-04-01 NOTE — PROGRESS NOTES
Pt ambulated in room and stood up. 02 sat remained >90% on rm air. Lowest was 89% for a brief moment. Otherwise remained 90-95%w/ ambulation. When he sat back down, pt stayed at 95%. A+Ox4, VSS on Rm air. Denies pain. Using bedside urinals to record UO. Tolerating meals. Hoping to go home tomorrow.

## 2022-04-01 NOTE — CARE COORDINATION
CM following, pt from home ind with wife, plans to DC home no needs anticipated. Plan to wean O2 to RA and cont IV Lasix, cards consult and following.   Electronically signed by Margarita Lundy RN on 4/1/2022 at 11:40 AM   [phone]

## 2022-04-01 NOTE — PROGRESS NOTES
Patient A&Ox4. VSS. Pt presenting with BLE pitting edema +2. No significant events overnight. All care needs addressed with no further needs at this time. Bed is locked and in lowest position. Call light and bedside table within reach. Will continue to monitor per protocol.      Electronically signed by Asmita Garcia RN on 4/1/2022 at 6:36 AM

## 2022-04-01 NOTE — PROGRESS NOTES
HOSPITALISTS PROGRESS NOTE    4/1/2022 4:03 PM        Name: Giuseppe Barclay . Admitted: 3/31/2022  Primary Care Provider: Dot Casas MD (Tel: [phone])      Problem List  Principal Problem:    Acute heart failure [facility])  Resolved Problems:    * No resolved hospital problems. *       Assessment & Plan:   Acute on chronic diastolic heart failure with history of atrial fibrillation  Significant improvement with IV diuretics, try to wean off oxygen, cardiology input noticed, continue IV diuretics for another day, cardiology does recommending changing from Lasix to torsemide p.o. daily on discharge and increasing the Toprol dose to 50 mg twice daily to prevent atrial fibrillation and continuing home dose of Eliquis and ramipril 10 mg daily    Uncontrolled diabetes mellitus  Will increase Lantus dose to 15 units and add Premeal insulin along with insulin sliding scale coverage    IV Access: Peripheral  Ansari: No  Diet: ADULT DIET; Regular; 3 carb choices (45 gm/meal); Low Fat/Low Chol/High Fiber/2 gm Na  Code:Full Code  DVT PPX Eliquis  Disposition hopefully tomorrow or day after depending on response to diuretics and need for oxygen      Brief Course: Sent from cardiology office with concerns of worsening orthopnea and exertional dyspnea with leg swelling. Improving with IV diuretics. CC: Shortness of breath  Subjective:  . Patient is at bedside with his wife, patient is feeling much better patient does feel that he made a lot of urine.   Patient was lying on the bed    Reviewed interval ancillary notes    Current Medications  metoprolol succinate (TOPROL XL) extended release tablet 50 mg, BID  insulin glargine (LANTUS;BASAGLAR) injection pen 15 Units, Nightly  insulin lispro (1 Unit Dial) 5 Units, TID WC  glucose (GLUTOSE) 40 % oral gel 15 g, PRN  dextrose 50 % IV solution, PRN  glucagon (rDNA) injection 1 mg, PRN  dextrose 5 % solution, PRN  sodium chloride flush 0.9 % injection 5-40 mL, 2 times per day  sodium chloride flush 0.9 % injection 5-40 mL, PRN  0.9 % sodium chloride infusion, PRN  ondansetron (ZOFRAN-ODT) disintegrating tablet 4 mg, Q8H PRN   Or  ondansetron (ZOFRAN) injection 4 mg, Q6H PRN  polyethylene glycol (GLYCOLAX) packet 17 g, Daily PRN  acetaminophen (TYLENOL) tablet 650 mg, Q6H PRN   Or  acetaminophen (TYLENOL) suppository 650 mg, Q6H PRN  apixaban (ELIQUIS) tablet 5 mg, BID  ramipril (ALTACE) capsule 10 mg, Daily  furosemide (LASIX) injection 40 mg, BID  insulin lispro (1 Unit Dial) 0-12 Units, TID WC  insulin lispro (1 Unit Dial) 0-6 Units, Nightly        Objective:  /72   Pulse 63   Temp 97.8 °F (36.6 °C) (Oral)   Resp 16   Ht 6' 1\" (1.854 m)   Wt 182 lb 1.6 oz (82.6 kg)   SpO2 92%   BMI 24.03 kg/m²     Intake/Output Summary (Last 24 hours) at 4/1/2022 1603  Last data filed at 4/1/2022 1357  Gross per 24 hour   Intake 722 ml   Output 3875 ml   Net -3153 ml      Wt Readings from Last 3 Encounters:   04/01/22 182 lb 1.6 oz (82.6 kg)   03/31/22 190 lb (86.2 kg)   03/28/22 193 lb 6.4 oz (87.7 kg)     Pitting pedal edema is significantly improved  General appearance:  Appears comfortable  Eyes: Sclera clear. Pupils equal.  ENT: Moist oral mucosa. Trachea midline, no adenopathy. Cardiovascular: Regular rhythm, normal S1, S2. No murmur. Respiratory: Not using accessory muscles. Good inspiratory effort. Clear to auscultation bilaterally, no wheeze or crackles. GI: Abdomen soft, no tenderness, not distended, normal bowel sounds  Musculoskeletal: No cyanosis in digits, neck supple  Neurology: CN 2-12 grossly intact. No speech or motor deficits  Psych: Normal affect. Alert and oriented in time, place and person  Skin: Warm, dry, normal turgor  Extremity exam shows brisk capillary refill.   Peripheral pulses are palpable in lower extremities     Labs and Tests:  CBC:   Recent Labs     03/31/22  1259 04/01/22  0614   WBC 11.9* 8.9   HGB 13.9 12.7*    368     BMP:    Recent Labs     03/31/22  1259 04/01/22  0614    139   K 4.7 4.2   CL 93* 95*   CO2 29 36*   BUN 20 22*   CREATININE 0.8 0.9   GLUCOSE 208* 158*     Hepatic:   Recent Labs     03/31/22  1259   AST 13*   ALT <5*   BILITOT 0.3   ALKPHOS 124     XR CHEST (2 VW)   Final Result   Impression: Bibasilar pleural-parenchymal opacities, unchanged.           Discussed care with family        Correne Bamberger, MD   4/1/2022 4:03 PM

## 2022-04-02 VITALS
HEART RATE: 68 BPM | OXYGEN SATURATION: 91 % | WEIGHT: 182.76 LBS | BODY MASS INDEX: 24.22 KG/M2 | HEIGHT: 73 IN | TEMPERATURE: 97.8 F | SYSTOLIC BLOOD PRESSURE: 139 MMHG | RESPIRATION RATE: 17 BRPM | DIASTOLIC BLOOD PRESSURE: 73 MMHG

## 2022-04-02 LAB
ANION GAP SERPL CALCULATED.3IONS-SCNC: 8 MMOL/L (ref 3–16)
BUN BLDV-MCNC: 24 MG/DL (ref 7–20)
CALCIUM SERPL-MCNC: 9.1 MG/DL (ref 8.3–10.6)
CHLORIDE BLD-SCNC: 87 MMOL/L (ref 99–110)
CO2: 37 MMOL/L (ref 21–32)
CREAT SERPL-MCNC: 0.8 MG/DL (ref 0.8–1.3)
GFR AFRICAN AMERICAN: >60
GFR NON-AFRICAN AMERICAN: >60
GLUCOSE BLD-MCNC: 222 MG/DL (ref 70–99)
GLUCOSE BLD-MCNC: 99 MG/DL (ref 70–99)
PERFORMED ON: NORMAL
POTASSIUM REFLEX MAGNESIUM: 4.4 MMOL/L (ref 3.5–5.1)
SODIUM BLD-SCNC: 132 MMOL/L (ref 136–145)

## 2022-04-02 PROCEDURE — 36415 COLL VENOUS BLD VENIPUNCTURE: CPT

## 2022-04-02 PROCEDURE — 2580000003 HC RX 258: Performed by: INTERNAL MEDICINE

## 2022-04-02 PROCEDURE — 96376 TX/PRO/DX INJ SAME DRUG ADON: CPT

## 2022-04-02 PROCEDURE — 6370000000 HC RX 637 (ALT 250 FOR IP): Performed by: INTERNAL MEDICINE

## 2022-04-02 PROCEDURE — 6360000002 HC RX W HCPCS: Performed by: INTERNAL MEDICINE

## 2022-04-02 PROCEDURE — 80048 BASIC METABOLIC PNL TOTAL CA: CPT

## 2022-04-02 PROCEDURE — G0378 HOSPITAL OBSERVATION PER HR: HCPCS

## 2022-04-02 PROCEDURE — 99233 SBSQ HOSP IP/OBS HIGH 50: CPT | Performed by: INTERNAL MEDICINE

## 2022-04-02 RX ORDER — METOPROLOL SUCCINATE 50 MG/1
50 TABLET, EXTENDED RELEASE ORAL 2 TIMES DAILY
Qty: 30 TABLET | Refills: 3 | Status: SHIPPED | OUTPATIENT
Start: 2022-04-02 | End: 2022-07-01 | Stop reason: SDUPTHER

## 2022-04-02 RX ORDER — TORSEMIDE 20 MG/1
20 TABLET ORAL DAILY
Qty: 30 TABLET | Refills: 1 | Status: ON HOLD | OUTPATIENT
Start: 2022-04-02 | End: 2022-04-05 | Stop reason: HOSPADM

## 2022-04-02 RX ADMIN — RAMIPRIL 10 MG: 5 CAPSULE ORAL at 10:10

## 2022-04-02 RX ADMIN — APIXABAN 5 MG: 5 TABLET, FILM COATED ORAL at 10:10

## 2022-04-02 RX ADMIN — SODIUM CHLORIDE, PRESERVATIVE FREE 10 ML: 5 INJECTION INTRAVENOUS at 10:10

## 2022-04-02 RX ADMIN — FUROSEMIDE 40 MG: 10 INJECTION, SOLUTION INTRAMUSCULAR; INTRAVENOUS at 10:10

## 2022-04-02 RX ADMIN — METOPROLOL SUCCINATE 50 MG: 50 TABLET, EXTENDED RELEASE ORAL at 10:10

## 2022-04-02 ASSESSMENT — PAIN SCALES - GENERAL
PAINLEVEL_OUTOF10: 0
PAINLEVEL_OUTOF10: 0

## 2022-04-02 NOTE — DISCHARGE SUMMARY
Hospitalist Discharge Summary    Patient ID:  Kristen Ramos  [id number]  67 y.o.  1945    Admit date: 3/31/2022    Discharge date: 4/2/2022    Disposition: home    Admission Diagnoses:   Patient Active Problem List   Diagnosis    HTN (hypertension)    Hyperlipidemia    OA (osteoarthritis) of knee    Carotid stenosis, left    Hearing loss    ED (erectile dysfunction)    DM type 2 (diabetes mellitus, type 2) ([facility])    Rotator cuff tear    Glenohumeral arthritis    Subacromial impingement    Trigger ring finger of right hand    Spinal stenosis of lumbar region    Closed compression fracture of L1 lumbar vertebra    Closed displaced fracture of lateral malleolus of left fibula    Exertional dyspnea    Atrial fibrillation with rapid ventricular response (HCC)    Acute heart failure with preserved ejection fraction (HFpEF) ([address]    Acute heart failure ([address]       Discharge Diagnoses: Principal Problem:    Acute heart failure ([facility]  Resolved Problems:    * No resolved hospital problems. *      Code Status:  Full Code    Condition:  Stable    Discharge Diet: Diet:  ADULT DIET; Regular; 3 carb choices (45 gm/meal); Low Fat/Low Chol/High Fiber/2 gm Na    PCP to do list: Follow for improvement in symptoms    Hospital Course: As per HPI:76 y.o. male with history of hypertension hyperlipidemia paroxysmal atrial fibrillation chronic diastolic failure diabetes mellitus presenting with symptoms of exertional dyspnea can walk up to 10 m before becoming short of breath. Symptoms has been going on for couple months but worse for the last 2 weeks. Patient started taking Lasix about 4 days did help for initial couple of days but again feeling worse symptoms of dyspnea. Patient seen at the cardiology office today and was recommended to come to the ER for further evaluation. Patient does also mention symptoms of worsening leg swelling.   Patient does mention that he is sleeping in his lazy boy for last 1 week. Denies any chest pain or palpitations otherwise. He denies any concerns of for nausea vomiting diarrhea cough phlegm. He does mention that he is vaccinated and boosted. Patient was admitted to the hospital and following problems were addressed    Acute on chronic diastolic heart failure  -Cardiology was consulted  -Patient was started on IV diuretics. Once his symptoms were improved he was switched to p.o. torsemide. Atrial fibrillation  -Continue Eliquis, metoprolol    Diabetes mellitus  -Continue home medications    Discharge Medications:   Current Discharge Medication List      START taking these medications    Details   torsemide (DEMADEX) 20 MG tablet Take 1 tablet by mouth daily  Qty: 30 tablet, Refills: 1           Current Discharge Medication List      CONTINUE these medications which have CHANGED    Details   metoprolol succinate (TOPROL XL) 50 MG extended release tablet Take 1 tablet by mouth in the morning and at bedtime  Qty: 30 tablet, Refills: 3           Current Discharge Medication List      CONTINUE these medications which have NOT CHANGED    Details   apixaban (ELIQUIS) 5 MG TABS tablet Take 1 tablet by mouth 2 times daily  Qty: 60 tablet, Refills: 2    Associated Diagnoses: Atrial fibrillation, unspecified type ([facility]      ! ! Semaglutide,0.25 or 0.5MG/DOS, (OZEMPIC, 0.25 OR 0.5 MG/DOSE,) 2 MG/1.5ML SOPN Inject as directed by physician. Qty: 3 pen, Refills: 0    Comments: NDC: 9169-6184-32  LOT: [id number]  EXP: 06/2024  Associated Diagnoses: Type 2 diabetes mellitus without complication, without long-term current use of insulin ([facility]      ! ! Semaglutide,0.25 or 0.5MG/DOS, (OZEMPIC, 0.25 OR 0.5 MG/DOSE,) 2 MG/1.5ML SOPN Inject as directed by physician.   Qty: 2 pen, Refills: 0    Comments: NDC: 4872-4670-71  LOT: [id number]  EXP: 10/2023  Associated Diagnoses: Type 2 diabetes mellitus without complication, without long-term current use of insulin ([facility])      ramipril (ALTACE) 10 MG capsule TAKE 1 CAPSULE DAILY  Qty: 90 capsule, Refills: 1    Associated Diagnoses: Primary hypertension      pioglitazone (ACTOS) 45 MG tablet TAKE 1 TABLET DAILY  Qty: 90 tablet, Refills: 1    Associated Diagnoses: Type 2 diabetes mellitus without complication, without long-term current use of insulin ([facility])      dapagliflozin (FARXIGA) 10 MG tablet TAKE 1 TABLET EVERY MORNING  Qty: 90 tablet, Refills: 1    Associated Diagnoses: Type 2 diabetes mellitus without complication, without long-term current use of insulin ([facility])      metFORMIN (GLUCOPHAGE) 500 MG tablet TAKE 2 TABLETS 2 TIMES     DAILY WITH MEALS  Qty: 360 tablet, Refills: 3    Associated Diagnoses: Type 2 diabetes mellitus without complication, without long-term current use of insulin ([facility])      acarbose (PRECOSE) 100 MG tablet TAKE 1 TABLET TWICE DAILY  WITH MEALS  Qty: 180 tablet, Refills: 3    Associated Diagnoses: Type 2 diabetes mellitus without complication, without long-term current use of insulin ([facility])      pravastatin (PRAVACHOL) 40 MG tablet Take 1 tablet by mouth daily  Qty: 90 tablet, Refills: 3    Associated Diagnoses: Mixed hyperlipidemia      glimepiride (AMARYL) 4 MG tablet Take 1 tablet by mouth 2 times daily (with meals)  Qty: 180 tablet, Refills: 3    Associated Diagnoses: Type 2 diabetes mellitus without complication, without long-term current use of insulin ([facility])      FREESTYLE LITE strip USE TO TEST BLOOD SUGAR LEVEL ONCE DAILY AS NEEDED   Qty: 100 each, Refills: 0      sildenafil (VIAGRA) 100 MG tablet Take 1 tablet by mouth as needed for Erectile Dysfunction Max daily dose 100mg. Qty: 16 tablet, Refills: 0      Lancets MISC 1 each by Does not apply route 2 times daily  Qty: 300 each, Refills: 1      Multiple Vitamin (MULTI VITAMIN MENS PO) Take by mouth Daily         ! ! - Potential duplicate medications found. Please discuss with provider.         Current Discharge Medication List      STOP taking these medications furosemide (LASIX) 20 MG tablet Comments:   Reason for Stopping:                   Procedures: none    Assessment on Discharge: Stable, improved     Discharge ROS:  A complete review of systems was asked and negative except for none    Discharge Exam:  /73   Pulse 68   Temp 97.8 °F (36.6 °C) (Oral)   Resp 17   Ht 6' 1\" (1.854 m)   Wt 182 lb 12.2 oz (82.9 kg)   SpO2 91%   BMI 24.11 kg/m²     Gen: NAD  HEENT: NC/AT, moist mucous membranes, no oropharyngeal erythema or exudate  Neck: supple, trachea midline, no anterior cervical or SC LAD  Heart:  Normal s1/s2, RRR, no murmurs, gallops, or rubs. no leg edema  Lungs:  CTA bilaterally, no wheeze,no rales or rhonchi, no use of accessory muscles  Abd: bowel sounds present, soft, nontender, nondistended, no masses  Extrem:  No clubbing, cyanosis,  no edema  Skin: no lesion or masses  Psych:  A & O x3  Neuro: grossly intact, moves all four extremities    Pertinent Studies During Hospital Stay:  Radiology:  XR CHEST (2 VW)    Result Date: 3/31/2022  XR CHEST (2 VW) Indication: Dyspnea COMPARISON: 2/28/2022 Findings: PA and lateral views of the chest were obtained. The heart is stable in size and configuration. Bibasilar pleural and parenchymal opacities are noted and not significant changed in the interval. Bilateral lower lobe consolidation. No pneumothorax. Impression: Bibasilar pleural-parenchymal opacities, unchanged. XR CHEST (2 VW)    Result Date: 3/28/2022  EXAM: PA AND LATERAL CHEST X-RAY ON 3/28/2022 INDICATION: Shortness of breath COMPARISON: PA/lateral chest 2/7/2020 FINDINGS: LIMITATIONS:None LINES/TUBES:None HEART / MEDIASTINUM: Heart size is normal. Trachea is midline. PLEURAL SPACES: There is moderate to large bilateral effusions. No pneumothorax is seen. LUNGS: There is compressive atelectasis and/or airspace disease in both lung bases. BONES / SOFT TISSUES: No significant abnormality. OTHER: None.      Moderate to large bilateral effusions with bibasilar atelectasis and/or airspace disease     NM MYOCARDIAL SPECT REST EXERCISE OR RX    Result Date: 3/10/2022  Cardiac Perfusion Imaging  Demographics   Patient Name       Eduarda Oshea   Date of Study      03/10/2022         Gender              Male   Patient Number     [id number]         Date of Birth       1945   Visit Number       [id number]          Age                 67 year(s)   Accession Number   [id number]         Room Number   Corporate ID       [id number]           NM Technician       Andreia Arias   Nurse              Paul Mckinnon,  Interpreting        JULY Blum RN                 Physician           Angelica Dave MD   Ordering Physician Chuck Kasper MD  Procedure Procedure Type:   Nuclear Stress Test:NM MYOCARDIAL SPECT REST EXERCISE OR RX   Study location: [facility] - Nuclear Medicine   Indications: Shortness of breath and Dyspnea upon exertion. Hospital Status: Outpatient. Height: 74 inches Weight: 199 pounds  Conclusions   Summary  Overall findings represent a low risk scan. Normal LV size and systolic function. Small basal inferior fixed defect, likely artifact, otherwise no evidence of  ischemia or prior infarction. Non-diagnostic EKG response due to failure to reach target heart rate. Non-diagnostic EKG response due to baseline abnormalities. Stress Protocols   Resting ECG  Normal sinus rhythm. PACs  Nonspecific ST segment changes. Resting HR:65 bpm  Resting BP:146/73 mmHg  Stress Protocol:Pharmacologic - Lexiscan's  Peak HR:81 bpm                          HR/BP product:83429  Peak BP:128/62 mmHg  Predicted HR: 143 bpm  % of predicted HR: 57  Test duration: 1 min  Reason for termination:Completed   Arrhythmias  Frequent PAC's. Symptoms  No chest pain. Complications  Procedure complication was none.    Stress Interpretation  Non-diagnostic EKG response due to failure to reach target heart rate. Non-diagnostic EKG response due to baseline abnormalities. Imaging Protocols   - One Day   Rest                          Stress   Isotope:Myoview/Tetrofosmin   Isotope: Myoview/Tetrofosmin  Isotope dose:11.2 mCi         Isotope dose:36 mCi  Administration Route:I.V.      Administration Route:I.V.  Date:03/10/2022 00:00         Date:03/10/2022 00:00                                 Technique:      Gated  Imaging Results    Stress ejection    Ejection fraction:60 %    EDV :128 ml    ESV :51 ml    Stroke volume :77 ml    LV mass :158 gr  Medical History  Signatures   ------------------------------------------------------------------  Electronically signed by Adis Sanz MD (Interpreting  physician) on 03/10/2022 at 15:37  ------------------------------------------------------------------            Last Labs on Discharge:     Recent Results (from the past 24 hour(s))   POCT Glucose    Collection Time: 04/01/22  4:58 PM   Result Value Ref Range    POC Glucose 236 (H) 70 - 99 mg/dl    Performed on ACCU-CHEK    POCT Glucose    Collection Time: 04/01/22  8:03 PM   Result Value Ref Range    POC Glucose 172 (H) 70 - 99 mg/dl    Performed on ACCU-CHEK    POCT Glucose    Collection Time: 04/02/22  7:38 AM   Result Value Ref Range    POC Glucose 99 70 - 99 mg/dl    Performed on ACCU-CHEK    Basic Metabolic Panel w/ Reflex to MG    Collection Time: 04/02/22 10:56 AM   Result Value Ref Range    Sodium 132 (L) 136 - 145 mmol/L    Potassium reflex Magnesium 4.4 3.5 - 5.1 mmol/L    Chloride 87 (L) 99 - 110 mmol/L    CO2 37 (H) 21 - 32 mmol/L    Anion Gap 8 3 - 16    Glucose 222 (H) 70 - 99 mg/dL    BUN 24 (H) 7 - 20 mg/dL    CREATININE 0.8 0.8 - 1.3 mg/dL    GFR Non-African American >60 >60    GFR African American >60 >60    Calcium 9.1 8.3 - 10.6 mg/dL         Follow up: with Rosie Jon MD    Note that over 30 minutes was spent in preparing discharge papers, discussing discharge with patient, medication review, etc.    Thank you Clarice Chris MD for the opportunity to be involved in this patient's care. If you have any questions or concerns please feel free to contact me at [phone].     Electronically signed by Arturo Qureshi MD on 4/2/2022 at 1:27 PM

## 2022-04-02 NOTE — CARE COORDINATION
Case Management Assessment            Discharge Note                    Date / Time of Note: 4/2/2022 12:58 PM                  Discharge Note Completed by: Cassi Sandoval RN    Patient Name: Nato Davis   YOB: 1945  Diagnosis: Acute heart failure, unspecified heart failure type [facility]) [I50.9]   Date / Time: 3/31/2022 12:16 PM    Current PCP: Jeanette Jung MD  Clinic patient: No    Hospitalization in the last 30 days: No    Advance Directives:  Code Status: Full Code  [place] DNR form completed and on chart: Not Indicated    Financial:  Payor: Beatriz Garcia / Plan: Beatriz Garcia / Product Type: *No Product type* /      Pharmacy:    [id number] [address]  [address]  Phone: [phone] Fax: [phone]    [facility]/pharmacy [facility] [address], [address] [facility] [address] [address] [facility]  [address] [facility] [address]  Phone: [phone] Fax: [phone]    [facility] [address], [address] [phone] - F [phone]  [facility] [address]  Phone: [phone] Fax: [phone] medications?: Potential Assistance Purchasing Medications: No  Assistance provided by Case Management: None at this time    Does patient want to participate in local refill/ meds to beds program?:      Meds To Beds General Rules:  1. Can ONLY be done Monday- Friday between 8:30am-5pm  2. Prescription(s) must be in pharmacy by 3pm to be filled same day  3. Copy of patient's insurance/ prescription drug card and patient face sheet must be sent along with the prescription(s)  4. Cost of Rx cannot be added to hospital bill. If financial assistance is needed, please contact unit  or ;  or  CANNOT provide pharmacy voucher for patients co-pays  5.  Patients can then  the prescription on their way out of the hospital at discharge, or pharmacy can deliver to the bedside if staff is available. (payment due at time of pick-up or delivery - cash, check, or card accepted)     Able to afford home medications/ co-pay costs: Yes    ADLS:  Current PT AM-PAC Score:   /24  Current OT AM-PAC Score:   /24      DISCHARGE Disposition: Home- No Services Needed    LOC at discharge: Not Applicable  ANGEL Completed: Not Indicated    Notification completed in HENS/PAS?:  Not Applicable    IMM Completed:   Not Indicated    Transportation:  Transportation PLAN for discharge: family   Mode of Transport: [address]:  [address] ordered at discharge: Not [address]: Not Applicable  Orders faxed: No    Durable Medical Equipment:  DME Provider: none  Equipment obtained during hospitalization:     Home Oxygen and Respiratory Equipment:  Oxygen needed at discharge?: Not [address]: Not Applicable  Portable tank available for discharge?: Not Indicated    Dialysis:  Dialysis patient: No    Dialysis Center:  Not Applicable      Additional CM Notes: CM following pt will DC home with wife no needs from Cm team.  Will follow up OP with Cardiology and PCP. The Plan for Transition of Care is related to the following treatment goals of Acute heart failure, unspecified heart failure type ([address] [I50.9]    The Patient and/or patient representative [facility] and his family were provided with a choice of provider and agrees with the discharge plan Yes    Freedom of choice list was provided with basic dialogue that supports the patient's individualized plan of care/goals and shares the quality data associated with the providers.  Not Indicated    Care Transitions patient: Yes    Kristopher Morales RN  [facility]  Case Management Department  Ph: [phone]  Fax: [phone]

## 2022-04-02 NOTE — PROGRESS NOTES
Pt alert and oriented, able to walk in room with steady gait. O2 above 92% on room air. Pt stated swelling to is feet improving. VSS. Denied pain/doscomfort throufhout the shift. Rested very well. Will monitor.

## 2022-04-02 NOTE — PLAN OF CARE
Problem: Falls - Risk of:  Goal: Will remain free from falls  Description: Will remain free from falls  Outcome: Ongoing     Problem: OXYGENATION/RESPIRATORY FUNCTION  Goal: Patient will maintain patent airway  Outcome: Met This Shift  Goal: Patient will achieve/maintain normal respiratory rate/effort  Description: Respiratory rate and effort will be within normal limits for the patient  Outcome: Met This Shift     Problem: FLUID AND ELECTROLYTE IMBALANCE  Goal: Fluid and electrolyte balance are achieved/maintained  Outcome: Ongoing     Problem: ACTIVITY INTOLERANCE/IMPAIRED MOBILITY  Goal: Mobility/activity is maintained at optimum level for patient  Outcome: Ongoing

## 2022-04-02 NOTE — PROGRESS NOTES
Discharge paperwork reviewed with patient and his wife. Declined having any questions PIV removed. Wheeled to car by PCA.     Electronically signed by Danish Sandoval on 4/2/2022 at 2:02 PM

## 2022-04-02 NOTE — CONSULTS
Cardiology Consultation                                                                    Pt Name: Chelsea Rothman  Age: 67 y.o. Sex: male  : 1945  Location: 8107/5622-14    Referring Physician: Massiel Perez MD  Primary cardiologist: Dr Shawna Baum      Reason for Consult:     Reason for Consultation/Chief Complaint: AHF    HPI:     Patient is a 76-year-old man with history of HTN, HLP, poorly controlled diabetes, PAFRVR, HFpEF.      Echo 2022: Normal LV size, mild LVH, LVEF 36%, diastolic grade 2, mild LAE, normal RV, mild valvular disease.     ECG 2022: Normal sinus rhythm with frequent PACs, poor R wave progression, cannot exclude old anterior MI.     Labs 2022: TSH 3.0 normal, creatinine 1.8, K5.0, FLP: , HDL 81, LDL 99, TG 89 on pravachol 40 daily.  Hemoglobin A1c 8.1.     CAM 2-week -2/10/2022: NSR with frequent episodes of SVT, couple of those episodes appear to be AF RVR.     Carleen 3/10/2022: Normal    Patient presented to my clinic on 3/31 with progressively worse shortness of breath briefly improved on Lasix 20 mg daily. He had inspiratory crackles and lower extremity edema on exam and therefore was referred to the emergency room for further evaluation and possible admission for acute heart failure. Patient was admitted for acute heart failure. He was started on Lasix 40 mg IV twice daily after receiving 20 mg IV x1 at the emergency room. Cardiology was consulted. Patient today reports improving breathing. His oxygen requirements have dropped from 2 L on admission to 1 L today. I's and O's -2.6 L total, creatinine stable at 0.9 with increasing bicarbonate.   Telemetry was personally reviewed, reveals NSR with frequent PACs.         Histories     Past Medical History:   has a past medical history of Carotid stenosis, left, Chronic back pain, Diastolic CHF ([address] Erectile dysfunction, Hyperlipidemia, Hypertension, Osteoarthritis, and Type II or unspecified type diabetes APRN - CNP   glimepiride (AMARYL) 4 MG tablet Take 1 tablet by mouth 2 times daily (with meals) 10/20/21   Clyde Teixeira, APRN - CNP   FREESTYLE LITE strip USE TO TEST BLOOD SUGAR LEVEL ONCE DAILY AS NEEDED  7/23/21   Cassia Li MD   sildenafil (VIAGRA) 100 MG tablet Take 1 tablet by mouth as needed for Erectile Dysfunction Max daily dose 100mg. 5/26/21 5/21/22  Cassia Li MD   Lancets MISC 1 each by Does not apply route 2 times daily 1/20/21   Cassia Li MD   Multiple Vitamin (MULTI VITAMIN MENS PO) Take by mouth Daily      Historical Provider, MD          Inpatient Medications:   metoprolol succinate  50 mg Oral BID    insulin glargine  15 Units SubCUTAneous Nightly    insulin lispro  5 Units SubCUTAneous TID WC    sodium chloride flush  5-40 mL IntraVENous 2 times per day    apixaban  5 mg Oral BID    ramipril  10 mg Oral Daily    furosemide  40 mg IntraVENous BID    insulin lispro  0-12 Units SubCUTAneous TID WC    insulin lispro  0-6 Units SubCUTAneous Nightly       IV drips:   dextrose      sodium chloride         PRN:  glucose, dextrose, glucagon (rDNA), dextrose, sodium chloride flush, sodium chloride, ondansetron **OR** ondansetron, polyethylene glycol, acetaminophen **OR** acetaminophen    Allergy:     Dye [iodides]       Review of Systems:     All 12 point review of symptoms completed. Pertinent positives identified in the HPI, all other review of symptoms negative as below. CONSTITUTIONAL: No fatigue  SKIN: No rash or pruritis. EYES: No visual changes or diplopia. No scleral icterus. ENT: No Headaches, hearing loss or vertigo. No mouth sores or sore throat. CARDIOVASCULAR: No chest pain/chest pressure/chest discomfort. No palpitations. + edema. RESPIRATORY: + dyspnea. No cough or wheezing, no sputum production. GASTROINTESTINAL: No N/V/D. No abdominal pain, appetite loss, blood in stools. GENITOURINARY: No dysuria, trouble voiding, or hematuria.   MUSCULOSKELETAL: No gait disturbance, weakness or joint complaints. NEUROLOGICAL: No headache, diplopia, change in muscle strength, numbness or tingling. No change in gait, balance, coordination, mood, affect, memory, mentation, behavior. ENDOCRINE: No excessive thirst, fluid intake, or urination. No tremor. HEMATOLOGIC: No abnormal bruising or bleeding. ALLERGY: No nasal congestion or hives. Physical Examination:     Vitals:    04/01/22 2059 04/01/22 2318 04/02/22 0430 04/02/22 0738   BP: 108/71 (!) 157/91 (!) 148/85 139/73   Pulse: 73 70 64 68   Resp:  16 16 17   Temp:  97.3 °F (36.3 °C) 97.7 °F (36.5 °C) 97.8 °F (36.6 °C)   TempSrc:  Oral Oral Oral   SpO2:  92% 92% 91%   Weight:   182 lb 12.2 oz (82.9 kg)    Height:           Wt Readings from Last 3 Encounters:   04/02/22 182 lb 12.2 oz (82.9 kg)   03/31/22 190 lb (86.2 kg)   03/28/22 193 lb 6.4 oz (87.7 kg)         General Appearance:  Alert, cooperative, no distress, appears stated age Appropriate weight   Head:  Normocephalic, without obvious abnormality, atraumatic   Eyes:  PERRL, conjunctiva/corneas clear EOM intact  Ears normal   Throat no lesions       Nose: Nares normal, no drainage or sinus tenderness   Throat: Lips, mucosa, and tongue normal   Neck: Supple, symmetrical, trachea midline, no adenopathy, thyroid: not enlarged, symmetric, no tenderness/mass/nodules, no carotid bruit. Lungs:   Respirations unlabored, basilar ronchi. Chest Wall:  No tenderness or deformity   Heart:  Regular rhythm, rate is controlled, S1, S2 normal, there is no murmur, there is no rub or gallop, cannot assess jvd, 1+ bilateral lower extremity edema   Abdomen:   Soft, non-tender, bowel sounds active all four quadrants,  no masses, no organomegaly       Extremities: Extremities normal, atraumatic, no cyanosis.     Pulses: 2+ and symmetric   Skin: Skin color, texture, turgor normal, no rashes or lesions   Pysch: Normal mood and affect   Neurologic: Normal gross motor and sensory exam.  Cranial nerves intact        Labs:     Recent Labs     03/31/22  1259 04/01/22  0614    139   K 4.7 4.2   BUN 20 22*   CREATININE 0.8 0.9   CL 93* 95*   CO2 29 36*   GLUCOSE 208* 158*   CALCIUM 9.2 9.2     Recent Labs     03/31/22  1259 03/31/22  1259 04/01/22  0614   WBC 11.9*  --  8.9   HGB 13.9  --  12.7*   HCT 44.5  --  40.5     --  368   MCV 80.7   < > 80.5    < > = values in this interval not displayed. No results for input(s): CHOLTOT, TRIG, HDL, LDL in the last 72 hours. Invalid input(s): LIPIDCOMM, CHOLHDL, VLDCHOL  No results for input(s): PTT, INR in the last 72 hours. Invalid input(s): PT  Recent Labs     03/31/22  1259   TROPONINI 0.02*     No results for input(s): BNP in the last 72 hours. No results for input(s): TSH in the last 72 hours. No results for input(s): CHOL, HDL, LDLCALC, TRIG in the last 72 hours.]    Lab Results   Component Value Date    TROPONINI 0.02 (H) 03/31/2022         Imaging:     Telemetry personally reviewed:  NSR with frequent PACs, no evidence of A. fib. I have personally reviewed patient's ECG which shows NSR, no evidence of ischemia. Assessment / Plan:     Acute on chronic diastolic heart failure/paroxysmal A. fib/hypertension.  -Transition to p.o. torsemide 20 mg.  -Continue metoprolol 50 mg twice a day. -Continue Eliquis for stroke prophylaxis.  -Continue ramipril.  -Okay to discharge home and follow-up with Dr. Amy Stiles as an outpatient. Jadiel Rivera MD, [facility] [place], [facility] [address]

## 2022-04-03 ENCOUNTER — APPOINTMENT (OUTPATIENT)
Dept: CT IMAGING | Age: 77
End: 2022-04-03
Payer: COMMERCIAL

## 2022-04-03 ENCOUNTER — APPOINTMENT (OUTPATIENT)
Dept: GENERAL RADIOLOGY | Age: 77
End: 2022-04-03
Payer: COMMERCIAL

## 2022-04-03 ENCOUNTER — HOSPITAL ENCOUNTER (OUTPATIENT)
Age: 77
Setting detail: OBSERVATION
Discharge: HOME | End: 2022-04-05
Attending: EMERGENCY MEDICINE | Admitting: INTERNAL MEDICINE
Payer: COMMERCIAL

## 2022-04-03 ENCOUNTER — TELEPHONE (OUTPATIENT)
Dept: OTHER | Facility: CLINIC | Age: 77
End: 2022-04-03

## 2022-04-03 DIAGNOSIS — R09.02 HYPOXIA: Primary | ICD-10-CM

## 2022-04-03 LAB
ANION GAP SERPL CALCULATED.3IONS-SCNC: 10 MMOL/L (ref 3–16)
BASE EXCESS VENOUS: 11.3 MMOL/L (ref -2–3)
BASOPHILS ABSOLUTE: 0.1 K/UL (ref 0–0.2)
BASOPHILS RELATIVE PERCENT: 0.4 %
BUN BLDV-MCNC: 36 MG/DL (ref 7–20)
CALCIUM SERPL-MCNC: 9.2 MG/DL (ref 8.3–10.6)
CARBOXYHEMOGLOBIN: 1.7 % (ref 0–1.5)
CHLORIDE BLD-SCNC: 93 MMOL/L (ref 99–110)
CO2: 34 MMOL/L (ref 21–32)
CREAT SERPL-MCNC: 0.9 MG/DL (ref 0.8–1.3)
EOSINOPHILS ABSOLUTE: 0.1 K/UL (ref 0–0.6)
EOSINOPHILS RELATIVE PERCENT: 0.6 %
GFR AFRICAN AMERICAN: >60
GFR NON-AFRICAN AMERICAN: >60
GLUCOSE BLD-MCNC: 229 MG/DL (ref 70–99)
GLUCOSE BLD-MCNC: 235 MG/DL (ref 70–99)
GLUCOSE BLD-MCNC: 280 MG/DL (ref 70–99)
HCO3 VENOUS: 40 MMOL/L (ref 24–28)
HCT VFR BLD CALC: 44.5 % (ref 40.5–52.5)
HEMOGLOBIN, VEN, REDUCED: 60 %
HEMOGLOBIN: 13.7 G/DL (ref 13.5–17.5)
LYMPHOCYTES ABSOLUTE: 0.6 K/UL (ref 1–5.1)
LYMPHOCYTES RELATIVE PERCENT: 4.1 %
MCH RBC QN AUTO: 25.1 PG (ref 26–34)
MCHC RBC AUTO-ENTMCNC: 30.9 G/DL (ref 31–36)
MCV RBC AUTO: 81.3 FL (ref 80–100)
METHEMOGLOBIN VENOUS: 0.3 % (ref 0–1.5)
MONOCYTES ABSOLUTE: 1.3 K/UL (ref 0–1.3)
MONOCYTES RELATIVE PERCENT: 8.9 %
NEUTROPHILS ABSOLUTE: 12.3 K/UL (ref 1.7–7.7)
NEUTROPHILS RELATIVE PERCENT: 86 %
O2 SAT, VEN: 39 %
PCO2, VEN: 69.2 MMHG (ref 41–51)
PDW BLD-RTO: 17 % (ref 12.4–15.4)
PERFORMED ON: ABNORMAL
PERFORMED ON: ABNORMAL
PH VENOUS: 7.37 (ref 7.35–7.45)
PLATELET # BLD: 414 K/UL (ref 135–450)
PMV BLD AUTO: 7.3 FL (ref 5–10.5)
PO2, VEN: <30 MMHG (ref 25–40)
POTASSIUM REFLEX MAGNESIUM: 4.8 MMOL/L (ref 3.5–5.1)
PRO-BNP: 936 PG/ML (ref 0–449)
PROCALCITONIN: 0.04 NG/ML (ref 0–0.15)
RBC # BLD: 5.47 M/UL (ref 4.2–5.9)
SODIUM BLD-SCNC: 137 MMOL/L (ref 136–145)
TCO2 CALC VENOUS: 42 MMOL/L
TROPONIN: 0.02 NG/ML
WBC # BLD: 14.4 K/UL (ref 4–11)

## 2022-04-03 PROCEDURE — 36415 COLL VENOUS BLD VENIPUNCTURE: CPT

## 2022-04-03 PROCEDURE — 71250 CT THORAX DX C-: CPT

## 2022-04-03 PROCEDURE — G0378 HOSPITAL OBSERVATION PER HR: HCPCS

## 2022-04-03 PROCEDURE — 80048 BASIC METABOLIC PNL TOTAL CA: CPT

## 2022-04-03 PROCEDURE — 84145 PROCALCITONIN (PCT): CPT

## 2022-04-03 PROCEDURE — 6360000002 HC RX W HCPCS: Performed by: STUDENT IN AN ORGANIZED HEALTH CARE EDUCATION/TRAINING PROGRAM

## 2022-04-03 PROCEDURE — 94150 VITAL CAPACITY TEST: CPT

## 2022-04-03 PROCEDURE — 2700000000 HC OXYGEN THERAPY PER DAY

## 2022-04-03 PROCEDURE — 94640 AIRWAY INHALATION TREATMENT: CPT

## 2022-04-03 PROCEDURE — 83880 ASSAY OF NATRIURETIC PEPTIDE: CPT

## 2022-04-03 PROCEDURE — 99283 EMERGENCY DEPT VISIT LOW MDM: CPT

## 2022-04-03 PROCEDURE — 6370000000 HC RX 637 (ALT 250 FOR IP): Performed by: INTERNAL MEDICINE

## 2022-04-03 PROCEDURE — 85025 COMPLETE CBC W/AUTO DIFF WBC: CPT

## 2022-04-03 PROCEDURE — 94664 DEMO&/EVAL PT USE INHALER: CPT

## 2022-04-03 PROCEDURE — 94761 N-INVAS EAR/PLS OXIMETRY MLT: CPT

## 2022-04-03 PROCEDURE — 71046 X-RAY EXAM CHEST 2 VIEWS: CPT

## 2022-04-03 PROCEDURE — 84484 ASSAY OF TROPONIN QUANT: CPT

## 2022-04-03 PROCEDURE — 93005 ELECTROCARDIOGRAM TRACING: CPT

## 2022-04-03 PROCEDURE — 82803 BLOOD GASES ANY COMBINATION: CPT

## 2022-04-03 PROCEDURE — 6370000000 HC RX 637 (ALT 250 FOR IP): Performed by: STUDENT IN AN ORGANIZED HEALTH CARE EDUCATION/TRAINING PROGRAM

## 2022-04-03 RX ORDER — INSULIN LISPRO 100 [IU]/ML
0-12 INJECTION, SOLUTION INTRAVENOUS; SUBCUTANEOUS
Status: DISCONTINUED | OUTPATIENT
Start: 2022-04-03 | End: 2022-04-05 | Stop reason: HOSPADM

## 2022-04-03 RX ORDER — SODIUM CHLORIDE 0.9 % (FLUSH) 0.9 %
5-40 SYRINGE (ML) INJECTION EVERY 12 HOURS SCHEDULED
Status: DISCONTINUED | OUTPATIENT
Start: 2022-04-03 | End: 2022-04-05 | Stop reason: HOSPADM

## 2022-04-03 RX ORDER — RAMIPRIL 5 MG/1
10 CAPSULE ORAL DAILY
Status: DISCONTINUED | OUTPATIENT
Start: 2022-04-03 | End: 2022-04-05 | Stop reason: HOSPADM

## 2022-04-03 RX ORDER — DEXTROSE MONOHYDRATE 25 G/50ML
12.5 INJECTION, SOLUTION INTRAVENOUS PRN
Status: DISCONTINUED | OUTPATIENT
Start: 2022-04-03 | End: 2022-04-03

## 2022-04-03 RX ORDER — NICOTINE POLACRILEX 4 MG
15 LOZENGE BUCCAL PRN
Status: DISCONTINUED | OUTPATIENT
Start: 2022-04-03 | End: 2022-04-03

## 2022-04-03 RX ORDER — TORSEMIDE 20 MG/1
20 TABLET ORAL DAILY
Status: DISCONTINUED | OUTPATIENT
Start: 2022-04-03 | End: 2022-04-04

## 2022-04-03 RX ORDER — INSULIN LISPRO 100 [IU]/ML
0-6 INJECTION, SOLUTION INTRAVENOUS; SUBCUTANEOUS NIGHTLY
Status: DISCONTINUED | OUTPATIENT
Start: 2022-04-03 | End: 2022-04-05 | Stop reason: HOSPADM

## 2022-04-03 RX ORDER — PRAVASTATIN SODIUM 40 MG
40 TABLET ORAL DAILY
Status: DISCONTINUED | OUTPATIENT
Start: 2022-04-03 | End: 2022-04-05 | Stop reason: HOSPADM

## 2022-04-03 RX ORDER — SODIUM CHLORIDE 0.9 % (FLUSH) 0.9 %
5-40 SYRINGE (ML) INJECTION PRN
Status: DISCONTINUED | OUTPATIENT
Start: 2022-04-03 | End: 2022-04-05 | Stop reason: HOSPADM

## 2022-04-03 RX ORDER — ALBUTEROL SULFATE 2.5 MG/3ML
2.5 SOLUTION RESPIRATORY (INHALATION) ONCE
Status: COMPLETED | OUTPATIENT
Start: 2022-04-03 | End: 2022-04-03

## 2022-04-03 RX ORDER — SODIUM CHLORIDE 9 MG/ML
INJECTION, SOLUTION INTRAVENOUS PRN
Status: DISCONTINUED | OUTPATIENT
Start: 2022-04-03 | End: 2022-04-05 | Stop reason: HOSPADM

## 2022-04-03 RX ORDER — METOPROLOL SUCCINATE 50 MG/1
50 TABLET, EXTENDED RELEASE ORAL 2 TIMES DAILY
Status: DISCONTINUED | OUTPATIENT
Start: 2022-04-03 | End: 2022-04-05 | Stop reason: HOSPADM

## 2022-04-03 RX ORDER — IPRATROPIUM BROMIDE AND ALBUTEROL SULFATE 2.5; .5 MG/3ML; MG/3ML
1 SOLUTION RESPIRATORY (INHALATION) ONCE
Status: COMPLETED | OUTPATIENT
Start: 2022-04-03 | End: 2022-04-03

## 2022-04-03 RX ORDER — ACARBOSE 50 MG/1
100 TABLET ORAL 2 TIMES DAILY
Status: DISCONTINUED | OUTPATIENT
Start: 2022-04-03 | End: 2022-04-05 | Stop reason: HOSPADM

## 2022-04-03 RX ORDER — DEXTROSE MONOHYDRATE 50 MG/ML
100 INJECTION, SOLUTION INTRAVENOUS PRN
Status: DISCONTINUED | OUTPATIENT
Start: 2022-04-03 | End: 2022-04-05 | Stop reason: HOSPADM

## 2022-04-03 RX ORDER — ONDANSETRON 2 MG/ML
4 INJECTION INTRAMUSCULAR; INTRAVENOUS EVERY 6 HOURS PRN
Status: DISCONTINUED | OUTPATIENT
Start: 2022-04-03 | End: 2022-04-05 | Stop reason: HOSPADM

## 2022-04-03 RX ORDER — IPRATROPIUM BROMIDE AND ALBUTEROL SULFATE 2.5; .5 MG/3ML; MG/3ML
1 SOLUTION RESPIRATORY (INHALATION) EVERY 4 HOURS PRN
Status: DISCONTINUED | OUTPATIENT
Start: 2022-04-03 | End: 2022-04-05 | Stop reason: SDUPTHER

## 2022-04-03 RX ORDER — ACETAMINOPHEN 650 MG/1
650 SUPPOSITORY RECTAL EVERY 6 HOURS PRN
Status: DISCONTINUED | OUTPATIENT
Start: 2022-04-03 | End: 2022-04-05 | Stop reason: HOSPADM

## 2022-04-03 RX ORDER — POLYETHYLENE GLYCOL 3350 17 G/17G
17 POWDER, FOR SOLUTION ORAL DAILY PRN
Status: DISCONTINUED | OUTPATIENT
Start: 2022-04-03 | End: 2022-04-05 | Stop reason: HOSPADM

## 2022-04-03 RX ORDER — ONDANSETRON 4 MG/1
4 TABLET, ORALLY DISINTEGRATING ORAL EVERY 8 HOURS PRN
Status: DISCONTINUED | OUTPATIENT
Start: 2022-04-03 | End: 2022-04-05 | Stop reason: HOSPADM

## 2022-04-03 RX ORDER — IPRATROPIUM BROMIDE AND ALBUTEROL SULFATE 2.5; .5 MG/3ML; MG/3ML
1 SOLUTION RESPIRATORY (INHALATION) 3 TIMES DAILY
Status: DISCONTINUED | OUTPATIENT
Start: 2022-04-03 | End: 2022-04-05

## 2022-04-03 RX ORDER — IPRATROPIUM BROMIDE AND ALBUTEROL SULFATE 2.5; .5 MG/3ML; MG/3ML
1 SOLUTION RESPIRATORY (INHALATION)
Status: DISCONTINUED | OUTPATIENT
Start: 2022-04-03 | End: 2022-04-03

## 2022-04-03 RX ORDER — ACETAMINOPHEN 325 MG/1
650 TABLET ORAL EVERY 6 HOURS PRN
Status: DISCONTINUED | OUTPATIENT
Start: 2022-04-03 | End: 2022-04-05 | Stop reason: HOSPADM

## 2022-04-03 RX ADMIN — INSULIN LISPRO 4 UNITS: 100 INJECTION, SOLUTION INTRAVENOUS; SUBCUTANEOUS at 17:30

## 2022-04-03 RX ADMIN — APIXABAN 5 MG: 5 TABLET, FILM COATED ORAL at 21:41

## 2022-04-03 RX ADMIN — INSULIN LISPRO 2 UNITS: 100 INJECTION, SOLUTION INTRAVENOUS; SUBCUTANEOUS at 21:42

## 2022-04-03 RX ADMIN — IPRATROPIUM BROMIDE AND ALBUTEROL SULFATE 1 AMPULE: .5; 2.5 SOLUTION RESPIRATORY (INHALATION) at 20:37

## 2022-04-03 RX ADMIN — IPRATROPIUM BROMIDE AND ALBUTEROL SULFATE 1 AMPULE: 2.5; .5 SOLUTION RESPIRATORY (INHALATION) at 11:11

## 2022-04-03 RX ADMIN — ALBUTEROL SULFATE 2.5 MG: 2.5 SOLUTION RESPIRATORY (INHALATION) at 11:15

## 2022-04-03 RX ADMIN — ACARBOSE 100 MG: 50 TABLET ORAL at 21:41

## 2022-04-03 RX ADMIN — METOPROLOL SUCCINATE 50 MG: 50 TABLET, EXTENDED RELEASE ORAL at 21:41

## 2022-04-03 ASSESSMENT — ENCOUNTER SYMPTOMS
CHEST TIGHTNESS: 1
BLOOD IN STOOL: 0
GASTROINTESTINAL NEGATIVE: 1
COUGH: 1
VOMITING: 0
ABDOMINAL DISTENTION: 0
ALLERGIC/IMMUNOLOGIC NEGATIVE: 1
ABDOMINAL PAIN: 0
NAUSEA: 0
SHORTNESS OF BREATH: 1
WHEEZING: 0
EYES NEGATIVE: 1

## 2022-04-03 ASSESSMENT — PAIN SCALES - GENERAL
PAINLEVEL_OUTOF10: 0

## 2022-04-03 NOTE — PLAN OF CARE
Problem: Falls - Risk of:  Goal: Will remain free from falls  Description: Will remain free from falls  Outcome: Ongoing  Note: Up with no assistance, tolerated well. Ambulated in hallways. Non-skid socks on.

## 2022-04-03 NOTE — PROGRESS NOTES
VSS. 97% on 2L NC. IV normal saline locked. Reg diet, tolerating well. Denies any N/V. Up independently with no assistance. No skin issues. Lungs diminished. Family at bedside. Pulmonology consulted. Home O2 eval scheduled for tomorrow. HOB 45. Normal sinus w/ PACs on tele. Hx AFIB. ACHS.  Will cont to monitor

## 2022-04-03 NOTE — ED NOTES
Pt going to 5322 report called to [place] then pt to room via stretcher per Michaela Sung PCA in stable condition     Amrit Gentile RN  04/03/22 4885 Previously Declined (within the last year)

## 2022-04-03 NOTE — RT PROTOCOL NOTE
RT Nebulizer Bronchodilator Protocol Note    There is a bronchodilator order in the chart from a provider indicating to follow the RT Bronchodilator Protocol and there is an Initiate RT Bronchodilator Protocol order as well (see protocol at bottom of note). CXR Findings:  No results found. The findings from the last RT Protocol Assessment were as follows:  Smoking: Chronic pulmonary disease  Respiratory Pattern: Mild dyspnea at rest, irregular pattern, or RR 21-25 bpm  Breath Sounds: Slightly diminished and/or crackles  Cough: Strong, productive  Indication for Bronchodilator Therapy:    Bronchodilator Assessment Score: 9    Aerosolized bronchodilator medication orders have been revised according to the RT Nebulizer Bronchodilator Protocol below. Respiratory Therapist to perform RT Therapy Protocol Assessment initially then follow the protocol. Repeat RT Therapy Protocol Assessment PRN for score 0-3 or on second treatment, BID, and PRN for scores above 3. No Indications - adjust the frequency to every 6 hours PRN wheezing or bronchospasm, if no treatments needed after 48 hours then discontinue using Per Protocol order mode. If indication present, adjust the RT bronchodilator orders based on the Bronchodilator Assessment Score as indicated below. If a patient is on this medication at home then do not decrease Frequency below that used at home. 0-3 - enter or revise RT bronchodilator order(s) to equivalent RT Bronchodilator order with Frequency of every 4 hours PRN for wheezing or increased work of breathing using Per Protocol order mode. 4-6 - enter or revise RT Bronchodilator order(s) to two equivalent RT bronchodilator orders with one order with BID Frequency and one order with Frequency of every 4 hours PRN wheezing or increased work of breathing using Per Protocol order mode.          7-10 - enter or revise RT Bronchodilator order(s) to two equivalent RT bronchodilator orders with one order with TID Frequency and one order with Frequency of every 4 hours PRN wheezing or increased work of breathing using Per Protocol order mode. 11-13 - enter or revise RT Bronchodilator order(s) to one equivalent RT bronchodilator order with QID Frequency and an Albuterol order with Frequency of every 4 hours PRN wheezing or increased work of breathing using Per Protocol order mode. Greater than 13 - enter or revise RT Bronchodilator order(s) to one equivalent RT bronchodilator order with every 4 hours Frequency and an Albuterol order with Frequency of every 2 hours PRN wheezing or increased work of breathing using Per Protocol order mode. RT to enter RT Home Evaluation for COPD & MDI Assessment order using Per Protocol order mode.     Electronically signed by Ap Morrissey RCP on 4/3/2022 at 4:04 PM

## 2022-04-03 NOTE — PROGRESS NOTES
4 Eyes Admission Assessment     I agree as the admission nurse that 2 RN's have performed a thorough Head to Toe Skin Assessment on the patient. ALL assessment sites listed below have been assessed on admission. Areas assessed by both nurses:   [x]   Head, Face, and Ears   [x]   Shoulders, Back, and Chest  [x]   Arms, Elbows, and Hands   [x]   Coccyx, Sacrum, and Ischium  [x]   Legs, Feet, and Heels        Does the Patient have Skin Breakdown?   No         Bryson Prevention initiated:  NA   Wound Care Orders initiated:  NA      WOC nurse consulted for Pressure Injury (Stage 3,4, Unstageable, DTI, NWPT, and Complex wounds) or Bryson score 18 or lower:  NA      Nurse 1 eSignature: Electronically signed by Loni Stone RN on 4/3/22 at 1:25 PM EDT    **SHARE this note so that the co-signing nurse is able to place an eSignature**    Nurse 2 eSignature: Electronically signed by Fiorella Arevalo RN on 4/3/22 at 1:42 PM EDT

## 2022-04-03 NOTE — ED PROVIDER NOTES
ED Attending Attestation Note     Date of evaluation: 4/3/2022    This patient was seen by the resident. I have seen and examined the patient, agree with the workup, evaluation, management and diagnosis. The care plan has been discussed. I have reviewed the ECG and concur with the resident's interpretation. My assessment reveals here with shortness of breath and hypoxia just discharged from the hospital yesterday. He was diuresed for CHF but today sats are in the mid 80s. He is now requiring O2 but chest xray is stable. Will need to admit as patient may need home O2.      Mark Mazariegos MD  04/03/22 7515

## 2022-04-03 NOTE — CARE COORDINATION
CM following, pt readmitted due to low O2 at home. Pt from home ind with wife, plans to return home no needs anticipated. Pending wean O2 Pulm consulted.   Electronically signed by Cassi Sandoval RN on 4/3/2022 at 3:41 PM   [phone]

## 2022-04-03 NOTE — PROGRESS NOTES
Admission Progress Note  4/3/2022 1:24 PM     Data: Patient to room 5322 from ED. See doc flow sheets for assessments and screenings. Action: Patient oriented to room and call light. Patient informed of plan of care. Reviewed the patient education folder with the patient and/or family. Patient instructed to call nurse with any needs or concerns. Response:  Patient verbalized understanding of plan of care and all instructions. Bed locked and in lowest position. Call light in reach. Will continue to monitor patient per plan of care.     Eloise Sanabria RN  ________________________________________________________________________

## 2022-04-03 NOTE — ED PROVIDER NOTES
[id number] [facility] RESIDENT NOTE     Date of evaluation: 4/3/2022    Chief Complaint     Shortness of Breath (also states O2 sat was in the 80's this morning)      History of Present Illness     Lidia Coreas is a 67 y.o. male with diastolic CHF, CAD, hyperlipidemia, hypertension, ? COPD who presents the emergency department due to chief complaint of shortness of breath and hypoxia. Patient was recently admitted to our hospital on 3/31/2022 after he presented with referral from his cardiologist due to worsening shortness of breath and transient hypoxia. He was admitted and diuresed with improvement in his symptoms. He was discharged yesterday and went home. He says he felt normal, however he was awoken at 4 in the morning gasping for air and felt very short of breath. He walked over to the couch and felt very short of breath and checked his oxygen saturation with a home pulse ox. He reports it read 81%. He did not feel any better and woke up his wife and thus decided to present to the emergency department. He states that his shortness of breath is worse with laying flat, exertion, and at night. He feels the swelling in his legs has improved. He also endorses a cough yesterday and earlier today productive of mucus. He does have a remote smoking history but reports he has not smoked for 25 years. His wife states that she has seen in his medical record mention of COPD but he has never seen a pulmonologist or been formally diagnosed with this. He does not take any inhalers. He does endorse a sensation of chest tightness but denies any chest pain. Denies fever, chills, abdominal pain, nausea, vomiting, melena, hematochezia, dysuria, hematuria. Upon arrival to our emergency department, he was placed on the monitor and was satting 86% at rest and was subsequently placed on 2 L of nasal cannula with improvement.   He does not have any home oxygen at home.    Review of Systems     Review of Systems   Constitutional: Negative. Negative for chills, diaphoresis and fever. HENT: Negative. Eyes: Negative. Respiratory: Positive for cough, chest tightness and shortness of breath. Negative for wheezing. Cardiovascular: Positive for leg swelling. Negative for chest pain and palpitations. Gastrointestinal: Negative. Negative for abdominal distention, abdominal pain, blood in stool, nausea and vomiting. Endocrine: Negative. Genitourinary: Negative. Negative for dysuria and hematuria. Musculoskeletal: Negative. Skin: Negative. Allergic/Immunologic: Negative. Neurological: Negative. Negative for dizziness, syncope and light-headedness. Hematological: Negative. Psychiatric/Behavioral: Negative. Past Medical, Surgical, Family, and Social History     He has a past medical history of Carotid stenosis, left, Chronic back pain, Diastolic CHF ([address] Erectile dysfunction, Hyperlipidemia, Hypertension, Osteoarthritis, and Type II or unspecified type diabetes mellitus without mention of complication, not stated as uncontrolled. He has a past surgical history that includes Rotator cuff repair (Bilateral) and knee surgery. His family history includes Arthritis in his maternal grandfather; Diabetes in his maternal grandmother; Hearing Loss in his father and maternal grandmother; Heart Disease (age of onset: 70) in his father; High Blood Pressure in his father. He reports that he quit smoking about 20 years ago. His smoking use included cigarettes. He has a 80.00 pack-year smoking history. He has never used smokeless tobacco. He reports current alcohol use. He reports that he does not use drugs.     Medications     Previous Medications    ACARBOSE (PRECOSE) 100 MG TABLET    TAKE 1 TABLET TWICE DAILY  WITH MEALS    APIXABAN (ELIQUIS) 5 MG TABS TABLET    Take 1 tablet by mouth 2 times daily    DAPAGLIFLOZIN (FARXIGA) 10 MG TABLET    TAKE 1 TABLET EVERY MORNING    FREESTYLE LITE STRIP    USE TO TEST BLOOD SUGAR LEVEL ONCE DAILY AS NEEDED     GLIMEPIRIDE (AMARYL) 4 MG TABLET    Take 1 tablet by mouth 2 times daily (with meals)    LANCETS MISC    1 each by Does not apply route 2 times daily    METFORMIN (GLUCOPHAGE) 500 MG TABLET    TAKE 2 TABLETS 2 TIMES     DAILY WITH MEALS    METOPROLOL SUCCINATE (TOPROL XL) 50 MG EXTENDED RELEASE TABLET    Take 1 tablet by mouth in the morning and at bedtime    MULTIPLE VITAMIN (MULTI VITAMIN MENS PO)    Take by mouth Daily      PIOGLITAZONE (ACTOS) 45 MG TABLET    TAKE 1 TABLET DAILY    PRAVASTATIN (PRAVACHOL) 40 MG TABLET    Take 1 tablet by mouth daily    RAMIPRIL (ALTACE) 10 MG CAPSULE    TAKE 1 CAPSULE DAILY    SEMAGLUTIDE,0.25 OR 0.5MG/DOS, (OZEMPIC, 0.25 OR 0.5 MG/DOSE,) 2 MG/1.5ML SOPN    Inject as directed by physician. SEMAGLUTIDE,0.25 OR 0.5MG/DOS, (OZEMPIC, 0.25 OR 0.5 MG/DOSE,) 2 MG/1.5ML SOPN    Inject as directed by physician. SILDENAFIL (VIAGRA) 100 MG TABLET    Take 1 tablet by mouth as needed for Erectile Dysfunction Max daily dose 100mg. TORSEMIDE (DEMADEX) 20 MG TABLET    Take 1 tablet by mouth daily       Allergies     He is allergic to dye [iodides]. Physical Exam     INITIAL VITALS: BP: (!) 146/80, Temp: 98.9 °F (37.2 °C), Pulse: 80, Resp: 20, SpO2: (!) 86 %   Physical Exam  Constitutional:       General: He is not in acute distress. Appearance: He is well-developed and normal weight. He is not ill-appearing, toxic-appearing or diaphoretic. HENT:      Head: Normocephalic and atraumatic. Mouth/Throat:      Mouth: Mucous membranes are moist.      Pharynx: Oropharynx is clear. Eyes:      Extraocular Movements: Extraocular movements intact. Cardiovascular:      Rate and Rhythm: Normal rate and regular rhythm. Pulses: Normal pulses. Heart sounds: Normal heart sounds.    Pulmonary:      Effort: Pulmonary effort is normal.      Breath sounds: Examination of the right-lower field reveals rales. Examination of the left-lower field reveals rales. Rales present. No decreased breath sounds (Decreased at the bases bilaterally, consistent with previous). Comments: Originally satting 86% with good Pleth at rest, placed on 2 L nasal cannula. Chest:      Chest wall: No tenderness. Abdominal:      Palpations: Abdomen is soft. Tenderness: There is no abdominal tenderness. Musculoskeletal:         General: Normal range of motion. Cervical back: Normal range of motion. Right lower leg: Edema present. Left lower leg: Edema (Trace bilateral pedal edema) present. Skin:     General: Skin is warm and dry. Capillary Refill: Capillary refill takes less than 2 seconds. Neurological:      General: No focal deficit present. Mental Status: He is alert and oriented to person, place, and time. Psychiatric:         Behavior: Behavior normal.         DiagnosticResults     EKG   Interpreted in conjunction with emergencydepartment physician Sintia Guevara MD  Rhythm: sinus arrhythmia  Rate: normal  Axis: normal  Ectopy: none  Conduction: normal  ST Segments: no acute change  T Waves:no acute change  Q Waves: none  Clinical Impression: no acute changes, sinus arrhythmia  Comparison: No significant change from prior on 3/31/2022, sinus arrhythmia still present    RADIOLOGY:  XR CHEST (2 VW)   Final Result      Stable bibasilar pleural-parenchymal disease.           LABS:   Results for orders placed or performed during the hospital encounter of 04/03/22   CBC with Auto Differential   Result Value Ref Range    WBC 14.4 (H) 4.0 - 11.0 K/uL    RBC 5.47 4.20 - 5.90 M/uL    Hemoglobin 13.7 13.5 - 17.5 g/dL    Hematocrit 44.5 40.5 - 52.5 %    MCV 81.3 80.0 - 100.0 fL    MCH 25.1 (L) 26.0 - 34.0 pg    MCHC 30.9 (L) 31.0 - 36.0 g/dL    RDW 17.0 (H) 12.4 - 15.4 %    Platelets 150 898 - 893 K/uL    MPV 7.3 5.0 - 10.5 fL    Neutrophils % 86.0 %    Lymphocytes % 4.1 % Monocytes % 8.9 %    Eosinophils % 0.6 %    Basophils % 0.4 %    Neutrophils Absolute 12.3 (H) 1.7 - 7.7 K/uL    Lymphocytes Absolute 0.6 (L) 1.0 - 5.1 K/uL    Monocytes Absolute 1.3 0.0 - 1.3 K/uL    Eosinophils Absolute 0.1 0.0 - 0.6 K/uL    Basophils Absolute 0.1 0.0 - 0.2 K/uL   Basic Metabolic Panel w/ Reflex to MG   Result Value Ref Range    Sodium 137 136 - 145 mmol/L    Potassium reflex Magnesium 4.8 3.5 - 5.1 mmol/L    Chloride 93 (L) 99 - 110 mmol/L    CO2 34 (H) 21 - 32 mmol/L    Anion Gap 10 3 - 16    Glucose 280 (H) 70 - 99 mg/dL    BUN 36 (H) 7 - 20 mg/dL    CREATININE 0.9 0.8 - 1.3 mg/dL    GFR Non-African American >60 >60    GFR African American >60 >60    Calcium 9.2 8.3 - 10.6 mg/dL   Troponin   Result Value Ref Range    Troponin 0.02 (H) <0.01 ng/mL   Brain Natriuretic Peptide   Result Value Ref Range    Pro- (H) 0 - 449 pg/mL   Blood Gas, Venous   Result Value Ref Range    pH, John 7.370 7.350 - 7.450    pCO2, John 69.2 (H) 41.0 - 51.0 mmHg    pO2, John <30.0 25.0 - 40.0 mmHg    HCO3, Venous 40.0 (H) 24.0 - 28.0 mmol/L    Base Excess, John 11.3 (H) -2.0 - 3.0 mmol/L    O2 Sat, John 39 Not established %    Carboxyhemoglobin 1.7 (H) 0.0 - 1.5 %    MetHgb, John 0.3 0.0 - 1.5 %    TC02 (Calc), John 42 mmol/L    Hemoglobin, John, Reduced 60.00 %       ED BEDSIDE ULTRASOUND:    RECENT VITALS:  BP: (!) 120/107, Temp: 98.9 °F (37.2 °C), Pulse: 80,Resp: 18, SpO2: 99 %     Procedures       ED Course     Nursing Notes, Past Medical Hx, Past Surgical Hx, Social Hx, Allergies, and Family Hx were reviewed. The patient was given the followingmedications:  Orders Placed This Encounter   Medications    ipratropium-albuterol (DUONEB) nebulizer solution 1 ampule     Order Specific Question:   Initiate RT Bronchodilator Protocol     Answer:    Yes    albuterol (PROVENTIL) nebulizer solution 2.5 mg     Order Specific Question:   Initiate RT Bronchodilator Protocol     Answer:   Yes       CONSULTS:  Angela Pelletier TO HOSPITALIST    MEDICAL DECISION MAKING / ASSESSMENT / Kika Zari is a 67 y.o. male with CHF, hypertension, hyperlipidemia, remote smoking history who presents to the emergency department for hypoxia and shortness of breath. Upon initial evaluation, patient is not ill-appearing, no acute distress but is satting 86% with a good Plath. Was initiated on 2 L of nasal cannula with improvement. VBG continues to show CO2 retention, no acidosis, appears chronic. Patient states he has never been diagnosed with COPD, has never had PFTs but does again have remote smoking history. His lungs are somewhat diminished at the bases with scant crackles, similar from previous. He does not seem clinically hypervolemic on exam.  His EKG does not show any signs of ischemia and is unchanged from previous. Chest x-ray unchanged. BNP improved from previous, downtrending. CBC with new mild leukocytosis to 14.4. BMP with slight bump in BUN but no ZANA, creatinine at baseline. Patient given duo nebs due to some potential component of possible obstructive lung disease. Overall, patient is intermittently descending at home and has new oxygen requirement for which he does not have any oxygen available at home. Discussed with hospitalist to admit patient for further work-up as well as assess resource availability for possible home oxygen requirement. This patient was also evaluated by the attending physician. All care plans were discussed and agreed upon. Clinical Impression     1. Hypoxia        Disposition     PATIENT REFERRED TO:  No follow-up provider specified.     DISCHARGE MEDICATIONS:  New Prescriptions    No medications on file       DISPOSITION Admitted 04/03/2022 12:10:36 PM       Chris Murphy MD  Resident  04/03/22 [id number]

## 2022-04-03 NOTE — H&P
Hospital Medicine History & Physical      PCP: Rosie Jon MD    Date of Admission: 4/3/2022    Date of Service: Pt seen/examined on 4/3/2022 and Admitted to Inpatient with expected LOS greater than two midnights due to medical therapy. Chief Complaint:  Hypoxia      History Of Present Illness:  Denisse Upton 67 y.o. male with hx of HTN, DM type 2, CHF (echo 1/2022 EF 60%, G2DD) multiple hospitalizations recently for CHF exacerbation. Discharged from hospital yesterday. Patient reports that he woke up for him in the morning gasping for breath he went to check his oxygen level which was 81%. So came into the hospital denies any wheezing, PND or orthopnea. Patient reports that he has more than 50-pack-year history of smoking quit 25 years back but was never diagnosed with COPD. On arrival to ER patient was hypoxic saturating 96% on room air. Past Medical History:          Diagnosis Date    Carotid stenosis, left 10/12/2011    Chronic back pain     Diastolic CHF (HCC)     Erectile dysfunction     Hyperlipidemia     Hypertension     Osteoarthritis     Type II or unspecified type diabetes mellitus without mention of complication, not stated as uncontrolled        Past Surgical History:          Procedure Laterality Date    KNEE SURGERY      ROTATOR CUFF REPAIR Bilateral        Medications Prior to Admission:      Prior to Admission medications    Medication Sig Start Date End Date Taking? Authorizing Provider   metoprolol succinate (TOPROL XL) 50 MG extended release tablet Take 1 tablet by mouth in the morning and at bedtime 4/2/22   Jordan Mas MD   torsemide [facility]) 20 MG tablet Take 1 tablet by mouth daily 4/2/22 5/2/22  Jordan Mas MD   apixaban (ELIQUIS) 5 MG TABS tablet Take 1 tablet by mouth 2 times daily 1/19/22   Rosie Jon MD   Semaglutide,0.25 or 0.5MG/DOS, (OZEMPIC, 0.25 OR 0.5 MG/DOSE,) 2 MG/1.5ML SOPN Inject as directed by physician.  1/19/22   Kd Gee Gala Hogue MD   Semaglutide,0.25 or 0.5MG/DOS, (OZEMPIC, 0.25 OR 0.5 MG/DOSE,) 2 MG/1.5ML SOPN Inject as directed by physician. 1/5/22   Nikky Flor MD   ramipril (ALTACE) 10 MG capsule TAKE 1 CAPSULE DAILY 11/15/21   Nikky Flor MD   pioglitazone (ACTOS) 45 MG tablet TAKE 1 TABLET DAILY 11/15/21   Nikky Flor MD   dapagliflozin (FARXIGA) 10 MG tablet TAKE 1 TABLET EVERY MORNING 11/15/21   Nikky Flor MD   metFORMIN (GLUCOPHAGE) 500 MG tablet TAKE 2 TABLETS 2 TIMES     DAILY WITH MEALS 10/20/21   NATASHA Stafford CNP   acarbose (PRECOSE) 100 MG tablet TAKE 1 TABLET TWICE DAILY  WITH MEALS 10/20/21   NATASHA Stafford CNP   pravastatin (PRAVACHOL) 40 MG tablet Take 1 tablet by mouth daily 10/20/21   NATASHA Stafford CNP   glimepiride (AMARYL) 4 MG tablet Take 1 tablet by mouth 2 times daily (with meals) 10/20/21   NATASHA Stafford CNP   FREESTYLE LITE strip USE TO TEST BLOOD SUGAR LEVEL ONCE DAILY AS NEEDED  7/23/21   Nikky Flor MD   sildenafil (VIAGRA) 100 MG tablet Take 1 tablet by mouth as needed for Erectile Dysfunction Max daily dose 100mg. 5/26/21 5/21/22  Nikky Flor MD   Lancets MISC 1 each by Does not apply route 2 times daily 1/20/21   Nikky Flor MD   Multiple Vitamin (MULTI VITAMIN MENS PO) Take by mouth Daily      Historical Provider, MD       Allergies:  Dye [iodides]    Social History:      The patient currently lives home, independent    TOBACCO:   reports that he quit smoking about 20 years ago. His smoking use included cigarettes. He has a 80.00 pack-year smoking history. He has never used smokeless tobacco.  ETOH:   reports current alcohol use.       Family History:       Reviewed in detail and Positive as follows:        Problem Relation Age of Onset    Hearing Loss Father     Heart Disease Father 70        MI    High Blood Pressure Father     Diabetes Maternal Grandmother         hypoglycemic    Hearing Loss Maternal Grandmother  Arthritis Maternal Grandfather        REVIEW OF SYSTEMS:   Pertinent positives as noted in the HPI. All other systems reviewed and negative. PHYSICAL EXAM PERFORMED:    /76   Pulse 83   Temp 98.9 °F (37.2 °C) (Oral)   Resp 23   Ht 6' 1\" (1.854 m)   Wt 189 lb 3.2 oz (85.8 kg)   SpO2 97%   BMI 24.96 kg/m²     General appearance:  No apparent distress, appears stated age and cooperative. HEENT:  Normal cephalic, atraumatic without obvious deformity. Pupils equal, round, and reactive to light. Extra ocular muscles intact. Conjunctivae/corneas clear. Neck: Supple, with full range of motion. No jugular venous distention. Trachea midline. Respiratory:  Normal respiratory effort. Diminished breathing sounds bilaterally with no audible wheeze or rhonchi. Cardiovascular:  Regular rate and rhythm with normal S1/S2 without murmurs, rubs or gallops. Abdomen: Soft, non-tender, non-distended with normal bowel sounds. Musculoskeletal:  No clubbing, cyanosis or edema bilaterally. Full range of motion without deformity. Skin: Skin color, texture, turgor normal.  No rashes or lesions. Neurologic:  Neurovascularly intact without any focal sensory/motor deficits. Cranial nerves: II-XII intact, grossly non-focal.  Psychiatric:  Alert and oriented, thought content appropriate, normal insight  Capillary Refill: Brisk,< 3 seconds   Peripheral Pulses: +2 palpable, equal bilaterally       Labs:     Recent Labs     04/01/22  0614 04/03/22  1045   WBC 8.9 14.4*   HGB 12.7* 13.7   HCT 40.5 44.5    414     Recent Labs     04/01/22  0614 04/02/22  1056 04/03/22  1045    132* 137   K 4.2 4.4 4.8   CL 95* 87* 93*   CO2 36* 37* 34*   BUN 22* 24* 36*   CREATININE 0.9 0.8 0.9   CALCIUM 9.2 9.1 9.2     No results for input(s): AST, ALT, BILIDIR, BILITOT, ALKPHOS in the last 72 hours. No results for input(s): INR in the last 72 hours.   Recent Labs     04/03/22  1045   TROPONINI 0.02*       Urinalysis: Lab Results   Component Value Date    NITRU NEGATIVE 07/11/2014    WBCUA 0 07/11/2014    BACTERIA NONE 07/11/2014    RBCUA 0 07/11/2014    BLOODU NEGATIVE 07/11/2014    SPECGRAV 1.020 07/11/2014    GLUCOSEU 100 mg/dl 09/03/2013       Radiology:     CXR: I have reviewed the CXR with the following interpretation: See radiology report. EKG:  I have reviewed the EKG with the following interpretation: Normal sinus rhythm, no acute ST changes. XR CHEST (2 VW)   Final Result      Stable bibasilar pleural-parenchymal disease. CT CHEST WO CONTRAST    (Results Pending)       ASSESSMENT:    Active Hospital Problems    Diagnosis Date Noted    Hypoxia [R09.02] 04/03/2022     #Hypoxia  #Suspected underlying COPD  Patient has no wheezing on exam.  We will start on DuoNeb every 4 hours. Chest x-ray showed stable bibasilar pleural-parenchymal disease. We will obtain a CT chest without contrast for better evaluation. Wean of oxygen to keep SaO2 above 90%. We will consult pulmonary patient may have some underlying COPD which is causing recurrent episodes of dyspnea requiring hospitalization. #Chronic diastolic heart failure compensated  #Paroxysmal A. fib currently in sinus rhythm. #Hypertension  Continue home medications including torsemide, metoprolol, Eliquis, ramipril. #Diabetes mellitus type 2  Hold oral antidiabetic agent. We will start on insulin sliding scale. DVT Prophylaxis: eliquis  Diet: ADULT DIET; Regular; 4 carb choices (60 gm/meal); Low Fat/Low Chol/High Fiber/2 gm Na  Code Status: Full Code    PT/OT Eval Status: consulted    Dispo - obs. Rafael Saeed MD    Thank you Michael Guerrero MD for the opportunity to be involved in this patient's care. If you have any questions or concerns please feel free to contact me at [phone].     This chart was likely completed using voice recognition technology and may contain unintended grammatical , phraseology,and/or punctuation errors

## 2022-04-03 NOTE — TELEPHONE ENCOUNTER
Admission orders were placed before writer contacted Dr. Ryan Kennedy to inform of 30 day readmission risk.

## 2022-04-04 ENCOUNTER — TELEPHONE (OUTPATIENT)
Dept: FAMILY MEDICINE CLINIC | Age: 77
End: 2022-04-04

## 2022-04-04 ENCOUNTER — APPOINTMENT (OUTPATIENT)
Dept: GENERAL RADIOLOGY | Age: 77
End: 2022-04-04
Payer: COMMERCIAL

## 2022-04-04 LAB
ANION GAP SERPL CALCULATED.3IONS-SCNC: 11 MMOL/L (ref 3–16)
BASOPHILS ABSOLUTE: 0.2 K/UL (ref 0–0.2)
BASOPHILS RELATIVE PERCENT: 1.9 %
BUN BLDV-MCNC: 40 MG/DL (ref 7–20)
CALCIUM SERPL-MCNC: 8.8 MG/DL (ref 8.3–10.6)
CHLORIDE BLD-SCNC: 94 MMOL/L (ref 99–110)
CO2: 33 MMOL/L (ref 21–32)
CREAT SERPL-MCNC: 0.9 MG/DL (ref 0.8–1.3)
EOSINOPHILS ABSOLUTE: 0.1 K/UL (ref 0–0.6)
EOSINOPHILS RELATIVE PERCENT: 1.4 %
GFR AFRICAN AMERICAN: >60
GFR NON-AFRICAN AMERICAN: >60
GLUCOSE BLD-MCNC: 173 MG/DL (ref 70–99)
GLUCOSE BLD-MCNC: 181 MG/DL (ref 70–99)
GLUCOSE BLD-MCNC: 208 MG/DL (ref 70–99)
GLUCOSE BLD-MCNC: 215 MG/DL (ref 70–99)
GLUCOSE BLD-MCNC: 258 MG/DL (ref 70–99)
HCT VFR BLD CALC: 39.8 % (ref 40.5–52.5)
HEMOGLOBIN: 12.6 G/DL (ref 13.5–17.5)
LYMPHOCYTES ABSOLUTE: 0.8 K/UL (ref 1–5.1)
LYMPHOCYTES RELATIVE PERCENT: 8.8 %
MCH RBC QN AUTO: 25.4 PG (ref 26–34)
MCHC RBC AUTO-ENTMCNC: 31.6 G/DL (ref 31–36)
MCV RBC AUTO: 80.3 FL (ref 80–100)
MONOCYTES ABSOLUTE: 1.2 K/UL (ref 0–1.3)
MONOCYTES RELATIVE PERCENT: 13.9 %
NEUTROPHILS ABSOLUTE: 6.4 K/UL (ref 1.7–7.7)
NEUTROPHILS RELATIVE PERCENT: 74 %
PDW BLD-RTO: 16.4 % (ref 12.4–15.4)
PERFORMED ON: ABNORMAL
PLATELET # BLD: 359 K/UL (ref 135–450)
PMV BLD AUTO: 7.5 FL (ref 5–10.5)
POTASSIUM REFLEX MAGNESIUM: 4.9 MMOL/L (ref 3.5–5.1)
RBC # BLD: 4.95 M/UL (ref 4.2–5.9)
SODIUM BLD-SCNC: 138 MMOL/L (ref 136–145)
WBC # BLD: 8.6 K/UL (ref 4–11)

## 2022-04-04 PROCEDURE — 6360000002 HC RX W HCPCS: Performed by: INTERNAL MEDICINE

## 2022-04-04 PROCEDURE — 99215 OFFICE O/P EST HI 40 MIN: CPT | Performed by: INTERNAL MEDICINE

## 2022-04-04 PROCEDURE — 96374 THER/PROPH/DIAG INJ IV PUSH: CPT

## 2022-04-04 PROCEDURE — 71046 X-RAY EXAM CHEST 2 VIEWS: CPT

## 2022-04-04 PROCEDURE — 6370000000 HC RX 637 (ALT 250 FOR IP): Performed by: INTERNAL MEDICINE

## 2022-04-04 PROCEDURE — G0378 HOSPITAL OBSERVATION PER HR: HCPCS

## 2022-04-04 PROCEDURE — 2580000003 HC RX 258: Performed by: INTERNAL MEDICINE

## 2022-04-04 PROCEDURE — 99222 1ST HOSP IP/OBS MODERATE 55: CPT | Performed by: INTERNAL MEDICINE

## 2022-04-04 PROCEDURE — 96376 TX/PRO/DX INJ SAME DRUG ADON: CPT

## 2022-04-04 PROCEDURE — 36415 COLL VENOUS BLD VENIPUNCTURE: CPT

## 2022-04-04 PROCEDURE — 85025 COMPLETE CBC W/AUTO DIFF WBC: CPT

## 2022-04-04 PROCEDURE — 80048 BASIC METABOLIC PNL TOTAL CA: CPT

## 2022-04-04 PROCEDURE — 94640 AIRWAY INHALATION TREATMENT: CPT

## 2022-04-04 RX ORDER — FUROSEMIDE 10 MG/ML
40 INJECTION INTRAMUSCULAR; INTRAVENOUS 3 TIMES DAILY
Status: DISCONTINUED | OUTPATIENT
Start: 2022-04-04 | End: 2022-04-05

## 2022-04-04 RX ORDER — ACETAZOLAMIDE 250 MG/1
250 TABLET ORAL ONCE
Status: DISCONTINUED | OUTPATIENT
Start: 2022-04-04 | End: 2022-04-04

## 2022-04-04 RX ADMIN — INSULIN LISPRO 6 UNITS: 100 INJECTION, SOLUTION INTRAVENOUS; SUBCUTANEOUS at 18:15

## 2022-04-04 RX ADMIN — SODIUM CHLORIDE, PRESERVATIVE FREE 10 ML: 5 INJECTION INTRAVENOUS at 21:30

## 2022-04-04 RX ADMIN — FUROSEMIDE 40 MG: 10 INJECTION, SOLUTION INTRAMUSCULAR; INTRAVENOUS at 20:49

## 2022-04-04 RX ADMIN — APIXABAN 5 MG: 5 TABLET, FILM COATED ORAL at 08:51

## 2022-04-04 RX ADMIN — PRAVASTATIN SODIUM 40 MG: 40 TABLET ORAL at 08:51

## 2022-04-04 RX ADMIN — INSULIN LISPRO 2 UNITS: 100 INJECTION, SOLUTION INTRAVENOUS; SUBCUTANEOUS at 21:29

## 2022-04-04 RX ADMIN — METOPROLOL SUCCINATE 50 MG: 50 TABLET, EXTENDED RELEASE ORAL at 20:48

## 2022-04-04 RX ADMIN — ACARBOSE 100 MG: 50 TABLET ORAL at 22:00

## 2022-04-04 RX ADMIN — INSULIN LISPRO 2 UNITS: 100 INJECTION, SOLUTION INTRAVENOUS; SUBCUTANEOUS at 08:52

## 2022-04-04 RX ADMIN — INSULIN LISPRO 4 UNITS: 100 INJECTION, SOLUTION INTRAVENOUS; SUBCUTANEOUS at 13:16

## 2022-04-04 RX ADMIN — IPRATROPIUM BROMIDE AND ALBUTEROL SULFATE 1 AMPULE: .5; 2.5 SOLUTION RESPIRATORY (INHALATION) at 15:31

## 2022-04-04 RX ADMIN — SODIUM CHLORIDE, PRESERVATIVE FREE 10 ML: 5 INJECTION INTRAVENOUS at 10:34

## 2022-04-04 RX ADMIN — IPRATROPIUM BROMIDE AND ALBUTEROL SULFATE 1 AMPULE: .5; 2.5 SOLUTION RESPIRATORY (INHALATION) at 07:55

## 2022-04-04 RX ADMIN — FUROSEMIDE 40 MG: 10 INJECTION, SOLUTION INTRAMUSCULAR; INTRAVENOUS at 13:20

## 2022-04-04 RX ADMIN — RAMIPRIL 10 MG: 5 CAPSULE ORAL at 08:52

## 2022-04-04 RX ADMIN — ACARBOSE 100 MG: 50 TABLET ORAL at 08:52

## 2022-04-04 RX ADMIN — TORSEMIDE 20 MG: 20 TABLET ORAL at 08:51

## 2022-04-04 RX ADMIN — METOPROLOL SUCCINATE 50 MG: 50 TABLET, EXTENDED RELEASE ORAL at 08:51

## 2022-04-04 RX ADMIN — APIXABAN 5 MG: 5 TABLET, FILM COATED ORAL at 20:48

## 2022-04-04 ASSESSMENT — ENCOUNTER SYMPTOMS
CONSTIPATION: 0
SHORTNESS OF BREATH: 1
SORE THROAT: 0
VOMITING: 0
APNEA: 0
COUGH: 0
NAUSEA: 0
CHEST TIGHTNESS: 0
DIARRHEA: 0
ABDOMINAL PAIN: 0

## 2022-04-04 ASSESSMENT — PAIN SCALES - GENERAL
PAINLEVEL_OUTOF10: 0

## 2022-04-04 NOTE — PROGRESS NOTES
Patient a/o x4. VSS. SpO2 saturation is between 90-94% on room air. Lung sounds diminished. Patient up independently with steady gait. Voiding well. Will continue to monitor.

## 2022-04-04 NOTE — CARE COORDINATION
Pt is from home w/his wife and plans to return home at DC. Pt's wife will transport him home. Pt's started on IV lasix, cons to cards and pulmonary. SW following for DCP.     Electronically signed by TY Quarles, JAMIR on 4/4/2022 at 1:24 PM  [phone]

## 2022-04-04 NOTE — TELEPHONE ENCOUNTER
Aric Favorite with [facility] called to ask if patient could hold Eliquis for 48 hours prior to an epidural and sciatic nerve block? If approved, please fax letter to [phone].

## 2022-04-04 NOTE — TELEPHONE ENCOUNTER
Not sure when injection scheduled for.  Currently readmitted to the hospital for shortness of breath (chf and copd)  See form

## 2022-04-04 NOTE — CONSULTS
Cardiology Consultation                                                                    Pt Name: Yvette Dixon  Age: 67 y.o. Sex: male  : 1945  Location: [id number]/[id number]    Referring Physician: Olga Gaines MD  Primary cardiologist: Dr Lior Pelayo      Reason for Consult:     Reason for Consultation/Chief Complaint: acute COPD and HF exacerbation    HPI:     Patient is a 76-year-old man with history of HTN, HLP, poorly controlled diabetes, PAFRVR, HFpEF, past heavy smoking 50-pack-year history, quit 25 years ago).       Echo 2022: Normal LV size, mild LVH, LVEF 91%, diastolic grade 2, mild LAE, normal RV, mild valvular disease.     ECG 2022: Normal sinus rhythm with frequent PACs, poor R wave progression, cannot exclude old anterior MI.     Labs 2022: TSH 3.0 normal, creatinine 1.8, K5.0, FLP: , HDL 81, LDL 99, TG 89 on pravachol 40 daily.  Hemoglobin A1c 8.1.     CAM 2-week -2/10/2022: NSR with frequent episodes of SVT, couple of those episodes appear to be AF RVR.     Carleen 3/10/2022: Normal    Patient was admitted at [facility] 3/31 - 22 for AHF and PAFRVR. Patient woke up this morning with severe dyspnea. He checked his pulse ox, his oxygen saturation was 81% on room air. Patient denies any fevers, wheezing, lower extremity edema or PND. Patient was given nebulizers to the emergency room with almost complete relief of his symptoms. He has been compliant with his medications including torsemide 20 mg daily. Patient was admitted for hypoxia due to possible acute COPD exacerbation, rule out acute heart failure. On admission patient was intolerance of supplemental oxygen, he has now been weaned off. She did not receive any diuretics at the emergency room. He continues to have exertional shortness of breath in his room, denies orthopnea.       Histories     Past Medical History:   has a past medical history of Carotid stenosis, left, Chronic back pain, Diastolic CHF ([address] Erectile dysfunction, Hyperlipidemia, Hypertension, Osteoarthritis, and Type II or unspecified type diabetes mellitus without mention of complication, not stated as uncontrolled. Surgical History:   has a past surgical history that includes Rotator cuff repair (Bilateral) and knee surgery. Social History:   reports that he quit smoking about 20 years ago. His smoking use included cigarettes. He has a 80.00 pack-year smoking history. He has never used smokeless tobacco. He reports current alcohol use. He reports that he does not use drugs. Family History:  No evidence for sudden cardiac death or premature CAD      Medications:       Home Medications  Were reviewed and are listed in nursing record. and/or listed below  Prior to Admission medications    Medication Sig Start Date End Date Taking? Authorizing Provider   metoprolol succinate (TOPROL XL) 50 MG extended release tablet Take 1 tablet by mouth in the morning and at bedtime 4/2/22   Leopoldo Bowman MD   torsemide [facility]) 20 MG tablet Take 1 tablet by mouth daily 4/2/22 5/2/22  Leopoldo Bowman MD   apixaban (ELIQUIS) 5 MG TABS tablet Take 1 tablet by mouth 2 times daily 1/19/22   Dang Stevens MD   Semaglutide,0.25 or 0.5MG/DOS, (OZEMPIC, 0.25 OR 0.5 MG/DOSE,) 2 MG/1.5ML SOPN Inject as directed by physician. 1/19/22   Dang Stevens MD   Semaglutide,0.25 or 0.5MG/DOS, (OZEMPIC, 0.25 OR 0.5 MG/DOSE,) 2 MG/1.5ML SOPN Inject as directed by physician.  1/5/22   Dang Stevens MD   ramipril (ALTACE) 10 MG capsule TAKE 1 CAPSULE DAILY 11/15/21   Dang Stevens MD   pioglitazone (ACTOS) 45 MG tablet TAKE 1 TABLET DAILY 11/15/21   Dang Stevens MD   dapagliflozin (FARXIGA) 10 MG tablet TAKE 1 TABLET EVERY MORNING 11/15/21   Dang Stevens MD   metFORMIN (GLUCOPHAGE) 500 MG tablet TAKE 2 TABLETS 2 TIMES     DAILY WITH MEALS 10/20/21   Redia Meigs, APRN - CNP   acarbose (PRECOSE) 100 MG tablet TAKE 1 TABLET TWICE DAILY  WITH MEALS 10/20/21   Padmini Doshi dyspnea. No cough or wheezing, no sputum production. GASTROINTESTINAL: No N/V/D. No abdominal pain, appetite loss, blood in stools. GENITOURINARY: No dysuria, trouble voiding, or hematuria. MUSCULOSKELETAL:  No gait disturbance, weakness or joint complaints. NEUROLOGICAL: No headache, diplopia, change in muscle strength, numbness or tingling. No change in gait, balance, coordination, mood, affect, memory, mentation, behavior. ENDOCRINE: No excessive thirst, fluid intake, or urination. No tremor. HEMATOLOGIC: No abnormal bruising or bleeding. ALLERGY: No nasal congestion or hives. Physical Examination:     Vitals:    04/04/22 0011 04/04/22 0417 04/04/22 0728 04/04/22 1107   BP: 123/72 117/64 (!) 151/74 122/75   Pulse: 77 72 70 71   Resp: 14 16 16 17   Temp: 97.8 °F (36.6 °C) 97.3 °F (36.3 °C) 98 °F (36.7 °C) 97.8 °F (36.6 °C)   TempSrc: Oral Oral Oral Oral   SpO2: 90% 90% 92% 93%   Weight:  179 lb 14.3 oz (81.6 kg)     Height:           Wt Readings from Last 3 Encounters:   04/04/22 179 lb 14.3 oz (81.6 kg)   04/02/22 182 lb 12.2 oz (82.9 kg)   03/31/22 190 lb (86.2 kg)         General Appearance:  Alert, cooperative, no distress, appears stated age Appropriate weight   Head:  Normocephalic, without obvious abnormality, atraumatic   Eyes:  PERRL, conjunctiva/corneas clear EOM intact  Ears normal   Throat no lesions       Nose: Nares normal, no drainage or sinus tenderness   Throat: Lips, mucosa, and tongue normal   Neck: Supple, symmetrical, trachea midline, no adenopathy, thyroid: not enlarged, symmetric, no tenderness/mass/nodules, no carotid bruit. Lungs:   Respirations unlabored, distant heart sounds.      Chest Wall:  No tenderness or deformity   Heart:  Regular rhythm, rate is controlled, S1, S2 normal, there is no murmur, there is no rub or gallop, cannot assess jvd, no bilateral lower extremity edema   Abdomen:   Soft, non-tender, bowel sounds active all four quadrants,  no masses, no organomegaly       Extremities: Extremities normal, atraumatic, no cyanosis. Pulses: 2+ and symmetric   Skin: Skin color, texture, turgor normal, no rashes or lesions   Pysch: Normal mood and affect   Neurologic: Normal gross motor and sensory exam.  Cranial nerves intact        Labs:     Recent Labs     04/02/22  1056 04/03/22  1045 04/04/22  0537   * 137 138   K 4.4 4.8 4.9   BUN 24* 36* 40*   CREATININE 0.8 0.9 0.9   CL 87* 93* 94*   CO2 37* 34* 33*   GLUCOSE 222* 280* 173*   CALCIUM 9.1 9.2 8.8     Recent Labs     04/03/22  1045 04/03/22  1045 04/04/22  0537   WBC 14.4*  --  8.6   HGB 13.7  --  12.6*   HCT 44.5  --  39.8*     --  359   MCV 81.3   < > 80.3    < > = values in this interval not displayed. No results for input(s): CHOLTOT, TRIG, HDL, LDL in the last 72 hours. Invalid input(s): LIPIDCOMM, CHOLHDL, VLDCHOL  No results for input(s): PTT, INR in the last 72 hours. Invalid input(s): PT  Recent Labs     04/03/22  1045   TROPONINI 0.02*     No results for input(s): BNP in the last 72 hours. No results for input(s): TSH in the last 72 hours. No results for input(s): CHOL, HDL, LDLCALC, TRIG in the last 72 hours.]    Lab Results   Component Value Date    TROPONINI 0.02 (H) 04/03/2022         Imaging:     Telemetry personally reviewed:  NSR      Assessment / Plan:     1. Hypoxic respiratory failure. It is most likely due to mild exacerbation of COPD and mild exacerbation of patient's HFpEF. 2.  Acute on chronic HFpEF. 3.  PAF RVR. Patient remains in sinus rhythm. 4.  HTN. 5.  Hyperlipidemia.        -Agree with Lasix 40 mg IV every 8 hours today  -Once patient euvolemic, could discharge him home on torsemide 40 mg p.o. daily  -Continue with Eliquis, ramipril 10 mg daily and Toprol 50 mg twice daily  -Patient will be seen by pulmonology for possible COPD; medications for COPD will be prescribed per primary team and pulmonology.         I have personally reviewed the reports and images of labs, radiological studies, cardiac studies including ECG's and telemetry, current and old medical records. The note was completed using EMR and Dragon dictation system. Every effort was made to ensure accuracy; however, inadvertent computerized transcription errors may be present. All questions and concerns were addressed to the patient/family. Alternatives to my treatment were discussed. I would like to thank you for providing me the opportunity to participate in the care of your patient. If you have any questions, please do not hesitate to contact me.      Lisette Leslie MD, [facility] [place], [address]  The [address]  [address] [address]  Ph: [phone]  Fax: [phone]

## 2022-04-04 NOTE — PROGRESS NOTES
Occupational/ Physical Therapy  No treatment- DC   Orders reviewed and received, attempt to see for OT/PT evals this AM. Pt is currently up ad jaime, ambulating IND in room with no needs or concerns for DC. Will sign off. Re-order if appropriate.      [id number], MOT, OTR/L  [address] [facility]

## 2022-04-04 NOTE — CONSULTS
Pulmonology Progress Note  PGY 3  Internal Medicine    Chief Complaint: Hypoxia    History Obtained From:  patient    HISTORY OF PRESENT ILLNESS:   Mr. Atul Adair is a 79-year-old male with a past medical history of hypertension, type 2 diabetes, grade 2 diastolic CHF, and hyperlipidemia who presented to the hospital with increased shortness of breath and hypoxia. Patient was admitted last week with a diastolic CHF exacerbation and after discharge the patient became short of breath again and he checked his blood oxygen saturation and it was in the 80s. On presentation he was found to be hypoxic and placed on oxygen. On presentation a CT scan was done that showed a tiny pneumothorax, small to moderate pleural effusion and distended main pulmonary artery. He received IV diuretics and breathing treatments and was able to be weaned off of oxygen relatively quickly. Patient has a extensive smoking history, but did quit 20 years ago. He does not carry a diagnosis of COPD and has never used inhalers as an outpatient nor had PFTs done.       PAST HISTORY:     Past Medical History:        Diagnosis Date    Carotid stenosis, left 10/12/2011    Chronic back pain     Diastolic CHF (HCC)     Erectile dysfunction     Hyperlipidemia     Hypertension     Osteoarthritis     Type II or unspecified type diabetes mellitus without mention of complication, not stated as uncontrolled    ·     Past Surgical History:        Procedure Laterality Date    KNEE SURGERY      ROTATOR CUFF REPAIR Bilateral    ·     Medications Priorto Admission:    · Medications Prior to Admission: metoprolol succinate (TOPROL XL) 50 MG extended release tablet, Take 1 tablet by mouth in the morning and at bedtime  · torsemide (DEMADEX) 20 MG tablet, Take 1 tablet by mouth daily  · apixaban (ELIQUIS) 5 MG TABS tablet, Take 1 tablet by mouth 2 times daily  · Semaglutide,0.25 or 0.5MG/DOS, (OZEMPIC, 0.25 OR 0.5 MG/DOSE,) 2 MG/1.5ML SOPN, Inject as directed by physician. · Semaglutide,0.25 or 0.5MG/DOS, (OZEMPIC, 0.25 OR 0.5 MG/DOSE,) 2 MG/1.5ML SOPN, Inject as directed by physician. · ramipril (ALTACE) 10 MG capsule, TAKE 1 CAPSULE DAILY  · pioglitazone (ACTOS) 45 MG tablet, TAKE 1 TABLET DAILY  · dapagliflozin (FARXIGA) 10 MG tablet, TAKE 1 TABLET EVERY MORNING  · metFORMIN (GLUCOPHAGE) 500 MG tablet, TAKE 2 TABLETS 2 TIMES     DAILY WITH MEALS  · acarbose (PRECOSE) 100 MG tablet, TAKE 1 TABLET TWICE DAILY  WITH MEALS  · pravastatin (PRAVACHOL) 40 MG tablet, Take 1 tablet by mouth daily  · glimepiride (AMARYL) 4 MG tablet, Take 1 tablet by mouth 2 times daily (with meals)  · FREESTYLE LITE strip, USE TO TEST BLOOD SUGAR LEVEL ONCE DAILY AS NEEDED   · sildenafil (VIAGRA) 100 MG tablet, Take 1 tablet by mouth as needed for Erectile Dysfunction Max daily dose 100mg. · Lancets MISC, 1 each by Does not apply route 2 times daily  · Multiple Vitamin (MULTI VITAMIN MENS PO), Take by mouth Daily      Allergies:  Dye [iodides]    Social History:   · TOBACCO:   reports that he quit smoking about 20 years ago. His smoking use included cigarettes. He has a 80.00 pack-year smoking history. He has never used smokeless tobacco.  · ETOH:   reports current alcohol use. · DRUGS : none  · Patient currently lives with family at home. Family History:       Problem Relation Age of Onset    Hearing Loss Father     Heart Disease Father 70        MI    High Blood Pressure Father     Diabetes Maternal Grandmother         hypoglycemic    Hearing Loss Maternal Grandmother     Arthritis Maternal Grandfather    ·     Review of Systems   Constitutional: Negative for activity change, appetite change, chills, diaphoresis, fever and unexpected weight change. HENT: Negative for congestion and sore throat. Eyes: Negative for visual disturbance. Respiratory: Positive for shortness of breath. Negative for apnea, cough and chest tightness.     Cardiovascular: Negative for chest pain, palpitations and leg swelling. Gastrointestinal: Negative for abdominal pain, constipation, diarrhea, nausea and vomiting. Endocrine: Negative for cold intolerance, heat intolerance, polydipsia, polyphagia and polyuria. Genitourinary: Negative for difficulty urinating. Musculoskeletal: Negative for arthralgias and myalgias. Neurological: Negative for weakness and headaches. Psychiatric/Behavioral: Negative for confusion and sleep disturbance. All other systems reviewed and are negative. ROS: A 10 point review of systems was conducted, significant findings as noted in HPI. Physical Exam:     Vitals:    04/04/22 0728   BP: (!) 151/74   Pulse: 70   Resp: 16   Temp: 98 °F (36.7 °C)   SpO2: 92%     Physical Exam  Vitals reviewed. Constitutional:       General: He is not in acute distress. Appearance: He is well-developed and well-groomed. HENT:      Head: Normocephalic and atraumatic. Mouth/Throat:      Mouth: Mucous membranes are moist.      Pharynx: Oropharynx is clear. Eyes:      General: No scleral icterus. Extraocular Movements: Extraocular movements intact. Conjunctiva/sclera: Conjunctivae normal.      Pupils: Pupils are equal, round, and reactive to light. Cardiovascular:      Rate and Rhythm: Normal rate and regular rhythm. Pulses: Normal pulses. Heart sounds: Normal heart sounds, S1 normal and S2 normal. No murmur heard. Pulmonary:      Effort: Pulmonary effort is normal. No respiratory distress. Breath sounds: Normal breath sounds. Decreased air movement present. Abdominal:      General: Abdomen is flat. Bowel sounds are normal.      Palpations: Abdomen is soft. Tenderness: There is no abdominal tenderness. Musculoskeletal:         General: Normal range of motion. Cervical back: Full passive range of motion without pain, normal range of motion and neck supple. Skin:     General: Skin is warm and dry.    Neurological: General: No focal deficit present. Mental Status: He is alert and oriented to person, place, and time. Cranial Nerves: Cranial nerves are intact. Sensory: Sensation is intact. Motor: Motor function is intact. Coordination: Coordination is intact. Gait: Gait is intact. Deep Tendon Reflexes: Reflexes are normal and symmetric. Psychiatric:         Attention and Perception: Attention and perception normal.         Mood and Affect: Mood normal.         Speech: Speech normal.         Behavior: Behavior normal. Behavior is cooperative. Thought Content: Thought content normal.         Cognition and Memory: Cognition and memory normal.         Judgment: Judgment normal.         Data:   Labs:  CBC:   Recent Labs     04/03/22  1045 04/04/22  0537   WBC 14.4* 8.6   HGB 13.7 12.6*   HCT 44.5 39.8*    359       BMP:   Recent Labs     04/02/22  1056 04/03/22  1045 04/04/22  0537   * 137 138   K 4.4 4.8 4.9   CL 87* 93* 94*   CO2 37* 34* 33*   BUN 24* 36* 40*   CREATININE 0.8 0.9 0.9   GLUCOSE 222* 280* 173*     LFT's: No results for input(s): AST, ALT, ALB, BILITOT, ALKPHOS in the last 72 hours. Troponin:   Recent Labs     04/03/22  1045   TROPONINI 0.02*     BNP:No results for input(s): BNP in the last 72 hours. ABGs: No results for input(s): PHART, KHJ6VSL, PO2ART in the last 72 hours. INR: No results for input(s): INR in the last 72 hours. U/A:No results for input(s): NITRITE, COLORU, PHUR, LABCAST, WBCUA, RBCUA, MUCUS, TRICHOMONAS, YEAST, BACTERIA, CLARITYU, SPECGRAV, LEUKOCYTESUR, UROBILINOGEN, BILIRUBINUR, BLOODU, GLUCOSEU, AMORPHOUS in the last 72 hours. Invalid input(s): KETONESU    XR CHEST (2 VW)   Final Result   1. Stable trace right pneumothorax. 2.  Unchanged bilateral pleural parenchymal airspace disease. CT CHEST WO CONTRAST   Final Result      1. Tiny right pneumothorax. 2. Small to moderate right and small left pleural effusion.    3. Bilateral lower lobe airspace consolidation volume loss consistent with atelectasis. Superimposed pneumonia is not excluded. 4. Distended main pulmonary artery could indicate pulmonary hypertension. The findings were communicated to the 5 S. RN Mary Kate at 1410. XR CHEST (2 VW)   Final Result      Stable bibasilar pleural-parenchymal disease. ASSESSMENT AND PLAN:       Spontaneous Pneumothorax. Chest imaging demonstrated a small pneumothorax. Patient denies trauma. · No acute need for chest tube at this time. · Recommend follow-up imaging for resolution as an outpatient. Acute hypoxic respiratory failure- resolved  Patient hypoxic on presentation and improved with IV Lasix and breathing treatments. · Supplemental oxygen as needed to keep Sat >88%  · Be reasonable to perform outpatient PFTs once pneumothorax has resolved. Patient with extensive smoking history and symptoms could fit with COPD    Acute diastolic CHF exacerbation-improving  · Being managed by primary team    This patient has been staffed and discussed with Dr. Cecilio Nails.   -----------------------------  La Nena Villegas MD, PGY-3  4/4/2022  10:41 AM    Patient seen, examined and discussed with the resident and I agree with the assessment and plan. Briefly, this is a 67 y.o. male with history of smoking, shortness of breath and small pneumothorax. Patient is now on room air and breathing comfortably at rest.  The pneumothorax on CT is extremely small and not enlarging. Etiology is unclear as he doesn't have bullous disease or recent trauma. I doubt the pneumothorax is contributing to his dyspnea in a significant manner. I would continue to try to diurese off the excess fluid in his lungs, likely there from his diastolic heart failure. If the fluid is refractory to diuresis then it could be tapped. He looks too well for that intervention at the moment. I'm happy to workup his dyspnea further as an outpatient.   A repeat chest xray in a week to ensure the pneumothorax hasn't enlarged would also be appropriate.         Socorro Hager MD

## 2022-04-04 NOTE — PROGRESS NOTES
Hospitalist Progress Note      PCP: Jasmyn Yang MD    Date of Admission: 4/3/2022    Chief Complaint: SOB    Hospital Course: Jabier Ziegler 67 y.o. male  hx of HTN, DM type 2, CHF (echo 1/2022 EF 60%, G2DD) multiple hospitalizations recently for CHF exacerbation. Discharged from hospital yesterday. Patient reports that he woke up for him in the morning gasping for breath he went to check his oxygen level which was 81%. So came into the hospital denies any wheezing, PND or orthopnea.     Patient reports that he has more than 50-pack-year history of smoking quit 25 years back but was never diagnosed with COPD. Arrival to ER patient was hypoxic saturating 86% on room air. CT chest showed bilateral pleural effusion with small pneumothorax. Subjective: Patient seen and examined today. Reports that he has been off of oxygen since 4 AM earlier this morning. Denies any shortness of breath, nausea, vomiting, fever, chills.   Wife at bedside discussed with her we will start on IV Lasix will have a cardiology evaluation and pulmonary evaluation      Medications:  Reviewed    Infusion Medications    sodium chloride      dextrose       Scheduled Medications    furosemide  40 mg IntraVENous TID    acarbose  100 mg Oral BID    apixaban  5 mg Oral BID    metoprolol succinate  50 mg Oral BID    pravastatin  40 mg Oral Daily    ramipril  10 mg Oral Daily    sodium chloride flush  5-40 mL IntraVENous 2 times per day    insulin lispro  0-12 Units SubCUTAneous TID WC    insulin lispro  0-6 Units SubCUTAneous Nightly    ipratropium-albuterol  1 ampule Inhalation TID     PRN Meds: sodium chloride flush, sodium chloride, ondansetron **OR** ondansetron, polyethylene glycol, acetaminophen **OR** acetaminophen, glucagon (rDNA), dextrose, glucose, dextrose bolus (hypoglycemia) **OR** dextrose bolus (hypoglycemia), ipratropium-albuterol      Intake/Output Summary (Last 24 hours) at 4/4/2022 1244  Last data 1.020 07/11/2014    GLUCOSEU 100 mg/dl 09/03/2013       Radiology:  XR CHEST (2 VW)   Final Result   1. Stable trace right pneumothorax. 2.  Unchanged bilateral pleural parenchymal airspace disease. CT CHEST WO CONTRAST   Final Result      1. Tiny right pneumothorax. 2. Small to moderate right and small left pleural effusion. 3. Bilateral lower lobe airspace consolidation volume loss consistent with atelectasis. Superimposed pneumonia is not excluded. 4. Distended main pulmonary artery could indicate pulmonary hypertension. The findings were communicated to the 5 S. RN , Natividad Stiles at 1410. XR CHEST (2 VW)   Final Result      Stable bibasilar pleural-parenchymal disease. Assessment/Plan:    Active Hospital Problems    Diagnosis Date Noted    Hypoxia [R09.02] 04/03/2022     #Acute on chronic diastolic heart failure  We will start on IV Lasix 40 3 times daily  Strict CRUZ's, daily weight. We will consult cardiology. Continue metoprolol, ramipril. #Hypoxia  #Suspect underlying COPD  Continue neb every 4 hour. Pending pulmonary evaluation. Would benefit from PFTs as an outpatient. Patient history of 50-pack-year history of smoking. #A. fib paroxysmal.  Rate controlled with metoprolol. Anticoagulation with Eliquis. #Diabetes mellitus type 2  Hold oral antidiabetic agent. Continue with sliding scale. Will adjust doses per need    DVT Prophylaxis: eliquis  Diet: ADULT DIET; Regular; 4 carb choices (60 gm/meal);  Low Fat/Low Chol/High Fiber/2 gm Na  Code Status: Full Code    PT/OT Eval Status: at baseline    Dispo - obs. 24-48 hours    Manjit Gaona MD     This chart was likely completed using voice recognition technology and may contain unintended grammatical , phraseology,and/or punctuation errors

## 2022-04-05 VITALS
TEMPERATURE: 98.7 F | HEART RATE: 69 BPM | RESPIRATION RATE: 16 BRPM | SYSTOLIC BLOOD PRESSURE: 110 MMHG | WEIGHT: 181.44 LBS | DIASTOLIC BLOOD PRESSURE: 70 MMHG | HEIGHT: 73 IN | BODY MASS INDEX: 24.05 KG/M2 | OXYGEN SATURATION: 93 %

## 2022-04-05 LAB
ANION GAP SERPL CALCULATED.3IONS-SCNC: 10 MMOL/L (ref 3–16)
BUN BLDV-MCNC: 37 MG/DL (ref 7–20)
CALCIUM SERPL-MCNC: 8.9 MG/DL (ref 8.3–10.6)
CHLORIDE BLD-SCNC: 91 MMOL/L (ref 99–110)
CO2: 35 MMOL/L (ref 21–32)
CREAT SERPL-MCNC: 1 MG/DL (ref 0.8–1.3)
GFR AFRICAN AMERICAN: >60
GFR NON-AFRICAN AMERICAN: >60
GLUCOSE BLD-MCNC: 193 MG/DL (ref 70–99)
GLUCOSE BLD-MCNC: 206 MG/DL (ref 70–99)
GLUCOSE BLD-MCNC: 226 MG/DL (ref 70–99)
PERFORMED ON: ABNORMAL
PERFORMED ON: ABNORMAL
POTASSIUM REFLEX MAGNESIUM: 4.6 MMOL/L (ref 3.5–5.1)
SODIUM BLD-SCNC: 136 MMOL/L (ref 136–145)

## 2022-04-05 PROCEDURE — 99226 PR SBSQ OBSERVATION CARE/DAY 35 MINUTES: CPT | Performed by: INTERNAL MEDICINE

## 2022-04-05 PROCEDURE — 36415 COLL VENOUS BLD VENIPUNCTURE: CPT

## 2022-04-05 PROCEDURE — G0378 HOSPITAL OBSERVATION PER HR: HCPCS

## 2022-04-05 PROCEDURE — 6360000002 HC RX W HCPCS: Performed by: INTERNAL MEDICINE

## 2022-04-05 PROCEDURE — 80048 BASIC METABOLIC PNL TOTAL CA: CPT

## 2022-04-05 PROCEDURE — 94618 PULMONARY STRESS TESTING: CPT

## 2022-04-05 PROCEDURE — 6370000000 HC RX 637 (ALT 250 FOR IP): Performed by: INTERNAL MEDICINE

## 2022-04-05 PROCEDURE — 96376 TX/PRO/DX INJ SAME DRUG ADON: CPT

## 2022-04-05 PROCEDURE — 94761 N-INVAS EAR/PLS OXIMETRY MLT: CPT

## 2022-04-05 PROCEDURE — 94640 AIRWAY INHALATION TREATMENT: CPT

## 2022-04-05 PROCEDURE — 99213 OFFICE O/P EST LOW 20 MIN: CPT | Performed by: NURSE PRACTITIONER

## 2022-04-05 RX ORDER — IPRATROPIUM BROMIDE AND ALBUTEROL SULFATE 2.5; .5 MG/3ML; MG/3ML
1 SOLUTION RESPIRATORY (INHALATION) EVERY 4 HOURS PRN
Status: DISCONTINUED | OUTPATIENT
Start: 2022-04-05 | End: 2022-04-05 | Stop reason: HOSPADM

## 2022-04-05 RX ORDER — TORSEMIDE 20 MG/1
40 TABLET ORAL DAILY
Status: DISCONTINUED | OUTPATIENT
Start: 2022-04-05 | End: 2022-04-05 | Stop reason: HOSPADM

## 2022-04-05 RX ORDER — TORSEMIDE 20 MG/1
40 TABLET ORAL DAILY
Qty: 60 TABLET | Refills: 1 | Status: SHIPPED | OUTPATIENT
Start: 2022-04-05 | End: 2022-04-12 | Stop reason: DRUGHIGH

## 2022-04-05 RX ADMIN — APIXABAN 5 MG: 5 TABLET, FILM COATED ORAL at 08:22

## 2022-04-05 RX ADMIN — FUROSEMIDE 40 MG: 10 INJECTION, SOLUTION INTRAMUSCULAR; INTRAVENOUS at 08:22

## 2022-04-05 RX ADMIN — INSULIN LISPRO 2 UNITS: 100 INJECTION, SOLUTION INTRAVENOUS; SUBCUTANEOUS at 08:22

## 2022-04-05 RX ADMIN — ACARBOSE 100 MG: 50 TABLET ORAL at 08:23

## 2022-04-05 RX ADMIN — INSULIN LISPRO 4 UNITS: 100 INJECTION, SOLUTION INTRAVENOUS; SUBCUTANEOUS at 12:26

## 2022-04-05 RX ADMIN — IPRATROPIUM BROMIDE AND ALBUTEROL SULFATE 1 AMPULE: .5; 2.5 SOLUTION RESPIRATORY (INHALATION) at 08:28

## 2022-04-05 RX ADMIN — RAMIPRIL 10 MG: 5 CAPSULE ORAL at 08:23

## 2022-04-05 RX ADMIN — TORSEMIDE 40 MG: 20 TABLET ORAL at 12:58

## 2022-04-05 RX ADMIN — METOPROLOL SUCCINATE 50 MG: 50 TABLET, EXTENDED RELEASE ORAL at 08:22

## 2022-04-05 RX ADMIN — PRAVASTATIN SODIUM 40 MG: 40 TABLET ORAL at 08:22

## 2022-04-05 ASSESSMENT — ENCOUNTER SYMPTOMS
GASTROINTESTINAL NEGATIVE: 1
SHORTNESS OF BREATH: 1

## 2022-04-05 ASSESSMENT — PAIN SCALES - GENERAL
PAINLEVEL_OUTOF10: 0

## 2022-04-05 NOTE — PROGRESS NOTES
Pt was on room air this shift until 4 am. In to check vitals, pt ambulated to bathroom. Returned to bed, O2 was 78 on room air. Up on resting, it only came up to 86%. @l O2 applied, SpO2 94%. Pt stated that was what happened at home and concerned that the same thing may happen if d/c today. Lungs sounds diminished. Up independently. No issues with gait. Denied pain/distress.

## 2022-04-05 NOTE — CONSULTS
[facility]  Palliative Medicine Consultation Note      Date Of Admission:4/3/2022  Date of consult: 04/05/22  Seen by [facility] AND [facility] in the past:  No    Recommendations:        Met with pt and wife at the bedside. Discussed pt's sob and recent hospitalizations. Pt expresses frustration about not knowing how to improve his breathing and prevent readmissions. He feels his quality of life and health status were very good up until the last couple of months. 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 We discussed advance directives briefly as well. Wife Gilbert Grossman says pt has completed advance directives and she will submit them to MyChart or PCP. Gilbert Grossman is his [de-identified] and they have 2 sons as well. They say they have discussed their wishes before, and Gilbert Grossman would know pt's wishes if he were to get much sicker. 1. Goals of Care/Advanced Care planning/Code status: Full Code, did not discuss today. Pt hopes to stabilize and not have to come to the hospital anytime soon. He hopes to get back to working 4 days per week. 2. Pain: pt denies current pain. 3. SOB: pt reports he has occasional sob and trouble \"drawing breath in.\" He has had episodes the past two nights where he has woken up feeling sob and desaturating to low 80s. He does not have home O2. He has been sleeping sitting up for the past week or two. He was diagnosed with HF within the past few weeks and is now going to be following up with pulmonology to be worked up for possible COPD. Hx smoking. He's currently on lasix iv 40 mg tid. He was checking his O2 sats periodically during the visit and he was sating 88-91%.   4. Disposition: d/c home possibly today    Reason for Consult:         []  Goals of Care  []  Code Status Discussion/Advanced Care Planning   []  Psychosocial/Family Support  []  Symptom Management  [x]  Other: HF 30 day readmission    Requesting Physician: Dr. Rachel Garcia:  Hypoxia    History Obtained From:  patient, electronic medical record    History of Present Illness:         Trice Ortega is a 67 y.o. male with PMH of HTN, DM type 2, CHF (echo 1/2022 EF 60%, G2DD) multiple hospitalizations recently for CHF exacerbation who presented with hypoxia. He had been discharged from the hospital the previous day. He reported he woke up gasping and was sating 81%. He was sating 96% on room air on arrival to ED. He has suspected underlying COPD with a history of smoking, and is being treated with duonebs. He's admitted with acute COPD and HF exacerbation. Cardiology was consulted and recommend iv lasix 40 mg q8h. Pulmonology was consulted for small pneumothorax and suspicion of COPD. Subjective:         Past Medical History:        Diagnosis Date    Carotid stenosis, left 10/12/2011    Chronic back pain     Diastolic CHF (HCC)     Erectile dysfunction     Hyperlipidemia     Hypertension     Osteoarthritis     Type II or unspecified type diabetes mellitus without mention of complication, not stated as uncontrolled        Past Surgical History:        Procedure Laterality Date    KNEE SURGERY      ROTATOR CUFF REPAIR Bilateral        Current Medications:    Medications Prior to Admission: metoprolol succinate (TOPROL XL) 50 MG extended release tablet, Take 1 tablet by mouth in the morning and at bedtime  torsemide (DEMADEX) 20 MG tablet, Take 1 tablet by mouth daily  apixaban (ELIQUIS) 5 MG TABS tablet, Take 1 tablet by mouth 2 times daily  Semaglutide,0.25 or 0.5MG/DOS, (OZEMPIC, 0.25 OR 0.5 MG/DOSE,) 2 MG/1.5ML SOPN, Inject as directed by physician. Semaglutide,0.25 or 0.5MG/DOS, (OZEMPIC, 0.25 OR 0.5 MG/DOSE,) 2 MG/1.5ML SOPN, Inject as directed by physician.   ramipril (ALTACE) 10 MG capsule, TAKE 1 CAPSULE DAILY  pioglitazone (ACTOS) 45 MG tablet, TAKE 1 TABLET DAILY  dapagliflozin (FARXIGA) 10 MG tablet, TAKE 1 TABLET EVERY MORNING  metFORMIN (GLUCOPHAGE) 500 MG tablet, TAKE 2 TABLETS 2 TIMES     DAILY WITH MEALS  acarbose (PRECOSE) 100 MG tablet, TAKE 1 TABLET TWICE DAILY  WITH MEALS  pravastatin (PRAVACHOL) 40 MG tablet, Take 1 tablet by mouth daily  glimepiride (AMARYL) 4 MG tablet, Take 1 tablet by mouth 2 times daily (with meals)  FREESTYLE LITE strip, USE TO TEST BLOOD SUGAR LEVEL ONCE DAILY AS NEEDED   sildenafil (VIAGRA) 100 MG tablet, Take 1 tablet by mouth as needed for Erectile Dysfunction Max daily dose 100mg. Lancets MISC, 1 each by Does not apply route 2 times daily  Multiple Vitamin (MULTI VITAMIN MENS PO), Take by mouth Daily      Allergies:  Dye [iodides]    Social History:    · TOBACCO: reports that he quit smoking about 20 years ago. His smoking use included cigarettes. He has a 80.00 pack-year smoking history. He has never used smokeless tobacco.  · ETOH:   reports current alcohol use. · Patient currently lives with family - wife    Review of Systems -   Review of Systems   Constitutional: Negative. HENT: Negative. Respiratory: Positive for shortness of breath. Cardiovascular: Positive for leg swelling. Gastrointestinal: Negative. Genitourinary: Negative. Musculoskeletal: Negative. Skin: Negative. Neurological: Negative. Psychiatric/Behavioral: Negative.    :     Objective:        Physical Exam  Constitutional:       General: He is not in acute distress. HENT:      Head: Normocephalic and atraumatic. Cardiovascular:      Rate and Rhythm: Normal rate and regular rhythm. Pulmonary:      Effort: Pulmonary effort is normal.      Breath sounds: No wheezing or rales. Abdominal:      General: Bowel sounds are normal.      Palpations: Abdomen is soft. Musculoskeletal:      Right lower leg: Edema present. Left lower leg: Edema present. Skin:     General: Skin is warm and dry. Neurological:      Mental Status: He is alert and oriented to person, place, and time. Palliative Performance Scale:  [] 60% Ambulation reduced; Significant disease; Can't do hobbies/housework; intake normal or reduced; occasional assist; LOC full/confusion  [] 50% Mainly sit/lie; Extensive disease; Can't do any work; Considerable assist; intake normal  Or reduced; LOC full/confusion  [] 40% Mainly in bed; Extensive disease; Mainly assist; intake normal or reduced; occasional assist; LOC full/confusion  [] 30% Bed Bound; Extensive disease; Total care; intake reduced; LOC full/confusion  [] 20% Bed Bound; Extensive disease; Total care; intake minimal; Drowsy/coma  [] 10% Bed Bound; Extensive disease; Total care; Mouth care only; Drowsy/coma  [] 0% Death    PPS: 70%    Vitals:    /66   Pulse 71   Temp 98 °F (36.7 °C) (Oral)   Resp 17   Ht 6' 1\" (1.854 m)   Wt 181 lb 7 oz (82.3 kg)   SpO2 91%   BMI 23.94 kg/m²     Labs:    BMP:   Recent Labs     04/03/22  1045 04/04/22  0537 04/05/22  0633    138 136   K 4.8 4.9 4.6   CL 93* 94* 91*   CO2 34* 33* 35*   BUN 36* 40* 37*   CREATININE 0.9 0.9 1.0   GLUCOSE 280* 173* 206*     CBC:   Recent Labs     04/03/22  1045 04/04/22  0537   WBC 14.4* 8.6   HGB 13.7 12.6*   HCT 44.5 39.8*    359       LFT's: No results for input(s): AST, ALT, ALB, BILITOT, ALKPHOS in the last 72 hours. Troponin:   Recent Labs     04/03/22  1045   TROPONINI 0.02*     BNP: No results for input(s): BNP in the last 72 hours. ABGs: No results for input(s): PHART, RDG9KMO, PO2ART in the last 72 hours. INR: No results for input(s): INR in the last 72 hours. U/A:No results for input(s): NITRITE, COLORU, PHUR, LABCAST, WBCUA, RBCUA, MUCUS, TRICHOMONAS, YEAST, BACTERIA, CLARITYU, SPECGRAV, LEUKOCYTESUR, UROBILINOGEN, BILIRUBINUR, BLOODU, GLUCOSEU, AMORPHOUS in the last 72 hours. Invalid input(s): KETONESU    XR CHEST (2 VW)   Final Result   1. Stable trace right pneumothorax. 2.  Unchanged bilateral pleural parenchymal airspace disease.       CT CHEST WO CONTRAST   Final Result      1. Tiny right pneumothorax. 2. Small to moderate right and small left pleural effusion. 3. Bilateral lower lobe airspace consolidation volume loss consistent with atelectasis. Superimposed pneumonia is not excluded. 4. Distended main pulmonary artery could indicate pulmonary hypertension. The findings were communicated to the 5 S. RN , Antonette Rollins at 1410. XR CHEST (2 VW)   Final Result      Stable bibasilar pleural-parenchymal disease. Conclusion/Time spent:         Recommendations see above    Pt [id number]  Time spent with patient and/or family: 20 min  Time reviewing records: 10 min   Time communicating with staff: 5 min     A total of 35 minutes spent with the patient and family on unit greater than 50% in counseling regarding palliative care and in goals of care for the patient. Thank you to Dr. Sherif Guillaume for this consultation. We will continue to follow Mr. Kathy Palumbo care as needed.     Anda Cooks, NP  [address]

## 2022-04-05 NOTE — CARE COORDINATION
Case Management Assessment            Discharge Note                    Date / Time of Note: 4/5/2022 2:44 PM                  Discharge Note Completed by: TY Berg, VONNIEW    Patient Name: Kristen Ramos   YOB: 1945  Diagnosis: Hypoxia [R09.02]   Date / Time: 4/3/2022 10:13 AM    Current PCP: Julissa Liao MD  Clinic patient: No    Hospitalization in the last 30 days: No    Advance Directives:  Code Status: Full Code  [place] DNR form completed and on chart: No    Financial:  Payor: Ligia Duron / Plan: Ligia Duron / Product Type: *No Product type* /      Pharmacy:    [id number] - [facility], [address] [facility] [address]  [facility] [place] [address]  Phone: [phone] Fax: [phone]    [facility]/pharmacy [facility], [address] [facility] [address] [address] [facility]  [address] [facility] [address]  Phone: [phone] Fax: [phone]    [facility] [address], [address] [id number] -  [phone]  [place] [address]  Phone: [phone] Fax: [phone] medications?: Potential Assistance Purchasing Medications: No  Assistance provided by Case Management: None at this time    Does patient want to participate in local refill/ meds to beds program?:      Meds To Beds General Rules:  1. Can ONLY be done Monday- Friday between 8:30am-5pm  2. Prescription(s) must be in pharmacy by 3pm to be filled same day  3. Copy of patient's insurance/ prescription drug card and patient face sheet must be sent along with the prescription(s)  4. Cost of Rx cannot be added to hospital bill. If financial assistance is needed, please contact unit  or ;  or  CANNOT provide pharmacy voucher for patients co-pays  5.  Patients can then  the prescription on their way out of the hospital at discharge, or pharmacy can deliver to the bedside if staff is available. (payment due at time of pick-up or delivery - cash, check, or card accepted)     Able to afford home medications/ co-pay costs: Yes    ADLS:  Current PT AM-PAC Score:   /24  Current OT AM-PAC Score:   /24      DISCHARGE Disposition: Home- No Services Needed    LOC at discharge: Not Applicable  ANGEL Completed: Yes    Notification completed in HENS/PAS?:  Not Applicable    IMM Completed:   Not Indicated    Transportation:  Transportation PLAN for discharge: family   Mode of Transport: [address] ordered at discharge: No    [address]:  DME Provider: none  Equipment obtained during hospitalization: none    Home Oxygen and Respiratory Equipment:  Oxygen needed at discharge?: Yes  [address] : [facility]  Phone: [phone]   Portable tank available for discharge?: Yes    Dialysis:  Dialysis patient: No      Referrals made at [facility] for outpatient continued care:  Not Applicable    Additional CM Notes:     Pt will DC home w/his wife. Pt's wife will transport him home. Pt denies [place] [address] needs. Pt will DC home w/O2 from [address] [place] [address]. SW sent referral to [facility] at [address] [place] [address], he will deliver tank to Pt's room. Pt has no other needs at this time. The Plan for Transition of Care is related to the following treatment goals of Hypoxia [R09.02]    The Patient and/or patient representative Lesly Downing and his family were provided with a choice of provider and agrees with the discharge plan Yes    Freedom of choice list was provided with basic dialogue that supports the patient's individualized plan of care/goals and shares the quality data associated with the providers.  Yes    Care Transitions patient: Yes    TY Marrufo, [facility] [employer]  Case Management Department  Ph: [phone]

## 2022-04-05 NOTE — PROGRESS NOTES
Patient alert and oriented x4. VSS. Patient has remained on RA and is sating above 90% with no requirements for supplemental O2 at this time. IV lasix given per orders. Tolerating diet and denies any pain at this time. Patient denies any other needs at this time. Bed is in the lowest position, call light and bedside table within reach. Patient bed alarm is on. Will continue to monitor for changes in patient status.      Electronically signed by Jackie Cardenas RN on 4/5/2022 at 10:09 AM

## 2022-04-05 NOTE — PROGRESS NOTES
Cardiology Consult Service  Daily Progress Note        Admit Date:  4/3/2022  Primary cardiologist: Dr Amy Stiles    Reason for Consultation/Chief Complaint: acute COPD and HF exacerbation    Subjective:     Patient is a 65-year-old man with history of HTN, HLP, poorly controlled diabetes, PAFRVR, HFpEF, past heavy smoking 50-pack-year history, quit 25 years ago).       Echo 1/25/2022: Normal LV size, mild LVH, LVEF 86%, diastolic grade 2, mild LAE, normal RV, mild valvular disease.     ECG 1/19/2022: Normal sinus rhythm with frequent PACs, poor R wave progression, cannot exclude old anterior MI.     Labs 1/24/2022: TSH 3.0 normal, creatinine 1.8, K5.0, FLP: , HDL 81, LDL 99, TG 89 on pravachol 40 daily.  Hemoglobin A1c 8.1.     CAM 2-week 1/27-2/10/2022: NSR with frequent episodes of SVT, couple of those episodes appear to be AF RVR.     Carleen 3/10/2022: Normal     Patient was admitted at [facility] 3/31 - 4/2/22 for AHF and PAFRVR.     Patient woke up this morning with severe dyspnea. He checked his pulse ox, his oxygen saturation was 81% on room air. Patient denies any fevers, wheezing, lower extremity edema or PND. Patient was given nebulizers to the emergency room with almost complete relief of his symptoms. He has been compliant with his medications including torsemide 20 mg daily. Patient was admitted for hypoxia due to possible acute COPD exacerbation, rule out acute heart failure. On admission patient was intolerance of supplemental oxygen, he has now been weaned off. She did not receive any diuretics at the emergency room. He continues to have exertional shortness of breath in his room, denies orthopnea.       Interval history:  Patient reports no complaints today. He remains off supplemental oxygen. I's and O's -3.3 L, creatinine 1.0 stable.     Objective:     Medications:   torsemide  40 mg Oral Daily    acarbose  100 mg Oral BID    apixaban  5 mg Oral BID    metoprolol succinate  50 mg Oral BID    pravastatin  40 mg Oral Daily    ramipril  10 mg Oral Daily    sodium chloride flush  5-40 mL IntraVENous 2 times per day    insulin lispro  0-12 Units SubCUTAneous TID WC    insulin lispro  0-6 Units SubCUTAneous Nightly       IV drips:   sodium chloride      dextrose         PRN:  ipratropium-albuterol, sodium chloride flush, sodium chloride, ondansetron **OR** ondansetron, polyethylene glycol, acetaminophen **OR** acetaminophen, glucagon (rDNA), dextrose, glucose, dextrose bolus (hypoglycemia) **OR** dextrose bolus (hypoglycemia)    Vitals:    04/05/22 0420 04/05/22 0732 04/05/22 0829 04/05/22 1059   BP: (!) 160/82 115/66  (!) 107/59   Pulse: 78 71  71   Resp: 20 17  16   Temp: 97.6 °F (36.4 °C) 98 °F (36.7 °C)  99.5 °F (37.5 °C)   TempSrc: Oral Oral  Oral   SpO2: 94% 90% 91% 91%   Weight: 181 lb 7 oz (82.3 kg)      Height:           Intake/Output Summary (Last 24 hours) at 4/5/2022 1248  Last data filed at 4/5/2022 0851  Gross per 24 hour   Intake 610 ml   Output 2900 ml   Net -2290 ml     I/O last 3 completed shifts: In: 18 [P.O.:840; I.V.:20]  Out: 5025 [Urine:5025]  Wt Readings from Last 3 Encounters:   04/05/22 181 lb 7 oz (82.3 kg)   04/02/22 182 lb 12.2 oz (82.9 kg)   03/31/22 190 lb (86.2 kg)       Admit Wt: Weight: 189 lb 3.2 oz (85.8 kg)   Todays Wt: Weight: 181 lb 7 oz (82.3 kg)    TELEMETRY personally reviewed: Sinus with PACs    Physical Exam:         General Appearance:  Alert, cooperative, no distress, appears stated age Appropriate weight   Head:  Normocephalic, without obvious abnormality, atraumatic   Eyes:  PERRL, conjunctiva/corneas clear EOM intact  Ears normal   Throat no lesions       Nose: Nares normal, no drainage or sinus tenderness   Throat: Lips, mucosa, and tongue normal   Neck: Supple, symmetrical, trachea midline, no adenopathy, thyroid: not enlarged, symmetric, no tenderness/mass/nodules, no carotid bruit.         Lungs:   Normal respiratory rate, lungs clear to auscultation without any wheezes, rubs or ronchi bilaterally. Chest Wall:  No tenderness or deformity   Heart:  Regular rhythm, rate is controlled, S1, S2 normal, there is no murmur, there is no rub or gallop, cannot assess jvd, no bilateral lower extremity edema   Abdomen:   Soft, non-tender, bowel sounds active all four quadrants,  no masses, no organomegaly       Extremities: Extremities normal, atraumatic, no cyanosis. Pulses: 2+ and symmetric   Skin: Skin color, texture, turgor normal, no rashes or lesions   Pysch: Normal mood and affect   Neurologic: Normal gross motor and sensory exam.  Cranial nerves intact       Labs:   Recent Labs     04/03/22  1045 04/04/22  0537 04/05/22  0633    138 136   K 4.8 4.9 4.6   BUN 36* 40* 37*   CREATININE 0.9 0.9 1.0   CL 93* 94* 91*   CO2 34* 33* 35*   GLUCOSE 280* 173* 206*   CALCIUM 9.2 8.8 8.9     Recent Labs     04/03/22  1045 04/03/22  1045 04/04/22  0537   WBC 14.4*  --  8.6   HGB 13.7  --  12.6*   HCT 44.5  --  39.8*     --  359   MCV 81.3   < > 80.3    < > = values in this interval not displayed. No results for input(s): CHOLTOT, TRIG, HDL, LDL in the last 72 hours. Invalid input(s): LIPIDCOMM, CHOLHDL, VLDCHOL  No results for input(s): PTT, INR in the last 72 hours. Invalid input(s): PT  Recent Labs     04/03/22  1045   TROPONINI 0.02*     No results for input(s): BNP in the last 72 hours. No results for input(s): NTPROBNP in the last 72 hours. No results for input(s): TSH in the last 72 hours. Imaging:       Assessment & Plan:     1. Hypoxic respiratory failure. It is most likely due to mild exacerbation of COPD and mild exacerbation of patient's HFpEF. 2.  Acute on chronic HFpEF. 3.  PAF RVR. Patient remains in sinus rhythm. 4.  HTN.   5.  Hyperlipidemia.           -We will change IV Lasix to torsemide 40 mg daily.  -Continue with Eliquis, ramipril 10 mg daily and Toprol 50 mg twice daily  -Patient will be seen by pulmonology for possible COPD; medications for COPD will be prescribed per primary team and pulmonology. Patient may be discharged home today on the above meds and follow up with me in 1 week with a BMP prior to visit. Please call me with any questions.            I have spent 35 minutes of face to face time with the patient with more than 50% spent counseling and coordinating care. I have personally reviewed the reports and images of labs, radiological studies, cardiac studies including ECG's and telemetry, current and old medical records. The note was completed using EMR and Dragon dictation system. Every effort was made to ensure accuracy; however, inadvertent computerized transcription errors may be present. All questions and concerns were addressed to the patient/family. Alternatives to my treatment were discussed. Thank you for allowing to us to participate in the care or [address]. Please call our service with questions.     Padmini Kelly MD, [facility], [address]  The [address]  [address] [address] [place] [address]  Ph: [phone]  Fax: [phone]

## 2022-04-05 NOTE — PROGRESS NOTES
Patient discharge education complete. IV removed and dressing placed. Patient verbalized understanding of medications and side effects at time of discharge. All questions answered at this time.      Electronically signed by Renny Russo RN on 4/5/2022 at 5:04 PM

## 2022-04-05 NOTE — PROGRESS NOTES
04/05/22 1242   Resting (Room Air)   SpO2 89   HR 71   Resting (On O2)   SpO2 92   HR 72   O2 Device Nasal cannula   O2 Flow Rate (l/min) 1 l/min   FIO2 (%) 24   During Walk (Room Air)   SpO2 87   HR 70   Walk/Assistance Device Ambulation   Rate of Dyspnea 0   During Walk (On O2)   SpO2 92   HR 71   O2 Device Nasal cannula   O2 Flow Rate (l/min) 1 l/min   Need Additional O2 Flow Rate Rows No   Walk/Assistance Device Ambulation   Rate of Dyspnea 0   After Walk   SpO2 92   HR 71   O2 Device Nasal cannula   O2 Flow Rate (l/min) 1 l/min   FIO2 (%) 24   Rate of Dyspnea 0   Does the Patient Qualify for Home O2 Yes   Liter Flow at Rest 0   Liter Flow on Exertion 1   Does the Patient Need Portable Oxygen Tanks Yes

## 2022-04-05 NOTE — PROGRESS NOTES
04/05/22 0832   RT Protocol   History Pulmonary Disease 1   Respiratory pattern 0   Breath sounds 0   Cough 0   Indications for Bronchodilator Therapy None   Bronchodilator Assessment Score 1

## 2022-04-05 NOTE — DISCHARGE INSTR - COC
Continuity of Care Form    Patient Name: Lane Pulido   :    MRN:  [id number]    Admit date:  4/3/2022  Discharge date:  ***    Code Status Order: Full Code   Advance Directives:      Admitting Physician:  Christine Cash MD  PCP: Carol Fong MD    Discharging Nurse: [facility] Unit/Room#: 2585/0224-48  Discharging Unit Phone Number: ***    Emergency Contact:   Extended Emergency Contact Information  Primary Emergency Contact: Shanta Mendoza  Address: [address], [address] [address] [place]  Phone: [phone]  Relation: Spouse    Past Surgical History:  Past Surgical History:   Procedure Laterality Date    KNEE SURGERY      ROTATOR CUFF REPAIR Bilateral        Immunization History:   Immunization History   Administered Date(s) Administered    COVID-19, Pfizer Purple top, DILUTE for use, 12+ yrs, 30mcg/0.3mL dose 2021, 2021, 01/15/2022    Influenza 10/24/2011    Influenza Virus Vaccine 10/27/2014    Influenza, High Dose (Fluzone 65 yrs and older) 10/15/2015, 10/10/2016, 2017, 2018    Pneumococcal Conjugate 13-valent (Xddheld29) 2015    Pneumococcal Conjugate 7-valent (Prevnar7) 2013    Pneumococcal Polysaccharide (Pcydqcjdh98) 2013    Tdap (Boostrix, Adacel) 2012    Zoster Live (Zostavax) 2013       Active Problems:  Patient Active Problem List   Diagnosis Code    HTN (hypertension) I10    Hyperlipidemia E78.5    OA (osteoarthritis) of knee M17.10    Carotid stenosis, left I65.22    Hearing loss H91.90    ED (erectile dysfunction) N52.9    DM type 2 (diabetes mellitus, type 2) (HCC) E11.9    Rotator cuff tear M75.100    Glenohumeral arthritis M19.019    Subacromial impingement M75.40    Trigger ring finger of right hand M65.341    Spinal stenosis of lumbar region M48.061    Closed compression fracture of L1 lumbar vertebra S32.010A    Closed displaced fracture of lateral malleolus of left fibula S82. 62XA    Exertional dyspnea R06.00    Atrial fibrillation with rapid ventricular response (HCC) I48.91    Acute heart failure with preserved ejection fraction (HFpEF) ([facility]) I50.31    Acute heart failure ([facility]) I50.9    Hypoxia R09.02       Isolation/Infection:   Isolation            No Isolation          Patient Infection Status       None to display            Nurse Assessment:  Last Vital Signs: BP (!) 107/59   Pulse 71   Temp 99.5 °F (37.5 °C) (Oral)   Resp 16   Ht 6' 1\" (1.854 m)   Wt 181 lb 7 oz (82.3 kg)   SpO2 91%   BMI 23.94 kg/m²     Last documented pain score (0-10 scale): Pain Level: 0  Last Weight:   Wt Readings from Last 1 Encounters:   22 181 lb 7 oz (82.3 kg)     Mental Status:  {IP PT MENTAL STATUS:}    IV Access:  { ANGEL IV ACCESS:975638406}    Nursing Mobility/ADLs:  Walking   {[facility] DME VICB:854064856}  Transfer  {[facility] DME JYBK:264928205}  Bathing  {[facility] DME ROUC:738173579}  Dressing  {[facility] DME ZIGZ:228508488}  Toileting  {[facility] DME FUSD:322877187}  Feeding  {[facility] DME CKXN:477328250}  Med Admin  {[facility] DME DARRELL:710050369}  Med Delivery   { ANGEL MED Delivery:832114090}    Wound Care Documentation and Therapy:        Elimination:  Continence: Bowel: {YES / VO:07232}  Bladder: {YES / B}  Urinary Catheter: {Urinary Catheter:624043003}   Colostomy/Ileostomy/Ileal Conduit: {YES / DF:85368}       Date of Last BM: ***    Intake/Output Summary (Last 24 hours) at 2022 1451  Last data filed at 2022 0851  Gross per 24 hour   Intake 490 ml   Output 2900 ml   Net -2410 ml     I/O last 3 completed shifts: In: 18 [P.O.:840;  I.V.:20]  Out: 5025 [Urine:5025]    Safety Concerns:     [address] [employer] Safety Concerns:449633333}    Impairments/Disabilities:      [address] ORTIZ Impairments/Disabilities:944709453}    Nutrition Therapy:  Current Nutrition Therapy:   [address] ORTIZ Diet List:382440056}    Routes of Feeding: {[facility] DME Other Feedings:738320910}  Liquids: {Slp liquid thickness:89455}  Daily

## 2022-04-06 ENCOUNTER — CARE COORDINATION (OUTPATIENT)
Dept: CASE MANAGEMENT | Age: 77
End: 2022-04-06

## 2022-04-06 DIAGNOSIS — R09.02 HYPOXIA: Primary | ICD-10-CM

## 2022-04-06 PROCEDURE — 1111F DSCHRG MED/CURRENT MED MERGE: CPT | Performed by: FAMILY MEDICINE

## 2022-04-06 NOTE — CARE COORDINATION
Ashlee [facility] Transitions Initial Follow Up Call    Call within 2 business days of discharge: Yes    Patient: Denisse Upton Patient : 1945   MRN: [id number]  Reason for Admission: Covid 19 Monitoring  Discharge Date: 22 RARS: Readmission Risk Score: 13.6 ( )      Last Discharge [facility]       Complaint Diagnosis Description Type Department Provider    4/3/22 Shortness of Breath Hypoxia ED to Hosp-Admission (Discharged) (ADMITTED) 333 Bryson Godinez MD; Mick Severance. .. Spoke with: Denisse Upton who reports that he is doing about the same. Patient states that he is now on O2 at 1 lter via nc continuous. Patient reports O2 sat in the mid high 90js at rest but just going to the bathroom can cause O2 sat to dip in the mid 80s. Patient states that he has a follow up with the cardiologist 2022. Patient denies cp, sob, cough, dizziness, headache, n/v, diarrhea, abdominal pains, fever, or chills. Patient report that appetite and fluid intake is good and denies any problems with bowel or bladder. Patient is taking all medications as ordered. Writer and patient did a complete medication review and 1111f was completed. Denies any needs at this time. Patient instructed to continue to monitor s/s, reporting any that may present to MD immediately for early intervention. Reminded of COVID 19 precautions. Agreeable to f/u calls. Facility: [facility].      Patient contacted regarding COVID-19 risk. Discussed COVID-19 related testing which was not done at this time. Test results were not done. Patient informed of results, if available? Not done. LPN Care Coordinator contacted the patient by telephone to perform post discharge assessment. Call within 2 business days of discharge: Yes. Verified name and  with patient as identifiers.  Provided introduction to self, and explanation of the CTN/ACM role, and reason for call due to risk factors for infection and/or exposure to COVID-19. Symptoms reviewed with patient who verbalized the following symptoms: no new symptoms  no worsening symptoms. Due to no new or worsening symptoms encounter was not routed to provider for escalation. Discussed follow-up appointments. If no appointment was previously scheduled, appointment scheduling offered: No.  [facility]):   Future Appointments   Date Time Provider Concepción Haely   4/12/2022 10:30 AM Linda Kenney, APRN - CNP [place] [facility]     Non-[facility] follow up appointment(s): na    Non-face-to-face services provided:  Obtained and reviewed discharge summary and/or continuity of care documents  Education of patient/family/caregiver/guardian to support self-management-s/s of Covid and when to contact the doctor  Assessment and support for treatment adherence and medication management-. Advance Care Planning:   Does patient have an Advance Directive:  not on file; education provided. Advance Care Planning   Healthcare Decision Maker:    Primary Decision Maker: Gabriel Kumar [facility] [phone]    Educated patient about risk for severe COVID-19 due to risk factors according to CDC guidelines. LPN CC reviewed discharge instructions, medical action plan and red flag symptoms with the patient who verbalized understanding. Discussed COVID vaccination status: Yes. Education provided on COVID-19 vaccination as appropriate. Discussed exposure protocols and quarantine with CDC Guidelines. Patient was given an opportunity to verbalize any questions and concerns and agrees to contact LPN CC or health care provider for questions related to their healthcare. Reviewed and educated patient on any new and changed medications related to discharge diagnosis     Was patient discharged with a pulse oximeter? Yes Discussed and confirmed pulse oximeter discharge instructions and when to notify provider or seek emergency care.        LPN CC provided contact information. Plan for follow-up call in 5-7 days based on severity of symptoms and risk factors.     Care Transitions 24 Hour Call    Do you have any ongoing symptoms?: Yes  Patient-reported symptoms: Shortness of Breath  Interventions for patient-reported symptoms: Other  Do you have a copy of your discharge instructions?: Yes  Do you have all of your prescriptions and are they filled?: Yes  Have you been contacted by a [address]?: No  Have you scheduled your follow up appointment?: Yes  How are you going to get to your appointment?: Car - family or friend to transport  Were you discharged with any Home Care or Post Acute Services: No  Do you feel like you have everything you need to keep you well at home?: Yes  Care Transitions Interventions  No Identified Needs         Follow Up  Future Appointments   Date Time Provider Concepción Haley   4/12/2022 10:30 AM Diane Severe, [address] [facility]       Zina Thayer LPN

## 2022-04-08 LAB
EKG ATRIAL RATE: 74 BPM
EKG DIAGNOSIS: NORMAL
EKG P AXIS: 78 DEGREES
EKG P-R INTERVAL: 154 MS
EKG Q-T INTERVAL: 394 MS
EKG QRS DURATION: 88 MS
EKG QTC CALCULATION (BAZETT): 437 MS
EKG R AXIS: 50 DEGREES
EKG T AXIS: 56 DEGREES
EKG VENTRICULAR RATE: 74 BPM

## 2022-04-10 ENCOUNTER — FOLLOWUP TELEPHONE ENCOUNTER (OUTPATIENT)
Dept: INPATIENT UNIT | Age: 77
End: 2022-04-10

## 2022-04-10 NOTE — PROGRESS NOTES
CAM - spoke with patient by phone. (He had CTN call on 4/6 as well). Pt states he is doing well with supplemental O2 but still feels slight SOB with ambulation. He feels, however, this is improving. He is aware of follow up arranged for 4/12 but I urged him to call [facility] office - even after hours / weekends -- if he feels like he is \"going backwards\" or develops any new s/sx of HF flare. We reviewed yellow zone items which he denies at this time other than the SOBOE. Pt has all meds and is taking them as scheduled. Pt is aware of need for diet restrictions and claims he is compliant with sodium and is monitoring fluid intake. Pt voices no barriers to attending follow up as scheduled. I again encouraged him to call with any concerns. He has all contact info.

## 2022-04-12 ENCOUNTER — OFFICE VISIT (OUTPATIENT)
Dept: CARDIOLOGY CLINIC | Age: 77
End: 2022-04-12
Payer: COMMERCIAL

## 2022-04-12 VITALS
WEIGHT: 180.2 LBS | OXYGEN SATURATION: 95 % | HEART RATE: 82 BPM | DIASTOLIC BLOOD PRESSURE: 62 MMHG | SYSTOLIC BLOOD PRESSURE: 114 MMHG | HEIGHT: 73 IN | BODY MASS INDEX: 23.88 KG/M2

## 2022-04-12 DIAGNOSIS — I48.0 PAF (PAROXYSMAL ATRIAL FIBRILLATION) (HCC): ICD-10-CM

## 2022-04-12 DIAGNOSIS — I10 PRIMARY HYPERTENSION: ICD-10-CM

## 2022-04-12 DIAGNOSIS — E78.2 MIXED HYPERLIPIDEMIA: ICD-10-CM

## 2022-04-12 DIAGNOSIS — I50.31 ACUTE HEART FAILURE WITH PRESERVED EJECTION FRACTION (HFPEF) (HCC): Primary | ICD-10-CM

## 2022-04-12 PROCEDURE — 99495 TRANSJ CARE MGMT MOD F2F 14D: CPT | Performed by: NURSE PRACTITIONER

## 2022-04-12 PROCEDURE — 1111F DSCHRG MED/CURRENT MED MERGE: CPT | Performed by: NURSE PRACTITIONER

## 2022-04-12 RX ORDER — FUROSEMIDE 40 MG/1
40 TABLET ORAL 2 TIMES DAILY
Qty: 60 TABLET | Refills: 5 | Status: SHIPPED | OUTPATIENT
Start: 2022-04-12 | End: 2022-05-13 | Stop reason: ALTCHOICE

## 2022-04-12 NOTE — PROGRESS NOTES
CC CHF hospital follow up. HPI:  67 y.o. patient of Dr Arnaldo Phillips with chronic HFpEF, pAF, HTN, HLD, DM and COPD. He is feeling \"terrible\". His breathing is more labored. He experiences hypoxia on RA with minimal activity. He feels tired and fatigues easily. He doesn't feel torsemide is working as well as lasix. He sleep on the couch. Weight is trending down. Edema is better. Denies abdominal bloating. He is limiting his salt and weighs daily. No chest pain, LH/dizziness, syncope or falls. No n/v/d, fever or chills. No GI/ bleeding. He is using 2 liters oxygen  Home weight was 171 lbs down from 176 lbs     Past Medical History:   Diagnosis Date    Carotid stenosis, left 10/12/2011    Chronic back pain     Diastolic CHF ([facility]     Erectile dysfunction     Hyperlipidemia     Hypertension     Osteoarthritis     Type II or unspecified type diabetes mellitus without mention of complication, not stated as uncontrolled      Past Surgical History:   Procedure Laterality Date    KNEE SURGERY      ROTATOR CUFF REPAIR Bilateral      Family History   Problem Relation Age of Onset    Hearing Loss Father     Heart Disease Father 70        MI    High Blood Pressure Father     Diabetes Maternal Grandmother         hypoglycemic    Hearing Loss Maternal Grandmother     Arthritis Maternal Grandfather      Social History     Tobacco Use    Smoking status: Former Smoker     Packs/day: 2.00     Years: 40.00     Pack years: 80.00     Types: Cigarettes     Quit date: 10/24/2001     Years since quittin.4    Smokeless tobacco: Never Used   Substance Use Topics    Alcohol use: Yes     Alcohol/week: 0.0 standard drinks     Comment: rarely    Drug use: No     Allergies:Dye [iodides]    Review of Systems  General: +fatigue  HEENT: No blurry or decreased vision. No changes in hearing, nasal discharge or sore throat. Cardiovascular:  See HPI.    Respiratory: + SOB and hypoxia   Gastrointestinal:  No abdominal pain, hematochezia, melana, constipation, diarrhea, or history of GI ulcers. Genito-Urinary: No dysuria or hematuria. No urgency or polyuria. Musculoskeletal:  No complaints of joint pain, joint swelling or muscular weakness/soreness. Neurological:  No dizziness, headaches, numbness/tingling, speech problems or weakness. Psychological:  No anxiety or depression. Hematological and Lymphatic: No abnormal bleeding or bruising, blood clots, jaundice or swollen lymph nodes. Endocrine:   No malaise/lethargy, palpitations, polydipsia/polyuria, temperature intolerance or unexpected weight changes  Skin:  No rashes or non-healing ulcers. Physical Exam:  Ht 6' 1\" (1.854 m)   Wt 180 lb 3.2 oz (81.7 kg)   BMI 23.77 kg/m²    General (appearance):  No acute distress  Eyes: anicteric   Neck: soft, No JVD  Ears/Nose/Mouth/Thorat: No cyanosis  CV: RRR   Respiratory:  Diminished diffusely. No crackles or wheeze   GI: soft, non-tender, non-distended  Skin: Warm, dry. No rashes  Neuro/Psych: Alert and oriented x 3. Appropriate behavior  Ext:  No c/c.  Mild LE edema  Pulses:  2+ radial     Weight  Wt Readings from Last 3 Encounters:   04/12/22 180 lb 3.2 oz (81.7 kg)   04/05/22 181 lb 7 oz (82.3 kg)   04/02/22 182 lb 12.2 oz (82.9 kg)          CBC:   Lab Results   Component Value Date    WBC 8.6 04/04/2022    HGB 12.6 (L) 04/04/2022    HCT 39.8 (L) 04/04/2022    MCV 80.3 04/04/2022     04/04/2022     BMP:  Lab Results   Component Value Date    CREATININE 1.0 04/05/2022    BUN 37 (H) 04/05/2022     04/05/2022    K 4.6 04/05/2022    CL 91 (L) 04/05/2022    CO2 35 (H) 04/05/2022     Mag: No results found for: MG  LIVER PROFILE:   Lab Results   Component Value Date    ALT <5 (L) 03/31/2022    AST 13 (L) 03/31/2022    ALKPHOS 124 03/31/2022    BILITOT 0.3 03/31/2022     PT/INR: No results found for: INR, PROTIME  Pro-BNP   Lab Results   Component Value Date    PROBNP 936 04/03/2022    PROBNP 2,238 03/31/2022 PROBNP 2,080 2022     LIPIDS:  No components found for: CHLPL  Lab Results   Component Value Date    TRIG 89 2022    TRIG 96 2021    TRIG 92 12/10/2019     Lab Results   Component Value Date    HDL 81 (H) 2022    HDL 74 (H) 2021    HDL 99 (H) 12/10/2019     Lab Results   Component Value Date    LDLCALC 99 2022    LDLCALC 96 2021    LDLCALC 74 12/10/2019     Lab Results   Component Value Date    LABVLDL 18 2022    LABVLDL 19 2021    LABVLDL 18 12/10/2019     TSH:  Lab Results   Component Value Date    TSH 2.89 10/17/2016       IMAGIN/3/2022 CT chest     1. Tiny right pneumothorax. 2. Small to moderate right and small left pleural effusion. 3. Bilateral lower lobe airspace consolidation volume loss consistent with atelectasis. Superimposed pneumonia is not excluded. 4. Distended main pulmonary artery could indicate pulmonary hypertension. 3/10/2022 Nuc stress      Overall findings represent a low risk scan.    Normal LV size and systolic function.    Small basal inferior fixed defect, likely artifact, otherwise no evidence of    ischemia or prior infarction.    Non-diagnostic EKG response due to failure to reach target heart rate.    Non-diagnostic EKG response due to baseline abnormalities. 2022 Echo:     Left ventricular cavity size is normal.   There is mild concentric left ventricular hypertrophy. Left ventricular function is normal with ejection fraction estimated at   55-60%. Diastolic filling parameters suggest grade II diastolic dysfunction. Global L. Strain = -14.6%. Mitral annular calcification is present   The left atrium is mildly dilated. Mild tricuspid regurgitation. Mild pulmonic regurgitation present. There is a small localized near right atrium pericardial effusion noted   without any hemodynamic implications.    Estimated pulmonary artery systolic pressure is at 33 mmHg assuming a right   atrial pressure of 3 mmHg. 2 week CAM 1/27-2/10/22  NSR with frequent episodes of SVT, couple of those episodes appear to be AF RVR. Medications:   Current Outpatient Medications   Medication Sig Dispense Refill    torsemide (DEMADEX) 20 MG tablet Take 2 tablets by mouth daily 60 tablet 1    metoprolol succinate (TOPROL XL) 50 MG extended release tablet Take 1 tablet by mouth in the morning and at bedtime 30 tablet 3    apixaban (ELIQUIS) 5 MG TABS tablet Take 1 tablet by mouth 2 times daily 60 tablet 2    Semaglutide,0.25 or 0.5MG/DOS, (OZEMPIC, 0.25 OR 0.5 MG/DOSE,) 2 MG/1.5ML SOPN Inject as directed by physician. 2 pen 0    ramipril (ALTACE) 10 MG capsule TAKE 1 CAPSULE DAILY 90 capsule 1    pioglitazone (ACTOS) 45 MG tablet TAKE 1 TABLET DAILY 90 tablet 1    dapagliflozin (FARXIGA) 10 MG tablet TAKE 1 TABLET EVERY MORNING 90 tablet 1    metFORMIN (GLUCOPHAGE) 500 MG tablet TAKE 2 TABLETS 2 TIMES     DAILY WITH MEALS 360 tablet 3    acarbose (PRECOSE) 100 MG tablet TAKE 1 TABLET TWICE DAILY  WITH MEALS 180 tablet 3    pravastatin (PRAVACHOL) 40 MG tablet Take 1 tablet by mouth daily 90 tablet 3    glimepiride (AMARYL) 4 MG tablet Take 1 tablet by mouth 2 times daily (with meals) 180 tablet 3    FREESTYLE LITE strip USE TO TEST BLOOD SUGAR LEVEL ONCE DAILY AS NEEDED  100 each 0    sildenafil (VIAGRA) 100 MG tablet Take 1 tablet by mouth as needed for Erectile Dysfunction Max daily dose 100mg. 16 tablet 0    Lancets MISC 1 each by Does not apply route 2 times daily 300 each 1    Semaglutide,0.25 or 0.5MG/DOS, (OZEMPIC, 0.25 OR 0.5 MG/DOSE,) 2 MG/1.5ML SOPN Inject as directed by physician. (Patient not taking: Reported on 4/6/2022) 3 pen 0    Multiple Vitamin (MULTI VITAMIN MENS PO) Take by mouth Daily   (Patient not taking: Reported on 4/6/2022)       No current facility-administered medications for this visit. Assessment:  1. Acute heart failure with preserved ejection fraction (HFpEF) ([address]    2.  PA[facility]ox[facility]    3. Primary hypertension    4. Mixed hyperlipidemia        Plan:  Acute/chronic HF   Switch torsemide to furosemide 40 mg po bid   BMP in 1 week    Daily weights, low salt diet   Toprol   Ramipril   Vic Diallo  PAF: stable   Toprol   Eliquis  HTN: stable   /62   Toprol   Ramipril  HLD: stable    Pravastatin     Discussed s/s decompensated HF  Pt will follow up with Pulmonology for evaluation         Reviewed most recent: CBC, BMP, LFT, Lipids, Mag, PT/INR, BNP, TSH  Reviewed most recent: ECG, Echo, Nuc stress test,  CTA, LHC        Post-Discharge Transitional Care  Follow Up      Chelsea Rothman   YOB: 1945    Date of Office Visit:  4/12/2022  Date of Hospital Admission: 4/3/22  Date of Hospital Discharge: 4/5/22  Risk of hospital readmission (high >=14%. Medium >=10%) :Readmission Risk Score: 13.6 ( )      Care management risk score Rising risk (score 2-5) and Complex Care (Scores >=6): 3     Non face to face  following discharge, date last encounter closed (first attempt may have been earlier): 4/6/2022  4:40 PM    Call initiated 2 business days of discharge: Yes    ASSESSMENT/PLAN:   Acute heart failure with preserved ejection fraction (HFpEF) ([facility]  -     Basic Metabolic Panel; Future  -     GA DISCHARGE MEDS RECONCILED W/ CURRENT OUTPATIENT MED LIST  PAF (paroxysmal atrial fibrillation) (HCC)  -     GA DISCHARGE MEDS RECONCILED W/ CURRENT OUTPATIENT MED LIST  Primary hypertension  -     GA DISCHARGE MEDS RECONCILED W/ CURRENT OUTPATIENT MED LIST  Mixed hyperlipidemia  -     GA DISCHARGE MEDS RECONCILED W/ CURRENT OUTPATIENT MED LIST      Medical Decision Making: moderate complexity  Return in 1 month (on 5/12/2022).     On this date 4/12/2022 I have spent 35 minutes reviewing previous notes, test results and face to face with the patient discussing the diagnosis and importance of compliance with the treatment plan as well as documenting on the day of the visit. Subjective:   HPI:  Follow up of Hospital problems/diagnosis(es): CHF    Inpatient course: Discharge summary reviewed- see chart. Interval history/Current status:     Patient Active Problem List   Diagnosis    HTN (hypertension)    Hyperlipidemia    OA (osteoarthritis) of knee    Carotid stenosis, left    Hearing loss    ED (erectile dysfunction)    DM type 2 (diabetes mellitus, type 2) ([address]    Rotator cuff tear    Glenohumeral arthritis    Subacromial impingement    Trigger ring finger of right hand    Spinal stenosis of lumbar region    Closed compression fracture of L1 lumbar vertebra    Closed displaced fracture of lateral malleolus of left fibula    Exertional dyspnea    Atrial fibrillation with rapid ventricular response ([address]    Acute heart failure with preserved ejection fraction (HFpEF) ([address]    Acute heart failure ([address]    Hypoxia       Medications listed as ordered at the time of discharge from hospital     Medication List          Accurate as of April 12, 2022 11:07 AM. If you have any questions, ask your nurse or doctor.             START taking these medications    furosemide 40 MG tablet  Commonly known as: Lasix  Take 1 tablet by mouth 2 times daily  Started by: NATASHA Tyler - CNP        CONTINUE taking these medications    acarbose 100 MG tablet  Commonly known as: PRECOSE  TAKE 1 TABLET TWICE DAILY  WITH MEALS     apixaban 5 MG Tabs tablet  Commonly known as: Eliquis  Take 1 tablet by mouth 2 times daily     dapagliflozin 10 MG tablet  Commonly known as: Farxiga  TAKE 1 TABLET EVERY MORNING     FREESTYLE LITE strip  Generic drug: blood glucose test strips  USE TO TEST BLOOD SUGAR LEVEL ONCE DAILY AS NEEDED     glimepiride 4 MG tablet  Commonly known as: AMARYL  Take 1 tablet by mouth 2 times daily (with meals)     Lancets Misc  1 each by Does not apply route 2 times daily     metFORMIN 500 MG tablet  Commonly known as: GLUCOPHAGE  TAKE 2 TABLETS 2 TIMES DAILY WITH MEALS     metoprolol succinate 50 MG extended release tablet  Commonly known as: Toprol XL  Take 1 tablet by mouth in the morning and at bedtime     MULTI VITAMIN MENS PO     * Ozempic (0.25 or 0.5 MG/DOSE) 2 MG/1.5ML Sopn  Generic drug: Semaglutide(0.25 or 0.5MG/DOS)  Inject as directed by physician. * Ozempic (0.25 or 0.5 MG/DOSE) 2 MG/1.5ML Sopn  Generic drug: Semaglutide(0.25 or 0.5MG/DOS)  Inject as directed by physician.     pioglitazone 45 MG tablet  Commonly known as: ACTOS  TAKE 1 TABLET DAILY     pravastatin 40 MG tablet  Commonly known as: PRAVACHOL  Take 1 tablet by mouth daily     ramipril 10 MG capsule  Commonly known as: ALTACE  TAKE 1 CAPSULE DAILY     sildenafil 100 MG tablet  Commonly known as: Viagra  Take 1 tablet by mouth as needed for Erectile Dysfunction Max daily dose 100mg. * This list has 2 medication(s) that are the same as other medications prescribed for you. Read the directions carefully, and ask your doctor or other care provider to review them with you. Where to Get Your Medications      These medications were sent to [id number] - [place], [address] [address], [address] [facility]    Phone: [phone]   · furosemide 40 MG tablet           Medications marked \"taking\" at this time  Outpatient Medications Marked as Taking for the 4/12/22 encounter (Office Visit) with NATASHA Rabago - CNP   Medication Sig Dispense Refill    furosemide (LASIX) 40 MG tablet Take 1 tablet by mouth 2 times daily 60 tablet 5    metoprolol succinate (TOPROL XL) 50 MG extended release tablet Take 1 tablet by mouth in the morning and at bedtime 30 tablet 3    apixaban (ELIQUIS) 5 MG TABS tablet Take 1 tablet by mouth 2 times daily 60 tablet 2    Semaglutide,0.25 or 0.5MG/DOS, (OZEMPIC, 0.25 OR 0.5 MG/DOSE,) 2 MG/1.5ML SOPN Inject as directed by physician.  2 pen 0    ramipril (ALTACE) 10 MG capsule TAKE 1 CAPSULE DAILY 90 capsule 1    pioglitazone (ACTOS) 45 MG tablet TAKE 1 TABLET DAILY 90 tablet 1    dapagliflozin (FARXIGA) 10 MG tablet TAKE 1 TABLET EVERY MORNING 90 tablet 1    metFORMIN (GLUCOPHAGE) 500 MG tablet TAKE 2 TABLETS 2 TIMES     DAILY WITH MEALS 360 tablet 3    acarbose (PRECOSE) 100 MG tablet TAKE 1 TABLET TWICE DAILY  WITH MEALS 180 tablet 3    pravastatin (PRAVACHOL) 40 MG tablet Take 1 tablet by mouth daily 90 tablet 3    glimepiride (AMARYL) 4 MG tablet Take 1 tablet by mouth 2 times daily (with meals) 180 tablet 3    FREESTYLE LITE strip USE TO TEST BLOOD SUGAR LEVEL ONCE DAILY AS NEEDED  100 each 0    sildenafil (VIAGRA) 100 MG tablet Take 1 tablet by mouth as needed for Erectile Dysfunction Max daily dose 100mg. 16 tablet 0    Lancets MISC 1 each by Does not apply route 2 times daily 300 each 1        Medications patient taking as of now reconciled against medications ordered at time of hospital discharge: Yes    A comprehensive review of systems was negative except for what was noted in the HPI. Objective:    /62   Pulse 82   Ht 6' 1\" (1.854 m)   Wt 180 lb 3.2 oz (81.7 kg)   SpO2 95%   BMI 23.77 kg/m²   See above      An electronic signature was used to authenticate this note.   --Karen Craig, NATASHA - CNP

## 2022-04-19 ENCOUNTER — CARE COORDINATION (OUTPATIENT)
Dept: CASE MANAGEMENT | Age: 77
End: 2022-04-19

## 2022-04-19 DIAGNOSIS — I50.31 ACUTE HEART FAILURE WITH PRESERVED EJECTION FRACTION (HFPEF) (HCC): ICD-10-CM

## 2022-04-19 LAB
ANION GAP SERPL CALCULATED.3IONS-SCNC: 13 MMOL/L (ref 3–16)
BUN BLDV-MCNC: 29 MG/DL (ref 7–20)
CALCIUM SERPL-MCNC: 9.6 MG/DL (ref 8.3–10.6)
CHLORIDE BLD-SCNC: 94 MMOL/L (ref 99–110)
CO2: 32 MMOL/L (ref 21–32)
CREAT SERPL-MCNC: 0.8 MG/DL (ref 0.8–1.3)
GFR AFRICAN AMERICAN: >60
GFR NON-AFRICAN AMERICAN: >60
GLUCOSE BLD-MCNC: 290 MG/DL (ref 70–99)
POTASSIUM SERPL-SCNC: 5.3 MMOL/L (ref 3.5–5.1)
SODIUM BLD-SCNC: 139 MMOL/L (ref 136–145)

## 2022-04-19 NOTE — CARE COORDINATION
[facility]  Transitions Follow Up Call    2022    Patient: Fredrick Natarajan  Patient : 1945   MRN: [id number]   Reason for Admission: COVID -19 Monitoring   Discharge Date: 22 RARS: Readmission Risk Score: 13.6 ( )         Spoke with: [address] [place] [address] Transitions Subsequent and Final Call    Schedule Follow Up Appointment with PCP: Declined  Subsequent and Final Calls  Do you have any ongoing symptoms?: Yes  Onset of Patient-reported symptoms: In the past 7 days  Patient-reported symptoms: Shortness of Breath  Have your medications changed?: No  Do you have any questions related to your medications?: No  Do you currently have any active services?: No  Do you have any needs or concerns that I can assist you with?: No  Identified Barriers: None  Care Transitions Interventions  No Identified Needs  Other Interventions:         Summary  CTN spoke with patient this afternoon for follow up CTN/COVID -19 Monitoring call. Patient states he is doing better, still has SOB with exertion, but states it is a little better. No reports of any fever, chills, nausea, vomiting, chest pain, or cough. Patient states BLE Edema is significantly down, instructed to continue to follow low sodium diet, and fluid restrictions. Patient also states he does not have any weight gain today, is actually down 1.5 lbs. CTN encouraged patient to continue to monitor weight daily, reporting any 3 lb weight gain overnight, or 5 lb weight gain in a week. Patient had follow up with Cardiologist as well already. No other issues or concerns, no new or changed medications at this time. CTN provided education on s/s that require medical attention and when to seek medical attention. CTN advised Pt of use urgent care or physicians 24 hr access line if assistance is needed after hours or on the weekend. Pt denies any needs or concerns and is agreeable with additional calls.  Patient instructed to continue to monitor for any of the above s/s, reporting any that may present to MD immediately.       Follow Up  Future Appointments   Date Time Provider Concepción Haley   5/13/2022  3:00 PM MD Shreyas Gottlieb   7/18/2022  1:00 PM MD Leslie Warren P/CC ANDREIA Lovelace RN

## 2022-04-21 DIAGNOSIS — I50.31 ACUTE HEART FAILURE WITH PRESERVED EJECTION FRACTION (HFPEF) (HCC): Primary | ICD-10-CM

## 2022-04-21 DIAGNOSIS — Z79.899 LONG TERM USE OF DRUG: ICD-10-CM

## 2022-04-26 ENCOUNTER — TELEPHONE (OUTPATIENT)
Dept: PULMONOLOGY | Age: 77
End: 2022-04-26

## 2022-04-26 NOTE — TELEPHONE ENCOUNTER
Mr Durene Simpson called to get a hospital f/u appt with you and was scheduled for July. He called Dr Selvin Cotter office asking them to call us to see if they could get him in sooner to be seen by you. He is concerned about his condition, could you please look at this chart and see if  he needs to be sooner, if so, when.

## 2022-04-27 DIAGNOSIS — Z79.899 LONG TERM USE OF DRUG: ICD-10-CM

## 2022-04-27 DIAGNOSIS — I50.31 ACUTE HEART FAILURE WITH PRESERVED EJECTION FRACTION (HFPEF) (HCC): ICD-10-CM

## 2022-04-27 LAB
ANION GAP SERPL CALCULATED.3IONS-SCNC: 13 MMOL/L (ref 3–16)
BUN BLDV-MCNC: 23 MG/DL (ref 7–20)
CALCIUM SERPL-MCNC: 9.3 MG/DL (ref 8.3–10.6)
CHLORIDE BLD-SCNC: 93 MMOL/L (ref 99–110)
CO2: 34 MMOL/L (ref 21–32)
CREAT SERPL-MCNC: 0.7 MG/DL (ref 0.8–1.3)
GFR AFRICAN AMERICAN: >60
GFR NON-AFRICAN AMERICAN: >60
GLUCOSE BLD-MCNC: 232 MG/DL (ref 70–99)
POTASSIUM SERPL-SCNC: 4.1 MMOL/L (ref 3.5–5.1)
SODIUM BLD-SCNC: 140 MMOL/L (ref 136–145)

## 2022-04-28 ENCOUNTER — CARE COORDINATION (OUTPATIENT)
Dept: CASE MANAGEMENT | Age: 77
End: 2022-04-28

## 2022-04-28 ENCOUNTER — TELEPHONE (OUTPATIENT)
Dept: CARDIOLOGY CLINIC | Age: 77
End: 2022-04-28

## 2022-04-28 NOTE — TELEPHONE ENCOUNTER
Informed pt Seth De Paz NP reviewed pt's labs and results for the labs are good, pt to continue lasix. Pt verbalized understanding.

## 2022-04-28 NOTE — CARE COORDINATION
[facility] Transitions Follow Up Call    2022    Patient: Chen Yepez  Patient : 1945   MRN: <[id number]>  Reason for Admission: CHF  Discharge Date: 22 RARS: Readmission Risk Score: 13.6 ( )         Spoke with: [address] [facility] Transitions Follow Up Call    Needs to be reviewed by the provider   Additional needs identified to be addressed with provider: No  none             Method of communication with provider : none      Care Transition Nurse (CTN) contacted the patient by telephone to follow up after admission. Verified name and  with patient as identifiers. Addressed changes since last contact: none  Discussed follow-up appointments. If no appointment was previously scheduled, appointment scheduling offered: Yes. Is follow up appointment scheduled within 7 days of discharge? Yes. Advance Care Planning:   Does patient have an Advance Directive: decision maker updated. Advance Care Planning   Healthcare Decision Maker:    Primary Decision Maker: Amanda Peoples - [facility] - [phone]    CTN reviewed discharge instructions, medical action plan and red flags with patient and discussed any barriers to care and/or understanding of plan of care after discharge. Discussed appropriate site of care based on symptoms and resources available to patient including: PCP  Specialist. The patient agrees to contact the PCP office for questions related to their healthcare. Patients top risk factors for readmission: medical condition-CHF    Patient verified  and was pleasant and agreeable to transition calls. States he is fantastic, but his SOB is still terrible. MDs aware. Patient is scheduled for pulmonology . Denies cough, edema, orthopnea. Weight \"fluctuating\" but denies that it is over his dry weight. No values given, despite being asked. Has pulse ox, states values are all over the place. Sometimes drops down while walking, sometimes he does not. Appetite good. Denies problems with bowels or bladder. Reviewed fluid restrictions. Denies medication changes. Cardiology f/u 5/13. Denies needs. CTN provided contact information for future needs. Plan for follow-up call in 5-7 days based on severity of symptoms and risk factors. Plan for next call: symptom management-SOB  self management-O2 monitoring  follow up appointment-pulmonology 5/11, cardiology 5/13  medication management-new or changed    Care Transitions Subsequent and Final Call    Subsequent and Final Calls  Do you have any ongoing symptoms?: Yes  Onset of Patient-reported symptoms: Today  Patient-reported symptoms: Shortness of Breath  Have your medications changed?: No  Do you have any questions related to your medications?: No  Do you currently have any active services?: No  Do you have any needs or concerns that I can assist you with?: No  Identified Barriers: None  Care Transitions Interventions  Other Interventions:               Follow Up  Future Appointments   Date Time Provider Concepción Haley   5/11/2022  3:40 PM MD Shreyas Howard/[facility]   5/13/2022  3:00 PM MD Jocelyn Olson [address]

## 2022-05-04 ENCOUNTER — CARE COORDINATION (OUTPATIENT)
Dept: CASE MANAGEMENT | Age: 77
End: 2022-05-04

## 2022-05-04 NOTE — CARE COORDINATION
[facility] Transitions Follow Up Call    2022    Patient: Nataliya Erazo  Patient : 1945   MRN: [id number]   Reason for Admission: COVID -19 Monitoring/Risk  Discharge Date: 22 RARS: Readmission Risk Score: 13.6 ( )         Spoke with: Nataliya Erazo     CTN spoke with patient this afternoon for follow up CTN call. Patient states he is doing well, reporting no complaints of any fever, chills, nausea, vomiting, chest pain, SOB or cough. Patient with no congestion, pain, difficulty emptying bladder, LE edema, feeling lightheaded, dizziness, and heart palpitations. Patient instructed to continue to monitor for any of the above s/s, reporting any that may present to MD immediately. No other issues or concerns, no new or changed medications at this time. Care Transitions Follow Up Call    Needs to be reviewed by the provider   Additional needs identified to be addressed with provider: No  none             Method of communication with provider : none      Verified name and  with patient as identifiers. Addressed changes since last contact: none  Discussed follow-up appointments. If no appointment was previously scheduled, appointment scheduling offered: Yes. Is follow up appointment scheduled within 7 days of discharge? Yes. Advance Care Planning:   Does patient have an Advance Directive: not on file. CTN reviewed discharge instructions, medical action plan and red flags with patient and discussed any barriers to care and/or understanding of plan of care after discharge. Discussed appropriate site of care based on symptoms and resources available to patient including: PCP  Specialist  Urgent care clinics  When to call 911. The patient agrees to contact the PCP office for questions related to their healthcare.      Patients top risk factors for readmission: medication management  multiple health system providers  polypharmacy  Interventions to address risk factors: Education of patient/family/caregiver/guardian to support self-management-Instructed to continue to monitor for any issues or concerns, reporting to MD immediately. CTN provided contact information for future needs. No further follow-up call indicated based on severity of symptoms and risk factors. Care Transitions Subsequent and Final Call    Schedule Follow Up Appointment with PCP: Declined  Subsequent and Final Calls  Do you have any ongoing symptoms?: No  Have your medications changed?: No  Do you have any questions related to your medications?: No  Do you currently have any active services?: No  Do you have any needs or concerns that I can assist you with?: No  Identified Barriers: None  Care Transitions Interventions  No Identified Needs  Other Interventions:            Follow Up  Future Appointments   Date Time Provider Concepción Haley   5/11/2022  3:40 PM MD Shreyas Brennan/[facility]ELL [facility]   5/13/2022  3:00 PM MD Shreyas Hartley [facility]     Thank You,    Lieutenant Julio RN  Care Transition Coordinator  Contact HZJKFZ:[phone]

## 2022-05-11 ENCOUNTER — OFFICE VISIT (OUTPATIENT)
Dept: PULMONOLOGY | Age: 77
End: 2022-05-11
Payer: COMMERCIAL

## 2022-05-11 VITALS
DIASTOLIC BLOOD PRESSURE: 81 MMHG | HEIGHT: 73 IN | OXYGEN SATURATION: 100 % | WEIGHT: 185 LBS | HEART RATE: 80 BPM | SYSTOLIC BLOOD PRESSURE: 135 MMHG | TEMPERATURE: 96.5 F | BODY MASS INDEX: 24.52 KG/M2

## 2022-05-11 DIAGNOSIS — R91.8 MASS OF LEFT LUNG: Primary | ICD-10-CM

## 2022-05-11 DIAGNOSIS — J90 CHRONIC BILATERAL PLEURAL EFFUSIONS: ICD-10-CM

## 2022-05-11 DIAGNOSIS — J93.11 PRIMARY SPONTANEOUS PNEUMOTHORAX: ICD-10-CM

## 2022-05-11 DIAGNOSIS — J96.11 CHRONIC RESPIRATORY FAILURE WITH HYPOXIA (HCC): ICD-10-CM

## 2022-05-11 PROCEDURE — 99214 OFFICE O/P EST MOD 30 MIN: CPT | Performed by: INTERNAL MEDICINE

## 2022-05-11 NOTE — PROGRESS NOTES
[facility] Pulmonary and Critical Care    Outpatient Follow Up Note    Subjective:   CHIEF COMPLAINT / HPI:     The patient is 67 y.o. male who presents today for hospital discharge follow up for dyspnea, chf and pneumothorax. [place] went well when he was admitted for shortness of breath, hypoxia and volume overload. He been having worsening dyspnea for several months. He did improve to the point of being able to go home with diuresis but he has not gotten back to normal.  He and his wife state that he still struggles with dyspnea and fatigue just walking 12 steps. He has been compliant with his diuretics and the swelling in his legs is down considerably. However he states that he still will drop his sats if he walks any extended distance even on the 3 L of oxygen. He is not a real active juan f to begin with but he is getting very frustrated with how little he can do. He is to be a 1 to 2 pack a day smoker as a  for years but quit in .     Past Medical History:    Past Medical History:   Diagnosis Date    Carotid stenosis, left 10/12/2011    Chronic back pain     Diastolic CHF (HCC)     Erectile dysfunction     Hyperlipidemia     Hypertension     Osteoarthritis     Type II or unspecified type diabetes mellitus without mention of complication, not stated as uncontrolled        Social History:    Social History     Tobacco Use   Smoking Status Former Smoker    Packs/day: 2.00    Years: 40.00    Pack years: 80.00    Types: Cigarettes    Quit date: 10/24/2001    Years since quittin.5   Smokeless Tobacco Never Used       Current Medications:  Current Outpatient Medications on File Prior to Visit   Medication Sig Dispense Refill    furosemide (LASIX) 40 MG tablet Take 1 tablet by mouth 2 times daily 60 tablet 5    metoprolol succinate (TOPROL XL) 50 MG extended release tablet Take 1 tablet by mouth in the morning and at bedtime 30 tablet 3    apixaban (ELIQUIS) 5 MG TABS tablet Take 1 tablet by mouth 2 times daily 60 tablet 2    Semaglutide,0.25 or 0.5MG/DOS, (OZEMPIC, 0.25 OR 0.5 MG/DOSE,) 2 MG/1.5ML SOPN Inject as directed by physician. 2 pen 0    ramipril (ALTACE) 10 MG capsule TAKE 1 CAPSULE DAILY 90 capsule 1    pioglitazone (ACTOS) 45 MG tablet TAKE 1 TABLET DAILY 90 tablet 1    dapagliflozin (FARXIGA) 10 MG tablet TAKE 1 TABLET EVERY MORNING 90 tablet 1    metFORMIN (GLUCOPHAGE) 500 MG tablet TAKE 2 TABLETS 2 TIMES     DAILY WITH MEALS 360 tablet 3    acarbose (PRECOSE) 100 MG tablet TAKE 1 TABLET TWICE DAILY  WITH MEALS 180 tablet 3    pravastatin (PRAVACHOL) 40 MG tablet Take 1 tablet by mouth daily 90 tablet 3    glimepiride (AMARYL) 4 MG tablet Take 1 tablet by mouth 2 times daily (with meals) 180 tablet 3    FREESTYLE LITE strip USE TO TEST BLOOD SUGAR LEVEL ONCE DAILY AS NEEDED  100 each 0    sildenafil (VIAGRA) 100 MG tablet Take 1 tablet by mouth as needed for Erectile Dysfunction Max daily dose 100mg. 16 tablet 0    Lancets MISC 1 each by Does not apply route 2 times daily 300 each 1     No current facility-administered medications on file prior to visit.        REVIEW OF SYSTEMS:    CONSTITUTIONAL: Negative for fevers and chills  HEENT: Negative for oropharyngeal exudate, post nasal drip, sinus pain / pressure, nasal congestion, ear pain  RESPIRATORY:  See HPI  CARDIOVASCULAR: Negative for chest pain, palpitations, edema  GASTROINTESTINAL: Negative for nausea, vomiting, diarrhea, constipation and abdominal pain  HEMATOLOGICAL: Negative for adenopathy  SKIN: Negative for clubbing, cyanosis, skin lesions  EXTREMITIES: Negative for weakness, decreased ROM  NEUROLOGICAL: Negative for unilateral weakness, speech or gait abnormalities  PSYCH: Negative for anxiety, depression    Objective:   PHYSICAL EXAM:        VITALS:  /81 (Site: Right Upper Arm, Position: Sitting, Cuff Size: Medium Adult)   Pulse 80   Temp 96.5 °F (35.8 °C) (Infrared)   Ht 6' 1\" (1.854 m)   Wt 185 lb (83.9 kg)   SpO2 100% Comment: 2L  BMI 24.41 kg/m²     CONSTITUTIONAL:  Awake, alert, cooperative, no apparent distress, and appears stated age  HEENT: No oropharyngeal exudate, PERRL, no cervical adenopathy, no tracheal deviation, thyroid size normal  LUNGS:  No increased work of breathing and clear to auscultation, no crackles. Decreased breath sounds at the bases bilaterally with eating a changes. CARDIOVASCULAR:  normal S1 and S2 and no JVD  ABDOMEN:  Normal bowel sounds, non-distended and non-tender to palpation  EXT: No edema, no calf tenderness. Pulses are present bilaterally. NEUROLOGIC:  Mental Status Exam:  Level of Alertness:   awake  Orientation:   person, place, time. SKIN:  normal skin color, texture, turgor, no redness, warmth, or swelling     DATA:      Radiology Review:  Pertinent images / reports were reviewed as a part of this visit. CT chest reveals the following:  April 2022:  Impression       1. Tiny right pneumothorax. 2. Small to moderate right and small left pleural effusion. 3. Bilateral lower lobe airspace consolidation volume loss consistent with atelectasis. Superimposed pneumonia is not excluded. 4. Distended main pulmonary artery could indicate pulmonary hypertension       Last PFTs:  None on file    Immunization History   Administered Date(s) Administered    COVID-19, Pfizer Purple top, DILUTE for use, 12+ yrs, 30mcg/0.3mL dose 03/18/2021, 04/08/2021, 01/15/2022    Influenza 10/24/2011    Influenza Virus Vaccine 10/27/2014    Influenza, High Dose (Fluzone 65 yrs and older) 10/15/2015, 10/10/2016, 11/11/2017, 09/12/2018, 11/06/2019    Pneumococcal Conjugate 13-valent ([id number]) 06/05/2015    Pneumococcal Conjugate 7-valent (Prevnar7) 09/03/2013    Pneumococcal Polysaccharide ([id number]) 09/03/2013    Tdap (Boostrix, Adacel) 08/20/2012    Zoster Live (Zostavax) 09/01/2013         Assessment:    This is a 67 y.o. male with pleural effusions, pneumothorax and chronic hypoxemic respiratory failure with dyspnea on exertion. Plan:   Patient had an odd presentation and imaging and has not responded as well as I would have hoped for treatment of his congestive heart failure. He appears only mildly volume overloaded on exam today but still has essentially absent breath sounds at the bases bilaterally. He had pleural effusions on the previous CT scan and he had near complete atelectasis of the left lower lobe which could be hiding rounded atelectasis or pulmonary lesion like tumor. In addition he had an asymptomatic right pneumothorax on the CT scan in April. His dyspnea has not worsened but there is no improvement either, on exam he does not have any mediastinal shift, crepitus or unequal breath sounds. Is unclear why he had a pneumothorax as he has not been exerting himself and he does not have evidence of emphysema or blebs. I think a repeat CT scan is needed to ensure a that the pneumothorax has resolved, be that the pleural effusions have decreased and see to make sure there are no underlying lesions that could have caused these to develop in the first place. Based on exam I suspect there is still a significant amount of pleural effusion and it would be safe to say that he has had adequate diuresis at this point such that a thoracentesis for therapeutic and diagnostic purposes may be indicated. While it would be unusual for a patient to have bilateral malignant pleural effusions its not impossible so I am inclined to be aggressive but need to see the imaging before determining a final plan. Diagnosis Orders   1. Mass of left lung  CT CHEST WO CONTRAST         The patient is not currently smoking. Follow-up to be determined by the results of the CT scan and possible procedures thereafter. Call or RTC sooner if symptoms persist or worsen acutely.       Derrek Ormond, MD

## 2022-05-12 NOTE — DISCHARGE SUMMARY
Hospital Discharge Summary    Patient's PCP: Phuc Brewer MD  Admit Date: 4/3/2022   Discharge Date: 5/11/2022    Admitting Physician: Amor Diego MD  Discharge Physician: Ariella Walsh MD   Consults: cardiology and pulmonary/intensive care      Discharge Diagnoses:        #Acute on chronic diastolic heart failure   torsemide 40 mg daily.  -Continue with Eliquis, ramipril 10 mg daily and Toprol 50 mg twice daily       #Hypoxia  #Suspect underlying COPD  Responded to neb,     #A. fib paroxysmal.  Rate controlled with metoprolol. Anticoagulation with Eliquis.       #Diabetes mellitus type 2  Hold oral antidiabetic agent. Continue with sliding scale. Will adjust doses per need             Patient Active Problem List   Diagnosis    HTN (hypertension)    Hyperlipidemia    OA (osteoarthritis) of knee    Carotid stenosis, left    Hearing loss    ED (erectile dysfunction)    DM type 2 (diabetes mellitus, type 2) ([facility])    Rotator cuff tear    Glenohumeral arthritis    Subacromial impingement    Trigger ring finger of right hand    Spinal stenosis of lumbar region    Closed compression fracture of L1 lumbar vertebra    Closed displaced fracture of lateral malleolus of left fibula    Exertional dyspnea    Atrial fibrillation with rapid ventricular response ([facility])    Acute heart failure with preserved ejection fraction (HFpEF) ([facility])    Acute heart failure ([facility] [address]    Hypoxia       Physical Exam: /70   Pulse 69   Temp 98.7 °F (37.1 °C) (Oral)   Resp 16   Ht 6' 1\" (1.854 m)   Wt 181 lb 7 oz (82.3 kg)   SpO2 93%   BMI 23.94 kg/m²   No results for input(s): POCGLU in the last 72 hours. General appearance: No apparent distress, appears stated age and cooperative. HEENT: Pupils equal, round, and reactive to light. Conjunctivae/corneas clear. Neck: Supple, with full range of motion. No jugular venous distention. Trachea midline. Respiratory:  Normal respiratory effort.  CTAB Cardiovascular: Regular rate and rhythm with normal S1/S2 without murmurs, rubs or gallops. Abdomen: Soft, non-tender, non-distended with normal bowel sounds. Musculoskeletal: No clubbing, cyanosis or edema bilaterally. Full range of motion without deformity. Skin: Skin color, texture, turgor normal.  No rashes or lesions. Neurologic:  Neurovascularly intact without any focal sensory/motor deficits. Cranial nerves: II-XII intact, grossly non-focal.  Psychiatric: Alert and oriented, thought content appropriate, normal insight  Capillary Refill: Brisk,< 3 seconds   Peripheral Pulses: +2 palpable, equal bilaterally        Discharge Medications:     Medication List      CONTINUE taking these medications    acarbose 100 MG tablet  Commonly known as: PRECOSE  TAKE 1 TABLET TWICE DAILY  WITH MEALS     apixaban 5 MG Tabs tablet  Commonly known as: Eliquis  Take 1 tablet by mouth 2 times daily     dapagliflozin 10 MG tablet  Commonly known as: Farxiga  TAKE 1 TABLET EVERY MORNING     FREESTYLE LITE strip  Generic drug: blood glucose test strips  USE TO TEST BLOOD SUGAR LEVEL ONCE DAILY AS NEEDED     glimepiride 4 MG tablet  Commonly known as: AMARYL  Take 1 tablet by mouth 2 times daily (with meals)     Lancets Misc  1 each by Does not apply route 2 times daily     metFORMIN 500 MG tablet  Commonly known as: GLUCOPHAGE  TAKE 2 TABLETS 2 TIMES     DAILY WITH MEALS     metoprolol succinate 50 MG extended release tablet  Commonly known as:  Toprol XL  Take 1 tablet by mouth in the morning and at bedtime     Ozempic (0.25 or 0.5 MG/DOSE) 2 MG/1.5ML Sopn  Generic drug: Semaglutide(0.25 or 0.5MG/DOS)  Inject as directed by physician.     pioglitazone 45 MG tablet  Commonly known as: ACTOS  TAKE 1 TABLET DAILY     pravastatin 40 MG tablet  Commonly known as: PRAVACHOL  Take 1 tablet by mouth daily     ramipril 10 MG capsule  Commonly known as: ALTACE  TAKE 1 CAPSULE DAILY     sildenafil 100 MG tablet  Commonly known as: Viagra  Take 1 tablet by mouth as needed for Erectile Dysfunction Max daily dose 100mg. STOP taking these medications    torsemide 20 MG tablet  Commonly known as: DEMADEX           Activity: activity as tolerated  Diet: regular diet  Wound Care: none needed    Disposition: home  Discharged Condition: Stable  Follow Up: Primary Care Physician in one week    Total time spent on discharge, finalizing medications, referrals and arranging outpatient follow up was more than 30 minutes    Thank you Dr. Cassia Li MD for the opportunity to be involved in this patients care. If you have any questions or concerns please feel free to contact me at [phone].

## 2022-05-13 ENCOUNTER — OFFICE VISIT (OUTPATIENT)
Dept: CARDIOLOGY CLINIC | Age: 77
End: 2022-05-13
Payer: COMMERCIAL

## 2022-05-13 VITALS
WEIGHT: 185 LBS | SYSTOLIC BLOOD PRESSURE: 128 MMHG | HEART RATE: 76 BPM | DIASTOLIC BLOOD PRESSURE: 66 MMHG | BODY MASS INDEX: 24.41 KG/M2

## 2022-05-13 DIAGNOSIS — Z79.899 LONG TERM USE OF DRUG: ICD-10-CM

## 2022-05-13 DIAGNOSIS — I50.31 ACUTE HEART FAILURE WITH PRESERVED EJECTION FRACTION (HFPEF) (HCC): Primary | ICD-10-CM

## 2022-05-13 PROCEDURE — 99214 OFFICE O/P EST MOD 30 MIN: CPT | Performed by: INTERNAL MEDICINE

## 2022-05-13 RX ORDER — TORSEMIDE 100 MG/1
100 TABLET ORAL DAILY
Qty: 90 TABLET | Refills: 3 | Status: SHIPPED | OUTPATIENT
Start: 2022-05-13 | End: 2022-07-01 | Stop reason: ALTCHOICE

## 2022-05-13 NOTE — PROGRESS NOTES
Cc: HFpEF, PAFRVR    HPI:     Patient is a 79-year-old man with history of HTN, HLP, poorly controlled diabetes, PAFRVR, HFpEF, past heavy smoking 50-pack-year history, quit 25 years ago), COPD on 2 liters oxygen, h/o pleural effusions and pneumothorax.      Echo 1/25/2022: Normal LV size, mild LVH, LVEF 88%, diastolic grade 2, mild LAE, normal RV, mild valvular disease.     ECG 1/19/2022: Normal sinus rhythm with frequent PACs, poor R wave progression, cannot exclude old anterior MI.     Labs 1/24/2022: TSH 3.0 normal, creatinine 1.8, K5.0, FLP: , HDL 81, LDL 99, TG 89 on pravachol 40 daily.  Hemoglobin A1c 8.1.     CAM 2-week 1/27-2/10/2022: NSR with frequent episodes of SVT, couple of those episodes appear to be AF RVR.     Carleen 3/10/2022: Normal    Patient had an admission in 04/2022 at [facility] for AHF and AECOPD. Patient is here for a follow up. He was on torsemide 40 daily but no effect, hence switched to lasix 40 bid, still no effect. He continues to have dyspnea with mild exertion (walking a few feet), his weight has increased to 180 lbs but remains stable now, he has mild leg edema. He has seen Dr Raómn Wall, pulmonology, plan for CT chest to assess for possible pleural effusions and/or pneumothorax. Histories     Past Medical History:   has a past medical history of Carotid stenosis, left, Chronic back pain, Diastolic CHF ([address] Erectile dysfunction, Hyperlipidemia, Hypertension, Osteoarthritis, and Type II or unspecified type diabetes mellitus without mention of complication, not stated as uncontrolled. Surgical History:   has a past surgical history that includes Rotator cuff repair (Bilateral) and knee surgery. Social History:   reports that he quit smoking about 20 years ago. His smoking use included cigarettes. He has a 80.00 pack-year smoking history. He has never used smokeless tobacco. He reports current alcohol use. He reports that he does not use drugs.      Family History:  No evidence for sudden cardiac death or premature CAD      Medications:     Home medications were reviewed and are listed below    Prior to Admission medications    Medication Sig Start Date End Date Taking? Authorizing Provider   torsemide (DEMADEX) 100 MG tablet Take 1 tablet by mouth daily 5/13/22  Yes Bre Roberson MD   metoprolol succinate (TOPROL XL) 50 MG extended release tablet Take 1 tablet by mouth in the morning and at bedtime 4/2/22  Yes Aria Foster MD   apixaban (ELIQUIS) 5 MG TABS tablet Take 1 tablet by mouth 2 times daily 1/19/22  Yes Benji Neil MD   Semaglutide,0.25 or 0.5MG/DOS, (OZEMPIC, 0.25 OR 0.5 MG/DOSE,) 2 MG/1.5ML SOPN Inject as directed by physician. 1/5/22  Yes Benji Neil MD   ramipril (ALTACE) 10 MG capsule TAKE 1 CAPSULE DAILY 11/15/21  Yes Benji Neil MD   pioglitazone (ACTOS) 45 MG tablet TAKE 1 TABLET DAILY 11/15/21  Yes Benji Neil MD   dapagliflozin (FARXIGA) 10 MG tablet TAKE 1 TABLET EVERY MORNING 11/15/21  Yes Benji Neil MD   metFORMIN (GLUCOPHAGE) 500 MG tablet TAKE 2 TABLETS 2 TIMES     DAILY WITH MEALS 10/20/21  Yes NATASHA Sheppard CNP   acarbose (PRECOSE) 100 MG tablet TAKE 1 TABLET TWICE DAILY  WITH MEALS 10/20/21  Yes NATASHA Sheppard CNP   pravastatin (PRAVACHOL) 40 MG tablet Take 1 tablet by mouth daily 10/20/21  Yes NATASHA Sheppard CNP   glimepiride (AMARYL) 4 MG tablet Take 1 tablet by mouth 2 times daily (with meals) 10/20/21  Yes NATASHA Sheppard CNP   FREESTYLE LITE strip USE TO TEST BLOOD SUGAR LEVEL ONCE DAILY AS NEEDED  7/23/21  Yes Benji Neil MD   sildenafil (VIAGRA) 100 MG tablet Take 1 tablet by mouth as needed for Erectile Dysfunction Max daily dose 100mg. 5/26/21 5/21/22 Yes Benji Neil MD   Lancets MISC 1 each by Does not apply route 2 times daily 1/20/21  Yes Benji Neil MD          Allergy:     Dye [iodides]       Review of Systems:     All 12 point review of symptoms completed. Pertinent positives identified in the HPI, all other review of symptoms negative as below. CONSTITUTIONAL: No fatigue  SKIN: No rash or pruritis. EYES: No visual changes or diplopia. No scleral icterus. ENT: No Headaches, hearing loss or vertigo. No mouth sores or sore throat. CARDIOVASCULAR: No chest pain/chest pressure/chest discomfort. No palpitations. +  edema. RESPIRATORY: ++  dyspnea. No cough or wheezing, no sputum production. GASTROINTESTINAL: No N/V/D. No abdominal pain, appetite loss, blood in stools. GENITOURINARY: No dysuria, trouble voiding, or hematuria. MUSCULOSKELETAL:  No gait disturbance, weakness or joint complaints. NEUROLOGICAL: No headache, diplopia, change in muscle strength, numbness or tingling. No change in gait, balance, coordination, mood, affect, memory, mentation, behavior. PSHYCH: No anxiety, loss of interest, change in sexual behavior, feelings of self-harm, or confusion. ENDOCRINE: No excessive thirst, fluid intake, or urination. No tremor. HEMATOLOGIC: No abnormal bruising or bleeding. ALLERGY: No nasal congestion or hives.       Physical Examination:     Vitals:    05/13/22 1505   BP: 128/66   Pulse: 76   Weight: 185 lb (83.9 kg)       Wt Readings from Last 3 Encounters:   05/13/22 185 lb (83.9 kg)   05/11/22 185 lb (83.9 kg)   04/12/22 180 lb 3.2 oz (81.7 kg)         General Appearance:  Alert, cooperative, no distress, appears stated age Appropriate weight   Head:  Normocephalic, without obvious abnormality, atraumatic   Eyes:  PERRL, conjunctiva/corneas clear EOM intact  Ears normal   Throat no lesions       Nose: Nares normal, no drainage or sinus tenderness   Throat: Lips, mucosa, and tongue normal   Neck: Supple, symmetrical, trachea midline, no adenopathy, thyroid: not enlarged, symmetric, no tenderness/mass/nodules, no carotid bruit       Lungs:   Absent breath sounds bilaterally, respirations unlabored   Chest Wall:  No tenderness or deformity   Heart: Regular rhythm, rate is controlled, S1, S2 normal, there is no murmur, there is no rub or gallop, cannot assess jvd, 1+ bilateral lower extremity edema   Abdomen:   Soft, non-tender, bowel sounds active all four quadrants,  no masses, no organomegaly       Extremities: Extremities normal, atraumatic, no cyanosis   Pulses: 2+ and symmetric   Skin: Skin color, texture, turgor normal, no rashes or lesions   Pysch: Normal mood and affect   Neurologic: Normal gross motor and sensory exam.  Cranial nerves intact        Labs:     Lab Results   Component Value Date    WBC 8.6 04/04/2022    HGB 12.6 (L) 04/04/2022    HCT 39.8 (L) 04/04/2022    MCV 80.3 04/04/2022     04/04/2022     Lab Results   Component Value Date     04/27/2022    K 4.1 04/27/2022    CL 93 (L) 04/27/2022    CO2 34 (H) 04/27/2022    BUN 23 (H) 04/27/2022    CREATININE 0.7 (L) 04/27/2022    GLUCOSE 232 (H) 04/27/2022    CALCIUM 9.3 04/27/2022    PROT 6.7 03/31/2022    LABALBU 3.4 03/31/2022    BILITOT 0.3 03/31/2022    ALKPHOS 124 03/31/2022    AST 13 (L) 03/31/2022    ALT <5 (L) 03/31/2022    LABGLOM >60 04/27/2022    GFRAA >60 04/27/2022    AGRATIO 1.2 01/24/2022    GLOB 3.0 10/20/2021         Lab Results   Component Value Date    CHOL 198 01/24/2022    CHOL 189 01/07/2021    CHOL 191 12/10/2019     Lab Results   Component Value Date    TRIG 89 01/24/2022    TRIG 96 01/07/2021    TRIG 92 12/10/2019     Lab Results   Component Value Date    HDL 81 (H) 01/24/2022    HDL 74 (H) 01/07/2021    HDL 99 (H) 12/10/2019     Lab Results   Component Value Date    LDLCALC 99 01/24/2022    LDLCALC 96 01/07/2021    LDLCALC 74 12/10/2019     Lab Results   Component Value Date    LABVLDL 18 01/24/2022    LABVLDL 19 01/07/2021    LABVLDL 18 12/10/2019     No results found for: CHOLHDLRATIO    No results found for: INR, PROTIME    The 10-year ASCVD risk score (Lynnette Banda et al., 2013) is: 47%    Values used to calculate the score:      Age: 67 years      Sex: Male      Is Non- : No      Diabetic: Yes      Tobacco smoker: No      Systolic Blood Pressure: 896 mmHg      Is BP treated: Yes      HDL Cholesterol: 81 mg/dL      Total Cholesterol: 198 mg/dL      Assessment / Plan:      Diagnosis Orders   1. Acute heart failure with preserved ejection fraction (HFpEF) ([facility])  Basic Metabolic Panel   2. Long term use of drug  Basic Metabolic Panel        1. Hypoxic respiratory failure: It may be recurrent pleural effusions vs pneumothorax vs acute heart failure in the setting of severe COPD on 2 liters oxygen.     -Change lasix 40 bid to torsemide 100 mg daily  -Check a BMP in 1 week. -Follow up with CT chest ordered by Dr Lucinda Ortega    2. Acute on chronic HFpEF:   Per #1. 3. PAF with RVR:   Patient remains in sinus.     -Cw Toprol 50 bid and eliquis. 4. HTN:   BP is controlled. -Cw BB and ramipril 10 daily    5. HLP:   Currently on pravastatin 40        Return in about 4 weeks (around 6/10/2022). with me or Janet Parker NP      I have spent 35 minutes of face to face time with the patient with more than 50% spent counseling and coordinating care. I have personally reviewed the reports and images of labs, radiological studies, cardiac studies including ECG's and telemetry, current and old medical records. The note was completed using EMR and Dragon dictation system. Every effort was made to ensure accuracy; however, inadvertent computerized transcription errors may be present. All questions and concerns were addressed to the patient/family. Alternatives to my treatment were discussed. I would like to thank you for providing me the opportunity to participate in the care of your patient. If you have any questions, please do not hesitate to contact me.      Javier Wilson MD, [address] Office Phone: [phone]  Fax: [phone]

## 2022-05-17 ENCOUNTER — HOSPITAL ENCOUNTER (OUTPATIENT)
Dept: CT IMAGING | Age: 77
Discharge: HOME | End: 2022-05-17
Payer: COMMERCIAL

## 2022-05-17 DIAGNOSIS — R91.8 MASS OF LEFT LUNG: ICD-10-CM

## 2022-05-17 PROCEDURE — 71250 CT THORAX DX C-: CPT

## 2022-05-19 ENCOUNTER — TELEPHONE (OUTPATIENT)
Dept: PULMONOLOGY | Age: 77
End: 2022-05-19

## 2022-05-19 DIAGNOSIS — J90 PLEURAL EFFUSION DUE TO ANOTHER DISORDER: Primary | ICD-10-CM

## 2022-05-19 RX ORDER — ALBUTEROL SULFATE 90 UG/1
2 AEROSOL, METERED RESPIRATORY (INHALATION) 4 TIMES DAILY PRN
Qty: 18 G | Refills: 5 | Status: SHIPPED | OUTPATIENT
Start: 2022-05-19

## 2022-05-19 RX ORDER — UMECLIDINIUM 62.5 UG/1
1 AEROSOL, POWDER ORAL DAILY
Qty: 1 EACH | Refills: 6 | Status: SHIPPED | OUTPATIENT
Start: 2022-05-19 | End: 2022-08-09

## 2022-05-19 NOTE — TELEPHONE ENCOUNTER
Gilbert Grossman called stating that Josiah's diuretic was increased by Dr Ada Tejeda and after that he started to have SOB, Dr Ada Tejeda does not think his SOB has anthing to do the diuretic, he fills that it is a pulmonary problem. Gilbert Grossman states that Finesse Stands seems to be getting worse, especially when moving. They would like you to call them at [phone].

## 2022-05-19 NOTE — TELEPHONE ENCOUNTER
Patient had bilateral effusions with right sided pneumothorax in April. They did not improve with diuresis and the pneumothorax \"resolved\" because the space has filled with fluid, in lieu of re-expansion. I suspect the right side is trapped. Patient's dyspnea is getting worse and is primarily with exertion. I don't see anything on the CT to explain why he should feel worse. However, the effusions did not decrease as was desired. I'm asking IR to tap the LEFT side in hopes of a gain of function. I want pleural chemistries and cytology sent if possible. We discussed getting PFTs, but with the effusions and recent pneumothorax I have 2 reasons to not trust the values and be leary of causing harm. Instead I'm going to empirically start him on a LABA/LAMA inhaler and rescue albuterol as a therapeutic trial.  He has a long smoking history and likely has COPD. If draining the left effusion and empiric COPD treatment are ineffective in helping his dyspnea, then I think we need to re-evaluate the accuracy of his stress test in March where he didn't achieve the target heart rate and consider the possibility that he may have 3 vessel coronary disease and the dyspnea is angina. I discussed this with Riaz Whalen and Genesis Keenan. Orders Placed This Encounter   Medications    Umeclidinium Bromide (INCRUSE ELLIPTA) 62.5 MCG/INH AEPB     Sig: Inhale 1 puff into the lungs daily     Dispense:  1 each     Refill:  6    albuterol sulfate HFA (VENTOLIN HFA) 108 (90 Base) MCG/ACT inhaler     Sig: Inhale 2 puffs into the lungs 4 times daily as needed for Wheezing     Dispense:  18 g     Refill:  5         Diagnosis Orders   1.  Pleural effusion due to another disorder  IR GUIDED THORACENTESIS PLEURAL

## 2022-05-20 DIAGNOSIS — I50.31 ACUTE HEART FAILURE WITH PRESERVED EJECTION FRACTION (HFPEF) (HCC): ICD-10-CM

## 2022-05-20 DIAGNOSIS — Z79.899 LONG TERM USE OF DRUG: ICD-10-CM

## 2022-05-20 LAB
ANION GAP SERPL CALCULATED.3IONS-SCNC: 14 MMOL/L (ref 3–16)
BUN BLDV-MCNC: 38 MG/DL (ref 7–20)
CALCIUM SERPL-MCNC: 9.4 MG/DL (ref 8.3–10.6)
CHLORIDE BLD-SCNC: 88 MMOL/L (ref 99–110)
CO2: 36 MMOL/L (ref 21–32)
CREAT SERPL-MCNC: 0.9 MG/DL (ref 0.8–1.3)
GFR AFRICAN AMERICAN: >60
GFR NON-AFRICAN AMERICAN: >60
GLUCOSE BLD-MCNC: 433 MG/DL (ref 70–99)
POTASSIUM SERPL-SCNC: 4.9 MMOL/L (ref 3.5–5.1)
SODIUM BLD-SCNC: 138 MMOL/L (ref 136–145)

## 2022-05-23 ENCOUNTER — TELEPHONE (OUTPATIENT)
Dept: PULMONOLOGY | Age: 77
End: 2022-05-23

## 2022-05-23 NOTE — TELEPHONE ENCOUNTER
Trying to schedule catalino, talked to Torres, she transferred me to Jewell Campos in [facility], then transferred to another dept where I had to leave a message. Left message explaining what I needed and who it was for and my contact name and info.

## 2022-05-23 NOTE — TELEPHONE ENCOUNTER
Dr Deangelo Valencia are you going to do this thora and is it going to be done in infusion center. What sedation will you use.

## 2022-05-23 NOTE — TELEPHONE ENCOUNTER
I was asking radiology to do it because they're smaller and possibly loculated. And I'm in the ICU, so less available.

## 2022-05-24 NOTE — TELEPHONE ENCOUNTER
5/24/22, No one has called me back for this Shirley Hernandez, I called Central scheduling again today, spoke to FRANKY, she transferred me to Guillermo. Guillermo stated that I needed to talk to [place] and transferred me to her voice mail, she will not be in until this afternoon.

## 2022-05-25 ENCOUNTER — TELEPHONE (OUTPATIENT)
Dept: PULMONOLOGY | Age: 77
End: 2022-05-25

## 2022-05-25 DIAGNOSIS — Z79.899 LONG TERM USE OF DRUG: Primary | ICD-10-CM

## 2022-05-25 NOTE — TELEPHONE ENCOUNTER
Called Julia at [facility] and M asking her to call so I can get this Thora scheduled,  I left my name, drs name, the office number, pts name and birthdate.

## 2022-05-26 ENCOUNTER — TELEPHONE (OUTPATIENT)
Dept: CARDIOLOGY CLINIC | Age: 77
End: 2022-05-26

## 2022-05-26 DIAGNOSIS — I50.31 ACUTE HEART FAILURE WITH PRESERVED EJECTION FRACTION (HFPEF) (HCC): ICD-10-CM

## 2022-05-26 DIAGNOSIS — Z79.899 LONG TERM USE OF DRUG: Primary | ICD-10-CM

## 2022-05-26 NOTE — TELEPHONE ENCOUNTER
Informed pt labs are normal, GEB would like the pt to repeat BMP in 2 weeks. Pt verbalized understanding.

## 2022-05-26 NOTE — TELEPHONE ENCOUNTER
----- Message from Vannessa Brand MD sent at 5/25/2022  7:46 AM EDT -----  HI Geetha Parker, I reviewed BMP, it is normal. I would like to repeat a BMP in 2 weeks to see the trend with K and Cr.  Thx

## 2022-05-31 ENCOUNTER — HOSPITAL ENCOUNTER (OUTPATIENT)
Dept: ULTRASOUND IMAGING | Age: 77
Discharge: HOME | End: 2022-05-31
Payer: COMMERCIAL

## 2022-05-31 ENCOUNTER — HOSPITAL ENCOUNTER (OUTPATIENT)
Dept: GENERAL RADIOLOGY | Age: 77
Discharge: HOME | End: 2022-05-31
Payer: COMMERCIAL

## 2022-05-31 DIAGNOSIS — J90 PLEURAL EFFUSION DUE TO ANOTHER DISORDER: ICD-10-CM

## 2022-05-31 DIAGNOSIS — Z98.890 S/P THORACENTESIS: ICD-10-CM

## 2022-05-31 DIAGNOSIS — I48.91 ATRIAL FIBRILLATION, UNSPECIFIED TYPE (HCC): ICD-10-CM

## 2022-05-31 PROCEDURE — 32555 ASPIRATE PLEURA W/ IMAGING: CPT

## 2022-05-31 PROCEDURE — 71045 X-RAY EXAM CHEST 1 VIEW: CPT

## 2022-06-01 ENCOUNTER — APPOINTMENT (OUTPATIENT)
Dept: GENERAL RADIOLOGY | Age: 77
End: 2022-06-01
Payer: COMMERCIAL

## 2022-06-01 ENCOUNTER — HOSPITAL ENCOUNTER (EMERGENCY)
Age: 77
Discharge: HOME | End: 2022-06-01
Attending: EMERGENCY MEDICINE
Payer: COMMERCIAL

## 2022-06-01 VITALS
RESPIRATION RATE: 19 BRPM | BODY MASS INDEX: 21.6 KG/M2 | WEIGHT: 163 LBS | DIASTOLIC BLOOD PRESSURE: 70 MMHG | SYSTOLIC BLOOD PRESSURE: 111 MMHG | OXYGEN SATURATION: 97 % | TEMPERATURE: 98.5 F | HEIGHT: 73 IN | HEART RATE: 87 BPM

## 2022-06-01 DIAGNOSIS — R06.09 DYSPNEA ON EXERTION: Primary | ICD-10-CM

## 2022-06-01 DIAGNOSIS — R73.9 HYPERGLYCEMIA: ICD-10-CM

## 2022-06-01 LAB
ANION GAP SERPL CALCULATED.3IONS-SCNC: 13 MMOL/L (ref 3–16)
BASE EXCESS VENOUS: 15 MMOL/L (ref -2–3)
BASOPHILS ABSOLUTE: 0.1 K/UL (ref 0–0.2)
BASOPHILS RELATIVE PERCENT: 0.7 %
BUN BLDV-MCNC: 45 MG/DL (ref 7–20)
CALCIUM SERPL-MCNC: 9.6 MG/DL (ref 8.3–10.6)
CARBOXYHEMOGLOBIN: 1.3 % (ref 0–1.5)
CHLORIDE BLD-SCNC: 86 MMOL/L (ref 99–110)
CO2: 34 MMOL/L (ref 21–32)
CREAT SERPL-MCNC: 1 MG/DL (ref 0.8–1.3)
EKG ATRIAL RATE: 89 BPM
EKG DIAGNOSIS: NORMAL
EKG P AXIS: 59 DEGREES
EKG P-R INTERVAL: 152 MS
EKG Q-T INTERVAL: 380 MS
EKG QRS DURATION: 92 MS
EKG QTC CALCULATION (BAZETT): 462 MS
EKG R AXIS: 11 DEGREES
EKG T AXIS: 53 DEGREES
EKG VENTRICULAR RATE: 89 BPM
EOSINOPHILS ABSOLUTE: 0 K/UL (ref 0–0.6)
EOSINOPHILS RELATIVE PERCENT: 0.4 %
GFR AFRICAN AMERICAN: >60
GFR NON-AFRICAN AMERICAN: >60
GLUCOSE BLD-MCNC: 276 MG/DL (ref 70–99)
GLUCOSE BLD-MCNC: 297 MG/DL (ref 70–99)
GLUCOSE BLD-MCNC: 456 MG/DL (ref 70–99)
HCO3 VENOUS: 43.4 MMOL/L (ref 24–28)
HCT VFR BLD CALC: 37.3 % (ref 40.5–52.5)
HEMOGLOBIN, VEN, REDUCED: 64.5 %
HEMOGLOBIN: 11.6 G/DL (ref 13.5–17.5)
INR BLD: 0.91 (ref 0.87–1.14)
LYMPHOCYTES ABSOLUTE: 0.4 K/UL (ref 1–5.1)
LYMPHOCYTES RELATIVE PERCENT: 4.2 %
MCH RBC QN AUTO: 26 PG (ref 26–34)
MCHC RBC AUTO-ENTMCNC: 31 G/DL (ref 31–36)
MCV RBC AUTO: 83.9 FL (ref 80–100)
METHEMOGLOBIN VENOUS: 0.4 % (ref 0–1.5)
MONOCYTES ABSOLUTE: 1.1 K/UL (ref 0–1.3)
MONOCYTES RELATIVE PERCENT: 10.9 %
NEUTROPHILS ABSOLUTE: 8.8 K/UL (ref 1.7–7.7)
NEUTROPHILS RELATIVE PERCENT: 83.8 %
O2 SAT, VEN: 34 %
PCO2, VEN: 73.7 MMHG (ref 41–51)
PDW BLD-RTO: 16.1 % (ref 12.4–15.4)
PERFORMED ON: ABNORMAL
PERFORMED ON: ABNORMAL
PH VENOUS: 7.38 (ref 7.35–7.45)
PLATELET # BLD: 401 K/UL (ref 135–450)
PMV BLD AUTO: 7.3 FL (ref 5–10.5)
PO2, VEN: <30 MMHG (ref 25–40)
POTASSIUM REFLEX MAGNESIUM: 4.5 MMOL/L (ref 3.5–5.1)
PRO-BNP: 852 PG/ML (ref 0–449)
PROTHROMBIN TIME: 12.2 SEC (ref 11.7–14.5)
RBC # BLD: 4.45 M/UL (ref 4.2–5.9)
SODIUM BLD-SCNC: 133 MMOL/L (ref 136–145)
TCO2 CALC VENOUS: 46 MMOL/L
TROPONIN: 0.02 NG/ML
WBC # BLD: 10.5 K/UL (ref 4–11)

## 2022-06-01 PROCEDURE — 93005 ELECTROCARDIOGRAM TRACING: CPT | Performed by: EMERGENCY MEDICINE

## 2022-06-01 PROCEDURE — 96372 THER/PROPH/DIAG INJ SC/IM: CPT

## 2022-06-01 PROCEDURE — 36415 COLL VENOUS BLD VENIPUNCTURE: CPT

## 2022-06-01 PROCEDURE — 83880 ASSAY OF NATRIURETIC PEPTIDE: CPT

## 2022-06-01 PROCEDURE — 6370000000 HC RX 637 (ALT 250 FOR IP): Performed by: STUDENT IN AN ORGANIZED HEALTH CARE EDUCATION/TRAINING PROGRAM

## 2022-06-01 PROCEDURE — 85610 PROTHROMBIN TIME: CPT

## 2022-06-01 PROCEDURE — 82803 BLOOD GASES ANY COMBINATION: CPT

## 2022-06-01 PROCEDURE — 80048 BASIC METABOLIC PNL TOTAL CA: CPT

## 2022-06-01 PROCEDURE — 84484 ASSAY OF TROPONIN QUANT: CPT

## 2022-06-01 PROCEDURE — 85025 COMPLETE CBC W/AUTO DIFF WBC: CPT

## 2022-06-01 PROCEDURE — 71046 X-RAY EXAM CHEST 2 VIEWS: CPT

## 2022-06-01 PROCEDURE — 99285 EMERGENCY DEPT VISIT HI MDM: CPT

## 2022-06-01 RX ORDER — INSULIN LISPRO 100 [IU]/ML
0-6 INJECTION, SOLUTION INTRAVENOUS; SUBCUTANEOUS
Status: DISCONTINUED | OUTPATIENT
Start: 2022-06-01 | End: 2022-06-01 | Stop reason: HOSPADM

## 2022-06-01 RX ORDER — INSULIN LISPRO 100 [IU]/ML
0-3 INJECTION, SOLUTION INTRAVENOUS; SUBCUTANEOUS NIGHTLY
Status: DISCONTINUED | OUTPATIENT
Start: 2022-06-01 | End: 2022-06-01 | Stop reason: HOSPADM

## 2022-06-01 RX ORDER — INSULIN LISPRO 100 [IU]/ML
6 INJECTION, SOLUTION INTRAVENOUS; SUBCUTANEOUS ONCE
Status: DISCONTINUED | OUTPATIENT
Start: 2022-06-01 | End: 2022-06-01 | Stop reason: HOSPADM

## 2022-06-01 RX ORDER — DEXTROSE MONOHYDRATE 50 MG/ML
100 INJECTION, SOLUTION INTRAVENOUS PRN
Status: DISCONTINUED | OUTPATIENT
Start: 2022-06-01 | End: 2022-06-01 | Stop reason: HOSPADM

## 2022-06-01 RX ORDER — APIXABAN 5 MG/1
TABLET, FILM COATED ORAL
Qty: 60 TABLET | Refills: 0 | Status: SHIPPED | OUTPATIENT
Start: 2022-06-01 | End: 2022-07-01

## 2022-06-01 RX ADMIN — INSULIN LISPRO 3 UNITS: 100 INJECTION, SOLUTION INTRAVENOUS; SUBCUTANEOUS at 17:07

## 2022-06-01 ASSESSMENT — PAIN DESCRIPTION - DESCRIPTORS: DESCRIPTORS: PRESSURE

## 2022-06-01 ASSESSMENT — ENCOUNTER SYMPTOMS
SHORTNESS OF BREATH: 1
DIARRHEA: 0
NAUSEA: 0
RHINORRHEA: 0
CHEST TIGHTNESS: 1
VOMITING: 0
WHEEZING: 0
BACK PAIN: 0
SINUS PRESSURE: 0
ABDOMINAL PAIN: 0
STRIDOR: 0
BLOOD IN STOOL: 0
TROUBLE SWALLOWING: 0
COUGH: 0
CHOKING: 0
ABDOMINAL DISTENTION: 0
APNEA: 0
CONSTIPATION: 1
SORE THROAT: 0

## 2022-06-01 ASSESSMENT — PAIN SCALES - GENERAL: PAINLEVEL_OUTOF10: 4

## 2022-06-01 ASSESSMENT — PAIN - FUNCTIONAL ASSESSMENT: PAIN_FUNCTIONAL_ASSESSMENT: 0-10

## 2022-06-01 ASSESSMENT — PAIN DESCRIPTION - LOCATION: LOCATION: CHEST

## 2022-06-01 ASSESSMENT — PAIN DESCRIPTION - ORIENTATION: ORIENTATION: MID

## 2022-06-01 ASSESSMENT — PAIN DESCRIPTION - FREQUENCY: FREQUENCY: CONTINUOUS

## 2022-06-01 ASSESSMENT — PAIN DESCRIPTION - PAIN TYPE: TYPE: CHRONIC PAIN

## 2022-06-01 NOTE — ED PROVIDER NOTES
[address] [facility] RESIDENT NOTE       Date of evaluation: 6/1/2022    Chief Complaint     Shortness of Breath (prolonged SOB)      History of Present Illness     Elmer Bryant is a 67 y.o. male with a PmHx of HTN, poorly controlled diabetes, PAFRVR, HFpEF, COPD on 2 liters oxygen, h/o pleural effusions and pneumothorax who presents with increased SOB on exertion. SOB has been worsening since February. Recently underwent ultrasound-guided thoracentesis at [facility] yesterday with Dr. Baron Barajas where 300 mL was extracted. Reported extreme pain during and after the procedure, but reported that the pain resolved this morning. However he has not had an improvement in his breathing, and reports that it is worse from couple days ago. He is short of breath on exertion even with 2 to 2.5 L of oxygen at home. He has not tried increasing his home oxygen. He does not feel safe mobilizing at home currently, he reports feeling lightheaded and like he is about to pass out. He sees Dr. Shailesh Corral who recently started him on 100 mg torsemide (from 40 lasix BID) but he reports that it worsened his breathing so he decreased the dose to 70 mg. He was not taking the torsemide on Monday and Tuesday but started again today. He also reports recent weight loss current weight is 163. He is on Eliquis at home for Afib and reports compliance, held dose yesterday for thora. He denies fevers, chills, dysuria, diarrhea. He has been constipated over the last week only having 1 bowel movement. In the ED he is breathing comfortably on 2.5 L. Last echo in January of this year showed grade 2 diastolic dysfunction, LVH, LVEF 60%. Patient had an admission in 04/2022 at [facility] for AHF and AECOPD. At an office visit 2 weeks ago he reported increased weight up to 180 pounds. He reports that his dry weight is likely 175 lbs. He has decreased appetite.     Review of Systems     Review of Systems Constitutional: Positive for activity change, appetite change, fatigue and unexpected weight change (Weight loss). Negative for fever. HENT: Negative for congestion, postnasal drip, rhinorrhea, sinus pressure, sneezing, sore throat and trouble swallowing. Eyes: Negative for visual disturbance. Respiratory: Positive for chest tightness and shortness of breath. Negative for apnea, cough, choking, wheezing and stridor. Cardiovascular: Positive for leg swelling. Negative for palpitations. Gastrointestinal: Positive for constipation. Negative for abdominal distention, abdominal pain, blood in stool, diarrhea, nausea and vomiting. Endocrine: Positive for polyuria. Genitourinary: Negative for difficulty urinating and dysuria. Musculoskeletal: Negative for arthralgias, back pain and myalgias. Skin: Negative for pallor, rash and wound. Neurological: Positive for weakness and light-headedness. Negative for tremors, seizures, syncope and headaches. Psychiatric/Behavioral: Negative for agitation, behavioral problems, confusion and decreased concentration. Past Medical, Surgical, Family, and Social History     He has a past medical history of Carotid stenosis, left, Chronic back pain, Diastolic CHF ([address] Erectile dysfunction, Hyperlipidemia, Hypertension, Osteoarthritis, and Type II or unspecified type diabetes mellitus without mention of complication, not stated as uncontrolled. He has a past surgical history that includes Rotator cuff repair (Bilateral) and knee surgery. His family history includes Arthritis in his maternal grandfather; Diabetes in his maternal grandmother; Hearing Loss in his father and maternal grandmother; Heart Disease (age of onset: 70) in his father; High Blood Pressure in his father. He reports that he quit smoking about 20 years ago. His smoking use included cigarettes. He has a 80.00 pack-year smoking history.  He has never used smokeless tobacco. He reports current alcohol use. He reports that he does not use drugs. Medications     Previous Medications    ACARBOSE (PRECOSE) 100 MG TABLET    TAKE 1 TABLET TWICE DAILY  WITH MEALS    ALBUTEROL SULFATE HFA (VENTOLIN HFA) 108 (90 BASE) MCG/ACT INHALER    Inhale 2 puffs into the lungs 4 times daily as needed for Wheezing    DAPAGLIFLOZIN (FARXIGA) 10 MG TABLET    TAKE 1 TABLET EVERY MORNING    ELIQUIS 5 MG TABS TABLET    TAKE 1 TABLET BY MOUTH TWO TIMES A DAY    GLIMEPIRIDE (AMARYL) 4 MG TABLET    Take 1 tablet by mouth 2 times daily (with meals)    METFORMIN (GLUCOPHAGE) 500 MG TABLET    TAKE 2 TABLETS 2 TIMES     DAILY WITH MEALS    METOPROLOL SUCCINATE (TOPROL XL) 50 MG EXTENDED RELEASE TABLET    Take 1 tablet by mouth in the morning and at bedtime    PIOGLITAZONE (ACTOS) 45 MG TABLET    TAKE 1 TABLET DAILY    PRAVASTATIN (PRAVACHOL) 40 MG TABLET    Take 1 tablet by mouth daily    RAMIPRIL (ALTACE) 10 MG CAPSULE    TAKE 1 CAPSULE DAILY    SEMAGLUTIDE,0.25 OR 0.5MG/DOS, (OZEMPIC, 0.25 OR 0.5 MG/DOSE,) 2 MG/1.5ML SOPN    Inject as directed by physician. SILDENAFIL (VIAGRA) 100 MG TABLET    Take 1 tablet by mouth as needed for Erectile Dysfunction Max daily dose 100mg. TORSEMIDE (DEMADEX) 100 MG TABLET    Take 1 tablet by mouth daily    UMECLIDINIUM BROMIDE (INCRUSE ELLIPTA) 62.5 MCG/INH AEPB    Inhale 1 puff into the lungs daily       Allergies     He is allergic to dye [iodides]. Physical Exam     INITIAL VITALS: BP: 114/72, Temp: 98.5 °F (36.9 °C), Heart Rate: 88, Resp: 15, SpO2: 97 %   Physical Exam  Constitutional:       General: He is not in acute distress. Appearance: He is normal weight. He is ill-appearing. HENT:      Head: Normocephalic. Mouth/Throat:      Mouth: Mucous membranes are moist.   Eyes:      Pupils: Pupils are equal, round, and reactive to light. Cardiovascular:      Rate and Rhythm: Normal rate. Rhythm irregular. Pulses: Normal pulses.       Heart sounds: No murmur heard.      Pulmonary:      Effort: No tachypnea or accessory muscle usage. Breath sounds: Normal breath sounds. No decreased breath sounds, wheezing, rhonchi or rales. Comments: Mildly increased respiratory effort  Chest:      Chest wall: No mass, tenderness or edema. Abdominal:      General: Bowel sounds are normal.      Palpations: Abdomen is soft. There is no hepatomegaly or mass. Tenderness: There is no guarding or rebound. Musculoskeletal:      Cervical back: Normal range of motion and neck supple. Right lower leg: No tenderness. Left lower leg: No tenderness. Comments: Mild bilateral ankle swelling, reportedly chronic and at baseline   Skin:     General: Skin is warm. Findings: No ecchymosis or rash. Neurological:      General: No focal deficit present. Mental Status: He is alert and oriented to person, place, and time. Cranial Nerves: No cranial nerve deficit. Psychiatric:         Mood and Affect: Mood normal.         Behavior: Behavior normal.         Diagnostic Results     RADIOLOGY:  XR CHEST (2 VW)   Final Result      Moderate loculated right and small left pleural effusions with bibasilar atelectasis. No visualized pneumothorax. Linear scarring in the right midlung. Normal cardiomediastinal silhouette.           LABS:   Results for orders placed or performed during the hospital encounter of 65/71/25   Basic Metabolic Panel w/ Reflex to MG   Result Value Ref Range    Sodium 133 (L) 136 - 145 mmol/L    Potassium reflex Magnesium 4.5 3.5 - 5.1 mmol/L    Chloride 86 (L) 99 - 110 mmol/L    CO2 34 (H) 21 - 32 mmol/L    Anion Gap 13 3 - 16    Glucose 456 (H) 70 - 99 mg/dL    BUN 45 (H) 7 - 20 mg/dL    CREATININE 1.0 0.8 - 1.3 mg/dL    GFR Non-African American >60 >60    GFR African American >60 >60    Calcium 9.6 8.3 - 10.6 mg/dL   Brain Natriuretic Peptide   Result Value Ref Range    Pro- (H) 0 - 449 pg/mL   Troponin   Result Value Ref Range    Troponin 0.02 (H) <0.01 ng/mL   Protime-INR   Result Value Ref Range    Protime 12.2 11.7 - 14.5 sec    INR 0.91 0.87 - 1.14   CBC with Auto Differential   Result Value Ref Range    WBC 10.5 4.0 - 11.0 K/uL    RBC 4.45 4.20 - 5.90 M/uL    Hemoglobin 11.6 (L) 13.5 - 17.5 g/dL    Hematocrit 37.3 (L) 40.5 - 52.5 %    MCV 83.9 80.0 - 100.0 fL    MCH 26.0 26.0 - 34.0 pg    MCHC 31.0 31.0 - 36.0 g/dL    RDW 16.1 (H) 12.4 - 15.4 %    Platelets 208 288 - 728 K/uL    MPV 7.3 5.0 - 10.5 fL    Neutrophils % 83.8 %    Lymphocytes % 4.2 %    Monocytes % 10.9 %    Eosinophils % 0.4 %    Basophils % 0.7 %    Neutrophils Absolute 8.8 (H) 1.7 - 7.7 K/uL    Lymphocytes Absolute 0.4 (L) 1.0 - 5.1 K/uL    Monocytes Absolute 1.1 0.0 - 1.3 K/uL    Eosinophils Absolute 0.0 0.0 - 0.6 K/uL    Basophils Absolute 0.1 0.0 - 0.2 K/uL   Blood gas, venous (Lab)   Result Value Ref Range    pH, John 7.378 7.350 - 7.450    pCO2, John 73.7 (H) 41.0 - 51.0 mmHg    pO2, John <30.0 25.0 - 40.0 mmHg    HCO3, Venous 43.4 (H) 24.0 - 28.0 mmol/L    Base Excess, John 15.0 (H) -2.0 - 3.0 mmol/L    O2 Sat, John 34 Not established %    Carboxyhemoglobin 1.3 0.0 - 1.5 %    MetHgb, John 0.4 0.0 - 1.5 %    TC02 (Calc), John 46 mmol/L    Hemoglobin, John, Reduced 64.50 %   POCT Glucose   Result Value Ref Range    POC Glucose 276 (H) 70 - 99 mg/dl    Performed on ACCU-CHEK    POCT Glucose   Result Value Ref Range    POC Glucose 297 (H) 70 - 99 mg/dl    Performed on [email or web address]    EKG 12 Lead   Result Value Ref Range    Ventricular Rate 89 BPM    Atrial Rate 89 BPM    P-R Interval 152 ms    QRS Duration 92 ms    Q-T Interval 380 ms    QTc Calculation (Bazett) 462 ms    P Axis 59 degrees    R Axis 11 degrees    T Axis 53 degrees    Diagnosis       EKG performed in ER and to be interpreted by ER physician. Confirmed by MD, ER ([id number]),  Raj LI), ALEC ([id number]) on 6/1/2022 3:01:41 PM         RECENT VITALS:  BP: 111/70, Temp: 98.5 °F (36.9 °C),Heart Rate: 87, Resp: 19, SpO2: 97 %     Procedures     None    ED Course     Nursing Notes, Past Medical Hx, Past Surgical Hx, Social Hx, Allergies, and FamilyHx were reviewed. The patient was giventhe following medications:  Orders Placed This Encounter   Medications    insulin lispro (1 Unit Dial) 0-6 Units    insulin lispro (1 Unit Dial) 0-3 Units    glucose chewable tablet 16 g    OR Linked Order Group     dextrose bolus 10% 125 mL     dextrose bolus 10% 250 mL    glucagon (rDNA) injection 1 mg    dextrose 5 % solution    insulin lispro (1 Unit Dial) 6 Units       CONSULTS:  None    MEDICAL DECISION MAKING / ASSESSMENT / Marta Kindra is a 67 y.o. male with a PmHx of HTN, poorly controlled diabetes, PAFRVR, HFpEF, COPD on 2 liters oxygen, h/o pleural effusions and pneumothorax who presents with increased SOB on exertion. In the ED patient was found to have an elevated proBNP of 852 it has been higher at prior checks. His troponin is elevated to 0.02 however it appears to be chronically elevated. Glucose is elevated to 456. He says that his home glucoses are usually between 160 and 220. He is not on home insulin. He describes chest pressure and has a history of A. fib. Vitals and physical exam are not consistent with pulmonary embolism. Chest x-ray showed a moderate loculated right and small left pleural effusions with atelectasis no pneumothorax. Describes lightheadedness with standing. Patient's fatigue and weight loss are likely related to her poor glucose control. His shortness of breath is likely related to his chronic COPD as well as heart failure with poor diet and medication compliance. This patient was also evaluated by the attending physician. All care plans were discussed and agreed upon.     Clinical Impression     Dyspnea on exertion likely secondary to HFpEF, chronic pulmonary disease, pleural effusions status post thoracentesis      Disposition     PATIENT REFERRED TO:  Olen Schwab Taty Hixton, [address] [place], [place] [id number] [phone]    Schedule an appointment as soon as possible for a visit in 3 days        DISCHARGE MEDICATIONS:  New Prescriptions    No medications on file       DISPOSITION Decision To Discharge 06/01/2022 06:02:42 PM   Discharge home with outpatient follow-up     Kennedy Cortez MD  Resident  06/01/22 [id number]

## 2022-06-01 NOTE — PROGRESS NOTES
Clinical Pharmacy Progress Note  Medication History     List of current medications the patient is taking is complete, and home medication list in Epic has been updated to reflect the changes noted below. Source(s) of information: Patient and wife    Changes made to medication list:    Medications removed (no longer taking):  · None     Medications added:  · None     Medication doses / instructions adjusted:  · None     Other notes:   · Recently held eliquis for throacentisis   · Ozempic 0.5mg weekly- injects on Saturdays  · Has not taking any torsemide in 3 days, states he wants to speak to Md before resuming     Thanks!   Vesna Tierney PharmD  Pharmacy Resident   Please call with questions [phone]  6/1/2022 4:47 PM

## 2022-06-01 NOTE — ED PROVIDER NOTES
ED Attending Attestation Note     Date of evaluation: 6/1/2022    This patient was seen by the resident. I have seen and examined the patient, agree with the workup, evaluation, management and diagnosis. The care plan has been discussed. I have reviewed the ECG and concur with the resident's interpretation. My assessment reveals an older gentleman who presents with ongoing difficulty breathing. He has also had a progressive decline in his functional ability. He has been plagued with generalized weakness. Has gotten to the point where he gets so short of breath even which is standing up that he is not doing much in a day. Had a thoracocentesis yesterday and states he actually felt worse after the procedure. On exam, he is in no acute distress. Heart rate regular. Lungs with some diminished breath sounds in the bases, no significant crackles.   No lower extremity edema     Nithin Haskins MD  06/01/22 [id number]

## 2022-06-01 NOTE — TELEPHONE ENCOUNTER
Medication:   Requested Prescriptions     Pending Prescriptions Disp Refills    ELIQUIS 5 MG TABS tablet [Pharmacy Med Name: Eliquis Oral Tablet 5 MG] 60 tablet 0     Sig: TAKE 1 TABLET BY MOUTH TWO TIMES A DAY        Last Filled:      Patient Phone Number: [phone] (home)     Last appt: 3/28/2022   Next appt: Visit date not found    Last OARRS: No flowsheet data found.

## 2022-06-07 ENCOUNTER — OFFICE VISIT (OUTPATIENT)
Dept: FAMILY MEDICINE CLINIC | Age: 77
End: 2022-06-07
Payer: COMMERCIAL

## 2022-06-07 VITALS
SYSTOLIC BLOOD PRESSURE: 110 MMHG | WEIGHT: 170 LBS | HEIGHT: 73 IN | HEART RATE: 89 BPM | OXYGEN SATURATION: 95 % | DIASTOLIC BLOOD PRESSURE: 60 MMHG | BODY MASS INDEX: 22.53 KG/M2

## 2022-06-07 DIAGNOSIS — R53.81 PHYSICAL DECONDITIONING: Primary | ICD-10-CM

## 2022-06-07 DIAGNOSIS — R06.02 SHORTNESS OF BREATH: ICD-10-CM

## 2022-06-07 DIAGNOSIS — I50.22 CHRONIC SYSTOLIC (CONGESTIVE) HEART FAILURE (HCC): ICD-10-CM

## 2022-06-07 PROCEDURE — 1123F ACP DISCUSS/DSCN MKR DOCD: CPT | Performed by: FAMILY MEDICINE

## 2022-06-07 PROCEDURE — 99214 OFFICE O/P EST MOD 30 MIN: CPT | Performed by: FAMILY MEDICINE

## 2022-06-07 RX ORDER — FLASH GLUCOSE SCANNING READER
EACH MISCELLANEOUS
Qty: 6 EACH | Refills: 1 | Status: SHIPPED | OUTPATIENT
Start: 2022-06-07

## 2022-06-07 RX ORDER — INSULIN DEGLUDEC 200 U/ML
INJECTION, SOLUTION SUBCUTANEOUS
Qty: 9 PEN | Refills: 1 | Status: SHIPPED | OUTPATIENT
Start: 2022-06-07

## 2022-06-07 RX ORDER — INSULIN DEGLUDEC 200 U/ML
INJECTION, SOLUTION SUBCUTANEOUS
Qty: 1 PEN | Refills: 0 | COMMUNITY
Start: 2022-06-07 | End: 2022-09-15

## 2022-06-07 RX ORDER — PEN NEEDLE, DIABETIC 31 G X1/4"
1 NEEDLE, DISPOSABLE MISCELLANEOUS DAILY
Qty: 100 EACH | Refills: 3 | Status: SHIPPED | OUTPATIENT
Start: 2022-06-07 | End: 2022-06-14 | Stop reason: SDUPTHER

## 2022-06-07 RX ORDER — FLASH GLUCOSE SENSOR
KIT MISCELLANEOUS
Qty: 6 EACH | Refills: 1 | Status: SHIPPED | OUTPATIENT
Start: 2022-06-07

## 2022-06-07 NOTE — PROGRESS NOTES
Classie Brunner (:  1945) is a 67 y.o. male,Established patient, here for evaluation of the following chief complaint(s):  ED Follow-up ([facility] ED on 22 for shortness of breath - ongoing for several months.)         ASSESSMENT/PLAN:  Ben Joseph was seen today for ed follow-up. Diagnoses and all orders for this visit:    Physical deconditioning  -     External Referral To Physical Therapy  Not well controlled. Referred to PT  Chronic systolic (congestive) heart failure  Stable. Following with cardiology  Shortness of breath  Unknown etiology. No relief with thoracentesis  Following with pulmonology  Other orders  -     Continuous Blood Gluc Sensor (FREESTYLE LIZA 14 DAY SENSOR) MISC; Check bs tidac and hs  -     Continuous Blood Gluc  (FREESTYLE LIZA 14 DAY READER) SOFIYA; Check bs tidac and hs  -     Insulin Degludec (TRESIBA FLEXTOUCH) 200 UNIT/ML SOPN; 10 units subcut qam, increase 4 units every 4 days until am blood sugar < 120. Max 100 units  -     Insulin Pen Needle (PEN NEEDLES) 31G X 6 MM MISC; 1 each by Does not apply route daily  -     Insulin Degludec (TRESIBA FLEXTOUCH) 200 UNIT/ML SOPN; Sig: 10 units subcut qam, increase 4 units every 4 days until am blood sugar < 120. Max 100 units         No follow-ups on file. Subjective   SUBJECTIVE/OBJECTIVE:  HPI   Pt is a of 67 y.o. male comes in today with   Chief Complaint   Patient presents with    ED Follow-up     [facility] ED on 22 for shortness of breath - ongoing for several months. Following with pulmonology. Booster in [facility]. Symptoms worse feb and end of march. Paracentesis on . Cardiologist changed lasix to torsemide. Edema resolved. follow up scheduled tomorrow. blood sugars have been high. Review of Systems       Objective   Physical Exam         An electronic signature was used to authenticate this note.     --Jocelyne Oseguera MD

## 2022-06-08 ENCOUNTER — OFFICE VISIT (OUTPATIENT)
Dept: CARDIOLOGY CLINIC | Age: 77
End: 2022-06-08
Payer: COMMERCIAL

## 2022-06-08 VITALS
DIASTOLIC BLOOD PRESSURE: 58 MMHG | SYSTOLIC BLOOD PRESSURE: 108 MMHG | BODY MASS INDEX: 22.69 KG/M2 | WEIGHT: 172 LBS | HEART RATE: 100 BPM

## 2022-06-08 DIAGNOSIS — I48.91 ATRIAL FIBRILLATION WITH RAPID VENTRICULAR RESPONSE (HCC): ICD-10-CM

## 2022-06-08 DIAGNOSIS — I50.32 CHRONIC HEART FAILURE WITH PRESERVED EJECTION FRACTION (HFPEF) (HCC): Primary | ICD-10-CM

## 2022-06-08 DIAGNOSIS — R06.09 DYSPNEA ON EXERTION: ICD-10-CM

## 2022-06-08 DIAGNOSIS — Z01.818 PRE-OP TESTING: Primary | ICD-10-CM

## 2022-06-08 DIAGNOSIS — R06.09 EXERTIONAL DYSPNEA: ICD-10-CM

## 2022-06-08 DIAGNOSIS — I50.31 ACUTE DIASTOLIC HEART FAILURE (HCC): ICD-10-CM

## 2022-06-08 PROBLEM — I50.9 ACUTE HEART FAILURE (HCC): Status: RESOLVED | Noted: 2022-03-31 | Resolved: 2022-06-08

## 2022-06-08 PROCEDURE — 1123F ACP DISCUSS/DSCN MKR DOCD: CPT | Performed by: INTERNAL MEDICINE

## 2022-06-08 PROCEDURE — 99215 OFFICE O/P EST HI 40 MIN: CPT | Performed by: INTERNAL MEDICINE

## 2022-06-08 NOTE — PROGRESS NOTES
Cc: HFpEF, PAFRVR, severe COPD, severe exertional dyspnea    HPI:     Patient is a 54-year-old man with history of HTN, HLP, poorly controlled diabetes, PAFRVR, HFpEF, past heavy smoking 50-pack-year history, quit 25 years ago), COPD on 2 liters oxygen, h/o pleural effusions and pneumothorax.      Echo 1/25/2022: Normal LV size, mild LVH, LVEF 40%, diastolic grade 2, mild LAE, normal RV, mild valvular disease.     ECG 1/19/2022: Normal sinus rhythm with frequent PACs, poor R wave progression, cannot exclude old anterior MI.     Labs 1/24/2022: TSH 3.0 normal, creatinine 1.8, K5.0, FLP: , HDL 81, LDL 99, TG 89 on pravachol 40 daily.  Hemoglobin A1c 8.1.     CAM 2-week 1/27-2/10/2022: NSR with frequent episodes of SVT, couple of those episodes appear to be AF RVR.     Carleen 3/10/2022: Normal    Patient had a CT chest 5/17/2022 which revealed small bilateral pleural effusions, no pneumothorax. Chest x-ray on 5/31/2022 revealed moderate right pleural effusion; received thoracentesis, 300 mL was removed. Patient was started on torsemide during his last visit in 05/2022 which he could not tolerate (had significant lightheadedness, worsening dyspnea, dizziness), despite decreasing dose from 100 mg to 50 mg daily. He eventually quit taking it 5 days ago.     Patient is here for a follow up. He is currently off diuretics. His weight has remained stable around 167 pounds. He has not noticed any significant lower extremity swelling or orthopnea. However he continues to have severe exertional dyspnea even with walking a few feet. Even when he uses his supplemental oxygen at 2 to 3 L, he does not find any significant relief with exertion. Despite normal oxygen sats during exercise, he remains dyspneic during exertion.        Histories     Past Medical History:   has a past medical history of Carotid stenosis, left, Chronic back pain, Chronic systolic (congestive) heart failure, Diastolic CHF ([address] Erectile dysfunction, Hyperlipidemia, Hypertension, Osteoarthritis, and Type II or unspecified type diabetes mellitus without mention of complication, not stated as uncontrolled. Surgical History:   has a past surgical history that includes Rotator cuff repair (Bilateral) and knee surgery. Social History:   reports that he quit smoking about 20 years ago. His smoking use included cigarettes. He has a 80.00 pack-year smoking history. He has never used smokeless tobacco. He reports current alcohol use. He reports that he does not use drugs. Family History:  No evidence for sudden cardiac death or premature CAD      Medications:     Home medications were reviewed and are listed below    Prior to Admission medications    Medication Sig Start Date End Date Taking? Authorizing Provider   Continuous Blood Gluc Sensor (FREESTYLE LIZA 14 DAY SENSOR) MISC Check bs tidac and hs 6/7/22  Yes Zac Valdes MD   Continuous Blood Gluc  (FREESTYLE LIZA 14 DAY READER) SOFIYA Check bs tidac and hs 6/7/22  Yes Zac Valdes MD   Insulin Degludec (TRESIBA FLEXTOUCH) 200 UNIT/ML SOPN 10 units subcut qam, increase 4 units every 4 days until am blood sugar < 120. Max 100 units 6/7/22  Yes Zac Valdes MD   Insulin Pen Needle (PEN NEEDLES) 31G X 6 MM MISC 1 each by Does not apply route daily 6/7/22  Yes Zac Valdes MD   Insulin Degludec (TRESIBA FLEXTOUCH) 200 UNIT/ML SOPN Sig: 10 units subcut qam, increase 4 units every 4 days until am blood sugar < 120.  Max 100 units 6/7/22  Yes Zac Valdes MD   ELIQUIS 5 MG TABS tablet TAKE 1 TABLET BY MOUTH TWO TIMES A DAY 6/1/22  Yes Zac Valdes MD   Umeclidinium Bromide (INCRUSE ELLIPTA) 62.5 MCG/INH AEPB Inhale 1 puff into the lungs daily 5/19/22  Yes Leticia Lentz MD   albuterol sulfate HFA (VENTOLIN HFA) 108 (90 Base) MCG/ACT inhaler Inhale 2 puffs into the lungs 4 times daily as needed for Wheezing 5/19/22  Yes Leticia Lentz MD   metoprolol succinate (TOPROL XL) 50 MG extended release tablet Take 1 tablet by mouth in the morning and at bedtime 4/2/22  Yes Aliyah Sanders MD   ramipril (ALTACE) 10 MG capsule TAKE 1 CAPSULE DAILY 11/15/21  Yes Alexei Glover MD   dapagliflozin (FARXIGA) 10 MG tablet TAKE 1 TABLET EVERY MORNING 11/15/21  Yes Alexei Glover MD   metFORMIN (GLUCOPHAGE) 500 MG tablet TAKE 2 TABLETS 2 TIMES     DAILY WITH MEALS 10/20/21  Yes Sary Smith APRN - CNP   pravastatin (PRAVACHOL) 40 MG tablet Take 1 tablet by mouth daily 10/20/21  Yes NATASHA Christian - CNP   torsemide [facility]) 100 MG tablet Take 1 tablet by mouth daily  Patient not taking: Reported on 6/7/2022 5/13/22   Lisette Leslie MD   sildenafil (VIAGRA) 100 MG tablet Take 1 tablet by mouth as needed for Erectile Dysfunction Max daily dose 100mg. 5/26/21 5/21/22  Alexei Glover MD          Allergy:     Dye [iodides]       Review of Systems:     All 12 point review of symptoms completed. Pertinent positives identified in the HPI, all other review of symptoms negative as below. CONSTITUTIONAL: No fatigue  SKIN: No rash or pruritis. EYES: No visual changes or diplopia. No scleral icterus. ENT: No Headaches, hearing loss or vertigo. No mouth sores or sore throat. CARDIOVASCULAR: No chest pain/chest pressure/chest discomfort. No palpitations. No edema. RESPIRATORY: No dyspnea. No cough or wheezing, no sputum production. GASTROINTESTINAL: No N/V/D. No abdominal pain, appetite loss, blood in stools. GENITOURINARY: No dysuria, trouble voiding, or hematuria. MUSCULOSKELETAL:  No gait disturbance, weakness or joint complaints. NEUROLOGICAL: No headache, diplopia, change in muscle strength, numbness or tingling. No change in gait, balance, coordination, mood, affect, memory, mentation, behavior. PSHYCH: No anxiety, loss of interest, change in sexual behavior, feelings of self-harm, or confusion. ENDOCRINE: No excessive thirst, fluid intake, or urination.  No tremor. HEMATOLOGIC: No abnormal bruising or bleeding. ALLERGY: No nasal congestion or hives.       Physical Examination:     Vitals:    06/08/22 1102   BP: (!) 108/58   Pulse: 100   Weight: 172 lb (78 kg)       Wt Readings from Last 3 Encounters:   06/08/22 172 lb (78 kg)   06/07/22 170 lb (77.1 kg)   06/01/22 163 lb (73.9 kg)         General Appearance:  Alert, cooperative, no distress, appears stated age Appropriate weight   Head:  Normocephalic, without obvious abnormality, atraumatic   Eyes:  PERRL, conjunctiva/corneas clear EOM intact  Ears normal   Throat no lesions       Nose: Nares normal, no drainage or sinus tenderness   Throat: Lips, mucosa, and tongue normal   Neck: Supple, symmetrical, trachea midline, no adenopathy, thyroid: not enlarged, symmetric, no tenderness/mass/nodules, no carotid bruit       Lungs:   Clear to auscultation bilaterally, respirations unlabored   Chest Wall:  No tenderness or deformity   Heart:  Regular rhythm, rate is controlled, S1, S2 normal, there is no murmur, there is no rub or gallop, cannot assess jvd, no bilateral lower extremity edema   Abdomen:   Soft, non-tender, bowel sounds active all four quadrants,  no masses, no organomegaly       Extremities: Extremities normal, atraumatic, no cyanosis   Pulses: 2+ and symmetric   Skin: Skin color, texture, turgor normal, no rashes or lesions   Pysch: Normal mood and affect   Neurologic: Normal gross motor and sensory exam.  Cranial nerves intact        Labs:     Lab Results   Component Value Date    WBC 10.5 06/01/2022    HGB 11.6 (L) 06/01/2022    HCT 37.3 (L) 06/01/2022    MCV 83.9 06/01/2022     06/01/2022     Lab Results   Component Value Date     (L) 06/01/2022    K 4.5 06/01/2022    CL 86 (L) 06/01/2022    CO2 34 (H) 06/01/2022    BUN 45 (H) 06/01/2022    CREATININE 1.0 06/01/2022    GLUCOSE 456 (H) 06/01/2022    CALCIUM 9.6 06/01/2022    PROT 6.7 03/31/2022    LABALBU 3.4 03/31/2022    BILITOT 0.3 03/31/2022 ALKPHOS 124 03/31/2022    AST 13 (L) 03/31/2022    ALT <5 (L) 03/31/2022    LABGLOM >60 06/01/2022    GFRAA >60 06/01/2022    AGRATIO 1.2 01/24/2022    GLOB 3.0 10/20/2021         Lab Results   Component Value Date    CHOL 198 01/24/2022    CHOL 189 01/07/2021    CHOL 191 12/10/2019     Lab Results   Component Value Date    TRIG 89 01/24/2022    TRIG 96 01/07/2021    TRIG 92 12/10/2019     Lab Results   Component Value Date    HDL 81 (H) 01/24/2022    HDL 74 (H) 01/07/2021    HDL 99 (H) 12/10/2019     Lab Results   Component Value Date    LDLCALC 99 01/24/2022    LDLCALC 96 01/07/2021    LDLCALC 74 12/10/2019     Lab Results   Component Value Date    LABVLDL 18 01/24/2022    LABVLDL 19 01/07/2021    LABVLDL 18 12/10/2019     No results found for: CHOLHDLRATIO    Lab Results   Component Value Date    INR 0.91 06/01/2022    PROTIME 12.2 06/01/2022       The 10-year ASCVD risk score (Faustino Ramsey, et al., 2013) is: 37.4%    Values used to calculate the score:      Age: 67 years      Sex: Male      Is Non- : No      Diabetic: Yes      Tobacco smoker: No      Systolic Blood Pressure: 157 mmHg      Is BP treated: Yes      HDL Cholesterol: 81 mg/dL      Total Cholesterol: 198 mg/dL      Assessment / Plan:      Diagnosis Orders   1. Chronic heart failure with preserved ejection fraction (HFpEF) ([address]     2. Dyspnea on exertion     3. Exertional dyspnea          1. Severe exertional dyspnea:   Pneumothorax and significant pleural effusions have been excluded by CT of the chest in 05/2022 (patient even had thoracentesis of the right pleural effusion with no significant improvement). He has not noticed any significant improvement with his exertional dyspnea despite using supplemental oxygen. He does not appear to be volume overloaded. He has multiple risk factors for significant CAD. Normal Lexiscan nuclear stress test 03/2022 could be falsely normal.     -Will plan for [facility]     2.  Chronic HFpEF: Patient appears euvolemic and hemodynamically stable.    -No need for diuretics.     3. PAF with RVR:   Patient remains in sinus.    -Cw Toprol 50 bid and eliquis.      4. HTN:   BP is controlled.      -Cw BB and ramipril 10 daily     5. HLP:   Currently on pravastatin 40        We will schedule a follow up visit in 5 weeks      I have spent 42 minutes of face to face time with the patient with more than 50% spent counseling and coordinating care. I have personally reviewed the reports and images of labs, radiological studies, cardiac studies including ECG's and telemetry, current and old medical records. The note was completed using EMR and Dragon dictation system. Every effort was made to ensure accuracy; however, inadvertent computerized transcription errors may be present. All questions and concerns were addressed to the patient/family. Alternatives to my treatment were discussed. I would like to thank you for providing me the opportunity to participate in the care of your patient. If you have any questions, please do not hesitate to contact me.      Meghann Clinton MD, [address] Office Phone: [phone]  Fax: [phone]

## 2022-06-10 DIAGNOSIS — Z79.899 LONG TERM USE OF DRUG: ICD-10-CM

## 2022-06-10 LAB
ANION GAP SERPL CALCULATED.3IONS-SCNC: 16 MMOL/L (ref 3–16)
BUN BLDV-MCNC: 20 MG/DL (ref 7–20)
CALCIUM SERPL-MCNC: 9.2 MG/DL (ref 8.3–10.6)
CHLORIDE BLD-SCNC: 99 MMOL/L (ref 99–110)
CO2: 27 MMOL/L (ref 21–32)
CREAT SERPL-MCNC: 0.7 MG/DL (ref 0.8–1.3)
GFR AFRICAN AMERICAN: >60
GFR NON-AFRICAN AMERICAN: >60
GLUCOSE BLD-MCNC: 282 MG/DL (ref 70–99)
POTASSIUM SERPL-SCNC: 5.2 MMOL/L (ref 3.5–5.1)
SODIUM BLD-SCNC: 142 MMOL/L (ref 136–145)

## 2022-06-13 ENCOUNTER — TELEPHONE (OUTPATIENT)
Dept: CARDIOLOGY CLINIC | Age: 77
End: 2022-06-13

## 2022-06-14 ENCOUNTER — PATIENT MESSAGE (OUTPATIENT)
Dept: FAMILY MEDICINE CLINIC | Age: 77
End: 2022-06-14

## 2022-06-14 RX ORDER — PEN NEEDLE, DIABETIC 31 G X1/4"
1 NEEDLE, DISPOSABLE MISCELLANEOUS DAILY
Qty: 100 EACH | Refills: 3 | Status: SHIPPED | OUTPATIENT
Start: 2022-06-14

## 2022-06-14 NOTE — TELEPHONE ENCOUNTER
From: Nato Davis  To: Dr. Jeanette Jung  Sent: 6/14/2022 1:38 PM EDT  Subject: Insulin needles    Would you please send the script for the insulin pen needles to [place]? Thank you.

## 2022-06-14 NOTE — TELEPHONE ENCOUNTER
Medication:   Requested Prescriptions     Pending Prescriptions Disp Refills    Insulin Pen Needle (PEN NEEDLES) 31G X 6 MM MISC 100 each 3     Si each by Does not apply route daily        Last Filled:  2022    Patient Phone Number: [phone] (home)     Last appt: 2022   Next appt: Visit date not found    Last OARRS: No flowsheet data found.

## 2022-06-16 ENCOUNTER — HOSPITAL ENCOUNTER (OUTPATIENT)
Dept: CARDIAC CATH/INVASIVE PROCEDURES | Age: 77
Discharge: HOME | End: 2022-06-16
Attending: INTERNAL MEDICINE | Admitting: INTERNAL MEDICINE
Payer: COMMERCIAL

## 2022-06-16 ENCOUNTER — PATIENT MESSAGE (OUTPATIENT)
Dept: PULMONOLOGY | Age: 77
End: 2022-06-16

## 2022-06-16 VITALS — BODY MASS INDEX: 22.53 KG/M2 | TEMPERATURE: 98.2 F | HEIGHT: 73 IN | WEIGHT: 170 LBS

## 2022-06-16 DIAGNOSIS — R06.02 SHORTNESS OF BREATH: Primary | ICD-10-CM

## 2022-06-16 LAB
ANION GAP SERPL CALCULATED.3IONS-SCNC: 10 MMOL/L (ref 3–16)
BUN BLDV-MCNC: 19 MG/DL (ref 7–20)
CALCIUM SERPL-MCNC: 9.2 MG/DL (ref 8.3–10.6)
CHLORIDE BLD-SCNC: 100 MMOL/L (ref 99–110)
CO2: 33 MMOL/L (ref 21–32)
CREAT SERPL-MCNC: 0.6 MG/DL (ref 0.8–1.3)
GFR AFRICAN AMERICAN: >60
GFR NON-AFRICAN AMERICAN: >60
GLUCOSE BLD-MCNC: 158 MG/DL (ref 70–99)
HCT VFR BLD CALC: 36.5 % (ref 40.5–52.5)
HEMOGLOBIN: 11.6 G/DL (ref 13.5–17.5)
INR BLD: 1.04 (ref 0.87–1.14)
LEFT VENTRICULAR EJECTION FRACTION HIGH VALUE: 70 %
LEFT VENTRICULAR EJECTION FRACTION MODE: NORMAL
LV EF: 65 %
MCH RBC QN AUTO: 26.7 PG (ref 26–34)
MCHC RBC AUTO-ENTMCNC: 31.8 G/DL (ref 31–36)
MCV RBC AUTO: 83.8 FL (ref 80–100)
PDW BLD-RTO: 17.4 % (ref 12.4–15.4)
PLATELET # BLD: 445 K/UL (ref 135–450)
PMV BLD AUTO: 7 FL (ref 5–10.5)
POTASSIUM SERPL-SCNC: 4.8 MMOL/L (ref 3.5–5.1)
PROTHROMBIN TIME: 13.5 SEC (ref 11.7–14.5)
RBC # BLD: 4.35 M/UL (ref 4.2–5.9)
SODIUM BLD-SCNC: 143 MMOL/L (ref 136–145)
WBC # BLD: 9 K/UL (ref 4–11)

## 2022-06-16 PROCEDURE — 85610 PROTHROMBIN TIME: CPT

## 2022-06-16 PROCEDURE — 99024 POSTOP FOLLOW-UP VISIT: CPT | Performed by: INTERNAL MEDICINE

## 2022-06-16 PROCEDURE — 2709999900 HC NON-CHARGEABLE SUPPLY

## 2022-06-16 PROCEDURE — 80048 BASIC METABOLIC PNL TOTAL CA: CPT

## 2022-06-16 PROCEDURE — 2500000003 HC RX 250 WO HCPCS

## 2022-06-16 PROCEDURE — 93460 R&L HRT ART/VENTRICLE ANGIO: CPT

## 2022-06-16 PROCEDURE — 93005 ELECTROCARDIOGRAM TRACING: CPT | Performed by: INTERNAL MEDICINE

## 2022-06-16 PROCEDURE — 93460 R&L HRT ART/VENTRICLE ANGIO: CPT | Performed by: INTERNAL MEDICINE

## 2022-06-16 PROCEDURE — 6360000002 HC RX W HCPCS

## 2022-06-16 PROCEDURE — 85027 COMPLETE CBC AUTOMATED: CPT

## 2022-06-16 PROCEDURE — C1769 GUIDE WIRE: HCPCS

## 2022-06-16 PROCEDURE — C1894 INTRO/SHEATH, NON-LASER: HCPCS

## 2022-06-16 PROCEDURE — 99152 MOD SED SAME PHYS/QHP 5/>YRS: CPT

## 2022-06-16 PROCEDURE — 99153 MOD SED SAME PHYS/QHP EA: CPT

## 2022-06-16 PROCEDURE — C1751 CATH, INF, PER/CENT/MIDLINE: HCPCS

## 2022-06-16 PROCEDURE — 6360000004 HC RX CONTRAST MEDICATION: Performed by: INTERNAL MEDICINE

## 2022-06-16 RX ORDER — SODIUM CHLORIDE 9 MG/ML
INJECTION, SOLUTION INTRAVENOUS CONTINUOUS
Status: DISCONTINUED | OUTPATIENT
Start: 2022-06-16 | End: 2022-06-16 | Stop reason: HOSPADM

## 2022-06-16 RX ORDER — SODIUM CHLORIDE 0.9 % (FLUSH) 0.9 %
5-40 SYRINGE (ML) INJECTION PRN
Status: DISCONTINUED | OUTPATIENT
Start: 2022-06-16 | End: 2022-06-17 | Stop reason: HOSPADM

## 2022-06-16 RX ORDER — SODIUM CHLORIDE 9 MG/ML
INJECTION, SOLUTION INTRAVENOUS PRN
Status: DISCONTINUED | OUTPATIENT
Start: 2022-06-16 | End: 2022-06-17 | Stop reason: HOSPADM

## 2022-06-16 RX ORDER — SODIUM CHLORIDE 0.9 % (FLUSH) 0.9 %
5-40 SYRINGE (ML) INJECTION EVERY 12 HOURS SCHEDULED
Status: DISCONTINUED | OUTPATIENT
Start: 2022-06-16 | End: 2022-06-17 | Stop reason: HOSPADM

## 2022-06-16 RX ORDER — ACETAMINOPHEN 325 MG/1
650 TABLET ORAL EVERY 4 HOURS PRN
Status: DISCONTINUED | OUTPATIENT
Start: 2022-06-16 | End: 2022-06-17 | Stop reason: HOSPADM

## 2022-06-16 RX ADMIN — IOPAMIDOL 150 ML: 755 INJECTION, SOLUTION INTRAVENOUS at 13:43

## 2022-06-16 NOTE — PROCEDURES
CARDIOLOGY CATH LAB NOTE  [facility]         Right and Left Heart Catherization  Eduard Arteaga M.D.,Merg[facility] with [facility]  6/16/2022, 2:01 PM  La Butler MD        Valarie Yo  65 y.o.  [id number]  Referring MD : DIMA Odell  Procedure : Right and left heart catherization   Left heart cath  Selective coronary angiogram  Left ventriculogram  Right ileofemoral angiogram  Right heart catheterization  Closure device (Angioseal)    CPT code 02352  CPT code 72375 CPT code 86809  CPT code 81273 at 2 units    Procedure performed by Dr. Carlos Otero MD, [facility]  Surgical assistant : none    Indication: Cardiac cath to rule out ischemic CAD, Possible angioplasty, The procedure and risks described to patient including risk of CVA, MI, bleeding, emergency surgery, death, patient seen and followed by Dr. Tere Mcdonald from heart failure group. Patient is having some chest discomfort and abnormal stress test.  Cardiac cath is performed at request on the right and left heart for this patient., Consent signed or positive stress test  Anesthesia: Moderate sedation with Versed and Fentanyl IV  Any and all anesthesia was administered by my staff under my direct supervision and with me personally monitoring the patient inside the room. We were able to access the right radial for the arterial side. This was done with ultrasound assist.  We could not access the right brachial vein. The left heart cath was performed from the right radial artery without difficulty. We did approach from the right femoral vein area for the right heart cath. A 7 Ukrainian sheath was placed through which we placed a York-Waldemar catheter and was able to access all chambers and perform studies in that area. No complications. Estimated blood loss :minimal  Specimen obtained : none    After informed consent was obtained the history and exam were reviewed and the patient was taken to the cardiac cath lab.  The patient had the right groin prepped and draped in sterile fashion. The groin was anesthetized with 2% Xylocaine. Using a thin walled needle the right femoral artery  And vein were entered  and .035mm guide wire was inserted. Right heart cath.:A Josiephine Butt catheter was placed through the  7 English right femoral vein sheath  to the pulmonary artery where subsequent pressure determinations were made in all the chambers. Thermodilution   cardiac outputs were made. The catheter was withdrawn. Left heart cath: A 5 Moroccan arterial introducer was advanced through the needle and the wire was removed. The sheath was aspirated and flushed with normal saline. A 5 left Bhumi catheter was advanced through the sheath over a guide wire and advanced under fluoroscopic guidance into the ascending aorta. The wire was withdrawn and the catheter was aspirated and flushed with normal saline. The catheter was then advanced into the ostium of the left coronary artery under fluoroscopic guidance. Multiple cine images were obtained in different projections of the left coronary artery. The catheter was then withdrawn. A JR4 catheter was subsequently advanced  Through the sheath over a wire and advanced under fluoroscopic guidance in to the ascending aorta. The guide wire was removed and the catheter was aspirated and flushed and subsequently advanced in to the ostium of the right coronary artery. Multiple cine images were then obtained of the right coronary artery in varying obliqities. The catheter was then withdrawn. A pigtail catheter was then advanced through the sheathe over a guide wire and advanced in to the ascending aorta. The wire was then used to help prolapse the catheter across the aortic valve. Once the catheter was across the valve the wire was withdrawn and the catheterr was aspirated and flushed with normal saline. Pressure measurements of the left ventricle were then obtained.     Then catheter was then connected to a mechanical injection device for contrast ventriculography that was performed in an BLISS projection. The pullback did not show a gradient across the aortic valve. The catheter was then reconnected to a pressure system for a pullback pressure analysis of the aortic valve. The catheter had a guide wire placed in it and the catheter was then withdraw. The right ileofemoral sheath was injected and the vessel imaged    Hemostasis was obtained by manual pressure in the right femoral vein area. We did a TR band in the right radial artery area. Complications: None      Estimated blood loss: Minimal      Sedation: Fentanyl 50 micrograms                  Versed 2 mg      Angiographic Findings:  Left Main: Calcification but no appreciable stenoses. Left Anterior Descending: Proximally calcific. There is mid vessel atherosclerotic plaque but no significant stenosis identified. There is fairly dramatic left to right collateral flow supplying the distal two thirds of the right coronary artery. Circumflex: Is has mild calcifications proximally. No significant stenosis identified. Right Coronary: Completely occluded proximally. There is collateral flow from the left side supplying a distal two thirds of the vessel. Left Ventriculogram: Anatomy contractility ejection fraction 55 to 70%. Right ileofemoral: Not observed  There was no gradient across the aortic valve. 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 There is moderate lesions in the mid LAD of 40 to 45% but not interventional with calcification. LV function is dynamic and contracts normally.     Recommendations:  Continue current medical management   Right heart pressure shows mild elevation of the pulmonary artery. Cardiac outputs were normal.  Continue current medical management. Optimize lipids try to get the LDL to 60 or less. We will follow-up with Dr. Beti Henderson.       This note was likely completed using voice recognition technology and may contain unintended errors  Jennifer Allan MD, Christin Li MD

## 2022-06-16 NOTE — ANESTHESIA PRE-OP
Brief Pre-Op Note/Sedation Assessment      Sivan Ramos [facility]  1945  [id number]  12:36 PM    Planned Procedure: Cardiac Catheterization Procedure  Post Procedure Plan: Return to same level of care  Consent: I have discussed with the patient and/or the patient representative the indication, alternatives, and the possible risks and/or complications of the planned procedure and the anesthesia methods. The patient and/or patient representative appear to understand and agree to proceed. We are asked by Dr. Abraham Singh from heart failure specialist to perform a right and left heart cath. Patient has a history of paroxysmal atrial fibrillation. Shortness of breath and dyspnea. Chief Complaint:   Chest Pain/Pressure  Anginal Equivalent  Dyspnea  Fatigue      Indications for Cath Procedure:  1. Presentation:  Cardiac Arrythmia, Cardiomyopathy and LV Dysfunction  2. Anginal Classification within 2 weeks:  CCS I - Angina only during strenuous or prolonged physical activity  3. Angina Symptoms Assessment:  Atypical Chest Pain  4. Heart Failure Class within last 2 weeks:  No symptoms  5. Cardiovascular Instability:  No   Summary 1/25/2022   Left ventricular cavity size is normal.   There is mild concentric left ventricular hypertrophy. Left ventricular function is normal with ejection fraction estimated at   55-60%. Diastolic filling parameters suggest grade II diastolic dysfunction. Global L. Strain = -14.6%. Mitral annular calcification is present   The left atrium is mildly dilated. Mild tricuspid regurgitation. Mild pulmonic regurgitation present. There is a small localized near right atrium pericardial effusion noted   without any hemodynamic implications. Estimated pulmonary artery systolic pressure is at 33 mmHg assuming a right   atrial pressure of 3 mmHg.     Summary 3/10/2022    Overall findings represent a low risk scan.    Normal LV size and systolic function.    Small basal inferior fixed defect, likely artifact, otherwise no evidence of    ischemia or prior infarction.    Non-diagnostic EKG response due to failure to reach target heart rate.    Non-diagnostic EKG response due to baseline abnormalities.           Prior Ischemic Workup/Eval:  1. Pre-Procedural Medications: Yes: Antiarrhythmic Agent Other, Aspirin and Beta Blockers  2. Stress Test Completed? Yes:  Stress or Imaging Studies Performed (within ANY time period):   Type:  Stress Nuclear  Results:  Positive:  Ischemia on ECG Extent of Ischemia:  Intermediate stress test results are questionable. Gray zone. Does Patient need surgery? Cath Valve Surgery:  No    Pre-Procedure Medical History:  Vital Signs:  Temp 98.2 °F (36.8 °C) (Oral)   Ht 6' 1\" (1.854 m)   Wt 170 lb (77.1 kg)   BMI 22.43 kg/m²     Allergies: Allergies   Allergen Reactions    Dye [Iodides]      Medications:    No current facility-administered medications for this encounter. Past Medical History:    Past Medical History:   Diagnosis Date    Carotid stenosis, left 10/12/2011    Chronic back pain     Chronic systolic (congestive) heart failure 1/2/9121    Diastolic CHF ([address]     Erectile dysfunction     Hyperlipidemia     Hypertension     Osteoarthritis     Type II or unspecified type diabetes mellitus without mention of complication, not stated as uncontrolled        Surgical History:    Past Surgical History:   Procedure Laterality Date    KNEE SURGERY      ROTATOR CUFF REPAIR Bilateral              Pre-Sedation:  Pre-Sedation Documentation and Exam:  I have personally completed a history, physical exam & review of systems for this patient (see notes). Prior History of Anesthesia Complications:   none    Modified Mallampati:  I (soft palate, uvula, fauces, tonsillar pillars visible)    ASA Classification:  Class 2 - A normal healthy patient with mild systemic disease    Kenisha Scale:   Activity:  2 - Able to move 4 extremities voluntarily on command  Respiration:  2 - Able to breathe deeply and cough freely  Circulation:  2 - BP+/- 20mmHg of normal  Consciousness:  2 - Fully awake  Oxygen Saturation (color):  2 - Able to maintain oxygen saturation >92% on room air    Sedation/Anesthesia Plan:  Guard the patient's safety and welfare. Minimize physical discomfort and pain. Minimize negative psychological responses to treatment by providing sedation and analgesia and maximize the potential amnesia. Patient to meet pre-procedure discharge plan.     Medication Planned:  Versed and fentanyl    Patient is an appropriate candidate for plan of sedation:   yes      Electronically signed by Jackie Blount MD on 6/16/2022 at 12:36 PM

## 2022-06-17 LAB
EKG ATRIAL RATE: 69 BPM
EKG DIAGNOSIS: NORMAL
EKG P AXIS: 42 DEGREES
EKG P-R INTERVAL: 144 MS
EKG Q-T INTERVAL: 404 MS
EKG QRS DURATION: 88 MS
EKG QTC CALCULATION (BAZETT): 432 MS
EKG R AXIS: 25 DEGREES
EKG T AXIS: 38 DEGREES
EKG VENTRICULAR RATE: 69 BPM

## 2022-06-17 PROCEDURE — 93010 ELECTROCARDIOGRAM REPORT: CPT | Performed by: INTERNAL MEDICINE

## 2022-06-17 NOTE — TELEPHONE ENCOUNTER
Well, know that he's had his cath I think it's time to get PFTs. It should be far enough removed from his pneumothorax. I agree that we should get him in sooner than August since he's been having such a difficult time. If we can get the PFTs done prior, lets give him a spot during my consult week.   The week of 6/27 (Tuesday-Thursday would likely be most manageable)

## 2022-06-17 NOTE — TELEPHONE ENCOUNTER
From: Giuseppe Barclay  To: Dr. Rip Rivera  Sent: 6/16/2022 7:11 PM EDT  Subject: Need appointment    Guille Lindsay had his Cardiac cath today. Nothing significant was found that would affect his breathing. So we need to continue following up on the pulmonary side. The earliest I can schedule an appt online is August, but he can't really wait that long. Would it be possible to get an appt sooner than that? Thanks for your help.

## 2022-06-21 NOTE — TELEPHONE ENCOUNTER
Spoke with patient. There is an order for PFT already pending in his chart. Diff Cap order was added. He will call [facility] to schedule. Appt made for 06/29/2022 at 3:00 PM.  This appt will be adjusted if PFT cannot be done prior to that. Patient expressed understanding. Please sign pended order.

## 2022-06-24 ENCOUNTER — HOSPITAL ENCOUNTER (OUTPATIENT)
Dept: PULMONOLOGY | Age: 77
Discharge: HOME | End: 2022-06-24
Payer: COMMERCIAL

## 2022-06-24 DIAGNOSIS — R06.02 SHORTNESS OF BREATH: ICD-10-CM

## 2022-06-24 PROCEDURE — 94726 PLETHYSMOGRAPHY LUNG VOLUMES: CPT

## 2022-06-24 PROCEDURE — 94729 DIFFUSING CAPACITY: CPT

## 2022-06-24 PROCEDURE — 6360000002 HC RX W HCPCS: Performed by: FAMILY MEDICINE

## 2022-06-24 PROCEDURE — 94760 N-INVAS EAR/PLS OXIMETRY 1: CPT

## 2022-06-24 PROCEDURE — 94664 DEMO&/EVAL PT USE INHALER: CPT

## 2022-06-24 PROCEDURE — 94060 EVALUATION OF WHEEZING: CPT

## 2022-06-24 RX ORDER — ALBUTEROL SULFATE 2.5 MG/3ML
2.5 SOLUTION RESPIRATORY (INHALATION) ONCE
Status: COMPLETED | OUTPATIENT
Start: 2022-06-24 | End: 2022-06-24

## 2022-06-24 RX ADMIN — ALBUTEROL SULFATE 2.5 MG: 2.5 SOLUTION RESPIRATORY (INHALATION) at 14:32

## 2022-06-29 ENCOUNTER — OFFICE VISIT (OUTPATIENT)
Dept: PULMONOLOGY | Age: 77
End: 2022-06-29
Payer: COMMERCIAL

## 2022-06-29 VITALS
DIASTOLIC BLOOD PRESSURE: 75 MMHG | TEMPERATURE: 97.4 F | OXYGEN SATURATION: 93 % | RESPIRATION RATE: 16 BRPM | HEIGHT: 73 IN | WEIGHT: 170 LBS | SYSTOLIC BLOOD PRESSURE: 131 MMHG | HEART RATE: 68 BPM | BODY MASS INDEX: 22.53 KG/M2

## 2022-06-29 DIAGNOSIS — R91.8 LUNG MASS: Primary | ICD-10-CM

## 2022-06-29 PROCEDURE — 99215 OFFICE O/P EST HI 40 MIN: CPT | Performed by: INTERNAL MEDICINE

## 2022-06-29 PROCEDURE — 1123F ACP DISCUSS/DSCN MKR DOCD: CPT | Performed by: INTERNAL MEDICINE

## 2022-06-29 NOTE — PROGRESS NOTES
[facility]    Outpatient Follow Up Note    Subjective:   CHIEF COMPLAINT / HPI:     The patient is 65 y.o. male who presents today for follow up of dyspnea and pleural effusions. Orin Jimenez is accompanied by his wife and remains very dyspneic. He gets short of breath just walking from one part of his house to another. He uses oxygen intermittently and says that sometimes he desaturates and sometimes he doesn't. He doesn't get around much anymore because of dyspnea. He had a thoracentesis since last visit and radiology was only able to get out 300mL before the pain was too severe. Unfortunately the fluid wasn't sent for chemistries. He had PFTs since last visit as well, as his functionality continued to decline. He had a left and right heart cath that showed an occluded RCA with left to right collaterals and mild elevation in pa pressures. He is using inhalers but notes no difference.     Past Medical History:    Past Medical History:   Diagnosis Date    Carotid stenosis, left 10/12/2011    Chronic back pain     Chronic systolic (congestive) heart failure 6687    Diastolic CHF ([facility] [address]     Erectile dysfunction     Hyperlipidemia     Hypertension     Osteoarthritis     Type II or unspecified type diabetes mellitus without mention of complication, not stated as uncontrolled        Social History:    Social History     Tobacco Use   Smoking Status Former Smoker    Packs/day: 2.00    Years: 40.00    Pack years: 80.00    Types: Cigarettes    Quit date: 10/24/2001    Years since quittin.6   Smokeless Tobacco Never Used       Current Medications:  Current Outpatient Medications on File Prior to Visit   Medication Sig Dispense Refill    Insulin Pen Needle (PEN NEEDLES) 31G X 6 MM MISC 1 each by Does not apply route daily 100 each 3    Continuous Blood Gluc Sensor (FREESTYLE LIZA 14 DAY SENSOR) MISC Check bs tidac and hs 6 each 1    Continuous Blood Gluc  ([employer] LIZA 14 DAY READER) SOFIYA Check bs tidac and hs 6 each 1    Insulin Degludec (TRESIBA FLEXTOUCH) 200 UNIT/ML SOPN 10 units subcut qam, increase 4 units every 4 days until am blood sugar < 120. Max 100 units 9 pen 1    Insulin Degludec (TRESIBA FLEXTOUCH) 200 UNIT/ML SOPN Sig: 10 units subcut qam, increase 4 units every 4 days until am blood sugar < 120. Max 100 units (Patient taking differently: Si units subcut qam, increase 4 units every 4 days until am blood sugar < 120. Max 100 units) 1 pen 0    ELIQUIS 5 MG TABS tablet TAKE 1 TABLET BY MOUTH TWO TIMES A DAY 60 tablet 0    Umeclidinium Bromide (INCRUSE ELLIPTA) 62.5 MCG/INH AEPB Inhale 1 puff into the lungs daily 1 each 6    albuterol sulfate HFA (VENTOLIN HFA) 108 (90 Base) MCG/ACT inhaler Inhale 2 puffs into the lungs 4 times daily as needed for Wheezing 18 g 5    torsemide (DEMADEX) 100 MG tablet Take 1 tablet by mouth daily 90 tablet 3    metoprolol succinate (TOPROL XL) 50 MG extended release tablet Take 1 tablet by mouth in the morning and at bedtime 30 tablet 3    ramipril (ALTACE) 10 MG capsule TAKE 1 CAPSULE DAILY 90 capsule 1    dapagliflozin (FARXIGA) 10 MG tablet TAKE 1 TABLET EVERY MORNING 90 tablet 1    metFORMIN (GLUCOPHAGE) 500 MG tablet TAKE 2 TABLETS 2 TIMES     DAILY WITH MEALS 360 tablet 3    pravastatin (PRAVACHOL) 40 MG tablet Take 1 tablet by mouth daily 90 tablet 3    sildenafil (VIAGRA) 100 MG tablet Take 1 tablet by mouth as needed for Erectile Dysfunction Max daily dose 100mg. 16 tablet 0     No current facility-administered medications on file prior to visit.        REVIEW OF SYSTEMS:    CONSTITUTIONAL: Negative for fevers and chills  HEENT: Negative for oropharyngeal exudate, post nasal drip, sinus pain / pressure, nasal congestion, ear pain  RESPIRATORY:  See HPI  CARDIOVASCULAR: Negative for chest pain, palpitations, edema  GASTROINTESTINAL: Negative for nausea, vomiting, diarrhea, constipation and abdominal pain  HEMATOLOGICAL: Negative for adenopathy  SKIN: Negative for clubbing, cyanosis, skin lesions  EXTREMITIES: Negative for weakness, decreased ROM  NEUROLOGICAL: Negative for unilateral weakness, speech or gait abnormalities  PSYCH: Negative for anxiety, depression    Objective:   PHYSICAL EXAM:        VITALS:  /75 (Site: Right Upper Arm, Position: Sitting, Cuff Size: Medium Adult)   Pulse 68   Temp 97.4 °F (36.3 °C) (Infrared)   Resp 16   Ht 6' 1\" (1.854 m)   Wt 170 lb (77.1 kg)   SpO2 93%   BMI 22.43 kg/m²     CONSTITUTIONAL:  Awake, alert, cooperative, no apparent distress, and appears stated age  HEENT: No oropharyngeal exudate, PERRL, no cervical adenopathy, no tracheal deviation, thyroid size normal  LUNGS:  No increased work of breathing and clear to auscultation, no crackles. Decreased breath sounds at the bases bilaterally with  egophony. CARDIOVASCULAR:  normal S1 and S2 and no JVD  ABDOMEN:  Normal bowel sounds, non-distended and non-tender to palpation  EXT: No edema, no calf tenderness. Pulses are present bilaterally. NEUROLOGIC:  Mental Status Exam:  Level of Alertness:   awake  Orientation:   person, place, time. SKIN:  normal skin color, texture, turgor, no redness, warmth, or swelling     DATA:      Radiology Review:  Pertinent images / reports were reviewed as a part of this visit. CT chest reveals the following:  April 2022:  Impression       1. Tiny right pneumothorax. 2. Small to moderate right and small left pleural effusion. 3. Bilateral lower lobe airspace consolidation volume loss consistent with atelectasis. Superimposed pneumonia is not excluded.    4. Distended main pulmonary artery could indicate pulmonary hypertension       Last PFTs:  None on file    Immunization History   Administered Date(s) Administered    COVID-19, PFIZER PURPLE top, DILUTE for use, (age 15 y+), 30mcg/0.3mL 03/18/2021, 04/08/2021, 01/15/2022    Influenza 10/24/2011    Influenza Virus Vaccine 10/27/2014    Influenza, High Dose (Fluzone 65 yrs and older) 10/15/2015, 10/10/2016, 11/11/2017, 09/12/2018, 11/06/2019    Pneumococcal Conjugate 13-valent ([id number]) 06/05/2015    Pneumococcal Conjugate 7-valent (Prevnar7) 09/03/2013    Pneumococcal Polysaccharide ([id number]) 09/03/2013    Tdap (Boostrix, Adacel) 08/20/2012    Zoster Live (Zostavax) 09/01/2013         Assessment: This is a 65 y.o. male with pleural effusions, pneumothorax and chronic hypoxemic respiratory failure with dyspnea on exertion. Plan:     Pneumothorax: spontaneous and resolved    Pleural effusions:  Etiology unclear as he has bilateral loculated effusions and likely bilateral trapped lung. Thoracentesis didn't resolve any dyspnea and resulted in pain. Malignancy remains on the differential.  Will repeat imaging in 2 months as the mass like consolidation in the LLL and effusions weren't significantly changed at 6 weeks. No plans for a repeat tap as patient found it too painful. We did discuss possible VATs if we don't have other progress. Restrictive lung disease:  Patient's PFTs showed severe restriction and the only obvious cause I can see is the effusions and basilar consolidation. Possibly rounded atelectasis, but he's not aware of asbestos exposure. Dyspnea:  PFTs match with someone who would be very limited. While the etiology is unclear, so too is any treatment other than trying to improve his conditioning. Patient to start home PT through his PCP and when he's improved enough to get out on a regular basis, I'm going to refer him to pulmonary rehab. Diagnosis Orders   1. Lung mass  CT CHEST WO CONTRAST         The patient is not currently smoking.      Time spent on encounter 60 minutes    RTC after CT in late August.    Rip Rivera MD

## 2022-06-30 ENCOUNTER — PATIENT MESSAGE (OUTPATIENT)
Dept: CARDIOLOGY CLINIC | Age: 77
End: 2022-06-30

## 2022-06-30 DIAGNOSIS — E11.9 TYPE 2 DIABETES MELLITUS WITHOUT COMPLICATION, WITHOUT LONG-TERM CURRENT USE OF INSULIN (HCC): ICD-10-CM

## 2022-06-30 DIAGNOSIS — I48.91 ATRIAL FIBRILLATION, UNSPECIFIED TYPE (HCC): ICD-10-CM

## 2022-06-30 DIAGNOSIS — I10 PRIMARY HYPERTENSION: ICD-10-CM

## 2022-06-30 NOTE — PROCEDURES
Pulmonary Function Test:     Indication: Shortness of breath    Smoked for 45 years    Test comment:     Spirometry data is acceptable and reproducible. Maximum effort given during the test.    Patient had difficulty exhaling during test.  Patient had difficult time inhaling during test.      Estimated body mass index is 22.43 kg/m² as calculated from the following:    Height as of 6/29/22: 6' 1\" (1.854 m). Weight as of 6/29/22: 170 lb (77.1 kg). Spirometry data:    FEV1/FVC: 98. Predicted ratio 72    Pre-Bronchodilator FEV1 1.04L, which is 33% predicted    Post-Bronchodilator FEV1 1.05L, which is 34% predicted    There is 1% reversibility     FVC is 1.11L, which is 25% predicted    Lung Volumes:    TLC (by Plethysmography) is 4.23L, which is 58% predicted    RV is 2.57L which is 96% predicted    Diffusion Capacity:    DLCO is 16.35 which is 51% predicted    Impression:    1. There is no obstruction present    2. There is severe restriction    3. There is no significant reversibility with bronchodilator        [Increase in FEV1 => 12% of control and => 200 ml]    4. There is no significant hyperinflation or air trapping    5. There is moderate reduction in diffusion capacity     Comment:  PFT findings can be seen in patients with pleural parenchymal lung disease. (Pt is known to have loculated pleural effusions)  Clinical correlation is recommended.

## 2022-07-01 RX ORDER — SILDENAFIL 100 MG/1
100 TABLET, FILM COATED ORAL PRN
Qty: 16 TABLET | Refills: 0 | Status: SHIPPED | OUTPATIENT
Start: 2022-07-01 | End: 2023-06-26

## 2022-07-01 RX ORDER — METOPROLOL SUCCINATE 50 MG/1
50 TABLET, EXTENDED RELEASE ORAL 2 TIMES DAILY
Qty: 180 TABLET | Refills: 3 | Status: SHIPPED | OUTPATIENT
Start: 2022-07-01

## 2022-07-01 RX ORDER — APIXABAN 5 MG/1
TABLET, FILM COATED ORAL
Qty: 60 TABLET | Refills: 0 | Status: SHIPPED | OUTPATIENT
Start: 2022-07-01 | End: 2022-07-22

## 2022-07-01 RX ORDER — RAMIPRIL 10 MG/1
CAPSULE ORAL
Qty: 90 CAPSULE | Refills: 1 | Status: SHIPPED | OUTPATIENT
Start: 2022-07-01 | End: 2022-09-14 | Stop reason: ALTCHOICE

## 2022-07-01 RX ORDER — FUROSEMIDE 20 MG/1
20 TABLET ORAL DAILY
Qty: 90 TABLET | Refills: 3 | Status: SHIPPED | OUTPATIENT
Start: 2022-07-01 | End: 2022-07-13

## 2022-07-01 NOTE — TELEPHONE ENCOUNTER
Medication:   Requested Prescriptions     Pending Prescriptions Disp Refills    ELIQUIS 5 MG TABS tablet [Pharmacy Med Name: Eliquis Oral Tablet 5 MG] 60 tablet 0     Sig: TAKE 1 TABLET BY MOUTH TWO TIMES A DAY        Last Filled:  6/1/2022    Patient Phone Number: [phone] (home)     Last appt: 6/7/2022   Next appt: 6/30/2022    Last OARRS: No flowsheet data found.

## 2022-07-01 NOTE — TELEPHONE ENCOUNTER
Medication:   Requested Prescriptions     Pending Prescriptions Disp Refills    sildenafil (VIAGRA) 100 MG tablet 16 tablet 0     Sig: Take 1 tablet by mouth as needed for Erectile Dysfunction Max daily dose 100mg. Last Filled:  5/26/21    Last appt: 6/7/2022   Next appt: Visit date not found    Last OARRS: No flowsheet data found.

## 2022-07-01 NOTE — TELEPHONE ENCOUNTER
From: Ani Becerra  To: Dr. Brittany Maguire  Sent: 6/30/2022 6:05 PM EDT  Subject: Medication refill    Please refill the Metoprol. I am confused as to whether I should be taking 25 mg or 50 mg twice a day. Also please refill the Lasix. Again, I am confused as to whether I am taking 20 mg or 40 mg once daily. Send the prescriptions to [facility] on [id number]

## 2022-07-01 NOTE — TELEPHONE ENCOUNTER
Spoke with pt and Braxton Painter to confirm pt's medications pt is on Metoprolol 50 mg twice daily and torsemide 100 mg daily. Pt explained at his last OV with [facility] pt explained he was having reactions to the torsemide since it was increased. Pt asked if he could start taking lasix instead. he has taken lasix through another doctor and did not have any reactions. Pt explained [facility] told him at last OV to stop the torsemide and start taking the lasix. Pt started lasix 20 mg daily at that time and stopped the torsemide. Reviewed pt's chart and mediactions with Mike Pabon NP and she ok'd the prescription for lasix 20 mg daily. Informed pt and Braxton Painter metoprolol and lasix were called into the [facility]. Pt and Braxton Painter verbalized understanding.

## 2022-07-01 NOTE — TELEPHONE ENCOUNTER
Medication:   Requested Prescriptions     Pending Prescriptions Disp Refills    dapagliflozin (FARXIGA) 10 MG tablet [Pharmacy Med Name: Josue Dom TAB 10MG] 90 tablet 1     Sig: TAKE 1 TABLET EVERY MORNING    ramipril (ALTACE) 10 MG capsule [Pharmacy Med Name: RAMIPRIL CAP 10MG] 90 capsule 1     Sig: TAKE 1 CAPSULE DAILY        Last Filled:  11/15/21    Patient Phone Number: [phone] (home)     Last appt: 6/7/2022   Next appt: Visit date not found    Last OARRS: No flowsheet data found.

## 2022-07-13 ENCOUNTER — OFFICE VISIT (OUTPATIENT)
Dept: CARDIOLOGY CLINIC | Age: 77
End: 2022-07-13
Payer: COMMERCIAL

## 2022-07-13 ENCOUNTER — TELEPHONE (OUTPATIENT)
Dept: FAMILY MEDICINE CLINIC | Age: 77
End: 2022-07-13

## 2022-07-13 VITALS
DIASTOLIC BLOOD PRESSURE: 60 MMHG | HEART RATE: 74 BPM | BODY MASS INDEX: 24.72 KG/M2 | WEIGHT: 187.4 LBS | SYSTOLIC BLOOD PRESSURE: 136 MMHG

## 2022-07-13 DIAGNOSIS — I50.32 CHRONIC HEART FAILURE WITH PRESERVED EJECTION FRACTION (HFPEF) (HCC): ICD-10-CM

## 2022-07-13 DIAGNOSIS — Z79.899 LONG TERM USE OF DRUG: Primary | ICD-10-CM

## 2022-07-13 PROCEDURE — 1123F ACP DISCUSS/DSCN MKR DOCD: CPT | Performed by: INTERNAL MEDICINE

## 2022-07-13 PROCEDURE — 99215 OFFICE O/P EST HI 40 MIN: CPT | Performed by: INTERNAL MEDICINE

## 2022-07-13 RX ORDER — FUROSEMIDE 40 MG/1
40 TABLET ORAL 2 TIMES DAILY
Qty: 180 TABLET | Refills: 3 | Status: SHIPPED | OUTPATIENT
Start: 2022-07-13 | End: 2022-08-16 | Stop reason: ALTCHOICE

## 2022-07-13 NOTE — TELEPHONE ENCOUNTER
Form received, does he need need to have a medicare home health certification face to face encounter or does his hospital follow up from 6/7/22 fulfill this requirement? Form placed on your desk so you can check, thanks!

## 2022-07-13 NOTE — PROGRESS NOTES
Cc: HFpEF, PAFRVR, severe COPD, severe exertional dyspnea    HPI:     Patient is a 54-year-old man with history of HTN, HLP, poorly controlled diabetes, PAFRVR, HFpEF, past heavy smoking 50-pack-year history, quit 25 years ago), COPD on 2 liters oxygen, h/o pleural effusions s/p repeat thoracenteses, h/o pneumothorax.      Echo 1/25/2022: Normal LV size, mild LVH, LVEF 29%, diastolic grade 2, mild LAE, normal RV, mild valvular disease.     ECG 1/19/2022: Normal sinus rhythm with frequent PACs, poor R wave progression, cannot exclude old anterior MI.     CAM 2-week 1/27-2/10/2022: NSR with frequent episodes of SVT, couple of those episodes appear to be AF RVR.     Carleen 3/10/2022: Normal     Patient was started on torsemide during his last visit in 05/2022 which he could not tolerate (had significant lightheadedness, worsening dyspnea, dizziness), despite decreasing dose from 100 mg to 50 mg daily. He eventually quit taking it 5 days ago. PFTs 6/30/2022: Severe restrictive lung disease, moderate decrease in DLCO, no evidence of obstruction or reversibility with bronchodilators. Per pulmonology, results are consistent with pleural-parenchymal disease and well-known history of loculated pleural effusions. R/LHC 06/2022:  pRCA  with left-to-right collaterals, mid LAD 40-45% stenosis, LVEF 55-70%.     Patient is here for a follow up. Patient has been taking Lasix 20 mg p.o. twice daily. He has noticed increasing weight of 10 lbs over the last 4 weeks, mildly worse dyspnea. He also has left flank pain x3 to 4 weeks worse with positional changes.       Histories     Past Medical History:   has a past medical history of Carotid stenosis, left, Chronic back pain, Chronic systolic (congestive) heart failure, Diastolic CHF ([address] Erectile dysfunction, Hyperlipidemia, Hypertension, Osteoarthritis, and Type II or unspecified type diabetes mellitus without mention of complication, not stated as uncontrolled. Surgical History:   has a past surgical history that includes Rotator cuff repair (Bilateral) and knee surgery. Social History:   reports that he quit smoking about 20 years ago. His smoking use included cigarettes. He has a 80.00 pack-year smoking history. He has never used smokeless tobacco. He reports current alcohol use. He reports that he does not use drugs. Family History:  No evidence for sudden cardiac death or premature CAD      Medications:     Home medications were reviewed and are listed below    Prior to Admission medications    Medication Sig Start Date End Date Taking? Authorizing Provider   furosemide (LASIX) 40 MG tablet Take 1 tablet by mouth 2 times daily 7/13/22  Yes Jolynn Ellington MD   ELIQUIS 5 MG TABS tablet TAKE 1 TABLET BY MOUTH TWO TIMES A DAY 7/1/22  Yes Taylor Young MD   dapagliflozin (FARXIGA) 10 MG tablet TAKE 1 TABLET EVERY MORNING 7/1/22  Yes Taylor Young MD   ramipril (ALTACE) 10 MG capsule TAKE 1 CAPSULE DAILY 7/1/22  Yes Taylor Young MD   sildenafil (VIAGRA) 100 MG tablet Take 1 tablet by mouth as needed for Erectile Dysfunction Max daily dose 100mg. 7/1/22 6/26/23 Yes Taylor Young MD   metoprolol succinate (TOPROL XL) 50 MG extended release tablet Take 1 tablet by mouth in the morning and at bedtime 7/1/22  Yes Jolynn Ellington MD   Insulin Pen Needle (PEN NEEDLES) 31G X 6 MM MISC 1 each by Does not apply route daily 6/14/22  Yes Taylor Young MD   Continuous Blood Gluc Sensor (FREESTYLE LIZA 14 DAY SENSOR) MISC Check bs tidac and hs 6/7/22  Yes Taylor Young MD   Continuous Blood Gluc  (FREESTYLE LIZA 14 DAY READER) SOFIYA Check bs tidac and hs 6/7/22  Yes Taylor Young MD   Insulin Degludec (TRESIBA FLEXTOUCH) 200 UNIT/ML SOPN 10 units subcut qam, increase 4 units every 4 days until am blood sugar < 120.  Max 100 units 6/7/22  Yes Taylor Young MD   Insulin Degludec (TRESIBA FLEXTOUCH) 200 UNIT/ML SOPN Sig: 10 units subcut qam, increase 4 units every 4 days until am blood sugar < 120. Max 100 units  Patient taking differently: Si units subcut qam, increase 4 units every 4 days until am blood sugar < 120. Max 100 units 22  Yes Dylan Arevalo MD   Umeclidinium Bromide (INCRUSE ELLIPTA) 62.5 MCG/INH AEPB Inhale 1 puff into the lungs daily 22  Yes Vickie Addison MD   albuterol sulfate HFA (VENTOLIN HFA) 108 (90 Base) MCG/ACT inhaler Inhale 2 puffs into the lungs 4 times daily as needed for Wheezing 22  Yes Vickie Addison MD   metFORMIN (GLUCOPHAGE) 500 MG tablet TAKE 2 TABLETS 2 TIMES     DAILY WITH MEALS 10/20/21  Yes NATASHA Hutchison CNP   pravastatin (PRAVACHOL) 40 MG tablet Take 1 tablet by mouth daily 10/20/21  Yes NATASHA Hutchison CNP          Allergy: Torsemide and Dye [iodides]       Review of Systems:     All 12 point review of symptoms completed. Pertinent positives identified in the HPI, all other review of symptoms negative as below. CONSTITUTIONAL: No fatigue  SKIN: No rash or pruritis. EYES: No visual changes or diplopia. No scleral icterus. ENT: No Headaches, hearing loss or vertigo. No mouth sores or sore throat. CARDIOVASCULAR: No chest pain/chest pressure/chest discomfort. No palpitations. No edema. RESPIRATORY: + dyspnea. No cough or wheezing, no sputum production. GASTROINTESTINAL: No N/V/D. No abdominal pain, appetite loss, blood in stools. GENITOURINARY: No dysuria, trouble voiding, or hematuria. MUSCULOSKELETAL:  No gait disturbance, weakness or joint complaints. NEUROLOGICAL: No headache, diplopia, change in muscle strength, numbness or tingling. No change in gait, balance, coordination, mood, affect, memory, mentation, behavior. PSHYCH: No anxiety, loss of interest, change in sexual behavior, feelings of self-harm, or confusion. ENDOCRINE: No excessive thirst, fluid intake, or urination. No tremor.   HEMATOLOGIC: No abnormal bruising or bleeding. ALLERGY: No nasal congestion or hives.       Physical Examination:     Vitals:    07/13/22 1119   BP: 136/60   Pulse: 74   Weight: 187 lb 6.4 oz (85 kg)       Wt Readings from Last 3 Encounters:   07/13/22 187 lb 6.4 oz (85 kg)   06/29/22 170 lb (77.1 kg)   06/16/22 170 lb (77.1 kg)         General Appearance:  Alert, cooperative, no distress, appears stated age Appropriate weight   Head:  Normocephalic, without obvious abnormality, atraumatic   Eyes:  PERRL, conjunctiva/corneas clear EOM intact  Ears normal   Throat no lesions       Nose: Nares normal, no drainage or sinus tenderness   Throat: Lips, mucosa, and tongue normal   Neck: Supple, symmetrical, trachea midline, no adenopathy, thyroid: not enlarged, symmetric, no tenderness/mass/nodules, no carotid bruit       Lungs:   Decreased breath sounds at bases bilaterally, respirations unlabored   Chest Wall:  No tenderness or deformity   Heart:  Regular rhythm, rate is controlled, S1, S2 normal, there is no murmur, there is no rub or gallop, cannot assess jvd, 1+ bilateral lower extremity edema   Abdomen:   Soft, non-tender, bowel sounds active all four quadrants,  no masses, no organomegaly       Extremities: Extremities normal, atraumatic, no cyanosis   Pulses: 2+ and symmetric   Skin: Skin color, texture, turgor normal, no rashes or lesions   Pysch: Normal mood and affect   Neurologic: Normal gross motor and sensory exam.  Cranial nerves intact        Labs:     Lab Results   Component Value Date    WBC 9.0 06/16/2022    HGB 11.6 (L) 06/16/2022    HCT 36.5 (L) 06/16/2022    MCV 83.8 06/16/2022     06/16/2022     Lab Results   Component Value Date     06/16/2022    K 4.8 06/16/2022     06/16/2022    CO2 33 (H) 06/16/2022    BUN 19 06/16/2022    CREATININE 0.6 (L) 06/16/2022    GLUCOSE 158 (H) 06/16/2022    CALCIUM 9.2 06/16/2022    PROT 6.7 03/31/2022    LABALBU 3.4 03/31/2022    BILITOT 0.3 03/31/2022    ALKPHOS 124 03/31/2022    AST 13 (L) 03/31/2022    ALT <5 (L) 03/31/2022    LABGLOM >60 06/16/2022    GFRAA >60 06/16/2022    AGRATIO 1.2 01/24/2022    GLOB 3.0 10/20/2021         Lab Results   Component Value Date    CHOL 198 01/24/2022    CHOL 189 01/07/2021    CHOL 191 12/10/2019     Lab Results   Component Value Date    TRIG 89 01/24/2022    TRIG 96 01/07/2021    TRIG 92 12/10/2019     Lab Results   Component Value Date    HDL 81 (H) 01/24/2022    HDL 74 (H) 01/07/2021    HDL 99 (H) 12/10/2019     Lab Results   Component Value Date    LDLCALC 99 01/24/2022    LDLCALC 96 01/07/2021    LDLCALC 74 12/10/2019     Lab Results   Component Value Date    LABVLDL 18 01/24/2022    LABVLDL 19 01/07/2021    LABVLDL 18 12/10/2019     No results found for: CHOLHDLRATIO    Lab Results   Component Value Date    INR 1.04 06/16/2022    INR 0.91 06/01/2022    PROTIME 13.5 06/16/2022    PROTIME 12.2 06/01/2022       The 10-year ASCVD risk score (Dontae Briceno, et al., 2013) is: 53.5%    Values used to calculate the score:      Age: 65 years      Sex: Male      Is Non- : No      Diabetic: Yes      Tobacco smoker: No      Systolic Blood Pressure: 023 mmHg      Is BP treated: Yes      HDL Cholesterol: 81 mg/dL      Total Cholesterol: 198 mg/dL      Assessment / Plan:      Diagnosis Orders   1. Long term use of drug  Basic Metabolic Panel    Magnesium   2. Chronic heart failure with preserved ejection fraction (HFpEF) ([facility])  Basic Metabolic Panel    Magnesium        1. Severe exertional dyspnea:   Pneumothorax and significant pleural effusions have been excluded by CT of the chest in 05/2022 (patient even had thoracentesis of the right pleural effusion with no significant improvement). He has not noticed any significant improvement with his exertional dyspnea despite using supplemental oxygen. He has multiple risk factors for significant CAD.   Normal Lexiscan nuclear stress test 03/2022 could be falsely normal. LHC did reveal a RCA infarct

## 2022-07-15 NOTE — TELEPHONE ENCOUNTER
Alvin Colbert from [facility] called to say the Hospital Follow up from 06/07 is acceptable for certification. Please fax notes and form to [phone].

## 2022-07-18 NOTE — TELEPHONE ENCOUNTER
Yuri Augustin is calling in stating that she did receive the fax that was sent over this morning for the patient. But unfortunately it is over a month old so she is calling to get a verbal order for PT. Please call Yuri Augustin when this can be completed.

## 2022-07-18 NOTE — TELEPHONE ENCOUNTER
Patient's wife Dilshad Marcum is calling in, she would like a MA to call her and follow up on this. Dilshad Marcum stated that we are \"holding up the process\" and would like to speak to someone regarding this so we can expedite this and get it completed so the patient can receive home care.

## 2022-07-18 NOTE — TELEPHONE ENCOUNTER
Called and spoke with Jono Alcantar. Advised her that I faxed over the last office notes.  She verbalized understanding

## 2022-07-22 DIAGNOSIS — I48.91 ATRIAL FIBRILLATION, UNSPECIFIED TYPE (HCC): ICD-10-CM

## 2022-07-22 DIAGNOSIS — I50.32 CHRONIC HEART FAILURE WITH PRESERVED EJECTION FRACTION (HFPEF) (HCC): ICD-10-CM

## 2022-07-22 DIAGNOSIS — Z79.899 LONG TERM USE OF DRUG: ICD-10-CM

## 2022-07-22 LAB
ANION GAP SERPL CALCULATED.3IONS-SCNC: 8 MMOL/L (ref 3–16)
BUN BLDV-MCNC: 20 MG/DL (ref 7–20)
CALCIUM SERPL-MCNC: 8.9 MG/DL (ref 8.3–10.6)
CHLORIDE BLD-SCNC: 93 MMOL/L (ref 99–110)
CO2: 41 MMOL/L (ref 21–32)
CREAT SERPL-MCNC: 0.6 MG/DL (ref 0.8–1.3)
GFR AFRICAN AMERICAN: >60
GFR NON-AFRICAN AMERICAN: >60
GLUCOSE BLD-MCNC: 144 MG/DL (ref 70–99)
MAGNESIUM: 1.5 MG/DL (ref 1.8–2.4)
POTASSIUM SERPL-SCNC: 4.9 MMOL/L (ref 3.5–5.1)
SODIUM BLD-SCNC: 142 MMOL/L (ref 136–145)

## 2022-07-22 RX ORDER — APIXABAN 5 MG/1
TABLET, FILM COATED ORAL
Qty: 60 TABLET | Refills: 0 | Status: SHIPPED | OUTPATIENT
Start: 2022-07-22 | End: 2022-09-06

## 2022-07-22 NOTE — TELEPHONE ENCOUNTER
Medication:   Requested Prescriptions     Pending Prescriptions Disp Refills    ELIQUIS 5 MG TABS tablet [Pharmacy Med Name: Eliquis Oral Tablet 5 MG] 60 tablet 0     Sig: TAKE 1 TABLET BY MOUTH TWO TIMES A DAY        Last Filled:  07/01/2022    Patient Phone Number: [phone] (home)     Last appt: 6/7/2022   Next appt: Visit date not found    Last OARRS: No flowsheet data found.

## 2022-07-27 RX ORDER — FUROSEMIDE 40 MG/1
40 TABLET ORAL 2 TIMES DAILY
Qty: 180 TABLET | Refills: 3 | OUTPATIENT
Start: 2022-07-27

## 2022-07-28 ENCOUNTER — TELEPHONE (OUTPATIENT)
Dept: CARDIOLOGY CLINIC | Age: 77
End: 2022-07-28

## 2022-07-28 ENCOUNTER — TELEPHONE (OUTPATIENT)
Dept: FAMILY MEDICINE CLINIC | Age: 77
End: 2022-07-28
Payer: COMMERCIAL

## 2022-07-28 DIAGNOSIS — I11.0 HYPERTENSIVE HEART FAILURE (HCC): Primary | ICD-10-CM

## 2022-07-28 PROCEDURE — G0180 MD CERTIFICATION HHA PATIENT: HCPCS | Performed by: FAMILY MEDICINE

## 2022-07-28 RX ORDER — MAGNESIUM OXIDE 400 MG/1
400 TABLET ORAL 2 TIMES DAILY
Qty: 180 TABLET | Refills: 3 | Status: SHIPPED | OUTPATIENT
Start: 2022-07-28

## 2022-07-28 NOTE — TELEPHONE ENCOUNTER
Informed pt's wfe Billy Simmons pt's magnesium was low and [facility] would like the pt to start magnesium oxide 400 mg bid. Billy Simmons verbalized understanding.

## 2022-07-28 NOTE — TELEPHONE ENCOUNTER
----- Message from Marya Benitez MD sent at 7/26/2022 10:24 PM EDT -----  Please prescribe patient magnesium oxide 400 mg po bid for low Mg. Thank you.

## 2022-08-09 ENCOUNTER — OFFICE VISIT (OUTPATIENT)
Dept: FAMILY MEDICINE CLINIC | Age: 77
End: 2022-08-09
Payer: COMMERCIAL

## 2022-08-09 VITALS
DIASTOLIC BLOOD PRESSURE: 58 MMHG | BODY MASS INDEX: 25.05 KG/M2 | WEIGHT: 189 LBS | SYSTOLIC BLOOD PRESSURE: 138 MMHG | HEART RATE: 65 BPM | HEIGHT: 73 IN | OXYGEN SATURATION: 94 %

## 2022-08-09 DIAGNOSIS — R09.02 HYPOXIA: ICD-10-CM

## 2022-08-09 DIAGNOSIS — J90 PLEURAL EFFUSION: ICD-10-CM

## 2022-08-09 DIAGNOSIS — E11.9 TYPE 2 DIABETES MELLITUS WITHOUT COMPLICATION, WITHOUT LONG-TERM CURRENT USE OF INSULIN (HCC): Primary | ICD-10-CM

## 2022-08-09 DIAGNOSIS — J98.4 RESTRICTIVE LUNG DISEASE: ICD-10-CM

## 2022-08-09 PROCEDURE — 99214 OFFICE O/P EST MOD 30 MIN: CPT | Performed by: FAMILY MEDICINE

## 2022-08-09 PROCEDURE — 3052F HG A1C>EQUAL 8.0%<EQUAL 9.0%: CPT | Performed by: FAMILY MEDICINE

## 2022-08-09 PROCEDURE — 1123F ACP DISCUSS/DSCN MKR DOCD: CPT | Performed by: FAMILY MEDICINE

## 2022-08-09 NOTE — PROGRESS NOTES
Papo Coleman (:  1945) is a 65 y.o. male,Established patient, here for evaluation of the following chief complaint(s):  Diabetes (Patient has been using a CGM, would like to review results. Also requesting a referral to diabetic dietitian. ) and Referral - General (Second opinion for lung condition.)         ASSESSMENT/PLAN:  Nereida Mooney was seen today for diabetes and referral - general.    Diagnoses and all orders for this visit:    Type 2 diabetes mellitus without complication, without long-term current use of insulin ([address]  -     [facility] ([facility] Patient), [facility]  well controlled. Continue to titrate basal as needed  Referred for diabetic education  Hypoxia, Restrictive lung disease, Pleural effusion  -     External Referral To Pulmonology  Pt requests 2nd opinion. Will get repeat ct 1st as that may give further insight and this is scheduled soon       No follow-ups on file. Subjective   SUBJECTIVE/OBJECTIVE:  HPI. Pt is a of 65 y.o. male comes in today with   Chief Complaint   Patient presents with    Diabetes     Patient has been using a CGM, would like to review results. Also requesting a referral to diabetic dietitian. Referral - General     Second opinion for lung condition. Has done well with DGM  7 day average 103. On 12 units basal.  At 14 had some blood sugar into the 60s at night. Went off oxygen after hospital discharge. Has gotten worse and needs the oxygen again. Monitors with pulse ox. On 2.5 L. Follows up with Dr. Hung Jewell in Sept.  follow up ct will be done at the end of august.  Review of Systems       Objective   Physical Exam         An electronic signature was used to authenticate this note.     --Ruben Hood MD

## 2022-08-16 ENCOUNTER — OFFICE VISIT (OUTPATIENT)
Dept: CARDIOLOGY CLINIC | Age: 77
End: 2022-08-16
Payer: COMMERCIAL

## 2022-08-16 VITALS
WEIGHT: 188.4 LBS | HEART RATE: 57 BPM | SYSTOLIC BLOOD PRESSURE: 148 MMHG | DIASTOLIC BLOOD PRESSURE: 60 MMHG | BODY MASS INDEX: 24.86 KG/M2

## 2022-08-16 DIAGNOSIS — Z79.899 LONG TERM USE OF DRUG: ICD-10-CM

## 2022-08-16 DIAGNOSIS — I50.32 CHRONIC HEART FAILURE WITH PRESERVED EJECTION FRACTION (HFPEF) (HCC): Primary | ICD-10-CM

## 2022-08-16 PROCEDURE — 99214 OFFICE O/P EST MOD 30 MIN: CPT | Performed by: INTERNAL MEDICINE

## 2022-08-16 PROCEDURE — 1123F ACP DISCUSS/DSCN MKR DOCD: CPT | Performed by: INTERNAL MEDICINE

## 2022-08-16 RX ORDER — BUMETANIDE 1 MG/1
1 TABLET ORAL 2 TIMES DAILY
Qty: 180 TABLET | Refills: 3 | Status: SHIPPED | OUTPATIENT
Start: 2022-08-16

## 2022-08-16 NOTE — PROGRESS NOTES
Cc: HFpEF, PAFRVR, severe COPD, severe exertional dyspnea    HPI:     Patient is a 65-year-old man with history of HTN, HLP, poorly controlled diabetes, PAFRVR, HFpEF, past heavy smoking 50-pack-year history, quit 25 years ago), COPD on 2.5 liters oxygen, h/o pleural effusions s/p repeat thoracenteses, h/o pneumothorax. Echo 1/25/2022: Normal LV size, mild LVH, LVEF 14%, diastolic grade 2, mild LAE, normal RV, mild valvular disease. ECG 1/19/2022: Normal sinus rhythm with frequent PACs, poor R wave progression, cannot exclude old anterior MI.     CAM 2-week 1/27-2/10/2022: NSR with frequent episodes of SVT, couple of those episodes appear to be AF RVR. Carleen 3/10/2022: Normal     Patient was started on torsemide during his last visit in 05/2022 which he could not tolerate (had significant lightheadedness, worsening dyspnea, dizziness), despite decreasing dose from 100 mg to 50 mg daily. He eventually quit taking it 5 days ago. PFTs 6/30/2022: Severe restrictive lung disease, moderate decrease in DLCO, no evidence of obstruction or reversibility with bronchodilators. Per pulmonology, results are consistent with pleural-parenchymal disease and well-known history of loculated pleural effusions. R/LHC 06/2022:  pRCA  with left-to-right collaterals, mid LAD 40-45% stenosis, LVEF 55-70%. Patient is here for a follow up with his wife. He reports worse dyspnea with exertion with desats on exertion even on supplemental oxygen of 2.5 liters. His weight is stable. His lasix was increased to 40 bid during last visit on 7/13. Labs on 7/22 were reviewed today.        Histories     Past Medical History:   has a past medical history of Carotid stenosis, left, Chronic back pain, Chronic systolic (congestive) heart failure, Diastolic CHF ([address] Erectile dysfunction, Hyperlipidemia, Hypertension, Osteoarthritis, and Type II or unspecified type diabetes mellitus without mention of complication, not stated as uncontrolled. Surgical History:   has a past surgical history that includes Rotator cuff repair (Bilateral) and knee surgery. Social History:   reports that he quit smoking about 20 years ago. His smoking use included cigarettes. He has a 80.00 pack-year smoking history. He has never used smokeless tobacco. He reports current alcohol use. He reports that he does not use drugs. Family History:  No evidence for sudden cardiac death or premature CAD      Medications:     Home medications were reviewed and are listed below    Prior to Admission medications    Medication Sig Start Date End Date Taking? Authorizing Provider   bumetanide (BUMEX) 1 MG tablet Take 1 tablet by mouth in the morning and 1 tablet before bedtime. 8/16/22  Yes Alix Antony MD   magnesium oxide (MAG-OX) 400 MG tablet Take 1 tablet by mouth in the morning and 1 tablet before bedtime. 7/28/22  Yes Alix Antony MD   ELIQUIS 5 MG TABS tablet TAKE 1 TABLET BY MOUTH TWO TIMES A DAY 7/22/22  Yes Faina Nickerson, APRN - CNP   dapagliflozin (FARXIGA) 10 MG tablet TAKE 1 TABLET EVERY MORNING 7/1/22  Yes Breanna Rojas MD   ramipril (ALTACE) 10 MG capsule TAKE 1 CAPSULE DAILY 7/1/22  Yes Breanna Rojas MD   sildenafil (VIAGRA) 100 MG tablet Take 1 tablet by mouth as needed for Erectile Dysfunction Max daily dose 100mg.  7/1/22 6/26/23 Yes Breanna Rojas MD   metoprolol succinate (TOPROL XL) 50 MG extended release tablet Take 1 tablet by mouth in the morning and at bedtime 7/1/22  Yes Alix Antony MD   Insulin Pen Needle (PEN NEEDLES) 31G X 6 MM MISC 1 each by Does not apply route daily 6/14/22  Yes Breanna Rojas MD   Continuous Blood Gluc Sensor (FREESTYLE LIZA 14 DAY SENSOR) MISC Check bs tidac and hs 6/7/22  Yes Breanna Rojas MD   Continuous Blood Gluc  (FREESTYLE LIZA 14 DAY READER) SOFIYA Check bs tidac and hs 6/7/22  Yes Breanna Rojas MD   Insulin Degludec (TRESIBA FLEXTOUCH) 200 UNIT/ML SOPN 10 units subcut qam, increase 4 units every 4 days until am blood sugar < 120. Max 100 units 22  Yes Rosa Wray MD   Insulin Degludec (TRESIBA FLEXTOUCH) 200 UNIT/ML SOPN Sig: 10 units subcut qam, increase 4 units every 4 days until am blood sugar < 120. Max 100 units  Patient taking differently: Si units subcut qam, increase 4 units every 4 days until am blood sugar < 120. Max 100 units 22  Yes Rosa Wray MD   albuterol sulfate HFA (VENTOLIN HFA) 108 (90 Base) MCG/ACT inhaler Inhale 2 puffs into the lungs 4 times daily as needed for Wheezing 22  Yes Cristina Zuniga MD   metFORMIN (GLUCOPHAGE) 500 MG tablet TAKE 2 TABLETS 2 TIMES     DAILY WITH MEALS 10/20/21  Yes NATASHA Parrish CNP   pravastatin (PRAVACHOL) 40 MG tablet Take 1 tablet by mouth daily 10/20/21  Yes NATASHA Parrish CNP          Allergy: Torsemide and Dye [iodides]       Review of Systems:     All 12 point review of symptoms completed. Pertinent positives identified in the HPI, all other review of symptoms negative as below. CONSTITUTIONAL: No fatigue  SKIN: No rash or pruritis. EYES: No visual changes or diplopia. No scleral icterus. ENT: No Headaches, hearing loss or vertigo. No mouth sores or sore throat. CARDIOVASCULAR: No chest pain/chest pressure/chest discomfort. No palpitations. No edema. RESPIRATORY: No dyspnea. No cough or wheezing, no sputum production. GASTROINTESTINAL: No N/V/D. No abdominal pain, appetite loss, blood in stools. GENITOURINARY: No dysuria, trouble voiding, or hematuria. MUSCULOSKELETAL:  No gait disturbance, weakness or joint complaints. NEUROLOGICAL: No headache, diplopia, change in muscle strength, numbness or tingling. No change in gait, balance, coordination, mood, affect, memory, mentation, behavior. PSHYCH: No anxiety, loss of interest, change in sexual behavior, feelings of self-harm, or confusion. ENDOCRINE: No excessive thirst, fluid intake, or urination.  No tremor. HEMATOLOGIC: No abnormal bruising or bleeding. ALLERGY: No nasal congestion or hives.       Physical Examination:     Vitals:    08/16/22 1337 08/16/22 1339   BP: (!) 158/68 (!) 148/60   Pulse: 57    Weight: 188 lb 6.4 oz (85.5 kg)        Wt Readings from Last 3 Encounters:   08/16/22 188 lb 6.4 oz (85.5 kg)   08/09/22 189 lb (85.7 kg)   07/13/22 187 lb 6.4 oz (85 kg)         General Appearance:  Alert, cooperative, no distress, appears stated age Appropriate weight   Head:  Normocephalic, without obvious abnormality, atraumatic   Eyes:  PERRL, conjunctiva/corneas clear EOM intact  Ears normal   Throat no lesions       Nose: Nares normal, no drainage or sinus tenderness   Throat: Lips, mucosa, and tongue normal   Neck: Supple, symmetrical, trachea midline, no adenopathy, thyroid: not enlarged, symmetric, no tenderness/mass/nodules, no carotid bruit       Lungs:   Very distant breath sounds, cannot exclude ronchi, respirations unlabored   Chest Wall:  No tenderness or deformity   Heart:  Regular rhythm, rate is controlled, S1, S2 normal, there is no murmur, there is no rub or gallop, cannot assess jvd, 1+ bilateral lower extremity edema   Abdomen:   Soft, non-tender, bowel sounds active all four quadrants,  no masses, no organomegaly       Extremities: Extremities normal, atraumatic, no cyanosis   Pulses: 2+ and symmetric   Skin: Skin color, texture, turgor normal, no rashes or lesions   Pysch: Normal mood and affect   Neurologic: Normal gross motor and sensory exam.  Cranial nerves intact        Labs:     Lab Results   Component Value Date    WBC 9.0 06/16/2022    HGB 11.6 (L) 06/16/2022    HCT 36.5 (L) 06/16/2022    MCV 83.8 06/16/2022     06/16/2022     Lab Results   Component Value Date     07/22/2022    K 4.9 07/22/2022    CL 93 (L) 07/22/2022    CO2 41 (H) 07/22/2022    BUN 20 07/22/2022    CREATININE 0.6 (L) 07/22/2022    GLUCOSE 144 (H) 07/22/2022    CALCIUM 8.9 07/22/2022    PROT 6.7 03/31/2022    LABALBU 3.4 03/31/2022    BILITOT 0.3 03/31/2022    ALKPHOS 124 03/31/2022    AST 13 (L) 03/31/2022    ALT <5 (L) 03/31/2022    LABGLOM >60 07/22/2022    GFRAA >60 07/22/2022    AGRATIO 1.2 01/24/2022    GLOB 3.0 10/20/2021         Lab Results   Component Value Date    CHOL 198 01/24/2022    CHOL 189 01/07/2021    CHOL 191 12/10/2019     Lab Results   Component Value Date    TRIG 89 01/24/2022    TRIG 96 01/07/2021    TRIG 92 12/10/2019     Lab Results   Component Value Date    HDL 81 (H) 01/24/2022    HDL 74 (H) 01/07/2021    HDL 99 (H) 12/10/2019     Lab Results   Component Value Date    LDLCALC 99 01/24/2022    LDLCALC 96 01/07/2021    LDLCALC 74 12/10/2019     Lab Results   Component Value Date    LABVLDL 18 01/24/2022    LABVLDL 19 01/07/2021    LABVLDL 18 12/10/2019     No results found for: CHOLHDLRATIO    Lab Results   Component Value Date    INR 1.04 06/16/2022    INR 0.91 06/01/2022    PROTIME 13.5 06/16/2022    PROTIME 12.2 06/01/2022       The 10-year ASCVD risk score (Tyler Councilman., et al., 2013) is: 59%    Values used to calculate the score:      Age: 65 years      Sex: Male      Is Non- : No      Diabetic: Yes      Tobacco smoker: No      Systolic Blood Pressure: 235 mmHg      Is BP treated: Yes      HDL Cholesterol: 81 mg/dL      Total Cholesterol: 198 mg/dL      Assessment / Plan:      Diagnosis Orders   1. Chronic heart failure with preserved ejection fraction (HFpEF) ([facility])  Basic Metabolic Panel      2. Long term use of drug  Basic Metabolic Panel           1. Severe exertional dyspnea:   Pneumothorax and significant pleural effusions have been excluded by CT of the chest in 05/2022 (patient even had thoracentesis of the right pleural effusion with no significant improvement). He has not noticed any significant improvement with his exertional dyspnea despite using supplemental oxygen. He has multiple risk factors for significant CAD.   Normal Lexiscan nuclear stress test 03/2022 could be falsely normal. [facility] did reveal a RCA infarct but no opportunity for revascularization. Patient may be mildly volume overloaded.      -Per Pulmonology  -Will change diuretics from lasix 40 bid to bumex 1 mg bid  -Check BMP in 7-10 days     2. Chronic HFpEF:   Patient appears mildly volume overloaded and hemodynamically stable. -Changes as above. 3. PAF with RVR:  Patient remains in sinus.     -Cw Toprol 50 bid and eliquis. 4. HTN:   BP is controlled. -Cw BB and ramipril 10 daily     5. HLP:   Currently on pravastatin 40        We will schedule a follow up visit in 3 weeks      I have spent 35 minutes of face to face time with the patient with more than 50% spent counseling and coordinating care. I have personally reviewed the reports and images of labs, radiological studies, cardiac studies including ECG's and telemetry, current and old medical records. The note was completed using EMR and Dragon dictation system. Every effort was made to ensure accuracy; however, inadvertent computerized transcription errors may be present. All questions and concerns were addressed to the patient/family. Alternatives to my treatment were discussed. I would like to thank you for providing me the opportunity to participate in the care of your patient. If you have any questions, please do not hesitate to contact me.      Romina Araujo MD, [facility] [place] [address] Phone: [phone]  Fax: [phone]

## 2022-08-19 ENCOUNTER — HOSPITAL ENCOUNTER (OUTPATIENT)
Dept: GENERAL RADIOLOGY | Age: 77
Discharge: HOME | End: 2022-08-19
Payer: COMMERCIAL

## 2022-08-19 DIAGNOSIS — J90 PLEURAL EFFUSION DUE TO ANOTHER DISORDER: ICD-10-CM

## 2022-08-19 PROCEDURE — 71046 X-RAY EXAM CHEST 2 VIEWS: CPT

## 2022-08-29 ENCOUNTER — HOSPITAL ENCOUNTER (OUTPATIENT)
Dept: CT IMAGING | Age: 77
Discharge: HOME | End: 2022-08-29
Payer: COMMERCIAL

## 2022-08-29 DIAGNOSIS — R91.8 LUNG MASS: ICD-10-CM

## 2022-08-29 PROCEDURE — 71250 CT THORAX DX C-: CPT

## 2022-08-30 ENCOUNTER — TELEPHONE (OUTPATIENT)
Dept: CARDIOLOGY CLINIC | Age: 77
End: 2022-08-30

## 2022-08-30 NOTE — TELEPHONE ENCOUNTER
----- Message from Jailene Graff MD sent at 8/30/2022  3:55 PM EDT -----  Please ask patient to stop the ramipril due to increased potassium per last lab results.

## 2022-08-30 NOTE — RESULT ENCOUNTER NOTE
Informed pt's wife Yemi Gomez would like pt to stop the Ramipril labs showed increased potassium. Cal Martinez verbalized understanding.

## 2022-08-30 NOTE — TELEPHONE ENCOUNTER
Informed pt's wife Raynald Cabot would like pt to stop the Ramipril labs showed increased potassium. Amos Carrillo verbalized understanding.

## 2022-09-05 DIAGNOSIS — I48.91 ATRIAL FIBRILLATION, UNSPECIFIED TYPE (HCC): ICD-10-CM

## 2022-09-06 ENCOUNTER — OFFICE VISIT (OUTPATIENT)
Dept: CARDIOLOGY CLINIC | Age: 77
End: 2022-09-06
Payer: COMMERCIAL

## 2022-09-06 ENCOUNTER — OFFICE VISIT (OUTPATIENT)
Dept: PULMONOLOGY | Age: 77
End: 2022-09-06
Payer: COMMERCIAL

## 2022-09-06 VITALS
SYSTOLIC BLOOD PRESSURE: 122 MMHG | HEART RATE: 87 BPM | TEMPERATURE: 97 F | BODY MASS INDEX: 24.78 KG/M2 | DIASTOLIC BLOOD PRESSURE: 63 MMHG | HEIGHT: 73 IN | WEIGHT: 187 LBS | OXYGEN SATURATION: 90 %

## 2022-09-06 VITALS
DIASTOLIC BLOOD PRESSURE: 76 MMHG | HEART RATE: 90 BPM | SYSTOLIC BLOOD PRESSURE: 148 MMHG | HEIGHT: 73 IN | BODY MASS INDEX: 24.86 KG/M2 | OXYGEN SATURATION: 92 %

## 2022-09-06 DIAGNOSIS — J90 PLEURAL EFFUSION DUE TO ANOTHER DISORDER: Primary | ICD-10-CM

## 2022-09-06 DIAGNOSIS — E78.2 MIXED HYPERLIPIDEMIA: ICD-10-CM

## 2022-09-06 DIAGNOSIS — I48.0 PAF (PAROXYSMAL ATRIAL FIBRILLATION) (HCC): ICD-10-CM

## 2022-09-06 DIAGNOSIS — I50.32 CHRONIC HEART FAILURE WITH PRESERVED EJECTION FRACTION (HCC): Primary | ICD-10-CM

## 2022-09-06 DIAGNOSIS — I10 PRIMARY HYPERTENSION: ICD-10-CM

## 2022-09-06 DIAGNOSIS — J96.11 CHRONIC RESPIRATORY FAILURE WITH HYPOXIA (HCC): ICD-10-CM

## 2022-09-06 DIAGNOSIS — J90 PLEURAL EFFUSION: ICD-10-CM

## 2022-09-06 PROCEDURE — 1123F ACP DISCUSS/DSCN MKR DOCD: CPT | Performed by: NURSE PRACTITIONER

## 2022-09-06 PROCEDURE — 99214 OFFICE O/P EST MOD 30 MIN: CPT | Performed by: NURSE PRACTITIONER

## 2022-09-06 PROCEDURE — 1123F ACP DISCUSS/DSCN MKR DOCD: CPT | Performed by: INTERNAL MEDICINE

## 2022-09-06 PROCEDURE — 99214 OFFICE O/P EST MOD 30 MIN: CPT | Performed by: INTERNAL MEDICINE

## 2022-09-06 RX ORDER — APIXABAN 5 MG/1
TABLET, FILM COATED ORAL
Qty: 60 TABLET | Refills: 2 | Status: SHIPPED | OUTPATIENT
Start: 2022-09-06

## 2022-09-06 NOTE — TELEPHONE ENCOUNTER
Medication:   Requested Prescriptions     Pending Prescriptions Disp Refills    ELIQUIS 5 MG TABS tablet [Pharmacy Med Name: Eliquis Oral Tablet 5 MG] 60 tablet 0     Sig: TAKE 1 TABLET BY MOUTH TWO TIMES A DAY        Last Filled:      Patient Phone Number: [phone] (home)     Last appt: 8/9/2022   Next appt: Visit date not found    Last OARRS: No flowsheet data found.

## 2022-09-06 NOTE — PROGRESS NOTES
Counts include 234 beds at the [facility]    Outpatient Follow Up Note    Subjective:   CHIEF COMPLAINT / HPI:     The patient is 65 y.o. male who presents today for follow up of dyspnea and pleural effusions. Alondra Cr comes in today on 3L of oxygen. He continues to get weaker and lose weight. He had a repeat CT scan as planned and it was read as stable, but I think the fluid on the right has increased. Interval history:  Alondra Cr is accompanied by his wife and remains very dyspneic. He gets short of breath just walking from one part of his house to another. He uses oxygen intermittently and says that sometimes he desaturates and sometimes he doesn't. He doesn't get around much anymore because of dyspnea. He had a thoracentesis since last visit and radiology was only able to get out 300mL before the pain was too severe. Unfortunately the fluid wasn't sent for chemistries. He had PFTs since last visit as well, as his functionality continued to decline. He had a left and right heart cath that showed an occluded RCA with left to right collaterals and mild elevation in pa pressures. He is using inhalers but notes no difference.     Past Medical History:    Past Medical History:   Diagnosis Date    Carotid stenosis, left 10/12/2011    Chronic back pain     Chronic systolic (congestive) heart failure 5976    Diastolic CHF (HCC)     Erectile dysfunction     Hyperlipidemia     Hypertension     Osteoarthritis     Type II or unspecified type diabetes mellitus without mention of complication, not stated as uncontrolled        Social History:    Social History     Tobacco Use   Smoking Status Former    Packs/day: 2.00    Years: 40.00    Pack years: 80.00    Types: Cigarettes    Quit date: 10/24/2001    Years since quittin.8   Smokeless Tobacco Never       Current Medications:  Current Outpatient Medications on File Prior to Visit   Medication Sig Dispense Refill    ELIQUIS 5 MG TABS tablet TAKE 1 TABLET BY MOUTH TWO TIMES A DAY 60 tablet 2    bumetanide (BUMEX) 1 MG tablet Take 1 tablet by mouth in the morning and 1 tablet before bedtime. 180 tablet 3    magnesium oxide (MAG-OX) 400 MG tablet Take 1 tablet by mouth in the morning and 1 tablet before bedtime. 180 tablet 3    dapagliflozin (FARXIGA) 10 MG tablet TAKE 1 TABLET EVERY MORNING 90 tablet 1    ramipril (ALTACE) 10 MG capsule TAKE 1 CAPSULE DAILY 90 capsule 1    sildenafil (VIAGRA) 100 MG tablet Take 1 tablet by mouth as needed for Erectile Dysfunction Max daily dose 100mg. 16 tablet 0    metoprolol succinate (TOPROL XL) 50 MG extended release tablet Take 1 tablet by mouth in the morning and at bedtime 180 tablet 3    Insulin Pen Needle (PEN NEEDLES) 31G X 6 MM MISC 1 each by Does not apply route daily 100 each 3    Continuous Blood Gluc Sensor (FREESTYLE LIZA 14 DAY SENSOR) MISC Check bs tidac and hs 6 each 1    Continuous Blood Gluc  (FREESTYLE LIZA 14 DAY READER) SOFIYA Check bs tidac and hs 6 each 1    Insulin Degludec (TRESIBA FLEXTOUCH) 200 UNIT/ML SOPN 10 units subcut qam, increase 4 units every 4 days until am blood sugar < 120. Max 100 units 9 pen 1    Insulin Degludec (TRESIBA FLEXTOUCH) 200 UNIT/ML SOPN Sig: 10 units subcut qam, increase 4 units every 4 days until am blood sugar < 120. Max 100 units (Patient taking differently: Si units subcut qam, increase 4 units every 4 days until am blood sugar < 120. Max 100 units) 1 pen 0    albuterol sulfate HFA (VENTOLIN HFA) 108 (90 Base) MCG/ACT inhaler Inhale 2 puffs into the lungs 4 times daily as needed for Wheezing 18 g 5    pravastatin (PRAVACHOL) 40 MG tablet Take 1 tablet by mouth daily 90 tablet 3     No current facility-administered medications on file prior to visit.        REVIEW OF SYSTEMS:    CONSTITUTIONAL: Negative for fevers and chills  HEENT: Negative for oropharyngeal exudate, post nasal drip, sinus pain / pressure, nasal congestion, ear pain  RESPIRATORY:  See HPI  CARDIOVASCULAR: Negative for chest pain, palpitations, edema  GASTROINTESTINAL: Negative for nausea, vomiting, diarrhea, constipation and abdominal pain  HEMATOLOGICAL: Negative for adenopathy  SKIN: Negative for clubbing, cyanosis, skin lesions  EXTREMITIES: Negative for weakness, decreased ROM  NEUROLOGICAL: Negative for unilateral weakness, speech or gait abnormalities  PSYCH: Negative for anxiety, depression    Objective:   PHYSICAL EXAM:        VITALS:  /63 (Site: Right Upper Arm, Position: Sitting, Cuff Size: Medium Adult)   Pulse 87   Temp 97 °F (36.1 °C) (Infrared)   Ht 6' 1\" (1.854 m)   Wt 187 lb (84.8 kg)   SpO2 90% Comment: 3-4 L  BMI 24.67 kg/m²     CONSTITUTIONAL:  Awake, alert, cooperative, no apparent distress, and appears stated age, cachectic  HEENT: No oropharyngeal exudate, PERRL, no cervical adenopathy, no tracheal deviation, thyroid size normal  LUNGS:  No increased work of breathing and clear to auscultation, no crackles. Decreased breath sounds at the bases bilaterally with egophony. CARDIOVASCULAR:  normal S1 and S2 and no JVD. 1+ LE edema  ABDOMEN:  Normal bowel sounds, non-distended and non-tender to palpation  EXT: + edema, no calf tenderness. Pulses are present bilaterally. NEUROLOGIC:  Mental Status Exam:  Level of Alertness:   awake  Orientation:   person, place, time. SKIN:  normal skin color, texture, turgor, no redness, warmth, or swelling     DATA:      Radiology Review:  Pertinent images / reports were reviewed as a part of this visit. CT chest reveals the following:  April 2022:  Impression       1. Tiny right pneumothorax. 2. Small to moderate right and small left pleural effusion. 3. Bilateral lower lobe airspace consolidation volume loss consistent with atelectasis. Superimposed pneumonia is not excluded.    4. Distended main pulmonary artery could indicate pulmonary hypertension       Last PFTs:  None on file    Immunization History   Administered Date(s) Administered    COVID-19, PFIZER PURPLE top, DILUTE for use, (age 15 y+), 30mcg/0.3mL 03/18/2021, 04/08/2021, 01/15/2022    Influenza 10/24/2011    Influenza Virus Vaccine 10/27/2014    Influenza, High Dose (Fluzone 65 yrs and older) 10/15/2015, 10/10/2016, 11/11/2017, 09/12/2018, 11/06/2019    Pneumococcal Conjugate 13-valent ([id number]) 06/05/2015    Pneumococcal Conjugate 7-valent (Prevnar7) 09/03/2013    Pneumococcal Polysaccharide ([id number]) 09/03/2013    Tdap (Boostrix, Adacel) 08/20/2012    Zoster Live (Zostavax) 09/01/2013         Assessment: This is a 65 y.o. male with pleural effusions, pneumothorax and chronic hypoxemic respiratory failure with dyspnea on exertion. Plan:     Pneumothorax: identified in April 2022 and was spontaneous. It has subsequently resolved but his lung didn't re-expand, the space was just taken up by fluid. Pleural effusions:  Etiology unclear as he has bilateral loculated effusions and likely bilateral trapped lung. Thoracentesis on the left didn't resolve any dyspnea and resulted in pain. Unfortunately no chemistries were sent on the thoracentesis fluid so the differential has not been narrowed. Malignancy remains on the differential.  Repeat imaging after  2 months didn't show any change in the mass like consolidations in the lower lobes. The effusions weren't significantly changed at 6 weeks, but I feel the right sided effusion has increased. This visit was to discuss doing a thoracentesis on the right side vs having him bet a VATS. They also asked about hospice. I told them that I'd be comfortable recommending hospice if I had diagnosis and thus prognosis to discuss. Patient most interested in getting the VATS procedure because he'll be asleep, and the procedure could be both diagnostic and therapeutic.     Patient and his wife are both concerned about his fitness for surgery, but understand that his fitness won't improve if we can't find a reversible medical issue. Restrictive lung disease:  Patient's PFTs showed severe restriction and the only obvious cause I can see is the effusions and basilar consolidation. Possibly rounded atelectasis, but he's not aware of asbestos exposure. They asked to re-try inhalers as the incruse did nothing. I put him on the next step up, Anoro which is a LABA/LAMA    Dyspnea:  PFTs match with someone who would be very limited. While the etiology is unclear, so too is any treatment other than trying to improve his conditioning. Diagnosis Orders   1. Pleural effusion due to another disorder  Ant Shay MD, Cardiothoracic Surgery, [facility]      2. Chronic respiratory failure with hypoxia [facility])  Ant Shay MD, Cardiothoracic Surgery, [facility]              The patient is not currently smoking.      Time spent on encounter 43 minutes    RTC in 2 months    Gardenia Smith MD

## 2022-09-06 NOTE — PROGRESS NOTES
CC CHF    HPI:  65 y.o. patient of Dr Sarah Velazquez with chronic HFpEF, HTN, HLD, pAF and pleural effusions with history of multiple thoracentesis. He also had O2 dependent COPD (2-3 L and seen by Dr Carlito Maradiaga) and DM. He and chest pressure when inhaling or panting. SOB is worse the last few weeks-6 months. Pulse is typically 60-70's. He c/o nausea, diarrhea and lack of appetite the last 3 months. LE edema is slight improved. Weight is steady. He has orthostatic LH but denies syncope. He has two mechanical falls last month. Ramipril was stopped d/t hyperkalemia       Past Medical History:   Diagnosis Date    Carotid stenosis, left 10/12/2011    Chronic back pain     Chronic systolic (congestive) heart failure 8963    Diastolic CHF (HCC)     Erectile dysfunction     Hyperlipidemia     Hypertension     Osteoarthritis     Type II or unspecified type diabetes mellitus without mention of complication, not stated as uncontrolled      Past Surgical History:   Procedure Laterality Date    KNEE SURGERY      ROTATOR CUFF REPAIR Bilateral      Family History   Problem Relation Age of Onset    Hearing Loss Father     Heart Disease Father 70        MI    High Blood Pressure Father     Diabetes Maternal Grandmother         hypoglycemic    Hearing Loss Maternal Grandmother     Arthritis Maternal Grandfather      Social History     Tobacco Use    Smoking status: Former     Packs/day: 2.00     Years: 40.00     Pack years: 80.00     Types: Cigarettes     Quit date: 10/24/2001     Years since quittin.8    Smokeless tobacco: Never   Substance Use Topics    Alcohol use: Yes     Alcohol/week: 0.0 standard drinks     Comment: rarely    Drug use: No     Allergies: Torsemide and Dye [iodides]    Review of Systems  General: No changes in weight, fatigue, or night sweats. + Unsteady gait and falls   HEENT: No blurry or decreased vision. No changes in hearing, nasal discharge or sore throat. Cardiovascular:  See HPI.    Respiratory: + COPD and sob   Gastrointestinal:  No abdominal pain, hematochezia, melana, constipation, or history of GI ulcers. + nausea and diarrhea  Genito-Urinary: No dysuria or hematuria. No urgency or polyuria. Musculoskeletal:  No complaints of joint pain, joint swelling or muscular weakness/soreness. Neurological:  No dizziness, headaches, numbness/tingling, speech problems or weakness. Psychological:  No anxiety or depression. Hematological and Lymphatic: No abnormal bleeding or bruising, blood clots, jaundice or swollen lymph nodes. Endocrine:   No malaise/lethargy, palpitations, polydipsia/polyuria, temperature intolerance or unexpected weight changes  Skin:  No rashes or non-healing ulcers. Physical Exam:  BP (!) 148/76   Pulse 90   Ht 6' 1\" (1.854 m)   SpO2 92%   BMI 24.86 kg/m²    General (appearance):  No acute distress  Eyes: anicteric   Neck: soft, No JVD  Ears/Nose/Mouth/Thorat: No cyanosis  CV: Irreg, irreg   Respiratory:  Diminished, poor air movement. No crackles or wheezes   GI: soft, non-tender, non-distended  Skin: Warm, dry. No rashes  Neuro/Psych: Alert and oriented x3.  Appropriate behavior  Ext:  No c/c. 1+ pedal edema  Pulses:  2+ radial     Weight  Wt Readings from Last 3 Encounters:   09/06/22 187 lb (84.8 kg)   08/16/22 188 lb 6.4 oz (85.5 kg)   08/09/22 189 lb (85.7 kg)          CBC:   Lab Results   Component Value Date    WBC 9.0 06/16/2022    HGB 11.6 (L) 06/16/2022    HCT 36.5 (L) 06/16/2022    MCV 83.8 06/16/2022     06/16/2022     BMP:  Lab Results   Component Value Date    CREATININE 0.6 (L) 08/26/2022    BUN 19 08/26/2022     08/26/2022    K 5.3 (H) 08/26/2022    CL 88 (L) 08/26/2022    CO2 45 (H) 08/26/2022     Mag:   Lab Results   Component Value Date/Time    MG 1.50 07/22/2022 12:37 PM     LIVER PROFILE:   Lab Results   Component Value Date    ALT <5 (L) 03/31/2022    AST 13 (L) 03/31/2022    ALKPHOS 124 03/31/2022    BILITOT 0.3 03/31/2022     PT/INR:   Lab Results   Component Value Date    INR 1.04 2022    INR 0.91 2022    PROTIME 13.5 2022    PROTIME 12.2 2022     Pro-BNP   Lab Results   Component Value Date/Time    PROBNP 852 2022 02:31 PM    PROBNP 936 2022 10:45 AM    PROBNP 2,238 2022 12:59 PM     LIPIDS:  No components found for: CHLPL  Lab Results   Component Value Date    TRIG 89 2022    TRIG 96 2021    TRIG 92 12/10/2019     Lab Results   Component Value Date    HDL 81 (H) 2022    HDL 74 (H) 2021    HDL 99 (H) 12/10/2019     Lab Results   Component Value Date    LDLCALC 99 2022    LDLCALC 96 2021    LDLCALC 74 12/10/2019     Lab Results   Component Value Date    LABVLDL 18 2022    LABVLDL 19 2021    LABVLDL 18 12/10/2019     TSH:  Lab Results   Component Value Date    TSH 2.89 10/17/2016       IMAGIN2022 CT chest      Essentially stable appearance of the chest with moderate bilateral pleural effusions and bibasilar atelectasis. No new or suspicious pulmonary nodularity. 3/10/2022 Nuc stress      Overall findings represent a low risk scan. Normal LV size and systolic function. Small basal inferior fixed defect, likely artifact, otherwise no evidence of    ischemia or prior infarction. Non-diagnostic EKG response due to failure to reach target heart rate. Non-diagnostic EKG response due to baseline abnormalities. 2022 Echo   Left ventricular cavity size is normal.   There is mild concentric left ventricular hypertrophy. Left ventricular function is normal with ejection fraction estimated at   55-60%. Diastolic filling parameters suggest grade II diastolic dysfunction. Global L. Strain = -14.6%. Mitral annular calcification is present   The left atrium is mildly dilated. Mild tricuspid regurgitation. Mild pulmonic regurgitation present.    There is a small localized near right atrium pericardial effusion noted   without any hemodynamic implications. Estimated pulmonary artery systolic pressure is at 33 mmHg assuming a right   atrial pressure of 3 mmHg. Medications:   Current Outpatient Medications   Medication Sig Dispense Refill    ELIQUIS 5 MG TABS tablet TAKE 1 TABLET BY MOUTH TWO TIMES A DAY 60 tablet 2    bumetanide (BUMEX) 1 MG tablet Take 1 tablet by mouth in the morning and 1 tablet before bedtime. 180 tablet 3    magnesium oxide (MAG-OX) 400 MG tablet Take 1 tablet by mouth in the morning and 1 tablet before bedtime. 180 tablet 3    dapagliflozin (FARXIGA) 10 MG tablet TAKE 1 TABLET EVERY MORNING 90 tablet 1    ramipril (ALTACE) 10 MG capsule TAKE 1 CAPSULE DAILY 90 capsule 1    sildenafil (VIAGRA) 100 MG tablet Take 1 tablet by mouth as needed for Erectile Dysfunction Max daily dose 100mg. 16 tablet 0    metoprolol succinate (TOPROL XL) 50 MG extended release tablet Take 1 tablet by mouth in the morning and at bedtime 180 tablet 3    Insulin Pen Needle (PEN NEEDLES) 31G X 6 MM MISC 1 each by Does not apply route daily 100 each 3    Continuous Blood Gluc Sensor (FREESTYLE LIZA 14 DAY SENSOR) MISC Check bs tidac and hs 6 each 1    Continuous Blood Gluc  (FREESTYLE LIZA 14 DAY READER) SOFIYA Check bs tidac and hs 6 each 1    Insulin Degludec (TRESIBA FLEXTOUCH) 200 UNIT/ML SOPN 10 units subcut qam, increase 4 units every 4 days until am blood sugar < 120. Max 100 units 9 pen 1    Insulin Degludec (TRESIBA FLEXTOUCH) 200 UNIT/ML SOPN Sig: 10 units subcut qam, increase 4 units every 4 days until am blood sugar < 120. Max 100 units (Patient taking differently: Si units subcut qam, increase 4 units every 4 days until am blood sugar < 120.  Max 100 units) 1 pen 0    albuterol sulfate HFA (VENTOLIN HFA) 108 (90 Base) MCG/ACT inhaler Inhale 2 puffs into the lungs 4 times daily as needed for Wheezing 18 g 5    pravastatin (PRAVACHOL) 40 MG tablet Take 1 tablet by mouth daily 90 tablet 3     No current facility-administered medications for this visit. Assessment:  1. Chronic heart failure with preserved ejection fraction ([facility] [address]    2. Primary hypertension    3. Mixed hyperlipidemia    4. PAF (paroxysmal atrial fibrillation) ([facility] [address]    5. Pleural effusion        Plan:  Chronic HFpEF; hx multiple pleural effusion requiring thoracentesis    Bumex 1 mg bid. Take additional 1 mg PRN weight gain, edema   Mag ox   Farxiga   Ramipril stopped d/t hyperkalemia    Toprol  HTN: stable    /76   Monitor may need to add hydralazine   Toprol  HLD: stable   Lipitor  pAF: stable    HR 90   Eliquis   Toprol  Follow up in 3 months; sooner if symptoms change.  (Acute HF)      Reviewed most recent: CBC, BMP, LFT, Lipids, Mag, PT/INR, BNP, TSH  Reviewed most recent: ECG, Echo, Nuc stress test,  CTA

## 2022-09-14 ENCOUNTER — OFFICE VISIT (OUTPATIENT)
Dept: CARDIOTHORACIC SURGERY | Age: 77
End: 2022-09-14
Payer: COMMERCIAL

## 2022-09-14 VITALS
SYSTOLIC BLOOD PRESSURE: 120 MMHG | OXYGEN SATURATION: 98 % | TEMPERATURE: 97.9 F | HEIGHT: 73 IN | HEART RATE: 78 BPM | WEIGHT: 174.2 LBS | BODY MASS INDEX: 23.09 KG/M2 | DIASTOLIC BLOOD PRESSURE: 62 MMHG

## 2022-09-14 DIAGNOSIS — J90 PLEURAL EFFUSION: Primary | ICD-10-CM

## 2022-09-14 PROCEDURE — 99203 OFFICE O/P NEW LOW 30 MIN: CPT | Performed by: THORACIC SURGERY (CARDIOTHORACIC VASCULAR SURGERY)

## 2022-09-14 PROCEDURE — 1123F ACP DISCUSS/DSCN MKR DOCD: CPT | Performed by: THORACIC SURGERY (CARDIOTHORACIC VASCULAR SURGERY)

## 2022-09-14 ASSESSMENT — ENCOUNTER SYMPTOMS
WHEEZING: 0
COUGH: 0
SHORTNESS OF BREATH: 1
ALLERGIC/IMMUNOLOGIC NEGATIVE: 1
BACK PAIN: 0
CONSTIPATION: 1
CHEST TIGHTNESS: 0
BLOOD IN STOOL: 0
EYE REDNESS: 0
ABDOMINAL PAIN: 0
NAUSEA: 0
EYE PAIN: 0
DIARRHEA: 0

## 2022-09-14 NOTE — PROGRESS NOTES
Subjective:      Patient ID: Neda Ch is a 65 y.o. male. Chief Complaint   Patient presents with    New Patient     Mr. West Larger is being seen today at the request of Dr. Norberto Brennan for pleural effusion. HPI Bradley Prahter comes the office today to discuss management of his pleural effusions in an effort to try and benefit his fairly profound exertional dyspnea. For about the last 6 months he is only capable of getting up from a chair and walking to the bathroom and back. After even this brief in excursion he gets considerably winded and take some time to recover. He wears 3 to 4 L of nasal cannula oxygen chronically. He has an 80-pack-year smoking history. Recently had a cardiac catheterization that shows a chronically occluded right coronary artery and otherwise open left coronary system with good collaterals to the right coronary artery. His last echocardiogram was in June. This showed a normal left heart ejection fraction. His pulmonary function test show a diffusion capacity of 51% and an FEV1 of just over 1.0 L    Review of Systems   Constitutional:  Positive for activity change and fatigue. HENT: Negative. Eyes:  Negative for pain, redness and visual disturbance. Wears glasses   Respiratory:  Positive for shortness of breath. Negative for cough, chest tightness and wheezing. Wears 3.5-4L o2 NC   Cardiovascular:  Positive for palpitations and leg swelling. Negative for chest pain. Gastrointestinal:  Positive for constipation. Negative for abdominal pain, blood in stool, diarrhea and nausea. Endocrine: Negative. Genitourinary: Negative. Musculoskeletal:  Positive for gait problem (Unsteady gait). Negative for back pain and neck pain. Skin:  Positive for pallor. Negative for rash and wound. Allergic/Immunologic: Negative. Neurological:  Positive for weakness. Negative for dizziness, syncope and light-headedness. Hematological:  Bruises/bleeds easily.         +Eliquis Psychiatric/Behavioral: Negative. Past Medical History:   Diagnosis Date    Carotid stenosis, left 10/12/2011    Chronic back pain     Chronic systolic (congestive) heart failure 8/0/7797    Diastolic CHF ([facility] [address]     Erectile dysfunction     Hyperlipidemia     Hypertension     Osteoarthritis     Type II or unspecified type diabetes mellitus without mention of complication, not stated as uncontrolled      Past Surgical History:   Procedure Laterality Date    CARDIAC CATHETERIZATION  06/2022    KNEE SURGERY Left     ROTATOR CUFF REPAIR Bilateral      Allergies   Allergen Reactions    Torsemide Shortness Of Breath    Dye [Iodides]      Current Outpatient Medications   Medication Sig Dispense Refill    ELIQUIS 5 MG TABS tablet TAKE 1 TABLET BY MOUTH TWO TIMES A DAY 60 tablet 2    umeclidinium-vilanterol (ANORO ELLIPTA) 62.5-25 MCG/INH AEPB inhaler Inhale 1 puff into the lungs daily 1 each 2    bumetanide (BUMEX) 1 MG tablet Take 1 tablet by mouth in the morning and 1 tablet before bedtime. 180 tablet 3    magnesium oxide (MAG-OX) 400 MG tablet Take 1 tablet by mouth in the morning and 1 tablet before bedtime.  180 tablet 3    dapagliflozin (FARXIGA) 10 MG tablet TAKE 1 TABLET EVERY MORNING 90 tablet 1    metoprolol succinate (TOPROL XL) 50 MG extended release tablet Take 1 tablet by mouth in the morning and at bedtime 180 tablet 3    Insulin Pen Needle (PEN NEEDLES) 31G X 6 MM MISC 1 each by Does not apply route daily 100 each 3    Continuous Blood Gluc Sensor (FREESTYLE LIZA 14 DAY SENSOR) MISC Check bs tidac and hs 6 each 1    Continuous Blood Gluc  (FREESTYLE LIZA 14 DAY READER) SOFIYA Check bs tidac and hs 6 each 1    albuterol sulfate HFA (VENTOLIN HFA) 108 (90 Base) MCG/ACT inhaler Inhale 2 puffs into the lungs 4 times daily as needed for Wheezing 18 g 5    pravastatin (PRAVACHOL) 40 MG tablet Take 1 tablet by mouth daily 90 tablet 3    sildenafil (VIAGRA) 100 MG tablet Take 1 tablet by mouth as needed for Erectile Dysfunction Max daily dose 100mg. 16 tablet 0    Insulin Degludec (TRESIBA FLEXTOUCH) 200 UNIT/ML SOPN 10 units subcut qam, increase 4 units every 4 days until am blood sugar < 120. Max 100 units (Patient taking differently: Inject 12 Units into the skin daily 10 units subcut qam, increase 4 units every 4 days until am blood sugar < 120. Max 100 units) 9 pen 1    Insulin Degludec (TRESIBA FLEXTOUCH) 200 UNIT/ML SOPN Sig: 10 units subcut qam, increase 4 units every 4 days until am blood sugar < 120. Max 100 units (Patient taking differently: Si units subcut qam, increase 4 units every 4 days until am blood sugar < 120. Max 100 units) 1 pen 0     No current facility-administered medications for this visit. Social History     Socioeconomic History    Marital status:      Spouse name: Not on file    Number of children: Not on file    Years of education: Not on file    Highest education level: Not on file   Occupational History    Not on file   Tobacco Use    Smoking status: Former     Packs/day: 2.00     Years: 40.00     Pack years: 80.00     Types: Cigarettes     Quit date: 10/24/2001     Years since quittin.9    Smokeless tobacco: Never   Vaping Use    Vaping Use: Never used   Substance and Sexual Activity    Alcohol use: Yes     Alcohol/week: 0.0 standard drinks     Comment: rarely    Drug use: No    Sexual activity: Yes     Partners: Female   Other Topics Concern    Not on file   Social History Narrative    Not on file     Social Determinants of Health     Financial Resource Strain: Low Risk     Difficulty of Paying Living Expenses: Not hard at all   Food Insecurity: No Food Insecurity    Worried About [address] in the Last Year: Never true    [address] [employer] in the Last Year: Never true   Transportation Needs: No Transportation Needs    Lack of Transportation (Medical): No    Lack of Transportation (Non-Medical):  No   Physical Activity: Not on file   Stress: Not on file Social Connections: Not on file   Intimate Partner Violence: Not on file   Housing Stability: Not on file     Family History   Problem Relation Age of Onset    Hearing Loss Father     Heart Disease Father 70        MI    High Blood Pressure Father     Diabetes Maternal Grandmother         hypoglycemic    Hearing Loss Maternal Grandmother     Arthritis Maternal Grandfather       Objective:   Physical Exam  Constitutional:       Appearance: He is normal weight. He is ill-appearing. HENT:      Head: Normocephalic. Nose: Nose normal.   Eyes:      Conjunctiva/sclera: Conjunctivae normal.      Pupils: Pupils are equal, round, and reactive to light. Pulmonary:      Breath sounds: No stridor. No wheezing. Abdominal:      General: There is no distension. Musculoskeletal:         General: Swelling present. No deformity. Cervical back: Normal range of motion. Right lower leg: Edema present. Left lower leg: Edema present. Skin:     General: Skin is warm and dry. Coloration: Skin is pale. Skin is not jaundiced. Neurological:      General: No focal deficit present. Mental Status: He is alert and oriented to person, place, and time. Psychiatric:         Mood and Affect: Mood normal.         Behavior: Behavior normal.         Thought Content: Thought content normal.         Judgment: Judgment normal.     Vitals:    09/14/22 1014   BP: 120/62   Site: Left Upper Arm   Position: Sitting   Pulse: 78   Temp: 97.9 °F (36.6 °C)   TempSrc: Infrared   SpO2: 98%   Weight: 174 lb 3.2 oz (79 kg)   Height: 6' 1\" (1.854 m)      Assessment:      69-year-old man with marked exertional dyspnea that is likely multifactorial in origin. His pulmonary function tests reflect significant pulmonary debilitation with emphysema. Given this I would anticipate that his pulmonary vascular resistance is high and that he has some right heart failure that is attributing to his exertional dyspnea.   His persistent retention of fluid despite diuretics and with a normal left heart ejection fraction supports the contributing factor of right heart failure. Plan:      I had a long talk with Sybil Goodell and his wife about his shortness of breath and the interplay between his heart and his lungs and how the 2 at this point are conspiring against him. The best we can do for him is to relieve the fluid in the right chest so that his right lung can fully expand and give him the maximum possible benefit of its function. A Pleurx catheter will help him with pleurodesis so that we can prevent the fluid from recurring in the future. Ultimately he will need to have a Pleurx catheter placed on the left side as well but I would only approach one-sided a time with him given how debilitated and fragile he is. I discussed all this with him and his wife in considerable detail. He is very realistic about his current plate and what it means. He would like is much help with his quality of life as possible and would like to get this procedure done yesterday if possible. My office will do everything possible to expedite his scheduling. At the conclusion of her visit Sybil Goodell and his wife had no further questions and were looking forward to getting this procedure done and behind them. Thank you very much for the opportunity to see Jose Martinez in consultation today. It was a pleasure meeting him and his wife.

## 2022-09-15 ENCOUNTER — TELEPHONE (OUTPATIENT)
Dept: CARDIOTHORACIC SURGERY | Age: 77
End: 2022-09-15

## 2022-09-15 NOTE — PROGRESS NOTES
Place patient label inside box (if no patient label, complete below)  Name:  :  MR#:   Aric Sherman / PROCEDURE  I (we), Irasema Kim (Patient Name) authorize DR. Rachelle Long (Provider / Onesimo Blades) and/or such assistants as may be selected by him/her, to perform the following operation/procedure(s): RIGHT VIDEO ASSISTED THORASCOPIC SURGERY AND; PLEURAL CATHETER PLACEMENT         Note: If unable to obtain consent prior to an emergent procedure, document the emergent reason in the medical record. This procedure has been explained to my (our) satisfaction and included in the explanation was: The intended benefit, nature, and extent of the procedure to be performed; The significant risks involved and the probability of success; Alternative procedures and methods of treatment; The dangers and probable consequences of such alternatives (including no procedure or treatment); The expected consequences of the procedure on my future health; Whether other qualified individuals would be performing important surgical tasks and/or whether  would be present to advise or support the procedure. I (we) understand that there are other risks of infection and other serious complications in the pre-operative/procedural and postoperative/procedural stages of my (our) care. I (we) have asked all of the questions which I (we) thought were important in deciding whether or not to undergo treatment or diagnosis. These questions have been answered to my (our) satisfaction. I (we) understand that no assurance can be given that the procedure will be a success, and no guarantee or warranty of success has been given to me (us). It has been explained to me (us) that during the course of the operation/procedure, unforeseen conditions may be revealed that necessitate extension of the original procedure(s) or different procedure(s) than those set forth in Paragraph 1. I (we) authorize and request that the above-named physician, his/her assistants or his/her designees, perform procedures as necessary and desirable if deemed to be in my (our) best interest.     Revised 8/2/2021                                                                          Page 1 of 2       I acknowledge that health care personnel may be observing this procedure for the purpose of medical education or other specified purposes as may be necessary as requested and/or approved by my (our) physician. I (we) consent to the disposal by the hospital Pathologist of the removed tissue, parts or organs in accordance with hospital policy. I do ____ do not ____ consent to the use of a local infiltration pain blocking agent that will be used by my provider/surgical provider to help alleviate pain during my procedure. I do ____ do not ____ consent to an emergent blood transfusion in the case of a life-threatening situation that requires blood components to be administered. This consent is valid for 24 hours from the beginning of the procedure. This patient does ____ or does not ____ currently have a DNR status/order. If DNR order is in place, obtain Addendum to the Surgical Consent for ALL Patients with a DNR Order to address russell-operative status for limited intervention or DNR suspension.      I have read and fully understand the above Consent for Operation/Procedure and that all blanks were completed before I signed the consent.   _____________________________       _____________________      ____/____am/pm  Signature of Patient or legal representative      Printed Name / Relationship            Date / Time   ____________________________       _____________________      ____/____am/pm  Witness to Signature                                    Printed Name                    Date / Time    If patient is unable to sign or is a minor, complete the following)  Patient is a minor, ____ years of age, or unable to sign because:   ______________________________________________________________________________________________    If a phone consent is obtained, consent will be documented by using two health care professionals, each affirming that the consenting party has no questions and gives consent for the procedure discussed with the physician/provider.   _____________________          ____________________       _____/_____am/pm   2nd witness to phone consent        Printed name           Date / Time    Informed Consent:  I have provided the explanation described above in section 1 to the patient and/or legal representative.  I have provided the patient and/or legal representative with an opportunity to ask any questions about the proposed operation/procedure.   ___________________________          ____________________         ____/____am/pm  Provider / Proceduralist                            Printed name            Date / Time  Revised 8/2/2021                                                                      Page 2 of 2

## 2022-09-15 NOTE — PROGRESS NOTES
9/15 1230   NO orders received from surgeon office yet. Spoke with Ranulfo Greenberg. He stated no PAT testing needed. He will fax antibiotic order for DOS as soon as he gets it. [facility]. Repeat BMP ordered for DOS.  Harry Kelly

## 2022-09-15 NOTE — PROGRESS NOTES
[facility] PRE-SURGICAL TESTING INSTRUCTIONS                      PRIOR TO PROCEDURE DATE:    1. PLEASE FOLLOW ANY INSTRUCTIONS GIVEN TO YOU PER YOUR SURGEON. 2. Arrange for someone to drive you home and be with you for the first 24 hours after discharge for your safety after your procedure for which you received sedation. Ensure it is someone we can share information with regarding your discharge. NOTE: At this time ONLY 2 ADULTS may accompany you & everyone must be MASKED. 3. You must contact your surgeon for instructions IF:  You are taking any blood thinners, aspirin, anti-inflammatory or vitamins. There is a change in your physical condition such as a cold, fever, rash, cuts, sores, or any other infection, especially near your surgical site. 4. Do not drink alcohol the day before or day of your procedure. Do not use any recreational marijuana at least 24 hours or street drugs (heroin, cocaine) at minimum 5 days prior to your procedure. 5. A Pre-Surgical History and Physical MUST be completed WITHIN 30 DAYS OR LESS prior to your procedure. by your Physician or an Urgent Care        THE DAY OF YOUR PROCEDURE:  1. Follow instructions for ARRIVAL TIME as DIRECTED BY YOUR SURGEON. 2. Enter the MAIN entrance from [address] and follow the signs to the free Parking [employer] or [employer] (offered free of charge 7 am-5pm). 3. Enter the Main Entrance of the hospital (do not enter from the lower level of the parking garage). Upon entrance, check in with the  at the surgical information desk on your LEFT. Bring your insurance card and photo ID to register      4. DO NOT EAT ANYTHING 8 hours prior to arrival for surgery. You may have up to 8 ounces of water 4 hours prior to your arrival for surgery.    NOTE: ALL Gastric, Bariatric & Bowel surgery patients - you MUST follow your surgeon's instructions regarding eating/ drinking as you will have very specific instructions to follow. If you did not receive these, call your surgeon's office immediately. 5. MEDICATIONS:  Take the following medications with a SMALL sip of water: METOPROLOL. BRING INHALERS   Use your usual dose of inhalers the morning of surgery. BRING your rescue inhaler with you to hospital.   Anesthesia does NOT want you to take insulin the morning of surgery. They will control your blood sugar while you are at the hospital. Please contact your ordering physician for instructions regarding your insulin the night before your procedure. If you have an insulin pump, please keep it set on basal rate. Bariatric patient's call your surgeon if on diabetic medications as some may need to be stopped 1 week prior to surgery    6. Do not swallow additional water when brushing teeth. No gum, candy, mints, or ice chips. Refrain from smoking or at least decrease the amount on day of surgery. 7. Morning of surgery:   Take a shower with an antibacterial soap (i.e., Safeguard or Dial) OR your physician may have instructed you to use Hibiclens. Dress in loose, comfortable clothing appropriate for redressing after your procedure. Do not wear jewelry (including body piercings), make-up (especially NO eye make-up), fingernail polish (NO toenail polish if foot/leg surgery), lotion, powders, or metal hairclips. Do not shave or wax for 72 hours prior to procedure near your operative site. Shaving with a razor can irritate your skin and make it easier to develop an infection. On the day of your procedure, any hair that needs to be removed near the surgical site will be 'clipped' by a healthcare worker using a special clipper designed to avoid skin irritation. 8. Dentures, glasses, or contacts will need to be removed before your procedure. Bring cases for your glasses, contacts, dentures, or hearing aids to protect them while you are in surgery.       9. If you use a CPAP, please bring it with you on the day of your procedure. 10. We recommend that valuable personal belongings such as cash, cell phones, e-tablets, or jewelry, be left at home during your stay. The hospital will not be responsible for valuables that are not secured in the hospital safe. However, if your insurance requires a co-pay, you may want to bring a method of payment, i.e., Check or credit card, if you wish to pay your co-pay the day of surgery. 11. If you are to stay overnight, you may bring a bag with personal items. Please have any large items you may need brought in by your family after your arrival to your hospital room. 12. If you have a Living Will or Durable Power of , please bring a copy on the day of your procedure. How we keep you safe and work to prevent surgical site infections:   1. Health care workers should always check your ID bracelet to verify your name and birth date. You will be asked many times to state your name, date of birth, and allergies. 2. Health care workers should always clean their hands with soap or alcohol gel before providing care to you. It is okay to ask anyone if they cleaned their hands before they touch you. 3. You will be actively involved in verifying the type of procedure you are having and ensuring the correct surgical site. This will be confirmed multiple times prior to your procedure. Do NOT abhijit your surgery site UNLESS instructed to by your surgeon. 4. When you are in the operating room, your surgical site will be cleansed with a special soap, and in most cases, you will be given an antibiotic before the surgery begins. What to expect AFTER your procedure? 1. Immediately following your procedure, your will be taken to the PACU for the first phase of your recovery. Your nurse will help you recover from any potential side effects of anesthesia, such as extreme drowsiness, changes in your vital signs or breathing patterns.  Nausea, headache, muscle aches, or sore throat may also occur after anesthesia. Your nurse will help you manage these potential side effects. 2. For comfort and safety, arrange to have someone at home with you for the first 24 hours after discharge. 3. You and your family will be given written instructions about your diet, activity, dressing care, medications, and return visits. 4. Once at home, should issues with nausea, pain, or bleeding occur, or should you notice any signs of infection, you should call your surgeon. 5. Always clean your hands before and after caring for your wound. Do not let your family touch your surgery site without cleaning their hands. 6. Narcotic pain medications can cause significant constipation. You may want to add a stool softener to your postoperative medication schedule or speak to your surgeon on how best to manage this SIDE EFFECT. SPECIAL INSTRUCTIONS     Thank you for allowing us to care for you. We strive to exceed your expectations in the delivery of care and service provided to you and your family. If you need to contact the [place] [address] staff for any reason, please call us at [phone]    Instructions reviewed with patient during preadmission testing phone interview.   Albertine Aschoff, RN.9/15/2022 .12:03 PM      ADDITIONAL EDUCATIONAL INFORMATION REVIEWED PER PHONE WITH YOU AND/OR YOUR FAMILY:  No Hibiclens® Bathing Instructions   Yes Antibacterial Soap

## 2022-09-16 ENCOUNTER — ANESTHESIA EVENT (OUTPATIENT)
Dept: OPERATING ROOM | Age: 77
End: 2022-09-16
Payer: COMMERCIAL

## 2022-09-19 ENCOUNTER — ANESTHESIA (OUTPATIENT)
Dept: OPERATING ROOM | Age: 77
End: 2022-09-19
Payer: COMMERCIAL

## 2022-09-19 PROBLEM — J90 PLEURAL EFFUSION ON RIGHT: Status: ACTIVE | Noted: 2022-01-01

## 2022-09-19 PROCEDURE — 2580000003 HC RX 258: Performed by: THORACIC SURGERY (CARDIOTHORACIC VASCULAR SURGERY)

## 2022-09-19 PROCEDURE — 6360000002 HC RX W HCPCS: Performed by: THORACIC SURGERY (CARDIOTHORACIC VASCULAR SURGERY)

## 2022-09-19 PROCEDURE — 2500000003 HC RX 250 WO HCPCS: Performed by: NURSE ANESTHETIST, CERTIFIED REGISTERED

## 2022-09-19 PROCEDURE — 2580000003 HC RX 258: Performed by: NURSE ANESTHETIST, CERTIFIED REGISTERED

## 2022-09-19 PROCEDURE — 6360000002 HC RX W HCPCS: Performed by: NURSE ANESTHETIST, CERTIFIED REGISTERED

## 2022-09-19 RX ORDER — EPHEDRINE SULFATE 50 MG/ML
INJECTION INTRAVENOUS PRN
Status: DISCONTINUED | OUTPATIENT
Start: 2022-09-19 | End: 2022-09-19 | Stop reason: SDUPTHER

## 2022-09-19 RX ORDER — FENTANYL CITRATE 50 UG/ML
INJECTION, SOLUTION INTRAMUSCULAR; INTRAVENOUS PRN
Status: DISCONTINUED | OUTPATIENT
Start: 2022-09-19 | End: 2022-09-19 | Stop reason: SDUPTHER

## 2022-09-19 RX ORDER — PHENYLEPHRINE HYDROCHLORIDE 10 MG/ML
INJECTION INTRAVENOUS PRN
Status: DISCONTINUED | OUTPATIENT
Start: 2022-09-19 | End: 2022-09-19 | Stop reason: SDUPTHER

## 2022-09-19 RX ORDER — PROPOFOL 10 MG/ML
INJECTION, EMULSION INTRAVENOUS PRN
Status: DISCONTINUED | OUTPATIENT
Start: 2022-09-19 | End: 2022-09-19 | Stop reason: SDUPTHER

## 2022-09-19 RX ORDER — GLYCOPYRROLATE 0.2 MG/ML
INJECTION INTRAMUSCULAR; INTRAVENOUS PRN
Status: DISCONTINUED | OUTPATIENT
Start: 2022-09-19 | End: 2022-09-19 | Stop reason: SDUPTHER

## 2022-09-19 RX ORDER — SODIUM CHLORIDE, SODIUM LACTATE, POTASSIUM CHLORIDE, CALCIUM CHLORIDE 600; 310; 30; 20 MG/100ML; MG/100ML; MG/100ML; MG/100ML
INJECTION, SOLUTION INTRAVENOUS CONTINUOUS PRN
Status: DISCONTINUED | OUTPATIENT
Start: 2022-09-19 | End: 2022-09-19 | Stop reason: SDUPTHER

## 2022-09-19 RX ORDER — ONDANSETRON 2 MG/ML
INJECTION INTRAMUSCULAR; INTRAVENOUS PRN
Status: DISCONTINUED | OUTPATIENT
Start: 2022-09-19 | End: 2022-09-19 | Stop reason: SDUPTHER

## 2022-09-19 RX ORDER — ROCURONIUM BROMIDE 10 MG/ML
INJECTION, SOLUTION INTRAVENOUS PRN
Status: DISCONTINUED | OUTPATIENT
Start: 2022-09-19 | End: 2022-09-19 | Stop reason: SDUPTHER

## 2022-09-19 RX ORDER — SUCCINYLCHOLINE CHLORIDE 20 MG/ML
INJECTION INTRAMUSCULAR; INTRAVENOUS PRN
Status: DISCONTINUED | OUTPATIENT
Start: 2022-09-19 | End: 2022-09-19 | Stop reason: SDUPTHER

## 2022-09-19 RX ADMIN — PHENYLEPHRINE HYDROCHLORIDE 200 MCG: 10 INJECTION INTRAVENOUS at 07:47

## 2022-09-19 RX ADMIN — PHENYLEPHRINE HYDROCHLORIDE 100 MCG: 10 INJECTION INTRAVENOUS at 07:29

## 2022-09-19 RX ADMIN — PHENYLEPHRINE HYDROCHLORIDE 100 MCG: 10 INJECTION INTRAVENOUS at 07:18

## 2022-09-19 RX ADMIN — EPHEDRINE SULFATE 5 MG: 50 INJECTION INTRAVENOUS at 07:45

## 2022-09-19 RX ADMIN — GLYCOPYRROLATE 0.1 MG: 0.2 INJECTION INTRAMUSCULAR; INTRAVENOUS at 07:25

## 2022-09-19 RX ADMIN — CEFAZOLIN 2000 MG: 2 INJECTION, POWDER, FOR SOLUTION INTRAMUSCULAR; INTRAVENOUS at 07:05

## 2022-09-19 RX ADMIN — PROPOFOL 60 MG: 10 INJECTION, EMULSION INTRAVENOUS at 07:14

## 2022-09-19 RX ADMIN — PHENYLEPHRINE HYDROCHLORIDE 100 MCG: 10 INJECTION INTRAVENOUS at 07:27

## 2022-09-19 RX ADMIN — ROCURONIUM BROMIDE 50 MG: 10 INJECTION INTRAVENOUS at 07:25

## 2022-09-19 RX ADMIN — PHENYLEPHRINE HYDROCHLORIDE 100 MCG: 10 INJECTION INTRAVENOUS at 07:21

## 2022-09-19 RX ADMIN — PHENYLEPHRINE HYDROCHLORIDE 100 MCG: 10 INJECTION INTRAVENOUS at 07:50

## 2022-09-19 RX ADMIN — SUGAMMADEX 200 MG: 100 INJECTION, SOLUTION INTRAVENOUS at 07:53

## 2022-09-19 RX ADMIN — EPHEDRINE SULFATE 5 MG: 50 INJECTION INTRAVENOUS at 07:50

## 2022-09-19 RX ADMIN — SUCCINYLCHOLINE CHLORIDE 120 MG: 20 INJECTION, SOLUTION INTRAMUSCULAR; INTRAVENOUS; PARENTERAL at 07:14

## 2022-09-19 RX ADMIN — PHENYLEPHRINE HYDROCHLORIDE 100 MCG: 10 INJECTION INTRAVENOUS at 07:24

## 2022-09-19 RX ADMIN — GLYCOPYRROLATE 0.1 MG: 0.2 INJECTION INTRAMUSCULAR; INTRAVENOUS at 07:30

## 2022-09-19 RX ADMIN — FENTANYL CITRATE 50 MCG: 50 INJECTION, SOLUTION INTRAMUSCULAR; INTRAVENOUS at 07:05

## 2022-09-19 RX ADMIN — FENTANYL CITRATE 50 MCG: 50 INJECTION, SOLUTION INTRAMUSCULAR; INTRAVENOUS at 07:10

## 2022-09-19 RX ADMIN — EPHEDRINE SULFATE 5 MG: 50 INJECTION INTRAVENOUS at 07:39

## 2022-09-19 RX ADMIN — SODIUM CHLORIDE, SODIUM LACTATE, POTASSIUM CHLORIDE, AND CALCIUM CHLORIDE: .6; .31; .03; .02 INJECTION, SOLUTION INTRAVENOUS at 07:00

## 2022-09-19 RX ADMIN — HYDROCORTISONE SODIUM SUCCINATE 100 MG: 100 INJECTION, POWDER, FOR SOLUTION INTRAMUSCULAR; INTRAVENOUS at 07:20

## 2022-09-19 RX ADMIN — ONDANSETRON 4 MG: 2 INJECTION INTRAMUSCULAR; INTRAVENOUS at 07:20

## 2022-09-19 RX ADMIN — PHENYLEPHRINE HYDROCHLORIDE 100 MCG: 10 INJECTION INTRAVENOUS at 07:44

## 2022-09-19 RX ADMIN — PROPOFOL 20 MG: 10 INJECTION, EMULSION INTRAVENOUS at 07:13

## 2022-09-19 RX ADMIN — GLYCOPYRROLATE 0.2 MG: 0.2 INJECTION INTRAMUSCULAR; INTRAVENOUS at 07:12

## 2022-09-19 RX ADMIN — EPHEDRINE SULFATE 5 MG: 50 INJECTION INTRAVENOUS at 07:35

## 2022-09-19 ASSESSMENT — ENCOUNTER SYMPTOMS: SHORTNESS OF BREATH: 1

## 2022-09-19 ASSESSMENT — LIFESTYLE VARIABLES: SMOKING_STATUS: 0

## 2022-09-19 NOTE — PROGRESS NOTES
Pt admitted to ICU via bed from PACU. Pt currently on BiPAP at 50%. Latest ABG resulted:   Latest Reference Range & Units 9/19/22 11:00   Hemoglobin, Art, Extended g/dL 9.20   pH, Arterial 7.350 - 7.450  7.367   pCO2, Arterial 35.0 - 45.0 mmHg 91.1 (HH)   pO2, Arterial 75.0 - 108.0 mmHg 72.9 (L)   HCO3, Arterial 21 - 29 mmol/L 52 (H)   TCO2 (calc), Art mmol/L 55   Base Excess, Arterial -3.0 - 3.0 mmol/L 23.3 (H)   O2 Sat, Arterial 93 - 100 % 94   Methemoglobin, Arterial 0.0 - 1.4 % 0.7   Carboxyhgb, Arterial 0.0 - 1.5 % 1.2   (HH): Data is critically high  (L): Data is abnormally low  (H): Data is abnormally high    Pt mentation is WDL. Unable to have prolonged conversion d/t SOB, but nods Y and N appropriately when asked specific questions. On BiPAP, breathing appears labored, shallow, and tachypneic with bpm in 40s at times. Breath sounds diminished throughout. Pt denies pain despite CT. CT initially output 65 ml of serosanguinous fluid. BP slightly hypotensive. HR WDL and NSR.      Electronically signed by Irina Quiñones RN on 9/19/2022 at 3:10 PM

## 2022-09-19 NOTE — PROGRESS NOTES
From OR, eyes open but not responsive, SaO2 84%, switched finger probe to ear probe and reading went to the 90's, other VSS, dressing CDI. Pleurix catheter banded, and hooked up to wall suction. Per report, 1300 ml output noted in OR. CRNA still at bedside after report.     S/P RIGHT VIDEO ASSISTED THORASCOPIC SURGERY AND; - Right

## 2022-09-19 NOTE — PROGRESS NOTES
Cardiothoracic Surgery  Post-operative Note      Procedure(s) Performed:   Right VATS, pleural catheter placement, intercostal nerve block x 4    Subjective:   Pt experienced respiratory distress in PACU requiring BiPAP. Patient states his pain and his SOB post-op are stable. Currently on BiPAP and hard to understand. Objective:    Vitals:   Vitals:    09/19/22 1100 09/19/22 1115 09/19/22 1200 09/19/22 1205   BP:  123/71 (!) 95/56    Pulse: 88 83 77    Resp: 28 16 19 (!) 39   Temp:  97.4 °F (36.3 °C) 96.9 °F (36.1 °C)    TempSrc:   Temporal    SpO2: 99% 98% 93%    Weight:       Height:           Physical Exam:  Post-op vital signs:  Stable   General appearance: Ill appearing. Neuro: A&Ox3, no focal deficits, sensation intact. Communicating via hand gestures. Follows commands  HEENT: NCAT, EOMI, trachea midline, no JVD. Chest/Lungs: Increased effort with accessory muscle use on BiPAP FiO2 40%. R chest tube dressing C/D/I. Cardiovascular: RRR  Abdomen: Soft, non-tender, non-distended, no rebound, guarding, or rigidity. Skin: Pale, warm and dry, no rashes  Extremities: no edema, no cyanosis  Genitourinary: No barry    Assessment and Plan  This is a 65y.o. year old male status post Right VATS with pleurex catheter placement secondary to right sided pleural effusion. (9/19/22) POD0. Neuro:   Pain/sedation/psych  Tylenol and robaxin scheduled. Oxy and dilaudid PRN. Cardiovascular:   Hx of Chronic HFpEF   - last echo 1/25/22, EF 55-60%. Pulm artery pressure 33 mm Hg.    Paroxysmal Afib   -Home Metoprolol and eliquis restarted   HTN   -Metoprolol scheduled and PRN hydralizine  HLD   -Home statin restarted    Pulmonary:   -S/p Right VATS with pleurex catheter placement secondary to right-sided pleural effusion today  -Hx of multiple pleural effusions with multiple thoracenteses  -Chest tube to continuous wall suction  -1.3 L pleural fluid evacuated intraoperatively   -Pulmonology consulted for post-op respiratory distress. Appreciate recommendations. (Dr. Anabella Andrews is his regular pulmonologist)   -Currently requiring BiPAP w/ settings: S/T AVAPS, 22/500/5/40%, IPAP max 35, min 10.   -Hx of COPD   - 2-3 L O2 at home. - Proventil and Xopenex PRN. Stiolto daily     GI:   Glycolax and Senokot-s for bowel function    FEN:   LR @ 50 mL/hr  Diet: NPO except ice chips and sips with meds    /Renal:   Remains a post-op void check      Hem/ID:   F/u labs. Preop Hgb 9.0    Endo:   Hx of uncontrolled DM.    -HDSSI after meals. LDSSI QHS     Prophylaxis:   Eliquis    Access:  Peripheral Access: 2 pIVs   Chest tube.  Pleurex catheter, right side, placed 9/19                                Mobility:  OOB and ambulate    Dispo:   ICU    Code Status: Full Code  -----------------------------  Effie Carranza DO   PGY1, General Surgery  09/19/22  1:37 PM  Pager # [phone]

## 2022-09-19 NOTE — PROGRESS NOTES
2624- CRNA gave some more Sugammadex. , EtCO2 monitor hooked up. 0838-Dr. All Dobbs was called and came to the bedside, Sa02 in the lower 80's despite NRB at 15L. RT called for Bipap.  0900-RT at bedside with Bipap.

## 2022-09-19 NOTE — CARE COORDINATION
Case management is following for discharge planning needs. The chart was reviewed. Mr. Radha Engel is from home with his spouse. He is active with [employer] for Home O[employer]. He had a new Pleur-x catheter placed today. Faxed the DME order form to [employer] today.      Electronically signed by DARNELL England RN-BC [id number]  Case Management  [phone]

## 2022-09-19 NOTE — PROGRESS NOTES
4 Eyes Admission Assessment     I agree as the admission nurse that 2 RN's have performed a thorough Head to Toe Skin Assessment on the patient. ALL assessment sites listed below have been assessed on admission. Areas assessed by both nurses: Lenny and Jarrell  [x]   Head, Face, and Ears   [x]   Shoulders, Back, and Chest   Scattered scabs, CT insertion site. [x]   Arms, Elbows, and Hands   [x]   Coccyx, Sacrum, and Ischium  [x]   Legs, Feet, and Heels        Does the Patient have Skin Breakdown?   No         Bryson Prevention initiated:  No   Wound Care Orders initiated:  No      [facility] nurse consulted for Pressure Injury (Stage 3,4, Unstageable, DTI, NWPT, and Complex wounds) or Bryson score 18 or lower:  No      Nurse 1 eSignature: Electronically signed by Naina Pruitt RN on 9/19/22 at 2:59 PM EDT    **SHARE this note so that the co-signing nurse is able to place an eSignature**    Nurse 2 eSignature: Electronically signed by Josy Reed RN on 9/19/22 at 3:00 PM EDT

## 2022-09-19 NOTE — PROGRESS NOTES
Thoracic Surgery  Interval Note:    Patient seen post-operatively at bedside in PACU given persistently increased work of breathing. Patient already evaluated with anesthesia and RT, re-dosed Sugamadex and started on BiPAP after NRB didn't improve WOB and SpO2 remained in the low 80s. Following roughly one hour on BiPAP, mentation and work of breathing showed very slight improvement. Post-op CXR with <10% remaining pneumothorax and proper placement of PleurX catheter; chest tube to -20 mmHg, tidaling without air leak. ABG pending. Patient and situation discussed with Dr Joyce Wilkins; decision to transfer patient from PACU to ICU rather than PCU given need for increased respiratory support. Patient seen in conjunction with Xiao Weiner and Lilliam Daugherty. Catie Gilliland MD  General Surgery, PGY-3  09/19/22  10:24 AM  [phone]

## 2022-09-19 NOTE — ANESTHESIA PRE PROCEDURE
Department of Anesthesiology  Preprocedure Note       Name:  Clau Mcgarry   Age:  65 y.o.  :  1945                                          MRN:  [id number]         Date:  2022      Surgeon: Vonnie Shanks):  MD Cullen Campos MD    Procedure: Procedure(s):  RIGHT VIDEO ASSISTED THORASCOPIC SURGERY AND;  PLEURAL CATHETER PLACEMENT    Medications prior to admission:   Prior to Admission medications    Medication Sig Start Date End Date Taking? Authorizing Provider   ELIQUIS 5 MG TABS tablet TAKE 1 TABLET BY MOUTH TWO TIMES A DAY 22   Can Franco, APRN - CNP   umeclidinium-vilanterol [facility] ELLIPTA) 62.5-25 MCG/INH AEPB inhaler Inhale 1 puff into the lungs daily 22   Emily Cooper MD   bumetanide (BUMEX) 1 MG tablet Take 1 tablet by mouth in the morning and 1 tablet before bedtime. 22 Later, MD   magnesium oxide (MAG-OX) 400 MG tablet Take 1 tablet by mouth in the morning and 1 tablet before bedtime. 22   Tina Ludwig MD   dapagliflozin (FARXIGA) 10 MG tablet TAKE 1 TABLET EVERY MORNING 22   Joyice Meigs, MD   sildenafil (VIAGRA) 100 MG tablet Take 1 tablet by mouth as needed for Erectile Dysfunction Max daily dose 100mg. 22  Joyice Meigs, MD   metoprolol succinate (TOPROL XL) 50 MG extended release tablet Take 1 tablet by mouth in the morning and at bedtime 22   Tina Ludwig MD   Insulin Pen Needle (PEN NEEDLES) 31G X 6 MM MISC 1 each by Does not apply route daily 22   Joyice Meigs, MD   Continuous Blood Gluc Sensor (FREESTYLE LIZA 14 DAY SENSOR) MISC Check bs tidac and hs 22   Joyice Meigs, MD   Continuous Blood Gluc  (FREESTYLE LIZA 14 DAY READER) SOFIYA Check bs tidac and hs 22   Joyice Meigs, MD   Insulin Degludec (TRESIBA FLEXTOUCH) 200 UNIT/ML SOPN 10 units subcut qam, increase 4 units every 4 days until am blood sugar < 120.  Max 100 units  Patient taking differently: Inject 12 Units into the skin daily 10 units subcut qam, increase 4 units every 4 days until am blood sugar < 120. Max 100 units 6/7/22   Mariah Herrera MD   albuterol sulfate HFA (VENTOLIN HFA) 108 (90 Base) MCG/ACT inhaler Inhale 2 puffs into the lungs 4 times daily as needed for Wheezing 5/19/22   Irene Canchola MD   pravastatin (PRAVACHOL) 40 MG tablet Take 1 tablet by mouth daily 10/20/21   Danna Cardoso, APRN - CNP       Current medications:    Current Facility-Administered Medications   Medication Dose Route Frequency Provider Last Rate Last Admin    lactated ringers infusion   IntraVENous Continuous Etelvina Harris MD        sodium chloride flush 0.9 % injection 5-40 mL  5-40 mL IntraVENous 2 times per day Etelvina Harris MD        sodium chloride flush 0.9 % injection 5-40 mL  5-40 mL IntraVENous PRN Etelvina Harris MD        0.9 % sodium chloride infusion   IntraVENous PRN Etelvina Harris MD        0.9 % sodium chloride infusion   IntraVENous Continuous Nida Jordan MD        ceFAZolin (ANCEF) 2,000 mg in sodium chloride 0.9 % 50 mL IVPB (mini-bag)  2,000 mg IntraVENous Once Nida Jordan MD           Allergies: Allergies   Allergen Reactions    Torsemide Shortness Of Breath    Dye [Iodides]      1970's - ?? Problem List:    Patient Active Problem List   Diagnosis Code    HTN (hypertension) I10    Hyperlipidemia E78.5    OA (osteoarthritis) of knee M17.10    Carotid stenosis, left I65.22    Hearing loss H91.90    ED (erectile dysfunction) N52.9    DM type 2 (diabetes mellitus, type 2) ([facility]) E11.9    Rotator cuff tear M75.100    Glenohumeral arthritis M19.019    Subacromial impingement M75.40    Trigger ring finger of right hand M65.341    Spinal stenosis of lumbar region M48.061    Closed compression fracture of L1 lumbar vertebra S32.010A    Closed displaced fracture of lateral malleolus of left fibula S82. 62XA    Exertional dyspnea R06.00    Atrial fibrillation with rapid ventricular response ([facility]) I48.91    Hypoxia R09.02    Chronic heart failure with preserved ejection fraction (HFpEF) ([facility]) I50.32       Past Medical History:        Diagnosis Date    Carotid stenosis, left 10/12/2011    Chronic back pain     Chronic systolic (congestive) heart failure     Diastolic CHF ([facility] [address]     Erectile dysfunction     Hyperlipidemia     Hypertension     Osteoarthritis     Restrictive lung disease     Type II or unspecified type diabetes mellitus without mention of complication, not stated as uncontrolled        Past Surgical History:        Procedure Laterality Date    CARDIAC CATHETERIZATION  2022    KNEE SURGERY Left     ROTATOR CUFF REPAIR Bilateral        Social History:    Social History     Tobacco Use    Smoking status: Former     Packs/day: 2.00     Years: 40.00     Pack years: 80.00     Types: Cigarettes     Quit date: 10/24/2001     Years since quittin.9    Smokeless tobacco: Never   Substance Use Topics    Alcohol use:  Yes     Alcohol/week: 0.0 standard drinks     Comment: rarely                                Counseling given: Not Answered      Vital Signs (Current):   Vitals:    09/15/22 1158 22 0534   BP:  132/60   Pulse:  56   Resp:  22   Temp:  97.7 °F (36.5 °C)   TempSrc:  Temporal   SpO2:  100%   Weight: 174 lb (78.9 kg) 173 lb 0.6 oz (78.5 kg)   Height: 6' 1\" (1.854 m) 6' 1\" (1.854 m)                                              BP Readings from Last 3 Encounters:   22 132/60   22 120/62   22 122/63       NPO Status: Time of last liquid consumption: 1800                        Time of last solid consumption: 1800                        Date of last liquid consumption: 22                        Date of last solid food consumption: 22    BMI:   Wt Readings from Last 3 Encounters:   22 173 lb 0.6 oz (78.5 kg)   22 174 lb 3.2 oz (79 kg)   22 187 lb (84.8 kg)     Body mass index is 22.83 kg/m².     CBC:   Lab Results   Component Value Date/Time    WBC 10.1 09/19/2022 06:10 AM    RBC 3.38 09/19/2022 06:10 AM    HGB 9.0 09/19/2022 06:10 AM    HCT 28.7 09/19/2022 06:10 AM    MCV 84.8 09/19/2022 06:10 AM    RDW 15.8 09/19/2022 06:10 AM     09/19/2022 06:10 AM       CMP:   Lab Results   Component Value Date/Time     08/26/2022 12:00 PM    K 5.3 08/26/2022 12:00 PM    K 4.5 06/01/2022 02:31 PM    CL 88 08/26/2022 12:00 PM    CO2 45 08/26/2022 12:00 PM    BUN 19 08/26/2022 12:00 PM    CREATININE 0.6 08/26/2022 12:00 PM    GFRAA >60 08/26/2022 12:00 PM    AGRATIO 1.2 01/24/2022 10:29 AM    LABGLOM >60 08/26/2022 12:00 PM    LABGLOM >90 07/11/2014 11:34 AM    GLUCOSE 251 08/26/2022 12:00 PM    GLUCOSE 154 05/18/2012 10:35 AM    PROT 6.7 03/31/2022 12:59 PM    CALCIUM 9.1 08/26/2022 12:00 PM    BILITOT 0.3 03/31/2022 12:59 PM    ALKPHOS 124 03/31/2022 12:59 PM    AST 13 03/31/2022 12:59 PM    ALT <5 03/31/2022 12:59 PM       POC Tests:   Recent Labs     09/19/22  0624   POCGLU 110*       Coags:   Lab Results   Component Value Date/Time    PROTIME 13.5 06/16/2022 10:11 AM    INR 1.04 06/16/2022 10:11 AM       HCG (If Applicable): No results found for: PREGTESTUR, PREGSERUM, HCG, HCGQUANT     ABGs: No results found for: PHART, PO2ART, JXH5WOL, YKA0NYB, BEART, F7XSUVWA     Type & Screen (If Applicable):  Lab Results   Component Value Date    LABABO A  POSITIVE   11/16/2018       Drug/Infectious Status (If Applicable):  No results found for: HIV, HEPCAB    COVID-19 Screening (If Applicable):   Lab Results   Component Value Date/Time    COVID19 DETECTED 12/16/2020 12:00 AM           Anesthesia Evaluation    Airway: Mallampati: I  TM distance: >3 FB   Neck ROM: full  Mouth opening: > = 3 FB   Dental: normal exam         Pulmonary: breath sounds clear to auscultation  (+) shortness of breath:      (-) sleep apnea and not a current smoker                          ROS comment:

## 2022-09-19 NOTE — PROGRESS NOTES
1- Dr. Kwesi Gallagher art bedside observing and evaluating the patient. 1035-Celina Baird at bedside too. 1040-Patient now is starting to respond to some questions, denies any pain at this time. Still with labored breathing and on the BiPAP.

## 2022-09-19 NOTE — PROGRESS NOTES
Patient switched to NRB at 15 L from the BiPAP. Maintaining Sa02 above 90's but still with labored breathing.

## 2022-09-19 NOTE — CONSULTS
Pulmonary Consult Note      Reason for Consult: respiratory distress, pleural effusions   Requesting Physician: Lilly/Cornelio    Subjective:     CHIEF COMPLAINT / HPI:                The patient is a 65 y.o. male with significant past medical history of CHF (hfpef), COPD had his right sided VATs with pleur-x catheter placement today and was having difficulty postoperative requiring bipap. Patient is well known to me from the office and I referred him to CT surgery for the VATS. He's had a pleural effusion on the right side since I met him in early March and another on the left that had gotten larger, despite diuretics. When the effusions were first noted he was much less short of breath and had a hydropneumothorax on the right. Over the course of the past several months he's been losing weight and getting progressively more dyspneic and now requires oxygen. I had the left side tapped, but he felt worse afterwards and the fluid was not sent for study. It hurt so much he refused to have it done again. At our last office visit we discussed the option of getting a VATs to the right, larger fluid collection versus pursuing hospice. He went for the VATS and it went better than expected surgically, but he's struggled postoperatively with rapid shallow breathing and being slow to clear sedation. I was asked to come see him in PACU because he couldn't tolerate coming off the bipap. I made several adjustments to the bipap/NIV to try improve his VTs and slow his RR with only modest success.     Past Medical History:      Diagnosis Date    Carotid stenosis, left 10/12/2011    Chronic back pain     Chronic systolic (congestive) heart failure 37/04/9739    Diastolic CHF ([facility] [address]     Erectile dysfunction     Hyperlipidemia     Hypertension     Osteoarthritis     Restrictive lung disease     Type II or unspecified type diabetes mellitus without mention of complication, not stated as uncontrolled       Past Surgical History:        Procedure Laterality Date    CARDIAC CATHETERIZATION  2022    KNEE SURGERY Left     ROTATOR CUFF REPAIR Bilateral      Current Medications:     insulin lispro  0-16 Units SubCUTAneous TID WC    insulin lispro  0-4 Units SubCUTAneous Nightly     Allergies: Allergies   Allergen Reactions    Torsemide Shortness Of Breath    Dye [Iodides]      1970's - ?? Social History:    TOBACCO:   reports that he quit smoking about 20 years ago. His smoking use included cigarettes. He has a 80.00 pack-year smoking history. He has never used smokeless tobacco.  ETOH:   reports current alcohol use. Family History:       Problem Relation Age of Onset    Hearing Loss Father     Heart Disease Father 70        MI    High Blood Pressure Father     Diabetes Maternal Grandmother         hypoglycemic    Hearing Loss Maternal Grandmother     Arthritis Maternal Grandfather        REVIEW OF SYSTEMS:    Unobtainable 2/2 respiratory distress    Objective:   PHYSICAL EXAM:      VITALS:  /71   Pulse 79   Temp 97.4 °F (36.3 °C)   Resp 16   Ht 6' 1\" (1.854 m)   Wt 173 lb 0.6 oz (78.5 kg)   SpO2 98%   BMI 22.83 kg/m²   24HR INTAKE/OUTPUT:    Intake/Output Summary (Last 24 hours) at 2022 1134  Last data filed at 2022 0753  Gross per 24 hour   Intake 500 ml   Output 110 ml   Net 390 ml     CURRENT PULSE OXIMETRY:  SpO2: 98 %  24HR PULSE OXIMETRY RANGE:  SpO2  Av.3 %  Min: 81 %  Max: 100 % on60% and NIV    CONSTITUTIONAL:  awake but lethargic, moderate distress, and appears stated age  NECK:  Supple, symmetrical, trachea midline, no adenopathy, thyroid symmetric, not enlarged and no tenderness, skin normal  LUNGS:  marked increased work of breathing with decreased breath sounds throughout. + accessory muscle use  CARDIOVASCULAR:  normal S1 and S2, no edema and no JVD  ABDOMEN:  normal bowel sounds, non-distended and no masses palpated, and no tenderness to palpation.  No hepatospleenomegaly  LYMPHADENOPATHY:  no axillary or supraclavicular adenopathy. No cervical adnenopathy  PSYCHIATRIC: Oriented to person, place and time. No obvious depression or anxiety. MUSCULOSKELETAL: No obvious misalignment or effusion of the joints. No clubbing, cyanosis of the digits. SKIN:  normal skin color, texture, turgor and no redness, warmth, or swelling.  No palpable nodules    DATA:    Old records have been reviewed  CBC with Differential:    Lab Results   Component Value Date/Time    WBC 10.1 09/19/2022 06:10 AM    RBC 3.38 09/19/2022 06:10 AM    HGB 9.0 09/19/2022 06:10 AM    HCT 28.7 09/19/2022 06:10 AM     09/19/2022 06:10 AM    MCV 84.8 09/19/2022 06:10 AM    MCH 26.6 09/19/2022 06:10 AM    MCHC 31.3 09/19/2022 06:10 AM    RDW 15.8 09/19/2022 06:10 AM    LYMPHOPCT 4.2 06/01/2022 02:31 PM    MONOPCT 10.9 06/01/2022 02:31 PM    BASOPCT 0.7 06/01/2022 02:31 PM    MONOSABS 1.1 06/01/2022 02:31 PM    LYMPHSABS 0.4 06/01/2022 02:31 PM    EOSABS 0.0 06/01/2022 02:31 PM    BASOSABS 0.1 06/01/2022 02:31 PM     BMP:    Lab Results   Component Value Date/Time     09/19/2022 06:10 AM    K 5.0 09/19/2022 06:10 AM    K 4.5 06/01/2022 02:31 PM    CL 86 09/19/2022 06:10 AM    CO2 >50 09/19/2022 06:10 AM    BUN 15 09/19/2022 06:10 AM    CREATININE 0.6 09/19/2022 06:10 AM    CALCIUM 9.3 09/19/2022 06:10 AM    GFRAA >60 09/19/2022 06:10 AM    LABGLOM >60 09/19/2022 06:10 AM    LABGLOM >90 07/11/2014 11:34 AM    GLUCOSE 105 09/19/2022 06:10 AM    GLUCOSE 154 05/18/2012 10:35 AM     Hepatic Function Panel:    Lab Results   Component Value Date/Time    ALKPHOS 91 09/19/2022 06:10 AM    ALT <5 09/19/2022 06:10 AM    AST 10 09/19/2022 06:10 AM    PROT 6.7 09/19/2022 06:10 AM    BILITOT 0.3 09/19/2022 06:10 AM    BILIDIR <0.2 09/19/2022 06:10 AM    IBILI see below 09/19/2022 06:10 AM     ABG:  No results found for: PZM8LUA, BEART, O2LKADWN, PHART, THGBART, NYW0OPZ, PO2ART, RPA1JYQ    Cultures:   Blood Culture:    Sputum Culture:        Radiology Review:  All pertinent images / reports were reviewed as a part of this visit. imaging reveals the following:  XR CHEST PORTABLE   Final Result   1. Right-sided Pleurx catheter in satisfactory position in the lung bases   2. Right-sided post operative pneumothorax, approximately 10%            Other diagnostic test:      PFTs:6/2022  Impression:                          1. There is no obstruction present                          2. There is severe restriction                          3. There is no significant reversibility with bronchodilator                              [Increase in FEV1 => 12% of control and => 200 ml]                          4. There is no significant hyperinflation or air trapping                          5. There is moderate reduction in diffusion capacity     Assessment/Plan   65 y.o. male with acute on chronic respiratory failure with hypoxia and hypercapnia following VATS with pleurx placement. Patient very tachypneic postoperatively without clear reason as he denied pain when alert enough to answer questions. Patient had 1.3L of fluid removed with procedure and some re-expansion of the lung but did have residual pneumothorax in areas where fluid had been on post operative chest xray. I made multiple adjustments to the NIV to improve his VTs which were persistently in the low 200mL range despite not having pressure limited breaths. At 6'1\" tall my target VTs were 500 mL. The final setting, that worked the best was S/T with AVAPS VT of 500 RR 22 PEEP of 5 and IPAP max of 35 and itime of 0.9s. His VTs on that setting were around 400 mL and his RR came down to the mid 30s at least.  FiO2 weaned to 50%. Will transfer to ICU as it's unclear how long he'll be able to tolerate bipap at this point. Pulmonary will follow.      Critical care time spent reviewing labs/films, examining patient, collaborating with other physicians but excluding procedures for life threatening organ failure is 40 minutes.       Daksha Crawford MD

## 2022-09-19 NOTE — PROGRESS NOTES
Dr. Sarah Maradiaga gave Doxapram and after a few minutes patient was more responsive, denies any pain, answers with a yes and no and states he's in the hospital.    Patient will be going to ICU instead of PCU.

## 2022-09-19 NOTE — DISCHARGE INSTRUCTIONS
Caring for Your PleurX Drainage Catheter    You were taught while inpatient how to drain your PleurX catheter. Use this information at home to remind you of what you learned. To care for your PleurX drainage catheter, you will: Inspect your catheter every day. Drain the fluid from your pleural space (chest) as directed by your doctor. Change your dressing whenever it becomes loose, wet, or soiled. If a wet dressing is left against your skin, it may cause your skin to become irritated and sore. Dont use any sharp objects around your catheter. Inspecting your catheter  You must inspect your catheter every day. You can use a hand-held mirror to do this. Check the dressing. If the dressing is wet, soiled, has come loose, or started to lift from your skin, it needs to be changed. Examine your skin around the catheter when youre changing the dressing. There should be no redness, areas of broken skin, or rash. Fluid shouldnt leak around the catheter. Inspect the catheter for kinks in the tubing. If you see a problem call your doctor or nurse. If your catheter becomes damaged, cut, or broken, follow these instructions:  Pinch the catheter closed between your fingers. Open a drainage kit and remove the blue emergency slide clamp. Slip it over the catheter and tighten the clamp. If you dont have a slide clamp, bend the catheter and tape it in this position. Call your doctor. Draining your pleural space  You will need to drain the fluid from your chest every day, or as directed by your doctor. To drain the fluid, you will attach the catheter to a vacuum bottle. The vacuum pulls the fluid from your chest into the bottle. If you have pain when you drain the catheter, take your pain medication as directed by your doctor 30 minutes before you drain it. Amount of drainage  Your doctor will tell you how much drainage you should expect and what it should look like.  Keep a record of when and how much you drain. This will help your doctor develop a schedule thats right for you. Some people may drain up to 500 milliliters (mL) (16.9 ounces) of fluid a day. Dont drain more than 1,000 mL unless directed by your doctor. If you drain less than 100 mL, add an additional day to drain again. For example if you are draining every other and you drain less than 100 you would wait an additional 2 days to drain your catheter. How to drain your catheter     - Remove old dressing from skin     - Open procedure kit, and place drainage line onto opened blue sterile wrapping     - Put sterile gloves on and prepare supplies (open alcohol pads, closing roller clamp on drainage line, remove cover from drainage tip)     - Clean end of PleurX cathter with alcohol pad, remove cap, and then place drainage line into Pleurx catheter.     - Remove plastic support clamp on top of drainage bottle and press plunger into bottle; slowly open roller clamp to start drainage.     - When finished draining close roller clamp; disconnect drainage catheter; clean Pleurx catheter with alcohol pad and place new cap. - Redress catheter with supplied dressings. Record drainage. Managing problems with draining your catheter  If no fluid drains from your chest into the vacuum bottle, there may be several reasons. The bottle may have lost its suction. Check the T-plunger at the top of vacuum bottle to make sure that its compressed. There may be a problem with the catheter, vacuum bottle, or drainage line. Make sure that there arent any kinks in the catheter. Check to see that the clamp on the drainage line is open. Check that the drainage line and catheter valve are securely connected. Check if the catheter is clogged. If it is, roll it between your fingers. This will help loosen anything thats blocking the drainage flow.   If these steps dont work, or if the bottle has lost its vacuum, repeat the drainage procedure with a new drainage kit and bottle. If fluid still isnt draining, you may not have enough fluid in your chest to drain. Its possible that over time, the amount of fluid will decrease. If it does, you wont need to drain as often as you did when the catheter was first put in. Call your doctor if fluid isnt draining. Changing the dressing  The dressing must be changed once a week or whenever it becomes wet, soiled, or pulls away from your skin. Change the dressing just after you have drained your pleural space. That way, you only have to open 1 drainage kit. Figure 13. Clean around the catheter   Gather your supplies   Foam catheter pad, from your drainage kit   Self-adhesive bandage, from your drainage kit   4x4 gauze pads, from your drainage kit   Soap and small bowl with water   Extra gauze or clean washcloth   Waste basket  Wash your hands with warm water and soap or use an alcohol-based hand . Hold the catheter in place to prevent tension or pulling on it. Remove the dressing from your catheter and throw it away. Wash your hands again. Use a gauze pad moistened with soap and water to clean the skin around the catheter. Rinse and dry the skin with fresh gauze and water. Place the foam catheter pad under the catheter. Remove and throw away the center panel from the backing of the self-adhesive dressing. Do not stretch. Peel the printed liner from the self adhesive dressing, exposing the adhesive surface. Place a new gauze pad over the catheter and foam pad. Center the bandage over the gauze pad. Press the bandage down, allowing the end of the catheter to come out from under the bandage. Make sure you dont stretch the dressing when you apply it. Slowly remove the frame while smoothing down the self adhesive dressing edges. Smooth and press the dressing down to make sure it is completely secure.  If the part of the catheter that is not covered is too long, secure it to your body with paper tape.  Wash your hands with warm water and soap or use an alcohol-based hand . Showering with your catheter  You can take showers with your PleurX catheter, but you will need to keep the dressing dry. If the dressing gets wet, you will need to change it. Wet dressings are a common cause of skin problems. A hand-held shower can help direct the water away from your dressing. You will also need to cover your dressing with a waterproof plastic covering. Before you shower, tape plastic wrap or a gallon sized bag over the dressing to keep it dry. You can also use AquaGuard®, which is a one-time use waterproof cover to protect your dressing. When you shower, make sure that that your catheter is covered to prevent it from getting wet. Applying the AquaGuard  The edges of the AquaGuard have peelable tape. Fold over a corner of each side of the tape. Hold the AquaGuard with the arrows pointing towards your head. Peel off the top strip and place the top edge of the AquaGuard above the dressing. Smooth it down. Then, grab a folded corner and peel down one side, smoothing as you go. Do the bottom and remaining side the same way. Dont let the tape on the AquaGuard touch your dressing. If it does, it can lift your dressing when you remove the AquaGuard after showering.     Call Your Doctor or Nurse if:  You have a temperature of 100.4º F (38° C) or higher   The drainage changes color or consistency   The drainage is cloudy or smells bad   The amount of fluid draining from your chest changes   You have pain when you drain the catheter or pain that continues after its drained   You have redness, swelling, drainage, or pain where the catheter goes in or in the area of the catheter   You damage, break, or cut the catheter   The catheter comes out   You see leaking from the valve or catheter   You have any concerns about your catheter            Extra Heart Failure sites:   https://[employer].com/ --- this is American Heart Association interactive Healthier Living with Heart Failure guidebook. Please copy and paste link into search bar. Use your mouse to scroll through the pages. Lots and lots of info / tips    HF [employer] lauren  --- free smart phone lauren available for [employer] and [employer]. Use your phone to track sodium / fluid intake,  symptoms, weight, etc.    [employer] - website-- [employer] is a dialysis company. All dialysis patients follow a renal diet which IS low sodium!! This website offers free seasonal cookbooks.   Each quarter, they will release 25-30 new recipes with a breakdown of calories, sodium, glucose, etc    [email or web address]/recipes -- more free recipes

## 2022-09-19 NOTE — ANESTHESIA POSTPROCEDURE EVALUATION
Department of Anesthesiology  Postprocedure Note    Patient: Ryan Massey  MRN: [id number]  YOB: 1945  Date of evaluation: 9/19/2022      Procedure Summary     Date: 09/19/22 Room / Location: [address]    Anesthesia Start: 0700 Anesthesia Stop: 8587    Procedures:       RIGHT VIDEO ASSISTED THORASCOPIC SURGERY AND; (Right)      PLEURAL CATHETER PLACEMENT; INTERCOSTAL NERVE BLOCK X4 Diagnosis:       Pleural effusion, right      (Pleural effusion, right [J90])    Surgeons: Simon Peraza MD Responsible Provider: Chandler Cruz MD    Anesthesia Type: general ASA Status: 3          Anesthesia Type: No value filed.     Kenisha Phase I: Kenisha Score: 8    Kenisha Phase II:        Anesthesia Post Evaluation    Patient location during evaluation: PACU  Patient participation: complete - patient participated  Level of consciousness: awake and alert  Airway patency: patent  Nausea & Vomiting: no vomiting  Complications: no  Cardiovascular status: blood pressure returned to baseline  Respiratory status: BIPAP  Hydration status: euvolemic  Comments: Pt admission escalated to ICU  Multimodal analgesia pain management approach

## 2022-09-19 NOTE — H&P
Deborah Ken    [id number]    [facility] Same Day Surgery Update H & P  Department of General Surgery   Surgical Service   Pre-operative History and Physical  Last H & P within the last 30 days. DIAGNOSIS:   Pleural effusion, right [J90]    Procedure(s):  RIGHT VIDEO ASSISTED THORASCOPIC SURGERY AND;  PLEURAL CATHETER PLACEMENT    History obtained from: Patient interview and EHR      HISTORY OF PRESENT ILLNESS:   The patient is a 65 y.o. male with recurrent pleural effusions presents today for the above procedure. Illness Screening: Patient denies fever, chills, worsening cough, or close contact with sick individuals. Past Medical History:        Diagnosis Date    Carotid stenosis, left 10/12/2011    Chronic back pain     Chronic systolic (congestive) heart failure 99/61/7361    Diastolic CHF ([address]     Erectile dysfunction     Hyperlipidemia     Hypertension     Osteoarthritis     Restrictive lung disease     Type II or unspecified type diabetes mellitus without mention of complication, not stated as uncontrolled      Past Surgical History:        Procedure Laterality Date    CARDIAC CATHETERIZATION  06/2022    KNEE SURGERY Left     ROTATOR CUFF REPAIR Bilateral        Medications Prior to Admission:      Prior to Admission medications    Medication Sig Start Date End Date Taking? Authorizing Provider   ELIQUIS 5 MG TABS tablet TAKE 1 TABLET BY MOUTH TWO TIMES A DAY 9/6/22   Meryl Guevara, APRN - CNP   umeclidinium-vilanterol [facility] ELLIP) 62.5-25 MCG/INH AEPB inhaler Inhale 1 puff into the lungs daily 9/6/22   Harrison Quiroz MD   bumetanide (BUMEX) 1 MG tablet Take 1 tablet by mouth in the morning and 1 tablet before bedtime. 8/16/22   Kirby Key MD   magnesium oxide (MAG-OX) 400 MG tablet Take 1 tablet by mouth in the morning and 1 tablet before bedtime.  7/28/22   Kirby Key MD   dapagliflozin (FARXIGA) 10 MG tablet TAKE 1 TABLET EVERY MORNING 7/1/22   Noemí Huber MD sildenafil (VIAGRA) 100 MG tablet Take 1 tablet by mouth as needed for Erectile Dysfunction Max daily dose 100mg. 7/1/22 6/26/23  Teddy Mojica MD   metoprolol succinate (TOPROL XL) 50 MG extended release tablet Take 1 tablet by mouth in the morning and at bedtime 7/1/22   Andrew Rothman MD   Insulin Pen Needle (PEN NEEDLES) 31G X 6 MM MISC 1 each by Does not apply route daily 6/14/22   Teddy Mojica MD   Continuous Blood Gluc Sensor (FREESTYLE LIZA 14 DAY SENSOR) MISC Check bs tidac and hs 6/7/22   Teddy Mojica MD   Continuous Blood Gluc  (FREESTYLE LIAZ 14 DAY READER) SOFIYA Check bs tidac and hs 6/7/22   Teddy Mojica MD   Insulin Degludec (TRESIBA FLEXTOUCH) 200 UNIT/ML SOPN 10 units subcut qam, increase 4 units every 4 days until am blood sugar < 120. Max 100 units  Patient taking differently: Inject 12 Units into the skin daily 10 units subcut qam, increase 4 units every 4 days until am blood sugar < 120. Max 100 units 6/7/22   Teddy Mojica MD   albuterol sulfate HFA (VENTOLIN HFA) 108 (90 Base) MCG/ACT inhaler Inhale 2 puffs into the lungs 4 times daily as needed for Wheezing 5/19/22   Maria De Jesus Quick MD   pravastatin (PRAVACHOL) 40 MG tablet Take 1 tablet by mouth daily 10/20/21   Vlad Wright, APRN - CNP         Allergies: Torsemide and Dye [iodides]    PHYSICAL EXAM:      /60   Pulse 56   Temp 97.7 °F (36.5 °C) (Temporal)   Resp 22   Ht 6' 1\" (1.854 m)   Wt 173 lb 0.6 oz (78.5 kg)   SpO2 100%   BMI 22.83 kg/m²      Airway:  Airway patent with no audible stridor    Heart:  Regular rate and rhythm, No murmur noted    Lungs:  No increased work of breathing, diminished breath sounds bilaterally, clear to auscultation bilaterally, no crackles or wheezing    Abdomen:  Soft, non-distended, non-tender, no rebound tenderness or guarding, and no masses palpated    ASSESSMENT AND PLAN     Patient is a 65 y.o. male with above specified procedure planned.     1.  The patients history and physical was obtained and signed off by the pre-admission testing department. Patient seen and focused exam done today- no new changes since last physical exam on 9/14/22    2. Access to ancillary services are available per request of the provider.     NATASHA Montero - CNP     9/19/2022

## 2022-09-19 NOTE — PLAN OF CARE

## 2022-09-19 NOTE — PROGRESS NOTES
PACU Transfer Note    Vitals:    09/19/22 1115   BP: 123/71   Pulse: 83   Resp: 16   Temp: 97.4 °F (36.3 °C)   SpO2: 98%       In: 500 [I.V.:500]  Out: 1410 [Urine:100]    Pain assessment:    Pain Level: 0    Report given to Receiving unit RN.    9/19/2022 11:55 AM

## 2022-09-19 NOTE — PROGRESS NOTES
Back to BiPAP, RT at bedside and Dr. Ismael Correa who ordered ABG. Dr. Sirisha Spivey updated, Dr. Steven Monge will come and evaluate the patient.

## 2022-09-20 NOTE — PROGRESS NOTES
Pt agitation increasing w/ altered mental status. PRN ABG obtained. pH: 7.1 w/ CO2 of 150. BiPAP reapplied. Dr. Sana Villarreal contacted and brought to bedside. Haldol given IV 2mg. Will continue to monitor respiratory status on BiPAP closely.      Electronically signed by Nadine Bray RN on 9/20/2022 at 1:15 PM

## 2022-09-20 NOTE — PLAN OF CARE
Problem: Chronic Conditions and Co-morbidities  Goal: Patient's chronic conditions and co-morbidity symptoms are monitored and maintained or improved  9/20/2022 1855 by Liliane Schuler RN  Outcome: Progressing  9/20/2022 0759 by Danial Hester RN  Outcome: Progressing  Flowsheets (Taken 9/19/2022 2000)  Care Plan - Patient's Chronic Conditions and Co-Morbidity Symptoms are Monitored and Maintained or Improved:   Monitor and assess patient's chronic conditions and comorbid symptoms for stability, deterioration, or improvement   Collaborate with multidisciplinary team to address chronic and comorbid conditions and prevent exacerbation or deterioration   Update acute care plan with appropriate goals if chronic or comorbid symptoms are exacerbated and prevent overall improvement and discharge     Problem: Discharge Planning  Goal: Discharge to home or other facility with appropriate resources  9/20/2022 1855 by Liliane Schuler RN  Outcome: Progressing  9/20/2022 0759 by Danial Hester RN  Outcome: Progressing  Flowsheets (Taken 9/19/2022 2000)  Discharge to home or other facility with appropriate resources: Identify barriers to discharge with patient and caregiver     Problem: Pain  Goal: Verbalizes/displays adequate comfort level or baseline comfort level  9/20/2022 1855 by Liliane Schuler RN  Outcome: Progressing  9/20/2022 0759 by Danial Hester RN  Outcome: Progressing     Problem: Safety - Adult  Goal: Free from fall injury  9/20/2022 1855 by Liliane Schuler RN  Outcome: Progressing  9/20/2022 0759 by Danial Hester RN  Outcome: Progressing     Problem: Skin/Tissue Integrity  Goal: Absence of new skin breakdown  Description: 1. Monitor for areas of redness and/or skin breakdown  2. Assess vascular access sites hourly  3. Every 4-6 hours minimum:  Change oxygen saturation probe site  4.   Every 4-6 hours:  If on nasal continuous positive airway pressure, respiratory therapy assess nares and determine need for appliance change or resting period.   9/20/2022 1855 by Valeria Chu RN  Outcome: Progressing  9/20/2022 0759 by Orin Kenney RN  Outcome: Progressing

## 2022-09-20 NOTE — PLAN OF CARE
Problem: Chronic Conditions and Co-morbidities  Goal: Patient's chronic conditions and co-morbidity symptoms are monitored and maintained or improved  9/20/2022 0759 by Lionel Barrientos RN  Outcome: Progressing  Flowsheets (Taken 9/19/2022 2000)  Care Plan - Patient's Chronic Conditions and Co-Morbidity Symptoms are Monitored and Maintained or Improved:   Monitor and assess patient's chronic conditions and comorbid symptoms for stability, deterioration, or improvement   Collaborate with multidisciplinary team to address chronic and comorbid conditions and prevent exacerbation or deterioration   Update acute care plan with appropriate goals if chronic or comorbid symptoms are exacerbated and prevent overall improvement and discharge  9/19/2022 1912 by Gregory Kitchen RN  Outcome: Progressing     Problem: Discharge Planning  Goal: Discharge to home or other facility with appropriate resources  9/20/2022 0759 by Lionel Barrientos RN  Outcome: Progressing  Flowsheets (Taken 9/19/2022 2000)  Discharge to home or other facility with appropriate resources: Identify barriers to discharge with patient and caregiver  9/19/2022 1912 by Gregory Kitchen RN  Outcome: Progressing     Problem: Pain  Goal: Verbalizes/displays adequate comfort level or baseline comfort level  9/20/2022 0759 by Lionel Barrientos RN  Outcome: Progressing  9/19/2022 1912 by Gregory Kitchen RN  Outcome: Progressing     Problem: Safety - Adult  Goal: Free from fall injury  9/20/2022 0759 by Lionel Barrientos RN  Outcome: Progressing  9/19/2022 1912 by Gregory Kitchen RN  Outcome: Progressing     Problem: Skin/Tissue Integrity  Goal: Absence of new skin breakdown  Description: 1. Monitor for areas of redness and/or skin breakdown  2. Assess vascular access sites hourly  3. Every 4-6 hours minimum:  Change oxygen saturation probe site  4.   Every 4-6 hours:  If on nasal continuous positive airway pressure, respiratory therapy assess nares and determine need for appliance change or resting period.   9/20/2022 0759 by Ghazal Ruiz RN  Outcome: Progressing  9/19/2022 1912 by Refugio Price RN  Outcome: Progressing

## 2022-09-20 NOTE — PROGRESS NOTES
Pt remains on BiPAP. Not tolerating off of BiPAP for more than 30-45 mins w/o AMS and ABGs show severe respiratory acidosis. VSS have remained stable. Steroids and D5wLR started per CTS. Pt had multiple Bms today. UO increased slightly from yesterday with Lasix IVP.      Electronically signed by Jackelyn Carter RN on 9/20/2022 at 7:32 PM

## 2022-09-20 NOTE — PROGRESS NOTES
Pulmonary & Critical Care Medicine ICU Progress Note    Admit Date: 2022  PCP: Charlene Bazzi MD    CC: Acute on chronic hypoxemic and hypercapnic respiratory failure  Events of Last 24 hours: Patient continues to have difficulty with confusion and respiratory distress. He was on and off BiPAP all day yesterday and overnight. He gets intermittently agitated and even at times somnolent and then improves after going on BiPAP. He had several blood gases while on BiPAP that show compensated respiratory acidosis. He was saturating well on 5 L and mildly confused for my examination this morning. He still has decreased breath sounds throughout. I tried to have him use an incentive spirometer, but he was too confused to understand how to use it. Vitals:  Tmax:  VITALS:  /65   Pulse 63   Temp 97.5 °F (36.4 °C) (Axillary)   Resp 18   Ht 6' 1\" (1.854 m)   Wt 170 lb 13.7 oz (77.5 kg)   SpO2 97%   BMI 22.54 kg/m²   24HR INTAKE/OUTPUT:    Intake/Output Summary (Last 24 hours) at 2022 1435  Last data filed at 2022 0800  Gross per 24 hour   Intake 1150.17 ml   Output 660 ml   Net 490.17 ml     CURRENT PULSE OXIMETRY:  SpO2: 97 %  24HR PULSE OXIMETRY RANGE:  SpO2  Av.7 %  Min: 91 %  Max: 100 %  CVP:      Invasive Lines:     EXAM:  General: No distress. Alert. Confused. Eyes: PERRL. No sclera icterus. No conjunctival injection. ENT: No discharge. Pharynx clear. Neck: Trachea midline. Normal thyroid. Resp: + accessory muscle use. No crackles. No wheezing. No rhonchi. Decreased breath sounds throughout. Right-sided Pleurx catheter in place attached to atrium on 20 cm of wall suction  CV: Regular rate. Regular rhythm. No mumur or rub. No edema. GI: Non-tender. Non-distended. No masses. No organmegaly. Normal bowel sounds. Skin: Warm and dry. No nodule on exposed extremities. No rash on exposed extremities. Lymph: No cervical LAD. No supraclavicular LAD. M/S: No cyanosis.  No joint deformity. No clubbing. Neuro: Awake. Will follow commands. Positive pupils/gag/corneals. Normal pain response. Psych: Oriented to person, place, time. No anxiety or agitation. Medications:    IV:   dextrose      sodium chloride 75 mL/hr at 09/20/22 0651    sodium chloride      dextrose           Scheduled Meds:   insulin regular  0-18 Units SubCUTAneous 4x Daily AC & HS    apixaban  5 mg Oral BID    magnesium oxide  400 mg Oral BID    metoprolol succinate  50 mg Oral BID    pravastatin  40 mg Oral Dinner    tiotropium-olodaterol  2 puff Inhalation Daily    sodium chloride flush  5-40 mL IntraVENous 2 times per day    acetaminophen  1,000 mg Oral Q6H    methocarbamol  1,000 mg Oral TID    sennosides-docusate sodium  1 tablet Oral BID    polyethylene glycol  17 g Oral Daily         Diet: Diet NPO Exceptions are: Ice Chips, Sips of Water with Meds     Results:  CBC:   Recent Labs     09/19/22  0610 09/20/22  0258   WBC 10.1 18.3*   HGB 9.0* 8.9*   HCT 28.7* 29.3*   MCV 84.8 84.7    359     BMP:   Recent Labs     09/19/22  0610 09/20/22  0258 09/20/22  1113    134* 134*   K 5.0 5.7* 5.6*   CL 86* 81* 83*   CO2 >50* 43* 44*   PHOS  --  4.4 6.3*   BUN 15 29* 33*   CREATININE 0.6* 1.1 1.2     LIVER PROFILE:   Recent Labs     09/19/22  0610   AST 10*   ALT <5*   BILIDIR <0.2   BILITOT 0.3   ALKPHOS 91     PT/INR:   Recent Labs     09/19/22  0610   PROTIME 13.2   INR 1.01     APTT:   Recent Labs     09/19/22  0610   APTT 31.0     UA:No results for input(s): NITRITE, COLORU, PHUR, LABCAST, WBCUA, RBCUA, MUCUS, TRICHOMONAS, YEAST, BACTERIA, CLARITYU, SPECGRAV, LEUKOCYTESUR, UROBILINOGEN, BILIRUBINUR, BLOODU, GLUCOSEU, AMORPHOUS in the last 72 hours. Invalid input(s): Aundra Fill    Cultures:      Films:  CXR reviewed by me and it showed   XR CHEST PORTABLE   Final Result     Pleurx catheter at the right lung base. Trace right    pneumothorax is unchanged.           XR CHEST PORTABLE   Final Result Right-sided chest tube evident in the base, its tip medially. Improvement in the right-sided pneumothorax, since earlier today. Possible medial collapse of the right lower lobe. Small left effusion. Patchy airspace density/atelectasis in the lungs bilaterally, with no other significant change. XR CHEST PORTABLE   Final Result   1. Right-sided Pleurx catheter in satisfactory position in the lung bases   2. Right-sided post operative pneumothorax, approximately 10%                Assessment/Plan:  65 y.o. male with Loculated pleural effusions, chronic hypoxemic and hypercapnic respiratory failure status post VATS with Pleurx placement  Patient remains profoundly dyspneic at times but overall seems to have improved work of breathing today compared to yesterday. However, he keeps having episodes of confusion and becomes uncooperative. We have only had blood gases while patient was on BiPAP so today I wrote orders for ABGs as needed and asked that we perform them when patient gets delirious. The first blood gas we got when patient became confused showed a acute on chronic respiratory acidosis that was severe with a pH of 7.144 and a PCO2 of 150. Is unclear why patient is so profoundly hypercapnic after surgery. His chest x-ray shows near complete reexpansion of the right lung and no underlying airspace disease or mass other than some linear areas of atelectasis. His oxygen requirements have not been higher than his baseline which undermines my theory that this could be an issue with shunting. He is acting more like a patient who has neuromuscular weakness but that was not how he presented. In reviewing his previous blood gases there is not appear to be a dramatic change prior to coming in although his sodium bicarbonate on his renal panels did get dramatically higher in the past 6 weeks leading up to surgery.     I explained to patient's family that if we cannot keep his gas exchange controlled with BiPAP, or if patient is unable to tolerate wearing BiPAP then we will need to go back on the ventilator. They appear to understand. We did give a dose of Haldol to try to help patient be more compliant with the noninvasive ventilation as he was frequently trying to pull at the mask. When patient is more lucid and blood gas is better compensated he can still have breaks from the BiPAP but I have put in orders for him to wear continuous end-tidal monitor so we can see if he is worsening and perhaps intervene before he gets to a pH of 7.1. In speaking with anesthesia yesterday they noted that his end-tidal CO2 was 80 at the beginning of their case. Prophylaxis:  Head of bed maintained at >30 degrees at all times except for procedures and repositioning. [facility]      Critical care time spent reviewing labs/films, examining patient, collaborating with other physicians but excluding procedures for life threatening organ failure is 45 minutes.     Lynda Sandoval MD, MD

## 2022-09-20 NOTE — PROGRESS NOTES
Final Anesthesia Post-op Assessment    Patient: Trey Ghosh  Procedure(s) Performed: CATARACT EXTRACTION WITH INTRAOCULAR LENS IMPLANTATION LEFT EYE - LEFT  Anesthesia type: Monitor Anesthesia Care    Vitals Value Taken Time   Temp 36.7 Â°C (98 Â°F) 6/25/2019  2:30 PM   Pulse 69 6/25/2019  2:30 PM   Resp 20 6/25/2019  2:30 PM   /57 6/25/2019  2:30 PM   SpO2 97 % 6/25/2019  2:30 PM       Last 24 I/O:     Intake/Output Summary (Last 24 hours) at 6/25/2019 1504  Last data filed at 6/25/2019 1420  Gross per 24 hour   Intake 400 ml   Output --   Net 400 ml       PATIENT LOCATION: Phase II  LEVEL OF CONSCIOUSNESS: participates in exam, awake, oriented, answers questions appropriately and alert  RESPIRATORY STATUS: spontaneous ventilation  CARDIOVASCULAR: blood pressure returned to baseline  HYDRATION: euvolemic    PAIN MANAGEMENT: well controlled  NAUSEA: None  AIRWAY PATENCY: patent  POST-OP ASSESSMENT: no complications, patient tolerated procedure well with no complications, no evidence of recall and sufficiently recovered from acute administration of anesthesia effects and able to participate in evaluation  COMPLICATIONS: none  HANDOFF:  Handoff to receiving nurse was performed and questions were answered ICU SURGERY DAILY PROGRESS NOTE    CC: Pleural effusions s/p R PleurX catheter placement    SUBJECTIVE:   Interval Hx: Remained on BiPAP overnight. One episode of emesis at 5:15, transitioned from BiPAP to HFNC. Acutely agitated this morning, not responding to questions but visually attentive to conversation. ROS: A 14 point review of systems was conducted, significant findings as noted above. All other systems negative. OBJECTIVE:   Vitals:   Vitals:    09/20/22 0500 09/20/22 0546 09/20/22 0600 09/20/22 0627   BP: 133/80  (!) 150/130    Pulse: 76 94 92    Resp: 22 25 15 18   Temp:       TempSrc:       SpO2: 91% 100% 100%    Weight:   170 lb 13.7 oz (77.5 kg)    Height:           I/O:   Intake/Output Summary (Last 24 hours) at 9/20/2022 0748  Last data filed at 9/20/2022 0600  Gross per 24 hour   Intake 1808.17 ml   Output 1870 ml   Net -61.83 ml     I/O last 3 completed shifts: In: 1808.2 [P.O.:485; I.V.:1323.2]  Out: 1980 [Urine:210;  Other:1300; Blood:10; Chest Tube:460]    Diet: Diet NPO Exceptions are: Ice Chips, Sips of Water with Meds      Physical Examination:   General appearance: no acute distress; attentive to conversation but doesn't verbalize  HEENT: NC/AT, EOMI, HFNC present  Neck: trachea midline, no JVD  Chest/Lungs: Decreased breath sounds bilaterally, no increased work of breathing or tachypnea; R PleurX catheter to -20mmHg without airleak, serosanguinous output; HF NC on 15Lpm  Cardiovascular: Regular rate and rhythm  Abdomen: soft, non-tender, non-distended  Skin: warm and dry, no rashes  Extremities: no edema, no cyanosis  Neuro: No focal deficits, sensation intact; not able to assess orientation    Labs:  CBC:   Recent Labs     09/19/22  0610 09/20/22  0258   WBC 10.1 18.3*   HGB 9.0* 8.9*   HCT 28.7* 29.3*    359       BMP:   Recent Labs     09/19/22  0610 09/20/22  0258    134*   K 5.0 5.7*   CL 86* 81*   CO2 >50* 43*   BUN 15 29*   CREATININE 0.6* 1.1   GLUCOSE 105* 198* LFT's:   Recent Labs     09/19/22  0610   AST 10*   ALT <5*   BILITOT 0.3   ALKPHOS 91     Troponin: No results for input(s): TROPONINI in the last 72 hours. BNP: No results for input(s): BNP in the last 72 hours. ABGs:   Recent Labs     09/19/22  1833 09/20/22  0616   PHART 7.395 7.338*   RVF4VEN 68.1* 85.4*   PO2ART 83.2 108.0     INR:   Recent Labs     09/19/22  0610   INR 1.01       U/A:No results for input(s): NITRITE, COLORU, PHUR, LABCAST, WBCUA, RBCUA, MUCUS, TRICHOMONAS, YEAST, BACTERIA, CLARITYU, SPECGRAV, LEUKOCYTESUR, UROBILINOGEN, BILIRUBINUR, BLOODU, GLUCOSEU, AMORPHOUS in the last 72 hours. Invalid input(s): Karthik Purl     Rad:   XR CHEST PORTABLE   Final Result     Pleurx catheter at the right lung base. Trace right    pneumothorax is unchanged. XR CHEST PORTABLE   Final Result      Right-sided chest tube evident in the base, its tip medially. Improvement in the right-sided pneumothorax, since earlier today. Possible medial collapse of the right lower lobe. Small left effusion. Patchy airspace density/atelectasis in the lungs bilaterally, with no other significant change. XR CHEST PORTABLE   Final Result   1. Right-sided Pleurx catheter in satisfactory position in the lung bases   2. Right-sided post operative pneumothorax, approximately 10%             ASSESSMENT AND PLAN:   Trip Anne is a 65 y.o. male with history of diastolic heart failure, atrial fibrillation, and DM with recurrent pleural effusions s/p R PleurX catheter placement (9/19), POD #1. Neuro:   Analgesia  - Continue scheduled Tylenol, Robaxin  - Prn Oxycodone 5mg  - Discontinuing prn Dilaudid given respiratory issues    Cardiovascular:   History of Paroxysmal Atrial Fibrillation  - Apixaban 5mg bid  - Metoprolol XL 50mg bid    HFpEF  - Bumex held in favor of 40mg IV Lasix this morning  - Metoprolol XL 50mg bid  - [place] held  - Last Echo (1/25/22):  LVEF = 12-51%, gr2 diastolic dysfunction    Hyperlipidemia  - Pravastatin 40mg    Pulmonary:   S/P R PleurX catheter placement (9/19), POD #1  - to -20mmHg  - SW/HHC pending  - Plan for capping once respiratory status improved    Acute on chronic respiratory failure  - Inpatient consult to pulmonology, Dr Diamond Patricio  - Continue BiPAP as tolerated  - Continue home LABA/ICS  - Baseline 3-4L NC    GI:   - Miralax, Senna-S    FEN:   IV fluids: NS 75cc/hr  Replace electrolytes per protocol  Diet: Diet NPO Exceptions are: Ice Chips, Sips of Water with Meds    /Renal:   Ansari placement  Creatinine 1.1 from 0.7    Hem/ID:   Hemoglobin 8.9 from 9.0    WBC 18.3 from 10.1    Endo:   History of DMII  - Last A1C 8.1% (1/24/22)  - Glucose: 180-280  - High-dose sliding scale insulin    Prophylaxis:   DVT Ppx: Eliquis  GI Ppx: n/a    Access:  Central Access: n/a  Peripheral Access: IV x2  Arterial Line Access: n/a                                Ansari Date placed: 09/20/22  NGT Date placed: n/a  ETT Date placed: 9/19, extubated immediately post-op    Mobility:  OBB w assist as tolerated    Dispo:   ICU    Code Status: Full Code  -----------------------------  Michael Vieyra.  Jovany Mccloud MD  General Surgery, PGY-3  09/20/22  7:48 AM  [phone]

## 2022-09-20 NOTE — PROGRESS NOTES
Pt ripped BIPAP mask off. RN placed pt on 15L high flow, pt hasn't slept all night. SPO2 99%. Will continue to monitor.

## 2022-09-20 NOTE — PROGRESS NOTES
Bladder scan volume 114ml. Patient was straight cathed per surgery. Straight cath output 110ml - surgery aware.

## 2022-09-20 NOTE — PROGRESS NOTES
Very fragile pulmonary status. CXR shows complete re-expansion of L lung this morning although lungs still look a bit wet. Doubt he'll make urine without diuretics to help. Will switch to scheduled lasix for the time being. Adding IV corticosteroids more as a belts and suspenders move for reactive airway disease as well as to crank him up a little to see if this will help his respiratory drive in any way. Chronically ill/stressed patients like this can become Addisonian with minor additional stress such as his VATS procedure. Spoke with him and his family at bedside and all questions answered.

## 2022-09-20 NOTE — PROGRESS NOTES
Patient notified this RN that he was feeling nauseous. Patient was taken off BIPAP, placed on 10L high flow, sat up in bed, and given an emesis bag. PRN zofran given - see MAR. Pt had a small amount of brown emesis.

## 2022-09-21 NOTE — PROGRESS NOTES
ICU CT SURGERY DAILY PROGRESS NOTE    CC: Pleural effusions s/p R PleurX catheter placement    SUBJECTIVE:   Interval Hx: On BiPAP last night. Varying levels of agitation overnight and required haldol to calm down. Currently on 5L NC this am. ABG significantly improved. ROS: A 14 point review of systems was conducted, significant findings as noted above. All other systems negative. OBJECTIVE:   Vitals:   Vitals:    09/21/22 0510 09/21/22 0600 09/21/22 0830 09/21/22 0921   BP:  (!) 174/95     Pulse: 94 (!) 102  93   Resp: 16 18 22 16   Temp:       TempSrc:       SpO2: 96% (!) 88%  100%   Weight:       Height:           I/O:   Intake/Output Summary (Last 24 hours) at 9/21/2022 1057  Last data filed at 9/21/2022 0600  Gross per 24 hour   Intake 1591.85 ml   Output 1107 ml   Net 484.85 ml       I/O last 3 completed shifts: In: 2382 [P.O.:245; I.V.:2137]  Out: 1647 [Urine:827; Chest Tube:820]    Diet: Diet NPO Exceptions are: Ice Chips, Sips of Water with Meds      Physical Examination:   General appearance: no acute distress; attentive to conversation but doesn't verbalize  HEENT: NC/AT, EOMI, HFNC presen trachea midline, no JVD  Chest/Lungs: Decreased breath sounds bilaterally, increased work of breathing; R PleurX catheter to -20mmHg without airleak, serosanguinous output; BiPAP FiO2 40.    Cardiovascular: Regular rate and rhythm  Abdomen: soft, non-tender, non-distended  Skin: warm and dry, no rashes  Extremities: no edema, no cyanosis  Neuro: No focal deficits, sensation intact; not able to assess orientation    Labs:  CBC:   Recent Labs     09/20/22 1755 09/20/22 1904 09/21/22  0340   WBC 14.4* 15.7* 15.5*   HGB 6.9* 8.7* 8.5*   HCT 24.4* 28.3* 27.3*    374 362         BMP:   Recent Labs     09/20/22 1755 09/20/22 1904 09/21/22  0340   * 132* 133*   K 5.1 4.9 5.4*   CL 84* 80* 81*   CO2 29 40* 40*   BUN 36* 46* 50*   CREATININE 1.2 1.4* 1.5*   GLUCOSE 954* 223* 140*       LFT's:   Recent Labs 09/19/22  0610   AST 10*   ALT <5*   BILITOT 0.3   ALKPHOS 91       Troponin: No results for input(s): TROPONINI in the last 72 hours. BNP: No results for input(s): BNP in the last 72 hours. ABGs:   Recent Labs     09/20/22  1606 09/21/22  0805   PHART 7.285* 7.378   [id number] 108.3* 75.9*   PO2ART 93.5 77.1       INR:   Recent Labs     09/19/22  0610   INR 1.01         U/A:No results for input(s): NITRITE, COLORU, PHUR, LABCAST, WBCUA, RBCUA, MUCUS, TRICHOMONAS, YEAST, BACTERIA, CLARITYU, SPECGRAV, LEUKOCYTESUR, UROBILINOGEN, BILIRUBINUR, BLOODU, GLUCOSEU, AMORPHOUS in the last 72 hours. Invalid input(s): Natty Wellington     Rad:   XR CHEST PORTABLE   Final Result     Pleurx catheter at the right lung base. Trace right    pneumothorax is unchanged. XR CHEST PORTABLE   Final Result      Right-sided chest tube evident in the base, its tip medially. Improvement in the right-sided pneumothorax, since earlier today. Possible medial collapse of the right lower lobe. Small left effusion. Patchy airspace density/atelectasis in the lungs bilaterally, with no other significant change. XR CHEST PORTABLE   Final Result   1. Right-sided Pleurx catheter in satisfactory position in the lung bases   2. Right-sided post operative pneumothorax, approximately 10%             ASSESSMENT AND PLAN:   Marylin Lebron is a 65 y.o. male with history of diastolic heart failure, atrial fibrillation, and DM with recurrent pleural effusions s/p R PleurX catheter placement (9/19), POD #2. Neuro:   Analgesia  - Continue scheduled Tylenol      Cardiovascular:   History of Paroxysmal Atrial Fibrillation  - continue Apixaban 5mg bid  - Metoprolol XL 50mg bid    HFpEF  - Bumex held in favor of lasix 40 mg BID.   - Metoprolol XL 50mg bid  - [facility] held  - Last Echo (1/25/22):  LVEF = 01-00%, gr2 diastolic dysfunction    Hyperlipidemia  - Pravastatin 40mg    Pulmonary:   S/P R PleurX catheter placement (9/19), POD #2  - to -20mmHg suction  - SW/HHC pending  - Plan for capping once respiratory status improved    Acute on chronic respiratory failure  - Inpatient consult to pulmonology, Dr Marcie Khan, appreciate recommendations.   - Continue BiPAP as tolerated  - Continue home LABA/ICS  - Baseline 3-4L NC    GI:   - Miralax, Senna-S    FEN:   IV fluids: D5LR 75cc/hr  Replace electrolytes per protocol  Diet: Diet NPO Exceptions are: Ice Chips, Sips of Water with Meds    /Renal:   Cont Ansari   Creatinine 1.5 from 1.4 from 1.2    Hem/ID:   Hemoglobin 8.5 from 8.7   WBC 15.5 from 15.7    Endo:   History of DMII  - Last A1C 8.1% (1/24/22)  - High-dose sliding scale insulin    Prophylaxis:   DVT Ppx: Eliquis  GI Ppx: n/a    Access:  Central Access: n/a  Peripheral Access: IV x2  Arterial Line Access: n/a                                Ansari Date placed: 09/21/22    Mobility:  OBB w assist as tolerated    Dispo:   ICU    Code Status: Full Code  -----------------------------  Cheryl Casas DO   PGY1, General Surgery  09/21/22  11:06 AM  Pager # [phone]

## 2022-09-21 NOTE — PROGRESS NOTES
ABG collected at 2328 while patient was awake and slightly restless. He has been on continuous bi-pap tonight.     pH: 7.395  pCO2: 66.1  PO2: 58.5 (increased FiO2 from 0.30 to 0.40)  HCO3: 40.5  BE: 15.6  SO2: 88.5%

## 2022-09-21 NOTE — CARE COORDINATION
Case management is following for discharge planning. The chart was reviewed. Mr. Kianna Bach remains in the ICU. He is s/p new PleurX catheter placement. The plan is to return home with his spouse and [facility] to assist with management of the drain. [employer] provides home O2. DME from [facility] for PleurX supplies was faxed on 9/19. Will further address disposition once the pt is more medically stable.     Electronically signed by DARNELL Jessica RN-[facility]  Case Management  [phone]

## 2022-09-21 NOTE — PLAN OF CARE
Problem: Chronic Conditions and Co-morbidities  Goal: Patient's chronic conditions and co-morbidity symptoms are monitored and maintained or improved  9/21/2022 0017 by Christopher Cardenas RN  Outcome: Progressing  Flowsheets (Taken 9/20/2022 2000)  Care Plan - Patient's Chronic Conditions and Co-Morbidity Symptoms are Monitored and Maintained or Improved:   Monitor and assess patient's chronic conditions and comorbid symptoms for stability, deterioration, or improvement   Collaborate with multidisciplinary team to address chronic and comorbid conditions and prevent exacerbation or deterioration  9/20/2022 1855 by Karthik Rajput RN  Outcome: Progressing     Problem: Discharge Planning  Goal: Discharge to home or other facility with appropriate resources  9/21/2022 0017 by Christopher Cardenas RN  Outcome: Progressing  Flowsheets (Taken 9/20/2022 2000)  Discharge to home or other facility with appropriate resources:   Identify barriers to discharge with patient and caregiver   Arrange for needed discharge resources and transportation as appropriate   Identify discharge learning needs (meds, wound care, etc)  9/20/2022 1855 by Karthik Rajput RN  Outcome: Progressing     Problem: Pain  Goal: Verbalizes/displays adequate comfort level or baseline comfort level  9/21/2022 0017 by Christopher Cardenas RN  Outcome: Progressing  9/20/2022 1855 by Karthik Rajput RN  Outcome: Progressing     Problem: Safety - Adult  Goal: Free from fall injury  9/21/2022 0017 by Christopher Cardenas RN  Outcome: Progressing  4 [address] [facility] [place]imhall (Taken 9/20/2022 2041)  Free From Fall Injury: Instruct family/caregiver on patient safety  9/20/2022 1855 by Karthik Rajput RN  Outcome: Progressing     Problem: Skin/Tissue Integrity  Goal: Absence of new skin breakdown  Description: 1. Monitor for areas of redness and/or skin breakdown  2. Assess vascular access sites hourly  3. Every 4-6 hours minimum:  Change oxygen saturation probe site  4.   Every 4-6 hours:  If on nasal continuous positive airway pressure, respiratory therapy assess nares and determine need for appliance change or resting period.   9/21/2022 0017 by Scarlet Chavez RN  Outcome: Progressing  9/20/2022 1855 by Danielle Bower RN  Outcome: Progressing

## 2022-09-21 NOTE — PROGRESS NOTES
Haldol given for comfort and agitation. Qtc monitored. Patient is awake and communicating, and stated he does not know where he is. Reoriented as able and oral care provided. O2 saturation was 78 in less than one minute on 5 L/min O2. Bi-pap replaced.

## 2022-09-21 NOTE — PROGRESS NOTES
Progress Note    Admit Date: 9/19/2022  Diet: Diet NPO Exceptions are: Ice Chips, Sips of Water with Meds    CC: Acute on chronic hypoxemic and hypercapnic respiratory failure    Interval history:   Has been on continuous avaps overnight, his beard was shaved which helped with air leak. FiO was increased to 40%. , RR 22, PEEP 5  He did receive haldol overnight for some agitation dn attempts to remove the mask. He is more calm and alert this morning, follows commands. Continues on bipap with 100% oxygen saturation. Blood gas this morning suggestive of  compensated hypoxemia and improved ventilation on continuous bipap therapy but does have a high base excess. Urine output 700 ml for net + 200 ml/24 hrs.  Weight 166    Medications:     Scheduled Meds:   insulin regular  0-18 Units SubCUTAneous 4x Daily AC & HS    methylPREDNISolone  125 mg IntraVENous Q8H    furosemide  40 mg IntraVENous BID    apixaban  5 mg Oral BID    magnesium oxide  400 mg Oral BID    metoprolol succinate  50 mg Oral BID    pravastatin  40 mg Oral Dinner    tiotropium-olodaterol  2 puff Inhalation Daily    sodium chloride flush  5-40 mL IntraVENous 2 times per day    acetaminophen  1,000 mg Oral Q6H    sennosides-docusate sodium  1 tablet Oral BID    polyethylene glycol  17 g Oral Daily     Continuous Infusions:   dextrose      dextrose 5% in lactated ringers 50 mL/hr at 09/20/22 1636    sodium chloride      dextrose       PRN Meds:glucose, dextrose bolus **OR** dextrose bolus, glucagon (rDNA), dextrose, haloperidol lactate, albuterol, sodium chloride flush, sodium chloride, ondansetron **OR** ondansetron, glucose, dextrose bolus **OR** dextrose bolus, glucagon (rDNA), dextrose, hydrALAZINE, levalbuterol    Objective:   Vitals:   T-max:  Patient Vitals for the past 8 hrs:   BP Temp Temp src Pulse Resp SpO2 Weight   09/21/22 0830 -- -- -- -- 22 -- --   09/21/22 0600 (!) 174/95 -- -- (!) 102 18 (!) 88 % --   09/21/22 0510 -- -- -- 94 16 96 09/20/22  1904 09/21/22  0340   AST 10*  --   --   --   --    ALT <5*  --   --   --   --    BILITOT 0.3  --   --   --   --    BILIDIR <0.2  --   --   --   --    PROT 6.7  --   --   --   --    LABALBU 3.5   < > 2.4* 3.4 3.5   ALKPHOS 91  --   --   --   --     < > = values in this interval not displayed. Troponin: No results for input(s): TROPONINI in the last 72 hours. BNP: No results for input(s): BNP in the last 72 hours. Lipids: No results for input(s): CHOL, HDL in the last 72 hours. Invalid input(s): LDLCALCU, TRIGLYCERIDE  ABGs:    Recent Labs     09/20/22  1214 09/20/22  1606 09/21/22  0805   PHART 7.144* 7.285* 7.378   NNA6AMB 150.9* 108.3* 75.9*   PO2ART 46.4* 93.5 77.1   KVU7UPN 51.8* 51.5* 45*   BEART 23* 25* 17.2*   G5UMVXMQ 62* 95 96   AQM6MNK >50 >50 47       INR:   Recent Labs     09/19/22  0610   INR 1.01     Lactate: No results for input(s): LACTATE in the last 72 hours. Cultures:  -----------------------------------------------------------------  RAD:   XR CHEST PORTABLE   Final Result     Pleurx catheter at the right lung base. Trace right    pneumothorax is unchanged. XR CHEST PORTABLE   Final Result      Right-sided chest tube evident in the base, its tip medially. Improvement in the right-sided pneumothorax, since earlier today. Possible medial collapse of the right lower lobe. Small left effusion. Patchy airspace density/atelectasis in the lungs bilaterally, with no other significant change. XR CHEST PORTABLE   Final Result   1. Right-sided Pleurx catheter in satisfactory position in the lung bases   2. Right-sided post operative pneumothorax, approximately 10%             Assessment/Plan:   65 y.o. male with hx of ischemic cardiomyopathy w/ preserved EF, loculated pleural effusions, COPD oxygen dependent, R hydropneumothorax status post VATS with PleurX placement (9/19).  Post procedure patient had significant dyspnea and worsening hypercapnic failure requiring bipap. Blood gas this morning shows compensated hypoxemia and hypercapnia. We will continue with intermittent bipap as tolerated depending on his mentation and CO2 remains compensated. Keep end tidal monitoring on while patient off bipap. Encourage small amount of nutrition while off bipap but need to watch for aspiration risk. His chest tube remains to suction -20, with 490 output over the past 24 hrs. His renal function is worsening and he has only had 700 ml for approx net even and insensible losses, has a barry for accurate i/o. Electrolytes stable. CXR from yesterday shows trace R pneumothorax but no significant effusions. Weight is 166 from 173 on admission. Baseline weight appears to be 170. Continue diuresis but does appear he may be on drier side. Pleural studies ordered if able to obtain fluid off the indwelling pleural cathter. Continue with haldol as needed for agitation and delirium precautions. Full code    Winston Napier MD, PGY-3  09/21/22  8:42 AM    This patient has been staffed and discussed with Dr Jing Vallejo      Patient seen, examined and discussed with the resident and I agree with the assessment and plan. Briefly, this    Recent Labs     09/20/22  2328 09/21/22  0805   PHART 7.395 7.378   HKR2JRM 66.1* 75.9*   PO2ART 58.5* 77.1       ABG was well compensated this morning so we opted to give him a break from the BiPAP. I had a long talk with his son Ulysses Rodas and his wife Laure Rodriguez about where things go moving forward. I anticipated that he was going to be intubated yesterday after speaking with respiratory therapy but they were able to get a seal with the BiPAP mask after they gave him a shave. He corrects well with BiPAP but the concern I have is how long we will have to continue him on BiPAP before we see him able to manage his respirations on his own.   He was able to tolerate being off the BiPAP for about 5 hours or so, however when I went and saw him in the afternoon a second time he was sleeping soundly and had an abnormal respiratory pattern. I was not able to arouse him even with sternal rub so we did a repeat blood gas and his pH was 725 and his PCO2 was 101. We put him back on BiPAP. Nursing and respiratory therapy are going to coordinate to get an end-tidal CO2 monitor so that when he is off BiPAP we can follow his trend and hopefully letting him sleep when its appropriate and put him on BiPAP when he needs it. If he is able to take long breaks from BiPAP and eat then perhaps we can continue this for several more days without having to go the invasive route. However I did warn them that he cannot breathe without the BiPAP we would need to go the invasive route and put him on a ventilator. I also introduced the possibility of a tracheostomy to allow us to give him more time to regain his strength. I had reviewed his previous lab work-ups and showed that he has had a remarkable rise in the sodium bicarbonate over the past several weeks such that I think we caught him right as he was falling off the proverbial respiratory failure dereje. Hopefully we have intervened soon enough. Critical care time spent reviewing labs/films, examining patient, collaborating with other physicians but excluding procedures for life threatening organ failure is 42 minutes.       Adrien Caldwell MD

## 2022-09-21 NOTE — PROGRESS NOTES
Bi-pap mask removed and patient placed on 5L NC for oral care and sips of water. He is oriented X 4 now (previously disoriented). He has been profoundly restless and uncomfortable overnight, moving constantly, attempting to remove the mask, pulling at his gown, and yelling out. \"Please take it off\". There was no noted difference with Haldol administration. All attempts for comfort made.

## 2022-09-22 PROBLEM — J96.21 ACUTE ON CHRONIC RESPIRATORY FAILURE WITH HYPOXIA AND HYPERCAPNIA (HCC): Status: ACTIVE | Noted: 2022-01-01

## 2022-09-22 PROBLEM — J96.22 ACUTE ON CHRONIC RESPIRATORY FAILURE WITH HYPOXIA AND HYPERCAPNIA (HCC): Status: ACTIVE | Noted: 2022-01-01

## 2022-09-22 NOTE — CONSULTS
[facility]  Palliative Medicine Consultation Note      Date Of Admission:9/19/2022  Date of consult: 09/22/22  Seen by [facility] in the past:  No    Recommendations:        Met with pt's wife, Shadi Cote, their two sons, and Dr. Christ Herrera at the bedside. I introduced palliative care. Dr. Christ Herrera explained aggressive care versus comfort care. At this point, the cause of his respiratory distress is unknown and we are not able to give the family a good prognosis. Dr. Christ Herrera recommended intubating Shahnaz Rios, giving him nutrition, and then considering an early trach to increase the amount of therapy he can participate in. The family is in agreement with this plan. We discussed how Shahnaz Rios was doing before coming into the hospital. He has lost about 100 lbs over the last year. All but 20-30 lbs of that was intentional per his wife. Over the last month or so he has had a dramatic decrease in appetite, as well as the amount of activity he's been able to do. His wife stated prior to coming to the hospital for surgery, over the weekend, he was unable to get out of bed and needed to use a urinal in bed. On the day he needed to get from the bed to the car, his son needed to help him and Shahnazkaylee Rios wanted to call EMS to bring him to the hospital d/t his SOB. Shahnaz Rios was able to come off of BiPap to talk with us. He is able to answer all orientation questions appropriately. It is unclear if he understands the consequences of aggressive care, but he is agreeable to intubation. Plan is to let him have a break from BiPap and visit with family. Upon his next respiratory decline, he will be intubated. Case discussed with MOUSTAPHA Cottrell    1. Goals of Care/Advanced Care planning/Code status: Continue aggressive care, including intubation and NG placement for nutrition  2. Pain: CPOT 0   3. SOB: Endorses SOB, apparent tachypnea, between supplemental O2 and BiPap  4. Recurrent pleural effusions: PleurX in place, surgery following  5.  Disposition: TBD - pending hospital course     Reason for Consult:         [x]  Goals of Care  [x]  Code Status Discussion/Advanced Care Planning   [x]  Psychosocial/Family Support  []  Symptom Management  []  Other (Specify)    Requesting Physician: Dr. Roseann Chacko:  recurrent pleural effusions    History Obtained From:  spouse, electronic medical record    History of Present Illness:         Nora Cerda is a 65 y.o. male with PMH of CHF, HLD, HTN, DMII, and COPD who presented for a VATS and pleuralex placement d/t recurrent pleural effusions. Pt is seen as an outpatient by Dr. Alonzo Barnes, where he had a thoracentesis. This was painful for the pt and ultimately did not help, so he was referred to Dr. Deon Whaley for the surgical procedure versus doing hospice. In PACU, pt required BiPap and was subsequently admitted to the ICU. He has been on and off of BiPap and has been agitated requiring Haldol. Dr. Alonzo Barnes is concerned he will need intubated. Palliative care was consulted for goals of care.     Subjective:         Past Medical History:        Diagnosis Date    Carotid stenosis, left 10/12/2011    Chronic back pain     Chronic systolic (congestive) heart failure 44/16/6355    Diastolic CHF (HCC)     Erectile dysfunction     Hyperlipidemia     Hypertension     Osteoarthritis     Restrictive lung disease     Type II or unspecified type diabetes mellitus without mention of complication, not stated as uncontrolled        Past Surgical History:        Procedure Laterality Date    CARDIAC CATHETERIZATION  06/2022    KNEE SURGERY Left     PLEURAL CATH INSERTION N/A 9/19/2022    PLEURAL CATHETER PLACEMENT; INTERCOSTAL NERVE BLOCK X4 performed by Stephanie Thapa MD at [facility] Bilateral     THORACOSCOPY Right 9/19/2022    RIGHT VIDEO ASSISTED THORASCOPIC SURGERY AND; performed by Stephanie Thapa MD at [address] [place] [address] [address]       Current Medications:    Medications Prior to Admission: ELIQUIS 5 MG TABS tablet, TAKE 1 TABLET BY MOUTH TWO TIMES A DAY  umeclidinium-vilanterol (ANORO ELLIPTA) 62.5-25 MCG/INH AEPB inhaler, Inhale 1 puff into the lungs daily  bumetanide (BUMEX) 1 MG tablet, Take 1 tablet by mouth in the morning and 1 tablet before bedtime. magnesium oxide (MAG-OX) 400 MG tablet, Take 1 tablet by mouth in the morning and 1 tablet before bedtime. dapagliflozin (FARXIGA) 10 MG tablet, TAKE 1 TABLET EVERY MORNING  sildenafil (VIAGRA) 100 MG tablet, Take 1 tablet by mouth as needed for Erectile Dysfunction Max daily dose 100mg.  metoprolol succinate (TOPROL XL) 50 MG extended release tablet, Take 1 tablet by mouth in the morning and at bedtime  Insulin Pen Needle (PEN NEEDLES) 31G X 6 MM MISC, 1 each by Does not apply route daily  Continuous Blood Gluc Sensor (FREESTYLE LIZA 14 DAY SENSOR) MISC, Check bs tidac and hs  Continuous Blood Gluc  (FREESTYLE LIZA 14 DAY READER) SOFIYA, Check bs tidac and hs  Insulin Degludec (TRESIBA FLEXTOUCH) 200 UNIT/ML SOPN, 10 units subcut qam, increase 4 units every 4 days until am blood sugar < 120. Max 100 units (Patient taking differently: Inject 12 Units into the skin daily 10 units subcut qam, increase 4 units every 4 days until am blood sugar < 120. Max 100 units)  albuterol sulfate HFA (VENTOLIN HFA) 108 (90 Base) MCG/ACT inhaler, Inhale 2 puffs into the lungs 4 times daily as needed for Wheezing  pravastatin (PRAVACHOL) 40 MG tablet, Take 1 tablet by mouth daily    Allergies: Torsemide and Dye [iodides]    Social History:    TOBACCO: reports that he quit smoking about 20 years ago. His smoking use included cigarettes. He has a 80.00 pack-year smoking history. He has never used smokeless tobacco.  ETOH:   reports current alcohol use. Patient currently lives with family, wife    Review of Systems -   Review of Systems   Unable to perform ROS: Mental status change     Objective:        Physical Exam  Constitutional:       General: He is in acute distress.    HENT:      Head: Normocephalic and atraumatic. Nose: Nose normal.      Mouth/Throat:      Mouth: Mucous membranes are dry. Pharynx: Oropharynx is clear. Eyes:      Pupils: Pupils are equal, round, and reactive to light. Cardiovascular:      Rate and Rhythm: Tachycardia present. Rhythm irregular. Pulses: Normal pulses. Heart sounds: Normal heart sounds. Pulmonary:      Effort: Respiratory distress present. Breath sounds: Decreased breath sounds present. Abdominal:      General: Abdomen is flat. Bowel sounds are normal.      Palpations: Abdomen is soft. Musculoskeletal:      Cervical back: Normal range of motion. Right lower leg: Edema present. Left lower leg: Edema present. Skin:     General: Skin is warm and dry. Neurological:      General: No focal deficit present. Mental Status: He is alert. He is disoriented. Motor: Weakness present. Psychiatric:         Cognition and Memory: Cognition is impaired. Palliative Performance Scale:  [] 60% Ambulation reduced; Significant disease; Can't do hobbies/housework; intake normal or reduced; occasional assist; LOC full/confusion  [] 50% Mainly sit/lie; Extensive disease; Can't do any work; Considerable assist; intake normal  Or reduced; LOC full/confusion  [] 40% Mainly in bed; Extensive disease; Mainly assist; intake normal or reduced; occasional assist; LOC full/confusion  [x] 30% Bed Bound; Extensive disease; Total care; intake reduced; LOC full/confusion  [] 20% Bed Bound; Extensive disease; Total care; intake minimal; Drowsy/coma  [] 10% Bed Bound; Extensive disease;  Total care; Mouth care only; Drowsy/coma  [] 0% Death    PPS: 30%    Vitals:    BP (!) 119/53   Pulse (!) 101   Temp 97.6 °F (36.4 °C)   Resp 18   Ht 6' 1\" (1.854 m)   Wt 166 lb 14.2 oz (75.7 kg)   SpO2 (!) 87%   BMI 22.02 kg/m²     Labs:    BMP:   Recent Labs     09/20/22  1904 09/21/22  0340 09/22/22  0444   * 133* 131*   K 4.9 5.4* 5.8*   CL 80* 81* 84*   CO2 40* 40* 31   BUN 46* 50* 74*   CREATININE 1.4* 1.5* 1.7*   GLUCOSE 223* 140* 160*     CBC:   Recent Labs     09/20/22  1904 09/21/22  0340 09/21/22  2346 09/22/22  0444   WBC 15.7* 15.5*  --  19.7*   HGB 8.7* 8.5* 8.3* 8.0*   HCT 28.3* 27.3* 27.2* 26.5*    362  --  326       LFT's: No results for input(s): AST, ALT, ALB, BILITOT, ALKPHOS in the last 72 hours. Troponin: No results for input(s): TROPONINI in the last 72 hours. BNP: No results for input(s): BNP in the last 72 hours. ABGs:   Recent Labs     09/21/22  0805 09/21/22  1420   PHART 7.378 7.251*   WFQ2BLG 75.9* 101.2*   PO2ART 77.1 236.8*     INR: No results for input(s): INR in the last 72 hours. U/A:No results for input(s): NITRITE, COLORU, PHUR, LABCAST, WBCUA, RBCUA, MUCUS, TRICHOMONAS, YEAST, BACTERIA, CLARITYU, SPECGRAV, LEUKOCYTESUR, UROBILINOGEN, BILIRUBINUR, BLOODU, GLUCOSEU, AMORPHOUS in the last 72 hours. Invalid input(s): KETONESU    XR CHEST PORTABLE   Final Result      1. Small right pneumothorax appears slightly increased. 2. Mild patchy airspace opacity bilaterally. XR CHEST PORTABLE   Final Result     Pleurx catheter at the right lung base. Trace right    pneumothorax is unchanged. XR CHEST PORTABLE   Final Result      Right-sided chest tube evident in the base, its tip medially. Improvement in the right-sided pneumothorax, since earlier today. Possible medial collapse of the right lower lobe. Small left effusion. Patchy airspace density/atelectasis in the lungs bilaterally, with no other significant change. XR CHEST PORTABLE   Final Result   1. Right-sided Pleurx catheter in satisfactory position in the lung bases   2.  Right-sided post operative pneumothorax, approximately 10%               Conclusion/Time spent:         Recommendations see above    Time spent with patient and/or family: 25  Time reviewing records: 10 min   Time communicating with staff: 5 min     A total of  minutes spent with the patient and family on unit greater than 50% in counseling regarding palliative care and in goals of care for the patient. Thank you to Dr. Fanny Byrnes for this consultation. We will continue to follow Mr. August Precise care as needed.      NATASHA Boykin - CHERIE

## 2022-09-22 NOTE — PROGRESS NOTES
ICU CT SURGERY DAILY PROGRESS NOTE    CC: Pleural effusions s/p R PleurX catheter placement    SUBJECTIVE:   Interval Hx: Continues to be on BiPAP overnight. RN states he was lucid and wishing to go home. Pt hard to understand with BiPAP on. ROS: A 14 point review of systems was conducted, significant findings as noted above. All other systems negative. OBJECTIVE:   Vitals:   Vitals:    09/22/22 0400 09/22/22 0455 09/22/22 0500 09/22/22 0600   BP: 102/72  102/86 (!) 119/53   Pulse: (!) 106  100 (!) 101   Resp: 23 20 28 18   Temp:   97.6 °F (36.4 °C)    TempSrc:       SpO2: 94%   (!) 87%   Weight:       Height:           I/O:   Intake/Output Summary (Last 24 hours) at 9/22/2022 0638  Last data filed at 9/22/2022 2992  Gross per 24 hour   Intake 1927.76 ml   Output 850 ml   Net 1077.76 ml       I/O last 3 completed shifts: In: 1591.9 [P.O.:120; I.V.:1471.9]  Out: 1887 [Urine:1117; Chest Tube:770]    Diet: Diet NPO Exceptions are: Ice Chips, Sips of Water with Meds      Physical Examination:   General appearance: attentive to conversation but doesn't verbalize. Extremely uncomfortable. Neuro: No focal deficits, sensation intact; not able to assess orientation  HEENT: NC/AT, EOMI, trachea midline, no JVD  Chest/Lungs: Decreased breath sounds bilaterally, increased work of breathing; R PleurX catheter to -20mmHg without airleak, serosanguinous output; BiPAP FiO2 40. Cardiovascular: Afib with RVR.    Abdomen: soft, non-tender, non-distended  Skin: warm and dry, no rashes  Extremities: no edema, no cyanosis      Labs:  CBC:   Recent Labs     09/20/22  1755 09/20/22  1904 09/21/22  0340 09/21/22  2346   WBC 14.4* 15.7* 15.5*  --    HGB 6.9* 8.7* 8.5* 8.3*   HCT 24.4* 28.3* 27.3* 27.2*    374 362  --          BMP:   Recent Labs     09/20/22  1755 09/20/22  1904 09/21/22  0340   * 132* 133*   K 5.1 4.9 5.4*   CL 84* 80* 81*   CO2 29 40* 40*   BUN 36* 46* 50*   CREATININE 1.2 1.4* 1.5*   GLUCOSE 954* 223* 140*       LFT's:   No results for input(s): AST, ALT, ALB, BILITOT, ALKPHOS in the last 72 hours. Troponin: No results for input(s): TROPONINI in the last 72 hours. BNP: No results for input(s): BNP in the last 72 hours. ABGs:   Recent Labs     09/21/22  0805 09/21/22  1420   PHART 7.378 7.251*   CTL9LFE 75.9* 101.2*   PO2ART 77.1 236.8*       INR:   No results for input(s): INR in the last 72 hours. U/A:No results for input(s): NITRITE, COLORU, PHUR, LABCAST, WBCUA, RBCUA, MUCUS, TRICHOMONAS, YEAST, BACTERIA, CLARITYU, SPECGRAV, LEUKOCYTESUR, UROBILINOGEN, BILIRUBINUR, BLOODU, GLUCOSEU, AMORPHOUS in the last 72 hours. Invalid input(s): Jennifer Arce     Rad:   XR CHEST PORTABLE   Final Result      1. Small right pneumothorax appears slightly increased. 2. Mild patchy airspace opacity bilaterally. XR CHEST PORTABLE   Final Result     Pleurx catheter at the right lung base. Trace right    pneumothorax is unchanged. XR CHEST PORTABLE   Final Result      Right-sided chest tube evident in the base, its tip medially. Improvement in the right-sided pneumothorax, since earlier today. Possible medial collapse of the right lower lobe. Small left effusion. Patchy airspace density/atelectasis in the lungs bilaterally, with no other significant change. XR CHEST PORTABLE   Final Result   1. Right-sided Pleurx catheter in satisfactory position in the lung bases   2. Right-sided post operative pneumothorax, approximately 10%             ASSESSMENT AND PLAN:   Jana Tipton is a 65 y.o. male with history of diastolic heart failure, atrial fibrillation, and DM with recurrent pleural effusions s/p R PleurX catheter placement (9/19), POD #3.     Neuro:   Analgesia  - Continue scheduled Tylenol    Cardiovascular:   History of Paroxysmal Atrial Fibrillation  - continue Apixaban 5mg bid  - Metoprolol XL 50mg bid    HFpEF  - Lasix d/c'd   - Metoprolol XL 50mg bid  - Clemente Brown held  - Last Echo (1/25/22): LVEF = 72-10%, gr2 diastolic dysfunction    Hyperlipidemia  - Pravastatin 40mg    Pulmonary:   S/P R PleurX catheter placement (9/19), POD #2  - to water seal  - SW/HHC pending  -CXR and ABG tomorrow am       Acute on chronic respiratory failure  - Inpatient consult to pulmonology (Dr Sana Villarreal) appreciate recommendations.   - Continue BiPAP as tolerated; will plan for intubation later today following pulmonology and family discussion  - Continue home LABA/ICS  - Baseline on home O2 3-4L NC     GI:   - Miralax, Senna-S    FEN:   IV fluids: D5LR 75cc/hr  Replace electrolytes per protocol  Diet: Diet NPO Exceptions are: Ice Chips, Sips of Water with Meds    /Renal:   Cont Ansari   Creatinine 1.7 from 1.5 from 1.4    Hem/ID:   Hemoglobin 8.0 from 8.3 from 8.5  WBC 19.7 from 15.5 from 15.7    Endo:   History of DMII  - Last A1C 8.1% (1/24/22)  - High-dose sliding scale insulin    Prophylaxis:   DVT Ppx: Eliquis  GI Ppx: n/a    Access:  Central Access: n/a  Peripheral Access: IV x2  Arterial Line Access: n/a                                Ansari Date placed: 09/22/22    Mobility:  OBB w assist as tolerated    Dispo:   ICU    Code Status: Full Code  -----------------------------  Kady Regalado DO   PGY1, General Surgery  09/22/22  6:38 AM  Pager # [phone]

## 2022-09-22 NOTE — PROGRESS NOTES
Shift change, bedside report given to  Ronit GERARD. Pt exhibits no s/s of distress. Sitter at bedside. Care has been transferred at this time.

## 2022-09-22 NOTE — PROCEDURES
ENDOTRACHEAL INTUBATION    INDICATION: Acute respiratory failure    TIME OUT: taken    SEDATION: etomidate    PROCEDURE: Using Greenwood scope guided laryngoscopy, the vocal cords were well visualized and an 8 mm endotracheal tube was place directly through the cords to 25 cm at the teeth. Good breath sounds auscultated bilaterally without sounds over abdomen. Appropriate CO2 waveform on the monitor. CXR is pending. Vent settings are 100% FIO2, PEEP 5, Tidal Volume 550, Rate 18. Will wean FIO2 as rapidly as possible to maintain minimum oxygen saturation of 88%.     Nasal corpack was placed and bridled through the left nostril    Clifton Barajas MD

## 2022-09-22 NOTE — CONSULTS
ICU HISTORY AND PHYSICAL       Hospital Day:   ICU Day:                                                          Code:Full Code  Admit Date: 9/19/2022  PCP: David Barrios MD                                  CC: Pleural effusion (right) s/p VATS and Pleural catheter (PleurX) placement    HISTORY OF PRESENT ILLNESS:   Irina Thakkar is a 66 yo Male with a PMH of CHF (CHFpEF), carotid stenosis, HLD, HTN, OA, Restrictive Lung Disease, T2DM who underwent VATS with Pleurx catheter placement on 9/19/2022 for recurrent bilateral loculated pleural effusions and has had hypoxia/hypercapnia since that time now requiring intubation. In addition to his recurrent bilateral loculated pleural effusions, he has lost over 100 lbs over the last year, some of which his wife attributes to intentional weight loss. Initially, he was started on BiPAP post-procedure for rapid shallow breathing and slow clearance of sedation. He wasn't tolerating the BiPAP well, trying to remove it. After discussion with family and patient (while on a break from BiPAP), the decision was made to intubate and confirmed that he's OK with tracheostomy if necessary.     PAST HISTORY:     Past Medical History:   Diagnosis Date    Carotid stenosis, left 10/12/2011    Chronic back pain     Chronic systolic (congestive) heart failure 85/27/3488    Diastolic CHF (HCC)     Erectile dysfunction     Hyperlipidemia     Hypertension     Osteoarthritis     Restrictive lung disease     Type II or unspecified type diabetes mellitus without mention of complication, not stated as uncontrolled      Past Surgical History:   Procedure Laterality Date    CARDIAC CATHETERIZATION  06/2022    KNEE SURGERY Left     PLEURAL CATH INSERTION N/A 9/19/2022    PLEURAL CATHETER PLACEMENT; INTERCOSTAL NERVE BLOCK X4 performed by Jodee Spain MD at [address] [place] [address] Bilateral     THORACOSCOPY Right 9/19/2022    RIGHT VIDEO ASSISTED THORASCOPIC SURGERY AND; performed by Lin Rojas MD at [address] [place] [address] History:   The patient lives at home with his wife. Alcohol: Current alcohol use  Illicit drugs: no use  Tobacco:  Quit smoking 80 pack-years    Family History:  Family History   Problem Relation Age of Onset    Hearing Loss Father     Heart Disease Father 70        MI    High Blood Pressure Father     Diabetes Maternal Grandmother         hypoglycemic    Hearing Loss Maternal Grandmother     Arthritis Maternal Grandfather        MEDICATIONS:     No current facility-administered medications on file prior to encounter. Current Outpatient Medications on File Prior to Encounter   Medication Sig Dispense Refill    ELIQUIS 5 MG TABS tablet TAKE 1 TABLET BY MOUTH TWO TIMES A DAY 60 tablet 2    umeclidinium-vilanterol (ANORO ELLIPTA) 62.5-25 MCG/INH AEPB inhaler Inhale 1 puff into the lungs daily 1 each 2    bumetanide (BUMEX) 1 MG tablet Take 1 tablet by mouth in the morning and 1 tablet before bedtime. 180 tablet 3    magnesium oxide (MAG-OX) 400 MG tablet Take 1 tablet by mouth in the morning and 1 tablet before bedtime. 180 tablet 3    dapagliflozin (FARXIGA) 10 MG tablet TAKE 1 TABLET EVERY MORNING 90 tablet 1    sildenafil (VIAGRA) 100 MG tablet Take 1 tablet by mouth as needed for Erectile Dysfunction Max daily dose 100mg. 16 tablet 0    metoprolol succinate (TOPROL XL) 50 MG extended release tablet Take 1 tablet by mouth in the morning and at bedtime 180 tablet 3    Insulin Pen Needle (PEN NEEDLES) 31G X 6 MM MISC 1 each by Does not apply route daily 100 each 3    Continuous Blood Gluc Sensor (FREESTYLE LIZA 14 DAY SENSOR) MISC Check bs tidac and hs 6 each 1    Continuous Blood Gluc  (FREESTYLE LIZA 14 DAY READER) SOFIYA Check bs tidac and hs 6 each 1    Insulin Degludec (TRESIBA FLEXTOUCH) 200 UNIT/ML SOPN 10 units subcut qam, increase 4 units every 4 days until am blood sugar < 120.  Max 100 units (Patient taking differently: Inject 12 Units into the skin daily 10 units subcut qam, increase 4 units every 4 days until am blood sugar < 120. Max 100 units) 9 pen 1    albuterol sulfate HFA (VENTOLIN HFA) 108 (90 Base) MCG/ACT inhaler Inhale 2 puffs into the lungs 4 times daily as needed for Wheezing 18 g 5    pravastatin (PRAVACHOL) 40 MG tablet Take 1 tablet by mouth daily 90 tablet 3     Scheduled Meds:   chlorhexidine  15 mL Mouth/Throat BID    famotidine (PEPCID) injection  20 mg IntraVENous Daily    sodium chloride  1,000 mL IntraVENous Once    insulin regular  0-18 Units SubCUTAneous Q4H    methylPREDNISolone  125 mg IntraVENous Q8H    apixaban  5 mg Oral BID    magnesium oxide  400 mg Oral BID    metoprolol succinate  50 mg Oral BID    pravastatin  40 mg Oral Dinner    sodium chloride flush  5-40 mL IntraVENous 2 times per day    acetaminophen  1,000 mg Oral Q6H    sennosides-docusate sodium  1 tablet Oral BID    polyethylene glycol  17 g Oral Daily      Continuous Infusions:   propofol 10 mcg/kg/min (09/22/22 1725)    dexmedetomidine (PRECEDEX) IV infusion Stopped (09/22/22 1545)    norepinephrine 2.5 mcg/min (09/22/22 1848)    sodium chloride 100 mL/hr at 09/22/22 1607    sodium chloride      dextrose       PRN Meds:haloperidol lactate, albuterol, sodium chloride flush, sodium chloride, ondansetron **OR** ondansetron, glucose, dextrose bolus **OR** dextrose bolus, glucagon (rDNA), dextrose, hydrALAZINE, levalbuterol    Allergies: Allergies   Allergen Reactions    Torsemide Shortness Of Breath    Dye [Iodides]      1970's - ??        REVIEW OF SYSTEMS:       History obtained from chart review    Review of Systems   Unable to perform ROS: Intubated     PHYSICAL EXAM:       Vitals: /71   Pulse 98   Temp 97.7 °F (36.5 °C) (Axillary)   Resp 24   Ht 6' 0.99\" (1.854 m)   Wt 166 lb 14.2 oz (75.7 kg)   SpO2 99%   BMI 22.02 kg/m²     I/O:    Intake/Output Summary (Last 24 hours) at 9/22/2022 1942  Last data filed at 9/22/2022 1809  Gross per 24 hour   Intake 2047.76 ml   Output 685 ml   Net 1362.76 ml     No intake/output data recorded. I/O last 3 completed shifts: In: 2047.8 [P.O.:240; I.V.:1807.8]  Out: 1235 [Urine:825; Chest Tube:410]    Physical Exam  Constitutional:       Appearance: Normal appearance. He is normal weight. HENT:      Head: Normocephalic and atraumatic. Right Ear: External ear normal.      Left Ear: External ear normal.      Nose: Nose normal.      Mouth/Throat:      Mouth: Mucous membranes are moist.      Pharynx: Oropharynx is clear. Eyes:      Extraocular Movements: Extraocular movements intact. Pupils: Pupils are equal, round, and reactive to light. Cardiovascular:      Rate and Rhythm: Normal rate and regular rhythm. Pulses: Normal pulses. Heart sounds: Normal heart sounds. Pulmonary:      Comments: Intubated. Clear airway sounds in all lung fields bilaterally. Musculoskeletal:         General: Normal range of motion. Cervical back: Normal range of motion. Skin:     General: Skin is warm and dry. Neurological:      General: No focal deficit present.      Access:   -Central Access Day #: 1                                  -Arterial line Day#:1                                  Ansari Day#:3  NGT Day#: 1                                            ETT Day#:1  Vent Settings: Vent Mode: AC/PRVC Resp Rate (Set): 26 bmp/Vt (Set, mL): 500 mL/ /FiO2 : 50 %    Recent Labs     09/21/22  0805 09/21/22  1420   PHART 7.378 7.251*   [id number] 75.9* 101.2*   PO2ART 77.1 236.8*       DATA:       Labs:  CBC:   Recent Labs     09/20/22 1904 09/21/22  0340 09/21/22  2346 09/22/22  0444   WBC 15.7* 15.5*  --  19.7*   HGB 8.7* 8.5* 8.3* 8.0*   HCT 28.3* 27.3* 27.2* 26.5*    362  --  326       BMP:   Recent Labs     09/20/22  1904 09/21/22  0340 09/22/22  0444   * 133* 131*   K 4.9 5.4* 5.8*   CL 80* 81* 84*   CO2 40* 40* 31   BUN 46* 50* 74*   CREATININE 1.4* 1.5* 1.7*   GLUCOSE 223* 140* 160*   PHOS 5.3* 5.2* 6.3* LFT's: No results for input(s): AST, ALT, ALB, BILITOT, ALKPHOS in the last 72 hours. Troponin: No results for input(s): TROPONINI in the last 72 hours. BNP:No results for input(s): BNP in the last 72 hours. ABGs:   Recent Labs     09/21/22  0805 09/21/22  1420   PHART 7.378 7.251*   GYE3VET 75.9* 101.2*   PO2ART 77.1 236.8*     INR: No results for input(s): INR in the last 72 hours. U/A:No results for input(s): NITRITE, COLORU, PHUR, LABCAST, WBCUA, RBCUA, MUCUS, TRICHOMONAS, YEAST, BACTERIA, CLARITYU, SPECGRAV, LEUKOCYTESUR, UROBILINOGEN, BILIRUBINUR, BLOODU, GLUCOSEU, AMORPHOUS in the last 72 hours. Invalid input(s): KETONESU    XR CHEST PORTABLE   Final Result   1. Satisfactory position of right internal jugular central line   2. No interval change in endotracheal tube and nasogastric tube positioning. 3. Extensive bilateral airspace disease with no changes. 4. Left pleural effusion with retrocardiac opacity, unchanged   5. Small stable tiny right lateral pneumothorax and stable position of right chest tube,, Pleurx catheter. XR ABDOMEN (KUB) (SINGLE AP VIEW)   Final Result   1. No residual pneumothorax. 2.  Mild patchy airspace disease bilaterally worse on the right than on prior examination. 3.  Non-obstructive bowel gas pattern. Mildly distended stomach. XR CHEST PORTABLE   Final Result   1. No residual pneumothorax. 2.  Mild patchy airspace disease bilaterally worse on the right than on prior examination. 3.  Non-obstructive bowel gas pattern. Mildly distended stomach. XR CHEST PORTABLE   Final Result      1. Small right pneumothorax appears slightly increased. 2. Mild patchy airspace opacity bilaterally. XR CHEST PORTABLE   Final Result     Pleurx catheter at the right lung base. Trace right    pneumothorax is unchanged. XR CHEST PORTABLE   Final Result      Right-sided chest tube evident in the base, its tip medially.  Improvement in the right-sided pneumothorax, since earlier today. Possible medial collapse of the right lower lobe. Small left effusion. Patchy airspace density/atelectasis in the lungs bilaterally, with no other significant change. XR CHEST PORTABLE   Final Result   1. Right-sided Pleurx catheter in satisfactory position in the lung bases   2. Right-sided post operative pneumothorax, approximately 10%         XR CHEST PORTABLE    (Results Pending)       EKG:   Echo:  Micro:     ASSESSMENT AND PLAN:   Winston Alcazar is a 65 y.o. male with a PMH of CHF (CHFpEF), carotid stenosis, HLD, HTN, OA, Restrictive Lung Disease, T2DM who underwent VATS with PleurX catheter placement on 9/19/2022 for recurrent bilateral loculated pleural effusions and has had hypoxia/hypercapnia since that time. Neuro/Sedation:  Sedated with propofol, precedex post-intubation today    CV:  Hypotension  Requiring 5 mcg of Levophed  -Began requiring pressors after intubation, attributed to sedation and positive pressure ventilation    Congestive Heart Failure with Preserved Ejection Fraction  EF ~65% 6/16/2022    Respiratory:   Patient admitted to ICU for inability to tolerate BIPAP for hypercapnic respiratory failure. Pulmonary service had been seeing the patient while on the floor. Patient became exhausted on BIPAP. Options of hospice vs intubation were discussed with the patient and family. Patient voiced agreement to tracheostomy, should it be required. Critical care service consulted for vent management. SpO2 98% on MV  Vent Settings: Vent Mode: AC/PRVC Resp Rate (Set): 26 bmp/Vt (Set, mL): 500 mL/ /FiO2 : 50 % PEEP5  Recent Labs     09/21/22  0805 09/21/22  1420   PHART 7.378 7.251*   [id number] 75.9* 101.2*   PO2ART 77.1 236.8*     Acute Hypoxic/Hypercapnic respiratory failure  Recurrent pleural effusions  S/p VATS procedure 9/19, with right PleurX catheter placement. Unclear etiology for his restrictive lung disease.   PleurX catheter place  -Methylprednisolone IV TID    Renal:  Ansari Catheter in place  I/O last 3 completed shifts: In: 2047.8 [P.O.:240; I.V.:1807.8]  Out: 1235 [Urine:825; Chest Tube:410]  No intake/output data recorded. ZANA  Creatinine up to 1.7 today, from 1.5 yesterday. Baseline Cr ~0.8.   - Continuous maintenance fluids  - Furosemide discontinued today    GI:  NG tube in place, feeds resumed as long as Levo drip below 8, per surgery   Diet NPO Exceptions are: Ice Chips, Sips of Water with Meds  ADULT TUBE FEEDING; Orogastric; Renal Formula; Continuous; 10; Yes; 10; Q 6 hours; 40; 30; Q 4 hours; Protein; 2 bottles Proteinex daily w/ FW flushes of 30 mL before and after  Prophylaxis: Famotidine     Code Status:Full Code  FEN: NPO, NG tube in place. No feed d/t pressor requirement. PPX:  Pepcid  DISPO: ICU while requiring    This patient has been staffed and discussed with Dr. Lizet Lemons.   -----------------------------  Carlos Benitez MD, PGY-1  9/22/2022  7:42 PM

## 2022-09-22 NOTE — PROGRESS NOTES
Pulmonary & Critical Care Medicine ICU Progress Note    Admit Date: 2022  PCP: Jeff Lorenzo MD    CC: Acute on chronic hypoxemic and hypercapnic respiratory failure  Events of Last 24 hours: Patient continues to have difficulty with confusion and respiratory distress. He was on and off BiPAP all day yesterday and overnight. He was off bipap for 5 hours yesterday but then became unresponsive and his pH was low and pco2 was over 100 again. Patient, while most lucid, indicated to nursing and the surgical team that he's done with suffering through his breathing problems. Vitals:  Tmax:  VITALS:  BP (!) 119/53   Pulse (!) 101   Temp 97.6 °F (36.4 °C)   Resp 27   Ht 6' 1\" (1.854 m)   Wt 166 lb 14.2 oz (75.7 kg)   SpO2 (!) 87%   BMI 22.02 kg/m²   24HR INTAKE/OUTPUT:    Intake/Output Summary (Last 24 hours) at 2022 0939  Last data filed at 2022 0482  Gross per 24 hour   Intake 2047.76 ml   Output 975 ml   Net 1072.76 ml       CURRENT PULSE OXIMETRY:  SpO2: (!) 87 %  24HR PULSE OXIMETRY RANGE:  SpO2  Av.9 %  Min: 80 %  Max: 100 %  CVP:      Invasive Lines:     EXAM:  General: No distress. Lethargic, confused. Eyes: PERRL. No sclera icterus. No conjunctival injection. ENT: No discharge. Pharynx clear. Neck: Trachea midline. Normal thyroid. Resp: + accessory muscle use. No crackles. No wheezing. No rhonchi. Decreased breath sounds throughout. Right-sided Pleurx catheter in place attached to atrium on 20 cm of wall suction  CV: Regular rate. Regular rhythm. No mumur or rub. No edema. GI: Non-tender. Non-distended. No masses. No organmegaly. Normal bowel sounds. Skin: Warm and dry. No nodule on exposed extremities. No rash on exposed extremities. Lymph: No cervical LAD. No supraclavicular LAD. M/S: No cyanosis. No joint deformity. No clubbing. Neuro: Awake. Will follow commands. Positive pupils/gag/corneals. Normal pain response. Psych: confused and unable to assess. Medications:    IV:   dextrose 5% in lactated ringers 50 mL/hr at 09/22/22 7593    sodium chloride      dextrose           Scheduled Meds:   insulin regular  0-18 Units SubCUTAneous Q4H    methylPREDNISolone  125 mg IntraVENous Q8H    apixaban  5 mg Oral BID    magnesium oxide  400 mg Oral BID    metoprolol succinate  50 mg Oral BID    pravastatin  40 mg Oral Dinner    tiotropium-olodaterol  2 puff Inhalation Daily    sodium chloride flush  5-40 mL IntraVENous 2 times per day    acetaminophen  1,000 mg Oral Q6H    sennosides-docusate sodium  1 tablet Oral BID    polyethylene glycol  17 g Oral Daily         Diet: Diet NPO Exceptions are: Ice Chips, Sips of Water with Meds     Results:  CBC:   Recent Labs     09/20/22  1904 09/21/22  0340 09/21/22  2346 09/22/22  0444   WBC 15.7* 15.5*  --  19.7*   HGB 8.7* 8.5* 8.3* 8.0*   HCT 28.3* 27.3* 27.2* 26.5*   MCV 84.4 83.4  --  85.7    362  --  326       BMP:   Recent Labs     09/20/22  1904 09/21/22  0340 09/22/22  0444   * 133* 131*   K 4.9 5.4* 5.8*   CL 80* 81* 84*   CO2 40* 40* 31   PHOS 5.3* 5.2* 6.3*   BUN 46* 50* 74*   CREATININE 1.4* 1.5* 1.7*       LIVER PROFILE:   No results for input(s): AST, ALT, LIPASE, BILIDIR, BILITOT, ALKPHOS in the last 72 hours. Invalid input(s): AMYLASE,  ALB    PT/INR:   No results for input(s): PROTIME, INR in the last 72 hours. APTT:   No results for input(s): APTT in the last 72 hours. UA:No results for input(s): NITRITE, COLORU, PHUR, LABCAST, WBCUA, RBCUA, MUCUS, TRICHOMONAS, YEAST, BACTERIA, CLARITYU, SPECGRAV, LEUKOCYTESUR, UROBILINOGEN, BILIRUBINUR, BLOODU, GLUCOSEU, AMORPHOUS in the last 72 hours. Invalid input(s): KETONESU    Cultures:      Films:  CXR reviewed by me and it showed   XR CHEST PORTABLE   Final Result      1. Small right pneumothorax appears slightly increased. 2. Mild patchy airspace opacity bilaterally.             XR CHEST PORTABLE   Final Result     Pleurx catheter at the right lung base. Trace right    pneumothorax is unchanged. XR CHEST PORTABLE   Final Result      Right-sided chest tube evident in the base, its tip medially. Improvement in the right-sided pneumothorax, since earlier today. Possible medial collapse of the right lower lobe. Small left effusion. Patchy airspace density/atelectasis in the lungs bilaterally, with no other significant change. XR CHEST PORTABLE   Final Result   1. Right-sided Pleurx catheter in satisfactory position in the lung bases   2. Right-sided post operative pneumothorax, approximately 10%                  Assessment/Plan:  65 y.o. male with Loculated pleural effusions, chronic hypoxemic and hypercapnic respiratory failure status post VATS with Pleurx placement    Patient hasn't made much progress and remains bipap dependent. He was on the cusp of going to hospice prior to the surgery but wanted to give this a chance first.  Unfortunately things have not gone as well as hoped, but it appears we caught him right as his decline was accelerating. Family wanting to discuss options moving forward as we can't make progress with him confused and back and forth on bipap. Options would be to go the vent and possible trach route to give him more time to recover, or transition to hospice. I don't anticipate he would survive more than 24 hours without bipap based on how he's done the past 48 hours. Has developed homero, so I stopped his scheduled lasix today. Will speak with the family again if they have questions about options moving forward, as patient's wife was not there this morning. Prophylaxis:  Head of bed maintained at >30 degrees at all times except for procedures and repositioning. Eliquis    Critical care time spent reviewing labs/films, examining patient, collaborating with other physicians but excluding procedures for life threatening organ failure is 50 minutes.     Vicky Ramírez MD    Addendum:  Shayne Mccormick with

## 2022-09-22 NOTE — PROCEDURES
Anders Moreno is a 65 y.o. male patient. No diagnosis found. Past Medical History:   Diagnosis Date    Carotid stenosis, left 10/12/2011    Chronic back pain     Chronic systolic (congestive) heart failure 77/67/9231    Diastolic CHF ([facility] [address]     Erectile dysfunction     Hyperlipidemia     Hypertension     Osteoarthritis     Restrictive lung disease     Type II or unspecified type diabetes mellitus without mention of complication, not stated as uncontrolled      Blood pressure 111/71, pulse 98, temperature 97.7 °F (36.5 °C), temperature source Axillary, resp. rate 24, height 6' 0.99\" (1.854 m), weight 166 lb 14.2 oz (75.7 kg), SpO2 99 %. Insert Arterial Line    Date/Time: 9/22/2022 6:34 PM  Performed by: Doug Mckeon DO  Authorized by: Selena Dixon DO   Consent: Written consent obtained. Risks and benefits: risks, benefits and alternatives were discussed  Consent given by: spouse  Procedure consent: procedure consent matches procedure scheduled  Relevant documents: relevant documents present and verified  Test results: test results available and properly labeled  Site marked: the operative site was marked  Imaging studies: imaging studies available  Required items: required blood products, implants, devices, and special equipment available  Patient identity confirmed: arm band and hospital-assigned identification number  Time out: Immediately prior to procedure a \"time out\" was called to verify the correct patient, procedure, equipment, support staff and site/side marked as required. Preparation: Patient was prepped and draped in the usual sterile fashion.   Indications: multiple ABGs, respiratory failure and hemodynamic monitoring  Location: left radial    Sedation:  Patient sedated: yes  Analgesia: see MAR for details    Edwin's test normal: yes  Needle gauge: 18  Seldinger technique: Seldinger technique used  Number of attempts: 1  Post-procedure: line sutured and dressing applied  Post-procedure CMS: unchanged  Patient tolerance: patient tolerated the procedure well with no immediate complications  Comments: EBL < 5cc  Arterial line flushes and draws without difficulty, appropriate arterial waveform.         Selena Dixon,   9/22/2022

## 2022-09-22 NOTE — PROGRESS NOTES
Physician Progress Note      PATIENT:               Bruna Sun  CSN #:                  [id number]  :                       1945  ADMIT DATE:       2022 5:20 AM  DISCH DATE:  Orin Ahumada  PROVIDER #:        Marah Lundy TEXT:    Patient admitted with Pleural effusions, noted to have Hx of Diastolic CHF and   Atrial fibrillation. If possible, please document in progress notes and d/c   summary further specificity regarding the type/underlying cause of pleural   effusion: The medical record reflects the following:  Risk Factors: 66 y/o male with a hx of CHF, and A-fib and has Pleural   effusions  Clinical Indicators:  PN: \"?HFpEF: Bumex held in favor of lasix 40 mg BID.   - Metoprolol XL 50mg bid  - [place] held- Last Echo (22): LVEF = 67-44%, gr2 diastolic   dysfunction. \"  Pro BNP: 4,139   PN: Beatrice Au is a 65 y.o. male with   history of diastolic heart failure, atrial fibrillation. \"  Treatment: IV Lasix 2x/day, Metoprolol ER 50 mg bid,  Bumex on hold  for   Lasix IV, Apixaban 5mg bid  Options provided:  -- Pleural effusion due to Acute on chronic Diastolic CHF  -- Pleural effusion due to Acute on chronic Diastolic CHF and Atrial   fibrillation  -- Pleural effusion due to other, please specify likely cause, please specify   likely cause. -- Other - I will add my own diagnosis  -- Disagree - Not applicable / Not valid  -- Disagree - Clinically unable to determine / Unknown  -- Refer to Clinical Documentation Reviewer    PROVIDER RESPONSE TEXT:    Patient has pleural effusion due to Acute on chronic CHF.     Query created by: Claude Diane on 2022 1:49 PM      Electronically signed by:  Isaac Escobar 2022 1:12 PM

## 2022-09-22 NOTE — CONSULTS
Comprehensive Nutrition Assessment    Type and Reason for Visit:  Initial, Consult    Nutrition Recommendations/Plan:   Pt at risk for refeeding, recommend checking lytes prior to initiating TF and replacing as needed  Initiate Nepro (Renal formula) at 10 mL/hr and as tolerated, increase by 10 mL/hr q 6 hours until goal of 40 mL/hr is met via OG   Recommend 30 mL H20 flush q 4 hours. Monitor IVF infusion, Na labs and need for adjustments in water flush  Recommend 2 Bottle Proteinex P2Go daily via syringe. Flush with 30 mL H20 before and after  Consider prophylactic prokinetic agent (such as reglan, erythromycin) to promote EN tolerance as appropriate   Consider daily micronutrient supplementation: 2000 IU Vitamin D3, MVM, + Probiotic   Monitor TF tolerance (abd distention, bowel habits, N/V, cramping)  Check TG while on diprivan infusion  Monitor nutrition adequacy, pertinent labs, bowel habits, wt changes, and clinical progress     Malnutrition Assessment:  Malnutrition Status: At risk for malnutrition (Comment) (09/22/22 4155)    Context:  Chronic Illness     Findings of the 6 clinical characteristics of malnutrition:  Energy Intake:  Unable to assess  Weight Loss:  Greater than 10% over 6 months (20% in 6 month period)       Nutrition Assessment:    Consult for TF ordering and management: Pt admitted after pleural effusion, PMHx CHF, T2DM. NPO, intubated, no NGT or OGT yet. Sedated on precedex and propofol. Propofol providing 119 kcal/day. Pt has had 20% wt loss in 6 month period. Phos and K both elevated, will start pt on Nepro and monitor for appropriate formula. Pt at risk for refeeding syndrome AEB wt loss. Unable to get intake hx as wife was not present during visit. Will continue to monitor. Nutrition Related Findings:    Na 131. K 5.8. Phos 6.3. Mg 2.7. .  Wound Type: None       Current Nutrition Intake & Therapies:    Average Meal Intake: NPO  Average Supplements Intake: NPO  Diet NPO Exceptions are: Samantha and Pia, Sips of Water with Meds  Current Tube Feeding (TF) Orders:  Feeding Route: Orogastric  Formula: Renal Formula  Schedule: Continuous  Additives/Modulars: Protein  Goal TF & Flush Orders Provides: Nepro @ 40 mL/hr to provide: 960 mL TV, 1699 kcal, 78 g/pro and 698 mL FW + FW flushes of 30 mL q4 + 2 bottles Proteinex daily to provide an additional 52 g/pro and 208 kcal    Anthropometric Measures:  Height: 6' 0.99\" (185.4 cm)  Ideal Body Weight (IBW): 184 lbs (84 kg)       Current Body Weight: 166 lb 14.2 oz (75.7 kg), 90.7 % IBW. Weight Source: Bed Scale  Current BMI (kg/m2): 22        Weight Adjustment For: No Adjustment                 BMI Categories: Normal Weight (BMI 22.0 to 24.9) age over 72    Estimated Daily Nutrient Needs:  Energy Requirements Based On: Kcal/kg (20-25 kcal/kg CBW)  Weight Used for Energy Requirements: Current (CBW 75.7 kg)  Energy (kcal/day): 3334-8423  Weight Used for Protein Requirements: Current (2 g/kg CBW)  Protein (g/day): 151  Method Used for Fluid Requirements: 1 ml/kcal  Fluid (ml/day): or per MD    Nutrition Diagnosis:   Inadequate oral intake related to impaired respiratory function as evidenced by NPO or clear liquid status due to medical condition, nutrition support - enteral nutrition    Nutrition Interventions:   Food and/or Nutrient Delivery: Continue NPO, Start Tube Feeding  Nutrition Education/Counseling: Education not appropriate  Coordination of Nutrition Care: Continue to monitor while inpatient       Goals:  Previous Goal Met: Progressing toward Goal(s)  Goals: Initiate nutrition support, within 2 days       Nutrition Monitoring and Evaluation:   Behavioral-Environmental Outcomes: None Identified  Food/Nutrient Intake Outcomes: Enteral Nutrition Intake/Tolerance  Physical Signs/Symptoms Outcomes: Biochemical Data, Nutrition Focused Physical Findings, Weight, GI Status    Discharge Planning:     Too soon to determine     Ronen Hardwick RD, LD  Contact: [phone]

## 2022-09-23 NOTE — PROGRESS NOTES
ICU Progress Note    Admit Date: 9/19/2022  Day: 5  Vent Day: 2  IV Access: Peripheral, central  IV Fluids: None  Vasopressors: None                Antibiotics: None  Diet: Diet NPO Exceptions are: Ice Chips, Sips of Water with Meds  ADULT TUBE FEEDING; Orogastric; Renal Formula; Continuous; 10; Yes; 10; Q 6 hours; 40; 30; Q 4 hours; Protein; 2 bottles Proteinex daily w/ FW flushes of 30 mL before and after    CC: Pleural effusion (right) s/p VATS and Pleural catheter (PleurX) placement    Interval history:   Patient relatively alert on mild propofol sedation, able to shake his head. Seems somewhat uncomfortable moving in bed, bothered by endotracheal tube. HPI:   Kelsie Simmons is a 64 yo Male with a PMH of CHF (CHFpEF), carotid stenosis, HLD, HTN, OA, Restrictive Lung Disease, T2DM who underwent VATS with Pleurx catheter placement on 9/19/2022 for recurrent bilateral loculated pleural effusions and has had hypoxia/hypercapnia since that time now requiring intubation. The etiology of his recurrent pleural effusions is unclear. Possibly malignancy. In addition to his recurrent bilateral loculated pleural effusions, he has lost over 100 lbs over the last year, some of which his wife attributes to intentional weight loss. Initially, he was started on BiPAP post-procedure for rapid shallow breathing and slow clearance of sedation. He wasn't tolerating the BiPAP well, trying to remove it. After discussion with family and patient (while on a break from BiPAP), the decision was made to intubate and confirmed that he's OK with tracheostomy if necessary. ROS:  Limited ability to assess due to patient's intubated status. Denies abdominal pain.     Medications:     Scheduled Meds:   methylPREDNISolone  75 mg IntraVENous Q8H    [START ON 9/24/2022] potassium bicarb-citric acid  20 mEq Oral Daily    chlorhexidine  15 mL Mouth/Throat BID    famotidine (PEPCID) injection  20 mg IntraVENous Daily    metoprolol  5 mg IntraVENous Q6H    insulin regular  0-18 Units SubCUTAneous Q4H    apixaban  5 mg Oral BID    pravastatin  40 mg Oral Dinner    sodium chloride flush  5-40 mL IntraVENous 2 times per day    acetaminophen  1,000 mg Oral Q6H    sennosides-docusate sodium  1 tablet Oral BID    polyethylene glycol  17 g Oral Daily     Continuous Infusions:   sodium chloride      propofol 15 mcg/kg/min (09/23/22 0957)    norepinephrine 3 mcg/min (09/23/22 0934)    sodium chloride      dextrose       PRN Meds:haloperidol lactate, albuterol, sodium chloride flush, sodium chloride, ondansetron **OR** ondansetron, glucose, dextrose bolus **OR** dextrose bolus, glucagon (rDNA), dextrose, hydrALAZINE, levalbuterol    Objective:   Vitals:   T-max:  Patient Vitals for the past 8 hrs:   BP Temp Temp src Pulse Resp SpO2 Weight   09/23/22 0830 -- -- -- 89 22 98 % --   09/23/22 0815 -- -- -- 87 (P) 24 97 % --   09/23/22 0800 124/81 98.7 °F (37.1 °C) Oral 98 21 97 % 178 lb 5.6 oz (80.9 kg)   09/23/22 0745 -- -- -- 88 22 100 % --   09/23/22 0730 -- -- -- 97 22 100 % --   09/23/22 0718 -- -- -- 93 22 100 % --   09/23/22 0715 -- -- -- 84 22 100 % --   09/23/22 0700 (!) 115/59 -- -- 91 22 -- --   09/23/22 0645 -- -- -- 96 22 -- --   09/23/22 0630 -- -- -- 97 22 100 % --   09/23/22 0615 -- -- -- 94 22 100 % --   09/23/22 0600 109/75 -- -- 96 22 100 % --   09/23/22 0545 -- -- -- 90 24 100 % --   09/23/22 0530 -- -- -- 96 22 100 % --   09/23/22 0515 -- -- -- 91 20 100 % --   09/23/22 0500 110/63 -- -- (!) 102 20 100 % --   09/23/22 0445 -- -- -- 99 22 100 % --   09/23/22 0430 -- -- -- (!) 104 20 99 % --   09/23/22 0415 -- -- -- (!) 102 22 100 % --   09/23/22 0410 -- -- -- 100 23 100 % --   09/23/22 0400 103/61 98.8 °F (37.1 °C) Axillary 94 23 99 % --   09/23/22 0345 -- -- -- 97 24 100 % --   09/23/22 0330 -- -- -- 94 22 100 % --   09/23/22 0315 -- -- -- 92 22 -- --   09/23/22 0300 116/63 -- -- 100 22 100 % --   09/23/22 0245 -- -- -- 98 18 100 % -- 09/23/22 0230 -- -- -- 97 22 100 % --   09/23/22 0215 -- -- -- 87 22 100 % --   09/23/22 0200 120/74 -- -- 100 25 100 % --   09/23/22 0145 -- -- -- (!) 108 24 99 % --   09/23/22 0130 -- -- -- 96 26 100 % --   09/23/22 0115 -- -- -- (!) 101 26 100 % --   09/23/22 0100 (!) 93/53 -- -- 92 26 99 % --   09/23/22 0045 -- -- -- (!) 106 26 99 % --       Intake/Output Summary (Last 24 hours) at 9/23/2022 3191  Last data filed at 9/23/2022 0900  Gross per 24 hour   Intake 3187.43 ml   Output 1020 ml   Net 2167.43 ml     Physical Exam:  CONSTITUTIONAL:  no apparent distress, and appears stated age  EYES: Pupils equal round and reactive to light. Normal conjunctiva  EARS, NOSE, MOUTH & THROAT: Normal oropharynx. Ears and nose appear normal  NECK:  Supple, symmetrical, trachea midline, no adenopathy, thyroid symmetric, not enlarged and no tenderness, skin normal  LUNGS:  no increased work of breathing and clear to auscultation. No accessory muscle use  CARDIOVASCULAR:  normal S1 and S2, and no JVD. Edema in upper and lower extremities bilaterally. ABDOMEN:  normal bowel sounds, non-distended and no masses palpated, and no tenderness to palpation. No hepatospleenomegaly  LYMPHADENOPATHY:  no axillary or supraclavicular adenopathy. No cervical adnenopathy  PSYCHIATRIC: Unable to assess due to intubated status  MUSCULOSKELETAL: No obvious misalignment or effusion of the joints. No clubbing, cyanosis of the digits. SKIN:  normal skin color, texture, turgor and no redness, warmth, or swelling.  No palpable nodules    LABS:    CBC:   Recent Labs     09/21/22  0340 09/21/22  2346 09/22/22  0444 09/23/22  0334   WBC 15.5*  --  19.7* 14.7*   HGB 8.5* 8.3* 8.0* 7.5*   HCT 27.3* 27.2* 26.5* 24.1*     --  326 327   MCV 83.4  --  85.7 82.9     Renal:    Recent Labs     09/21/22  0340 09/22/22  0444 09/23/22  0334   * 131* 137   K 5.4* 5.8* 3.8   CL 81* 84* 90*   CO2 40* 31 35*   BUN 50* 74* 83*   CREATININE 1.5* 1.7* 1.6* GLUCOSE 140* 160* 207*   CALCIUM 9.2 8.6 8.1*   MG 2.40 2.70* 2.60*   PHOS 5.2* 6.3* 2.9   ANIONGAP 12 16 12     Hepatic:   Recent Labs     09/21/22  0340 09/21/22  2346 09/22/22  0444 09/23/22  0334   PROT  --  6.2*  --   --    LABALBU 3.5  --  3.3* 2.7*     Troponin: No results for input(s): TROPONINI in the last 72 hours. BNP: No results for input(s): BNP in the last 72 hours. Lipids: No results for input(s): CHOL, HDL in the last 72 hours. Invalid input(s): LDLCALCU, TRIGLYCERIDE  ABGs:    Recent Labs     09/21/22  1420 09/23/22  0109 09/23/22  0615   PHART 7.251* 7.577* 7.580*   FEM2TLK 101.2* 40.1 43.5   PO2ART 236.8* 69.9* 99.3   VHX0ZJE 44.5* 37* 41*   BEART 17* 14.1* 17.2*   S2TJZQIY 100 96 98   HOK8VJP 48 39 42       INR: No results for input(s): INR in the last 72 hours. Lactate: No results for input(s): LACTATE in the last 72 hours. Cultures:  -----------------------------------------------------------------  RAD:   XR CHEST PORTABLE   Final Result   1. Support lines and tubes are stable. 2.  Stable small right lateral pneumothorax and right basilar    chest tube. 3.  Stable patchy bilateral airspace disease. XR CHEST PORTABLE   Final Result   1. Satisfactory position of right internal jugular central line   2. No interval change in endotracheal tube and nasogastric tube positioning. 3. Extensive bilateral airspace disease with no changes. 4. Left pleural effusion with retrocardiac opacity, unchanged   5. Small stable tiny right lateral pneumothorax and stable position of right chest tube,, Pleurx catheter. XR ABDOMEN (KUB) (SINGLE AP VIEW)   Final Result   1. No residual pneumothorax. 2.  Mild patchy airspace disease bilaterally worse on the right than on prior examination. 3.  Non-obstructive bowel gas pattern. Mildly distended stomach. XR CHEST PORTABLE   Final Result   1. No residual pneumothorax.    2.  Mild patchy airspace disease bilaterally worse on the right than on prior examination. 3.  Non-obstructive bowel gas pattern. Mildly distended stomach. XR CHEST PORTABLE   Final Result      1. Small right pneumothorax appears slightly increased. 2. Mild patchy airspace opacity bilaterally. XR CHEST PORTABLE   Final Result     Pleurx catheter at the right lung base. Trace right    pneumothorax is unchanged. XR CHEST PORTABLE   Final Result      Right-sided chest tube evident in the base, its tip medially. Improvement in the right-sided pneumothorax, since earlier today. Possible medial collapse of the right lower lobe. Small left effusion. Patchy airspace density/atelectasis in the lungs bilaterally, with no other significant change. XR CHEST PORTABLE   Final Result   1. Right-sided Pleurx catheter in satisfactory position in the lung bases   2. Right-sided post operative pneumothorax, approximately 10%               Assessment/Plan:   Winston Alcazar is a 65 y.o. male with a PMH of CHF (CHFpEF), carotid stenosis, HLD, HTN, OA, Restrictive Lung Disease, T2DM who underwent VATS with PleurX catheter placement on 9/19/2022 for recurrent bilateral loculated pleural effusions and has had hypoxia/hypercapnia since that time. Neuro/Sedation:  Sedated with propofol 15 today    CV:  Hypotension  Requiring 3 mcg of Levophed  -Began requiring pressors after intubation, attributed to sedation and positive pressure ventilation     Congestive Heart Failure with Preserved Ejection Fraction  EF ~65% 6/16/2022  -BNP ordered today-->Elevated to 57136 (from 4000)    Respiratory:   Patient admitted to ICU for inability to tolerate BIPAP for hypercapnic respiratory failure. Pulmonary service had been seeing the patient while on the floor. Patient became exhausted on BIPAP. Options of hospice vs intubation were discussed with the patient and family. Patient voiced agreement to tracheostomy, should it be required.   Critical care service consulted for vent management. SpO2 98% on MV  Vent Settings: Vent Mode: AC/PRVC Resp Rate (Set): 18 bmp/Vt (Set, mL): 500 mL/ /FiO2 : 40 % PEEP 5  Recent Labs     09/23/22  0615 09/23/22  0827   PHART 7.580* 7.603*   IPF2RPW 43.5 38.7   PO2ART 99.3 81.7     Acute Hypoxic/Hypercapnic respiratory failure  Recurrent pleural effusions  The etiology of his recurrent pleural effusions is unclear. No studies done on fluid from original thoracenteses. Possibly malignancy. VATS fluid results showing an exudative picture, per [facility] Criteria. S/p VATS procedure 9/19, with right PleurX catheter placement. Unclear etiology for his restrictive lung disease. PleurX catheter place  -Methylprednisolone IV TID  On mechanical ventilation, he has developed respiratory alkalosis, with significant drop in PCO2 from his baseline. Ventilator support reduced accordingly, decreasing VT and fR, with minute volume 9.4 L. Subsequent arterial pH 7.45, PCO2 54, PO2 90    Renal:  Ansari Catheter in place  ZANA  Low urine output 9/22-9/23: 1020 mL in 24 hrs. Overall fluid balance increasingly positive. Creatinine at 1.6 today, from 0.6 on 9/19.    - Maintenance fluids stopped today for concern for volume overload, pulmonary edema. BNP increased to 15,496 from 4139  - Furosemide discontinued 9/22  - Spot dose of Furosemide 40mg IV ordered today    Code Status: Full Code  FEN: NPO, NG tube in place. Hold feeds for Levo requirement >7  PPX: Pepcid IV  DISPO: ICU while requiring mechanical ventilation  ELOS: 1 week ICU  Barriers to discharge: requiring mechanical ventilation    Francesco Beltrán MD, PGY-1  09/23/22  8:39 AM    This patient has been staffed and discussed with Dr. Britney Watson. Patient examined, findings as discussed with Dr. Mikey Quan. Agree with assessment and plan as edited above.   Management of critical illness required for worsening respiratory failure, post VATS for pleural drainage, with the background of increasing respiratory failure associated with pleural disease. He is compensated with current level of ventilator support. The level of ventilation required is modest, but very poor respiratory compliance increases work of breathing beyond his capability at this time. He has additional component of pulmonary edema, exhibited on chest x-ray and elevated BNP. Expect to see improvement with diuresis. Blood pressure decreased, particularly with sedative medication, and readily compensated with low-dose pressor agent. Conscious sedation required to allow effective ventilation. Family updated regarding progress and plan.   Discussed with surgery service  Time spent in critical care 50 minutes

## 2022-09-23 NOTE — PROGRESS NOTES
and residents at bedside. Vent changes made. Sammi Bazzi answering sons questions. Will continue to monitor.

## 2022-09-23 NOTE — PLAN OF CARE
Problem: Chronic Conditions and Co-morbidities  Goal: Patient's chronic conditions and co-morbidity symptoms are monitored and maintained or improved  Outcome: Not Progressing     Problem: Discharge Planning  Goal: Discharge to home or other facility with appropriate resources  Outcome: Not Progressing     Problem: Pain  Goal: Verbalizes/displays adequate comfort level or baseline comfort level  Outcome: Not Progressing     Problem: Safety - Adult  Goal: Free from fall injury  Outcome: Not Progressing  Flowsheets (Taken 9/22/2022 2000)  Free From Fall Injury: Instruct family/caregiver on patient safety     Problem: Skin/Tissue Integrity  Goal: Absence of new skin breakdown  Description: 1. Monitor for areas of redness and/or skin breakdown  2. Assess vascular access sites hourly  3. Every 4-6 hours minimum:  Change oxygen saturation probe site  4. Every 4-6 hours:  If on nasal continuous positive airway pressure, respiratory therapy assess nares and determine need for appliance change or resting period. Outcome: Not Progressing     Problem: Safety - Medical Restraint  Goal: Remains free of injury from restraints (Restraint for Interference with Medical Device)  Description: INTERVENTIONS:  1. Determine that other, less restrictive measures have been tried or would not be effective before applying the restraint  2. Evaluate the patient's condition at the time of restraint application  3. Inform patient/family regarding the reason for restraint  4.  Q2H: Monitor safety, psychosocial status, comfort, nutrition and hydration  Outcome: Not Progressing  Flowsheets  Taken 9/23/2022 0700  Remains free of injury from restraints (restraint for interference with medical device):   Determine that other, less restrictive measures have been tried or would not be effective before applying the restraint   Evaluate the patient's condition at the time of restraint application   Inform patient/family regarding the reason for restraint   Every 2 hours: Monitor safety, psychosocial status, comfort, nutrition and hydration  Taken 9/23/2022 0600  Remains free of injury from restraints (restraint for interference with medical device):   Determine that other, less restrictive measures have been tried or would not be effective before applying the restraint   Evaluate the patient's condition at the time of restraint application   Inform patient/family regarding the reason for restraint   Every 2 hours: Monitor safety, psychosocial status, comfort, nutrition and hydration  Taken 9/23/2022 0500  Remains free of injury from restraints (restraint for interference with medical device):   Determine that other, less restrictive measures have been tried or would not be effective before applying the restraint   Evaluate the patient's condition at the time of restraint application   Inform patient/family regarding the reason for restraint   Every 2 hours: Monitor safety, psychosocial status, comfort, nutrition and hydration  Taken 9/23/2022 0400  Remains free of injury from restraints (restraint for interference with medical device):   Determine that other, less restrictive measures have been tried or would not be effective before applying the restraint   Evaluate the patient's condition at the time of restraint application   Inform patient/family regarding the reason for restraint   Every 2 hours: Monitor safety, psychosocial status, comfort, nutrition and hydration  Taken 9/23/2022 0300  Remains free of injury from restraints (restraint for interference with medical device):   Determine that other, less restrictive measures have been tried or would not be effective before applying the restraint   Evaluate the patient's condition at the time of restraint application   Inform patient/family regarding the reason for restraint   Every 2 hours: Monitor safety, psychosocial status, comfort, nutrition and hydration  Taken 9/23/2022 0200  Remains free of injury from restraints (restraint for interference with medical device):   Determine that other, less restrictive measures have been tried or would not be effective before applying the restraint   Evaluate the patient's condition at the time of restraint application   Inform patient/family regarding the reason for restraint   Every 2 hours: Monitor safety, psychosocial status, comfort, nutrition and hydration  Taken 9/23/2022 0100  Remains free of injury from restraints (restraint for interference with medical device):   Determine that other, less restrictive measures have been tried or would not be effective before applying the restraint   Evaluate the patient's condition at the time of restraint application   Inform patient/family regarding the reason for restraint   Every 2 hours: Monitor safety, psychosocial status, comfort, nutrition and hydration  Taken 9/23/2022 0000  Remains free of injury from restraints (restraint for interference with medical device):   Evaluate the patient's condition at the time of restraint application   Determine that other, less restrictive measures have been tried or would not be effective before applying the restraint   Inform patient/family regarding the reason for restraint   Every 2 hours: Monitor safety, psychosocial status, comfort, nutrition and hydration  Taken 9/22/2022 2300  Remains free of injury from restraints (restraint for interference with medical device):   Determine that other, less restrictive measures have been tried or would not be effective before applying the restraint   Evaluate the patient's condition at the time of restraint application   Inform patient/family regarding the reason for restraint   Every 2 hours: Monitor safety, psychosocial status, comfort, nutrition and hydration  Taken 9/22/2022 2200  Remains free of injury from restraints (restraint for interference with medical device):   Determine that other, less restrictive measures have been tried or would not be effective before applying the restraint   Evaluate the patient's condition at the time of restraint application   Inform patient/family regarding the reason for restraint   Every 2 hours: Monitor safety, psychosocial status, comfort, nutrition and hydration  Taken 9/22/2022 2100  Remains free of injury from restraints (restraint for interference with medical device):   Determine that other, less restrictive measures have been tried or would not be effective before applying the restraint   Evaluate the patient's condition at the time of restraint application   Inform patient/family regarding the reason for restraint   Every 2 hours: Monitor safety, psychosocial status, comfort, nutrition and hydration  Taken 9/22/2022 2000  Remains free of injury from restraints (restraint for interference with medical device):   Determine that other, less restrictive measures have been tried or would not be effective before applying the restraint   Evaluate the patient's condition at the time of restraint application   Inform patient/family regarding the reason for restraint   Every 2 hours: Monitor safety, psychosocial status, comfort, nutrition and hydration     Problem: Nutrition Deficit:  Goal: Optimize nutritional status  Outcome: Not Progressing     Problem: Chronic Conditions and Co-morbidities  Goal: Patient's chronic conditions and co-morbidity symptoms are monitored and maintained or improved  Outcome: Not Progressing     Problem: Discharge Planning  Goal: Discharge to home or other facility with appropriate resources  Outcome: Not Progressing     Problem: Pain  Goal: Verbalizes/displays adequate comfort level or baseline comfort level  Outcome: Not Progressing     Problem: Safety - Adult  Goal: Free from fall injury  Outcome: Not Progressing  Flowsheets (Taken 9/22/2022 2000)  Free From Fall Injury: Instruct family/caregiver on patient safety     Problem: Skin/Tissue Integrity  Goal: Absence of new skin breakdown  Description: 1. Monitor for areas of redness and/or skin breakdown  2. Assess vascular access sites hourly  3. Every 4-6 hours minimum:  Change oxygen saturation probe site  4. Every 4-6 hours:  If on nasal continuous positive airway pressure, respiratory therapy assess nares and determine need for appliance change or resting period. Outcome: Not Progressing     Problem: Safety - Medical Restraint  Goal: Remains free of injury from restraints (Restraint for Interference with Medical Device)  Description: INTERVENTIONS:  1. Determine that other, less restrictive measures have been tried or would not be effective before applying the restraint  2. Evaluate the patient's condition at the time of restraint application  3. Inform patient/family regarding the reason for restraint  4.  Q2H: Monitor safety, psychosocial status, comfort, nutrition and hydration  Outcome: Not Progressing  Flowsheets  Taken 9/23/2022 0700  Remains free of injury from restraints (restraint for interference with medical device):   Determine that other, less restrictive measures have been tried or would not be effective before applying the restraint   Evaluate the patient's condition at the time of restraint application   Inform patient/family regarding the reason for restraint   Every 2 hours: Monitor safety, psychosocial status, comfort, nutrition and hydration  Taken 9/23/2022 0600  Remains free of injury from restraints (restraint for interference with medical device):   Determine that other, less restrictive measures have been tried or would not be effective before applying the restraint   Evaluate the patient's condition at the time of restraint application   Inform patient/family regarding the reason for restraint   Every 2 hours: Monitor safety, psychosocial status, comfort, nutrition and hydration  Taken 9/23/2022 0500  Remains free of injury from restraints (restraint for interference with medical device):   Determine that other, less restrictive measures have been tried or would not be effective before applying the restraint   Evaluate the patient's condition at the time of restraint application   Inform patient/family regarding the reason for restraint   Every 2 hours: Monitor safety, psychosocial status, comfort, nutrition and hydration  Taken 9/23/2022 0400  Remains free of injury from restraints (restraint for interference with medical device):   Determine that other, less restrictive measures have been tried or would not be effective before applying the restraint   Evaluate the patient's condition at the time of restraint application   Inform patient/family regarding the reason for restraint   Every 2 hours: Monitor safety, psychosocial status, comfort, nutrition and hydration  Taken 9/23/2022 0300  Remains free of injury from restraints (restraint for interference with medical device):   Determine that other, less restrictive measures have been tried or would not be effective before applying the restraint   Evaluate the patient's condition at the time of restraint application   Inform patient/family regarding the reason for restraint   Every 2 hours: Monitor safety, psychosocial status, comfort, nutrition and hydration  Taken 9/23/2022 0200  Remains free of injury from restraints (restraint for interference with medical device):   Determine that other, less restrictive measures have been tried or would not be effective before applying the restraint   Evaluate the patient's condition at the time of restraint application   Inform patient/family regarding the reason for restraint   Every 2 hours: Monitor safety, psychosocial status, comfort, nutrition and hydration  Taken 9/23/2022 0100  Remains free of injury from restraints (restraint for interference with medical device):   Determine that other, less restrictive measures have been tried or would not be effective before applying the restraint   Evaluate the patient's condition at the time of restraint application   Inform patient/family regarding the reason for restraint   Every 2 hours: Monitor safety, psychosocial status, comfort, nutrition and hydration  Taken 9/23/2022 0000  Remains free of injury from restraints (restraint for interference with medical device):   Evaluate the patient's condition at the time of restraint application   Determine that other, less restrictive measures have been tried or would not be effective before applying the restraint   Inform patient/family regarding the reason for restraint   Every 2 hours: Monitor safety, psychosocial status, comfort, nutrition and hydration  Taken 9/22/2022 2300  Remains free of injury from restraints (restraint for interference with medical device):   Determine that other, less restrictive measures have been tried or would not be effective before applying the restraint   Evaluate the patient's condition at the time of restraint application   Inform patient/family regarding the reason for restraint   Every 2 hours: Monitor safety, psychosocial status, comfort, nutrition and hydration  Taken 9/22/2022 2200  Remains free of injury from restraints (restraint for interference with medical device):   Determine that other, less restrictive measures have been tried or would not be effective before applying the restraint   Evaluate the patient's condition at the time of restraint application   Inform patient/family regarding the reason for restraint   Every 2 hours: Monitor safety, psychosocial status, comfort, nutrition and hydration  Taken 9/22/2022 2100  Remains free of injury from restraints (restraint for interference with medical device):   Determine that other, less restrictive measures have been tried or would not be effective before applying the restraint   Evaluate the patient's condition at the time of restraint application   Inform patient/family regarding the reason for restraint   Every 2 hours: Monitor safety, psychosocial status, comfort, nutrition and hydration  Taken 9/22/2022 2000  Remains free of injury from restraints (restraint for interference with medical device):   Determine that other, less restrictive measures have been tried or would not be effective before applying the restraint   Evaluate the patient's condition at the time of restraint application   Inform patient/family regarding the reason for restraint   Every 2 hours: Monitor safety, psychosocial status, comfort, nutrition and hydration     Problem: Nutrition Deficit:  Goal: Optimize nutritional status  Outcome: Not Progressing

## 2022-09-23 NOTE — CARE COORDINATION
Case Management Assessment           Daily Note                 Date/ Time of Note: 9/23/2022 8:52 AM         Note completed by: Sarika Fraire RN    Patient Name: Mirian Luna  YOB: 1945    Diagnosis:Pleural effusion, right [J90]  Pleural effusion on right [J90]  Patient Admission Status: Inpatient    Date of Isabela Tamayo  5:20 AM Length of Stay: 4 GLOS: GMLOS: 4.9    Current Plan of Care: Intubated and sedated. S/P VATS w CT placed (new PleurX cath). TF's. Poss trach/PEG per notes. Palliative Care consulted  ________________________________________________________  Discharge Plan: tbd    Case Management Notes: Case management is following for discharge planning. The chart was reviewed. Mr. Sanchez Bravo is from home with his spouse. He was relatively independent with self care and functional mobility prior to admission. Following the VATS procedure,  faxed an order form for the PleurX cath supplies to [facility] and his family were provided with choice of provider; he and his family are in agreement with the discharge plan.     Care Transition Patient: Yes    Sarika Fraire RN  [facility] [employer]  Case Management Department  [phone]

## 2022-09-23 NOTE — PLAN OF CARE
Problem: Chronic Conditions and Co-morbidities  Goal: Patient's chronic conditions and co-morbidity symptoms are monitored and maintained or improved  9/23/2022 1855 by Dianna Ortega RN  Outcome: Progressing  Flowsheets (Taken 9/23/2022 0800)  Care Plan - Patient's Chronic Conditions and Co-Morbidity Symptoms are Monitored and Maintained or Improved:   Monitor and assess patient's chronic conditions and comorbid symptoms for stability, deterioration, or improvement   Collaborate with multidisciplinary team to address chronic and comorbid conditions and prevent exacerbation or deterioration   Update acute care plan with appropriate goals if chronic or comorbid symptoms are exacerbated and prevent overall improvement and discharge     Problem: Discharge Planning  Goal: Discharge to home or other facility with appropriate resources  9/23/2022 1855 by Dianna Ortega RN  Outcome: Progressing  Flowsheets (Taken 9/23/2022 0800)  Discharge to home or other facility with appropriate resources:   Identify barriers to discharge with patient and caregiver   Arrange for needed discharge resources and transportation as appropriate   Identify discharge learning needs (meds, wound care, etc)   Arrange for interpreters to assist at discharge as needed     Problem: Pain  Goal: Verbalizes/displays adequate comfort level or baseline comfort level  9/23/2022 1855 by Dianna Ortega RN  Outcome: Progressing     Problem: Safety - Adult  Goal: Free from fall injury  9/23/2022 1855 by Dianna Ortega RN  Outcome: Progressing  Flowsheets (Taken 9/23/2022 0930)  Free From Fall Injury:   Instruct family/caregiver on patient safety   Based on caregiver fall risk screen, instruct family/caregiver to ask for assistance with transferring infant if caregiver noted to have fall risk factors     Problem: Skin/Tissue Integrity  Goal: Absence of new skin breakdown  Description: 1. Monitor for areas of redness and/or skin breakdown  2.   Assess vascular access sites hourly  3. Every 4-6 hours minimum:  Change oxygen saturation probe site  4. Every 4-6 hours:  If on nasal continuous positive airway pressure, respiratory therapy assess nares and determine need for appliance change or resting period. 9/23/2022 1855 by Emily Ellison RN  Outcome: Progressing     Problem: Safety - Medical Restraint  Goal: Remains free of injury from restraints (Restraint for Interference with Medical Device)  Description: INTERVENTIONS:  1. Determine that other, less restrictive measures have been tried or would not be effective before applying the restraint  2. Evaluate the patient's condition at the time of restraint application  3. Inform patient/family regarding the reason for restraint  4.  Q2H: Monitor safety, psychosocial status, comfort, nutrition and hydration  9/23/2022 1855 by Emily Ellison RN  Outcome: Progressing  Flowsheets (Taken 9/23/2022 0800)  Remains free of injury from restraints (restraint for interference with medical device):   Determine that other, less restrictive measures have been tried or would not be effective before applying the restraint   Evaluate the patient's condition at the time of restraint application   Inform patient/family regarding the reason for restraint   Every 2 hours: Monitor safety, psychosocial status, comfort, nutrition and hydration     Problem: Nutrition Deficit:  Goal: Optimize nutritional status  9/23/2022 1855 by Emily Ellison RN  Outcome: Progressing     Problem: Chronic Conditions and Co-morbidities  Goal: Patient's chronic conditions and co-morbidity symptoms are monitored and maintained or improved  9/23/2022 1855 by Emily Ellison RN  Outcome: Progressing  Flowsheets (Taken 9/23/2022 0800)  Care Plan - Patient's Chronic Conditions and Co-Morbidity Symptoms are Monitored and Maintained or Improved:   Monitor and assess patient's chronic conditions and comorbid symptoms for stability, deterioration, or improvement   Collaborate with multidisciplinary team to address chronic and comorbid conditions and prevent exacerbation or deterioration   Update acute care plan with appropriate goals if chronic or comorbid symptoms are exacerbated and prevent overall improvement and discharge  9/23/2022 0728 by Ugo Thorpe RN  Outcome: Not Progressing     Problem: Discharge Planning  Goal: Discharge to home or other facility with appropriate resources  9/23/2022 1855 by Genevieve Santacruz RN  Outcome: Progressing  Flowsheets (Taken 9/23/2022 0800)  Discharge to home or other facility with appropriate resources:   Identify barriers to discharge with patient and caregiver   Arrange for needed discharge resources and transportation as appropriate   Identify discharge learning needs (meds, wound care, etc)   Arrange for interpreters to assist at discharge as needed  9/23/2022 0728 by Ugo Thorpe RN  Outcome: Not Progressing     Problem: Pain  Goal: Verbalizes/displays adequate comfort level or baseline comfort level  9/23/2022 1855 by Genevieve Santacruz RN  Outcome: Progressing  9/23/2022 0728 by Ugo Thorpe RN  Outcome: Not Progressing     Problem: Safety - Adult  Goal: Free from fall injury  9/23/2022 1855 by Genevieve Santacruz RN  Outcome: Progressing  Flowsheets (Taken 9/23/2022 0930)  Free From Fall Injury:   Instruct family/caregiver on patient safety   Based on caregiver fall risk screen, instruct family/caregiver to ask for assistance with transferring infant if caregiver noted to have fall risk factors  9/23/2022 0728 by Ugo Thorpe RN  Outcome: Not Progressing  Flowsheets (Taken 9/22/2022 2000)  Free From Fall Injury: Instruct family/caregiver on patient safety     Problem: Skin/Tissue Integrity  Goal: Absence of new skin breakdown  Description: 1. Monitor for areas of redness and/or skin breakdown  2. Assess vascular access sites hourly  3. Every 4-6 hours minimum:  Change oxygen saturation probe site  4. Every 4-6 hours:  If on nasal continuous positive airway pressure, respiratory therapy assess nares and determine need for appliance change or resting period. 9/23/2022 1855 by Carla Molina RN  Outcome: Progressing  9/23/2022 0728 by Gino Tapia RN  Outcome: Not Progressing     Problem: Safety - Medical Restraint  Goal: Remains free of injury from restraints (Restraint for Interference with Medical Device)  Description: INTERVENTIONS:  1. Determine that other, less restrictive measures have been tried or would not be effective before applying the restraint  2. Evaluate the patient's condition at the time of restraint application  3. Inform patient/family regarding the reason for restraint  4.  Q2H: Monitor safety, psychosocial status, comfort, nutrition and hydration  9/23/2022 1855 by Carla Molina RN  Outcome: Progressing  Flowsheets (Taken 9/23/2022 0800)  Remains free of injury from restraints (restraint for interference with medical device):   Determine that other, less restrictive measures have been tried or would not be effective before applying the restraint   Evaluate the patient's condition at the time of restraint application   Inform patient/family regarding the reason for restraint   Every 2 hours: Monitor safety, psychosocial status, comfort, nutrition and hydration  9/23/2022 0728 by Gino Tapia RN  Outcome: Not Progressing  Flowsheets  Taken 9/23/2022 0700  Remains free of injury from restraints (restraint for interference with medical device):   Determine that other, less restrictive measures have been tried or would not be effective before applying the restraint   Evaluate the patient's condition at the time of restraint application   Inform patient/family regarding the reason for restraint   Every 2 hours: Monitor safety, psychosocial status, comfort, nutrition and hydration  Taken 9/23/2022 0600  Remains free of injury from restraints (restraint for interference with medical the patient's condition at the time of restraint application   Inform patient/family regarding the reason for restraint   Every 2 hours: Monitor safety, psychosocial status, comfort, nutrition and hydration  Taken 9/23/2022 0100  Remains free of injury from restraints (restraint for interference with medical device):   Determine that other, less restrictive measures have been tried or would not be effective before applying the restraint   Evaluate the patient's condition at the time of restraint application   Inform patient/family regarding the reason for restraint   Every 2 hours: Monitor safety, psychosocial status, comfort, nutrition and hydration  Taken 9/23/2022 0000  Remains free of injury from restraints (restraint for interference with medical device):   Evaluate the patient's condition at the time of restraint application   Determine that other, less restrictive measures have been tried or would not be effective before applying the restraint   Inform patient/family regarding the reason for restraint   Every 2 hours: Monitor safety, psychosocial status, comfort, nutrition and hydration  Taken 9/22/2022 2300  Remains free of injury from restraints (restraint for interference with medical device):   Determine that other, less restrictive measures have been tried or would not be effective before applying the restraint   Evaluate the patient's condition at the time of restraint application   Inform patient/family regarding the reason for restraint   Every 2 hours: Monitor safety, psychosocial status, comfort, nutrition and hydration  Taken 9/22/2022 2200  Remains free of injury from restraints (restraint for interference with medical device):   Determine that other, less restrictive measures have been tried or would not be effective before applying the restraint   Evaluate the patient's condition at the time of restraint application   Inform patient/family regarding the reason for restraint   Every 2 hours: Monitor safety, psychosocial status, comfort, nutrition and hydration  Taken 9/22/2022 2100  Remains free of injury from restraints (restraint for interference with medical device):   Determine that other, less restrictive measures have been tried or would not be effective before applying the restraint   Evaluate the patient's condition at the time of restraint application   Inform patient/family regarding the reason for restraint   Every 2 hours: Monitor safety, psychosocial status, comfort, nutrition and hydration  Taken 9/22/2022 2000  Remains free of injury from restraints (restraint for interference with medical device):   Determine that other, less restrictive measures have been tried or would not be effective before applying the restraint   Evaluate the patient's condition at the time of restraint application   Inform patient/family regarding the reason for restraint   Every 2 hours: Monitor safety, psychosocial status, comfort, nutrition and hydration     Problem: Nutrition Deficit:  Goal: Optimize nutritional status  9/23/2022 1855 by Iraida Fitzpatrick, RN  Outcome: Progressing  9/23/2022 0728 by Babatunde Spring RN  Outcome: Not Progressing

## 2022-09-24 NOTE — PROGRESS NOTES
ICU Progress Note    Admit Date: 9/19/2022  Day: 6  Vent Day: 3  IV Access: Peripheral, central  IV Fluids: None  Vasopressors: None                Antibiotics: None  Diet: Diet NPO    CC: Pleural effusion (right) s/p VATS and Pleural catheter (PleurX) placement    Interval history:   Patient seen and examined this morning with wife and son at bedside. No acute events overnight. Patient is unresponsive. HPI:   Arianna Bundy is a 64 yo Male with a PMH of CHF (CHFpEF), carotid stenosis, HLD, HTN, OA, Restrictive Lung Disease, T2DM who underwent VATS with Pleurx catheter placement on 9/19/2022 for recurrent bilateral loculated pleural effusions and has had hypoxia/hypercapnia since that time now requiring intubation. The etiology of his recurrent pleural effusions is unclear. Possibly malignancy. In addition to his recurrent bilateral loculated pleural effusions, he has lost over 100 lbs over the last year, some of which his wife attributes to intentional weight loss. Initially, he was started on BiPAP post-procedure for rapid shallow breathing and slow clearance of sedation. He wasn't tolerating the BiPAP well, trying to remove it. After discussion with family and patient (while on a break from BiPAP), the decision was made to intubate and confirmed that he's OK with tracheostomy if necessary. ROS:  Limited ability to assess due to patient's intubated status. Denies abdominal pain.     Medications:     Scheduled Meds:   potassium phosphate IVPB  30 mmol IntraVENous Once    insulin glargine  5 Units SubCUTAneous Nightly    potassium bicarb-citric acid  20 mEq Oral Daily    methylPREDNISolone  80 mg IntraVENous Q8H    chlorhexidine  15 mL Mouth/Throat BID    famotidine (PEPCID) injection  20 mg IntraVENous Daily    [Held by provider] metoprolol  5 mg IntraVENous Q6H    insulin regular  0-18 Units SubCUTAneous Q4H    apixaban  5 mg Oral BID    sodium chloride flush  5-40 mL IntraVENous 2 times per day polyethylene glycol  17 g Oral Daily     Continuous Infusions:   furosemide (LASIX) 1mg/ml infusion 15 mg/hr (09/24/22 1447)    propofol 25 mcg/kg/min (09/24/22 0220)    norepinephrine 4 mcg/min (09/24/22 1120)    sodium chloride      dextrose       PRN Meds:acetaminophen, haloperidol lactate, albuterol, sodium chloride flush, sodium chloride, ondansetron **OR** ondansetron, glucose, dextrose bolus **OR** dextrose bolus, glucagon (rDNA), dextrose, hydrALAZINE, levalbuterol    Objective:   Vitals:   T-max:  Patient Vitals for the past 8 hrs:   BP Temp Temp src Pulse Resp SpO2   09/24/22 1400 (!) 131/100 -- -- (!) 109 19 --   09/24/22 1345 -- -- -- (!) 124 (!) 8 --   09/24/22 1330 -- -- -- 90 (!) 0 --   09/24/22 1315 -- -- -- 100 (!) 0 --   09/24/22 1300 129/84 -- -- 93 (!) 0 --   09/24/22 1251 -- -- -- (!) 106 (!) 0 96 %   09/24/22 1245 -- -- -- (!) 115 (!) 0 96 %   09/24/22 1230 -- -- -- 99 (!) 0 96 %   09/24/22 1215 -- -- -- 83 15 96 %   09/24/22 1200 108/73 -- -- 85 20 95 %   09/24/22 1145 -- -- -- 94 18 96 %   09/24/22 1130 -- -- -- (!) 102 (!) 7 (!) 87 %   09/24/22 1115 -- -- -- 81 22 97 %   09/24/22 1100 (!) 110/56 -- -- 83 12 97 %   09/24/22 1045 -- -- -- 92 20 96 %   09/24/22 1030 -- -- -- 84 18 97 %   09/24/22 1015 -- -- -- 90 16 97 %   09/24/22 1000 (!) 93/53 -- -- 84 16 97 %   09/24/22 0900 98/72 -- -- 97 13 96 %   09/24/22 0845 -- -- -- (!) 111 20 95 %   09/24/22 0837 -- -- -- 94 15 96 %   09/24/22 0800 -- -- -- 100 -- --   09/24/22 0750 105/60 96.8 °F (36 °C) Axillary (!) 104 21 --       Intake/Output Summary (Last 24 hours) at 9/24/2022 1518  Last data filed at 9/24/2022 1420  Gross per 24 hour   Intake 1548.46 ml   Output 965 ml   Net 583.46 ml     Physical Exam:  CONSTITUTIONAL:  no apparent distress, and appears stated age  EYES: Pupils equal round and reactive to light. Normal conjunctiva  EARS, NOSE, MOUTH & THROAT: Normal oropharynx.   Ears and nose appear normal  NECK:  Supple, symmetrical, trachea midline, no adenopathy, thyroid symmetric, not enlarged and no tenderness, skin normal  LUNGS:  no increased work of breathing and clear to auscultation. No accessory muscle use  CARDIOVASCULAR:  normal S1 and S2, and no JVD. Edema in upper and lower extremities bilaterally. ABDOMEN:  normal bowel sounds, non-distended and no masses palpated, and no tenderness to palpation. No hepatospleenomegaly  LYMPHADENOPATHY:  no axillary or supraclavicular adenopathy. No cervical adnenopathy  PSYCHIATRIC: Unable to assess due to intubated status  MUSCULOSKELETAL: No obvious misalignment or effusion of the joints. No clubbing, cyanosis of the digits. SKIN:  normal skin color, texture, turgor and no redness, warmth, or swelling. No palpable nodules    LABS:    CBC:   Recent Labs     09/22/22 0444 09/23/22 0334 09/24/22 0424   WBC 19.7* 14.7* 15.7*   HGB 8.0* 7.5* 7.8*   HCT 26.5* 24.1* 25.1*    327 315   MCV 85.7 82.9 82.8     Renal:    Recent Labs     09/22/22 0444 09/23/22 0334 09/24/22 0424 09/24/22 0425   * 137  --  136   K 5.8* 3.8  --  3.6   CL 84* 90*  --  89*   CO2 31 35*  --  38*   BUN 74* 83*  --  99*   CREATININE 1.7* 1.6*  --  1.5*   GLUCOSE 160* 207*  --  325*   CALCIUM 8.6 8.1*  --  8.1*   MG 2.70* 2.60* 2.70*  --    PHOS 6.3* 2.9  --  2.7   ANIONGAP 16 12  --  9     Hepatic:   Recent Labs     09/21/22  2346 09/22/22 0444 09/23/22 0334 09/24/22 0425   PROT 6.2*  --   --   --    LABALBU  --  3.3* 2.7* 2.7*     Troponin: No results for input(s): TROPONINI in the last 72 hours. BNP: No results for input(s): BNP in the last 72 hours. Lipids: No results for input(s): CHOL, HDL in the last 72 hours.     Invalid input(s): LDLCALCU, TRIGLYCERIDE  ABGs:    Recent Labs     09/23/22  0615 09/23/22  0827 09/23/22  1000   PHART 7.580* 7.603* 7.454*   LCV3ZIW 43.5 38.7 53.8*   PO2ART 99.3 81.7 89.9   ENR4DGK 41* 38.3* 37.8*   BEART 17.2* 17* 14*   C8YCOYPK 98 98 97   YBF8VJA 42 40 39       INR: No results for input(s): INR in the last 72 hours. Lactate:   Recent Labs     09/23/22  0827   LACTATE 0.87     Cultures:  -----------------------------------------------------------------  RAD:   XR CHEST PORTABLE   Final Result   1. Bilateral multifocal pneumonia with improvement in the right    pulmonary consolidation since prior study from 9/23/22   2. Mild to moderate left pleural effusion          XR CHEST PORTABLE   Final Result   1. Support lines and tubes are stable. 2.  Stable small right lateral pneumothorax and right basilar    chest tube. 3.  Stable patchy bilateral airspace disease. XR CHEST PORTABLE   Final Result   1. Satisfactory position of right internal jugular central line   2. No interval change in endotracheal tube and nasogastric tube positioning. 3. Extensive bilateral airspace disease with no changes. 4. Left pleural effusion with retrocardiac opacity, unchanged   5. Small stable tiny right lateral pneumothorax and stable position of right chest tube,, Pleurx catheter. XR ABDOMEN (KUB) (SINGLE AP VIEW)   Final Result   1. No residual pneumothorax. 2.  Mild patchy airspace disease bilaterally worse on the right than on prior examination. 3.  Non-obstructive bowel gas pattern. Mildly distended stomach. XR CHEST PORTABLE   Final Result   1. No residual pneumothorax. 2.  Mild patchy airspace disease bilaterally worse on the right than on prior examination. 3.  Non-obstructive bowel gas pattern. Mildly distended stomach. XR CHEST PORTABLE   Final Result      1. Small right pneumothorax appears slightly increased. 2. Mild patchy airspace opacity bilaterally. XR CHEST PORTABLE   Final Result     Pleurx catheter at the right lung base. Trace right    pneumothorax is unchanged. XR CHEST PORTABLE   Final Result      Right-sided chest tube evident in the base, its tip medially.  Improvement in the right-sided pneumothorax, since earlier today. Possible medial collapse of the right lower lobe. Small left effusion. Patchy airspace density/atelectasis in the lungs bilaterally, with no other significant change. XR CHEST PORTABLE   Final Result   1. Right-sided Pleurx catheter in satisfactory position in the lung bases   2. Right-sided post operative pneumothorax, approximately 10%         XR CHEST PORTABLE    (Results Pending)         Assessment/Plan:   Rina Jackson is a 65 y.o. male with a PMH of CHF (CHFpEF), carotid stenosis, HLD, HTN, OA, Restrictive Lung Disease, T2DM who underwent VATS with PleurX catheter placement on 9/19/2022 for recurrent bilateral loculated pleural effusions and has had hypoxia/hypercapnia since that time. Neuro/Sedation:  Sedated with propofol 25 today    CV:  Hypotension  Requiring 4 mcg of Levophed  -Began requiring pressors after intubation, attributed to sedation and positive pressure ventilation     Congestive Heart Failure with Preserved Ejection Fraction  EF ~65% 6/16/2022  -BNP on 9/23 elevated to 83588 (from 4000)    Respiratory:   Patient admitted to ICU for inability to tolerate BIPAP for hypercapnic respiratory failure. Pulmonary service had been seeing the patient while on the floor. Patient became exhausted on BIPAP. Options of hospice vs intubation were discussed with the patient and family. Patient voiced agreement to tracheostomy, should it be required. Critical care service consulted for vent management. SpO2 98% on MV  Vent Settings: Vent Mode: AC/PRVC Resp Rate (Set): 18 bmp/Vt (Set, mL): 450 mL/ /FiO2 : 30 % PEEP 5  Recent Labs     09/23/22  0827 09/23/22  1000   PHART 7.603* 7.454*   MIA8YON 38.7 53.8*   PO2ART 81.7 89.9     Acute Hypoxic/Hypercapnic respiratory failure  Recurrent pleural effusions  The etiology of his recurrent pleural effusions is unclear. No studies done on fluid from original thoracenteses. Possibly malignancy.  VATS fluid results showing an exudative picture, per [facility] Criteria. S/p VATS procedure 9/19, with right PleurX catheter placement. Unclear etiology for his restrictive lung disease. PleurX catheter place  -Methylprednisolone IV TID  On mechanical ventilation, he has developed respiratory alkalosis, with significant drop in PCO2 from his baseline. Ventilator support reduced accordingly, decreasing VT and fR, with minute volume 9.4 L. Subsequent arterial pH 7.45, PCO2 54, PO2 90    Renal:  Ansari Catheter in place  ZANA  Low urine output 9/22-9/23: 1141 mL in 24 hrs. Overall fluid balance increasingly positive. Creatinine at 1.5 today from 1.6 yesterday. Was 0.6 on 9/19.    - Maintenance fluids stopped for concern for volume overload, pulmonary edema. BNP increased to 15,496 from 4139  -Increased furosemide from 10 to 15mg/hr    T2DM  Last HbA1C in 1/22 of 8.1. Patient is on at least 10 units of [place] at home. Glucose of 325 this AM, likely secondary to steroids  -HDSS  -Will add 10 units lantus nightly and 5 units lispro TID  -POCT q4h  -Hypoglycemia protocol    Code Status: Full Code  FEN: NPO, NG tube in place. Hold feeds for Levo requirement >7  PPX: Pepcid IV  DISPO: ICU while requiring mechanical ventilation  ELOS: 1 week ICU  Barriers to discharge: requiring mechanical ventilation    Nic Sparks MD, PGY-1  09/24/22  3:18 PM    This patient has been staffed and discussed with Dr. Irasema Shoemaker. Patient examined, findings as discussed with Dr. Adrian Rojas. Agree with assessment and plan. Respiratory failure secondary to pleural disease with severe ventilatory restriction, and now superimposed pulmonary edema. Source of edema suspected cardiogenic, given elevated BNP. He has had improvement radiographically without substantial diuresis. Pulmonary compliance is improving. Gas exchange requires slightly less FiO2 support.   He requires ongoing sedation to limit patient work of breathing and prevent interference with ventilator support. These parameters have improved but are not at a level yet where we might consider withdrawing or decreasing ventilator support. Follow-up with chest radiographs and BNP. Family updated regarding progress and plan.   Time spent in critical care 45 minutes

## 2022-09-24 NOTE — PROGRESS NOTES
Patient UOP borderline since onset of shift. Care team aware;  Lasix gtt restarted today; will continue to monitor progress.

## 2022-09-24 NOTE — PLAN OF CARE
Problem: Chronic Conditions and Co-morbidities  Goal: Patient's chronic conditions and co-morbidity symptoms are monitored and maintained or improved  9/23/2022 2013 by Juan Carlos Fink RN  Outcome: Progressing     Problem: Discharge Planning  Goal: Discharge to home or other facility with appropriate resources  9/23/2022 2013 by Juan Carlos Fink RN  Outcome: Progressing     Problem: Pain  Goal: Verbalizes/displays adequate comfort level or baseline comfort level  9/23/2022 2013 by Juan Carlos Fink RN  Outcome: Progressing     Problem: Safety - Adult  Goal: Free from fall injury  9/23/2022 2013 by Juan Carlos Fink RN  Outcome: Progressing     Problem: Skin/Tissue Integrity  Goal: Absence of new skin breakdown  Description: 1. Monitor for areas of redness and/or skin breakdown  2. Assess vascular access sites hourly  3. Every 4-6 hours minimum:  Change oxygen saturation probe site  4. Every 4-6 hours:  If on nasal continuous positive airway pressure, respiratory therapy assess nares and determine need for appliance change or resting period. 9/23/2022 2013 by Juan Carlos Fink RN  Outcome: Progressing     Problem: Safety - Medical Restraint  Goal: Remains free of injury from restraints (Restraint for Interference with Medical Device)  Description: INTERVENTIONS:  1. Determine that other, less restrictive measures have been tried or would not be effective before applying the restraint  2. Evaluate the patient's condition at the time of restraint application  3. Inform patient/family regarding the reason for restraint  4.  Q2H: Monitor safety, psychosocial status, comfort, nutrition and hydration  9/23/2022 2013 by Juan Carlos Fink RN  Outcome: Progressing     Problem: Nutrition Deficit:  Goal: Optimize nutritional status  9/23/2022 2013 by Juan Carlos Fink RN  Outcome: Progressing     Problem: Chronic Conditions and Co-morbidities  Goal: Patient's chronic conditions and co-morbidity symptoms are monitored and maintained or improved  9/23/2022 2013 by Santos Poon RN  Outcome: Progressing  9/23/2022 1855 by Naina Cain RN  Outcome: Progressing  Flowsheets (Taken 9/23/2022 0800)  Care Plan - Patient's Chronic Conditions and Co-Morbidity Symptoms are Monitored and Maintained or Improved:   Monitor and assess patient's chronic conditions and comorbid symptoms for stability, deterioration, or improvement   Collaborate with multidisciplinary team to address chronic and comorbid conditions and prevent exacerbation or deterioration   Update acute care plan with appropriate goals if chronic or comorbid symptoms are exacerbated and prevent overall improvement and discharge  9/23/2022 0728 by Ruben Beltre RN  Outcome: Not Progressing     Problem: Discharge Planning  Goal: Discharge to home or other facility with appropriate resources  9/23/2022 2013 by Santos Poon RN  Outcome: Progressing  9/23/2022 1855 by Naina Cain RN  Outcome: Progressing  Flowsheets (Taken 9/23/2022 0800)  Discharge to home or other facility with appropriate resources:   Identify barriers to discharge with patient and caregiver   Arrange for needed discharge resources and transportation as appropriate   Identify discharge learning needs (meds, wound care, etc)   Arrange for interpreters to assist at discharge as needed  9/23/2022 0728 by Ruben Beltre RN  Outcome: Not Progressing     Problem: Pain  Goal: Verbalizes/displays adequate comfort level or baseline comfort level  9/23/2022 2013 by Santos Poon RN  Outcome: Progressing  9/23/2022 1855 by Naina Cain RN  Outcome: Progressing  9/23/2022 0728 by Ruben Beltre RN  Outcome: Not Progressing     Problem: Safety - Adult  Goal: Free from fall injury  9/23/2022 2013 by Santos Poon RN  Outcome: Progressing  9/23/2022 1855 by Naina Cain RN  Outcome: Progressing  Flowsheets (Taken 9/23/2022 0930)  Free From Fall Injury:   Instruct family/caregiver on patient safety   Based on caregiver fall risk screen, instruct family/caregiver to ask for assistance with transferring infant if caregiver noted to have fall risk factors  9/23/2022 0728 by Sanjuana Pereyra RN  Outcome: Not Progressing  Flowsheets (Taken 9/22/2022 2000)  Free From Fall Injury: Instruct family/caregiver on patient safety     Problem: Skin/Tissue Integrity  Goal: Absence of new skin breakdown  Description: 1. Monitor for areas of redness and/or skin breakdown  2. Assess vascular access sites hourly  3. Every 4-6 hours minimum:  Change oxygen saturation probe site  4. Every 4-6 hours:  If on nasal continuous positive airway pressure, respiratory therapy assess nares and determine need for appliance change or resting period. 9/23/2022 2013 by Yadira Gil RN  Outcome: Progressing  9/23/2022 1855 by Sherryle Phy, RN  Outcome: Progressing  9/23/2022 0728 by Sanjuana Pereyra RN  Outcome: Not Progressing     Problem: Safety - Medical Restraint  Goal: Remains free of injury from restraints (Restraint for Interference with Medical Device)  Description: INTERVENTIONS:  1. Determine that other, less restrictive measures have been tried or would not be effective before applying the restraint  2. Evaluate the patient's condition at the time of restraint application  3. Inform patient/family regarding the reason for restraint  4.  Q2H: Monitor safety, psychosocial status, comfort, nutrition and hydration  9/23/2022 2013 by Yadira Gil RN  Outcome: Progressing  9/23/2022 1855 by Sherryle Phy, RN  Outcome: Progressing  Flowsheets (Taken 9/23/2022 0800)  Remains free of injury from restraints (restraint for interference with medical device):   Determine that other, less restrictive measures have been tried or would not be effective before applying the restraint   Evaluate the patient's condition at the time of restraint application   Inform patient/family regarding the reason for restraint   Every 2 hours: Monitor safety, psychosocial status, comfort, nutrition and hydration  9/23/2022 0728 by Hector Whaley RN  Outcome: Not Progressing  Flowsheets  Taken 9/23/2022 0700  Remains free of injury from restraints (restraint for interference with medical device):   Determine that other, less restrictive measures have been tried or would not be effective before applying the restraint   Evaluate the patient's condition at the time of restraint application   Inform patient/family regarding the reason for restraint   Every 2 hours: Monitor safety, psychosocial status, comfort, nutrition and hydration  Taken 9/23/2022 0600  Remains free of injury from restraints (restraint for interference with medical device):   Determine that other, less restrictive measures have been tried or would not be effective before applying the restraint   Evaluate the patient's condition at the time of restraint application   Inform patient/family regarding the reason for restraint   Every 2 hours: Monitor safety, psychosocial status, comfort, nutrition and hydration  Taken 9/23/2022 0500  Remains free of injury from restraints (restraint for interference with medical device):   Determine that other, less restrictive measures have been tried or would not be effective before applying the restraint   Evaluate the patient's condition at the time of restraint application   Inform patient/family regarding the reason for restraint   Every 2 hours: Monitor safety, psychosocial status, comfort, nutrition and hydration  Taken 9/23/2022 0400  Remains free of injury from restraints (restraint for interference with medical device):   Determine that other, less restrictive measures have been tried or would not be effective before applying the restraint   Evaluate the patient's condition at the time of restraint application   Inform patient/family regarding the reason for restraint   Every 2 hours: Monitor safety, psychosocial status, comfort, nutrition and hydration  Taken 9/23/2022 0300  Remains free of injury from restraints (restraint for interference with medical device):   Determine that other, less restrictive measures have been tried or would not be effective before applying the restraint   Evaluate the patient's condition at the time of restraint application   Inform patient/family regarding the reason for restraint   Every 2 hours: Monitor safety, psychosocial status, comfort, nutrition and hydration  Taken 9/23/2022 0200  Remains free of injury from restraints (restraint for interference with medical device):   Determine that other, less restrictive measures have been tried or would not be effective before applying the restraint   Evaluate the patient's condition at the time of restraint application   Inform patient/family regarding the reason for restraint   Every 2 hours: Monitor safety, psychosocial status, comfort, nutrition and hydration  Taken 9/23/2022 0100  Remains free of injury from restraints (restraint for interference with medical device):   Determine that other, less restrictive measures have been tried or would not be effective before applying the restraint   Evaluate the patient's condition at the time of restraint application   Inform patient/family regarding the reason for restraint   Every 2 hours: Monitor safety, psychosocial status, comfort, nutrition and hydration  Taken 9/23/2022 0000  Remains free of injury from restraints (restraint for interference with medical device):   Evaluate the patient's condition at the time of restraint application   Determine that other, less restrictive measures have been tried or would not be effective before applying the restraint   Inform patient/family regarding the reason for restraint   Every 2 hours: Monitor safety, psychosocial status, comfort, nutrition and hydration  Taken 9/22/2022 2300  Remains free of injury from restraints (restraint for interference with medical device):   Determine that other, less restrictive measures have been tried or would not be effective before applying the restraint   Evaluate the patient's condition at the time of restraint application   Inform patient/family regarding the reason for restraint   Every 2 hours: Monitor safety, psychosocial status, comfort, nutrition and hydration  Taken 9/22/2022 2200  Remains free of injury from restraints (restraint for interference with medical device):   Determine that other, less restrictive measures have been tried or would not be effective before applying the restraint   Evaluate the patient's condition at the time of restraint application   Inform patient/family regarding the reason for restraint   Every 2 hours: Monitor safety, psychosocial status, comfort, nutrition and hydration  Taken 9/22/2022 2100  Remains free of injury from restraints (restraint for interference with medical device):   Determine that other, less restrictive measures have been tried or would not be effective before applying the restraint   Evaluate the patient's condition at the time of restraint application   Inform patient/family regarding the reason for restraint   Every 2 hours: Monitor safety, psychosocial status, comfort, nutrition and hydration  Taken 9/22/2022 2000  Remains free of injury from restraints (restraint for interference with medical device):   Determine that other, less restrictive measures have been tried or would not be effective before applying the restraint   Evaluate the patient's condition at the time of restraint application   Inform patient/family regarding the reason for restraint   Every 2 hours: Monitor safety, psychosocial status, comfort, nutrition and hydration     Problem: Nutrition Deficit:  Goal: Optimize nutritional status  9/23/2022 2013 by Uche Ochoa RN  Outcome: Progressing  9/23/2022 1855 by Aubrey Jeffers RN  Outcome: Progressing  9/23/2022 0728 by Paola Cash RN  Outcome: Not Progressing

## 2022-09-24 NOTE — PROGRESS NOTES
Patient intubated and sedated upon shift arrival. Responsive to pain with eyes opening to stimulation. Does not follow command. Patient LEVO running at 3. Patient hyper-sensitive to titrations against MAP goal of 65. Will continue to monitor.

## 2022-09-24 NOTE — PROGRESS NOTES
Patient maintained BP with levophed-titrated according to BP;   1100 Lasix infusion increased to 15mg/hr  1400 Glucose 325-458mg/dl; referred accordingly.   1600 repeat blood sugar-405 mg/dl  Medicine resident aware

## 2022-09-24 NOTE — PROGRESS NOTES
BILITOT, ALKPHOS in the last 72 hours. Troponin: No results for input(s): TROPONINI in the last 72 hours. BNP: No results for input(s): BNP in the last 72 hours. ABGs:   Recent Labs     09/23/22 0827 09/23/22  1000   PHART 7.603* 7.454*   VPP0AZR 38.7 53.8*   PO2ART 81.7 89.9       INR:   No results for input(s): INR in the last 72 hours. U/A:No results for input(s): NITRITE, COLORU, PHUR, LABCAST, WBCUA, RBCUA, MUCUS, TRICHOMONAS, YEAST, BACTERIA, CLARITYU, SPECGRAV, LEUKOCYTESUR, UROBILINOGEN, BILIRUBINUR, BLOODU, GLUCOSEU, AMORPHOUS in the last 72 hours. Invalid input(s): Javy Ends     Rad:   XR CHEST PORTABLE   Final Result   1. Bilateral multifocal pneumonia with improvement in the right    pulmonary consolidation since prior study from 9/23/22   2. Mild to moderate left pleural effusion          XR CHEST PORTABLE   Final Result   1. Support lines and tubes are stable. 2.  Stable small right lateral pneumothorax and right basilar    chest tube. 3.  Stable patchy bilateral airspace disease. XR CHEST PORTABLE   Final Result   1. Satisfactory position of right internal jugular central line   2. No interval change in endotracheal tube and nasogastric tube positioning. 3. Extensive bilateral airspace disease with no changes. 4. Left pleural effusion with retrocardiac opacity, unchanged   5. Small stable tiny right lateral pneumothorax and stable position of right chest tube,, Pleurx catheter. XR ABDOMEN (KUB) (SINGLE AP VIEW)   Final Result   1. No residual pneumothorax. 2.  Mild patchy airspace disease bilaterally worse on the right than on prior examination. 3.  Non-obstructive bowel gas pattern. Mildly distended stomach. XR CHEST PORTABLE   Final Result   1. No residual pneumothorax. 2.  Mild patchy airspace disease bilaterally worse on the right than on prior examination. 3.  Non-obstructive bowel gas pattern. Mildly distended stomach.       XR CHEST PORTABLE   Final Result      1. Small right pneumothorax appears slightly increased. 2. Mild patchy airspace opacity bilaterally. XR CHEST PORTABLE   Final Result     Pleurx catheter at the right lung base. Trace right    pneumothorax is unchanged. XR CHEST PORTABLE   Final Result      Right-sided chest tube evident in the base, its tip medially. Improvement in the right-sided pneumothorax, since earlier today. Possible medial collapse of the right lower lobe. Small left effusion. Patchy airspace density/atelectasis in the lungs bilaterally, with no other significant change. XR CHEST PORTABLE   Final Result   1. Right-sided Pleurx catheter in satisfactory position in the lung bases   2. Right-sided post operative pneumothorax, approximately 10%             ASSESSMENT AND PLAN:   Vlad Harper is a 65 y.o. male with history of diastolic heart failure, atrial fibrillation, and DM with recurrent pleural effusions s/p R PleurX catheter placement (9/19), POD #5. Neuro:   Analgesia  - Continue scheduled Tylenol  Propofol for sedation - wean as tolerated. Cardiovascular:   History of Paroxysmal Atrial Fibrillation  - continue Apixaban 5mg bid  - Metoprolol IV    HFpEF  - Metoprolol IV (hold due to hypotension), currently on Levo  - Michalene Melquiades held  - Last Echo (1/25/22): LVEF = 71-77%, gr2 diastolic dysfunction    Hyperlipidemia  - Pravastatin 40mg    Pulmonary:   S/P R PleurX catheter placement (9/19), POD #5  - transition to water seal today  - SW/HHC pending    Acute on chronic respiratory failure  - Mechanically intubated on 9/22. Currently PRVC 22/480/15/30%. Vent management per pulm  - Inpatient consult to pulmonology (Dr Gadiel Carney) appreciate recommendations.   - Baseline on home O2 3-4L NC   - Wean Solu-medrol to 75 mg Q8    GI:   - Miralax, Senna-S  - continue TF at goal  - recommend holding TF if pressor requirement increases to greater than 5 of Levo    FEN:   IV fluids: none, currently on lasix drip  Replace electrolytes per protocol  Diet: Diet NPO    /Renal:   Cont Ansari   Creatinine 1.5 from 1.6 from 1.7 from 1.5  BUN elevated, continue lasix drip per crit care     Hem/ID:   Hemoglobin 7.8 from 8.0 from 8.3 from 8.5  WBC 15.7 from 14.7 from 19.7 from 15.5    Endo:   History of DMII  - Last A1C 8.1% (1/24/22)  - High-dose sliding scale insulin  - Solu-medrol to 75 mg Q8    Prophylaxis:   DVT Ppx: Eliquis  GI Ppx: n/a    Access:  Central Access: R IJ CVC, placed 9/22  Peripheral Access: IV x2  Arterial Line Access: L radial, placed 9/22                              Ansari Date placed: 09/24/22    Mobility:  N/A    Dispo:   ICU    Code Status: Full Code  -----------------------------  Nelson Germain DO  PGY1, General Surgery  09/24/22  8:14 AM  [place]  Pager: [phone]    I am post-call today and will not be on campus. Please contact Dr. Jody Hernandez at [phone] for questions or concerns regarding this patient.

## 2022-09-24 NOTE — PLAN OF CARE
Problem: Safety - Medical Restraint  Goal: Remains free of injury from restraints (Restraint for Interference with Medical Device)  Description: INTERVENTIONS:  1. Determine that other, less restrictive measures have been tried or would not be effective before applying the restraint  2. Evaluate the patient's condition at the time of restraint application  3. Inform patient/family regarding the reason for restraint  4.  Q2H: Monitor safety, psychosocial status, comfort, nutrition and hydration  Outcome: Progressing  Flowsheets  Taken 9/24/2022 1656  Remains free of injury from restraints (restraint for interference with medical device):   Determine that other, less restrictive measures have been tried or would not be effective before applying the restraint   Evaluate the patient's condition at the time of restraint application   Every 2 hours: Monitor safety, psychosocial status, comfort, nutrition and hydration  Taken 9/24/2022 0800  Remains free of injury from restraints (restraint for interference with medical device):   Determine that other, less restrictive measures have been tried or would not be effective before applying the restraint   Every 2 hours: Monitor safety, psychosocial status, comfort, nutrition and hydration     Problem: Chronic Conditions and Co-morbidities  Goal: Patient's chronic conditions and co-morbidity symptoms are monitored and maintained or improved  Outcome: Not Progressing  Flowsheets (Taken 9/24/2022 1656)  Care Plan - Patient's Chronic Conditions and Co-Morbidity Symptoms are Monitored and Maintained or Improved:   Monitor and assess patient's chronic conditions and comorbid symptoms for stability, deterioration, or improvement   Collaborate with multidisciplinary team to address chronic and comorbid conditions and prevent exacerbation or deterioration   Update acute care plan with appropriate goals if chronic or comorbid symptoms are exacerbated and prevent overall improvement and discharge

## 2022-09-25 NOTE — PLAN OF CARE
Problem: Chronic Conditions and Co-morbidities  Goal: Patient's chronic conditions and co-morbidity symptoms are monitored and maintained or improved  Outcome: Progressing  Flowsheets (Taken 9/24/2022 8941)  Care Plan - Patient's Chronic Conditions and Co-Morbidity Symptoms are Monitored and Maintained or Improved:   Monitor and assess patient's chronic conditions and comorbid symptoms for stability, deterioration, or improvement   Collaborate with multidisciplinary team to address chronic and comorbid conditions and prevent exacerbation or deterioration   Update acute care plan with appropriate goals if chronic or comorbid symptoms are exacerbated and prevent overall improvement and discharge     Problem: Safety - Medical Restraint  Goal: Remains free of injury from restraints (Restraint for Interference with Medical Device)  Description: INTERVENTIONS:  1. Determine that other, less restrictive measures have been tried or would not be effective before applying the restraint  2. Evaluate the patient's condition at the time of restraint application  3. Inform patient/family regarding the reason for restraint  4.  Q2H: Monitor safety, psychosocial status, comfort, nutrition and hydration  9/25/2022 1757 by Samanta Magallanes RN  Outcome: Progressing

## 2022-09-25 NOTE — PROGRESS NOTES
Vss levophed continues at 1 mcg/min propofol 25 mcg/kg/min recent  insulin 2.88 unit/hr. Total output from chest tube 50 ml serous drainage. Total output from barry 840 ml. Incontinent of stool x1.

## 2022-09-25 NOTE — PROGRESS NOTES
ICU CT SURGERY DAILY PROGRESS NOTE    CC: Pleural effusions s/p R PleurX catheter placement    SUBJECTIVE:   Interval Hx:   Patient remains intubated. Afebrile and HDS with minimal vent setting at 18/450/5/30%. Levo at 1, Lasix gtt at 15, Propofol at 25. Urine output is appropriate. Had a BM last night. Chest tube remains in place. TF at goal 40cc/hr. ROS: A 14 point review of systems was conducted, significant findings as noted above. All other systems negative. OBJECTIVE:   Vitals:   Vitals:    09/25/22 0430 09/25/22 0445 09/25/22 0500 09/25/22 0600   BP:   (!) 100/56 (!) 84/59   Pulse: 94 86 89 91   Resp: 10 10 12 10   Temp:       TempSrc:       SpO2: 98% 98% 98% 97%   Weight:       Height:           I/O:   Intake/Output Summary (Last 24 hours) at 9/25/2022 0717  Last data filed at 9/25/2022 0616  Gross per 24 hour   Intake 1767.47 ml   Output 1475 ml   Net 292.47 ml       I/O last 3 completed shifts: In: 2543.5 [I.V.:673.5; NG/GT:1610; IV Piggyback:260]  Out: 2095 [Urine:1875; Chest Tube:220]    Diet: Diet NPO      Physical Examination:   General appearance: Sedated and mechanically ventilated. HEENT: NC/AT, EOMI, trachea midline, no JVD. Corpak in right nostril w/ bridle. ETT in place. Chest/Lungs: Decreased breath sounds bilaterally, increased work of breathing; R PleurX catheter to water seal without airleak, has serosanguinous output  Cardiovascular: Atrial fibrillation   Abdomen: Soft, non-tender, non-distended  Genitourinary:  Ansari in place with clear yellow urine; 1325cc/24hr  Skin: Warm and dry, no rashes  Extremities: No edema, no cyanosis; SCDs in place      Labs:  CBC:   Recent Labs     09/23/22  0334 09/24/22  0424 09/25/22  0521   WBC 14.7* 15.7* 19.1*   HGB 7.5* 7.8* 8.0*   HCT 24.1* 25.1* 25.6*    315 302         BMP:   Recent Labs     09/24/22  0425 09/24/22  1842 09/25/22  0521    134* 136   K 3.6 3.9 4.1   CL 89* 88* 91*   CO2 38* 34* 36*   BUN 99* 108* 110*   CREATININE 1. 5* 1.7* 1.5*   GLUCOSE 325* 414* 122*       LFT's:   No results for input(s): AST, ALT, ALB, BILITOT, ALKPHOS in the last 72 hours. Troponin: No results for input(s): TROPONINI in the last 72 hours. BNP: No results for input(s): BNP in the last 72 hours. ABGs:   Recent Labs     09/23/22  0827 09/23/22  1000   PHART 7.603* 7.454*   ZDT0BUA 38.7 53.8*   PO2ART 81.7 89.9       INR:   No results for input(s): INR in the last 72 hours. U/A:No results for input(s): NITRITE, COLORU, PHUR, LABCAST, WBCUA, RBCUA, MUCUS, TRICHOMONAS, YEAST, BACTERIA, CLARITYU, SPECGRAV, LEUKOCYTESUR, UROBILINOGEN, BILIRUBINUR, BLOODU, GLUCOSEU, AMORPHOUS in the last 72 hours. Invalid input(s): Key Locus     Rad:   XR CHEST PORTABLE   Final Result      Stable small right-sided pneumothorax. More prominent emphysema over the lower neck. No change in bilateral airspace disease. Stable left pleural effusion. XR CHEST PORTABLE   Final Result   1. Bilateral multifocal pneumonia with improvement in the right    pulmonary consolidation since prior study from 9/23/22   2. Mild to moderate left pleural effusion          XR CHEST PORTABLE   Final Result   1. Support lines and tubes are stable. 2.  Stable small right lateral pneumothorax and right basilar    chest tube. 3.  Stable patchy bilateral airspace disease. XR CHEST PORTABLE   Final Result   1. Satisfactory position of right internal jugular central line   2. No interval change in endotracheal tube and nasogastric tube positioning. 3. Extensive bilateral airspace disease with no changes. 4. Left pleural effusion with retrocardiac opacity, unchanged   5. Small stable tiny right lateral pneumothorax and stable position of right chest tube,, Pleurx catheter. XR ABDOMEN (KUB) (SINGLE AP VIEW)   Final Result   1. No residual pneumothorax. 2.  Mild patchy airspace disease bilaterally worse on the right than on prior examination. 3.  Non-obstructive bowel gas pattern. Mildly distended stomach. XR CHEST PORTABLE   Final Result   1. No residual pneumothorax. 2.  Mild patchy airspace disease bilaterally worse on the right than on prior examination. 3.  Non-obstructive bowel gas pattern. Mildly distended stomach. XR CHEST PORTABLE   Final Result      1. Small right pneumothorax appears slightly increased. 2. Mild patchy airspace opacity bilaterally. XR CHEST PORTABLE   Final Result     Pleurx catheter at the right lung base. Trace right    pneumothorax is unchanged. XR CHEST PORTABLE   Final Result      Right-sided chest tube evident in the base, its tip medially. Improvement in the right-sided pneumothorax, since earlier today. Possible medial collapse of the right lower lobe. Small left effusion. Patchy airspace density/atelectasis in the lungs bilaterally, with no other significant change. XR CHEST PORTABLE   Final Result   1. Right-sided Pleurx catheter in satisfactory position in the lung bases   2. Right-sided post operative pneumothorax, approximately 10%             ASSESSMENT AND PLAN:   James Echevarria is a 65 y.o. male with history of diastolic heart failure, atrial fibrillation, and DM with recurrent pleural effusions s/p R PleurX catheter placement (9/19), POD #6. Neuro:   Analgesia  - Continue scheduled Tylenol  - Propofol for sedation; wean as tolerated    Cardiovascular:   History of paroxysmal atrial fibrillation  - Continue Apixaban 5mg bid  - Metoprolol IV    HFpEF  - Metoprolol IV (hold due to hypotension), currently on Levo 1mcg/kg/hr  - [place] held  - Last echo (1/25/22): LVEF = 02-75%, grade 2 diastolic dysfunction    Hyperlipidemia  - Pravastatin 40mg    Pulmonary:   S/P R PleurX catheter placement (9/19), POD #6  - continue to water seal   - SW/HHC pending    Acute on chronic respiratory failure  - Mechanically intubated on 9/22. Currently PRVC 18/450/5/30%. Vent management per pulm  - Inpatient consult to pulmonology (Dr Gadiel Carney) appreciate recommendations.   - Baseline on home O2 3-4L NC   - Wean Solu-medrol to 75 mg Q8  - CXR 09/25 with stable small R-sided PNX, more prominent emphysema over lower neck; stable L pleural effusion    GI:   - Miralax, Senna-S  - continue TF at goal  - Recommend holding TF if pressor requirement increases to greater than 5 of Levo    FEN:   - IV fluids: none, currently on lasix drip  - Replace electrolytes per protocol  - Diet: Diet NPO    /Renal:   - Cont Ansari   - Creatinine 1.5 from 1.7, 1.5, 1.6, 1.7  - BUN elevated, continue lasix drip per crit care     Hem/ID:   - Hemoglobin 8.0 from 7.8, 7.5, 8.0, 8.3  - WBC 19.1 from 15.7, 14.7, 19.7    Endo:   History of DMII  - Last A1C 8.1% (1/24/22)  - High-dose sliding scale insulin  - Solu-medrol to 75 mg Q8    Prophylaxis:   DVT Ppx: Eliquis, SCDs  GI Ppx: n/a    Access:  Central Access: R IJ CVC, placed 9/22  Peripheral Access: IV x2  Arterial Line Access: L radial, placed 9/22                              Ansari Date placed: 09/25/22    Mobility:  N/A    Dispo:   ICU    Code Status: Full Code  -----------------------------  Jeni Hogue DO, [address] [facility]  PGY1, General Surgery  09/25/22  7:25 AM  [employer]  Pager: [phone]

## 2022-09-25 NOTE — PROGRESS NOTES
Blood sugar 414 from renal panel at 1842 insulin gtt started at 6.72 unit/hr will monitor BS hourly.

## 2022-09-25 NOTE — PROGRESS NOTES
On MV with FIO2-30%,  Sedated with Propofol 20 mcg; RAAS (-1)  BP maintained with Levophed (2mcg/min)  Lasix infusion 15 mg/H (/12 hrs)  Insulin inf according to glucose monitoring. Chest tube-minimal serous drain. Received Diuril 250mg toDAY.

## 2022-09-25 NOTE — PROGRESS NOTES
ICU Progress Note    Admit Date: 9/19/2022  Day: 6  Vent Day:  3  IV Access:    IV Fluids:    Vasopressors:    Antibiotics:    Diet:  Diet NPO    CC: Pleural effusion (right) s/p VATS and Pleural catheter (PleurX) placement    Interval History: No overnight events. Patient is still sedate and intubated. Volume status is getting better but will continue to diurese     HPI: Rina Carpio is a 66 yo Male with a PMH of CHF (CHFpEF), carotid stenosis, HLD, HTN, OA, Restrictive Lung Disease, T2DM who underwent VATS with Pleurx catheter placement on 9/19/2022 for recurrent bilateral loculated pleural effusions and has had hypoxia/hypercapnia since that time now requiring intubation. The etiology of his recurrent pleural effusions is unclear. Possibly malignancy. In addition to his recurrent bilateral loculated pleural effusions, he has lost over 100 lbs over the last year, some of which his wife attributes to intentional weight loss. Initially, he was started on BiPAP post-procedure for rapid shallow breathing and slow clearance of sedation. He wasn't tolerating the BiPAP well, trying to remove it. After discussion with family and patient (while on a break from BiPAP), the decision was made to intubate and confirmed that he's OK with tracheostomy if necessary. \"      Review of Systems Intubate and sedated    Objective     Scheduled Meds:     potassium bicarb-citric acid  20 mEq Oral Daily    methylPREDNISolone  80 mg IntraVENous Q8H    chlorhexidine  15 mL Mouth/Throat BID    famotidine (PEPCID) injection  20 mg IntraVENous Daily    [Held by provider] metoprolol  5 mg IntraVENous Q6H    apixaban  5 mg Oral BID    sodium chloride flush  5-40 mL IntraVENous 2 times per day    polyethylene glycol  17 g Oral Daily     Continuous Infusions:     norepinephrine 1 mcg/min (09/25/22 0616)    furosemide (LASIX) 1mg/ml infusion 15 mg/hr (09/25/22 0616)    insulin 1.9 Units/hr (09/25/22 0710)    propofol 25 mcg/kg/min (09/25/22 4831)    sodium chloride      dextrose       PRN Meds:  acetaminophen, haloperidol lactate, albuterol, sodium chloride flush, sodium chloride, ondansetron **OR** ondansetron, glucose, dextrose bolus **OR** dextrose bolus, glucagon (rDNA), dextrose, hydrALAZINE, levalbuterol    Vitals:   T-max:  Patient Vitals for the past 8 hrs:   BP Temp Temp src Pulse Resp SpO2   09/25/22 0600 (!) 84/59 -- -- 91 10 97 %   09/25/22 0500 (!) 100/56 -- -- 89 12 98 %   09/25/22 0445 -- -- -- 86 10 98 %   09/25/22 0430 -- -- -- 94 10 98 %   09/25/22 0415 -- -- -- 100 (!) 9 --   09/25/22 0401 -- -- -- (!) 102 19 100 %   09/25/22 0400 91/61 97.5 °F (36.4 °C) Axillary (!) 104 16 99 %   09/25/22 0345 -- -- -- 99 12 99 %   09/25/22 0330 -- -- -- 98 (!) 0 98 %   09/25/22 0315 -- -- -- 90 (!) 0 99 %   09/25/22 0300 103/60 -- -- 96 (!) 0 99 %   09/25/22 0245 -- -- -- 100 (!) 0 99 %   09/25/22 0230 -- -- -- 100 (!) 0 99 %   09/25/22 0215 -- -- -- 91 (!) 0 100 %   09/25/22 0200 100/62 -- -- (!) 109 (!) 0 99 %   09/25/22 0145 -- -- -- 89 (!) 0 100 %   09/25/22 0130 -- -- -- 87 (!) 0 100 %   09/25/22 0115 -- -- -- 98 (!) 0 100 %   09/25/22 0100 103/64 -- -- 91 (!) 0 100 %   09/25/22 0045 -- -- -- 94 (!) 0 99 %   09/25/22 0030 -- -- -- 87 (!) 0 99 %   09/25/22 0015 -- -- -- 93 (!) 0 98 %   09/25/22 0001 -- -- -- 94 (!) 0 98 %   09/25/22 0000 113/69 98 °F (36.7 °C) Axillary 97 (!) 0 98 %   09/24/22 2345 -- -- -- (!) 101 (!) 0 99 %         Intake/Output Summary (Last 24 hours) at 9/25/2022 0735  Last data filed at 9/25/2022 7373  Gross per 24 hour   Intake 1767.47 ml   Output 1475 ml   Net 292.47 ml       Access:   Central:  Day                                  Peripheral:  Day   Arterial:  Day                             Ansari Day:  NGT Day:                                           ETT Day:  Vent Settings:  RA, assist control, or bilevel/PEEP /FiO2 /RR / TV   Vent Mode: AC/PRVC Resp Rate (Set): 18 bmp/Vt (Set, mL): 450 mL/ /FiO2 : 30 %    ABGs:    Recent Labs     09/23/22  0615 09/23/22  0827 09/23/22  1000   PHART 7.580* 7.603* 7.454*   MUI4MXB 43.5 38.7 53.8*   PO2ART 99.3 81.7 89.9   DXC7PCE 41* 38.3* 37.8*   BEART 17.2* 17* 14*   C7OSHGNQ 98 98 97   YKX3MGI 42 40 39        Physical Exam:  CONSTITUTIONAL:  no apparent distress, and appears stated age  EYES: Pupils equal round and reactive to light. Normal conjunctiva  EARS, NOSE, MOUTH & THROAT: Normal oropharynx. Ears and nose appear normal  NECK:  Supple, symmetrical, trachea midline, no adenopathy, thyroid symmetric, not enlarged and no tenderness, skin normal  LUNGS:  no increased work of breathing and clear to auscultation. No accessory muscle use  CARDIOVASCULAR:  normal S1 and S2, and no JVD. Edema in upper and lower extremities bilaterally. ABDOMEN:  normal bowel sounds, non-distended and no masses palpated, and no tenderness to palpation. No hepatospleenomegaly  LYMPHADENOPATHY:  no axillary or supraclavicular adenopathy. No cervical adnenopathy  PSYCHIATRIC: Unable to assess due to intubated status  MUSCULOSKELETAL: No obvious misalignment or effusion of the joints. No clubbing, cyanosis of the digits. SKIN:  normal skin color, texture, turgor and no redness, warmth, or swelling.  No palpable nodules    Labs:  CBC:   Recent Labs     09/23/22  0334 09/24/22  0424 09/25/22  0521   WBC 14.7* 15.7* 19.1*   HGB 7.5* 7.8* 8.0*   HCT 24.1* 25.1* 25.6*    315 302   MCV 82.9 82.8 83.0     Renal:    Recent Labs     09/23/22  0334 09/24/22  0424 09/24/22  0425 09/24/22  1842 09/25/22  0521     --  136 134* 136   K 3.8  --  3.6 3.9 4.1   CL 90*  --  89* 88* 91*   CO2 35*  --  38* 34* 36*   BUN 83*  --  99* 108* 110*   CREATININE 1.6*  --  1.5* 1.7* 1.5*   GLUCOSE 207*  --  325* 414* 122*   CALCIUM 8.1*  --  8.1* 8.1* 8.2*   MG 2.60* 2.70*  --   --  2.60*   PHOS 2.9  --  2.7 4.1 3.5   ANIONGAP 12  --  9 12 9     Hepatic:   Recent Labs     09/24/22  0425 09/24/22  5334 09/25/22  0521   LABALBU 2.7* 2.6* 2.5*     Troponin: No results for input(s): TROPONINI in the last 72 hours. BNP: No results for input(s): BNP in the last 72 hours. Lipids: No results for input(s): CHOL, HDL in the last 72 hours. Invalid input(s): LDLCALCU, TRIGLYCERIDE  INR: No results for input(s): INR in the last 72 hours. Lactate:   Recent Labs     09/23/22  0827   LACTATE 0.87     Cultures:      Radiology:   XR CHEST PORTABLE   Final Result      Stable small right-sided pneumothorax. More prominent emphysema over the lower neck. No change in bilateral airspace disease. Stable left pleural effusion. XR CHEST PORTABLE   Final Result   1. Bilateral multifocal pneumonia with improvement in the right    pulmonary consolidation since prior study from 9/23/22   2. Mild to moderate left pleural effusion          XR CHEST PORTABLE   Final Result   1. Support lines and tubes are stable. 2.  Stable small right lateral pneumothorax and right basilar    chest tube. 3.  Stable patchy bilateral airspace disease. XR CHEST PORTABLE   Final Result   1. Satisfactory position of right internal jugular central line   2. No interval change in endotracheal tube and nasogastric tube positioning. 3. Extensive bilateral airspace disease with no changes. 4. Left pleural effusion with retrocardiac opacity, unchanged   5. Small stable tiny right lateral pneumothorax and stable position of right chest tube,, Pleurx catheter. XR ABDOMEN (KUB) (SINGLE AP VIEW)   Final Result   1. No residual pneumothorax. 2.  Mild patchy airspace disease bilaterally worse on the right than on prior examination. 3.  Non-obstructive bowel gas pattern. Mildly distended stomach. XR CHEST PORTABLE   Final Result   1. No residual pneumothorax. 2.  Mild patchy airspace disease bilaterally worse on the right than on prior examination. 3.  Non-obstructive bowel gas pattern.  Mildly distended stomach. XR CHEST PORTABLE   Final Result      1. Small right pneumothorax appears slightly increased. 2. Mild patchy airspace opacity bilaterally. XR CHEST PORTABLE   Final Result     Pleurx catheter at the right lung base. Trace right    pneumothorax is unchanged. XR CHEST PORTABLE   Final Result      Right-sided chest tube evident in the base, its tip medially. Improvement in the right-sided pneumothorax, since earlier today. Possible medial collapse of the right lower lobe. Small left effusion. Patchy airspace density/atelectasis in the lungs bilaterally, with no other significant change. XR CHEST PORTABLE   Final Result   1. Right-sided Pleurx catheter in satisfactory position in the lung bases   2. Right-sided post operative pneumothorax, approximately 10%               Assessment/Plan   Geetha Lennon is a 65 y.o. male with a PMH of CHF (CHFpEF), carotid stenosis, HLD, HTN, OA, Restrictive Lung Disease, T2DM who underwent VATS with PleurX catheter placement on 9/19/2022 for recurrent bilateral loculated pleural effusions and has had hypoxia/hypercapnia since that time. Neuro/Sedation:  Sedated with propofol 15 today     CV:  Hypotension  Requiring 1 mcg of Levophed  -Began requiring pressors after intubation, attributed to sedation and positive pressure ventilation     Congestive Heart Failure with Preserved Ejection Fraction  EF ~65% 6/16/2022  -BNP ordered today-->Elevated to 06543 (from 4000)     Respiratory:   Patient admitted to ICU for inability to tolerate BIPAP for hypercapnic respiratory failure. Pulmonary service had been seeing the patient while on the floor. Patient became exhausted on BIPAP. Options of hospice vs intubation were discussed with the patient and family. Patient voiced agreement to tracheostomy, should it be required. Critical care service consulted for vent management.   SpO2 98% on MV  Vent Settings: Vent Mode: AC/PRVC Resp Rate (Set): 18 bmp/Vt (Set, mL): 500 mL/ /FiO2 : 40 % PEEP 5       Recent Labs     09/23/22  0615 09/23/22  0827   PHART 7.580* 7.603*   VBI3FMO 43.5 38.7   PO2ART 99.3 81.7      Acute Hypoxic/Hypercapnic respiratory failure  Recurrent pleural effusions  The etiology of his recurrent pleural effusions is unclear. No studies done on fluid from original thoracenteses. Possibly malignancy. VATS fluid results showing an exudative picture, per [facility] Criteria. S/p VATS procedure 9/19, with right PleurX catheter placement. Ventilatory restriction on PFT associated with worsening pleural disease. PleurX catheter place  -Methylprednisolone IV TID  On mechanical ventilation, he developed respiratory alkalosis, with significant drop in PCO2 from his baseline. Ventilator support reduced accordingly, decreasing VT and fR, with minute volume 9.4 L. Subsequent arterial pH 7.45, PCO2 54, PO2 90  Static compliance is improving, with plateau pressure decreased to 29 cm H2O     Renal:  Ansari Catheter in place  ZANA  Low urine output 9/22-9/23: 1020 mL in 24 hrs. Overall fluid balance increasingly positive. Creatinine at 1.5 today, from 0.6 on 9/19.    - Maintenance fluids stopped today for concern for volume overload, pulmonary edema. BNP increased to 15,496 from 4139         Code Status: FULL Code  FEN: NPO  PPX: Pepcid  DISPO: ICU    Awa Mayo MD  PGY-1  09/25/22  7:35 AM    This patient has been staffed and discussed with Dr. Lemond Hodgkins    Patient examined, findings as discussed with Dr. Lola Vega.  Agree with assessment and plan. Respiratory failure post VATS and Pleurx catheter placement is associated with diffuse alveolar infiltrates, consistent with pulmonary edema. Question whether this may be related to inflammation, but etiology would be uncertain. He has not successfully diuresed, but diffuse infiltrate has improved somewhat. Pulmonary compliance is likewise better.   This will need to improve significantly before he can be successfully weaned from the ventilator. At this point his ventilatory requirement is about 9 L, FiO2 is only 30%. He requires sedation to maintain ventilation control. Nutritional support is provided with enteral feeding per OGT. Family updated regarding progress and plan.   Time spent in critical care 40 minutes

## 2022-09-26 PROBLEM — N17.9 AKI (ACUTE KIDNEY INJURY) (HCC): Status: ACTIVE | Noted: 2022-01-01

## 2022-09-26 NOTE — PROGRESS NOTES
Palliative Care Chart Review  and Check in Note:     NAME:  Madhuri Palencia  Admit Date: 9/19/2022  Hospital Day:  Hospital Day: 8   Current Code status: Full Code    Palliative care is continuing to following Mr. Cynthia Delcid for symptom management, and goals of care discussion as needed. Patient's chart reviewed today 9/26/22. Spoke with pt's wife, Milo Lindquist, and son, Lilliam Daugherty, at the bedside. They remain hopeful for Josiah's recovery. They have questions about rehab and next steps, but also say they are \"taking it one day at a time. \" Plan is to do SBT today. The following are the currently established goals/code status, and Symptom management. Goals of care: Continue current medical management.      Code status: Full    Discharge plan: TBD - pending hospital course       NATASHA Sue NP  09/26/22  11:15 AM

## 2022-09-26 NOTE — PROGRESS NOTES
ICU rounds: for SBT, check IVC. Sedation decreased (25-10-5mcg/kg)   RT informed. SBP increased to 180/200mmhg. HR- frequent PVC's in bigeminy and trigeminy. SBT x 6 mins, terminated by RT.

## 2022-09-26 NOTE — PROGRESS NOTES
Comprehensive Nutrition Assessment    Type and Reason for Visit:  Reassess    Nutrition Recommendations/Plan:   Continue current TF of Nepro @ 40 mL/hr  Continue 2 bottles Proteinex daily  Monitor nutrition adequacy, pertinent labs, bowel habits, wt changes, and clinical progress     Malnutrition Assessment:  Malnutrition Status: At risk for malnutrition (Comment) (09/22/22 1400)    Context:  Chronic Illness     Findings of the 6 clinical characteristics of malnutrition:  Energy Intake:  Unable to assess  Weight Loss:  Greater than 10% over 6 months (20% in 6 month period)       Nutrition Assessment:    Follow up: Pt remains intubated. Hypotensive on levo, sedated on propofol. Propofol providing 301 kcal/day. Pt on TF of Nepro @ 40 mL/hr. Will continue pt on current TF order, d/t necessary interruptions pt is not receiving full 24 TF, and not currently being overfed. Per MD if levo reaches >5, TF to be held. Pt +5.5L since admission, will continue to use previous wt for estimated needs. Will continue to monitor. Nutrition Related Findings:    . Mg 2.6. Cr 1.7. . Active bowel sounds. +2 non-pitting RUE/LUE edema. Wound Type: None       Current Nutrition Intake & Therapies:    Average Meal Intake: NPO  Average Supplements Intake: NPO  Diet NPO  Current Tube Feeding (TF) Orders:  Feeding Route: Nasogastric  Formula: Renal Formula  Schedule: Continuous  Additives/Modulars: Protein  Goal TF & Flush Orders Provides: Nepro @ 40 mL/hr to provide: 960 mL TV, 1699 kcal, 78 g/pro, and 698 mL FW + FW flushes of 30 mL q4 + 2 bottles Proteinex daily to provide an additional 52 g/pro and 208 kcal    Anthropometric Measures:  Height: 6' 0.99\" (185.4 cm)  Ideal Body Weight (IBW): 184 lbs (84 kg)       Current Body Weight: 166 lb 14.2 oz (75.7 kg), 90.7 % IBW.  Weight Source: Bed Scale  Current BMI (kg/m2): 22        Weight Adjustment For: No Adjustment                 BMI Categories: Normal Weight (BMI 22.0 to 24.9) age over 72    Estimated Daily Nutrient Needs:  Energy Requirements Based On: Kcal/kg (20-25 kcal/kg CBW)  Weight Used for Energy Requirements: Current (CBW 75.7 kg)  Energy (kcal/day): 4870-7300  Weight Used for Protein Requirements: Current (2 g/kg CBW)  Protein (g/day): 151  Method Used for Fluid Requirements: 1 ml/kcal  Fluid (ml/day): or per MD    Nutrition Diagnosis:   Inadequate oral intake related to impaired respiratory function as evidenced by NPO or clear liquid status due to medical condition, nutrition support - enteral nutrition    Nutrition Interventions:   Food and/or Nutrient Delivery: Continue NPO, Continue Current Tube Feeding  Nutrition Education/Counseling: Education not appropriate  Coordination of Nutrition Care: Continue to monitor while inpatient       Goals:  Previous Goal Met: Goal(s) Achieved  Goals: Tolerate nutrition support at goal rate       Nutrition Monitoring and Evaluation:   Behavioral-Environmental Outcomes: None Identified  Food/Nutrient Intake Outcomes: Enteral Nutrition Intake/Tolerance  Physical Signs/Symptoms Outcomes: Biochemical Data, Nutrition Focused Physical Findings, Weight, Fluid Status or Edema    Discharge Planning:     Too soon to determine     Klever Denise, [address] [place] [address],   Contact: [phone]

## 2022-09-26 NOTE — PROGRESS NOTES
ICU CT SURGERY DAILY PROGRESS NOTE    CC: Pleural effusions s/p R PleurX catheter placement    SUBJECTIVE:   Interval Hx:   Patient remains intubated. Afebrile and HDS with minimal vent setting at 18/450/5/30%. Levo at 5, Propofol at 25. Urine output is appropriate. Chest tube remains in place. TF at goal 40cc/hr. Crit Care has turned off lasix, and feels pt's pulmonary infiltrates and compliance are improving. ROS: A 14 point review of systems was conducted, significant findings as noted above. All other systems negative. OBJECTIVE:   Vitals:   Vitals:    09/26/22 0230 09/26/22 0245 09/26/22 0300 09/26/22 0315   BP:   (!) 83/59    Pulse: 91 93 90 89   Resp: 28 27 24 22   Temp:       TempSrc:       SpO2: 97% 97% 96% 97%   Weight:       Height:           I/O:   Intake/Output Summary (Last 24 hours) at 9/26/2022 0341  Last data filed at 9/26/2022 0300  Gross per 24 hour   Intake 1946.25 ml   Output 1985 ml   Net -38.75 ml       I/O last 3 completed shifts: In: 2803.9 [I.V.:969.9; MF/TP:6186; IV Piggyback:260]  Out: 3677 [Urine:2045; Chest Tube:180]    Diet: Diet NPO      Physical Examination:   General appearance: Sedated and mechanically ventilated. HEENT: NC/AT, EOMI, trachea midline, no JVD. Corpak in right nostril w/ bridle. ETT in place. Chest/Lungs: Decreased breath sounds bilaterally, on mechanical ventilation; R PleurX catheter to water seal without airleak, has serous output  Cardiovascular: Atrial fibrillation with rate WNL  Abdomen: Soft, non-tender, non-distended  Genitourinary:  Ansari in place with clear yellow urine  Skin: Warm and dry, no rashes  Extremities: No edema, no cyanosis; SCDs in place      Labs:  CBC:   Recent Labs     09/24/22  0424 09/25/22  0521   WBC 15.7* 19.1*   HGB 7.8* 8.0*   HCT 25.1* 25.6*    302         BMP:   Recent Labs     09/24/22  1842 09/25/22  0521 09/25/22  1745   * 136 137   K 3.9 4.1 3.4*   CL 88* 91* 97*   CO2 34* 36* 30   * 110* 102* CREATININE 1.7* 1.5* 1.3   GLUCOSE 414* 122* 126*       LFT's:   No results for input(s): AST, ALT, ALB, BILITOT, ALKPHOS in the last 72 hours. Troponin: No results for input(s): TROPONINI in the last 72 hours. BNP: No results for input(s): BNP in the last 72 hours. ABGs:   Recent Labs     09/23/22  0827 09/23/22  1000   PHART 7.603* 7.454*   DAZ2LIE 38.7 53.8*   PO2ART 81.7 89.9       INR:   No results for input(s): INR in the last 72 hours. U/A:No results for input(s): NITRITE, COLORU, PHUR, LABCAST, WBCUA, RBCUA, MUCUS, TRICHOMONAS, YEAST, BACTERIA, CLARITYU, SPECGRAV, LEUKOCYTESUR, UROBILINOGEN, BILIRUBINUR, BLOODU, GLUCOSEU, AMORPHOUS in the last 72 hours. Invalid input(s): Josiane Johansen     Rad:   XR CHEST PORTABLE   Final Result      Stable small right-sided pneumothorax. More prominent emphysema over the lower neck. No change in bilateral airspace disease. Stable left pleural effusion. XR CHEST PORTABLE   Final Result   1. Bilateral multifocal pneumonia with improvement in the right    pulmonary consolidation since prior study from 9/23/22   2. Mild to moderate left pleural effusion          XR CHEST PORTABLE   Final Result   1. Support lines and tubes are stable. 2.  Stable small right lateral pneumothorax and right basilar    chest tube. 3.  Stable patchy bilateral airspace disease. XR CHEST PORTABLE   Final Result   1. Satisfactory position of right internal jugular central line   2. No interval change in endotracheal tube and nasogastric tube positioning. 3. Extensive bilateral airspace disease with no changes. 4. Left pleural effusion with retrocardiac opacity, unchanged   5. Small stable tiny right lateral pneumothorax and stable position of right chest tube,, Pleurx catheter. XR ABDOMEN (KUB) (SINGLE AP VIEW)   Final Result   1. No residual pneumothorax.    2.  Mild patchy airspace disease bilaterally worse on the right than on prior examination. 3.  Non-obstructive bowel gas pattern. Mildly distended stomach. XR CHEST PORTABLE   Final Result   1. No residual pneumothorax. 2.  Mild patchy airspace disease bilaterally worse on the right than on prior examination. 3.  Non-obstructive bowel gas pattern. Mildly distended stomach. XR CHEST PORTABLE   Final Result      1. Small right pneumothorax appears slightly increased. 2. Mild patchy airspace opacity bilaterally. XR CHEST PORTABLE   Final Result     Pleurx catheter at the right lung base. Trace right    pneumothorax is unchanged. XR CHEST PORTABLE   Final Result      Right-sided chest tube evident in the base, its tip medially. Improvement in the right-sided pneumothorax, since earlier today. Possible medial collapse of the right lower lobe. Small left effusion. Patchy airspace density/atelectasis in the lungs bilaterally, with no other significant change. XR CHEST PORTABLE   Final Result   1. Right-sided Pleurx catheter in satisfactory position in the lung bases   2. Right-sided post operative pneumothorax, approximately 10%         XR CHEST 1 VIEW    (Results Pending)       ASSESSMENT AND PLAN:   Laure Rayo is a 65 y.o. male with history of diastolic heart failure, atrial fibrillation, and DM with recurrent pleural effusions s/p R PleurX catheter placement (9/19), POD #7. Neuro:   Analgesia  - Continue scheduled Tylenol  - Propofol for sedation; wean as tolerated    Cardiovascular:   History of paroxysmal atrial fibrillation  - Continue Apixaban 5mg bid  - Metoprolol IV held currently for hypotension    HFpEF  - Metoprolol IV (hold due to hypotension), currently on Levo 5mcg/kg/hr  - [place] held  - Last echo (1/25/22):  LVEF = 86-27%, grade 2 diastolic dysfunction    Hyperlipidemia  - Pravastatin 40mg    Pulmonary:   S/P R PleurX catheter placement (9/19), POD #7  - Continue to water seal. Change atrium today   - /[facility] pending    Acute on chronic respiratory failure  - Mechanically intubated on 9/22. Currently PRVC 18/450/5/30%. Vent management per pulm  - Inpatient consult to pulmonology (Dr Uma Bustamante) appreciate recommendations. - Baseline on home O2 3-4L NC   - Wean Solu-medrol. Currently 80 mg Q8, will decrease to 60 mg Q8   - CXR 09/26 this am - stable from 9/25      GI:   - Miralax  - Continue TF at goal  - Recommend holding TF if pressor requirement increases to greater than 5 of Levo    FEN:   - IV fluids: none, lasix drip stopped yesterday  - Replace electrolytes per protocol  - Diet: Diet NPO    /Renal:   - Cont Ansari   - Creatinine 1.7 from 1.3 from 1.5 from 1.7  - BUN elevated at 119 from 102  -Consult nephrology for continuing Cr and BUN    Hem/ID:   - Hemoglobin 8.5 from 8.0, 7.8, 7.5, 8.0, 8.3  - WBC 23.6 from 19.1, 15.7, 14.7, 19.7. Leukocytosis 2/2 steroids vs infection.      Endo:   History of DMII  - Last A1C 8.1% (1/24/22)  - High-dose sliding scale insulin  - Solu-medrol as above    Prophylaxis:   DVT Ppx: Eliquis, SCDs  GI Ppx: pepcid    Access:  Central Access: R IJ CVC, placed 9/22  Peripheral Access: IV x2  Arterial Line Access: L radial, placed 9/22                              Ansari Date placed: 09/26/22    Mobility:  N/A    Dispo:   ICU    Code Status: Full Code  -----------------------------  Alexei Barraza DO   PGY1, General Surgery  09/26/22  6:43 AM  Pager # [phone]

## 2022-09-26 NOTE — PLAN OF CARE
Problem: Chronic Conditions and Co-morbidities  Goal: Patient's chronic conditions and co-morbidity symptoms are monitored and maintained or improved  9/26/2022 1812 by Aviva Perez RN  Outcome: Not Progressing  Flowsheets (Taken 9/26/2022 1811)  Care Plan - Patient's Chronic Conditions and Co-Morbidity Symptoms are Monitored and Maintained or Improved:   Monitor and assess patient's chronic conditions and comorbid symptoms for stability, deterioration, or improvement   Collaborate with multidisciplinary team to address chronic and comorbid conditions and prevent exacerbation or deterioration   Update acute care plan with appropriate goals if chronic or comorbid symptoms are exacerbated and prevent overall improvement and discharge    Problem: Safety - Medical Restraint  Goal: Remains free of injury from restraints (Restraint for Interference with Medical Device)  Description: INTERVENTIONS:  1. Determine that other, less restrictive measures have been tried or would not be effective before applying the restraint  2. Evaluate the patient's condition at the time of restraint application  3. Inform patient/family regarding the reason for restraint  4.  Q2H: Monitor safety, psychosocial status, comfort, nutrition and hydration  Outcome: Progressing  Flowsheets  Taken 9/26/2022 1812  Remains free of injury from restraints (restraint for interference with medical device):

## 2022-09-26 NOTE — CARE COORDINATION
Case Management Assessment           Daily Note                 Date/ Time of Note: 9/26/2022 9:12 AM         Note completed by: Duane Andres RN    Patient Name: Austyn Monge  YOB: 1945    Diagnosis:Pleural effusion, right [J90]  Pleural effusion on right [J90]  Patient Admission Status: Inpatient    Date of Nia Dumont  5:20 AM Length of Stay: 7 GLOS: GMLOS: 4.9    Current Plan of Care: Intubated and sedated (attempting to wean sedation). CT in place POD 7. Levo gtt. TF  _________________________________________________________  Discharge Plan: TBD. The original plan was [place] [id number]. Will need PT/OT eval's and recommendations once he is able to participate. Case Management Notes: : Case management is following for discharge planning. The chart was reviewed. Mr. Shi Luke is from home with his spouse. He was relatively independent with self care and functional mobility prior to admission. Following the VATS procedure, CM [facility]. He wears O2 3-4L/NC at baseline    Juana Osorio and his family were provided with choice of provider; he and his family are in agreement with the discharge plan.     Care Transition Patient: Yes    Duane Andres RN  [facility]  Case Management Department  [phone]

## 2022-09-26 NOTE — PROGRESS NOTES
No acute events over night. Afebrile HR in 60s chronic afib. Levophed titrated up to 5 mcg/min from 2 for goal map >65. Propofol 25 mcg/kg/min. Insulin gtt BS within goal 140-180. EF renal at goal 40 ml/hr free h2o 30 ml/4hr. Total urine output this shift 1085 ml. Total output from R pleurX catheter 50 ml serous.

## 2022-09-26 NOTE — PROGRESS NOTES
CT Surg  POC    Up to bedside during SBT, propofol at 5 and SBP at 220. Lasted very short time given HTN. Wife and son bedside. Discussed current status with them, suspect that next conversations will be for trach placement +/- PEG. No immediate plans for these, but suspect patient will need a long vent wean and by getting the propofol off we can transition to better control of his meds and BP. Will discuss with Dr. Yanely Ynag and Jason Villalba today. Family understands, all questions answered to their satisfaction, family will continue to discuss their wishes going forward but seem to have a good grasp on what his quality of life would be going forward.       Coni Schuler MD   Gen Surg PGY 5  9/26/2022  12:31 PM  [phone]

## 2022-09-26 NOTE — PROGRESS NOTES
ICU Progress Note  PGY-1    Admit Date: 9/19/2022  Day: 7  Vent Day: 4  IV Access:CVC triple lumen, peripheral  IV Fluids:None  Vasopressors:None                Antibiotics:   Diet: Diet NPO    CC: Pleural effusion (right) s/p VATS and Pleural catheter (PleurX) placement    Interval history: Patient sedated and intubated    levo needs increased overnight from 2 to 5. Propofol from 20 to 25. Lasix stopped, remains fluid overloaded but better pulm compliance. HPI:  Sallie Waterman is a 66 yo Male with a PMH of CHF (CHFpEF), carotid stenosis, HLD, HTN, OA, Restrictive Lung Disease, T2DM who underwent VATS with Pleurx catheter placement on 9/19/2022 for recurrent bilateral loculated pleural effusions and has had hypoxia/hypercapnia since that time now requiring intubation. The etiology of his recurrent pleural effusions is unclear. Possibly malignancy. In addition to his recurrent bilateral loculated pleural effusions, he has lost over 100 lbs over the last year, some of which his wife attributes to intentional weight loss. Initially, he was started on BiPAP post-procedure for rapid shallow breathing and slow clearance of sedation. He wasn't tolerating the BiPAP well, trying to remove it. After discussion with family and patient (while on a break from BiPAP), the decision was made to intubate and confirmed that he's OK with tracheostomy if necessary. \"    Medications:     Scheduled Meds:   potassium bicarb-citric acid  20 mEq Oral Daily    methylPREDNISolone  80 mg IntraVENous Q8H    chlorhexidine  15 mL Mouth/Throat BID    famotidine (PEPCID) injection  20 mg IntraVENous Daily    [Held by provider] metoprolol  5 mg IntraVENous Q6H    apixaban  5 mg Oral BID    sodium chloride flush  5-40 mL IntraVENous 2 times per day    polyethylene glycol  17 g Oral Daily     Continuous Infusions:   norepinephrine 5 mcg/min (09/26/22 0603)    insulin 3.52 Units/hr (09/26/22 0658)    propofol 25 mcg/kg/min (09/26/22 0603) sodium chloride      dextrose       PRN Meds:acetaminophen, haloperidol lactate, albuterol, sodium chloride flush, sodium chloride, ondansetron **OR** ondansetron, glucose, dextrose bolus **OR** dextrose bolus, glucagon (rDNA), dextrose, hydrALAZINE, levalbuterol    Objective:   Vitals:   T-max:  Patient Vitals for the past 8 hrs:   BP Temp Temp src Pulse Resp SpO2 Weight   09/26/22 0600 (!) 97/59 -- -- 86 23 98 % 181 lb (82.1 kg)   09/26/22 0500 (!) 79/60 -- -- 90 18 98 % --   09/26/22 0401 -- -- -- 66 20 97 % --   09/26/22 0400 95/62 98.2 °F (36.8 °C) Axillary 67 22 97 % --   09/26/22 0315 -- -- -- 89 22 97 % --   09/26/22 0300 (!) 83/59 -- -- 90 24 96 % --   09/26/22 0245 -- -- -- 93 27 97 % --   09/26/22 0230 -- -- -- 91 28 97 % --   09/26/22 0215 -- -- -- (!) 108 24 96 % --   09/26/22 0200 97/72 -- -- 98 (!) 6 98 % --   09/26/22 0145 -- -- -- 88 22 97 % --   09/26/22 0130 -- -- -- 98 19 98 % --   09/26/22 0115 -- -- -- (!) 101 20 98 % --   09/26/22 0100 104/68 -- -- 88 20 97 % --   09/26/22 0045 -- -- -- 98 19 98 % --   09/26/22 0030 -- -- -- 89 20 98 % --   09/26/22 0015 -- -- -- 89 19 99 % --   09/26/22 0001 -- -- -- 94 22 98 % --   09/26/22 0000 (!) 90/58 97.8 °F (36.6 °C) Axillary 92 21 98 % --   09/25/22 2345 -- -- -- 91 23 98 % --   09/25/22 2330 -- -- -- 94 17 98 % --       Intake/Output Summary (Last 24 hours) at 9/26/2022 0717  Last data filed at 9/26/2022 0700  Gross per 24 hour   Intake 2249.09 ml   Output 1935 ml   Net 314.09 ml       Review of Systems - unable to perform, intubated and sedated    Physical Exam  Constitutional:       Comments: Sedated, intubated, arouses to pain   HENT:      Head: Normocephalic. Eyes:      Pupils: Pupils are equal, round, and reactive to light. Cardiovascular:      Rate and Rhythm: Normal rate. Rhythm irregular. Pulses: Normal pulses. Heart sounds: Normal heart sounds.    Pulmonary:      Comments: On ventilator  Abdominal:      General: Abdomen is flat.      Palpations: Abdomen is soft. Musculoskeletal:      Right lower leg: Edema present. Left lower leg: Edema present. Skin:     General: Skin is warm and dry. Neurological:      Comments: Sedated. Responds to pain         LABS:    CBC:   Recent Labs     09/24/22 0424 09/25/22 0521 09/26/22 0354   WBC 15.7* 19.1* 23.6*   HGB 7.8* 8.0* 8.5*   HCT 25.1* 25.6* 26.7*    302 366   MCV 82.8 83.0 82.0     Renal:    Recent Labs     09/24/22  0424 09/24/22 0425 09/25/22 0521 09/25/22 1745 09/26/22 0354   NA  --    < > 136 137 136   K  --    < > 4.1 3.4* 4.2   CL  --    < > 91* 97* 89*   CO2  --    < > 36* 30 36*   BUN  --    < > 110* 102* 119*   CREATININE  --    < > 1.5* 1.3 1.7*   GLUCOSE  --    < > 122* 126* 155*   CALCIUM  --    < > 8.2* 6.9* 8.3   MG 2.70*  --  2.60*  --   --    PHOS  --    < > 3.5 2.7 3.1   ANIONGAP  --    < > 9 10 11    < > = values in this interval not displayed. Hepatic:   Recent Labs     09/25/22 0521 09/25/22 1745 09/26/22 0354   LABALBU 2.5* 2.0* 2.5*     Troponin: No results for input(s): TROPONINI in the last 72 hours. BNP: No results for input(s): BNP in the last 72 hours. Lipids: No results for input(s): CHOL, HDL in the last 72 hours. Invalid input(s): LDLCALCU, TRIGLYCERIDE  ABGs:    Recent Labs     09/23/22  0827 09/23/22  1000 09/26/22  0354   PHART 7.603* 7.454* 7.564*   ESE1FKM 38.7 53.8* 46.9*   PO2ART 81.7 89.9 96.2   OBA4FJM 38.3* 37.8* 42*   BEART 17* 14* 18.3*   [id number] 98 97 98   [id number] 40 39 44       INR: No results for input(s): INR in the last 72 hours. Lactate:   Recent Labs     09/23/22  0827   LACTATE 0.87     Cultures:  -----------------------------------------------------------------  RAD:   XR CHEST 1 VIEW   Final Result      1. Stable small right-sided pneumothorax and prominent subcutaneous emphysema along the neck and upper superior chest wall, greater in right lung stable   2.  Stable left basilar consolidation and pleural effusion      3. Stable appearing perihilar accentuated markings or airspace disease            XR CHEST PORTABLE   Final Result      Stable small right-sided pneumothorax. More prominent emphysema over the lower neck. No change in bilateral airspace disease. Stable left pleural effusion. XR CHEST PORTABLE   Final Result   1. Bilateral multifocal pneumonia with improvement in the right    pulmonary consolidation since prior study from 9/23/22   2. Mild to moderate left pleural effusion          XR CHEST PORTABLE   Final Result   1. Support lines and tubes are stable. 2.  Stable small right lateral pneumothorax and right basilar    chest tube. 3.  Stable patchy bilateral airspace disease. XR CHEST PORTABLE   Final Result   1. Satisfactory position of right internal jugular central line   2. No interval change in endotracheal tube and nasogastric tube positioning. 3. Extensive bilateral airspace disease with no changes. 4. Left pleural effusion with retrocardiac opacity, unchanged   5. Small stable tiny right lateral pneumothorax and stable position of right chest tube,, Pleurx catheter. XR ABDOMEN (KUB) (SINGLE AP VIEW)   Final Result   1. No residual pneumothorax. 2.  Mild patchy airspace disease bilaterally worse on the right than on prior examination. 3.  Non-obstructive bowel gas pattern. Mildly distended stomach. XR CHEST PORTABLE   Final Result   1. No residual pneumothorax. 2.  Mild patchy airspace disease bilaterally worse on the right than on prior examination. 3.  Non-obstructive bowel gas pattern. Mildly distended stomach. XR CHEST PORTABLE   Final Result      1. Small right pneumothorax appears slightly increased. 2. Mild patchy airspace opacity bilaterally. XR CHEST PORTABLE   Final Result     Pleurx catheter at the right lung base. Trace right    pneumothorax is unchanged.           XR CHEST PORTABLE   Final Result Right-sided chest tube evident in the base, its tip medially. Improvement in the right-sided pneumothorax, since earlier today. Possible medial collapse of the right lower lobe. Small left effusion. Patchy airspace density/atelectasis in the lungs bilaterally, with no other significant change. XR CHEST PORTABLE   Final Result   1. Right-sided Pleurx catheter in satisfactory position in the lung bases   2. Right-sided post operative pneumothorax, approximately 10%               Assessment/Plan:   Alli Mccabe is a 65 y.o. male with a PMH of CHF (CHFpEF), carotid stenosis, HLD, HTN, OA, Restrictive Lung Disease, T2DM who underwent VATS with PleurX catheter placement on 9/19/2022 for recurrent bilateral loculated pleural effusions and has had hypoxia/hypercapnia since that time    Neuro  Sedated: Propofol 25     Pulmonary  Vent Day: 4  Vent: , RR 18, FiO2 30, PEEP 5    On mechanical ventilation, he developed respiratory alkalosis, with significant drop in PCO2 from his baseline. Ventilator support reduced accordingly yesterday, decreasing VT and fR, with minute volume 9.4 L. Subsequent arterial pH 7.45, PCO2 54, PO2 90    Today patient continues with alkalosis. 7.564 from 7.454 PCO2 47, HCO3 42. Posthypercapneic? Acute hypoxic/hypercapnic respiratory failure  Recurrent Pleural Effusions, s/p VATS and Pleurx  Patient was admitted to ICU for failure to tolerate bipap following procedure. The etiology of his recurrent pleural effusions is unclear. No studies done on fluid from original thoracenteses. Possibly malignancy. VATS fluid results showing an exudative picture, per [facility] Criteria. S/p VATS procedure 9/19, with right PleurX catheter placement. Ventilatory restriction on PFT associated with worsening pleural disease.   PleurX catheter in place  -Methylprednisolone IV TID, Surgery planning to decreased dose to 60 TID     Cardiovascular  Hypotension   Norepi 5mcg/min, up from 1 yesterday  Pressures stable in 110s/60s  -Began requiring pressors after intubation, attributed to sedation and positive pressure ventilation    Congestive Heart Failure with Preserved Ejection Fraction  EF ~65% 6/16/2022  -BNP ordered 9/23. Significant elevated 15k, given lasix at that time. Was held yesterday given patient's improving respiratory status  - remains fluid overloaded on exam with edema to knees bilaterally. GI  Diet: Diet NPO  GI ppx: pepcid     Constipation  Cont glycolax    Renal   Ansari in place  ZANA  UOP 1875ml, Cr remains elevated at 1.7  Nephro consult placed      Code Status: Full Code   PPX: pepcid  DISPO: ICU    Katie Way MD, PGY-1  Internal Medicine Resident  Contact via [facility]  09/26/22  7:17 AM    This patient has been staffed and discussed with Dr Rosa Cottrell    Patient was seen, examined and discussed with Dr. Lola Napoles. I agree with the interval history. My physical exam confirms the findings listed below  Chart was reviewed including labs and medical records confirm the findings noted    Acute respiratory failure on mechanical vent support. , RR 18, FiO2 30, PEEP 5. Hypotension requiring levophed at 5 mcg/min  Pleural effusion s/p VATS. Pt has high proBNP, bilateral effusion, and known diastolic CHF. Leukocytosis. Pt is on steroids, however he had leukocytosis two days before. A.fib on Eliquis     SBT today  Bedside US today   F/u on nephrology recommendations    Pt has a high probability of imminent or life-threatening deterioration requiring close monitoring, and highly complex decision-making and/or interventions of high intensity to assess, manipulate, and support his critical organ systems to prevent a likely inevitable decline which could occur if left untreated. A total critical care time 35 minutes was used.  This includes but not limited to examining patient, collaborating with other physicians, monitoring vital signs, telemetry, continuous pulse oximetry, and clinical response to IV medications, documentation time, review and interpretation of laboratory and radiological data, review of nursing notes and old record review. This time excludes any time that may have been spent performing procedures for life threatening organ failure.

## 2022-09-26 NOTE — CONSULTS
Nephrology Consult Note                                                                                                                                                                                                                                                                                                                                                               Office : [phone]     Fax :[phone]    Patient's Name: Carmen Roque  1:18 PM  9/26/2022    Reason for Consult: ZANA  Requesting Physician:  Melisa Rodgers MD    Chief Complaint:  pleural effusion sob    History of Present Ilness:    Carmen Roque is a 65 y.o. male with PMHx of CHF (CHFpEF), carotid stenosis, HLD, HTN, OA, Restrictive Lung Disease, T2DM who underwent VATS with Pleurx catheter placement on 9/19/2022 for recurrent bilateral loculated pleural effusions. He was unable to tolerate BiPAP and was intubated following his VATS procedure on 9/19. He is now requiring pressor support with levophed in setting of sedation. His creatinine was worsening thus Nephrology was consulted for further evaluation and recommendations. His creatinine at baseline is ~0.7 and was normal on admission. His creatinine over the course of his admission has been worsening and is up to 1.7 on day of consult. His UOP over the last 2 days has been good.      Past Medical History:   Diagnosis Date    Carotid stenosis, left 10/12/2011    Chronic back pain     Chronic systolic (congestive) heart failure 18/42/1162    Diastolic CHF ([address]     Erectile dysfunction     Hyperlipidemia     Hypertension     Osteoarthritis     Restrictive lung disease     Type II or unspecified type diabetes mellitus without mention of complication, not stated as uncontrolled        Past Surgical History:   Procedure Laterality Date    CARDIAC CATHETERIZATION  06/2022    KNEE SURGERY Left     PLEURAL CATH INSERTION N/A 9/19/2022    PLEURAL CATHETER PLACEMENT; INTERCOSTAL NERVE BLOCK X4 performed by Dixie Menon MD at [facility] [place] Bilateral     THORACOSCOPY Right 9/19/2022    RIGHT VIDEO ASSISTED THORASCOPIC SURGERY AND; performed by Dixie Menon MD at [address] [place] [address] History   Problem Relation Age of Onset    Hearing Loss Father     Heart Disease Father 70        MI    High Blood Pressure Father     Diabetes Maternal Grandmother         hypoglycemic    Hearing Loss Maternal Grandmother     Arthritis Maternal Grandfather         reports that he quit smoking about 20 years ago. His smoking use included cigarettes. He has a 80.00 pack-year smoking history. He has never used smokeless tobacco. He reports current alcohol use. He reports that he does not use drugs. Allergies:   Torsemide and Dye [iodides]    Current Medications:    methylPREDNISolone sodium (SOLU-MEDROL) injection 60 mg, Q8H  norepinephrine (LEVOPHED) 16 mg in sodium chloride 0.9 % 250 mL infusion, Continuous  insulin regular (HUMULIN R;NOVOLIN R) 100 Units in sodium chloride 0.9 % 100 mL infusion, Continuous  potassium bicarb-citric acid (EFFER-K) effervescent tablet 20 mEq, Daily  acetaminophen (TYLENOL) 160 MG/5ML solution 650 mg, Q6H PRN  propofol injection, Titrated  chlorhexidine (PERIDEX) 0.12 % solution 15 mL, BID  famotidine (PEPCID) 20 mg in sodium chloride (PF) 10 mL injection, Daily  [Held by provider] metoprolol (LOPRESSOR) injection 5 mg, Q6H  haloperidol lactate (HALDOL) injection 2 mg, Q6H PRN  albuterol (PROVENTIL) nebulizer solution 2.5 mg, Q4H PRN  apixaban (ELIQUIS) tablet 5 mg, BID  sodium chloride flush 0.9 % injection 5-40 mL, 2 times per day  sodium chloride flush 0.9 % injection 5-40 mL, PRN  0.9 % sodium chloride infusion, PRN  polyethylene glycol (GLYCOLAX) packet 17 g, Daily  ondansetron (ZOFRAN-ODT) disintegrating tablet 4 mg, Q8H PRN   Or  ondansetron (ZOFRAN) injection 4 mg, Q6H PRN  glucose chewable tablet 16 g, PRN  dextrose bolus 10% 125 mL, PRN   Or  dextrose bolus 10% 250 mL, PRN  glucagon (rDNA) injection 1 mg, PRN  dextrose 10 % infusion, Continuous PRN  hydrALAZINE (APRESOLINE) injection 5 mg, Q15 Min PRN  levalbuterol (XOPENEX) nebulization 0.63 mg, Q4H PRN        Review of Systems:   14 point ROS obtained but were negative except mentioned in HPI      Physical exam:     Vitals:  BP (!) 108/56   Pulse 90   Temp 98.4 °F (36.9 °C) (Axillary)   Resp 21   Ht 6' 0.99\" (1.854 m)   Wt 181 lb (82.1 kg)   SpO2 97%   BMI 23.88 kg/m²   Constitutional:  on MV  Skin: no rash, turgor wnl  Heent:  eomi, mmm  Neck: no bruits or jvd noted  Cardiovascular:  S1, S2 without m/r/g  Respiratory: mechanical BS  Abdomen:  +bs, soft, nt, nd  Ext: bilateral lower extremity edema  Psychiatric: mood and affect appropriate  Musculoskeletal:  Rom, muscular strength intact    Data:   Labs:  CBC:   Recent Labs     09/24/22 0424 09/25/22 0521 09/26/22 0354   WBC 15.7* 19.1* 23.6*   HGB 7.8* 8.0* 8.5*    302 366     BMP:    Recent Labs     09/25/22 0521 09/25/22 1745 09/26/22  0354    137 136   K 4.1 3.4* 4.2   CL 91* 97* 89*   CO2 36* 30 36*   * 102* 119*   CREATININE 1.5* 1.3 1.7*   GLUCOSE 122* 126* 155*     Ca/Mg/Phos:   Recent Labs     09/24/22 0424 09/24/22 0425 09/25/22 0521 09/25/22 1745 09/26/22  0354   CALCIUM  --    < > 8.2* 6.9* 8.3   MG 2.70*  --  2.60*  --  2.60*   PHOS  --    < > 3.5 2.7 3.1    < > = values in this interval not displayed. Hepatic: No results for input(s): AST, ALT, ALB, BILITOT, ALKPHOS in the last 72 hours. Troponin: No results for input(s): TROPONINI in the last 72 hours. BNP: No results for input(s): BNP in the last 72 hours. Lipids: No results for input(s): CHOL, TRIG, HDL, LDLCALC, LABVLDL in the last 72 hours. ABGs:   Recent Labs     09/26/22  0354   PHART 7.564*   PO2ART 96.2   HKL6XUP 46.9*     INR: No results for input(s): INR in the last 72 hours.   UA:No results for input(s): Jaziel Abdi [facility], [address] [place] [address], Julious Ates, BLOODU, PHUR, PROTEINU, UROBILINOGEN, NITRU, LEUKOCYTESUR, Jinnie Paling in the last 72 hours. Urine Microscopic: No results for input(s): LABCAST, BACTERIA, COMU, HYALCAST, WBCUA, RBCUA, EPIU in the last 72 hours. Urine Culture: No results for input(s): LABURIN in the last 72 hours. Urine Chemistry: No results for input(s): Randalyn Co, PROTEINUR, NAUR in the last 72 hours. IMAGING:  XR CHEST 1 VIEW   Final Result      1. Stable small right-sided pneumothorax and prominent subcutaneous emphysema along the neck and upper superior chest wall, greater in right lung stable   2. Stable left basilar consolidation and pleural effusion      3. Stable appearing perihilar accentuated markings or airspace disease            XR CHEST PORTABLE   Final Result      Stable small right-sided pneumothorax. More prominent emphysema over the lower neck. No change in bilateral airspace disease. Stable left pleural effusion. XR CHEST PORTABLE   Final Result   1. Bilateral multifocal pneumonia with improvement in the right    pulmonary consolidation since prior study from 9/23/22   2. Mild to moderate left pleural effusion          XR CHEST PORTABLE   Final Result   1. Support lines and tubes are stable. 2.  Stable small right lateral pneumothorax and right basilar    chest tube. 3.  Stable patchy bilateral airspace disease. XR CHEST PORTABLE   Final Result   1. Satisfactory position of right internal jugular central line   2. No interval change in endotracheal tube and nasogastric tube positioning. 3. Extensive bilateral airspace disease with no changes. 4. Left pleural effusion with retrocardiac opacity, unchanged   5. Small stable tiny right lateral pneumothorax and stable position of right chest tube,, Pleurx catheter. XR ABDOMEN (KUB) (SINGLE AP VIEW)   Final Result   1. No residual pneumothorax.    2.  Mild patchy airspace disease bilaterally worse on the right than on prior examination. 3.  Non-obstructive bowel gas pattern. Mildly distended stomach. XR CHEST PORTABLE   Final Result   1. No residual pneumothorax. 2.  Mild patchy airspace disease bilaterally worse on the right than on prior examination. 3.  Non-obstructive bowel gas pattern. Mildly distended stomach. XR CHEST PORTABLE   Final Result      1. Small right pneumothorax appears slightly increased. 2. Mild patchy airspace opacity bilaterally. XR CHEST PORTABLE   Final Result     Pleurx catheter at the right lung base. Trace right    pneumothorax is unchanged. XR CHEST PORTABLE   Final Result      Right-sided chest tube evident in the base, its tip medially. Improvement in the right-sided pneumothorax, since earlier today. Possible medial collapse of the right lower lobe. Small left effusion. Patchy airspace density/atelectasis in the lungs bilaterally, with no other significant change. XR CHEST PORTABLE   Final Result   1. Right-sided Pleurx catheter in satisfactory position in the lung bases   2. Right-sided post operative pneumothorax, approximately 10%             Assessment/Plan   ZANA  - differential includes ATN in setting of hypotension/contrast  - baseline Cre 0.7. Normal on admission. Slowly trending up  - Cre 0.7-->1.1-->1.4-->1.6-->1.7  - Hold diuretics today   - BNP 5 K   - pending workup including: protein:cre ratio, UCre, Toro  - closely monitor UOP  - avoid nephrotoxic agent     2. Hypotension  - in setting of sedation  - currently requiring Levophed    3. Anemia  - Hgb 8.5 (9.0 on admission)  - daily CBC    4. Acid- base/ Electrolyte imbalance   - optimize lytes    5. Acute hypoxic resp failure  - s/p VATS on 9/19/22 for recurrent pleural effusions  - on MV  - Intensivist and CTS following    6.  CHF in exacerbation  - EF 65% on 6/16/22 via cardiac cath  - BNP 5908 and tredning down  - Intensivist following      Thank you for allowing us to participate in care of Vlad Harper     Patient will be staffed with Dr. China Vasquez MD  Feel free to contact me   [facility] [address]  Office : [phone]  Fax :[phone]       I have seen the patient independently from the resident . I discussed the care with the resident. I personally reviewed the HPI, PH, FH, SH, ROS and medications. I repeated pertinent portions of the examination and reviewed the relevant imaging and laboratory data.  I agree with the findings, assessment and plan as documented, with the following addendum:      Veronica Gasca MD

## 2022-09-27 NOTE — PLAN OF CARE
Problem: Chronic Conditions and Co-morbidities  Goal: Patient's chronic conditions and co-morbidity symptoms are monitored and maintained or improved  9/27/2022 0149 by David Lynne RN  Outcome: Progressing  Flowsheets (Taken 9/26/2022 2000)  Care Plan - Patient's Chronic Conditions and Co-Morbidity Symptoms are Monitored and Maintained or Improved:   Monitor and assess patient's chronic conditions and comorbid symptoms for stability, deterioration, or improvement   Collaborate with multidisciplinary team to address chronic and comorbid conditions and prevent exacerbation or deterioration   Update acute care plan with appropriate goals if chronic or comorbid symptoms are exacerbated and prevent overall improvement and discharge  9/26/2022 1812 by Kayley Larson RN  Outcome: Not Progressing  Flowsheets (Taken 9/26/2022 1811)  Care Plan - Patient's Chronic Conditions and Co-Morbidity Symptoms are Monitored and Maintained or Improved:   Monitor and assess patient's chronic conditions and comorbid symptoms for stability, deterioration, or improvement   Collaborate with multidisciplinary team to address chronic and comorbid conditions and prevent exacerbation or deterioration   Update acute care plan with appropriate goals if chronic or comorbid symptoms are exacerbated and prevent overall improvement and discharge  9/26/2022 1811 by Kayley Larson RN  Outcome: Not Progressing  Flowsheets  Taken 9/26/2022 1811  Care Plan - Patient's Chronic Conditions and Co-Morbidity Symptoms are Monitored and Maintained or Improved:   Monitor and assess patient's chronic conditions and comorbid symptoms for stability, deterioration, or improvement   Collaborate with multidisciplinary team to address chronic and comorbid conditions and prevent exacerbation or deterioration   Update acute care plan with appropriate goals if chronic or comorbid symptoms are exacerbated and prevent overall improvement and discharge  Taken 9/26/2022 [address] - Patient's Chronic Conditions and Co-Morbidity Symptoms are Monitored and Maintained or Improved: Monitor and assess patient's chronic conditions and comorbid symptoms for stability, deterioration, or improvement  Taken 9/26/2022 1200  Care Plan - Patient's Chronic Conditions and Co-Morbidity Symptoms are Monitored and Maintained or Improved: Monitor and assess patient's chronic conditions and comorbid symptoms for stability, deterioration, or improvement     Problem: Discharge Planning  Goal: Discharge to home or other facility with appropriate resources  Outcome: Progressing  Flowsheets  Taken 9/26/2022 2000 by Atif Strickland RN  Discharge to home or other facility with appropriate resources: Identify barriers to discharge with patient and caregiver  Taken 9/26/2022 1200 by Carlitos De La Vega RN  Discharge to home or other facility with appropriate resources: Identify barriers to discharge with patient and caregiver     Problem: Pain  Goal: Verbalizes/displays adequate comfort level or baseline comfort level  Outcome: Progressing  Flowsheets  Taken 9/27/2022 0000  Verbalizes/displays adequate comfort level or baseline comfort level:   Encourage patient to monitor pain and request assistance   Assess pain using appropriate pain scale   Administer analgesics based on type and severity of pain and evaluate response  Taken 9/26/2022 2000  Verbalizes/displays adequate comfort level or baseline comfort level:   Encourage patient to monitor pain and request assistance   Assess pain using appropriate pain scale   Administer analgesics based on type and severity of pain and evaluate response     Problem: Safety - Adult  Goal: Free from fall injury  Outcome: Progressing     Problem: Skin/Tissue Integrity  Goal: Absence of new skin breakdown  Description: 1. Monitor for areas of redness and/or skin breakdown  2. Assess vascular access sites hourly  3.   Every 4-6 hours minimum:  Change oxygen saturation probe site  4. Every 4-6 hours:  If on nasal continuous positive airway pressure, respiratory therapy assess nares and determine need for appliance change or resting period. Outcome: Progressing     Problem: Safety - Medical Restraint  Goal: Remains free of injury from restraints (Restraint for Interference with Medical Device)  Description: INTERVENTIONS:  1. Determine that other, less restrictive measures have been tried or would not be effective before applying the restraint  2. Evaluate the patient's condition at the time of restraint application  3. Inform patient/family regarding the reason for restraint  4.  Q2H: Monitor safety, psychosocial status, comfort, nutrition and hydration  9/27/2022 0149 by Brooks Wilhelm RN  Outcome: Progressing  Flowsheets  Taken 9/27/2022 0100  Remains free of injury from restraints (restraint for interference with medical device):   Determine that other, less restrictive measures have been tried or would not be effective before applying the restraint   Evaluate the patient's condition at the time of restraint application   Inform patient/family regarding the reason for restraint   Every 2 hours: Monitor safety, psychosocial status, comfort, nutrition and hydration  Taken 9/27/2022 0000  Remains free of injury from restraints (restraint for interference with medical device):   Determine that other, less restrictive measures have been tried or would not be effective before applying the restraint   Evaluate the patient's condition at the time of restraint application   Inform patient/family regarding the reason for restraint   Every 2 hours: Monitor safety, psychosocial status, comfort, nutrition and hydration  Taken 9/26/2022 2300  Remains free of injury from restraints (restraint for interference with medical device):   Determine that other, less restrictive measures have been tried or would not be effective before applying the restraint   Evaluate the patient's condition at the time of restraint application   Inform patient/family regarding the reason for restraint   Every 2 hours: Monitor safety, psychosocial status, comfort, nutrition and hydration  Taken 9/26/2022 2200  Remains free of injury from restraints (restraint for interference with medical device):   Determine that other, less restrictive measures have been tried or would not be effective before applying the restraint   Evaluate the patient's condition at the time of restraint application   Inform patient/family regarding the reason for restraint   Every 2 hours: Monitor safety, psychosocial status, comfort, nutrition and hydration  Taken 9/26/2022 2100  Remains free of injury from restraints (restraint for interference with medical device):   Evaluate the patient's condition at the time of restraint application   Inform patient/family regarding the reason for restraint   Every 2 hours: Monitor safety, psychosocial status, comfort, nutrition and hydration   Determine that other, less restrictive measures have been tried or would not be effective before applying the restraint  Taken 9/26/2022 2000  Remains free of injury from restraints (restraint for interference with medical device):    Inform patient/family regarding the reason for restraint   Evaluate the patient's condition at the time of restraint application   Every 2 hours: Monitor safety, psychosocial status, comfort, nutrition and hydration   Determine that other, less restrictive measures have been tried or would not be effective before applying the restraint  Taken 9/26/2022 1900  Remains free of injury from restraints (restraint for interference with medical device):   Determine that other, less restrictive measures have been tried or would not be effective before applying the restraint   Evaluate the patient's condition at the time of restraint application   Inform patient/family regarding the reason for restraint   Every 2 hours: Monitor safety, psychosocial status, comfort, nutrition and hydration  9/26/2022 1812 by Luis Taylor RN  Outcome: Progressing  Flowsheets  Taken 9/26/2022 1812  Remains free of injury from restraints (restraint for interference with medical device):   Determine that other, less restrictive measures have been tried or would not be effective before applying the restraint   Evaluate the patient's condition at the time of restraint application   Every 2 hours: Monitor safety, psychosocial status, comfort, nutrition and hydration   Inform patient/family regarding the reason for restraint  Taken 9/26/2022 1801  Remains free of injury from restraints (restraint for interference with medical device): Every 2 hours: Monitor safety, psychosocial status, comfort, nutrition and hydration     Problem: Nutrition Deficit:  Goal: Optimize nutritional status  Outcome: Progressing     Problem: ABCDS Injury Assessment  Goal: Absence of physical injury  Outcome: Progressing     Problem: Chronic Conditions and Co-morbidities  Goal: Patient's chronic conditions and co-morbidity symptoms are monitored and maintained or improved  9/27/2022 0149 by Idalia Olmos RN  Outcome: Progressing  Flowsheets (Taken 9/26/2022 2000)  Care Plan - Patient's Chronic Conditions and Co-Morbidity Symptoms are Monitored and Maintained or Improved:   Monitor and assess patient's chronic conditions and comorbid symptoms for stability, deterioration, or improvement   Collaborate with multidisciplinary team to address chronic and comorbid conditions and prevent exacerbation or deterioration   Update acute care plan with appropriate goals if chronic or comorbid symptoms are exacerbated and prevent overall improvement and discharge  9/26/2022 1812 by Luis Taylor RN  Outcome: Not Progressing  Flowsheets (Taken 9/26/2022 1811)  Care Plan - Patient's Chronic Conditions and Co-Morbidity Symptoms are Monitored and Maintained or Improved:   Monitor and assess patient's chronic conditions and comorbid symptoms for stability, deterioration, or improvement   Collaborate with multidisciplinary team to address chronic and comorbid conditions and prevent exacerbation or deterioration   Update acute care plan with appropriate goals if chronic or comorbid symptoms are exacerbated and prevent overall improvement and discharge  9/26/2022 1811 by Jerald Venegas RN  Outcome: Not Progressing  Flowsheets  Taken 9/26/2022 1811  Care Plan - Patient's Chronic Conditions and Co-Morbidity Symptoms are Monitored and Maintained or Improved:   Monitor and assess patient's chronic conditions and comorbid symptoms for stability, deterioration, or improvement   Collaborate with multidisciplinary team to address chronic and comorbid conditions and prevent exacerbation or deterioration   Update acute care plan with appropriate goals if chronic or comorbid symptoms are exacerbated and prevent overall improvement and discharge  Taken 9/26/2022 1600  Care Plan - Patient's Chronic Conditions and Co-Morbidity Symptoms are Monitored and Maintained or Improved: Monitor and assess patient's chronic conditions and comorbid symptoms for stability, deterioration, or improvement  Taken 9/26/2022 1200  Care Plan - Patient's Chronic Conditions and Co-Morbidity Symptoms are Monitored and Maintained or Improved: Monitor and assess patient's chronic conditions and comorbid symptoms for stability, deterioration, or improvement

## 2022-09-27 NOTE — PROGRESS NOTES
Nephrology Progress Note                                                                                                                                                                                                                                                                                                                                                               Office : [phone]     Fax :[phone]    Patient's Name: Ivonne Restrepo  8:44 AM  9/27/2022    Reason for Consult: ZANA  Requesting Physician:  Mac Galvan MD    Interval History:    Patient remains on MV and sedated. UOP 1.1 L. Cre stable. Discussed case with family at bedside    Past Medical History:   Diagnosis Date    ZANA (acute kidney injury) ([facility] [address] 9/26/2022    Carotid stenosis, left 10/12/2011    Chronic back pain     Chronic systolic (congestive) heart failure 73/87/0723    Diastolic CHF ([facility] [address]     Erectile dysfunction     Hyperlipidemia     Hypertension     Osteoarthritis     Restrictive lung disease     Type II or unspecified type diabetes mellitus without mention of complication, not stated as uncontrolled        Past Surgical History:   Procedure Laterality Date    CARDIAC CATHETERIZATION  06/2022    KNEE SURGERY Left     PLEURAL CATH INSERTION N/A 9/19/2022    PLEURAL CATHETER PLACEMENT; INTERCOSTAL NERVE BLOCK X4 performed by Víctor Burgos MD at [facility] Bilateral     THORACOSCOPY Right 9/19/2022    RIGHT VIDEO ASSISTED THORASCOPIC SURGERY AND; performed by Víctor Burgos MD at [address] [place] [address] History   Problem Relation Age of Onset    Hearing Loss Father     Heart Disease Father 70        MI    High Blood Pressure Father     Diabetes Maternal Grandmother         hypoglycemic    Hearing Loss Maternal Grandmother     Arthritis Maternal Grandfather         reports that he quit smoking about 20 years ago. His smoking use included cigarettes. He has a 80.00 pack-year smoking history.  He has never used smokeless tobacco. He reports current alcohol use. He reports that he does not use drugs. Allergies:   Torsemide and Dye [iodides]    Current Medications:    methylPREDNISolone sodium (SOLU-MEDROL) injection 60 mg, Q8H  norepinephrine (LEVOPHED) 16 mg in sodium chloride 0.9 % 250 mL infusion, Continuous  insulin regular (HUMULIN R;NOVOLIN R) 100 Units in sodium chloride 0.9 % 100 mL infusion, Continuous  potassium bicarb-citric acid (EFFER-K) effervescent tablet 20 mEq, Daily  acetaminophen (TYLENOL) 160 MG/5ML solution 650 mg, Q6H PRN  propofol injection, Titrated  chlorhexidine (PERIDEX) 0.12 % solution 15 mL, BID  famotidine (PEPCID) 20 mg in sodium chloride (PF) 10 mL injection, Daily  [Held by provider] metoprolol (LOPRESSOR) injection 5 mg, Q6H  haloperidol lactate (HALDOL) injection 2 mg, Q6H PRN  albuterol (PROVENTIL) nebulizer solution 2.5 mg, Q4H PRN  apixaban (ELIQUIS) tablet 5 mg, BID  sodium chloride flush 0.9 % injection 5-40 mL, 2 times per day  sodium chloride flush 0.9 % injection 5-40 mL, PRN  0.9 % sodium chloride infusion, PRN  polyethylene glycol (GLYCOLAX) packet 17 g, Daily  ondansetron (ZOFRAN-ODT) disintegrating tablet 4 mg, Q8H PRN   Or  ondansetron (ZOFRAN) injection 4 mg, Q6H PRN  glucose chewable tablet 16 g, PRN  dextrose bolus 10% 125 mL, PRN   Or  dextrose bolus 10% 250 mL, PRN  glucagon (rDNA) injection 1 mg, PRN  dextrose 10 % infusion, Continuous PRN  hydrALAZINE (APRESOLINE) injection 5 mg, Q15 Min PRN  levalbuterol (XOPENEX) nebulization 0.63 mg, Q4H PRN      Review of Systems:   14 point ROS obtained but were negative except mentioned in HPI      Physical exam:     Vitals:  BP (!) 89/52   Pulse 85   Temp 98.1 °F (36.7 °C) (Axillary)   Resp 17   Ht 6' 0.99\" (1.854 m)   Wt 196 lb 3.4 oz (89 kg)   SpO2 95%   BMI 25.89 kg/m²   Constitutional:  on MV  Skin: no rash, turgor wnl  Heent:  eomi, mmm  Neck: no bruits or jvd noted  Cardiovascular:  S1, S2 without m/r/g  Respiratory: mechanical BS  Abdomen:  +bs, soft, nt, nd  Ext: bilateral lower extremity edema R>L  Psychiatric: mood and affect appropriate  Musculoskeletal:  Rom, muscular strength intact    Data:   Labs:  CBC:   Recent Labs     09/25/22 0521 09/26/22 0354 09/27/22 0315   WBC 19.1* 23.6* 20.0*   HGB 8.0* 8.5* 8.7*    366 342     BMP:    Recent Labs     09/25/22 1745 09/26/22 0354 09/27/22 0315    136 138   K 3.4* 4.2 4.3   CL 97* 89* 90*   CO2 30 36* 40*   * 119* 125*   CREATININE 1.3 1.7* 1.7*   GLUCOSE 126* 155* 157*     Ca/Mg/Phos:   Recent Labs     09/25/22 0521 09/25/22 1745 09/26/22 0354 09/27/22 0315   CALCIUM 8.2* 6.9* 8.3 8.4   MG 2.60*  --  2.60* 2.80*   PHOS 3.5 2.7 3.1 3.3     Hepatic: No results for input(s): AST, ALT, ALB, BILITOT, ALKPHOS in the last 72 hours. Troponin: No results for input(s): TROPONINI in the last 72 hours. BNP: No results for input(s): BNP in the last 72 hours. Lipids: No results for input(s): CHOL, TRIG, HDL, LDLCALC, LABVLDL in the last 72 hours. ABGs:   Recent Labs     09/26/22 0354   PHART 7.564*   PO2ART 96.2   RQT2MHR 46.9*     INR: No results for input(s): INR in the last 72 hours. UA:No results for input(s): Marinus Melvin, GLUCOSEU, BILIRUBINUR, Varghese Delgado, BLOODU, PHUR, PROTEINU, UROBILINOGEN, NITRU, LEUKOCYTESUR, LABMICR, URINETYPE in the last 72 hours. Urine Microscopic: No results for input(s): LABCAST, BACTERIA, COMU, HYALCAST, WBCUA, RBCUA, EPIU in the last 72 hours. Urine Culture: No results for input(s): LABURIN in the last 72 hours. Urine Chemistry:   Recent Labs     09/26/22  1009 09/26/22  1245   LABCREA 51.2 66.3   PROTEINUR 14.00*  --    NAUR  --  <20             IMAGING:  XR CHEST 1 VIEW   Final Result      1. Stable small right-sided pneumothorax and prominent subcutaneous emphysema along the neck and upper superior chest wall, greater in right lung stable   2.  Stable left basilar consolidation and pleural effusion      3. Stable appearing perihilar accentuated markings or airspace disease            XR CHEST PORTABLE   Final Result      Stable small right-sided pneumothorax. More prominent emphysema over the lower neck. No change in bilateral airspace disease. Stable left pleural effusion. XR CHEST PORTABLE   Final Result   1. Bilateral multifocal pneumonia with improvement in the right    pulmonary consolidation since prior study from 9/23/22   2. Mild to moderate left pleural effusion          XR CHEST PORTABLE   Final Result   1. Support lines and tubes are stable. 2.  Stable small right lateral pneumothorax and right basilar    chest tube. 3.  Stable patchy bilateral airspace disease. XR CHEST PORTABLE   Final Result   1. Satisfactory position of right internal jugular central line   2. No interval change in endotracheal tube and nasogastric tube positioning. 3. Extensive bilateral airspace disease with no changes. 4. Left pleural effusion with retrocardiac opacity, unchanged   5. Small stable tiny right lateral pneumothorax and stable position of right chest tube,, Pleurx catheter. XR ABDOMEN (KUB) (SINGLE AP VIEW)   Final Result   1. No residual pneumothorax. 2.  Mild patchy airspace disease bilaterally worse on the right than on prior examination. 3.  Non-obstructive bowel gas pattern. Mildly distended stomach. XR CHEST PORTABLE   Final Result   1. No residual pneumothorax. 2.  Mild patchy airspace disease bilaterally worse on the right than on prior examination. 3.  Non-obstructive bowel gas pattern. Mildly distended stomach. XR CHEST PORTABLE   Final Result      1. Small right pneumothorax appears slightly increased. 2. Mild patchy airspace opacity bilaterally. XR CHEST PORTABLE   Final Result     Pleurx catheter at the right lung base. Trace right    pneumothorax is unchanged.           XR CHEST PORTABLE Final Result      Right-sided chest tube evident in the base, its tip medially. Improvement in the right-sided pneumothorax, since earlier today. Possible medial collapse of the right lower lobe. Small left effusion. Patchy airspace density/atelectasis in the lungs bilaterally, with no other significant change. XR CHEST PORTABLE   Final Result   1. Right-sided Pleurx catheter in satisfactory position in the lung bases   2. Right-sided post operative pneumothorax, approximately 10%             Assessment/Plan   ZANA  - suspect this is contrast nephropathy  - baseline Cre 0.7. Normal on admission.  - Cre 0.7-->1.1-->1.4-->1.6-->1.7-->1.7  - Hold diuretics today   - BNP 5k, Protein:Cre 0.3, FENa 0.4%  - closely monitor UOP  - avoid nephrotoxic agent     2. Hypotension  - in setting of sedation    3. Anemia  - Hgb 8.7 (9.0 on admission)  - daily CBC    4. Acid- base/ Electrolyte imbalance   - optimize lytes    5. Acute hypoxic resp failure  - s/p VATS on 9/19/22 for recurrent pleural effusions  - on MV  - Intensivist and CTS following    6. CHF in exacerbation  - EF 65% on 6/16/22 via cardiac cath  - BNP 5908 and tredning down  - diuretics being held   - Intensivist following    Patient will be staffed with Dr. Edwina Guzman MD  Feel free to contact me   [facility] [address]  Office : [phone]  Fax :[phone]     I have seen the patient independently from the resident . I discussed the care with the resident. I personally reviewed the HPI, PH, FH, SH, ROS and medications. I repeated pertinent portions of the examination and reviewed the relevant imaging and laboratory data.  I agree with the findings, assessment and plan as documented, with the following addendum:      Suly Gomez MD

## 2022-09-27 NOTE — PROGRESS NOTES
Per MD orders At 1130a precedex was started at 0.3mcg/kg/min Then at 12p propofol was weaned down to 10mcg/kg/min, as precedex was titrated to 0.4mcg/kg/min. Pt became bradycardic and hypotensive. Md aware and instructed to stop precedex but keep the propofol at 5mcg/kg/min. [facility]  and fentyl was discussed as a possible option. Will continue to monitor.

## 2022-09-27 NOTE — PROGRESS NOTES
Patient placed on a SBT per RT at this time. Patient appears to be tolerating well, will continue to monitor.

## 2022-09-27 NOTE — PROGRESS NOTES
ICU Progress Note  PGY-1    Admit Date: 9/19/2022  Day: 8  Vent Day: 5  IV Access:CVC triple lumen, peripheral  IV Fluids:None  Vasopressors: levo stopped yesterday morning                 Antibiotics:   Diet: Diet NPO    CC: Pleural effusion (right) s/p VATS and Pleural catheter (PleurX) placement    Interval history: Patient sedated and intubated    Levo 5 yest morn then off for rest of day. BP 97/57 this morning but spontaneously recovers to 110s. .   Propofol up to 25 yesterday then weaned to 15 this morning. Patient responds to stimulation by opening eyes    Patient failed breathing trial after 6 min due to hypertension yesterday. HPI:  Tian Dwyer is a 66 yo Male with a PMH of CHF (CHFpEF), carotid stenosis, HLD, HTN, OA, Restrictive Lung Disease, T2DM who underwent VATS with Pleurx catheter placement on 9/19/2022 for recurrent bilateral loculated pleural effusions and has had hypoxia/hypercapnia since that time now requiring intubation. The etiology of his recurrent pleural effusions is unclear. Possibly malignancy. In addition to his recurrent bilateral loculated pleural effusions, he has lost over 100 lbs over the last year, some of which his wife attributes to intentional weight loss. Initially, he was started on BiPAP post-procedure for rapid shallow breathing and slow clearance of sedation. He wasn't tolerating the BiPAP well, trying to remove it. After discussion with family and patient (while on a break from BiPAP), the decision was made to intubate and confirmed that he's OK with tracheostomy if necessary. \"    Medications:     Scheduled Meds:   methylPREDNISolone  60 mg IntraVENous Q8H    potassium bicarb-citric acid  20 mEq Oral Daily    chlorhexidine  15 mL Mouth/Throat BID    famotidine (PEPCID) injection  20 mg IntraVENous Daily    [Held by provider] metoprolol  5 mg IntraVENous Q6H    apixaban  5 mg Oral BID    sodium chloride flush  5-40 mL IntraVENous 2 times per day polyethylene glycol  17 g Oral Daily     Continuous Infusions:   norepinephrine 5 mcg/min (09/26/22 0603)    insulin 3.06 Units/hr (09/27/22 0700)    propofol 15 mcg/kg/min (09/26/22 2348)    sodium chloride      dextrose       PRN Meds:acetaminophen, haloperidol lactate, albuterol, sodium chloride flush, sodium chloride, ondansetron **OR** ondansetron, glucose, dextrose bolus **OR** dextrose bolus, glucagon (rDNA), dextrose, hydrALAZINE, levalbuterol    Objective:   Vitals:   T-max:  Patient Vitals for the past 8 hrs:   BP Temp Temp src Pulse Resp SpO2 Weight   09/27/22 0603 -- -- -- -- -- -- 196 lb 3.4 oz (89 kg)   09/27/22 0600 (!) 89/52 -- -- 78 16 95 % --   09/27/22 0500 127/79 -- -- 88 19 94 % --   09/27/22 0445 -- -- -- 93 16 99 % --   09/27/22 0400 (!) 112/59 98.1 °F (36.7 °C) Axillary 73 (!) 7 93 % --   09/27/22 0300 (!) 111/57 -- -- 76 10 93 % --   09/27/22 0200 114/62 -- -- 69 12 92 % --   09/27/22 0100 (!) 125/59 -- -- 80 (!) 0 93 % --   09/27/22 0049 -- -- -- 68 12 92 % --   09/27/22 0000 (!) 107/50 97.9 °F (36.6 °C) Axillary 81 20 93 % --         Intake/Output Summary (Last 24 hours) at 9/27/2022 0727  Last data filed at 9/27/2022 8820  Gross per 24 hour   Intake 1438.45 ml   Output 1185 ml   Net 253.45 ml         Review of Systems - unable to perform, intubated and sedated    Physical Exam  Constitutional:       Comments: Sedated, intubated, arouses to pain   HENT:      Head: Normocephalic. Eyes:      Pupils: Pupils are equal, round, and reactive to light. Cardiovascular:      Rate and Rhythm: Normal rate. Rhythm irregular. Pulses: Normal pulses. Heart sounds: Normal heart sounds. Pulmonary:      Comments: On ventilator  Abdominal:      General: Abdomen is flat. Palpations: Abdomen is soft. Musculoskeletal:      Right lower leg: Edema present. Left lower leg: Edema present. Skin:     General: Skin is warm and dry. Neurological:      Comments: Sedated.  Responds to pain LABS:    CBC:   Recent Labs     09/25/22 0521 09/26/22 0354 09/27/22 0315   WBC 19.1* 23.6* 20.0*   HGB 8.0* 8.5* 8.7*   HCT 25.6* 26.7* 27.7*    366 342   MCV 83.0 82.0 83.5       Renal:    Recent Labs     09/25/22 0521 09/25/22 1745 09/26/22 0354 09/27/22 0315    137 136 138   K 4.1 3.4* 4.2 4.3   CL 91* 97* 89* 90*   CO2 36* 30 36* 40*   * 102* 119* 125*   CREATININE 1.5* 1.3 1.7* 1.7*   GLUCOSE 122* 126* 155* 157*   CALCIUM 8.2* 6.9* 8.3 8.4   MG 2.60*  --  2.60* 2.80*   PHOS 3.5 2.7 3.1 3.3   ANIONGAP 9 10 11 8       Hepatic:   Recent Labs     09/25/22 1745 09/26/22 0354 09/27/22 0315   LABALBU 2.0* 2.5* 2.7*       Troponin: No results for input(s): TROPONINI in the last 72 hours. BNP: No results for input(s): BNP in the last 72 hours. Lipids: No results for input(s): CHOL, HDL in the last 72 hours. Invalid input(s): LDLCALCU, TRIGLYCERIDE  ABGs:    Recent Labs     09/26/22 0354   PHART 7.564*   PZA1XRA 46.9*   PO2ART 96.2   XEC1VBH 42*   BEART 18.3*   [id number] 98   CLT2KFA 44         INR: No results for input(s): INR in the last 72 hours. Lactate:   No results for input(s): LACTATE in the last 72 hours. Cultures:  -----------------------------------------------------------------  RAD:   XR CHEST 1 VIEW   Final Result      1. Stable small right-sided pneumothorax and prominent subcutaneous emphysema along the neck and upper superior chest wall, greater in right lung stable   2. Stable left basilar consolidation and pleural effusion      3. Stable appearing perihilar accentuated markings or airspace disease            XR CHEST PORTABLE   Final Result      Stable small right-sided pneumothorax. More prominent emphysema over the lower neck. No change in bilateral airspace disease. Stable left pleural effusion. XR CHEST PORTABLE   Final Result   1.   Bilateral multifocal pneumonia with improvement in the right    pulmonary consolidation since prior study from 9/23/22   2. Mild to moderate left pleural effusion          XR CHEST PORTABLE   Final Result   1. Support lines and tubes are stable. 2.  Stable small right lateral pneumothorax and right basilar    chest tube. 3.  Stable patchy bilateral airspace disease. XR CHEST PORTABLE   Final Result   1. Satisfactory position of right internal jugular central line   2. No interval change in endotracheal tube and nasogastric tube positioning. 3. Extensive bilateral airspace disease with no changes. 4. Left pleural effusion with retrocardiac opacity, unchanged   5. Small stable tiny right lateral pneumothorax and stable position of right chest tube,, Pleurx catheter. XR ABDOMEN (KUB) (SINGLE AP VIEW)   Final Result   1. No residual pneumothorax. 2.  Mild patchy airspace disease bilaterally worse on the right than on prior examination. 3.  Non-obstructive bowel gas pattern. Mildly distended stomach. XR CHEST PORTABLE   Final Result   1. No residual pneumothorax. 2.  Mild patchy airspace disease bilaterally worse on the right than on prior examination. 3.  Non-obstructive bowel gas pattern. Mildly distended stomach. XR CHEST PORTABLE   Final Result      1. Small right pneumothorax appears slightly increased. 2. Mild patchy airspace opacity bilaterally. XR CHEST PORTABLE   Final Result     Pleurx catheter at the right lung base. Trace right    pneumothorax is unchanged. XR CHEST PORTABLE   Final Result      Right-sided chest tube evident in the base, its tip medially. Improvement in the right-sided pneumothorax, since earlier today. Possible medial collapse of the right lower lobe. Small left effusion. Patchy airspace density/atelectasis in the lungs bilaterally, with no other significant change. XR CHEST PORTABLE   Final Result   1. Right-sided Pleurx catheter in satisfactory position in the lung bases   2.  Right-sided post operative pneumothorax, approximately 10%               Assessment/Plan:   Jana Tipton is a 65 y.o. male with a PMH of CHF (CHFpEF), carotid stenosis, HLD, HTN, OA, Restrictive Lung Disease, T2DM who underwent VATS with PleurX catheter placement on 9/19/2022 for recurrent bilateral loculated pleural effusions and has had hypoxia/hypercapnia since that time    Neuro  Sedated: Propofol 15 from 25 yesterday  Patient continues to respond to pain. Pulmonary  Vent Day: 5  Vent: , RR 18, FiO2 30, PEEP 5    On mechanical ventilation, he developed respiratory alkalosis, with significant drop in PCO2 from his baseline. Ventilator support reduced, decreasing VT and fR, with minute volume 9.4 L. Subsequent arterial pH 7.45, PCO2 54, PO2 90    Acute hypoxic/hypercapnic respiratory failure  Recurrent Pleural Effusions, s/p VATS and Pleurx  Patient was admitted to ICU for failure to tolerate bipap following procedure. The etiology of his recurrent pleural effusions is unclear. No studies done on fluid from original thoracenteses. Possibly malignancy. VATS fluid results showing an exudative picture, per [facility] Criteria. S/p VATS procedure 9/19, with right PleurX catheter placement. Ventilatory restriction on PFT associated with worsening pleural disease. PleurX catheter in place  -Methylprednisolone IV 60mg TID per CTS    Cardiovascular  Hypotension   Patient off of levo drip overnight. Ok blood pressures. A couple soft readings but had popped back up to 110s spontaneously  Pressures stable in 110s/60s  -Began requiring pressors after intubation, attributed to sedation and positive pressure ventilation    Congestive Heart Failure with Preserved Ejection Fraction  EF ~65% 6/16/2022  -BNP ordered 9/23. Significant elevated 15k, given lasix at that time. - remains fluid overloaded on exam with edema to knees bilaterally.   - BNP 6k yestreday 9/26  - Per nephro held off on diuretics given kidney injury    GI  Diet: Diet NPO  GI ppx: pepcid     Constipation  Cont glycolax    Renal   Ansari in place  ZANA  Potentially ATN from hypotension/contrast   UOP 1185ml, Cr remains at 1.7  Nephro consult placed yesterday, rec to hold off on diuretics       Code Status: Full Code   PPX: pepcid  DISPO: ICU    Gabriel Bolivar MD, PGY-1  Internal Medicine Resident  Contact via [email or web address]  09/27/22  7:27 AM    This patient has been staffed and discussed with Dr Griffin Alaniz    Patient was seen, examined and discussed with Dr. Vidhi Pinto. I agree with the interval history. My physical exam confirms the findings listed below  Chart was reviewed including labs and medical records confirm the findings noted     Acute respiratory failure on mechanical vent support. , RR 18, FiO2 30, PEEP 5. Vent day #5  Hypotension is resolved. On propofol at 15  Failed SBT within few minutes  Pleural effusion s/p VATS. Pt has high proBNP, bilateral effusion, and known diastolic CHF. Leukocytosis. Pt is on steroids, however he had leukocytosis two days before. A.fib on Eliquis      Change propofol to precedex  SBT today with PS 15  Wean steroids     Pt has a high probability of imminent or life-threatening deterioration requiring close monitoring, and highly complex decision-making and/or interventions of high intensity to assess, manipulate, and support his critical organ systems to prevent a likely inevitable decline which could occur if left untreated. A total critical care time 35 minutes was used. This includes but not limited to examining patient, collaborating with other physicians, monitoring vital signs, telemetry, continuous pulse oximetry, and clinical response to IV medications, documentation time, review and interpretation of laboratory and radiological data, review of nursing notes and old record review. This time excludes any time that may have been spent performing procedures for life threatening organ failure.

## 2022-09-27 NOTE — PROGRESS NOTES
ICU CT SURGERY DAILY PROGRESS NOTE    CC: Pleural effusions s/p R PleurX catheter placement    SUBJECTIVE:   Interval Hx:   No acute events overnight. Pt remains HDS and has been off levo since yesterday morning. SBT attempted yesterday - only 6 mins. ROS: A 14 point review of systems was conducted, significant findings as noted above. All other systems negative. OBJECTIVE:   Vitals:   Vitals:    09/27/22 0100 09/27/22 0200 09/27/22 0300 09/27/22 0445   BP: (!) 125/59 114/62 (!) 111/57    Pulse: 80 69 76 93   Resp: (!) 0 12 10 16   Temp:       TempSrc:       SpO2: 93% 92% 93% 99%   Weight:       Height:           I/O:   Intake/Output Summary (Last 24 hours) at 9/27/2022 0550  Last data filed at 9/27/2022 0000  Gross per 24 hour   Intake 1081.11 ml   Output 1060 ml   Net 21.11 ml       I/O last 3 completed shifts: In: 2802.5 [I.V.:793.8; NG/GT:1970; IV Piggyback:38.8]  Out: 2385 [Urine:2245; Chest Tube:140]    Diet: Diet NPO      Physical Examination:   General appearance: Sedated and mechanically ventilated. HEENT: NC/AT, EOMI, trachea midline, no JVD. Crepitus noted in the distal neck, supraclavicular region. Corpak in right nostril w/ bridle. ETT in place. Chest/Lungs: Decreased breath sounds bilaterally, on mechanical ventilation; R PleurX catheter to water seal without airleak, has serous output  Cardiovascular: Atrial fibrillation with rate WNL  Abdomen: Soft, non-tender, non-distended  Genitourinary:  Ansari in place with clear yellow urine  Skin: Warm and dry, no rashes  Extremities: No edema, no cyanosis; SCDs in place      Labs:  CBC:   Recent Labs     09/25/22  0521 09/26/22  0354 09/27/22  0315   WBC 19.1* 23.6* 20.0*   HGB 8.0* 8.5* 8.7*   HCT 25.6* 26.7* 27.7*    366 342         BMP:   Recent Labs     09/25/22  1745 09/26/22  0354 09/27/22  0315    136 138   K 3.4* 4.2 4.3   CL 97* 89* 90*   CO2 30 36* 40*   * 119* 125*   CREATININE 1.3 1.7* 1.7*   GLUCOSE 126* 155* 157* LFT's:   No results for input(s): AST, ALT, ALB, BILITOT, ALKPHOS in the last 72 hours. Troponin: No results for input(s): TROPONINI in the last 72 hours. BNP: No results for input(s): BNP in the last 72 hours. ABGs:   Recent Labs     09/26/22  0354   PHART 7.564*   [id number] 46.9*   PO2ART 96.2       INR:   No results for input(s): INR in the last 72 hours. U/A:No results for input(s): NITRITE, COLORU, PHUR, LABCAST, WBCUA, RBCUA, MUCUS, TRICHOMONAS, YEAST, BACTERIA, CLARITYU, SPECGRAV, LEUKOCYTESUR, UROBILINOGEN, BILIRUBINUR, BLOODU, GLUCOSEU, AMORPHOUS in the last 72 hours. Invalid input(s): Kelley Savers     Rad:   XR CHEST 1 VIEW   Final Result      1. Stable small right-sided pneumothorax and prominent subcutaneous emphysema along the neck and upper superior chest wall, greater in right lung stable   2. Stable left basilar consolidation and pleural effusion      3. Stable appearing perihilar accentuated markings or airspace disease            XR CHEST PORTABLE   Final Result      Stable small right-sided pneumothorax. More prominent emphysema over the lower neck. No change in bilateral airspace disease. Stable left pleural effusion. XR CHEST PORTABLE   Final Result   1. Bilateral multifocal pneumonia with improvement in the right    pulmonary consolidation since prior study from 9/23/22   2. Mild to moderate left pleural effusion          XR CHEST PORTABLE   Final Result   1. Support lines and tubes are stable. 2.  Stable small right lateral pneumothorax and right basilar    chest tube. 3.  Stable patchy bilateral airspace disease. XR CHEST PORTABLE   Final Result   1. Satisfactory position of right internal jugular central line   2. No interval change in endotracheal tube and nasogastric tube positioning. 3. Extensive bilateral airspace disease with no changes. 4. Left pleural effusion with retrocardiac opacity, unchanged   5.  Small stable tiny right lateral pneumothorax and stable position of right chest tube,, Pleurx catheter. XR ABDOMEN (KUB) (SINGLE AP VIEW)   Final Result   1. No residual pneumothorax. 2.  Mild patchy airspace disease bilaterally worse on the right than on prior examination. 3.  Non-obstructive bowel gas pattern. Mildly distended stomach. XR CHEST PORTABLE   Final Result   1. No residual pneumothorax. 2.  Mild patchy airspace disease bilaterally worse on the right than on prior examination. 3.  Non-obstructive bowel gas pattern. Mildly distended stomach. XR CHEST PORTABLE   Final Result      1. Small right pneumothorax appears slightly increased. 2. Mild patchy airspace opacity bilaterally. XR CHEST PORTABLE   Final Result     Pleurx catheter at the right lung base. Trace right    pneumothorax is unchanged. XR CHEST PORTABLE   Final Result      Right-sided chest tube evident in the base, its tip medially. Improvement in the right-sided pneumothorax, since earlier today. Possible medial collapse of the right lower lobe. Small left effusion. Patchy airspace density/atelectasis in the lungs bilaterally, with no other significant change. XR CHEST PORTABLE   Final Result   1. Right-sided Pleurx catheter in satisfactory position in the lung bases   2. Right-sided post operative pneumothorax, approximately 10%             ASSESSMENT AND PLAN:   Anders Moreno is a 65 y.o. male with history of diastolic heart failure, atrial fibrillation, and DM with recurrent pleural effusions s/p R PleurX catheter placement (9/19), POD #7.     Neuro:   Analgesia  - Continue scheduled Tylenol  - Propofol for sedation; wean as tolerated    Cardiovascular:   History of paroxysmal atrial fibrillation  - Continue Apixaban 5mg bid  - Metoprolol IV held currently for hypotension    HFpEF  - Metoprolol IV (hold due to hypotension), currently on Levo 5mcg/kg/hr  - Verle Stephanie held  - Last echo (1/25/22): LVEF = 85-56%, grade 2 diastolic dysfunction    Hyperlipidemia  - Pravastatin 40mg    Pulmonary:   S/P R PleurX catheter placement (9/19), POD #7  - Continue to water seal. Change atrium today   - SW/HHC pending    Acute on chronic respiratory failure  - Mechanically intubated on 9/22. Currently PRVC 18/450/5/30%. Vent management per pulm. Consider tracheostomy later this week. - Inpatient consult to pulmonology (Dr Favian Reynolds) appreciate recommendations. - Baseline on home O2 3-4L NC   - Wean Solu-medrol. Currently 60 mg Q8. Continue weaning. GI:   - Miralax  - Continue TF at goal  - Recommend holding TF if pressor requirement increases to greater than 5 of Levo    FEN:   - IV fluids: none, lasix drip stopped yesterday  - Replace electrolytes per protocol  - Diet: Diet NPO    /Renal:   - Cont Ansari   - Creatinine stable at 1.7 from 1.3 from 1.5 from 1.7  - BUN elevated at 125 from 119,102  -Nephrology consulted. Appreciate recommendations. Hem/ID:   - Hemoglobin 8.7 from 8.5, 8.0, 7.8, 7.5  - WBC 20.0 from 23.6, 19.1, 15.7, 14.7. Leukocytosis 2/2 steroids vs infection.      Endo:   History of DMII  - Last A1C 8.1% (1/24/22)  - High-dose sliding scale insulin  - Solu-medrol as above    Prophylaxis:   DVT Ppx: Eliquis, SCDs  GI Ppx: pepcid    Access:  Central Access: R IJ CVC, placed 9/22  Peripheral Access: IV x2  Arterial Line Access: L radial, placed 9/22                              Ansari Date placed: 09/27/22    Mobility:  N/A    Dispo:   ICU    Code Status: Full Code  -----------------------------  Merrick Sanchez DO PGY1, General Surgery  09/27/22  5:50 AM  Pager # [phone]

## 2022-09-27 NOTE — PLAN OF CARE
Problem: Safety - Adult  Goal: Free from fall injury  Note: No attempts to get out of bed. Pt resting calmly. Bed locked and in lowest position. Side rails up x3. Exit alarm in use. Call light in reach. Remains free from injury. Will cont to monitor safety. Problem: Skin/Tissue Integrity  Goal: Absence of new skin breakdown  Description: 1. Monitor for areas of redness and/or skin breakdown  2. Assess vascular access sites hourly  3. Every 4-6 hours minimum:  Change oxygen saturation probe site  4. Every 4-6 hours:  If on nasal continuous positive airway pressure, respiratory therapy assess nares and determine need for appliance change or resting period. Note: No skin breakdown noted. Pt turned and repositioned every two hours, and checked for stool incontinence. Pt on low air loss pressure alternating mattress. Will cont to monitor skin integrity. Problem: Safety - Medical Restraint  Goal: Remains free of injury from restraints (Restraint for Interference with Medical Device)  Description: INTERVENTIONS:  1. Determine that other, less restrictive measures have been tried or would not be effective before applying the restraint  2. Evaluate the patient's condition at the time of restraint application  3. Inform patient/family regarding the reason for restraint  4. Q2H: Monitor safety, psychosocial status, comfort, nutrition and hydration    Note: Continuing with BSW restraints as pt unable to follow commands, involuntary movements. Will cont to monitor safety.

## 2022-09-27 NOTE — PROGRESS NOTES
Spoke with medical resident regarding patient being tachycardic with a HR of 105 and SBP in the 180's. Okay to increase Propofol and stop SBT. Will let RT know.

## 2022-09-27 NOTE — PROGRESS NOTES
Patient was in an SBT from 7045-5988. Patient failed SBT today for increased heart and blood pressure. Will attempt wean again tomorrow, 9/28/22.

## 2022-09-27 NOTE — PROGRESS NOTES
Notification of IV to PO Conversion     IV To Po Conversion:   Will convert Famotidine to per NG tube based on [facility] IV to PO policy (see below). Please call with questions.   Tamiko Gilmore PharmD., BCPS   9/27/2022 9:50 AM  Wireless: [phone]      Criteria for conversion from IV to PO therapy per [facility] IV to PO Protocol:   Patients should meet all of the following inclusion criteria and none of the exclusion criteria    Criteria to initiate medication route switch (Inclusion Criteria)  IV therapy for > 24 hours (antibiotics only)  Tolerating diet more advanced than clear liquids  Tolerating oral (PO) medications  Does not require vasopressor therapy for blood pressure support  No seizures for 72hrs (antiepileptic medications only)      Criteria indicating that the patient is NOT a candidate for IV to PO conversion (Exclusion Criteria)  Infections requiring IV therapy (ie: meningitis, endocarditis, osteomyelitis, pancreatitis)  Nausea and/or vomiting or severe diarrhea within past 24 hours  Has gastrectomy, ileus, gastric outlet or bowel obstruction, or malabsorption syndromes  Has significant, painful oral ulceration  TPN with an NPO order  Active GI bleed  Unable to swallow  Nothing by Mouth (NPO) status  Febrile in the last 24 hours- (antibiotics only)  Clinical deteriorating or unstable - (antibiotics only)  Pediatric patients and patients who are not euthyroid (not on oral levothyroxine/not stabilized on oral levothyroxine) - (Levothyroxine only)  Patients being treated for myxedema coma or during the organ donation process - (Levothyroxine only)    Other notes-   Patients may be given suppositories when available for the product being ordered if no contraindications exist (ie: rectal surgery or infection)   Oral solutions/suspensions may be considered for patients with a functioning enteral tube not on continuous suction (if medication is available in this formulation)

## 2022-09-27 NOTE — PROGRESS NOTES
Spoke with MD, okay to place patient on a SBT. Call placed to RT at this time, will come to place patient on a SBT at this time. Will continue to monitor.

## 2022-09-27 NOTE — PROGRESS NOTES
ABG results s/p SBT as follows,     Latest Reference Range & Units 9/27/22 16:02   pH, Arterial 7.350 - 7.450  7.423   pCO2, Arterial 35.0 - 45.0 mm Hg 63.3 (H)   pO2, Arterial 75.0 - 108.0 mm Hg 67.0 (L)   HCO3, Arterial 21.0 - 29.0 mmol/L 41.4 (H)   TCO2 (calc), Art Not Established mmol/L 43   Base Excess, Arterial -3 - 3  17 (H)   O2 Sat, Arterial 93 - 100 % 93   (H): Data is abnormally high  (L): Data is abnormally low    Notified jaron Younger to keep patient on SBT until 1900 and flip back to vent settings. Will continue to monitor.

## 2022-09-28 NOTE — PROGRESS NOTES
Dr. Marshall Chocorua rounding this morning. Okay to order CTS arrhythmia protocol if patient goes into a fib. Will continue to monitor.

## 2022-09-28 NOTE — PROGRESS NOTES
Patient tolerated SBT from 8513-2340. Failed due to increased HR and BP.  Patient was weaned to a PS 10cwp  during trial.

## 2022-09-28 NOTE — PROGRESS NOTES
Kenn Perla rounding on patient, updated on patients current status. Notified of Amiodarone not being administered d/t patient not meeting criteria. Yolanda salmeron, will continue to monitor.

## 2022-09-28 NOTE — PROGRESS NOTES
Medical residents aware of patients HR. CTS arrhythmia protocol ordered per Dr. Ivana Matthew due to patient HR in the 170's and irregular. Will continue to monitor.

## 2022-09-28 NOTE — PROGRESS NOTES
EKG complete, notified medical residents. CTS arrhythmia ordered, pharmacy to send Amiodarone. Will continue to monitor.

## 2022-09-28 NOTE — PROGRESS NOTES
ICU Progress Note  PGY-1    Admit Date: 9/19/2022  Day: 9  Vent Day: 6  IV Access:CVC triple lumen, peripheral  IV Fluids:None  Vasopressors: levo restarted yesterday evening, 3 mcg now              Antibiotics:   Diet: Diet NPO    CC: Pleural effusion (right) s/p VATS and Pleural catheter (PleurX) placement    Interval history:   BPs low yesterday 7pm to 91/37. Restarted on levo. Required up to 7. Now down to 3mcg. Bps in 110s/50s. SBT yesterday made it about 1.5 hrs before becoming hypertensive, tachycardic. Trying again this morning. Propofol down to 10. Patient far more aware this morning. Responds to questions appropriately and follows commands. Denies any pain, but does wince on lower extremity edema exam.    HPI:  Katt Mcnair is a 64 yo Male with a PMH of CHF (CHFpEF), carotid stenosis, HLD, HTN, OA, Restrictive Lung Disease, T2DM who underwent VATS with Pleurx catheter placement on 9/19/2022 for recurrent bilateral loculated pleural effusions and has had hypoxia/hypercapnia since that time now requiring intubation. The etiology of his recurrent pleural effusions is unclear. Possibly malignancy. In addition to his recurrent bilateral loculated pleural effusions, he has lost over 100 lbs over the last year, some of which his wife attributes to intentional weight loss. Initially, he was started on BiPAP post-procedure for rapid shallow breathing and slow clearance of sedation. He wasn't tolerating the BiPAP well, trying to remove it. After discussion with family and patient (while on a break from BiPAP), the decision was made to intubate and confirmed that he's OK with tracheostomy if necessary. \"    Medications:     Scheduled Meds:   methylPREDNISolone  60 mg IntraVENous Q12H    famotidine  20 mg Per NG tube Daily    potassium bicarb-citric acid  20 mEq Oral Daily    chlorhexidine  15 mL Mouth/Throat BID    [Held by provider] metoprolol  5 mg IntraVENous Q6H    apixaban  5 mg Oral BID sodium chloride flush  5-40 mL IntraVENous 2 times per day    polyethylene glycol  17 g Oral Daily     Continuous Infusions:   dexmedetomidine (PRECEDEX) IV infusion Stopped (09/27/22 1234)    propofol 10 mcg/kg/min (09/28/22 0321)    norepinephrine 3 mcg/min (09/28/22 0639)    insulin 1 Units/hr (09/28/22 0622)    sodium chloride      dextrose       PRN Meds:acetaminophen, haloperidol lactate, albuterol, sodium chloride flush, sodium chloride, ondansetron **OR** ondansetron, glucose, dextrose bolus **OR** dextrose bolus, glucagon (rDNA), dextrose, hydrALAZINE, levalbuterol    Objective:   Vitals:   T-max:  Patient Vitals for the past 8 hrs:   BP Temp Temp src Pulse Resp SpO2 Weight   09/28/22 0745 -- -- -- 92 18 92 % --   09/28/22 0711 -- -- -- 95 (!) 0 92 % --   09/28/22 0700 (!) 147/59 -- -- 97 -- 92 % --   09/28/22 0645 -- -- -- 96 -- 93 % --   09/28/22 0630 (!) 146/71 -- -- 96 10 93 % --   09/28/22 0615 -- -- -- 96 -- 93 % --   09/28/22 0600 (!) 142/65 -- -- 82 -- 93 % --   09/28/22 0545 -- -- -- 84 -- 93 % --   09/28/22 0530 124/64 -- -- -- -- 93 % 193 lb 2 oz (87.6 kg)   09/28/22 0515 -- -- -- 99 25 92 % --   09/28/22 0500 (!) 142/61 -- -- 87 -- 93 % --   09/28/22 0445 -- -- -- 90 -- 93 % --   09/28/22 0430 119/66 -- -- 91 -- 93 % --   09/28/22 0418 -- -- -- 91 20 93 % --   09/28/22 0415 -- -- -- 85 -- 93 % --   09/28/22 0400 (!) 137/55 -- -- 85 (!) 0 93 % --   09/28/22 0345 -- -- -- 92 (!) 0 93 % --   09/28/22 0341 -- 98 °F (36.7 °C) Axillary 88 18 93 % --   09/28/22 0330 (!) 117/47 -- -- 85 -- 93 % --   09/28/22 0300 (!) 144/61 -- -- 82 -- 93 % --   09/28/22 0245 -- -- -- 87 -- 93 % --   09/28/22 0230 134/64 -- -- 85 -- 93 % --   09/28/22 0215 -- -- -- 91 -- 94 % --   09/28/22 0200 (!) 112/58 -- -- 80 -- 93 % --   09/28/22 0140 -- -- -- 79 -- 94 % --   09/28/22 0130 -- -- -- 84 -- 93 % --   09/28/22 0115 -- -- -- 81 -- 93 % --   09/28/22 0100 112/63 -- -- 80 -- 93 % --   09/28/22 0045 -- -- -- 80 -- 93 % -- 09/28/22 0030 -- -- -- 80 -- 93 % --   09/28/22 0020 -- -- -- 79 21 92 % --         Intake/Output Summary (Last 24 hours) at 9/28/2022 9581  Last data filed at 9/28/2022 0500  Gross per 24 hour   Intake 1238 ml   Output 1880 ml   Net -642 ml         Review of Systems - denies any pain    Physical Exam  Constitutional:       Comments: Sedated, intubated, arousable and responds to questions appropriately with nods   HENT:      Head: Normocephalic. Eyes:      Pupils: Pupils are equal, round, and reactive to light. Cardiovascular:      Rate and Rhythm: Normal rate. Rhythm irregular. Pulses: Normal pulses. Heart sounds: Normal heart sounds. Pulmonary:      Comments: On ventilator  Abdominal:      General: Abdomen is flat. Palpations: Abdomen is soft. Musculoskeletal:      Right lower leg: Edema present. Left lower leg: Edema present. Skin:     General: Skin is warm and dry. Neurological:      Comments: Sedated. Responds to voice, answers questions appropriately, follows commands         LABS:    CBC:   Recent Labs     09/26/22 0354 09/27/22 0315 09/28/22 0359   WBC 23.6* 20.0* 27.2*   HGB 8.5* 8.7* 9.1*   HCT 26.7* 27.7* 29.0*    342 442   MCV 82.0 83.5 82.7       Renal:    Recent Labs     09/26/22 0354 09/27/22 0315 09/28/22 0359    138 142   K 4.2 4.3 4.7   CL 89* 90* 94*   CO2 36* 40* 40*   * 125* 124*   CREATININE 1.7* 1.7* 1.7*   GLUCOSE 155* 157* 161*   CALCIUM 8.3 8.4 8.3   MG 2.60* 2.80* 2.90*   PHOS 3.1 3.3 3.3   ANIONGAP 11 8 8       Hepatic:   Recent Labs     09/26/22 0354 09/27/22 0315 09/28/22 0359   LABALBU 2.5* 2.7* 2.7*       Troponin: No results for input(s): TROPONINI in the last 72 hours. BNP: No results for input(s): BNP in the last 72 hours. Lipids: No results for input(s): CHOL, HDL in the last 72 hours.     Invalid input(s): LDLCALCU, TRIGLYCERIDE  ABGs:    Recent Labs     09/26/22  0354 09/27/22  1602   PHART 7.564* 7.423   OFU1GMQ 46.9* 63.3*   PO2ART 96.2 67.0*   [id number] 42* 41.4*   BEART 18.3* 17*   [id number] 98 93   MRD7DYT 44 43         INR: No results for input(s): INR in the last 72 hours. Lactate:   No results for input(s): LACTATE in the last 72 hours. Cultures:  -----------------------------------------------------------------  RAD:   XR CHEST 1 VIEW   Final Result      1. Stable small right-sided pneumothorax and prominent subcutaneous emphysema along the neck and upper superior chest wall, greater in right lung stable   2. Stable left basilar consolidation and pleural effusion      3. Stable appearing perihilar accentuated markings or airspace disease            XR CHEST PORTABLE   Final Result      Stable small right-sided pneumothorax. More prominent emphysema over the lower neck. No change in bilateral airspace disease. Stable left pleural effusion. XR CHEST PORTABLE   Final Result   1. Bilateral multifocal pneumonia with improvement in the right    pulmonary consolidation since prior study from 9/23/22   2. Mild to moderate left pleural effusion          XR CHEST PORTABLE   Final Result   1. Support lines and tubes are stable. 2.  Stable small right lateral pneumothorax and right basilar    chest tube. 3.  Stable patchy bilateral airspace disease. XR CHEST PORTABLE   Final Result   1. Satisfactory position of right internal jugular central line   2. No interval change in endotracheal tube and nasogastric tube positioning. 3. Extensive bilateral airspace disease with no changes. 4. Left pleural effusion with retrocardiac opacity, unchanged   5. Small stable tiny right lateral pneumothorax and stable position of right chest tube,, Pleurx catheter. XR ABDOMEN (KUB) (SINGLE AP VIEW)   Final Result   1. No residual pneumothorax. 2.  Mild patchy airspace disease bilaterally worse on the right than on prior examination. 3.  Non-obstructive bowel gas pattern.  Mildly distended stomach. XR CHEST PORTABLE   Final Result   1. No residual pneumothorax. 2.  Mild patchy airspace disease bilaterally worse on the right than on prior examination. 3.  Non-obstructive bowel gas pattern. Mildly distended stomach. XR CHEST PORTABLE   Final Result      1. Small right pneumothorax appears slightly increased. 2. Mild patchy airspace opacity bilaterally. XR CHEST PORTABLE   Final Result     Pleurx catheter at the right lung base. Trace right    pneumothorax is unchanged. XR CHEST PORTABLE   Final Result      Right-sided chest tube evident in the base, its tip medially. Improvement in the right-sided pneumothorax, since earlier today. Possible medial collapse of the right lower lobe. Small left effusion. Patchy airspace density/atelectasis in the lungs bilaterally, with no other significant change. XR CHEST PORTABLE   Final Result   1. Right-sided Pleurx catheter in satisfactory position in the lung bases   2. Right-sided post operative pneumothorax, approximately 10%               Assessment/Plan:   Betzy Hardwick is a 65 y.o. male with a PMH of CHF (CHFpEF), carotid stenosis, HLD, HTN, OA, Restrictive Lung Disease, T2DM who underwent VATS with PleurX catheter placement on 9/19/2022 for recurrent bilateral loculated pleural effusions and has had hypoxia/hypercapnia since that time    Neuro  Sedated: Propofol 10   Patient much more aware this morning. Following commands and responding to questions with appropriate nods. Pulmonary  Vent Day: 6  Vent: , RR 18, FiO2 30, PEEP 5  - currently in SBT on 15 PS    On mechanical ventilation, he developed respiratory alkalosis, with significant drop in PCO2 from his baseline. Ventilator support reduced, decreasing VT and fR, with minute volume 9.4 L.   Subsequent arterial pH 7.45, PCO2 54, PO2 90    Acute hypoxic/hypercapnic respiratory failure  Recurrent Pleural Effusions, s/p VATS and Pleurx  Patient was admitted to ICU for failure to tolerate bipap following procedure. The etiology of his recurrent pleural effusions is unclear. No studies done on fluid from original thoracenteses. Possibly malignancy. VATS fluid results showing an exudative picture, per [facility] Criteria. S/p VATS procedure 9/19, with right PleurX catheter placement. Ventilatory restriction on PFT associated with worsening pleural disease. PleurX catheter in place, 80 ml out yesterday  -Methylprednisolone IV 60mg BID yest, tapering    Cardiovascular  Hypotension   Patient required levo restart overnight up to 7, but now on 3 and good BP. Wean today  -Began requiring pressors after intubation, attributed to sedation and positive pressure ventilation    Congestive Heart Failure with Preserved Ejection Fraction  EF ~65% 6/16/2022  -BNP ordered 9/23. Significant elevated 15k, given lasix at that time. - remains fluid overloaded on exam with edema to knees bilaterally. - BNP 6k yestreday 9/26  - Per nephro held off on diuretics given kidney injury    GI  Diet: Diet NPO  GI ppx: pepcid     Constipation  Cont glycolax    Renal   Ansari in place  ZANA  Potentially ATN from hypotension/contrast   UOP 1800ml, Cr remains at 1.7, Vol up 5L on admit  Nephro following, rec to hold off on diuretics       Code Status: Full Code   PPX: pepcid  DISPO: ICU    Kristi Rahman MD, PGY-1  Internal Medicine Resident  Contact via Dennise Degroot  09/28/22  8:12 AM    This patient has been staffed and discussed with Dr Kaur        Patient was seen, examined and discussed with Dr. Artur Toney. I agree with the interval history. My physical exam confirms the findings listed below  Chart was reviewed including labs and medical records confirm the findings noted     Acute respiratory failure on mechanical vent support. , RR 18, FiO2 30, PEEP 5. Vent day #6. ABG after SBT on 9/27 7.42/63/67. he lasted for 1.5 hours.   However this breathing trial on PS of 15. The day before SBT lasted for few minutes on PS 8. Hypotension requiring levophed at 3. Pleural effusion s/p VATS. Pt has high proBNP, bilateral effusion, and known diastolic CHF. Leukocytosis. Pt is on steroids, however he had leukocytosis two days before. A.fib on Eliquis   ZANA: discussed with Dr. Jazlyn Dickey. Pt has good UOP. Appears euvolemic     SBT is tolerated on propofol at 10 and PS 15. Will titrate PS down  Continue steroid taper   D/c insulin drip. Start latus 13 units plus medium dose SSI      Pt has a high probability of imminent or life-threatening deterioration requiring close monitoring, and highly complex decision-making and/or interventions of high intensity to assess, manipulate, and support his critical organ systems to prevent a likely inevitable decline which could occur if left untreated. A total critical care time 35 minutes was used. This includes but not limited to examining patient, collaborating with other physicians, monitoring vital signs, telemetry, continuous pulse oximetry, and clinical response to IV medications, documentation time, review and interpretation of laboratory and radiological data, review of nursing notes and old record review.  This time excludes any time that may have been spent performing procedures for life threatening organ failure

## 2022-09-28 NOTE — PROGRESS NOTES
EKG complete, states patients rhythm artrial flutter. Notified medical residents per surgical residents recommendations. Amiodarone protocol CTS arrhythmia protocol ordered. Will continue to monitor.

## 2022-09-28 NOTE — CONSULTS
Otolaryngology/ENT Surgery   Resident Consult Note    Reason for Consult: Possible tracheostomy     History of Present Illness:   Trip Anne is a 65 y.o. male with Hx of diastolic heart failure, atrial fibrillation, and DM with recurrent pleural effusions s/p R PleurX catheter placement (9/19), who subsequently was intubated for respiratory failure on 9/22. Pt has been undergoing SBT's with yesterdays lasting 1.5 hours and today's for 4.25 hours. Pt will be extubated on Friday and Otolayngology/ENT has been consulted in anticipation for the need for a tracheostomy tube if patient fails extubation. Past Medical History:        Diagnosis Date    ZANA (acute kidney injury) ([facility] [address] 9/26/2022    Carotid stenosis, left 10/12/2011    Chronic back pain     Chronic systolic (congestive) heart failure 68/51/7909    Diastolic CHF (HCC)     Erectile dysfunction     Hyperlipidemia     Hypertension     Osteoarthritis     Restrictive lung disease     Type II or unspecified type diabetes mellitus without mention of complication, not stated as uncontrolled        Past Surgical History:        Procedure Laterality Date    CARDIAC CATHETERIZATION  06/2022    KNEE SURGERY Left     PLEURAL CATH INSERTION N/A 9/19/2022    PLEURAL CATHETER PLACEMENT; INTERCOSTAL NERVE BLOCK X4 performed by Cresencio Coe MD at [facility] Bilateral     THORACOSCOPY Right 9/19/2022    RIGHT VIDEO ASSISTED THORASCOPIC SURGERY AND; performed by Cresencio Coe MD at [address] [place] [address] Avenue: Torsemide and Dye [iodides]    Medications:   Home Meds  No current facility-administered medications on file prior to encounter.      Current Outpatient Medications on File Prior to Encounter   Medication Sig Dispense Refill    ELIQUIS 5 MG TABS tablet TAKE 1 TABLET BY MOUTH TWO TIMES A DAY 60 tablet 2    umeclidinium-vilanterol (ANORO ELLIPTA) 62.5-25 MCG/INH AEPB inhaler Inhale 1 puff into the lungs daily 1 each 2    bumetanide (BUMEX) 1 MG tablet Take 1 tablet by mouth in the morning and 1 tablet before bedtime. 180 tablet 3    magnesium oxide (MAG-OX) 400 MG tablet Take 1 tablet by mouth in the morning and 1 tablet before bedtime. 180 tablet 3    dapagliflozin (FARXIGA) 10 MG tablet TAKE 1 TABLET EVERY MORNING 90 tablet 1    sildenafil (VIAGRA) 100 MG tablet Take 1 tablet by mouth as needed for Erectile Dysfunction Max daily dose 100mg. 16 tablet 0    metoprolol succinate (TOPROL XL) 50 MG extended release tablet Take 1 tablet by mouth in the morning and at bedtime 180 tablet 3    Insulin Pen Needle (PEN NEEDLES) 31G X 6 MM MISC 1 each by Does not apply route daily 100 each 3    Continuous Blood Gluc Sensor (FREESTYLE LIZA 14 DAY SENSOR) MISC Check bs tidac and hs 6 each 1    Continuous Blood Gluc  (FREESTYLE LIZA 14 DAY READER) SOFIYA Check bs tidac and hs 6 each 1    Insulin Degludec (TRESIBA FLEXTOUCH) 200 UNIT/ML SOPN 10 units subcut qam, increase 4 units every 4 days until am blood sugar < 120. Max 100 units (Patient taking differently: Inject 12 Units into the skin daily 10 units subcut qam, increase 4 units every 4 days until am blood sugar < 120.  Max 100 units) 9 pen 1    albuterol sulfate HFA (VENTOLIN HFA) 108 (90 Base) MCG/ACT inhaler Inhale 2 puffs into the lungs 4 times daily as needed for Wheezing 18 g 5    pravastatin (PRAVACHOL) 40 MG tablet Take 1 tablet by mouth daily 90 tablet 3       Current Meds  methylPREDNISolone sodium (SOLU-MEDROL) injection 40 mg, Q12H  insulin glargine (LANTUS;BASAGLAR) injection pen 13 Units, Nightly  insulin lispro (1 Unit Dial) 0-8 Units, TID WC  insulin lispro (1 Unit Dial) 0-4 Units, Nightly  famotidine (PEPCID) tablet 20 mg, Daily  propofol injection, Continuous  norepinephrine (LEVOPHED) 16 mg in sodium chloride 0.9 % 250 mL infusion, Continuous  acetaminophen (TYLENOL) 160 MG/5ML solution 650 mg, Q6H PRN  chlorhexidine (PERIDEX) 0.12 % solution 15 mL, BID  [Held by provider] metoprolol (LOPRESSOR) injection 5 mg, Q6H  haloperidol lactate (HALDOL) injection 2 mg, Q6H PRN  albuterol (PROVENTIL) nebulizer solution 2.5 mg, Q4H PRN  apixaban (ELIQUIS) tablet 5 mg, BID  sodium chloride flush 0.9 % injection 5-40 mL, 2 times per day  sodium chloride flush 0.9 % injection 5-40 mL, PRN  0.9 % sodium chloride infusion, PRN  polyethylene glycol (GLYCOLAX) packet 17 g, Daily  ondansetron (ZOFRAN-ODT) disintegrating tablet 4 mg, Q8H PRN   Or  ondansetron (ZOFRAN) injection 4 mg, Q6H PRN  glucose chewable tablet 16 g, PRN  dextrose bolus 10% 125 mL, PRN   Or  dextrose bolus 10% 250 mL, PRN  glucagon (rDNA) injection 1 mg, PRN  dextrose 10 % infusion, Continuous PRN  hydrALAZINE (APRESOLINE) injection 5 mg, Q15 Min PRN  levalbuterol (XOPENEX) nebulization 0.63 mg, Q4H PRN        Family History:   Family History   Problem Relation Age of Onset    Hearing Loss Father     Heart Disease Father 70        MI    High Blood Pressure Father     Diabetes Maternal Grandmother         hypoglycemic    Hearing Loss Maternal Grandmother     Arthritis Maternal Grandfather        Social History:   TOBACCO:   reports that he quit smoking about 20 years ago. His smoking use included cigarettes. He has a 80.00 pack-year smoking history. He has never used smokeless tobacco.  ETOH:   reports current alcohol use. DRUGS:   reports no history of drug use. Review of Systems:   A 14 point review of systems was conducted, significant findings as noted in HPI. All other systems negative. Physical exam:    Vitals:    09/28/22 0745 09/28/22 0800 09/28/22 1112 09/28/22 1200   BP:       Pulse: 92  (!) 101 (!) 133   Resp: 18  23 (!) 0   Temp:  98 °F (36.7 °C)  97.6 °F (36.4 °C)   TempSrc:    Axillary   SpO2: 92%  93% 92%   Weight:       Height:           General appearance: Sedated and mechanically ventilated. Does respond to verbal and mechanical stimuli but immediately falls asleep. HEENT: NC/AT, EOMI, trachea midline, no JVD. Crepitus noted in the distal neck, supraclavicular region. Corpak in right nostril w/ bridle. ETT in place. Chest/Lungs: Decreased breath sounds bilaterally, on mechanical ventilation; R PleurX catheter to water seal without airleak, has serous output  Cardiovascular: Atrial fibrillation with rate WNL  Abdomen: Soft, non-tender, non-distended  Genitourinary: Ansari in place with clear yellow urine  Skin: Warm and dry, no rashes  Extremities: No edema, no cyanosis; SCDs in place    Labs:    CBC:   Recent Labs     09/26/22  0354 09/27/22 0315 09/28/22 0359   WBC 23.6* 20.0* 27.2*   HGB 8.5* 8.7* 9.1*   HCT 26.7* 27.7* 29.0*   MCV 82.0 83.5 82.7    342 442     BMP:   Recent Labs     09/26/22  0354 09/27/22 0315 09/28/22 0359    138 142   K 4.2 4.3 4.7   CL 89* 90* 94*   CO2 36* 40* 40*   PHOS 3.1 3.3 3.3   * 125* 124*   CREATININE 1.7* 1.7* 1.7*     PT/INR: No results for input(s): PROTIME, INR in the last 72 hours. APTT: No results for input(s): APTT in the last 72 hours.   Liver Profile:   Lab Results   Component Value Date/Time    AST 10 09/19/2022 06:10 AM    ALT <5 09/19/2022 06:10 AM    BILIDIR <0.2 09/19/2022 06:10 AM    BILITOT 0.3 09/19/2022 06:10 AM    ALKPHOS 91 09/19/2022 06:10 AM     Lab Results   Component Value Date/Time    CHOL 198 01/24/2022 10:29 AM    HDL 81 01/24/2022 10:29 AM    TRIG 82 09/27/2022 03:15 AM     UA:   Lab Results   Component Value Date/Time    NITRITE neg 09/03/2013 09:10 AM    COLORU YELLOW 07/11/2014 11:34 AM    PHUR 5.5 07/11/2014 11:34 AM    LABCAST NONE SEEN 07/11/2014 11:34 AM    LABCAST NONE SEEN 07/11/2014 11:34 AM    WBCUA 0 07/11/2014 11:34 AM    RBCUA 0 07/11/2014 11:34 AM    YEAST NONE SEEN 07/11/2014 11:34 AM    BACTERIA NONE 07/11/2014 11:34 AM    CLARITYU clear 09/03/2013 09:10 AM    SPECGRAV 1.020 07/11/2014 11:34 AM    LEUKOCYTESUR NEGATIVE 07/11/2014 11:34 AM    UROBILINOGEN 1.0 07/11/2014 11:34 AM    BILIRUBINUR NEGATIVE 07/11/2014 11:34 AM BILIRUBINUR small 09/03/2013 09:10 AM    BLOODU NEGATIVE 07/11/2014 11:34 AM    GLUCOSEU 100 mg/dl 09/03/2013 09:10 AM       Imaging:   XR CHEST 1 VIEW   Final Result      1. Stable small right-sided pneumothorax and prominent subcutaneous emphysema along the neck and upper superior chest wall, greater in right lung stable   2. Stable left basilar consolidation and pleural effusion      3. Stable appearing perihilar accentuated markings or airspace disease            XR CHEST PORTABLE   Final Result      Stable small right-sided pneumothorax. More prominent emphysema over the lower neck. No change in bilateral airspace disease. Stable left pleural effusion. XR CHEST PORTABLE   Final Result   1. Bilateral multifocal pneumonia with improvement in the right    pulmonary consolidation since prior study from 9/23/22   2. Mild to moderate left pleural effusion          XR CHEST PORTABLE   Final Result   1. Support lines and tubes are stable. 2.  Stable small right lateral pneumothorax and right basilar    chest tube. 3.  Stable patchy bilateral airspace disease. XR CHEST PORTABLE   Final Result   1. Satisfactory position of right internal jugular central line   2. No interval change in endotracheal tube and nasogastric tube positioning. 3. Extensive bilateral airspace disease with no changes. 4. Left pleural effusion with retrocardiac opacity, unchanged   5. Small stable tiny right lateral pneumothorax and stable position of right chest tube,, Pleurx catheter. XR ABDOMEN (KUB) (SINGLE AP VIEW)   Final Result   1. No residual pneumothorax. 2.  Mild patchy airspace disease bilaterally worse on the right than on prior examination. 3.  Non-obstructive bowel gas pattern. Mildly distended stomach. XR CHEST PORTABLE   Final Result   1. No residual pneumothorax. 2.  Mild patchy airspace disease bilaterally worse on the right than on prior examination.    3. Non-obstructive bowel gas pattern. Mildly distended stomach. XR CHEST PORTABLE   Final Result      1. Small right pneumothorax appears slightly increased. 2. Mild patchy airspace opacity bilaterally. XR CHEST PORTABLE   Final Result     Pleurx catheter at the right lung base. Trace right    pneumothorax is unchanged. XR CHEST PORTABLE   Final Result      Right-sided chest tube evident in the base, its tip medially. Improvement in the right-sided pneumothorax, since earlier today. Possible medial collapse of the right lower lobe. Small left effusion. Patchy airspace density/atelectasis in the lungs bilaterally, with no other significant change. XR CHEST PORTABLE   Final Result   1. Right-sided Pleurx catheter in satisfactory position in the lung bases   2. Right-sided post operative pneumothorax, approximately 10%               Assessment/Plan:  Anders Moreno is a 65 y.o. male with Hx of diastolic heart failure, atrial fibrillation, and DM with recurrent pleural effusions s/p R PleurX catheter placement (9/19), who subsequently was intubated for respiratory failure on 9/22. Otolayngology/ENT has been consulted in anticipation for the need for a tracheostomy tube if patient fails extubation.     -Pt to be extubated this Friday.  If respiratory failure ensues, then the pt will undergo a tracheostomy.   -Remainder of care per [address] Hernandez DO   PGY1, General Surgery  09/28/22  4:01 PM  Pager # [phone]

## 2022-09-28 NOTE — PROGRESS NOTES
ICU CT SURGERY DAILY PROGRESS NOTE    CC: Pleural effusions s/p R PleurX catheter placement    SUBJECTIVE:   Interval Hx:   SBT attempted yesterday, tolerated 1.5 hours. Precedex was initiated and meant to replace propofol, however, pt became hypotensive and the precedex was d/c'd and propofol reinitiated. Pt was off levo most of yesterday but is currently back on it today. ROS: A 14 point review of systems was conducted, significant findings as noted above. All other systems negative. OBJECTIVE:   Vitals:   Vitals:    09/28/22 0745 09/28/22 0800 09/28/22 1112 09/28/22 1200   BP:       Pulse: 92  (!) 101 (!) 133   Resp: 18  23 (!) 0   Temp:  98 °F (36.7 °C)  97.6 °F (36.4 °C)   TempSrc:    Axillary   SpO2: 92%  93% 92%   Weight:       Height:           I/O:   Intake/Output Summary (Last 24 hours) at 9/28/2022 1606  Last data filed at 9/28/2022 1400  Gross per 24 hour   Intake 1362.32 ml   Output 1736 ml   Net -373.68 ml       I/O last 3 completed shifts: In: 2123 [I.V.:331; NG/GT:1792]  Out: 2615 [Urine:2525; Chest Tube:90]    Diet: Diet NPO  ADULT TUBE FEEDING; Orogastric; Renal Formula; Continuous; 10; Yes; 10; Q 4 hours; 40; 30; Q 4 hours; Protein; 2 bottles Proteinex daily w/ FW flushes of 30 mL before and after      Physical Examination:   General appearance: Sedated and mechanically ventilated. HEENT: NC/AT, EOMI, trachea midline, no JVD. Crepitus noted in the distal neck, supraclavicular region. Corpak in right nostril w/ bridle. ETT in place. Chest/Lungs: Decreased breath sounds bilaterally, on mechanical ventilation; R PleurX catheter to water seal without airleak, has serous output  Cardiovascular: Atrial fibrillation with rate WNL  Abdomen: Soft, non-tender, non-distended  Genitourinary:  Ansari in place with clear yellow urine  Skin: Warm and dry, no rashes  Extremities: No edema, no cyanosis; SCDs in place      Labs:  CBC:   Recent Labs     09/26/22  0354 09/27/22  0315 09/28/22  0359   WBC 23.6* 20.0* 27.2*   HGB 8.5* 8.7* 9.1*   HCT 26.7* 27.7* 29.0*    342 442         BMP:   Recent Labs     09/26/22  0354 09/27/22  0315 09/28/22  0359    138 142   K 4.2 4.3 4.7   CL 89* 90* 94*   CO2 36* 40* 40*   * 125* 124*   CREATININE 1.7* 1.7* 1.7*   GLUCOSE 155* 157* 161*       LFT's:   No results for input(s): AST, ALT, ALB, BILITOT, ALKPHOS in the last 72 hours. Troponin: No results for input(s): TROPONINI in the last 72 hours. BNP: No results for input(s): BNP in the last 72 hours. ABGs:   Recent Labs     09/26/22  0354 09/27/22  1602   PHART 7.564* 7.423   IVW5UMV 46.9* 63.3*   PO2ART 96.2 67.0*       INR:   No results for input(s): INR in the last 72 hours. U/A:No results for input(s): NITRITE, COLORU, PHUR, LABCAST, WBCUA, RBCUA, MUCUS, TRICHOMONAS, YEAST, BACTERIA, CLARITYU, SPECGRAV, LEUKOCYTESUR, UROBILINOGEN, BILIRUBINUR, BLOODU, GLUCOSEU, AMORPHOUS in the last 72 hours. Invalid input(s): Aundra Fill     Rad:   XR CHEST 1 VIEW   Final Result      1. Stable small right-sided pneumothorax and prominent subcutaneous emphysema along the neck and upper superior chest wall, greater in right lung stable   2. Stable left basilar consolidation and pleural effusion      3. Stable appearing perihilar accentuated markings or airspace disease            XR CHEST PORTABLE   Final Result      Stable small right-sided pneumothorax. More prominent emphysema over the lower neck. No change in bilateral airspace disease. Stable left pleural effusion. XR CHEST PORTABLE   Final Result   1. Bilateral multifocal pneumonia with improvement in the right    pulmonary consolidation since prior study from 9/23/22   2. Mild to moderate left pleural effusion          XR CHEST PORTABLE   Final Result   1. Support lines and tubes are stable. 2.  Stable small right lateral pneumothorax and right basilar    chest tube. 3.  Stable patchy bilateral airspace disease.           XR CHEST PORTABLE   Final Result   1. Satisfactory position of right internal jugular central line   2. No interval change in endotracheal tube and nasogastric tube positioning. 3. Extensive bilateral airspace disease with no changes. 4. Left pleural effusion with retrocardiac opacity, unchanged   5. Small stable tiny right lateral pneumothorax and stable position of right chest tube,, Pleurx catheter. XR ABDOMEN (KUB) (SINGLE AP VIEW)   Final Result   1. No residual pneumothorax. 2.  Mild patchy airspace disease bilaterally worse on the right than on prior examination. 3.  Non-obstructive bowel gas pattern. Mildly distended stomach. XR CHEST PORTABLE   Final Result   1. No residual pneumothorax. 2.  Mild patchy airspace disease bilaterally worse on the right than on prior examination. 3.  Non-obstructive bowel gas pattern. Mildly distended stomach. XR CHEST PORTABLE   Final Result      1. Small right pneumothorax appears slightly increased. 2. Mild patchy airspace opacity bilaterally. XR CHEST PORTABLE   Final Result     Pleurx catheter at the right lung base. Trace right    pneumothorax is unchanged. XR CHEST PORTABLE   Final Result      Right-sided chest tube evident in the base, its tip medially. Improvement in the right-sided pneumothorax, since earlier today. Possible medial collapse of the right lower lobe. Small left effusion. Patchy airspace density/atelectasis in the lungs bilaterally, with no other significant change. XR CHEST PORTABLE   Final Result   1. Right-sided Pleurx catheter in satisfactory position in the lung bases   2. Right-sided post operative pneumothorax, approximately 10%             ASSESSMENT AND PLAN:   Lizzy Davies is a 65 y.o. male with history of diastolic heart failure, atrial fibrillation, and DM with recurrent pleural effusions s/p R PleurX catheter placement (9/19), POD #8.     Neuro:   Analgesia  - Continue scheduled Tylenol  - Propofol for sedation; wean as tolerated    Cardiovascular:   History of paroxysmal atrial fibrillation  - Continue Apixaban 5mg bid  - Metoprolol IV held currently for hypotension    HFpEF  - Metoprolol IV (hold due to hypotension), currently on Levo 5mcg/kg/hr  - [place] held  - Last echo (1/25/22): LVEF = 22-60%, grade 2 diastolic dysfunction    Hyperlipidemia  - Pravastatin 40mg    Currently on levophed of 2    Pulmonary:   S/P R PleurX catheter placement (9/19), POD #8  - Continue to water seal.  - SW/HHC pending    Acute on chronic respiratory failure  - Mechanically intubated on 9/22. Currently PRVC 18/450/5/35%. Vent management per pulm. Extubation with possible tracheostomy later this week. - Inpatient consult to pulmonology (Dr Jamaal Rocha) appreciate recommendations. - Baseline on home O2 3-4L NC   - Continue to wean Solu-medrol. Now 40 mg Q12 from 60 mg Q12. GI:   - Miralax  - Continue TF at goal  - Recommend holding TF if pressor requirement increases to greater than 5 of Levo    FEN:   - IV fluids: none,   - Replace electrolytes per protocol  - Diet: Diet NPO  ADULT TUBE FEEDING; Orogastric; Renal Formula; Continuous; 10; Yes; 10; Q 4 hours; 40; 30; Q 4 hours; Protein; 2 bottles Proteinex daily w/ FW flushes of 30 mL before and after    /Renal:   - Cont Ansari   - Creatinine stable at 1.7   -  from [phone]  -Nephrology consulted. Appreciate recommendations. Hem/ID:   - Hemoglobin 9.1 from 8.7, 8.5, 8.0, 7.8, 7.5  - WBC 27.7 from 20.0, 23.6, 19.1, 15.7, 14.7. Leukocytosis 2/2 steroids most likely.      Endo:   History of DMII  - Last A1C 8.1% (1/24/22)  - High-dose sliding scale insulin and insulin lantus  - Solu-medrol as above    Prophylaxis:   DVT Ppx: Eliquis, SCDs  GI Ppx: pepcid    Access:  Central Access: R IJ CVC, placed 9/22  Peripheral Access: IV x2  Arterial Line Access: L radial, placed 9/22                              Ansari Date placed: 09/28/22    Mobility:  N/A    Dispo:   ICU    Code Status: Full Code  -----------------------------  Hany Coffey DO   PGY1, General Surgery  09/28/22  4:06 PM  Pager # [phone]

## 2022-09-28 NOTE — PROGRESS NOTES
Per surgical residents, refer to medical residents regarding patients current hemodynamic instability d/t medical residents managing the patients arrhythmia. Notified medical residents at this time. Cardiology to come complete EKG. Will continue to monitor.

## 2022-09-28 NOTE — PROGRESS NOTES
Palliative Care Chart Review  and Check in Note:     NAME:  Liliana Box  Admit Date: 9/19/2022  Hospital Day:  Hospital Day: 10   Current Code status: Full Code    Palliative care is continuing to following Mr. Hailey Hollidayion for symptom management, and goals of care discussion as needed. Patient's chart reviewed today 9/28/22. Spoke with pt's wife, Cindi Serrano, at the bedside. Jabari Owens is on an extended breathing trial, doing well, and is calm. I explained Josiah's LTACH insurance benefit to Cindi Serrano (pt must be trach to vent and new HD to qualify). Cindi Serrano remains hopeful that he will get extubated. I explained that in the event that he will need a trach, he would go to a SNF afterwards. Cindi Serrano said she is a retired nurse and would have the skill and support to care for him at home if that was required. I explained home health and home hospice benefits and the support they could provide. Cindi Serrano would be open to either option depending on Josiah's clinical course. We discussed his kidney function as well. Creatinine remains 1.7 this morning. Nephrology following. The following are the currently established goals/code status, and Symptom management. Goals of care: Continue current medical management.      Code status: Full- did not discuss today, as Cindi Serrano has verbalized their wish to proceed with aggressive care    Discharge plan: TBD - pending hospital course       NATASHA Palomo NP  09/28/22  9:48 AM

## 2022-09-28 NOTE — PROGRESS NOTES
Patient now hypotensive and NSR with a HR in the 60's. CTS arrhythmia protocol deferred d/t patient not meeting criteria for administration. Will continue to monitor.

## 2022-09-28 NOTE — PLAN OF CARE
Goals were implemented and reviewed through out the shift.      Problem: Chronic Conditions and Co-morbidities  Goal: Patient's chronic conditions and co-morbidity symptoms are monitored and maintained or improved  Outcome: Progressing  Flowsheets (Taken 9/27/2022 2000 by Med Nur, RN)  Care Plan - Patient's Chronic Conditions and Co-Morbidity Symptoms are Monitored and Maintained or Improved:   Monitor and assess patient's chronic conditions and comorbid symptoms for stability, deterioration, or improvement   Collaborate with multidisciplinary team to address chronic and comorbid conditions and prevent exacerbation or deterioration   Update acute care plan with appropriate goals if chronic or comorbid symptoms are exacerbated and prevent overall improvement and discharge     Problem: Discharge Planning  Goal: Discharge to home or other facility with appropriate resources  Outcome: Progressing  Flowsheets (Taken 9/27/2022 2000 by Med Nur, RN)  Discharge to home or other facility with appropriate resources:   Identify barriers to discharge with patient and caregiver   Arrange for needed discharge resources and transportation as appropriate   Identify discharge learning needs (meds, wound care, etc)   Refer to discharge planning if patient needs post-hospital services based on physician order or complex needs related to functional status, cognitive ability or social support system     Problem: Pain  Goal: Verbalizes/displays adequate comfort level or baseline comfort level  Outcome: Progressing  Flowsheets (Taken 9/28/2022 1200)  Verbalizes/displays adequate comfort level or baseline comfort level:   Encourage patient to monitor pain and request assistance   Assess pain using appropriate pain scale   Administer analgesics based on type and severity of pain and evaluate response     Problem: Safety - Adult  Goal: Free from fall injury  Outcome: Progressing  Flowsheets (Taken 9/27/2022 0150 by Elma Ramesh Vesna Lugo RN)  Free From Fall Injury: Instruct family/caregiver on patient safety     Problem: Skin/Tissue Integrity  Goal: Absence of new skin breakdown  Description: 1. Monitor for areas of redness and/or skin breakdown  2. Assess vascular access sites hourly  3. Every 4-6 hours minimum:  Change oxygen saturation probe site  4. Every 4-6 hours:  If on nasal continuous positive airway pressure, respiratory therapy assess nares and determine need for appliance change or resting period. Outcome: Progressing  Note: Pt's buttock is still reddish but clean, dry, and intact. Problem: Safety - Medical Restraint  Goal: Remains free of injury from restraints (Restraint for Interference with Medical Device)  Description: INTERVENTIONS:  1. Determine that other, less restrictive measures have been tried or would not be effective before applying the restraint  2. Evaluate the patient's condition at the time of restraint application  3. Inform patient/family regarding the reason for restraint  4.  Q2H: Monitor safety, psychosocial status, comfort, nutrition and hydration  Outcome: Progressing  Flowsheets (Taken 9/27/2022 2000 by Becky Hackett RN)  Remains free of injury from restraints (restraint for interference with medical device):   Evaluate the patient's condition at the time of restraint application   Determine that other, less restrictive measures have been tried or would not be effective before applying the restraint   Inform patient/family regarding the reason for restraint   Every 2 hours: Monitor safety, psychosocial status, comfort, nutrition and hydration     Problem: Nutrition Deficit:  Goal: Optimize nutritional status  Outcome: Progressing  Flowsheets (Taken 9/26/2022 0941 by Barbara Fritz RD)  Nutrient intake appropriate for improving, restoring, or maintaining nutritional needs:   Recommend, monitor, and adjust tube feedings and TPN/PPN based on assessed needs   Assess nutritional status and recommend course of action     Problem: ABCDS Injury Assessment  Goal: Absence of physical injury  Outcome: Progressing  Flowsheets (Taken 9/27/2022 0150 by Atif Strickland RN)  Absence of Physical Injury: Implement safety measures based on patient assessment

## 2022-09-28 NOTE — PROGRESS NOTES
Nephrology Progress Note                                                                                                                                                                                                                                                                                                                                                               Office : [phone]     Fax :[phone]    Patient's Name: Ryann Plummer  12:52 PM  9/28/2022    Reason for Consult: ZANA  Requesting Physician:  Zulay Dejesus MD    Interval History:      Cr stable   Good UO   Intubated     Past Medical History:   Diagnosis Date    ZANA (acute kidney injury) ([facility] [address] 9/26/2022    Carotid stenosis, left 10/12/2011    Chronic back pain     Chronic systolic (congestive) heart failure 63/91/1705    Diastolic CHF ([facility] [address]     Erectile dysfunction     Hyperlipidemia     Hypertension     Osteoarthritis     Restrictive lung disease     Type II or unspecified type diabetes mellitus without mention of complication, not stated as uncontrolled        Past Surgical History:   Procedure Laterality Date    CARDIAC CATHETERIZATION  06/2022    KNEE SURGERY Left     PLEURAL CATH INSERTION N/A 9/19/2022    PLEURAL CATHETER PLACEMENT; INTERCOSTAL NERVE BLOCK X4 performed by Ariel Murphy MD at [facility] Bilateral     THORACOSCOPY Right 9/19/2022    RIGHT VIDEO ASSISTED THORASCOPIC SURGERY AND; performed by Ariel Murphy MD at [address] [place] [address] History   Problem Relation Age of Onset    Hearing Loss Father     Heart Disease Father 70        MI    High Blood Pressure Father     Diabetes Maternal Grandmother         hypoglycemic    Hearing Loss Maternal Grandmother     Arthritis Maternal Grandfather         reports that he quit smoking about 20 years ago. His smoking use included cigarettes. He has a 80.00 pack-year smoking history. He has never used smokeless tobacco. He reports current alcohol use.  He reports that he does not use drugs. Allergies:   Torsemide and Dye [iodides]    Current Medications:    methylPREDNISolone sodium (SOLU-MEDROL) injection 40 mg, Q12H  insulin glargine (LANTUS;BASAGLAR) injection pen 13 Units, Nightly  insulin lispro (1 Unit Dial) 0-8 Units, TID WC  insulin lispro (1 Unit Dial) 0-4 Units, Nightly  amiodarone (CORDARONE) 150 mg in dextrose 5 % 100 mL bolus, Once   Followed by  amiodarone (CORDARONE) 450 mg in dextrose 5 % 250 mL infusion, Continuous   Followed by  amiodarone (CORDARONE) 450 mg in dextrose 5 % 250 mL infusion, Continuous  amiodarone (CORDARONE) tablet 400 mg, BID   Followed by  Anibal Delgado ON 10/3/2022] amiodarone (CORDARONE) tablet 200 mg, Daily  famotidine (PEPCID) tablet 20 mg, Daily  propofol injection, Continuous  norepinephrine (LEVOPHED) 16 mg in sodium chloride 0.9 % 250 mL infusion, Continuous  potassium bicarb-citric acid (EFFER-K) effervescent tablet 20 mEq, Daily  acetaminophen (TYLENOL) 160 MG/5ML solution 650 mg, Q6H PRN  chlorhexidine (PERIDEX) 0.12 % solution 15 mL, BID  [Held by provider] metoprolol (LOPRESSOR) injection 5 mg, Q6H  haloperidol lactate (HALDOL) injection 2 mg, Q6H PRN  albuterol (PROVENTIL) nebulizer solution 2.5 mg, Q4H PRN  apixaban (ELIQUIS) tablet 5 mg, BID  sodium chloride flush 0.9 % injection 5-40 mL, 2 times per day  sodium chloride flush 0.9 % injection 5-40 mL, PRN  0.9 % sodium chloride infusion, PRN  polyethylene glycol (GLYCOLAX) packet 17 g, Daily  ondansetron (ZOFRAN-ODT) disintegrating tablet 4 mg, Q8H PRN   Or  ondansetron (ZOFRAN) injection 4 mg, Q6H PRN  glucose chewable tablet 16 g, PRN  dextrose bolus 10% 125 mL, PRN   Or  dextrose bolus 10% 250 mL, PRN  glucagon (rDNA) injection 1 mg, PRN  dextrose 10 % infusion, Continuous PRN  hydrALAZINE (APRESOLINE) injection 5 mg, Q15 Min PRN  levalbuterol (XOPENEX) nebulization 0.63 mg, Q4H PRN      Review of Systems:   14 point ROS obtained but were negative except mentioned in HPI      Physical exam:     Vitals:  BP (!) 147/59   Pulse (!) 133   Temp 98 °F (36.7 °C)   Resp (!) 0   Ht 6' 0.99\" (1.854 m)   Wt 193 lb 2 oz (87.6 kg)   SpO2 92%   BMI 25.49 kg/m²   Constitutional:  on MV  Skin: no rash, turgor wnl  Heent:  eomi, mmm  Neck: no bruits or jvd noted  Cardiovascular:  S1, S2 without m/r/g  Respiratory: mechanical BS  Abdomen:  +bs, soft, nt, nd  Ext: bilateral lower extremity edema R>L  Psychiatric: mood and affect appropriate  Musculoskeletal:  Rom, muscular strength intact    Data:   Labs:  CBC:   Recent Labs     09/26/22 0354 09/27/22 0315 09/28/22 0359   WBC 23.6* 20.0* 27.2*   HGB 8.5* 8.7* 9.1*    342 442       BMP:    Recent Labs     09/26/22 0354 09/27/22 0315 09/28/22 0359    138 142   K 4.2 4.3 4.7   CL 89* 90* 94*   CO2 36* 40* 40*   * 125* 124*   CREATININE 1.7* 1.7* 1.7*   GLUCOSE 155* 157* 161*       Ca/Mg/Phos:   Recent Labs     09/26/22 0354 09/27/22 0315 09/28/22 0359   CALCIUM 8.3 8.4 8.3   MG 2.60* 2.80* 2.90*   PHOS 3.1 3.3 3.3       Hepatic: No results for input(s): AST, ALT, ALB, BILITOT, ALKPHOS in the last 72 hours. Troponin: No results for input(s): TROPONINI in the last 72 hours. BNP: No results for input(s): BNP in the last 72 hours. Lipids:   Recent Labs     09/27/22  0315   TRIG 82     ABGs:   Recent Labs     09/27/22  1602   PHART 7.423   PO2ART 67.0*   OES8ZHR 63.3*       INR: No results for input(s): INR in the last 72 hours. UA:No results for input(s): Liza Peterson, GLUCOSEU, BILIRUBINUR, Geraldene Peed, BLOODU, PHUR, PROTEINU, UROBILINOGEN, NITRU, LEUKOCYTESUR, LABMICR, URINETYPE in the last 72 hours. Urine Microscopic: No results for input(s): LABCAST, BACTERIA, COMU, HYALCAST, WBCUA, RBCUA, EPIU in the last 72 hours. Urine Culture: No results for input(s): LABURIN in the last 72 hours.   Urine Chemistry:   Recent Labs     09/26/22  1009 09/26/22  1245   LABCREA 51.2 66.3   PROTEINUR 14.00*  --    NAUR --  <20               IMAGING:  XR CHEST 1 VIEW   Final Result      1. Stable small right-sided pneumothorax and prominent subcutaneous emphysema along the neck and upper superior chest wall, greater in right lung stable   2. Stable left basilar consolidation and pleural effusion      3. Stable appearing perihilar accentuated markings or airspace disease            XR CHEST PORTABLE   Final Result      Stable small right-sided pneumothorax. More prominent emphysema over the lower neck. No change in bilateral airspace disease. Stable left pleural effusion. XR CHEST PORTABLE   Final Result   1. Bilateral multifocal pneumonia with improvement in the right    pulmonary consolidation since prior study from 9/23/22   2. Mild to moderate left pleural effusion          XR CHEST PORTABLE   Final Result   1. Support lines and tubes are stable. 2.  Stable small right lateral pneumothorax and right basilar    chest tube. 3.  Stable patchy bilateral airspace disease. XR CHEST PORTABLE   Final Result   1. Satisfactory position of right internal jugular central line   2. No interval change in endotracheal tube and nasogastric tube positioning. 3. Extensive bilateral airspace disease with no changes. 4. Left pleural effusion with retrocardiac opacity, unchanged   5. Small stable tiny right lateral pneumothorax and stable position of right chest tube,, Pleurx catheter. XR ABDOMEN (KUB) (SINGLE AP VIEW)   Final Result   1. No residual pneumothorax. 2.  Mild patchy airspace disease bilaterally worse on the right than on prior examination. 3.  Non-obstructive bowel gas pattern. Mildly distended stomach. XR CHEST PORTABLE   Final Result   1. No residual pneumothorax. 2.  Mild patchy airspace disease bilaterally worse on the right than on prior examination. 3.  Non-obstructive bowel gas pattern. Mildly distended stomach. XR CHEST PORTABLE   Final Result      1. Small right pneumothorax appears slightly increased. 2. Mild patchy airspace opacity bilaterally. XR CHEST PORTABLE   Final Result     Pleurx catheter at the right lung base. Trace right    pneumothorax is unchanged. XR CHEST PORTABLE   Final Result      Right-sided chest tube evident in the base, its tip medially. Improvement in the right-sided pneumothorax, since earlier today. Possible medial collapse of the right lower lobe. Small left effusion. Patchy airspace density/atelectasis in the lungs bilaterally, with no other significant change. XR CHEST PORTABLE   Final Result   1. Right-sided Pleurx catheter in satisfactory position in the lung bases   2. Right-sided post operative pneumothorax, approximately 10%             Assessment/Plan   ZANA  - suspect this is contrast nephropathy  - baseline Cre 0.7. Normal on admission.  - Cre 0.7-->1.1-->1.4-->1.6-->1.7-->1.7  - Hold diuretics today   - BNP 5k, Protein:Cre 0.3, FENa 0.4%  - closely monitor UOP  - avoid nephrotoxic agent     2. Hypotension  - in setting of sedation    3. Anemia  - Hgb 8.7 (9.0 on admission)  - daily CBC    4. Acid- base/ Electrolyte imbalance   - optimize lytes    5. Acute hypoxic resp failure  - s/p VATS on 9/19/22 for recurrent pleural effusions  - on MV  - Intensivist and CTS following    6.  CHF in exacerbation  - EF 65% on 6/16/22 via cardiac cath  - BNP 5908 and tredning down  - diuretics being held   - Intensivist following      Kike Blanco MD

## 2022-09-28 NOTE — PROGRESS NOTES
Pts MAP was reading in the low to-mid 50s off of the A-line. Restarted levo according to order with MAP goal >65. MD aware.

## 2022-09-28 NOTE — PLAN OF CARE
Problem: Chronic Conditions and Co-morbidities  Goal: Patient's chronic conditions and co-morbidity symptoms are monitored and maintained or improved  Outcome: Progressing  Flowsheets (Taken 9/27/2022 2000)  Care Plan - Patient's Chronic Conditions and Co-Morbidity Symptoms are Monitored and Maintained or Improved:   Monitor and assess patient's chronic conditions and comorbid symptoms for stability, deterioration, or improvement   Collaborate with multidisciplinary team to address chronic and comorbid conditions and prevent exacerbation or deterioration   Update acute care plan with appropriate goals if chronic or comorbid symptoms are exacerbated and prevent overall improvement and discharge     Problem: Discharge Planning  Goal: Discharge to home or other facility with appropriate resources  Outcome: Progressing  Flowsheets (Taken 9/27/2022 2000)  Discharge to home or other facility with appropriate resources:   Identify barriers to discharge with patient and caregiver   Arrange for needed discharge resources and transportation as appropriate   Identify discharge learning needs (meds, wound care, etc)   Refer to discharge planning if patient needs post-hospital services based on physician order or complex needs related to functional status, cognitive ability or social support system     Problem: Pain  Goal: Verbalizes/displays adequate comfort level or baseline comfort level  Outcome: Progressing  Flowsheets (Taken 9/27/2022 2000)  Verbalizes/displays adequate comfort level or baseline comfort level:   Encourage patient to monitor pain and request assistance   Assess pain using appropriate pain scale   Implement non-pharmacological measures as appropriate and evaluate response   Administer analgesics based on type and severity of pain and evaluate response   Consider cultural and social influences on pain and pain management   Notify Licensed Independent Practitioner if interventions unsuccessful or patient reports new pain

## 2022-09-28 NOTE — PROGRESS NOTES
Patient tachycardiac with a HR in the 170-180's. STAT EKG ordered and Perfect Serve sent to Surgical residents. Waiting for a response. Will continue to monitor.

## 2022-09-29 NOTE — PROGRESS NOTES
Pt received 16 units of insulin lispro per order as well as ordered lantus (see e-MAR). After recheck, pts blood glucose was 490. ICU residents made aware and 10 units of regular insulin was ordered and given.

## 2022-09-29 NOTE — PROGRESS NOTES
Latest Reference Range & Units 9/28/22 23:29   POC Glucose 70 - 99 mg/dl 476 (H)         20 units of lantus given per order from ICU residents based on this glucose.

## 2022-09-29 NOTE — PROGRESS NOTES
27.7* 29.0*    442         BMP:   Recent Labs     09/27/22  0315 09/28/22  0359    142   K 4.3 4.7   CL 90* 94*   CO2 40* 40*   * 124*   CREATININE 1.7* 1.7*   GLUCOSE 157* 161*       LFT's:   No results for input(s): AST, ALT, ALB, BILITOT, ALKPHOS in the last 72 hours. Troponin: No results for input(s): TROPONINI in the last 72 hours. BNP: No results for input(s): BNP in the last 72 hours. ABGs:   Recent Labs     09/27/22  1602   PHART 7.423   BAH7HQB 63.3*   PO2ART 67.0*       INR:   No results for input(s): INR in the last 72 hours. U/A:No results for input(s): NITRITE, COLORU, PHUR, LABCAST, WBCUA, RBCUA, MUCUS, TRICHOMONAS, YEAST, BACTERIA, CLARITYU, SPECGRAV, LEUKOCYTESUR, UROBILINOGEN, BILIRUBINUR, BLOODU, GLUCOSEU, AMORPHOUS in the last 72 hours. Invalid input(s): Duana Spurling     Rad:   XR CHEST 1 VIEW   Final Result      1. Stable small right-sided pneumothorax and prominent subcutaneous emphysema along the neck and upper superior chest wall, greater in right lung stable   2. Stable left basilar consolidation and pleural effusion      3. Stable appearing perihilar accentuated markings or airspace disease            XR CHEST PORTABLE   Final Result      Stable small right-sided pneumothorax. More prominent emphysema over the lower neck. No change in bilateral airspace disease. Stable left pleural effusion. XR CHEST PORTABLE   Final Result   1. Bilateral multifocal pneumonia with improvement in the right    pulmonary consolidation since prior study from 9/23/22   2. Mild to moderate left pleural effusion          XR CHEST PORTABLE   Final Result   1. Support lines and tubes are stable. 2.  Stable small right lateral pneumothorax and right basilar    chest tube. 3.  Stable patchy bilateral airspace disease. XR CHEST PORTABLE   Final Result   1. Satisfactory position of right internal jugular central line   2.  No interval change in endotracheal tube and nasogastric tube positioning. 3. Extensive bilateral airspace disease with no changes. 4. Left pleural effusion with retrocardiac opacity, unchanged   5. Small stable tiny right lateral pneumothorax and stable position of right chest tube,, Pleurx catheter. XR ABDOMEN (KUB) (SINGLE AP VIEW)   Final Result   1. No residual pneumothorax. 2.  Mild patchy airspace disease bilaterally worse on the right than on prior examination. 3.  Non-obstructive bowel gas pattern. Mildly distended stomach. XR CHEST PORTABLE   Final Result   1. No residual pneumothorax. 2.  Mild patchy airspace disease bilaterally worse on the right than on prior examination. 3.  Non-obstructive bowel gas pattern. Mildly distended stomach. XR CHEST PORTABLE   Final Result      1. Small right pneumothorax appears slightly increased. 2. Mild patchy airspace opacity bilaterally. XR CHEST PORTABLE   Final Result     Pleurx catheter at the right lung base. Trace right    pneumothorax is unchanged. XR CHEST PORTABLE   Final Result      Right-sided chest tube evident in the base, its tip medially. Improvement in the right-sided pneumothorax, since earlier today. Possible medial collapse of the right lower lobe. Small left effusion. Patchy airspace density/atelectasis in the lungs bilaterally, with no other significant change. XR CHEST PORTABLE   Final Result   1. Right-sided Pleurx catheter in satisfactory position in the lung bases   2. Right-sided post operative pneumothorax, approximately 10%             ASSESSMENT AND PLAN:   Luis Ramirez is a 65 y.o. male with history of diastolic heart failure, atrial fibrillation, and DM with recurrent pleural effusions s/p R PleurX catheter placement (9/19), POD #10.     Neuro:   Analgesia  - Continue scheduled Tylenol  - Propofol for sedation; wean as tolerated    Cardiovascular:   History of paroxysmal atrial fibrillation  -Episode yesterday of Afib with RVR. Amiodarone gtt started and currently at 0.5   - Holding Apixaban 5mg BID in anticipation of possible tracheostomy tomorrow. - Metoprolol IV held currently for hypotension    HFpEF  - Metoprolol IV (hold due to hypotension), currently on Levo 5mcg/kg/hr  - [address]  - Last echo (1/25/22): LVEF = 69-53%, grade 2 diastolic dysfunction    Hyperlipidemia  - Pravastatin 40mg    Currently on levophed of 2    Pulmonary:   S/P R PleurX catheter placement (9/19), POD #10  - Continue to water seal.  - SW/HHC pending    Acute on chronic respiratory failure  - Mechanically intubated on 9/22. Currently PRVC 18/450/5/35%. Vent management per pulm. SBT trial today. Consider extubation. Possible tracheostomy tomorrow if fails extubation.  - Inpatient consult to pulmonology, appreciate recommendations. - Baseline on home O2 3-4L NC   - Continue to wean Solu-medrol. GI:   - Miralax  - Continue TF at goal  - Recommend holding TF if pressor requirement increases to greater than 5 of Levo    FEN:    -Replace electrolytes per protocol  - Diet: Diet NPO  ADULT TUBE FEEDING; Orogastric; Renal Formula; Continuous; 10; Yes; 10; Q 4 hours; 40; 30; Q 4 hours; Protein; 2 bottles Proteinex daily w/ FW flushes of 30 mL before and after    /Renal:   - Cont Ansari   - Creatinine stable at 1.7   -  from 124, 018,799,727  -Nephrology consulted. Appreciate recommendations. Hem/ID:   - Hemoglobin 8.0 from 9.1, 8.7, 8.5, 8.0, 7.8, 7.5  - WBC 27.7 from 20.0, 23.6, 19.1, 15.7, 14.7. Leukocytosis 2/2 steroids most likely. Endo:   History of DMII  - Last A1C 8.1% (1/24/22)  - Overnight POC glucose 490.  Started insulin gtt this am.   - Solu-medrol as above    Prophylaxis:   DVT Ppx: SCDs  GI Ppx: pepcid    Access:  Central Access: R IJ CVC, placed 9/22  Peripheral Access: IV x2  Arterial Line Access: L radial, placed 9/22                              Ansari Date placed: 09/29/22    Mobility:  N/A    Dispo:   ICU.      Code Status: Full Code  -----------------------------  Marrian Lesches, DO   PGY1, General Surgery  09/29/22  4:00 AM  Pager # [phone]

## 2022-09-29 NOTE — PROGRESS NOTES
Called to patients room by wife due to patient gagging on ETT, alarming ventilator and appearing restless. When patient asked if in pain, patient nods yes. Perfect serve sent to surgical residents regarding pain management. Will continue to monitor.

## 2022-09-29 NOTE — PROGRESS NOTES
ICU ENT SURGERY DAILY PROGRESS NOTE    CC: Pleural effusions s/p R PleurX catheter placement    SUBJECTIVE:   Interval Hx:   Pt had an episode of Afib with RVR yesterday with HR to 149 - started on amiodarone gtt. Pt also had SBT yesterday that preceded the arhythmia for 4.25 hours. Chest tube output is decreasing. ROS: A 14 point review of systems was conducted, significant findings as noted above. All other systems negative. OBJECTIVE:   Vitals:   Vitals:    09/29/22 0545 09/29/22 0600 09/29/22 0615 09/29/22 0630   BP: (!) 100/47 (!) 107/56 (!) 108/55 (!) 99/47   Pulse: 73 72 71 64   Resp: 15 19 18 21   Temp:       TempSrc:       SpO2: 93% 95% 95% 95%   Weight:       Height:           I/O:   Intake/Output Summary (Last 24 hours) at 9/29/2022 0635  Last data filed at 9/29/2022 0507  Gross per 24 hour   Intake 2027.32 ml   Output 1727 ml   Net 300.32 ml       I/O last 3 completed shifts: In: 1793.3 [I.V.:375.3; NG/GT:1418]  Out: 2823 [Urine:2450; Chest Tube:86]    Diet: Diet NPO  ADULT TUBE FEEDING; Orogastric; Renal Formula; Continuous; 10; Yes; 10; Q 4 hours; 40; 30; Q 4 hours; Protein; 2 bottles Proteinex daily w/ FW flushes of 30 mL before and after      Physical Examination:   General appearance: Mechanically ventilated. Appropriately nods head in response to questions. HEENT: NC/AT, EOMI, trachea midline, no JVD. Crepitus noted in the distal neck, supraclavicular region. Corpak in right nostril w/ bridle. ETT in place. Chest/Lungs: Decreased breath sounds bilaterally, on mechanical ventilation; R PleurX catheter to water seal without airleak, has serous output  Cardiovascular: Atrial fibrillation with rate WNL  Abdomen: Soft, non-tender, non-distended  Genitourinary:  Ansari in place with clear yellow urine  Skin: Warm and dry, no rashes  Extremities: No edema, no cyanosis; SCDs in place      Labs:  CBC:   Recent Labs     09/27/22  0315 09/28/22  0359 09/29/22  0425   WBC 20.0* 27.2* 21.6*   HGB 8.7* 9. 1* 8.0*   HCT 27.7* 29.0* 25.7*    442 389         BMP:   Recent Labs     09/27/22  0315 09/28/22  0359 09/29/22  0425    142 140   K 4.3 4.7 4.2   CL 90* 94* 94*   CO2 40* 40* 39*   * 124* 123*   CREATININE 1.7* 1.7* 1.7*   GLUCOSE 157* 161* 343*       LFT's:   No results for input(s): AST, ALT, ALB, BILITOT, ALKPHOS in the last 72 hours. Troponin: No results for input(s): TROPONINI in the last 72 hours. BNP: No results for input(s): BNP in the last 72 hours. ABGs:   Recent Labs     09/27/22  1602   PHART 7.423   KSZ3SEL 63.3*   PO2ART 67.0*       INR:   No results for input(s): INR in the last 72 hours. U/A:No results for input(s): NITRITE, COLORU, PHUR, LABCAST, WBCUA, RBCUA, MUCUS, TRICHOMONAS, YEAST, BACTERIA, CLARITYU, SPECGRAV, LEUKOCYTESUR, UROBILINOGEN, BILIRUBINUR, BLOODU, GLUCOSEU, AMORPHOUS in the last 72 hours. Invalid input(s): Olive Pelayo     Rad:   XR CHEST 1 VIEW   Final Result      1. Stable small right-sided pneumothorax and prominent subcutaneous emphysema along the neck and upper superior chest wall, greater in right lung stable   2. Stable left basilar consolidation and pleural effusion      3. Stable appearing perihilar accentuated markings or airspace disease            XR CHEST PORTABLE   Final Result      Stable small right-sided pneumothorax. More prominent emphysema over the lower neck. No change in bilateral airspace disease. Stable left pleural effusion. XR CHEST PORTABLE   Final Result   1. Bilateral multifocal pneumonia with improvement in the right    pulmonary consolidation since prior study from 9/23/22   2. Mild to moderate left pleural effusion          XR CHEST PORTABLE   Final Result   1. Support lines and tubes are stable. 2.  Stable small right lateral pneumothorax and right basilar    chest tube. 3.  Stable patchy bilateral airspace disease. XR CHEST PORTABLE   Final Result   1.  Satisfactory position of right internal jugular central line   2. No interval change in endotracheal tube and nasogastric tube positioning. 3. Extensive bilateral airspace disease with no changes. 4. Left pleural effusion with retrocardiac opacity, unchanged   5. Small stable tiny right lateral pneumothorax and stable position of right chest tube,, Pleurx catheter. XR ABDOMEN (KUB) (SINGLE AP VIEW)   Final Result   1. No residual pneumothorax. 2.  Mild patchy airspace disease bilaterally worse on the right than on prior examination. 3.  Non-obstructive bowel gas pattern. Mildly distended stomach. XR CHEST PORTABLE   Final Result   1. No residual pneumothorax. 2.  Mild patchy airspace disease bilaterally worse on the right than on prior examination. 3.  Non-obstructive bowel gas pattern. Mildly distended stomach. XR CHEST PORTABLE   Final Result      1. Small right pneumothorax appears slightly increased. 2. Mild patchy airspace opacity bilaterally. XR CHEST PORTABLE   Final Result     Pleurx catheter at the right lung base. Trace right    pneumothorax is unchanged. XR CHEST PORTABLE   Final Result      Right-sided chest tube evident in the base, its tip medially. Improvement in the right-sided pneumothorax, since earlier today. Possible medial collapse of the right lower lobe. Small left effusion. Patchy airspace density/atelectasis in the lungs bilaterally, with no other significant change. XR CHEST PORTABLE   Final Result   1. Right-sided Pleurx catheter in satisfactory position in the lung bases   2. Right-sided post operative pneumothorax, approximately 10%             ASSESSMENT AND PLAN:   Marylin Lebron is a 65 y.o. male with history of diastolic heart failure, atrial fibrillation, and DM with recurrent pleural effusions s/p R PleurX catheter placement (9/19), POD #10. Acute on chronic respiratory failure  - Mechanically intubated on 9/22.  Currently [facility] 18/450/5/35%. Vent management per pulm. SBT trial today. Consider extubation. Possible tracheostomy tomorrow if fails extubation.     Remainder of care per primary team.       -----------------------------  Josefina Fontanez, DO   PGY1, General Surgery  09/29/22  6:35 AM  Pager # [phone]

## 2022-09-29 NOTE — PLAN OF CARE
Problem: Safety - Adult  Goal: Free from fall injury  9/28/2022 2235 by Shakira Mccall RN  Outcome: Progressing     Problem: Skin/Tissue Integrity  Goal: Absence of new skin breakdown  Description: 1. Monitor for areas of redness and/or skin breakdown  2. Assess vascular access sites hourly  3. Every 4-6 hours minimum:  Change oxygen saturation probe site  4. Every 4-6 hours:  If on nasal continuous positive airway pressure, respiratory therapy assess nares and determine need for appliance change or resting period.   9/28/2022 2235 by Shakira Mccall RN  Outcome: Progressing     Problem: Nutrition Deficit:  Goal: Optimize nutritional status  9/28/2022 2235 by Shakira Mccall RN  Outcome: Progressing     Problem: ABCDS Injury Assessment  Goal: Absence of physical injury  9/28/2022 2235 by Shakira Mccall RN  Outcome: Progressing

## 2022-09-29 NOTE — PROGRESS NOTES
09/29/22 1604   Encounter Summary   Encounter Overview/Reason  Initial Encounter   Service Provided For: Family   Referral/Consult From: [facility] Family members; Tenriism/cory community;Friends/neighbors   Last Encounter  09/29/22  (rw 9/29)   Complexity of Encounter Low   Begin Time 1600   End Time  1605   Total Time Calculated 5 min   Encounter    Type Initial Screen/Assessment   Assessment/Intervention/Outcome   Assessment Calm;Coping; Hopeful   Intervention Active listening;Discussed belief system/Pentecostalism practices/cory; Explored Coping Skills/Resources   Outcome Acceptance; Coping;Expressed Gratitude;Engaged in conversation;Peace    rounding and spoke with pt's wife. She says they have good support from family, friends and Hinduism members. No spiritual needs or concerns expressed today.  let wife know how to reach spiritual svcs in case of future needs.      Dhaval Gross M.Div., [facility]

## 2022-09-29 NOTE — PROGRESS NOTES
Latest Reference Range & Units 9/29/22 01:30   POC Glucose 70 - 99 mg/dl 420 (H)       16 units sliding scale given per order and ICU residents.

## 2022-09-29 NOTE — PROGRESS NOTES
Nephrology Progress Note                                                                                                                                                                                                                                                                                                                                                               Office : [phone]     Fax :[phone]    Patient's Name: Ivonne Restrepo  9:33 AM  9/29/2022    Reason for Consult: ZANA  Requesting Physician:  Mac Galvan MD    Interval History:      Cr stable   Good UO   Intubated     Past Medical History:   Diagnosis Date    ZANA (acute kidney injury) ([facility] [address] 9/26/2022    Carotid stenosis, left 10/12/2011    Chronic back pain     Chronic systolic (congestive) heart failure 92/54/0318    Diastolic CHF ([address]     Erectile dysfunction     Hyperlipidemia     Hypertension     Osteoarthritis     Restrictive lung disease     Type II or unspecified type diabetes mellitus without mention of complication, not stated as uncontrolled        Past Surgical History:   Procedure Laterality Date    CARDIAC CATHETERIZATION  06/2022    KNEE SURGERY Left     PLEURAL CATH INSERTION N/A 9/19/2022    PLEURAL CATHETER PLACEMENT; INTERCOSTAL NERVE BLOCK X4 performed by Víctor Burgos MD at [facility] Bilateral     THORACOSCOPY Right 9/19/2022    RIGHT VIDEO ASSISTED THORASCOPIC SURGERY AND; performed by Víctor Burgos MD at [address] [place] [address] History   Problem Relation Age of Onset    Hearing Loss Father     Heart Disease Father 70        MI    High Blood Pressure Father     Diabetes Maternal Grandmother         hypoglycemic    Hearing Loss Maternal Grandmother     Arthritis Maternal Grandfather         reports that he quit smoking about 20 years ago. His smoking use included cigarettes. He has a 80.00 pack-year smoking history. He has never used smokeless tobacco. He reports current alcohol use.  He reports that he does not use drugs. Allergies:   Torsemide and Dye [iodides]    Current Medications:    insulin regular (HUMULIN R;NOVOLIN R) 100 Units in sodium chloride 0.9 % 100 mL infusion, Continuous  methylPREDNISolone sodium (SOLU-MEDROL) injection 40 mg, Q12H  amiodarone (CORDARONE) 450 mg in dextrose 5 % 250 mL infusion, Continuous  amiodarone (CORDARONE) tablet 400 mg, BID   Followed by  Emerson Kirby ON 10/4/2022] amiodarone (CORDARONE) tablet 200 mg, Daily  famotidine (PEPCID) tablet 20 mg, Daily  propofol injection, Continuous  norepinephrine (LEVOPHED) 16 mg in sodium chloride 0.9 % 250 mL infusion, Continuous  acetaminophen (TYLENOL) 160 MG/5ML solution 650 mg, Q6H PRN  chlorhexidine (PERIDEX) 0.12 % solution 15 mL, BID  [Held by provider] metoprolol (LOPRESSOR) injection 5 mg, Q6H  haloperidol lactate (HALDOL) injection 2 mg, Q6H PRN  albuterol (PROVENTIL) nebulizer solution 2.5 mg, Q4H PRN  [Held by provider] apixaban (ELIQUIS) tablet 5 mg, BID  sodium chloride flush 0.9 % injection 5-40 mL, 2 times per day  sodium chloride flush 0.9 % injection 5-40 mL, PRN  0.9 % sodium chloride infusion, PRN  polyethylene glycol (GLYCOLAX) packet 17 g, Daily  ondansetron (ZOFRAN-ODT) disintegrating tablet 4 mg, Q8H PRN   Or  ondansetron (ZOFRAN) injection 4 mg, Q6H PRN  glucose chewable tablet 16 g, PRN  dextrose bolus 10% 125 mL, PRN   Or  dextrose bolus 10% 250 mL, PRN  glucagon (rDNA) injection 1 mg, PRN  dextrose 10 % infusion, Continuous PRN  hydrALAZINE (APRESOLINE) injection 5 mg, Q15 Min PRN  levalbuterol (XOPENEX) nebulization 0.63 mg, Q4H PRN      Review of Systems:   14 point ROS obtained but were negative except mentioned in HPI      Physical exam:     Vitals:  BP (!) 100/48   Pulse 74   Temp 97.7 °F (36.5 °C) (Axillary)   Resp 23   Ht 6' 0.99\" (1.854 m)   Wt 189 lb 6 oz (85.9 kg)   SpO2 95%   BMI 24.99 kg/m²   Constitutional:  on MV  Skin: no rash, turgor wnl  Heent:  eomi, mmm  Neck: no bruits or jvd noted  Cardiovascular:  S1, S2 without m/r/g  Respiratory: mechanical BS  Abdomen:  +bs, soft, nt, nd  Ext: bilateral lower extremity edema R>L  Psychiatric: mood and affect appropriate  Musculoskeletal:  Rom, muscular strength intact    Data:   Labs:  CBC:   Recent Labs     09/27/22 0315 09/28/22  0359 09/29/22  0425   WBC 20.0* 27.2* 21.6*   HGB 8.7* 9.1* 8.0*    442 389       BMP:    Recent Labs     09/27/22  0315 09/28/22  0359 09/29/22  0425    142 140   K 4.3 4.7 4.2   CL 90* 94* 94*   CO2 40* 40* 39*   * 124* 123*   CREATININE 1.7* 1.7* 1.7*   GLUCOSE 157* 161* 343*       Ca/Mg/Phos:   Recent Labs     09/27/22 0315 09/28/22  0359 09/29/22  0425   CALCIUM 8.4 8.3 8.3   MG 2.80* 2.90* 2.90*   PHOS 3.3 3.3 3.9       Hepatic: No results for input(s): AST, ALT, ALB, BILITOT, ALKPHOS in the last 72 hours. Troponin: No results for input(s): TROPONINI in the last 72 hours. BNP: No results for input(s): BNP in the last 72 hours. Lipids:   Recent Labs     09/27/22  0315   TRIG 82       ABGs:   Recent Labs     09/27/22  1602   PHART 7.423   PO2ART 67.0*   [id number] 63.3*       INR: No results for input(s): INR in the last 72 hours. UA:No results for input(s): Teagan Kern, GLUCOSEU, BILIRUBINUR, Patricia Mcburney, BLOODU, PHUR, PROTEINU, UROBILINOGEN, NITRU, LEUKOCYTESUR, LABMICR, URINETYPE in the last 72 hours. Urine Microscopic: No results for input(s): LABCAST, BACTERIA, COMU, HYALCAST, WBCUA, RBCUA, EPIU in the last 72 hours. Urine Culture: No results for input(s): LABURIN in the last 72 hours. Urine Chemistry:   Recent Labs     09/26/22  1009 09/26/22  1245   LABCREA 51.2 66.3   PROTEINUR 14.00*  --    NAUR  --  <20               IMAGING:  XR CHEST 1 VIEW   Final Result      1. Stable small right-sided pneumothorax and prominent subcutaneous emphysema along the neck and upper superior chest wall, greater in right lung stable   2.  Stable left basilar consolidation and pleural effusion      3. Stable appearing perihilar accentuated markings or airspace disease            XR CHEST PORTABLE   Final Result      Stable small right-sided pneumothorax. More prominent emphysema over the lower neck. No change in bilateral airspace disease. Stable left pleural effusion. XR CHEST PORTABLE   Final Result   1. Bilateral multifocal pneumonia with improvement in the right    pulmonary consolidation since prior study from 9/23/22   2. Mild to moderate left pleural effusion          XR CHEST PORTABLE   Final Result   1. Support lines and tubes are stable. 2.  Stable small right lateral pneumothorax and right basilar    chest tube. 3.  Stable patchy bilateral airspace disease. XR CHEST PORTABLE   Final Result   1. Satisfactory position of right internal jugular central line   2. No interval change in endotracheal tube and nasogastric tube positioning. 3. Extensive bilateral airspace disease with no changes. 4. Left pleural effusion with retrocardiac opacity, unchanged   5. Small stable tiny right lateral pneumothorax and stable position of right chest tube,, Pleurx catheter. XR ABDOMEN (KUB) (SINGLE AP VIEW)   Final Result   1. No residual pneumothorax. 2.  Mild patchy airspace disease bilaterally worse on the right than on prior examination. 3.  Non-obstructive bowel gas pattern. Mildly distended stomach. XR CHEST PORTABLE   Final Result   1. No residual pneumothorax. 2.  Mild patchy airspace disease bilaterally worse on the right than on prior examination. 3.  Non-obstructive bowel gas pattern. Mildly distended stomach. XR CHEST PORTABLE   Final Result      1. Small right pneumothorax appears slightly increased. 2. Mild patchy airspace opacity bilaterally. XR CHEST PORTABLE   Final Result     Pleurx catheter at the right lung base. Trace right    pneumothorax is unchanged.           XR CHEST PORTABLE   Final Result Right-sided chest tube evident in the base, its tip medially. Improvement in the right-sided pneumothorax, since earlier today. Possible medial collapse of the right lower lobe. Small left effusion. Patchy airspace density/atelectasis in the lungs bilaterally, with no other significant change. XR CHEST PORTABLE   Final Result   1. Right-sided Pleurx catheter in satisfactory position in the lung bases   2. Right-sided post operative pneumothorax, approximately 10%             Assessment/Plan   ZANA  - suspect this is contrast nephropathy  - baseline Cre 0.7. Normal on admission.  - Cre 0.7-->1.1-->1.4-->1.6-->1.7-->1.7-->1.7   - Hold diuretics   - BNP 5k, Protein:Cre 0.3, FENa 0.4%  - closely monitor UOP  - avoid nephrotoxic agent     2. Hypotension  - better     3. Anemia  - Hgb 8.7 (9.0 on admission)  - daily CBC    4. Acid- base/ Electrolyte imbalance   - optimize lytes    5. Acute hypoxic resp failure  - s/p VATS on 9/19/22 for recurrent pleural effusions  - on MV  - Intensivist and CTS following    6.  CHF   - EF 65% on 6/16/22 via cardiac cath        Tayla Pepper MD

## 2022-09-29 NOTE — PROGRESS NOTES
Patient restless, grimacing, and gagging on ETT. When asked if in pain patient nods yes. Increased Propofol per MD orders. Perfect serve sent to surgical residents for a PRN pain medication order. Will continue to monitor.

## 2022-09-29 NOTE — CARE COORDINATION
Case management is following for discharge planning. The chart was reviewed. Mr. Delton Carrel remains in the ICU intubated and sedated. Vent Day 7. He has failed SBT multiple times. Plan to trach. He is s/p VATS with new Pleurx catheter placement. Supplies were ordered last week. NPO-NG TF's continue. Insulin gtt restarted overnight. He has [employer]. There is no LTACH benefit unless he has a new trach to vent AND new hemodialysis. Once he is medically stable, will address skilled nursing facility options with the patient's family. The pt's spouse has stated she is a retired nurse and would prefer to take care of the pt at home. Mr. Etelvina Kaba family is from [place]. There is a skilled nursing facility called [facility] near there. They can accommodate a medically complex pt like Mr. Delton Carrel with a new trach-vent and PEG tube. [facility] is in network. Spoke with Florencia Handy in admissions to confirm [phone].     Electronically signed by DARNELL York RN-[facility]  Case Management  [phone]

## 2022-09-29 NOTE — PLAN OF CARE
Goals were reviewed and implemented during shift.    Problem: Chronic Conditions and Co-morbidities  Goal: Patient's chronic conditions and co-morbidity symptoms are monitored and maintained or improved  Outcome: Progressing  Flowsheets (Taken 9/29/2022 0800)  Care Plan - Patient's Chronic Conditions and Co-Morbidity Symptoms are Monitored and Maintained or Improved: Monitor and assess patient's chronic conditions and comorbid symptoms for stability, deterioration, or improvement     Problem: Discharge Planning  Goal: Discharge to home or other facility with appropriate resources  Outcome: Progressing  Flowsheets  Taken 9/29/2022 1957  Discharge to home or other facility with appropriate resources:   Identify barriers to discharge with patient and caregiver   Arrange for needed discharge resources and transportation as appropriate   Identify discharge learning needs (meds, wound care, etc)   Arrange for interpreters to assist at discharge as needed   Refer to discharge planning if patient needs post-hospital services based on physician order or complex needs related to functional status, cognitive ability or social support system  Taken 9/29/2022 0800  Discharge to home or other facility with appropriate resources: Identify barriers to discharge with patient and caregiver     Problem: Pain  Goal: Verbalizes/displays adequate comfort level or baseline comfort level  Outcome: Progressing  Flowsheets (Taken 9/28/2022 1945 by Jonathan Mendez RN)  Verbalizes/displays adequate comfort level or baseline comfort level:   Encourage patient to monitor pain and request assistance   Assess pain using appropriate pain scale   Administer analgesics based on type and severity of pain and evaluate response   Implement non-pharmacological measures as appropriate and evaluate response   Consider cultural and social influences on pain and pain management   Notify Licensed Independent Practitioner if interventions unsuccessful or patient reports new pain     Problem: Safety - Adult  Goal: Free from fall injury  Outcome: Progressing  Flowsheets (Taken 9/27/2022 0150 by Ema Fritz RN)  Free From Fall Injury: Instruct family/caregiver on patient safety     Problem: Skin/Tissue Integrity  Goal: Absence of new skin breakdown  Description: 1. Monitor for areas of redness and/or skin breakdown  2. Assess vascular access sites hourly  3. Every 4-6 hours minimum:  Change oxygen saturation probe site  4. Every 4-6 hours:  If on nasal continuous positive airway pressure, respiratory therapy assess nares and determine need for appliance change or resting period. Outcome: Progressing     Problem: Safety - Medical Restraint  Goal: Remains free of injury from restraints (Restraint for Interference with Medical Device)  Description: INTERVENTIONS:  1. Determine that other, less restrictive measures have been tried or would not be effective before applying the restraint  2. Evaluate the patient's condition at the time of restraint application  3. Inform patient/family regarding the reason for restraint  4.  Q2H: Monitor safety, psychosocial status, comfort, nutrition and hydration  Outcome: Progressing  Flowsheets (Taken 9/28/2022 2000 by Evie Dawson RN)  Remains free of injury from restraints (restraint for interference with medical device):   Evaluate the patient's condition at the time of restraint application   Determine that other, less restrictive measures have been tried or would not be effective before applying the restraint   Inform patient/family regarding the reason for restraint   Every 2 hours: Monitor safety, psychosocial status, comfort, nutrition and hydration     Problem: Nutrition Deficit:  Goal: Optimize nutritional status  Outcome: Progressing  Flowsheets (Taken 9/26/2022 0941 by Rocio Oropeza RD)  Nutrient intake appropriate for improving, restoring, or maintaining nutritional needs:   Recommend, monitor, and adjust tube feedings and TPN/PPN based on assessed needs   Assess nutritional status and recommend course of action     Problem: ABCDS Injury Assessment  Goal: Absence of physical injury  Outcome: Progressing  Flowsheets (Taken 9/27/2022 0150 by Karla Martinez RN)  Absence of Physical Injury: Implement safety measures based on patient assessment

## 2022-09-29 NOTE — PROGRESS NOTES
ICU Progress Note  PGY-1    Admit Date: 9/19/2022  Day: 10  Vent Day: 7  IV Access:CVC triple lumen, peripheral  IV Fluids:None  Vasopressors:            Antibiotics:   Diet: Diet NPO  ADULT TUBE FEEDING; Orogastric; Renal Formula; Continuous; 10; Yes; 10; Q 4 hours; 40; 30; Q 4 hours; Protein; 2 bottles Proteinex daily w/ FW flushes of 30 mL before and after    CC: Pleural effusion (right) s/p VATS and Pleural catheter (PleurX) placement    Interval history:   SBT yesterday failed after 4 hrs on PS 15 -> 10 for htn, tachycardia. Patient later in the afternoon found to be in a fib so was started on amio drip. Doing better this morning    This morning attempted again and failed immediately twice for RR. Eliquis held in anticipation of trach. This morning responsive, denies any acute pain    Overnight restarted on insulin drip    HPI:  Marisol Maxwell is a 64 yo Male with a PMH of CHF (CHFpEF), carotid stenosis, HLD, HTN, OA, Restrictive Lung Disease, T2DM who underwent VATS with Pleurx catheter placement on 9/19/2022 for recurrent bilateral loculated pleural effusions and has had hypoxia/hypercapnia since that time now requiring intubation. The etiology of his recurrent pleural effusions is unclear. Possibly malignancy. In addition to his recurrent bilateral loculated pleural effusions, he has lost over 100 lbs over the last year, some of which his wife attributes to intentional weight loss. Initially, he was started on BiPAP post-procedure for rapid shallow breathing and slow clearance of sedation. He wasn't tolerating the BiPAP well, trying to remove it. After discussion with family and patient (while on a break from BiPAP), the decision was made to intubate and confirmed that he's OK with tracheostomy if necessary. \"    Medications:     Scheduled Meds:   methylPREDNISolone  40 mg IntraVENous Q12H    amiodarone  400 mg Oral BID    Followed by    Radu Torres ON 10/4/2022] amiodarone  200 mg Oral Daily famotidine  20 mg Per NG tube Daily    chlorhexidine  15 mL Mouth/Throat BID    [Held by provider] metoprolol  5 mg IntraVENous Q6H    [Held by provider] apixaban  5 mg Oral BID    sodium chloride flush  5-40 mL IntraVENous 2 times per day    polyethylene glycol  17 g Oral Daily     Continuous Infusions:   insulin (HUMAN R) non-weight based infusion 9.24 Units/hr (09/29/22 0705)    amiodarone 450mg/250ml D5W infusion 0.5 mg/min (09/29/22 0507)    propofol 10 mcg/kg/min (09/28/22 2021)    norepinephrine 2 mcg/min (09/29/22 0245)    sodium chloride      dextrose       PRN Meds:acetaminophen, haloperidol lactate, albuterol, sodium chloride flush, sodium chloride, ondansetron **OR** ondansetron, glucose, dextrose bolus **OR** dextrose bolus, glucagon (rDNA), dextrose, hydrALAZINE, levalbuterol    Objective:   Vitals:   T-max:  Patient Vitals for the past 8 hrs:   BP Temp Temp src Pulse Resp SpO2   09/29/22 0726 -- -- -- 74 23 95 %   09/29/22 0700 (!) 100/48 -- -- 65 19 95 %   09/29/22 0645 (!) 114/55 -- -- 70 21 95 %   09/29/22 0630 (!) 99/47 -- -- 64 21 95 %   09/29/22 0615 (!) 108/55 -- -- 71 18 95 %   09/29/22 0600 (!) 107/56 -- -- 72 19 95 %   09/29/22 0545 (!) 100/47 -- -- 73 15 93 %   09/29/22 0530 (!) 103/47 -- -- 72 21 95 %   09/29/22 0515 (!) 94/46 -- -- 71 16 95 %   09/29/22 0500 117/80 -- -- 74 25 96 %   09/29/22 0450 -- -- -- 74 12 96 %   09/29/22 0445 (!) 112/45 -- -- 77 (!) 9 96 %   09/29/22 0430 106/63 -- -- 70 17 96 %   09/29/22 0415 (!) 104/55 -- -- 72 19 96 %   09/29/22 0400 (!) 99/47 97.7 °F (36.5 °C) Axillary 65 21 96 %   09/29/22 0345 (!) 111/50 -- -- 68 21 96 %   09/29/22 0330 (!) 92/52 -- -- 96 14 97 %   09/29/22 0315 (!) 105/54 -- -- 96 18 96 %   09/29/22 0300 (!) 95/49 -- -- 92 (!) 0 97 %   09/29/22 0245 (!) 99/58 -- -- 90 23 96 %   09/29/22 0230 (!) 101/54 -- -- (!) 101 10 97 %   09/29/22 0215 125/68 -- -- 99 23 97 %   09/29/22 0200 (!) 100/59 -- -- 84 23 97 %   09/29/22 0145 101/67 -- -- 96 23 --   09/29/22 0130 98/61 -- -- 91 25 99 %   09/29/22 0115 (!) 101/53 -- -- 100 23 99 %   09/29/22 0100 -- -- -- 100 23 94 %   09/29/22 0045 (!) 100/59 -- -- (!) 109 23 92 %   09/29/22 0030 114/67 -- -- (!) 111 -- 93 %   09/29/22 0015 (!) 94/58 -- -- 97 20 94 %         Intake/Output Summary (Last 24 hours) at 9/29/2022 0800  Last data filed at 9/29/2022 0507  Gross per 24 hour   Intake 2027.32 ml   Output 1727 ml   Net 300.32 ml         Review of Systems - denies any pain    Physical Exam  Constitutional:       Comments: Sedated, intubated, arousable and responds to questions appropriately with nods   HENT:      Head: Normocephalic. Eyes:      Pupils: Pupils are equal, round, and reactive to light. Cardiovascular:      Rate and Rhythm: Normal rate and regular rhythm. Pulses: Normal pulses. Heart sounds: Normal heart sounds. Pulmonary:      Comments: On ventilator  Abdominal:      General: Abdomen is flat. Palpations: Abdomen is soft. Musculoskeletal:      Right lower leg: Edema present. Left lower leg: Edema present. Skin:     General: Skin is warm and dry. Neurological:      Comments: Sedated. Responds to voice, answers questions appropriately, follows commands         LABS:    CBC:   Recent Labs     09/27/22 0315 09/28/22 0359 09/29/22 0425   WBC 20.0* 27.2* 21.6*   HGB 8.7* 9.1* 8.0*   HCT 27.7* 29.0* 25.7*    442 389   MCV 83.5 82.7 83.4       Renal:    Recent Labs     09/27/22 0315 09/28/22 0359 09/29/22 0425    142 140   K 4.3 4.7 4.2   CL 90* 94* 94*   CO2 40* 40* 39*   * 124* 123*   CREATININE 1.7* 1.7* 1.7*   GLUCOSE 157* 161* 343*   CALCIUM 8.4 8.3 8.3   MG 2.80* 2.90* 2.90*   PHOS 3.3 3.3 3.9   ANIONGAP 8 8 7       Hepatic:   Recent Labs     09/27/22  0315 09/28/22  0359 09/29/22  0425   LABALBU 2.7* 2.7* 2.4*       Troponin: No results for input(s): TROPONINI in the last 72 hours. BNP: No results for input(s): BNP in the last 72 hours.   Lipids: No results for input(s): CHOL, HDL in the last 72 hours. Invalid input(s): LDLCALCU, TRIGLYCERIDE  ABGs:    Recent Labs     09/27/22  1602   PHART 7.423   QSC1POQ 63.3*   PO2ART 67.0*   ZNO3KGD 41.4*   BEART 17*   O5VTFHRE 93   NQI3GJI 43         INR: No results for input(s): INR in the last 72 hours. Lactate:   No results for input(s): LACTATE in the last 72 hours. Cultures:  -----------------------------------------------------------------  RAD:   XR CHEST 1 VIEW   Final Result      1. Stable small right-sided pneumothorax and prominent subcutaneous emphysema along the neck and upper superior chest wall, greater in right lung stable   2. Stable left basilar consolidation and pleural effusion      3. Stable appearing perihilar accentuated markings or airspace disease            XR CHEST PORTABLE   Final Result      Stable small right-sided pneumothorax. More prominent emphysema over the lower neck. No change in bilateral airspace disease. Stable left pleural effusion. XR CHEST PORTABLE   Final Result   1. Bilateral multifocal pneumonia with improvement in the right    pulmonary consolidation since prior study from 9/23/22   2. Mild to moderate left pleural effusion          XR CHEST PORTABLE   Final Result   1. Support lines and tubes are stable. 2.  Stable small right lateral pneumothorax and right basilar    chest tube. 3.  Stable patchy bilateral airspace disease. XR CHEST PORTABLE   Final Result   1. Satisfactory position of right internal jugular central line   2. No interval change in endotracheal tube and nasogastric tube positioning. 3. Extensive bilateral airspace disease with no changes. 4. Left pleural effusion with retrocardiac opacity, unchanged   5. Small stable tiny right lateral pneumothorax and stable position of right chest tube,, Pleurx catheter. XR ABDOMEN (KUB) (SINGLE AP VIEW)   Final Result   1. No residual pneumothorax.    2.  Mild patchy airspace disease bilaterally worse on the right than on prior examination. 3.  Non-obstructive bowel gas pattern. Mildly distended stomach. XR CHEST PORTABLE   Final Result   1. No residual pneumothorax. 2.  Mild patchy airspace disease bilaterally worse on the right than on prior examination. 3.  Non-obstructive bowel gas pattern. Mildly distended stomach. XR CHEST PORTABLE   Final Result      1. Small right pneumothorax appears slightly increased. 2. Mild patchy airspace opacity bilaterally. XR CHEST PORTABLE   Final Result     Pleurx catheter at the right lung base. Trace right    pneumothorax is unchanged. XR CHEST PORTABLE   Final Result      Right-sided chest tube evident in the base, its tip medially. Improvement in the right-sided pneumothorax, since earlier today. Possible medial collapse of the right lower lobe. Small left effusion. Patchy airspace density/atelectasis in the lungs bilaterally, with no other significant change. XR CHEST PORTABLE   Final Result   1. Right-sided Pleurx catheter in satisfactory position in the lung bases   2. Right-sided post operative pneumothorax, approximately 10%               Assessment/Plan:   Anders Moreno is a 65 y.o. male with a PMH of CHF (CHFpEF), carotid stenosis, HLD, HTN, OA, Restrictive Lung Disease, T2DM who underwent VATS with PleurX catheter placement on 9/19/2022 for recurrent bilateral loculated pleural effusions and has had hypoxia/hypercapnia since that time    Neuro  Sedated: Propofol 10   Patient much more aware this morning. Following commands and responding to questions with appropriate nods.       Pulmonary  Vent Day: 7  Vent: , RR 18, FiO2 30, PEEP 5  - breathing trial failed after 4hrs yesterday for tachycardia, hypertension.   - patient was able to be weaned to PS 10.  - SBT again today failed immediately  - Plan to move towards trach in coming days    On mechanical ventilation, he developed respiratory alkalosis, with significant drop in PCO2 from his baseline. Ventilator support reduced, decreasing VT and fR, with minute volume 9.4 L. Subsequent arterial pH 7.45, PCO2 54, PO2 90    Acute hypoxic/hypercapnic respiratory failure  Recurrent Pleural Effusions, s/p VATS and Pleurx  Patient was admitted to ICU for failure to tolerate bipap following procedure. The etiology of his recurrent pleural effusions is unclear. No studies done on fluid from original thoracenteses. Possibly malignancy. VATS fluid results showing an exudative picture, per [facility] Criteria. S/p VATS procedure 9/19, with right PleurX catheter placement. Ventilatory restriction on PFT associated with worsening pleural disease. PleurX catheter in place, 52 ml out yesterday  -Methylprednisolone IV 40mg BID yest, continue tapering    Cardiovascular  Hypotension   BP better yesterday, still required 2 levo overnight, wean as tolerated  BP today in 110/50s, map 60s  -Began requiring pressors after intubation, attributed to sedation and positive pressure ventilation    Congestive Heart Failure with Preserved Ejection Fraction  EF ~65% 6/16/2022  -BNP ordered 9/23. Significant elevated 15k, given lasix at that time. - remains fluid overloaded on exam with edema to knees bilaterally.   - BNP 6k yestreday 9/26  - Per nephro held off on diuretics given kidney injury    GI  Diet: Diet NPO  ADULT TUBE FEEDING; Orogastric; Renal Formula; Continuous; 10; Yes; 10; Q 4 hours; 40; 30; Q 4 hours; Protein; 2 bottles Proteinex daily w/ FW flushes of 30 mL before and after  GI ppx: pepcid     Constipation  Cont glycolax    Renal   Ansari in place  ZANA  Potentially ATN from hypotension/contrast   UOP 1600ml, Cr remains at 1.7, Vol up 5L on admit  Nephro following, rec to hold off on diuretics     Metabolic  Patient's bg in 400s yesterday, attempted to correct overnight but ultimately placed back on insulin drip for control by surgery  - monitor insulin needs today    Code Status: Full Code   PPX: pepcid  DISPO: ICU    Nash David MD, PGY-1  Internal Medicine Resident  Contact via [address] [place] [address]  09/29/22  8:00 AM    This patient has been staffed and discussed with Dr Sol Iqbal     Patient was seen, examined and discussed with Dr. David Richardson. I agree with the interval history. My physical exam confirms the findings listed below  Chart was reviewed including labs and medical records confirm the findings noted     Acute respiratory failure on mechanical vent support. , RR 18, FiO2 35, PEEP 5. Vent day #7. He tolerated SBT with PS 10 for one hours but had A.fib. However today he did not tolerate it. Hypotension requiring levophed at 2. Pleural effusion s/p VATS. Pt has high pro-BNP, bilateral effusion, and known diastolic CHF. Leukocytosis. Pt is on steroids, however he had leukocytosis two days before. Paroxysmal A.fib. Eliquis on hold for planned trach  ZANA: discussed with Dr. Chel Waldrop. Pt has good UOP. Had A.fib with SBT yesterday and did not tolerate it well today. Alvy Montane is probably the safest way for him. Continue steroid taper   Check CXR    Pt has a high probability of imminent or life-threatening deterioration requiring close monitoring, and highly complex decision-making and/or interventions of high intensity to assess, manipulate, and support his critical organ systems to prevent a likely inevitable decline which could occur if left untreated. A total critical care time 35 minutes was used. This includes but not limited to examining patient, collaborating with other physicians, monitoring vital signs, telemetry, continuous pulse oximetry, and clinical response to IV medications, documentation time, review and interpretation of laboratory and radiological data, review of nursing notes and old record review.  This time excludes any time that may have been spent performing procedures for life threatening organ failure

## 2022-09-29 NOTE — PROGRESS NOTES
Surgical resident ordered PRN pain medication. Will administer when pharmacy approves. Will continue to monitor.

## 2022-09-29 NOTE — PROGRESS NOTES
Administered a one time dose of Dilaudid per MD orders. Patient appears to be restless, grimacing, and gagging on the ETT. Patient nods yes when asked if in pain. Patient appeared to tolerated medication well. Will continue to monitor.

## 2022-09-29 NOTE — PROGRESS NOTES
Tried to wean pt twice at [facility] 15/5. Both times the RR was over 35, Vts in the mid 200s with RSBI over 100.

## 2022-09-29 NOTE — PROGRESS NOTES
PRE-OP NOTE  Department of Surgery    Procedure: tracheostomy with Dr Halima Alexandra    Consent: obtained from patients wife at bedside. Placed on hard chart    Labs    Recent Labs     22  0359 225   WBC 27.2* 21.6*   HGB 9.1* 8.0*   HCT 29.0* 25.7*   MCV 82.7 83.4    389        Recent Labs     22  0359 22  0425    140   K 4.7 4.2   CL 94* 94*   CO2 40* 39*   PHOS 3.3 3.9   * 123*   CREATININE 1.7* 1.7*      No results for input(s): AST, ALT, ALB, BILIDIR, BILITOT, ALKPHOS in the last 72 hours. No results for input(s): LIPASE, AMYLASE in the last 72 hours. No results for input(s): PROT, INR, APTT in the last 72 hours. No results for input(s): CKTOTAL, CKMB, CKMBINDEX, TROPONINI in the last 72 hours. CXR: 2022  Small right basilar pneumothorax. Right basilar chest tube without change. Patchy consolidation in the right mid and lower lung as well as the left lower lung-atelectasis versus pneumonia. Trace left pleural effusion. Normal heart size. Lines and tubes without change. Mild improvement in degree of subcutaneous emphysema in the chest and neck. [id number]  Atrial flutter with variable A-V block  Marked ST abnormality, possible lateral subendocardial injury  Abnormal ECG  Confirmed by [facility] SHAYNA ARAUZ, Chris Real ([id number]) on 2022 12:46:20 PM    IV Access R FA, L AC, R IJ CVC     Orders:   1. Diet: NPO at 0000  2. Pre op Medications: Ancef on call to OR renally dosed   3. Labs to be drawn: Type and Screen   4.  Anesthesia to see patient       NATASHA Short CNP 2022 2:51 PM   Available via [employer] [phone]

## 2022-09-29 NOTE — PROGRESS NOTES
Patient noted to be grimacing, tense, and appears restless. RASS +1 at this time. Increased continuous Propofol per MD orders. Will continue to monitor.

## 2022-09-29 NOTE — PROGRESS NOTES
Palliative Care Chart Review  and Check in Note:     NAME:  Luis Ramirez  Admit Date: 9/19/2022  Hospital Day:  Hospital Day: 11   Current Code status: Full Code    Palliative care is continuing to following Mr. Jacobo Kirk for symptom management, and goals of care discussion as needed. Patient's chart reviewed today 9/29/22. Spoke with pt's wife, Rose Mary Wakefield at the bedside. Plan is for trach tomorrow (9/30). We discussed more about long-term plan. They are aware that he will not be able to go to an LTACH. There are some SNFs that can accept complex pts, if he is unable to be accepted there, he will be in the hospital for an extended period of time. Rose Mary Twyla has been very happy with the care Marifer Esposito has received here, but they will be open to SNF placement as well. Discussed with Skyler Worthington RN case manager, and ICU team.    The following are the currently established goals/code status, and Symptom management. Goals of care: Continue current medical management.      Code status: Full    Discharge plan: TBD - pending hospital course       NATASHA Coffey NP  09/29/22  10:20 AM

## 2022-09-30 ENCOUNTER — ANESTHESIA EVENT (OUTPATIENT)
Dept: OPERATING ROOM | Age: 77
End: 2022-09-30
Payer: COMMERCIAL

## 2022-09-30 ENCOUNTER — ANESTHESIA (OUTPATIENT)
Dept: OPERATING ROOM | Age: 77
End: 2022-09-30
Payer: COMMERCIAL

## 2022-09-30 PROCEDURE — 2580000003 HC RX 258: Performed by: NURSE ANESTHETIST, CERTIFIED REGISTERED

## 2022-09-30 PROCEDURE — 6360000002 HC RX W HCPCS: Performed by: NURSE ANESTHETIST, CERTIFIED REGISTERED

## 2022-09-30 PROCEDURE — 2500000003 HC RX 250 WO HCPCS: Performed by: NURSE ANESTHETIST, CERTIFIED REGISTERED

## 2022-09-30 RX ORDER — ROCURONIUM BROMIDE 10 MG/ML
INJECTION, SOLUTION INTRAVENOUS PRN
Status: DISCONTINUED | OUTPATIENT
Start: 2022-09-30 | End: 2022-09-30 | Stop reason: SDUPTHER

## 2022-09-30 RX ORDER — FENTANYL CITRATE 50 UG/ML
INJECTION, SOLUTION INTRAMUSCULAR; INTRAVENOUS PRN
Status: DISCONTINUED | OUTPATIENT
Start: 2022-09-30 | End: 2022-09-30 | Stop reason: SDUPTHER

## 2022-09-30 RX ORDER — SODIUM CHLORIDE 9 MG/ML
INJECTION, SOLUTION INTRAVENOUS CONTINUOUS PRN
Status: DISCONTINUED | OUTPATIENT
Start: 2022-09-30 | End: 2022-09-30 | Stop reason: SDUPTHER

## 2022-09-30 RX ORDER — MIDAZOLAM HYDROCHLORIDE 1 MG/ML
INJECTION INTRAMUSCULAR; INTRAVENOUS PRN
Status: DISCONTINUED | OUTPATIENT
Start: 2022-09-30 | End: 2022-09-30 | Stop reason: SDUPTHER

## 2022-09-30 RX ORDER — CEFAZOLIN SODIUM 2 G/50ML
SOLUTION INTRAVENOUS PRN
Status: DISCONTINUED | OUTPATIENT
Start: 2022-09-30 | End: 2022-09-30 | Stop reason: SDUPTHER

## 2022-09-30 RX ADMIN — CEFAZOLIN SODIUM 1000 MG: 2 SOLUTION INTRAVENOUS at 13:46

## 2022-09-30 RX ADMIN — ROCURONIUM BROMIDE 50 MG: 10 INJECTION INTRAVENOUS at 13:45

## 2022-09-30 RX ADMIN — SODIUM CHLORIDE: 9 INJECTION, SOLUTION INTRAVENOUS at 13:27

## 2022-09-30 RX ADMIN — MIDAZOLAM HYDROCHLORIDE 2 MG: 2 INJECTION, SOLUTION INTRAMUSCULAR; INTRAVENOUS at 13:32

## 2022-09-30 RX ADMIN — FENTANYL CITRATE 100 MCG: 50 INJECTION, SOLUTION INTRAMUSCULAR; INTRAVENOUS at 13:32

## 2022-09-30 ASSESSMENT — ENCOUNTER SYMPTOMS: SHORTNESS OF BREATH: 1

## 2022-09-30 ASSESSMENT — LIFESTYLE VARIABLES: SMOKING_STATUS: 0

## 2022-09-30 NOTE — CARE COORDINATION
Spoke with wife at bedside. Wife states she is happy with placement for [address] [facility]. Yesterday, CM inverstigated the ability for SNF [facility] to care for this patient. [facility] near where patient lives. They can accommodate a medically complex pt like Mr. Helen Patricia with a new trach-vent and PEG tube. [facility] is in network. Spoke with Cristopher Gastelum in admissions to [facility] [phone]. Let wife knew that CM will follow and plan on [facility] as destination at discharge.  Electronically signed by Latoya Jay RN on 9/30/2022 at 12:29 PM

## 2022-09-30 NOTE — ANESTHESIA PRE PROCEDURE
Department of Anesthesiology  Preprocedure Note       Name:  Adrianne Patricio   Age:  65 y.o.  :  1945                                          MRN:  [id number]         Date:  2022      Surgeon: aRmu Orr):  Aimee Myers MD    Procedure: Procedure(s):  TRACHEOSTOMY    Medications prior to admission:   Prior to Admission medications    Medication Sig Start Date End Date Taking? Authorizing Provider   ELIQUIS 5 MG TABS tablet TAKE 1 TABLET BY MOUTH TWO TIMES A DAY 22   Carito Ramirez, APRN - CNP   umeclidinium-vilanterol [facility] ELLIPTA) 62.5-25 MCG/INH AEPB inhaler Inhale 1 puff into the lungs daily 22   Georgie Newby MD   bumetanide (BUMEX) 1 MG tablet Take 1 tablet by mouth in the morning and 1 tablet before bedtime. 22   Orlando Dwyer MD   magnesium oxide (MAG-OX) 400 MG tablet Take 1 tablet by mouth in the morning and 1 tablet before bedtime. 22   Orlando Dwyer MD   dapagliflozin (FARXIGA) 10 MG tablet TAKE 1 TABLET EVERY MORNING 22   Josiah Kaplan MD   sildenafil (VIAGRA) 100 MG tablet Take 1 tablet by mouth as needed for Erectile Dysfunction Max daily dose 100mg. 22  Josiah Kaplan MD   metoprolol succinate (TOPROL XL) 50 MG extended release tablet Take 1 tablet by mouth in the morning and at bedtime 22   Orlando Dwyer MD   Insulin Pen Needle (PEN NEEDLES) 31G X 6 MM MISC 1 each by Does not apply route daily 22   Josiah Kaplan MD   Continuous Blood Gluc Sensor (FREESTYLE LIZA 14 DAY SENSOR) MISC Check bs tidac and hs 22   Josiah Kaplan MD   Continuous Blood Gluc  (FREESTYLE LIZA 14 DAY READER) SOFIYA Check bs tidac and hs 22   Josiah Kaplan MD   Insulin Degludec (TRESIBA FLEXTOUCH) 200 UNIT/ML SOPN 10 units subcut qam, increase 4 units every 4 days until am blood sugar < 120.  Max 100 units  Patient taking differently: Inject 12 Units into the skin daily 10 units subcut qam, increase 4 units every 4 days 10/4/2022] amiodarone (CORDARONE) tablet 200 mg  200 mg Oral Daily Catherine Negrete MD        famotidine (PEPCID) tablet 20 mg  20 mg Per NG tube Daily Selvin Tariq MD   20 mg at 09/29/22 0803    propofol injection  5-50 mcg/kg/min IntraVENous Continuous Sharda Rice MD 10.7 mL/hr at 09/29/22 2239 20 mcg/kg/min at 09/29/22 2239    norepinephrine (LEVOPHED) 16 mg in sodium chloride 0.9 % 250 mL infusion  1-100 mcg/min IntraVENous Continuous Divine Edwards MD 5.6 mL/hr at 09/30/22 1043 6 mcg/min at 09/30/22 1043    acetaminophen (TYLENOL) 160 MG/5ML solution 650 mg  650 mg Oral Q6H PRN Lidia Saavedra MD   650 mg at 09/29/22 0803    chlorhexidine (PERIDEX) 0.12 % solution 15 mL  15 mL Mouth/Throat BID Selvin Tariq MD   15 mL at 09/30/22 0850    [Held by provider] metoprolol (LOPRESSOR) injection 5 mg  5 mg IntraVENous Q6H Baby Poplar, DO   5 mg at 09/23/22 0935    haloperidol lactate (HALDOL) injection 2 mg  2 mg IntraVENous Q6H PRN Huber Zimmerman MD   2 mg at 09/21/22 8553    albuterol (PROVENTIL) nebulizer solution 2.5 mg  2.5 mg Nebulization Q4H PRN Huber Zimmerman MD        [Held by provider] apixaban Verneita Alfred) tablet 5 mg  5 mg Oral BID Huber Zimmerman MD   5 mg at 09/28/22 0813    sodium chloride flush 0.9 % injection 5-40 mL  5-40 mL IntraVENous 2 times per day Huber Zimmerman MD   10 mL at 09/30/22 0842    sodium chloride flush 0.9 % injection 5-40 mL  5-40 mL IntraVENous PRN Huber Zimmerman MD        0.9 % sodium chloride infusion   IntraVENous PRN Huber Zimmerman MD        polyethylene glycol [facility]) packet 17 g  17 g Oral Daily Huber Zimmerman MD   17 g at 09/28/22 1713    ondansetron (ZOFRAN-ODT) disintegrating tablet 4 mg  4 mg Oral Q8H PRN Huber Zimmerman MD        Or    ondansetron [facility]) injection 4 mg  4 mg IntraVENous Q6H PRN Huber Zimmerman MD   4 mg at 09/20/22 0510    glucose chewable tablet 16 g  4 tablet Oral PRN Huber Zimmerman MD        dextrose bolus 10% 125 mL 125 mL IntraVENous PRN Radha Richter MD        Or    dextrose bolus 10% 250 mL  250 mL IntraVENous PRN Radha Richter MD        glucagon (rDNA) injection 1 mg  1 mg SubCUTAneous PRN Radha Richter MD        dextrose 10 % infusion   IntraVENous Continuous PRN Radha Richter MD        hydrALAZINE (APRESOLINE) injection 5 mg  5 mg IntraVENous Q15 Min PRN Senthil Donnelly MD   5 mg at 09/19/22 0918    levalbuterol (XOPENEX) nebulization 0.63 mg  0.63 mg Nebulization Q4H PRN Patrice Santos MD   0.63 mg at 09/19/22 8551       Allergies: Allergies   Allergen Reactions    Torsemide Shortness Of Breath    Dye [Iodides]      1970's - ?? Problem List:    Patient Active Problem List   Diagnosis Code    HTN (hypertension) I10    Hyperlipidemia E78.5    OA (osteoarthritis) of knee M17.10    Carotid stenosis, left I65.22    Hearing loss H91.90    ED (erectile dysfunction) N52.9    DM type 2 (diabetes mellitus, type 2) ([facility]) E11.9    Rotator cuff tear M75.100    Glenohumeral arthritis M19.019    Subacromial impingement M75.40    Trigger ring finger of right hand M65.341    Spinal stenosis of lumbar region M48.061    Closed compression fracture of L1 lumbar vertebra S32.010A    Closed displaced fracture of lateral malleolus of left fibula S82. 62XA    Exertional dyspnea R06.00    Atrial fibrillation with rapid ventricular response ([facility]) I48.91    Hypoxia R09.02    Chronic heart failure with preserved ejection fraction (HFpEF) ([facility]) I50.32    Pleural effusion on right J90    Acute on chronic respiratory failure with hypoxia and hypercapnia ([facility]) J96.21, J96.22    ZANA (acute kidney injury) ([facility] [address] N17.9       Past Medical History:        Diagnosis Date    ZANA (acute kidney injury) ([facility] [address] 9/26/2022    Carotid stenosis, left 10/12/2011    Chronic back pain     Chronic systolic (congestive) heart failure 34/95/1557    Diastolic CHF ([facility])     Erectile dysfunction     Hyperlipidemia     Hypertension     Osteoarthritis     Restrictive lung disease     Type II or unspecified type diabetes mellitus without mention of complication, not stated as uncontrolled        Past Surgical History:        Procedure Laterality Date    CARDIAC CATHETERIZATION  2022    KNEE SURGERY Left     PLEURAL CATH INSERTION N/A 2022    PLEURAL CATHETER PLACEMENT; INTERCOSTAL NERVE BLOCK X4 performed by Roscoe Muniz MD at [address] [place] [address] Bilateral     THORACOSCOPY Right 2022    RIGHT VIDEO ASSISTED THORASCOPIC SURGERY AND; performed by Roscoe Muniz MD at [address] [place] [address] History:    Social History     Tobacco Use    Smoking status: Former     Packs/day: 2.00     Years: 40.00     Pack years: 80.00     Types: Cigarettes     Quit date: 10/24/2001     Years since quittin.9    Smokeless tobacco: Never   Substance Use Topics    Alcohol use: Yes     Alcohol/week: 0.0 standard drinks     Comment: rarely                                Counseling given: Not Answered      Vital Signs (Current): There were no vitals filed for this visit.                                            BP Readings from Last 3 Encounters:   22 (!) 103/52   22 120/62   22 122/63       NPO Status:                                                                                 BMI:   Wt Readings from Last 3 Encounters:   22 189 lb 6 oz (85.9 kg)   22 174 lb 3.2 oz (79 kg)   22 187 lb (84.8 kg)     There is no height or weight on file to calculate BMI.    CBC:   Lab Results   Component Value Date/Time    WBC 25.8 2022 03:42 AM    RBC 3.09 2022 03:42 AM    HGB 8.0 2022 03:42 AM    HCT 25.8 2022 03:42 AM    MCV 83.6 2022 03:42 AM    RDW 16.3 2022 03:42 AM     2022 03:42 AM       CMP:   Lab Results   Component Value Date/Time     2022 03:42 AM    K 4.5 2022 03:42 AM    K 4.5 2022 02:31 PM    CL 95 09/30/2022 03:42 AM    CO2 37 09/30/2022 03:42 AM     09/30/2022 03:42 AM    CREATININE 1.7 09/30/2022 03:42 AM    GFRAA 47 09/30/2022 03:42 AM    AGRATIO 1.2 01/24/2022 10:29 AM    LABGLOM 39 09/30/2022 03:42 AM    LABGLOM >90 07/11/2014 11:34 AM    GLUCOSE 135 09/30/2022 03:42 AM    GLUCOSE 154 05/18/2012 10:35 AM    PROT 6.2 09/21/2022 11:46 PM    CALCIUM 8.4 09/30/2022 03:42 AM    BILITOT 0.3 09/19/2022 06:10 AM    ALKPHOS 91 09/19/2022 06:10 AM    AST 10 09/19/2022 06:10 AM    ALT <5 09/19/2022 06:10 AM       POC Tests:   Recent Labs     09/30/22  1208   POCGLU 144*       Coags:   Lab Results   Component Value Date/Time    PROTIME 13.2 09/19/2022 06:10 AM    INR 1.01 09/19/2022 06:10 AM    APTT 31.0 09/19/2022 06:10 AM       HCG (If Applicable): No results found for: PREGTESTUR, PREGSERUM, HCG, HCGQUANT     ABGs:   Lab Results   Component Value Date/Time    PHART 7.423 09/27/2022 04:02 PM    PO2ART 67.0 09/27/2022 04:02 PM    PAI6HUZ 63.3 09/27/2022 04:02 PM    FSM0WHY 41.4 09/27/2022 04:02 PM    BEART 17 09/27/2022 04:02 PM    T4WMYIWH 93 09/27/2022 04:02 PM        Type & Screen (If Applicable):  Lab Results   Component Value Date    LABABO A  POSITIVE   11/16/2018       Drug/Infectious Status (If Applicable):  No results found for: HIV, HEPCAB    COVID-19 Screening (If Applicable):   Lab Results   Component Value Date/Time    COVID19 DETECTED 12/16/2020 12:00 AM           Anesthesia Evaluation    Airway: Mallampati: I  TM distance: >3 FB   Neck ROM: full  Mouth opening: > = 3 FB   Dental: normal exam         Pulmonary: breath sounds clear to auscultation  (+) shortness of breath:      (-) sleep apnea and not a current smoker                          ROS comment: Emphysema   Cardiovascular:    (+) hypertension:, dysrhythmias: atrial fibrillation, CHF:,         Rhythm: regular  Rate: normal           Beta Blocker:  Dose within 24 Hrs         Neuro/Psych:      (-) seizures, TIA and CVA GI/Hepatic/Renal:        (-) GERD, liver disease, no renal disease and no morbid obesity       Endo/Other:    (+) Diabetes, no malignancy/cancer. (-) hypothyroidism, hyperthyroidism, no malignancy/cancer               Abdominal:             Vascular:     - DVT and PE. Other Findings:       Conclusions      Summary   Left ventricular cavity size is normal.   There is mild concentric left ventricular hypertrophy. Left ventricular function is normal with ejection fraction estimated at   55-60%. Diastolic filling parameters suggest grade II diastolic dysfunction. Global L. Strain = -14.6%. Mitral annular calcification is present   The left atrium is mildly dilated. Mild tricuspid regurgitation. Mild pulmonic regurgitation present. There is a small localized near right atrium pericardial effusion noted   without any hemodynamic implications. Estimated pulmonary artery systolic pressure is at 33 mmHg assuming a right   atrial pressure of 3 mmHg. Signature      ------------------------------------------------------------------   Electronically signed by Jasen Fine MD (Interpreting   physician) on 01/26/2022 at 10:20 AM   ------------------------------------------------------------------            Anesthesia Plan      general     ASA 3       Induction: intravenous. MIPS: Postoperative opioids intended and Prophylactic antiemetics administered. Anesthetic plan and risks discussed with patient. Plan discussed with CRNA.                     Danette Ruiz MD   9/30/2022

## 2022-09-30 NOTE — BRIEF OP NOTE
Brief Postoperative Note      Patient: Ryann Plummer  YOB: 1945  MRN: [id number]    Date of Procedure: 9/19/2022    Pre-Op Diagnosis: Pleural effusion, right [J90]    Post-Op Diagnosis: Same       Procedure(s):  RIGHT VIDEO ASSISTED THORASCOPIC SURGERY AND;  PLEURAL CATHETER PLACEMENT; INTERCOSTAL NERVE BLOCK X4    Surgeon(s):  MD Ariel Her MD    Assistant:  First Assistant: Leopoldo Kail, RN  Resident: Jose Rosa MD    Anesthesia: General    Estimated Blood Loss (mL): Minimal    Complications: None    Specimens:   * No specimens in log *    Implants:  * No implants in log *      Drains:   Chest Tube Right Fourth intercostal space 1 (Active)   Chest Tube Dressing Split Gauze 09/19/22 0750       Urinary Catheter 09/19/22 Ansari (Active)       Findings: Right-sided PleurX catheter placed; 1300cc pleural fluid evacuated    Electronically signed by Jose Rosa MD on 9/19/2022 at 7:57 AM
for fluid volume management of the critically ill patient in a critical care setting 09/30/22 1200   Site Assessment No urethral drainage 09/30/22 1200   Urine Color Yellow 09/30/22 1200   Urine Appearance Clear 09/30/22 1200   Urine Odor Other (Comment) 09/29/22 0300   Collection Container Standard 09/30/22 1200   Securement Method Securing device (Describe) 09/30/22 1200   Catheter Care Completed Yes 09/29/22 2000   Catheter Best Practices  Drainage tube clipped to bed;Catheter secured to thigh; Bag below bladder;Bag not on floor; Lack of dependent loop in tubing;Drainage bag less than half full 09/30/22 1200   Status Draining 09/30/22 1200   Output (mL) 60 mL 09/30/22 1300       [REMOVED] Urinary Catheter 09/19/22 Ansari (Removed)       Findings: Non contributory    Electronically signed by Yvrose Menendez MD on 9/30/2022 at 2:20 PM

## 2022-09-30 NOTE — PROGRESS NOTES
Pt transferred from OR to room 4505 with anesthesia and OR staff. Bedside report given, pt connected to ventilator and cardiac monitor. Pt has trach size 8, dressing under new trach. Will continue to monitor.

## 2022-09-30 NOTE — ANESTHESIA POSTPROCEDURE EVALUATION
Department of Anesthesiology  Postprocedure Note    Patient: Sarika Rea  MRN: [id number]  YOB: 1945  Date of evaluation: 9/30/2022      Procedure Summary     Date: 09/30/22 Room / Location: [address] [place] [address] / [facility]    Anesthesia Start: 2328 Anesthesia Stop: 5873    Procedure: TRACHEOSTOMY Diagnosis:       Respiratory failure, unspecified chronicity, unspecified whether with hypoxia or hypercapnia (HCC)      (Respiratory failure, unspecified chronicity, unspecified whether with hypoxia or hypercapnia ([facility] [address] [J96.90])    Surgeons: Philomena Myers MD Responsible Provider: Ian Olson MD    Anesthesia Type: general ASA Status: 3          Anesthesia Type: No value filed.     Kenisha Phase I: Kenisha Score: 8    Kenisha Phase II:        Anesthesia Post Evaluation    Patient location during evaluation: ICU  Level of consciousness: sedated and ventilated  Complications: no  Respiratory status: ventilator  Multimodal analgesia pain management approach

## 2022-09-30 NOTE — PROGRESS NOTES
Comprehensive Nutrition Assessment    Type and Reason for Visit:  Reassess    Nutrition Recommendations/Plan:   Initiate Vital HP once medically feasible (High protein peptide formula) at 10 mL/hr and as tolerated, increase by 10 mL/hr q 4 hours until goal of 65 mL/hr is met via NG   Recommend 30 mL H20 flush q 4 hours. Monitor IVF infusion, Na labs and need for adjustments in water flush  Consider prophylactic prokinetic agent (such as reglan, erythromycin) to promote EN tolerance as appropriate   Consider daily micronutrient supplementation: 2000 IU Vitamin D3, MVM, + Probiotic   Monitor TF tolerance (abd distention, bowel habits, N/V, cramping)  Check TG while on diprivan infusion  Monitor nutrition adequacy, pertinent labs, bowel habits, wt changes, and clinical progress  Monitor nutrition adequacy, pertinent labs, bowel habits, wt changes, and clinical progress     Malnutrition Assessment:  Malnutrition Status: At risk for malnutrition (Comment) (09/22/22 2360)    Context:  Chronic Illness     Findings of the 6 clinical characteristics of malnutrition:  Energy Intake:  Unable to assess  Weight Loss:  Greater than 10% over 6 months (20% in 6 month period)       Nutrition Assessment:    Follow up: Pt discussed in ICU rounds. TF held for trach today, will resume later today. K/Phos WNL, Nepro no longer indicated, will modify TF to Vital HP @ 65 mL/hr and d/c Proteinex. Propofol providing 282 kcal/day, will continue to monitor for need to modify TF formula and rate. Hypotensive on Levo. +5.5L since admission. Nutrition Related Findings:    . Mg 3.1. Active bowel sounds. +BM 9/28.  Wound Type: None       Current Nutrition Intake & Therapies:    Average Meal Intake: NPO  Average Supplements Intake: NPO  Diet NPO  Current Tube Feeding (TF) Orders:  Feeding Route: Nasogastric  Formula: Renal Formula  Schedule: Continuous  Additives/Modulars: None  Goal TF & Flush Orders Provides: Vital HP @ 65 mL/hr to provide: 1560 mL TV, 1560 kcal, 136 g/pro, and 1362 mL FW + FW flushes of 30 mL q4    Anthropometric Measures:  Height: 6' 0.99\" (185.4 cm)  Ideal Body Weight (IBW): 184 lbs (84 kg)       Current Body Weight: 166 lb 14.2 oz (75.7 kg), 90.7 % IBW. Weight Source: Bed Scale  Current BMI (kg/m2): 22        Weight Adjustment For: No Adjustment                 BMI Categories: Normal Weight (BMI 22.0 to 24.9) age over 72    Estimated Daily Nutrient Needs:  Energy Requirements Based On: Kcal/kg (20-25 kcal/kg CBW)  Weight Used for Energy Requirements: Current (CBW 75.7 kg)  Energy (kcal/day): 6868-6973  Weight Used for Protein Requirements: Current (2 g/kg CBW)  Protein (g/day): 151  Method Used for Fluid Requirements: 1 ml/kcal  Fluid (ml/day): or per MD    Nutrition Diagnosis:   Inadequate oral intake related to impaired respiratory function as evidenced by NPO or clear liquid status due to medical condition, nutrition support - enteral nutrition    Nutrition Interventions:   Food and/or Nutrient Delivery: Continue NPO, Modify Tube Feeding  Nutrition Education/Counseling: Education not indicated  Coordination of Nutrition Care: Continue to monitor while inpatient  Plan of Care discussed with: MD/ICU team    Goals:  Previous Goal Met: Progressing toward Goal(s)  Goals: Initiate nutrition support, within 2 days       Nutrition Monitoring and Evaluation:   Behavioral-Environmental Outcomes: None Identified  Food/Nutrient Intake Outcomes: Enteral Nutrition Intake/Tolerance  Physical Signs/Symptoms Outcomes: Biochemical Data, Nutrition Focused Physical Findings, Weight    Discharge Planning:     Too soon to determine     Oneil Middleton, [address] [place] [address],   Contact: [phone]

## 2022-09-30 NOTE — PROGRESS NOTES
ICU ENT SURGERY DAILY PROGRESS NOTE    CC: Pleural effusions s/p R PleurX catheter placement    SUBJECTIVE:   Interval Hx:   Pt's HR better controlled yesterday, now starting to be in 50's. MAPs b/w 54 - 75 overnight. Failed SBT yesterday 2/2 increased RR. ROS: A 14 point review of systems was conducted, significant findings as noted above. All other systems negative. OBJECTIVE:   Vitals:   Vitals:    09/30/22 0515 09/30/22 0530 09/30/22 0545 09/30/22 0600   BP: (!) 116/57 (!) 110/58 112/63 (!) 118/57   Pulse: 55 54 61 54   Resp: 18 18 18 18   Temp:       TempSrc:       SpO2: 95% 95% 95% 95%   Weight:       Height:           I/O:   Intake/Output Summary (Last 24 hours) at 9/30/2022 4417  Last data filed at 9/29/2022 2000  Gross per 24 hour   Intake 1124 ml   Output 1030 ml   Net 94 ml       I/O last 3 completed shifts: In: 2596 [I.V.:900; NG/GT:1696]  Out: 2101 [ONBFH:0052; Chest Tube:106]    Diet: Diet NPO      Physical Examination:   General appearance: Mechanically ventilated. Appropriately nods head in response to questions. HEENT: NC/AT, EOMI, trachea midline, no JVD. Crepitus noted in the distal neck, supraclavicular region. Corpak in right nostril w/ bridle. ETT in place. Chest/Lungs: Decreased breath sounds bilaterally, on mechanical ventilation; R PleurX catheter to water seal without airleak, has serous output  Cardiovascular: Atrial fibrillation with rate in the 50's currently   Abdomen: Soft, non-tender, non-distended  Genitourinary:  Ansari in place with clear yellow urine  Skin: Warm and dry, no rashes  Extremities: No edema, no cyanosis; SCDs in place      Labs:  CBC:   Recent Labs     09/28/22  0359 09/29/22  0425 09/30/22  0342   WBC 27.2* 21.6* 25.8*   HGB 9.1* 8.0* 8.0*   HCT 29.0* 25.7* 25.8*    389 486*         BMP:   Recent Labs     09/28/22  0359 09/29/22  0425 09/30/22  0342    140 142   K 4.7 4.2 4.5   CL 94* 94* 95*   CO2 40* 39* 37*   * 123* 125*   CREATININE 1. 7* 1.7* 1.7*   GLUCOSE 161* 343* 135*       LFT's:   No results for input(s): AST, ALT, ALB, BILITOT, ALKPHOS in the last 72 hours. Troponin: No results for input(s): TROPONINI in the last 72 hours. BNP: No results for input(s): BNP in the last 72 hours. ABGs:   Recent Labs     09/27/22  1602   PHART 7.423   MHJ6PWH 63.3*   PO2ART 67.0*       INR:   No results for input(s): INR in the last 72 hours. U/A:No results for input(s): NITRITE, COLORU, PHUR, LABCAST, WBCUA, RBCUA, MUCUS, TRICHOMONAS, YEAST, BACTERIA, CLARITYU, SPECGRAV, LEUKOCYTESUR, UROBILINOGEN, BILIRUBINUR, BLOODU, GLUCOSEU, AMORPHOUS in the last 72 hours. Invalid input(s): Kelley Savers     Rad:   XR CHEST PORTABLE   Final Result      Small right basilar pneumothorax. Right basilar chest tube without change. Patchy consolidation in the right mid and lower lung as well as the left lower lung-atelectasis versus pneumonia. Trace left pleural effusion. Normal heart size. Lines and tubes without change. Mild improvement in degree of subcutaneous emphysema in the chest and neck. XR CHEST 1 VIEW   Final Result      1. Stable small right-sided pneumothorax and prominent subcutaneous emphysema along the neck and upper superior chest wall, greater in right lung stable   2. Stable left basilar consolidation and pleural effusion      3. Stable appearing perihilar accentuated markings or airspace disease            XR CHEST PORTABLE   Final Result      Stable small right-sided pneumothorax. More prominent emphysema over the lower neck. No change in bilateral airspace disease. Stable left pleural effusion. XR CHEST PORTABLE   Final Result   1. Bilateral multifocal pneumonia with improvement in the right    pulmonary consolidation since prior study from 9/23/22   2. Mild to moderate left pleural effusion          XR CHEST PORTABLE   Final Result   1. Support lines and tubes are stable.    2.  Stable small right lateral pneumothorax and right basilar    chest tube. 3.  Stable patchy bilateral airspace disease. XR CHEST PORTABLE   Final Result   1. Satisfactory position of right internal jugular central line   2. No interval change in endotracheal tube and nasogastric tube positioning. 3. Extensive bilateral airspace disease with no changes. 4. Left pleural effusion with retrocardiac opacity, unchanged   5. Small stable tiny right lateral pneumothorax and stable position of right chest tube,, Pleurx catheter. XR ABDOMEN (KUB) (SINGLE AP VIEW)   Final Result   1. No residual pneumothorax. 2.  Mild patchy airspace disease bilaterally worse on the right than on prior examination. 3.  Non-obstructive bowel gas pattern. Mildly distended stomach. XR CHEST PORTABLE   Final Result   1. No residual pneumothorax. 2.  Mild patchy airspace disease bilaterally worse on the right than on prior examination. 3.  Non-obstructive bowel gas pattern. Mildly distended stomach. XR CHEST PORTABLE   Final Result      1. Small right pneumothorax appears slightly increased. 2. Mild patchy airspace opacity bilaterally. XR CHEST PORTABLE   Final Result     Pleurx catheter at the right lung base. Trace right    pneumothorax is unchanged. XR CHEST PORTABLE   Final Result      Right-sided chest tube evident in the base, its tip medially. Improvement in the right-sided pneumothorax, since earlier today. Possible medial collapse of the right lower lobe. Small left effusion. Patchy airspace density/atelectasis in the lungs bilaterally, with no other significant change. XR CHEST PORTABLE   Final Result   1. Right-sided Pleurx catheter in satisfactory position in the lung bases   2.  Right-sided post operative pneumothorax, approximately 10%             ASSESSMENT AND PLAN:   Anders Moreno is a 65 y.o. male with history of diastolic heart failure, atrial fibrillation, and DM with recurrent pleural effusions s/p R PleurX catheter placement (9/19), POD #11.    - Holding Apixaban 5mg BID in anticipation of possible tracheostomy today. - Mechanically intubated on 9/22. Currently PRVC 18/450/5/35%. Vent management per pulm. SBT trial today. Tracheostomy today. - Diet: Diet NPO (holding TF's for tracheostomy today).  Discuss with staff nutritional delivery postop.   -Remainder of care per CTS  -----------------------------  Elijah Pearson DO   PGY1, General Surgery  09/30/22  7:12 AM  Pager # [phone]

## 2022-09-30 NOTE — OP NOTE
Operative Note      Patient Name: Laure Rayo  YOB: 1945  Medical Record Number:  [id number]  Billing Number:  [id number]  Date of Procedure: 9/30/2022  Time: 1330      Pre Operative Diagnoses: 1) Respiratory Failur. Post Operative Diagnoses: Same as above. Procedure: 1) Tracheostomy  Surgeon: Rupert Estes MD   OR Staff/ Assistant:  Circulator: Jonatan Cabrera RN; Arthur Valenzuela RN; Isabella Bentley RN  Scrub Person First: Adalberto Webb RN  Circulator Assist: Regino Dong RN; Tram Mae RN  Anesthesia: General anesthesia. Findings: 1) Non-contributory    Description: After verification of informed consent, the patient was brought to the operating room and placed in the supine position. General endotracheal anesthesia was induced. A shoulder roll placed. The neck landmarks were marked and 3cc of 1% lidocaine with 1:100,000 epinephrine injected in the planned incision site just below the cricoid cartilage. The patient was prepped and draped in routine fashion and a skin incision was made with a 15 blade through the subcutaneous tissues in a horizontal fashion. A vertical incision was the made with Bovie electrocautery on 20 arechiga spray to the trachea with lateral retraction of soft tissues. An incision was made between the 2nd and 3rd tracheal rings to access the airway. The endotracheal tube was removed and a 8-0 distal cuffed XLT Shiley Shiley tracheostomy tube was placed. Once CO2 confirmed the trach tube was sewn in with 3-0 prolene stitches. The patient was then returned to the ICU in stable but critical condition. I attest that I was present for and performed the entire procedure myself. Author Miguel Angel     Specimens: none  Estimated Blood Loss: 76UC  Complications: none        Electronically signed by Jamee Edwards MD on 9/30/2022 at 2:21 PM

## 2022-09-30 NOTE — PLAN OF CARE
Problem: Chronic Conditions and Co-morbidities  Goal: Patient's chronic conditions and co-morbidity symptoms are monitored and maintained or improved  9/30/2022 0720 by Atif Strickland RN  Outcome: Progressing  Flowsheets (Taken 9/29/2022 2000)  Care Plan - Patient's Chronic Conditions and Co-Morbidity Symptoms are Monitored and Maintained or Improved:   Monitor and assess patient's chronic conditions and comorbid symptoms for stability, deterioration, or improvement   Update acute care plan with appropriate goals if chronic or comorbid symptoms are exacerbated and prevent overall improvement and discharge   Collaborate with multidisciplinary team to address chronic and comorbid conditions and prevent exacerbation or deterioration  9/29/2022 1957 by Stalin Reddy RN  Outcome: Progressing  Flowsheets (Taken 9/29/2022 0800)  Care Plan - Patient's Chronic Conditions and Co-Morbidity Symptoms are Monitored and Maintained or Improved: Monitor and assess patient's chronic conditions and comorbid symptoms for stability, deterioration, or improvement     Problem: Discharge Planning  Goal: Discharge to home or other facility with appropriate resources  9/30/2022 0720 by Atif Strickland RN  Outcome: Progressing  Flowsheets (Taken 9/29/2022 2000)  Discharge to home or other facility with appropriate resources: Identify barriers to discharge with patient and caregiver  9/29/2022 1957 by Stalin Reddy RN  Outcome: Progressing  Flowsheets  Taken 9/29/2022 1957  Discharge to home or other facility with appropriate resources:   Identify barriers to discharge with patient and caregiver   Arrange for needed discharge resources and transportation as appropriate   Identify discharge learning needs (meds, wound care, etc)   Arrange for interpreters to assist at discharge as needed   Refer to discharge planning if patient needs post-hospital services based on physician order or complex needs related to functional status, cognitive ability or social support system  Taken 9/29/2022 0800  Discharge to home or other facility with appropriate resources: Identify barriers to discharge with patient and caregiver     Problem: Pain  Goal: Verbalizes/displays adequate comfort level or baseline comfort level  9/30/2022 0720 by Glendy Louis RN  Outcome: Progressing  Flowsheets (Taken 9/29/2022 2000)  Verbalizes/displays adequate comfort level or baseline comfort level:   Encourage patient to monitor pain and request assistance   Assess pain using appropriate pain scale   Administer analgesics based on type and severity of pain and evaluate response  9/29/2022 1957 by Brandi Amanda RN  Outcome: Progressing  Flowsheets (Taken 9/28/2022 1945 by Neena Reyes RN)  Verbalizes/displays adequate comfort level or baseline comfort level:   Encourage patient to monitor pain and request assistance   Assess pain using appropriate pain scale   Administer analgesics based on type and severity of pain and evaluate response   Implement non-pharmacological measures as appropriate and evaluate response   Consider cultural and social influences on pain and pain management   Notify Licensed Independent Practitioner if interventions unsuccessful or patient reports new pain     Problem: Safety - Adult  Goal: Free from fall injury  9/30/2022 0720 by Glendy Louis RN  Outcome: Progressing  9/29/2022 1957 by Brandi Amanda RN  Outcome: Progressing  Flowsheets (Taken 9/27/2022 0150 by Glendy Louis RN)  Free From Fall Injury: Instruct family/caregiver on patient safety     Problem: Skin/Tissue Integrity  Goal: Absence of new skin breakdown  Description: 1. Monitor for areas of redness and/or skin breakdown  2. Assess vascular access sites hourly  3. Every 4-6 hours minimum:  Change oxygen saturation probe site  4.   Every 4-6 hours:  If on nasal continuous positive airway pressure, respiratory therapy assess nares and determine need for appliance change or resting period. 9/30/2022 0720 by Claudia Mota RN  Outcome: Progressing  9/29/2022 1957 by Navid Cooper RN  Outcome: Progressing     Problem: Safety - Medical Restraint  Goal: Remains free of injury from restraints (Restraint for Interference with Medical Device)  Description: INTERVENTIONS:  1. Determine that other, less restrictive measures have been tried or would not be effective before applying the restraint  2. Evaluate the patient's condition at the time of restraint application  3. Inform patient/family regarding the reason for restraint  4.  Q2H: Monitor safety, psychosocial status, comfort, nutrition and hydration  9/30/2022 0720 by Claudia Mota RN  Outcome: Progressing  Flowsheets  Taken 9/30/2022 0600  Remains free of injury from restraints (restraint for interference with medical device):   Determine that other, less restrictive measures have been tried or would not be effective before applying the restraint   Evaluate the patient's condition at the time of restraint application   Every 2 hours: Monitor safety, psychosocial status, comfort, nutrition and hydration   Inform patient/family regarding the reason for restraint  Taken 9/30/2022 0500  Remains free of injury from restraints (restraint for interference with medical device):   Determine that other, less restrictive measures have been tried or would not be effective before applying the restraint   Evaluate the patient's condition at the time of restraint application   Inform patient/family regarding the reason for restraint   Every 2 hours: Monitor safety, psychosocial status, comfort, nutrition and hydration  Taken 9/30/2022 0400  Remains free of injury from restraints (restraint for interference with medical device):   Determine that other, less restrictive measures have been tried or would not be effective before applying the restraint   Evaluate the patient's condition at the time of restraint application   Inform patient/family regarding the reason for restraint   Every 2 hours: Monitor safety, psychosocial status, comfort, nutrition and hydration  Taken 9/30/2022 0300  Remains free of injury from restraints (restraint for interference with medical device):   Determine that other, less restrictive measures have been tried or would not be effective before applying the restraint   Evaluate the patient's condition at the time of restraint application   Inform patient/family regarding the reason for restraint   Every 2 hours: Monitor safety, psychosocial status, comfort, nutrition and hydration  Taken 9/30/2022 0200  Remains free of injury from restraints (restraint for interference with medical device):   Determine that other, less restrictive measures have been tried or would not be effective before applying the restraint   Evaluate the patient's condition at the time of restraint application   Inform patient/family regarding the reason for restraint   Every 2 hours: Monitor safety, psychosocial status, comfort, nutrition and hydration  Taken 9/30/2022 0100  Remains free of injury from restraints (restraint for interference with medical device):   Determine that other, less restrictive measures have been tried or would not be effective before applying the restraint   Evaluate the patient's condition at the time of restraint application   Inform patient/family regarding the reason for restraint   Every 2 hours: Monitor safety, psychosocial status, comfort, nutrition and hydration  Taken 9/30/2022 0000  Remains free of injury from restraints (restraint for interference with medical device):   Determine that other, less restrictive measures have been tried or would not be effective before applying the restraint   Evaluate the patient's condition at the time of restraint application   Inform patient/family regarding the reason for restraint   Every 2 hours: Monitor safety, psychosocial status, comfort, nutrition and hydration  Taken 9/29/2022 2300  Remains free of injury from restraints (restraint for interference with medical device):   Determine that other, less restrictive measures have been tried or would not be effective before applying the restraint   Evaluate the patient's condition at the time of restraint application   Inform patient/family regarding the reason for restraint   Every 2 hours: Monitor safety, psychosocial status, comfort, nutrition and hydration  Taken 9/29/2022 2200  Remains free of injury from restraints (restraint for interference with medical device):   Determine that other, less restrictive measures have been tried or would not be effective before applying the restraint   Evaluate the patient's condition at the time of restraint application   Every 2 hours: Monitor safety, psychosocial status, comfort, nutrition and hydration   Inform patient/family regarding the reason for restraint  Taken 9/29/2022 2100  Remains free of injury from restraints (restraint for interference with medical device):   Determine that other, less restrictive measures have been tried or would not be effective before applying the restraint   Evaluate the patient's condition at the time of restraint application   Inform patient/family regarding the reason for restraint   Every 2 hours: Monitor safety, psychosocial status, comfort, nutrition and hydration  Taken 9/29/2022 2000  Remains free of injury from restraints (restraint for interference with medical device):   Determine that other, less restrictive measures have been tried or would not be effective before applying the restraint   Evaluate the patient's condition at the time of restraint application   Inform patient/family regarding the reason for restraint   Every 2 hours: Monitor safety, psychosocial status, comfort, nutrition and hydration  9/29/2022 1957 by Michi Tello RN  Outcome: Progressing  Flowsheets  Taken 9/29/2022 1900 by Josue Coley Sebastián Ochoa RN  Remains free of injury from restraints (restraint for interference with medical device):   Determine that other, less restrictive measures have been tried or would not be effective before applying the restraint   Evaluate the patient's condition at the time of restraint application   Inform patient/family regarding the reason for restraint   Every 2 hours: Monitor safety, psychosocial status, comfort, nutrition and hydration  Taken 9/28/2022 2000 by Marianne Pagan RN  Remains free of injury from restraints (restraint for interference with medical device):   Evaluate the patient's condition at the time of restraint application   Determine that other, less restrictive measures have been tried or would not be effective before applying the restraint   Inform patient/family regarding the reason for restraint   Every 2 hours: Monitor safety, psychosocial status, comfort, nutrition and hydration     Problem: Nutrition Deficit:  Goal: Optimize nutritional status  9/30/2022 0720 by Domenico Murray RN  Outcome: Progressing  9/29/2022 1957 by Evelyn Rapp RN  Outcome: Progressing  Flowsheets (Taken 9/26/2022 0941 by Adams Harris RD)  Nutrient intake appropriate for improving, restoring, or maintaining nutritional needs:   Recommend, monitor, and adjust tube feedings and TPN/PPN based on assessed needs   Assess nutritional status and recommend course of action     Problem: ABCDS Injury Assessment  Goal: Absence of physical injury  9/30/2022 0720 by Domenico Murray RN  Outcome: Progressing  9/29/2022 1957 by Evelyn Rapp RN  Outcome: Progressing  Flowsheets (Taken 9/27/2022 0150 by Domenico Murray RN)  Absence of Physical Injury: Implement safety measures based on patient assessment

## 2022-09-30 NOTE — PROGRESS NOTES
Morning rounds occurred with  and medical residents. Plan of care discussed and new orders received. Will continue to monitor. Luiza Burnett

## 2022-09-30 NOTE — PLAN OF CARE
Problem: Chronic Conditions and Co-morbidities  Goal: Patient's chronic conditions and co-morbidity symptoms are monitored and maintained or improved  9/30/2022 1902 by Ronen Landeros RN  Outcome: Progressing  Flowsheets (Taken 9/30/2022 0800)  Care Plan - Patient's Chronic Conditions and Co-Morbidity Symptoms are Monitored and Maintained or Improved:   Monitor and assess patient's chronic conditions and comorbid symptoms for stability, deterioration, or improvement   Update acute care plan with appropriate goals if chronic or comorbid symptoms are exacerbated and prevent overall improvement and discharge     Problem: Discharge Planning  Goal: Discharge to home or other facility with appropriate resources  9/30/2022 1902 by Ronen Landeros RN  Outcome: Progressing  Flowsheets (Taken 9/30/2022 0800)  Discharge to home or other facility with appropriate resources:   Identify barriers to discharge with patient and caregiver   Identify discharge learning needs (meds, wound care, etc)   Refer to discharge planning if patient needs post-hospital services based on physician order or complex needs related to functional status, cognitive ability or social support system   Arrange for interpreters to assist at discharge as needed   Arrange for needed discharge resources and transportation as appropriate     Problem: Pain  Goal: Verbalizes/displays adequate comfort level or baseline comfort level  9/30/2022 1902 by Ronen Landeros RN  Outcome: Progressing     Problem: Safety - Adult  Goal: Free from fall injury  9/30/2022 1902 by Ronen Landeros RN  Outcome: Progressing  Flowsheets  Taken 9/30/2022 0821 by Ronen Landeros RN  Free From Fall Injury:   Instruct family/caregiver on patient safety   Based on caregiver fall risk screen, instruct family/caregiver to ask for assistance with transferring infant if caregiver noted to have fall risk factors  Problem: Skin/Tissue Integrity  Goal: Absence of new skin breakdown  Description: 1. Monitor for areas of redness and/or skin breakdown  2. Assess vascular access sites hourly  3. Every 4-6 hours minimum:  Change oxygen saturation probe site  4. Every 4-6 hours:  If on nasal continuous positive airway pressure, respiratory therapy assess nares and determine need for appliance change or resting period. 9/30/2022 1902 by Skyla Morrison RN  Outcome: Progressing     Problem: Safety - Medical Restraint  Goal: Remains free of injury from restraints (Restraint for Interference with Medical Device)  Description: INTERVENTIONS:  1. Determine that other, less restrictive measures have been tried or would not be effective before applying the restraint  2. Evaluate the patient's condition at the time of restraint application  3. Inform patient/family regarding the reason for restraint  4.  Q2H: Monitor safety, psychosocial status, comfort, nutrition and hydration  9/30/2022 1902 by Skyla Morrison RN  Outcome: Progressing  Flowsheets (Taken 9/30/2022 0800)  Remains free of injury from restraints (restraint for interference with medical device):   Determine that other, less restrictive measures have been tried or would not be effective before applying the restraint   Inform patient/family regarding the reason for restraint   Every 2 hours: Monitor safety, psychosocial status, comfort, nutrition and hydration   Evaluate the patient's condition at the time of restraint application     Problem: Nutrition Deficit:  Goal: Optimize nutritional status  9/30/2022 1902 by Skyla Morrison RN  Outcome: Progressing  Flowsheets (Taken 9/30/2022 1057 by Miki Castillo RD)  Nutrient intake appropriate for improving, restoring, or maintaining nutritional needs:   Recommend, monitor, and adjust tube feedings and TPN/PPN based on assessed needs   Assess nutritional status and recommend course of action     Problem: ABCDS Injury Assessment  Goal: Absence of physical injury  9/30/2022 1902 by Sebastian Pickett RN  Outcome: Progressing  Flowsheets  Taken 9/30/2022 6702 by Sebastian Pickett RN  Absence of Physical Injury: Implement safety measures based on patient assessment  Taken 9/30/2022 0721 by Ada Early RN  Absence of Physical Injury: Implement safety measures based on patient assessment     Problem: Confusion  Goal: Confusion, delirium, dementia, or psychosis is improved or at baseline  Description: INTERVENTIONS:  1. Assess for possible contributors to thought disturbance, including medications, impaired vision or hearing, underlying metabolic abnormalities, dehydration, psychiatric diagnoses, and notify attending LIP  2. [place] high risk fall precautions, as indicated  3. Provide frequent short contacts to provide reality reorientation, refocusing and direction  4. Decrease environmental stimuli, including noise as appropriate  5. Monitor and intervene to maintain adequate nutrition, hydration, elimination, sleep and activity  6. If unable to ensure safety without constant attention obtain sitter and review sitter guidelines with assigned personnel  7.  Initiate Psychosocial CNS and Spiritual Care consult, as indicated  Outcome: Progressing

## 2022-09-30 NOTE — PROGRESS NOTES
ICU CT SURGERY DAILY PROGRESS NOTE    CC: Pleural effusions s/p R PleurX catheter placement    SUBJECTIVE:   Interval Hx:   Pt's HR better controlled yesterday, now starting to be in 50's. MAPs b/w 54 - 75 overnight. Failed SBT yesterday 2/2 increased RR. ROS: A 14 point review of systems was conducted, significant findings as noted above. All other systems negative. OBJECTIVE:   Vitals:   Vitals:    09/30/22 0515 09/30/22 0530 09/30/22 0545 09/30/22 0600   BP: (!) 116/57 (!) 110/58 112/63 (!) 118/57   Pulse: 55 54 61 54   Resp: 18 18 18 18   Temp:       TempSrc:       SpO2: 95% 95% 95% 95%   Weight:       Height:           I/O:   Intake/Output Summary (Last 24 hours) at 9/30/2022 0613  Last data filed at 9/29/2022 2000  Gross per 24 hour   Intake 1124 ml   Output 1030 ml   Net 94 ml       I/O last 3 completed shifts: In: 2422.3 [I.V.:1051. 3; NG/GT:1371]  Out: 2677 [Urine:2575; Chest Tube:102]    Diet: Diet NPO      Physical Examination:   General appearance: Mechanically ventilated. Appropriately nods head in response to questions. HEENT: NC/AT, EOMI, trachea midline, no JVD. Crepitus noted in the distal neck, supraclavicular region. Corpak in right nostril w/ bridle. ETT in place. Chest/Lungs: Decreased breath sounds bilaterally, on mechanical ventilation; R PleurX catheter to water seal without airleak, has serous output  Cardiovascular: Atrial fibrillation with rate in the 50's currently   Abdomen: Soft, non-tender, non-distended  Genitourinary:  Ansari in place with clear yellow urine  Skin: Warm and dry, no rashes  Extremities: No edema, no cyanosis; SCDs in place      Labs:  CBC:   Recent Labs     09/28/22  0359 09/29/22  0425 09/30/22  0342   WBC 27.2* 21.6* 25.8*   HGB 9.1* 8.0* 8.0*   HCT 29.0* 25.7* 25.8*    389 486*         BMP:   Recent Labs     09/28/22  0359 09/29/22  0425 09/30/22  0342    140 142   K 4.7 4.2 4.5   CL 94* 94* 95*   CO2 40* 39* 37*   * 123* 125* Stable small right lateral pneumothorax and right basilar    chest tube. 3.  Stable patchy bilateral airspace disease. XR CHEST PORTABLE   Final Result   1. Satisfactory position of right internal jugular central line   2. No interval change in endotracheal tube and nasogastric tube positioning. 3. Extensive bilateral airspace disease with no changes. 4. Left pleural effusion with retrocardiac opacity, unchanged   5. Small stable tiny right lateral pneumothorax and stable position of right chest tube,, Pleurx catheter. XR ABDOMEN (KUB) (SINGLE AP VIEW)   Final Result   1. No residual pneumothorax. 2.  Mild patchy airspace disease bilaterally worse on the right than on prior examination. 3.  Non-obstructive bowel gas pattern. Mildly distended stomach. XR CHEST PORTABLE   Final Result   1. No residual pneumothorax. 2.  Mild patchy airspace disease bilaterally worse on the right than on prior examination. 3.  Non-obstructive bowel gas pattern. Mildly distended stomach. XR CHEST PORTABLE   Final Result      1. Small right pneumothorax appears slightly increased. 2. Mild patchy airspace opacity bilaterally. XR CHEST PORTABLE   Final Result     Pleurx catheter at the right lung base. Trace right    pneumothorax is unchanged. XR CHEST PORTABLE   Final Result      Right-sided chest tube evident in the base, its tip medially. Improvement in the right-sided pneumothorax, since earlier today. Possible medial collapse of the right lower lobe. Small left effusion. Patchy airspace density/atelectasis in the lungs bilaterally, with no other significant change. XR CHEST PORTABLE   Final Result   1. Right-sided Pleurx catheter in satisfactory position in the lung bases   2.  Right-sided post operative pneumothorax, approximately 10%             ASSESSMENT AND PLAN:   Emerson Bower is a 65 y.o. male with history of diastolic heart failure, atrial fibrillation, and DM with recurrent pleural effusions s/p R PleurX catheter placement (9/19), POD #11. Neuro:   Analgesia  - Continue scheduled Tylenol  - Propofol for sedation; wean as tolerated    Cardiovascular:   History of paroxysmal atrial fibrillation  -Amiodarone gtt currently at 0.5   - Holding Apixaban 5mg BID in anticipation of possible tracheostomy today. Restart in 48 hours postop. - Metoprolol IV held currently for hypotension    HFpEF  - Metoprolol IV (hold due to hypotension), currently on Levo 4 mcg/kg/hr  - [place] held  - Last echo (1/25/22): LVEF = 82-51%, grade 2 diastolic dysfunction    Hyperlipidemia  - Pravastatin 40mg    Currently on levophed of 4    Pulmonary:   S/P R PleurX catheter placement (9/19), POD #11  - Continue to water seal.  - SW/HHC pending    Acute on chronic respiratory failure  - Mechanically intubated on 9/22. Currently PRVC 18/450/5/35%. Vent management per pulm. SBT trial today. Tracheostomy today. - Inpatient consult to pulmonology, appreciate recommendations. - Baseline on home O2 3-4L NC   - Continue to wean Solu-medrol. GI:   - Miralax  -TF's held since midnight for OR today. Will restart postop.    - Recommend holding TF if pressor requirement increases to greater than 5 of Levo    FEN:    -Replace electrolytes per protocol  - Diet: Diet NPO (holding TF's for tracheostomy today. Will resume TF postop). -SLP to evaluate swallow function tomorrow. /Renal:   - Cont Ansari   - Creatinine stable at 1.7   -  from 123, 124, [phone]  -Nephrology consulted. Appreciate recommendations. Hem/ID:   - Hemoglobin 8.0 from 8.0, 9.1, 8.7, 8.5, 8.0, 7.8, 7.5  -Will give 0.4 mcg/kg DDAVP this am for uremic bleeding.   - WBC 25.8 from 21.6 from 27.7, 20.0, 23.6, 19.1, 15.7, 14.7. Leukocytosis 2/2 steroids most likely. Endo:   History of DMII  - Last A1C 8.1% (1/24/22)  - Cont insulin gtt.  Glc this am 135 from 343   - Solu-medrol as above    Prophylaxis:   DVT Ppx: SCDs  GI Ppx: pepcid    Access:  Central Access: R IJ CVC, placed 9/22  Peripheral Access: IV x2  Arterial Line Access: L radial, placed 9/22                              Ansari Date placed: 09/30/22    Mobility:  N/A    Dispo:   ICU. Case management has indicated he would not be accepted to [facility] unless he has hemodialysis in addition to his tracheostomy, but possibly some SNF's might take him.       Code Status: Full Code  -----------------------------  Kady Regalado DO   PGY1, General Surgery  09/30/22  6:13 AM  Pager # [phone]

## 2022-09-30 NOTE — PROGRESS NOTES
ENT Surgery  Post-operative Note      Procedure(s) Performed:   Tracheostomy     Subjective:   Pt is currently sedated on 20 propofol and 25 fentanyl. Objective:    Vitals:   Vitals:    09/30/22 1745 09/30/22 1800 09/30/22 1815 09/30/22 1830   BP:  (!) 84/56  (!) 88/57   Pulse: 64 57 62 59   Resp: 10 10 18 14   Temp:       TempSrc:       SpO2: 99% 99% 100% 100%   Weight:       Height:           Physical Exam:  Post-op vital signs: Stable, sinus bradycardia    Neuro: sedated on 20 propofol and 25 fentanyl. HEENT: NCAT, tracheostomy tube in place. Chest/Lungs: Normal effort with no accessory muscle use on mechanical ventilation PRVC 18/450/5/70%. Chest tube in place to water seal with dressing C/D/I. Cardiovascular: RRR. On amiodarone and levophed   Skin: warm and dry, no rashes  Extremities: no edema, no cyanosis  Genitourinary: Ansari in place with clear yellow urine      Assessment and Plan  This is a 65y.o. year old male status post tracheotomy secondary to failure to successfully be extubated. (9/30) POD0. Pain management: multimodel  Cardiovasc: Currently on amiodarone 0.25 and levophed 3. Resume eliquis 48 hours postop (10/2/22 @ 14:30)  Respiratory:  Mechanically ventilated via tracheostomy tube. Pulm/Crit care managing vent settings. Fluids: NS @ 125 mL/hr, Diet: NPO, TF's currently   : Ansari in place. Cont. Ambulation: N/A. Currently sedated. Prophylaxis: SCDs.   Antibiotics: None  Wound: Local wound care    Haig Signs, DO   PGY1, General Surgery  09/30/22  6:50 PM  Pager # [phone]

## 2022-09-30 NOTE — ANESTHESIA POSTPROCEDURE EVALUATION
Department of Anesthesiology  Postprocedure Note    Patient: Agnes Bullock  MRN: [id number]  YOB: 1945  Date of evaluation: 9/30/2022      Procedure Summary     Date: 09/30/22 Room / Location: [address] [place] [address]    Anesthesia Start: 1429 Anesthesia Stop: 5261    Procedure: TRACHEOSTOMY Diagnosis:       Respiratory failure, unspecified chronicity, unspecified whether with hypoxia or hypercapnia (HCC)      (Respiratory failure, unspecified chronicity, unspecified whether with hypoxia or hypercapnia ([facility] [address] [J96.90])    Surgeons: Troy Magana MD Responsible Provider: Isacc Dickey MD    Anesthesia Type: general ASA Status: 3          Anesthesia Type: No value filed.     Kenisha Phase I: Kenisha Score: 8    Kenisha Phase II:        Anesthesia Post Evaluation    Patient location during evaluation: PACU  Patient participation: complete - patient participated  Level of consciousness: awake and alert  Pain score: 0  Airway patency: patent  Nausea & Vomiting: no nausea and no vomiting  Complications: no  Cardiovascular status: hemodynamically stable  Respiratory status: acceptable  Hydration status: stable

## 2022-09-30 NOTE — PROGRESS NOTES
Nephrology Progress Note                                                                                                                                                                                                                                                                                                                                                               Office : [phone]     Fax :[phone]    Patient's Name: Rosario Long  10:32 AM  9/30/2022    Reason for Consult: ZANA  Requesting Physician:  Tasia Frausto MD    Interval History:      Cr stable   Good UO   Intubated but plan for tracheostomy today    Past Medical History:   Diagnosis Date    ZANA (acute kidney injury) ([facility] [address] 9/26/2022    Carotid stenosis, left 10/12/2011    Chronic back pain     Chronic systolic (congestive) heart failure 00/52/3016    Diastolic CHF ([facility] [address]     Erectile dysfunction     Hyperlipidemia     Hypertension     Osteoarthritis     Restrictive lung disease     Type II or unspecified type diabetes mellitus without mention of complication, not stated as uncontrolled        Past Surgical History:   Procedure Laterality Date    CARDIAC CATHETERIZATION  06/2022    KNEE SURGERY Left     PLEURAL CATH INSERTION N/A 9/19/2022    PLEURAL CATHETER PLACEMENT; INTERCOSTAL NERVE BLOCK X4 performed by Lin Rojas MD at [facility] Bilateral     THORACOSCOPY Right 9/19/2022    RIGHT VIDEO ASSISTED THORASCOPIC SURGERY AND; performed by Lin Rojas MD at [address] [place] [address] History   Problem Relation Age of Onset    Hearing Loss Father     Heart Disease Father 70        MI    High Blood Pressure Father     Diabetes Maternal Grandmother         hypoglycemic    Hearing Loss Maternal Grandmother     Arthritis Maternal Grandfather         reports that he quit smoking about 20 years ago. His smoking use included cigarettes. He has a 80.00 pack-year smoking history.  He has never used smokeless tobacco. He reports current alcohol use. He reports that he does not use drugs. Allergies:   Torsemide and Dye [iodides]    Current Medications:    insulin regular (HUMULIN R;NOVOLIN R) 100 Units in sodium chloride 0.9 % 100 mL infusion, Continuous  Venelex ointment, BID  fentaNYL (SUBLIMAZE) injection 12.5 mcg, Once  ceFAZolin (ANCEF) 1,000 mg in dextrose 5 % 50 mL IVPB (mini-bag), On Call to OR  HYDROmorphone (DILAUDID) injection 0.25 mg, Q3H PRN  amiodarone (CORDARONE) 150 mg in dextrose 5 % 100 mL bolus, Once   Followed by  amiodarone (CORDARONE) 450 mg in dextrose 5 % 250 mL infusion, Continuous  methylPREDNISolone sodium (SOLU-MEDROL) injection 40 mg, Q12H  amiodarone (CORDARONE) tablet 400 mg, BID   Followed by  Joanna Marie ON 10/4/2022] amiodarone (CORDARONE) tablet 200 mg, Daily  famotidine (PEPCID) tablet 20 mg, Daily  propofol injection, Continuous  norepinephrine (LEVOPHED) 16 mg in sodium chloride 0.9 % 250 mL infusion, Continuous  acetaminophen (TYLENOL) 160 MG/5ML solution 650 mg, Q6H PRN  chlorhexidine (PERIDEX) 0.12 % solution 15 mL, BID  [Held by provider] metoprolol (LOPRESSOR) injection 5 mg, Q6H  haloperidol lactate (HALDOL) injection 2 mg, Q6H PRN  albuterol (PROVENTIL) nebulizer solution 2.5 mg, Q4H PRN  [Held by provider] apixaban (ELIQUIS) tablet 5 mg, BID  sodium chloride flush 0.9 % injection 5-40 mL, 2 times per day  sodium chloride flush 0.9 % injection 5-40 mL, PRN  0.9 % sodium chloride infusion, PRN  polyethylene glycol (GLYCOLAX) packet 17 g, Daily  ondansetron (ZOFRAN-ODT) disintegrating tablet 4 mg, Q8H PRN   Or  ondansetron (ZOFRAN) injection 4 mg, Q6H PRN  glucose chewable tablet 16 g, PRN  dextrose bolus 10% 125 mL, PRN   Or  dextrose bolus 10% 250 mL, PRN  glucagon (rDNA) injection 1 mg, PRN  dextrose 10 % infusion, Continuous PRN  hydrALAZINE (APRESOLINE) injection 5 mg, Q15 Min PRN  levalbuterol (XOPENEX) nebulization 0.63 mg, Q4H PRN      Review of Systems:   14 point ROS obtained but were negative except mentioned in HPI      Physical exam:     Vitals:  BP (!) 100/49   Pulse 61   Temp 97.7 °F (36.5 °C) (Oral)   Resp 18   Ht 6' 0.99\" (1.854 m)   Wt 189 lb 6 oz (85.9 kg)   SpO2 99%   BMI 24.99 kg/m²   Constitutional:  on MV  Skin: no rash, turgor wnl  Heent:  eomi, mmm  Neck: no bruits or jvd noted  Cardiovascular:  S1, S2 without m/r/g  Respiratory: mechanical BS  Abdomen:  +bs, soft, nt, nd  Ext: bilateral lower extremity edema R>L  Psychiatric: mood and affect appropriate  Musculoskeletal:  Rom, muscular strength intact    Data:   Labs:  CBC:   Recent Labs     09/28/22 0359 09/29/22 0425 09/30/22 0342   WBC 27.2* 21.6* 25.8*   HGB 9.1* 8.0* 8.0*    389 486*     BMP:    Recent Labs     09/28/22 0359 09/29/22 0425 09/30/22 0342    140 142   K 4.7 4.2 4.5   CL 94* 94* 95*   CO2 40* 39* 37*   * 123* 125*   CREATININE 1.7* 1.7* 1.7*   GLUCOSE 161* 343* 135*     Ca/Mg/Phos:   Recent Labs     09/28/22 0359 09/29/22 0425 09/30/22 0342   CALCIUM 8.3 8.3 8.4   MG 2.90* 2.90* 3.10*   PHOS 3.3 3.9 3.8     Hepatic: No results for input(s): AST, ALT, ALB, BILITOT, ALKPHOS in the last 72 hours. Troponin: No results for input(s): TROPONINI in the last 72 hours. BNP: No results for input(s): BNP in the last 72 hours. Lipids: No results for input(s): CHOL, TRIG, HDL, LDLCALC, LABVLDL in the last 72 hours. ABGs:   Recent Labs     09/27/22  1602   PHART 7.423   PO2ART 67.0*   [id number] 63.3*     INR: No results for input(s): INR in the last 72 hours. UA:No results for input(s): Tina Schiller, GLUCOSEU, BILIRUBINUR, Jim Ella, BLOODU, PHUR, PROTEINU, UROBILINOGEN, NITRU, LEUKOCYTESUR, LABMICR, URINETYPE in the last 72 hours. Urine Microscopic: No results for input(s): LABCAST, BACTERIA, COMU, HYALCAST, WBCUA, RBCUA, EPIU in the last 72 hours. Urine Culture: No results for input(s): LABURIN in the last 72 hours.   Urine Chemistry:   No results for input(s): [place], Julia Palencia in the last 72 hours. IMAGING:  XR CHEST PORTABLE   Final Result      Small right basilar pneumothorax. Right basilar chest tube without change. Patchy consolidation in the right mid and lower lung as well as the left lower lung-atelectasis versus pneumonia. Trace left pleural effusion. Normal heart size. Lines and tubes without change. Mild improvement in degree of subcutaneous emphysema in the chest and neck. XR CHEST 1 VIEW   Final Result      1. Stable small right-sided pneumothorax and prominent subcutaneous emphysema along the neck and upper superior chest wall, greater in right lung stable   2. Stable left basilar consolidation and pleural effusion      3. Stable appearing perihilar accentuated markings or airspace disease            XR CHEST PORTABLE   Final Result      Stable small right-sided pneumothorax. More prominent emphysema over the lower neck. No change in bilateral airspace disease. Stable left pleural effusion. XR CHEST PORTABLE   Final Result   1. Bilateral multifocal pneumonia with improvement in the right    pulmonary consolidation since prior study from 9/23/22   2. Mild to moderate left pleural effusion          XR CHEST PORTABLE   Final Result   1. Support lines and tubes are stable. 2.  Stable small right lateral pneumothorax and right basilar    chest tube. 3.  Stable patchy bilateral airspace disease. XR CHEST PORTABLE   Final Result   1. Satisfactory position of right internal jugular central line   2. No interval change in endotracheal tube and nasogastric tube positioning. 3. Extensive bilateral airspace disease with no changes. 4. Left pleural effusion with retrocardiac opacity, unchanged   5. Small stable tiny right lateral pneumothorax and stable position of right chest tube,, Pleurx catheter. XR ABDOMEN (KUB) (SINGLE AP VIEW)   Final Result   1.   No residual pneumothorax. 2.  Mild patchy airspace disease bilaterally worse on the right than on prior examination. 3.  Non-obstructive bowel gas pattern. Mildly distended stomach. XR CHEST PORTABLE   Final Result   1. No residual pneumothorax. 2.  Mild patchy airspace disease bilaterally worse on the right than on prior examination. 3.  Non-obstructive bowel gas pattern. Mildly distended stomach. XR CHEST PORTABLE   Final Result      1. Small right pneumothorax appears slightly increased. 2. Mild patchy airspace opacity bilaterally. XR CHEST PORTABLE   Final Result     Pleurx catheter at the right lung base. Trace right    pneumothorax is unchanged. XR CHEST PORTABLE   Final Result      Right-sided chest tube evident in the base, its tip medially. Improvement in the right-sided pneumothorax, since earlier today. Possible medial collapse of the right lower lobe. Small left effusion. Patchy airspace density/atelectasis in the lungs bilaterally, with no other significant change. XR CHEST PORTABLE   Final Result   1. Right-sided Pleurx catheter in satisfactory position in the lung bases   2. Right-sided post operative pneumothorax, approximately 10%             Assessment/Plan   ZANA  - suspect this is contrast nephropathy  - baseline Cre 0.7. Normal on admission.  - Cre 0.7-->1.1-->1.4-->1.6-->1.7-->1.7-->1.7-->1.7  - Hold diuretics   - BNP 5k, Protein:Cre 0.3, FENa 0.4%  - closely monitor UOP  - avoid nephrotoxic agent     2. Hypotension  - better     3. Anemia  - Hgb 8.7 (9.0 on admission)  - daily CBC    4. Acid- base/ Electrolyte imbalance   - optimize lytes    5. Acute hypoxic resp failure  - s/p VATS on 9/19/22 for recurrent pleural effusions  - on MV. Plan for tracheostomy today  - Intensivist and CTS following    6.  CHF   - EF 65% on 6/16/22 via cardiac cath    Patient discussed with Dr. Xenia Miller MD   PGY3 IM Resident       I have seen the patient independently from the resident . I discussed the care with the resident. I personally reviewed the HPI, PH, FH, SH, ROS and medications. I repeated pertinent portions of the examination and reviewed the relevant imaging and laboratory data.  I agree with the findings, assessment and plan as documented, with the following addendum:      John Paul Mitchell MD

## 2022-09-30 NOTE — ANESTHESIA PROCEDURE NOTES
Arterial Line:    An arterial line was placed in the OR for the following indication(s): Yoanna Roman A 20 gauge (size), 1 and 1/4 inch (length), Arrow (type) catheter was placed, Seldinger technique used, into the left radial artery, secured by . Anesthesia type: General    Additional notes:  Left radial vane was dampened. I changed it over a wire in extreme sterile conditions with nurse assistant.   Excellent blood return, great wave form and secured with Biopatch and Tegaderm x 2.9/30/2022 1:40 PM9/30/2022 1:45 PM  Anesthesiologist: Florencia Montes MD  Performed: Anesthesiologist   Preanesthetic Checklist  Completed: patient identified, IV checked, site marked, risks and benefits discussed, surgical/procedural consents, equipment checked, pre-op evaluation, timeout performed, anesthesia consent given, oxygen available, monitors applied/VS acknowledged, fire risk safety assessment completed and verbalized and blood product R/B/A discussed and consented

## 2022-09-30 NOTE — PROGRESS NOTES
chloride flush  5-40 mL IntraVENous 2 times per day    polyethylene glycol  17 g Oral Daily     Continuous Infusions:   insulin (HUMAN R) non-weight based infusion 3.6 Units/hr (09/30/22 0806)    amiodarone 450mg/250ml D5W infusion 0.5 mg/min (09/30/22 0215)    propofol 20 mcg/kg/min (09/29/22 2239)    norepinephrine 6 mcg/min (09/29/22 2252)    sodium chloride      dextrose       PRN Meds:HYDROmorphone, acetaminophen, haloperidol lactate, albuterol, sodium chloride flush, sodium chloride, ondansetron **OR** ondansetron, glucose, dextrose bolus **OR** dextrose bolus, glucagon (rDNA), dextrose, hydrALAZINE, levalbuterol    Objective:   Vitals:   T-max:  Patient Vitals for the past 8 hrs:   BP Temp Temp src Pulse Resp SpO2   09/30/22 0815 -- -- -- 56 18 99 %   09/30/22 0807 -- -- -- 57 (!) 0 94 %   09/30/22 0800 (!) 110/55 97.7 °F (36.5 °C) Oral 60 18 94 %   09/30/22 0745 -- -- -- 55 18 94 %   09/30/22 0730 (!) 122/58 -- -- 59 18 94 %   09/30/22 0715 (!) 146/60 -- -- 63 25 94 %   09/30/22 0700 (!) 124/58 -- -- 57 18 94 %   09/30/22 0600 (!) 118/57 -- -- 54 18 95 %   09/30/22 0545 112/63 -- -- 61 18 95 %   09/30/22 0530 (!) 110/58 -- -- 54 18 95 %   09/30/22 0515 (!) 116/57 -- -- 55 18 95 %   09/30/22 0500 (!) 128/59 -- -- 57 18 95 %   09/30/22 0445 (!) 111/56 -- -- 53 18 95 %   09/30/22 0430 (!) 111/53 -- -- 55 18 96 %   09/30/22 0415 (!) 112/56 -- -- 56 18 96 %   09/30/22 0400 118/62 98.6 °F (37 °C) Axillary 63 17 96 %   09/30/22 0345 (!) 114/59 -- -- 56 18 96 %   09/30/22 0330 (!) 111/54 -- -- 56 18 95 %   09/30/22 0315 122/63 -- -- 55 17 95 %   09/30/22 0300 (!) 106/58 -- -- 56 18 95 %   09/30/22 0245 (!) 143/64 -- -- 62 16 96 %   09/30/22 0230 (!) 106/58 -- -- 58 13 95 %   09/30/22 0215 (!) 110/58 -- -- 58 (!) 0 96 %   09/30/22 0200 (!) 112/57 -- -- 57 (!) 0 95 %   09/30/22 0145 126/65 -- -- 58 (!) 0 95 %   09/30/22 0130 131/67 -- -- 66 11 96 %   09/30/22 0115 128/61 -- -- 60 (!) 0 96 %   09/30/22 0100 119/61 -- -- 60 (!) 0 95 %   09/30/22 0045 (!) 119/53 -- -- 59 (!) 6 95 %         Intake/Output Summary (Last 24 hours) at 9/30/2022 0036  Last data filed at 9/30/2022 0617  Gross per 24 hour   Intake 3874 ml   Output 1980 ml   Net 1894 ml         Review of Systems - denies any pain    Physical Exam  Constitutional:       Comments: Sedated, intubated, arousable and responds to questions appropriately with nods   HENT:      Head: Normocephalic. Eyes:      Pupils: Pupils are equal, round, and reactive to light. Cardiovascular:      Rate and Rhythm: Normal rate and regular rhythm. Pulses: Normal pulses. Heart sounds: Normal heart sounds. Pulmonary:      Comments: On ventilator  Abdominal:      General: Abdomen is flat. Palpations: Abdomen is soft. Musculoskeletal:      Right lower leg: Edema present. Left lower leg: Edema present. Skin:     General: Skin is warm and dry. Neurological:      Comments: Sedated. Responds to voice, answers questions appropriately, follows commands         LABS:    CBC:   Recent Labs     09/28/22 0359 09/29/22 0425 09/30/22 0342   WBC 27.2* 21.6* 25.8*   HGB 9.1* 8.0* 8.0*   HCT 29.0* 25.7* 25.8*    389 486*   MCV 82.7 83.4 83.6       Renal:    Recent Labs     09/28/22 0359 09/29/22 0425 09/30/22 0342    140 142   K 4.7 4.2 4.5   CL 94* 94* 95*   CO2 40* 39* 37*   * 123* 125*   CREATININE 1.7* 1.7* 1.7*   GLUCOSE 161* 343* 135*   CALCIUM 8.3 8.3 8.4   MG 2.90* 2.90* 3.10*   PHOS 3.3 3.9 3.8   ANIONGAP 8 7 10       Hepatic:   Recent Labs     09/28/22 0359 09/29/22 0425 09/30/22 0342   LABALBU 2.7* 2.4* 2.4*       Troponin: No results for input(s): TROPONINI in the last 72 hours. BNP: No results for input(s): BNP in the last 72 hours. Lipids: No results for input(s): CHOL, HDL in the last 72 hours.     Invalid input(s): LDLCALCU, TRIGLYCERIDE  ABGs:    Recent Labs     09/27/22  1602   PHART 7.423   [id number] 63.3*   PO2ART 67.0*   AQE2NSF 41.4* Small stable tiny right lateral pneumothorax and stable position of right chest tube,, Pleurx catheter. XR ABDOMEN (KUB) (SINGLE AP VIEW)   Final Result   1. No residual pneumothorax. 2.  Mild patchy airspace disease bilaterally worse on the right than on prior examination. 3.  Non-obstructive bowel gas pattern. Mildly distended stomach. XR CHEST PORTABLE   Final Result   1. No residual pneumothorax. 2.  Mild patchy airspace disease bilaterally worse on the right than on prior examination. 3.  Non-obstructive bowel gas pattern. Mildly distended stomach. XR CHEST PORTABLE   Final Result      1. Small right pneumothorax appears slightly increased. 2. Mild patchy airspace opacity bilaterally. XR CHEST PORTABLE   Final Result     Pleurx catheter at the right lung base. Trace right    pneumothorax is unchanged. XR CHEST PORTABLE   Final Result      Right-sided chest tube evident in the base, its tip medially. Improvement in the right-sided pneumothorax, since earlier today. Possible medial collapse of the right lower lobe. Small left effusion. Patchy airspace density/atelectasis in the lungs bilaterally, with no other significant change. XR CHEST PORTABLE   Final Result   1. Right-sided Pleurx catheter in satisfactory position in the lung bases   2.  Right-sided post operative pneumothorax, approximately 10%               Assessment/Plan:   Nora Cerda is a 65 y.o. male with a PMH of CHF (CHFpEF), carotid stenosis, HLD, HTN, OA, Restrictive Lung Disease, T2DM who underwent VATS with PleurX catheter placement on 9/19/2022 for recurrent bilateral loculated pleural effusions and has had hypoxia/hypercapnia since that time    Neuro  Sedated: Propofol 20   Patient slightly more sedated this morning, still follows commands but appears sleepy    Pulmonary  Vent Day: 8  Vent: , RR 18, FiO2 30, PEEP 5  - planned trach for today  - eliquis remains held      Acute hypoxic/hypercapnic respiratory failure  Recurrent Pleural Effusions, s/p VATS and Pleurx  Patient was admitted to ICU for failure to tolerate bipap following procedure. The etiology of his recurrent pleural effusions is unclear. No studies done on fluid from original thoracenteses. Possibly malignancy. VATS fluid results showing an exudative picture, per [facility] Criteria. S/p VATS procedure 9/19, with right PleurX catheter placement. Ventilatory restriction on PFT associated with worsening pleural disease. PleurX catheter in place, 52 ml out yesterday  -Methylprednisolone IV  continue tapering  - CXR yesterday with atelectasis around pluerx cath    Cardiovascular  Hypotension   BP required 6 to 4 levo overnight, wean as tolerated  BP today in 110/50s, map 60s  -Began requiring pressors after intubation, attributed to sedation and positive pressure ventilation    Congestive Heart Failure with Preserved Ejection Fraction  EF ~65% 6/16/2022  -BNP ordered 9/23. Significant elevated 15k, given lasix at that time. - remains fluid overloaded on exam with edema to knees bilaterally. - BNP 3k today from [facility] on 9/26  - Per nephro held off on diuretics given kidney injury    GI  Diet: Diet NPO  GI ppx: pepcid     Constipation  Cont glycolax    Renal   Ansari in place  ZANA  Potentially ATN from hypotension/contrast   UOP 2000ml, Cr remains at 1.7  Nephro following, rec to hold off on diuretics     Metabolic  Patient's bg in 400s yesterday, attempted to correct overnight but ultimately placed back on insulin drip for control by surgery  - cont on insulin drip    Code Status: Full Code   PPX: pepcid  DISPO: ICU    Casandra Quintana MD, PGY-1  Internal Medicine Resident  Contact via [address] [place] [address]  09/30/22  8:32 AM    This patient has been staffed and discussed with Dr Garry Dunaway    Patient was seen, examined and discussed with Dr. Chiqui Singleton. I agree with the interval history.  My physical exam confirms the findings listed below  Chart was reviewed including labs and medical records confirm the findings noted     Acute respiratory failure on mechanical vent support. , RR 18, FiO2 35, PEEP 5. Vent day #8. Repeatedly failing breathing trials. Hypotension requiring levophed at 5  Pleural effusion s/p VATS. Pt has high pro-BNP, bilateral effusion, and known diastolic CHF. proBNP is trending down. Leukocytosis. Pt is on steroids, however he had leukocytosis two days before. Paroxysmal A.fib. Eliquis on hold for planned trach. On amio, now in sinus rhythm. ZANA:  Pt has good UOP. creatinine is stable. Anabella Gins is planned today   Restart TF after surgery  D/c steroids  D/c insulin drip and start lantus      Pt has a high probability of imminent or life-threatening deterioration requiring close monitoring, and highly complex decision-making and/or interventions of high intensity to assess, manipulate, and support his critical organ systems to prevent a likely inevitable decline which could occur if left untreated. A total critical care time 35 minutes was used. This includes but not limited to examining patient, collaborating with other physicians, monitoring vital signs, telemetry, continuous pulse oximetry, and clinical response to IV medications, documentation time, review and interpretation of laboratory and radiological data, review of nursing notes and old record review.  This time excludes any time that may have been spent performing procedures for life threatening organ failure

## 2022-10-01 NOTE — PROGRESS NOTES
ICU Progress Note  PGY-1    Admit Date: 9/19/2022  Day: 11  Vent Day: 8  IV Access:CVC triple lumen, peripheral  IV Fluids:None  Vasopressors: levo 4           Antibiotics:   Diet: Diet NPO    CC: Pleural effusion (right) s/p VATS and Pleural catheter (PleurX) placement    Interval history:   S/p tracheostomy yesterday. Pleurx with increasing output. Sedation decreased this AM. Patient opens eyes to name, squeezes hands, appears awake and interactive. HPI:  Sallie Waterman is a 64 yo Male with a PMH of CHF (CHFpEF), carotid stenosis, HLD, HTN, OA, Restrictive Lung Disease, T2DM who underwent VATS with Pleurx catheter placement on 9/19/2022 for recurrent bilateral loculated pleural effusions and has had hypoxia/hypercapnia since that time now requiring intubation. The etiology of his recurrent pleural effusions is unclear. Possibly malignancy. In addition to his recurrent bilateral loculated pleural effusions, he has lost over 100 lbs over the last year, some of which his wife attributes to intentional weight loss. Initially, he was started on BiPAP post-procedure for rapid shallow breathing and slow clearance of sedation. He wasn't tolerating the BiPAP well, trying to remove it. After discussion with family and patient (while on a break from BiPAP), the decision was made to intubate and confirmed that he's OK with tracheostomy if necessary. \"    Medications:     Scheduled Meds:   heparin (porcine)  5,000 Units SubCUTAneous 3 times per day    insulin lispro  0-8 Units SubCUTAneous TID     insulin lispro  0-4 Units SubCUTAneous Nightly    insulin glargine  15 Units SubCUTAneous Nightly    sodium chloride flush  5-40 mL IntraVENous 2 times per day    Venelex   Topical BID    amiodarone bolus  150 mg IntraVENous Once    amiodarone  400 mg Oral BID    Followed by    Markel Olszewski ON 10/4/2022] amiodarone  200 mg Oral Daily    famotidine  20 mg Per NG tube Daily    chlorhexidine  15 mL Mouth/Throat BID    [Held by provider] metoprolol  5 mg IntraVENous Q6H    [Held by provider] apixaban  5 mg Oral BID    sodium chloride flush  5-40 mL IntraVENous 2 times per day    polyethylene glycol  17 g Oral Daily     Continuous Infusions:   fentaNYL 25 mcg/hr (09/30/22 1145)    sodium chloride      sodium chloride      propofol 20 mcg/kg/min (09/30/22 1518)    norepinephrine 5 mcg/min (09/30/22 1859)    sodium chloride      dextrose       PRN Meds:sodium chloride flush, sodium chloride, fentanNYL, HYDROmorphone, ondansetron, prochlorperazine, LORazepam, diphenhydrAMINE, labetalol, HYDROmorphone, acetaminophen, haloperidol lactate, albuterol, sodium chloride flush, sodium chloride, ondansetron **OR** ondansetron, glucose, dextrose bolus **OR** dextrose bolus, glucagon (rDNA), dextrose, hydrALAZINE, levalbuterol    Objective:   Vitals:   T-max:  Patient Vitals for the past 8 hrs:   BP Temp Temp src Pulse Resp SpO2 Weight   10/01/22 0600 (!) 109/47 -- -- 63 18 -- 188 lb 15 oz (85.7 kg)   10/01/22 0500 (!) 96/57 -- -- 61 18 97 % --   10/01/22 0430 106/61 -- -- 62 18 -- --   10/01/22 0401 -- -- -- 61 18 97 % --   10/01/22 0400 (!) 103/50 97.1 °F (36.2 °C) Axillary 65 18 -- --   10/01/22 0330 (!) 113/56 -- -- 69 21 96 % --   10/01/22 0300 (!) 100/54 -- -- 61 14 97 % --   10/01/22 0230 (!) 94/56 -- -- 58 14 97 % --   10/01/22 0200 (!) 111/58 -- -- 60 10 98 % --   10/01/22 0130 (!) 93/58 -- -- 63 14 98 % --   10/01/22 0100 (!) 100/57 -- -- 60 16 98 % --   10/01/22 0030 (!) 107/55 -- -- 59 14 -- --   10/01/22 0001 -- -- -- 61 16 97 % --   10/01/22 0000 100/68 97.4 °F (36.3 °C) Axillary 62 15 97 % --         Intake/Output Summary (Last 24 hours) at 10/1/2022 0737  Last data filed at 10/1/2022 0600  Gross per 24 hour   Intake 1164 ml   Output 1690 ml   Net -526 ml         Review of Systems - denies any pain    Physical Exam  Constitutional:       Comments: Sedated, intubated, arousable and responds to questions appropriately with nods   HENT: Head: Normocephalic. Nose:      Comments: NG tube in place  Eyes:      Pupils: Pupils are equal, round, and reactive to light. Neck:      Comments: Tracheostomy  Cardiovascular:      Rate and Rhythm: Normal rate and regular rhythm. Pulses: Normal pulses. Heart sounds: Normal heart sounds. Pulmonary:      Comments: On ventilator. Pleurx catheter in place with clear yellow output. Abdominal:      General: Abdomen is flat. Palpations: Abdomen is soft. Musculoskeletal:      Right lower leg: Edema present. Left lower leg: Edema present. Skin:     General: Skin is warm and dry. Neurological:      Comments: Sedated. Responds to voice, answers questions appropriately, follows commands       LABS:    CBC:   Recent Labs     09/29/22  0425 09/30/22  0342 10/01/22  0357   WBC 21.6* 25.8* 20.3*   HGB 8.0* 8.0* 7.8*   HCT 25.7* 25.8* 24.7*    486* 493*   MCV 83.4 83.6 83.7       Renal:    Recent Labs     09/29/22  0425 09/30/22  0342 10/01/22  0357    142 143   K 4.2 4.5 4.1   CL 94* 95* 97*   CO2 39* 37* 35*   * 125* 119*   CREATININE 1.7* 1.7* 1.9*   GLUCOSE 343* 135* 176*   CALCIUM 8.3 8.4 8.2*   MG 2.90* 3.10* 3.00*   PHOS 3.9 3.8 4.4   ANIONGAP 7 10 11       Hepatic:   Recent Labs     09/29/22  0425 09/30/22  0342 10/01/22  0357   LABALBU 2.4* 2.4* 2.4*       Troponin: No results for input(s): TROPONINI in the last 72 hours. BNP: No results for input(s): BNP in the last 72 hours. Lipids: No results for input(s): CHOL, HDL in the last 72 hours. Invalid input(s): LDLCALCU, TRIGLYCERIDE  ABGs:    No results for input(s): PHART, YSW2KAB, PO2ART, FIS5EQW, BEART, THGBART, N0QWQFVP, DYK0QMY in the last 72 hours. INR: No results for input(s): INR in the last 72 hours. Lactate:   No results for input(s): LACTATE in the last 72 hours.     Cultures:  -----------------------------------------------------------------  RAD:   XR CHEST PORTABLE   Final Result      Small right basilar pneumothorax. Right basilar chest tube without change. Patchy consolidation in the right mid and lower lung as well as the left lower lung-atelectasis versus pneumonia. Trace left pleural effusion. Normal heart size. Lines and tubes without change. Mild improvement in degree of subcutaneous emphysema in the chest and neck. XR CHEST 1 VIEW   Final Result      1. Stable small right-sided pneumothorax and prominent subcutaneous emphysema along the neck and upper superior chest wall, greater in right lung stable   2. Stable left basilar consolidation and pleural effusion      3. Stable appearing perihilar accentuated markings or airspace disease            XR CHEST PORTABLE   Final Result      Stable small right-sided pneumothorax. More prominent emphysema over the lower neck. No change in bilateral airspace disease. Stable left pleural effusion. XR CHEST PORTABLE   Final Result   1. Bilateral multifocal pneumonia with improvement in the right    pulmonary consolidation since prior study from 9/23/22   2. Mild to moderate left pleural effusion          XR CHEST PORTABLE   Final Result   1. Support lines and tubes are stable. 2.  Stable small right lateral pneumothorax and right basilar    chest tube. 3.  Stable patchy bilateral airspace disease. XR CHEST PORTABLE   Final Result   1. Satisfactory position of right internal jugular central line   2. No interval change in endotracheal tube and nasogastric tube positioning. 3. Extensive bilateral airspace disease with no changes. 4. Left pleural effusion with retrocardiac opacity, unchanged   5. Small stable tiny right lateral pneumothorax and stable position of right chest tube,, Pleurx catheter. XR ABDOMEN (KUB) (SINGLE AP VIEW)   Final Result   1. No residual pneumothorax. 2.  Mild patchy airspace disease bilaterally worse on the right than on prior examination.    3. Non-obstructive bowel gas pattern. Mildly distended stomach. XR CHEST PORTABLE   Final Result   1. No residual pneumothorax. 2.  Mild patchy airspace disease bilaterally worse on the right than on prior examination. 3.  Non-obstructive bowel gas pattern. Mildly distended stomach. XR CHEST PORTABLE   Final Result      1. Small right pneumothorax appears slightly increased. 2. Mild patchy airspace opacity bilaterally. XR CHEST PORTABLE   Final Result     Pleurx catheter at the right lung base. Trace right    pneumothorax is unchanged. XR CHEST PORTABLE   Final Result      Right-sided chest tube evident in the base, its tip medially. Improvement in the right-sided pneumothorax, since earlier today. Possible medial collapse of the right lower lobe. Small left effusion. Patchy airspace density/atelectasis in the lungs bilaterally, with no other significant change. XR CHEST PORTABLE   Final Result   1. Right-sided Pleurx catheter in satisfactory position in the lung bases   2. Right-sided post operative pneumothorax, approximately 10%               Assessment/Plan:   Dejah Marshall is a 65 y.o. male with a PMH of CHF (CHFpEF), carotid stenosis, HLD, HTN, OA, Restrictive Lung Disease, T2DM who underwent VATS with PleurX catheter placement on 9/19/2022 for recurrent bilateral loculated pleural effusions and has had hypoxia/hypercapnia since that time    Neuro  Sedated: Propofol decreased to 10, Fentanyl at 12.5    Pulmonary  Vent Day: 9  Vent: , RR 18, FiO2 40, PEEP 5   Acute hypoxic/hypercapnic respiratory failure  Recurrent Pleural Effusions, s/p VATS and Pleurx  Patient was admitted to ICU for failure to tolerate bipap following procedure. The etiology of his recurrent pleural effusions is unclear. No studies done on fluid from original thoracenteses. Possibly malignancy. VATS fluid results showing an exudative picture, per [facility] Criteria.  S/p VATS procedure 9/19, with right PleurX catheter placement. Ventilatory restriction on PFT associated with worsening pleural disease. 9/29 CXR with atelectasis around pluerx cath  - PleurX catheter in place with clear yellow drainage  - s/p Methylprednisolone IV      Cardiovascular  Hypotension   Began requiring pressors after intubation, attributed to sedation and positive pressure ventilation  - Levo gtt @ 5, stable    Congestive Heart Failure with Preserved Ejection Fraction  EF ~65% 6/16/2022  -9/23 BNP 15k, given lasix at that time, now downtrending.   - remains fluid overloaded on exam with edema to knees bilaterally. - BNP downtrending  - Per nephro held off on diuretics given kidney injury    GI  Diet: Diet NPO  GI ppx: pepcid     Constipation  Cont glycolax    Renal   Ansari in place  ZANA  Potentially ATN from hypotension/contrast   - Nephro following, rec to hold off on diuretics   - Good UOP, cr uptrending to 1.9 from 1.7    Metabolic  - insulin gtt transitioned to bolus    Code Status: Full Code   PPX: pepcid  DISPO: ICU    Tiffanie Joyner MD, PGY-1  Internal Medicine Resident  Contact via [facility]  10/01/22  7:37 AM    This patient has been staffed and discussed with Dr Alena Peres    Patient was seen, examined and discussed with Dr. Finesse Baldwin. I agree with the interval history. My physical exam confirms the findings listed below  Chart was reviewed including labs and medical records confirm the findings noted     Acute respiratory failure on mechanical vent support. , RR 18, FiO2 40, PEEP 5. trach on 9/30  Hypotension requiring levophed at 5  Pleural effusion s/p VATS. Pt has high pro-BNP, bilateral effusion, and known diastolic CHF. Leukocytosis. steroids discontinued on 9/30  Paroxysmal A.fib. Eliquis on hold for additional day. On amio, now in sinus rhythm. ZANA:  Pt has good UOP. nephrology. D/c propofol and fentanyl. Pain meds on PRN basis   TF.   Blood sugar is up to 240 after resuming TF. Increase lantus to 20  Decrease FiO2 35 and decrease RR to 14     Pt has a high probability of imminent or life-threatening deterioration requiring close monitoring, and highly complex decision-making and/or interventions of high intensity to assess, manipulate, and support his critical organ systems to prevent a likely inevitable decline which could occur if left untreated. A total critical care time 35 minutes was used. This includes but not limited to examining patient, collaborating with other physicians, monitoring vital signs, telemetry, continuous pulse oximetry, and clinical response to IV medications, documentation time, review and interpretation of laboratory and radiological data, review of nursing notes and old record review.  This time excludes any time that may have been spent performing procedures for life threatening organ failure

## 2022-10-01 NOTE — OP NOTE
[address]               [address] [place], [address]                                OPERATIVE REPORT    PATIENT NAME: Renetta Clinton                     :        1945  MED REC NO:   [id number]                          ROOM:       4505  ACCOUNT NO:   [de-identified]                           ADMIT DATE: 2022  PROVIDER:     Paz Vang MD    DATE OF PROCEDURE:  2022    PREOPERATIVE DIAGNOSES:  Chronic hypoxemia and hypercarbia with  panlobular emphysema and recurrent right pleural effusion. POSTOPERATIVE DIAGNOSES:  Chronic hypoxemia and hypercapnia with  panlobular emphysema and recurrent right pleural effusion. PROCEDURES:  Right VATS; drainage of pleural effusion (approximately  1300 mL); placement of PleurX catheter; intercostal nerve block x4  levels. SURGEON:  Azael Lucero MD    ANESTHESIA:  Double-lumen, general endotracheal.    INDICATIONS:  The patient is a 65-year-old man who is extremely  compromised from his pulmonary decompensation. He was both chronically  hypoxic as well as chronically hypercarbic. In deference to a recurrent  right pleural effusion, he was seen as an outpatient and scheduled for  placement of PleurX catheter for which he is brought to the operative  room today. FINDINGS AT SURGERY:  The right lung had a fairly typical emphysematous  appearance with scattered _____ that is typical for people with a  smoking history. There was approximately 1300 mL of clear, pale, yellow  fluid in the right chest cavity. The PleurX catheter was located into  the right posterolateral sulcus. DESCRIPTION OF PROCEDURE:  After obtaining adequate double-lumen general  endotracheal anesthesia, the patient was placed in a left lateral  decubitus position with all appropriate pressure points padded.   The  right arm was suspended on an [facility].  The right chest was then  meticulously prepped and draped in a sterile manner. A standard timeout  procedure was completed. A port site was made at approximately the fifth intercostal space. Inspection of the chest cavity showed a lung floating on pleural fluid. We placed a plastic suction catheter in the chest cavity and evacuated  nearly all of the fluid. Thereafter, a substantial space between the  lung and the chest wall. We looked around to see if there was any  indication of any type of cancer that might be in the chest cavity that  would be contributing to his pleural effusion. There was nothing  visible that was abnormal.    A PleurX catheter was then placed in a tunneled fashion so that it  exited just above the costal margin in the anterior axillary line  region. The tip of the catheter was located into the right  posterolateral sulcus. The right lung was re-inflated. The port sites  were then closed with subcuticular sutures. The patient was then  awakened, extubated, and prepared for transfer to the recovery room for  ongoing care.         Sean Washington MD    D: 09/30/2022 16:24:02       T: 10/01/2022 0:18:44     [email or web address]  Job#: [id number]     Doc#: [id number]    CC:

## 2022-10-01 NOTE — PROGRESS NOTES
Patient blood glucose increasing throughout the day. Blood glucose 373 @1653. Residents made aware. 10 units of insulin lispro ordered for one time dose.

## 2022-10-01 NOTE — PLAN OF CARE
Problem: Chronic Conditions and Co-morbidities  Goal: Patient's chronic conditions and co-morbidity symptoms are monitored and maintained or improved  10/1/2022 0506 by Hyun Marina RN  Outcome: Progressing  Flowsheets (Taken 9/30/2022 2000)  Care Plan - Patient's Chronic Conditions and Co-Morbidity Symptoms are Monitored and Maintained or Improved:   Monitor and assess patient's chronic conditions and comorbid symptoms for stability, deterioration, or improvement   Collaborate with multidisciplinary team to address chronic and comorbid conditions and prevent exacerbation or deterioration   Update acute care plan with appropriate goals if chronic or comorbid symptoms are exacerbated and prevent overall improvement and discharge  9/30/2022 1902 by Skyla Morrison RN  Outcome: Progressing  Flowsheets (Taken 9/30/2022 0800)  Care Plan - Patient's Chronic Conditions and Co-Morbidity Symptoms are Monitored and Maintained or Improved:   Monitor and assess patient's chronic conditions and comorbid symptoms for stability, deterioration, or improvement   Update acute care plan with appropriate goals if chronic or comorbid symptoms are exacerbated and prevent overall improvement and discharge     Problem: Discharge Planning  Goal: Discharge to home or other facility with appropriate resources  10/1/2022 0506 by Hyun Marina RN  Outcome: Progressing  Flowsheets (Taken 9/30/2022 2000)  Discharge to home or other facility with appropriate resources: Identify barriers to discharge with patient and caregiver  9/30/2022 1902 by Skyla Morrison RN  Outcome: Progressing  Flowsheets (Taken 9/30/2022 0800)  Discharge to home or other facility with appropriate resources:   Identify barriers to discharge with patient and caregiver   Identify discharge learning needs (meds, wound care, etc)   Refer to discharge planning if patient needs post-hospital services based on physician order or complex needs related to functional status, cognitive ability or social support system   Arrange for interpreters to assist at discharge as needed   Arrange for needed discharge resources and transportation as appropriate     Problem: Pain  Goal: Verbalizes/displays adequate comfort level or baseline comfort level  10/1/2022 0506 by Delfino Perla RN  Outcome: Progressing  Flowsheets (Taken 9/30/2022 2000)  Verbalizes/displays adequate comfort level or baseline comfort level:   Encourage patient to monitor pain and request assistance   Assess pain using appropriate pain scale   Administer analgesics based on type and severity of pain and evaluate response  9/30/2022 1902 by Toni Carrillo RN  Outcome: Progressing     Problem: Safety - Adult  Goal: Free from fall injury  10/1/2022 0506 by Delfino Perla RN  Outcome: Progressing  9/30/2022 1902 by Toni Carrillo RN  Outcome: Progressing  Flowsheets  Taken 9/30/2022 0821 by Toni Carrillo RN  Free From Fall Injury:   Instruct family/caregiver on patient safety   Based on caregiver fall risk screen, instruct family/caregiver to ask for assistance with transferring infant if caregiver noted to have fall risk factors  Taken 9/30/2022 0721 by Delfino Perla RN  Free From Fall Injury: Instruct family/caregiver on patient safety     Problem: Skin/Tissue Integrity  Goal: Absence of new skin breakdown  Description: 1. Monitor for areas of redness and/or skin breakdown  2. Assess vascular access sites hourly  3. Every 4-6 hours minimum:  Change oxygen saturation probe site  4. Every 4-6 hours:  If on nasal continuous positive airway pressure, respiratory therapy assess nares and determine need for appliance change or resting period.   10/1/2022 0506 by Delfino Perla RN  Outcome: Progressing  9/30/2022 1902 by Toni Carrillo RN  Outcome: Progressing     Problem: Safety - Medical Restraint  Goal: Remains free of injury from restraints (Restraint for Interference with Medical Device)  Description: INTERVENTIONS:  1. Determine that other, less restrictive measures have been tried or would not be effective before applying the restraint  2. Evaluate the patient's condition at the time of restraint application  3. Inform patient/family regarding the reason for restraint  4.  Q2H: Monitor safety, psychosocial status, comfort, nutrition and hydration  10/1/2022 0506 by Brooks Wilhelm RN  Outcome: Progressing  Flowsheets  Taken 10/1/2022 0500  Remains free of injury from restraints (restraint for interference with medical device):   Determine that other, less restrictive measures have been tried or would not be effective before applying the restraint   Evaluate the patient's condition at the time of restraint application   Inform patient/family regarding the reason for restraint   Every 2 hours: Monitor safety, psychosocial status, comfort, nutrition and hydration  Taken 10/1/2022 0400  Remains free of injury from restraints (restraint for interference with medical device):   Determine that other, less restrictive measures have been tried or would not be effective before applying the restraint   Evaluate the patient's condition at the time of restraint application   Inform patient/family regarding the reason for restraint   Every 2 hours: Monitor safety, psychosocial status, comfort, nutrition and hydration  Taken 10/1/2022 0300  Remains free of injury from restraints (restraint for interference with medical device):   Determine that other, less restrictive measures have been tried or would not be effective before applying the restraint   Evaluate the patient's condition at the time of restraint application   Inform patient/family regarding the reason for restraint   Every 2 hours: Monitor safety, psychosocial status, comfort, nutrition and hydration  Taken 10/1/2022 0200  Remains free of injury from restraints (restraint for interference with medical device):   Determine that other, less restrictive measures have been tried or would not be effective before applying the restraint   Evaluate the patient's condition at the time of restraint application   Inform patient/family regarding the reason for restraint   Every 2 hours: Monitor safety, psychosocial status, comfort, nutrition and hydration  Taken 10/1/2022 0100  Remains free of injury from restraints (restraint for interference with medical device):   Determine that other, less restrictive measures have been tried or would not be effective before applying the restraint   Evaluate the patient's condition at the time of restraint application   Inform patient/family regarding the reason for restraint   Every 2 hours: Monitor safety, psychosocial status, comfort, nutrition and hydration  Taken 10/1/2022 0000  Remains free of injury from restraints (restraint for interference with medical device):   Determine that other, less restrictive measures have been tried or would not be effective before applying the restraint   Evaluate the patient's condition at the time of restraint application   Inform patient/family regarding the reason for restraint   Every 2 hours: Monitor safety, psychosocial status, comfort, nutrition and hydration  Taken 9/30/2022 2300  Remains free of injury from restraints (restraint for interference with medical device):   Determine that other, less restrictive measures have been tried or would not be effective before applying the restraint   Inform patient/family regarding the reason for restraint   Evaluate the patient's condition at the time of restraint application   Every 2 hours: Monitor safety, psychosocial status, comfort, nutrition and hydration  Taken 9/30/2022 2200  Remains free of injury from restraints (restraint for interference with medical device):   Determine that other, less restrictive measures have been tried or would not be effective before applying the restraint   Evaluate the patient's condition at the time application     Problem: Nutrition Deficit:  Goal: Optimize nutritional status  10/1/2022 0506 by Jyothi Lerma RN  Outcome: Progressing  9/30/2022 1902 by Olu Woods RN  Outcome: Progressing  Flowsheets (Taken 9/30/2022 1057 by Barbara Fritz RD)  Nutrient intake appropriate for improving, restoring, or maintaining nutritional needs:   Recommend, monitor, and adjust tube feedings and TPN/PPN based on assessed needs   Assess nutritional status and recommend course of action     Problem: ABCDS Injury Assessment  Goal: Absence of physical injury  10/1/2022 0506 by Jyothi Lerma RN  Outcome: Progressing  9/30/2022 1902 by Olu Woods RN  Outcome: Progressing  Flowsheets  Taken 9/30/2022 0821 by Olu Woods RN  Absence of Physical Injury: Implement safety measures based on patient assessment  Taken 9/30/2022 0721 by Jyothi Lerma RN  Absence of Physical Injury: Implement safety measures based on patient assessment     Problem: Confusion  Goal: Confusion, delirium, dementia, or psychosis is improved or at baseline  Description: INTERVENTIONS:  1. Assess for possible contributors to thought disturbance, including medications, impaired vision or hearing, underlying metabolic abnormalities, dehydration, psychiatric diagnoses, and notify attending LIP  2. [place] high risk fall precautions, as indicated  3. Provide frequent short contacts to provide reality reorientation, refocusing and direction  4. Decrease environmental stimuli, including noise as appropriate  5. Monitor and intervene to maintain adequate nutrition, hydration, elimination, sleep and activity  6. If unable to ensure safety without constant attention obtain sitter and review sitter guidelines with assigned personnel  7.  Initiate Psychosocial CNS and Spiritual Care consult, as indicated  10/1/2022 0506 by Jyothi Lerma RN  Outcome: Progressing  Flowsheets (Taken 9/30/2022 2000)  Effect of thought disturbance (confusion, delirium, dementia, or psychosis) are managed with adequate functional status:   Assess for contributors to thought disturbance, including medications, impaired vision or hearing, underlying metabolic abnormalities, dehydration, psychiatric diagnoses, notify [place] high risk fall precautions, as indicated   Decrease environmental stimuli, including noise as appropriate  9/30/2022 1902 by Lavon Marshall RN  Outcome: Progressing

## 2022-10-01 NOTE — PLAN OF CARE
Problem: Chronic Conditions and Co-morbidities  Goal: Patient's chronic conditions and co-morbidity symptoms are monitored and maintained or improved  10/1/2022 1057 by Fela Kruse RN  Outcome: Progressing  Flowsheets (Taken 10/1/2022 0800)  Care Plan - Patient's Chronic Conditions and Co-Morbidity Symptoms are Monitored and Maintained or Improved:   Monitor and assess patient's chronic conditions and comorbid symptoms for stability, deterioration, or improvement   Collaborate with multidisciplinary team to address chronic and comorbid conditions and prevent exacerbation or deterioration   Update acute care plan with appropriate goals if chronic or comorbid symptoms are exacerbated and prevent overall improvement and discharge  10/1/2022 0506 by Alexei Gee RN  Outcome: Progressing  Flowsheets (Taken 9/30/2022 2000)  Care Plan - Patient's Chronic Conditions and Co-Morbidity Symptoms are Monitored and Maintained or Improved:   Monitor and assess patient's chronic conditions and comorbid symptoms for stability, deterioration, or improvement   Collaborate with multidisciplinary team to address chronic and comorbid conditions and prevent exacerbation or deterioration   Update acute care plan with appropriate goals if chronic or comorbid symptoms are exacerbated and prevent overall improvement and discharge     Problem: Discharge Planning  Goal: Discharge to home or other facility with appropriate resources  10/1/2022 1057 by Fela Kruse RN  Outcome: Progressing  Flowsheets (Taken 10/1/2022 0800)  Discharge to home or other facility with appropriate resources:   Identify barriers to discharge with patient and caregiver   Arrange for needed discharge resources and transportation as appropriate   Identify discharge learning needs (meds, wound care, etc)   Arrange for interpreters to assist at discharge as needed   Refer to discharge planning if patient needs post-hospital services based on physician order or complex needs related to functional status, cognitive ability or social support system  10/1/2022 0506 by Jenniffer Avendano RN  Outcome: Progressing  Flowsheets (Taken 9/30/2022 2000)  Discharge to home or other facility with appropriate resources: Identify barriers to discharge with patient and caregiver     Problem: Pain  Goal: Verbalizes/displays adequate comfort level or baseline comfort level  10/1/2022 1057 by Chele Paniagua RN  Outcome: Progressing  10/1/2022 0506 by Jenniffer Avendano RN  Outcome: Progressing  Flowsheets (Taken 9/30/2022 2000)  Verbalizes/displays adequate comfort level or baseline comfort level:   Encourage patient to monitor pain and request assistance   Assess pain using appropriate pain scale   Administer analgesics based on type and severity of pain and evaluate response     Problem: Safety - Adult  Goal: Free from fall injury  10/1/2022 1057 by Chele Paniagua RN  Outcome: Progressing  [facility] (Taken 10/1/2022 0507 by Jenniffer Avendano RN)  Free From Fall Injury: Instruct family/caregiver on patient safety  10/1/2022 0506 by Jenniffer Avendano RN  Outcome: Progressing     Problem: Skin/Tissue Integrity  Goal: Absence of new skin breakdown  Description: 1. Monitor for areas of redness and/or skin breakdown  2. Assess vascular access sites hourly  3. Every 4-6 hours minimum:  Change oxygen saturation probe site  4. Every 4-6 hours:  If on nasal continuous positive airway pressure, respiratory therapy assess nares and determine need for appliance change or resting period. 10/1/2022 1057 by Chele Paniagua RN  Outcome: Progressing  10/1/2022 0506 by Jenniffer Avendano RN  Outcome: Progressing     Problem: Safety - Medical Restraint  Goal: Remains free of injury from restraints (Restraint for Interference with Medical Device)  Description: INTERVENTIONS:  1. Determine that other, less restrictive measures have been tried or would not be effective before applying the restraint  2. Evaluate the patient's condition at the time of restraint application  3. Inform patient/family regarding the reason for restraint  4.  Q2H: Monitor safety, psychosocial status, comfort, nutrition and hydration  10/1/2022 1057 by Edwin Lugo RN  Outcome: Progressing  Flowsheets  Taken 10/1/2022 1000 by Edwin Lugo RN  Remains free of injury from restraints (restraint for interference with medical device):   Determine that other, less restrictive measures have been tried or would not be effective before applying the restraint   Evaluate the patient's condition at the time of restraint application   Inform patient/family regarding the reason for restraint   Every 2 hours: Monitor safety, psychosocial status, comfort, nutrition and hydration  Taken 10/1/2022 0800 by Edwin Lugo RN  Remains free of injury from restraints (restraint for interference with medical device):   Determine that other, less restrictive measures have been tried or would not be effective before applying the restraint   Evaluate the patient's condition at the time of restraint application   Inform patient/family regarding the reason for restraint   Every 2 hours: Monitor safety, psychosocial status, comfort, nutrition and hydration  Taken 10/1/2022 0600 by Glendy Louis RN  Remains free of injury from restraints (restraint for interference with medical device):   Determine that other, less restrictive measures have been tried or would not be effective before applying the restraint   Evaluate the patient's condition at the time of restraint application   Inform patient/family regarding the reason for restraint   Every 2 hours: Monitor safety, psychosocial status, comfort, nutrition and hydration  10/1/2022 0506 by Glendy Louis RN  Outcome: Progressing  Flowsheets  Taken 10/1/2022 0500  Remains free of injury from restraints (restraint for interference with medical device):   Determine that other, less restrictive measures have been tried or would not be effective before applying the restraint   Evaluate the patient's condition at the time of restraint application   Inform patient/family regarding the reason for restraint   Every 2 hours: Monitor safety, psychosocial status, comfort, nutrition and hydration  Taken 10/1/2022 0400  Remains free of injury from restraints (restraint for interference with medical device):   Determine that other, less restrictive measures have been tried or would not be effective before applying the restraint   Evaluate the patient's condition at the time of restraint application   Inform patient/family regarding the reason for restraint   Every 2 hours: Monitor safety, psychosocial status, comfort, nutrition and hydration  Taken 10/1/2022 0300  Remains free of injury from restraints (restraint for interference with medical device):   Determine that other, less restrictive measures have been tried or would not be effective before applying the restraint   Evaluate the patient's condition at the time of restraint application   Inform patient/family regarding the reason for restraint   Every 2 hours: Monitor safety, psychosocial status, comfort, nutrition and hydration  Taken 10/1/2022 0200  Remains free of injury from restraints (restraint for interference with medical device):   Determine that other, less restrictive measures have been tried or would not be effective before applying the restraint   Evaluate the patient's condition at the time of restraint application   Inform patient/family regarding the reason for restraint   Every 2 hours: Monitor safety, psychosocial status, comfort, nutrition and hydration  Taken 10/1/2022 0100  Remains free of injury from restraints (restraint for interference with medical device):   Determine that other, less restrictive measures have been tried or would not be effective before applying the restraint   Evaluate the patient's condition at the time of restraint application Inform patient/family regarding the reason for restraint   Every 2 hours: Monitor safety, psychosocial status, comfort, nutrition and hydration  Taken 10/1/2022 0000  Remains free of injury from restraints (restraint for interference with medical device):   Determine that other, less restrictive measures have been tried or would not be effective before applying the restraint   Evaluate the patient's condition at the time of restraint application   Inform patient/family regarding the reason for restraint   Every 2 hours: Monitor safety, psychosocial status, comfort, nutrition and hydration  Taken 9/30/2022 2300  Remains free of injury from restraints (restraint for interference with medical device):   Determine that other, less restrictive measures have been tried or would not be effective before applying the restraint   Inform patient/family regarding the reason for restraint   Evaluate the patient's condition at the time of restraint application   Every 2 hours: Monitor safety, psychosocial status, comfort, nutrition and hydration  Taken 9/30/2022 2200  Remains free of injury from restraints (restraint for interference with medical device):   Determine that other, less restrictive measures have been tried or would not be effective before applying the restraint   Evaluate the patient's condition at the time of restraint application   Inform patient/family regarding the reason for restraint   Every 2 hours: Monitor safety, psychosocial status, comfort, nutrition and hydration  Taken 9/30/2022 2100  Remains free of injury from restraints (restraint for interference with medical device):   Determine that other, less restrictive measures have been tried or would not be effective before applying the restraint   Inform patient/family regarding the reason for restraint   Evaluate the patient's condition at the time of restraint application   Every 2 hours: Monitor safety, psychosocial status, comfort, nutrition and hydration  Taken 9/30/2022 2000  Remains free of injury from restraints (restraint for interference with medical device):   Determine that other, less restrictive measures have been tried or would not be effective before applying the restraint   Evaluate the patient's condition at the time of restraint application     Problem: Nutrition Deficit:  Goal: Optimize nutritional status  10/1/2022 1057 by Shahnaz Fuchs RN  Outcome: Progressing  10/1/2022 0506 by Gume Mcdermott RN  Outcome: Progressing     Problem: ABCDS Injury Assessment  Goal: Absence of physical injury  10/1/2022 1057 by Shahnaz Fuchs RN  Outcome: Progressing  [address] (Taken 10/1/2022 0507 by Gume Mcdermott RN)  Absence of Physical Injury: Implement safety measures based on patient assessment  10/1/2022 0506 by Gume Mcdermott RN  Outcome: Progressing     Problem: Confusion  Goal: Confusion, delirium, dementia, or psychosis is improved or at baseline  Description: INTERVENTIONS:  1. Assess for possible contributors to thought disturbance, including medications, impaired vision or hearing, underlying metabolic abnormalities, dehydration, psychiatric diagnoses, and notify attending LIP  2. [place] high risk fall precautions, as indicated  3. Provide frequent short contacts to provide reality reorientation, refocusing and direction  4. Decrease environmental stimuli, including noise as appropriate  5. Monitor and intervene to maintain adequate nutrition, hydration, elimination, sleep and activity  6. If unable to ensure safety without constant attention obtain sitter and review sitter guidelines with assigned personnel  7.  Initiate Psychosocial CNS and Spiritual Care consult, as indicated  10/1/2022 1057 by Shahnaz Fuchs RN  Outcome: Progressing  Flowsheets (Taken 10/1/2022 0800)  Effect of thought disturbance (confusion, delirium, dementia, or psychosis) are managed with adequate functional status:   Assess for contributors to thought disturbance, including medications, impaired vision or hearing, underlying metabolic abnormalities, dehydration, psychiatric diagnoses, notify [place] high risk fall precautions, as indicated   Provide frequent short contacts to provide reality reorientation, refocusing and direction   Decrease environmental stimuli, including noise as appropriate   Monitor and intervene to maintain adequate nutrition, hydration, elimination, sleep and activity   If unable to ensure safety without constant attention obtain sitter and review sitter guidelines with assigned personnel   Initiate Psychosocial Clinical Nurse Specialist and [facility] consult, as indicated  10/1/2022 0506 by Idalia Ortiz RN  Outcome: Progressing  Flowsheets (Taken 9/30/2022 2000)  Effect of thought disturbance (confusion, delirium, dementia, or psychosis) are managed with adequate functional status:   Assess for contributors to thought disturbance, including medications, impaired vision or hearing, underlying metabolic abnormalities, dehydration, psychiatric diagnoses, notify [place] high risk fall precautions, as indicated   Decrease environmental stimuli, including noise as appropriate

## 2022-10-01 NOTE — PROGRESS NOTES
ENT Surgery   Daily Progress Note  Patient: Rosario Long      CC: tracheostomy placement    SUBJECTIVE:   Pt's HR controlled. BP improved from yesterday. FI O2 vent weaned to 40%. Urinating appropriately via barry     ROS:   A 14 point review of systems was conducted, significant findings as noted above. All other systems negative. OBJECTIVE:    PHYSICAL EXAM:    Vitals:    10/01/22 0401 10/01/22 0430 10/01/22 0500 10/01/22 0600   BP:  106/61 (!) 96/57 (!) 109/47   Pulse: 61 62 61 63   Resp: 18 18 18 18   Temp:       TempSrc:       SpO2: 97%  97%    Weight:    188 lb 15 oz (85.7 kg)   Height:           General appearance: Mechanically ventilated. Appropriately nods head in response to questions. HEENT: NC/AT, EOMI, trachea midline, no JVD. Crepitus noted in the distal neck, supraclavicular region. Corpak in right nostril w/ bridle. Tracheostomy in place. Chest/Lungs: Decreased breath sounds bilaterally, on mechanical ventilation; R PleurX catheter to water seal without airleak, has serous output  Cardiovascular: Atrial fibrillation with rate in the 60's currently   Abdomen: Soft, non-tender, non-distended  Genitourinary: Abrry in place with clear yellow urine  Skin: Warm and dry, no rashes  Extremities: No edema, no cyanosis; SCDs in place    LABS:   Recent Labs     09/30/22 0342 10/01/22  0357   WBC 25.8* 20.3*   HGB 8.0* 7.8*   HCT 25.8* 24.7*   MCV 83.6 83.7   * 493*        Recent Labs     09/30/22 0342 10/01/22  0357    143   K 4.5 4.1   CL 95* 97*   CO2 37* 35*   PHOS 3.8 4.4   * 119*   CREATININE 1.7* 1.9*      No results for input(s): AST, ALT, ALB, BILIDIR, BILITOT, ALKPHOS in the last 72 hours. No results for input(s): LIPASE, AMYLASE in the last 72 hours. No results for input(s): PROT, INR, APTT in the last 72 hours. No results for input(s): CKTOTAL, CKMB, CKMBINDEX, TROPONINI in the last 72 hours.       ASSESSMENT & PLAN:   Rosario Long is a 65 y.o. male with history of diastolic heart failure, atrial fibrillation, and DM with recurrent pleural effusions s/p R PleurX catheter placement (9/19).  ENT consulted for tracheotomy placement 9/30.     - continue vent settings, wean as tolerated   - Continue to wean Solu-medrol.   - please call with questions regarding problems with tracheostomy     Stephane Jones DO  PGY1, General Surgery  10/01/22  8:09 AM  [facility]  Pager: [phone]

## 2022-10-01 NOTE — PROGRESS NOTES
ICU CT SURGERY DAILY PROGRESS NOTE    CC: Pleural effusions s/p R PleurX catheter placement    SUBJECTIVE:   Interval Hx:   Pt's HR controlled. BP improved from yesterday. FI O2 vent weaned to 40%. Urinating appropriately via barry     ROS: A 14 point review of systems was conducted, significant findings as noted above. All other systems negative. OBJECTIVE:   Vitals:   Vitals:    10/01/22 0401 10/01/22 0430 10/01/22 0500 10/01/22 0600   BP:  106/61 (!) 96/57 (!) 109/47   Pulse: 61 62 61 63   Resp: 18 18 18 18   Temp:       TempSrc:       SpO2: 97%  97%    Weight:    188 lb 15 oz (85.7 kg)   Height:           I/O:   Intake/Output Summary (Last 24 hours) at 10/1/2022 0753  Last data filed at 10/1/2022 0600  Gross per 24 hour   Intake 1164 ml   Output 1690 ml   Net -526 ml       I/O last 3 completed shifts: In: 9820 [I.V.:3361; NG/GT:1282]  Out: 4482 [Urine:2530; Chest Tube:190]    Diet: Diet NPO      Physical Examination:   General appearance: Mechanically ventilated. Appropriately nods head in response to questions. HEENT: NC/AT, EOMI, trachea midline, no JVD. Crepitus noted in the distal neck, supraclavicular region. Corpak in right nostril w/ bridle. Tracheostomy in place. Chest/Lungs: Decreased breath sounds bilaterally, on mechanical ventilation; R PleurX catheter to water seal without airleak, has serous output  Cardiovascular: Atrial fibrillation with rate in the 60's currently   Abdomen: Soft, non-tender, non-distended  Genitourinary:  Barry in place with clear yellow urine  Skin: Warm and dry, no rashes  Extremities: No edema, no cyanosis; SCDs in place      Labs:  CBC:   Recent Labs     09/29/22 0425 09/30/22  0342 10/01/22  0357   WBC 21.6* 25.8* 20.3*   HGB 8.0* 8.0* 7.8*   HCT 25.7* 25.8* 24.7*    486* 493*         BMP:   Recent Labs     09/29/22 0425 09/30/22  0342 10/01/22  0357    142 143   K 4.2 4.5 4.1   CL 94* 95* 97*   CO2 39* 37* 35*   * 125* 119*   CREATININE 1.7* 1. 7* 1.9*   GLUCOSE 343* 135* 176*       LFT's:   No results for input(s): AST, ALT, ALB, BILITOT, ALKPHOS in the last 72 hours. Troponin: No results for input(s): TROPONINI in the last 72 hours. BNP: No results for input(s): BNP in the last 72 hours. ABGs:   No results for input(s): PHART, TOY1YTR, PO2ART in the last 72 hours. INR:   No results for input(s): INR in the last 72 hours. U/A:No results for input(s): NITRITE, COLORU, PHUR, LABCAST, WBCUA, RBCUA, MUCUS, TRICHOMONAS, YEAST, BACTERIA, CLARITYU, SPECGRAV, LEUKOCYTESUR, UROBILINOGEN, BILIRUBINUR, BLOODU, GLUCOSEU, AMORPHOUS in the last 72 hours. Invalid input(s): Talya Forde     Rad:   XR CHEST PORTABLE   Final Result      Small right basilar pneumothorax. Right basilar chest tube without change. Patchy consolidation in the right mid and lower lung as well as the left lower lung-atelectasis versus pneumonia. Trace left pleural effusion. Normal heart size. Lines and tubes without change. Mild improvement in degree of subcutaneous emphysema in the chest and neck. XR CHEST 1 VIEW   Final Result      1. Stable small right-sided pneumothorax and prominent subcutaneous emphysema along the neck and upper superior chest wall, greater in right lung stable   2. Stable left basilar consolidation and pleural effusion      3. Stable appearing perihilar accentuated markings or airspace disease            XR CHEST PORTABLE   Final Result      Stable small right-sided pneumothorax. More prominent emphysema over the lower neck. No change in bilateral airspace disease. Stable left pleural effusion. XR CHEST PORTABLE   Final Result   1. Bilateral multifocal pneumonia with improvement in the right    pulmonary consolidation since prior study from 9/23/22   2. Mild to moderate left pleural effusion          XR CHEST PORTABLE   Final Result   1. Support lines and tubes are stable.    2.  Stable small right lateral pneumothorax and right basilar    chest tube. 3.  Stable patchy bilateral airspace disease. XR CHEST PORTABLE   Final Result   1. Satisfactory position of right internal jugular central line   2. No interval change in endotracheal tube and nasogastric tube positioning. 3. Extensive bilateral airspace disease with no changes. 4. Left pleural effusion with retrocardiac opacity, unchanged   5. Small stable tiny right lateral pneumothorax and stable position of right chest tube,, Pleurx catheter. XR ABDOMEN (KUB) (SINGLE AP VIEW)   Final Result   1. No residual pneumothorax. 2.  Mild patchy airspace disease bilaterally worse on the right than on prior examination. 3.  Non-obstructive bowel gas pattern. Mildly distended stomach. XR CHEST PORTABLE   Final Result   1. No residual pneumothorax. 2.  Mild patchy airspace disease bilaterally worse on the right than on prior examination. 3.  Non-obstructive bowel gas pattern. Mildly distended stomach. XR CHEST PORTABLE   Final Result      1. Small right pneumothorax appears slightly increased. 2. Mild patchy airspace opacity bilaterally. XR CHEST PORTABLE   Final Result     Pleurx catheter at the right lung base. Trace right    pneumothorax is unchanged. XR CHEST PORTABLE   Final Result      Right-sided chest tube evident in the base, its tip medially. Improvement in the right-sided pneumothorax, since earlier today. Possible medial collapse of the right lower lobe. Small left effusion. Patchy airspace density/atelectasis in the lungs bilaterally, with no other significant change. XR CHEST PORTABLE   Final Result   1. Right-sided Pleurx catheter in satisfactory position in the lung bases   2.  Right-sided post operative pneumothorax, approximately 10%             ASSESSMENT AND PLAN:   Bernadette Wilks is a 65 y.o. male with history of diastolic heart failure, atrial fibrillation, and DM with recurrent pleural effusions s/p R PleurX catheter placement (9/19), POD #12. Neuro:   Analgesia  - Continue scheduled Tylenol  - Propofol for sedation; wean as tolerated    Cardiovascular:   History of paroxysmal atrial fibrillation  - Holding Apixaban 5mg BID after tracheostomy. Restart in 48 hours postop. - Metoprolol IV held currently for hypotension    HFpEF  - Metoprolol IV (hold due to hypotension), currently on Levo 5 mcg/kg/hr  - [place] held  - Last echo (1/25/22): LVEF = 12-12%, grade 2 diastolic dysfunction    Hyperlipidemia  - Pravastatin 40mg    Currently on levophed of 5  Will discuss starting midodrine with staff in order to wean levophed    Pulmonary:   S/P R PleurX catheter placement (9/19), POD #12  - Continue to water seal.  - SW/HHC pending    Acute on chronic respiratory failure  - Mechanically intubated on 9/22. Currently PRVC 18/450/5/35%. Vent management per pulm. - Tracheostomy POD 1   - Inpatient consult to pulmonology, appreciate recommendations. - Baseline on home O2 3-4L NC   - Continue to wean Solu-medrol. GI:   - Miralax  -TF's restarted postop.    - Recommend holding TF if pressor requirement increases to greater than 5 of Levo    FEN:    -Replace electrolytes per protocol  - Diet: Diet NPO (Will resume TF)   -SLP to evaluate swallow function today. /Renal:   - Cont Ansari   - Creatinine 1.9 from 1.7   -  from 125, 123, 124, 125  -Nephrology consulted. Appreciate recommendations. Hem/ID:   - Hemoglobin 7.8 from 8.0, 8.0, 9.1, 8.7, 8.5, 8.0, 7.8, 7.5  -DDAVP given 0.4 mcg/kg yesterday for uremic bleeding.   - WBC 20.3 from 25.8, 21.6, 27.7, 20.0, 23.6, 19.1, 15.7, 14.7. Leukocytosis 2/2 steroids most likely. Endo:   History of DMII  - Last A1C 8.1% (1/24/22)  - Cont insulin gtt.  Glc this am 176 from 135, 343   - Solu-medrol as above    Prophylaxis:   DVT Ppx: SCDs  GI Ppx: pepcid    Access:  Central Access: R IJ CVC, placed 9/22  Peripheral Access: IV x2  Arterial Line Access: L radial, placed 9/22                              Ansari Date placed: 10/01/22    Mobility:  N/A    Dispo:   ICU. Case management has indicated he would not be accepted to [facility] unless he has hemodialysis in addition to his tracheostomy, but possibly some SNF's might take him.       Code Status: Full Code  -----------------------------  Mario Cardoso DO  PGY1, General Surgery  10/01/22  8:07 AM  [employer]  Pager: [phone]

## 2022-10-01 NOTE — PROGRESS NOTES
Nephrology Progress Note                                                                                                                                                                                                                                                                                                                                                               Office : [phone]     Fax :[phone]    Patient's Name: Alondra Yang  2:06 PM  10/1/2022    Reason for Consult: ZANA  Requesting Physician:  Fahad Jj MD    Interval History:      Cr stable   Good UO   Trach +    Past Medical History:   Diagnosis Date    ZANA (acute kidney injury) ([facility] [address] 9/26/2022    Carotid stenosis, left 10/12/2011    Chronic back pain     Chronic systolic (congestive) heart failure 13/98/9103    Diastolic CHF ([facility] [address]     Erectile dysfunction     Hyperlipidemia     Hypertension     Osteoarthritis     Restrictive lung disease     Type II or unspecified type diabetes mellitus without mention of complication, not stated as uncontrolled        Past Surgical History:   Procedure Laterality Date    CARDIAC CATHETERIZATION  06/2022    KNEE SURGERY Left     PLEURAL CATH INSERTION N/A 9/19/2022    PLEURAL CATHETER PLACEMENT; INTERCOSTAL NERVE BLOCK X4 performed by Danielle Davies MD at [facility] Bilateral     THORACOSCOPY Right 9/19/2022    RIGHT VIDEO ASSISTED THORASCOPIC SURGERY AND; performed by Danielle Davies MD at [address] N/A 9/30/2022    TRACHEOSTOMY performed by Henry Valdes MD at [facility]       Family History   Problem Relation Age of Onset    Hearing Loss Father     Heart Disease Father 70        MI    High Blood Pressure Father     Diabetes Maternal Grandmother         hypoglycemic    Hearing Loss Maternal Grandmother     Arthritis Maternal Grandfather         reports that he quit smoking about 20 years ago. His smoking use included cigarettes.  He has a 80.00 pack-year smoking history. He has never used smokeless tobacco. He reports current alcohol use. He reports that he does not use drugs. Allergies:   Torsemide and Dye [iodides]    Current Medications:    heparin (porcine) injection 5,000 Units, 3 times per day  insulin glargine (LANTUS;BASAGLAR) injection pen 20 Units, Nightly  [Held by provider] fentaNYL (SUBLIMAZE) 1,000 mcg in sodium chloride 0.9% 100 mL infusion, Continuous  insulin lispro (1 Unit Dial) 0-8 Units, TID WC  insulin lispro (1 Unit Dial) 0-4 Units, Nightly  0.9 % sodium chloride infusion, Continuous  sodium chloride flush 0.9 % injection 5-40 mL, 2 times per day  sodium chloride flush 0.9 % injection 5-40 mL, PRN  0.9 % sodium chloride infusion, PRN  fentaNYL (SUBLIMAZE) injection 25 mcg, Q5 Min PRN  HYDROmorphone (DILAUDID) injection 0.5 mg, Q5 Min PRN  ondansetron (ZOFRAN) injection 4 mg, Once PRN  prochlorperazine (COMPAZINE) injection 5 mg, Once PRN  LORazepam (ATIVAN) injection 0.5 mg, Once PRN  diphenhydrAMINE (BENADRYL) injection 12.5 mg, Once PRN  labetalol (NORMODYNE;TRANDATE) injection 10 mg, Q15 Min PRN  Venelex ointment, BID  HYDROmorphone (DILAUDID) injection 0.25 mg, Q3H PRN  amiodarone (CORDARONE) 150 mg in dextrose 5 % 100 mL bolus, Once  amiodarone (CORDARONE) tablet 400 mg, BID   Followed by  Brenda Garcia ON 10/4/2022] amiodarone (CORDARONE) tablet 200 mg, Daily  famotidine (PEPCID) tablet 20 mg, Daily  [Held by provider] propofol injection, Continuous  norepinephrine (LEVOPHED) 16 mg in sodium chloride 0.9 % 250 mL infusion, Continuous  acetaminophen (TYLENOL) 160 MG/5ML solution 650 mg, Q6H PRN  chlorhexidine (PERIDEX) 0.12 % solution 15 mL, BID  [Held by provider] metoprolol (LOPRESSOR) injection 5 mg, Q6H  haloperidol lactate (HALDOL) injection 2 mg, Q6H PRN  albuterol (PROVENTIL) nebulizer solution 2.5 mg, Q4H PRN  [Held by provider] apixaban (ELIQUIS) tablet 5 mg, BID  sodium chloride flush 0.9 % injection 5-40 mL, 2 times per day  sodium chloride flush 0.9 % injection 5-40 mL, PRN  0.9 % sodium chloride infusion, PRN  polyethylene glycol (GLYCOLAX) packet 17 g, Daily  ondansetron (ZOFRAN-ODT) disintegrating tablet 4 mg, Q8H PRN   Or  ondansetron (ZOFRAN) injection 4 mg, Q6H PRN  glucose chewable tablet 16 g, PRN  dextrose bolus 10% 125 mL, PRN   Or  dextrose bolus 10% 250 mL, PRN  glucagon (rDNA) injection 1 mg, PRN  dextrose 10 % infusion, Continuous PRN  hydrALAZINE (APRESOLINE) injection 5 mg, Q15 Min PRN  levalbuterol (XOPENEX) nebulization 0.63 mg, Q4H PRN      Review of Systems:   14 point ROS obtained but were negative except mentioned in HPI      Physical exam:     Vitals:  BP (!) 115/53   Pulse 65   Temp 98.7 °F (37.1 °C) (Axillary)   Resp (!) 9   Ht 6' 0.99\" (1.854 m)   Wt 188 lb 15 oz (85.7 kg)   SpO2 96%   BMI 24.93 kg/m²   Constitutional:  on MV  Skin: no rash, turgor wnl  Heent:  eomi, mmm  Neck: no bruits or jvd noted  Cardiovascular:  S1, S2 without m/r/g  Respiratory: mechanical BS  Abdomen:  +bs, soft, nt, nd  Ext: bilateral lower extremity edema R>L  Psychiatric: mood and affect appropriate  Musculoskeletal:  Rom, muscular strength intact    Data:   Labs:  CBC:   Recent Labs     09/29/22  0425 09/30/22  0342 10/01/22  0357   WBC 21.6* 25.8* 20.3*   HGB 8.0* 8.0* 7.8*    486* 493*       BMP:    Recent Labs     09/29/22  0425 09/30/22  0342 10/01/22  0357    142 143   K 4.2 4.5 4.1   CL 94* 95* 97*   CO2 39* 37* 35*   * 125* 119*   CREATININE 1.7* 1.7* 1.9*   GLUCOSE 343* 135* 176*       Ca/Mg/Phos:   Recent Labs     09/29/22  0425 09/30/22  0342 10/01/22  0357   CALCIUM 8.3 8.4 8.2*   MG 2.90* 3.10* 3.00*   PHOS 3.9 3.8 4.4       Hepatic: No results for input(s): AST, ALT, ALB, BILITOT, ALKPHOS in the last 72 hours. Troponin: No results for input(s): TROPONINI in the last 72 hours. BNP: No results for input(s): BNP in the last 72 hours.   Lipids:   Recent Labs     09/30/22  0342   TRIG 63     ABGs:   No results for input(s): PHART, PO2ART, WLH9IFU in the last 72 hours. INR: No results for input(s): INR in the last 72 hours. UA:No results for input(s): Flores Munch, GLUCOSEU, BILIRUBINUR, Sushila Maw, BLOODU, PHUR, PROTEINU, UROBILINOGEN, NITRU, LEUKOCYTESUR, LABMICR, URINETYPE in the last 72 hours. Urine Microscopic: No results for input(s): LABCAST, BACTERIA, COMU, HYALCAST, WBCUA, RBCUA, EPIU in the last 72 hours. Urine Culture: No results for input(s): LABURIN in the last 72 hours. Urine Chemistry:   No results for input(s): Heriberto Gails, PROTEINUR, NAUR in the last 72 hours. IMAGING:  XR CHEST PORTABLE   Final Result      Small right basilar pneumothorax. Right basilar chest tube without change. Patchy consolidation in the right mid and lower lung as well as the left lower lung-atelectasis versus pneumonia. Trace left pleural effusion. Normal heart size. Lines and tubes without change. Mild improvement in degree of subcutaneous emphysema in the chest and neck. XR CHEST 1 VIEW   Final Result      1. Stable small right-sided pneumothorax and prominent subcutaneous emphysema along the neck and upper superior chest wall, greater in right lung stable   2. Stable left basilar consolidation and pleural effusion      3. Stable appearing perihilar accentuated markings or airspace disease            XR CHEST PORTABLE   Final Result      Stable small right-sided pneumothorax. More prominent emphysema over the lower neck. No change in bilateral airspace disease. Stable left pleural effusion. XR CHEST PORTABLE   Final Result   1. Bilateral multifocal pneumonia with improvement in the right    pulmonary consolidation since prior study from 9/23/22   2. Mild to moderate left pleural effusion          XR CHEST PORTABLE   Final Result   1. Support lines and tubes are stable.    2.  Stable small right lateral pneumothorax and right basilar    chest tube.   3.  Stable patchy bilateral airspace disease. XR CHEST PORTABLE   Final Result   1. Satisfactory position of right internal jugular central line   2. No interval change in endotracheal tube and nasogastric tube positioning. 3. Extensive bilateral airspace disease with no changes. 4. Left pleural effusion with retrocardiac opacity, unchanged   5. Small stable tiny right lateral pneumothorax and stable position of right chest tube,, Pleurx catheter. XR ABDOMEN (KUB) (SINGLE AP VIEW)   Final Result   1. No residual pneumothorax. 2.  Mild patchy airspace disease bilaterally worse on the right than on prior examination. 3.  Non-obstructive bowel gas pattern. Mildly distended stomach. XR CHEST PORTABLE   Final Result   1. No residual pneumothorax. 2.  Mild patchy airspace disease bilaterally worse on the right than on prior examination. 3.  Non-obstructive bowel gas pattern. Mildly distended stomach. XR CHEST PORTABLE   Final Result      1. Small right pneumothorax appears slightly increased. 2. Mild patchy airspace opacity bilaterally. XR CHEST PORTABLE   Final Result     Pleurx catheter at the right lung base. Trace right    pneumothorax is unchanged. XR CHEST PORTABLE   Final Result      Right-sided chest tube evident in the base, its tip medially. Improvement in the right-sided pneumothorax, since earlier today. Possible medial collapse of the right lower lobe. Small left effusion. Patchy airspace density/atelectasis in the lungs bilaterally, with no other significant change. XR CHEST PORTABLE   Final Result   1. Right-sided Pleurx catheter in satisfactory position in the lung bases   2. Right-sided post operative pneumothorax, approximately 10%             Assessment/Plan   ZANA  - suspect this is contrast nephropathy  - baseline Cre 0.7.  Normal on admission.  - Cre 0.7-->1.1-->1.4-->1.6-->1.7-->1.7-->1.7-->1.9  - Hold diuretics   - BNP 5k, Protein:Cre 0.3, FENa 0.4%  - closely monitor UOP  - avoid nephrotoxic agent     2. Hypotension  - better     3. Anemia  - Hgb 8.7 (9.0 on admission)  - daily CBC    4. Acid- base/ Electrolyte imbalance   - optimize lytes    5. Acute hypoxic resp failure  - s/p VATS on 9/19/22 for recurrent pleural effusions  - on MV. Plan for tracheostomy today  - Intensivist and CTS following    6.  CHF   - EF 65% on 6/16/22 via cardiac cath        Jabari Ramires MD

## 2022-10-01 NOTE — PROGRESS NOTES
Speech Language Pathology    Received evaluation order. Reviewed chart, spoke with RN. Will hold; pt not consistently closing mouth/following directions, and coming off of sedation today. Will plan to f/u tomorrow as appropriate. [place], [address] [place] [address], .[phone]  Pg.  # [phone]

## 2022-10-02 NOTE — PROGRESS NOTES
CT Surg  POC    Notified patient progressively tachycardic. Asked nursing staff to reach out to critical care. Will order ECG, currently on PO amio, may need resumption of IV. Also patient hyperglycemic, placed on SSI with meals, patient currently NPO, switched to Q4Hrs as sugars in the 350s.   Tube feed formula changed, may consider diabetic formula given hyperglycemia    Darell James MD   Gen Surg PGY 5  10/2/2022  5:43 PM  [phone]

## 2022-10-02 NOTE — PROGRESS NOTES
Restraints removed to give the patient a break. He has made no attempts at removing equipment. I explained that he cannot pull anything off and he nodded in agreement. Wife updated and states she will also remind him if he forgets.

## 2022-10-02 NOTE — PROGRESS NOTES
ICU Progress Note  PGY-1    Admit Date: 9/19/2022  Day: 12  Vent Day: 9  IV Access:CVC triple lumen, peripheral  IV Fluids:None  Vasopressors: levo          Antibiotics:   Diet: Diet NPO    CC: Pleural effusion (right) s/p VATS and Pleural catheter (PleurX) placement    Interval history:   Pt awake off sedation, responsive to voice, follows commands consistently. Chest tube with less drainage. Remains on pressors this AM. Wife at bedside. HPI:  Jairo Jones is a 64 yo Male with a PMH of CHF (CHFpEF), carotid stenosis, HLD, HTN, OA, Restrictive Lung Disease, T2DM who underwent VATS with Pleurx catheter placement on 9/19/2022 for recurrent bilateral loculated pleural effusions and has had hypoxia/hypercapnia since that time now requiring intubation. The etiology of his recurrent pleural effusions is unclear. Possibly malignancy. In addition to his recurrent bilateral loculated pleural effusions, he has lost over 100 lbs over the last year, some of which his wife attributes to intentional weight loss. Initially, he was started on BiPAP post-procedure for rapid shallow breathing and slow clearance of sedation. He wasn't tolerating the BiPAP well, trying to remove it. After discussion with family and patient (while on a break from BiPAP), the decision was made to intubate and confirmed that he's OK with tracheostomy if necessary. \"    Medications:     Scheduled Meds:   heparin (porcine)  5,000 Units SubCUTAneous 3 times per day    insulin glargine  20 Units SubCUTAneous Nightly    insulin lispro  0-8 Units SubCUTAneous TID WC    insulin lispro  0-4 Units SubCUTAneous Nightly    sodium chloride flush  5-40 mL IntraVENous 2 times per day    Venelex   Topical BID    amiodarone bolus  150 mg IntraVENous Once    amiodarone  400 mg Oral BID    Followed by    Erica Wilson ON 10/4/2022] amiodarone  200 mg Oral Daily    famotidine  20 mg Per NG tube Daily    chlorhexidine  15 mL Mouth/Throat BID    [Held by provider] metoprolol  5 mg IntraVENous Q6H    [Held by provider] apixaban  5 mg Oral BID    sodium chloride flush  5-40 mL IntraVENous 2 times per day    polyethylene glycol  17 g Oral Daily     Continuous Infusions:   [Held by provider] fentaNYL Stopped (10/01/22 1121)    sodium chloride      sodium chloride      [Held by provider] propofol Stopped (10/01/22 1121)    norepinephrine 5 mcg/min (09/30/22 1859)    sodium chloride      dextrose       PRN Meds:sodium chloride flush, sodium chloride, fentanNYL, HYDROmorphone, labetalol, HYDROmorphone, acetaminophen, haloperidol lactate, albuterol, sodium chloride flush, sodium chloride, ondansetron **OR** ondansetron, glucose, dextrose bolus **OR** dextrose bolus, glucagon (rDNA), dextrose, hydrALAZINE, levalbuterol    Objective:   Vitals:   T-max:  Patient Vitals for the past 8 hrs:   BP Temp Temp src Pulse Resp SpO2 Weight   10/02/22 0716 -- -- -- 77 22 93 % --   10/02/22 0524 -- -- -- -- -- -- 190 lb 0.6 oz (86.2 kg)   10/02/22 0401 -- -- -- 85 21 93 % --   10/02/22 0001 -- -- -- 85 23 92 % --   10/02/22 0000 109/66 98.4 °F (36.9 °C) Axillary 72 16 -- --         Intake/Output Summary (Last 24 hours) at 10/2/2022 0744  Last data filed at 10/2/2022 0350  Gross per 24 hour   Intake 918.24 ml   Output 1460 ml   Net -541.76 ml         Review of Systems - denies any pain    Physical Exam  Constitutional:       Comments: Intubated with tracheostomy, awake with eyes open, arousable to voice and responds to questions appropriately with nods   HENT:      Head: Normocephalic. Nose:      Comments: NG tube in place  Eyes:      Pupils: Pupils are equal, round, and reactive to light. Neck:      Comments: Tracheostomy  Cardiovascular:      Rate and Rhythm: Normal rate and regular rhythm. Pulses: Normal pulses. Heart sounds: Normal heart sounds. Pulmonary:      Comments: On ventilator. Pleurx catheter in place with clear yellow output.    Abdominal:      General: Abdomen is flat.      Palpations: Abdomen is soft. Musculoskeletal:      Right lower leg: Edema present. Left lower leg: Edema present. Skin:     General: Skin is warm and dry. Neurological:      Comments: Responds to voice, answers questions appropriately, follows commands       LABS:    CBC:   Recent Labs     09/30/22  0342 10/01/22  0357 10/02/22  0332   WBC 25.8* 20.3* 22.6*   HGB 8.0* 7.8* 8.0*   HCT 25.8* 24.7* 25.3*   * 493* 474*   MCV 83.6 83.7 84.6       Renal:    Recent Labs     09/30/22  0342 10/01/22  0357 10/02/22  0332    143 148*   K 4.5 4.1 4.1   CL 95* 97* 101   CO2 37* 35* 36*   * 119* 113*   CREATININE 1.7* 1.9* 1.8*   GLUCOSE 135* 176* 136*   CALCIUM 8.4 8.2* 8.2*   MG 3.10* 3.00* 3.10*   PHOS 3.8 4.4 4.7   ANIONGAP 10 11 11       Hepatic:   Recent Labs     09/30/22  0342 10/01/22  0357 10/02/22  0332   LABALBU 2.4* 2.4* 2.4*       Troponin: No results for input(s): TROPONINI in the last 72 hours. BNP: No results for input(s): BNP in the last 72 hours. Lipids: No results for input(s): CHOL, HDL in the last 72 hours. Invalid input(s): LDLCALCU, TRIGLYCERIDE  ABGs:    No results for input(s): PHART, AGE2LID, PO2ART, QIJ6FRH, BEART, THGBART, P8TZGOCJ, ZLQ9YXH in the last 72 hours. INR: No results for input(s): INR in the last 72 hours. Lactate:   No results for input(s): LACTATE in the last 72 hours. Cultures:  -----------------------------------------------------------------  RAD:   XR CHEST PORTABLE   Final Result      Small right basilar pneumothorax. Right basilar chest tube without change. Patchy consolidation in the right mid and lower lung as well as the left lower lung-atelectasis versus pneumonia. Trace left pleural effusion. Normal heart size. Lines and tubes without change. Mild improvement in degree of subcutaneous emphysema in the chest and neck. XR CHEST 1 VIEW   Final Result      1.  Stable small right-sided pneumothorax and prominent subcutaneous emphysema along the neck and upper superior chest wall, greater in right lung stable   2. Stable left basilar consolidation and pleural effusion      3. Stable appearing perihilar accentuated markings or airspace disease            XR CHEST PORTABLE   Final Result      Stable small right-sided pneumothorax. More prominent emphysema over the lower neck. No change in bilateral airspace disease. Stable left pleural effusion. XR CHEST PORTABLE   Final Result   1. Bilateral multifocal pneumonia with improvement in the right    pulmonary consolidation since prior study from 9/23/22   2. Mild to moderate left pleural effusion          XR CHEST PORTABLE   Final Result   1. Support lines and tubes are stable. 2.  Stable small right lateral pneumothorax and right basilar    chest tube. 3.  Stable patchy bilateral airspace disease. XR CHEST PORTABLE   Final Result   1. Satisfactory position of right internal jugular central line   2. No interval change in endotracheal tube and nasogastric tube positioning. 3. Extensive bilateral airspace disease with no changes. 4. Left pleural effusion with retrocardiac opacity, unchanged   5. Small stable tiny right lateral pneumothorax and stable position of right chest tube,, Pleurx catheter. XR ABDOMEN (KUB) (SINGLE AP VIEW)   Final Result   1. No residual pneumothorax. 2.  Mild patchy airspace disease bilaterally worse on the right than on prior examination. 3.  Non-obstructive bowel gas pattern. Mildly distended stomach. XR CHEST PORTABLE   Final Result   1. No residual pneumothorax. 2.  Mild patchy airspace disease bilaterally worse on the right than on prior examination. 3.  Non-obstructive bowel gas pattern. Mildly distended stomach. XR CHEST PORTABLE   Final Result      1. Small right pneumothorax appears slightly increased. 2. Mild patchy airspace opacity bilaterally. XR CHEST PORTABLE   Final Result     Pleurx catheter at the right lung base. Trace right    pneumothorax is unchanged. XR CHEST PORTABLE   Final Result      Right-sided chest tube evident in the base, its tip medially. Improvement in the right-sided pneumothorax, since earlier today. Possible medial collapse of the right lower lobe. Small left effusion. Patchy airspace density/atelectasis in the lungs bilaterally, with no other significant change. XR CHEST PORTABLE   Final Result   1. Right-sided Pleurx catheter in satisfactory position in the lung bases   2. Right-sided post operative pneumothorax, approximately 10%               Assessment/Plan:   Marylin Lebron is a 65 y.o. male with a PMH of CHF (CHFpEF), carotid stenosis, HLD, HTN, OA, Restrictive Lung Disease, T2DM who underwent VATS with PleurX catheter placement on 9/19/2022 for recurrent bilateral loculated pleural effusions and has had hypoxia/hypercapnia since that time    Neuro  - off sedation as of 10/1    Pulmonary  Vent Day: 9  Vent: , RR 18, FiO2 35, PEEP 4  Acute hypoxic/hypercapnic respiratory failure  Recurrent Pleural Effusions, s/p VATS and Pleurx  Patient was admitted to ICU for failure to tolerate bipap following procedure. The etiology of his recurrent pleural effusions is unclear. No studies done on fluid from original thoracenteses. Possibly malignancy. VATS fluid results showing an exudative picture, per [facility] Criteria. S/p VATS procedure 9/19, with right PleurX catheter placement. Ventilatory restriction on PFT associated with worsening pleural disease. 9/29 CXR with atelectasis around pluerx cath.   s/p Methylprednisolone IV    - PleurX catheter in place with clear yellow drainage  - may consider breathing trial today  - CTS following    Cardiovascular  Hypotension   Began requiring pressors after intubation, attributed to sedation and positive pressure ventilation  - Levo gtt @ 5, stable Congestive Heart Failure with Preserved Ejection Fraction  EF ~65% 6/16/2022. 9/23 BNP 15k, given lasix at that time, now downtrending.   - remains fluid overloaded on exam with edema to knees bilaterally. - BNP downtrending  - Per nephro held off on diuretics given kidney injury    GI  Diet: Diet NPO  GI ppx: pepcid     Constipation  Cont glycolax    Renal   Ansari in place  ZANA  Potentially ATN from hypotension/contrast   - Nephro following, rec to hold off on diuretics   - Good UOP, cr stable    Metabolic  - Lantus 33N nightly wit MDSI    Code Status: Full Code   PPX: pepcid, heparin   DISPO: ICU    Tiffanie Joyner MD, PGY-1  Internal Medicine Resident  Contact via [facility]  10/02/22  7:44 AM    This patient has been staffed and discussed with Dr Alena Peres    Patient was seen, examined and discussed with Dr. Finesse Baldwin. I agree with the interval history. My physical exam confirms the findings listed below  Chart was reviewed including labs and medical records confirm the findings noted     Acute respiratory failure on mechanical vent support. , RR 18, FiO2 35, PEEP 5. trach on 9/30  Hypotension, still requiring levophed at 5. He is off sedation   Pleural effusion s/p VATS. Pt has high pro-BNP, bilateral effusion, and known diastolic CHF. Leukocytosis. steroids discontinued on 9/30  Paroxysmal A.fib. On amio PO  ZANA:  Pt has good UOP. creatinine is stable.   .       Change BP goal to MAP > 60 and titrate levo accordingly  Restart Eliquis and d/c heparin prophylaxis   Increase free water with TF

## 2022-10-02 NOTE — PROGRESS NOTES
Speech Language Pathology  Facility/Department: [facility]   CLINICAL BEDSIDE SWALLOW EVALUATION/treat    NAME: Bernadette Wilks  :   MRN: [id number]    ADMISSION DATE: 2022  ADMITTING DIAGNOSIS: has HTN (hypertension); Hyperlipidemia; OA (osteoarthritis) of knee; Carotid stenosis, left; Hearing loss; ED (erectile dysfunction); DM type 2 (diabetes mellitus, type 2) ([address] Rotator cuff tear; Glenohumeral arthritis; Subacromial impingement; Trigger ring finger of right hand; Spinal stenosis of lumbar region; Closed compression fracture of L1 lumbar vertebra; Closed displaced fracture of lateral malleolus of left fibula; Exertional dyspnea; Atrial fibrillation with rapid ventricular response ([address] Hypoxia; Chronic heart failure with preserved ejection fraction (HFpEF) ([address] Pleural effusion on right; Acute on chronic respiratory failure with hypoxia and hypercapnia (HCC); and ZANA (acute kidney injury) ([address] on their problem list.  ONSET DATE: 22    Recent Chest Xray 22     Small right basilar pneumothorax. Right basilar chest tube without change. Patchy consolidation in the right mid and lower lung as well as the left lower lung-atelectasis versus pneumonia. Trace left pleural effusion. Normal heart size. Lines and tubes without change. Mild improvement in degree of subcutaneous emphysema in the chest and neck. Date of Eval: 10/2/2022  Evaluating Therapist: SURESH Ellis    Current Diet level:  Current Diet : NPO    Primary Complaint  Not able to state    Pain:  Pain Assessment  Pain Assessment: does not appear in pain    Reason for Referral  Bernadette Wilks was referred for a bedside swallow evaluation to assess the efficiency of his swallow function, identify signs and symptoms of aspiration and make recommendations regarding safe dietary consistencies, effective compensatory strategies, and safe eating environment.     HPI:    Elvira Jackson is a 65 yo Male with a PMH of CHF (CHFpEF), carotid stenosis, HLD, HTN, OA, Restrictive Lung Disease, T2DM who underwent VATS with Pleurx catheter placement on 9/19/2022 for recurrent bilateral loculated pleural effusions and has had hypoxia/hypercapnia since that time now requiring intubation. The etiology of his recurrent pleural effusions is unclear. Possibly malignancy. In addition to his recurrent bilateral loculated pleural effusions, he has lost over 100 lbs over the last year, some of which his wife attributes to intentional weight loss. Initially, he was started on BiPAP post-procedure for rapid shallow breathing and slow clearance of sedation. He wasn't tolerating the BiPAP well, trying to remove it. After discussion with family and patient (while on a break from BiPAP), the decision was made to intubate and confirmed that he's OK with tracheostomy if necessary. \"     Impression  RN states okay to attempt assessment. Dr Rossy Coombs speaking with spouse stating pt most likely still with effects of sedation. Pt was intubated 9/22- 9/30/22. Trach placed 9/30, with pt on ventilator. Pt shook head yes/no intermittently to questions asked. Pt made no attempt to mouth words. Pt exhibiting open mouth posture, did not form labial seal or protrude /lateralize tongue on command. Oral care completed, pt demonstrating no oral response. Placed 2 single ice chips in oral cavity, again with no response - no lingual movement noted. Pt did close lips with tactile stim x 2. No swallow movement was felt upon palpation of anterior neck. O2 sats remained stable and RR remained in mid 20's. Recommend aggressive oral care 2-3 x/day with suction kit. Plan to re-assess as pt appropriate. Dysphagia Diagnosis: Suspected needs further assessment  Dysphagia Outcome Severity Scale: Level 1: Severe dysphagia- NPO.  Unable to tolerate any PO safely     Treatment Plan  Requires SLP Intervention: Yes     Recommended Diet and Intervention  NPO - cont alternate form of nutrition  Aggressive oral care with suction kit, 2-3 x/day  Re-assessment as pt appropriate    Recommended Form of Meds: Via alternative means of nutrition  Therapeutic Interventions: Oral care; Patient/Family education    Compensatory Swallowing Strategies  N/a    Treatment/Goals  1-The patient will tolerate repeat bedside swallowing evaluation when able    2-The patient/caregiver will demonstrate understanding of compensatory strategies for improved swallowing safety  10/2: pt and spouse educated to purpose of visit. Explained impact trach/vent can have on swallowing. Discussed plan to re-assess as pt is more alert and appropriate and probable need for MBS or FEES. Also discussed possible PMV as appropriate. Explained importance of and instruction for completion of aggressive oral care. Spouse stated good understanding, pt with questionable full comprehension    General  Chart Reviewed: Yes  Behavior/Cognition: Lethargic  Respiratory Status: Trach cuff  O2 Device: Ventilator  Communication Observation: Non-verbal (due to trach, not making attempt to communicate)  Dentition: Adequate  Patient Positioning: Upright in bed  Volitional Cough:  (n/a)  Prior Dysphagia History: none    Vision/Hearing  Vision  Vision: Within Functional Limits  Hearing  Hearing: Exceptions to [facility] Exceptions: Hard of hearing/hearing concerns    Oral Motor Deficits  Pt with open mouth posture, did not form labial seal or protrude /lateralize tongue on command.      Prognosis  Individuals consulted  Consulted and agree with results and recommendations: Patient;RN;Family member  Family member consulted: spouse  RN Name: Cara Mendez  Patient Education: Pt and spouse educated to purpose of visit  Patient Education Response: Verbalizes understanding  Safety Devices in place: Yes  Type of devices: Call light within reach       Therapy Time  [phone]    Plan:  Diet recommendation: NPO - cont alternate means of nutrition  Re-assessment bedside as pt appropriate  Aggressive oral care with suction kit 2-3 x/day  Instrumental exam when pt appropriate  PMV assessment as pt appropriate  Dc recommendation: ongoing speech therapy indicated at this time  Tx goal: not able to state  Dc goal: not able to state  Rosie Mckeon M.S./[facility]-SLP #[id number]  Pg.  # [phone]  Needs met prior to leaving room, call light within reach, d/w MOUSTAPHA Davison  This document will serve as a dc summary if pt dc prior to next visit  10/2/2022 10:13 AM

## 2022-10-02 NOTE — PLAN OF CARE
Problem: Chronic Conditions and Co-morbidities  Goal: Patient's chronic conditions and co-morbidity symptoms are monitored and maintained or improved  Outcome: Progressing  Flowsheets (Taken 10/2/2022 0800)  Care Plan - Patient's Chronic Conditions and Co-Morbidity Symptoms are Monitored and Maintained or Improved:   Monitor and assess patient's chronic conditions and comorbid symptoms for stability, deterioration, or improvement   Collaborate with multidisciplinary team to address chronic and comorbid conditions and prevent exacerbation or deterioration   Update acute care plan with appropriate goals if chronic or comorbid symptoms are exacerbated and prevent overall improvement and discharge     Problem: Discharge Planning  Goal: Discharge to home or other facility with appropriate resources  Outcome: Progressing  Flowsheets (Taken 10/2/2022 0800)  Discharge to home or other facility with appropriate resources:   Identify barriers to discharge with patient and caregiver   Arrange for needed discharge resources and transportation as appropriate   Identify discharge learning needs (meds, wound care, etc)   Arrange for interpreters to assist at discharge as needed   Refer to discharge planning if patient needs post-hospital services based on physician order or complex needs related to functional status, cognitive ability or social support system     Problem: Pain  Goal: Verbalizes/displays adequate comfort level or baseline comfort level  Outcome: Progressing     Problem: Safety - Adult  Goal: Free from fall injury  Outcome: Progressing  Flowsheets (Taken 10/1/2022 1933 by Karissa Reaves RN)  Free From Fall Injury: Instruct family/caregiver on patient safety     Problem: Skin/Tissue Integrity  Goal: Absence of new skin breakdown  Description: 1. Monitor for areas of redness and/or skin breakdown  2. Assess vascular access sites hourly  3. Every 4-6 hours minimum:  Change oxygen saturation probe site  4. Every 4-6 hours:  If on nasal continuous positive airway pressure, respiratory therapy assess nares and determine need for appliance change or resting period. Outcome: Progressing     Problem: Safety - Medical Restraint  Goal: Remains free of injury from restraints (Restraint for Interference with Medical Device)  Description: INTERVENTIONS:  1. Determine that other, less restrictive measures have been tried or would not be effective before applying the restraint  2. Evaluate the patient's condition at the time of restraint application  3. Inform patient/family regarding the reason for restraint  4.  Q2H: Monitor safety, psychosocial status, comfort, nutrition and hydration  Outcome: Progressing  Flowsheets  Taken 10/2/2022 0800 by Vitaly Sandy RN  Remains free of injury from restraints (restraint for interference with medical device):   Determine that other, less restrictive measures have been tried or would not be effective before applying the restraint   Evaluate the patient's condition at the time of restraint application   Inform patient/family regarding the reason for restraint   Every 2 hours: Monitor safety, psychosocial status, comfort, nutrition and hydration  Taken 10/2/2022 0600 by Hazel Medeiros RN  Remains free of injury from restraints (restraint for interference with medical device): Determine that other, less restrictive measures have been tried or would not be effective before applying the restraint  Taken 10/2/2022 0400 by Hazel Medeiros RN  Remains free of injury from restraints (restraint for interference with medical device): Determine that other, less restrictive measures have been tried or would not be effective before applying the restraint  Taken 10/2/2022 0200 by Hazel Medeiros RN  Remains free of injury from restraints (restraint for interference with medical device): Determine that other, less restrictive measures have been tried or would not be effective before applying the restraint  Taken 10/2/2022 0000 by Douglas Newby RN  Remains free of injury from restraints (restraint for interference with medical device): Determine that other, less restrictive measures have been tried or would not be effective before applying the restraint  Taken 10/1/2022 2200 by Douglas Newby RN  Remains free of injury from restraints (restraint for interference with medical device): Determine that other, less restrictive measures have been tried or would not be effective before applying the restraint  Taken 10/1/2022 2000 by Douglas Newby RN  Remains free of injury from restraints (restraint for interference with medical device): Determine that other, less restrictive measures have been tried or would not be effective before applying the restraint     Problem: Nutrition Deficit:  Goal: Optimize nutritional status  Outcome: Progressing     Problem: ABCDS Injury Assessment  Goal: Absence of physical injury  Outcome: Progressing  Flowsheets (Taken 10/1/2022 1933 by Douglas Newby RN)  Absence of Physical Injury: Implement safety measures based on patient assessment     Problem: Confusion  Goal: Confusion, delirium, dementia, or psychosis is improved or at baseline  Description: INTERVENTIONS:  1. Assess for possible contributors to thought disturbance, including medications, impaired vision or hearing, underlying metabolic abnormalities, dehydration, psychiatric diagnoses, and notify attending LIP  2. [place] high risk fall precautions, as indicated  3. Provide frequent short contacts to provide reality reorientation, refocusing and direction  4. Decrease environmental stimuli, including noise as appropriate  5. Monitor and intervene to maintain adequate nutrition, hydration, elimination, sleep and activity  6. If unable to ensure safety without constant attention obtain sitter and review sitter guidelines with assigned personnel  7.  Initiate Psychosocial CNS and Spiritual Care consult, as indicated  Outcome: Progressing  Flowsheets (Taken 10/2/2022 0800)  Effect of thought disturbance (confusion, delirium, dementia, or psychosis) are managed with adequate functional status:   Assess for contributors to thought disturbance, including medications, impaired vision or hearing, underlying metabolic abnormalities, dehydration, psychiatric diagnoses, notify [place] high risk fall precautions, as indicated   Provide frequent short contacts to provide reality reorientation, refocusing and direction   Decrease environmental stimuli, including noise as appropriate   Monitor and intervene to maintain adequate nutrition, hydration, elimination, sleep and activity   If unable to ensure safety without constant attention obtain sitter and review sitter guidelines with assigned personnel   Initiate Psychosocial Clinical Nurse Specialist and [facility] consult, as indicated

## 2022-10-02 NOTE — PROGRESS NOTES
ICU CT SURGERY DAILY PROGRESS NOTE    CC: Pleural effusions s/p R PleurX catheter placement    SUBJECTIVE:   Interval Hx:   Pt's HR controlled. BP improved from yesterday. FI O2 vent weaned to 35%. Urinating appropriately via barry. Remains on levophed. Still drowsy. ROS: A 14 point review of systems was conducted, significant findings as noted above. All other systems negative. OBJECTIVE:   Vitals:   Vitals:    10/02/22 0001 10/02/22 0401 10/02/22 0524 10/02/22 0716   BP:       Pulse: 85 85  77   Resp: 23 21 22   Temp:       TempSrc:       SpO2: 92% 93%  93%   Weight:   190 lb 0.6 oz (86.2 kg)    Height:           I/O:   Intake/Output Summary (Last 24 hours) at 10/2/2022 0755  Last data filed at 10/2/2022 0350  Gross per 24 hour   Intake 918.24 ml   Output 1460 ml   Net -541.76 ml       I/O last 3 completed shifts: In: 1586.2 [I.V.:585.2; NG/GT:1001]  Out: 26 [Urine:2100; Chest Tube:120]    Diet: Diet NPO      Physical Examination:   General appearance: Mechanically ventilated. Appropriately nods head in response to questions. HEENT: NC/AT, EOMI, trachea midline, no JVD. Crepitus noted in the distal neck, supraclavicular region. Corpak in right nostril w/ bridle. Tracheostomy in place. Chest/Lungs: Decreased breath sounds bilaterally, on mechanical ventilation; R PleurX catheter to water seal without airleak, has serous output  Cardiovascular: Atrial fibrillation with rate in the 70's currently   Abdomen: Soft, non-tender, non-distended  Genitourinary:  Barry in place with clear yellow urine  Skin: Warm and dry, no rashes  Extremities: No edema, no cyanosis; SCDs in place      Labs:  CBC:   Recent Labs     09/30/22  0342 10/01/22  0357 10/02/22  0332   WBC 25.8* 20.3* 22.6*   HGB 8.0* 7.8* 8.0*   HCT 25.8* 24.7* 25.3*   * 493* 474*         BMP:   Recent Labs     09/30/22  0342 10/01/22  0357 10/02/22  0332    143 148*   K 4.5 4.1 4.1   CL 95* 97* 101   CO2 37* 35* 36*   * 119* 113* CREATININE 1.7* 1.9* 1.8*   GLUCOSE 135* 176* 136*       LFT's:   No results for input(s): AST, ALT, ALB, BILITOT, ALKPHOS in the last 72 hours. Troponin: No results for input(s): TROPONINI in the last 72 hours. BNP: No results for input(s): BNP in the last 72 hours. ABGs:   No results for input(s): PHART, UXX6MYB, PO2ART in the last 72 hours. INR:   No results for input(s): INR in the last 72 hours. U/A:No results for input(s): NITRITE, COLORU, PHUR, LABCAST, WBCUA, RBCUA, MUCUS, TRICHOMONAS, YEAST, BACTERIA, CLARITYU, SPECGRAV, LEUKOCYTESUR, UROBILINOGEN, BILIRUBINUR, BLOODU, GLUCOSEU, AMORPHOUS in the last 72 hours. Invalid input(s): Javy Ends     Rad:   XR CHEST PORTABLE   Final Result      Small right basilar pneumothorax. Right basilar chest tube without change. Patchy consolidation in the right mid and lower lung as well as the left lower lung-atelectasis versus pneumonia. Trace left pleural effusion. Normal heart size. Lines and tubes without change. Mild improvement in degree of subcutaneous emphysema in the chest and neck. XR CHEST 1 VIEW   Final Result      1. Stable small right-sided pneumothorax and prominent subcutaneous emphysema along the neck and upper superior chest wall, greater in right lung stable   2. Stable left basilar consolidation and pleural effusion      3. Stable appearing perihilar accentuated markings or airspace disease            XR CHEST PORTABLE   Final Result      Stable small right-sided pneumothorax. More prominent emphysema over the lower neck. No change in bilateral airspace disease. Stable left pleural effusion. XR CHEST PORTABLE   Final Result   1. Bilateral multifocal pneumonia with improvement in the right    pulmonary consolidation since prior study from 9/23/22   2. Mild to moderate left pleural effusion          XR CHEST PORTABLE   Final Result   1. Support lines and tubes are stable.    2.  Stable small right lateral pneumothorax and right basilar    chest tube. 3.  Stable patchy bilateral airspace disease. XR CHEST PORTABLE   Final Result   1. Satisfactory position of right internal jugular central line   2. No interval change in endotracheal tube and nasogastric tube positioning. 3. Extensive bilateral airspace disease with no changes. 4. Left pleural effusion with retrocardiac opacity, unchanged   5. Small stable tiny right lateral pneumothorax and stable position of right chest tube,, Pleurx catheter. XR ABDOMEN (KUB) (SINGLE AP VIEW)   Final Result   1. No residual pneumothorax. 2.  Mild patchy airspace disease bilaterally worse on the right than on prior examination. 3.  Non-obstructive bowel gas pattern. Mildly distended stomach. XR CHEST PORTABLE   Final Result   1. No residual pneumothorax. 2.  Mild patchy airspace disease bilaterally worse on the right than on prior examination. 3.  Non-obstructive bowel gas pattern. Mildly distended stomach. XR CHEST PORTABLE   Final Result      1. Small right pneumothorax appears slightly increased. 2. Mild patchy airspace opacity bilaterally. XR CHEST PORTABLE   Final Result     Pleurx catheter at the right lung base. Trace right    pneumothorax is unchanged. XR CHEST PORTABLE   Final Result      Right-sided chest tube evident in the base, its tip medially. Improvement in the right-sided pneumothorax, since earlier today. Possible medial collapse of the right lower lobe. Small left effusion. Patchy airspace density/atelectasis in the lungs bilaterally, with no other significant change. XR CHEST PORTABLE   Final Result   1. Right-sided Pleurx catheter in satisfactory position in the lung bases   2.  Right-sided post operative pneumothorax, approximately 10%             ASSESSMENT AND PLAN:   Mirian Luna is a 65 y.o. male with history of diastolic heart failure, atrial fibrillation, and DM with recurrent pleural effusions s/p R PleurX catheter placement (9/19), POD #13. Neuro:   Analgesia  - Continue scheduled Tylenol  - Propofol off    Cardiovascular:   History of paroxysmal atrial fibrillation  - Holding Apixaban 5mg BID after tracheostomy.  - Metoprolol IV held currently for hypotension    HFpEF  - Metoprolol IV (hold due to hypotension), currently on Levo 5 mcg/kg/hr  - [place] held  - Last echo (1/25/22): LVEF = 01-45%, grade 2 diastolic dysfunction    Hyperlipidemia  - Pravastatin 40mg    Currently on levophed of 3  Will discuss starting midodrine with staff in order to wean levophed    Pulmonary:   S/P R PleurX catheter placement (9/19), POD #13  - Continue to water seal.  - SW/HHC pending    Acute on chronic respiratory failure  - Mechanically intubated on 9/22. Currently PRVC 18/450/5/35%. Vent management per pulm. - Tracheostomy 9/30  - Inpatient consult to pulmonology, appreciate recommendations. - Baseline on home O2 3-4L NC   - Continue to wean Solu-medrol. GI:   - Miralax  -TF's restarted postop.    - Recommend holding TF if pressor requirement increases to greater than 5 of Levo    FEN:    -Replace electrolytes per protocol  - Diet: Diet NPO (Will resume TF)   -SLP to evaluate swallow function today. /Renal:   - Cont Ansari   - Creatinine 1.8 from 1.9 from 1.7   - BUN  113 from 119 from 125, 123, 124, 125  -Nephrology consulted. Appreciate recommendations. Hem/ID:   - Hemoglobin stable, 8.0 from 7.8  - WBC 22.6 from  20.3, will continue to monitor     Endo:   History of DMII  - Last A1C 8.1% (1/24/22)  - Cont insulin gtt. Glc this am 265, 187  - Solu-medrol as above    Prophylaxis:   DVT Ppx: SCDs  GI Ppx: pepcid    Access:  Central Access: R IJ CVC, placed 9/22  Peripheral Access: IV x2  Arterial Line Access: L radial, placed 9/22                              Ansari Date placed: 10/02/22     Mobility:  N/A    Dispo:   ICU.   Case management has indicated he would not be accepted to [facility] unless he has hemodialysis in addition to his tracheostomy, but possibly some SNF's might take him.       Code Status: Full Code  -----------------------------  Maribel Lopez DO  PGY1, General Surgery  10/02/22  7:55 AM  [place]  Pager: [phone]

## 2022-10-03 NOTE — PROGRESS NOTES
Nephrology Progress Note                                                                                                                                                                                                                                                                                                                                                               Office : [phone]     Fax :[phone]    Patient's Name: Anders Moreno    10/3/2022    Reason for Consult: ZANA  Requesting Physician:  Kierra Rubi MD    Interval History:      Cr stable   Good UO   Trach +    Past Medical History:   Diagnosis Date    ZANA (acute kidney injury) ([facility] [address] 9/26/2022    Carotid stenosis, left 10/12/2011    Chronic back pain     Chronic systolic (congestive) heart failure 35/71/6269    Diastolic CHF ([facility] [address]     Erectile dysfunction     Hyperlipidemia     Hypertension     Osteoarthritis     Restrictive lung disease     Type II or unspecified type diabetes mellitus without mention of complication, not stated as uncontrolled        Past Surgical History:   Procedure Laterality Date    CARDIAC CATHETERIZATION  06/2022    KNEE SURGERY Left     PLEURAL CATH INSERTION N/A 9/19/2022    PLEURAL CATHETER PLACEMENT; INTERCOSTAL NERVE BLOCK X4 performed by Inge Liao MD at [address] Bilateral     THORACOSCOPY Right 9/19/2022    RIGHT VIDEO ASSISTED THORASCOPIC SURGERY AND; performed by Inge Liao MD at [address] N/A 9/30/2022    TRACHEOSTOMY performed by Liliane Pham MD at [facility] OR       Family History   Problem Relation Age of Onset    Hearing Loss Father     Heart Disease Father 70        MI    High Blood Pressure Father     Diabetes Maternal Grandmother         hypoglycemic    Hearing Loss Maternal Grandmother     Arthritis Maternal Grandfather         reports that he quit smoking about 20 years ago. His smoking use included cigarettes.  He has a 80.00 pack-year smoking history. He has never used smokeless tobacco. He reports current alcohol use. He reports that he does not use drugs. Allergies:   Torsemide and Dye [iodides]    Current Medications:    norepinephrine (LEVOPHED) 16 mg in sodium chloride 0.9 % 250 mL infusion, Continuous  insulin lispro (1 Unit Dial) (HUMALOG/ADMELOG) pen 0-16 Units, Q4H  insulin glargine (LANTUS;BASAGLAR) injection pen 20 Units, Nightly  sodium chloride flush 0.9 % injection 5-40 mL, 2 times per day  sodium chloride flush 0.9 % injection 5-40 mL, PRN  0.9 % sodium chloride infusion, PRN  fentaNYL (SUBLIMAZE) injection 25 mcg, Q5 Min PRN  HYDROmorphone (DILAUDID) injection 0.5 mg, Q5 Min PRN  labetalol (NORMODYNE;TRANDATE) injection 10 mg, Q15 Min PRN  Venelex ointment, BID  HYDROmorphone (DILAUDID) injection 0.25 mg, Q3H PRN  amiodarone (CORDARONE) 150 mg in dextrose 5 % 100 mL bolus, Once  amiodarone (CORDARONE) tablet 400 mg, BID   Followed by  Eve Lloyd ON 10/4/2022] amiodarone (CORDARONE) tablet 200 mg, Daily  famotidine (PEPCID) tablet 20 mg, Daily  acetaminophen (TYLENOL) 160 MG/5ML solution 650 mg, Q6H PRN  chlorhexidine (PERIDEX) 0.12 % solution 15 mL, BID  [Held by provider] metoprolol (LOPRESSOR) injection 5 mg, Q6H  haloperidol lactate (HALDOL) injection 2 mg, Q6H PRN  albuterol (PROVENTIL) nebulizer solution 2.5 mg, Q4H PRN  apixaban (ELIQUIS) tablet 5 mg, BID  sodium chloride flush 0.9 % injection 5-40 mL, 2 times per day  sodium chloride flush 0.9 % injection 5-40 mL, PRN  0.9 % sodium chloride infusion, PRN  polyethylene glycol (GLYCOLAX) packet 17 g, Daily  ondansetron (ZOFRAN-ODT) disintegrating tablet 4 mg, Q8H PRN   Or  ondansetron (ZOFRAN) injection 4 mg, Q6H PRN  glucose chewable tablet 16 g, PRN  dextrose bolus 10% 125 mL, PRN   Or  dextrose bolus 10% 250 mL, PRN  glucagon (rDNA) injection 1 mg, PRN  dextrose 10 % infusion, Continuous PRN  hydrALAZINE (APRESOLINE) injection 5 mg, Q15 Min PRN  levalbuterol ([place]) nebulization 0.63 mg, Q4H PRN      Review of Systems:   14 point ROS obtained but were negative except mentioned in HPI      Physical exam:     Vitals:  BP (!) 111/57   Pulse 76   Temp 98 °F (36.7 °C) (Axillary)   Resp 13   Ht 6' 0.99\" (1.854 m)   Wt 190 lb 0.6 oz (86.2 kg)   SpO2 95%   BMI 25.08 kg/m²   Constitutional:  on MV  Skin: no rash, turgor wnl  Heent:  eomi, mmm  Neck: no bruits or jvd noted  Cardiovascular:  S1, S2 without m/r/g  Respiratory: mechanical BS  Abdomen:  +bs, soft, nt, nd  Ext: bilateral lower extremity edema R>L  Psychiatric: mood and affect appropriate  Musculoskeletal:  Rom, muscular strength intact    Data:   Labs:  CBC:   Recent Labs     10/01/22  0357 10/02/22  0332 10/03/22  0407   WBC 20.3* 22.6* 22.4*   HGB 7.8* 8.0* 7.5*   * 474* 446       BMP:    Recent Labs     10/01/22  0357 10/02/22  0332 10/03/22  0407    148* 151*   K 4.1 4.1 3.5   CL 97* 101 103   CO2 35* 36* 36*   * 113* 112*   CREATININE 1.9* 1.8* 1.8*   GLUCOSE 176* 136* 185*       Ca/Mg/Phos:   Recent Labs     10/01/22  0357 10/02/22  0332 10/03/22  0407   CALCIUM 8.2* 8.2* 8.0*   MG 3.00* 3.10*  --    PHOS 4.4 4.7 4.7       Hepatic: No results for input(s): AST, ALT, ALB, BILITOT, ALKPHOS in the last 72 hours. Troponin: No results for input(s): TROPONINI in the last 72 hours. BNP: No results for input(s): BNP in the last 72 hours. Lipids: No results for input(s): CHOL, TRIG, HDL, LDLCALC, LABVLDL in the last 72 hours. ABGs:   No results for input(s): PHART, PO2ART, NGV4RQM in the last 72 hours. INR: No results for input(s): INR in the last 72 hours. UA:No results for input(s): Marinus Melvin, GLUCOSEU, BILIRUBINUR, Varghese Delgado, BLOODU, PHUR, PROTEINU, UROBILINOGEN, NITRU, LEUKOCYTESUR, LABMICR, URINETYPE in the last 72 hours. Urine Microscopic: No results for input(s): LABCAST, BACTERIA, COMU, HYALCAST, WBCUA, RBCUA, EPIU in the last 72 hours.   Urine Culture: No results for input(s): LABURIN in the last 72 hours. Urine Chemistry:   No results for input(s): Sherial Blower, PROTEINUR, NAUR in the last 72 hours. IMAGING:  XR CHEST PORTABLE   Final Result      Small right basilar pneumothorax. Right basilar chest tube without change. Patchy consolidation in the right mid and lower lung as well as the left lower lung-atelectasis versus pneumonia. Trace left pleural effusion. Normal heart size. Lines and tubes without change. Mild improvement in degree of subcutaneous emphysema in the chest and neck. XR CHEST 1 VIEW   Final Result      1. Stable small right-sided pneumothorax and prominent subcutaneous emphysema along the neck and upper superior chest wall, greater in right lung stable   2. Stable left basilar consolidation and pleural effusion      3. Stable appearing perihilar accentuated markings or airspace disease            XR CHEST PORTABLE   Final Result      Stable small right-sided pneumothorax. More prominent emphysema over the lower neck. No change in bilateral airspace disease. Stable left pleural effusion. XR CHEST PORTABLE   Final Result   1. Bilateral multifocal pneumonia with improvement in the right    pulmonary consolidation since prior study from 9/23/22   2. Mild to moderate left pleural effusion          XR CHEST PORTABLE   Final Result   1. Support lines and tubes are stable. 2.  Stable small right lateral pneumothorax and right basilar    chest tube. 3.  Stable patchy bilateral airspace disease. XR CHEST PORTABLE   Final Result   1. Satisfactory position of right internal jugular central line   2. No interval change in endotracheal tube and nasogastric tube positioning. 3. Extensive bilateral airspace disease with no changes. 4. Left pleural effusion with retrocardiac opacity, unchanged   5.  Small stable tiny right lateral pneumothorax and stable position of right chest tube,, Pleurx catheter. XR ABDOMEN (KUB) (SINGLE AP VIEW)   Final Result   1. No residual pneumothorax. 2.  Mild patchy airspace disease bilaterally worse on the right than on prior examination. 3.  Non-obstructive bowel gas pattern. Mildly distended stomach. XR CHEST PORTABLE   Final Result   1. No residual pneumothorax. 2.  Mild patchy airspace disease bilaterally worse on the right than on prior examination. 3.  Non-obstructive bowel gas pattern. Mildly distended stomach. XR CHEST PORTABLE   Final Result      1. Small right pneumothorax appears slightly increased. 2. Mild patchy airspace opacity bilaterally. XR CHEST PORTABLE   Final Result     Pleurx catheter at the right lung base. Trace right    pneumothorax is unchanged. XR CHEST PORTABLE   Final Result      Right-sided chest tube evident in the base, its tip medially. Improvement in the right-sided pneumothorax, since earlier today. Possible medial collapse of the right lower lobe. Small left effusion. Patchy airspace density/atelectasis in the lungs bilaterally, with no other significant change. XR CHEST PORTABLE   Final Result   1. Right-sided Pleurx catheter in satisfactory position in the lung bases   2. Right-sided post operative pneumothorax, approximately 10%             Assessment/Plan   ZANA  - suspect this is contrast nephropathy  - baseline Cre 0.7. Normal on admission.  - Cre 0.7-->1.1-->1.4-->1.6-->1.7-->1.7-->1.7-->1.8  - Hold diuretics   - BNP 5k, Protein:Cre 0.3, FENa 0.4%  - closely monitor UOP  - avoid nephrotoxic agent     2. Hypotension  - better     3. Anemia  - Hgb 8.7 (9.0 on admission)  - daily CBC    4. Acid- base/ Electrolyte imbalance   - optimize lytes    5. Acute hypoxic resp failure  - s/p VATS on 9/19/22 for recurrent pleural effusions  - on MV. Plan for tracheostomy today  - Intensivist and CTS following    6.  CHF   - EF 65% on 6/16/22 via cardiac rolly Andersen MD

## 2022-10-03 NOTE — TELEPHONE ENCOUNTER
Karissa called to check the status of the pt's fax she sent on 09/27 requesting an updated Rx. Karissa stated the original Rx did not include an Oxygen level. She will re-fax form again to Dr. Johnny Hammond.

## 2022-10-03 NOTE — CARE COORDINATION
Case Management Assessment           Daily Note                 Date/ Time of Note: 10/3/2022 9:46 AM         Note completed by: Zully Roldan RN    Patient Name: Bridgett Donovan  YOB: 1945    Diagnosis:Pleural effusion, right [J90]  Pleural effusion on right [J90]  Patient Admission Status: Inpatient    Date of Ishmael Mdconough  5:20 AM Length of Stay: 14 GLOS: GMLOS: 20    Current Plan of Care: New trach-vent (9/30). 14/450/5/35%. POD #14 PleurX catheter placement. Plan to cap and drain on Wed. SLP recommended NPO-NGTF. Levo gtt (weaning). Discharge Plan: Family is open to going to [facility] once Mr. Denzel Riley is more medically stable. The SNF can accommodate a medically complex pt with new trach-vent/PleurX, PEG tube (if needed). CM spoke with Raheem Fleming [phone] in admissions. [employer] insurance is in network    Case Management Notes: Case management is following for discharge planning. The chart was reviewed. The pt is from home with his spouse. He was primarily independent with family support prior to admission. O2 3-4L/NC at baseline. Salma Lisa and his family were provided with choice of provider; he and his family are in agreement with the discharge plan.     Care Transition Patient: Yes    Zully Roldan RN  [facility] [place] [facility], [place]  Case Management Department  [phone]

## 2022-10-03 NOTE — PROGRESS NOTES
ICU CT SURGERY DAILY PROGRESS NOTE    CC: Pleural effusions s/p R PleurX catheter placement    SUBJECTIVE:   Interval Hx:   Pt indicating with hand gesture that he feels he is doing slightly better. Pt still requiring levophed (varying b/w 4-8 last 24). Has been off sedation over 24 hours now. ROS: A 14 point review of systems was conducted, significant findings as noted above. All other systems negative. OBJECTIVE:   Vitals:   Vitals:    10/02/22 2001 10/03/22 0000 10/03/22 0001 10/03/22 0401   BP:  104/61  (!) 111/57   Pulse: 80 83 78 76   Resp: (!) 0 20 (!) 5 13   Temp:  98.2 °F (36.8 °C)  98 °F (36.7 °C)   TempSrc:  Axillary  Axillary   SpO2: 94% 96% 92% 95%   Weight:       Height:           I/O:   Intake/Output Summary (Last 24 hours) at 10/3/2022 0618  Last data filed at 10/3/2022 0555  Gross per 24 hour   Intake 2846.57 ml   Output 1509 ml   Net 1337.57 ml       I/O last 3 completed shifts: In: 2385.9 [I.V.:826.9; NG/GT:1559]  Out: 2364 [Urine:2350; Chest Tube:14]    Diet: ADULT TUBE FEEDING; Nasogastric; Peptide Based High Protein; Continuous; 10; Yes; 10; Q 4 hours; 65; 200; Q 4 hours      Physical Examination:   General appearance: Mechanically ventilated. Appropriately nods head and uses hand gestures in response to questions. HEENT: NC/AT, EOMI, mouth open, trachea midline, no JVD. Crepitus noted in the distal neck, supraclavicular region. Corpak in right nostril w/ bridle. Tracheostomy in place. Chest/Lungs: Decreased breath sounds bilaterally, on mechanical ventilation; R PleurX catheter to water seal without airleak, has serous output  Cardiovascular: Atrial fibrillation with rate in the 70's currently   Abdomen: Soft, non-tender, non-distended  Genitourinary:  Ansari in place with clear yellow urine  Skin: Warm and dry, no rashes  Extremities: No edema, no cyanosis; SCDs in place    Labs:  CBC:   Recent Labs     10/01/22  0357 10/02/22  0332 10/03/22  0407   WBC 20.3* 22.6* 22.4*   HGB 7.8* 8. 0* 7.5*   HCT 24.7* 25.3* 23.5*   * 474* 446         BMP:   Recent Labs     10/01/22  0357 10/02/22  0332 10/03/22  0407    148* 151*   K 4.1 4.1 3.5   CL 97* 101 103   CO2 35* 36* 36*   * 113* 112*   CREATININE 1.9* 1.8* 1.8*   GLUCOSE 176* 136* 185*       LFT's:   No results for input(s): AST, ALT, ALB, BILITOT, ALKPHOS in the last 72 hours. Troponin: No results for input(s): TROPONINI in the last 72 hours. BNP: No results for input(s): BNP in the last 72 hours. ABGs:   No results for input(s): PHART, XZO2RTL, PO2ART in the last 72 hours. INR:   No results for input(s): INR in the last 72 hours. U/A:No results for input(s): NITRITE, COLORU, PHUR, LABCAST, WBCUA, RBCUA, MUCUS, TRICHOMONAS, YEAST, BACTERIA, CLARITYU, SPECGRAV, LEUKOCYTESUR, UROBILINOGEN, BILIRUBINUR, BLOODU, GLUCOSEU, AMORPHOUS in the last 72 hours. Invalid input(s): Cecily Campos     Rad:   XR CHEST PORTABLE   Final Result      Small right basilar pneumothorax. Right basilar chest tube without change. Patchy consolidation in the right mid and lower lung as well as the left lower lung-atelectasis versus pneumonia. Trace left pleural effusion. Normal heart size. Lines and tubes without change. Mild improvement in degree of subcutaneous emphysema in the chest and neck. XR CHEST 1 VIEW   Final Result      1. Stable small right-sided pneumothorax and prominent subcutaneous emphysema along the neck and upper superior chest wall, greater in right lung stable   2. Stable left basilar consolidation and pleural effusion      3. Stable appearing perihilar accentuated markings or airspace disease            XR CHEST PORTABLE   Final Result      Stable small right-sided pneumothorax. More prominent emphysema over the lower neck. No change in bilateral airspace disease. Stable left pleural effusion. XR CHEST PORTABLE   Final Result   1.   Bilateral multifocal pneumonia with improvement in the right    pulmonary consolidation since prior study from 9/23/22   2. Mild to moderate left pleural effusion          XR CHEST PORTABLE   Final Result   1. Support lines and tubes are stable. 2.  Stable small right lateral pneumothorax and right basilar    chest tube. 3.  Stable patchy bilateral airspace disease. XR CHEST PORTABLE   Final Result   1. Satisfactory position of right internal jugular central line   2. No interval change in endotracheal tube and nasogastric tube positioning. 3. Extensive bilateral airspace disease with no changes. 4. Left pleural effusion with retrocardiac opacity, unchanged   5. Small stable tiny right lateral pneumothorax and stable position of right chest tube,, Pleurx catheter. XR ABDOMEN (KUB) (SINGLE AP VIEW)   Final Result   1. No residual pneumothorax. 2.  Mild patchy airspace disease bilaterally worse on the right than on prior examination. 3.  Non-obstructive bowel gas pattern. Mildly distended stomach. XR CHEST PORTABLE   Final Result   1. No residual pneumothorax. 2.  Mild patchy airspace disease bilaterally worse on the right than on prior examination. 3.  Non-obstructive bowel gas pattern. Mildly distended stomach. XR CHEST PORTABLE   Final Result      1. Small right pneumothorax appears slightly increased. 2. Mild patchy airspace opacity bilaterally. XR CHEST PORTABLE   Final Result     Pleurx catheter at the right lung base. Trace right    pneumothorax is unchanged. XR CHEST PORTABLE   Final Result      Right-sided chest tube evident in the base, its tip medially. Improvement in the right-sided pneumothorax, since earlier today. Possible medial collapse of the right lower lobe. Small left effusion. Patchy airspace density/atelectasis in the lungs bilaterally, with no other significant change. XR CHEST PORTABLE   Final Result   1.  Right-sided Pleurx catheter in satisfactory position in the lung bases   2. Right-sided post operative pneumothorax, approximately 10%             ASSESSMENT AND PLAN:   Nora Cerda is a 65 y.o. male with history of diastolic heart failure, atrial fibrillation, and DM with recurrent pleural effusions s/p R PleurX catheter placement (9/19), POD #14. Neuro:   Analgesia  - Continue scheduled Tylenol. Dilaudid PRN    Cardiovascular:   History of paroxysmal atrial fibrillation  -Cont eliquis and amiodarone. HFpEF  - Metoprolol IV (hold due to hypotension)  - Farxiga held  - Last echo (1/25/22): LVEF = 66-03%, grade 2 diastolic dysfunction    Hyperlipidemia  - Pravastatin 40mg    Currently on levophed of 4. Wean levophed. Discuss w/staff about switching to midodrine     Pulmonary:   S/P R PleurX catheter placement (9/19), POD #14  - Cap Catheter today. Will drain on Wednesday.   - SW/HHC pending    Acute on chronic respiratory failure  - Mechanically intubated on 9/22. Currently PRVC 14/450/5/35%. Vent management per pulm. - Tracheostomy 9/30  - Inpatient consult to pulmonology, appreciate recommendations. - Baseline on home O2 3-4L NC     GI:   - Miralax  - Recommend holding TF if pressor requirement increases to greater than 5 of Levo    FEN:    -Replace electrolytes per protocol  - Diet: ADULT TUBE FEEDING; Nasogastric; Peptide Based High Protein; Continuous; 10; Yes; 10; Q 4 hours; 65; 200; Q 4 hours (Will resume TF)   -SLP consulted, recommends NPO and aggressive oral care. Appreciate recs. /Renal:   - Cont Ansari   - Creatinine 1.8 from 1.9 from 1.7   -  from 119 from 125, 123, 124, 125  -Nephrology consulted. Appreciate recommendations. Hem/ID:   - Hemoglobin stable,7.5 from 8.0 from 7.8  - WBC from 22.4 from 22.6 from  20.3, will continue to monitor     Endo:   History of DMII  - Last A1C 8.1% (1/24/22)  - Cont insulin gtt.  Glc this am from 185 from 136    Prophylaxis:   DVT Ppx: SCDs and eliquis  GI Ppx: pepcid    Access:  Central Access: R IJ CVC, placed 9/22  Peripheral Access: IV x2  Arterial Line Access: L radial, placed 9/22                              Ansari Date placed: 10/03/22     Mobility:  N/A    Dispo:   ICU  Case management has indicated he would not be accepted to LTAC unless he has hemodialysis in addition to his tracheostomy, but possibly some SNF's might take him.       Code Status: Full Code  -----------------------------  Nadja Barcenas DO   PGY1, General Surgery  10/03/22  8:30 AM  Pager # [phone]

## 2022-10-03 NOTE — PROGRESS NOTES
20 years ago. His smoking use included cigarettes. He has a 80.00 pack-year smoking history. He has never used smokeless tobacco. He reports current alcohol use. He reports that he does not use drugs. Allergies:   Torsemide and Dye [iodides]    Current Medications:    potassium chloride 10 mEq/100 mL IVPB (Peripheral Line), Q1H  norepinephrine (LEVOPHED) 16 mg in sodium chloride 0.9 % 250 mL infusion, Continuous  insulin lispro (1 Unit Dial) (HUMALOG/ADMELOG) pen 0-16 Units, Q4H  insulin glargine (LANTUS;BASAGLAR) injection pen 20 Units, Nightly  sodium chloride flush 0.9 % injection 5-40 mL, 2 times per day  sodium chloride flush 0.9 % injection 5-40 mL, PRN  0.9 % sodium chloride infusion, PRN  HYDROmorphone (DILAUDID) injection 0.5 mg, Q5 Min PRN  labetalol (NORMODYNE;TRANDATE) injection 10 mg, Q15 Min PRN  Venelex ointment, BID  HYDROmorphone (DILAUDID) injection 0.25 mg, Q3H PRN  amiodarone (CORDARONE) 150 mg in dextrose 5 % 100 mL bolus, Once  amiodarone (CORDARONE) tablet 400 mg, BID   Followed by  Yisel Joseph ON 10/4/2022] amiodarone (CORDARONE) tablet 200 mg, Daily  famotidine (PEPCID) tablet 20 mg, Daily  acetaminophen (TYLENOL) 160 MG/5ML solution 650 mg, Q6H PRN  chlorhexidine (PERIDEX) 0.12 % solution 15 mL, BID  [Held by provider] metoprolol (LOPRESSOR) injection 5 mg, Q6H  haloperidol lactate (HALDOL) injection 2 mg, Q6H PRN  albuterol (PROVENTIL) nebulizer solution 2.5 mg, Q4H PRN  apixaban (ELIQUIS) tablet 5 mg, BID  sodium chloride flush 0.9 % injection 5-40 mL, 2 times per day  sodium chloride flush 0.9 % injection 5-40 mL, PRN  0.9 % sodium chloride infusion, PRN  polyethylene glycol (GLYCOLAX) packet 17 g, Daily  ondansetron (ZOFRAN-ODT) disintegrating tablet 4 mg, Q8H PRN   Or  ondansetron (ZOFRAN) injection 4 mg, Q6H PRN  glucose chewable tablet 16 g, PRN  dextrose bolus 10% 125 mL, PRN   Or  dextrose bolus 10% 250 mL, PRN  glucagon (rDNA) injection 1 mg, PRN  dextrose 10 % infusion, Continuous PRN  hydrALAZINE (APRESOLINE) injection 5 mg, Q15 Min PRN  levalbuterol (XOPENEX) nebulization 0.63 mg, Q4H PRN      Review of Systems:   14 point ROS obtained but were negative except mentioned in HPI      Physical exam:     Vitals:  BP (!) 100/51   Pulse 74   Temp 99.2 °F (37.3 °C) (Axillary)   Resp 12   Ht 6' 0.99\" (1.854 m)   Wt 190 lb 0.6 oz (86.2 kg)   SpO2 95%   BMI 25.08 kg/m²   Constitutional:  on MV  Skin: no rash, turgor wnl  Heent:  eomi, mmm  Neck: no bruits or jvd noted  Cardiovascular:  S1, S2 without m/r/g  Respiratory: trach  Abdomen:  +bs, soft, nt, nd  Ext: bilateral lower extremity edema R>L  Psychiatric: mood and affect appropriate  Musculoskeletal:  Rom, muscular strength intact    Data:   Labs:  CBC:   Recent Labs     10/01/22  0357 10/02/22  0332 10/03/22  0407   WBC 20.3* 22.6* 22.4*   HGB 7.8* 8.0* 7.5*   * 474* 446     BMP:    Recent Labs     10/01/22  0357 10/02/22  0332 10/03/22  0407    148* 151*   K 4.1 4.1 3.5   CL 97* 101 103   CO2 35* 36* 36*   * 113* 112*   CREATININE 1.9* 1.8* 1.8*   GLUCOSE 176* 136* 185*     Ca/Mg/Phos:   Recent Labs     10/01/22  0357 10/02/22  0332 10/03/22  0407   CALCIUM 8.2* 8.2* 8.0*   MG 3.00* 3.10*  --    PHOS 4.4 4.7 4.7     Hepatic: No results for input(s): AST, ALT, ALB, BILITOT, ALKPHOS in the last 72 hours. Troponin: No results for input(s): TROPONINI in the last 72 hours. BNP: No results for input(s): BNP in the last 72 hours. Lipids: No results for input(s): CHOL, TRIG, HDL, LDLCALC, LABVLDL in the last 72 hours. ABGs:   No results for input(s): PHART, PO2ART, JPT4AID in the last 72 hours. INR: No results for input(s): INR in the last 72 hours. UA:No results for input(s): [place], GLUCOSEU, BILIRUBINUR, Bethene Armor, BLOODU, PHUR, PROTEINU, UROBILINOGEN, NITRU, LEUKOCYTESUR, LABMICR, URINETYPE in the last 72 hours.    Urine Microscopic: No results for input(s): LABCAST, BACTERIA, COMU, HYALCAST, BETZAIDA [place] in the last 72 hours. Urine Culture: No results for input(s): LABURIN in the last 72 hours. Urine Chemistry:   No results for input(s): Sherial Blower, PROTEINUR, NAUR in the last 72 hours. IMAGING:  XR CHEST PORTABLE   Final Result      Stable appearance of loculated right pneumothorax, with loculated components in the lateral right midlung region and in the right lateral costophrenic sulcus. Stable scattered areas of atelectasis versus scar, most prominent in the medial right lung base and in the left lateral lung base. XR CHEST PORTABLE   Final Result      Small right basilar pneumothorax. Right basilar chest tube without change. Patchy consolidation in the right mid and lower lung as well as the left lower lung-atelectasis versus pneumonia. Trace left pleural effusion. Normal heart size. Lines and tubes without change. Mild improvement in degree of subcutaneous emphysema in the chest and neck. XR CHEST 1 VIEW   Final Result      1. Stable small right-sided pneumothorax and prominent subcutaneous emphysema along the neck and upper superior chest wall, greater in right lung stable   2. Stable left basilar consolidation and pleural effusion      3. Stable appearing perihilar accentuated markings or airspace disease            XR CHEST PORTABLE   Final Result      Stable small right-sided pneumothorax. More prominent emphysema over the lower neck. No change in bilateral airspace disease. Stable left pleural effusion. XR CHEST PORTABLE   Final Result   1. Bilateral multifocal pneumonia with improvement in the right    pulmonary consolidation since prior study from 9/23/22   2. Mild to moderate left pleural effusion          XR CHEST PORTABLE   Final Result   1. Support lines and tubes are stable. 2.  Stable small right lateral pneumothorax and right basilar    chest tube. 3.  Stable patchy bilateral airspace disease. XR CHEST PORTABLE   Final Result   1. Satisfactory position of right internal jugular central line   2. No interval change in endotracheal tube and nasogastric tube positioning. 3. Extensive bilateral airspace disease with no changes. 4. Left pleural effusion with retrocardiac opacity, unchanged   5. Small stable tiny right lateral pneumothorax and stable position of right chest tube,, Pleurx catheter. XR ABDOMEN (KUB) (SINGLE AP VIEW)   Final Result   1. No residual pneumothorax. 2.  Mild patchy airspace disease bilaterally worse on the right than on prior examination. 3.  Non-obstructive bowel gas pattern. Mildly distended stomach. XR CHEST PORTABLE   Final Result   1. No residual pneumothorax. 2.  Mild patchy airspace disease bilaterally worse on the right than on prior examination. 3.  Non-obstructive bowel gas pattern. Mildly distended stomach. XR CHEST PORTABLE   Final Result      1. Small right pneumothorax appears slightly increased. 2. Mild patchy airspace opacity bilaterally. XR CHEST PORTABLE   Final Result     Pleurx catheter at the right lung base. Trace right    pneumothorax is unchanged. XR CHEST PORTABLE   Final Result      Right-sided chest tube evident in the base, its tip medially. Improvement in the right-sided pneumothorax, since earlier today. Possible medial collapse of the right lower lobe. Small left effusion. Patchy airspace density/atelectasis in the lungs bilaterally, with no other significant change. XR CHEST PORTABLE   Final Result   1. Right-sided Pleurx catheter in satisfactory position in the lung bases   2. Right-sided post operative pneumothorax, approximately 10%             Assessment/Plan   ZANA  - suspect this is contrast nephropathy  - baseline Cre 0.7.  Normal on admission.  - Cre 0.7-->1.8 but stabilized  - Hold diuretics   - BNP 3k, Protein:Cre 0.3, FENa 0.4%  - closely monitor UOP  - avoid nephrotoxic agent     2. Hypernatremia  - increase free water via NG. 300 Q4hr  - will continue to monitor     3. Hypotension  - better     4. Anemia  - Hgb 7.5 (9.0 on admission)  - daily CBC    5. Acid- base/ Electrolyte imbalance   - optimize lytes    6. Acute hypoxic resp failure  - s/p VATS on 9/19/22 for recurrent pleural effusions  - on MV. Plan for tracheostomy today  - Intensivist and CTS following    7. CHF   - EF 65% on 6/16/22 via cardiac cath    Patient will be staffed with Dr. Tamiko Nash MD   PGY3 IM Resident       I have seen the patient independently from the resident . I discussed the care with the resident. I personally reviewed the HPI, PH, FH, SH, ROS and medications. I repeated pertinent portions of the examination and reviewed the relevant imaging and laboratory data.  I agree with the findings, assessment and plan as documented, with the following addendum:      Jabari Ramires MD

## 2022-10-03 NOTE — PROGRESS NOTES
ICU Progress Note  PGY-1    Admit Date: 9/19/2022  Day: 13  Vent Day: 10  IV Access: CVC triple lumen, peripheral  IV Fluids: None  Vasopressors: Levo          Antibiotics: None  Diet: ADULT TUBE FEEDING; Nasogastric; Peptide Based High Protein; Continuous; 10; Yes; 10; Q 4 hours; 65; 200; Q 4 hours    CC: Pleural effusion (right) s/p VATS and Pleural catheter (PleurX) placement    Interval history:  - Awake, responsive to voice and commands  - With head nods/shakes, denies fever/chills. Reports pain in R shoulder. HPI:  Divine Wellington is a 66 yo Male with a PMH of CHF (CHFpEF), carotid stenosis, HLD, HTN, OA, Restrictive Lung Disease, T2DM who underwent VATS with Pleurx catheter placement on 9/19/2022 for recurrent bilateral loculated pleural effusions and has had hypoxia/hypercapnia since that time now requiring intubation. The etiology of his recurrent pleural effusions is unclear. Possibly malignancy. In addition to his recurrent bilateral loculated pleural effusions, he has lost over 100 lbs over the last year, some of which his wife attributes to intentional weight loss. Initially, he was started on BiPAP post-procedure for rapid shallow breathing and slow clearance of sedation. He wasn't tolerating the BiPAP well, trying to remove it. After discussion with family and patient (while on a break from BiPAP), the decision was made to intubate and confirmed that he's OK with tracheostomy if necessary. \"    Medications:     Scheduled Meds:   insulin lispro  0-16 Units SubCUTAneous Q4H    insulin glargine  20 Units SubCUTAneous Nightly    sodium chloride flush  5-40 mL IntraVENous 2 times per day    Venelex   Topical BID    amiodarone bolus  150 mg IntraVENous Once    amiodarone  400 mg Oral BID    Followed by    Alonzo Means ON 10/4/2022] amiodarone  200 mg Oral Daily    famotidine  20 mg Per NG tube Daily    chlorhexidine  15 mL Mouth/Throat BID    [Held by provider] metoprolol  5 mg IntraVENous Q6H apixaban  5 mg Oral BID    sodium chloride flush  5-40 mL IntraVENous 2 times per day    polyethylene glycol  17 g Oral Daily     Continuous Infusions:   norepinephrine 6 mcg/min (10/03/22 0000)    sodium chloride      sodium chloride      dextrose       PRN Meds:sodium chloride flush, sodium chloride, fentanNYL, HYDROmorphone, labetalol, HYDROmorphone, acetaminophen, haloperidol lactate, albuterol, sodium chloride flush, sodium chloride, ondansetron **OR** ondansetron, glucose, dextrose bolus **OR** dextrose bolus, glucagon (rDNA), dextrose, hydrALAZINE, levalbuterol    Objective:   Vitals:   T-max:  Patient Vitals for the past 8 hrs:   BP Temp Temp src Pulse Resp SpO2   10/03/22 0401 (!) 111/57 98 °F (36.7 °C) Axillary 76 13 95 %   10/03/22 0001 -- -- -- 78 (!) 5 92 %   10/03/22 0000 104/61 98.2 °F (36.8 °C) Axillary 83 20 96 %       Intake/Output Summary (Last 24 hours) at 10/3/2022 0650  Last data filed at 10/3/2022 0555  Gross per 24 hour   Intake 2846.57 ml   Output 1509 ml   Net 1337.57 ml     Review of Systems - denies any pain    Physical Exam  Constitutional:       Comments: Intubated with tracheostomy, awake with eyes open, arousable to voice and responds to questions appropriately with nods   HENT:      Head: Normocephalic. Nose:      Comments: NG tube in place  Eyes:      Pupils: Pupils are equal, round, and reactive to light. Neck:      Comments: Tracheostomy  Cardiovascular:      Rate and Rhythm: Normal rate and regular rhythm. Pulses: Normal pulses. Heart sounds: Normal heart sounds. Pulmonary:      Comments: On ventilator. Pleurx catheter in place with clear yellow output. Abdominal:      General: Abdomen is flat. Palpations: Abdomen is soft. Musculoskeletal:      Right lower leg: Edema present. Left lower leg: Edema present. Skin:     General: Skin is warm and dry.    Neurological:      Comments: Responds to voice, answers questions appropriately, follows commands LABS:    CBC:   Recent Labs     10/01/22  0357 10/02/22  0332 10/03/22  0407   WBC 20.3* 22.6* 22.4*   HGB 7.8* 8.0* 7.5*   HCT 24.7* 25.3* 23.5*   * 474* 446   MCV 83.7 84.6 85.1     Renal:    Recent Labs     10/01/22  0357 10/02/22  0332 10/03/22  0407    148* 151*   K 4.1 4.1 3.5   CL 97* 101 103   CO2 35* 36* 36*   * 113* 112*   CREATININE 1.9* 1.8* 1.8*   GLUCOSE 176* 136* 185*   CALCIUM 8.2* 8.2* 8.0*   MG 3.00* 3.10*  --    PHOS 4.4 4.7 4.7   ANIONGAP 11 11 12     Hepatic:   Recent Labs     10/01/22  0357 10/02/22  0332 10/03/22  0407   LABALBU 2.4* 2.4* 2.2*     Troponin: No results for input(s): TROPONINI in the last 72 hours. BNP: No results for input(s): BNP in the last 72 hours. Lipids: No results for input(s): CHOL, HDL in the last 72 hours. Invalid input(s): LDLCALCU, TRIGLYCERIDE  ABGs:    No results for input(s): PHART, MWR2JNE, PO2ART, DDM9TRN, BEART, THGBART, G4DGCMVN, SYJ4LZQ in the last 72 hours. INR: No results for input(s): INR in the last 72 hours. Lactate:   No results for input(s): LACTATE in the last 72 hours. Cultures:  -----------------------------------------------------------------  RAD:   XR CHEST PORTABLE   Final Result      Small right basilar pneumothorax. Right basilar chest tube without change. Patchy consolidation in the right mid and lower lung as well as the left lower lung-atelectasis versus pneumonia. Trace left pleural effusion. Normal heart size. Lines and tubes without change. Mild improvement in degree of subcutaneous emphysema in the chest and neck. XR CHEST 1 VIEW   Final Result      1. Stable small right-sided pneumothorax and prominent subcutaneous emphysema along the neck and upper superior chest wall, greater in right lung stable   2. Stable left basilar consolidation and pleural effusion      3.  Stable appearing perihilar accentuated markings or airspace disease            XR CHEST PORTABLE   Final Result      Stable small right-sided pneumothorax. More prominent emphysema over the lower neck. No change in bilateral airspace disease. Stable left pleural effusion. XR CHEST PORTABLE   Final Result   1. Bilateral multifocal pneumonia with improvement in the right    pulmonary consolidation since prior study from 9/23/22   2. Mild to moderate left pleural effusion          XR CHEST PORTABLE   Final Result   1. Support lines and tubes are stable. 2.  Stable small right lateral pneumothorax and right basilar    chest tube. 3.  Stable patchy bilateral airspace disease. XR CHEST PORTABLE   Final Result   1. Satisfactory position of right internal jugular central line   2. No interval change in endotracheal tube and nasogastric tube positioning. 3. Extensive bilateral airspace disease with no changes. 4. Left pleural effusion with retrocardiac opacity, unchanged   5. Small stable tiny right lateral pneumothorax and stable position of right chest tube,, Pleurx catheter. XR ABDOMEN (KUB) (SINGLE AP VIEW)   Final Result   1. No residual pneumothorax. 2.  Mild patchy airspace disease bilaterally worse on the right than on prior examination. 3.  Non-obstructive bowel gas pattern. Mildly distended stomach. XR CHEST PORTABLE   Final Result   1. No residual pneumothorax. 2.  Mild patchy airspace disease bilaterally worse on the right than on prior examination. 3.  Non-obstructive bowel gas pattern. Mildly distended stomach. XR CHEST PORTABLE   Final Result      1. Small right pneumothorax appears slightly increased. 2. Mild patchy airspace opacity bilaterally. XR CHEST PORTABLE   Final Result     Pleurx catheter at the right lung base. Trace right    pneumothorax is unchanged. XR CHEST PORTABLE   Final Result      Right-sided chest tube evident in the base, its tip medially.  Improvement in the right-sided pneumothorax, since earlier today. Possible medial collapse of the right lower lobe. Small left effusion. Patchy airspace density/atelectasis in the lungs bilaterally, with no other significant change. XR CHEST PORTABLE   Final Result   1. Right-sided Pleurx catheter in satisfactory position in the lung bases   2. Right-sided post operative pneumothorax, approximately 10%           Assessment/Plan:   Christian Bal is a 65 y.o. male with a PMH of CHF (CHFpEF), carotid stenosis, HLD, HTN, OA, Restrictive Lung Disease, T2DM who underwent VATS with PleurX catheter placement on 9/19/2022 for recurrent bilateral loculated pleural effusions and has had hypoxia/hypercapnia since that time    Neuro  - off sedation as of 10/1    Pulmonary  Vent Day: 10  Vent: , RR 14, FiO2 35, PEEP 5  Acute hypoxic/hypercapnic respiratory failure  Recurrent Pleural Effusions, s/p VATS and Pleurx  Patient was admitted to ICU for failure to tolerate bipap following procedure. The etiology of his recurrent pleural effusions is unclear. No studies done on fluid from original thoracenteses. Possibly malignancy. VATS fluid results showing an exudative picture, per [facility] Criteria. S/p VATS procedure 9/19, with right PleurX catheter placement. Ventilatory restriction on PFT associated with worsening pleural disease. 9/29 CXR with atelectasis around pluerx cath.   s/p Methylprednisolone IV til 9/30  - PleurX catheter in place with clear yellow drainage  - SBT trial today  - CTS following    Cardiovascular  Hypotension  Began requiring pressors after intubation, attributed to sedation and positive pressure ventilation  - Levo gtt @ 2, stable    Congestive Heart Failure with Preserved EF  EF ~65% 6/16/2022. 9/23 BNP 15k, given lasix at that time, now downtrending.   - remains fluid overloaded on exam with edema to knees bilaterally  - BNP downtrending  - Per nephro held off on diuretics given kidney injury    GI  Diet: ADULT TUBE FEEDING; Nasogastric; Peptide Based High Protein; Continuous; 10; Yes; 10; Q 4 hours; 65; 200; Q 4 hours  GI ppx: pepcid     Constipation  Cont glycolax    Renal   Ansari in place  ZANA  Potentially ATN from hypotension/contrast   - Nephro following, rec to hold off on diuretics   - Good UOP, cr stable    Hypernatremia  Sodium uptrend to 151 this AM.  - Free water flushes in NG tube feeds    Metabolic  - Lantus 22Y nightly wit MDSI    Code Status: Full Code   PPX: pepcid, heparin   DISPO: ICU    Wendy Colin MD, PGY-1  Internal Medicine Resident  Contact via [facility]  10/03/22  6:50 AM    Pulmonary & Critical Care    Patient seen and examined. I agree with Dr. Butch Donis history, physical, lab findings, assessment and plan. Pt is POD#14 of VATS with Pleurx placement. Patient had bilateral pleural effusions right > left that did not improve with diuresis prompting VATS. Pleura visibly was normal.  Pleurx was placed for ongoing management of pleural fluid. Total protein was quite low at 1.2 on 9/21. LDH was elevated at 305. proBNP has been as high as 15,496    Patient developed postoperative acute on chronic hypercapnic/hypoxemic respiratory failure. For months he had slowly been getting worse in regards to dyspnea on exertion and an unintentional weight loss of 30 pounds. PFTs as an outpatient shows severe restrictive defect. CT chest August 29 shows bilateral pleural effusions that are moderate but no masses, nodules, or mediastinal adenopathy. I do not feel like we have a firm grasp of the pathology with Louis Peoples  From the best I can tell his pleural effusions are likely on the basis of CHF but there is some other entity that is driving his respiratory failure, weight loss, and weakness. I think in underlying occult malignancy with a paraneoplastic syndrome needs to be searched for and ruled out. I will repeat a CT chest and get an abdomen and pelvis. Check sed rate, PSA, and CEA.      He bailey hypotensive on low-dose Levophed. He just came off steroids and this likely why his BUN is elevated. No evidence of a GI bleed. He remains off sedation    I tried a VSV wean and immediately his peak airway pressures increased to above the 40s. On PSV 20/5 his volumes were quite low so I switched him back to [facility]. Another problem is his insurance refusal to cover an LTAC. Medically Ambrocio Mazariegos is the typical patient that would benefit from a well structured chronic ventilator weaning program, found at LTAC's. Pt has a high probability of imminent or life-threatening deterioration requiring close monitoring, and highly complex decision-making and/or interventions of high intensity to assess, manipulate, and support his critical organ systems to prevent a likely inevitable decline which could occur if left untreated. A total critical care time 44 minutes was used. This includes but not limited to examining patient, collaborating with other physicians, monitoring vital signs, telemetry, continuous pulse oximetry, and clinical response to IV medications, documentation time, review and interpretation of laboratory and radiological data, review of nursing notes and old record review. This time excludes any time that may have been spent performing procedures for life threatening organ failure.     Janna Alvarez MD

## 2022-10-03 NOTE — PROGRESS NOTES
Speech Language Pathology  Facility/Department: [facility] ICU  Dysphagia Daily Treatment Note    NAME: Dejah Marshall  : 1945  MRN: [id number]    Patient Diagnosis(es):   Patient Active Problem List    Diagnosis Date Noted    ZANA (acute kidney injury) ([facility] [address] 2022    Acute on chronic respiratory failure with hypoxia and hypercapnia ([address] 2022    Pleural effusion on right 2022    Chronic heart failure with preserved ejection fraction (HFpEF) ([address] 2022    Hypoxia 2022    Atrial fibrillation with rapid ventricular response ([address] 2022    Exertional dyspnea 2022    Closed displaced fracture of lateral malleolus of left fibula 2021    Spinal stenosis of lumbar region 10/17/2018    Closed compression fracture of L1 lumbar vertebra 10/17/2018    Trigger ring finger of right hand 06/15/2017    Subacromial impingement 2015    Rotator cuff tear 2015    Glenohumeral arthritis 2015    DM type 2 (diabetes mellitus, type 2) ([address] 2013    ED (erectile dysfunction) 2012    OA (osteoarthritis) of knee 10/12/2011    Carotid stenosis, left 10/12/2011    Hearing loss 10/12/2011    HTN (hypertension) 10/07/2011    Hyperlipidemia 10/07/2011     Allergies: Allergies   Allergen Reactions    Torsemide Shortness Of Breath    Dye [Iodides]      1970's - ?? CXR (10/3/22)-  Impression       Stable appearance of loculated right pneumothorax, with loculated components in the lateral right midlung region and in the right lateral costophrenic sulcus. Stable scattered areas of atelectasis versus scar, most prominent in the medial right lung base and in the left lateral lung base. Previous MBS -  N/A    Chart reviewed. Medical Diagnosis: Pleural effusion, right [J90]  Pleural effusion on right [J90]   Treatment Diagnosis: Oropharyngeal dysphagia    BSE Impression (10/2/22)-   RN states okay to attempt assessment.   Dr Ryann Garcia speaking with spouse stating pt most likely still with effects of sedation. Pt was intubated 9/22- 9/30/22. Trach placed 9/30, with pt on ventilator. Pt shook head yes/no intermittently to questions asked. Pt made no attempt to mouth words. Pt exhibiting open mouth posture, did not form labial seal or protrude /lateralize tongue on command. Oral care completed, pt demonstrating no oral response. Placed 2 single ice chips in oral cavity, again with no response - no lingual movement noted. Pt did close lips with tactile stim x 2. No swallow movement was felt upon palpation of anterior neck. O2 sats remained stable and RR remained in mid 20's. Recommend aggressive oral care 2-3 x/day with suction kit. Plan to re-assess as pt appropriate. Dysphagia Diagnosis: Suspected needs further assessment    MBS results - instrumental will be indicated     Pain: None indicated     Current Diet : ADULT TUBE FEEDING; Nasogastric; Peptide Based High Protein; Continuous; 10; Yes; 10; Q 4 hours; 65; 300; Q 4 hours   Recommended Form of Meds: Via alternative means of nutrition      Treatment:  Pt seen bedside to address the following goals:   1. The patient will tolerate repeat bedside swallowing evaluation when able. 10/3 - Pt positioned upright and aggressive oral care was completed with suction toothbrush. Vitals reading had poor waveform and appeared to be unreliable readings; RN notified and in to assess. The patient demo'd spontaneous swallows without PO with audible air escaping around stoma. Pt tolerated 4/4 ice chips with constant verbal cues for appropriate acceptance and manipulation of ice (I.e.close mouth, chew before you swallow, etc). There was no coughing but not likely clinically relevant due to research indicating high prevalence of SILENT aspiration in cuff inflated tracheostomy patients. Pt not yet appropriate for instrumental swallow evaluation but will require out prior to advancing diet. Notes indicate plan for SBT today. Requested order for PMV which will hopefully be receive and coincide with improved alertness and weaning from ventilator. Continue goal.    2. The patient/caregiver will demonstrate understanding of compensatory strategies for improved swallowing safety. 10/3 - Educated on potential impact of trach on swallow function, rationale for oral care, recommendations for few single ice chips to be given to facilitate oral mucosal integrity and preserve swallow function that is present. Introduced plan for PMV in future, rationales for this. Educated on limitations in pts alertness at this time and need for improvement for completing swallow study, therapeutic effectiveness, etc. Continue goal.      Patient/Family/Caregiver Education:  Patient with eyes opened but mostly not attempting gestural communication during education. Spouse denied additional questions. Compensatory Strategies:  Oral care using suction / suction system 3x per day   Single ice chips given by staff when pt awake and in upright position      Plan:  Continue dysphagia treatment with goals per plan of care. Diet recommendations: Single ice chips given by staff when pt awake and in upright position   DC recommendation: Ongoing speech therapy is indicated   Treatment: 20 minutes  D/W nursing, [facility]  Needs met prior to leaving room, call button in reach. Ayse Gabriel M.A., [facility].[id number]  Speech-Language Pathologist  Pg.  # [phone]    If patient is discharged prior to next treatment, this note will serve as the discharge summary

## 2022-10-03 NOTE — PLAN OF CARE
Problem: Chronic Conditions and Co-morbidities  Goal: Patient's chronic conditions and co-morbidity symptoms are monitored and maintained or improved  Outcome: Progressing  Flowsheets (Taken 10/3/2022 0805)  Care Plan - Patient's Chronic Conditions and Co-Morbidity Symptoms are Monitored and Maintained or Improved: Monitor and assess patient's chronic conditions and comorbid symptoms for stability, deterioration, or improvement     Problem: Discharge Planning  Goal: Discharge to home or other facility with appropriate resources  Outcome: Progressing  Flowsheets (Taken 10/3/2022 0805)  Discharge to home or other facility with appropriate resources: Identify barriers to discharge with patient and caregiver     Problem: Pain  Goal: Verbalizes/displays adequate comfort level or baseline comfort level  Outcome: Progressing     Problem: Safety - Adult  Goal: Free from fall injury  Outcome: Progressing     Problem: Nutrition Deficit:  Goal: Optimize nutritional status  Outcome: Progressing  Flowsheets (Taken 10/3/2022 1257 by Faraz Menon RD)  Nutrient intake appropriate for improving, restoring, or maintaining nutritional needs:   Recommend, monitor, and adjust tube feedings and TPN/PPN based on assessed needs   Assess nutritional status and recommend course of action

## 2022-10-03 NOTE — PROGRESS NOTES
Comprehensive Nutrition Assessment    Type and Reason for Visit:  Reassess    Nutrition Recommendations/Plan:   Modify current TF to Glucerna 1.5 @ 45 mL/hr  Add 2 bottles Proteinex daily  Monitor nutrition adequacy, pertinent labs, bowel habits, wt changes, and clinical progress     Malnutrition Assessment:  Malnutrition Status: At risk for malnutrition (Comment) (09/22/22 1409)    Context:  Chronic Illness     Findings of the 6 clinical characteristics of malnutrition:  Energy Intake:  Unable to assess  Weight Loss:  Greater than 10% over 6 months (20% in 6 month period)         Nutrition Assessment:    Follow up: Pt discussed in ICU rounds. Now w/ trach. Propofol off. Spoke w/ surgery, pt BG now >180, will modify current TF to Glucerna, lower rate of 45 mL/hr and add 2 Proteinex/day to provide adequate protein. New TF rate and formula will provide pt w/ 30g less of carbohydrates. Pt is +7.6L, continued use of previous wt for estimated needs. Will continue to monitor. Nutrition Related Findings:    . . Cr 1.8. Ca 8.0. Mg 3.1. Active bowel sounds. +BM 10/2. +2 pitting generalized edema. Wound Type: None       Current Nutrition Intake & Therapies:    Average Meal Intake: NPO  Average Supplements Intake: NPO  ADULT TUBE FEEDING; Nasogastric; Peptide Based High Protein; Continuous; 10; Yes; 10; Q 4 hours; 65; 300; Q 4 hours  Current Tube Feeding (TF) Orders:  Feeding Route: Nasogastric  Formula: Diabetic  Schedule: Continuous  Additives/Modulars: Protein  Goal TF & Flush Orders Provides: Glucerna 1.5 @ 45 mL/hr to provide: 1080 mL TV, 1620 kcal, 89 g/pro, and 820 mL FW + FW flushes of 30 mL q4 + 2 bottles Proteinex/day to provide and additional 52 g/pro and 208 kcal    Anthropometric Measures:  Height: 6' 0.99\" (185.4 cm)  Ideal Body Weight (IBW): 184 lbs (84 kg)       Current Body Weight: 166 lb 14.2 oz (75.7 kg), 90.7 % IBW.  Weight Source: Bed Scale  Current BMI (kg/m2): 22        Weight Adjustment For: No Adjustment                 BMI Categories: Normal Weight (BMI 22.0 to 24.9) age over 72    Estimated Daily Nutrient Needs:  Energy Requirements Based On: Kcal/kg (20-25 kcal/kg CBW)  Weight Used for Energy Requirements: Current (CBW 75.7 kg)  Energy (kcal/day): 5756-4984  Weight Used for Protein Requirements: Current (2 g/kg CBW)  Protein (g/day): 151  Method Used for Fluid Requirements: 1 ml/kcal  Fluid (ml/day): or per MD    Nutrition Diagnosis:   Inadequate oral intake related to impaired respiratory function as evidenced by NPO or clear liquid status due to medical condition, nutrition support - enteral nutrition    Nutrition Interventions:   Food and/or Nutrient Delivery: Continue NPO, Modify Tube Feeding  Nutrition Education/Counseling: Education not indicated  Coordination of Nutrition Care: Continue to monitor while inpatient  Plan of Care discussed with: ICU team/surgery    Goals:  Previous Goal Met: Progressing toward Goal(s)  Goals: Tolerate nutrition support at goal rate, within 2 days       Nutrition Monitoring and Evaluation:   Behavioral-Environmental Outcomes: None Identified  Food/Nutrient Intake Outcomes: Enteral Nutrition Intake/Tolerance  Physical Signs/Symptoms Outcomes: Biochemical Data, Nutrition Focused Physical Findings, Weight, Hemodynamic Status    Discharge Planning:     Too soon to determine     Jose Miguel Christopher, [address] [place] [address],   Contact: [phone]

## 2022-10-04 NOTE — PROGRESS NOTES
ICU CT SURGERY DAILY PROGRESS NOTE    CC: Pleural effusions s/p R PleurX catheter placement    SUBJECTIVE:   Interval Hx:   Pt developed asymptomatic tachycardia to 137 overnight while on PO amiodarone. A line was accidentally removed yesterday and pt remains on  levo of 4. Pt had 2x loose bowel movements. Otherwise, he indicates he has no changes. ROS: A 14 point review of systems was conducted, significant findings as noted above. All other systems negative. OBJECTIVE:   Vitals:   Vitals:    10/04/22 0530 10/04/22 0545 10/04/22 0600 10/04/22 0615   BP: 94/61 113/60 125/65 115/65   Pulse: (!) 121 (!) 105 (!) 121 (!) 128   Resp: 22 16 16 20   Temp:       TempSrc:       SpO2: 91%      Weight:       Height:           I/O:   Intake/Output Summary (Last 24 hours) at 10/4/2022 0638  Last data filed at 10/4/2022 0600  Gross per 24 hour   Intake 3087.99 ml   Output 1580 ml   Net 1507.99 ml       I/O last 3 completed shifts: In: 4424.6 [I.V.:663.2; NG/GT:3528; IV Piggyback:233.3]  Out: 2389 [Urine:2385; Chest Tube:4]    Diet: ADULT TUBE FEEDING; Nasogastric; Diabetic; Continuous; 10; Yes; 10; Q 4 hours; 45; 300; Q 4 hours; Protein; 2 bottles Proteinex daily w/ 30 mL FW flushes before and after      Physical Examination:   General appearance: Mechanically ventilated. Appropriately nods head and uses hand gestures in response to questions. HEENT: NC/AT, EOMI, mouth open, trachea midline, no JVD. Crepitus noted in the distal neck, supraclavicular region. Corpak in right nostril w/ bridle. Tracheostomy in place. Chest/Lungs: Decreased breath sounds bilaterally, on mechanical ventilation; R PleurX catheter capped  Cardiovascular: Atrial fibrillation with rate in the 120's currently   Abdomen: Soft, non-tender, non-distended  Genitourinary: Ansari in place with clear yellow urine  Skin: Warm and dry, no rashes  Extremities: Edema of the b/l feet.  No cyanosis; SCDs in place    Labs:  CBC:   Recent Labs URETERS: Normal.      URINARY BLADDER: Ansari catheter present within collapsed urinary bladder. REPRODUCTIVE ORGANS: No associated masses. BOWEL: Normal diameter, nonobstructed. Feeding tube tip at the level of the ligament of Treitz. LYMPH NODES: No abnormally enlarged nodes. PERITONEUM/RETROPERITONEUM: Severe pneumoperitoneum extends into the upper abdomen with some of the symmetric multiseptated gas appearing deep to the diaphragm consistent with mild pneumoperitoneum (series 601 was 101). This is likely related to    pneumonia mediastinum dissecting into the abdomen. No fluid collection in the abdomen or pelvis. No ascites. VESSELS: Extensive atherosclerotic calcification of the aorta and iliac arteries. ABDOMINAL WALL: No acute abnormality. BONES: No acute abnormality. Mild chronic L1 compression fracture with previous vertebroplasty. IMPRESSION:      1. Severe pneumomediastinum extends into the upper abdomen with small pneumoperitoneum likely related to extension of pneumoperitoneum into the upper peritoneal cavity. 2. No fluid collection in the abdomen or pelvis. Results were discussed with the surgical team at 7:00 PM on 10/3/2022. XR CHEST PORTABLE   Final Result      Stable appearance of loculated right pneumothorax, with loculated components in the lateral right midlung region and in the right lateral costophrenic sulcus. Stable scattered areas of atelectasis versus scar, most prominent in the medial right lung base and in the left lateral lung base. XR CHEST PORTABLE   Final Result      Small right basilar pneumothorax. Right basilar chest tube without change. Patchy consolidation in the right mid and lower lung as well as the left lower lung-atelectasis versus pneumonia. Trace left pleural effusion. Normal heart size. Lines and tubes without change.       Mild improvement in degree of subcutaneous emphysema in the chest and neck. XR CHEST 1 VIEW   Final Result      1. Stable small right-sided pneumothorax and prominent subcutaneous emphysema along the neck and upper superior chest wall, greater in right lung stable   2. Stable left basilar consolidation and pleural effusion      3. Stable appearing perihilar accentuated markings or airspace disease            XR CHEST PORTABLE   Final Result      Stable small right-sided pneumothorax. More prominent emphysema over the lower neck. No change in bilateral airspace disease. Stable left pleural effusion. XR CHEST PORTABLE   Final Result   1. Bilateral multifocal pneumonia with improvement in the right    pulmonary consolidation since prior study from 9/23/22   2. Mild to moderate left pleural effusion          XR CHEST PORTABLE   Final Result   1. Support lines and tubes are stable. 2.  Stable small right lateral pneumothorax and right basilar    chest tube. 3.  Stable patchy bilateral airspace disease. XR CHEST PORTABLE   Final Result   1. Satisfactory position of right internal jugular central line   2. No interval change in endotracheal tube and nasogastric tube positioning. 3. Extensive bilateral airspace disease with no changes. 4. Left pleural effusion with retrocardiac opacity, unchanged   5. Small stable tiny right lateral pneumothorax and stable position of right chest tube,, Pleurx catheter. XR ABDOMEN (KUB) (SINGLE AP VIEW)   Final Result   1. No residual pneumothorax. 2.  Mild patchy airspace disease bilaterally worse on the right than on prior examination. 3.  Non-obstructive bowel gas pattern. Mildly distended stomach. XR CHEST PORTABLE   Final Result   1. No residual pneumothorax. 2.  Mild patchy airspace disease bilaterally worse on the right than on prior examination. 3.  Non-obstructive bowel gas pattern. Mildly distended stomach. XR CHEST PORTABLE   Final Result      1.  Small right pneumothorax appears slightly increased. 2. Mild patchy airspace opacity bilaterally. XR CHEST PORTABLE   Final Result     Pleurx catheter at the right lung base. Trace right    pneumothorax is unchanged. XR CHEST PORTABLE   Final Result      Right-sided chest tube evident in the base, its tip medially. Improvement in the right-sided pneumothorax, since earlier today. Possible medial collapse of the right lower lobe. Small left effusion. Patchy airspace density/atelectasis in the lungs bilaterally, with no other significant change. XR CHEST PORTABLE   Final Result   1. Right-sided Pleurx catheter in satisfactory position in the lung bases   2. Right-sided post operative pneumothorax, approximately 10%             ASSESSMENT AND PLAN:   Jana Tipton is a 65 y.o. male with history of diastolic heart failure, atrial fibrillation, and DM with recurrent pleural effusions s/p R PleurX catheter placement (9/19), POD #15. Neuro:   Analgesia  - Continue scheduled Tylenol. Dilaudid PRN    Cardiovascular:   History of paroxysmal atrial fibrillation  -Cont eliquis and amiodarone. HFpEF  - Metoprolol IV (hold due to hypotension)  - Farxiga held  - Last echo (1/25/22): LVEF = 96-19%, grade 2 diastolic dysfunction    Hyperlipidemia  - Pravastatin 40mg    Currently on levophed of 4. Wean levophed    Pulmonary:   S/P R PleurX catheter placement (9/19), POD #15  - Capped Catheter yesterday. Will drain every 3 days.   - SW/HHC pending    Acute on chronic respiratory failure  - Mechanically intubated on 9/22. Currently PRVC 12/450/5/35%. Vent management per pulm/crit care. - Tracheostomy 9/30. Remove sutures tomorrow  - Pulmonology following, appreciate recommendations.   - Baseline on home O2 3-4L NC     GI:   - Miralax  - Recommend holding TF if pressor requirement increases to greater than 5 of Levo    FEN:    -Replace electrolytes per protocol  - Diet: ADULT TUBE FEEDING; Nasogastric; Diabetic; Continuous; 10; Yes; 10; Q 4 hours; 45; 300; Q 4 hours; Protein; 2 bottles Proteinex daily w/ 30 mL FW flushes before and after   -SLP consulted, Appreciate recs. /Renal:   - Cont Ansari   - Creatinine 1.7 from 1.8, 1.9, 1.7   -  from 112, 113, 119, 125, 123, 124, 125  -Nephrology consulted. Appreciate recommendations. Hem/ID:   - Hemoglobin this am from 7.6 from 7.5, 8.0, 7.8  - WBC from 27.0, 22.4,  22.6.  20.3, will continue to monitor     Endo:   History of DMII  - Last A1C 8.1% (1/24/22)  - Insulin gtt d/c'd. Now on lantus 20 and HDSSI      Prophylaxis:   DVT Ppx: SCDs and eliquis  GI Ppx: pepcid    Access:  Central Access: R IJ CVC, placed 9/22  Peripheral Access: IV x2                       Ansari Date placed: 9/20    Mobility:  N/A    Dispo:   ICU  Case management has indicated he would not be accepted to LTAC unless he has hemodialysis in addition to his tracheostomy, but possibly some SNF's might take him. Code Status: Full Code  -----------------------------  Alexis Flynn DO  PGY1, General Surgery  10/04/22  7:51 AM    I am post-call today and will not be on campus. Please contact Dr. Lieutenant Higgins at [phone] for questions or concerns regarding this patient.

## 2022-10-04 NOTE — CONSULTS
[address] [address] Continence Nurse  Consult Note       NAME:  [address] RECORD NUMBER:  [id number]  AGE: 65 y.o. GENDER: male  : 1945  TODAY'S DATE:  10/4/2022    Subjective   Reason for WOCN Evaluation and Assessment: Sacrum - DTI      Madhuri Palencia is a 65 y.o. male referred by:   [] Physician  [x] Nursing  [] Other:     Wound Identification:  Wound Type: pressure  Contributing Factors: diabetes, chronic pressure, decreased mobility, and shear force    Wound History: Madhuri Palencia is a 65 y.o. male with PMHx of CHF (CHFpEF), carotid stenosis, HLD, HTN, OA, Restrictive Lung Disease, T2DM who underwent VATS with Pleurx catheter placement on 2022 for recurrent bilateral loculated pleural effusions. He was unable to tolerate BiPAP and was intubated following his VATS procedure on . He is now requiring pressor support with levophed in setting of sedation. His creatinine was worsening thus Nephrology was consulted for further evaluation and recommendations. His creatinine at baseline is ~0.7 and was normal on admission. His creatinine over the course of his admission has been worsening and is up to 1.7 on day of consult. His UOP over the last 2 days has been good.      Current Wound Care Treatment:  Sacrum - Venelex    Patient Goal of Care:  [x] Wound Healing  [] Odor Control  [] Palliative Care  [] Pain Control   [] Other:         PAST MEDICAL HISTORY        Diagnosis Date    ZANA (acute kidney injury) ([facility] [address] 2022    Carotid stenosis, left 10/12/2011    Chronic back pain     Chronic systolic (congestive) heart failure     Diastolic CHF ([facility] [address]     Erectile dysfunction     Hyperlipidemia     Hypertension     Osteoarthritis     Restrictive lung disease     Type II or unspecified type diabetes mellitus without mention of complication, not stated as uncontrolled        PAST SURGICAL HISTORY    Past Surgical History:   Procedure Laterality Date    CARDIAC CATHETERIZATION 2022    KNEE SURGERY Left     PLEURAL CATH INSERTION N/A 2022    PLEURAL CATHETER PLACEMENT; INTERCOSTAL NERVE BLOCK X4 performed by Gallito Singh MD at  [facility] Bilateral     THORACOSCOPY Right 2022    RIGHT VIDEO ASSISTED THORASCOPIC SURGERY AND; performed by Gallito Singh MD at [address] N/A 2022    TRACHEOSTOMY performed by Hilary Gottlieb MD at [facility]    Family History   Problem Relation Age of Onset    Hearing Loss Father     Heart Disease Father 70        MI    High Blood Pressure Father     Diabetes Maternal Grandmother         hypoglycemic    Hearing Loss Maternal Grandmother     Arthritis Maternal Grandfather        SOCIAL HISTORY    Social History     Tobacco Use    Smoking status: Former     Packs/day: 2.00     Years: 40.00     Pack years: 80.00     Types: Cigarettes     Quit date: 10/24/2001     Years since quittin.9    Smokeless tobacco: Never   Vaping Use    Vaping Use: Never used   Substance Use Topics    Alcohol use: Yes     Alcohol/week: 0.0 standard drinks     Comment: rarely    Drug use: No       ALLERGIES    Allergies   Allergen Reactions    Torsemide Shortness Of Breath    Dye [Iodides]      1970s - ?? MEDICATIONS    No current facility-administered medications on file prior to encounter. Current Outpatient Medications on File Prior to Encounter   Medication Sig Dispense Refill    ELIQUIS 5 MG TABS tablet TAKE 1 TABLET BY MOUTH TWO TIMES A DAY 60 tablet 2    umeclidinium-vilanterol (ANORO ELLIPTA) 62.5-25 MCG/INH AEPB inhaler Inhale 1 puff into the lungs daily 1 each 2    bumetanide (BUMEX) 1 MG tablet Take 1 tablet by mouth in the morning and 1 tablet before bedtime. 180 tablet 3    magnesium oxide (MAG-OX) 400 MG tablet Take 1 tablet by mouth in the morning and 1 tablet before bedtime.  180 tablet 3    dapagliflozin (FARXIGA) 10 MG tablet TAKE 1 TABLET EVERY MORNING 90 tablet 1    sildenafil (VIAGRA) 100 MG tablet Take 1 tablet by mouth as needed for Erectile Dysfunction Max daily dose 100mg. 16 tablet 0    metoprolol succinate (TOPROL XL) 50 MG extended release tablet Take 1 tablet by mouth in the morning and at bedtime 180 tablet 3    Insulin Pen Needle (PEN NEEDLES) 31G X 6 MM MISC 1 each by Does not apply route daily 100 each 3    Continuous Blood Gluc Sensor (FREESTYLE LIZA 14 DAY SENSOR) MISC Check bs tidac and hs 6 each 1    Continuous Blood Gluc  (FREESTYLE LIZA 14 DAY READER) SOFIYA Check bs tidac and hs 6 each 1    Insulin Degludec (TRESIBA FLEXTOUCH) 200 UNIT/ML SOPN 10 units subcut qam, increase 4 units every 4 days until am blood sugar < 120. Max 100 units (Patient taking differently: Inject 12 Units into the skin daily 10 units subcut qam, increase 4 units every 4 days until am blood sugar < 120. Max 100 units) 9 pen 1    albuterol sulfate HFA (VENTOLIN HFA) 108 (90 Base) MCG/ACT inhaler Inhale 2 puffs into the lungs 4 times daily as needed for Wheezing 18 g 5    pravastatin (PRAVACHOL) 40 MG tablet Take 1 tablet by mouth daily 90 tablet 3       Objective    /78   Pulse (!) 109   Temp 98 °F (36.7 °C) (Axillary)   Resp 25   Ht 6' 0.99\" (1.854 m)   Wt 179 lb 0.2 oz (81.2 kg)   SpO2 99%   BMI 23.62 kg/m²     LABS:  WBC:    Lab Results   Component Value Date/Time    WBC 27.0 10/04/2022 04:25 AM     H/H:    Lab Results   Component Value Date/Time    HGB 7.6 10/04/2022 04:25 AM    HCT 25.1 10/04/2022 04:25 AM     PTT:    Lab Results   Component Value Date/Time    APTT 31.0 09/19/2022 06:10 AM   [APTT}  PT/INR:    Lab Results   Component Value Date/Time    PROTIME 13.2 09/19/2022 06:10 AM    INR 1.01 09/19/2022 06:10 AM     HgBA1c:    Lab Results   Component Value Date/Time    LABA1C 8.1 01/24/2022 10:29 AM       Assessment: Sacrum - non blanchable purple area, deflated, ruptured blisters.  Periwound is fragile with blanchable erythema   Bryson Risk Score: Bryson Scale Score: 14    Patient Active Problem List   Diagnosis Code    HTN (hypertension) I10    Hyperlipidemia E78.5    OA (osteoarthritis) of knee M17.9    Carotid stenosis, left I65.22    Hearing loss H91.90    ED (erectile dysfunction) N52.9    DM type 2 (diabetes mellitus, type 2) ([facility]) E11.9    Rotator cuff tear M75.100    Glenohumeral arthritis M19.019    Subacromial impingement M75.40    Trigger ring finger of right hand M65.341    Spinal stenosis of lumbar region M48.061    Closed compression fracture of L1 lumbar vertebra S32.010A    Closed displaced fracture of lateral malleolus of left fibula S82. 62XA    Exertional dyspnea R06.09    Atrial fibrillation with rapid ventricular response ([facility]) I48.91    Hypoxia R09.02    Chronic heart failure with preserved ejection fraction (HFpEF) ([facility]) I50.32    Pleural effusion on right J90    Acute on chronic respiratory failure with hypoxia and hypercapnia ([facility]) J96.21, J96.22    ZANA (acute kidney injury) ([facility] [address] N17.9       Measurements:  Wound 10/03/22 Sacrum (Active)   Wound Image   10/04/22 1137   Wound Etiology Deep tissue/Injury 10/04/22 1137   Dressing Status Intact 10/04/22 1137   Wound Cleansed Not Cleansed 10/04/22 1137   Dressing/Treatment Pharmaceutical agent (see MAR) 10/04/22 1137   Dressing Change Due 10/04/22 10/04/22 1137   Wound Length (cm) 4 cm 10/04/22 1137   Wound Width (cm) 8 cm 10/04/22 1137   Wound Depth (cm) 0.1 cm 10/04/22 1137   Wound Surface Area (cm^2) 32 cm^2 10/04/22 1137   Wound Volume (cm^3) 3.2 cm^3 10/04/22 1137   Wound Assessment Ruptured blister; Purple/maroon 10/04/22 1137   Drainage Amount None 10/04/22 1137   Odor None 10/04/22 1137   Nery-wound Assessment Blanchable erythema;Fragile 10/04/22 1137   Margins Attached edges; Defined edges 10/04/22 1137   Number of days: 1   Sacrum:      Incision 09/19/22 Abdomen Lateral;Right;Upper (Active)   Dressing Status Clean;Dry; Intact 10/04/22 0600   Incision Cleansed Not Cleansed 10/04/22 0600 Dressing/Treatment Tegaderm/transparent film dressing 10/04/22 0600   Closure Sutures 10/04/22 0600   Margins Approximated 10/04/22 0600   Incision Assessment Dry 10/04/22 0600   Drainage Amount None 10/04/22 0600   Odor None 10/04/22 0600   Nery-incision Assessment Intact 10/04/22 0600   Number of days: 15     Response to treatment:  Well tolerated by patient.      Pain Assessment:  Severity:  0 / 10  Quality of pain: N/A  Wound Pain Timing/Severity: none  Premedicated: No    Plan   Plan of Care: Wound 10/03/22 Sacrum-Dressing/Treatment: Pharmaceutical agent (see MAR) (Venelex)  Recommendation: Sacrum - clean with NS, apply Venelex BID, may use sacral foam if not having a lot of bowel movements  Reposition pt every 2 hours  Call Wound Care for deterioration [phone]    Specialty Bed Required : No   [x] Low Air Loss   [x] Pressure Redistribution  [] Fluid Immersion  [] Bariatric  [] Total Pressure Relief  [] Other:     Current Diet: ADULT TUBE FEEDING; Nasogastric; Diabetic; Continuous; 10; Yes; 10; Q 4 hours; 45; 300; Q 4 hours; Protein; 2 bottles Proteinex daily w/ 30 mL FW flushes before and after  Diet NPO  Dietician consult:  Yes    Discharge Plan:  Placement for patient upon discharge: skilled nursing    Patient appropriate for Outpatient [address]: Yes    Referrals:  [x]   [] [facility]  [] Supplies  [] Other    Patient/Caregiver Teaching:  Level of patient/caregiver understanding able to:   [] Indicates understanding       [] Needs reinforcement  [] Unsuccessful      [] Verbal Understanding  [] Demonstrated understanding       [] No evidence of learning  [] Refused teaching         [] N/A       Electronically signed by Ashwin tSevens RN, CWOCN on 10/4/2022 at 11:39 AM 11-Apr-2019

## 2022-10-04 NOTE — PROGRESS NOTES
Pt remains on 4 mcg/min IV levo for pressure support. HR remains sinustachy and elevates 130s-140 with care, residents aware. Adequate urine output. No substantial events overnight. Pt tolerating ice chips well, nods and mouths responses appropriately, follows commands x4 extremities. Resting comfortably in bed.

## 2022-10-04 NOTE — PROGRESS NOTES
Patient is having numerous watery stools throughout day and night, spoke to ICU residents and surgical residents to have the place orders for rectal tube in order to protect patients skin from any additional breakdown. Rectal tube placed. Will continue to monitor.

## 2022-10-04 NOTE — PROGRESS NOTES
Speech Language Pathology  Facility/Department: [facility]  Dysphagia Daily Treatment Note    NAME: Bernadette Wilks  : 1945  MRN: [id number]    Patient Diagnosis(es):   Patient Active Problem List    Diagnosis Date Noted    ZANA (acute kidney injury) ([facility] [address] 2022    Acute on chronic respiratory failure with hypoxia and hypercapnia ([address] 2022    Pleural effusion on right 2022    Chronic heart failure with preserved ejection fraction (HFpEF) ([address] 2022    Hypoxia 2022    Atrial fibrillation with rapid ventricular response ([address] 2022    Exertional dyspnea 2022    Closed displaced fracture of lateral malleolus of left fibula 2021    Spinal stenosis of lumbar region 10/17/2018    Closed compression fracture of L1 lumbar vertebra 10/17/2018    Trigger ring finger of right hand 06/15/2017    Subacromial impingement 2015    Rotator cuff tear 2015    Glenohumeral arthritis 2015    DM type 2 (diabetes mellitus, type 2) ([address] 2013    ED (erectile dysfunction) 2012    OA (osteoarthritis) of knee 10/12/2011    Carotid stenosis, left 10/12/2011    Hearing loss 10/12/2011    HTN (hypertension) 10/07/2011    Hyperlipidemia 10/07/2011     Allergies: Allergies   Allergen Reactions    Torsemide Shortness Of Breath    Dye [Iodides]      1970's - ?? CXR (10/3/22)-  Impression       Stable appearance of loculated right pneumothorax, with loculated components in the lateral right midlung region and in the right lateral costophrenic sulcus. Stable scattered areas of atelectasis versus scar, most prominent in the medial right lung base and in the left lateral lung base. Previous MBS -  N/A    Chart reviewed. Medical Diagnosis: Pleural effusion, right [J90]  Pleural effusion on right [J90]   Treatment Diagnosis: Oropharyngeal dysphagia    BSE Impression (10/2/22)-   RN states okay to attempt assessment.   Dr Chiqui Kuhn speaking with spouse stating pt most likely still with effects of sedation. Pt was intubated 9/22- 9/30/22. Trach placed 9/30, with pt on ventilator. Pt shook head yes/no intermittently to questions asked. Pt made no attempt to mouth words. Pt exhibiting open mouth posture, did not form labial seal or protrude /lateralize tongue on command. Oral care completed, pt demonstrating no oral response. Placed 2 single ice chips in oral cavity, again with no response - no lingual movement noted. Pt did close lips with tactile stim x 2. No swallow movement was felt upon palpation of anterior neck. O2 sats remained stable and RR remained in mid 20's. Recommend aggressive oral care 2-3 x/day with suction kit. Plan to re-assess as pt appropriate. Dysphagia Diagnosis: Suspected needs further assessment    MBS results - instrumental will be indicated     Pain: None indicated     Current Diet : ADULT TUBE FEEDING; Nasogastric; Diabetic; Continuous; 10; Yes; 10; Q 4 hours; 45; 300; Q 4 hours; Protein; 2 bottles Proteinex daily w/ 30 mL FW flushes before and after  Diet NPO   Recommended Form of Meds: Via alternative means of nutrition      Treatment:  Pt seen bedside to address the following goals:   1. The patient will tolerate repeat bedside swallowing evaluation when able. 10/3 - Pt positioned upright and aggressive oral care was completed with suction toothbrush. Vitals reading had poor waveform and appeared to be unreliable readings; RN notified and in to assess. The patient demo'd spontaneous swallows without PO with audible air escaping around stoma. Pt tolerated 4/4 ice chips with constant verbal cues for appropriate acceptance and manipulation of ice (I.e.close mouth, chew before you swallow, etc). There was no coughing but not likely clinically relevant due to research indicating high prevalence of SILENT aspiration in cuff inflated tracheostomy patients.  Pt not yet appropriate for instrumental swallow evaluation but will require out prior to advancing diet. Notes indicate plan for SBT today. Requested order for PMV which will hopefully be receive and coincide with improved alertness and weaning from ventilator. Continue goal.  10/4: Cleared by RN. No order for PMV yet. Received pt more awake/alert. Wife present. Pt remains on trach/vent. Oral care recently completed. Repositioned pt upright. Targeted swallow function via trials ice chips and small sips h2o; pt with positive oral acceptance, no anterior loss, appropriate and timely oral prep, seemingly positive swallow movement. Attempted to check clearance via oral suction; evidently dry with no material suctioned. As previously noted, no cough, however would be unlikely to occur given inflated cuff tracheostomy. Would recommend waiting on instrumental pending ability to place PMV (assuming order will be received in the near future), as that will likely support pt's swallow function, and show improved function. Cont goal    2. The patient/caregiver will demonstrate understanding of compensatory strategies for improved swallowing safety. 10/3 - Educated on potential impact of trach on swallow function, rationale for oral care, recommendations for few single ice chips to be given to facilitate oral mucosal integrity and preserve swallow function that is present. Introduced plan for PMV in future, rationales for this. Educated on limitations in pts alertness at this time and need for improvement for completing swallow study, therapeutic effectiveness, etc. Continue goal.   10/4: Discussed swallow function with patient/wife. Also reviewed recommendation for PMV, hopefully in near future (order not yet received). Discussed how PMV may also improve swallow function be supporting necessary pressure for swallowing. Reviewed recommendation for small amounts ice/small sips h2o with effortful swallow to support swallow function/maintenance. Wife and pt indicated comprehension.   Cont goal    Patient/Family/Caregiver Education:  Please see goal 2 above    Compensatory Strategies:  Oral care using suction / suction system 3x per day   Single ice chips given by staff when pt awake and in upright position      Plan:  Continue dysphagia treatment with goals per plan of care. Diet recommendations: Single ice chips given by staff when pt awake and in upright position   DC recommendation: Ongoing speech therapy is indicated   Treatment: 10 minutes  D/W nursingAna  Needs met prior to leaving room, call button in reach. [place], [place], [address] [place] [address], [phone]  Pg.  # [phone]    If patient is discharged prior to next treatment, this note will serve as the discharge summary

## 2022-10-04 NOTE — PROGRESS NOTES
Made respiratory tech aware of bubbling sound under trach when patient swallows. Respiratory assessed patient and speech therapy was consulted. Will continue to monitor.

## 2022-10-04 NOTE — PLAN OF CARE
Problem: Chronic Conditions and Co-morbidities  Goal: Patient's chronic conditions and co-morbidity symptoms are monitored and maintained or improved  10/4/2022 1038 by Jennifer Johnson RN  Outcome: Progressing  10/4/2022 0302 by Edwin King RN  Outcome: Progressing  Flowsheets (Taken 10/3/2022 2000)  Care Plan - Patient's Chronic Conditions and Co-Morbidity Symptoms are Monitored and Maintained or Improved:   Monitor and assess patient's chronic conditions and comorbid symptoms for stability, deterioration, or improvement   Collaborate with multidisciplinary team to address chronic and comorbid conditions and prevent exacerbation or deterioration   Update acute care plan with appropriate goals if chronic or comorbid symptoms are exacerbated and prevent overall improvement and discharge     Problem: Discharge Planning  Goal: Discharge to home or other facility with appropriate resources  10/4/2022 1038 by Jennifer Johnson RN  Outcome: Progressing  10/4/2022 0302 by Edwin King RN  Outcome: Progressing  Flowsheets (Taken 10/3/2022 2000)  Discharge to home or other facility with appropriate resources:   Identify barriers to discharge with patient and caregiver   Arrange for needed discharge resources and transportation as appropriate     Problem: Pain  Goal: Verbalizes/displays adequate comfort level or baseline comfort level  10/4/2022 1038 by Jennifer Johnson RN  Outcome: Progressing  Flowsheets  Taken 10/4/2022 0815  Verbalizes/displays adequate comfort level or baseline comfort level: Encourage patient to monitor pain and request assistance  Taken 10/4/2022 0800  Verbalizes/displays adequate comfort level or baseline comfort level: Encourage patient to monitor pain and request assistance  10/4/2022 0302 by Edwin King RN  Outcome: Progressing  Flowsheets (Taken 10/3/2022 2000)  Verbalizes/displays adequate comfort level or baseline comfort level:   Encourage patient to monitor pain and request assistance   Assess pain using appropriate pain scale     Problem: Safety - Adult  Goal: Free from fall injury  10/4/2022 1038 by June Gillette RN  Outcome: Progressing  10/4/2022 0302 by Holly Tong RN  Outcome: Progressing  Flowsheets (Taken 10/4/2022 0257)  Free From Fall Injury: Instruct family/caregiver on patient safety     Problem: Skin/Tissue Integrity  Goal: Absence of new skin breakdown  Description: 1. Monitor for areas of redness and/or skin breakdown  2. Assess vascular access sites hourly  3. Every 4-6 hours minimum:  Change oxygen saturation probe site  4. Every 4-6 hours:  If on nasal continuous positive airway pressure, respiratory therapy assess nares and determine need for appliance change or resting period. 10/4/2022 1038 by June Gillette RN  Outcome: Progressing  10/4/2022 0302 by Holly Tong RN  Outcome: Progressing     Problem: Safety - Medical Restraint  Goal: Remains free of injury from restraints (Restraint for Interference with Medical Device)  Description: INTERVENTIONS:  1. Determine that other, less restrictive measures have been tried or would not be effective before applying the restraint  2. Evaluate the patient's condition at the time of restraint application  3. Inform patient/family regarding the reason for restraint  4.  Q2H: Monitor safety, psychosocial status, comfort, nutrition and hydration  10/4/2022 1038 by June Gillette RN  Outcome: Progressing  10/4/2022 0302 by Holly Tong RN  Outcome: Progressing     Problem: Nutrition Deficit:  Goal: Optimize nutritional status  10/4/2022 1038 by June Gillette RN  Outcome: Progressing  10/4/2022 0302 by Holly Tong RN  Outcome: Progressing     Problem: ABCDS Injury Assessment  Goal: Absence of physical injury  10/4/2022 1038 by June Gillette RN  Outcome: Progressing  10/4/2022 0302 by Holly Tong RN  Outcome: Progressing  Flowsheets (Taken 10/4/2022 0257)  Absence of Physical Injury: Implement safety measures based on patient assessment     Problem: Confusion  Goal: Confusion, delirium, dementia, or psychosis is improved or at baseline  Description: INTERVENTIONS:  1. Assess for possible contributors to thought disturbance, including medications, impaired vision or hearing, underlying metabolic abnormalities, dehydration, psychiatric diagnoses, and notify attending LIP  2. [place] high risk fall precautions, as indicated  3. Provide frequent short contacts to provide reality reorientation, refocusing and direction  4. Decrease environmental stimuli, including noise as appropriate  5. Monitor and intervene to maintain adequate nutrition, hydration, elimination, sleep and activity  6. If unable to ensure safety without constant attention obtain sitter and review sitter guidelines with assigned personnel  7.  Initiate Psychosocial CNS and Spiritual Care consult, as indicated  10/4/2022 1038 by Parag Mclaughlin RN  Outcome: Progressing  10/4/2022 0302 by Sorin Reyes RN  Outcome: Progressing  Flowsheets (Taken 10/3/2022 2000)  Effect of thought disturbance (confusion, delirium, dementia, or psychosis) are managed with adequate functional status:   Assess for contributors to thought disturbance, including medications, impaired vision or hearing, underlying metabolic abnormalities, dehydration, psychiatric diagnoses, notify [place] high risk fall precautions, as indicated   Provide frequent short contacts to provide reality reorientation, refocusing and direction   Decrease environmental stimuli, including noise as appropriate   Monitor and intervene to maintain adequate nutrition, hydration, elimination, sleep and activity

## 2022-10-04 NOTE — PROGRESS NOTES
Dietician called and informed this nurse that tube feed rate was set too high for the Glucerna tube feed. Rate change from 65 ml/hr to 45 ml/hr. Will continue to monitor.

## 2022-10-04 NOTE — PROGRESS NOTES
Nephrology Progress Note                                                                                                                                                                                                                                                                                                                                                               Office : [phone]     Fax :[phone]    Patient's Name: Vlad Harper    10/4/2022    Reason for Consult:   ZANA  Requesting Physician:  Tiffanie Gill MD    Interval History:      Cr stable   Good UO   Remains on Trach   Still requiring small amount of pressors  Family at bedside and case and questions answered to their satisfaction    Past Medical History:   Diagnosis Date    ZANA (acute kidney injury) ([facility] [address] 9/26/2022    Carotid stenosis, left 10/12/2011    Chronic back pain     Chronic systolic (congestive) heart failure 30/98/1090    Diastolic CHF ([facility] [address]     Erectile dysfunction     Hyperlipidemia     Hypertension     Osteoarthritis     Restrictive lung disease     Type II or unspecified type diabetes mellitus without mention of complication, not stated as uncontrolled        Past Surgical History:   Procedure Laterality Date    CARDIAC CATHETERIZATION  06/2022    KNEE SURGERY Left     PLEURAL CATH INSERTION N/A 9/19/2022    PLEURAL CATHETER PLACEMENT; INTERCOSTAL NERVE BLOCK X4 performed by Dixie Menon MD at [facility] Bilateral     THORACOSCOPY Right 9/19/2022    RIGHT VIDEO ASSISTED THORASCOPIC SURGERY AND; performed by Dixie Menon MD at [address] N/A 9/30/2022    TRACHEOSTOMY performed by Howard Pineda MD at [address] [facility] History   Problem Relation Age of Onset    Hearing Loss Father     Heart Disease Father 70        MI    High Blood Pressure Father     Diabetes Maternal Grandmother         hypoglycemic    Hearing Loss Maternal Grandmother     Arthritis Maternal Grandfather reports that he quit smoking about 20 years ago. His smoking use included cigarettes. He has a 80.00 pack-year smoking history. He has never used smokeless tobacco. He reports current alcohol use. He reports that he does not use drugs. Allergies:   Torsemide and Dye [iodides]    Current Medications:    pantoprazole (PROTONIX) injection 40 mg, BID  norepinephrine (LEVOPHED) 16 mg in sodium chloride 0.9 % 250 mL infusion, Continuous  insulin lispro (1 Unit Dial) (HUMALOG/ADMELOG) pen 0-16 Units, Q4H  insulin glargine (LANTUS;BASAGLAR) injection pen 20 Units, Nightly  sodium chloride flush 0.9 % injection 5-40 mL, 2 times per day  sodium chloride flush 0.9 % injection 5-40 mL, PRN  0.9 % sodium chloride infusion, PRN  HYDROmorphone (DILAUDID) injection 0.5 mg, Q5 Min PRN  labetalol (NORMODYNE;TRANDATE) injection 10 mg, Q15 Min PRN  Venelex ointment, BID  HYDROmorphone (DILAUDID) injection 0.25 mg, Q3H PRN  amiodarone (CORDARONE) 150 mg in dextrose 5 % 100 mL bolus, Once  amiodarone (CORDARONE) tablet 200 mg, Daily  acetaminophen (TYLENOL) 160 MG/5ML solution 650 mg, Q6H PRN  chlorhexidine (PERIDEX) 0.12 % solution 15 mL, BID  [Held by provider] metoprolol (LOPRESSOR) injection 5 mg, Q6H  haloperidol lactate (HALDOL) injection 2 mg, Q6H PRN  albuterol (PROVENTIL) nebulizer solution 2.5 mg, Q4H PRN  [Held by provider] apixaban (ELIQUIS) tablet 5 mg, BID  sodium chloride flush 0.9 % injection 5-40 mL, 2 times per day  sodium chloride flush 0.9 % injection 5-40 mL, PRN  0.9 % sodium chloride infusion, PRN  polyethylene glycol (GLYCOLAX) packet 17 g, Daily  ondansetron (ZOFRAN-ODT) disintegrating tablet 4 mg, Q8H PRN   Or  ondansetron (ZOFRAN) injection 4 mg, Q6H PRN  glucose chewable tablet 16 g, PRN  dextrose bolus 10% 125 mL, PRN   Or  dextrose bolus 10% 250 mL, PRN  glucagon (rDNA) injection 1 mg, PRN  dextrose 10 % infusion, Continuous PRN  hydrALAZINE (APRESOLINE) injection 5 mg, Q15 Min PRN  levalbuterol (XOPENEX) nebulization 0.63 mg, Q4H PRN      Review of Systems:   14 point ROS obtained but were negative except mentioned in HPI      Physical exam:     Vitals:  /78   Pulse (!) 111   Temp 98 °F (36.7 °C) (Axillary)   Resp 27   Ht 6' 0.99\" (1.854 m)   Wt 179 lb 0.2 oz (81.2 kg)   SpO2 97%   BMI 23.62 kg/m²   Constitutional:  on MV  Skin: no rash, turgor wnl  Heent:  eomi, mmm  Neck: no bruits or jvd noted  Cardiovascular:  S1, S2 without m/r/g  Respiratory: trach  Abdomen:  +bs, soft, nt, nd  Ext: bilateral lower extremity edema R>L  Psychiatric: mood and affect appropriate  Musculoskeletal:  Rom, muscular strength intact    Data:   Labs:  CBC:   Recent Labs     10/02/22  0332 10/03/22  0407 10/04/22  0425   WBC 22.6* 22.4* 27.0*   HGB 8.0* 7.5* 7.6*   * 446 454*     BMP:    Recent Labs     10/02/22  0332 10/03/22  0407 10/04/22  0426   * 151* 148*   K 4.1 3.5 3.8    103 102   CO2 36* 36* 35*   * 112* 114*   CREATININE 1.8* 1.8* 1.7*   GLUCOSE 136* 185* 282*     Ca/Mg/Phos:   Recent Labs     10/02/22  0332 10/03/22  0407 10/03/22  1229 10/04/22  0425 10/04/22  0426   CALCIUM 8.2* 8.0*  --   --  8.0*   MG 3.10*  --  3.20* 3.30*  --    PHOS 4.7 4.7  --   --  4.5     Hepatic: No results for input(s): AST, ALT, ALB, BILITOT, ALKPHOS in the last 72 hours. Troponin: No results for input(s): TROPONINI in the last 72 hours. BNP: No results for input(s): BNP in the last 72 hours. Lipids:   Recent Labs     10/03/22  0407   TRIG 77       ABGs:   No results for input(s): PHART, PO2ART, YIA7QYC in the last 72 hours. INR: No results for input(s): INR in the last 72 hours. UA:No results for input(s): Laren Dollar, GLUCOSEU, BILIRUBINUR, Pa Buddhist, BLOODU, PHUR, PROTEINU, UROBILINOGEN, NITRU, LEUKOCYTESUR, LABMICR, URINETYPE in the last 72 hours. Urine Microscopic: No results for input(s): LABCAST, BACTERIA, COMU, HYALCAST, WBCUA, RBCUA, EPIU in the last 72 hours.   Urine Culture: No results for input(s): LABURIN in the last 72 hours. Urine Chemistry:   No results for input(s): Edwin Lieu, PROTEINUR, NAUR in the last 72 hours. IMAGING:  CT CHEST ABDOMEN PELVIS WO CONTRAST   Final Result      1. Severe pneumomediastinum. 2. Small to moderate right hydropneumothorax with similar fluid and gas components with a right-sided Pleurx catheter. 3. Moderate loculated left pleural effusion with atelectasis of the left lower lobe with patency of the central left lower lobe bronchi. 4. Fluid/material filling the bronchus intermedius and right lower lobe bronchi with complete consolidation and volume loss of the right lower lobe. Differential diagnosis would include mucous plugging or obstructing lesion. 5. Tracheostomy tube tip within the trachea   6. Severe subcutaneous emphysema in the neck. CT ABDOMEN AND PELVIS:      FINDINGS:      LIVER: Normal.      GALLBLADDER AND BILIARY TREE: No calcified gallstones. No gallbladder distention. No intra- or extrahepatic biliary dilatation. PANCREAS: Normal.      SPLEEN: Normal.      ADRENAL GLANDS: Normal.      KIDNEYS AND URETERS: Normal.      URINARY BLADDER: Ansari catheter present within collapsed urinary bladder. REPRODUCTIVE ORGANS: No associated masses. BOWEL: Normal diameter, nonobstructed. Feeding tube tip at the level of the ligament of Treitz. LYMPH NODES: No abnormally enlarged nodes. PERITONEUM/RETROPERITONEUM: Severe pneumoperitoneum extends into the upper abdomen with some of the symmetric multiseptated gas appearing deep to the diaphragm consistent with mild pneumoperitoneum (series 601 was 101). This is likely related to    pneumonia mediastinum dissecting into the abdomen. No fluid collection in the abdomen or pelvis. No ascites. VESSELS: Extensive atherosclerotic calcification of the aorta and iliac arteries. ABDOMINAL WALL: No acute abnormality.       BONES: No acute abnormality. Mild chronic L1 compression fracture with previous vertebroplasty. IMPRESSION:      1. Severe pneumomediastinum extends into the upper abdomen with small pneumoperitoneum likely related to extension of pneumoperitoneum into the upper peritoneal cavity. 2. No fluid collection in the abdomen or pelvis. Results were discussed with the surgical team at 7:00 PM on 10/3/2022. XR CHEST PORTABLE   Final Result      Stable appearance of loculated right pneumothorax, with loculated components in the lateral right midlung region and in the right lateral costophrenic sulcus. Stable scattered areas of atelectasis versus scar, most prominent in the medial right lung base and in the left lateral lung base. XR CHEST PORTABLE   Final Result      Small right basilar pneumothorax. Right basilar chest tube without change. Patchy consolidation in the right mid and lower lung as well as the left lower lung-atelectasis versus pneumonia. Trace left pleural effusion. Normal heart size. Lines and tubes without change. Mild improvement in degree of subcutaneous emphysema in the chest and neck. XR CHEST 1 VIEW   Final Result      1. Stable small right-sided pneumothorax and prominent subcutaneous emphysema along the neck and upper superior chest wall, greater in right lung stable   2. Stable left basilar consolidation and pleural effusion      3. Stable appearing perihilar accentuated markings or airspace disease            XR CHEST PORTABLE   Final Result      Stable small right-sided pneumothorax. More prominent emphysema over the lower neck. No change in bilateral airspace disease. Stable left pleural effusion. XR CHEST PORTABLE   Final Result   1. Bilateral multifocal pneumonia with improvement in the right    pulmonary consolidation since prior study from 9/23/22   2.   Mild to moderate left pleural effusion          XR CHEST PORTABLE   Final Result   1. Support lines and tubes are stable. 2.  Stable small right lateral pneumothorax and right basilar    chest tube. 3.  Stable patchy bilateral airspace disease. XR CHEST PORTABLE   Final Result   1. Satisfactory position of right internal jugular central line   2. No interval change in endotracheal tube and nasogastric tube positioning. 3. Extensive bilateral airspace disease with no changes. 4. Left pleural effusion with retrocardiac opacity, unchanged   5. Small stable tiny right lateral pneumothorax and stable position of right chest tube,, Pleurx catheter. XR ABDOMEN (KUB) (SINGLE AP VIEW)   Final Result   1. No residual pneumothorax. 2.  Mild patchy airspace disease bilaterally worse on the right than on prior examination. 3.  Non-obstructive bowel gas pattern. Mildly distended stomach. XR CHEST PORTABLE   Final Result   1. No residual pneumothorax. 2.  Mild patchy airspace disease bilaterally worse on the right than on prior examination. 3.  Non-obstructive bowel gas pattern. Mildly distended stomach. XR CHEST PORTABLE   Final Result      1. Small right pneumothorax appears slightly increased. 2. Mild patchy airspace opacity bilaterally. XR CHEST PORTABLE   Final Result     Pleurx catheter at the right lung base. Trace right    pneumothorax is unchanged. XR CHEST PORTABLE   Final Result      Right-sided chest tube evident in the base, its tip medially. Improvement in the right-sided pneumothorax, since earlier today. Possible medial collapse of the right lower lobe. Small left effusion. Patchy airspace density/atelectasis in the lungs bilaterally, with no other significant change. XR CHEST PORTABLE   Final Result   1. Right-sided Pleurx catheter in satisfactory position in the lung bases   2.  Right-sided post operative pneumothorax, approximately 10%             Assessment/Plan   ZANA  - suspect this is contrast nephropathy  - baseline Cre 0.7. Normal on admission.  - Cre 0.7-->1.7 but stabilized  - Hold diuretics   - BNP 3k, Protein:Cre 0.3, FENa 0.4%  - closely monitor UOP  - avoid nephrotoxic agent     2. Hypernatremia  - improving 151-148  - continue free water via NG. 300 Q4hr  - will continue to monitor     3. Hypotension  - still requiring low amount of Levophed    4. Anemia  - Hgb 7.6 (9.0 on admission)  - daily CBC    5. Acid- base/ Electrolyte imbalance   - optimize lytes    6. Acute hypoxic resp failure  - s/p VATS on 9/19/22 for recurrent pleural effusions  - on MV. Plan for tracheostomy today  - Intensivist and CTS following    7. CHF   - EF 65% on 6/16/22 via cardiac cath    Patient will be staffed with Dr. Xenia Miller MD   PGY3 IM Resident       I have seen the patient independently from the resident . I discussed the care with the resident. I personally reviewed the HPI, PH, FH, SH, ROS and medications. I repeated pertinent portions of the examination and reviewed the relevant imaging and laboratory data.  I agree with the findings, assessment and plan as documented, with the following addendum:      Nesha Souza MD

## 2022-10-04 NOTE — PLAN OF CARE
Problem: Chronic Conditions and Co-morbidities  Goal: Patient's chronic conditions and co-morbidity symptoms are monitored and maintained or improved  10/4/2022 0302 by Daylin Breen RN  Outcome: Progressing  Flowsheets (Taken 10/3/2022 2000)  Care Plan - Patient's Chronic Conditions and Co-Morbidity Symptoms are Monitored and Maintained or Improved:   Monitor and assess patient's chronic conditions and comorbid symptoms for stability, deterioration, or improvement   Collaborate with multidisciplinary team to address chronic and comorbid conditions and prevent exacerbation or deterioration   Update acute care plan with appropriate goals if chronic or comorbid symptoms are exacerbated and prevent overall improvement and discharge  10/3/2022 1727 by Jailene River RN  Outcome: Progressing  Flowsheets (Taken 10/3/2022 0805)  Care Plan - Patient's Chronic Conditions and Co-Morbidity Symptoms are Monitored and Maintained or Improved: Monitor and assess patient's chronic conditions and comorbid symptoms for stability, deterioration, or improvement     Problem: Skin/Tissue Integrity  Goal: Absence of new skin breakdown  Description: 1. Monitor for areas of redness and/or skin breakdown  2. Assess vascular access sites hourly  3. Every 4-6 hours minimum:  Change oxygen saturation probe site  4. Every 4-6 hours:  If on nasal continuous positive airway pressure, respiratory therapy assess nares and determine need for appliance change or resting period. Outcome: Progressing     Problem: ABCDS Injury Assessment  Goal: Absence of physical injury  Outcome: Progressing  Flowsheets (Taken 10/4/2022 0257)  Absence of Physical Injury: Implement safety measures based on patient assessment     Problem: Confusion  Goal: Confusion, delirium, dementia, or psychosis is improved or at baseline  Description: INTERVENTIONS:  1.  Assess for possible contributors to thought disturbance, including medications, impaired vision or hearing, underlying metabolic abnormalities, dehydration, psychiatric diagnoses, and notify attending LIP  2. [place] high risk fall precautions, as indicated  3. Provide frequent short contacts to provide reality reorientation, refocusing and direction  4. Decrease environmental stimuli, including noise as appropriate  5. Monitor and intervene to maintain adequate nutrition, hydration, elimination, sleep and activity  6. If unable to ensure safety without constant attention obtain sitter and review sitter guidelines with assigned personnel  7.  Initiate Psychosocial CNS and Spiritual Care consult, as indicated  Outcome: Progressing  Flowsheets (Taken 10/3/2022 2000)  Effect of thought disturbance (confusion, delirium, dementia, or psychosis) are managed with adequate functional status:   Assess for contributors to thought disturbance, including medications, impaired vision or hearing, underlying metabolic abnormalities, dehydration, psychiatric diagnoses, notify [place] high risk fall precautions, as indicated   Provide frequent short contacts to provide reality reorientation, refocusing and direction   Decrease environmental stimuli, including noise as appropriate   Monitor and intervene to maintain adequate nutrition, hydration, elimination, sleep and activity

## 2022-10-04 NOTE — PROGRESS NOTES
Attempted to titrate patients levophed down to 1 mcg/hr. Patients MAP dropped down to high 50's low 60's. Turned Levo back up to 2 mcg/hr. Will continue to monitor.

## 2022-10-04 NOTE — PROGRESS NOTES
ICU Progress Note  PGY-1    Admit Date: 9/19/2022  Day: 14  Vent Day: 11  IV Access: CVC triple lumen, peripheral  IV Fluids: None  Vasopressors: Levo          Antibiotics: None  Diet: ADULT TUBE FEEDING; Nasogastric; Diabetic; Continuous; 10; Yes; 10; Q 4 hours; 45; 300; Q 4 hours; Protein; 2 bottles Proteinex daily w/ 30 mL FW flushes before and after    CC: Pleural effusion (right) s/p VATS and Pleural catheter (PleurX) placement    Interval history:  - Awake, responsive to voice and commands  - With head nods/shakes, denies fever/chills. Denies pain.   - RNs reporting he is gurgling while eating ice chips but not choking or coughing    Medications:     Scheduled Meds:   potassium chloride  20 mEq IntraVENous Once    insulin lispro  0-16 Units SubCUTAneous Q4H    insulin glargine  20 Units SubCUTAneous Nightly    sodium chloride flush  5-40 mL IntraVENous 2 times per day    Venelex   Topical BID    amiodarone bolus  150 mg IntraVENous Once    amiodarone  200 mg Oral Daily    famotidine  20 mg Per NG tube Daily    chlorhexidine  15 mL Mouth/Throat BID    [Held by provider] metoprolol  5 mg IntraVENous Q6H    apixaban  5 mg Oral BID    sodium chloride flush  5-40 mL IntraVENous 2 times per day    polyethylene glycol  17 g Oral Daily     Continuous Infusions:   norepinephrine 2 mcg/min (10/04/22 0629)    sodium chloride      sodium chloride      dextrose       PRN Meds:sodium chloride flush, sodium chloride, HYDROmorphone, labetalol, HYDROmorphone, acetaminophen, haloperidol lactate, albuterol, sodium chloride flush, sodium chloride, ondansetron **OR** ondansetron, glucose, dextrose bolus **OR** dextrose bolus, glucagon (rDNA), dextrose, hydrALAZINE, levalbuterol    Objective:   Vitals:   T-max:  Patient Vitals for the past 8 hrs:   BP Temp Temp src Pulse Resp SpO2 Weight   10/04/22 0615 115/65 -- -- (!) 128 20 -- --   10/04/22 0600 125/65 -- -- (!) 121 16 -- 179 lb 0.2 oz (81.2 kg)   10/04/22 0545 113/60 -- -- Alem Subramanian 105 16 -- --   10/04/22 0530 94/61 -- -- (!) 121 22 91 % --   10/04/22 0515 (!) 105/55 -- -- -- -- 95 % --   10/04/22 0500 116/75 -- -- 93 19 94 % --   10/04/22 0445 115/67 -- -- (!) 105 14 91 % --   10/04/22 0430 107/64 -- -- (!) 123 23 (!) 78 % --   10/04/22 0415 120/68 -- -- (!) 113 18 94 % --   10/04/22 0400 111/77 98.5 °F (36.9 °C) Axillary (!) 125 24 94 % --   10/04/22 0345 108/73 -- -- (!) 122 21 -- --   10/04/22 0330 (!) 117/59 -- -- (!) 110 18 -- --   10/04/22 0315 107/68 -- -- (!) 118 19 -- --   10/04/22 0300 (!) 93/56 -- -- 100 18 -- --   10/04/22 0245 111/66 -- -- (!) 115 19 -- --   10/04/22 0230 (!) 101/57 -- -- (!) 103 17 94 % --   10/04/22 0145 -- -- -- 98 17 96 % --   10/04/22 0015 118/79 -- -- 94 16 96 % --   10/04/22 0000 116/70 98.2 °F (36.8 °C) Axillary (!) 111 21 96 % --         Intake/Output Summary (Last 24 hours) at 10/4/2022 0751  Last data filed at 10/4/2022 0600  Gross per 24 hour   Intake 3087.99 ml   Output 1580 ml   Net 1507.99 ml       Review of Systems - denies any pain    Physical Exam  Constitutional:       Comments: Intubated with tracheostomy, awake with eyes open, arousable to voice and responds to questions appropriately with nods   HENT:      Head: Normocephalic. Nose:      Comments: NG tube in place  Eyes:      Pupils: Pupils are equal, round, and reactive to light. Neck:      Comments: Tracheostomy  Cardiovascular:      Rate and Rhythm: Normal rate and regular rhythm. Pulses: Normal pulses. Heart sounds: Normal heart sounds. Pulmonary:      Comments: On ventilator. Pleurx catheter in place. Abdominal:      General: Abdomen is flat. Palpations: Abdomen is soft. Musculoskeletal:      Right lower leg: Edema present. Left lower leg: Edema present. Skin:     General: Skin is warm and dry.    Neurological:      Comments: Responds to voice, answers questions appropriately, follows commands     LABS:    CBC:   Recent Labs     10/02/22  0332 10/03/22  0407 10/04/22  0425   WBC 22.6* 22.4* 27.0*   HGB 8.0* 7.5* 7.6*   HCT 25.3* 23.5* 25.1*   * 446 454*   MCV 84.6 85.1 86.3       Renal:    Recent Labs     10/02/22  0332 10/03/22  0407 10/03/22  1229 10/04/22  0425 10/04/22  0426   * 151*  --   --  148*   K 4.1 3.5  --   --  3.8    103  --   --  102   CO2 36* 36*  --   --  35*   * 112*  --   --  114*   CREATININE 1.8* 1.8*  --   --  1.7*   GLUCOSE 136* 185*  --   --  282*   CALCIUM 8.2* 8.0*  --   --  8.0*   MG 3.10*  --  3.20* 3.30*  --    PHOS 4.7 4.7  --   --  4.5   ANIONGAP 11 12  --   --  11       Hepatic:   Recent Labs     10/02/22  0332 10/03/22  0407 10/04/22  0426   LABALBU 2.4* 2.2* 2.3*       Troponin: No results for input(s): TROPONINI in the last 72 hours. BNP: No results for input(s): BNP in the last 72 hours. Lipids: No results for input(s): CHOL, HDL in the last 72 hours. Invalid input(s): LDLCALCU, TRIGLYCERIDE  ABGs:    No results for input(s): PHART, GTK2BZO, PO2ART, KMC5XPY, BEART, THGBART, Z5AQUMKZ, KXB2EDL in the last 72 hours. INR: No results for input(s): INR in the last 72 hours. Lactate:   No results for input(s): LACTATE in the last 72 hours. Cultures:  -----------------------------------------------------------------  RAD:   CT CHEST ABDOMEN PELVIS WO CONTRAST   Final Result      1. Severe pneumomediastinum. 2. Small to moderate right hydropneumothorax with similar fluid and gas components with a right-sided Pleurx catheter. 3. Moderate loculated left pleural effusion with atelectasis of the left lower lobe with patency of the central left lower lobe bronchi. 4. Fluid/material filling the bronchus intermedius and right lower lobe bronchi with complete consolidation and volume loss of the right lower lobe. Differential diagnosis would include mucous plugging or obstructing lesion. 5. Tracheostomy tube tip within the trachea   6. Severe subcutaneous emphysema in the neck.          CT ABDOMEN AND PELVIS:      FINDINGS:      LIVER: Normal.      GALLBLADDER AND BILIARY TREE: No calcified gallstones. No gallbladder distention. No intra- or extrahepatic biliary dilatation. PANCREAS: Normal.      SPLEEN: Normal.      ADRENAL GLANDS: Normal.      KIDNEYS AND URETERS: Normal.      URINARY BLADDER: Ansari catheter present within collapsed urinary bladder. REPRODUCTIVE ORGANS: No associated masses. BOWEL: Normal diameter, nonobstructed. Feeding tube tip at the level of the ligament of Treitz. LYMPH NODES: No abnormally enlarged nodes. PERITONEUM/RETROPERITONEUM: Severe pneumoperitoneum extends into the upper abdomen with some of the symmetric multiseptated gas appearing deep to the diaphragm consistent with mild pneumoperitoneum (series 601 was 101). This is likely related to    pneumonia mediastinum dissecting into the abdomen. No fluid collection in the abdomen or pelvis. No ascites. VESSELS: Extensive atherosclerotic calcification of the aorta and iliac arteries. ABDOMINAL WALL: No acute abnormality. BONES: No acute abnormality. Mild chronic L1 compression fracture with previous vertebroplasty. IMPRESSION:      1. Severe pneumomediastinum extends into the upper abdomen with small pneumoperitoneum likely related to extension of pneumoperitoneum into the upper peritoneal cavity. 2. No fluid collection in the abdomen or pelvis. Results were discussed with the surgical team at 7:00 PM on 10/3/2022. XR CHEST PORTABLE   Final Result      Stable appearance of loculated right pneumothorax, with loculated components in the lateral right midlung region and in the right lateral costophrenic sulcus. Stable scattered areas of atelectasis versus scar, most prominent in the medial right lung base and in the left lateral lung base. XR CHEST PORTABLE   Final Result      Small right basilar pneumothorax. Right basilar chest tube without change. Patchy consolidation in the right mid and lower lung as well as the left lower lung-atelectasis versus pneumonia. Trace left pleural effusion. Normal heart size. Lines and tubes without change. Mild improvement in degree of subcutaneous emphysema in the chest and neck. XR CHEST 1 VIEW   Final Result      1. Stable small right-sided pneumothorax and prominent subcutaneous emphysema along the neck and upper superior chest wall, greater in right lung stable   2. Stable left basilar consolidation and pleural effusion      3. Stable appearing perihilar accentuated markings or airspace disease            XR CHEST PORTABLE   Final Result      Stable small right-sided pneumothorax. More prominent emphysema over the lower neck. No change in bilateral airspace disease. Stable left pleural effusion. XR CHEST PORTABLE   Final Result   1. Bilateral multifocal pneumonia with improvement in the right    pulmonary consolidation since prior study from 9/23/22   2. Mild to moderate left pleural effusion          XR CHEST PORTABLE   Final Result   1. Support lines and tubes are stable. 2.  Stable small right lateral pneumothorax and right basilar    chest tube. 3.  Stable patchy bilateral airspace disease. XR CHEST PORTABLE   Final Result   1. Satisfactory position of right internal jugular central line   2. No interval change in endotracheal tube and nasogastric tube positioning. 3. Extensive bilateral airspace disease with no changes. 4. Left pleural effusion with retrocardiac opacity, unchanged   5. Small stable tiny right lateral pneumothorax and stable position of right chest tube,, Pleurx catheter. XR ABDOMEN (KUB) (SINGLE AP VIEW)   Final Result   1. No residual pneumothorax. 2.  Mild patchy airspace disease bilaterally worse on the right than on prior examination. 3.  Non-obstructive bowel gas pattern. Mildly distended stomach.       XR CHEST PORTABLE   Final Result   1. No residual pneumothorax. 2.  Mild patchy airspace disease bilaterally worse on the right than on prior examination. 3.  Non-obstructive bowel gas pattern. Mildly distended stomach. XR CHEST PORTABLE   Final Result      1. Small right pneumothorax appears slightly increased. 2. Mild patchy airspace opacity bilaterally. XR CHEST PORTABLE   Final Result     Pleurx catheter at the right lung base. Trace right    pneumothorax is unchanged. XR CHEST PORTABLE   Final Result      Right-sided chest tube evident in the base, its tip medially. Improvement in the right-sided pneumothorax, since earlier today. Possible medial collapse of the right lower lobe. Small left effusion. Patchy airspace density/atelectasis in the lungs bilaterally, with no other significant change. XR CHEST PORTABLE   Final Result   1. Right-sided Pleurx catheter in satisfactory position in the lung bases   2. Right-sided post operative pneumothorax, approximately 10%           Assessment/Plan:   Rosario Long is a 65 y.o. male with a PMH of CHF (CHFpEF), carotid stenosis, HLD, HTN, OA, Restrictive Lung Disease, T2DM who underwent VATS with PleurX catheter placement on 9/19/2022 for recurrent bilateral loculated pleural effusions and has had hypoxia/hypercapnia since that time    Neuro  - off sedation as of 10/1    Pulmonary  Vent Day: 10  Vent: , RR 14, FiO2 35, PEEP 5  Acute hypoxic/hypercapnic respiratory failure  Recurrent Pleural Effusions, s/p VATS and Pleurx  Patient was admitted to ICU for failure to tolerate bipap following procedure. The etiology of his recurrent pleural effusions is unclear. No studies done on fluid from original thoracenteses. Possibly malignancy. VATS fluid results showing an exudative picture, per [facility] Criteria. S/p VATS procedure 9/19, with right PleurX catheter placement.  Ventilatory restriction on PFT associated with worsening pleural disease. 9/29 CXR with atelectasis around pluerx cath. s/p Methylprednisolone IV til 9/30  - PleurX catheter in place  - CTS following  - 10/4: CT Chest/Abdomen showed pneumomediastinum extending to pneumoperitoneum    Cardiovascular  Hypotension  Began requiring pressors after intubation, attributed to sedation and positive pressure ventilation  - Levo gtt @ 2, stable    Congestive Heart Failure with Preserved EF  EF ~65% 6/16/2022. 9/23 BNP 15k, given lasix at that time, now downtrending.   - remains fluid overloaded on exam with edema to knees bilaterally  - BNP downtrending  - Per nephro held off on diuretics given kidney injury    GI  Diet: ADULT TUBE FEEDING; Nasogastric; Diabetic; Continuous; 10; Yes; 10; Q 4 hours; 45; 300; Q 4 hours; Protein; 2 bottles Proteinex daily w/ 30 mL FW flushes before and after  GI ppx: pepcid     Constipation  Cont glycolax    Renal   Ansari in place  ZANA  Potentially ATN from hypotension/contrast   - Nephro following, rec to hold off on diuretics   - Good UOP, cr stable  - 10/4:  1.5L urine in last 24h    Hypernatremia  Sodium uptrend to 151 this AM.  - 10/3: Free water flushes in NG tube feeds  - 10/4: Na 151> 148. Continue free water flushes 300ml Q4H. Metabolic  - Lantus 85I nightly wit MDSI    Code Status: Full Code   PPX: pepcid, heparin   DISPO: ICU    Jaskaran Jaquez MD, PGY-1  Internal Medicine Resident  Contact via [facility]  10/04/22  7:51 AM    Pulmonary & Critical Care     Patient seen and examined. I agree with Dr. Marco Ortiz history, physical, lab findings, assessment and plan. Pt is POD#15 VATS with Pleurx placement. Patient had bilateral pleural effusions right > left that did not improve with diuresis prompting VATS. Pleura visibly was normal.  Pleurx was placed for ongoing management of pleural fluid. Total protein was quite low at 1.2 on 9/21. LDH was elevated at 305.   proBNP has been as high as 15,496     Patient developed postoperative acute on chronic hypercapnic/hypoxemic respiratory failure. For months he had slowly been getting worse in regards to dyspnea on exertion and an unintentional weight loss of 30 pounds. PFTs as an outpatient shows severe restrictive defect. CT chest August 29 shows bilateral pleural effusions that are moderate but no masses, nodules, or mediastinal adenopathy. I do not feel like we have a firm grasp of the pathology with Tiffanie Solomon  From the best I can tell his pleural effusions are likely on the basis of CHF but there is some other entity that is driving his respiratory failure, weight loss, and weakness. I think in underlying occult malignancy with a paraneoplastic syndrome needs to be searched for and ruled out. I repeated a CT chest/abdomen with the following:    CT Chest  1. Severe pneumomediastinum. 2. Small to moderate right hydropneumothorax with similar fluid and gas components with a right-sided Pleurx catheter. 3. Moderate loculated left pleural effusion with atelectasis of the left lower lobe with patency of the central left lower lobe bronchi. 4. Fluid/material filling the bronchus intermedius and right lower lobe bronchi with complete consolidation and volume loss of the right lower lobe. Differential diagnosis would include mucous plugging or obstructing lesion. 5. Tracheostomy tube tip within the trachea   6. Severe subcutaneous emphysema in the neck. CT Abdomen:  IMPRESSION:       1. Severe pneumomediastinum extends into the upper abdomen with small pneumoperitoneum likely related to extension of pneumoperitoneum into the upper peritoneal cavity. 2. No fluid collection in the abdomen or pelvis. I think it is beneficial to perform bronchoscopy to inspect airways to see if right lower lobe obstruction is mucus versus mass.   I will make him n.p.o. after midnight and stop his Eliquis in preparation for bronchoscopy tomorrow     I retried a VSV wean but dropped my goal tidal volume to 350 given his pneumomediastinum, subcutaneous emphysema, and pneumoperitoneum. He tolerated this much better than  yesterday spite that he is 6 foot tall. Will check VBG in 4 hours and return to full support    Change Pepcid to Protonix 40 IV twice daily given his persistently high BUN in case this is a GI bleed    Levophed is down to 2. Await PSA and CEA     Another problem is his insurance refusal to cover an LTAC. Medically Starr Blount is the typical patient that would benefit from a well structured chronic ventilator weaning program, found at LTAC's. Pt has a high probability of imminent or life-threatening deterioration requiring close monitoring, and highly complex decision-making and/or interventions of high intensity to assess, manipulate, and support his critical organ systems to prevent a likely inevitable decline which could occur if left untreated. A total critical care time 44 minutes was used. This includes but not limited to examining patient, collaborating with other physicians, monitoring vital signs, telemetry, continuous pulse oximetry, and clinical response to IV medications, documentation time, review and interpretation of laboratory and radiological data, review of nursing notes and old record review. This time excludes any time that may have been spent performing procedures for life threatening organ failure.      Ronald Subramanian MD

## 2022-10-05 PROBLEM — T17.500A MUCUS PLUGGING OF BRONCHI: Status: ACTIVE | Noted: 2022-01-01

## 2022-10-05 NOTE — CONSULTS
[address] [place] [address]  GI Consultation      Patient: Shanon Elena  :        Date:  10/5/2022    Subjective:       History of Present Illness    Patient is a 65 y.o.  male admitted with Pleural effusion, right [J90]  Pleural effusion on right [J90] with past medical history of CHF, COVID stenosis, hyperlipidemia, hypertension, restrictive lung disease, type 2 diabetes mellitus who was admitted to the ICU after acute hypoxic hypercapnic respiratory failure that led to intubation. The patient was s/p VATS with Pleurx catheter placement on 2022 for recurrent bilateral loculated pleural effusions. Etiology behind his recurrent pleural effusions was unclear. Lets leave results showed an exudative picture per lights criteria. Patient required ventilatory support in the ICU and now is s/p trach on 22. He has been afebrile, tachycardic and borderline hypotensive. He has  been requiring pressor support but off levophed since 8 AM.   CT chest abdomen and pelvis done for pneumothorax s/p pleurx catheter placement done on 10/03 revealed severe pneumomediastinum extending into upper abdomen with small pneumoperitoneum with extension to peritoneal cavity.      Past Medical History:   Diagnosis Date    ZANA (acute kidney injury) ([facility] [address] 2022    Carotid stenosis, left 10/12/2011    Chronic back pain     Chronic systolic (congestive) heart failure     Diastolic CHF ([address]     Erectile dysfunction     Hyperlipidemia     Hypertension     Osteoarthritis     Restrictive lung disease     Type II or unspecified type diabetes mellitus without mention of complication, not stated as uncontrolled       Past Surgical History:   Procedure Laterality Date    CARDIAC CATHETERIZATION  2022    KNEE SURGERY Left     PLEURAL CATH INSERTION N/A 2022    PLEURAL CATHETER PLACEMENT; INTERCOSTAL NERVE BLOCK X4 performed by Gallito Singh MD [place]  [facility] [place] Right 9/19/2022    RIGHT VIDEO ASSISTED THORASCOPIC SURGERY AND; performed by Bonnie Iraheta MD at [address] N/A 9/30/2022    TRACHEOSTOMY performed by Guy Doe MD at [address]      Past Endoscopic History     Colonoscopy done in 2013  IMPRESSION:  1.  3-4 mm sigmoid colon polyp removed with biopsy forceps  2. Sigmoid colon diverticulosis    Biopsy revealing hyperplastic polyp without evidence of dysplasia. Admission Meds  No current facility-administered medications on file prior to encounter. Current Outpatient Medications on File Prior to Encounter   Medication Sig Dispense Refill    ELIQUIS 5 MG TABS tablet TAKE 1 TABLET BY MOUTH TWO TIMES A DAY 60 tablet 2    umeclidinium-vilanterol (ANORO ELLIPTA) 62.5-25 MCG/INH AEPB inhaler Inhale 1 puff into the lungs daily 1 each 2    bumetanide (BUMEX) 1 MG tablet Take 1 tablet by mouth in the morning and 1 tablet before bedtime. 180 tablet 3    magnesium oxide (MAG-OX) 400 MG tablet Take 1 tablet by mouth in the morning and 1 tablet before bedtime. 180 tablet 3    dapagliflozin (FARXIGA) 10 MG tablet TAKE 1 TABLET EVERY MORNING 90 tablet 1    sildenafil (VIAGRA) 100 MG tablet Take 1 tablet by mouth as needed for Erectile Dysfunction Max daily dose 100mg. 16 tablet 0    metoprolol succinate (TOPROL XL) 50 MG extended release tablet Take 1 tablet by mouth in the morning and at bedtime 180 tablet 3    Insulin Pen Needle (PEN NEEDLES) 31G X 6 MM MISC 1 each by Does not apply route daily 100 each 3    Continuous Blood Gluc Sensor (FREESTYLE LIZA 14 DAY SENSOR) MISC Check bs tidac and hs 6 each 1    Continuous Blood Gluc  (FREESTYLE LIZA 14 DAY READER) SOFIYA Check bs tidac and hs 6 each 1    Insulin Degludec (TRESIBA FLEXTOUCH) 200 UNIT/ML SOPN 10 units subcut qam, increase 4 units every 4 days until am blood sugar < 120.  Max 100 units (Patient taking differently: Inject 12 Units into the skin daily 10 units subcut qam, increase 4 units every 4 days until am blood sugar < 120. Max 100 units) 9 pen 1    albuterol sulfate HFA (VENTOLIN HFA) 108 (90 Base) MCG/ACT inhaler Inhale 2 puffs into the lungs 4 times daily as needed for Wheezing 18 g 5    pravastatin (PRAVACHOL) 40 MG tablet Take 1 tablet by mouth daily 90 tablet 3         Allergies  Allergies   Allergen Reactions    Torsemide Shortness Of Breath    Dye [Iodides]      1970's - ?? Social   Social History     Tobacco Use    Smoking status: Former     Packs/day: 2.00     Years: 40.00     Pack years: 80.00     Types: Cigarettes     Quit date: 10/24/2001     Years since quittin.9    Smokeless tobacco: Never   Substance Use Topics    Alcohol use: Yes     Alcohol/week: 0.0 standard drinks     Comment: rarely        Family History   Problem Relation Age of Onset    Hearing Loss Father     Heart Disease Father 70        MI    High Blood Pressure Father     Diabetes Maternal Grandmother         hypoglycemic    Hearing Loss Maternal Grandmother     Arthritis Maternal Grandfather       Unknown family history of colon cancer, Crohn's disease, or ulcerative colitis. Review of Systems  Patient in mild discomfort due to bed sore but otherwise able to converse with a weak voice       Physical Exam    BP (!) 93/51   Pulse 99   Temp 97.3 °F (36.3 °C) (Axillary)   Resp 14   Ht 6' 0.99\" (1.854 m)   Wt 178 lb 12.7 oz (81.1 kg)   SpO2 98%   BMI 23.59 kg/m²   General appearance: alert, cooperative  Anicteric, No Jaundice  Head: Normocephalic, neck: s/p trach  Lungs: pleurx catheter in place. Heart: regular rate and rhythm, normal S1 and S2, no murmurs or rubs  Abdomen: soft and non tender.    Extremities: 3+ edema edema  Skin: cold  Neuro: intact        Data Review:    Recent Labs     10/03/22  0407 10/04/22  0425 10/05/22  0607   WBC 22.4* 27.0* 21.7*   HGB 7.5* 7.6* 7.5*   HCT 23.5* 25.1* 24.0*   MCV 85.1 86.3 85.9    454* 389     Recent Labs     10/03/22  0407 10/04/22  0426 10/05/22  0607   * 148* 146*   K 3.5 3.8 3.4*    102 102   CO2 36* 35* 37*   PHOS 4.7 4.5 3.8   * 114* 110*   CREATININE 1.8* 1.7* 1.5*     No results for input(s): AST, ALT, ALB, BILIDIR, BILITOT, ALKPHOS in the last 72 hours. No results for input(s): LIPASE, AMYLASE in the last 72 hours. No results for input(s): PROTIME, INR in the last 72 hours. No results for input(s): PTT in the last 72 hours. No results for input(s): OCCULTBLD in the last 72 hours. Imaging Studies:                                      CT-scan of abdomen and pelvis 10/03  : IMPRESSION:       1. Severe pneumomediastinum extends into the upper abdomen with small pneumoperitoneum likely related to extension of pneumoperitoneum into the upper peritoneal cavity. 2. No fluid collection in the abdomen or pelvis. Assessment:     Principal Problem:    Pleural effusion on right  Active Problems:    Acute on chronic respiratory failure with hypoxia and hypercapnia (HCC)    ZANA (acute kidney injury) ([facility] [address]  Resolved Problems:    * No resolved hospital problems. *    Respiratory failure requiring intubation s/p trach. Has had a prolonged course of hospitalization in the ICU  Was on Eliquis for A fib. Held on 10/04/2022. Bronch today  Needs PEG tube placement for nutrition. Recommendations:     PEG placement time to be determined after discussion with Dr. Meng Bone. Thank you for the opportunity to participate in [address] [place] [address]. Marta Mensah MD  PGY-2  Internal Medicine.

## 2022-10-05 NOTE — PROCEDURES
PROCEDURE:  BRONCHOSCOPY WITH airway inspection and therapeutic aspiration of mucous plug     The risks and benefits as well as alternatives to the procedure have been discussed with the patient. The patient understands and agrees to proceed. DESCRIPTION OF PROCEDURE: A time out was taken. Type of sedation used: Moderate Sedation  Physician/patient face-to-face sedation start time: 3:30 pm  Physician/patient face-to-face sedation stop time: 3:45 pm  Total moderate sedation time in minutes: 15 minutes  Patient was monitored continuously 1:1 throughout the entire procedure while sedation was administered    Fentanyl 100 mcg IV  Versed 4 mg IV    The scope was passed with ease via the tracheostomy tube. A complete airway inspection was performed. No endobronchial lesions were identified. There were thick, white purulent secretions throughout the bilateral airways, more so on the right and more so lower lobes than upper lobe. Therapeutic aspiration was performed and sent to lab for culture    EBL 0    The patient tolerated the procedure well.      Wife updated    Cosme Kincaid MD

## 2022-10-05 NOTE — PLAN OF CARE
Problem: Chronic Conditions and Co-morbidities  Goal: Patient's chronic conditions and co-morbidity symptoms are monitored and maintained or improved  10/5/2022 0718 by Brad Emery RN  Outcome: Progressing  10/5/2022 0558 by Rose Rodrigues RN  Outcome: Progressing  Flowsheets  Taken 10/4/2022 2000 by Rose Rodrigues RN  Care Plan - Patient's Chronic Conditions and Co-Morbidity Symptoms are Monitored and Maintained or Improved:   Monitor and assess patient's chronic conditions and comorbid symptoms for stability, deterioration, or improvement   Collaborate with multidisciplinary team to address chronic and comorbid conditions and prevent exacerbation or deterioration   Update acute care plan with appropriate goals if chronic or comorbid symptoms are exacerbated and prevent overall improvement and discharge  Taken 10/4/2022 1600 by Ana Cristina Waters [address] - Patient's Chronic Conditions and Co-Morbidity Symptoms are Monitored and Maintained or Improved: Monitor and assess patient's chronic conditions and comorbid symptoms for stability, deterioration, or improvement     Problem: Discharge Planning  Goal: Discharge to home or other facility with appropriate resources  10/5/2022 0718 by Brad Emery RN  Outcome: Progressing  10/5/2022 0558 by Rose Rodrigues RN  Outcome: Progressing  Flowsheets  Taken 10/4/2022 2000 by Rose Rodrigues RN  Discharge to home or other facility with appropriate resources:   Identify barriers to discharge with patient and caregiver   Arrange for needed discharge resources and transportation as appropriate  Taken 10/4/2022 1600 by Brad Emery RN  Discharge to home or other facility with appropriate resources: Identify barriers to discharge with patient and caregiver     Problem: Pain  Goal: Verbalizes/displays adequate comfort level or baseline comfort level  10/5/2022 0718 by Brad Emery RN  Outcome: Progressing  10/5/2022 0558 by Rose Rodrigues RN  Outcome: Progressing  Flowsheets  Taken 10/4/2022 2000 by Holly Tong RN  Verbalizes/displays adequate comfort level or baseline comfort level:   Encourage patient to monitor pain and request assistance   Assess pain using appropriate pain scale  Taken 10/4/2022 1600 by June Gillette RN  Verbalizes/displays adequate comfort level or baseline comfort level: Encourage patient to monitor pain and request assistance     Problem: Safety - Adult  Goal: Free from fall injury  10/5/2022 0718 by June Gillette RN  Outcome: Progressing  Flowsheets (Taken 10/5/2022 0616 by Holly Tong RN)  Free From Fall Injury: Instruct family/caregiver on patient safety  10/5/2022 0558 by Holly Tong RN  Outcome: Progressing     Problem: Skin/Tissue Integrity  Goal: Absence of new skin breakdown  Description: 1. Monitor for areas of redness and/or skin breakdown  2. Assess vascular access sites hourly  3. Every 4-6 hours minimum:  Change oxygen saturation probe site  4. Every 4-6 hours:  If on nasal continuous positive airway pressure, respiratory therapy assess nares and determine need for appliance change or resting period. 10/5/2022 0718 by June Gillette RN  Outcome: Progressing  10/5/2022 0558 by Holly Tong RN  Outcome: Progressing     Problem: Safety - Medical Restraint  Goal: Remains free of injury from restraints (Restraint for Interference with Medical Device)  Description: INTERVENTIONS:  1. Determine that other, less restrictive measures have been tried or would not be effective before applying the restraint  2. Evaluate the patient's condition at the time of restraint application  3. Inform patient/family regarding the reason for restraint  4.  Q2H: Monitor safety, psychosocial status, comfort, nutrition and hydration  10/5/2022 0718 by June Gillette RN  Outcome: Progressing  10/5/2022 0558 by Holly Tong RN  Outcome: Progressing     Problem: Nutrition Deficit:  Goal: Optimize nutritional status  10/5/2022 1882 by Mirza Holliday RN  Outcome: Progressing  10/5/2022 0558 by Adam Villatoro RN  Outcome: Progressing     Problem: ABCDS Injury Assessment  Goal: Absence of physical injury  10/5/2022 0718 by Mirza Holliday RN  Outcome: Progressing  Flowsheets (Taken 10/5/2022 0616 by Adam Villatoro RN)  Absence of Physical Injury: Implement safety measures based on patient assessment  10/5/2022 0558 by Adam Villatoro RN  Outcome: Progressing     Problem: Confusion  Goal: Confusion, delirium, dementia, or psychosis is improved or at baseline  Description: INTERVENTIONS:  1. Assess for possible contributors to thought disturbance, including medications, impaired vision or hearing, underlying metabolic abnormalities, dehydration, psychiatric diagnoses, and notify attending LIP  2. [place] high risk fall precautions, as indicated  3. Provide frequent short contacts to provide reality reorientation, refocusing and direction  4. Decrease environmental stimuli, including noise as appropriate  5. Monitor and intervene to maintain adequate nutrition, hydration, elimination, sleep and activity  6. If unable to ensure safety without constant attention obtain sitter and review sitter guidelines with assigned personnel  7.  Initiate Psychosocial CNS and Spiritual Care consult, as indicated  10/5/2022 0718 by Mirza Holliday RN  Outcome: Progressing  10/5/2022 0558 by Adam Villatoro RN  Outcome: Progressing  Flowsheets  Taken 10/4/2022 2000 by Adam Villatoro RN  Effect of thought disturbance (confusion, delirium, dementia, or psychosis) are managed with adequate functional status:   Assess for contributors to thought disturbance, including medications, impaired vision or hearing, underlying metabolic abnormalities, dehydration, psychiatric diagnoses, notify [facility] high risk fall precautions, as indicated   Provide frequent short contacts to provide reality reorientation, refocusing and direction   Decrease environmental stimuli, including noise as appropriate   Monitor and intervene to maintain adequate nutrition, hydration, elimination, sleep and activity  Taken 10/4/2022 1600 by Milagro Fallon RN  Effect of thought disturbance (confusion, delirium, dementia, or psychosis) are managed with adequate functional status: Assess for contributors to thought disturbance, including medications, impaired vision or hearing, underlying metabolic abnormalities, dehydration, psychiatric diagnoses, notify LIP

## 2022-10-05 NOTE — PROGRESS NOTES
Speech Language Pathology    Pt is NPO for bronch this AM. Will follow-up at another time as patient is available and schedule allows.      Osmin Pennington M.A., [facility]  Speech-Language Pathologist  Pager [phone]

## 2022-10-05 NOTE — PROGRESS NOTES
Respiratory Therapy Bronchoscopy Assist      Name:  Ada Record Number:  [id number]  Age: 65 y.o. Gender: male  : 1945  Today's Date:  10/5/2022  Room:  [address] [place] [address]     Sterile field maintained throughout procedure. Patient was pre-sedated. 30 mL of saline instilled. 30 mL of cloudy fluid obtained. Pt tolerated procedure well. Samples sent to lab for testing. Patient SpO2 within acceptable range throughout bronchscopy procedure. Physician assisted with bronch from 1520PM to 5512 PM without complications.  Bronchoscope ID: [id number]    Patient/caregiver was educated on the proper method of use:  Yes      Level of patient/caregiver understanding able to:   [x] Verbalize understanding   [] Demonstrate understanding       [] Teach back        [] Needs reinforcement       []  No available caregiver               []  Other:     Response to education:  Good     Electronically signed by Yulissa Mixon RCP on 10/5/2022 at 4:08 PM

## 2022-10-05 NOTE — PROGRESS NOTES
Point of care note:     Subjective    Mr. Sharon Suarez is POD #5 from a tracheostomy today. He is still currently connected to mechanical ventilation on PRVC 14/350/5/35%. Pt currently expresses no complaints related to tracheostomy. Objective:    Physical Examination:   General appearance: Mechanically ventilated. Appropriately nods head and uses hand gestures in response to questions. Does not attempt to verbalize   HEENT: NC/AT, EOMI, trachea midline and tracheostomy in place with some minor salivary secretions, no JVD. Crepitus noted in the neck, supraclavicular region, improving. Corpak in right nostril w/ bridle. Tracheostomy in place. Chest/Lungs: On mechanical ventilation via tracheostomy; R PleurX catheter capped  Cardiovascular: Atrial fibrillation with rate in the 120's currently   Abdomen: Soft, non-tender, non-distended  Genitourinary: Ansari in place with clear yellow urine  Skin: Warm and dry, no rashes. Sacral decubitus ulcer   Extremities: Edema of the b/l feet. No cyanosis; SCDs in place      Assessment and Plan:    -Removed 4 prolene sutures around tracheostomy. Pt tolerated well. -Remainder of care per primary team.   -Will sign off. Please contact ENT/Otolaryngology if any questions or concerns should arise.       Kady Regalado DO   PGY1, General Surgery  10/05/22  11:26 AM  Pager # [phone]

## 2022-10-05 NOTE — PROGRESS NOTES
10/05/22 1429   Encounter Summary   Encounter Overview/Reason  Attempted Encounter   Service Provided For: Patient not available   Last Encounter  10/05/22  (dje)   Complexity of Encounter Low   Begin Time 1340   End Time  1445   Total Time Calculated 65 min   Encounter    Type Initial Screen/Assessment   Assessment/Intervention/Outcome   Assessment Unable to assess  (busy busy with staff.)   Staff Iva Dickinson MA

## 2022-10-05 NOTE — PROGRESS NOTES
Patients MAP in the 70's on 1 mcg of Levophed. This nurse spoke to Dr. Gita Hernandez as well as the ICU residents and stopped the Levo to see if patients MAP can stay above 65 off of the Levophed. Will continue to monitor.

## 2022-10-05 NOTE — PROGRESS NOTES
100 18 -- --   10/05/22 0445 114/66 -- -- (!) 101 18 96 % --   10/05/22 0430 92/60 -- -- 93 16 96 % --   10/05/22 0415 92/62 -- -- 92 17 96 % --   10/05/22 0400 (!) 89/52 98.1 °F (36.7 °C) Oral 96 17 97 % --   10/05/22 0345 (!) 93/59 -- -- (!) 102 16 96 % --   10/05/22 0330 109/61 -- -- (!) 101 18 98 % --   10/05/22 0315 (!) 87/55 -- -- 83 17 97 % --   10/05/22 0300 86/61 -- -- 95 17 97 % --   10/05/22 0245 (!) 95/58 -- -- 100 17 96 % --   10/05/22 0230 (!) 88/57 -- -- 81 17 96 % --   10/05/22 0215 91/62 -- -- 96 18 96 % --   10/05/22 0200 (!) 99/53 -- -- 91 16 96 % --   10/05/22 0145 94/60 -- -- 89 16 96 % --   10/05/22 0130 (!) 86/55 -- -- (!) 103 17 95 % --   10/05/22 0045 108/60 -- -- (!) 120 18 96 % --   10/05/22 0030 109/69 -- -- (!) 102 14 -- --   10/05/22 0015 (!) 95/54 -- -- (!) 102 19 -- --   10/05/22 0010 -- -- -- -- 16 -- --         Intake/Output Summary (Last 24 hours) at 10/5/2022 0800  Last data filed at 10/5/2022 0600  Gross per 24 hour   Intake 2066.54 ml   Output 1150 ml   Net 916.54 ml       Review of Systems - denies any pain    Physical Exam  Constitutional:       Comments: Intubated with tracheostomy, awake with eyes open, arousable to voice and responds to questions appropriately with nods   HENT:      Head: Normocephalic. Nose:      Comments: NG tube in place  Eyes:      Pupils: Pupils are equal, round, and reactive to light. Neck:      Comments: Tracheostomy  Cardiovascular:      Rate and Rhythm: Normal rate and regular rhythm. Pulses: Normal pulses. Heart sounds: Normal heart sounds. Pulmonary:      Comments: On ventilator. Pleurx catheter in place. Abdominal:      General: Abdomen is flat. Palpations: Abdomen is soft. Musculoskeletal:      Right lower leg: Edema present. Left lower leg: Edema present. Skin:     General: Skin is warm and dry.    Neurological:      Comments: Responds to voice, answers questions appropriately, follows commands LABS:    CBC:   Recent Labs     10/03/22  0407 10/04/22  0425 10/05/22  0607   WBC 22.4* 27.0* 21.7*   HGB 7.5* 7.6* 7.5*   HCT 23.5* 25.1* 24.0*    454* 389   MCV 85.1 86.3 85.9       Renal:    Recent Labs     10/03/22  0407 10/03/22  1229 10/04/22  0425 10/04/22  0426 10/05/22  0607   *  --   --  148* 146*   K 3.5  --   --  3.8 3.4*     --   --  102 102   CO2 36*  --   --  35* 37*   *  --   --  114* 110*   CREATININE 1.8*  --   --  1.7* 1.5*   GLUCOSE 185*  --   --  282* 110*   CALCIUM 8.0*  --   --  8.0* 8.1*   MG  --  3.20* 3.30*  --  3.30*   PHOS 4.7  --   --  4.5 3.8   ANIONGAP 12  --   --  11 7       Hepatic:   Recent Labs     10/03/22  0407 10/04/22  0426 10/05/22  0607   LABALBU 2.2* 2.3* 2.2*       Troponin: No results for input(s): TROPONINI in the last 72 hours. BNP: No results for input(s): BNP in the last 72 hours. Lipids: No results for input(s): CHOL, HDL in the last 72 hours. Invalid input(s): LDLCALCU, TRIGLYCERIDE  ABGs:    No results for input(s): PHART, GGZ7RNM, PO2ART, OVH8PVT, BEART, THGBART, M0GTRCPD, ESX8VCP in the last 72 hours. INR: No results for input(s): INR in the last 72 hours. Lactate:   No results for input(s): LACTATE in the last 72 hours. Cultures:  -----------------------------------------------------------------  RAD:   CT CHEST ABDOMEN PELVIS WO CONTRAST   Final Result      1. Severe pneumomediastinum. 2. Small to moderate right hydropneumothorax with similar fluid and gas components with a right-sided Pleurx catheter. 3. Moderate loculated left pleural effusion with atelectasis of the left lower lobe with patency of the central left lower lobe bronchi. 4. Fluid/material filling the bronchus intermedius and right lower lobe bronchi with complete consolidation and volume loss of the right lower lobe. Differential diagnosis would include mucous plugging or obstructing lesion. 5. Tracheostomy tube tip within the trachea   6. Severe subcutaneous emphysema in the neck. CT ABDOMEN AND PELVIS:      FINDINGS:      LIVER: Normal.      GALLBLADDER AND BILIARY TREE: No calcified gallstones. No gallbladder distention. No intra- or extrahepatic biliary dilatation. PANCREAS: Normal.      SPLEEN: Normal.      ADRENAL GLANDS: Normal.      KIDNEYS AND URETERS: Normal.      URINARY BLADDER: Ansari catheter present within collapsed urinary bladder. REPRODUCTIVE ORGANS: No associated masses. BOWEL: Normal diameter, nonobstructed. Feeding tube tip at the level of the ligament of Treitz. LYMPH NODES: No abnormally enlarged nodes. PERITONEUM/RETROPERITONEUM: Severe pneumoperitoneum extends into the upper abdomen with some of the symmetric multiseptated gas appearing deep to the diaphragm consistent with mild pneumoperitoneum (series 601 was 101). This is likely related to    pneumonia mediastinum dissecting into the abdomen. No fluid collection in the abdomen or pelvis. No ascites. VESSELS: Extensive atherosclerotic calcification of the aorta and iliac arteries. ABDOMINAL WALL: No acute abnormality. BONES: No acute abnormality. Mild chronic L1 compression fracture with previous vertebroplasty. IMPRESSION:      1. Severe pneumomediastinum extends into the upper abdomen with small pneumoperitoneum likely related to extension of pneumoperitoneum into the upper peritoneal cavity. 2. No fluid collection in the abdomen or pelvis. Results were discussed with the surgical team at 7:00 PM on 10/3/2022. XR CHEST PORTABLE   Final Result      Stable appearance of loculated right pneumothorax, with loculated components in the lateral right midlung region and in the right lateral costophrenic sulcus. Stable scattered areas of atelectasis versus scar, most prominent in the medial right lung base and in the left lateral lung base.          XR CHEST PORTABLE   Final Result      Small right basilar pneumothorax. Right basilar chest tube without change. Patchy consolidation in the right mid and lower lung as well as the left lower lung-atelectasis versus pneumonia. Trace left pleural effusion. Normal heart size. Lines and tubes without change. Mild improvement in degree of subcutaneous emphysema in the chest and neck. XR CHEST 1 VIEW   Final Result      1. Stable small right-sided pneumothorax and prominent subcutaneous emphysema along the neck and upper superior chest wall, greater in right lung stable   2. Stable left basilar consolidation and pleural effusion      3. Stable appearing perihilar accentuated markings or airspace disease            XR CHEST PORTABLE   Final Result      Stable small right-sided pneumothorax. More prominent emphysema over the lower neck. No change in bilateral airspace disease. Stable left pleural effusion. XR CHEST PORTABLE   Final Result   1. Bilateral multifocal pneumonia with improvement in the right    pulmonary consolidation since prior study from 9/23/22   2. Mild to moderate left pleural effusion          XR CHEST PORTABLE   Final Result   1. Support lines and tubes are stable. 2.  Stable small right lateral pneumothorax and right basilar    chest tube. 3.  Stable patchy bilateral airspace disease. XR CHEST PORTABLE   Final Result   1. Satisfactory position of right internal jugular central line   2. No interval change in endotracheal tube and nasogastric tube positioning. 3. Extensive bilateral airspace disease with no changes. 4. Left pleural effusion with retrocardiac opacity, unchanged   5. Small stable tiny right lateral pneumothorax and stable position of right chest tube,, Pleurx catheter. XR ABDOMEN (KUB) (SINGLE AP VIEW)   Final Result   1. No residual pneumothorax. 2.  Mild patchy airspace disease bilaterally worse on the right than on prior examination.    3. Non-obstructive bowel gas pattern. Mildly distended stomach. XR CHEST PORTABLE   Final Result   1. No residual pneumothorax. 2.  Mild patchy airspace disease bilaterally worse on the right than on prior examination. 3.  Non-obstructive bowel gas pattern. Mildly distended stomach. XR CHEST PORTABLE   Final Result      1. Small right pneumothorax appears slightly increased. 2. Mild patchy airspace opacity bilaterally. XR CHEST PORTABLE   Final Result     Pleurx catheter at the right lung base. Trace right    pneumothorax is unchanged. XR CHEST PORTABLE   Final Result      Right-sided chest tube evident in the base, its tip medially. Improvement in the right-sided pneumothorax, since earlier today. Possible medial collapse of the right lower lobe. Small left effusion. Patchy airspace density/atelectasis in the lungs bilaterally, with no other significant change. XR CHEST PORTABLE   Final Result   1. Right-sided Pleurx catheter in satisfactory position in the lung bases   2. Right-sided post operative pneumothorax, approximately 10%           Assessment/Plan:   Onel Abdul is a 65 y.o. male with a PMH of CHF (CHFpEF), carotid stenosis, HLD, HTN, OA, Restrictive Lung Disease, T2DM who underwent VATS with PleurX catheter placement on 9/19/2022 for recurrent bilateral loculated pleural effusions and has had hypoxia/hypercapnia since that time    Neuro  - off sedation as of 10/1    Pulmonary  Vent Day: 10  Vent: , RR 16, FiO2 35, PEEP 5, Tinsp 0.95  Acute hypoxic/hypercapnic respiratory failure  Recurrent Pleural Effusions, s/p VATS and Pleurx  Patient was admitted to ICU for failure to tolerate bipap following procedure. The etiology of his recurrent pleural effusions is unclear. No studies done on fluid from original thoracenteses. Possibly malignancy. VATS fluid results showing an exudative picture, per [facility] Criteria.  S/p VATS procedure 9/19, with right PleurX catheter placement. Ventilatory restriction on PFT associated with worsening pleural disease. 9/29 CXR with atelectasis around pluerx cath. s/p Methylprednisolone IV til 9/30  - PleurX catheter in place  - CTS following  - 10/4: CT Chest/Abdomen showed pneumomediastinum extending to pneumoperitoneum  - 10/5: NPO for bronchoscopy this morning    Cardiovascular  Hypotension  Began requiring pressors after intubation, attributed to sedation and positive pressure ventilation  - Levo gtt @ 2, stable  - 10/5: Pressures 110s/70s. Wwill wean off Levophed. Congestive Heart Failure with Preserved EF  EF ~65% 6/16/2022. 9/23 BNP 15k, given lasix at that time, now downtrending.   - remains fluid overloaded on exam with edema to knees bilaterally  - BNP downtrending  - Per nephro held off on diuretics given kidney injury    GI  Diet: Diet NPO  GI ppx: pepcid     Constipation  Cont glycolax    Renal   Ansari in place  ZANA  Potentially ATN from hypotension/contrast   - Nephro following, rec to hold off on diuretics   - Good UOP, cr stable  - 10/4: 1.5L urine in last 24h  - 10/5: 1.35L UOP in 24h. Net +9.8L this admission. Hypernatremia  Sodium uptrend to 151.  - 10/3: Free water flushes in NG tube feeds  - 10/4: Na 151> 148. Continue free water flushes 300ml Q4H.  - 10/5: Na 151> 148> 146. TF held. Metabolic  - Lantus 38D nightly wit MDSI    Code Status: Full Code   PPX: Pepcid, heparin   DISPO: ICU    Anoop Arango MD, PGY-1  Internal Medicine Resident  Contact via [address] [place] [address]  10/05/22  8:00 AM    Pulmonary & Critical Care     Patient seen and examined. I agree with Dr. Cali Gonzalez history, physical, lab findings, assessment and plan. Pt is POD#16 VATS with Pleurx placement. Patient had bilateral pleural effusions right > left that did not improve with diuresis prompting VATS. Pleura visibly was normal.  Pleurx was placed for ongoing management of pleural fluid.   Total protein was quite low at 1.2 on 9/21. LDH was elevated at 305. proBNP has been as high as 15,496     Patient developed postoperative acute on chronic hypercapnic/hypoxemic respiratory failure. For months he had slowly been getting worse in regards to dyspnea on exertion and an unintentional weight loss of 30 pounds. PFTs as an outpatient shows severe restrictive defect. CT chest August 29 shows bilateral pleural effusions that are moderate but no masses, nodules, or mediastinal adenopathy. I do not feel like we have a firm grasp of the pathology with Td Anand  From the best I can tell his pleural effusions are likely on the basis of CHF but there is some other entity that is driving his respiratory failure, weight loss, and weakness. I think in underlying occult malignancy with a paraneoplastic syndrome needs to be searched for and ruled out. I repeated a CT chest/abdomen with the following:     CT Chest  1. Severe pneumomediastinum. 2. Small to moderate right hydropneumothorax with similar fluid and gas components with a right-sided Pleurx catheter. 3. Moderate loculated left pleural effusion with atelectasis of the left lower lobe with patency of the central left lower lobe bronchi. 4. Fluid/material filling the bronchus intermedius and right lower lobe bronchi with complete consolidation and volume loss of the right lower lobe. Differential diagnosis would include mucous plugging or obstructing lesion. 5. Tracheostomy tube tip within the trachea   6. Severe subcutaneous emphysema in the neck. CT Abdomen:  IMPRESSION:       1. Severe pneumomediastinum extends into the upper abdomen with small pneumoperitoneum likely related to extension of pneumoperitoneum into the upper peritoneal cavity. 2. No fluid collection in the abdomen or pelvis. Will perform bronchoscopy today  Tolerated VS x 4 hrs yesterday. ABG after 4 hours showed a respiratory alkalosis.   PRVC has been changed to tidal volume of 350 with a rate of 14     Levophed has been weaned to off     PSA 1.15 and CEA 6.9     Another problem is his insurance refusal to cover an LTAC. Medically Jonel Valenzuela is the typical patient that would benefit from a well structured chronic ventilator weaning program, found at LTAC's.         Nadia Ralph MD

## 2022-10-05 NOTE — PROGRESS NOTES
Removed barry catheter, placed an external condom catheter per nurse driven protocol. Patient tolerated well, will continue to monitor.

## 2022-10-05 NOTE — PROGRESS NOTES
Patient had bronchoscopy performed at bedside by pulmonology. Patient received 100 mcg of fentanyl, 3 mg of versed and 2 mL of lidocaine total throughout the procedure. Patients MAP dropped down to low 60's, Levo restarted at 3 mcg/hr, Map then went back up to 124 so Levo was turned off and patient has since maintained a MAP in the 70's. Patient tolerated procedure well and is still asleep under sedation. Will continue to monitor.

## 2022-10-05 NOTE — PROGRESS NOTES
ICU CT SURGERY DAILY PROGRESS NOTE    CC: Pleural effusions s/p R PleurX catheter placement    SUBJECTIVE:   Interval Hx:   Pt remains tachycardic to 120, but otherwise HDS and afebrile. Pt indicated he has no complaints this am. His TF's are being held in anticipation of bronchoscopy today. Sacral wound found on pt yesterday. ROS: A 14 point review of systems was conducted, significant findings as noted above. All other systems negative. OBJECTIVE:   Vitals:   Vitals:    10/05/22 0530 10/05/22 0545 10/05/22 0600 10/05/22 0615   BP:  111/62 89/61 (!) 102/59   Pulse: (!) 120 (!) 105 (!) 101 97   Resp: 28 19 22 19   Temp:       TempSrc:       SpO2:  96% 93%    Weight:   178 lb 12.7 oz (81.1 kg)    Height:           I/O:   Intake/Output Summary (Last 24 hours) at 10/5/2022 0651  Last data filed at 10/5/2022 0600  Gross per 24 hour   Intake 2066.54 ml   Output 1350 ml   Net 716.54 ml       I/O last 3 completed shifts: In: 3895.5 [I.V.:109.2; NG/GT:3503; IV Piggyback:283.3]  Out: 2330 [Urine:2330]    Diet: Diet NPO      Physical Examination:   General appearance: Mechanically ventilated. Appropriately nods head and uses hand gestures in response to questions. Does not attempt to verbalize   HEENT: NC/AT, EOMI, trachea midline, no JVD. Crepitus noted in the neck, supraclavicular region, improving. Corpak in right nostril w/ bridle. Tracheostomy in place. Chest/Lungs: On mechanical ventilation via tracheostomy; R PleurX catheter capped  Cardiovascular: Atrial fibrillation with rate in the 120's currently   Abdomen: Soft, non-tender, non-distended  Genitourinary: Ansari in place with clear yellow urine  Skin: Warm and dry, no rashes. Sacral decubitus ulcer   Extremities: Edema of the b/l feet.  No cyanosis; SCDs in place    Labs:  CBC:   Recent Labs     10/03/22  0407 10/04/22  0425 10/05/22  0607   WBC 22.4* 27.0* 21.7*   HGB 7.5* 7.6* 7.5*   HCT 23.5* 25.1* 24.0*    454* 389         BMP:   Recent Labs 10/03/22  0407 10/04/22  0426   * 148*   K 3.5 3.8    102   CO2 36* 35*   * 114*   CREATININE 1.8* 1.7*   GLUCOSE 185* 282*       LFT's:   No results for input(s): AST, ALT, ALB, BILITOT, ALKPHOS in the last 72 hours. Troponin: No results for input(s): TROPONINI in the last 72 hours. BNP: No results for input(s): BNP in the last 72 hours. ABGs:   No results for input(s): PHART, SLD1BMS, PO2ART in the last 72 hours. INR:   No results for input(s): INR in the last 72 hours. U/A:No results for input(s): NITRITE, COLORU, PHUR, LABCAST, WBCUA, RBCUA, MUCUS, TRICHOMONAS, YEAST, BACTERIA, CLARITYU, SPECGRAV, LEUKOCYTESUR, UROBILINOGEN, BILIRUBINUR, BLOODU, GLUCOSEU, AMORPHOUS in the last 72 hours. Invalid input(s): Cecily Campos     Rad:   CT CHEST ABDOMEN PELVIS WO CONTRAST   Final Result      1. Severe pneumomediastinum. 2. Small to moderate right hydropneumothorax with similar fluid and gas components with a right-sided Pleurx catheter. 3. Moderate loculated left pleural effusion with atelectasis of the left lower lobe with patency of the central left lower lobe bronchi. 4. Fluid/material filling the bronchus intermedius and right lower lobe bronchi with complete consolidation and volume loss of the right lower lobe. Differential diagnosis would include mucous plugging or obstructing lesion. 5. Tracheostomy tube tip within the trachea   6. Severe subcutaneous emphysema in the neck. CT ABDOMEN AND PELVIS:      FINDINGS:      LIVER: Normal.      GALLBLADDER AND BILIARY TREE: No calcified gallstones. No gallbladder distention. No intra- or extrahepatic biliary dilatation. PANCREAS: Normal.      SPLEEN: Normal.      ADRENAL GLANDS: Normal.      KIDNEYS AND URETERS: Normal.      URINARY BLADDER: Ansari catheter present within collapsed urinary bladder. REPRODUCTIVE ORGANS: No associated masses. BOWEL: Normal diameter, nonobstructed.   Feeding tube tip at the level of the ligament of Treitz. LYMPH NODES: No abnormally enlarged nodes. PERITONEUM/RETROPERITONEUM: Severe pneumoperitoneum extends into the upper abdomen with some of the symmetric multiseptated gas appearing deep to the diaphragm consistent with mild pneumoperitoneum (series 601 was 101). This is likely related to    pneumonia mediastinum dissecting into the abdomen. No fluid collection in the abdomen or pelvis. No ascites. VESSELS: Extensive atherosclerotic calcification of the aorta and iliac arteries. ABDOMINAL WALL: No acute abnormality. BONES: No acute abnormality. Mild chronic L1 compression fracture with previous vertebroplasty. IMPRESSION:      1. Severe pneumomediastinum extends into the upper abdomen with small pneumoperitoneum likely related to extension of pneumoperitoneum into the upper peritoneal cavity. 2. No fluid collection in the abdomen or pelvis. Results were discussed with the surgical team at 7:00 PM on 10/3/2022. XR CHEST PORTABLE   Final Result      Stable appearance of loculated right pneumothorax, with loculated components in the lateral right midlung region and in the right lateral costophrenic sulcus. Stable scattered areas of atelectasis versus scar, most prominent in the medial right lung base and in the left lateral lung base. XR CHEST PORTABLE   Final Result      Small right basilar pneumothorax. Right basilar chest tube without change. Patchy consolidation in the right mid and lower lung as well as the left lower lung-atelectasis versus pneumonia. Trace left pleural effusion. Normal heart size. Lines and tubes without change. Mild improvement in degree of subcutaneous emphysema in the chest and neck. XR CHEST 1 VIEW   Final Result      1. Stable small right-sided pneumothorax and prominent subcutaneous emphysema along the neck and upper superior chest wall, greater in right lung stable   2.  Stable left basilar consolidation and pleural effusion      3. Stable appearing perihilar accentuated markings or airspace disease            XR CHEST PORTABLE   Final Result      Stable small right-sided pneumothorax. More prominent emphysema over the lower neck. No change in bilateral airspace disease. Stable left pleural effusion. XR CHEST PORTABLE   Final Result   1. Bilateral multifocal pneumonia with improvement in the right    pulmonary consolidation since prior study from 9/23/22   2. Mild to moderate left pleural effusion          XR CHEST PORTABLE   Final Result   1. Support lines and tubes are stable. 2.  Stable small right lateral pneumothorax and right basilar    chest tube. 3.  Stable patchy bilateral airspace disease. XR CHEST PORTABLE   Final Result   1. Satisfactory position of right internal jugular central line   2. No interval change in endotracheal tube and nasogastric tube positioning. 3. Extensive bilateral airspace disease with no changes. 4. Left pleural effusion with retrocardiac opacity, unchanged   5. Small stable tiny right lateral pneumothorax and stable position of right chest tube,, Pleurx catheter. XR ABDOMEN (KUB) (SINGLE AP VIEW)   Final Result   1. No residual pneumothorax. 2.  Mild patchy airspace disease bilaterally worse on the right than on prior examination. 3.  Non-obstructive bowel gas pattern. Mildly distended stomach. XR CHEST PORTABLE   Final Result   1. No residual pneumothorax. 2.  Mild patchy airspace disease bilaterally worse on the right than on prior examination. 3.  Non-obstructive bowel gas pattern. Mildly distended stomach. XR CHEST PORTABLE   Final Result      1. Small right pneumothorax appears slightly increased. 2. Mild patchy airspace opacity bilaterally. XR CHEST PORTABLE   Final Result     Pleurx catheter at the right lung base. Trace right    pneumothorax is unchanged. XR CHEST PORTABLE   Final Result      Right-sided chest tube evident in the base, its tip medially. Improvement in the right-sided pneumothorax, since earlier today. Possible medial collapse of the right lower lobe. Small left effusion. Patchy airspace density/atelectasis in the lungs bilaterally, with no other significant change. XR CHEST PORTABLE   Final Result   1. Right-sided Pleurx catheter in satisfactory position in the lung bases   2. Right-sided post operative pneumothorax, approximately 10%             ASSESSMENT AND PLAN:   Winston Alcazar is a 65 y.o. male with history of diastolic heart failure, atrial fibrillation, and DM with recurrent pleural effusions s/p R PleurX catheter placement (9/19), POD #16. Neuro:   Analgesia  - Continue scheduled Tylenol. Dilaudid PRN    Cardiovascular:   History of paroxysmal atrial fibrillation  -Cont amiodarone. Eliquis currently being held in anticipation of bronchoscopy today. HFpEF  - Metoprolol IV (hold due to hypotension)  - Farxiga held  - Last echo (1/25/22): LVEF = 34-19%, grade 2 diastolic dysfunction    Hyperlipidemia  - Pravastatin 40mg    Currently on levophed of 1. Wean levophed    Pulmonary:   S/P R PleurX catheter placement (9/19), POD #16  - Capped Catheter. Will drain every 3 days - next drain tomorrow   - SW/HHC pending    Acute on chronic respiratory failure  - Mechanically intubated on 9/22. Currently PRVC 16/375/5/35%. Vent management per pulm/crit care. - Tracheostomy 9/30. Remove sutures today  - Pulmonology following, appreciate recommendations. - Baseline on home O2 3-4L NC   -Critical care will conduct bronchoscopy today    GI:   - Miralax  - Recommend holding TF if pressor requirement increases to greater than 5 of Levo    FEN:    -Replace electrolytes per protocol  - Diet: Diet NPO   -SLP consulted, will inquire about Passy [place] valve for speaking and eating. Appreciate recs.      /Renal:   - Cont Ansari   - Creatinine 1.5 from 1.7, 1.8, 1.9, 1.7   -  from 114, 112, 113, 119, 125, 123, 124, 125  -Nephrology consulted. Appreciate recommendations. Hem/ID/wound:   - Hemoglobin this am from 7.5 from 7.6, 7.5, 8.0, 7.8  - WBC from 21.7 from 27.0, 22.4,  22.6.  20.3, will continue to monitor   -Sacral wound per wound care    Endo:   History of DMII  - Last A1C 8.1% (1/24/22)  - Insulin gtt d/c'd. Now on lantus 20 and HDSSI      Prophylaxis:   DVT Ppx: SCDs and eliquis  GI Ppx: pepcid    Access:  Central Access: R IJ CVC, placed 9/22  Peripheral Access: IV x2                       Ansari Date placed: 9/20    Mobility:  N/A    Dispo:   ICU  Case management has indicated he would not be accepted to LTAC unless he has hemodialysis in addition to his tracheostomy, but possibly some SNF's might take him.       Code Status: Full Code  -----------------------------  Juli Prasad DO  PGY1, General Surgery  10/05/22  6:51 AM

## 2022-10-06 NOTE — PROGRESS NOTES
Nutrition Note    Received call from RN, pt has had increasing diarrhea since switch to Glucerna, concern for skin breakdown. Will modify TF to Vital AF 1.2 @ 55 mL/hr + 2 bottles Proteinex daily. Pt previously tolerated Vital HP. New TF formula will provide pt with only 3gm more of carbs. Will follow up tomorrow.     Electronically signed by Óscar Gómez RD on 10/6/22 at 2:59 PM EDT    Contact: [phone]

## 2022-10-06 NOTE — PROGRESS NOTES
Titrated patient off of the Levophed and he is tolerating it well. Patient is having copious liquid bowel movements around his rectal tube. Spoke to surgery as well as the dietician to change patients TF because of concern for skin breakdown. Patient was also having increased pain so surgery resident was consulted and they added PRN Oxycodone in addition to his PRN Dilaudid.

## 2022-10-06 NOTE — PROGRESS NOTES
Patient is complaining of severe chest pain, surgical team and ICU team notified and EKG ordered. Will continue to monitor.

## 2022-10-06 NOTE — PROGRESS NOTES
ICU CT SURGERY DAILY PROGRESS NOTE    CC: Pleural effusions s/p R PleurX catheter placement    SUBJECTIVE:   Interval Hx:   Pt was on and off levophed of 1 overnight, only while sleeping per RN. HR with Afib, 96 - 122 overnight, but otherwise HD and afebrile. Bronchoscopy yesterday by crit care, tolerated well. GI consulted by crit care yesterday for PEG placement. No acute complaints from pt this am and he is OOB to chair. ROS: A 14 point review of systems was conducted, significant findings as noted above. All other systems negative. OBJECTIVE:   Vitals:   Vitals:    10/06/22 0300 10/06/22 0400 10/06/22 0500 10/06/22 0600   BP: 87/68 109/61 (!) 92/59 (!) 111/54   Pulse: 99 96 100 (!) 108   Resp: 20 15 20 25   Temp:  98 °F (36.7 °C)     TempSrc:       SpO2:  94%  (!) 82%   Weight:   187 lb 6.3 oz (85 kg) 187 lb 6.3 oz (85 kg)   Height:           I/O:   Intake/Output Summary (Last 24 hours) at 10/6/2022 0731  Last data filed at 10/6/2022 0500  Gross per 24 hour   Intake 1581.51 ml   Output 1100 ml   Net 481.51 ml       I/O last 3 completed shifts: In: 2840.5 [I.V.:428.5; NG/GT:2262; IV Piggyback:150]  Out: 1700 [Urine:1700]    Diet: ADULT TUBE FEEDING; Nasogastric; Diabetic; Continuous; 10; Yes; 10; Q 4 hours; 45; 300; Q 4 hours; Protein; 2 bottles Proteinex daily w/ 30 mL FW flushes before and after      Physical Examination:   General appearance: Mechanically ventilated. Appropriately nods head and uses hand gestures in response to questions. Does not attempt to verbalize   HEENT: NC/AT, EOMI, trachea midline, no JVD. Crepitus noted in the neck, supraclavicular region, improving. Corpak in right nostril w/ bridle. Tracheostomy in place. Chest/Lungs:  On mechanical ventilation via tracheostomy; R PleurX catheter capped with dressing CLD  Cardiovascular: Atrial fibrillation with rate in the 110's currently   Abdomen: Soft, non-tender, non-distended  Genitourinary/Anorectal: Ansari in place with clear yellow urine. Rectal tube in place with brown liquid stool. Skin: Warm and dry, no rashes. Sacral decubitus ulcer   Extremities: Pitting edema of the b/l feet. No cyanosis; SCDs in place    Labs:  CBC:   Recent Labs     10/04/22  0425 10/05/22  0607 10/06/22  0333   WBC 27.0* 21.7* 20.6*   HGB 7.6* 7.5* 7.1*   HCT 25.1* 24.0* 23.7*   * 389 325         BMP:   Recent Labs     10/04/22  0426 10/05/22  0607 10/06/22  0333   * 146* 149*   K 3.8 3.4* 4.3    102 107   CO2 35* 37* 35*   * 110* 105*   CREATININE 1.7* 1.5* 1.3   GLUCOSE 282* 110* 170*       LFT's:   No results for input(s): AST, ALT, ALB, BILITOT, ALKPHOS in the last 72 hours. Troponin: No results for input(s): TROPONINI in the last 72 hours. BNP: No results for input(s): BNP in the last 72 hours. ABGs:   No results for input(s): PHART, WFN4DQJ, PO2ART in the last 72 hours. INR:   No results for input(s): INR in the last 72 hours. U/A:No results for input(s): NITRITE, COLORU, PHUR, LABCAST, WBCUA, RBCUA, MUCUS, TRICHOMONAS, YEAST, BACTERIA, CLARITYU, SPECGRAV, LEUKOCYTESUR, UROBILINOGEN, BILIRUBINUR, BLOODU, GLUCOSEU, AMORPHOUS in the last 72 hours. Invalid input(s): Natty Wellington     Rad:   XR CHEST PORTABLE   Final Result      Tiny right lateral pneumothorax is suspected, 5 mm in maximum thickness. This has decreased in size since 10/3/2022. Right basilar Pleurx catheter noted. Small bilateral pleural effusions. Prominent interstitial markings. Stable cardiac mediastinal silhouette. Lines and tubes without change. Subcutaneous emphysema noted. CT CHEST ABDOMEN PELVIS WO CONTRAST   Final Result      1. Severe pneumomediastinum. 2. Small to moderate right hydropneumothorax with similar fluid and gas components with a right-sided Pleurx catheter. 3. Moderate loculated left pleural effusion with atelectasis of the left lower lobe with patency of the central left lower lobe bronchi.    4. Fluid/material filling the bronchus intermedius and right lower lobe bronchi with complete consolidation and volume loss of the right lower lobe. Differential diagnosis would include mucous plugging or obstructing lesion. 5. Tracheostomy tube tip within the trachea   6. Severe subcutaneous emphysema in the neck. CT ABDOMEN AND PELVIS:      FINDINGS:      LIVER: Normal.      GALLBLADDER AND BILIARY TREE: No calcified gallstones. No gallbladder distention. No intra- or extrahepatic biliary dilatation. PANCREAS: Normal.      SPLEEN: Normal.      ADRENAL GLANDS: Normal.      KIDNEYS AND URETERS: Normal.      URINARY BLADDER: Ansari catheter present within collapsed urinary bladder. REPRODUCTIVE ORGANS: No associated masses. BOWEL: Normal diameter, nonobstructed. Feeding tube tip at the level of the ligament of Treitz. LYMPH NODES: No abnormally enlarged nodes. PERITONEUM/RETROPERITONEUM: Severe pneumoperitoneum extends into the upper abdomen with some of the symmetric multiseptated gas appearing deep to the diaphragm consistent with mild pneumoperitoneum (series 601 was 101). This is likely related to    pneumonia mediastinum dissecting into the abdomen. No fluid collection in the abdomen or pelvis. No ascites. VESSELS: Extensive atherosclerotic calcification of the aorta and iliac arteries. ABDOMINAL WALL: No acute abnormality. BONES: No acute abnormality. Mild chronic L1 compression fracture with previous vertebroplasty. IMPRESSION:      1. Severe pneumomediastinum extends into the upper abdomen with small pneumoperitoneum likely related to extension of pneumoperitoneum into the upper peritoneal cavity. 2. No fluid collection in the abdomen or pelvis. Results were discussed with the surgical team at 7:00 PM on 10/3/2022.       XR CHEST PORTABLE   Final Result      Stable appearance of loculated right pneumothorax, with loculated components in the lateral right midlung region and in the right lateral costophrenic sulcus. Stable scattered areas of atelectasis versus scar, most prominent in the medial right lung base and in the left lateral lung base. XR CHEST PORTABLE   Final Result      Small right basilar pneumothorax. Right basilar chest tube without change. Patchy consolidation in the right mid and lower lung as well as the left lower lung-atelectasis versus pneumonia. Trace left pleural effusion. Normal heart size. Lines and tubes without change. Mild improvement in degree of subcutaneous emphysema in the chest and neck. XR CHEST 1 VIEW   Final Result      1. Stable small right-sided pneumothorax and prominent subcutaneous emphysema along the neck and upper superior chest wall, greater in right lung stable   2. Stable left basilar consolidation and pleural effusion      3. Stable appearing perihilar accentuated markings or airspace disease            XR CHEST PORTABLE   Final Result      Stable small right-sided pneumothorax. More prominent emphysema over the lower neck. No change in bilateral airspace disease. Stable left pleural effusion. XR CHEST PORTABLE   Final Result   1. Bilateral multifocal pneumonia with improvement in the right    pulmonary consolidation since prior study from 9/23/22   2. Mild to moderate left pleural effusion          XR CHEST PORTABLE   Final Result   1. Support lines and tubes are stable. 2.  Stable small right lateral pneumothorax and right basilar    chest tube. 3.  Stable patchy bilateral airspace disease. XR CHEST PORTABLE   Final Result   1. Satisfactory position of right internal jugular central line   2. No interval change in endotracheal tube and nasogastric tube positioning. 3. Extensive bilateral airspace disease with no changes. 4. Left pleural effusion with retrocardiac opacity, unchanged   5.  Small stable tiny right lateral pneumothorax and stable position of right chest tube,, Pleurx catheter. XR ABDOMEN (KUB) (SINGLE AP VIEW)   Final Result   1. No residual pneumothorax. 2.  Mild patchy airspace disease bilaterally worse on the right than on prior examination. 3.  Non-obstructive bowel gas pattern. Mildly distended stomach. XR CHEST PORTABLE   Final Result   1. No residual pneumothorax. 2.  Mild patchy airspace disease bilaterally worse on the right than on prior examination. 3.  Non-obstructive bowel gas pattern. Mildly distended stomach. XR CHEST PORTABLE   Final Result      1. Small right pneumothorax appears slightly increased. 2. Mild patchy airspace opacity bilaterally. XR CHEST PORTABLE   Final Result     Pleurx catheter at the right lung base. Trace right    pneumothorax is unchanged. XR CHEST PORTABLE   Final Result      Right-sided chest tube evident in the base, its tip medially. Improvement in the right-sided pneumothorax, since earlier today. Possible medial collapse of the right lower lobe. Small left effusion. Patchy airspace density/atelectasis in the lungs bilaterally, with no other significant change. XR CHEST PORTABLE   Final Result   1. Right-sided Pleurx catheter in satisfactory position in the lung bases   2. Right-sided post operative pneumothorax, approximately 10%             ASSESSMENT AND PLAN:   Marylin Lebron is a 65 y.o. male with history of diastolic heart failure, atrial fibrillation, and DM with recurrent pleural effusions s/p R PleurX catheter placement (9/19), POD #17. Neuro:   Analgesia  - Continue scheduled Tylenol. Dilaudid PRN    Cardiovascular:   History of paroxysmal atrial fibrillation  -Cont amiodarone. Eliquis held for bronch yesterday, continue holding in anticipation for possible PEG    HFpEF  - Metoprolol IV (hold due to hypotension)  - [facility] held  - Last echo (1/25/22):  LVEF = 54-84%, grade 2 diastolic dysfunction    Hyperlipidemia  - Pravastatin 40mg      Pulmonary:   S/P R PleurX catheter placement (9/19), POD #17  - Capped Catheter. Will drain every 3 days - drain today  - SW/HHC pending    Acute on chronic respiratory failure  - Mechanically intubated on 9/22. Currently PRVC 16/375/5/35%. Vent management per pulm/crit care. - Tracheostomy 9/30. Remove sutures today  - Pulmonology following, appreciate recommendations. - Baseline on home O2 3-4L NC   -Critical care conducted bronchoscopy yesterday - no masses noted, only white mucous secretions in the airways. GI:   -GI consulted by crit care for PEG placement. Placement TBD  - Miralax  - Recommend holding TF if pressor requirement increases to greater than 5 of Levo  -Rectal tube in place, continue      FEN:    -Replace electrolytes per protocol  - Diet: ADULT TUBE FEEDING; Nasogastric; Diabetic; Continuous; 10; Yes; 10; Q 4 hours; 45; 300; Q 4 hours; Protein; 2 bottles Proteinex daily w/ 30 mL FW flushes before and after   -SLP consulted, will inquire about Pass[place] valve for speaking and eating. Appreciate recs. /Renal:   - Cont Ansari   - Creatinine 1.3 from 1.5 from 1.7, 1.8, 1.9, 1.7   -  from 110 from 114, 112, 113, 119, 125, 123, 124, 125  -Nephrology consulted. Appreciate recommendations. Hem/ID/wound:   - Hemoglobin this am from 7.5 from 7.6, 7.5, 8.0, 7.8  - WBC from 20.6 from 21.7 from 27.0, 22.4,  22.6.  20.3. Discuss with Crit Care about starting broad spectrum abx as pt continues to have leukocytosis. -F/u BAL cx's  -Sacral wound per wound care    Endo:   History of DMII  - Last A1C 8.1% (1/24/22)  - Insulin gtt d/c'd.  Now on lantus 20 and HDSSI      Prophylaxis:   DVT Ppx: SCDs and eliquis  GI Ppx: pepcid    Access:  Central Access: R IJ CVC, placed 9/22  Peripheral Access: IV x2                       Ansari Date placed: 9/20  Rectal tube placed: 10/4    Mobility:  OOB and activity as tolerated    Dispo:   ICU  Will discuss with case management as he ideally needs placement at an LTAC but case management has previously indicated he would not be accepted to [facility] unless he has hemodialysis in addition to his tracheostomy    Code Status: Full Code  -----------------------------  Jody Hernandez DO  PGY1, General Surgery  10/06/22  7:31 AM

## 2022-10-06 NOTE — PROGRESS NOTES
ICU Progress Note  PGY-1    Admit Date: 9/19/2022  Day: 18  Trach Day: 5   Vent day: 15  IV Access: CVC triple lumen, peripheral  IV Fluids: None  Vasopressors: Levo          Antibiotics: None  Diet: ADULT TUBE FEEDING; Nasogastric; Diabetic; Continuous; 10; Yes; 10; Q 4 hours; 45; 300; Q 4 hours; Protein; 2 bottles Proteinex daily w/ 30 mL FW flushes before and after    CC: Pleural effusion (right) s/p VATS and Pleural catheter (PleurX) placement    Interval history:  Bronch yesterday with therapeutic aspiration of mucous plug. Sample sent for culture.  -req barry replacement overnight  -tube feeds restarted  -req 1 of levo overnight  - GI saw patient and agreed with PEG benefit, but would like to wait on improvement in pneumoperitoneum     Patient not in pain this morning, but is visibly frustrated as difficulty communicating and lack of improvement.  Complains of dry mouth      Medications:     Scheduled Meds:   fentanNYL  100 mcg IntraVENous On Call    midazolam  5 mg IntraVENous On Call    pantoprazole  40 mg IntraVENous BID    insulin lispro  0-16 Units SubCUTAneous Q4H    insulin glargine  20 Units SubCUTAneous Nightly    sodium chloride flush  5-40 mL IntraVENous 2 times per day    Venelex   Topical BID    amiodarone bolus  150 mg IntraVENous Once    amiodarone  200 mg Oral Daily    chlorhexidine  15 mL Mouth/Throat BID    [Held by provider] metoprolol  5 mg IntraVENous Q6H    apixaban  5 mg Oral BID    sodium chloride flush  5-40 mL IntraVENous 2 times per day    polyethylene glycol  17 g Oral Daily     Continuous Infusions:   norepinephrine 1 mcg/min (10/06/22 0135)    sodium chloride      sodium chloride      dextrose       PRN Meds:sodium chloride flush, sodium chloride, HYDROmorphone, labetalol, HYDROmorphone, acetaminophen, haloperidol lactate, albuterol, sodium chloride flush, sodium chloride, ondansetron **OR** ondansetron, glucose, dextrose bolus **OR** dextrose bolus, glucagon (rDNA), dextrose, hydrALAZINE, levalbuterol    Objective:   Vitals:   T-max:  Patient Vitals for the past 8 hrs:   BP Temp Pulse Resp SpO2   10/06/22 0500 (!) 92/59 -- 100 20 --   10/06/22 0400 109/61 98 °F (36.7 °C) 96 15 94 %   10/06/22 0300 87/68 -- 99 20 --   10/06/22 0200 (!) 119/59 -- (!) 102 14 --   10/06/22 0100 120/62 -- (!) 117 21 94 %   10/06/22 0001 -- -- 98 14 94 %   10/06/22 0000 104/62 98 °F (36.7 °C) 99 14 95 %   10/05/22 2300 125/69 -- (!) 124 21 96 %   10/05/22 2200 (!) 87/48 -- (!) 104 20 92 %       Intake/Output Summary (Last 24 hours) at 10/6/2022 0522  Last data filed at 10/6/2022 0500  Gross per 24 hour   Intake 1261.51 ml   Output 1675 ml   Net -413.49 ml     Review of Systems - denies any pain    Physical Exam  Constitutional:       General: He is not in acute distress. Appearance: He is normal weight. He is not ill-appearing. Comments: Intubated with tracheostomy, awake with eyes open, arousable to voice and responds to questions appropriately with nods. Attempts to respond with voice but has difficulty. HENT:      Head: Normocephalic. Nose:      Comments: NG tube in place     Mouth/Throat:      Mouth: Mucous membranes are dry. Eyes:      Extraocular Movements: Extraocular movements intact. Pupils: Pupils are equal, round, and reactive to light. Neck:      Comments: Tracheostomy  Cardiovascular:      Rate and Rhythm: Normal rate and regular rhythm. Pulses: Normal pulses. Heart sounds: Normal heart sounds. Pulmonary:      Effort: Pulmonary effort is normal.      Breath sounds: Normal breath sounds. Comments: On ventilator. Pleurx catheter in place. Abdominal:      General: Abdomen is flat. There is no distension. Palpations: Abdomen is soft. Tenderness: There is no abdominal tenderness. Musculoskeletal:      Right lower leg: Edema present. Left lower leg: Edema present. Comments: Feet appear puffy   Skin:     General: Skin is warm and dry. Neurological:      General: No focal deficit present. Mental Status: He is alert and oriented to person, place, and time. Comments: Responds to voice, answers questions appropriately, follows commands in all extremities   Psychiatric:         Mood and Affect: Mood normal.         Behavior: Behavior normal.     LABS:    CBC:   Recent Labs     10/04/22  0425 10/05/22  0607 10/06/22  0333   WBC 27.0* 21.7* 20.6*   HGB 7.6* 7.5* 7.1*   HCT 25.1* 24.0* 23.7*   * 389 325   MCV 86.3 85.9 87.5     Renal:    Recent Labs     10/04/22  0425 10/04/22  0426 10/05/22  0607 10/06/22  0333   NA  --  148* 146* 149*   K  --  3.8 3.4* 4.3   CL  --  102 102 107   CO2  --  35* 37* 35*   BUN  --  114* 110* 105*   CREATININE  --  1.7* 1.5* 1.3   GLUCOSE  --  282* 110* 170*   CALCIUM  --  8.0* 8.1* 8.3   MG 3.30*  --  3.30* 3.30*   PHOS  --  4.5 3.8 4.5   ANIONGAP  --  11 7 7     Hepatic:   Recent Labs     10/04/22  0426 10/05/22  0607 10/06/22  0333   LABALBU 2.3* 2.2* 2.0*     Troponin: No results for input(s): TROPONINI in the last 72 hours. BNP: No results for input(s): BNP in the last 72 hours. Lipids: No results for input(s): CHOL, HDL in the last 72 hours. Invalid input(s): LDLCALCU, TRIGLYCERIDE  ABGs:    No results for input(s): PHART, GPL2XHN, PO2ART, UNY4RSQ, BEART, THGBART, I7JWRYEL, GUT4CBU in the last 72 hours. INR: No results for input(s): INR in the last 72 hours. Lactate:   No results for input(s): LACTATE in the last 72 hours. Cultures:  -----------------------------------------------------------------  RAD:   XR CHEST PORTABLE   Final Result      Tiny right lateral pneumothorax is suspected, 5 mm in maximum thickness. This has decreased in size since 10/3/2022. Right basilar Pleurx catheter noted. Small bilateral pleural effusions. Prominent interstitial markings. Stable cardiac mediastinal silhouette. Lines and tubes without change. Subcutaneous emphysema noted. CT CHEST ABDOMEN PELVIS WO CONTRAST   Final Result      1. Severe pneumomediastinum. 2. Small to moderate right hydropneumothorax with similar fluid and gas components with a right-sided Pleurx catheter. 3. Moderate loculated left pleural effusion with atelectasis of the left lower lobe with patency of the central left lower lobe bronchi. 4. Fluid/material filling the bronchus intermedius and right lower lobe bronchi with complete consolidation and volume loss of the right lower lobe. Differential diagnosis would include mucous plugging or obstructing lesion. 5. Tracheostomy tube tip within the trachea   6. Severe subcutaneous emphysema in the neck. CT ABDOMEN AND PELVIS:      FINDINGS:      LIVER: Normal.      GALLBLADDER AND BILIARY TREE: No calcified gallstones. No gallbladder distention. No intra- or extrahepatic biliary dilatation. PANCREAS: Normal.      SPLEEN: Normal.      ADRENAL GLANDS: Normal.      KIDNEYS AND URETERS: Normal.      URINARY BLADDER: Ansari catheter present within collapsed urinary bladder. REPRODUCTIVE ORGANS: No associated masses. BOWEL: Normal diameter, nonobstructed. Feeding tube tip at the level of the ligament of Treitz. LYMPH NODES: No abnormally enlarged nodes. PERITONEUM/RETROPERITONEUM: Severe pneumoperitoneum extends into the upper abdomen with some of the symmetric multiseptated gas appearing deep to the diaphragm consistent with mild pneumoperitoneum (series 601 was 101). This is likely related to    pneumonia mediastinum dissecting into the abdomen. No fluid collection in the abdomen or pelvis. No ascites. VESSELS: Extensive atherosclerotic calcification of the aorta and iliac arteries. ABDOMINAL WALL: No acute abnormality. BONES: No acute abnormality. Mild chronic L1 compression fracture with previous vertebroplasty. IMPRESSION:      1.  Severe pneumomediastinum extends into the upper abdomen with small pneumoperitoneum likely related to extension of pneumoperitoneum into the upper peritoneal cavity. 2. No fluid collection in the abdomen or pelvis. Results were discussed with the surgical team at 7:00 PM on 10/3/2022. XR CHEST PORTABLE   Final Result      Stable appearance of loculated right pneumothorax, with loculated components in the lateral right midlung region and in the right lateral costophrenic sulcus. Stable scattered areas of atelectasis versus scar, most prominent in the medial right lung base and in the left lateral lung base. XR CHEST PORTABLE   Final Result      Small right basilar pneumothorax. Right basilar chest tube without change. Patchy consolidation in the right mid and lower lung as well as the left lower lung-atelectasis versus pneumonia. Trace left pleural effusion. Normal heart size. Lines and tubes without change. Mild improvement in degree of subcutaneous emphysema in the chest and neck. XR CHEST 1 VIEW   Final Result      1. Stable small right-sided pneumothorax and prominent subcutaneous emphysema along the neck and upper superior chest wall, greater in right lung stable   2. Stable left basilar consolidation and pleural effusion      3. Stable appearing perihilar accentuated markings or airspace disease            XR CHEST PORTABLE   Final Result      Stable small right-sided pneumothorax. More prominent emphysema over the lower neck. No change in bilateral airspace disease. Stable left pleural effusion. XR CHEST PORTABLE   Final Result   1. Bilateral multifocal pneumonia with improvement in the right    pulmonary consolidation since prior study from 9/23/22   2. Mild to moderate left pleural effusion          XR CHEST PORTABLE   Final Result   1. Support lines and tubes are stable. 2.  Stable small right lateral pneumothorax and right basilar    chest tube. 3.  Stable patchy bilateral airspace disease. XR CHEST PORTABLE   Final Result   1. Satisfactory position of right internal jugular central line   2. No interval change in endotracheal tube and nasogastric tube positioning. 3. Extensive bilateral airspace disease with no changes. 4. Left pleural effusion with retrocardiac opacity, unchanged   5. Small stable tiny right lateral pneumothorax and stable position of right chest tube,, Pleurx catheter. XR ABDOMEN (KUB) (SINGLE AP VIEW)   Final Result   1. No residual pneumothorax. 2.  Mild patchy airspace disease bilaterally worse on the right than on prior examination. 3.  Non-obstructive bowel gas pattern. Mildly distended stomach. XR CHEST PORTABLE   Final Result   1. No residual pneumothorax. 2.  Mild patchy airspace disease bilaterally worse on the right than on prior examination. 3.  Non-obstructive bowel gas pattern. Mildly distended stomach. XR CHEST PORTABLE   Final Result      1. Small right pneumothorax appears slightly increased. 2. Mild patchy airspace opacity bilaterally. XR CHEST PORTABLE   Final Result     Pleurx catheter at the right lung base. Trace right    pneumothorax is unchanged. XR CHEST PORTABLE   Final Result      Right-sided chest tube evident in the base, its tip medially. Improvement in the right-sided pneumothorax, since earlier today. Possible medial collapse of the right lower lobe. Small left effusion. Patchy airspace density/atelectasis in the lungs bilaterally, with no other significant change. XR CHEST PORTABLE   Final Result   1. Right-sided Pleurx catheter in satisfactory position in the lung bases   2.  Right-sided post operative pneumothorax, approximately 10%           Assessment/Plan:   Jennifer Pierre is a 65 y.o. male with a PMH of CHF (CHFpEF), carotid stenosis, HLD, HTN, OA, Restrictive Lung Disease, T2DM who underwent VATS with PleurX catheter placement on 9/19/2022 for recurrent bilateral loculated pleural effusions and has had hypoxia/hypercapnia since that time    Neuro  - off sedation as of 10/1  - mental status: stable, interactive, responds appropriately to commands    Pulmonary  Vent Day: 15  Trach day: 5   Vent: , RR 14, FiO2 35, PEEP 5, Tinsp 0.95    Acute hypoxic/hypercapnic respiratory failure  Recurrent Pleural Effusions, s/p VATS and Pleurx  Patient was admitted to ICU for failure to tolerate bipap following procedure. The etiology of his recurrent pleural effusions is unclear. No studies done on fluid from original thoracenteses. Possibly malignancy. VATS fluid results showing an exudative picture, per Lights Criteria. S/p VATS procedure 9/19, with right PleurX catheter placement. Ventilatory restriction on PFT associated with worsening pleural disease. 9/29 CXR with atelectasis around pluerx cath. s/p Methylprednisolone IV  (9/30)  - PleurX catheter in place  - Trach in place  - CTS following  - 10/4: CT Chest/Abdomen showed pneumomediastinum extending to pneumoperitoneum  - 10/5: bronchoscopy with aspiration of mucous plugs, culture sent, await results.  -10/6:  try breathing trial again today, may show some improvement post bronch    Cardiovascular  Hypotension  Began requiring pressors after intubation, attributed to sedation and positive pressure ventilation  - Levo gtt @ 1, stable  - Appears to dip overnight req minimal levophed to maintain goal. Upon waking does better.     Congestive Heart Failure with Preserved EF  EF ~65% 6/16/2022. 9/23 BNP 15k, given lasix at that time, now downtrending.   - remains fluid overloaded on exam with edema to knees bilaterally  - BNP downtrending  - Per nephro held off on diuretics given kidney injury    GI  Diet: ADULT TUBE FEEDING; Nasogastric; Diabetic; Continuous; 10; Yes; 10; Q 4 hours; 45; 300; Q 4 hours; Protein; 2 bottles Proteinex daily w/ 30 mL FW flushes before and after  GI ppx: pepcid  - GI consult yest: agreed with benefit of PEG but would like to wait for improvement of pneumomediastinum/pneumoperitoneum       Constipation  Cont glycolax    Renal   Ansari replaced overnight due to retention    ZANA  Potentially ATN from hypotension/contrast   - Nephro following, rec to hold off on diuretics   - Good UOP, cr stable  - 10/4: 1.5L urine in last 24h  - 10/5: 1.35L UOP in 24h. Net +9.8L this admission.  - 10/6: 1100 ml UOP yest net +400ml, now +9L on admit   - Cr 1.3, improving    Hypernatremia  Sodium uptrend to 151.  - 10/3: Free water flushes in NG tube feeds  - 10/4: Na 151> 148. Continue free water flushes 300ml Q4H.  - 10/5: Na 151> 148> 146. TF held. - Na 149, continue free water flushes    Metabolic  - Lantus 79B nightly with HD Sliding scale. With resumption of TF may req scheduled mealtime lispro. Code Status: Full Code   PPX: Pepcid, heparin   DISPO: ICU    Betty Marquez MD, PGY-1  Internal Medicine Resident  Contact via [facility]  10/06/22  5:22 AM       Pulmonary & Critical Care     Patient seen and examined. I agree with Dr. Ginger Reeves history, physical, lab findings, assessment and plan. Pt is POD#17 VATS with Pleurx placement. Patient had bilateral pleural effusions right > left that did not improve with diuresis prompting VATS. Pleura visibly was normal.  Pleurx was placed for ongoing management of pleural fluid. Total protein was quite low at 1.2 on 9/21. LDH was elevated at 305. proBNP has been as high as 15,496     Patient developed postoperative acute on chronic hypercapnic/hypoxemic respiratory failure. For months he had slowly been getting worse in regards to dyspnea on exertion and an unintentional weight loss of 30 pounds. PFTs as an outpatient shows severe restrictive defect. CT chest August 29 shows bilateral pleural effusions that are moderate but no masses, nodules, or mediastinal adenopathy.      I do not feel like we have a firm grasp of the pathology with Ambrocio Mazariegos  From the best I can tell his pleural effusions are likely on the basis of CHF but there is some other entity that is driving his respiratory failure, weight loss, and weakness. I repeated a CT chest/abdomen with the following:     CT Chest  1. Severe pneumomediastinum. 2. Small to moderate right hydropneumothorax with similar fluid and gas components with a right-sided Pleurx catheter. 3. Moderate loculated left pleural effusion with atelectasis of the left lower lobe with patency of the central left lower lobe bronchi. 4. Fluid/material filling the bronchus intermedius and right lower lobe bronchi with complete consolidation and volume loss of the right lower lobe. Differential diagnosis would include mucous plugging or obstructing lesion. 5. Tracheostomy tube tip within the trachea   6. Severe subcutaneous emphysema in the neck. CT Abdomen:  IMPRESSION:       1. Severe pneumomediastinum extends into the upper abdomen with small pneumoperitoneum likely related to extension of pneumoperitoneum into the upper peritoneal cavity. 2. No fluid collection in the abdomen or pelvis. Yesterday I performed bronchoscopy that showed extensive mucus plugging on the right with less on the left. Mucus was thick and purulent was therapeutically aspirated and sent for culture. There is no endobronchial mass lesions noted. Will start airway clearance with albuterol and hypertonic saline    I tried a  wean today but he failed within 2 hours with respiratory rate of 38 and RSBI of 110, visible increased work of breathing and complaints of dyspnea. Cefepime and vancomycin has been started while await cultures from yesterday     Levophed was required at small doses overnight but he is now off of it     Continue current tube feeds. PEG will likely be needed but GI would like to give his pneumoperitoneum time to resolve     Another problem is his insurance refusal to cover an LTAC.   Medically Jabari Owens is the typical patient that would benefit from a well structured chronic ventilator weaning program, found at LTAC's.         Zoila Cao MD

## 2022-10-06 NOTE — PROGRESS NOTES
Patients MAP dropped down to 57, Levo restarted at 3 mcg/hr and will continue to try and wean patient off of Levo today.

## 2022-10-06 NOTE — PROGRESS NOTES
Surgery at bedside, drained 375 mL out of right lung. Dr. Payton Suarez tried to do a wean on the vent, the patient had a hard time breathing and his respirations were at 35, Dr. Payton Suarez switched his vent back over. Gave patient 0.25 of dilaudid for pain after procedure. Will continue to monitor.

## 2022-10-06 NOTE — CARE COORDINATION
Case Management Assessment           Daily Note                 Date/ Time of Note: 10/6/2022 9:38 AM         Note completed by: Zully Roldan RN    Patient Name: Bridgett Donovan  YOB: 1945    Diagnosis:Pleural effusion, right [J90]  Pleural effusion on right [J90]  Patient Admission Status: Inpatient    Date of Admission:9/19/2022  5:20 AM Length of Stay: 17 GLOS: GMLOS: 20    Current Plan of Care: New trach-vent. Levo gtt. NGTF-GI consulted for PEG. Plan to wait until there is \"improvement in pneumoperitoneum\". FC in place. Rectal tube. Up OOB     Discharge Plan: Family is open to going to [facility] once Mr. Denzel Riley is more medically stable. The SNF can accommodate a medically complex pt with new trach-vent/PleurX, PEG tube (if needed). CM spoke with Raheem Fleming [phone] in admissions. [facility] is in network    Current barriers to discharge: Needs PEG tube. Will need the rectal tube removed. Wean vent    Case Management Notes: Case management is following for discharge planning. The chart was reviewed. The pt is from home with his spouse. He was primarily independent with family support prior to admission. O2 3-4L/NC at baseline. [place] and his family were provided with choice of provider; he and his family are in agreement with the discharge plan.     Care Transition Patient: Yes    Zully Roldan RN  [facility]  Case Management Department  [phone]

## 2022-10-06 NOTE — PROGRESS NOTES
Patient switched to Vital HP at a rate of 25 ml/hr. Will continue to monitor patient while advancing TF.

## 2022-10-06 NOTE — CONSULTS
Clinical Pharmacy Consult Note    Vancomycin - Management by Pharmacy    Consult Date(s): 10/6  Consulting Provider(s): Dr. Yasmine Nesbtit / Plan  Ventilator Associated Pneumonia (VAP) - Vancomycin  Concurrent Antimicrobials: Cefepime 2 g IV q12h (day #1)  Day of Vanc Therapy / Ordered Duration: Day 1 of 7 ordered  Current Dosing Method: Bayesian-Guided AUC Dosing  Therapeutic Goal: -600 mg/L*hr  Current Dose / Plan:   SCr continues to downtrend, at 1.3 today; Documented UOP adequate over prior 24 hours (~0.5 mL/kg/hr)  Will plan to load vancomycin with 2000 mg IV x 1 dose (~23.5 mg/kg), followed by vancomycin 1250 mg (~15 mg/kg) IV q24h  Selected regimen falls within target AUC of 400-600 (projected AUC of 498 mg/L*hr) and target steady state trough (projected trough of 15.3 mg/L)  Will plan to obtain vancomycin random level in 1-2 days (10/8 AM, not yet ordered) or sooner if clinically indicated  MRSA nares ordered per protocol  Will continue to monitor clinical condition and make adjustments to regimen as appropriate. Thank you for consulting Pharmacy! Chelsea Hanson, NicholasD  Clinical Pharmacist - Emergency Dept  Wireless: [phone]  10/6/2022 11:12 AM      Subjective/Objective: Jana Tipton is a 65 y.o. male with a PMH significant for atrial fibrillation, CHF (HFpEF), carotid stenosis, HLD, HTN, OA, restrictive lung disease and T2DM who underwent VATS with PleurX catheter placement on 9/19/2022 for recurrent bilateral loculated pleural effusions. His hospital admission has been complicated by acute on chronic hypoxic hypercarbic respiratory failure. Patient was intubated on 9/22/22 with subsequent tracheostomy on 9/30/22. Mary Jane Snare \"Josiah\" underwent bronchoscopy on 10/05/22 and is currently being worked up for ventilator associated pneumonia (VAP). Pharmacy is consulted to dose vancomycin.     Ht Readings from Last 1 Encounters:   09/22/22 6' 0.99\" (1.854 m)     Wt Readings from Last 1 Encounters:   10/06/22 187 lb 6.3 oz (85 kg)     Current & Prior Antimicrobial Regimen(s):  Cefepime 2 g IV q12h (10/6 - present)  Vancomycin 1250 mg IV q24h (10/6 - present)    Vancomycin Level(s) / Doses:  Date Time Dose Type of Level / Level Interpretation   10/8 0600 1250 mg IV q24h Random level TBD   Note: Serum levels collected for AUC-based dosing may be high if collected in close proximity to the dose administered. This is not necessarily indicative of toxicity. Cultures & Sensitivities:  Date Site Micro Susceptibility / Result   10/6 MRSA nares pending pending   10/5 BAL resp cx pending pending   10/4 Pleural fluid cx no growth final     Recent Labs     10/04/22  0425 10/04/22  0426 10/05/22  0607 10/06/22  0333   CREATININE  --  1.7* 1.5* 1.3   BUN  --  114* 110* 105*   WBC 27.0*  --  21.7* 20.6*     Estimated Creatinine Clearance: 53 mL/min (based on SCr of 1.3 mg/dL). Additional Lab Values / Findings of Note:  No results for input(s): PROCAL in the last 72 hours.

## 2022-10-06 NOTE — PROGRESS NOTES
Speech Language Pathology  Facility/Department: [facility] ICU  Dysphagia/Communication Daily Treatment Note    NAME: Nora Cerda  :   MRN: [id number]    Patient Diagnosis(es):   Patient Active Problem List    Diagnosis Date Noted    Mucus plugging of bronchi 10/05/2022    ZANA (acute kidney injury) ([facility] [address] 2022    Acute on chronic respiratory failure with hypoxia and hypercapnia ([address] 2022    Pleural effusion on right 2022    Chronic heart failure with preserved ejection fraction (HFpEF) ([address] 2022    Hypoxia 2022    Atrial fibrillation with rapid ventricular response ([facility] [address] 2022    Exertional dyspnea 2022    Closed displaced fracture of lateral malleolus of left fibula 2021    Spinal stenosis of lumbar region 10/17/2018    Closed compression fracture of L1 lumbar vertebra 10/17/2018    Trigger ring finger of right hand 06/15/2017    Subacromial impingement 2015    Rotator cuff tear 2015    Glenohumeral arthritis 2015    DM type 2 (diabetes mellitus, type 2) ([facility] [address] 2013    ED (erectile dysfunction) 2012    OA (osteoarthritis) of knee 10/12/2011    Carotid stenosis, left 10/12/2011    Hearing loss 10/12/2011    HTN (hypertension) 10/07/2011    Hyperlipidemia 10/07/2011     Allergies: Allergies   Allergen Reactions    Torsemide Shortness Of Breath    Dye [Iodides]      1970's - ?? CXR (10/3/22)-  Impression       Stable appearance of loculated right pneumothorax, with loculated components in the lateral right midlung region and in the right lateral costophrenic sulcus. Stable scattered areas of atelectasis versus scar, most prominent in the medial right lung base and in the left lateral lung base. Previous MBS -  N/A    Chart reviewed.     Medical Diagnosis: Pleural effusion, right [J90]  Pleural effusion on right [J90]   Treatment Diagnosis: Oropharyngeal dysphagia    BSE Impression (10/2/22)-  RN states okay to attempt assessment. Dr Augusto Howe speaking with spouse stating pt most likely still with effects of sedation. Pt was intubated 9/22- 9/30/22. Trach placed 9/30, with pt on ventilator. Pt shook head yes/no intermittently to questions asked. Pt made no attempt to mouth words. Pt exhibiting open mouth posture, did not form labial seal or protrude /lateralize tongue on command. Oral care completed, pt demonstrating no oral response. Placed 2 single ice chips in oral cavity, again with no response - no lingual movement noted. Pt did close lips with tactile stim x 2. No swallow movement was felt upon palpation of anterior neck. O2 sats remained stable and RR remained in mid 20's. Recommend aggressive oral care 2-3 x/day with suction kit. Plan to re-assess as pt appropriate. Dysphagia Diagnosis: Suspected needs further assessment    MBS results - instrumental will be indicated ; FEES preferred as schedule permits     Pain: None indicated     Current Diet : ADULT TUBE FEEDING; Nasogastric; Diabetic; Continuous; 10; Yes; 10; Q 4 hours; 45; 300; Q 4 hours; Protein; 2 bottles Proteinex daily w/ 30 mL FW flushes before and after  Diet NPO   Recommended Form of Meds: Via alternative means of nutrition      Treatment:  Pt seen bedside to address the following goals:   1. The patient will tolerate repeat bedside swallowing evaluation when able. 10/3 - Pt positioned upright and aggressive oral care was completed with suction toothbrush. Vitals reading had poor waveform and appeared to be unreliable readings; RN notified and in to assess. The patient demo'd spontaneous swallows without PO with audible air escaping around stoma. Pt tolerated 4/4 ice chips with constant verbal cues for appropriate acceptance and manipulation of ice (I.e.close mouth, chew before you swallow, etc).  There was no coughing but not likely clinically relevant due to research indicating high prevalence of SILENT aspiration in cuff inflated tracheostomy patients. Pt not yet appropriate for instrumental swallow evaluation but will require out prior to advancing diet. Notes indicate plan for SBT today. Requested order for PMV which will hopefully be receive and coincide with improved alertness and weaning from ventilator. Continue goal.  10/4: Cleared by RN. No order for PMV yet. Received pt more awake/alert. Wife present. Pt remains on trach/vent. Oral care recently completed. Repositioned pt upright. Targeted swallow function via trials ice chips and small sips h2o; pt with positive oral acceptance, no anterior loss, appropriate and timely oral prep, seemingly positive swallow movement. Attempted to check clearance via oral suction; evidently dry with no material suctioned. As previously noted, no cough, however would be unlikely to occur given inflated cuff tracheostomy. Would recommend waiting on instrumental pending ability to place PMV (assuming order will be received in the near future), as that will likely support pt's swallow function, and show improved function. Cont goal  10/6: Pt positioned upright in bed. Thorough oral care and suctioning provided. Pt continues to be more alert/awake and active in his care/treatment. Family present. Pt completed 20x effortful swallows 5x swallows/ ice chips to reduce further swallow deconditioning. RR & 02 stable across low level ice chip trials. Audible air escape and secretions around trach appreciated. Per RN, Dr. Ryann Garcia aware. Discussed need for instrumental swallow evaluation with pt and family. FEES to be completed as schedule permits, preferably prior to PEG determination. Cont goal.     2. The patient/caregiver will demonstrate understanding of compensatory strategies for improved swallowing safety.    10/3 - Educated on potential impact of trach on swallow function, rationale for oral care, recommendations for few single ice chips to be given to facilitate oral mucosal integrity and preserve swallow function that is present. Introduced plan for PMV in future, rationales for this. Educated on limitations in pts alertness at this time and need for improvement for completing swallow study, therapeutic effectiveness, etc. Continue goal.   10/4: Discussed swallow function with patient/wife. Also reviewed recommendation for PMV, hopefully in near future (order not yet received). Discussed how PMV may also improve swallow function be supporting necessary pressure for swallowing. Reviewed recommendation for small amounts ice/small sips h2o with effortful swallow to support swallow function/maintenance. Wife and pt indicated comprehension. Cont goal  10/5: Thorough education this date re: potential adverse affects of trach/vent on swallow function, need for instrumentation, need for PMV & importance of oral care. Wean trial failed this date per Dr. Lyudmila Packer. Audible air/secretion leakage around trach. PMV trials are likely not appropriate at this time and orders have not been provided. Reviewed continued recommendation for low level ice chips s/p oral care to support swallow function/ maintenance, improve pt QOL/ comfort and encourage oral care. 3. Patient will participate in [employer] [employer] trials when appropriate/ orders received  10/6- Speech/ Communication Treatment: Pt with increased communicative frustration. PMV trials are likely not appropriate at this time and orders have not been provided yet. Family stating they wanted to get him an IPAD with speech lauren. Discussed low tech vs high tech AAC and appropriateness for AAC in different populations. Given trach/vent is suspected to not be long-term and pt continues to be in the acute phase of care, high tech AAC is not recommended at this time. Pt provided communication board. All icons reviewed. Pt was able to spell out \"tired\" on the letter board.  Recommend continued use of communication board, letter board & non-verbal communication (I.e lip reading, pointing, facial expressions etc) to facilitate patient expression of basic wants/needs. Patient and family demonstrated understanding. Patient/Family/Caregiver Education:  Please see goal 2 above    Compensatory Strategies:  Oral care using suction / suction system 3x per day   Single ice chips given by staff when pt awake and in upright position after oral care  Communication board      Plan:  Continue dysphagia/communication treatment with goals per plan of care. Diet recommendations: Single ice chips given by staff when pt awake and in upright position after oral care   DC recommendation: Ongoing speech therapy is indicated   Treatment: [id number]; 42 minutes  D/W nursingAna  Needs met prior to leaving room, call button in reach; Family at bedside. Thank you.    Nitza Dailey M.A, CCC-SLP/ CBIS     If patient is discharged prior to next treatment, this note will serve as the discharge summary

## 2022-10-06 NOTE — PLAN OF CARE
Problem: Chronic Conditions and Co-morbidities  Goal: Patient's chronic conditions and co-morbidity symptoms are monitored and maintained or improved  Outcome: Progressing  Flowsheets (Taken 10/6/2022 0800)  Care Plan - Patient's Chronic Conditions and Co-Morbidity Symptoms are Monitored and Maintained or Improved: Monitor and assess patient's chronic conditions and comorbid symptoms for stability, deterioration, or improvement     Problem: Discharge Planning  Goal: Discharge to home or other facility with appropriate resources  Outcome: Progressing  Flowsheets (Taken 10/6/2022 0800)  Discharge to home or other facility with appropriate resources: Identify barriers to discharge with patient and caregiver     Problem: Pain  Goal: Verbalizes/displays adequate comfort level or baseline comfort level  Outcome: Progressing  Flowsheets (Taken 10/6/2022 0800)  Verbalizes/displays adequate comfort level or baseline comfort level: Encourage patient to monitor pain and request assistance     Problem: Safety - Adult  Goal: Free from fall injury  Outcome: Progressing     Problem: Skin/Tissue Integrity  Goal: Absence of new skin breakdown  Description: 1. Monitor for areas of redness and/or skin breakdown  2. Assess vascular access sites hourly  3. Every 4-6 hours minimum:  Change oxygen saturation probe site  4. Every 4-6 hours:  If on nasal continuous positive airway pressure, respiratory therapy assess nares and determine need for appliance change or resting period. Outcome: Progressing     Problem: Safety - Medical Restraint  Goal: Remains free of injury from restraints (Restraint for Interference with Medical Device)  Description: INTERVENTIONS:  1. Determine that other, less restrictive measures have been tried or would not be effective before applying the restraint  2. Evaluate the patient's condition at the time of restraint application  3. Inform patient/family regarding the reason for restraint  4.  Q2H: Monitor safety, psychosocial status, comfort, nutrition and hydration  Outcome: Progressing     Problem: Nutrition Deficit:  Goal: Optimize nutritional status  Outcome: Progressing  Flowsheets (Taken 10/6/2022 1056 by Óscar Gómez RD)  Nutrient intake appropriate for improving, restoring, or maintaining nutritional needs:   Recommend, monitor, and adjust tube feedings and TPN/PPN based on assessed needs   Assess nutritional status and recommend course of action     Problem: ABCDS Injury Assessment  Goal: Absence of physical injury  Outcome: Progressing     Problem: Confusion  Goal: Confusion, delirium, dementia, or psychosis is improved or at baseline  Description: INTERVENTIONS:  1. Assess for possible contributors to thought disturbance, including medications, impaired vision or hearing, underlying metabolic abnormalities, dehydration, psychiatric diagnoses, and notify attending LIP  2. [place] high risk fall precautions, as indicated  3. Provide frequent short contacts to provide reality reorientation, refocusing and direction  4. Decrease environmental stimuli, including noise as appropriate  5. Monitor and intervene to maintain adequate nutrition, hydration, elimination, sleep and activity  6. If unable to ensure safety without constant attention obtain sitter and review sitter guidelines with assigned personnel  7.  Initiate Psychosocial CNS and Spiritual Care consult, as indicated  Outcome: Progressing  Flowsheets (Taken 10/6/2022 0800)  Effect of thought disturbance (confusion, delirium, dementia, or psychosis) are managed with adequate functional status: Assess for contributors to thought disturbance, including medications, impaired vision or hearing, underlying metabolic abnormalities, dehydration, psychiatric diagnoses, notify LIP

## 2022-10-06 NOTE — PROGRESS NOTES
Comprehensive Nutrition Assessment    Type and Reason for Visit:  Reassess    Nutrition Recommendations/Plan:   Restart TF when medically feasible, Glucerna 1.5 @ 45 mL/hr + 2 bottles Proteinex  Monitor nutrition adequacy, pertinent labs, bowel habits, wt changes, and clinical progress     Malnutrition Assessment:  Malnutrition Status: At risk for malnutrition (Comment) (09/22/22 1409)    Context:  Chronic Illness     Findings of the 6 clinical characteristics of malnutrition:  Energy Intake:  Unable to assess  Weight Loss:  Greater than 10% over 6 months (20% in 6 month period)       Nutrition Assessment:    Follow up: Pt discussed in ICU rounds. Attempted to wean pt from mechanical ventilation, did not tolerate. Hypotensive on levo, all sedation d/c'ed. Plan for PEG placement, TF on hold currently. Na trending up, FW flushes @ 100mL q4. +9L since admission, previous wt used for needs remains appropriate. Pt BG trending up, <180 currently, will continue to monitor. Nutrition Related Findings:    . Cr 1.5. Mg 3.3. Na 146. . K 3.4. Active bowel sounds, diarrhea. +3 RLE/LLE edema. +1 RUE edema. Wound Type: None       Current Nutrition Intake & Therapies:    Average Meal Intake: NPO  Average Supplements Intake: NPO  ADULT TUBE FEEDING; Nasogastric; Diabetic; Continuous; 10; Yes; 10; Q 4 hours; 45; 100; Q 1 hour; Protein; 2 bottles Proteinex daily w/ 30 mL FW flushes before and after  Current Tube Feeding (TF) Orders:  Feeding Route: Nasogastric  Formula: Diabetic  Schedule: Continuous  Additives/Modulars: Protein  Goal TF & Flush Orders Provides: Glucerna 1.5 @ 45 mL/hr to provide: 1080 mL TV, 1620 kcal, 89 g/pro, and 820 mL FW + FW flushes of 60 mL q4 + 2 bottles Proteinex/day to provide an additional 52 g/pro and 208 kcal    Anthropometric Measures:  Height: 6' 0.99\" (185.4 cm)  Ideal Body Weight (IBW): 184 lbs (84 kg)       Current Body Weight: 166 lb 14.2 oz (75.7 kg), 90.7 % IBW.  Weight Source: Bed Scale  Current BMI (kg/m2): 22        Weight Adjustment For: No Adjustment                 BMI Categories: Normal Weight (BMI 22.0 to 24.9) age over 72    Estimated Daily Nutrient Needs:  Energy Requirements Based On: Kcal/kg (20-25 kcal/kg CBW)  Weight Used for Energy Requirements: Current (CBW 75.7 kg)  Energy (kcal/day): 2746-9694  Weight Used for Protein Requirements: Current (2 g/kg CBW)  Protein (g/day): 151  Method Used for Fluid Requirements: 1 ml/kcal  Fluid (ml/day): or per MD    Nutrition Diagnosis:   Inadequate oral intake related to impaired respiratory function as evidenced by NPO or clear liquid status due to medical condition, nutrition support - enteral nutrition    Nutrition Interventions:   Food and/or Nutrient Delivery: Continue NPO, Continue Current Tube Feeding  Nutrition Education/Counseling: Education not indicated  Coordination of Nutrition Care: Continue to monitor while inpatient  Plan of Care discussed with: ICU team/surgery    Goals:  Previous Goal Met: Progressing toward Goal(s)  Goals: Initiate nutrition support, within 2 days       Nutrition Monitoring and Evaluation:   Behavioral-Environmental Outcomes: None Identified  Food/Nutrient Intake Outcomes: Enteral Nutrition Intake/Tolerance  Physical Signs/Symptoms Outcomes: Biochemical Data, Nutrition Focused Physical Findings, Weight, Diarrhea, Hemodynamic Status    Discharge Planning:     Too soon to determine     Ulysses Admire, [address], [place]  Contact: [phone]

## 2022-10-06 NOTE — PROGRESS NOTES
Pharmacy Note - Extended Infusion Beta-Lactam Adjustment    Cefepime ordered for treatment of ventilator associated pneumonia (VAP). Per [facility] Extended Infusion Beta-Lactam Policy, cefepime will be changed to 2000 mg IV x 1 dose administered over 30 minutes (loading dose), followed by cefepime 2000 mg IV q12h administered over 240 minutes (maintenance dose, extended infusion). Estimated Creatinine Clearance: Estimated Creatinine Clearance: 53 mL/min (based on SCr of 1.3 mg/dL). Dialysis Status, ZANA, CKD: Patient is recovering from ZANA (SCr downtrending to 1.3 today)  BMI: Body mass index is 24.73 kg/m². Rationale for Adjustment: Agent demonstrates time-dependent effect on bacterial eradication. Extended-infusion dosing strategy aims to enhance microbiologic and clinical efficacy. Pharmacy will continue to monitor renal function and adjust dose as necessary. Please call with any questions.     Helena Liao PharmD  Clinical Pharmacist - Emergency Dept  Wireless: [phone]  10/6/2022 11:47 AM

## 2022-10-06 NOTE — PROGRESS NOTES
Nephrology Progress Note                                                                                                                                                                                                                                                                                                                                                               Office : [phone]     Fax :[phone]    Patient's Name: Shanon Elena    10/6/2022    Reason for Consult: ZANA  Requesting Physician:  Dillan Benton MD    Interval History:      Cr better   Good UO   Remains on Trach         Past Medical History:   Diagnosis Date    ZANA (acute kidney injury) ([facility] [address] 9/26/2022    Carotid stenosis, left 10/12/2011    Chronic back pain     Chronic systolic (congestive) heart failure 36/97/9920    Diastolic CHF ([address]     Erectile dysfunction     Hyperlipidemia     Hypertension     Osteoarthritis     Restrictive lung disease     Type II or unspecified type diabetes mellitus without mention of complication, not stated as uncontrolled        Past Surgical History:   Procedure Laterality Date    CARDIAC CATHETERIZATION  06/2022    KNEE SURGERY Left     PLEURAL CATH INSERTION N/A 9/19/2022    PLEURAL CATHETER PLACEMENT; INTERCOSTAL NERVE BLOCK X4 performed by Gallito Singh MD at [facility], [place] Bilateral     THORACOSCOPY Right 9/19/2022    RIGHT VIDEO ASSISTED THORASCOPIC SURGERY AND; performed by Gallito Singh MD at [address] N/A 9/30/2022    TRACHEOSTOMY performed by Hilary Gottlieb MD at [facility] History   Problem Relation Age of Onset    Hearing Loss Father     Heart Disease Father 70        MI    High Blood Pressure Father     Diabetes Maternal Grandmother         hypoglycemic    Hearing Loss Maternal Grandmother     Arthritis Maternal Grandfather         reports that he quit smoking about 20 years ago. His smoking use included cigarettes.  He has a 80.00 pack-year smoking history. He has never used smokeless tobacco. He reports current alcohol use. He reports that he does not use drugs. Allergies:   Torsemide and Dye [iodides]    Current Medications:    pantoprazole (PROTONIX) injection 40 mg, BID  norepinephrine (LEVOPHED) 16 mg in sodium chloride 0.9 % 250 mL infusion, Continuous  insulin lispro (1 Unit Dial) (HUMALOG/ADMELOG) pen 0-16 Units, Q4H  insulin glargine (LANTUS;BASAGLAR) injection pen 20 Units, Nightly  sodium chloride flush 0.9 % injection 5-40 mL, 2 times per day  sodium chloride flush 0.9 % injection 5-40 mL, PRN  0.9 % sodium chloride infusion, PRN  HYDROmorphone (DILAUDID) injection 0.5 mg, Q5 Min PRN  labetalol (NORMODYNE;TRANDATE) injection 10 mg, Q15 Min PRN  Venelex ointment, BID  HYDROmorphone (DILAUDID) injection 0.25 mg, Q3H PRN  amiodarone (CORDARONE) 150 mg in dextrose 5 % 100 mL bolus, Once  amiodarone (CORDARONE) tablet 200 mg, Daily  acetaminophen (TYLENOL) 160 MG/5ML solution 650 mg, Q6H PRN  chlorhexidine (PERIDEX) 0.12 % solution 15 mL, BID  [Held by provider] metoprolol (LOPRESSOR) injection 5 mg, Q6H  haloperidol lactate (HALDOL) injection 2 mg, Q6H PRN  albuterol (PROVENTIL) nebulizer solution 2.5 mg, Q4H PRN  apixaban (ELIQUIS) tablet 5 mg, BID  sodium chloride flush 0.9 % injection 5-40 mL, 2 times per day  sodium chloride flush 0.9 % injection 5-40 mL, PRN  0.9 % sodium chloride infusion, PRN  polyethylene glycol (GLYCOLAX) packet 17 g, Daily  ondansetron (ZOFRAN-ODT) disintegrating tablet 4 mg, Q8H PRN   Or  ondansetron (ZOFRAN) injection 4 mg, Q6H PRN  glucose chewable tablet 16 g, PRN  dextrose bolus 10% 125 mL, PRN   Or  dextrose bolus 10% 250 mL, PRN  glucagon (rDNA) injection 1 mg, PRN  dextrose 10 % infusion, Continuous PRN  hydrALAZINE (APRESOLINE) injection 5 mg, Q15 Min PRN  levalbuterol (XOPENEX) nebulization 0.63 mg, Q4H PRN      Review of Systems:   14 point ROS obtained but were negative except mentioned in HPI      Physical exam:     Vitals:  BP (!) 111/54   Pulse (!) 122   Temp 98 °F (36.7 °C)   Resp 17   Ht 6' 0.99\" (1.854 m)   Wt 187 lb 6.3 oz (85 kg)   SpO2 (!) 82%   BMI 24.73 kg/m²   Constitutional:  on MV  Skin: no rash, turgor wnl  Heent:  eomi, mmm  Neck: no bruits or jvd noted  Cardiovascular:  S1, S2 without m/r/g  Respiratory: trach  Abdomen:  +bs, soft, nt, nd  Ext: bilateral lower extremity edema R>L  Psychiatric: mood and affect appropriate  Musculoskeletal:  Rom, muscular strength intact    Data:   Labs:  CBC:   Recent Labs     10/04/22  0425 10/05/22  0607 10/06/22  0333   WBC 27.0* 21.7* 20.6*   HGB 7.6* 7.5* 7.1*   * 389 325       BMP:    Recent Labs     10/04/22  0426 10/05/22  0607 10/06/22  0333   * 146* 149*   K 3.8 3.4* 4.3    102 107   CO2 35* 37* 35*   * 110* 105*   CREATININE 1.7* 1.5* 1.3   GLUCOSE 282* 110* 170*       Ca/Mg/Phos:   Recent Labs     10/04/22  0425 10/04/22  0426 10/05/22  0607 10/06/22  0333   CALCIUM  --  8.0* 8.1* 8.3   MG 3.30*  --  3.30* 3.30*   PHOS  --  4.5 3.8 4.5       Hepatic: No results for input(s): AST, ALT, ALB, BILITOT, ALKPHOS in the last 72 hours. Troponin: No results for input(s): TROPONINI in the last 72 hours. BNP: No results for input(s): BNP in the last 72 hours. Lipids:   No results for input(s): CHOL, TRIG, HDL, LDLCALC, LABVLDL in the last 72 hours. ABGs:   No results for input(s): PHART, PO2ART, YIC7PIN in the last 72 hours. INR: No results for input(s): INR in the last 72 hours. UA:No results for input(s): Ana Lone, GLUCOSEU, BILIRUBINUR, Julious Ates, BLOODU, PHUR, PROTEINU, UROBILINOGEN, NITRU, LEUKOCYTESUR, LABMICR, URINETYPE in the last 72 hours. Urine Microscopic: No results for input(s): LABCAST, BACTERIA, COMU, HYALCAST, WBCUA, RBCUA, EPIU in the last 72 hours. Urine Culture: No results for input(s): LABURIN in the last 72 hours.   Urine Chemistry:   No results for input(s): CLUR, JEFFREY Noel in the last 72 hours. IMAGING:  XR CHEST PORTABLE   Final Result      Tiny right lateral pneumothorax is suspected, 5 mm in maximum thickness. This has decreased in size since 10/3/2022. Right basilar Pleurx catheter noted. Small bilateral pleural effusions. Prominent interstitial markings. Stable cardiac mediastinal silhouette. Lines and tubes without change. Subcutaneous emphysema noted. CT CHEST ABDOMEN PELVIS WO CONTRAST   Final Result      1. Severe pneumomediastinum. 2. Small to moderate right hydropneumothorax with similar fluid and gas components with a right-sided Pleurx catheter. 3. Moderate loculated left pleural effusion with atelectasis of the left lower lobe with patency of the central left lower lobe bronchi. 4. Fluid/material filling the bronchus intermedius and right lower lobe bronchi with complete consolidation and volume loss of the right lower lobe. Differential diagnosis would include mucous plugging or obstructing lesion. 5. Tracheostomy tube tip within the trachea   6. Severe subcutaneous emphysema in the neck. CT ABDOMEN AND PELVIS:      FINDINGS:      LIVER: Normal.      GALLBLADDER AND BILIARY TREE: No calcified gallstones. No gallbladder distention. No intra- or extrahepatic biliary dilatation. PANCREAS: Normal.      SPLEEN: Normal.      ADRENAL GLANDS: Normal.      KIDNEYS AND URETERS: Normal.      URINARY BLADDER: Ansari catheter present within collapsed urinary bladder. REPRODUCTIVE ORGANS: No associated masses. BOWEL: Normal diameter, nonobstructed. Feeding tube tip at the level of the ligament of Treitz. LYMPH NODES: No abnormally enlarged nodes. PERITONEUM/RETROPERITONEUM: Severe pneumoperitoneum extends into the upper abdomen with some of the symmetric multiseptated gas appearing deep to the diaphragm consistent with mild pneumoperitoneum (series 601 was 101).  This is likely related to    pneumonia mediastinum dissecting into the abdomen. No fluid collection in the abdomen or pelvis. No ascites. VESSELS: Extensive atherosclerotic calcification of the aorta and iliac arteries. ABDOMINAL WALL: No acute abnormality. BONES: No acute abnormality. Mild chronic L1 compression fracture with previous vertebroplasty. IMPRESSION:      1. Severe pneumomediastinum extends into the upper abdomen with small pneumoperitoneum likely related to extension of pneumoperitoneum into the upper peritoneal cavity. 2. No fluid collection in the abdomen or pelvis. Results were discussed with the surgical team at 7:00 PM on 10/3/2022. XR CHEST PORTABLE   Final Result      Stable appearance of loculated right pneumothorax, with loculated components in the lateral right midlung region and in the right lateral costophrenic sulcus. Stable scattered areas of atelectasis versus scar, most prominent in the medial right lung base and in the left lateral lung base. XR CHEST PORTABLE   Final Result      Small right basilar pneumothorax. Right basilar chest tube without change. Patchy consolidation in the right mid and lower lung as well as the left lower lung-atelectasis versus pneumonia. Trace left pleural effusion. Normal heart size. Lines and tubes without change. Mild improvement in degree of subcutaneous emphysema in the chest and neck. XR CHEST 1 VIEW   Final Result      1. Stable small right-sided pneumothorax and prominent subcutaneous emphysema along the neck and upper superior chest wall, greater in right lung stable   2. Stable left basilar consolidation and pleural effusion      3. Stable appearing perihilar accentuated markings or airspace disease            XR CHEST PORTABLE   Final Result      Stable small right-sided pneumothorax. More prominent emphysema over the lower neck. No change in bilateral airspace disease. Stable left pleural effusion. XR CHEST PORTABLE   Final Result   1. Bilateral multifocal pneumonia with improvement in the right    pulmonary consolidation since prior study from 9/23/22   2. Mild to moderate left pleural effusion          XR CHEST PORTABLE   Final Result   1. Support lines and tubes are stable. 2.  Stable small right lateral pneumothorax and right basilar    chest tube. 3.  Stable patchy bilateral airspace disease. XR CHEST PORTABLE   Final Result   1. Satisfactory position of right internal jugular central line   2. No interval change in endotracheal tube and nasogastric tube positioning. 3. Extensive bilateral airspace disease with no changes. 4. Left pleural effusion with retrocardiac opacity, unchanged   5. Small stable tiny right lateral pneumothorax and stable position of right chest tube,, Pleurx catheter. XR ABDOMEN (KUB) (SINGLE AP VIEW)   Final Result   1. No residual pneumothorax. 2.  Mild patchy airspace disease bilaterally worse on the right than on prior examination. 3.  Non-obstructive bowel gas pattern. Mildly distended stomach. XR CHEST PORTABLE   Final Result   1. No residual pneumothorax. 2.  Mild patchy airspace disease bilaterally worse on the right than on prior examination. 3.  Non-obstructive bowel gas pattern. Mildly distended stomach. XR CHEST PORTABLE   Final Result      1. Small right pneumothorax appears slightly increased. 2. Mild patchy airspace opacity bilaterally. XR CHEST PORTABLE   Final Result     Pleurx catheter at the right lung base. Trace right    pneumothorax is unchanged. XR CHEST PORTABLE   Final Result      Right-sided chest tube evident in the base, its tip medially. Improvement in the right-sided pneumothorax, since earlier today. Possible medial collapse of the right lower lobe. Small left effusion.  Patchy airspace density/atelectasis in the lungs bilaterally, with no other significant change. XR CHEST PORTABLE   Final Result   1. Right-sided Pleurx catheter in satisfactory position in the lung bases   2. Right-sided post operative pneumothorax, approximately 10%             Assessment/Plan   ZANA  - suspect this is contrast nephropathy  - baseline Cre 0.7. Normal on admission.  - Cr better   - Hold diuretics   - BNP 3k, Protein:Cre 0.3, FENa 0.4%  - closely monitor UOP  - avoid nephrotoxic agent     2. Hypernatremia  - continue free water   - will continue to monitor     3. Hypotension  - pressors as needed     4. Anemia  - daily CBC    5. Acid- base/ Electrolyte imbalance   - optimize lytes    6. Acute hypoxic resp failure  - s/p VATS on 9/19/22 for recurrent pleural effusions  - Intensivist and CTS following    7.  CHF   - EF 65% on 6/16/22 via cardiac cath        Les Oneill MD

## 2022-10-06 NOTE — PLAN OF CARE
Problem: Chronic Conditions and Co-morbidities  Goal: Patient's chronic conditions and co-morbidity symptoms are monitored and maintained or improved  10/6/2022 1922 by Suly Mccormick RN  Outcome: Progressing  Flowsheets (Taken 10/6/2022 1600 by Milvia Miller RN)  Care Plan - Patient's Chronic Conditions and Co-Morbidity Symptoms are Monitored and Maintained or Improved: Monitor and assess patient's chronic conditions and comorbid symptoms for stability, deterioration, or improvement  10/6/2022 1247 by Milvia Miller RN  Outcome: Progressing  Flowsheets  Taken 10/6/2022 1200  Care Plan - Patient's Chronic Conditions and Co-Morbidity Symptoms are Monitored and Maintained or Improved: Monitor and assess patient's chronic conditions and comorbid symptoms for stability, deterioration, or improvement  Taken 10/6/2022 0800  Care Plan - Patient's Chronic Conditions and Co-Morbidity Symptoms are Monitored and Maintained or Improved: Monitor and assess patient's chronic conditions and comorbid symptoms for stability, deterioration, or improvement     Problem: Discharge Planning  Goal: Discharge to home or other facility with appropriate resources  10/6/2022 1922 by Suly Mccormick RN  Outcome: Progressing  Flowsheets (Taken 10/6/2022 1600 by Milvia Miller RN)  Discharge to home or other facility with appropriate resources: Identify barriers to discharge with patient and caregiver  10/6/2022 1247 by Milvia Miller RN  Outcome: Progressing  Flowsheets  Taken 10/6/2022 1200  Discharge to home or other facility with appropriate resources: Identify barriers to discharge with patient and caregiver  Taken 10/6/2022 0800  Discharge to home or other facility with appropriate resources: Identify barriers to discharge with patient and caregiver     Problem: Pain  Goal: Verbalizes/displays adequate comfort level or baseline comfort level  10/6/2022 1922 by Suly Mccormick RN  Outcome: Progressing  Flowsheets (Taken 10/6/2022 1600 by Martina Garcia RN)  Verbalizes/displays adequate comfort level or baseline comfort level: Encourage patient to monitor pain and request assistance  10/6/2022 1247 by Martina Garcia RN  Outcome: Progressing  Flowsheets  Taken 10/6/2022 1200  Verbalizes/displays adequate comfort level or baseline comfort level: Encourage patient to monitor pain and request assistance  Taken 10/6/2022 0800  Verbalizes/displays adequate comfort level or baseline comfort level: Encourage patient to monitor pain and request assistance     Problem: Safety - Adult  Goal: Free from fall injury  10/6/2022 1247 by Martina Garcia RN  Outcome: Progressing     Problem: Skin/Tissue Integrity  Goal: Absence of new skin breakdown  Description: 1. Monitor for areas of redness and/or skin breakdown  2. Assess vascular access sites hourly  3. Every 4-6 hours minimum:  Change oxygen saturation probe site  4. Every 4-6 hours:  If on nasal continuous positive airway pressure, respiratory therapy assess nares and determine need for appliance change or resting period. 10/6/2022 1247 by Martina Garcia RN  Outcome: Progressing     Problem: Safety - Medical Restraint  Goal: Remains free of injury from restraints (Restraint for Interference with Medical Device)  Description: INTERVENTIONS:  1. Determine that other, less restrictive measures have been tried or would not be effective before applying the restraint  2. Evaluate the patient's condition at the time of restraint application  3. Inform patient/family regarding the reason for restraint  4.  Q2H: Monitor safety, psychosocial status, comfort, nutrition and hydration  10/6/2022 1247 by Martina Garcia RN  Outcome: Progressing     Problem: Nutrition Deficit:  Goal: Optimize nutritional status  10/6/2022 1247 by Martina Garcia RN  Outcome: Progressing  Flowsheets (Taken 10/6/2022 1056 by Cee Russell RD)  Nutrient intake appropriate for improving, restoring, or maintaining nutritional needs:   Recommend, monitor, and adjust tube feedings and TPN/PPN based on assessed needs   Assess nutritional status and recommend course of action     Problem: ABCDS Injury Assessment  Goal: Absence of physical injury  10/6/2022 1247 by Jama Mcmahon RN  Outcome: Progressing     Problem: Confusion  Goal: Confusion, delirium, dementia, or psychosis is improved or at baseline  Description: INTERVENTIONS:  1. Assess for possible contributors to thought disturbance, including medications, impaired vision or hearing, underlying metabolic abnormalities, dehydration, psychiatric diagnoses, and notify attending LIP  2. [place] high risk fall precautions, as indicated  3. Provide frequent short contacts to provide reality reorientation, refocusing and direction  4. Decrease environmental stimuli, including noise as appropriate  5. Monitor and intervene to maintain adequate nutrition, hydration, elimination, sleep and activity  6. If unable to ensure safety without constant attention obtain sitter and review sitter guidelines with assigned personnel  7.  Initiate Psychosocial CNS and Spiritual Care consult, as indicated  Recent Flowsheet Documentation  Taken 10/6/2022 1600 by Jama Mcmahon RN  Effect of thought disturbance (confusion, delirium, dementia, or psychosis) are managed with adequate functional status: Assess for contributors to thought disturbance, including medications, impaired vision or hearing, underlying metabolic abnormalities, dehydration, psychiatric diagnoses, notify LIP  10/6/2022 1247 by Jama Mcmahon RN  Outcome: Progressing  Flowsheets  Taken 10/6/2022 1200  Effect of thought disturbance (confusion, delirium, dementia, or psychosis) are managed with adequate functional status: Assess for contributors to thought disturbance, including medications, impaired vision or hearing, underlying metabolic abnormalities, dehydration, psychiatric diagnoses, notify LIP  Taken 10/6/2022 0800  Effect of thought disturbance (confusion, delirium, dementia, or psychosis) are managed with adequate functional status: Assess for contributors to thought disturbance, including medications, impaired vision or hearing, underlying metabolic abnormalities, dehydration, psychiatric diagnoses, notify LIP

## 2022-10-06 NOTE — PLAN OF CARE
Problem: Chronic Conditions and Co-morbidities  Goal: Patient's chronic conditions and co-morbidity symptoms are monitored and maintained or improved  10/5/2022 2043 by Toya Cota RN  Outcome: Progressing  Flowsheets  Taken 10/5/2022 1600 by Ana Cristina Morris - Patient's Chronic Conditions and Co-Morbidity Symptoms are Monitored and Maintained or Improved: Monitor and assess patient's chronic conditions and comorbid symptoms for stability, deterioration, or improvement  Taken 10/5/2022 1200 by Ana Cristina Morris - Patient's Chronic Conditions and Co-Morbidity Symptoms are Monitored and Maintained or Improved: Monitor and assess patient's chronic conditions and comorbid symptoms for stability, deterioration, or improvement  Taken 10/5/2022 0800 by Ana Cristina Morris - Patient's Chronic Conditions and Co-Morbidity Symptoms are Monitored and Maintained or Improved: Monitor and assess patient's chronic conditions and comorbid symptoms for stability, deterioration, or improvement  10/5/2022 0718 by Memo Nava RN  Outcome: Progressing     Problem: Discharge Planning  Goal: Discharge to home or other facility with appropriate resources  10/5/2022 2043 by Toya Cota RN  Outcome: Progressing  Flowsheets  Taken 10/5/2022 1600 by Memo Nava RN  Discharge to home or other facility with appropriate resources: Identify barriers to discharge with patient and caregiver  Taken 10/5/2022 1200 by Memo Nava RN  Discharge to home or other facility with appropriate resources: Identify barriers to discharge with patient and caregiver  Taken 10/5/2022 0800 by Memo Nava RN  Discharge to home or other facility with appropriate resources: Identify barriers to discharge with patient and caregiver  10/5/2022 0718 by Memo Nava RN  Outcome: Progressing     Problem: Pain  Goal: Verbalizes/displays adequate comfort level or baseline comfort level  10/5/2022 2043 by Jacey Schafer RN  Outcome: Progressing  Flowsheets  Taken 10/5/2022 1600 by Jason Livingston RN  Verbalizes/displays adequate comfort level or baseline comfort level: Encourage patient to monitor pain and request assistance  Taken 10/5/2022 1200 by Jason Livingston RN  Verbalizes/displays adequate comfort level or baseline comfort level: Encourage patient to monitor pain and request assistance  10/5/2022 0718 by Jason Livingston RN  Outcome: Progressing     Problem: Safety - Adult  Goal: Free from fall injury  Recent Flowsheet Documentation  Taken 10/5/2022 1140 by Jason Livingston RN  Free From Fall Injury: Instruct family/caregiver on patient safety  10/5/2022 0718 by Jason Livingston RN  Outcome: Progressing  Flowsheets (Taken 10/5/2022 0616 by Kimberly Ibrahim RN)  Free From Fall Injury: Instruct family/caregiver on patient safety     Problem: Skin/Tissue Integrity  Goal: Absence of new skin breakdown  Description: 1. Monitor for areas of redness and/or skin breakdown  2. Assess vascular access sites hourly  3. Every 4-6 hours minimum:  Change oxygen saturation probe site  4. Every 4-6 hours:  If on nasal continuous positive airway pressure, respiratory therapy assess nares and determine need for appliance change or resting period. 10/5/2022 0718 by Jason Livingston RN  Outcome: Progressing     Problem: Safety - Medical Restraint  Goal: Remains free of injury from restraints (Restraint for Interference with Medical Device)  Description: INTERVENTIONS:  1. Determine that other, less restrictive measures have been tried or would not be effective before applying the restraint  2. Evaluate the patient's condition at the time of restraint application  3. Inform patient/family regarding the reason for restraint  4.  Q2H: Monitor safety, psychosocial status, comfort, nutrition and hydration  10/5/2022 0718 by Jason Livingston RN  Outcome: Progressing     Problem: Nutrition Deficit:  Goal: Optimize nutritional status  10/5/2022 0718 by Thomas Diaz RN  Outcome: Progressing     Problem: ABCDS Injury Assessment  Goal: Absence of physical injury  Recent Flowsheet Documentation  Taken 10/5/2022 1140 by Thomas Diaz RN  Absence of Physical Injury: Implement safety measures based on patient assessment  10/5/2022 0718 by Thomas Diaz RN  Outcome: Progressing  Flowsheets (Taken 10/5/2022 0616 by Nolan Mar RN)  Absence of Physical Injury: Implement safety measures based on patient assessment     Problem: Confusion  Goal: Confusion, delirium, dementia, or psychosis is improved or at baseline  Description: INTERVENTIONS:  1. Assess for possible contributors to thought disturbance, including medications, impaired vision or hearing, underlying metabolic abnormalities, dehydration, psychiatric diagnoses, and notify attending LIP  2. [place] high risk fall precautions, as indicated  3. Provide frequent short contacts to provide reality reorientation, refocusing and direction  4. Decrease environmental stimuli, including noise as appropriate  5. Monitor and intervene to maintain adequate nutrition, hydration, elimination, sleep and activity  6. If unable to ensure safety without constant attention obtain sitter and review sitter guidelines with assigned personnel  7.  Initiate Psychosocial CNS and Spiritual Care consult, as indicated  Recent Flowsheet Documentation  Taken 10/5/2022 1600 by Thomas Diaz RN  Effect of thought disturbance (confusion, delirium, dementia, or psychosis) are managed with adequate functional status: Assess for contributors to thought disturbance, including medications, impaired vision or hearing, underlying metabolic abnormalities, dehydration, psychiatric diagnoses, notify LIP  Taken 10/5/2022 1200 by Thomas Diaz RN  Effect of thought disturbance (confusion, delirium, dementia, or psychosis) are managed with adequate functional status: Assess for contributors to thought disturbance,

## 2022-10-07 NOTE — PROGRESS NOTES
Clinical Pharmacy Progress Note    Vancomycin - Management by Pharmacy    Consult Date(s): 10/6  Consulting Provider(s): Dr. Anibal Coon / Plan  Ventilator Associated Pneumonia (VAP) - Vancomycin  Concurrent Antimicrobials: Cefepime 2 g IV q12h (day #2)  Day of Vanc Therapy / Ordered Duration: Day 2 of 7 ordered  Current Dosing Method: Bayesian-Guided AUC Dosing  Therapeutic Goal: -600 mg/L*hr  Current Dose / Plan:   SCr remains stable today at 1.3 today; Documented UOP has downtrended since 10/5, down to ~0.3 mL/kg/hr over prior 24 hours   Will continue vancomycin 1250 mg (~15 mg/kg) IV q24h at this time  Selected regimen falls within target AUC of 400-600 (projected AUC of 498 mg/L*hr) and target steady state trough (projected trough of 15.3 mg/L)  Will plan to obtain vancomycin random level tomorrow (10/8 AM, ordered) to avoid accumulation in setting of decreased urine output  MRSA nares ordered per protocol, resulted as negative  Will continue to monitor clinical condition and make adjustments to regimen as appropriate    Thank you for consulting Pharmacy! Arnetha Sports, PharmD  Clinical Pharmacist - Emergency Dept  Wireless: [phone]  10/7/2022 2:55 PM      Interval Update: Patient remains mechanically ventilated on broad spectrum antibiotics (cefepime, vancomycin). He has been afebrile, but tachycardic with intermittent hypotension noted over prior 24 hours. Norepinephrine (Levophed) has been titrated off since 10/6 afternoon with no further need for vasopressor support at this time. Blood transfusion was required today due to hemoglobin this AM of <7 g/dL. Urine output noted to have decreased to ~0.3 mL/kg/hr over prior 24 hours.     Subjective/Objective: Austyn Monge is a 65 y.o. male with a PMH significant for atrial fibrillation, CHF (HFpEF), carotid stenosis, HLD, HTN, OA, restrictive lung disease and T2DM who underwent VATS with PleurX catheter placement on 9/19/2022 for recurrent bilateral loculated pleural effusions. His hospital admission has been complicated by acute on chronic hypoxic hypercarbic respiratory failure. Patient was intubated on 9/22/22 with subsequent tracheostomy on 9/30/22. Clarice Camejo \"Josiah\" underwent bronchoscopy on 10/05/22 and is currently being worked up for ventilator associated pneumonia (VAP). Pharmacy is consulted to dose vancomycin. Ht Readings from Last 1 Encounters:   09/22/22 6' 0.99\" (1.854 m)     Wt Readings from Last 1 Encounters:   10/06/22 187 lb 6.3 oz (85 kg)     Current & Prior Antimicrobial Regimen(s):  Cefepime 2 g IV q12h (10/6 - present)  Vancomycin 1250 mg IV q24h (10/6 - present)    Vancomycin Level(s) / Doses:  Date Time Dose Type of Level / Level Interpretation   10/8 0600 1250 mg IV q24h Random level TBD   Note: Serum levels collected for AUC-based dosing may be high if collected in close proximity to the dose administered. This is not necessarily indicative of toxicity. Cultures & Sensitivities:  Date Site Micro Susceptibility / Result   10/6 MRSA nares negative final   10/5 BAL resp cx normal resp stella preliminary   10/4 Pleural fluid cx no growth final     Recent Labs     10/05/22  0607 10/06/22  0333 10/07/22  0427   CREATININE 1.5* 1.3 1.3   * 105* 109*   WBC 21.7* 20.6* 16.9*       Estimated Creatinine Clearance: 53 mL/min (based on SCr of 1.3 mg/dL). Additional Lab Values / Findings of Note:  No results for input(s): PROCAL in the last 72 hours.

## 2022-10-07 NOTE — PROGRESS NOTES
Speech Language Pathology  Facility/Department: [facility]  Dysphagia/Communication Daily Treatment Note    NAME: Luis Ramirez  :   MRN: [id number]    Patient Diagnosis(es):   Patient Active Problem List    Diagnosis Date Noted    Mucus plugging of bronchi 10/05/2022    ZANA (acute kidney injury) ([address] 2022    Acute on chronic respiratory failure with hypoxia and hypercapnia ([address] 2022    Pleural effusion on right 2022    Chronic heart failure with preserved ejection fraction (HFpEF) ([address] 2022    Hypoxia 2022    Atrial fibrillation with rapid ventricular response ([address] 2022    Exertional dyspnea 2022    Closed displaced fracture of lateral malleolus of left fibula 2021    Spinal stenosis of lumbar region 10/17/2018    Closed compression fracture of L1 lumbar vertebra 10/17/2018    Trigger ring finger of right hand 06/15/2017    Subacromial impingement 2015    Rotator cuff tear 2015    Glenohumeral arthritis 2015    DM type 2 (diabetes mellitus, type 2) ([address] 2013    ED (erectile dysfunction) 2012    OA (osteoarthritis) of knee 10/12/2011    Carotid stenosis, left 10/12/2011    Hearing loss 10/12/2011    HTN (hypertension) 10/07/2011    Hyperlipidemia 10/07/2011     Allergies: Allergies   Allergen Reactions    Torsemide Shortness Of Breath    Dye [Iodides]      1970's - ?? CXR (10/3/22)-  Impression       Stable appearance of loculated right pneumothorax, with loculated components in the lateral right midlung region and in the right lateral costophrenic sulcus. Stable scattered areas of atelectasis versus scar, most prominent in the medial right lung base and in the left lateral lung base. Previous MBS -  N/A    Chart reviewed.     Medical Diagnosis: Pleural effusion, right [J90]  Pleural effusion on right [J90]   Treatment Diagnosis: Oropharyngeal dysphagia    BSE Impression (10/2/22)-  RN states okay to attempt assessment. Dr Ruiz Gain speaking with spouse stating pt most likely still with effects of sedation. Pt was intubated 9/22- 9/30/22. Trach placed 9/30, with pt on ventilator. Pt shook head yes/no intermittently to questions asked. Pt made no attempt to mouth words. Pt exhibiting open mouth posture, did not form labial seal or protrude /lateralize tongue on command. Oral care completed, pt demonstrating no oral response. Placed 2 single ice chips in oral cavity, again with no response - no lingual movement noted. Pt did close lips with tactile stim x 2. No swallow movement was felt upon palpation of anterior neck. O2 sats remained stable and RR remained in mid 20's. Recommend aggressive oral care 2-3 x/day with suction kit. Plan to re-assess as pt appropriate. Dysphagia Diagnosis: Suspected needs further assessment    MBS results - instrumental will be indicated ; FEES preferred as schedule permits     Pain: None indicated     Current Diet : ADULT TUBE FEEDING; Nasogastric; Diabetic; Continuous; 10; Yes; 10; Q 4 hours; 45; 300; Q 4 hours; Protein; 2 bottles Proteinex daily w/ 30 mL FW flushes before and after  Diet NPO   Recommended Form of Meds: Via alternative means of nutrition      Treatment:  Pt seen bedside to address the following goals:   1. The patient will tolerate repeat bedside swallowing evaluation when able. 10/3 - Pt positioned upright and aggressive oral care was completed with suction toothbrush. Vitals reading had poor waveform and appeared to be unreliable readings; RN notified and in to assess. The patient demo'd spontaneous swallows without PO with audible air escaping around stoma. Pt tolerated 4/4 ice chips with constant verbal cues for appropriate acceptance and manipulation of ice (I.e.close mouth, chew before you swallow, etc).  There was no coughing but not likely clinically relevant due to research indicating high prevalence of SILENT aspiration in cuff inflated tracheostomy patients. Pt not yet appropriate for instrumental swallow evaluation but will require out prior to advancing diet. Notes indicate plan for SBT today. Requested order for PMV which will hopefully be receive and coincide with improved alertness and weaning from ventilator. Continue goal.  10/4: Cleared by RN. No order for PMV yet. Received pt more awake/alert. Wife present. Pt remains on trach/vent. Oral care recently completed. Repositioned pt upright. Targeted swallow function via trials ice chips and small sips h2o; pt with positive oral acceptance, no anterior loss, appropriate and timely oral prep, seemingly positive swallow movement. Attempted to check clearance via oral suction; evidently dry with no material suctioned. As previously noted, no cough, however would be unlikely to occur given inflated cuff tracheostomy. Would recommend waiting on instrumental pending ability to place PMV (assuming order will be received in the near future), as that will likely support pt's swallow function, and show improved function. Cont goal  10/6: Pt positioned upright in bed. Thorough oral care and suctioning provided. Pt continues to be more alert/awake and active in his care/treatment. Family present. Pt completed 20x effortful swallows 5x swallows/ ice chips to reduce further swallow deconditioning. RR & 02 stable across low level ice chip trials. Audible air escape and secretions around trach appreciated. Per RN, Dr. Randee cantu. Discussed need for instrumental swallow evaluation with pt and family. FEES to be completed as schedule permits, preferably prior to PEG determination. Cont goal.   10/7: Pt seated upright in bed and SLP assisted with oral care via suction toothbrush. Pt with increased alertness and agreeable to accept trials of ice chips. X25 effortful swallows with ice chips completed this session.  Pt with intermittent air escape and secretions noted around trach, however per discussion with SLP who saw pt yesterday and per family, both looked less. RR and O2 remained stable across all trials. SLP re-discussed possibility of completing FEES and expressed will reach out to other SLP to see if it can be completed prior to PEG placement on Monday. Family and pt demonstrated understanding and agreement at this time. Cont. 2. The patient/caregiver will demonstrate understanding of compensatory strategies for improved swallowing safety. 10/3 - Educated on potential impact of trach on swallow function, rationale for oral care, recommendations for few single ice chips to be given to facilitate oral mucosal integrity and preserve swallow function that is present. Introduced plan for PMV in future, rationales for this. Educated on limitations in pts alertness at this time and need for improvement for completing swallow study, therapeutic effectiveness, etc. Continue goal.   10/4: Discussed swallow function with patient/wife. Also reviewed recommendation for PMV, hopefully in near future (order not yet received). Discussed how PMV may also improve swallow function be supporting necessary pressure for swallowing. Reviewed recommendation for small amounts ice/small sips h2o with effortful swallow to support swallow function/maintenance. Wife and pt indicated comprehension. Cont goal  10/5: Thorough education this date re: potential adverse affects of trach/vent on swallow function, need for instrumentation, need for PMV & importance of oral care. Wean trial failed this date per Dr. Alber Osorio. Audible air/secretion leakage around trach. PMV trials are likely not appropriate at this time and orders have not been provided. Reviewed continued recommendation for low level ice chips s/p oral care to support swallow function/ maintenance, improve pt QOL/ comfort and encourage oral care.    10/7: Discussed education regarding rationale for completing trials ice chips with effortful swallow, rec's for PMV as able, and overall importance of oral care, and possibility of completing further swallow assessment via FEES when able. Pt and family present demonstrated understanding. Cont. 3. Patient will participate in [employer] trials when appropriate/ orders received  10/6- Speech/ Communication Treatment: Pt with increased communicative frustration. PMV trials are likely not appropriate at this time and orders have not been provided yet. Family stating they wanted to get him an IPAD with speech lauren. Discussed low tech vs high tech AAC and appropriateness for AAC in different populations. Given trach/vent is suspected to not be long-term and pt continues to be in the acute phase of care, high tech AAC is not recommended at this time. Pt provided communication board. All icons reviewed. Pt was able to spell out \"tired\" on the letter board. Recommend continued use of communication board, letter board & non-verbal communication (I.e lip reading, pointing, facial expressions etc) to facilitate patient expression of basic wants/needs. Patient and family demonstrated understanding. 10/7: Pt communicated via head nod/shake and use of gestures to communicate wants/needs this session. Patient/Family/Caregiver Education:  Please see goal 2 above    Compensatory Strategies:  Oral care using suction / suction system 3x per day   Single ice chips given by staff when pt awake and in upright position after oral care  Communication board      Plan:  Continue dysphagia/communication treatment with goals per plan of care. Diet recommendations: Single ice chips given by staff when pt awake and in upright position, ONLY after oral care   DC recommendation: Ongoing speech therapy is indicated   Treatment: 25  D/W nursing: Ana  Needs met prior to leaving room, call button in reach; Family at bedside.      Electronically signed by:  Dave Johnston M.A., [address] [place] [address]  Speech-Language Pathologist  Pg #: [phone]    If patient is discharged prior to next treatment, this note will serve as the discharge summary

## 2022-10-07 NOTE — PLAN OF CARE
Problem: Chronic Conditions and Co-morbidities  Goal: Patient's chronic conditions and co-morbidity symptoms are monitored and maintained or improved  Outcome: Progressing  Flowsheets  Taken 10/7/2022 1600  Care Plan - Patient's Chronic Conditions and Co-Morbidity Symptoms are Monitored and Maintained or Improved: Monitor and assess patient's chronic conditions and comorbid symptoms for stability, deterioration, or improvement  Taken 10/7/2022 1200  Care Plan - Patient's Chronic Conditions and Co-Morbidity Symptoms are Monitored and Maintained or Improved: Monitor and assess patient's chronic conditions and comorbid symptoms for stability, deterioration, or improvement  Taken 10/7/2022 0800  Care Plan - Patient's Chronic Conditions and Co-Morbidity Symptoms are Monitored and Maintained or Improved: Monitor and assess patient's chronic conditions and comorbid symptoms for stability, deterioration, or improvement     Problem: Discharge Planning  Goal: Discharge to home or other facility with appropriate resources  Outcome: Progressing  Flowsheets  Taken 10/7/2022 1600  Discharge to home or other facility with appropriate resources: Identify barriers to discharge with patient and caregiver  Taken 10/7/2022 1200  Discharge to home or other facility with appropriate resources: Identify barriers to discharge with patient and caregiver  Taken 10/7/2022 0800  Discharge to home or other facility with appropriate resources: Identify barriers to discharge with patient and caregiver     Problem: Pain  Goal: Verbalizes/displays adequate comfort level or baseline comfort level  Outcome: Progressing  Flowsheets  Taken 10/7/2022 1600  Verbalizes/displays adequate comfort level or baseline comfort level: Encourage patient to monitor pain and request assistance  Taken 10/7/2022 1200  Verbalizes/displays adequate comfort level or baseline comfort level: Encourage patient to monitor pain and request assistance  Taken 10/7/2022 0800  Verbalizes/displays adequate comfort level or baseline comfort level: Encourage patient to monitor pain and request assistance     Problem: Safety - Adult  Goal: Free from fall injury  Outcome: Progressing  Flowsheets (Taken 10/7/2022 0840)  Free From Fall Injury: Instruct family/caregiver on patient safety     Problem: Skin/Tissue Integrity  Goal: Absence of new skin breakdown  Description: 1. Monitor for areas of redness and/or skin breakdown  2. Assess vascular access sites hourly  3. Every 4-6 hours minimum:  Change oxygen saturation probe site  4. Every 4-6 hours:  If on nasal continuous positive airway pressure, respiratory therapy assess nares and determine need for appliance change or resting period. Outcome: Progressing     Problem: Safety - Medical Restraint  Goal: Remains free of injury from restraints (Restraint for Interference with Medical Device)  Description: INTERVENTIONS:  1. Determine that other, less restrictive measures have been tried or would not be effective before applying the restraint  2. Evaluate the patient's condition at the time of restraint application  3. Inform patient/family regarding the reason for restraint  4. Q2H: Monitor safety, psychosocial status, comfort, nutrition and hydration  Outcome: Progressing     Problem: Nutrition Deficit:  Goal: Optimize nutritional status  Outcome: Progressing     Problem: ABCDS Injury Assessment  Goal: Absence of physical injury  Outcome: Progressing  Flowsheets (Taken 10/7/2022 0840)  Absence of Physical Injury: Implement safety measures based on patient assessment     Problem: Confusion  Goal: Confusion, delirium, dementia, or psychosis is improved or at baseline  Description: INTERVENTIONS:  1. Assess for possible contributors to thought disturbance, including medications, impaired vision or hearing, underlying metabolic abnormalities, dehydration, psychiatric diagnoses, and notify attending LIP  2.  Zalma high risk fall precautions, as indicated  3. Provide frequent short contacts to provide reality reorientation, refocusing and direction  4. Decrease environmental stimuli, including noise as appropriate  5. Monitor and intervene to maintain adequate nutrition, hydration, elimination, sleep and activity  6. If unable to ensure safety without constant attention obtain sitter and review sitter guidelines with assigned personnel  7.  Initiate Psychosocial CNS and Spiritual Care consult, as indicated  Outcome: Progressing  Flowsheets  Taken 10/7/2022 1600  Effect of thought disturbance (confusion, delirium, dementia, or psychosis) are managed with adequate functional status: Assess for contributors to thought disturbance, including medications, impaired vision or hearing, underlying metabolic abnormalities, dehydration, psychiatric diagnoses, notify LIP  Taken 10/7/2022 1200  Effect of thought disturbance (confusion, delirium, dementia, or psychosis) are managed with adequate functional status: Assess for contributors to thought disturbance, including medications, impaired vision or hearing, underlying metabolic abnormalities, dehydration, psychiatric diagnoses, notify LIP  Taken 10/7/2022 0800  Effect of thought disturbance (confusion, delirium, dementia, or psychosis) are managed with adequate functional status: Assess for contributors to thought disturbance, including medications, impaired vision or hearing, underlying metabolic abnormalities, dehydration, psychiatric diagnoses, notify LIP

## 2022-10-07 NOTE — CARE COORDINATION
CM sent a referral to [facility] at the request of [facility]. Spoke with the liaison, Netta. Netta stated [place] [facility] [place] Explained the [place] surgeon would like to have a [id number] conversation regardless of denial. Netta stated she would submit today.      Electronically signed by DARNELL Monae RN-[facility] ACM-RN  Case Management  [phone]

## 2022-10-07 NOTE — PROGRESS NOTES
ICU Progress Note  PGY-1    Admit Date: 9/19/2022  Day: 19  Trach Day: 6   Vent day: 16  IV Access: CVC triple lumen, peripheral  IV Fluids: None  Vasopressors: none         Antibiotics: cefepime, vanc  Diet: ADULT TUBE FEEDING; Nasogastric; Peptide Based; Continuous; 25; Yes; 10; Q 4 hours; 55; 100; Q 1 hour; Protein; 2 bottles Proteinex daily w/ 30 mL FW flushes before and after    CC: Pleural effusion (right) s/p VATS and Pleural catheter (PleurX) placement    Interval history:  Patient tried on  wean yesterday. Failed after 2hrs for tachypnea and RSBI 110. Remained off levophed, BP in 100s/60s overnight. Was empirically started on abx yesterday while awaiting culture results. This morning sleeping comfortably in bed. No acute complaints per family.      Medications:     Scheduled Meds:   insulin lispro  5 Units SubCUTAneous q8h    insulin glargine  28 Units SubCUTAneous Nightly    cefepime  2,000 mg IntraVENous Q12H    vancomycin  15 mg/kg IntraVENous Q24H    albuterol  2.5 mg Nebulization Q4H    sodium chloride (Inhalant)  4 mL Nebulization Q4H    pantoprazole  40 mg IntraVENous BID    insulin lispro  0-16 Units SubCUTAneous Q4H    sodium chloride flush  5-40 mL IntraVENous 2 times per day    Venelex   Topical BID    amiodarone bolus  150 mg IntraVENous Once    amiodarone  200 mg Oral Daily    chlorhexidine  15 mL Mouth/Throat BID    apixaban  5 mg Oral BID    sodium chloride flush  5-40 mL IntraVENous 2 times per day    polyethylene glycol  17 g Oral Daily     Continuous Infusions:   sodium chloride      norepinephrine Stopped (10/06/22 1259)    sodium chloride      sodium chloride      dextrose       PRN Meds:sodium chloride, oxyCODONE, oxyCODONE, sodium chloride flush, sodium chloride, HYDROmorphone, labetalol, HYDROmorphone, acetaminophen, albuterol, sodium chloride flush, sodium chloride, ondansetron **OR** ondansetron, glucose, dextrose bolus **OR** dextrose bolus, glucagon (rDNA), dextrose, hydrALAZINE    Objective:   Vitals:   T-max:  Patient Vitals for the past 8 hrs:   BP Temp Pulse Resp SpO2   10/07/22 0718 -- -- -- 24 --   10/07/22 0645 110/66 98.2 °F (36.8 °C) (!) 123 24 --   10/07/22 0630 (!) 99/59 97.8 °F (36.6 °C) (!) 105 19 93 %   10/07/22 0621 103/64 97.8 °F (36.6 °C) (!) 109 20 93 %   10/07/22 0600 106/64 -- (!) 111 (!) 8 --   10/07/22 0500 95/61 -- 95 21 --   10/07/22 0418 -- -- (!) 105 15 --   10/07/22 0400 113/78 98 °F (36.7 °C) (!) 101 21 95 %   10/07/22 0300 (!) 120/56 -- (!) 104 14 96 %   10/07/22 0200 (!) 104/53 -- 99 16 --   10/07/22 0123 -- -- -- 16 --   10/07/22 0100 117/80 -- (!) 105 21 94 %   10/07/22 0030 -- -- 96 (!) 0 --   10/07/22 0028 -- -- 93 (!) 0 --   10/07/22 0000 (!) 100/59 98.5 °F (36.9 °C) (!) 105 (!) 0 --         Intake/Output Summary (Last 24 hours) at 10/7/2022 7319  Last data filed at 10/7/2022 0600  Gross per 24 hour   Intake 3679.92 ml   Output 725 ml   Net 2954.92 ml       Review of Systems - denies any pain    Physical Exam  Constitutional:       General: He is not in acute distress. Appearance: He is normal weight. He is not ill-appearing. Comments: Intubated with tracheostomy, awake with eyes open, arousable to voice and responds to questions appropriately with nods. Attempts to respond with voice but has difficulty. HENT:      Head: Normocephalic. Nose:      Comments: NG tube in place     Mouth/Throat:      Mouth: Mucous membranes are dry. Eyes:      Extraocular Movements: Extraocular movements intact. Pupils: Pupils are equal, round, and reactive to light. Neck:      Comments: Tracheostomy  Cardiovascular:      Rate and Rhythm: Normal rate and regular rhythm. Pulses: Normal pulses. Heart sounds: Normal heart sounds. Pulmonary:      Effort: Pulmonary effort is normal.      Breath sounds: Normal breath sounds. Comments: On ventilator. Pleurx catheter in place. Abdominal:      General: Abdomen is flat. There is no distension. Palpations: Abdomen is soft. Tenderness: There is no abdominal tenderness. Musculoskeletal:      Right lower leg: Edema present. Left lower leg: Edema present. Comments: Feet appear puffy   Skin:     General: Skin is warm and dry. Neurological:      General: No focal deficit present. Mental Status: He is alert and oriented to person, place, and time. Comments: Responds to voice, answers questions appropriately, follows commands in all extremities   Psychiatric:         Mood and Affect: Mood normal.         Behavior: Behavior normal.     LABS:    CBC:   Recent Labs     10/05/22  0607 10/06/22  0333 10/07/22  0427   WBC 21.7* 20.6* 16.9*   HGB 7.5* 7.1* 6.6*   HCT 24.0* 23.7* 21.3*    325 280   MCV 85.9 87.5 85.9       Renal:    Recent Labs     10/05/22  0607 10/06/22  0333 10/07/22  0427   * 149* 143   K 3.4* 4.3 4.2    107 102   CO2 37* 35* 35*   * 105* 109*   CREATININE 1.5* 1.3 1.3   GLUCOSE 110* 170* 224*   CALCIUM 8.1* 8.3 8.1*   MG 3.30* 3.30* 3.40*   PHOS 3.8 4.5 4.0   ANIONGAP 7 7 6       Hepatic:   Recent Labs     10/05/22  0607 10/06/22  0333 10/07/22  0427   LABALBU 2.2* 2.0* 2.0*       Troponin: No results for input(s): TROPONINI in the last 72 hours. BNP: No results for input(s): BNP in the last 72 hours. Lipids: No results for input(s): CHOL, HDL in the last 72 hours. Invalid input(s): LDLCALCU, TRIGLYCERIDE  ABGs:    No results for input(s): PHART, KWN0XGG, PO2ART, KWV6AUI, BEART, THGBART, E0RPCSMK, PEY9CHT in the last 72 hours. INR: No results for input(s): INR in the last 72 hours. Lactate:   No results for input(s): LACTATE in the last 72 hours. Cultures:  -----------------------------------------------------------------  RAD:   XR CHEST PORTABLE   Final Result      Tiny right lateral pneumothorax is suspected, 5 mm in maximum thickness. This has decreased in size since 10/3/2022.  Right basilar Pleurx catheter noted. Small bilateral pleural effusions. Prominent interstitial markings. Stable cardiac mediastinal silhouette. Lines and tubes without change. Subcutaneous emphysema noted. CT CHEST ABDOMEN PELVIS WO CONTRAST   Final Result      1. Severe pneumomediastinum. 2. Small to moderate right hydropneumothorax with similar fluid and gas components with a right-sided Pleurx catheter. 3. Moderate loculated left pleural effusion with atelectasis of the left lower lobe with patency of the central left lower lobe bronchi. 4. Fluid/material filling the bronchus intermedius and right lower lobe bronchi with complete consolidation and volume loss of the right lower lobe. Differential diagnosis would include mucous plugging or obstructing lesion. 5. Tracheostomy tube tip within the trachea   6. Severe subcutaneous emphysema in the neck. CT ABDOMEN AND PELVIS:      FINDINGS:      LIVER: Normal.      GALLBLADDER AND BILIARY TREE: No calcified gallstones. No gallbladder distention. No intra- or extrahepatic biliary dilatation. PANCREAS: Normal.      SPLEEN: Normal.      ADRENAL GLANDS: Normal.      KIDNEYS AND URETERS: Normal.      URINARY BLADDER: Ansari catheter present within collapsed urinary bladder. REPRODUCTIVE ORGANS: No associated masses. BOWEL: Normal diameter, nonobstructed. Feeding tube tip at the level of the ligament of Treitz. LYMPH NODES: No abnormally enlarged nodes. PERITONEUM/RETROPERITONEUM: Severe pneumoperitoneum extends into the upper abdomen with some of the symmetric multiseptated gas appearing deep to the diaphragm consistent with mild pneumoperitoneum (series 601 was 101). This is likely related to    pneumonia mediastinum dissecting into the abdomen. No fluid collection in the abdomen or pelvis. No ascites. VESSELS: Extensive atherosclerotic calcification of the aorta and iliac arteries.        ABDOMINAL WALL: No acute abnormality. BONES: No acute abnormality. Mild chronic L1 compression fracture with previous vertebroplasty. IMPRESSION:      1. Severe pneumomediastinum extends into the upper abdomen with small pneumoperitoneum likely related to extension of pneumoperitoneum into the upper peritoneal cavity. 2. No fluid collection in the abdomen or pelvis. Results were discussed with the surgical team at 7:00 PM on 10/3/2022. XR CHEST PORTABLE   Final Result      Stable appearance of loculated right pneumothorax, with loculated components in the lateral right midlung region and in the right lateral costophrenic sulcus. Stable scattered areas of atelectasis versus scar, most prominent in the medial right lung base and in the left lateral lung base. XR CHEST PORTABLE   Final Result      Small right basilar pneumothorax. Right basilar chest tube without change. Patchy consolidation in the right mid and lower lung as well as the left lower lung-atelectasis versus pneumonia. Trace left pleural effusion. Normal heart size. Lines and tubes without change. Mild improvement in degree of subcutaneous emphysema in the chest and neck. XR CHEST 1 VIEW   Final Result      1. Stable small right-sided pneumothorax and prominent subcutaneous emphysema along the neck and upper superior chest wall, greater in right lung stable   2. Stable left basilar consolidation and pleural effusion      3. Stable appearing perihilar accentuated markings or airspace disease            XR CHEST PORTABLE   Final Result      Stable small right-sided pneumothorax. More prominent emphysema over the lower neck. No change in bilateral airspace disease. Stable left pleural effusion. XR CHEST PORTABLE   Final Result   1. Bilateral multifocal pneumonia with improvement in the right    pulmonary consolidation since prior study from 9/23/22   2.   Mild to moderate left pleural effusion XR CHEST PORTABLE   Final Result   1. Support lines and tubes are stable. 2.  Stable small right lateral pneumothorax and right basilar    chest tube. 3.  Stable patchy bilateral airspace disease. XR CHEST PORTABLE   Final Result   1. Satisfactory position of right internal jugular central line   2. No interval change in endotracheal tube and nasogastric tube positioning. 3. Extensive bilateral airspace disease with no changes. 4. Left pleural effusion with retrocardiac opacity, unchanged   5. Small stable tiny right lateral pneumothorax and stable position of right chest tube,, Pleurx catheter. XR ABDOMEN (KUB) (SINGLE AP VIEW)   Final Result   1. No residual pneumothorax. 2.  Mild patchy airspace disease bilaterally worse on the right than on prior examination. 3.  Non-obstructive bowel gas pattern. Mildly distended stomach. XR CHEST PORTABLE   Final Result   1. No residual pneumothorax. 2.  Mild patchy airspace disease bilaterally worse on the right than on prior examination. 3.  Non-obstructive bowel gas pattern. Mildly distended stomach. XR CHEST PORTABLE   Final Result      1. Small right pneumothorax appears slightly increased. 2. Mild patchy airspace opacity bilaterally. XR CHEST PORTABLE   Final Result     Pleurx catheter at the right lung base. Trace right    pneumothorax is unchanged. XR CHEST PORTABLE   Final Result      Right-sided chest tube evident in the base, its tip medially. Improvement in the right-sided pneumothorax, since earlier today. Possible medial collapse of the right lower lobe. Small left effusion. Patchy airspace density/atelectasis in the lungs bilaterally, with no other significant change. XR CHEST PORTABLE   Final Result   1. Right-sided Pleurx catheter in satisfactory position in the lung bases   2.  Right-sided post operative pneumothorax, approximately 10% Assessment/Plan:   Bridgett Donovan is a 65 y.o. male with a PMH of CHF (CHFpEF), carotid stenosis, HLD, HTN, OA, Restrictive Lung Disease, T2DM who underwent VATS with PleurX catheter placement on 9/19/2022 for recurrent bilateral loculated pleural effusions and has had hypoxia/hypercapnia since that time    Neuro  - off sedation as of 10/1  - mental status: stable, interactive, responds appropriately to commands    Pulmonary  Vent Day: 16  Trach day: 6   Vent: , RR 14, FiO2 35, PEEP 5, Tinsp 0.95    Acute hypoxic/hypercapnic respiratory failure  Recurrent Pleural Effusions, s/p VATS and Pleurx  Patient was admitted to ICU for failure to tolerate bipap following procedure. The etiology of his recurrent pleural effusions is unclear. No studies done on fluid from original thoracenteses. Possibly malignancy. VATS fluid results showing an exudative picture, per [facility] Criteria. S/p VATS procedure 9/19, with right PleurX catheter placement. Ventilatory restriction on PFT associated with worsening pleural disease. 9/29 CXR with atelectasis around pluerx cath. s/p Methylprednisolone IV  (9/30)  - PleurX catheter in place  - Trach in place  - CTS following  - 10/4: CT Chest/Abdomen showed pneumomediastinum extending to pneumoperitoneum  - 10/5: bronchoscopy with aspiration of mucous plugs, culture sent, await results.   - 10/6:  failed VSV after 2 hrs for tachypnea, rsbi 110.  - 10/7:  attempt VSV, can likely stop abx given no bacteria on gram stain and MRSA neg     Cardiovascular  Hypotension  Began requiring pressors after intubation, attributed to sedation and positive pressure ventilation  - off Levo gtt, stable  - Did well off levo overnight, cont to monitor bp    Congestive Heart Failure with Preserved EF  EF ~65% 6/16/2022. 9/23 BNP 15k, given lasix at that time, now downtrending.   - remains fluid overloaded on exam with edema to knees bilaterally  - BNP downtrending  - Per nephro held off on diuretics given kidney injury    GI  Diet: ADULT TUBE FEEDING; Nasogastric; Peptide Based; Continuous; 25; Yes; 10; Q 4 hours; 55; 100; Q 1 hour; Protein; 2 bottles Proteinex daily w/ 30 mL FW flushes before and after  GI ppx: pepcid  - GI consult: agreed with benefit of PEG but would like to wait for improvement of pneumomediastinum/pneumoperitoneum       Constipation  Cont glycolax    Renal   Ansari replaced overnight due to retention    ZANA  Potentially ATN from hypotension/contrast   - Nephro following, rec to hold off on diuretics   - Good UOP, cr stable  - 10/5: 1.35L UOP in 24h. Net +9.8L this admission.  - 10/6: 1100 ml UOP  - 10/7:  725 ml UOP, + 9.8 since admit  - Cr 1.3, stable    Hypernatremia  Sodium normal today, 143  - 10/3: Free water flushes in NG tube feeds  - 10/4: Na 151> 148. Continue free water flushes 300ml Q4H.  - 10/5: Na 151> 148> 146. TF held. - continue free water flushes    Metabolic  - Lantus 02A nightly with HD Sliding scale. Added 5 lispro mealtime    Heme  Patient anemic to 6.6. Given 1 unit of blood. .    Code Status: Full Code   PPX: Pepcid, heparin   DISPO: ICU    Keith Vazquez MD, PGY-1  Internal Medicine Resident  Contact via [facility]  10/07/22  7:22 AM    Pulmonary & Critical Care     Patient seen and examined. I agree with Dr. Angélica Martin history, physical, lab findings, assessment and plan. Pt is POD#18 VATS with Pleurx placement. Patient had bilateral pleural effusions right > left that did not improve with diuresis prompting VATS. Pleura visibly was normal.  Pleurx was placed for ongoing management of pleural fluid. Total protein was quite low at 1.2 on 9/21. LDH was elevated at 305. proBNP has been as high as 15,496     Patient developed postoperative acute on chronic hypercapnic/hypoxemic respiratory failure. For months he had slowly been getting worse in regards to dyspnea on exertion and an unintentional weight loss of 30 pounds.   PFTs as an outpatient shows severe restrictive defect. CT chest August 29 shows bilateral pleural effusions that are moderate but no masses, nodules, or mediastinal adenopathy. I do not feel like we have a firm grasp of the pathology with Cassia November  From the best I can tell his pleural effusions are likely on the basis of CHF but there is some other entity that is driving his respiratory failure, weight loss, and weakness. I repeated a CT chest/abdomen with the following:     CT Chest  1. Severe pneumomediastinum. 2. Small to moderate right hydropneumothorax with similar fluid and gas components with a right-sided Pleurx catheter. 3. Moderate loculated left pleural effusion with atelectasis of the left lower lobe with patency of the central left lower lobe bronchi. 4. Fluid/material filling the bronchus intermedius and right lower lobe bronchi with complete consolidation and volume loss of the right lower lobe. Differential diagnosis would include mucous plugging or obstructing lesion. 5. Tracheostomy tube tip within the trachea   6. Severe subcutaneous emphysema in the neck. CT Abdomen:  IMPRESSION:       1. Severe pneumomediastinum extends into the upper abdomen with small pneumoperitoneum likely related to extension of pneumoperitoneum into the upper peritoneal cavity. 2. No fluid collection in the abdomen or pelvis. Wednesday I performed bronchoscopy that showed extensive mucus plugging on the right with less on the left. Mucus was thick and purulent was therapeutically aspirated and sent for culture. There is no endobronchial mass lesions noted. Will start airway clearance with albuterol and hypertonic saline     I tried a  wean today and he is doing better now than yesterday, surpassing the 4-hour abhijit with a goal of 6 hours     Leukocytosis is down to 16,000 from 21,000. Patient is without fever. MRSA nasal probe is negative. Respiratory culture from bronchoscopy is pending.   Continue cefepime but vancomycin can be stopped     Louis Peoples remains off Levophed     Continue current tube feeds. PEG will likely be needed but GI would like to give his pneumoperitoneum time to resolve     Another problem is his insurance refusal to cover an LTAC. Medically Louis Peoples is the typical patient that would benefit from a well structured chronic ventilator weaning program, found at LTAC's.         Rajni Blackwell MD

## 2022-10-07 NOTE — PROGRESS NOTES
Got patient up to chair for 1 hour. Patient tolerated well, started having pain in his bottom so he was moved back to his bed. Will continue to monitor.

## 2022-10-07 NOTE — PROGRESS NOTES
ICU CT SURGERY DAILY PROGRESS NOTE    CC: Pleural effusions s/p R PleurX catheter placement    SUBJECTIVE:   Interval Hx:   Pt was able to rest well last night. VSS. Hgb this am decreased to 6.6 now receiving 1 unit of pRBC's. ROS: A 14 point review of systems was conducted, significant findings as noted above. All other systems negative. OBJECTIVE:   Vitals:   Vitals:    10/07/22 0800 10/07/22 0814 10/07/22 0817 10/07/22 0830   BP: 115/76   (!) 110/58   Pulse: (!) 105 93 94 99   Resp: 18 17 17 14   Temp: 98 °F (36.7 °C)   98 °F (36.7 °C)   TempSrc: Axillary   Axillary   SpO2: 96% 95% 95% 95%   Weight:       Height:           I/O:   Intake/Output Summary (Last 24 hours) at 10/7/2022 0835  Last data filed at 10/7/2022 0600  Gross per 24 hour   Intake 3679.92 ml   Output 725 ml   Net 2954.92 ml       I/O last 3 completed shifts: In: 5108.9 [I.V.:341.9; Other:375; AA/ND:6576; IV Piggyback:550]  Out: 8002 [Urine:1050; Stool:125]    Diet: ADULT TUBE FEEDING; Nasogastric; Peptide Based; Continuous; 25; Yes; 10; Q 4 hours; 55; 100; Q 1 hour; Protein; 2 bottles Proteinex daily w/ 30 mL FW flushes before and after      Physical Examination:   General appearance: Mechanically ventilated. Appropriately nods head and uses hand gestures in response to questions. Does not attempt to verbalize   HEENT: NC/AT, EOMI, trachea midline, no JVD. Crepitus noted in the neck, supraclavicular region, improving. Corpak in right nostril w/ bridle. Tracheostomy in place. Chest/Lungs: On mechanical ventilation via tracheostomy; R PleurX catheter capped with dressing CLD  Cardiovascular: Atrial fibrillation with rate in the 110's currently   Abdomen: Soft, non-tender, non-distended. Genitourinary/Anorectal: Ansari in place with clear yellow urine. Rectal tube in place with brown liquid stool. Skin: Warm and dry, no rashes. Sacral decubitus ulcer   Extremities: Pitting edema of the b/l feet.  No cyanosis; SCDs in place    Labs:  CBC: Recent Labs     10/05/22  0607 10/06/22  0333 10/07/22  0427   WBC 21.7* 20.6* 16.9*   HGB 7.5* 7.1* 6.6*   HCT 24.0* 23.7* 21.3*    325 280         BMP:   Recent Labs     10/05/22  0607 10/06/22  0333 10/07/22  0427   * 149* 143   K 3.4* 4.3 4.2    107 102   CO2 37* 35* 35*   * 105* 109*   CREATININE 1.5* 1.3 1.3   GLUCOSE 110* 170* 224*       LFT's:   No results for input(s): AST, ALT, ALB, BILITOT, ALKPHOS in the last 72 hours. Troponin: No results for input(s): TROPONINI in the last 72 hours. BNP: No results for input(s): BNP in the last 72 hours. ABGs:   No results for input(s): PHART, ONM8BGE, PO2ART in the last 72 hours. INR:   No results for input(s): INR in the last 72 hours. U/A:No results for input(s): NITRITE, COLORU, PHUR, LABCAST, WBCUA, RBCUA, MUCUS, TRICHOMONAS, YEAST, BACTERIA, CLARITYU, SPECGRAV, LEUKOCYTESUR, UROBILINOGEN, BILIRUBINUR, BLOODU, GLUCOSEU, AMORPHOUS in the last 72 hours. Invalid input(s): [place]     Rad:   XR CHEST PORTABLE   Final Result      Tiny right lateral pneumothorax is suspected, 5 mm in maximum thickness. This has decreased in size since 10/3/2022. Right basilar Pleurx catheter noted. Small bilateral pleural effusions. Prominent interstitial markings. Stable cardiac mediastinal silhouette. Lines and tubes without change. Subcutaneous emphysema noted. CT CHEST ABDOMEN PELVIS WO CONTRAST   Final Result      1. Severe pneumomediastinum. 2. Small to moderate right hydropneumothorax with similar fluid and gas components with a right-sided Pleurx catheter. 3. Moderate loculated left pleural effusion with atelectasis of the left lower lobe with patency of the central left lower lobe bronchi. 4. Fluid/material filling the bronchus intermedius and right lower lobe bronchi with complete consolidation and volume loss of the right lower lobe.  Differential diagnosis would include mucous plugging or obstructing lesion. 5. Tracheostomy tube tip within the trachea   6. Severe subcutaneous emphysema in the neck. CT ABDOMEN AND PELVIS:      FINDINGS:      LIVER: Normal.      GALLBLADDER AND BILIARY TREE: No calcified gallstones. No gallbladder distention. No intra- or extrahepatic biliary dilatation. PANCREAS: Normal.      SPLEEN: Normal.      ADRENAL GLANDS: Normal.      KIDNEYS AND URETERS: Normal.      URINARY BLADDER: Ansari catheter present within collapsed urinary bladder. REPRODUCTIVE ORGANS: No associated masses. BOWEL: Normal diameter, nonobstructed. Feeding tube tip at the level of the ligament of Treitz. LYMPH NODES: No abnormally enlarged nodes. PERITONEUM/RETROPERITONEUM: Severe pneumoperitoneum extends into the upper abdomen with some of the symmetric multiseptated gas appearing deep to the diaphragm consistent with mild pneumoperitoneum (series 601 was 101). This is likely related to    pneumonia mediastinum dissecting into the abdomen. No fluid collection in the abdomen or pelvis. No ascites. VESSELS: Extensive atherosclerotic calcification of the aorta and iliac arteries. ABDOMINAL WALL: No acute abnormality. BONES: No acute abnormality. Mild chronic L1 compression fracture with previous vertebroplasty. IMPRESSION:      1. Severe pneumomediastinum extends into the upper abdomen with small pneumoperitoneum likely related to extension of pneumoperitoneum into the upper peritoneal cavity. 2. No fluid collection in the abdomen or pelvis. Results were discussed with the surgical team at 7:00 PM on 10/3/2022. XR CHEST PORTABLE   Final Result      Stable appearance of loculated right pneumothorax, with loculated components in the lateral right midlung region and in the right lateral costophrenic sulcus. Stable scattered areas of atelectasis versus scar, most prominent in the medial right lung base and in the left lateral lung base. XR CHEST PORTABLE   Final Result      Small right basilar pneumothorax. Right basilar chest tube without change. Patchy consolidation in the right mid and lower lung as well as the left lower lung-atelectasis versus pneumonia. Trace left pleural effusion. Normal heart size. Lines and tubes without change. Mild improvement in degree of subcutaneous emphysema in the chest and neck. XR CHEST 1 VIEW   Final Result      1. Stable small right-sided pneumothorax and prominent subcutaneous emphysema along the neck and upper superior chest wall, greater in right lung stable   2. Stable left basilar consolidation and pleural effusion      3. Stable appearing perihilar accentuated markings or airspace disease            XR CHEST PORTABLE   Final Result      Stable small right-sided pneumothorax. More prominent emphysema over the lower neck. No change in bilateral airspace disease. Stable left pleural effusion. XR CHEST PORTABLE   Final Result   1. Bilateral multifocal pneumonia with improvement in the right    pulmonary consolidation since prior study from 9/23/22   2. Mild to moderate left pleural effusion          XR CHEST PORTABLE   Final Result   1. Support lines and tubes are stable. 2.  Stable small right lateral pneumothorax and right basilar    chest tube. 3.  Stable patchy bilateral airspace disease. XR CHEST PORTABLE   Final Result   1. Satisfactory position of right internal jugular central line   2. No interval change in endotracheal tube and nasogastric tube positioning. 3. Extensive bilateral airspace disease with no changes. 4. Left pleural effusion with retrocardiac opacity, unchanged   5. Small stable tiny right lateral pneumothorax and stable position of right chest tube,, Pleurx catheter. XR ABDOMEN (KUB) (SINGLE AP VIEW)   Final Result   1. No residual pneumothorax.    2.  Mild patchy airspace disease bilaterally worse on the right than on prior examination. 3.  Non-obstructive bowel gas pattern. Mildly distended stomach. XR CHEST PORTABLE   Final Result   1. No residual pneumothorax. 2.  Mild patchy airspace disease bilaterally worse on the right than on prior examination. 3.  Non-obstructive bowel gas pattern. Mildly distended stomach. XR CHEST PORTABLE   Final Result      1. Small right pneumothorax appears slightly increased. 2. Mild patchy airspace opacity bilaterally. XR CHEST PORTABLE   Final Result     Pleurx catheter at the right lung base. Trace right    pneumothorax is unchanged. XR CHEST PORTABLE   Final Result      Right-sided chest tube evident in the base, its tip medially. Improvement in the right-sided pneumothorax, since earlier today. Possible medial collapse of the right lower lobe. Small left effusion. Patchy airspace density/atelectasis in the lungs bilaterally, with no other significant change. XR CHEST PORTABLE   Final Result   1. Right-sided Pleurx catheter in satisfactory position in the lung bases   2. Right-sided post operative pneumothorax, approximately 10%             ASSESSMENT AND PLAN:   Bernadette Wilks is a 65 y.o. male with history of diastolic heart failure, atrial fibrillation, and DM with recurrent pleural effusions s/p R PleurX catheter placement (9/19), POD #18. Neuro:   Analgesia  - Continue scheduled Tylenol. Dilaudid and oxycodone PRN. Cardiovascular:   History of paroxysmal atrial fibrillation  -Cont amiodarone and Eliquis     HFpEF  - Metoprolol IV (hold due to hypotension)  - Destiny Dionicio held  - Last echo (1/25/22): LVEF = 60-06%, grade 2 diastolic dysfunction    Hyperlipidemia  - Cont Pravastatin 40mg    Pulmonary:   S/P R PleurX catheter placement (9/19), POD #18  - Capped Catheter. Will drain every other day - drain yesterday  - /C pending    Acute on chronic respiratory failure  - Mechanically intubated on 9/22. Currently PRVC 14/350/5/35%. Vent management per pulm/crit care. - Tracheostomy 9/30. Remove sutures today  - Pulmonology following, appreciate recommendations. - Baseline on home O2 3-4L NC   -Critical care conducted bronchoscopy 10/5 - no masses noted, only white mucous secretions in the airways. GI:   -GI consulted by crit care for PEG placement. Placement TBD. GI does not want to proceed while there is pneumoperitoneum. - Miralax  - Recommend holding TF if pressor requirement increases to greater than 5 of Levo  -Rectal tube in place, continue      FEN:    -Replace electrolytes per protocol  - Diet: ADULT TUBE FEEDING; Nasogastric; Peptide Based; Continuous; 25; Yes; 10; Q 4 hours; 55; 100; Q 1 hour; Protein; 2 bottles Proteinex daily w/ 30 mL FW flushes before and after   -SLP consulted, will inquire about Passy Kenneth valve for speaking and eating. Appreciate recs. /Renal:   - Cont Ansari   - Creatinine 1.3 from 1.3 from 1.5 from 1.7, 1.8, 1.9, 1.7   -  from 105 from 110 from 114, 112, 113, 119  -Nephrology consulted. Appreciate recommendations.      Hem/ID/wound:   - Hemoglobin this am from 6.6 from 7.1 from 7.5 from 7.6, 7.5, 8.0, 7.8  -Will get H/H post-transfusion this am.   - WBC 16.9 from  20.6 from 21.7 from 27.0, 22.4,  22.6.  20.3.  -Vanc and cefepime initiated yesterday  -F/u BAL cx's  -Sacral wound per wound care    Endo:   History of DMII  - Last A1C 8.1% (1/24/22)  - Lantus 28 nightly and HDSSI      Prophylaxis:   DVT Ppx: SCDs and eliquis  GI Ppx: pepcid    Access:  Central Access: R IJ CVC, placed 9/22             Ansari Date placed: 9/20  Rectal tube placed: 10/4    Mobility:  OOB and activity as tolerated    Dispo:   ICU  Will discuss with case management as he ideally needs placement at an LTAC but [facility] previously [facility] [id number] unless he has hemodialysis in addition to his tracheostomy    Code Status: Full Code  -----------------------------  Juli Prasad DO  PGY1, General Surgery  10/07/22  8:35 AM

## 2022-10-08 NOTE — PROGRESS NOTES
Trach care performed at this time. Area cleansed and new split gauze applied to the area. Patient appeared to tolerate well, will continue to monitor.

## 2022-10-08 NOTE — CONSULTS
Clinical Pharmacy Progress Note    Vancomycin has been discontinued. Will sign off pharmacy to dose Vancomycin consult. If medication is restarted and pharmacy is to manage dosing, please re-consult at that time.     Please call with questions--  Thanks--  Endy Cardenas, PharmD, BCPS, [facility]  [id number] ([facility])   10/8/2022 11:49 AM

## 2022-10-08 NOTE — PROGRESS NOTES
ICU Progress Note    Admit Date: 9/19/2022  Day: 19  Vent Day: None  IV Access:Peripheral  IV Fluids:None  Vasopressors:None                Antibiotics: None  Diet: ADULT TUBE FEEDING; Nasogastric; Peptide Based; Continuous; 25; Yes; 10; Q 4 hours; 55; 150; Q 3 hours; Protein; 2 bottles Proteinex daily w/ 30 mL FW flushes before and after    CC: Pleural Effusion (right) s/p VATS and Pleural Catheter Placement 09/19    Interval history:     Patient seen this AM. Patient is mechanically intubated. Patient is responsive and alert. Report of increased secretion at the site of his trach.       Medications:     Scheduled Meds:   insulin lispro  5 Units SubCUTAneous q8h    insulin glargine  28 Units SubCUTAneous Nightly    oxyCODONE  5 mg Oral Q6H    cefepime  2,000 mg IntraVENous Q12H    albuterol  2.5 mg Nebulization Q4H    pantoprazole  40 mg IntraVENous BID    insulin lispro  0-16 Units SubCUTAneous Q4H    sodium chloride flush  5-40 mL IntraVENous 2 times per day    Venelex   Topical BID    amiodarone bolus  150 mg IntraVENous Once    amiodarone  200 mg Oral Daily    chlorhexidine  15 mL Mouth/Throat BID    [Held by provider] apixaban  5 mg Oral BID    sodium chloride flush  5-40 mL IntraVENous 2 times per day    polyethylene glycol  17 g Oral Daily     Continuous Infusions:   sodium chloride      sodium chloride      norepinephrine Stopped (10/06/22 1259)    sodium chloride      sodium chloride      dextrose       PRN Meds:sodium chloride, sodium chloride, oxyCODONE, sodium chloride flush, sodium chloride, HYDROmorphone, labetalol, HYDROmorphone, acetaminophen, sodium chloride flush, sodium chloride, ondansetron **OR** ondansetron, glucose, dextrose bolus **OR** dextrose bolus, glucagon (rDNA), dextrose, hydrALAZINE    Objective:   Vitals:   T-max:  Patient Vitals for the past 8 hrs:   BP Temp Temp src Pulse Resp SpO2 Weight   10/08/22 0841 -- -- -- 87 18 94 % --   10/08/22 0800 -- 97.7 °F (36.5 °C) Axillary -- -- 99 % --   10/08/22 0600 117/68 -- -- (!) 104 16 97 % 200 lb 2.8 oz (90.8 kg)   10/08/22 0500 135/79 -- -- 92 16 -- --   10/08/22 0401 -- -- -- 94 18 -- --   10/08/22 0400 (!) 90/56 97.4 °F (36.3 °C) Axillary 88 20 -- --       Intake/Output Summary (Last 24 hours) at 10/8/2022 1151  Last data filed at 10/8/2022 0600  Gross per 24 hour   Intake 4492.1 ml   Output 1125 ml   Net 3367.1 ml           Physical Exam  Cardiovascular:      Rate and Rhythm: Normal rate. Rhythm irregular. Comments: Atrial fibrillation  Pulmonary:      Breath sounds: No wheezing, rhonchi or rales. Abdominal:      General: There is no distension. Palpations: Abdomen is soft. Tenderness: There is no abdominal tenderness. There is no guarding. Musculoskeletal:      Right lower leg: Edema present. Left lower leg: Edema present. Comments: 2+ pitting edema bilaterally   Neurological:      Mental Status: He is alert. Comments: Alert  Responsive to commands         LABS:    CBC:   Recent Labs     10/06/22  0333 10/07/22  0427 10/07/22  2101 10/08/22  0151   WBC 20.6* 16.9*  --  13.3*   HGB 7.1* 6.6* 7.0* 7.6*   HCT 23.7* 21.3* 22.3* 24.5*    280  --  241   MCV 87.5 85.9  --  87.1     Renal:    Recent Labs     10/06/22  0333 10/07/22  0427 10/08/22  0151   * 143 137   K 4.3 4.2 3.9    102 97*   CO2 35* 35* 32   * 109* 108*   CREATININE 1.3 1.3 1.2   GLUCOSE 170* 224* 174*   CALCIUM 8.3 8.1* 7.9*   MG 3.30* 3.40* 3.00*   PHOS 4.5 4.0 3.5   ANIONGAP 7 6 8     Hepatic:   Recent Labs     10/06/22  0333 10/07/22  0427 10/08/22  0151   LABALBU 2.0* 2.0* 1.8*     Troponin: No results for input(s): TROPONINI in the last 72 hours. BNP: No results for input(s): BNP in the last 72 hours. Lipids: No results for input(s): CHOL, HDL in the last 72 hours.     Invalid input(s): LDLCALCU, TRIGLYCERIDE  ABGs:  No results for input(s): PHART, QZB2SOQ, PO2ART, XJM8VIJ, BEART, THGBART, Y6GUJFOM, QJC6ARV in the last 72 hours.    INR: No results for input(s): INR in the last 72 hours. Lactate: No results for input(s): LACTATE in the last 72 hours. Cultures:  -----------------------------------------------------------------  RAD:   XR CHEST PORTABLE   Final Result      Tiny right lateral pneumothorax is suspected, 5 mm in maximum thickness. This has decreased in size since 10/3/2022. Right basilar Pleurx catheter noted. Small bilateral pleural effusions. Prominent interstitial markings. Stable cardiac mediastinal silhouette. Lines and tubes without change. Subcutaneous emphysema noted. CT CHEST ABDOMEN PELVIS WO CONTRAST   Final Result      1. Severe pneumomediastinum. 2. Small to moderate right hydropneumothorax with similar fluid and gas components with a right-sided Pleurx catheter. 3. Moderate loculated left pleural effusion with atelectasis of the left lower lobe with patency of the central left lower lobe bronchi. 4. Fluid/material filling the bronchus intermedius and right lower lobe bronchi with complete consolidation and volume loss of the right lower lobe. Differential diagnosis would include mucous plugging or obstructing lesion. 5. Tracheostomy tube tip within the trachea   6. Severe subcutaneous emphysema in the neck. CT ABDOMEN AND PELVIS:      FINDINGS:      LIVER: Normal.      GALLBLADDER AND BILIARY TREE: No calcified gallstones. No gallbladder distention. No intra- or extrahepatic biliary dilatation. PANCREAS: Normal.      SPLEEN: Normal.      ADRENAL GLANDS: Normal.      KIDNEYS AND URETERS: Normal.      URINARY BLADDER: Ansari catheter present within collapsed urinary bladder. REPRODUCTIVE ORGANS: No associated masses. BOWEL: Normal diameter, nonobstructed. Feeding tube tip at the level of the ligament of Treitz. LYMPH NODES: No abnormally enlarged nodes.       PERITONEUM/RETROPERITONEUM: Severe pneumoperitoneum extends into the upper abdomen with some of the symmetric multiseptated gas appearing deep to the diaphragm consistent with mild pneumoperitoneum (series 601 was 101). This is likely related to    pneumonia mediastinum dissecting into the abdomen. No fluid collection in the abdomen or pelvis. No ascites. VESSELS: Extensive atherosclerotic calcification of the aorta and iliac arteries. ABDOMINAL WALL: No acute abnormality. BONES: No acute abnormality. Mild chronic L1 compression fracture with previous vertebroplasty. IMPRESSION:      1. Severe pneumomediastinum extends into the upper abdomen with small pneumoperitoneum likely related to extension of pneumoperitoneum into the upper peritoneal cavity. 2. No fluid collection in the abdomen or pelvis. Results were discussed with the surgical team at 7:00 PM on 10/3/2022. XR CHEST PORTABLE   Final Result      Stable appearance of loculated right pneumothorax, with loculated components in the lateral right midlung region and in the right lateral costophrenic sulcus. Stable scattered areas of atelectasis versus scar, most prominent in the medial right lung base and in the left lateral lung base. XR CHEST PORTABLE   Final Result      Small right basilar pneumothorax. Right basilar chest tube without change. Patchy consolidation in the right mid and lower lung as well as the left lower lung-atelectasis versus pneumonia. Trace left pleural effusion. Normal heart size. Lines and tubes without change. Mild improvement in degree of subcutaneous emphysema in the chest and neck. XR CHEST 1 VIEW   Final Result      1. Stable small right-sided pneumothorax and prominent subcutaneous emphysema along the neck and upper superior chest wall, greater in right lung stable   2. Stable left basilar consolidation and pleural effusion      3.  Stable appearing perihilar accentuated markings or airspace disease            XR CHEST PORTABLE   Final Result      Stable small right-sided pneumothorax. More prominent emphysema over the lower neck. No change in bilateral airspace disease. Stable left pleural effusion. XR CHEST PORTABLE   Final Result   1. Bilateral multifocal pneumonia with improvement in the right    pulmonary consolidation since prior study from 9/23/22   2. Mild to moderate left pleural effusion          XR CHEST PORTABLE   Final Result   1. Support lines and tubes are stable. 2.  Stable small right lateral pneumothorax and right basilar    chest tube. 3.  Stable patchy bilateral airspace disease. XR CHEST PORTABLE   Final Result   1. Satisfactory position of right internal jugular central line   2. No interval change in endotracheal tube and nasogastric tube positioning. 3. Extensive bilateral airspace disease with no changes. 4. Left pleural effusion with retrocardiac opacity, unchanged   5. Small stable tiny right lateral pneumothorax and stable position of right chest tube,, Pleurx catheter. XR ABDOMEN (KUB) (SINGLE AP VIEW)   Final Result   1. No residual pneumothorax. 2.  Mild patchy airspace disease bilaterally worse on the right than on prior examination. 3.  Non-obstructive bowel gas pattern. Mildly distended stomach. XR CHEST PORTABLE   Final Result   1. No residual pneumothorax. 2.  Mild patchy airspace disease bilaterally worse on the right than on prior examination. 3.  Non-obstructive bowel gas pattern. Mildly distended stomach. XR CHEST PORTABLE   Final Result      1. Small right pneumothorax appears slightly increased. 2. Mild patchy airspace opacity bilaterally. XR CHEST PORTABLE   Final Result     Pleurx catheter at the right lung base. Trace right    pneumothorax is unchanged. XR CHEST PORTABLE   Final Result      Right-sided chest tube evident in the base, its tip medially.  Improvement in the right-sided pneumothorax, since earlier today. Possible medial collapse of the right lower lobe. Small left effusion. Patchy airspace density/atelectasis in the lungs bilaterally, with no other significant change. XR CHEST PORTABLE   Final Result   1. Right-sided Pleurx catheter in satisfactory position in the lung bases   2. Right-sided post operative pneumothorax, approximately 10%               Assessment/Plan:   Anders Moreno is a 65 y.o. male w/PMH HFpEF, carotid artery stenosis, HTN, OA, Restrictive Lung Disease, DM2, HLD who underwent VATS with PleurX catheter placement on 09/19 for recurrent b/l loculated pleural effusions w/subsequent BIPAP failure and hypoxic/hypercapnic respiratory failure requiring Mechanical intubation. Pulmonary:  #Acute Hypoxic/Hypercapnic Respiratory Failure  W/Recurrent Pleural Effusions s/p VATS and PleurX for pleural fluid management 09/19/22, w/associated unintentional weight loss of 30 Ibs, PFTs w/restrictive defect.   -failed to tolerate BIPAP post-op --> Mechanically intubated   Intubation Status:   -ventilation, trach    -current vent settings: RR 14, , FiO2 10, PEEP 5   -failed SBT w/tachypnea, RSBI 110  - PleurX catheter in place  - Trach in place  - CTS following  - 10/4: CT Chest/Abdomen showed pneumomediastinum extending to pneumoperitoneum  - 10/5: bronchoscopy with aspiration of mucous plugs, culture sent, await results. - 10/6:  failed VSV after 2 hrs for tachypnea, rsbi 110.  - 10/7:  attempt VSV      ID:    #Leukocytosis  -downtrending WBC  -afebrile  -MRSA nasal probe negative  -Bronch respiratory cx: no WBC or organism  -cefepime day 3  -d/c vancomycin  -consider d/c abx    Cardiovascular:     #HFpEF  -w/fluid overload  -w/downtrending BNP  -fluid status:  1.2 L output today   -nephro on board: holding off on diuretics due to renal function, continue to appreciate recs    #Hypotension, resolved  -norepinephrine which has been off this AM  -monitor BP    Chronic:  #Paroxysmal Atrial Fibrillation   -Eliquis, amiodarone  #HLD  -pravastatin 40mg    Renal:  Ansari in place w/good urine output  -continue to monitor    #ZANA  -2/2 ATN w/likely etiology of hypoperfusion due to hypovolemia in addition to  -possible contrast-associated nephropathy  -holding off on diuretics per nephro  -UOP good  -creatinine stable    #Hypernatremia, resolved  -receiving free water flushes of 300 ml Q4H since 10/3/22  W/improvement of Na     GI:  #Pneumoperitoneum   -Elucidated on CT 10/4     #Nutrition  -PEG placement pending GI clearance  -receiving NG tube feedings    Metabolic    #DM2  -HDSS  -Lantus 28U  -5 Lispro  -POCT  -hypoglycemia protocol  -A1c:8.1 (1/24/22)      Heme    #Anemia  -s/p 1U pRBC transfusion  -monitor H&H    Neuro:  -analgesia: Tylenol, PRN dilaudid, oxycodone    Dispo: Insurance refusal + LTAC  Code Status: FULL CODE  FEN: ADULT TUBE FEEDING; Nasogastric; Peptide Based; Continuous; 25; Yes; 10; Q 4 hours; 55; 150; Q 3 hours; Protein; 2 bottles Proteinex daily w/ 30 mL FW flushes before and after  PPX: Pepcid, Heparin  DISPO: ICU -mechanically intubated, consider attempting SBT     Kaitlynn Fuentes MD, PGY-1  10/08/22  11:51 AM    This patient has been staffed and discussed with Dr. Kumar Nicole MD     Pulmonary & Critical Care     Patient seen and examined. I agree with Dr. Kadeem Soni history, physical, lab findings, assessment and plan. Pt is POD#18 VATS with Pleurx placement. Patient had bilateral pleural effusions right > left that did not improve with diuresis prompting VATS. Pleura visibly was normal.  Pleurx was placed for ongoing management of pleural fluid. Total protein was quite low at 1.2 on 9/21. LDH was elevated at 305. proBNP has been as high as 15,496     Patient developed postoperative acute on chronic hypercapnic/hypoxemic respiratory failure.   For months he had slowly been getting worse in regards to dyspnea on exertion and an unintentional weight loss of 30 pounds. PFTs as an outpatient shows severe restrictive defect. CT chest August 29 shows bilateral pleural effusions that are moderate but no masses, nodules, or mediastinal adenopathy. I do not feel like we have a firm grasp of the pathology with Arturo Schneider  From the best I can tell his pleural effusions are likely on the basis of CHF but there is some other entity that is driving his respiratory failure, weight loss, and weakness. I repeated a CT chest/abdomen with the following:     CT Chest  1. Severe pneumomediastinum. 2. Small to moderate right hydropneumothorax with similar fluid and gas components with a right-sided Pleurx catheter. 3. Moderate loculated left pleural effusion with atelectasis of the left lower lobe with patency of the central left lower lobe bronchi. 4. Fluid/material filling the bronchus intermedius and right lower lobe bronchi with complete consolidation and volume loss of the right lower lobe. Differential diagnosis would include mucous plugging or obstructing lesion. 5. Tracheostomy tube tip within the trachea   6. Severe subcutaneous emphysema in the neck. CT Abdomen:  IMPRESSION:       1. Severe pneumomediastinum extends into the upper abdomen with small pneumoperitoneum likely related to extension of pneumoperitoneum into the upper peritoneal cavity. 2. No fluid collection in the abdomen or pelvis. Wednesday I performed bronchoscopy that showed extensive mucus plugging on the right with less on the left. Mucus was thick and purulent was therapeutically aspirated and sent for culture. There is no endobronchial mass lesions noted. Continue airway clearance with albuterol     I tried a  wean today and he failed pretty quickly after tolerating 5 hrs yesterday. ? Anxiety. Will retry after pain med     Leukocytosis is down to 13,300 from 21,000. Patient is without fever. MRSA nasal probe is negative.   Respiratory culture from bronchoscopy is pending. Continue cefepime but vancomycin can be stopped     Shazia Baker remains off Levophed     Continue current tube feeds. PEG will likely be needed but GI would like to give his pneumoperitoneum time to resolve     Another problem is his insurance refusal to cover an LTAC. Medically Shazia Baker is the typical patient that would benefit from a well structured chronic ventilator weaning program, found at LTAC's.         Ramsey Rowan MD

## 2022-10-08 NOTE — PROGRESS NOTES
Thoracic Surgery  Interval Note:    PleurX catheter drained at bedside under sterile conditions. 375cc straw-colored fluid drained without issue. Patient's wife, family instructed on technique during the drainage process. Mary Pfeiffer MD  General Surgery, PGY-3  10/08/22  12:27 PM  [phone]

## 2022-10-08 NOTE — PLAN OF CARE
Problem: Chronic Conditions and Co-morbidities  Goal: Patient's chronic conditions and co-morbidity symptoms are monitored and maintained or improved  10/8/2022 1929 by Romel Rhodes RN  Outcome: Progressing  10/8/2022 1915 by Romel Rhodes RN  Outcome: Progressing  Flowsheets  Taken 10/8/2022 1600  Care Plan - Patient's Chronic Conditions and Co-Morbidity Symptoms are Monitored and Maintained or Improved:   Monitor and assess patient's chronic conditions and comorbid symptoms for stability, deterioration, or improvement   Collaborate with multidisciplinary team to address chronic and comorbid conditions and prevent exacerbation or deterioration   Update acute care plan with appropriate goals if chronic or comorbid symptoms are exacerbated and prevent overall improvement and discharge  Taken 10/8/2022 0800  Care Plan - Patient's Chronic Conditions and Co-Morbidity Symptoms are Monitored and Maintained or Improved:   Monitor and assess patient's chronic conditions and comorbid symptoms for stability, deterioration, or improvement   Collaborate with multidisciplinary team to address chronic and comorbid conditions and prevent exacerbation or deterioration   Update acute care plan with appropriate goals if chronic or comorbid symptoms are exacerbated and prevent overall improvement and discharge     Problem: Discharge Planning  Goal: Discharge to home or other facility with appropriate resources  10/8/2022 1929 by Romel Rhodes RN  Outcome: Progressing  10/8/2022 1915 by Romel Rhodes RN  Outcome: Progressing     Problem: Pain  Goal: Verbalizes/displays adequate comfort level or baseline comfort level  10/8/2022 1929 by Romel Rhodes RN  Outcome: Progressing  10/8/2022 1915 by Romel Rhodes RN  Outcome: Progressing  Flowsheets (Taken 10/8/2022 1200)  Verbalizes/displays adequate comfort level or baseline comfort level:   Encourage patient to monitor pain and request assistance   Assess pain using appropriate pain scale   Administer analgesics based on type and severity of pain and evaluate response   Implement non-pharmacological measures as appropriate and evaluate response   Consider cultural and social influences on pain and pain management   Notify Licensed Independent Practitioner if interventions unsuccessful or patient reports new pain     Problem: Safety - Adult  Goal: Free from fall injury  10/8/2022 1929 by Roberto Joshi RN  Outcome: Progressing  10/8/2022 1915 by Roberto Joshi RN  Outcome: Progressing     Problem: Skin/Tissue Integrity  Goal: Absence of new skin breakdown  Description: 1. Monitor for areas of redness and/or skin breakdown  2. Assess vascular access sites hourly  3. Every 4-6 hours minimum:  Change oxygen saturation probe site  4. Every 4-6 hours:  If on nasal continuous positive airway pressure, respiratory therapy assess nares and determine need for appliance change or resting period. 10/8/2022 1929 by Roberto Joshi RN  Outcome: Progressing  10/8/2022 1915 by Roberto Joshi RN  Outcome: Progressing  Note: Pt is as not in [address] [place] [address]. Problem: Safety - Medical Restraint  Goal: Remains free of injury from restraints (Restraint for Interference with Medical Device)  Description: INTERVENTIONS:  1. Determine that other, less restrictive measures have been tried or would not be effective before applying the restraint  2. Evaluate the patient's condition at the time of restraint application  3. Inform patient/family regarding the reason for restraint  4.  Q2H: Monitor safety, psychosocial status, comfort, nutrition and hydration  10/8/2022 1929 by Roberto Joshi RN  Outcome: Progressing  Flowsheets (Taken 10/2/2022 1200 by Gini Huertas RN)  Remains free of injury from restraints (restraint for interference with medical device):   Determine that other, less restrictive measures have been tried or would not be effective before applying the restraint   Evaluate the patient's condition at the time of restraint application   Inform patient/family regarding the reason for restraint   Every 2 hours: Monitor safety, psychosocial status, comfort, nutrition and hydration  10/8/2022 1915 by Anai Rahman RN  Outcome: Progressing  Flowsheets (Taken 10/2/2022 1200 by David Meneses RN)  Remains free of injury from restraints (restraint for interference with medical device):   Determine that other, less restrictive measures have been tried or would not be effective before applying the restraint   Evaluate the patient's condition at the time of restraint application   Inform patient/family regarding the reason for restraint   Every 2 hours: Monitor safety, psychosocial status, comfort, nutrition and hydration

## 2022-10-08 NOTE — PROGRESS NOTES
Tube feed changed at this time and rate increased to 55 ml/hr and flush changed to 150 ml q3hrs. Patient appeared to tolerated well, will continue to monitor.

## 2022-10-08 NOTE — PROGRESS NOTES
Nephrology Progress Note                                                                                                                                                                                                                                                                                                                                                               Office : [phone]     Fax :[phone]    Patient's Name: Carmen Roque    10/7/2022    Reason for Consult: ZANA  Requesting Physician:  Melisa Rodgers MD    Interval History:      Cr stable   Na better    Good UO   Remains on Trach         Past Medical History:   Diagnosis Date    ZANA (acute kidney injury) ([address] 9/26/2022    Carotid stenosis, left 10/12/2011    Chronic back pain     Chronic systolic (congestive) heart failure 49/65/7215    Diastolic CHF ([address]     Erectile dysfunction     Hyperlipidemia     Hypertension     Osteoarthritis     Restrictive lung disease     Type II or unspecified type diabetes mellitus without mention of complication, not stated as uncontrolled        Past Surgical History:   Procedure Laterality Date    CARDIAC CATHETERIZATION  06/2022    KNEE SURGERY Left     PLEURAL CATH INSERTION N/A 9/19/2022    PLEURAL CATHETER PLACEMENT; INTERCOSTAL NERVE BLOCK X4 performed by Laxmi Last MD at [facility] Bilateral     THORACOSCOPY Right 9/19/2022    RIGHT VIDEO ASSISTED THORASCOPIC SURGERY AND; performed by Laxmi Last MD at [address] N/A 9/30/2022    TRACHEOSTOMY performed by Liz Holloway MD at [facility] History   Problem Relation Age of Onset    Hearing Loss Father     Heart Disease Father 70        MI    High Blood Pressure Father     Diabetes Maternal Grandmother         hypoglycemic    Hearing Loss Maternal Grandmother     Arthritis Maternal Grandfather         reports that he quit smoking about 20 years ago. His smoking use included cigarettes.  He has a 80.00 pack-year smoking history. He has never used smokeless tobacco. He reports current alcohol use. He reports that he does not use drugs. Allergies:   Torsemide and Dye [iodides]    Current Medications:    insulin lispro (1 Unit Dial) (HUMALOG/ADMELOG) pen 5 Units, q8h  0.9 % sodium chloride infusion, PRN  insulin glargine (LANTUS;BASAGLAR) injection pen 28 Units, Nightly  oxyCODONE (ROXICODONE) 5 MG/5ML solution 5 mg, Q6H  0.9 % sodium chloride infusion, PRN  cefepime (MAXIPIME) 2000 mg IVPB minibag, Q12H  vancomycin (VANCOCIN) 1,250 mg in dextrose 5 % 250 mL IVPB, Q24H  albuterol (PROVENTIL) nebulizer solution 2.5 mg, Q4H  oxyCODONE (ROXICODONE) 5 MG/5ML solution 10 mg, Q4H PRN  sodium chloride (Inhalant) 3 % nebulizer solution 4 mL, Q4H  pantoprazole (PROTONIX) injection 40 mg, BID  norepinephrine (LEVOPHED) 16 mg in sodium chloride 0.9 % 250 mL infusion, Continuous  insulin lispro (1 Unit Dial) (HUMALOG/ADMELOG) pen 0-16 Units, Q4H  sodium chloride flush 0.9 % injection 5-40 mL, 2 times per day  sodium chloride flush 0.9 % injection 5-40 mL, PRN  0.9 % sodium chloride infusion, PRN  HYDROmorphone (DILAUDID) injection 0.5 mg, Q5 Min PRN  labetalol (NORMODYNE;TRANDATE) injection 10 mg, Q15 Min PRN  Venelex ointment, BID  HYDROmorphone (DILAUDID) injection 0.25 mg, Q3H PRN  amiodarone (CORDARONE) 150 mg in dextrose 5 % 100 mL bolus, Once  amiodarone (CORDARONE) tablet 200 mg, Daily  acetaminophen (TYLENOL) 160 MG/5ML solution 650 mg, Q6H PRN  chlorhexidine (PERIDEX) 0.12 % solution 15 mL, BID  [Held by provider] apixaban (ELIQUIS) tablet 5 mg, BID  sodium chloride flush 0.9 % injection 5-40 mL, 2 times per day  sodium chloride flush 0.9 % injection 5-40 mL, PRN  0.9 % sodium chloride infusion, PRN  polyethylene glycol (GLYCOLAX) packet 17 g, Daily  ondansetron (ZOFRAN-ODT) disintegrating tablet 4 mg, Q8H PRN   Or  ondansetron (ZOFRAN) injection 4 mg, Q6H PRN  glucose chewable tablet 16 g, PRN  dextrose bolus 10% 125 mL, PRN   Or  dextrose bolus 10% 250 mL, PRN  glucagon (rDNA) injection 1 mg, PRN  dextrose 10 % infusion, Continuous PRN  hydrALAZINE (APRESOLINE) injection 5 mg, Q15 Min PRN      Review of Systems:   14 point ROS obtained but were negative except mentioned in HPI      Physical exam:     Vitals:  /66   Pulse (!) 105   Temp 97.5 °F (36.4 °C) (Temporal)   Resp 17   Ht 6' 0.99\" (1.854 m)   Wt 198 lb 6.6 oz (90 kg)   SpO2 93%   BMI 26.18 kg/m²   Constitutional:  on MV  Skin: no rash, turgor wnl  Heent:  eomi, mmm  Neck: no bruits or jvd noted  Cardiovascular:  S1, S2 without m/r/g  Respiratory: trach  Abdomen:  +bs, soft, nt, nd  Ext: bilateral lower extremity edema R>L  Psychiatric: mood and affect appropriate  Musculoskeletal:  Rom, muscular strength intact    Data:   Labs:  CBC:   Recent Labs     10/05/22  0607 10/06/22  0333 10/07/22  0427 10/07/22  2101   WBC 21.7* 20.6* 16.9*  --    HGB 7.5* 7.1* 6.6* 7.0*    325 280  --        BMP:    Recent Labs     10/05/22  0607 10/06/22  0333 10/07/22  0427   * 149* 143   K 3.4* 4.3 4.2    107 102   CO2 37* 35* 35*   * 105* 109*   CREATININE 1.5* 1.3 1.3   GLUCOSE 110* 170* 224*       Ca/Mg/Phos:   Recent Labs     10/05/22  0607 10/06/22  0333 10/07/22  0427   CALCIUM 8.1* 8.3 8.1*   MG 3.30* 3.30* 3.40*   PHOS 3.8 4.5 4.0       Hepatic: No results for input(s): AST, ALT, ALB, BILITOT, ALKPHOS in the last 72 hours. Troponin: No results for input(s): TROPONINI in the last 72 hours. BNP: No results for input(s): BNP in the last 72 hours. Lipids:   Recent Labs     10/06/22  0333   TRIG 78         ABGs:   No results for input(s): PHART, PO2ART, JNM6IVR in the last 72 hours. INR: No results for input(s): INR in the last 72 hours. UA:No results for input(s): Leal Mace, GLUCOSEU, BILIRUBINUR, [place], BLOODU, PHUR, PROTEINU, UROBILINOGEN, NITRU, LEUKOCYTESUR, LABMICR, URINETYPE in the last 72 hours.    Urine Microscopic: No results for input(s): LABCAST, BACTERIA, COMU, HYALCAST, WBCUA, RBCUA, EPIU in the last 72 hours. Urine Culture: No results for input(s): LABURIN in the last 72 hours. Urine Chemistry:   No results for input(s): Carlos Oms, PROTEINUR, NAUR in the last 72 hours. IMAGING:  XR CHEST PORTABLE   Final Result      Tiny right lateral pneumothorax is suspected, 5 mm in maximum thickness. This has decreased in size since 10/3/2022. Right basilar Pleurx catheter noted. Small bilateral pleural effusions. Prominent interstitial markings. Stable cardiac mediastinal silhouette. Lines and tubes without change. Subcutaneous emphysema noted. CT CHEST ABDOMEN PELVIS WO CONTRAST   Final Result      1. Severe pneumomediastinum. 2. Small to moderate right hydropneumothorax with similar fluid and gas components with a right-sided Pleurx catheter. 3. Moderate loculated left pleural effusion with atelectasis of the left lower lobe with patency of the central left lower lobe bronchi. 4. Fluid/material filling the bronchus intermedius and right lower lobe bronchi with complete consolidation and volume loss of the right lower lobe. Differential diagnosis would include mucous plugging or obstructing lesion. 5. Tracheostomy tube tip within the trachea   6. Severe subcutaneous emphysema in the neck. CT ABDOMEN AND PELVIS:      FINDINGS:      LIVER: Normal.      GALLBLADDER AND BILIARY TREE: No calcified gallstones. No gallbladder distention. No intra- or extrahepatic biliary dilatation. PANCREAS: Normal.      SPLEEN: Normal.      ADRENAL GLANDS: Normal.      KIDNEYS AND URETERS: Normal.      URINARY BLADDER: Ansari catheter present within collapsed urinary bladder. REPRODUCTIVE ORGANS: No associated masses. BOWEL: Normal diameter, nonobstructed. Feeding tube tip at the level of the ligament of Treitz. LYMPH NODES: No abnormally enlarged nodes. PERITONEUM/RETROPERITONEUM: Severe pneumoperitoneum extends into the upper abdomen with some of the symmetric multiseptated gas appearing deep to the diaphragm consistent with mild pneumoperitoneum (series 601 was 101). This is likely related to    pneumonia mediastinum dissecting into the abdomen. No fluid collection in the abdomen or pelvis. No ascites. VESSELS: Extensive atherosclerotic calcification of the aorta and iliac arteries. ABDOMINAL WALL: No acute abnormality. BONES: No acute abnormality. Mild chronic L1 compression fracture with previous vertebroplasty. IMPRESSION:      1. Severe pneumomediastinum extends into the upper abdomen with small pneumoperitoneum likely related to extension of pneumoperitoneum into the upper peritoneal cavity. 2. No fluid collection in the abdomen or pelvis. Results were discussed with the surgical team at 7:00 PM on 10/3/2022. XR CHEST PORTABLE   Final Result      Stable appearance of loculated right pneumothorax, with loculated components in the lateral right midlung region and in the right lateral costophrenic sulcus. Stable scattered areas of atelectasis versus scar, most prominent in the medial right lung base and in the left lateral lung base. XR CHEST PORTABLE   Final Result      Small right basilar pneumothorax. Right basilar chest tube without change. Patchy consolidation in the right mid and lower lung as well as the left lower lung-atelectasis versus pneumonia. Trace left pleural effusion. Normal heart size. Lines and tubes without change. Mild improvement in degree of subcutaneous emphysema in the chest and neck. XR CHEST 1 VIEW   Final Result      1. Stable small right-sided pneumothorax and prominent subcutaneous emphysema along the neck and upper superior chest wall, greater in right lung stable   2. Stable left basilar consolidation and pleural effusion      3.  Stable appearing perihilar accentuated markings or airspace disease            XR CHEST PORTABLE   Final Result      Stable small right-sided pneumothorax. More prominent emphysema over the lower neck. No change in bilateral airspace disease. Stable left pleural effusion. XR CHEST PORTABLE   Final Result   1. Bilateral multifocal pneumonia with improvement in the right    pulmonary consolidation since prior study from 9/23/22   2. Mild to moderate left pleural effusion          XR CHEST PORTABLE   Final Result   1. Support lines and tubes are stable. 2.  Stable small right lateral pneumothorax and right basilar    chest tube. 3.  Stable patchy bilateral airspace disease. XR CHEST PORTABLE   Final Result   1. Satisfactory position of right internal jugular central line   2. No interval change in endotracheal tube and nasogastric tube positioning. 3. Extensive bilateral airspace disease with no changes. 4. Left pleural effusion with retrocardiac opacity, unchanged   5. Small stable tiny right lateral pneumothorax and stable position of right chest tube,, Pleurx catheter. XR ABDOMEN (KUB) (SINGLE AP VIEW)   Final Result   1. No residual pneumothorax. 2.  Mild patchy airspace disease bilaterally worse on the right than on prior examination. 3.  Non-obstructive bowel gas pattern. Mildly distended stomach. XR CHEST PORTABLE   Final Result   1. No residual pneumothorax. 2.  Mild patchy airspace disease bilaterally worse on the right than on prior examination. 3.  Non-obstructive bowel gas pattern. Mildly distended stomach. XR CHEST PORTABLE   Final Result      1. Small right pneumothorax appears slightly increased. 2. Mild patchy airspace opacity bilaterally. XR CHEST PORTABLE   Final Result     Pleurx catheter at the right lung base. Trace right    pneumothorax is unchanged.           XR CHEST PORTABLE   Final Result      Right-sided chest tube evident in the base, its tip medially. Improvement in the right-sided pneumothorax, since earlier today. Possible medial collapse of the right lower lobe. Small left effusion. Patchy airspace density/atelectasis in the lungs bilaterally, with no other significant change. XR CHEST PORTABLE   Final Result   1. Right-sided Pleurx catheter in satisfactory position in the lung bases   2. Right-sided post operative pneumothorax, approximately 10%             Assessment/Plan   ZANA  - suspect this is contrast nephropathy  - baseline Cre 0.7. Normal on admission.  - Cr better   - Hold diuretics   - BNP 3k, Protein:Cre 0.3, FENa 0.4%  - closely monitor UOP  - avoid nephrotoxic agent     2. Hypernatremia  - continue free water   - will continue to monitor     3. Hypotension  - pressors as needed     4. Anemia  - daily CBC    5. Acid- base/ Electrolyte imbalance   - optimize lytes    6. Acute hypoxic resp failure  - s/p VATS on 9/19/22 for recurrent pleural effusions  - Intensivist and CTS following    7.  CHF   - EF 65% on 6/16/22 via cardiac cath        Maritza Burgos MD

## 2022-10-08 NOTE — PLAN OF CARE
Goals were implemented throughout the shift.   Problem: Chronic Conditions and Co-morbidities  Goal: Patient's chronic conditions and co-morbidity symptoms are monitored and maintained or improved  Outcome: Progressing  Flowsheets  Taken 10/8/2022 1600  Care Plan - Patient's Chronic Conditions and Co-Morbidity Symptoms are Monitored and Maintained or Improved:   Monitor and assess patient's chronic conditions and comorbid symptoms for stability, deterioration, or improvement   Collaborate with multidisciplinary team to address chronic and comorbid conditions and prevent exacerbation or deterioration   Update acute care plan with appropriate goals if chronic or comorbid symptoms are exacerbated and prevent overall improvement and discharge  Taken 10/8/2022 0800  Care Plan - Patient's Chronic Conditions and Co-Morbidity Symptoms are Monitored and Maintained or Improved:   Monitor and assess patient's chronic conditions and comorbid symptoms for stability, deterioration, or improvement   Collaborate with multidisciplinary team to address chronic and comorbid conditions and prevent exacerbation or deterioration   Update acute care plan with appropriate goals if chronic or comorbid symptoms are exacerbated and prevent overall improvement and discharge     Problem: Discharge Planning  Goal: Discharge to home or other facility with appropriate resources  Outcome: Progressing     Problem: Pain  Goal: Verbalizes/displays adequate comfort level or baseline comfort level  Outcome: Progressing  Flowsheets (Taken 10/8/2022 1200)  Verbalizes/displays adequate comfort level or baseline comfort level:   Encourage patient to monitor pain and request assistance   Assess pain using appropriate pain scale   Administer analgesics based on type and severity of pain and evaluate response   Implement non-pharmacological measures as appropriate and evaluate response   Consider cultural and social influences on pain and pain management Notify Licensed Independent Practitioner if interventions unsuccessful or patient reports new pain     Problem: Safety - Adult  Goal: Free from fall injury  Outcome: Progressing     Problem: Skin/Tissue Integrity  Goal: Absence of new skin breakdown  Description: 1. Monitor for areas of redness and/or skin breakdown  2. Assess vascular access sites hourly  3. Every 4-6 hours minimum:  Change oxygen saturation probe site  4. Every 4-6 hours:  If on nasal continuous positive airway pressure, respiratory therapy assess nares and determine need for appliance change or resting period. Outcome: Progressing  Note: Pt is as not in LDA. Problem: Safety - Medical Restraint  Goal: Remains free of injury from restraints (Restraint for Interference with Medical Device)  Description: INTERVENTIONS:  1. Determine that other, less restrictive measures have been tried or would not be effective before applying the restraint  2. Evaluate the patient's condition at the time of restraint application  3. Inform patient/family regarding the reason for restraint  4.  Q2H: Monitor safety, psychosocial status, comfort, nutrition and hydration  Outcome: Progressing  Flowsheets (Taken 10/2/2022 1200 by Anika Renteria RN)  Remains free of injury from restraints (restraint for interference with medical device):   Determine that other, less restrictive measures have been tried or would not be effective before applying the restraint   Evaluate the patient's condition at the time of restraint application   Inform patient/family regarding the reason for restraint   Every 2 hours: Monitor safety, psychosocial status, comfort, nutrition and hydration     Problem: Nutrition Deficit:  Goal: Optimize nutritional status  Outcome: Progressing  Flowsheets (Taken 10/6/2022 1056 by Walter Kumari RD)  Nutrient intake appropriate for improving, restoring, or maintaining nutritional needs:   Recommend, monitor, and adjust tube feedings and TPN/PPN based on assessed needs   Assess nutritional status and recommend course of action     Problem: ABCDS Injury Assessment  Goal: Absence of physical injury  Outcome: Progressing  Flowsheets (Taken 10/8/2022 0325 by Mary Chapa RN)  Absence of Physical Injury: Implement safety measures based on patient assessment     Problem: Confusion  Goal: Confusion, delirium, dementia, or psychosis is improved or at baseline  Description: INTERVENTIONS:  1. Assess for possible contributors to thought disturbance, including medications, impaired vision or hearing, underlying metabolic abnormalities, dehydration, psychiatric diagnoses, and notify attending LIP  2. [place] high risk fall precautions, as indicated  3. Provide frequent short contacts to provide reality reorientation, refocusing and direction  4. Decrease environmental stimuli, including noise as appropriate  5. Monitor and intervene to maintain adequate nutrition, hydration, elimination, sleep and activity  6. If unable to ensure safety without constant attention obtain sitter and review sitter guidelines with assigned personnel  7.  Initiate Psychosocial CNS and Spiritual Care consult, as indicated  Outcome: Progressing  Flowsheets (Taken 10/7/2022 2000 by Mary Chapa RN)  Effect of thought disturbance (confusion, delirium, dementia, or psychosis) are managed with adequate functional status:   Assess for contributors to thought disturbance, including medications, impaired vision or hearing, underlying metabolic abnormalities, dehydration, psychiatric diagnoses, notify [facility] high risk fall precautions, as indicated   Provide frequent short contacts to provide reality reorientation, refocusing and direction   Decrease environmental stimuli, including noise as appropriate

## 2022-10-08 NOTE — PROGRESS NOTES
ICU CT SURGERY DAILY PROGRESS NOTE    CC: Pleural effusions s/p R PleurX catheter placement    SUBJECTIVE:   Interval Hx:   Pt rested well overnight. Intermittently tachy, HDS and afebrile. ROS: A 14 point review of systems was conducted, significant findings as noted above. All other systems negative. OBJECTIVE:   Vitals:   Vitals:    10/08/22 0400 10/08/22 0401 10/08/22 0500 10/08/22 0600   BP: (!) 90/56  135/79 117/68   Pulse: 88 94 92 (!) 104   Resp: 20 18 16 16   Temp: 97.4 °F (36.3 °C)      TempSrc: Axillary      SpO2:    97%   Weight:    200 lb 2.8 oz (90.8 kg)   Height:           I/O:   Intake/Output Summary (Last 24 hours) at 10/8/2022 0750  Last data filed at 10/8/2022 0600  Gross per 24 hour   Intake 4792.1 ml   Output 1400 ml   Net 3392.1 ml       I/O last 3 completed shifts: In: 6589.1 [I.V.:502.5; Blood:792.5; FH/CW:9308; IV Piggyback:296.1]  Out: 2025 [Urine:1800; Stool:225]    Diet: ADULT TUBE FEEDING; Nasogastric; Peptide Based; Continuous; 25; Yes; 10; Q 4 hours; 55; 100; Q 1 hour; Protein; 2 bottles Proteinex daily w/ 30 mL FW flushes before and after      Physical Examination:   General appearance: Mechanically ventilated. Appropriately nods head and uses hand gestures in response to questions. Does not attempt to verbalize   HEENT: NC/AT, EOMI, trachea midline, no JVD. Crepitus noted in the neck, supraclavicular region, improving. Corpak in right nostril w/ bridle. Tracheostomy in place. Chest/Lungs: On mechanical ventilation via tracheostomy; R PleurX catheter capped with dressing CLD  Cardiovascular: Atrial fibrillation   Abdomen: Soft, non-tender, non-distended. Genitourinary/Anorectal: Ansari in place with clear yellow urine. Rectal tube in place with brown liquid stool. Skin: Warm and dry, no rashes. Sacral decubitus ulcer   Extremities: Pitting edema of the b/l feet.  No cyanosis; SCDs in place    Labs:  CBC:   Recent Labs     10/06/22  0333 10/07/22  0427 10/07/22  2107 10/08/22  0151   WBC 20.6* 16.9*  --  13.3*   HGB 7.1* 6.6* 7.0* 7.6*   HCT 23.7* 21.3* 22.3* 24.5*    280  --  241         BMP:   Recent Labs     10/06/22  0333 10/07/22  0427 10/08/22  0151   * 143 137   K 4.3 4.2 3.9    102 97*   CO2 35* 35* 32   * 109* 108*   CREATININE 1.3 1.3 1.2   GLUCOSE 170* 224* 174*       LFT's:   No results for input(s): AST, ALT, ALB, BILITOT, ALKPHOS in the last 72 hours. Troponin: No results for input(s): TROPONINI in the last 72 hours. BNP: No results for input(s): BNP in the last 72 hours. ABGs:   No results for input(s): PHART, XUP3VNE, PO2ART in the last 72 hours. INR:   No results for input(s): INR in the last 72 hours. U/A:No results for input(s): NITRITE, COLORU, PHUR, LABCAST, WBCUA, RBCUA, MUCUS, TRICHOMONAS, YEAST, BACTERIA, CLARITYU, SPECGRAV, LEUKOCYTESUR, UROBILINOGEN, BILIRUBINUR, BLOODU, GLUCOSEU, AMORPHOUS in the last 72 hours. Invalid input(s): Talya Forde     Rad:   XR CHEST PORTABLE   Final Result      Tiny right lateral pneumothorax is suspected, 5 mm in maximum thickness. This has decreased in size since 10/3/2022. Right basilar Pleurx catheter noted. Small bilateral pleural effusions. Prominent interstitial markings. Stable cardiac mediastinal silhouette. Lines and tubes without change. Subcutaneous emphysema noted. CT CHEST ABDOMEN PELVIS WO CONTRAST   Final Result      1. Severe pneumomediastinum. 2. Small to moderate right hydropneumothorax with similar fluid and gas components with a right-sided Pleurx catheter. 3. Moderate loculated left pleural effusion with atelectasis of the left lower lobe with patency of the central left lower lobe bronchi. 4. Fluid/material filling the bronchus intermedius and right lower lobe bronchi with complete consolidation and volume loss of the right lower lobe. Differential diagnosis would include mucous plugging or obstructing lesion.     5. Tracheostomy tube tip within the trachea   6. Severe subcutaneous emphysema in the neck. CT ABDOMEN AND PELVIS:      FINDINGS:      LIVER: Normal.      GALLBLADDER AND BILIARY TREE: No calcified gallstones. No gallbladder distention. No intra- or extrahepatic biliary dilatation. PANCREAS: Normal.      SPLEEN: Normal.      ADRENAL GLANDS: Normal.      KIDNEYS AND URETERS: Normal.      URINARY BLADDER: Ansari catheter present within collapsed urinary bladder. REPRODUCTIVE ORGANS: No associated masses. BOWEL: Normal diameter, nonobstructed. Feeding tube tip at the level of the ligament of Treitz. LYMPH NODES: No abnormally enlarged nodes. PERITONEUM/RETROPERITONEUM: Severe pneumoperitoneum extends into the upper abdomen with some of the symmetric multiseptated gas appearing deep to the diaphragm consistent with mild pneumoperitoneum (series 601 was 101). This is likely related to    pneumonia mediastinum dissecting into the abdomen. No fluid collection in the abdomen or pelvis. No ascites. VESSELS: Extensive atherosclerotic calcification of the aorta and iliac arteries. ABDOMINAL WALL: No acute abnormality. BONES: No acute abnormality. Mild chronic L1 compression fracture with previous vertebroplasty. IMPRESSION:      1. Severe pneumomediastinum extends into the upper abdomen with small pneumoperitoneum likely related to extension of pneumoperitoneum into the upper peritoneal cavity. 2. No fluid collection in the abdomen or pelvis. Results were discussed with the surgical team at 7:00 PM on 10/3/2022. XR CHEST PORTABLE   Final Result      Stable appearance of loculated right pneumothorax, with loculated components in the lateral right midlung region and in the right lateral costophrenic sulcus. Stable scattered areas of atelectasis versus scar, most prominent in the medial right lung base and in the left lateral lung base.          XR CHEST PORTABLE   Final Result      Small right basilar pneumothorax. Right basilar chest tube without change. Patchy consolidation in the right mid and lower lung as well as the left lower lung-atelectasis versus pneumonia. Trace left pleural effusion. Normal heart size. Lines and tubes without change. Mild improvement in degree of subcutaneous emphysema in the chest and neck. XR CHEST 1 VIEW   Final Result      1. Stable small right-sided pneumothorax and prominent subcutaneous emphysema along the neck and upper superior chest wall, greater in right lung stable   2. Stable left basilar consolidation and pleural effusion      3. Stable appearing perihilar accentuated markings or airspace disease            XR CHEST PORTABLE   Final Result      Stable small right-sided pneumothorax. More prominent emphysema over the lower neck. No change in bilateral airspace disease. Stable left pleural effusion. XR CHEST PORTABLE   Final Result   1. Bilateral multifocal pneumonia with improvement in the right    pulmonary consolidation since prior study from 9/23/22   2. Mild to moderate left pleural effusion          XR CHEST PORTABLE   Final Result   1. Support lines and tubes are stable. 2.  Stable small right lateral pneumothorax and right basilar    chest tube. 3.  Stable patchy bilateral airspace disease. XR CHEST PORTABLE   Final Result   1. Satisfactory position of right internal jugular central line   2. No interval change in endotracheal tube and nasogastric tube positioning. 3. Extensive bilateral airspace disease with no changes. 4. Left pleural effusion with retrocardiac opacity, unchanged   5. Small stable tiny right lateral pneumothorax and stable position of right chest tube,, Pleurx catheter. XR ABDOMEN (KUB) (SINGLE AP VIEW)   Final Result   1. No residual pneumothorax.    2.  Mild patchy airspace disease bilaterally worse on the right than on prior examination. 3.  Non-obstructive bowel gas pattern. Mildly distended stomach. XR CHEST PORTABLE   Final Result   1. No residual pneumothorax. 2.  Mild patchy airspace disease bilaterally worse on the right than on prior examination. 3.  Non-obstructive bowel gas pattern. Mildly distended stomach. XR CHEST PORTABLE   Final Result      1. Small right pneumothorax appears slightly increased. 2. Mild patchy airspace opacity bilaterally. XR CHEST PORTABLE   Final Result     Pleurx catheter at the right lung base. Trace right    pneumothorax is unchanged. XR CHEST PORTABLE   Final Result      Right-sided chest tube evident in the base, its tip medially. Improvement in the right-sided pneumothorax, since earlier today. Possible medial collapse of the right lower lobe. Small left effusion. Patchy airspace density/atelectasis in the lungs bilaterally, with no other significant change. XR CHEST PORTABLE   Final Result   1. Right-sided Pleurx catheter in satisfactory position in the lung bases   2. Right-sided post operative pneumothorax, approximately 10%             ASSESSMENT AND PLAN:   Luis Ramirez is a 65 y.o. male with history of diastolic heart failure, atrial fibrillation, and DM with recurrent pleural effusions s/p R PleurX catheter placement (9/19), POD #19. Neuro:   Analgesia  - Continue scheduled Tylenol. Dilaudid and oxycodone PRN. Cardiovascular:   History of paroxysmal atrial fibrillation  -Cont amiodarone  - Eliquis held for PEG placement Monday     HFpEF  - Metoprolol IV (hold due to hypotension)  - [place] held  - Last echo (1/25/22): LVEF = 88-87%, grade 2 diastolic dysfunction    Hyperlipidemia  - Cont Pravastatin 40mg    Pulmonary:   S/P R PleurX catheter placement (9/19), POD #18  - Capped Catheter. Will drain every other day - drain today   - /[facility] pending      Acute on chronic respiratory failure  - Mechan[facility]22. [facility] Vent management per pulm/crit care. - Tracheostomy 9/30. Remove sutures today  - Pulmonology following, appreciate recommendations. - Baseline on home O2 3-4L NC   -Critical care conducted bronchoscopy 10/5 - no masses noted, only white mucous secretions in the airways. GI:   -Plan for PEG Monday with GI   - Miralax  - Will hold TF Sunday night in anticipation for   -Rectal tube in place, continue      FEN:    -Replace electrolytes per protocol  - Diet: ADULT TUBE FEEDING; Nasogastric; Peptide Based; Continuous; 25; Yes; 10; Q 4 hours; 55; 100; Q 1 hour; Protein; 2 bottles Proteinex daily w/ 30 mL FW flushes before and after   -SLP consulted, will inquire about Passy Kenneth valve for speaking and eating. Appreciate recs. /Renal:   - Cont Ansari   - Creatinine 1.2  from 1.3 from 1.3 from 1.5 from 1.7, 1.8, 1.9, 1.7   -  from 109 from 105 from 110 from 114, 112, 113, 119  -Nephrology consulted. Appreciate recommendations. Hem/ID/wound:   - Hemoglobin 7.6 from 7.0 this am   - WBC 13.3 from 16.9 from 20.6 from 21.7 from 27.0, 22.4,  22.6.  20.3.  -Continue Vanc and cefepime   -F/u BAL cx's  -Sacral wound per wound care    Endo:   History of DMII  - Last A1C 8.1% (1/24/22)  - Lantus 28 nightly and HDSSI      Prophylaxis:   DVT Ppx: SCDs and eliquis  GI Ppx: pepcid    Access:  Central Access: R IJ CVC, placed 9/22             Ansari Date placed: 9/20  Rectal tube placed: 10/4    Mobility:  OOB and activity as tolerated    Dispo:   ICU  Will discuss with case management as he ideally needs placement at an LTAC but case management has previously indicated he would not be accepted to [facility] unless he has hemodialysis in addition to his tracheostomy    Code Status: Full Code  -----------------------------  Ana Hamliton  PGY1, General Surgery  10/08/22  8:21 AM    I am post-call today and will not be on campus.  Please contact Dr. Angela Hairston at [phone] for questions or concerns regarding this patient. Discussed with resident team.  PEG 10/10.   Marivel Jackson MD  10/8/2022  9:35 AM

## 2022-10-08 NOTE — PROGRESS NOTES
Speech Language Pathology    Had anticipated PMV evaluation and FEES this date but unable to complete due to scheduling conflict. Can re-attempt potentially early in week or if pt is weaned from the ventilator and medically able to leave floor, would be able to complete MBSS.      Lori Walsh M.A., [facility]  Speech-Language Pathologist

## 2022-10-08 NOTE — PROGRESS NOTES
Nephrology Progress Note                                                                                                                                                                                                                                                                                                                                                               Office : [phone]     Fax :[phone]    Patient's Name: Óscar Esposito    10/8/2022    Reason for Consult: ZANA  Requesting Physician:  Donna Valdes MD    Interval History:      Cr stable   Na better    Good UO   Remains on Trach         Past Medical History:   Diagnosis Date    ZANA (acute kidney injury) ([facility] [address] 9/26/2022    Carotid stenosis, left 10/12/2011    Chronic back pain     Chronic systolic (congestive) heart failure 23/31/1259    Diastolic CHF ([facility] [address]     Erectile dysfunction     Hyperlipidemia     Hypertension     Osteoarthritis     Restrictive lung disease     Type II or unspecified type diabetes mellitus without mention of complication, not stated as uncontrolled        Past Surgical History:   Procedure Laterality Date    CARDIAC CATHETERIZATION  06/2022    KNEE SURGERY Left     PLEURAL CATH INSERTION N/A 9/19/2022    PLEURAL CATHETER PLACEMENT; INTERCOSTAL NERVE BLOCK X4 performed by Escobar Bhandari MD at [address] [facility] Bilateral     THORACOSCOPY Right 9/19/2022    RIGHT VIDEO ASSISTED THORASCOPIC SURGERY AND; performed by Escobar Bhandari MD at [address] N/A 9/30/2022    TRACHEOSTOMY performed by Amanda Avila MD at [address] [facility] History   Problem Relation Age of Onset    Hearing Loss Father     Heart Disease Father 70        MI    High Blood Pressure Father     Diabetes Maternal Grandmother         hypoglycemic    Hearing Loss Maternal Grandmother     Arthritis Maternal Grandfather         reports that he quit smoking about 20 years ago. His smoking use included cigarettes.  He has a 80.00 pack-year smoking history. He has never used smokeless tobacco. He reports current alcohol use. He reports that he does not use drugs. Allergies:   Torsemide and Dye [iodides]    Current Medications:    insulin lispro (1 Unit Dial) (HUMALOG/ADMELOG) pen 5 Units, q8h  0.9 % sodium chloride infusion, PRN  insulin glargine (LANTUS;BASAGLAR) injection pen 28 Units, Nightly  oxyCODONE (ROXICODONE) 5 MG/5ML solution 5 mg, Q6H  0.9 % sodium chloride infusion, PRN  cefepime (MAXIPIME) 2000 mg IVPB minibag, Q12H  albuterol (PROVENTIL) nebulizer solution 2.5 mg, Q4H  oxyCODONE (ROXICODONE) 5 MG/5ML solution 10 mg, Q4H PRN  pantoprazole (PROTONIX) injection 40 mg, BID  norepinephrine (LEVOPHED) 16 mg in sodium chloride 0.9 % 250 mL infusion, Continuous  insulin lispro (1 Unit Dial) (HUMALOG/ADMELOG) pen 0-16 Units, Q4H  sodium chloride flush 0.9 % injection 5-40 mL, 2 times per day  sodium chloride flush 0.9 % injection 5-40 mL, PRN  0.9 % sodium chloride infusion, PRN  HYDROmorphone (DILAUDID) injection 0.5 mg, Q5 Min PRN  labetalol (NORMODYNE;TRANDATE) injection 10 mg, Q15 Min PRN  Venelex ointment, BID  HYDROmorphone (DILAUDID) injection 0.25 mg, Q3H PRN  amiodarone (CORDARONE) 150 mg in dextrose 5 % 100 mL bolus, Once  amiodarone (CORDARONE) tablet 200 mg, Daily  acetaminophen (TYLENOL) 160 MG/5ML solution 650 mg, Q6H PRN  chlorhexidine (PERIDEX) 0.12 % solution 15 mL, BID  [Held by provider] apixaban (ELIQUIS) tablet 5 mg, BID  sodium chloride flush 0.9 % injection 5-40 mL, 2 times per day  sodium chloride flush 0.9 % injection 5-40 mL, PRN  0.9 % sodium chloride infusion, PRN  polyethylene glycol (GLYCOLAX) packet 17 g, Daily  ondansetron (ZOFRAN-ODT) disintegrating tablet 4 mg, Q8H PRN   Or  ondansetron (ZOFRAN) injection 4 mg, Q6H PRN  glucose chewable tablet 16 g, PRN  dextrose bolus 10% 125 mL, PRN   Or  dextrose bolus 10% 250 mL, PRN  glucagon (rDNA) injection 1 mg, PRN  dextrose 10 % infusion, Continuous PRN  hydrALAZINE (APRESOLINE) injection 5 mg, Q15 Min PRN      Review of Systems:   14 point ROS obtained but were negative except mentioned in HPI      Physical exam:     Vitals:  /68   Pulse 87   Temp 97.7 °F (36.5 °C) (Axillary)   Resp 18   Ht 6' 0.99\" (1.854 m)   Wt 200 lb 2.8 oz (90.8 kg)   SpO2 94%   BMI 26.42 kg/m²   Constitutional:  on MV  Skin: no rash, turgor wnl  Heent:  eomi, mmm  Neck: no bruits or jvd noted  Cardiovascular:  S1, S2 without m/r/g  Respiratory: trach  Abdomen:  +bs, soft, nt, nd  Ext: bilateral lower extremity edema R>L  Psychiatric: mood and affect appropriate  Musculoskeletal:  Rom, muscular strength intact    Data:   Labs:  CBC:   Recent Labs     10/06/22  0333 10/07/22  0427 10/07/22  2101 10/08/22  0151   WBC 20.6* 16.9*  --  13.3*   HGB 7.1* 6.6* 7.0* 7.6*    280  --  241       BMP:    Recent Labs     10/06/22  0333 10/07/22  0427 10/08/22  0151   * 143 137   K 4.3 4.2 3.9    102 97*   CO2 35* 35* 32   * 109* 108*   CREATININE 1.3 1.3 1.2   GLUCOSE 170* 224* 174*       Ca/Mg/Phos:   Recent Labs     10/06/22  0333 10/07/22  0427 10/08/22  0151   CALCIUM 8.3 8.1* 7.9*   MG 3.30* 3.40* 3.00*   PHOS 4.5 4.0 3.5       Hepatic: No results for input(s): AST, ALT, ALB, BILITOT, ALKPHOS in the last 72 hours. Troponin: No results for input(s): TROPONINI in the last 72 hours. BNP: No results for input(s): BNP in the last 72 hours. Lipids:   Recent Labs     10/06/22  0333   TRIG 78         ABGs:   No results for input(s): PHART, PO2ART, ESA2RFD in the last 72 hours. INR: No results for input(s): INR in the last 72 hours. UA:No results for input(s): Hillman Escort, GLUCOSEU, BILIRUBINUR, Jeanann Dials, BLOODU, PHUR, PROTEINU, UROBILINOGEN, NITRU, LEUKOCYTESUR, LABMICR, URINETYPE in the last 72 hours. Urine Microscopic: No results for input(s): LABCAST, BACTERIA, COMU, HYALCAST, WBCUA, RBCUA, EPIU in the last 72 hours.   Urine Culture: No results for input(s): LABURIN in the last 72 hours. Urine Chemistry:   No results for input(s): Algernon Fortune, PROTEINUR, NAUR in the last 72 hours. IMAGING:  XR CHEST PORTABLE   Final Result      Tiny right lateral pneumothorax is suspected, 5 mm in maximum thickness. This has decreased in size since 10/3/2022. Right basilar Pleurx catheter noted. Small bilateral pleural effusions. Prominent interstitial markings. Stable cardiac mediastinal silhouette. Lines and tubes without change. Subcutaneous emphysema noted. CT CHEST ABDOMEN PELVIS WO CONTRAST   Final Result      1. Severe pneumomediastinum. 2. Small to moderate right hydropneumothorax with similar fluid and gas components with a right-sided Pleurx catheter. 3. Moderate loculated left pleural effusion with atelectasis of the left lower lobe with patency of the central left lower lobe bronchi. 4. Fluid/material filling the bronchus intermedius and right lower lobe bronchi with complete consolidation and volume loss of the right lower lobe. Differential diagnosis would include mucous plugging or obstructing lesion. 5. Tracheostomy tube tip within the trachea   6. Severe subcutaneous emphysema in the neck. CT ABDOMEN AND PELVIS:      FINDINGS:      LIVER: Normal.      GALLBLADDER AND BILIARY TREE: No calcified gallstones. No gallbladder distention. No intra- or extrahepatic biliary dilatation. PANCREAS: Normal.      SPLEEN: Normal.      ADRENAL GLANDS: Normal.      KIDNEYS AND URETERS: Normal.      URINARY BLADDER: Ansari catheter present within collapsed urinary bladder. REPRODUCTIVE ORGANS: No associated masses. BOWEL: Normal diameter, nonobstructed. Feeding tube tip at the level of the ligament of Treitz. LYMPH NODES: No abnormally enlarged nodes.       PERITONEUM/RETROPERITONEUM: Severe pneumoperitoneum extends into the upper abdomen with some of the symmetric multiseptated gas appearing deep to the diaphragm consistent with mild pneumoperitoneum (series 601 was 101). This is likely related to    pneumonia mediastinum dissecting into the abdomen. No fluid collection in the abdomen or pelvis. No ascites. VESSELS: Extensive atherosclerotic calcification of the aorta and iliac arteries. ABDOMINAL WALL: No acute abnormality. BONES: No acute abnormality. Mild chronic L1 compression fracture with previous vertebroplasty. IMPRESSION:      1. Severe pneumomediastinum extends into the upper abdomen with small pneumoperitoneum likely related to extension of pneumoperitoneum into the upper peritoneal cavity. 2. No fluid collection in the abdomen or pelvis. Results were discussed with the surgical team at 7:00 PM on 10/3/2022. XR CHEST PORTABLE   Final Result      Stable appearance of loculated right pneumothorax, with loculated components in the lateral right midlung region and in the right lateral costophrenic sulcus. Stable scattered areas of atelectasis versus scar, most prominent in the medial right lung base and in the left lateral lung base. XR CHEST PORTABLE   Final Result      Small right basilar pneumothorax. Right basilar chest tube without change. Patchy consolidation in the right mid and lower lung as well as the left lower lung-atelectasis versus pneumonia. Trace left pleural effusion. Normal heart size. Lines and tubes without change. Mild improvement in degree of subcutaneous emphysema in the chest and neck. XR CHEST 1 VIEW   Final Result      1. Stable small right-sided pneumothorax and prominent subcutaneous emphysema along the neck and upper superior chest wall, greater in right lung stable   2. Stable left basilar consolidation and pleural effusion      3.  Stable appearing perihilar accentuated markings or airspace disease            XR CHEST PORTABLE   Final Result      Stable small right-sided pneumothorax. More prominent emphysema over the lower neck. No change in bilateral airspace disease. Stable left pleural effusion. XR CHEST PORTABLE   Final Result   1. Bilateral multifocal pneumonia with improvement in the right    pulmonary consolidation since prior study from 9/23/22   2. Mild to moderate left pleural effusion          XR CHEST PORTABLE   Final Result   1. Support lines and tubes are stable. 2.  Stable small right lateral pneumothorax and right basilar    chest tube. 3.  Stable patchy bilateral airspace disease. XR CHEST PORTABLE   Final Result   1. Satisfactory position of right internal jugular central line   2. No interval change in endotracheal tube and nasogastric tube positioning. 3. Extensive bilateral airspace disease with no changes. 4. Left pleural effusion with retrocardiac opacity, unchanged   5. Small stable tiny right lateral pneumothorax and stable position of right chest tube,, Pleurx catheter. XR ABDOMEN (KUB) (SINGLE AP VIEW)   Final Result   1. No residual pneumothorax. 2.  Mild patchy airspace disease bilaterally worse on the right than on prior examination. 3.  Non-obstructive bowel gas pattern. Mildly distended stomach. XR CHEST PORTABLE   Final Result   1. No residual pneumothorax. 2.  Mild patchy airspace disease bilaterally worse on the right than on prior examination. 3.  Non-obstructive bowel gas pattern. Mildly distended stomach. XR CHEST PORTABLE   Final Result      1. Small right pneumothorax appears slightly increased. 2. Mild patchy airspace opacity bilaterally. XR CHEST PORTABLE   Final Result     Pleurx catheter at the right lung base. Trace right    pneumothorax is unchanged. XR CHEST PORTABLE   Final Result      Right-sided chest tube evident in the base, its tip medially. Improvement in the right-sided pneumothorax, since earlier today.       Possible medial collapse of the right lower lobe. Small left effusion. Patchy airspace density/atelectasis in the lungs bilaterally, with no other significant change. XR CHEST PORTABLE   Final Result   1. Right-sided Pleurx catheter in satisfactory position in the lung bases   2. Right-sided post operative pneumothorax, approximately 10%             Assessment/Plan   ZANA  - suspect this is contrast nephropathy  - baseline Cre 0.7. Normal on admission.  - Cr better   - Hold diuretics   - BNP 3k, Protein:Cre 0.3, FENa 0.4%  - closely monitor UOP  - avoid nephrotoxic agent     2. Hypernatremia  - continue free water   - will continue to monitor     3. Hypotension  - pressors as needed     4. Anemia  - daily CBC    5. Acid- base/ Electrolyte imbalance   - optimize lytes    6. Acute hypoxic resp failure  - s/p VATS on 9/19/22 for recurrent pleural effusions  - Intensivist and CTS following    7.  CHF   - EF 65% on 6/16/22 via cardiac cath        Kalyani Blackman MD

## 2022-10-08 NOTE — PROGRESS NOTES
While completing 1600 assessment patient noted to have a red/maroon/purple nonblanchable area to the LLE. Surrounding skin intact. Wound care reconsulted to evaluate the patient, venelex applied to the area, and mepilex in place. Will continue to monitor.

## 2022-10-08 NOTE — PLAN OF CARE
Problem: Chronic Conditions and Co-morbidities  Goal: Patient's chronic conditions and co-morbidity symptoms are monitored and maintained or improved  10/8/2022 0326 by Mary Chapa RN  Outcome: Progressing  Flowsheets (Taken 10/7/2022 2000)  Care Plan - Patient's Chronic Conditions and Co-Morbidity Symptoms are Monitored and Maintained or Improved:   Monitor and assess patient's chronic conditions and comorbid symptoms for stability, deterioration, or improvement   Collaborate with multidisciplinary team to address chronic and comorbid conditions and prevent exacerbation or deterioration   Update acute care plan with appropriate goals if chronic or comorbid symptoms are exacerbated and prevent overall improvement and discharge  10/7/2022 1817 by Silvia John RN  Outcome: Progressing  Flowsheets  Taken 10/7/2022 1600  Care Plan - Patient's Chronic Conditions and Co-Morbidity Symptoms are Monitored and Maintained or Improved: Monitor and assess patient's chronic conditions and comorbid symptoms for stability, deterioration, or improvement  Taken 10/7/2022 1200  Care Plan - Patient's Chronic Conditions and Co-Morbidity Symptoms are Monitored and Maintained or Improved: Monitor and assess patient's chronic conditions and comorbid symptoms for stability, deterioration, or improvement  Taken 10/7/2022 0800  Care Plan - Patient's Chronic Conditions and Co-Morbidity Symptoms are Monitored and Maintained or Improved: Monitor and assess patient's chronic conditions and comorbid symptoms for stability, deterioration, or improvement     Problem: Pain  Goal: Verbalizes/displays adequate comfort level or baseline comfort level  10/8/2022 0326 by Mary Chapa RN  Outcome: Progressing  Flowsheets (Taken 10/8/2022 0000)  Verbalizes/displays adequate comfort level or baseline comfort level:   Encourage patient to monitor pain and request assistance   Assess pain using appropriate pain scale  10/7/2022 1817 by Kylah Jones Moustapha RN  Outcome: Progressing  Flowsheets  Taken 10/7/2022 1600  Verbalizes/displays adequate comfort level or baseline comfort level: Encourage patient to monitor pain and request assistance  Taken 10/7/2022 1200  Verbalizes/displays adequate comfort level or baseline comfort level: Encourage patient to monitor pain and request assistance  Taken 10/7/2022 0800  Verbalizes/displays adequate comfort level or baseline comfort level: Encourage patient to monitor pain and request assistance     Problem: Skin/Tissue Integrity  Goal: Absence of new skin breakdown  Description: 1. Monitor for areas of redness and/or skin breakdown  2. Assess vascular access sites hourly  3. Every 4-6 hours minimum:  Change oxygen saturation probe site  4. Every 4-6 hours:  If on nasal continuous positive airway pressure, respiratory therapy assess nares and determine need for appliance change or resting period.   10/8/2022 0326 by Jeremie Williamson RN  Outcome: Progressing  10/7/2022 1817 by Trell Alcantar RN  Outcome: Progressing

## 2022-10-08 NOTE — PROGRESS NOTES
Clinical Pharmacy Progress Note    Vancomycin - Management by Pharmacy    Consult Date(s): 10/6  Consulting Provider(s): Dr. Leslye Seaman / Plan  Ventilator Associated Pneumonia (VAP) - Vancomycin  Concurrent Antimicrobials: Cefepime 2 g IV q12h (day #3)  Day of Vanc Therapy / Ordered Duration: Day 3 of 7 ordered  Current Dosing Method: Bayesian-Guided AUC Dosing  Therapeutic Goal: -600 mg/L*hr  Current Dose / Plan:   SCr remains stable today at 1.2 today; Documented UOP has trended up over last 24 hours and is ~0.6 mL/kg/hr. Random level this AM drawn ~22 hr after second dose resulted at 13.2 mg/L. Will continue vancomycin 1250 mg (~15 mg/kg) IV q24h at this time. The regimen predicts an estimated  mg/L.hr and trough 13.8 mg/L. Will plan to obtain another level if clinically indicated. MRSA nares swab from 10/6 resulted negative. Will continue to monitor clinical condition and make adjustments to regimen as appropriate    Thank you for consulting Pharmacy! Mica Valentine, PharmD  PGY-1 Pharmacy Resident  [facility]  [id number]  10/8/2022   8:21 AM        Interval Update: Patient remains mechanically ventilated on broad spectrum antibiotics (cefepime, vancomycin). He has been afebrile, but with intermittent tachycardia and hypotension noted over last 24 hours. Norepinephrine (Levophed) has been titrated off since 10/6 afternoon with no further need for vasopressor support at this time. Blood transfusion was required yesterday (10/7) due to hemoglobin of <7 g/dL. Hemoglobin this AM 7.6 g/dL. Urine output noted to have increased to ~0.6 mL/kg/hr over last 24 hours. Plan for patient to get PEG tube on Monday (10/10).     Subjective/Objective: Cristóbal Cristobal is a 65 y.o. male with a PMH significant for atrial fibrillation, CHF (HFpEF), carotid stenosis, HLD, HTN, OA, restrictive lung disease and T2DM who underwent VATS with PleurX catheter placement on 9/19/2022 for recurrent bilateral loculated pleural effusions. His hospital admission has been complicated by acute on chronic hypoxic hypercarbic respiratory failure. Patient was intubated on 9/22/22 with subsequent tracheostomy on 9/30/22. All Dobbs \"Josiah\" underwent bronchoscopy on 10/05/22 and is currently being worked up for ventilator associated pneumonia (VAP). Pharmacy is consulted to dose vancomycin. Ht Readings from Last 1 Encounters:   09/22/22 6' 0.99\" (1.854 m)     Wt Readings from Last 1 Encounters:   10/08/22 200 lb 2.8 oz (90.8 kg)     Current & Prior Antimicrobial Regimen(s):  Cefepime 2 g IV q12h (10/6 - present)  Vancomycin 1250 mg IV q24h (10/6 - present)    Vancomycin Level(s) / Doses:  Date Time Dose Type of Level / Level Interpretation   10/8 0531 1250 mg IV q24h Random level=13.2 mg/dL - Level drawn ~22 hr after last dose  -Estimated  mg/L.hr  -Continue current regimen   Note: Serum levels collected for AUC-based dosing may be high if collected in close proximity to the dose administered. This is not necessarily indicative of toxicity. Cultures & Sensitivities:  Date Site Micro Susceptibility / Result   10/6 MRSA nares negative final   10/5 BAL resp cx normal resp stella preliminary   10/4 Pleural fluid cx no growth final     Recent Labs     10/06/22  0333 10/07/22  0427 10/08/22  0151   CREATININE 1.3 1.3 1.2   * 109* 108*   WBC 20.6* 16.9* 13.3*       Estimated Creatinine Clearance: 57 mL/min (based on SCr of 1.2 mg/dL).     Additional Lab Values / Findings of Note:  Recent Labs     10/07/22  0427   PROCAL 0.36*

## 2022-10-08 NOTE — PROGRESS NOTES
Protein shot administered at this time. Patient appeared to tolerate well, will continue to monitor.

## 2022-10-09 NOTE — PLAN OF CARE
Problem: Chronic Conditions and Co-morbidities  Goal: Patient's chronic conditions and co-morbidity symptoms are monitored and maintained or improved  10/9/2022 0614 by Daylin Breen RN  Outcome: Progressing  Flowsheets (Taken 10/8/2022 2000)  Care Plan - Patient's Chronic Conditions and Co-Morbidity Symptoms are Monitored and Maintained or Improved:   Monitor and assess patient's chronic conditions and comorbid symptoms for stability, deterioration, or improvement   Collaborate with multidisciplinary team to address chronic and comorbid conditions and prevent exacerbation or deterioration   Update acute care plan with appropriate goals if chronic or comorbid symptoms are exacerbated and prevent overall improvement and discharge  10/8/2022 1929 by Enrique Boyer RN  Outcome: Progressing  10/8/2022 1915 by Enrique Boyer RN  Outcome: Progressing  Flowsheets  Taken 10/8/2022 1600  Care Plan - Patient's Chronic Conditions and Co-Morbidity Symptoms are Monitored and Maintained or Improved:   Monitor and assess patient's chronic conditions and comorbid symptoms for stability, deterioration, or improvement   Collaborate with multidisciplinary team to address chronic and comorbid conditions and prevent exacerbation or deterioration   Update acute care plan with appropriate goals if chronic or comorbid symptoms are exacerbated and prevent overall improvement and discharge  Taken 10/8/2022 0800  Care Plan - Patient's Chronic Conditions and Co-Morbidity Symptoms are Monitored and Maintained or Improved:   Monitor and assess patient's chronic conditions and comorbid symptoms for stability, deterioration, or improvement   Collaborate with multidisciplinary team to address chronic and comorbid conditions and prevent exacerbation or deterioration   Update acute care plan with appropriate goals if chronic or comorbid symptoms are exacerbated and prevent overall improvement and discharge     Problem: Discharge Planning  Goal: Discharge to home or other facility with appropriate resources  10/9/2022 0614 by Breanne Cortes RN  Outcome: Progressing  Flowsheets (Taken 10/8/2022 2000)  Discharge to home or other facility with appropriate resources:   Identify barriers to discharge with patient and caregiver   Arrange for needed discharge resources and transportation as appropriate  10/8/2022 1929 by Rosalie Quick RN  Outcome: Progressing  10/8/2022 1915 by Rosalie Quick RN  Outcome: Progressing     Problem: Pain  Goal: Verbalizes/displays adequate comfort level or baseline comfort level  10/9/2022 0614 by Breanne Cortes RN  Outcome: Progressing  Flowsheets (Taken 10/8/2022 2000)  Verbalizes/displays adequate comfort level or baseline comfort level:   Encourage patient to monitor pain and request assistance   Assess pain using appropriate pain scale   Administer analgesics based on type and severity of pain and evaluate response  10/8/2022 1929 by Rosalie Quick RN  Outcome: Progressing  10/8/2022 1915 by Rosalie Quick RN  Outcome: Progressing  Flowsheets (Taken 10/8/2022 1200)  Verbalizes/displays adequate comfort level or baseline comfort level:   Encourage patient to monitor pain and request assistance   Assess pain using appropriate pain scale   Administer analgesics based on type and severity of pain and evaluate response   Implement non-pharmacological measures as appropriate and evaluate response   Consider cultural and social influences on pain and pain management   Notify Licensed Independent Practitioner if interventions unsuccessful or patient reports new pain     Problem: Skin/Tissue Integrity  Goal: Absence of new skin breakdown  Description: 1. Monitor for areas of redness and/or skin breakdown  2. Assess vascular access sites hourly  3. Every 4-6 hours minimum:  Change oxygen saturation probe site  4.   Every 4-6 hours:  If on nasal continuous positive airway pressure, respiratory therapy assess nares and determine need for appliance change or resting period. 10/9/2022 2996 by Keith River RN  Outcome: Progressing  10/8/2022 1929 by Stalin Reddy RN  Outcome: Progressing  10/8/2022 1915 by Stalin Reddy RN  Outcome: Progressing  Note: Pt is as not in [address] [place] [address].

## 2022-10-09 NOTE — PROGRESS NOTES
During trach care this RT noticed redness underneath flange of pt's trach. Redness is noted to be at the bottom of the incision site of the stoma. Trach care performed with soap and water w/ gauze. Rinsed with water and dried. Lidofoam placed under trach. Spoke to [employer], to request wound consult.

## 2022-10-09 NOTE — PLAN OF CARE
Problem: Chronic Conditions and Co-morbidities  Goal: Patient's chronic conditions and co-morbidity symptoms are monitored and maintained or improved  10/9/2022 1926 by Enrique Boyer RN  Outcome: Progressing  Flowsheets (Taken 10/9/2022 1200)  Care Plan - Patient's Chronic Conditions and Co-Morbidity Symptoms are Monitored and Maintained or Improved:   Monitor and assess patient's chronic conditions and comorbid symptoms for stability, deterioration, or improvement   Collaborate with multidisciplinary team to address chronic and comorbid conditions and prevent exacerbation or deterioration   Update acute care plan with appropriate goals if chronic or comorbid symptoms are exacerbated and prevent overall improvement and discharge  10/9/2022 0614 by Daylin Breen RN  Outcome: Progressing  [address] [facility] [address] (Taken 10/8/2022 2000)  Care Plan - Patient's Chronic Conditions and Co-Morbidity Symptoms are Monitored and Maintained or Improved:   Monitor and assess patient's chronic conditions and comorbid symptoms for stability, deterioration, or improvement   Collaborate with multidisciplinary team to address chronic and comorbid conditions and prevent exacerbation or deterioration   Update acute care plan with appropriate goals if chronic or comorbid symptoms are exacerbated and prevent overall improvement and discharge     Problem: Discharge Planning  Goal: Discharge to home or other facility with appropriate resources  10/9/2022 1926 by Enrique Boyer RN  Outcome: Progressing  Flowsheets (Taken 10/9/2022 1200)  Discharge to home or other facility with appropriate resources:   Identify barriers to discharge with patient and caregiver   Identify discharge learning needs (meds, wound care, etc)   Arrange for needed discharge resources and transportation as appropriate  10/9/2022 0614 by Daylin Breen RN  Outcome: Progressing  Flowsheets (Taken 10/8/2022 2000)  Discharge to home or other facility with appropriate resources:   Identify barriers to discharge with patient and caregiver   Arrange for needed discharge resources and transportation as appropriate     Problem: Pain  Goal: Verbalizes/displays adequate comfort level or baseline comfort level  10/9/2022 1926 by Hudson Rodriguez RN  Outcome: Progressing  Flowsheets (Taken 10/8/2022 2000 by Inés Giles RN)  Verbalizes/displays adequate comfort level or baseline comfort level:   Encourage patient to monitor pain and request assistance   Assess pain using appropriate pain scale   Administer analgesics based on type and severity of pain and evaluate response  10/9/2022 0614 by Inés Giels RN  Outcome: Progressing  Flowsheets (Taken 10/8/2022 2000)  Verbalizes/displays adequate comfort level or baseline comfort level:   Encourage patient to monitor pain and request assistance   Assess pain using appropriate pain scale   Administer analgesics based on type and severity of pain and evaluate response     Problem: Safety - Adult  Goal: Free from fall injury  10/9/2022 1926 by Hudson Rodriguez RN  Outcome: Progressing  Flowsheets (Taken 10/9/2022 0612 by Inés Giles RN)  Free From Fall Injury: Instruct family/caregiver on patient safety  10/9/2022 7029 by Inés Giles RN  Outcome: Progressing  Flowsheets (Taken 10/9/2022 0612)  Free From Fall Injury: Instruct family/caregiver on patient safety     Problem: Skin/Tissue Integrity  Goal: Absence of new skin breakdown  Description: 1. Monitor for areas of redness and/or skin breakdown  2. Assess vascular access sites hourly  3. Every 4-6 hours minimum:  Change oxygen saturation probe site  4. Every 4-6 hours:  If on nasal continuous positive airway pressure, respiratory therapy assess nares and determine need for appliance change or resting period. 10/9/2022 1926 by Hudson Rodriguez RN  Outcome: Progressing  Note: Skin as noted in [facility].   10/9/2022 1684 by Inés Giles RN  Outcome: Progressing Problem: Safety - Medical Restraint  Goal: Remains free of injury from restraints (Restraint for Interference with Medical Device)  Description: INTERVENTIONS:  1. Determine that other, less restrictive measures have been tried or would not be effective before applying the restraint  2. Evaluate the patient's condition at the time of restraint application  3. Inform patient/family regarding the reason for restraint  4.  Q2H: Monitor safety, psychosocial status, comfort, nutrition and hydration  10/9/2022 1926 by Claudine Alvarado RN  Outcome: Progressing  Flowsheets (Taken 10/2/2022 1200 by Wali Neville RN)  Remains free of injury from restraints (restraint for interference with medical device):   Determine that other, less restrictive measures have been tried or would not be effective before applying the restraint   Evaluate the patient's condition at the time of restraint application   Inform patient/family regarding the reason for restraint   Every 2 hours: Monitor safety, psychosocial status, comfort, nutrition and hydration  10/9/2022 0614 by Lonnie Sanders RN  Outcome: Progressing     Problem: Nutrition Deficit:  Goal: Optimize nutritional status  10/9/2022 1926 by Claudine Alvarado RN  Outcome: Progressing  Flowsheets (Taken 10/6/2022 1056 by Barbara Fritz RD)  Nutrient intake appropriate for improving, restoring, or maintaining nutritional needs:   Recommend, monitor, and adjust tube feedings and TPN/PPN based on assessed needs   Assess nutritional status and recommend course of action  10/9/2022 0614 by Lonnie Sanders RN  Outcome: Progressing     Problem: ABCDS Injury Assessment  Goal: Absence of physical injury  10/9/2022 1926 by Claudine Alvarado RN  Outcome: Progressing  Flowsheets (Taken 10/9/2022 0612 by Lonnie Sanders RN)  Absence of Physical Injury: Implement safety measures based on patient assessment  10/9/2022 0614 by Lonnie Sanders RN  Outcome: Progressing  Flowsheets (Taken 10/9/2022 0612)  Absence of Physical Injury: Implement safety measures based on patient assessment     Problem: Confusion  Goal: Confusion, delirium, dementia, or psychosis is improved or at baseline  Description: INTERVENTIONS:  1. Assess for possible contributors to thought disturbance, including medications, impaired vision or hearing, underlying metabolic abnormalities, dehydration, psychiatric diagnoses, and notify attending LIP  2. [place] high risk fall precautions, as indicated  3. Provide frequent short contacts to provide reality reorientation, refocusing and direction  4. Decrease environmental stimuli, including noise as appropriate  5. Monitor and intervene to maintain adequate nutrition, hydration, elimination, sleep and activity  6. If unable to ensure safety without constant attention obtain sitter and review sitter guidelines with assigned personnel  7.  Initiate Psychosocial CNS and Spiritual Care consult, as indicated  10/9/2022 1926 by Brooke Chavarria RN  Outcome: Progressing  Flowsheets (Taken 10/9/2022 0800)  Effect of thought disturbance (confusion, delirium, dementia, or psychosis) are managed with adequate functional status:   Assess for contributors to thought disturbance, including medications, impaired vision or hearing, underlying metabolic abnormalities, dehydration, psychiatric diagnoses, notify [place] high risk fall precautions, as indicated   Decrease environmental stimuli, including noise as appropriate   Provide frequent short contacts to provide reality reorientation, refocusing and direction   Monitor and intervene to maintain adequate nutrition, hydration, elimination, sleep and activity  10/9/2022 0614 by Mary Chapa RN  Outcome: Progressing

## 2022-10-09 NOTE — PROGRESS NOTES
Perfect serve to residents to request additional pain management, pt very uncomfortable, restless and grimacing in pain. Awaiting orders.

## 2022-10-09 NOTE — PROGRESS NOTES
ICU Progress Note    Admit Date: 9/19/2022  Day: 20  Vent Day: None  IV Access:Peripheral  IV Fluids:None  Vasopressors:None                Antibiotics: None  Diet: ADULT TUBE FEEDING; Nasogastric; Peptide Based; Continuous; 25; Yes; 10; Q 4 hours; 55; 150; Q 3 hours; Protein; 2 bottles Proteinex daily w/ 30 mL FW flushes before and after    CC: Pleural Effusion (right) s/p VATS and Pleural Catheter Placement 09/19    Interval history:     Patient seen this AM. Patient is alert and responsive. He denies any acute pain.        Medications:     Scheduled Meds:   oxyCODONE  7.5 mg Oral Q4H    insulin lispro  5 Units SubCUTAneous q8h    insulin glargine  28 Units SubCUTAneous Nightly    cefepime  2,000 mg IntraVENous Q12H    albuterol  2.5 mg Nebulization Q4H    pantoprazole  40 mg IntraVENous BID    insulin lispro  0-16 Units SubCUTAneous Q4H    sodium chloride flush  5-40 mL IntraVENous 2 times per day    Venelex   Topical BID    amiodarone bolus  150 mg IntraVENous Once    amiodarone  200 mg Oral Daily    chlorhexidine  15 mL Mouth/Throat BID    [Held by provider] apixaban  5 mg Oral BID    sodium chloride flush  5-40 mL IntraVENous 2 times per day    polyethylene glycol  17 g Oral Daily     Continuous Infusions:   norepinephrine Stopped (10/06/22 1259)    sodium chloride      sodium chloride      dextrose       PRN Meds:HYDROmorphone, sodium chloride flush, sodium chloride, labetalol, acetaminophen, sodium chloride flush, sodium chloride, ondansetron **OR** ondansetron, glucose, dextrose bolus **OR** dextrose bolus, glucagon (rDNA), dextrose, hydrALAZINE    Objective:   Vitals:   T-max:  Patient Vitals for the past 8 hrs:   BP Temp Temp src Pulse Resp SpO2 Weight   10/09/22 0742 -- -- -- (!) 106 18 95 % --   10/09/22 0600 (!) 97/50 -- -- (!) 105 18 95 % 202 lb 2.6 oz (91.7 kg)   10/09/22 0540 -- -- -- -- 20 -- --   10/09/22 0500 129/69 -- -- (!) 112 16 93 % --   10/09/22 0401 -- -- -- 98 16 92 % --   10/09/22 0400 (!) 127/52 97.4 °F (36.3 °C) Axillary 91 17 94 % --   10/09/22 0300 (!) 113/46 -- -- 92 16 -- --   10/09/22 0237 -- -- -- -- 16 -- --   10/09/22 0200 (!) 120/47 -- -- 100 17 -- --       Intake/Output Summary (Last 24 hours) at 10/9/2022 0908  Last data filed at 10/9/2022 0500  Gross per 24 hour   Intake 2318.58 ml   Output 1150 ml   Net 1168.58 ml           Physical Exam  Cardiovascular:      Rate and Rhythm: Normal rate. Rhythm irregular. Comments: Atrial fibrillation  Pulmonary:      Breath sounds: No wheezing, rhonchi or rales. Abdominal:      General: There is no distension. Palpations: Abdomen is soft. Tenderness: There is no abdominal tenderness. There is no guarding. Musculoskeletal:      Right lower leg: Edema present. Left lower leg: Edema present. Comments: Bilateral lower extremity pitting edema has improved however still persists  2+ PE at the bilateral feet   Neurological:      Mental Status: He is alert. Comments: Alert  Responsive to commands         LABS:    CBC:   Recent Labs     10/07/22  0427 10/07/22  2101 10/08/22  0151 10/09/22  0510   WBC 16.9*  --  13.3* 12.0*   HGB 6.6* 7.0* 7.6* 8.2*   HCT 21.3* 22.3* 24.5* 26.3*     --  241 269   MCV 85.9  --  87.1 88.5     Renal:    Recent Labs     10/07/22  0427 10/08/22  0151 10/09/22  0510    137 135*   K 4.2 3.9 4.1    97* 96*   CO2 35* 32 33*   * 108* 112*   CREATININE 1.3 1.2 1.2   GLUCOSE 224* 174* 199*   CALCIUM 8.1* 7.9* 8.2*   MG 3.40* 3.00* 3.30*   PHOS 4.0 3.5 3.8   ANIONGAP 6 8 6     Hepatic:   Recent Labs     10/07/22  0427 10/08/22  0151 10/09/22  0510   LABALBU 2.0* 1.8* 2.2*     Troponin: No results for input(s): TROPONINI in the last 72 hours. BNP: No results for input(s): BNP in the last 72 hours. Lipids: No results for input(s): CHOL, HDL in the last 72 hours.     Invalid input(s): LDLCALCU, TRIGLYCERIDE  ABGs:  No results for input(s): PHART, QAQ1QIA, PO2ART, VSA8CVG, BEART, THGBART, H7IGOMWL, BHX5YTY in the last 72 hours. INR: No results for input(s): INR in the last 72 hours. Lactate: No results for input(s): LACTATE in the last 72 hours. Cultures:  -----------------------------------------------------------------  RAD:   XR CHEST PORTABLE   Final Result      Tiny right lateral pneumothorax is suspected, 5 mm in maximum thickness. This has decreased in size since 10/3/2022. Right basilar Pleurx catheter noted. Small bilateral pleural effusions. Prominent interstitial markings. Stable cardiac mediastinal silhouette. Lines and tubes without change. Subcutaneous emphysema noted. CT CHEST ABDOMEN PELVIS WO CONTRAST   Final Result      1. Severe pneumomediastinum. 2. Small to moderate right hydropneumothorax with similar fluid and gas components with a right-sided Pleurx catheter. 3. Moderate loculated left pleural effusion with atelectasis of the left lower lobe with patency of the central left lower lobe bronchi. 4. Fluid/material filling the bronchus intermedius and right lower lobe bronchi with complete consolidation and volume loss of the right lower lobe. Differential diagnosis would include mucous plugging or obstructing lesion. 5. Tracheostomy tube tip within the trachea   6. Severe subcutaneous emphysema in the neck. CT ABDOMEN AND PELVIS:      FINDINGS:      LIVER: Normal.      GALLBLADDER AND BILIARY TREE: No calcified gallstones. No gallbladder distention. No intra- or extrahepatic biliary dilatation. PANCREAS: Normal.      SPLEEN: Normal.      ADRENAL GLANDS: Normal.      KIDNEYS AND URETERS: Normal.      URINARY BLADDER: Ansari catheter present within collapsed urinary bladder. REPRODUCTIVE ORGANS: No associated masses. BOWEL: Normal diameter, nonobstructed. Feeding tube tip at the level of the ligament of Treitz. LYMPH NODES: No abnormally enlarged nodes.       PERITONEUM/RETROPERITONEUM: Severe pneumoperitoneum extends into the upper abdomen with some of the symmetric multiseptated gas appearing deep to the diaphragm consistent with mild pneumoperitoneum (series 601 was 101). This is likely related to    pneumonia mediastinum dissecting into the abdomen. No fluid collection in the abdomen or pelvis. No ascites. VESSELS: Extensive atherosclerotic calcification of the aorta and iliac arteries. ABDOMINAL WALL: No acute abnormality. BONES: No acute abnormality. Mild chronic L1 compression fracture with previous vertebroplasty. IMPRESSION:      1. Severe pneumomediastinum extends into the upper abdomen with small pneumoperitoneum likely related to extension of pneumoperitoneum into the upper peritoneal cavity. 2. No fluid collection in the abdomen or pelvis. Results were discussed with the surgical team at 7:00 PM on 10/3/2022. XR CHEST PORTABLE   Final Result      Stable appearance of loculated right pneumothorax, with loculated components in the lateral right midlung region and in the right lateral costophrenic sulcus. Stable scattered areas of atelectasis versus scar, most prominent in the medial right lung base and in the left lateral lung base. XR CHEST PORTABLE   Final Result      Small right basilar pneumothorax. Right basilar chest tube without change. Patchy consolidation in the right mid and lower lung as well as the left lower lung-atelectasis versus pneumonia. Trace left pleural effusion. Normal heart size. Lines and tubes without change. Mild improvement in degree of subcutaneous emphysema in the chest and neck. XR CHEST 1 VIEW   Final Result      1. Stable small right-sided pneumothorax and prominent subcutaneous emphysema along the neck and upper superior chest wall, greater in right lung stable   2. Stable left basilar consolidation and pleural effusion      3.  Stable appearing perihilar accentuated markings or airspace disease            XR CHEST PORTABLE   Final Result      Stable small right-sided pneumothorax. More prominent emphysema over the lower neck. No change in bilateral airspace disease. Stable left pleural effusion. XR CHEST PORTABLE   Final Result   1. Bilateral multifocal pneumonia with improvement in the right    pulmonary consolidation since prior study from 9/23/22   2. Mild to moderate left pleural effusion          XR CHEST PORTABLE   Final Result   1. Support lines and tubes are stable. 2.  Stable small right lateral pneumothorax and right basilar    chest tube. 3.  Stable patchy bilateral airspace disease. XR CHEST PORTABLE   Final Result   1. Satisfactory position of right internal jugular central line   2. No interval change in endotracheal tube and nasogastric tube positioning. 3. Extensive bilateral airspace disease with no changes. 4. Left pleural effusion with retrocardiac opacity, unchanged   5. Small stable tiny right lateral pneumothorax and stable position of right chest tube,, Pleurx catheter. XR ABDOMEN (KUB) (SINGLE AP VIEW)   Final Result   1. No residual pneumothorax. 2.  Mild patchy airspace disease bilaterally worse on the right than on prior examination. 3.  Non-obstructive bowel gas pattern. Mildly distended stomach. XR CHEST PORTABLE   Final Result   1. No residual pneumothorax. 2.  Mild patchy airspace disease bilaterally worse on the right than on prior examination. 3.  Non-obstructive bowel gas pattern. Mildly distended stomach. XR CHEST PORTABLE   Final Result      1. Small right pneumothorax appears slightly increased. 2. Mild patchy airspace opacity bilaterally. XR CHEST PORTABLE   Final Result     Pleurx catheter at the right lung base. Trace right    pneumothorax is unchanged. XR CHEST PORTABLE   Final Result      Right-sided chest tube evident in the base, its tip medially. Improvement in the right-sided pneumothorax, since earlier today. Possible medial collapse of the right lower lobe. Small left effusion. Patchy airspace density/atelectasis in the lungs bilaterally, with no other significant change. XR CHEST PORTABLE   Final Result   1. Right-sided Pleurx catheter in satisfactory position in the lung bases   2. Right-sided post operative pneumothorax, approximately 10%               Assessment/Plan:   Onel Abdul is a 65 y.o. male w/PMH HFpEF, carotid artery stenosis, HTN, OA, Restrictive Lung Disease, DM2, HLD who underwent VATS with PleurX catheter placement on 09/19 for recurrent b/l loculated pleural effusions w/subsequent BIPAP failure and hypoxic/hypercapnic respiratory failure requiring Mechanical intubation. Pulmonary:  #Acute Hypoxic/Hypercapnic Respiratory Failure  W/Recurrent Pleural Effusions s/p VATS and PleurX for pleural fluid management 09/19/22, w/associated unintentional weight loss of 30 Ibs, PFTs w/restrictive defect.   -failed to tolerate BIPAP post-op --> Mechanically intubated   Intubation Status:   -ventilation, trach    -current vent settings: RR 14, , FiO2 40%, PEEP 5   -failed  trial w/tachypnea, RSBI 110  - PleurX catheter in place  - Trach in place  - CTS following  - 10/4: CT Chest/Abdomen showed pneumomediastinum extending to pneumoperitoneum  - 10/5: bronchoscopy with aspiration of mucous plugs, culture sent, await results. -  failed  after 2 hrs for tachypnea, rsbi 110.  - for   with Roxicodone 7.5 mg schedule, Seroquel 25 mg addition      ID:    #Leukocytosis  -downtrending WBC  -afebrile  -MRSA nasal probe negative, vancomycin d/c  -Bronch respiratory cx: no WBC or organism  -cefepime day 3      Cardiovascular:     #HFpEF  -w/fluid overload  -w/downtrending BNP  -fluid status:  1.15 L output today   -nephro on board: holding off on diuretics due to renal function, continue to appreciate recs    #Hypotension, resolved  -norepinephrine previously which remains off  -monitor BP    Chronic:  #Paroxysmal Atrial Fibrillation   -Eliquis held, amiodarone  #HLD  -pravastatin 40mg    Renal:  Ansari in place w/good urine output  -continue to monitor    #ZANA  -2/2 ATN w/likely etiology of hypoperfusion due to hypovolemia in addition to  -possible contrast-associated nephropathy  -holding off on diuretics per nephro  -UOP good  -creatinine stable    #Hypernatremia, resolved  -receiving free water flushes of 300 ml Q4H since 10/3/22  W/improvement of Na     GI:  #Pneumoperitoneum   -Elucidated on CT 10/4     #Nutrition  -PEG placement pending GI clearance  -receiving NG tube feedings    Metabolic    #DM2  -w/elevation of glucose to 331 and subsequent improvement  -HDSS  -Lantus 28U  -5 Lispro  -POCT  -hypoglycemia protocol  -A1c:8.1 (1/24/22)      Heme    #Anemia  -s/p 1U pRBC transfusion  -continue to monitor H&H    Neuro:  -analgesia: Tylenol, received dilaudid 0.25 mg overnight, on scheduled oxycodone 5-->7.5 mg  Dispo: Insurance refusal + LTAC  Code Status: FULL CODE  FEN: ADULT TUBE FEEDING; Nasogastric; Peptide Based; Continuous; 25; Yes; 10; Q 4 hours; 55; 150; Q 3 hours; Protein; 2 bottles Proteinex daily w/ 30 mL FW flushes before and after  PPX: Pepcid, Heparin  DISPO: ICU pending  trial     Milvia Graham MD, PGY-1  10/09/22  9:08 AM    Pulmonary & Critical Care     Patient seen and examined. I agree with Dr. Beto Montes history, physical, lab findings, assessment and plan. Pt is POD#19 VATS with Pleurx placement. Patient had bilateral pleural effusions right > left that did not improve with diuresis prompting VATS. Pleura visibly was normal.  Pleurx was placed for ongoing management of pleural fluid. Total protein was quite low at 1.2 on 9/21. LDH was elevated at 305.   proBNP has been as high as 15,496     Patient developed postoperative acute on chronic hypercapnic/hypoxemic respiratory failure. For months he had slowly been getting worse in regards to dyspnea on exertion and an unintentional weight loss of 30 pounds. PFTs as an outpatient shows severe restrictive defect. CT chest August 29 shows bilateral pleural effusions that are moderate but no masses, nodules, or mediastinal adenopathy. I do not feel like we have a firm grasp of the pathology with Danica Kenneth  From the best I can tell his pleural effusions are likely on the basis of CHF but there is some other entity that is driving his respiratory failure, weight loss, and weakness. I repeated a CT chest/abdomen with the following:     CT Chest  1. Severe pneumomediastinum. 2. Small to moderate right hydropneumothorax with similar fluid and gas components with a right-sided Pleurx catheter. 3. Moderate loculated left pleural effusion with atelectasis of the left lower lobe with patency of the central left lower lobe bronchi. 4. Fluid/material filling the bronchus intermedius and right lower lobe bronchi with complete consolidation and volume loss of the right lower lobe. Differential diagnosis would include mucous plugging or obstructing lesion. 5. Tracheostomy tube tip within the trachea   6. Severe subcutaneous emphysema in the neck. CT Abdomen:  IMPRESSION:       1. Severe pneumomediastinum extends into the upper abdomen with small pneumoperitoneum likely related to extension of pneumoperitoneum into the upper peritoneal cavity. 2. No fluid collection in the abdomen or pelvis. Wednesday I performed bronchoscopy that showed extensive mucus plugging on the right with less on the left. Mucus was thick and purulent was therapeutically aspirated and sent for culture. There is no endobronchial mass lesions noted. Will continue airway clearance with albuterol and trach suctioning     I tried a  wean today after seroquel.  He was calm but still failed quickly with PIP 45 with RR in mid 30's while on PRVC of 375 PIP 35 and RR 27. This is difficult to explain     Leukocytosis is down to 12,000 from high of 27,000. Patient is without fever. MRSA nasal probe is negative. Respiratory culture from bronchoscopy unrevealing but was not plated correctly. Continue cefepime      Janie Jay remains off Levophedbut BP occasionally low     Continue current tube feeds. PEG will likely be needed but GI would like to give his pneumoperitoneum time to resolve     Another problem is his insurance refusal to cover an LTAC. Medically Janie Jay is the typical patient that would benefit from a well structured chronic ventilator weaning program found at LTAC's.         Tosha Benitez MD

## 2022-10-09 NOTE — PROGRESS NOTES
Nephrology Progress Note                                                                                                                                                                                                                                                                                                                                                               Office : [phone]     Fax :[phone]    Patient's Name: Marah Chavez    10/9/2022    Reason for Consult: ZANA  Requesting Physician:  Layla Everett MD    Interval History:      Cr stable   Na better    Good UO   Remains on Trach         Past Medical History:   Diagnosis Date    ZANA (acute kidney injury) ([facility] [address] 2022    Carotid stenosis, left 10/12/2011    Chronic back pain     Chronic systolic (congestive) heart failure     Diastolic CHF ([facility] [address]     Erectile dysfunction     Hyperlipidemia     Hypertension     Osteoarthritis     Restrictive lung disease     Type II or unspecified type diabetes mellitus without mention of complication, not stated as uncontrolled        Past Surgical History:   Procedure Laterality Date    CARDIAC CATHETERIZATION  2022    KNEE SURGERY Left     PLEURAL CATH INSERTION N/A 2022    PLEURAL CATHETER PLACEMENT; INTERCOSTAL NERVE BLOCK X4 performed by Shannon Saavedra MD at  [facility] Bilateral     THORACOSCOPY Right 2022    RIGHT VIDEO ASSISTED THORASCOPIC SURGERY AND; performed by Shannon Saavedra MD at [address] N/A 2022    TRACHEOSTOMY performed by Leona Espinoza MD at [address] [facility] History   Problem Relation Age of Onset    Hearing Loss Father     Heart Disease Father 70        MI    High Blood Pressure Father     Diabetes Maternal Grandmother         hypoglycemic    Hearing Loss Maternal Grandmother     Arthritis Maternal Grandfather         reports that he quit smoking about 20 years ago. His smoking use included cigarettes.  He has a 80.00 pack-year smoking history. He has never used smokeless tobacco. He reports current alcohol use. He reports that he does not use drugs. Allergies:   Torsemide and Dye [iodides]    Current Medications:    HYDROmorphone (DILAUDID) injection 0.25 mg, Q3H PRN  oxyCODONE (ROXICODONE) 5 MG/5ML solution 7.5 mg, Q4H  insulin lispro (1 Unit Dial) (HUMALOG/ADMELOG) pen 5 Units, q8h  insulin glargine (LANTUS;BASAGLAR) injection pen 28 Units, Nightly  cefepime (MAXIPIME) 2000 mg IVPB minibag, Q12H  albuterol (PROVENTIL) nebulizer solution 2.5 mg, Q4H  pantoprazole (PROTONIX) injection 40 mg, BID  norepinephrine (LEVOPHED) 16 mg in sodium chloride 0.9 % 250 mL infusion, Continuous  insulin lispro (1 Unit Dial) (HUMALOG/ADMELOG) pen 0-16 Units, Q4H  sodium chloride flush 0.9 % injection 5-40 mL, 2 times per day  sodium chloride flush 0.9 % injection 5-40 mL, PRN  0.9 % sodium chloride infusion, PRN  labetalol (NORMODYNE;TRANDATE) injection 10 mg, Q15 Min PRN  Venelex ointment, BID  amiodarone (CORDARONE) 150 mg in dextrose 5 % 100 mL bolus, Once  amiodarone (CORDARONE) tablet 200 mg, Daily  acetaminophen (TYLENOL) 160 MG/5ML solution 650 mg, Q6H PRN  chlorhexidine (PERIDEX) 0.12 % solution 15 mL, BID  [Held by provider] apixaban (ELIQUIS) tablet 5 mg, BID  sodium chloride flush 0.9 % injection 5-40 mL, 2 times per day  sodium chloride flush 0.9 % injection 5-40 mL, PRN  0.9 % sodium chloride infusion, PRN  polyethylene glycol (GLYCOLAX) packet 17 g, Daily  ondansetron (ZOFRAN-ODT) disintegrating tablet 4 mg, Q8H PRN   Or  ondansetron (ZOFRAN) injection 4 mg, Q6H PRN  glucose chewable tablet 16 g, PRN  dextrose bolus 10% 125 mL, PRN   Or  dextrose bolus 10% 250 mL, PRN  glucagon (rDNA) injection 1 mg, PRN  dextrose 10 % infusion, Continuous PRN  hydrALAZINE (APRESOLINE) injection 5 mg, Q15 Min PRN      Review of Systems:   14 point ROS obtained but were negative except mentioned in HPI      Physical exam:     Vitals: BP (!) 97/50   Pulse (!) 106   Temp 97.4 °F (36.3 °C) (Axillary)   Resp 18   Ht 6' 0.99\" (1.854 m)   Wt 202 lb 2.6 oz (91.7 kg)   SpO2 95%   BMI 26.68 kg/m²   Constitutional:  on MV  Skin: no rash, turgor wnl  Heent:  eomi, mmm  Neck: no bruits or jvd noted  Cardiovascular:  S1, S2 without m/r/g  Respiratory: trach  Abdomen:  +bs, soft, nt, nd  Ext: bilateral lower extremity edema R>L  Psychiatric: mood and affect appropriate  Musculoskeletal:  Rom, muscular strength intact    Data:   Labs:  CBC:   Recent Labs     10/07/22  0427 10/07/22  2101 10/08/22  0151 10/09/22  0510   WBC 16.9*  --  13.3* 12.0*   HGB 6.6* 7.0* 7.6* 8.2*     --  241 269       BMP:    Recent Labs     10/07/22  0427 10/08/22  0151 10/09/22  0510    137 135*   K 4.2 3.9 4.1    97* 96*   CO2 35* 32 33*   * 108* 112*   CREATININE 1.3 1.2 1.2   GLUCOSE 224* 174* 199*       Ca/Mg/Phos:   Recent Labs     10/07/22  0427 10/08/22  0151 10/09/22  0510   CALCIUM 8.1* 7.9* 8.2*   MG 3.40* 3.00* 3.30*   PHOS 4.0 3.5 3.8       Hepatic: No results for input(s): AST, ALT, ALB, BILITOT, ALKPHOS in the last 72 hours. Troponin: No results for input(s): TROPONINI in the last 72 hours. BNP: No results for input(s): BNP in the last 72 hours. Lipids:   No results for input(s): CHOL, TRIG, HDL, LDLCALC, LABVLDL in the last 72 hours. ABGs:   No results for input(s): PHART, PO2ART, NSU3LVU in the last 72 hours. INR: No results for input(s): INR in the last 72 hours. UA:No results for input(s): Janyce Lathe, GLUCOSEU, BILIRUBINUR, Svitlana Dano, BLOODU, PHUR, PROTEINU, UROBILINOGEN, NITRU, LEUKOCYTESUR, LABMICR, URINETYPE in the last 72 hours. Urine Microscopic: No results for input(s): LABCAST, BACTERIA, COMU, HYALCAST, WBCUA, RBCUA, EPIU in the last 72 hours. Urine Culture: No results for input(s): LABURIN in the last 72 hours.   Urine Chemistry:   No results for input(s): Phani Ludwin in the last 72 hours. IMAGING:  XR CHEST PORTABLE   Final Result      Tiny right lateral pneumothorax is suspected, 5 mm in maximum thickness. This has decreased in size since 10/3/2022. Right basilar Pleurx catheter noted. Small bilateral pleural effusions. Prominent interstitial markings. Stable cardiac mediastinal silhouette. Lines and tubes without change. Subcutaneous emphysema noted. CT CHEST ABDOMEN PELVIS WO CONTRAST   Final Result      1. Severe pneumomediastinum. 2. Small to moderate right hydropneumothorax with similar fluid and gas components with a right-sided Pleurx catheter. 3. Moderate loculated left pleural effusion with atelectasis of the left lower lobe with patency of the central left lower lobe bronchi. 4. Fluid/material filling the bronchus intermedius and right lower lobe bronchi with complete consolidation and volume loss of the right lower lobe. Differential diagnosis would include mucous plugging or obstructing lesion. 5. Tracheostomy tube tip within the trachea   6. Severe subcutaneous emphysema in the neck. CT ABDOMEN AND PELVIS:      FINDINGS:      LIVER: Normal.      GALLBLADDER AND BILIARY TREE: No calcified gallstones. No gallbladder distention. No intra- or extrahepatic biliary dilatation. PANCREAS: Normal.      SPLEEN: Normal.      ADRENAL GLANDS: Normal.      KIDNEYS AND URETERS: Normal.      URINARY BLADDER: Ansari catheter present within collapsed urinary bladder. REPRODUCTIVE ORGANS: No associated masses. BOWEL: Normal diameter, nonobstructed. Feeding tube tip at the level of the ligament of Treitz. LYMPH NODES: No abnormally enlarged nodes. PERITONEUM/RETROPERITONEUM: Severe pneumoperitoneum extends into the upper abdomen with some of the symmetric multiseptated gas appearing deep to the diaphragm consistent with mild pneumoperitoneum (series 601 was 101).  This is likely related to    pneumonia mediastinum dissecting into the abdomen. No fluid collection in the abdomen or pelvis. No ascites. VESSELS: Extensive atherosclerotic calcification of the aorta and iliac arteries. ABDOMINAL WALL: No acute abnormality. BONES: No acute abnormality. Mild chronic L1 compression fracture with previous vertebroplasty. IMPRESSION:      1. Severe pneumomediastinum extends into the upper abdomen with small pneumoperitoneum likely related to extension of pneumoperitoneum into the upper peritoneal cavity. 2. No fluid collection in the abdomen or pelvis. Results were discussed with the surgical team at 7:00 PM on 10/3/2022. XR CHEST PORTABLE   Final Result      Stable appearance of loculated right pneumothorax, with loculated components in the lateral right midlung region and in the right lateral costophrenic sulcus. Stable scattered areas of atelectasis versus scar, most prominent in the medial right lung base and in the left lateral lung base. XR CHEST PORTABLE   Final Result      Small right basilar pneumothorax. Right basilar chest tube without change. Patchy consolidation in the right mid and lower lung as well as the left lower lung-atelectasis versus pneumonia. Trace left pleural effusion. Normal heart size. Lines and tubes without change. Mild improvement in degree of subcutaneous emphysema in the chest and neck. XR CHEST 1 VIEW   Final Result      1. Stable small right-sided pneumothorax and prominent subcutaneous emphysema along the neck and upper superior chest wall, greater in right lung stable   2. Stable left basilar consolidation and pleural effusion      3. Stable appearing perihilar accentuated markings or airspace disease            XR CHEST PORTABLE   Final Result      Stable small right-sided pneumothorax. More prominent emphysema over the lower neck. No change in bilateral airspace disease. Stable left pleural effusion.       XR CHEST PORTABLE   Final Result   1. Bilateral multifocal pneumonia with improvement in the right    pulmonary consolidation since prior study from 9/23/22   2. Mild to moderate left pleural effusion          XR CHEST PORTABLE   Final Result   1. Support lines and tubes are stable. 2.  Stable small right lateral pneumothorax and right basilar    chest tube. 3.  Stable patchy bilateral airspace disease. XR CHEST PORTABLE   Final Result   1. Satisfactory position of right internal jugular central line   2. No interval change in endotracheal tube and nasogastric tube positioning. 3. Extensive bilateral airspace disease with no changes. 4. Left pleural effusion with retrocardiac opacity, unchanged   5. Small stable tiny right lateral pneumothorax and stable position of right chest tube,, Pleurx catheter. XR ABDOMEN (KUB) (SINGLE AP VIEW)   Final Result   1. No residual pneumothorax. 2.  Mild patchy airspace disease bilaterally worse on the right than on prior examination. 3.  Non-obstructive bowel gas pattern. Mildly distended stomach. XR CHEST PORTABLE   Final Result   1. No residual pneumothorax. 2.  Mild patchy airspace disease bilaterally worse on the right than on prior examination. 3.  Non-obstructive bowel gas pattern. Mildly distended stomach. XR CHEST PORTABLE   Final Result      1. Small right pneumothorax appears slightly increased. 2. Mild patchy airspace opacity bilaterally. XR CHEST PORTABLE   Final Result     Pleurx catheter at the right lung base. Trace right    pneumothorax is unchanged. XR CHEST PORTABLE   Final Result      Right-sided chest tube evident in the base, its tip medially. Improvement in the right-sided pneumothorax, since earlier today. Possible medial collapse of the right lower lobe. Small left effusion. Patchy airspace density/atelectasis in the lungs bilaterally, with no other significant change. XR CHEST PORTABLE   Final Result   1. Right-sided Pleurx catheter in satisfactory position in the lung bases   2. Right-sided post operative pneumothorax, approximately 10%             Assessment/Plan   ZANA  - suspect this is contrast nephropathy  - baseline Cre 0.7. Normal on admission.  - Cr better   - Hold diuretics   - BNP 3k, Protein:Cre 0.3, FENa 0.4%  - closely monitor UOP  - avoid nephrotoxic agent     2. Hypernatremia  - continue free water   - will continue to monitor     3. Hypotension  - pressors as needed     4. Anemia  - daily CBC    5. Acid- base/ Electrolyte imbalance   - optimize lytes    6. Acute hypoxic resp failure  - s/p VATS on 9/19/22 for recurrent pleural effusions  - Intensivist and CTS following    7.  CHF   - EF 65% on 6/16/22 via cardiac cath        Lc Andersen MD

## 2022-10-09 NOTE — PROGRESS NOTES
ICU CT SURGERY DAILY PROGRESS NOTE    CC: Pleural effusions s/p R PleurX catheter placement    SUBJECTIVE:   Interval Hx:   Patient's Pleur-X was drained yesterday with 375 cc output. He rested well overnight. Intermittently tachycardic but otherwise HDS and afebrile. He is restless this AM with generalized pain. ROS: A 14 point review of systems was conducted, significant findings as noted above. All other systems negative. OBJECTIVE:   Vitals:   Vitals:    10/09/22 0500 10/09/22 0540 10/09/22 0600 10/09/22 0742   BP: 129/69  (!) 97/50    Pulse: (!) 112  (!) 105 (!) 106   Resp: 16 20 18 18   Temp:       TempSrc:       SpO2: 93%  95% 95%   Weight:   202 lb 2.6 oz (91.7 kg)    Height:           I/O:   Intake/Output Summary (Last 24 hours) at 10/9/2022 0801  Last data filed at 10/9/2022 0500  Gross per 24 hour   Intake 2318.58 ml   Output 1150 ml   Net 1168.58 ml       I/O last 3 completed shifts: In: 5089.6 [I.V.:824.1; Blood:492.5; NG/GT:3773]  Out: 2050 [Urine:1900; Stool:150]    Diet: ADULT TUBE FEEDING; Nasogastric; Peptide Based; Continuous; 25; Yes; 10; Q 4 hours; 55; 150; Q 3 hours; Protein; 2 bottles Proteinex daily w/ 30 mL FW flushes before and after      Physical Examination:   General appearance: Mechanically ventilated. Appropriately nods head and uses hand gestures in response to questions. Does not attempt to verbalize   HEENT: NC/AT, EOMI, trachea midline, no JVD. Crepitus noted in the neck, supraclavicular region, improving. Corpak in right nostril w/ bridle. Tracheostomy in place. Chest/Lungs: On mechanical ventilation via tracheostomy; R PleurX catheter capped with dressing CLD  Cardiovascular: Atrial fibrillation   Abdomen: Soft, non-tender, non-distended. Genitourinary/Anorectal: Ansari in place with clear yellow urine. Rectal tube in place with brown liquid stool. Skin: Warm and dry, no rashes. Sacral decubitus ulcer   Extremities: Pitting edema of the b/l feet.  No cyanosis; SCDs in place    Labs:  CBC:   Recent Labs     10/07/22  0427 10/07/22  2101 10/08/22  0151 10/09/22  0510   WBC 16.9*  --  13.3* 12.0*   HGB 6.6* 7.0* 7.6* 8.2*   HCT 21.3* 22.3* 24.5* 26.3*     --  241 269         BMP:   Recent Labs     10/07/22  0427 10/08/22  0151 10/09/22  0510    137 135*   K 4.2 3.9 4.1    97* 96*   CO2 35* 32 33*   * 108* 112*   CREATININE 1.3 1.2 1.2   GLUCOSE 224* 174* 199*       LFT's:   No results for input(s): AST, ALT, ALB, BILITOT, ALKPHOS in the last 72 hours. Troponin: No results for input(s): TROPONINI in the last 72 hours. BNP: No results for input(s): BNP in the last 72 hours. ABGs:   No results for input(s): PHART, HCJ8FFR, PO2ART in the last 72 hours. INR:   No results for input(s): INR in the last 72 hours. U/A:No results for input(s): NITRITE, COLORU, PHUR, LABCAST, WBCUA, RBCUA, MUCUS, TRICHOMONAS, YEAST, BACTERIA, CLARITYU, SPECGRAV, LEUKOCYTESUR, UROBILINOGEN, BILIRUBINUR, BLOODU, GLUCOSEU, AMORPHOUS in the last 72 hours. Invalid input(s): Oc Lusher     Rad:   XR CHEST PORTABLE   Final Result      Tiny right lateral pneumothorax is suspected, 5 mm in maximum thickness. This has decreased in size since 10/3/2022. Right basilar Pleurx catheter noted. Small bilateral pleural effusions. Prominent interstitial markings. Stable cardiac mediastinal silhouette. Lines and tubes without change. Subcutaneous emphysema noted. CT CHEST ABDOMEN PELVIS WO CONTRAST   Final Result      1. Severe pneumomediastinum. 2. Small to moderate right hydropneumothorax with similar fluid and gas components with a right-sided Pleurx catheter. 3. Moderate loculated left pleural effusion with atelectasis of the left lower lobe with patency of the central left lower lobe bronchi. 4. Fluid/material filling the bronchus intermedius and right lower lobe bronchi with complete consolidation and volume loss of the right lower lobe. Differential diagnosis would include mucous plugging or obstructing lesion. 5. Tracheostomy tube tip within the trachea   6. Severe subcutaneous emphysema in the neck. CT ABDOMEN AND PELVIS:      FINDINGS:      LIVER: Normal.      GALLBLADDER AND BILIARY TREE: No calcified gallstones. No gallbladder distention. No intra- or extrahepatic biliary dilatation. PANCREAS: Normal.      SPLEEN: Normal.      ADRENAL GLANDS: Normal.      KIDNEYS AND URETERS: Normal.      URINARY BLADDER: Ansari catheter present within collapsed urinary bladder. REPRODUCTIVE ORGANS: No associated masses. BOWEL: Normal diameter, nonobstructed. Feeding tube tip at the level of the ligament of Treitz. LYMPH NODES: No abnormally enlarged nodes. PERITONEUM/RETROPERITONEUM: Severe pneumoperitoneum extends into the upper abdomen with some of the symmetric multiseptated gas appearing deep to the diaphragm consistent with mild pneumoperitoneum (series 601 was 101). This is likely related to    pneumonia mediastinum dissecting into the abdomen. No fluid collection in the abdomen or pelvis. No ascites. VESSELS: Extensive atherosclerotic calcification of the aorta and iliac arteries. ABDOMINAL WALL: No acute abnormality. BONES: No acute abnormality. Mild chronic L1 compression fracture with previous vertebroplasty. IMPRESSION:      1. Severe pneumomediastinum extends into the upper abdomen with small pneumoperitoneum likely related to extension of pneumoperitoneum into the upper peritoneal cavity. 2. No fluid collection in the abdomen or pelvis. Results were discussed with the surgical team at 7:00 PM on 10/3/2022. XR CHEST PORTABLE   Final Result      Stable appearance of loculated right pneumothorax, with loculated components in the lateral right midlung region and in the right lateral costophrenic sulcus.       Stable scattered areas of atelectasis versus scar, most prominent in the medial right lung base and in the left lateral lung base. XR CHEST PORTABLE   Final Result      Small right basilar pneumothorax. Right basilar chest tube without change. Patchy consolidation in the right mid and lower lung as well as the left lower lung-atelectasis versus pneumonia. Trace left pleural effusion. Normal heart size. Lines and tubes without change. Mild improvement in degree of subcutaneous emphysema in the chest and neck. XR CHEST 1 VIEW   Final Result      1. Stable small right-sided pneumothorax and prominent subcutaneous emphysema along the neck and upper superior chest wall, greater in right lung stable   2. Stable left basilar consolidation and pleural effusion      3. Stable appearing perihilar accentuated markings or airspace disease            XR CHEST PORTABLE   Final Result      Stable small right-sided pneumothorax. More prominent emphysema over the lower neck. No change in bilateral airspace disease. Stable left pleural effusion. XR CHEST PORTABLE   Final Result   1. Bilateral multifocal pneumonia with improvement in the right    pulmonary consolidation since prior study from 9/23/22   2. Mild to moderate left pleural effusion          XR CHEST PORTABLE   Final Result   1. Support lines and tubes are stable. 2.  Stable small right lateral pneumothorax and right basilar    chest tube. 3.  Stable patchy bilateral airspace disease. XR CHEST PORTABLE   Final Result   1. Satisfactory position of right internal jugular central line   2. No interval change in endotracheal tube and nasogastric tube positioning. 3. Extensive bilateral airspace disease with no changes. 4. Left pleural effusion with retrocardiac opacity, unchanged   5. Small stable tiny right lateral pneumothorax and stable position of right chest tube,, Pleurx catheter. XR ABDOMEN (KUB) (SINGLE AP VIEW)   Final Result   1.   No residual pneumothorax. 2.  Mild patchy airspace disease bilaterally worse on the right than on prior examination. 3.  Non-obstructive bowel gas pattern. Mildly distended stomach. XR CHEST PORTABLE   Final Result   1. No residual pneumothorax. 2.  Mild patchy airspace disease bilaterally worse on the right than on prior examination. 3.  Non-obstructive bowel gas pattern. Mildly distended stomach. XR CHEST PORTABLE   Final Result      1. Small right pneumothorax appears slightly increased. 2. Mild patchy airspace opacity bilaterally. XR CHEST PORTABLE   Final Result     Pleurx catheter at the right lung base. Trace right    pneumothorax is unchanged. XR CHEST PORTABLE   Final Result      Right-sided chest tube evident in the base, its tip medially. Improvement in the right-sided pneumothorax, since earlier today. Possible medial collapse of the right lower lobe. Small left effusion. Patchy airspace density/atelectasis in the lungs bilaterally, with no other significant change. XR CHEST PORTABLE   Final Result   1. Right-sided Pleurx catheter in satisfactory position in the lung bases   2. Right-sided post operative pneumothorax, approximately 10%             ASSESSMENT AND PLAN:   Ivonne Restrepo is a 65 y.o. male with history of diastolic heart failure, atrial fibrillation, and DM with recurrent pleural effusions s/p R PleurX catheter placement (9/19), POD #19. Neuro:   Analgesia  - Continue scheduled Tylenol. Dilaudid and oxycodone PRN    Cardiovascular:   History of paroxysmal atrial fibrillation  -Cont amiodarone  - Eliquis held for PEG placement Monday     HFpEF  - Metoprolol IV (hold due to hypotension)  - Kiran Irizarry held  - Last echo (1/25/22): LVEF = 72-48%, grade 2 diastolic dysfunction    Hyperlipidemia  - Cont Pravastatin 40mg    Pulmonary:   S/P R PleurX catheter placement (9/19), POD #19  - Capped Catheter.  Will drain every other day - drain tomorrow  - [facility] pending    Acute on chronic respiratory failure  - Mechanically intubated on 9/22. Currently PRVC 14/375/5/40%. Vent management per pulm/crit care. - Tracheostomy 9/30  - Pulmonology following, appreciate recommendations. - Baseline on home O2 3-4L NC   -Critical care conducted bronchoscopy 10/5 - no masses noted, only white mucous secretions in the airways. GI:   -Plan for PEG Monday with GI   - Miralax  - Will hold TF Sunday night in anticipation for   -Rectal tube in place, continue      FEN:    -Replace electrolytes per protocol  - Diet: ADULT TUBE FEEDING; Nasogastric; Peptide Based; Continuous; 25; Yes; 10; Q 4 hours; 55; 150; Q 3 hours; Protein; 2 bottles Proteinex daily w/ 30 mL FW flushes before and after   -SLP consulted, will inquire about Passy Kenneth valve for speaking and eating. Appreciate recs. /Renal:   - Cont Ansari   - Creatinine 1.2 stable  -  from 108, 109, 105, 110  - Nephrology consulted. Appreciate recommendations. Hem/ID/wound:   - Hemoglobin 8.2 from 7.6, 7.0  - WBC 12.0 from 13.3, 16.9, 20.6  - Continue Vanc and cefepime   - F/u BAL cx's no organisms seen  - Sacral wound per wound care    Endo:   History of DMII  - Last A1C 8.1% (1/24/22)  - Lantus 28 nightly and HDSSI    Prophylaxis:   DVT Ppx: SCDs and eliquis  GI Ppx: pepcid    Access:  Central Access: R IJ CVC, placed 9/22             Ansari Date placed: 9/20  Rectal tube placed: 10/4    Mobility:  OOB and activity as tolerated    Dispo:   ICU  Will discuss with case management as he ideally needs placement at an LTAC but case management has previously indicated he would not be accepted to [facility] unless he has hemodialysis in addition to his tracheostomy    Code Status: Full Code  -----------------------------  Abdoul Graham DO, [address] [facility]  PGY1, General Surgery  10/09/22  8:04 AM    I am post-call today and will not be on campus.  Please contact Dr. Sai Lr at [phone] for questions or concerns regarding this patient.

## 2022-10-10 NOTE — PROGRESS NOTES
Perfect serve sent to surgery regarding repeat hgb of 6.9. Waiting for a response, will continue to monitor.

## 2022-10-10 NOTE — PROGRESS NOTES
Gastroenterology Progress Note      Betzy Hardwick is a 65 y.o. male patient. Principal Problem:    Pleural effusion on right  Active Problems:    Acute on chronic respiratory failure with hypoxia and hypercapnia (HCC)    ZAAN (acute kidney injury) ([address]    Mucus plugging of bronchi  Resolved Problems:    * No resolved hospital problems. *      SUBJECTIVE: The patient is in need of a feeding tube but there is still some unexplained free air    ROS:  No fever, chills  No chest pain, palpitations  No SOB, cough  Gastrointestinal ROS: no abdominal pain, nausea, vomiting     Physical    VITALS:  /67   Pulse 91   Temp 97.9 °F (36.6 °C) (Temporal)   Resp 11   Ht 6' 0.99\" (1.854 m)   Wt 210 lb 15.7 oz (95.7 kg)   SpO2 93%   BMI 27.84 kg/m²   TEMPERATURE:  Current - Temp: 97.9 °F (36.6 °C); Max - Temp  Av.9 °F (36.6 °C)  Min: 97.3 °F (36.3 °C)  Max: 98.4 °F (36.9 °C)    NAD  RRR, Nl s1s2  Lungs CTA Bilaterally, normal effort  Abdomen soft, ND, NT, no HSM, Bowel sounds normal.    Data    Data Review:    Recent Labs     10/08/22  0151 10/09/22  0510 10/10/22  0547 10/10/22  0635   WBC 13.3* 12.0* 6.9  --    HGB 7.6* 8.2* 6.8* 6.9*   HCT 24.5* 26.3* 20.7* 21.4*   MCV 87.1 88.5 88.5  --     269 238  --      Recent Labs     10/08/22  0151 10/09/22  0510 10/10/22  0547    135* 136   K 3.9 4.1 3.9   CL 97* 96* 97*   CO2 32 33* 33*   PHOS 3.5 3.8 2.5   * 112* 111*   CREATININE 1.2 1.2 1.1     No results for input(s): AST, ALT, ALB, BILIDIR, BILITOT, ALKPHOS in the last 72 hours. No results for input(s): LIPASE, AMYLASE in the last 72 hours. No results for input(s): PROTIME, INR in the last 72 hours. No results for input(s): PTT in the last 72 hours.     Radiology Review:        ASSESSMENT need for feeding tube  Unexplained free air          PLAN check a CT scan if CT scan is totally normal then we will proceed with percutaneous endoscopic gastrostomy tomorrow    Melisa Ny>MD [facility]

## 2022-10-10 NOTE — PROGRESS NOTES
ICU CT SURGERY DAILY PROGRESS NOTE    CC: Pleural effusions s/p R PleurX catheter placement    SUBJECTIVE:   Interval Hx:   HR  (afib), DBP 49-65, MAPs 56-84. RR 23-30 on PRVC 14/375/2/35%. POC glucose this am 91 from 60 this am.  VSV trial attempted by [facility], did not tolerate. TF's and eliquis currently held for PEG today    ROS: A 14 point review of systems was conducted, significant findings as noted above. All other systems negative. OBJECTIVE:   Vitals:   Vitals:    10/10/22 0400 10/10/22 0401 10/10/22 0500 10/10/22 0600   BP: (!) 97/56  (!) 85/52 (!) 78/48   Pulse: 91 76 93 77   Resp: 29 27 24 26   Temp:       TempSrc:       SpO2: 95% 95% 95% 96%   Weight:       Height:           I/O:   Intake/Output Summary (Last 24 hours) at 10/10/2022 0744  Last data filed at 10/10/2022 0505  Gross per 24 hour   Intake 1756.55 ml   Output 1390 ml   Net 366.55 ml       I/O last 3 completed shifts: In: 3278.6 [I.V.:182.6; NG/GT:3096]  Out: 1940 [Urine:1940]    Diet: Diet NPO      Physical Examination:   General appearance: Mechanically ventilated. Appropriately nods head and uses hand gestures in response to questions. Does not attempt to verbalize   HEENT: NC/AT, EOMI, trachea midline, no JVD. Tracheostomy leaking and with minimal erythema on edges. Corpak in right nostril w/ bridle. Chest/Lungs: On mechanical ventilation via tracheostomy; R PleurX catheter capped with dressing CLD  Cardiovascular: Atrial fibrillation   Abdomen: Soft, non-tender, non-distended. Genitourinary/Anorectal: Ansari in place with clear yellow urine. Rectal tube in place with brown liquid stool. Skin: Warm and dry, no rashes. Sacral decubitus ulcer   Extremities: Pitting edema of the b/l feet.  No cyanosis; SCDs in place    Labs:  CBC:   Recent Labs     10/08/22  0151 10/09/22  0510 10/10/22  0547 10/10/22  0635   WBC 13.3* 12.0* 6.9  --    HGB 7.6* 8.2* 6.8* 6.9*   HCT 24.5* 26.3* 20.7* 21.4*    269 238  --          BMP: Recent Labs     10/08/22  0151 10/09/22  0510 10/10/22  0547    135* 136   K 3.9 4.1 3.9   CL 97* 96* 97*   CO2 32 33* 33*   * 112* 111*   CREATININE 1.2 1.2 1.1   GLUCOSE 174* 199* 68*       LFT's:   No results for input(s): AST, ALT, ALB, BILITOT, ALKPHOS in the last 72 hours. Troponin: No results for input(s): TROPONINI in the last 72 hours. BNP: No results for input(s): BNP in the last 72 hours. ABGs:   No results for input(s): PHART, IDE9IIO, PO2ART in the last 72 hours. INR:   No results for input(s): INR in the last 72 hours. U/A:No results for input(s): NITRITE, COLORU, PHUR, LABCAST, WBCUA, RBCUA, MUCUS, TRICHOMONAS, YEAST, BACTERIA, CLARITYU, SPECGRAV, LEUKOCYTESUR, UROBILINOGEN, BILIRUBINUR, BLOODU, GLUCOSEU, AMORPHOUS in the last 72 hours. Invalid input(s): [place]     Rad:   XR CHEST PORTABLE   Final Result      Tiny right lateral pneumothorax is suspected, 5 mm in maximum thickness. This has decreased in size since 10/3/2022. Right basilar Pleurx catheter noted. Small bilateral pleural effusions. Prominent interstitial markings. Stable cardiac mediastinal silhouette. Lines and tubes without change. Subcutaneous emphysema noted. CT CHEST ABDOMEN PELVIS WO CONTRAST   Final Result      1. Severe pneumomediastinum. 2. Small to moderate right hydropneumothorax with similar fluid and gas components with a right-sided Pleurx catheter. 3. Moderate loculated left pleural effusion with atelectasis of the left lower lobe with patency of the central left lower lobe bronchi. 4. Fluid/material filling the bronchus intermedius and right lower lobe bronchi with complete consolidation and volume loss of the right lower lobe. Differential diagnosis would include mucous plugging or obstructing lesion. 5. Tracheostomy tube tip within the trachea   6. Severe subcutaneous emphysema in the neck.          CT ABDOMEN AND PELVIS:      FINDINGS:      LIVER: Normal.      GALLBLADDER AND BILIARY TREE: No calcified gallstones. No gallbladder distention. No intra- or extrahepatic biliary dilatation. PANCREAS: Normal.      SPLEEN: Normal.      ADRENAL GLANDS: Normal.      KIDNEYS AND URETERS: Normal.      URINARY BLADDER: Ansari catheter present within collapsed urinary bladder. REPRODUCTIVE ORGANS: No associated masses. BOWEL: Normal diameter, nonobstructed. Feeding tube tip at the level of the ligament of Treitz. LYMPH NODES: No abnormally enlarged nodes. PERITONEUM/RETROPERITONEUM: Severe pneumoperitoneum extends into the upper abdomen with some of the symmetric multiseptated gas appearing deep to the diaphragm consistent with mild pneumoperitoneum (series 601 was 101). This is likely related to    pneumonia mediastinum dissecting into the abdomen. No fluid collection in the abdomen or pelvis. No ascites. VESSELS: Extensive atherosclerotic calcification of the aorta and iliac arteries. ABDOMINAL WALL: No acute abnormality. BONES: No acute abnormality. Mild chronic L1 compression fracture with previous vertebroplasty. IMPRESSION:      1. Severe pneumomediastinum extends into the upper abdomen with small pneumoperitoneum likely related to extension of pneumoperitoneum into the upper peritoneal cavity. 2. No fluid collection in the abdomen or pelvis. Results were discussed with the surgical team at 7:00 PM on 10/3/2022. XR CHEST PORTABLE   Final Result      Stable appearance of loculated right pneumothorax, with loculated components in the lateral right midlung region and in the right lateral costophrenic sulcus. Stable scattered areas of atelectasis versus scar, most prominent in the medial right lung base and in the left lateral lung base. XR CHEST PORTABLE   Final Result      Small right basilar pneumothorax. Right basilar chest tube without change.       Patchy consolidation in the right mid and lower lung as well as the left lower lung-atelectasis versus pneumonia. Trace left pleural effusion. Normal heart size. Lines and tubes without change. Mild improvement in degree of subcutaneous emphysema in the chest and neck. XR CHEST 1 VIEW   Final Result      1. Stable small right-sided pneumothorax and prominent subcutaneous emphysema along the neck and upper superior chest wall, greater in right lung stable   2. Stable left basilar consolidation and pleural effusion      3. Stable appearing perihilar accentuated markings or airspace disease            XR CHEST PORTABLE   Final Result      Stable small right-sided pneumothorax. More prominent emphysema over the lower neck. No change in bilateral airspace disease. Stable left pleural effusion. XR CHEST PORTABLE   Final Result   1. Bilateral multifocal pneumonia with improvement in the right    pulmonary consolidation since prior study from 9/23/22   2. Mild to moderate left pleural effusion          XR CHEST PORTABLE   Final Result   1. Support lines and tubes are stable. 2.  Stable small right lateral pneumothorax and right basilar    chest tube. 3.  Stable patchy bilateral airspace disease. XR CHEST PORTABLE   Final Result   1. Satisfactory position of right internal jugular central line   2. No interval change in endotracheal tube and nasogastric tube positioning. 3. Extensive bilateral airspace disease with no changes. 4. Left pleural effusion with retrocardiac opacity, unchanged   5. Small stable tiny right lateral pneumothorax and stable position of right chest tube,, Pleurx catheter. XR ABDOMEN (KUB) (SINGLE AP VIEW)   Final Result   1. No residual pneumothorax. 2.  Mild patchy airspace disease bilaterally worse on the right than on prior examination. 3.  Non-obstructive bowel gas pattern. Mildly distended stomach. XR CHEST PORTABLE   Final Result   1.   No residual pneumothorax. 2.  Mild patchy airspace disease bilaterally worse on the right than on prior examination. 3.  Non-obstructive bowel gas pattern. Mildly distended stomach. XR CHEST PORTABLE   Final Result      1. Small right pneumothorax appears slightly increased. 2. Mild patchy airspace opacity bilaterally. XR CHEST PORTABLE   Final Result     Pleurx catheter at the right lung base. Trace right    pneumothorax is unchanged. XR CHEST PORTABLE   Final Result      Right-sided chest tube evident in the base, its tip medially. Improvement in the right-sided pneumothorax, since earlier today. Possible medial collapse of the right lower lobe. Small left effusion. Patchy airspace density/atelectasis in the lungs bilaterally, with no other significant change. XR CHEST PORTABLE   Final Result   1. Right-sided Pleurx catheter in satisfactory position in the lung bases   2. Right-sided post operative pneumothorax, approximately 10%             ASSESSMENT AND PLAN:   Geetha Lennon is a 65 y.o. male with history of diastolic heart failure, atrial fibrillation, and DM with recurrent pleural effusions s/p R PleurX catheter placement (9/19), POD #21. Neuro:   Analgesia  - Continue oxycodone scheduled. Dilaudid and tylenol PRN     Cardiovascular:   History of paroxysmal atrial fibrillation  -Cont amiodarone  - Eliquis held for PEG placement today - restart tomorrow     HFpEF  - Last echo (1/25/22): LVEF = 20-28%, grade 2 diastolic dysfunction     Hyperlipidemia  - Cont Pravastatin 40mg     Pulmonary:   S/P R PleurX catheter placement (9/19), POD #21  - Capped Catheter. Will drain every other day - drain today  - /C pending     Acute on chronic respiratory failure  - Mechanically [facility] on 9/22. [facility] Vent management per pulm/crit care. - Tracheostomy 9/30.  Tracheostomy leaking - consult crit care   - Pulmonology following, appreciate recommendations. - Baseline on home O2 3-4L NC   -Critical care conducted bronchoscopy 10/5 - no masses noted, only white mucous secretions in the airways. GI:   -Plan for PEG today with GI   - Miralax  - Will restart TF's post PEG placement per GI recommendations.   -Rectal tube in place, continue        FEN:    -Replace electrolytes per protocol  - Diet: Diet NPO   -SLP consulted, will inquire about Passy Kenneth valve for speaking and eating. Appreciate recs. /Renal:   - Cont Ansari   - Creatinine 1.1 from 1.2  -  from 112,108, 109, 105, 110  - Nephrology consulted. Appreciate recommendations. Hem/ID/wound:   - Hemoglobin 6.6 from 8.2 from 7.6, 7.0 - will transfuse this am  - WBC 6.9 from 12.0, 13.3, 16.9, 20.6  - Continue Vanc and cefepime   - BAL cx's no organisms seen - no organisms seen, but sample streaked incorrectly. - Sacral wound per wound care     Endo:   History of DMII  - Last A1C 8.1% (1/24/22)  - Lantus 28 nightly and HDSSI     Prophylaxis:   DVT Ppx: SCDs  GI Ppx: pepcid     Access:  Central Access: R IJ CVC, placed 9/22             Ansari Date placed: 9/20  Rectal tube placed: 10/4     Mobility:  OOB and activity as tolerated     Dispo:   ICU  Case management assisting with dispo - pt would benefit from LTAC over SNF.       Code Status: Full Code  -----------------------------  Martita Roland DO   PGY1, General Surgery  10/10/22  7:49 AM  Pager # [phone]

## 2022-10-10 NOTE — CARE COORDINATION
Case management is following for discharge planning. The chart was reviewed. Spoke with Toby Arreaga from [place]. There has not been any communication from [facility] regarding an admission decision. [facility] has until 10/21 to respond.      Electronically signed by Snehal Landers MSN RN-[facility]  Case Management   [phone]

## 2022-10-10 NOTE — PROGRESS NOTES
Blood sugar 137 after restarting continuous TF. D10 drip stopped at this time, will continue to monitor.

## 2022-10-10 NOTE — PROGRESS NOTES
Nephrology Progress Note                                                                                                                                                                                                                                                                                                                                                               Office : [phone]     Fax :[phone]    Patient's Name: Deborah Ken    10/10/2022    Reason for Consult: ZANA  Requesting Physician:  Noemí Huber MD    Interval History:      Cr stable   Na stable   Good UO   Remains on Trach         Past Medical History:   Diagnosis Date    ZANA (acute kidney injury) ([facility] [address] 9/26/2022    Carotid stenosis, left 10/12/2011    Chronic back pain     Chronic systolic (congestive) heart failure 50/83/6276    Diastolic CHF ([address]     Erectile dysfunction     Hyperlipidemia     Hypertension     Osteoarthritis     Restrictive lung disease     Type II or unspecified type diabetes mellitus without mention of complication, not stated as uncontrolled        Past Surgical History:   Procedure Laterality Date    CARDIAC CATHETERIZATION  06/2022    KNEE SURGERY Left     PLEURAL CATH INSERTION N/A 9/19/2022    PLEURAL CATHETER PLACEMENT; INTERCOSTAL NERVE BLOCK X4 performed by Dominique Oliver MD at [facility] 5 Bilateral     THORACOSCOPY Right 9/19/2022    RIGHT VIDEO ASSISTED THORASCOPIC SURGERY AND; performed by Dominique Oliver MD at [address] N/A 9/30/2022    TRACHEOSTOMY performed by Janis Kaur MD at [facility] History   Problem Relation Age of Onset    Hearing Loss Father     Heart Disease Father 70        MI    High Blood Pressure Father     Diabetes Maternal Grandmother         hypoglycemic    Hearing Loss Maternal Grandmother     Arthritis Maternal Grandfather         reports that he quit smoking about 20 years ago. His smoking use included cigarettes.  He has a 80.00 pack-year smoking history. He has never used smokeless tobacco. He reports current alcohol use. He reports that he does not use drugs. Allergies:   Torsemide and Dye [iodides]    Current Medications:    0.9 % sodium chloride infusion, PRN  potassium phosphate 20 mmol in dextrose 5 % 250 mL IVPB, Once  hydroxypropyl methylcellulose (GONIOSOL) 2.5 % ophthalmic solution 1 drop, PRN  HYDROmorphone (DILAUDID) injection 0.5 mg, Q3H PRN   Or  HYDROmorphone (DILAUDID) injection 1 mg, Q3H PRN  oxyCODONE (ROXICODONE) 5 MG/5ML solution 7.5 mg, Q4H  QUEtiapine (SEROQUEL) tablet 25 mg, BID  insulin lispro (1 Unit Dial) (HUMALOG/ADMELOG) pen 5 Units, q8h  insulin glargine (LANTUS;BASAGLAR) injection pen 28 Units, Nightly  cefepime (MAXIPIME) 2000 mg IVPB minibag, Q12H  albuterol (PROVENTIL) nebulizer solution 2.5 mg, Q4H  pantoprazole (PROTONIX) injection 40 mg, BID  insulin lispro (1 Unit Dial) (HUMALOG/ADMELOG) pen 0-16 Units, Q4H  sodium chloride flush 0.9 % injection 5-40 mL, 2 times per day  sodium chloride flush 0.9 % injection 5-40 mL, PRN  0.9 % sodium chloride infusion, PRN  labetalol (NORMODYNE;TRANDATE) injection 10 mg, Q15 Min PRN  Venelex ointment, BID  amiodarone (CORDARONE) 150 mg in dextrose 5 % 100 mL bolus, Once  amiodarone (CORDARONE) tablet 200 mg, Daily  acetaminophen (TYLENOL) 160 MG/5ML solution 650 mg, Q6H PRN  chlorhexidine (PERIDEX) 0.12 % solution 15 mL, BID  [Held by provider] apixaban (ELIQUIS) tablet 5 mg, BID  sodium chloride flush 0.9 % injection 5-40 mL, 2 times per day  sodium chloride flush 0.9 % injection 5-40 mL, PRN  0.9 % sodium chloride infusion, PRN  polyethylene glycol (GLYCOLAX) packet 17 g, Daily  ondansetron (ZOFRAN-ODT) disintegrating tablet 4 mg, Q8H PRN   Or  ondansetron (ZOFRAN) injection 4 mg, Q6H PRN  glucose chewable tablet 16 g, PRN  dextrose bolus 10% 125 mL, PRN   Or  dextrose bolus 10% 250 mL, PRN  glucagon (rDNA) injection 1 mg, PRN  dextrose 10 % infusion, Continuous PRN  hydrALAZINE (APRESOLINE) injection 5 mg, Q15 Min PRN      Review of Systems:   14 point ROS obtained but were negative except mentioned in HPI      Physical exam:     Vitals:  BP (!) 97/42 Comment: 5min into infusion  Pulse 86   Temp 98 °F (36.7 °C)   Resp 13   Ht 6' 0.99\" (1.854 m)   Wt 210 lb 15.7 oz (95.7 kg)   SpO2 91%   BMI 27.84 kg/m²   Constitutional:  on MV  Skin: no rash, turgor wnl  Heent:  eomi, mmm  Neck: no bruits or jvd noted  Cardiovascular:  S1, S2 without m/r/g  Respiratory: trach  Abdomen:  +bs, soft, nt, nd  Ext: bilateral lower extremity edema R>L  Psychiatric: mood and affect appropriate  Musculoskeletal:  Rom, muscular strength intact    Data:   Labs:  CBC:   Recent Labs     10/08/22  0151 10/09/22  0510 10/10/22  0547 10/10/22  0635   WBC 13.3* 12.0* 6.9  --    HGB 7.6* 8.2* 6.8* 6.9*    269 238  --        BMP:    Recent Labs     10/08/22  0151 10/09/22  0510 10/10/22  0547    135* 136   K 3.9 4.1 3.9   CL 97* 96* 97*   CO2 32 33* 33*   * 112* 111*   CREATININE 1.2 1.2 1.1   GLUCOSE 174* 199* 68*       Ca/Mg/Phos:   Recent Labs     10/08/22  0151 10/09/22  0510 10/10/22  0547   CALCIUM 7.9* 8.2* 7.6*   MG 3.00* 3.30* 3.10*   PHOS 3.5 3.8 2.5       Hepatic: No results for input(s): AST, ALT, ALB, BILITOT, ALKPHOS in the last 72 hours. Troponin: No results for input(s): TROPONINI in the last 72 hours. BNP: No results for input(s): BNP in the last 72 hours. Lipids:   Recent Labs     10/09/22  0510   TRIG 59         ABGs:   No results for input(s): PHART, PO2ART, OHY5MAW in the last 72 hours. INR: No results for input(s): INR in the last 72 hours. UA:No results for input(s): Squire Cap, GLUCOSEU, BILIRUBINUR, Julia Agreste, BLOODU, PHUR, PROTEINU, UROBILINOGEN, NITRU, LEUKOCYTESUR, LABMICR, URINETYPE in the last 72 hours.    Urine Microscopic: No results for input(s): LABCAST, BACTERIA, COMU, HYALCAST, WBCUA, RBCUA, EPIU in the last 72 hours.  Urine Culture: No results for input(s): LABURIN in the last 72 hours. Urine Chemistry:   No results for input(s): Marlise Roderick, PROTEINUR, NAUR in the last 72 hours. IMAGING:  XR CHEST PORTABLE   Final Result      Tiny right lateral pneumothorax is suspected, 5 mm in maximum thickness. This has decreased in size since 10/3/2022. Right basilar Pleurx catheter noted. Small bilateral pleural effusions. Prominent interstitial markings. Stable cardiac mediastinal silhouette. Lines and tubes without change. Subcutaneous emphysema noted. CT CHEST ABDOMEN PELVIS WO CONTRAST   Final Result      1. Severe pneumomediastinum. 2. Small to moderate right hydropneumothorax with similar fluid and gas components with a right-sided Pleurx catheter. 3. Moderate loculated left pleural effusion with atelectasis of the left lower lobe with patency of the central left lower lobe bronchi. 4. Fluid/material filling the bronchus intermedius and right lower lobe bronchi with complete consolidation and volume loss of the right lower lobe. Differential diagnosis would include mucous plugging or obstructing lesion. 5. Tracheostomy tube tip within the trachea   6. Severe subcutaneous emphysema in the neck. CT ABDOMEN AND PELVIS:      FINDINGS:      LIVER: Normal.      GALLBLADDER AND BILIARY TREE: No calcified gallstones. No gallbladder distention. No intra- or extrahepatic biliary dilatation. PANCREAS: Normal.      SPLEEN: Normal.      ADRENAL GLANDS: Normal.      KIDNEYS AND URETERS: Normal.      URINARY BLADDER: Ansari catheter present within collapsed urinary bladder. REPRODUCTIVE ORGANS: No associated masses. BOWEL: Normal diameter, nonobstructed. Feeding tube tip at the level of the ligament of Treitz. LYMPH NODES: No abnormally enlarged nodes.       PERITONEUM/RETROPERITONEUM: Severe pneumoperitoneum extends into the upper abdomen with some of the symmetric multiseptated gas appearing deep to the diaphragm consistent with mild pneumoperitoneum (series 601 was 101). This is likely related to    pneumonia mediastinum dissecting into the abdomen. No fluid collection in the abdomen or pelvis. No ascites. VESSELS: Extensive atherosclerotic calcification of the aorta and iliac arteries. ABDOMINAL WALL: No acute abnormality. BONES: No acute abnormality. Mild chronic L1 compression fracture with previous vertebroplasty. IMPRESSION:      1. Severe pneumomediastinum extends into the upper abdomen with small pneumoperitoneum likely related to extension of pneumoperitoneum into the upper peritoneal cavity. 2. No fluid collection in the abdomen or pelvis. Results were discussed with the surgical team at 7:00 PM on 10/3/2022. XR CHEST PORTABLE   Final Result      Stable appearance of loculated right pneumothorax, with loculated components in the lateral right midlung region and in the right lateral costophrenic sulcus. Stable scattered areas of atelectasis versus scar, most prominent in the medial right lung base and in the left lateral lung base. XR CHEST PORTABLE   Final Result      Small right basilar pneumothorax. Right basilar chest tube without change. Patchy consolidation in the right mid and lower lung as well as the left lower lung-atelectasis versus pneumonia. Trace left pleural effusion. Normal heart size. Lines and tubes without change. Mild improvement in degree of subcutaneous emphysema in the chest and neck. XR CHEST 1 VIEW   Final Result      1. Stable small right-sided pneumothorax and prominent subcutaneous emphysema along the neck and upper superior chest wall, greater in right lung stable   2. Stable left basilar consolidation and pleural effusion      3.  Stable appearing perihilar accentuated markings or airspace disease            XR CHEST PORTABLE   Final Result      Stable small right-sided pneumothorax. More prominent emphysema over the lower neck. No change in bilateral airspace disease. Stable left pleural effusion. XR CHEST PORTABLE   Final Result   1. Bilateral multifocal pneumonia with improvement in the right    pulmonary consolidation since prior study from 9/23/22   2. Mild to moderate left pleural effusion          XR CHEST PORTABLE   Final Result   1. Support lines and tubes are stable. 2.  Stable small right lateral pneumothorax and right basilar    chest tube. 3.  Stable patchy bilateral airspace disease. XR CHEST PORTABLE   Final Result   1. Satisfactory position of right internal jugular central line   2. No interval change in endotracheal tube and nasogastric tube positioning. 3. Extensive bilateral airspace disease with no changes. 4. Left pleural effusion with retrocardiac opacity, unchanged   5. Small stable tiny right lateral pneumothorax and stable position of right chest tube,, Pleurx catheter. XR ABDOMEN (KUB) (SINGLE AP VIEW)   Final Result   1. No residual pneumothorax. 2.  Mild patchy airspace disease bilaterally worse on the right than on prior examination. 3.  Non-obstructive bowel gas pattern. Mildly distended stomach. XR CHEST PORTABLE   Final Result   1. No residual pneumothorax. 2.  Mild patchy airspace disease bilaterally worse on the right than on prior examination. 3.  Non-obstructive bowel gas pattern. Mildly distended stomach. XR CHEST PORTABLE   Final Result      1. Small right pneumothorax appears slightly increased. 2. Mild patchy airspace opacity bilaterally. XR CHEST PORTABLE   Final Result     Pleurx catheter at the right lung base. Trace right    pneumothorax is unchanged. XR CHEST PORTABLE   Final Result      Right-sided chest tube evident in the base, its tip medially. Improvement in the right-sided pneumothorax, since earlier today. Possible medial collapse of the right lower lobe. Small left effusion. Patchy airspace density/atelectasis in the lungs bilaterally, with no other significant change. XR CHEST PORTABLE   Final Result   1. Right-sided Pleurx catheter in satisfactory position in the lung bases   2. Right-sided post operative pneumothorax, approximately 10%             Assessment/Plan   ZANA  - suspect this is contrast nephropathy  - baseline Cre 0.7. Normal on admission.  - Cr better   - Hold diuretics   - BNP 3k, Protein:Cre 0.3, FENa 0.4%  - closely monitor UOP  - avoid nephrotoxic agent     2. Hypernatremia  - continue free water   - will continue to monitor     3. Hypotension  - pressors as needed     4. Anemia  - daily CBC    5. Acid- base/ Electrolyte imbalance   - optimize lytes    6. Acute hypoxic resp failure  - s/p VATS on 9/19/22 for recurrent pleural effusions  - Intensivist and CTS following    7.  CHF   - EF 65% on 6/16/22 via cardiac cath        John Paul Mitchell MD

## 2022-10-10 NOTE — PROGRESS NOTES
Speech Language Pathology  Facility/Department: [facility]  [facility] [employer] Valve Evaluation and Treatment    NAME: Dejah Marshall  :    MRN: [id number]  ADMISSION DATE: 2022  ADMITTING DIAGNOSIS: has HTN (hypertension); Hyperlipidemia; OA (osteoarthritis) of knee; Carotid stenosis, left; Hearing loss; ED (erectile dysfunction); DM type 2 (diabetes mellitus, type 2) ([address] Rotator cuff tear; Glenohumeral arthritis; Subacromial impingement; Trigger ring finger of right hand; Spinal stenosis of lumbar region; Closed compression fracture of L1 lumbar vertebra; Closed displaced fracture of lateral malleolus of left fibula; Exertional dyspnea; Atrial fibrillation with rapid ventricular response ([address] Hypoxia; Chronic heart failure with preserved ejection fraction (HFpEF) ([address] Pleural effusion on right; Acute on chronic respiratory failure with hypoxia and hypercapnia ([address] ZANA (acute kidney injury) ([address] and Mucus plugging of bronchi on their problem list.  DATE ONSET: 22    Date of Eval: 10/10/2022   Evaluating Therapist: Foir Lawler, SLP    RECENT RESULTS  CT OF HEAD/MRI: None    Primary Complaint: None    Pain: Grimacing and repositioning prior to, during and after, mild in nature    Vision/ Hearing   Limited full assessment - pt with upward gaze, minimal eye tracking to persons. Assessment:  Communication Diagnosis: Severe due to trach/vent status   Diagnosis: Pt presents with a severe communication impairment secondary to trach/vent status. Order obtained and pt appropriate this date per ventilator settings and report. The patient was awake but drowsy (spouse reports recent dilaudid). The patient allowed oral care but demo'd what appeared to be reflexive mouth closing for any presentation of oral care utensils despite multiple attempts and verbal cues. Procedure explained to patient and spouse. Pt did not demo any comprehension of education / not attending.  RT arrived and trach cuf this time. PMV safety sticker not placed as there should be no donning of PMV without RT/SLP. Concerns for mental status / etiology of communication impairments outside of trach/vent status as pt did not demo appropriate consistent eye gaze during session, oral movements that were not clearly related to communication vs groping vs other etiology, inconsistent gestures (I.e. not using thumbs up/thumbs down this date, or hand squeeze). Will continue to assessment communication via light tech AAC as pt can tolerate and demo UE command following and ideally pointing. Recommendations:  Recommendations  Requires SLP Intervention: Yes  Patient Education: Pt/spouse educated on PMV evaluation and results/recommendations at this time. Patient Education Response: No evidence of learning  Timed Code Treatment Minutes: 0 Minutes  Total Treatment Time: 30  Duration of Treatment: 1-2x per week for length of stay     Plan:   Speech Therapy Prognosis  Prognosis Considerations: Participation Level;Severity of Impairments  Individuals consulted  Consulted and agree with results and recommendations: RN;RT  Family member consulted: spouse  RN Name: Meño Derik  Safety Devices in place: Yes ([facility]    Goals:  Short Term Goals  Goal 1: Pt will tolerate PMV placement for duration of session without change in respiratory status and without discomfort. Goal 2: Pt will improve breath-phonation coordination via phonation for single syllable words on 50% of opportunities. Goal 3: Pt/caregiver will demonstrate comprehension of PMV safety instructions with mod I.  Goal 4: Pt will tolerate ongoing communication as indicated. Subjective:  Pt partially upright in bed with eyes partially open, open mouth posturing. Oral cavity is dry.     Objective:    PMV Vitals (Before)  SpO2: 96 %  Resp: 17  Pulse: 106  PMV Vitals (During)  SpO2:  (as low as 90, generally sustained at 95%)  Resp: 16  Pulse: 99  PMV Vitals (After)  SpO2: 94 %  Resp: 14  Pulse: 102  Passy-Kenneth Speaking Valve  Passy-Pennington Speaking Valve Type: PMV-007 ([place]qua) Inline  Coughing: No (Minimal)  Secretions: No  Anxiety: No  Vocal Intensity: 1  Vocal Quality: 1  Speech Intelligibility: 1  Other Observations: Pt with occasional discomfort and oral movements which spouse endorses is not different from prior to [facility] evaluation / since admission. Passy-Pennington Valve Tolerance - Minutes: 7  Passy-Kenneth Valve Tolerance - Seconds: 30  PMV Placement Recommendations:  (Speech Therapy and RT only)  Ventilator  PEEP: 5  PIP: 35  FIO2: 35     Prognosis:  Speech Therapy Prognosis  Prognosis Considerations: Participation Level;Severity of Impairments  Individuals consulted  Consulted and agree with results and recommendations: RN;RT  Family member consulted: spouse  RN Name: Florencia Rollins    Education:  Patient Education: Pt/spouse educated on PMV evaluation and results/recommendations at this time.   Patient Education Response: No evidence of learning  Safety Devices in place: Yes (Family members at [facility])     Therapy Time:   Individual Concurrent Group Co-treatment   Time In 1245 0000 0000 0000   Time Out 1315 0000 0000 0000   Minutes 30 0 0 0   Variance: 0  Timed Code Treatment Minutes: 0 Minutes  Total Treatment Time: 30 / concurrent with RTs Amara Woodward., [phone]  Speech-Language Pathologist

## 2022-10-10 NOTE — CONSULTS
[facility] Continence Nurse  Consult Note       NAME:  Ingrid [address] RECORD NUMBER:  [id number]  AGE: 65 y.o. GENDER: male  : 1945  TODAY'S DATE:  10/10/2022    Subjective   Reason for WOCN Evaluation and Assessment: Consulted for new wounds located on R & L Lower Legs possible from the SCDs. Laure Rayo is a 65 y.o. male referred by:   [] Physician  [x] Nursing  [] Other:     Wound Identification:  Wound Type: pressure  Contributing Factors: diabetes, chronic pressure, decreased mobility, shear force, and incontinence of stool    Wound History: Laure Rayo is a 65 y.o. male with PMHx of CHF (CHFpEF), carotid stenosis, HLD, HTN, OA, Restrictive Lung Disease, T2DM who underwent VATS with Pleurx catheter placement on 2022 for recurrent bilateral loculated pleural effusions. He was unable to tolerate BiPAP and was intubated following his VATS procedure on . He is now requiring pressor support with levophed in setting of sedation. His creatinine was worsening thus Nephrology was consulted for further evaluation and recommendations. His creatinine at baseline is ~0.7 and was normal on admission. His creatinine over the course of his admission has been worsening and is up to 1.7 on day of consult. His UOP over the last 2 days has been good.    Current Wound Care Treatment:  L Lower Leg Proximal, Mid and Distal - Venelex  R Lower Leg - Venelex    Patient Goal of Care:  [x] Wound Healing  [] Odor Control  [] Palliative Care  [] Pain Control   [] Other:         PAST MEDICAL HISTORY        Diagnosis Date    [facility] 2022    Carotid stenosis, left 10/12/2011    Chronic back pain     Chronic systolic (congestive) heart failure     Diastolic CHF (HCC)     Erectile dysfunction     Hyperlipidemia     Hypertension     Osteoarthritis     Restrictive lung disease     Type II or unspecified type diabetes mellitus without mention of complication, not stated as uncontrolled        PAST SURGICAL HISTORY    Past Surgical History:   Procedure Laterality Date    CARDIAC CATHETERIZATION  2022    KNEE SURGERY Left     PLEURAL CATH INSERTION N/A 2022    PLEURAL CATHETER PLACEMENT; INTERCOSTAL NERVE BLOCK X4 performed by Yisel Marshall MD at  [facility], [place], operated by [facility] Bilateral     THORACOSCOPY Right 2022    RIGHT VIDEO ASSISTED THORASCOPIC SURGERY AND; performed by Yisel Marshall MD at [facility] N/A 2022    TRACHEOSTOMY performed by Nicole Lopez MD at [facility]    Family History   Problem Relation Age of Onset    Hearing Loss Father     Heart Disease Father 70        MI    High Blood Pressure Father     Diabetes Maternal Grandmother         hypoglycemic    Hearing Loss Maternal Grandmother     Arthritis Maternal Grandfather        SOCIAL HISTORY    Social History     Tobacco Use    Smoking status: Former     Packs/day: 2.00     Years: 40.00     Pack years: 80.00     Types: Cigarettes     Quit date: 10/24/2001     Years since quittin.9    Smokeless tobacco: Never   Vaping Use    Vaping Use: Never used   Substance Use Topics    Alcohol use: Yes     Alcohol/week: 0.0 standard drinks     Comment: rarely    Drug use: No       ALLERGIES    Allergies   Allergen Reactions    Torsemide Shortness Of Breath    Dye [Iodides]      1970's - ?? MEDICATIONS    No current facility-administered medications on file prior to encounter. Current Outpatient Medications on File Prior to Encounter   Medication Sig Dispense Refill    ELIQUIS 5 MG TABS tablet TAKE 1 TABLET BY MOUTH TWO TIMES A DAY 60 tablet 2    umeclidinium-vilanterol (ANORO ELLIPTA) 62.5-25 MCG/INH AEPB inhaler Inhale 1 puff into the lungs daily 1 each 2    bumetanide (BUMEX) 1 MG tablet Take 1 tablet by mouth in the morning and 1 tablet before bedtime.  180 tablet 3    magnesium oxide (MAG-OX) 400 MG tablet Take 1 tablet by mouth in the morning and 1 tablet before bedtime. 180 tablet 3    dapagliflozin (FARXIGA) 10 MG tablet TAKE 1 TABLET EVERY MORNING 90 tablet 1    sildenafil (VIAGRA) 100 MG tablet Take 1 tablet by mouth as needed for Erectile Dysfunction Max daily dose 100mg. 16 tablet 0    metoprolol succinate (TOPROL XL) 50 MG extended release tablet Take 1 tablet by mouth in the morning and at bedtime 180 tablet 3    Insulin Pen Needle (PEN NEEDLES) 31G X 6 MM MISC 1 each by Does not apply route daily 100 each 3    Continuous Blood Gluc Sensor (FREESTYLE LIZA 14 DAY SENSOR) MISC Check bs tidac and hs 6 each 1    Continuous Blood Gluc  (FREESTYLE LIZA 14 DAY READER) SOFIYA Check bs tidac and hs 6 each 1    Insulin Degludec (TRESIBA FLEXTOUCH) 200 UNIT/ML SOPN 10 units subcut qam, increase 4 units every 4 days until am blood sugar < 120. Max 100 units (Patient taking differently: Inject 12 Units into the skin daily 10 units subcut qam, increase 4 units every 4 days until am blood sugar < 120.  Max 100 units) 9 pen 1    albuterol sulfate HFA (VENTOLIN HFA) 108 (90 Base) MCG/ACT inhaler Inhale 2 puffs into the lungs 4 times daily as needed for Wheezing 18 g 5    pravastatin (PRAVACHOL) 40 MG tablet Take 1 tablet by mouth daily 90 tablet 3       Objective    BP (!) 78/48   Pulse 88   Temp 98.3 °F (36.8 °C) (Temporal)   Resp 27   Ht 6' 0.99\" (1.854 m)   Wt 210 lb 15.7 oz (95.7 kg)   SpO2 95%   BMI 27.84 kg/m²     LABS:  WBC:    Lab Results   Component Value Date/Time    WBC 6.9 10/10/2022 05:47 AM     H/H:    Lab Results   Component Value Date/Time    HGB 6.9 10/10/2022 06:35 AM    HCT 21.4 10/10/2022 06:35 AM     PTT:    Lab Results   Component Value Date/Time    APTT 31.0 09/19/2022 06:10 AM   [APTT}  PT/INR:    Lab Results   Component Value Date/Time    PROTIME 13.2 09/19/2022 06:10 AM    INR 1.01 09/19/2022 06:10 AM     HgBA1c:    Lab Results   Component Value Date/Time    LABA1C 8.1 01/24/2022 10:29 AM       Assessment: See LDAs  No new photo of sacrum - slightly smaller, concerned about the coccyx area. Appears to be evolving into an unstageable with soft, moist eschar. Will continue to monitor   Bryson Risk Score: Bryson Scale Score: 14    Patient Active Problem List   Diagnosis Code    HTN (hypertension) I10    Hyperlipidemia E78.5    OA (osteoarthritis) of knee M17.9    Carotid stenosis, left I65.22    Hearing loss H91.90    ED (erectile dysfunction) N52.9    DM type 2 (diabetes mellitus, type 2) ([facility]) E11.9    Rotator cuff tear M75.100    Glenohumeral arthritis M19.019    Subacromial impingement M75.40    Trigger ring finger of right hand M65.341    Spinal stenosis of lumbar region M48.061    Closed compression fracture of L1 lumbar vertebra S32.010A    Closed displaced fracture of lateral malleolus of left fibula S82. 62XA    Exertional dyspnea R06.09    Atrial fibrillation with rapid ventricular response ([facility]) I48.91    Hypoxia R09.02    Chronic heart failure with preserved ejection fraction (HFpEF) ([facility]) I50.32    Pleural effusion on right J90    Acute on chronic respiratory failure with hypoxia and hypercapnia ([facility]) J96.21, J96.22    ZANA (acute kidney injury) ([facility]) N17.9    Mucus plugging of bronchi T17.500A       Measurements:  Wound 10/03/22 Sacrum (Active)   Wound Image   10/04/22 1137   Wound Etiology Deep tissue/Injury 10/10/22 0400   Dressing Status Intact 10/10/22 0400   Wound Cleansed Soap and water 10/09/22 1800   Dressing/Treatment Pharmaceutical agent (see MAR) 10/10/22 0400   Dressing Change Due 10/05/22 10/09/22 1800   Wound Length (cm) 4 cm 10/04/22 1137   Wound Width (cm) 8 cm 10/04/22 1137   Wound Depth (cm) 0.1 cm 10/04/22 1137   Wound Surface Area (cm^2) 32 cm^2 10/04/22 1137   Wound Volume (cm^3) 3.2 cm^3 10/04/22 1137   Wound Assessment Purple/maroon;Pink/red;Ruptured blister;Slough;Superficial 10/10/22 0400   Drainage Amount None 10/10/22 0400   Odor None 10/10/22 0400   Nery-wound Assessment Blanchable erythema;Fragile 10/10/22 0400   Margins Epibole (rolled edges) 10/10/22 0400   Number of days: 7       Wound 10/10/22 Leg Left;Lateral;Lower;Proximal (Active)   Wound Image   10/10/22 1135   Wound Etiology Deep tissue/Injury 10/10/22 1135   Dressing Status Dry; Intact 10/10/22 1135   Wound Cleansed Not Cleansed 10/10/22 1135   Dressing/Treatment Foam;Pharmaceutical agent (see MAR) 10/10/22 1135   Dressing Change Due 10/10/22 10/10/22 1135   Wound Length (cm) 1 cm 10/10/22 1135   Wound Width (cm) 2 cm 10/10/22 1135   Wound Depth (cm) 0 cm 10/10/22 1135   Wound Surface Area (cm^2) 2 cm^2 10/10/22 1135   Wound Volume (cm^3) 0 cm^3 10/10/22 1135   Wound Assessment Dry;Purple/maroon 10/10/22 1135   Drainage Amount None 10/10/22 1135   Odor None 10/10/22 1135   Nery-wound Assessment Dry/flaky; Intact 10/10/22 1135   Margins Attached edges; Defined edges 10/10/22 1135   Number of days: 0    L Lower Leg Lateral Proximal:       Wound 10/10/22 Leg Left; Lower; Lateral;Mid (Active)   Wound Image   10/10/22 1135   Wound Etiology Pressure Stage 1 10/10/22 1135   Dressing Status Dry; Intact 10/10/22 1135   Wound Cleansed Not Cleansed 10/10/22 1135   Dressing/Treatment Foam;Pharmaceutical agent (see MAR) 10/10/22 1135   Dressing Change Due 10/10/22 10/10/22 1135   Wound Length (cm) 4 cm 10/10/22 1135   Wound Width (cm) 1.5 cm 10/10/22 1135   Wound Depth (cm) 0 cm 10/10/22 1135   Wound Surface Area (cm^2) 6 cm^2 10/10/22 1135   Wound Volume (cm^3) 0 cm^3 10/10/22 1135   Wound Assessment Dry;Non-blanchable erythema 10/10/22 1135   Drainage Amount None 10/10/22 1135   Odor None 10/10/22 1135   Nery-wound Assessment Dry/flaky; Intact 10/10/22 1135   Margins Attached edges; Defined edges 10/10/22 1135   Number of days: 0    L Lower Leg Lateral Mid:       Wound 10/10/22 Left; Lower; Lateral;Distal (Active)   Wound Image   10/10/22 1135   Wound Etiology Pressure Stage 2 10/10/22 1135   Dressing Status Dry; Intact 10/10/22 1135   Wound Cleansed Not Cleansed 10/10/22 1135   Dressing/Treatment Foam;Pharmaceutical agent (see MAR) 10/10/22 1135   Dressing Change Due 10/10/22 10/10/22 1135   Wound Length (cm) 5 cm 10/10/22 1135   Wound Width (cm) 1 cm 10/10/22 1135   Wound Depth (cm) 0 cm 10/10/22 1135   Wound Surface Area (cm^2) 5 cm^2 10/10/22 1135   Wound Volume (cm^3) 0 cm^3 10/10/22 1135   Wound Assessment Pink/red 10/10/22 1135   Drainage Amount None 10/10/22 1135   Odor None 10/10/22 1135   Nery-wound Assessment Dry/flaky; Intact 10/10/22 1135   Margins Attached edges; Defined edges 10/10/22 1135   Number of days: 0    L Lower Leg Distal:       Wound 10/10/22 Right; Lower; Lateral (Active)   Wound Image   10/10/22 1135   Wound Etiology Deep tissue/Injury 10/10/22 1135   Dressing Status Dry; Intact 10/10/22 1135   Wound Cleansed Not Cleansed 10/10/22 1135   Dressing/Treatment Foam;Pharmaceutical agent (see MAR) 10/10/22 1135   Dressing Change Due 10/10/22 10/10/22 1135   Wound Length (cm) 5 cm 10/10/22 1135   Wound Width (cm) 2 cm 10/10/22 1135   Wound Depth (cm) 0 cm 10/10/22 1135   Wound Surface Area (cm^2) 10 cm^2 10/10/22 1135   Wound Volume (cm^3) 0 cm^3 10/10/22 1135   Wound Assessment Dry;Purple/maroon 10/10/22 1135   Drainage Amount None 10/10/22 1135   Odor None 10/10/22 1135   Nery-wound Assessment Dry/flaky; Intact 10/10/22 1135   Margins Attached edges; Defined edges 10/10/22 1135   Number of days: 0   R Lower Leg Lateral:      Incision 09/19/22 Abdomen Lateral;Right;Upper (Active)   Dressing Status Clean;Dry; Intact 10/10/22 0400   Incision Cleansed Not Cleansed 10/10/22 0400   Dressing/Treatment Tegaderm/transparent film dressing 10/10/22 0400   Closure Sutures 10/10/22 0400   Margins Approximated 10/10/22 0400   Incision Assessment Dry 10/10/22 0400   Drainage Amount None 10/10/22 0400   Odor None 10/10/22 0400   Nery-incision Assessment Intact 10/10/22 0400   Number of days: 21     Response to treatment:  Well tolerated by patient.      Pain Assessment:  Severity:  0 / 10  Quality of pain: N/A  Wound Pain Timing/Severity: none  Premedicated: No    Plan   Plan of Care: Wound 10/03/22 Sacrum-Dressing/Treatment: Pharmaceutical agent (see MAR) (venelex, mepilex)  Wound 10/10/22 Leg Left;Lateral;Lower;Proximal-Dressing/Treatment: Foam, Pharmaceutical agent (see MAR) (Venelex)  Wound 10/10/22 Leg Left; Lower; Lateral;Mid-Dressing/Treatment: Foam, Pharmaceutical agent (see MAR) (Venelex)  Wound 10/10/22 Left; Lower; Lateral;Distal-Dressing/Treatment: Foam, Pharmaceutical agent (see MAR) (Venelex)  Wound 10/10/22 Right; Lower; Lateral-Dressing/Treatment: Foam, Pharmaceutical agent (see MAR) (Venelex)  Recommendations: Bilateral Lower Legs - clean with wounds with NS, apply Venelex BID, foam dressing  Call Wound Care for deterioration [phone]    Specialty Bed Required : Yes   [x] Low Air Loss   [x] Pressure Redistribution  [] Fluid Immersion  [] Bariatric  [] Total Pressure Relief  [] Other:     Current Diet: Diet NPO  Dietician consult:  Yes    Discharge Plan:  Placement for patient upon discharge: skilled nursing    Patient appropriate for Outpatient [address]: Yes    Referrals:  [x]   [] [facility]  [] Supplies  [] Other    Patient/Caregiver Teaching:  Level of patient/caregiver understanding able to:   [] Indicates understanding       [] Needs reinforcement  [] Unsuccessful      [] Verbal Understanding  [] Demonstrated understanding       [] No evidence of learning  [] Refused teaching         [] N/A       Electronically signed by Shruti Lovelace RN, CWOCN on 10/10/2022 at 11:55 AM

## 2022-10-10 NOTE — PROGRESS NOTES
Patients medial lumen of their CVC clotted at this time. Perfect serve sent to surgical resident at this time. Cath Antwon ordered, waiting for pharmacy to send, will administer accordingly.

## 2022-10-10 NOTE — PROGRESS NOTES
Palliative Care Chart Review  and Check in Note:     NAME:  Ryann Plummer  Admit Date: 9/19/2022  Hospital Day:  Hospital Day: 22   Current Code status: Full Code    Palliative care is continuing to following Mr. Dom Craig for symptom management, and goals of care discussion as needed. Patient's chart reviewed today 10/10/22. Saw Maddie Luke at the bedside. He is awake and alert. Per nursing staff, he becomes restless when it is close to time for his Pleur-X to be drained. Today, he was unable to receive pain medication d/t feeding and meds being held for PEG placement today. PRN IV dilaudid was added per surgery team.     The following are the currently established goals/code status, and Symptom management. Goals of care: Continue current medical management.      Code status: Full    Discharge plan: TBD - pt is nearing discharge, case management is working on appeal for LTACH placement      NATASHA Gonsalez NP  10/10/22  2:07 PM

## 2022-10-10 NOTE — PROGRESS NOTES
Brisk blood return noted to the medial lumen of the CVC. 10 ml removed and line normal saline flushed and capped. Will continue to monitor.

## 2022-10-10 NOTE — PROGRESS NOTES
ICU Progress Note    Admit Date: 9/19/2022  Day: 21  Vent Day: None  IV Access:Peripheral  IV Fluids:None  Vasopressors:None                Antibiotics: None  Diet: Diet NPO    CC: Pleural Effusion (right) s/p VATS and Pleural Catheter Placement 09/19    Interval history:     Patient seen lying in bed. Appears more uncomfortable and less interactive this morning.       Medications:     Scheduled Meds:   oxyCODONE  7.5 mg Oral Q4H    QUEtiapine  25 mg Oral BID    insulin lispro  5 Units SubCUTAneous q8h    insulin glargine  28 Units SubCUTAneous Nightly    cefepime  2,000 mg IntraVENous Q12H    albuterol  2.5 mg Nebulization Q4H    pantoprazole  40 mg IntraVENous BID    insulin lispro  0-16 Units SubCUTAneous Q4H    sodium chloride flush  5-40 mL IntraVENous 2 times per day    Venelex   Topical BID    amiodarone bolus  150 mg IntraVENous Once    amiodarone  200 mg Oral Daily    chlorhexidine  15 mL Mouth/Throat BID    [Held by provider] apixaban  5 mg Oral BID    sodium chloride flush  5-40 mL IntraVENous 2 times per day    polyethylene glycol  17 g Oral Daily     Continuous Infusions:   sodium chloride      sodium chloride      dextrose 100 mL/hr at 10/10/22 0505     PRN Meds:HYDROmorphone, sodium chloride flush, sodium chloride, labetalol, acetaminophen, sodium chloride flush, sodium chloride, ondansetron **OR** ondansetron, glucose, dextrose bolus **OR** dextrose bolus, glucagon (rDNA), dextrose, hydrALAZINE    Objective:   Vitals:   T-max:  Patient Vitals for the past 8 hrs:   BP Temp Temp src Pulse Resp SpO2   10/10/22 0600 (!) 78/48 -- -- 77 26 96 %   10/10/22 0500 (!) 85/52 -- -- 93 24 95 %   10/10/22 0401 -- -- -- 76 27 95 %   10/10/22 0400 (!) 97/56 -- -- 91 29 95 %   10/10/22 0300 128/65 -- -- (!) 106 24 97 %   10/10/22 0235 -- -- -- -- 26 --   10/10/22 0200 (!) 86/57 -- -- 89 30 94 %   10/10/22 0105 (!) 86/46 -- -- 91 28 94 %   10/10/22 0001 -- -- -- 92 28 94 %   10/10/22 0000 (!) 85/50 98.3 °F (36.8 °C) Temporal 87 28 94 %         Intake/Output Summary (Last 24 hours) at 10/10/2022 0703  Last data filed at 10/10/2022 0505  Gross per 24 hour   Intake 1756.55 ml   Output 1390 ml   Net 366.55 ml             Physical Exam  Constitutional:       Comments: Less interactive this morning. Mild discomfort with breathing on vent   HENT:      Head: Normocephalic and atraumatic. Cardiovascular:      Rate and Rhythm: Normal rate. Rhythm irregular. Comments: Atrial fibrillation  Pulmonary:      Breath sounds: No wheezing, rhonchi or rales. Abdominal:      General: There is no distension. Palpations: Abdomen is soft. Tenderness: There is no abdominal tenderness. There is no guarding. Musculoskeletal:      Right lower leg: Edema present. Left lower leg: Edema present. Comments: Bilateral lower extremity pitting edema has improved however still persists  2+ PE at the bilateral feet   Neurological:      Comments: Alert  Responsive to commands         LABS:    CBC:   Recent Labs     10/08/22  0151 10/09/22  0510 10/10/22  0547   WBC 13.3* 12.0* 6.9   HGB 7.6* 8.2* 6.8*   HCT 24.5* 26.3* 20.7*    269 238   MCV 87.1 88.5 88.5       Renal:    Recent Labs     10/08/22  0151 10/09/22  0510 10/10/22  0547    135* 136   K 3.9 4.1 3.9   CL 97* 96* 97*   CO2 32 33* 33*   * 112* 111*   CREATININE 1.2 1.2 1.1   GLUCOSE 174* 199* 68*   CALCIUM 7.9* 8.2* 7.6*   MG 3.00* 3.30* 3.10*   PHOS 3.5 3.8 2.5   ANIONGAP 8 6 6       Hepatic:   Recent Labs     10/08/22  0151 10/09/22  0510 10/10/22  0547   LABALBU 1.8* 2.2* 1.8*       Troponin: No results for input(s): TROPONINI in the last 72 hours. BNP: No results for input(s): BNP in the last 72 hours. Lipids: No results for input(s): CHOL, HDL in the last 72 hours. Invalid input(s): LDLCALCU, TRIGLYCERIDE  ABGs:  No results for input(s): PHART, XSI3QKU, PO2ART, DOF7UIL, BEART, THGBART, F2NLXHUL, FMN8KYN in the last 72 hours.     INR: No results for input(s): INR in the last 72 hours. Lactate: No results for input(s): LACTATE in the last 72 hours. Cultures:  -----------------------------------------------------------------  RAD:   XR CHEST PORTABLE   Final Result      Tiny right lateral pneumothorax is suspected, 5 mm in maximum thickness. This has decreased in size since 10/3/2022. Right basilar Pleurx catheter noted. Small bilateral pleural effusions. Prominent interstitial markings. Stable cardiac mediastinal silhouette. Lines and tubes without change. Subcutaneous emphysema noted. CT CHEST ABDOMEN PELVIS WO CONTRAST   Final Result      1. Severe pneumomediastinum. 2. Small to moderate right hydropneumothorax with similar fluid and gas components with a right-sided Pleurx catheter. 3. Moderate loculated left pleural effusion with atelectasis of the left lower lobe with patency of the central left lower lobe bronchi. 4. Fluid/material filling the bronchus intermedius and right lower lobe bronchi with complete consolidation and volume loss of the right lower lobe. Differential diagnosis would include mucous plugging or obstructing lesion. 5. Tracheostomy tube tip within the trachea   6. Severe subcutaneous emphysema in the neck. CT ABDOMEN AND PELVIS:      FINDINGS:      LIVER: Normal.      GALLBLADDER AND BILIARY TREE: No calcified gallstones. No gallbladder distention. No intra- or extrahepatic biliary dilatation. PANCREAS: Normal.      SPLEEN: Normal.      ADRENAL GLANDS: Normal.      KIDNEYS AND URETERS: Normal.      URINARY BLADDER: Ansari catheter present within collapsed urinary bladder. REPRODUCTIVE ORGANS: No associated masses. BOWEL: Normal diameter, nonobstructed. Feeding tube tip at the level of the ligament of Treitz. LYMPH NODES: No abnormally enlarged nodes.       PERITONEUM/RETROPERITONEUM: Severe pneumoperitoneum extends into the upper abdomen with some of the symmetric multiseptated gas appearing deep to the diaphragm consistent with mild pneumoperitoneum (series 601 was 101). This is likely related to    pneumonia mediastinum dissecting into the abdomen. No fluid collection in the abdomen or pelvis. No ascites. VESSELS: Extensive atherosclerotic calcification of the aorta and iliac arteries. ABDOMINAL WALL: No acute abnormality. BONES: No acute abnormality. Mild chronic L1 compression fracture with previous vertebroplasty. IMPRESSION:      1. Severe pneumomediastinum extends into the upper abdomen with small pneumoperitoneum likely related to extension of pneumoperitoneum into the upper peritoneal cavity. 2. No fluid collection in the abdomen or pelvis. Results were discussed with the surgical team at 7:00 PM on 10/3/2022. XR CHEST PORTABLE   Final Result      Stable appearance of loculated right pneumothorax, with loculated components in the lateral right midlung region and in the right lateral costophrenic sulcus. Stable scattered areas of atelectasis versus scar, most prominent in the medial right lung base and in the left lateral lung base. XR CHEST PORTABLE   Final Result      Small right basilar pneumothorax. Right basilar chest tube without change. Patchy consolidation in the right mid and lower lung as well as the left lower lung-atelectasis versus pneumonia. Trace left pleural effusion. Normal heart size. Lines and tubes without change. Mild improvement in degree of subcutaneous emphysema in the chest and neck. XR CHEST 1 VIEW   Final Result      1. Stable small right-sided pneumothorax and prominent subcutaneous emphysema along the neck and upper superior chest wall, greater in right lung stable   2. Stable left basilar consolidation and pleural effusion      3.  Stable appearing perihilar accentuated markings or airspace disease            XR CHEST PORTABLE   Final Result      Stable small right-sided pneumothorax. More prominent emphysema over the lower neck. No change in bilateral airspace disease. Stable left pleural effusion. XR CHEST PORTABLE   Final Result   1. Bilateral multifocal pneumonia with improvement in the right    pulmonary consolidation since prior study from 9/23/22   2. Mild to moderate left pleural effusion          XR CHEST PORTABLE   Final Result   1. Support lines and tubes are stable. 2.  Stable small right lateral pneumothorax and right basilar    chest tube. 3.  Stable patchy bilateral airspace disease. XR CHEST PORTABLE   Final Result   1. Satisfactory position of right internal jugular central line   2. No interval change in endotracheal tube and nasogastric tube positioning. 3. Extensive bilateral airspace disease with no changes. 4. Left pleural effusion with retrocardiac opacity, unchanged   5. Small stable tiny right lateral pneumothorax and stable position of right chest tube,, Pleurx catheter. XR ABDOMEN (KUB) (SINGLE AP VIEW)   Final Result   1. No residual pneumothorax. 2.  Mild patchy airspace disease bilaterally worse on the right than on prior examination. 3.  Non-obstructive bowel gas pattern. Mildly distended stomach. XR CHEST PORTABLE   Final Result   1. No residual pneumothorax. 2.  Mild patchy airspace disease bilaterally worse on the right than on prior examination. 3.  Non-obstructive bowel gas pattern. Mildly distended stomach. XR CHEST PORTABLE   Final Result      1. Small right pneumothorax appears slightly increased. 2. Mild patchy airspace opacity bilaterally. XR CHEST PORTABLE   Final Result     Pleurx catheter at the right lung base. Trace right    pneumothorax is unchanged. XR CHEST PORTABLE   Final Result      Right-sided chest tube evident in the base, its tip medially. Improvement in the right-sided pneumothorax, since earlier today.       Possible medial collapse of the right lower lobe. Small left effusion. Patchy airspace density/atelectasis in the lungs bilaterally, with no other significant change. XR CHEST PORTABLE   Final Result   1. Right-sided Pleurx catheter in satisfactory position in the lung bases   2. Right-sided post operative pneumothorax, approximately 10%               Assessment/Plan:   Luis Ramirez is a 65 y.o. male w/PMH HFpEF, carotid artery stenosis, HTN, OA, Restrictive Lung Disease, DM2, HLD who underwent VATS with PleurX catheter placement on 09/19 for recurrent b/l loculated pleural effusions w/subsequent BIPAP failure and hypoxic/hypercapnic respiratory failure requiring Mechanical intubation. Pulmonary:  #Acute Hypoxic/Hypercapnic Respiratory Failure  W/Recurrent Pleural Effusions s/p VATS and PleurX for pleural fluid management 09/19/22, w/associated unintentional weight loss of 30 Ibs, PFTs w/restrictive defect.   -failed to tolerate BIPAP post-op --> Mechanically intubated  Intubation Status:   -ventilation, trach    -current vent settings: RR 14, , FiO2 40%, PEEP 5  - PleurX catheter in place  - Trach in place  - CTS following  - 10/4: CT Chest/Abdomen showed pneumomediastinum extending to pneumoperitoneum  - 10/5: bronchoscopy with aspiration of mucous plugs, culture sent, await results. - Yest  with Roxicodone 7.5 mg schedule, Seroquel 25 mg addition. Calm but still failed quickly with PIP 45 with RR in mid 30's. ID:    #Leukocytosis  -downtrending WBC, normal today  -afebrile  -MRSA nasal probe negative, vancomycin d/c  -Bronch respiratory cx: no WBC or organism  -cefepime day 4      Cardiovascular:     #HFpEF  -w/fluid overload  -w/downtrending BNP  -fluid status:  1.4 L output today   -nephro on board: holding off on diuretics due to renal function, continue to appreciate recs    #Hypotension, resolved  -norepinephrine previously which remains off  -monitor BP    Chronic:  #Paroxysmal Atrial Fibrillation   -Eliquis held, amiodarone  #HLD  -pravastatin 40mg    Renal:  Ansari in place w/good urine output  -continue to monitor    #ZANA  -2/2 ATN w/likely etiology of hypoperfusion due to hypovolemia in addition to  -possible contrast-associated nephropathy  -holding off on diuretics per nephro  -UOP good  -creatinine improving    #Hypernatremia, resolved  -receiving free water flushes of 300 ml Q4H since 10/3/22  W/improvement of Na     GI:  #Pneumoperitoneum   -Found on CT 10/4     #Nutrition  -PEG placement today pending GI clearance  -receiving NG tube feedings    Metabolic    #DM2  - glu in low 100s yest, in 70-90s this morning  -HDSS  -Lantus 28U, patient NPO today for procedure running low  -5 Lispro, hold while npo  -POCT  -hypoglycemia protocol  -A1c:8.1 (1/24/22)    Heme    #Anemia  - s/p 1U pRBC transfusion  - 6.9 this morning, getting another unit this morning  - continue to monitor H&H    Neuro:  -analgesia: Tylenol, received dilaudid 0.25 mg overnight, on scheduled oxycodone 5-->7.5 mg    Code Status: FULL CODE  FEN: Diet NPO  PPX: Pepcid  DISPO: ICU    Stephen Terry MD, PGY-1  10/10/22  7:03 AM    Patient seen, examined and discussed with the resident and I agree with the assessment and plan. Vent Mode: AC/PRVC Resp Rate (Set): 14 bmp/Vt (Set, mL): 375 mL/ /FiO2 : 35 %  PEEP 5  No results for input(s): PHART, MYS8MUS, PO2ART in the last 72 hours. Plan for PEG today. Draining pleurx every other day. Hasn't been able to tolerate vent weans thus far. Finally weaned off levophed yesterday. After PEG is in and working, I'm going to ask RT to check NIF an VC BID. If numbers remain low we may need to perform a NM weakness workup. I will also consider a repeat CT chest to see if his lower lobes are opening up, and if he's got more secretions.       Critical care time spent reviewing labs/films, examining patient, collaborating with other physicians but excluding procedures for life threatening organ failure is 36 minutes.       Farhan Pelayo MD

## 2022-10-10 NOTE — PROGRESS NOTES
Due to PEG tube placement being delayed another day, per Dr. Rosanne Terrell, okay to restart Tube Feeds. Will check a BS one hour after starting and turn off D10 if BS>80 per surgery. Continuous TF started at initial rate of 25 ml/hr at this time. Will continue to monitor.

## 2022-10-10 NOTE — PROGRESS NOTES
1 unit of PRBC's administered at this time. VSS and patient appears to be tolerating well, will continue to monitor.

## 2022-10-11 ENCOUNTER — ANESTHESIA EVENT (OUTPATIENT)
Dept: ENDOSCOPY | Age: 77
End: 2022-10-11
Payer: COMMERCIAL

## 2022-10-11 ENCOUNTER — ANESTHESIA (OUTPATIENT)
Dept: ENDOSCOPY | Age: 77
End: 2022-10-11
Payer: COMMERCIAL

## 2022-10-11 PROCEDURE — 6360000002 HC RX W HCPCS: Performed by: NURSE ANESTHETIST, CERTIFIED REGISTERED

## 2022-10-11 PROCEDURE — 2580000003 HC RX 258: Performed by: NURSE ANESTHETIST, CERTIFIED REGISTERED

## 2022-10-11 RX ORDER — SODIUM CHLORIDE 9 MG/ML
INJECTION, SOLUTION INTRAVENOUS CONTINUOUS PRN
Status: DISCONTINUED | OUTPATIENT
Start: 2022-10-11 | End: 2022-10-11 | Stop reason: SDUPTHER

## 2022-10-11 RX ORDER — PROPOFOL 10 MG/ML
INJECTION, EMULSION INTRAVENOUS PRN
Status: DISCONTINUED | OUTPATIENT
Start: 2022-10-11 | End: 2022-10-11 | Stop reason: SDUPTHER

## 2022-10-11 RX ADMIN — PROPOFOL 10 MG: 10 INJECTION, EMULSION INTRAVENOUS at 14:21

## 2022-10-11 RX ADMIN — PHENYLEPHRINE HYDROCHLORIDE 100 MCG: 10 INJECTION, SOLUTION INTRAMUSCULAR; INTRAVENOUS; SUBCUTANEOUS at 14:21

## 2022-10-11 RX ADMIN — PROPOFOL 20 MG: 10 INJECTION, EMULSION INTRAVENOUS at 14:17

## 2022-10-11 RX ADMIN — PROPOFOL 10 MG: 10 INJECTION, EMULSION INTRAVENOUS at 14:31

## 2022-10-11 RX ADMIN — SODIUM CHLORIDE: 9 INJECTION, SOLUTION INTRAVENOUS at 14:08

## 2022-10-11 RX ADMIN — PROPOFOL 20 MG: 10 INJECTION, EMULSION INTRAVENOUS at 14:16

## 2022-10-11 RX ADMIN — PHENYLEPHRINE HYDROCHLORIDE 100 MCG: 10 INJECTION, SOLUTION INTRAMUSCULAR; INTRAVENOUS; SUBCUTANEOUS at 14:23

## 2022-10-11 RX ADMIN — PROPOFOL 20 MG: 10 INJECTION, EMULSION INTRAVENOUS at 14:18

## 2022-10-11 RX ADMIN — PHENYLEPHRINE HYDROCHLORIDE 200 MCG: 10 INJECTION, SOLUTION INTRAMUSCULAR; INTRAVENOUS; SUBCUTANEOUS at 14:36

## 2022-10-11 RX ADMIN — PROPOFOL 10 MG: 10 INJECTION, EMULSION INTRAVENOUS at 14:23

## 2022-10-11 RX ADMIN — PROPOFOL 10 MG: 10 INJECTION, EMULSION INTRAVENOUS at 14:29

## 2022-10-11 RX ADMIN — PHENYLEPHRINE HYDROCHLORIDE 200 MCG: 10 INJECTION, SOLUTION INTRAMUSCULAR; INTRAVENOUS; SUBCUTANEOUS at 14:27

## 2022-10-11 RX ADMIN — PROPOFOL 10 MG: 10 INJECTION, EMULSION INTRAVENOUS at 14:20

## 2022-10-11 RX ADMIN — PROPOFOL 10 MG: 10 INJECTION, EMULSION INTRAVENOUS at 14:33

## 2022-10-11 RX ADMIN — PROPOFOL 10 MG: 10 INJECTION, EMULSION INTRAVENOUS at 14:27

## 2022-10-11 ASSESSMENT — LIFESTYLE VARIABLES: SMOKING_STATUS: 0

## 2022-10-11 ASSESSMENT — ENCOUNTER SYMPTOMS: SHORTNESS OF BREATH: 1

## 2022-10-11 NOTE — PROGRESS NOTES
Speech Language Pathology    Anticipated to complete FEES this date, however pt NPO for PEG tube placement this afternoon. Will re-schedule per pt and SLP availability and attempt again this week.      Electronically signed by:  Maddie Briggs M.A., [address] [place] [address]  Speech-Language Pathologist  Pg #: [phone]

## 2022-10-11 NOTE — ANESTHESIA POSTPROCEDURE EVALUATION
Department of Anesthesiology  Postprocedure Note    Patient: Winston Alcazar  MRN: [id number]  YOB: 1945  Date of evaluation: 10/11/2022      Procedure Summary     Date: 10/11/22 Room / Location: [facility]    Anesthesia Start: 8411 Anesthesia Stop: 7898    Procedure: EGD PEG TUBE PLACEMENT Diagnosis: Malnutrition, unspecified type [facility])    Surgeons: Eran Berumen MD Responsible Provider: Jenni Sever, DO    Anesthesia Type: general ASA Status: 4          Anesthesia Type: No value filed.     Kenisha Phase I: Kenisha Score: 5    Kenisha Phase II:        Anesthesia Post Evaluation    Patient location during evaluation: PACU  Level of consciousness: awake  Complications: no  Multimodal analgesia pain management approach

## 2022-10-11 NOTE — PROGRESS NOTES
Nephrology Progress Note                                                                                                                                                                                                                                                                                                                                                               Office : [phone]     Fax :[phone]    Patient's Name: Marylin Lebron    10/11/2022    Reason for Consult: ZANA  Requesting Physician:  Smith Law MD    Interval History:      Cr stable   Na stable   Good UO   Remains on Trach   PEG today         Past Medical History:   Diagnosis Date    ZANA (acute kidney injury) ([facility] [address] 9/26/2022    Carotid stenosis, left 10/12/2011    Chronic back pain     Chronic systolic (congestive) heart failure 27/14/2573    Diastolic CHF ([facility] [address]     Erectile dysfunction     Hyperlipidemia     Hypertension     Osteoarthritis     Restrictive lung disease     Type II or unspecified type diabetes mellitus without mention of complication, not stated as uncontrolled        Past Surgical History:   Procedure Laterality Date    CARDIAC CATHETERIZATION  06/2022    KNEE SURGERY Left     PLEURAL CATH INSERTION N/A 9/19/2022    PLEURAL CATHETER PLACEMENT; INTERCOSTAL NERVE BLOCK X4 performed by Leslie Santacruz MD at [facility] Bilateral     THORACOSCOPY Right 9/19/2022    RIGHT VIDEO ASSISTED THORASCOPIC SURGERY AND; performed by Leslie Santacruz MD at [address] N/A 9/30/2022    TRACHEOSTOMY performed by Bhavesh Simmons MD at [address] [facility] History   Problem Relation Age of Onset    Hearing Loss Father     Heart Disease Father 70        MI    High Blood Pressure Father     Diabetes Maternal Grandmother         hypoglycemic    Hearing Loss Maternal Grandmother     Arthritis Maternal Grandfather         reports that he quit smoking about 20 years ago.  His smoking use included cigarettes. He has a 80.00 pack-year smoking history. He has never used smokeless tobacco. He reports current alcohol use. He reports that he does not use drugs. Allergies:   Torsemide and Dye [iodides]    Current Medications:    cefepime (MAXIPIME) 2000 mg IVPB minibag, Q8H  0.9 % sodium chloride infusion, PRN  hydroxypropyl methylcellulose (GONIOSOL) 2.5 % ophthalmic solution 1 drop, PRN  HYDROmorphone (DILAUDID) injection 0.5 mg, Q3H PRN   Or  HYDROmorphone (DILAUDID) injection 1 mg, Q3H PRN  oxyCODONE (ROXICODONE) 5 MG/5ML solution 7.5 mg, Q4H  QUEtiapine (SEROQUEL) tablet 25 mg, BID  insulin lispro (1 Unit Dial) (HUMALOG/ADMELOG) pen 5 Units, q8h  [Held by provider] insulin glargine (LANTUS;BASAGLAR) injection pen 28 Units, Nightly  albuterol (PROVENTIL) nebulizer solution 2.5 mg, Q4H  pantoprazole (PROTONIX) injection 40 mg, BID  insulin lispro (1 Unit Dial) (HUMALOG/ADMELOG) pen 0-16 Units, Q4H  sodium chloride flush 0.9 % injection 5-40 mL, 2 times per day  sodium chloride flush 0.9 % injection 5-40 mL, PRN  0.9 % sodium chloride infusion, PRN  labetalol (NORMODYNE;TRANDATE) injection 10 mg, Q15 Min PRN  Venelex ointment, BID  amiodarone (CORDARONE) 150 mg in dextrose 5 % 100 mL bolus, Once  amiodarone (CORDARONE) tablet 200 mg, Daily  acetaminophen (TYLENOL) 160 MG/5ML solution 650 mg, Q6H PRN  chlorhexidine (PERIDEX) 0.12 % solution 15 mL, BID  [Held by provider] apixaban (ELIQUIS) tablet 5 mg, BID  sodium chloride flush 0.9 % injection 5-40 mL, 2 times per day  sodium chloride flush 0.9 % injection 5-40 mL, PRN  0.9 % sodium chloride infusion, PRN  polyethylene glycol (GLYCOLAX) packet 17 g, Daily  ondansetron (ZOFRAN-ODT) disintegrating tablet 4 mg, Q8H PRN   Or  ondansetron (ZOFRAN) injection 4 mg, Q6H PRN  glucose chewable tablet 16 g, PRN  dextrose bolus 10% 125 mL, PRN   Or  dextrose bolus 10% 250 mL, PRN  glucagon (rDNA) injection 1 mg, PRN  dextrose 10 % infusion, Continuous PRN  hydrALAZINE (APRESOLINE) injection 5 mg, Q15 Min PRN      Review of Systems:   14 point ROS obtained but were negative except mentioned in HPI      Physical exam:     Vitals:  BP 90/63   Pulse 96   Temp 97.7 °F (36.5 °C) (Temporal)   Resp 28   Ht 6' 0.99\" (1.854 m)   Wt 210 lb 15.7 oz (95.7 kg)   SpO2 (!) 89%   BMI 27.84 kg/m²   Constitutional:  on MV  Skin: no rash, turgor wnl  Heent:  eomi, mmm  Neck: no bruits or jvd noted  Cardiovascular:  S1, S2 without m/r/g  Respiratory: trach  Abdomen:  +bs, soft, nt, nd  Ext: bilateral lower extremity edema R>L  Psychiatric: mood and affect appropriate  Musculoskeletal:  Rom, muscular strength intact    Data:   Labs:  CBC:   Recent Labs     10/09/22  0510 10/10/22  0547 10/10/22  0635 10/10/22  1800 10/11/22  0457   WBC 12.0* 6.9  --   --  7.3   HGB 8.2* 6.8* 6.9* 8.2* 8.1*    238  --   --  261       BMP:    Recent Labs     10/09/22  0510 10/10/22  0547 10/11/22  0457   * 136 135*   K 4.1 3.9 4.3   CL 96* 97* 97*   CO2 33* 33* 29   * 111* 98*   CREATININE 1.2 1.1 1.0   GLUCOSE 199* 68* 154*       Ca/Mg/Phos:   Recent Labs     10/09/22  0510 10/10/22  0547 10/11/22  0457   CALCIUM 8.2* 7.6* 7.9*   MG 3.30* 3.10* 3.20*   PHOS 3.8 2.5 3.2       Hepatic: No results for input(s): AST, ALT, ALB, BILITOT, ALKPHOS in the last 72 hours. Troponin: No results for input(s): TROPONINI in the last 72 hours. BNP: No results for input(s): BNP in the last 72 hours. Lipids:   Recent Labs     10/09/22  0510   TRIG 59         ABGs:   No results for input(s): PHART, PO2ART, UVR2WXH in the last 72 hours. INR: No results for input(s): INR in the last 72 hours. UA:No results for input(s): Rosalinda Cheikh, GLUCOSEU, BILIRUBINUR, Karene Specter, BLOODU, PHUR, PROTEINU, UROBILINOGEN, NITRU, LEUKOCYTESUR, LABMICR, URINETYPE in the last 72 hours. Urine Microscopic: No results for input(s): LABCAST, BACTERIA, COMU, HYALCAST, WBCUA, RBCUA, EPIU in the last 72 hours.   Urine Culture: No results for input(s): LABURIN in the last 72 hours. Urine Chemistry:   No results for input(s): Clearance Bookbinder, PROTEINUR, NAUR in the last 72 hours. IMAGING:  CT ABDOMEN PELVIS WO CONTRAST Additional Contrast? None   Final Result      Marked decrease in size of pneumomediastinum. Trace residual pneumoperitoneum. Extensive bilateral lower lobe pulmonary atelectasis with pleural effusions and trace residual right pneumothorax. XR CHEST PORTABLE   Final Result      Tiny right lateral pneumothorax is suspected, 5 mm in maximum thickness. This has decreased in size since 10/3/2022. Right basilar Pleurx catheter noted. Small bilateral pleural effusions. Prominent interstitial markings. Stable cardiac mediastinal silhouette. Lines and tubes without change. Subcutaneous emphysema noted. CT CHEST ABDOMEN PELVIS WO CONTRAST   Final Result      1. Severe pneumomediastinum. 2. Small to moderate right hydropneumothorax with similar fluid and gas components with a right-sided Pleurx catheter. 3. Moderate loculated left pleural effusion with atelectasis of the left lower lobe with patency of the central left lower lobe bronchi. 4. Fluid/material filling the bronchus intermedius and right lower lobe bronchi with complete consolidation and volume loss of the right lower lobe. Differential diagnosis would include mucous plugging or obstructing lesion. 5. Tracheostomy tube tip within the trachea   6. Severe subcutaneous emphysema in the neck. CT ABDOMEN AND PELVIS:      FINDINGS:      LIVER: Normal.      GALLBLADDER AND BILIARY TREE: No calcified gallstones. No gallbladder distention. No intra- or extrahepatic biliary dilatation. PANCREAS: Normal.      SPLEEN: Normal.      ADRENAL GLANDS: Normal.      KIDNEYS AND URETERS: Normal.      URINARY BLADDER: Ansari catheter present within collapsed urinary bladder.       REPRODUCTIVE ORGANS: No associated masses. BOWEL: Normal diameter, nonobstructed. Feeding tube tip at the level of the ligament of Treitz. LYMPH NODES: No abnormally enlarged nodes. PERITONEUM/RETROPERITONEUM: Severe pneumoperitoneum extends into the upper abdomen with some of the symmetric multiseptated gas appearing deep to the diaphragm consistent with mild pneumoperitoneum (series 601 was 101). This is likely related to    pneumonia mediastinum dissecting into the abdomen. No fluid collection in the abdomen or pelvis. No ascites. VESSELS: Extensive atherosclerotic calcification of the aorta and iliac arteries. ABDOMINAL WALL: No acute abnormality. BONES: No acute abnormality. Mild chronic L1 compression fracture with previous vertebroplasty. IMPRESSION:      1. Severe pneumomediastinum extends into the upper abdomen with small pneumoperitoneum likely related to extension of pneumoperitoneum into the upper peritoneal cavity. 2. No fluid collection in the abdomen or pelvis. Results were discussed with the surgical team at 7:00 PM on 10/3/2022. XR CHEST PORTABLE   Final Result      Stable appearance of loculated right pneumothorax, with loculated components in the lateral right midlung region and in the right lateral costophrenic sulcus. Stable scattered areas of atelectasis versus scar, most prominent in the medial right lung base and in the left lateral lung base. XR CHEST PORTABLE   Final Result      Small right basilar pneumothorax. Right basilar chest tube without change. Patchy consolidation in the right mid and lower lung as well as the left lower lung-atelectasis versus pneumonia. Trace left pleural effusion. Normal heart size. Lines and tubes without change. Mild improvement in degree of subcutaneous emphysema in the chest and neck. XR CHEST 1 VIEW   Final Result      1.  Stable small right-sided pneumothorax and prominent subcutaneous emphysema along the neck and upper superior chest wall, greater in right lung stable   2. Stable left basilar consolidation and pleural effusion      3. Stable appearing perihilar accentuated markings or airspace disease            XR CHEST PORTABLE   Final Result      Stable small right-sided pneumothorax. More prominent emphysema over the lower neck. No change in bilateral airspace disease. Stable left pleural effusion. XR CHEST PORTABLE   Final Result   1. Bilateral multifocal pneumonia with improvement in the right    pulmonary consolidation since prior study from 9/23/22   2. Mild to moderate left pleural effusion          XR CHEST PORTABLE   Final Result   1. Support lines and tubes are stable. 2.  Stable small right lateral pneumothorax and right basilar    chest tube. 3.  Stable patchy bilateral airspace disease. XR CHEST PORTABLE   Final Result   1. Satisfactory position of right internal jugular central line   2. No interval change in endotracheal tube and nasogastric tube positioning. 3. Extensive bilateral airspace disease with no changes. 4. Left pleural effusion with retrocardiac opacity, unchanged   5. Small stable tiny right lateral pneumothorax and stable position of right chest tube,, Pleurx catheter. XR ABDOMEN (KUB) (SINGLE AP VIEW)   Final Result   1. No residual pneumothorax. 2.  Mild patchy airspace disease bilaterally worse on the right than on prior examination. 3.  Non-obstructive bowel gas pattern. Mildly distended stomach. XR CHEST PORTABLE   Final Result   1. No residual pneumothorax. 2.  Mild patchy airspace disease bilaterally worse on the right than on prior examination. 3.  Non-obstructive bowel gas pattern. Mildly distended stomach. XR CHEST PORTABLE   Final Result      1. Small right pneumothorax appears slightly increased. 2. Mild patchy airspace opacity bilaterally.             XR CHEST PORTABLE   Final Result Pleurx catheter at the right lung base. Trace right    pneumothorax is unchanged. XR CHEST PORTABLE   Final Result      Right-sided chest tube evident in the base, its tip medially. Improvement in the right-sided pneumothorax, since earlier today. Possible medial collapse of the right lower lobe. Small left effusion. Patchy airspace density/atelectasis in the lungs bilaterally, with no other significant change. XR CHEST PORTABLE   Final Result   1. Right-sided Pleurx catheter in satisfactory position in the lung bases   2. Right-sided post operative pneumothorax, approximately 10%             Assessment/Plan   ZANA  - suspect this is contrast nephropathy  - baseline Cre 0.7. Normal on admission.  - Cr better   - Hold diuretics   - BNP 3k, Protein:Cre 0.3, FENa 0.4%  - closely monitor UOP  - avoid nephrotoxic agent     2. Hypernatremia  - continue free water   - will continue to monitor     3. Hypotension  - pressors as needed     4. Anemia  - daily CBC    5. Acid- base/ Electrolyte imbalance   - optimize lytes    6. Acute hypoxic resp failure  - s/p VATS on 9/19/22 for recurrent pleural effusions  - Intensivist and CTS following    7.  CHF   - EF 65% on 6/16/22 via cardiac cath        Jessy Rosa MD

## 2022-10-11 NOTE — PROGRESS NOTES
ICU CT SURGERY DAILY PROGRESS NOTE    CC: Pleural effusions s/p R PleurX catheter placement    SUBJECTIVE:   Interval Hx:   Pt tachycardic to 122 overnight. Increased secretions from tracheostomy. PEG not placed yesterday. ROS: A 14 point review of systems was conducted, significant findings as noted above. All other systems negative. OBJECTIVE:   Vitals:   Vitals:    10/11/22 0530 10/11/22 0539 10/11/22 0600 10/11/22 0722   BP: (!) 100/56  (!) 81/52    Pulse: (!) 105  (!) 103 (!) 114   Resp: 19 26 20 14   Temp:       TempSrc:       SpO2: (!) 87%  90% 98%   Weight:       Height:           I/O:   Intake/Output Summary (Last 24 hours) at 10/11/2022 0743  Last data filed at 10/11/2022 0500  Gross per 24 hour   Intake 2688.31 ml   Output 1530 ml   Net 1158.31 ml       I/O last 3 completed shifts: In: 3106.3 [I.V.:1886.3; Blood:300; NG/GT:920]  Out: 2170 [Urine:2170]    Diet: Diet NPO      Physical Examination:   General appearance: Mechanically ventilated. Appropriately nods head and uses hand gestures in response to questions. Does not attempt to verbalize   HEENT: NC/AT, EOMI, trachea midline, no JVD. Tracheostomy leaking and with minimal erythema on edges. Corpak in right nostril w/ bridle. Chest/Lungs: On mechanical ventilation via tracheostomy; R PleurX catheter capped with dressing CLD  Cardiovascular: Atrial fibrillation   Abdomen: Soft, non-tender, non-distended. Genitourinary/Anorectal: Ansari in place with clear yellow urine. Rectal tube in place with brown liquid stool. Skin: Warm and dry, no rashes. Sacral decubitus ulcer   Extremities: Pitting edema of the b/l feet.  No cyanosis; SCDs in place    Labs:  CBC:   Recent Labs     10/09/22  0510 10/10/22  0547 10/10/22  0635 10/10/22  1800 10/11/22  0457   WBC 12.0* 6.9  --   --  7.3   HGB 8.2* 6.8* 6.9* 8.2* 8.1*   HCT 26.3* 20.7* 21.4* 25.4* 25.3*    238  --   --  261         BMP:   Recent Labs     10/09/22  0510 10/10/22  0547 10/11/22  0457 * 136 135*   K 4.1 3.9 4.3   CL 96* 97* 97*   CO2 33* 33* 29   * 111* 98*   CREATININE 1.2 1.1 1.0   GLUCOSE 199* 68* 154*       LFT's:   No results for input(s): AST, ALT, ALB, BILITOT, ALKPHOS in the last 72 hours. Troponin: No results for input(s): TROPONINI in the last 72 hours. BNP: No results for input(s): BNP in the last 72 hours. ABGs:   No results for input(s): PHART, [id number], PO2ART in the last 72 hours. INR:   No results for input(s): INR in the last 72 hours. U/A:No results for input(s): NITRITE, COLORU, PHUR, LABCAST, WBCUA, RBCUA, MUCUS, TRICHOMONAS, YEAST, BACTERIA, CLARITYU, SPECGRAV, LEUKOCYTESUR, UROBILINOGEN, BILIRUBINUR, BLOODU, GLUCOSEU, AMORPHOUS in the last 72 hours. Invalid input(s): Jeanna [place]     Rad:   CT ABDOMEN PELVIS WO CONTRAST Additional Contrast? None   Final Result      Marked decrease in size of pneumomediastinum. Trace residual pneumoperitoneum. Extensive bilateral lower lobe pulmonary atelectasis with pleural effusions and trace residual right pneumothorax. XR CHEST PORTABLE   Final Result      Tiny right lateral pneumothorax is suspected, 5 mm in maximum thickness. This has decreased in size since 10/3/2022. Right basilar Pleurx catheter noted. Small bilateral pleural effusions. Prominent interstitial markings. Stable cardiac mediastinal silhouette. Lines and tubes without change. Subcutaneous emphysema noted. CT CHEST ABDOMEN PELVIS WO CONTRAST   Final Result      1. Severe pneumomediastinum. 2. Small to moderate right hydropneumothorax with similar fluid and gas components with a right-sided Pleurx catheter. 3. Moderate loculated left pleural effusion with atelectasis of the left lower lobe with patency of the central left lower lobe bronchi. 4. Fluid/material filling the bronchus intermedius and right lower lobe bronchi with complete consolidation and volume loss of the right lower lobe.  Differential diagnosis would include mucous plugging or obstructing lesion. 5. Tracheostomy tube tip within the trachea   6. Severe subcutaneous emphysema in the neck. CT ABDOMEN AND PELVIS:      FINDINGS:      LIVER: Normal.      GALLBLADDER AND BILIARY TREE: No calcified gallstones. No gallbladder distention. No intra- or extrahepatic biliary dilatation. PANCREAS: Normal.      SPLEEN: Normal.      ADRENAL GLANDS: Normal.      KIDNEYS AND URETERS: Normal.      URINARY BLADDER: Ansari catheter present within collapsed urinary bladder. REPRODUCTIVE ORGANS: No associated masses. BOWEL: Normal diameter, nonobstructed. Feeding tube tip at the level of the ligament of Treitz. LYMPH NODES: No abnormally enlarged nodes. PERITONEUM/RETROPERITONEUM: Severe pneumoperitoneum extends into the upper abdomen with some of the symmetric multiseptated gas appearing deep to the diaphragm consistent with mild pneumoperitoneum (series 601 was 101). This is likely related to    pneumonia mediastinum dissecting into the abdomen. No fluid collection in the abdomen or pelvis. No ascites. VESSELS: Extensive atherosclerotic calcification of the aorta and iliac arteries. ABDOMINAL WALL: No acute abnormality. BONES: No acute abnormality. Mild chronic L1 compression fracture with previous vertebroplasty. IMPRESSION:      1. Severe pneumomediastinum extends into the upper abdomen with small pneumoperitoneum likely related to extension of pneumoperitoneum into the upper peritoneal cavity. 2. No fluid collection in the abdomen or pelvis. Results were discussed with the surgical team at 7:00 PM on 10/3/2022. XR CHEST PORTABLE   Final Result      Stable appearance of loculated right pneumothorax, with loculated components in the lateral right midlung region and in the right lateral costophrenic sulcus.       Stable scattered areas of atelectasis versus scar, most prominent in the medial right lung base and in the left lateral lung base. XR CHEST PORTABLE   Final Result      Small right basilar pneumothorax. Right basilar chest tube without change. Patchy consolidation in the right mid and lower lung as well as the left lower lung-atelectasis versus pneumonia. Trace left pleural effusion. Normal heart size. Lines and tubes without change. Mild improvement in degree of subcutaneous emphysema in the chest and neck. XR CHEST 1 VIEW   Final Result      1. Stable small right-sided pneumothorax and prominent subcutaneous emphysema along the neck and upper superior chest wall, greater in right lung stable   2. Stable left basilar consolidation and pleural effusion      3. Stable appearing perihilar accentuated markings or airspace disease            XR CHEST PORTABLE   Final Result      Stable small right-sided pneumothorax. More prominent emphysema over the lower neck. No change in bilateral airspace disease. Stable left pleural effusion. XR CHEST PORTABLE   Final Result   1. Bilateral multifocal pneumonia with improvement in the right    pulmonary consolidation since prior study from 9/23/22   2. Mild to moderate left pleural effusion          XR CHEST PORTABLE   Final Result   1. Support lines and tubes are stable. 2.  Stable small right lateral pneumothorax and right basilar    chest tube. 3.  Stable patchy bilateral airspace disease. XR CHEST PORTABLE   Final Result   1. Satisfactory position of right internal jugular central line   2. No interval change in endotracheal tube and nasogastric tube positioning. 3. Extensive bilateral airspace disease with no changes. 4. Left pleural effusion with retrocardiac opacity, unchanged   5. Small stable tiny right lateral pneumothorax and stable position of right chest tube,, Pleurx catheter. XR ABDOMEN (KUB) (SINGLE AP VIEW)   Final Result   1. No residual pneumothorax. 2.  Mild patchy airspace disease bilaterally worse on the right than on prior examination. 3.  Non-obstructive bowel gas pattern. Mildly distended stomach. XR CHEST PORTABLE   Final Result   1. No residual pneumothorax. 2.  Mild patchy airspace disease bilaterally worse on the right than on prior examination. 3.  Non-obstructive bowel gas pattern. Mildly distended stomach. XR CHEST PORTABLE   Final Result      1. Small right pneumothorax appears slightly increased. 2. Mild patchy airspace opacity bilaterally. XR CHEST PORTABLE   Final Result     Pleurx catheter at the right lung base. Trace right    pneumothorax is unchanged. XR CHEST PORTABLE   Final Result      Right-sided chest tube evident in the base, its tip medially. Improvement in the right-sided pneumothorax, since earlier today. Possible medial collapse of the right lower lobe. Small left effusion. Patchy airspace density/atelectasis in the lungs bilaterally, with no other significant change. XR CHEST PORTABLE   Final Result   1. Right-sided Pleurx catheter in satisfactory position in the lung bases   2. Right-sided post operative pneumothorax, approximately 10%             ASSESSMENT AND PLAN:   Carmen Roque is a 65 y.o. male with history of diastolic heart failure, atrial fibrillation, and DM with recurrent pleural effusions s/p R PleurX catheter placement (9/19), POD #22. Neuro:   Analgesia  - Continue oxycodone scheduled. Dilaudid and tylenol PRN    Cardiovascular:   History of paroxysmal atrial fibrillation  -Cont amiodarone  - Eliquis held for possible PEG placement today - restart 24 hours post-op    HFpEF  - Last echo (1/25/22): LVEF = 39-16%, grade 2 diastolic dysfunction    Hyperlipidemia  - Cont Pravastatin 40mg    Pulmonary:   S/P R PleurX catheter placement (9/19), POD #22  - Capped Catheter.  Will drain every other day - drain today  - SW/HHC pending    Acute on chronic respiratory failure  - Mechanically intubated on 9/22. Currently PRVC 14/375/5/45%. Vent management per pulm/crit care. - Tracheostomy 9/30. Tracheostomy leaking - consult crit care   - Pulmonology following, appreciate recommendations. - Baseline on home O2 3-4L NC   -Critical care conducted bronchoscopy 10/5 - no masses noted, only white mucous secretions in the airways. GI:   -CT A/P completed yesterday. Shows trace residual pneumoperitoneum   -Possible PEG today with GI   - Miralax  - Will restart TF's post PEG placement per GI recommendations.   -Rectal tube in place, continue      FEN:    -Replace electrolytes per protocol  - Diet: Diet NPO   -SLP consulted, will inquire about Passy Kenneth valve for speaking and eating. Appreciate recs. /Renal:   - Cont Ansari   - Creatinine 1.0 from 1.1  - BUN 98 from 111, 112, 108, 109, 105, 110  - Nephrology consulted. Appreciate recommendations. Hem/ID/wound:   - Hemoglobin 8.1 from 8.2, 6.9 s/p 1u pRBC  - WBC 7.3 from 6.9 from 12.0, 13.3, 16.9, 20.6  - Continue cefepime until 10/6  - BAL cx's no organisms seen - no organisms seen, but sample streaked incorrectly. - Sacral wound per wound care nurse    Endo:   History of DMII  - Last A1C 8.1% (1/24/22)  - Lantus 28 nightly and HDSSI    Prophylaxis:   DVT Ppx: SCDs  GI Ppx: pepcid    Access:  Central Access: R IJ CVC, placed 9/22             Ansari Date placed: 9/20  Rectal tube placed: 10/4    Mobility:  OOB and activity as tolerated    Dispo:   ICU  Case management assisting with dispo - pt would benefit from LTAC over SNF.      Code Status: Full Code  -----------------------------  Purnima Theodore DO   PGY1, General Surgery  10/11/22  7:43 AM  Pager # [phone]

## 2022-10-11 NOTE — ANESTHESIA PRE PROCEDURE
Department of Anesthesiology  Preprocedure Note       Name:  Deborah Ken   Age:  65 y.o.  :  1945                                          MRN:  [id number]         Date:  10/11/2022      Surgeon: Madeline Benton):  Miriam Leblanc MD    Procedure: Procedure(s):  EGD PEG TUBE PLACEMENT    Medications prior to admission:   Prior to Admission medications    Medication Sig Start Date End Date Taking? Authorizing Provider   ELIQUIS 5 MG TABS tablet TAKE 1 TABLET BY MOUTH TWO TIMES A DAY 22   Meryl Guevara, APRN - CNP   umeclidinium-vilanterol [facility] ELLIPTA) 62.5-25 MCG/INH AEPB inhaler Inhale 1 puff into the lungs daily 22   Harrison Quiroz MD   bumetanide (BUMEX) 1 MG tablet Take 1 tablet by mouth in the morning and 1 tablet before bedtime. 22   Kirby Key MD   magnesium oxide (MAG-OX) 400 MG tablet Take 1 tablet by mouth in the morning and 1 tablet before bedtime. 22   Kirby Key MD   dapagliflozin (FARXIGA) 10 MG tablet TAKE 1 TABLET EVERY MORNING 22   Noemí Huber MD   sildenafil (VIAGRA) 100 MG tablet Take 1 tablet by mouth as needed for Erectile Dysfunction Max daily dose 100mg. 22  Noemí Huber MD   metoprolol succinate (TOPROL XL) 50 MG extended release tablet Take 1 tablet by mouth in the morning and at bedtime 22   Kirby Key MD   Insulin Pen Needle (PEN NEEDLES) 31G X 6 MM MISC 1 each by Does not apply route daily 22   Noemí Huber MD   Continuous Blood Gluc Sensor (FREESTYLE LIZA 14 DAY SENSOR) MISC Check bs tidac and hs 22   Noemí Huber MD   Continuous Blood Gluc  (FREESTYLE LIZA 14 DAY READER) SOFIYA Check bs tidac and hs 22   Noemí Huber MD   Insulin Degludec (TRESIBA FLEXTOUCH) 200 UNIT/ML SOPN 10 units subcut qam, increase 4 units every 4 days until am blood sugar < 120.  Max 100 units  Patient taking differently: Inject 12 Units into the skin daily 10 units subcut qam, increase 4 units every 4 days until am blood sugar < 120. Max 100 units 6/7/22   Tiffanie Gill MD   albuterol sulfate HFA (VENTOLIN HFA) 108 (90 Base) MCG/ACT inhaler Inhale 2 puffs into the lungs 4 times daily as needed for Wheezing 5/19/22   Viviana Doyle MD   pravastatin (PRAVACHOL) 40 MG tablet Take 1 tablet by mouth daily 10/20/21   Robbi Rainey, APRN - CNP       Current medications:    No current facility-administered medications for this visit. No current outpatient medications on file.      Facility-Administered Medications Ordered in Other Visits   Medication Dose Route Frequency Provider Last Rate Last Admin    0.9 % sodium chloride infusion   IntraVENous PRN Cristy Kelleych, DO        hydroxypropyl methylcellulose (GONIOSOL) 2.5 % ophthalmic solution 1 drop  1 drop Both Eyes PRN Cristy Vitale, DO        HYDROmorphone (DILAUDID) injection 0.5 mg  0.5 mg IntraVENous Q3H PRN Cristy Vitale, DO        Or    HYDROmorphone (DILAUDID) injection 1 mg  1 mg IntraVENous Q3H PRN Cristy Vitale, DO   1 mg at 10/10/22 1347    oxyCODONE (ROXICODONE) 5 MG/5ML solution 7.5 mg  7.5 mg Oral Q4H Dre Arenas MD   7.5 mg at 10/11/22 0509    QUEtiapine (SEROQUEL) tablet 25 mg  25 mg Oral BID Kaleigh Faria DO   25 mg at 10/10/22 2025    insulin lispro (1 Unit Dial) (HUMALOG/ADMELOG) pen 5 Units  5 Units SubCUTAneous q8h Beti Dotson DO   5 Units at 10/09/22 0450    [Held by provider] insulin glargine (LANTUS;BASAGLAR) injection pen 28 Units  28 Units SubCUTAneous Nightly Beti Dotson DO   28 Units at 10/09/22 2014    cefepime (MAXIPIME) 2000 mg IVPB minibag  2,000 mg IntraVENous Q12H Cristy Vitale DO 12.5 mL/hr at 10/11/22 0932 2,000 mg at 10/11/22 0932    albuterol (PROVENTIL) nebulizer solution 2.5 mg  2.5 mg Nebulization Q4H Danae Bolton MD   2.5 mg at 10/11/22 0429    pantoprazole (PROTONIX) injection 40 mg  40 mg IntraVENous BID Jose Francisco Kohli MD   40 mg at 10/11/22 4830    insulin lispro (1 Unit Dial) (HUMALOG/ADMELOG) pen 0-16 Units  0-16 Units SubCUTAneous Q4H Nasrin Simpson MD   4 Units at 10/09/22 0539    sodium chloride flush 0.9 % injection 5-40 mL  5-40 mL IntraVENous 2 times per day Jarett Hoover MD   10 mL at 10/11/22 0800    sodium chloride flush 0.9 % injection 5-40 mL  5-40 mL IntraVENous PRN Jarett Hooevr MD        0.9 % sodium chloride infusion  25 mL IntraVENous PRN Jarett Hoover MD        labetalol (NORMODYNE;TRANDATE) injection 10 mg  10 mg IntraVENous Q15 Min PRN Jarett Hoover MD        Venelex ointment   Topical BID Mathew Galeano MD   Given at 10/10/22 2025    amiodarone (CORDARONE) 150 mg in dextrose 5 % 100 mL bolus  150 mg IntraVENous Once Mathew Galeano MD        amiodarone (CORDARONE) tablet 200 mg  200 mg Oral Daily Mathew Galeano MD   200 mg at 10/09/22 0950    acetaminophen (TYLENOL) 160 MG/5ML solution 650 mg  650 mg Oral Q6H PRN Mathew Galeano MD   650 mg at 10/04/22 1315    chlorhexidine (PERIDEX) 0.12 % solution 15 mL  15 mL Mouth/Throat BID Mathew Galeano MD   15 mL at 10/11/22 0929    [Held by provider] apixaban (ELIQUIS) tablet 5 mg  5 mg Oral BID Teresa Maldonado MD   5 mg at 10/07/22 0910    sodium chloride flush 0.9 % injection 5-40 mL  5-40 mL IntraVENous 2 times per day Mathew Galeano MD   10 mL at 10/11/22 0800    sodium chloride flush 0.9 % injection 5-40 mL  5-40 mL IntraVENous PRN Mathew Galeano MD   10 mL at 10/03/22 0834    0.9 % sodium chloride infusion   IntraVENous PRN Mathew Galeano MD        polyethylene glycol [facility]) packet 17 g  17 g Oral Daily Mathew Galeano MD   17 g at 10/09/22 0950    ondansetron (ZOFRAN-ODT) disintegrating tablet 4 mg  4 mg Oral Q8H PRN Mathew Galeano MD        Or    ondansetron [facility]) injection 4 mg  4 mg IntraVENous Q6H PRN Mathew Galeano MD   4 mg at 09/20/22 0510    glucose chewable tablet 16 g  4 tablet Oral PRN Tawny Ba MD        dextrose bolus 10% 125 mL  125 mL IntraVENous PRN Tawny Ba MD   Stopped at 10/10/22 9067    Or    dextrose bolus 10% 250 mL  250 mL IntraVENous PRN Tawny Ba MD        glucagon (rDNA) injection 1 mg  1 mg SubCUTAneous PRN Tawny Ba MD        dextrose 10 % infusion   IntraVENous Continuous PRN Tawny Ba MD   Stopped at 10/10/22 1749    hydrALAZINE (APRESOLINE) injection 5 mg  5 mg IntraVENous Q15 Min PRN Tawny Ba MD   5 mg at 09/19/22 1519       Allergies: Allergies   Allergen Reactions    Torsemide Shortness Of Breath    Dye [Iodides]      1970's - ?? Problem List:    Patient Active Problem List   Diagnosis Code    HTN (hypertension) I10    Hyperlipidemia E78.5    OA (osteoarthritis) of knee M17.9    Carotid stenosis, left I65.22    Hearing loss H91.90    ED (erectile dysfunction) N52.9    DM type 2 (diabetes mellitus, type 2) ([facility] E11.9    Rotator cuff tear M75.100    Glenohumeral arthritis M19.019    Subacromial impingement M75.40    Trigger ring finger of right hand M65.341    Spinal stenosis of lumbar region M48.061    Closed compression fracture of L1 lumbar vertebra S32.010A    Closed displaced fracture of lateral malleolus of left fibula S82. 62XA    Exertional dyspnea R06.09    Atrial fibrillation with rapid ventricular response ([facility]) I48.91    Hypoxia R09.02    Chronic heart failure with preserved ejection fraction (HFpEF) ([facility]) I50.32    Pleural effusion on right J90    Acute on chronic respiratory failure with hypoxia and hypercapnia ([facility]) J96.21, J96.22    ZANA (acute kidney injury) ([facility] [address] N17.9    Mucus plugging of bronchi T17.500A       Past Medical History:        Diagnosis Date    ZANA (acute kidney injury) ([facility] [address] 9/26/2022    Carotid stenosis, left 10/12/2011    Chronic back pain     Chronic systolic (congestive) heart failure 79/75/9457    Diastolic CHF ([facility] [address]     Erectile dysfunction     Hyperlipidemia     Hypertension     Osteoarthritis     Restrictive lung disease     Type II or unspecified type diabetes mellitus without mention of complication, not stated as uncontrolled        Past Surgical History:        Procedure Laterality Date    CARDIAC CATHETERIZATION  2022    KNEE SURGERY Left     PLEURAL CATH INSERTION N/A 2022    PLEURAL CATHETER PLACEMENT; INTERCOSTAL NERVE BLOCK X4 performed by Venkatesh Rosa MD at [place] [address] Bilateral     THORACOSCOPY Right 2022    RIGHT VIDEO ASSISTED THORASCOPIC SURGERY AND; performed by [facility]atesh [facility] MD at [facility] N/A 2022    TRACHEOSTOMY performed by Belinda Hernandez MD at [facility] [address] [facility] History:    Social History     Tobacco Use    Smoking status: Former     Packs/day: 2.00     Years: 40.00     Pack years: 80.00     Types: Cigarettes     Quit date: 10/24/2001     Years since quittin.9    Smokeless tobacco: Never   Substance Use Topics    Alcohol use: Yes     Alcohol/week: 0.0 standard drinks     Comment: rarely                                Counseling given: Not Answered      Vital Signs (Current): There were no vitals filed for this visit.                                            BP Readings from Last 3 Encounters:   10/11/22 (!) 101/57   22 120/62   22 122/63       NPO Status:                                                                                 BMI:   Wt Readings from Last 3 Encounters:   10/10/22 210 lb 15.7 oz (95.7 kg)   22 174 lb 3.2 oz (79 kg)   22 187 lb (84.8 kg)     There is no height or weight on file to calculate BMI.    CBC:   Lab Results   Component Value Date/Time    WBC 7.3 10/11/2022 04:57 AM    RBC 2.90 10/11/2022 04:57 AM    HGB 8.1 10/11/2022 04:57 AM    HCT 25.3 10/11/2022 04:57 AM    MCV 87.2 10/11/2022 04:57 AM    RDW 16.4 10/11/2022 04:57 AM     10/11/2022 04:57 AM CMP:   Lab Results   Component Value Date/Time     10/11/2022 04:57 AM    K 4.3 10/11/2022 04:57 AM    K 4.5 06/01/2022 02:31 PM    CL 97 10/11/2022 04:57 AM    CO2 29 10/11/2022 04:57 AM    BUN 98 10/11/2022 04:57 AM    CREATININE 1.0 10/11/2022 04:57 AM    GFRAA >60 10/11/2022 04:57 AM    AGRATIO 1.2 01/24/2022 10:29 AM    LABGLOM >60 10/11/2022 04:57 AM    LABGLOM >90 07/11/2014 11:34 AM    GLUCOSE 154 10/11/2022 04:57 AM    GLUCOSE 154 05/18/2012 10:35 AM    PROT 6.2 09/21/2022 11:46 PM    CALCIUM 7.9 10/11/2022 04:57 AM    BILITOT 0.3 09/19/2022 06:10 AM    ALKPHOS 91 09/19/2022 06:10 AM    AST 10 09/19/2022 06:10 AM    ALT <5 09/19/2022 06:10 AM       POC Tests:   Recent Labs     10/11/22  0759   POCGLU 154*       Coags:   Lab Results   Component Value Date/Time    PROTIME 13.2 09/19/2022 06:10 AM    INR 1.01 09/19/2022 06:10 AM    APTT 31.0 09/19/2022 06:10 AM       HCG (If Applicable): No results found for: PREGTESTUR, PREGSERUM, HCG, HCGQUANT     ABGs:   Lab Results   Component Value Date/Time    PHART 7.423 09/27/2022 04:02 PM    PO2ART 67.0 09/27/2022 04:02 PM    VVJ5GWZ 63.3 09/27/2022 04:02 PM    SNW8AZK 41.4 09/27/2022 04:02 PM    BEART 17 09/27/2022 04:02 PM    N1FQBCNQ 93 09/27/2022 04:02 PM        Type & Screen (If Applicable):  Lab Results   Component Value Date    LABABO A  POSITIVE   11/16/2018       Drug/Infectious Status (If Applicable):  No results found for: HIV, HEPCAB    COVID-19 Screening (If Applicable):   Lab Results   Component Value Date/Time    COVID19 DETECTED 12/16/2020 12:00 AM           Anesthesia Evaluation  Patient summary reviewed and Nursing notes reviewed no history of anesthetic complications:   Airway: Mallampati: I  TM distance: >3 FB   Neck ROM: full  Mouth opening: > = 3 FB  Tracheostomy present Dental: normal exam         Pulmonary:   (+) shortness of breath:  decreased breath sounds: bilateral     (-) sleep apnea and not a current smoker                          ROS comment: Emphysema   Cardiovascular:    (+) hypertension:, dysrhythmias: atrial fibrillation, CHF:,         Rhythm: regular  Rate: normal           Beta Blocker:  Dose within 24 Hrs         Neuro/Psych:      (-) seizures, TIA and CVA           GI/Hepatic/Renal:        (-) GERD, liver disease, no renal disease and no morbid obesity       Endo/Other:    (+) Diabetes, no malignancy/cancer. (-) hypothyroidism, hyperthyroidism, no malignancy/cancer               Abdominal:   (+) obese,     Abdomen: soft. Vascular:     - DVT and PE. Other Findings:       Conclusions      Summary   Left ventricular cavity size is normal.   There is mild concentric left ventricular hypertrophy. Left ventricular function is normal with ejection fraction estimated at   55-60%. Diastolic filling parameters suggest grade II diastolic dysfunction. Global L. Strain = -14.6%. Mitral annular calcification is present   The left atrium is mildly dilated. Mild tricuspid regurgitation. Mild pulmonic regurgitation present. There is a small localized near right atrium pericardial effusion noted   without any hemodynamic implications. Estimated pulmonary artery systolic pressure is at 33 mmHg assuming a right   atrial pressure of 3 mmHg. Signature      ------------------------------------------------------------------   Electronically signed by Sawyer Navarrete MD (Interpreting   physician) on 01/26/2022 at 10:20 AM   ------------------------------------------------------------------            Anesthesia Plan      general     ASA 4       Induction: intravenous. Anesthetic plan and risks discussed with patient. Plan discussed with CRNA.     Attending anesthesiologist reviewed and agrees with Preprocedure content                Nicholas Roman DO   10/11/2022

## 2022-10-11 NOTE — PROCEDURES
Endoscopic Gastroduodenoscopy with PEG Procedure Note    Procedure Date: 10/11/2022    Pre-operative Diagnosis: Oropharyngeal dysphagia     Referring Provider: Rudy Conner MD    GI Provider:     Procedural Medications:  Propofol    IV Cefepime    Indications:  Oropharyngeal dysphagia, EGD for PEG placement      Description of Operation/Procedure: The risks, benefits, indications, potential complications, and alternatives were explained to the patient's DPOA and informed consent obtained. Endoscopic Gastroduodenoscopy --diagnostic was performed. The patient was placed in the left lateral decubitus position. Based on the pre-procedure assessment, including review of the patient's medical history, medications, allergies, and review of systems, he had been deemed to be an appropriate candidate for conscious sedation; he was therefore sedated with the medications listed below. He was monitored continuously with ECG tracing, pulse oximetry, blood pressure monitoring, and direct observation. The gastroscope was inserted into the mouth and advanced under direct vision to second portion of the duodenum. A careful inspection was made as the gastroscope was withdrawn, including a retroflexed view of the proximal stomach. An appropriate location for PEG placement was found by transillumination and palpation of the anterior abdominal wall. The skin was prepped and draped in a sterile fashion, then the skin and underlying soft tissues were anesthetized with lidocaine. A needle was passed through the abdominal wall and grasped using a snare placed through the endoscope. A wire was then passed through the needle, grasped with the snare, and brought out through the patient's mouth along with the endoscope. Following this, a PEG tube was placed by the pull technique. Further findings and interventions are described below. Findings: The GEJ was located at 40 cm.   Severe LA Grade D erosive esophagitis noted with mucosal friability and bleeding. Normal Gastric mucosa  Patent pylorus  Normal D1 and D2    S/p PEG placement ( [address] , 4 cm from internal bumper to skin)    Impression:    Severe LA GRADE D erosive esophagitis   Successful PEG tube placement. Photographs: Yes    Biopsies: No    Estimated blood loss: <03 ml    Complications: The patient did tolerate the procedure well and no complications were noted.     Plan:   Continue acid suppression  The PEG tube may be used for feedings and medications after 4 hours   The head of the bed should be kept elevated to 30 degrees during tube feeds, and the tube should be flushed with 30 mL of water every 8 hours and after giving medications through the tube    Tube feeding instructions, per dietician    IV Protonix 40 mg q12 hr

## 2022-10-11 NOTE — H&P
Gastroenterology Note             Pre-operative History and Physical    Patient: James Echevarria  : [birth year]  CSN: [id number]    History Obtained From:  patient and/or guardian. HISTORY OF PRESENT ILLNESS:    The patient is a 65 y.o. male  here for oropharyngeal dysphagia- here for PEG placement. Past Medical History:    Past Medical History:   Diagnosis Date    ZANA (acute kidney injury) ([facility] [address] 2022    Carotid stenosis, left 10/12/2011    Chronic back pain     Chronic systolic (congestive) heart failure     Diastolic CHF (HCC)     Erectile dysfunction     Hyperlipidemia     Hypertension     Osteoarthritis     Restrictive lung disease     Type II or unspecified type diabetes mellitus without mention of complication, not stated as uncontrolled      Past Surgical History:    Past Surgical History:   Procedure Laterality Date    CARDIAC CATHETERIZATION  2022    GASTROSTOMY TUBE PLACEMENT N/A 10/11/2022    EGD PEG TUBE PLACEMENT performed by Dolly Braxton MD at [facility] Left     PLEURAL CATH INSERTION N/A 2022    PLEURAL CATHETER PLACEMENT; INTERCOSTAL NERVE BLOCK X4 performed by Federico Fitch MD at  [facility] Bilateral     THORACOSCOPY Right 2022    RIGHT VIDEO ASSISTED THORASCOPIC SURGERY AND; performed by Federico Fitch MD at [address] N/A 2022    TRACHEOSTOMY performed by Brad Beebe MD at [address]     Medications Prior to Admission:   No current facility-administered medications on file prior to encounter. Current Outpatient Medications on File Prior to Encounter   Medication Sig Dispense Refill    ELIQUIS 5 MG TABS tablet TAKE 1 TABLET BY MOUTH TWO TIMES A DAY 60 tablet 2    umeclidinium-vilanterol (ANORO ELLIPTA) 62.5-25 MCG/INH AEPB inhaler Inhale 1 puff into the lungs daily 1 each 2    bumetanide (BUMEX) 1 MG tablet Take 1 tablet by mouth in the morning and 1 tablet before bedtime. 180 tablet 3    magnesium oxide (MAG-OX) 400 MG tablet Take 1 tablet by mouth in the morning and 1 tablet before bedtime. 180 tablet 3    dapagliflozin (FARXIGA) 10 MG tablet TAKE 1 TABLET EVERY MORNING 90 tablet 1    sildenafil (VIAGRA) 100 MG tablet Take 1 tablet by mouth as needed for Erectile Dysfunction Max daily dose 100mg. 16 tablet 0    metoprolol succinate (TOPROL XL) 50 MG extended release tablet Take 1 tablet by mouth in the morning and at bedtime 180 tablet 3    Insulin Pen Needle (PEN NEEDLES) 31G X 6 MM MISC 1 each by Does not apply route daily 100 each 3    Continuous Blood Gluc Sensor (FREESTYLE LIZA 14 DAY SENSOR) MISC Check bs tidac and hs 6 each 1    Continuous Blood Gluc  (FREESTYLE LIZA 14 DAY READER) SOFIYA Check bs tidac and hs 6 each 1    Insulin Degludec (TRESIBA FLEXTOUCH) 200 UNIT/ML SOPN 10 units subcut qam, increase 4 units every 4 days until am blood sugar < 120. Max 100 units (Patient taking differently: Inject 12 Units into the skin daily 10 units subcut qam, increase 4 units every 4 days until am blood sugar < 120. Max 100 units) 9 pen 1    albuterol sulfate HFA (VENTOLIN HFA) 108 (90 Base) MCG/ACT inhaler Inhale 2 puffs into the lungs 4 times daily as needed for Wheezing 18 g 5    pravastatin (PRAVACHOL) 40 MG tablet Take 1 tablet by mouth daily 90 tablet 3        Allergies: Torsemide and Dye [iodides]      Social History:   Social History     Tobacco Use    Smoking status: Former     Packs/day: 2.00     Years: 40.00     Pack years: 80.00     Types: Cigarettes     Quit date: 10/24/2001     Years since quittin.9    Smokeless tobacco: Never   Substance Use Topics    Alcohol use:  Yes     Alcohol/week: 0.0 standard drinks     Comment: rarely     Family History:   Family History   Problem Relation Age of Onset    Hearing Loss Father     Heart Disease Father 70        MI    High Blood Pressure Father     Diabetes Maternal Grandmother         hypoglycemic    Hearing Loss Maternal Grandmother     Arthritis Maternal Grandfather        PHYSICAL EXAM:      BP (!) 89/49   Pulse 94   Temp 98.3 °F (36.8 °C) (Temporal)   Resp 12   Ht 6' 0.99\" (1.854 m)   Wt 210 lb 15.7 oz (95.7 kg)   SpO2 96%   BMI 27.84 kg/m²  I        Heart:   within normal limits    Lungs:  CTA bilat anteriorly,  Normal effort    Abdomen:   soft, NT, ND      ASA Grade:  ASA 2 - Patient with mild systemic disease with no functional limitations    Mallampati Class: 2      ASSESSMENT AND PLAN:    1. Patient is a 65 y.o. male here for EGD with PEG placement  2. Procedure options, risks and benefits reviewed with patient. Patient expresses understanding and wishes to proceed. Knox MD,   [facility]  10/11/2022    Please note that some or all of this record was generated using voice recognition software. If there are any questions about the content of this document, please contact the author as some errors in translation may have occurred.

## 2022-10-11 NOTE — PROGRESS NOTES
Palliative Care Chart Review  and Check in Note:     NAME:  Christian Bal  Admit Date: 9/19/2022  Hospital Day:  Hospital Day: 23   Current Code status: Full Code    Palliative care is continuing to following Mr. Delton Carrel for symptom management, and goals of care discussion as needed. Patient's chart reviewed today 10/11/22. Saw Jonel Valenzuela and his wife, Christa Galeano, at the bedside. Jonel Valenzuela was resting comfortably. PEG placement was delayed from yesterday until today. We discussed the appeal process to [facility] for LTACH placement. Christa Galeano remains hopeful. She has found comfort in prayer and family support. The following are the currently established goals/code status, and Symptom management. Goals of care: Continue current medical management.      Code status: Full    Discharge plan: TBD - pending hospital course       NATASHA Paulson NP  10/11/22  11:09 AM

## 2022-10-11 NOTE — PROGRESS NOTES
ICU Progress Note    Admit Date: 9/19/2022  Day: 22  Vent Day: None  IV Access:Peripheral  IV Fluids:None  Vasopressors:None                Antibiotics: None  Diet: Diet NPO    CC: Pleural Effusion (right) s/p VATS and Pleural Catheter Placement 09/19    Interval history:     Patient unable to get PEG yesterday due to concern of free air. Repeat CT ab obtained which did show reduction in free air. May get PEG today pending GI approval.     Patient just received pain medication and is mildly less interactive. Does respond to voice and commands.       Medications:     Scheduled Meds:   oxyCODONE  7.5 mg Oral Q4H    QUEtiapine  25 mg Oral BID    insulin lispro  5 Units SubCUTAneous q8h    [Held by provider] insulin glargine  28 Units SubCUTAneous Nightly    cefepime  2,000 mg IntraVENous Q12H    albuterol  2.5 mg Nebulization Q4H    pantoprazole  40 mg IntraVENous BID    insulin lispro  0-16 Units SubCUTAneous Q4H    sodium chloride flush  5-40 mL IntraVENous 2 times per day    Venelex   Topical BID    amiodarone bolus  150 mg IntraVENous Once    amiodarone  200 mg Oral Daily    chlorhexidine  15 mL Mouth/Throat BID    [Held by provider] apixaban  5 mg Oral BID    sodium chloride flush  5-40 mL IntraVENous 2 times per day    polyethylene glycol  17 g Oral Daily     Continuous Infusions:   sodium chloride      sodium chloride      sodium chloride      dextrose Stopped (10/10/22 1749)     PRN Meds:sodium chloride, hydroxypropyl methylcellulose, HYDROmorphone **OR** HYDROmorphone, sodium chloride flush, sodium chloride, labetalol, acetaminophen, sodium chloride flush, sodium chloride, ondansetron **OR** ondansetron, glucose, dextrose bolus **OR** dextrose bolus, glucagon (rDNA), dextrose, hydrALAZINE    Objective:   Vitals:   T-max:  Patient Vitals for the past 8 hrs:   BP Temp Temp src Pulse Resp SpO2   10/11/22 0800 (!) 97/55 97.7 °F (36.5 °C) Temporal (!) 102 14 94 %   10/11/22 0722 -- -- -- (!) 114 14 98 %   10/11/22 0700 (!) 100/56 -- -- (!) 108 16 90 %   10/11/22 0600 (!) 81/52 -- -- (!) 103 20 90 %   10/11/22 0539 -- -- -- -- 26 --   10/11/22 0530 (!) 100/56 -- -- (!) 105 19 (!) 87 %   10/11/22 0500 107/69 -- -- (!) 110 20 95 %   10/11/22 0430 98/69 -- -- 100 (!) 8 --   10/11/22 0429 -- -- -- -- -- 95 %   10/11/22 0400 (!) 88/48 97.3 °F (36.3 °C) Temporal 96 24 95 %   10/11/22 0330 94/61 -- -- (!) 108 27 94 %   10/11/22 0300 94/60 -- -- (!) 102 20 --   10/11/22 0230 116/72 -- -- (!) 112 25 91 %   10/11/22 0200 (!) 95/58 -- -- 93 13 (!) 89 %   10/11/22 0130 93/65 -- -- 94 (!) 33 --   10/11/22 0100 114/64 -- -- (!) 108 20 (!) 88 %   10/11/22 0030 (!) 138/111 -- -- (!) 103 30 (!) 88 %         Intake/Output Summary (Last 24 hours) at 10/11/2022 0816  Last data filed at 10/11/2022 0500  Gross per 24 hour   Intake 2688.31 ml   Output 1330 ml   Net 1358.31 ml             Physical Exam  Constitutional:       Comments: Less interactive this morning. Mild improvement in discomfort from yest   HENT:      Head: Normocephalic and atraumatic. Cardiovascular:      Rate and Rhythm: Normal rate. Rhythm irregular. Comments: Atrial fibrillation  Pulmonary:      Breath sounds: No wheezing, rhonchi or rales. Abdominal:      General: There is no distension. Palpations: Abdomen is soft. Tenderness: There is no abdominal tenderness. There is no guarding. Musculoskeletal:      Right lower leg: Edema present. Left lower leg: Edema present.       Comments: Bilateral lower extremity pitting edema persists  2+ PE at the bilateral feet to ankles   Neurological:      Comments: Alert but staring off after receiving pain meds  Responsive to commands         LABS:    CBC:   Recent Labs     10/09/22  0510 10/10/22  0547 10/10/22  0635 10/10/22  1800 10/11/22  0457   WBC 12.0* 6.9  --   --  7.3   HGB 8.2* 6.8* 6.9* 8.2* 8.1*   HCT 26.3* 20.7* 21.4* 25.4* 25.3*    238  --   --  261   MCV 88.5 88.5  --   --  87.2       Renal: Recent Labs     10/09/22  0510 10/10/22  0547 10/11/22  0457   * 136 135*   K 4.1 3.9 4.3   CL 96* 97* 97*   CO2 33* 33* 29   * 111* 98*   CREATININE 1.2 1.1 1.0   GLUCOSE 199* 68* 154*   CALCIUM 8.2* 7.6* 7.9*   MG 3.30* 3.10* 3.20*   PHOS 3.8 2.5 3.2   ANIONGAP 6 6 9       Hepatic:   Recent Labs     10/09/22  0510 10/10/22  0547 10/11/22  0457   LABALBU 2.2* 1.8* 1.8*       Troponin: No results for input(s): TROPONINI in the last 72 hours. BNP: No results for input(s): BNP in the last 72 hours. Lipids: No results for input(s): CHOL, HDL in the last 72 hours. Invalid input(s): LDLCALCU, TRIGLYCERIDE  ABGs:  No results for input(s): PHART, AUY5PMM, PO2ART, HJM6YCJ, BEART, THGBART, M9NSXKDV, CAH9OJS in the last 72 hours. INR: No results for input(s): INR in the last 72 hours. Lactate: No results for input(s): LACTATE in the last 72 hours. Cultures:  -----------------------------------------------------------------  RAD:   CT ABDOMEN PELVIS WO CONTRAST Additional Contrast? None   Final Result      Marked decrease in size of pneumomediastinum. Trace residual pneumoperitoneum. Extensive bilateral lower lobe pulmonary atelectasis with pleural effusions and trace residual right pneumothorax. XR CHEST PORTABLE   Final Result      Tiny right lateral pneumothorax is suspected, 5 mm in maximum thickness. This has decreased in size since 10/3/2022. Right basilar Pleurx catheter noted. Small bilateral pleural effusions. Prominent interstitial markings. Stable cardiac mediastinal silhouette. Lines and tubes without change. Subcutaneous emphysema noted. CT CHEST ABDOMEN PELVIS WO CONTRAST   Final Result      1. Severe pneumomediastinum. 2. Small to moderate right hydropneumothorax with similar fluid and gas components with a right-sided Pleurx catheter.    3. Moderate loculated left pleural effusion with atelectasis of the left lower lobe with patency of the central left lower lobe bronchi. 4. Fluid/material filling the bronchus intermedius and right lower lobe bronchi with complete consolidation and volume loss of the right lower lobe. Differential diagnosis would include mucous plugging or obstructing lesion. 5. Tracheostomy tube tip within the trachea   6. Severe subcutaneous emphysema in the neck. CT ABDOMEN AND PELVIS:      FINDINGS:      LIVER: Normal.      GALLBLADDER AND BILIARY TREE: No calcified gallstones. No gallbladder distention. No intra- or extrahepatic biliary dilatation. PANCREAS: Normal.      SPLEEN: Normal.      ADRENAL GLANDS: Normal.      KIDNEYS AND URETERS: Normal.      URINARY BLADDER: Ansari catheter present within collapsed urinary bladder. REPRODUCTIVE ORGANS: No associated masses. BOWEL: Normal diameter, nonobstructed. Feeding tube tip at the level of the ligament of Treitz. LYMPH NODES: No abnormally enlarged nodes. PERITONEUM/RETROPERITONEUM: Severe pneumoperitoneum extends into the upper abdomen with some of the symmetric multiseptated gas appearing deep to the diaphragm consistent with mild pneumoperitoneum (series 601 was 101). This is likely related to    pneumonia mediastinum dissecting into the abdomen. No fluid collection in the abdomen or pelvis. No ascites. VESSELS: Extensive atherosclerotic calcification of the aorta and iliac arteries. ABDOMINAL WALL: No acute abnormality. BONES: No acute abnormality. Mild chronic L1 compression fracture with previous vertebroplasty. IMPRESSION:      1. Severe pneumomediastinum extends into the upper abdomen with small pneumoperitoneum likely related to extension of pneumoperitoneum into the upper peritoneal cavity. 2. No fluid collection in the abdomen or pelvis. Results were discussed with the surgical team at 7:00 PM on 10/3/2022.       XR CHEST PORTABLE   Final Result      Stable appearance of loculated right pneumothorax, with loculated components in the lateral right midlung region and in the right lateral costophrenic sulcus. Stable scattered areas of atelectasis versus scar, most prominent in the medial right lung base and in the left lateral lung base. XR CHEST PORTABLE   Final Result      Small right basilar pneumothorax. Right basilar chest tube without change. Patchy consolidation in the right mid and lower lung as well as the left lower lung-atelectasis versus pneumonia. Trace left pleural effusion. Normal heart size. Lines and tubes without change. Mild improvement in degree of subcutaneous emphysema in the chest and neck. XR CHEST 1 VIEW   Final Result      1. Stable small right-sided pneumothorax and prominent subcutaneous emphysema along the neck and upper superior chest wall, greater in right lung stable   2. Stable left basilar consolidation and pleural effusion      3. Stable appearing perihilar accentuated markings or airspace disease            XR CHEST PORTABLE   Final Result      Stable small right-sided pneumothorax. More prominent emphysema over the lower neck. No change in bilateral airspace disease. Stable left pleural effusion. XR CHEST PORTABLE   Final Result   1. Bilateral multifocal pneumonia with improvement in the right    pulmonary consolidation since prior study from 9/23/22   2. Mild to moderate left pleural effusion          XR CHEST PORTABLE   Final Result   1. Support lines and tubes are stable. 2.  Stable small right lateral pneumothorax and right basilar    chest tube. 3.  Stable patchy bilateral airspace disease. XR CHEST PORTABLE   Final Result   1. Satisfactory position of right internal jugular central line   2. No interval change in endotracheal tube and nasogastric tube positioning. 3. Extensive bilateral airspace disease with no changes. 4. Left pleural effusion with retrocardiac opacity, unchanged   5.  Small stable tiny right lateral pneumothorax and stable position of right chest tube,, Pleurx catheter. XR ABDOMEN (KUB) (SINGLE AP VIEW)   Final Result   1. No residual pneumothorax. 2.  Mild patchy airspace disease bilaterally worse on the right than on prior examination. 3.  Non-obstructive bowel gas pattern. Mildly distended stomach. XR CHEST PORTABLE   Final Result   1. No residual pneumothorax. 2.  Mild patchy airspace disease bilaterally worse on the right than on prior examination. 3.  Non-obstructive bowel gas pattern. Mildly distended stomach. XR CHEST PORTABLE   Final Result      1. Small right pneumothorax appears slightly increased. 2. Mild patchy airspace opacity bilaterally. XR CHEST PORTABLE   Final Result     Pleurx catheter at the right lung base. Trace right    pneumothorax is unchanged. XR CHEST PORTABLE   Final Result      Right-sided chest tube evident in the base, its tip medially. Improvement in the right-sided pneumothorax, since earlier today. Possible medial collapse of the right lower lobe. Small left effusion. Patchy airspace density/atelectasis in the lungs bilaterally, with no other significant change. XR CHEST PORTABLE   Final Result   1. Right-sided Pleurx catheter in satisfactory position in the lung bases   2. Right-sided post operative pneumothorax, approximately 10%               Assessment/Plan:   Mirian Luna is a 65 y.o. male w/PMH HFpEF, carotid artery stenosis, HTN, OA, Restrictive Lung Disease, DM2, HLD who underwent VATS with PleurX catheter placement on 09/19 for recurrent b/l loculated pleural effusions w/subsequent BIPAP failure and hypoxic/hypercapnic respiratory failure requiring Mechanical intubation.      Pulmonary:  #Acute Hypoxic/Hypercapnic Respiratory Failure  W/Recurrent Pleural Effusions s/p VATS and PleurX for pleural fluid management 09/19/22, w/associated unintentional weight loss of 27 Ibs, PFTs w/restrictive defect.   -failed to tolerate BIPAP post-op --> Mechanically intubated  Intubation Status:   -ventilation, trach    -current vent settings: RR 14, , FiO2 40%, PEEP 5  - PleurX catheter in place  - Trach in place  - CTS following  - 10/4: CT Chest/Abdomen showed pneumomediastinum extending to pneumoperitoneum   -10/11: Pluerx cath drained 250 ml out.     - Repeat CT ab:  Marked decrease in size of pneumomediastinum. Trace residual pneumoperitoneum. Extensive bilateral lower lobe pulmonary atelectasis with pleural effusions and trace residual right pneumothorax      ID:    #Leukocytosis, resolved  -afebrile  -MRSA nasal probe negative, vancomycin d/c  -Bronch respiratory cx: no WBC or organism  -cefepime day 5      Cardiovascular: #HFpEF  -w/fluid overload  -w/downtrending BNP  -fluid status:  1.5 L output today, +1.1L on day   -nephro on board: holding off on diuretics due to renal function, continue to appreciate recs    #Hypotension, resolved  -norepinephrine previously which remains off  -monitor BP    Chronic:  #Paroxysmal Atrial Fibrillation   -Eliquis held, amiodarone  #HLD  -pravastatin 40mg    Renal:  Ansari in place w/good urine output  -continue to monitor    #ZANA  -2/2 ATN w/likely etiology of hypoperfusion due to hypovolemia in addition to  -possible contrast-associated nephropathy  -holding off on diuretics per nephro  -UOP good  -creatinine improving, 1.0 today    #Hypernatremia, resolved  -receiving free water flushes of 300 ml Q4H since 10/3/22  W/improvement of Na     GI:  #Pneumoperitoneum   -Found on CT 10/4   Repeat CT 10/10  Marked decrease in size of pneumomediastinum. Trace residual pneumoperitoneum.    - Await decision on PEG    #Nutrition  -PEG placement today pending GI clearance  -receiving NG tube feedings    Metabolic    #DM2  - glu in 150s overnight, cont NPO today  -HDSS  -Lantus 28U  -5 Lispro, hold while npo  -POCT  -hypoglycemia protocol  -A1c:8.1 (1/24/22)     Heme    #Anemia  - s/p 1U pRBC transfusion  - Post unit HnH 8.1  - continue to monitor H&H    Neuro:  -analgesia: Tylenol, received dilaudid 0.25 mg overnight, on scheduled oxycodone 5-->7.5 mg    Code Status: FULL CODE  FEN: Diet NPO  PPX: Pepcid  DISPO: ICU    Martina Miller MD, PGY-1  10/11/22  8:16 AM    Patient seen, examined and discussed with the resident and I agree with the assessment and plan. Vent Mode: AC/PRVC Resp Rate (Set): 14 bmp/Vt (Set, mL): 375 mL/ /FiO2 : 45 %  PEEP 5  No results for input(s): PHART, [id number], PO2ART in the last 72 hours. Remains vent dependent via the tracheostomy. CT abdomen yesterday showed persistent, symmetric atelectasis of his lower lobes. Unclear if there was mucus plugging as the scan didn't come up to the larger airways. He's made no progress thus far on the vent and was still somnolent following some narcotics this morning. Plan for PEG today. No changes on the vent until after PEG. He remains very restricted. Critical care time spent reviewing labs/films, examining patient, collaborating with other physicians but excluding procedures for life threatening organ failure is 35 minutes.       Amadou Bennett MD

## 2022-10-11 NOTE — PROGRESS NOTES
Pharmacy Note - Renal Dosing    Cefepime ordered for treatment of VAP. Per [facility] Renal Dose Adjustment Policy, Cefepime will be changed to  2000mg IV q8h Extended infusion. Estimated Creatinine Clearance: Estimated Creatinine Clearance: 69 mL/min (based on SCr of 1 mg/dL). Dialysis Status, ZANA, CKD: Improved ZANA  BMI: Body mass index is 27.84 kg/m². Rationale for Adjustment: Agent is renally eliminated. Pharmacy will continue to monitor renal function and adjust dose as necessary. Please call with any questions.     Raleigh Mortimer PharmD., BCPS   10/11/2022 11:02 AM  Wireless: [phone]

## 2022-10-12 NOTE — PROGRESS NOTES
ICU CT SURGERY DAILY PROGRESS NOTE    CC: Pleural effusions s/p R PleurX catheter placement    SUBJECTIVE:   Interval Hx:   Pt tachycardic to 112  with BP range /42-68 overnight. PEG placed yesterday. ROS: A 14 point review of systems was conducted, significant findings as noted above. All other systems negative. OBJECTIVE:   Vitals:   Vitals:    10/12/22 0330 10/12/22 0400 10/12/22 0430 10/12/22 0500   BP: (!) 104/57 (!) 97/54 (!) 82/55 (!) 85/47   Pulse: 84 91 89 86   Resp: 22 (!) 4 29 29   Temp:       TempSrc:  Temporal     SpO2:  92%     Weight:       Height:           I/O:   Intake/Output Summary (Last 24 hours) at 10/12/2022 0539  Last data filed at 10/12/2022 0500  Gross per 24 hour   Intake 575 ml   Output 1535 ml   Net -960 ml       I/O last 3 completed shifts: In: 3038.3 [I.V.:2186.3; Blood:300; NG/GT:502; IV Piggyback:50]  Out: 0271 [Urine:2530]    Diet: Diet NPO  ADULT TUBE FEEDING; PEG; Renal Formula; Continuous; 25; Yes; 10; Q 4 hours; 55; 30; Q 8 hours; Protein; 2 bottles Proteinex daily w/ 30 mL FW flushes before and after      Physical Examination:   General appearance: Mechanically ventilated. Appropriately nods head and uses hand gestures in response to questions. Does not attempt to verbalize   HEENT: NC/AT, EOMI, trachea midline, no JVD. Tracheostomy leaking and with minimal erythema on edges. Chest/Lungs: On mechanical ventilation via tracheostomy; R PleurX catheter capped with dressing CLD  Cardiovascular: Atrial fibrillation with rate WNL  Abdomen: Soft, non-tender, non-distended. Genitourinary/Anorectal: Ansari in place with clear yellow urine. Rectal tube in place with minimal brown liquid stool. Skin: Warm and dry. Sacral decubitus ulcer   Extremities: Pitting edema of the b/l feet.  No cyanosis    Labs:  CBC:   Recent Labs     10/10/22  0547 10/10/22  0635 10/10/22  1800 10/11/22  0457   WBC 6.9  --   --  7.3   HGB 6.8* 6.9* 8.2* 8.1*   HCT 20.7* 21.4* 25.4* 25.3*    --   --  261         BMP:   Recent Labs     10/10/22  0547 10/11/22  0457    135*   K 3.9 4.3   CL 97* 97*   CO2 33* 29   * 98*   CREATININE 1.1 1.0   GLUCOSE 68* 154*       LFT's:   No results for input(s): AST, ALT, ALB, BILITOT, ALKPHOS in the last 72 hours. Troponin: No results for input(s): TROPONINI in the last 72 hours. BNP: No results for input(s): BNP in the last 72 hours. ABGs:   No results for input(s): PHART, EAO5BTH, PO2ART in the last 72 hours. INR:   No results for input(s): INR in the last 72 hours. U/A:No results for input(s): NITRITE, COLORU, PHUR, LABCAST, WBCUA, RBCUA, MUCUS, TRICHOMONAS, YEAST, BACTERIA, CLARITYU, SPECGRAV, LEUKOCYTESUR, UROBILINOGEN, BILIRUBINUR, BLOODU, GLUCOSEU, AMORPHOUS in the last 72 hours. Invalid input(s): [place]     Rad:   CT ABDOMEN PELVIS WO CONTRAST Additional Contrast? None   Final Result      Marked decrease in size of pneumomediastinum. Trace residual pneumoperitoneum. Extensive bilateral lower lobe pulmonary atelectasis with pleural effusions and trace residual right pneumothorax. XR CHEST PORTABLE   Final Result      Tiny right lateral pneumothorax is suspected, 5 mm in maximum thickness. This has decreased in size since 10/3/2022. Right basilar Pleurx catheter noted. Small bilateral pleural effusions. Prominent interstitial markings. Stable cardiac mediastinal silhouette. Lines and tubes without change. Subcutaneous emphysema noted. CT CHEST ABDOMEN PELVIS WO CONTRAST   Final Result      1. Severe pneumomediastinum. 2. Small to moderate right hydropneumothorax with similar fluid and gas components with a right-sided Pleurx catheter. 3. Moderate loculated left pleural effusion with atelectasis of the left lower lobe with patency of the central left lower lobe bronchi.    4. Fluid/material filling the bronchus intermedius and right lower lobe bronchi with complete consolidation and volume loss of the right lower lobe. Differential diagnosis would include mucous plugging or obstructing lesion. 5. Tracheostomy tube tip within the trachea   6. Severe subcutaneous emphysema in the neck. CT ABDOMEN AND PELVIS:      FINDINGS:      LIVER: Normal.      GALLBLADDER AND BILIARY TREE: No calcified gallstones. No gallbladder distention. No intra- or extrahepatic biliary dilatation. PANCREAS: Normal.      SPLEEN: Normal.      ADRENAL GLANDS: Normal.      KIDNEYS AND URETERS: Normal.      URINARY BLADDER: Ansari catheter present within collapsed urinary bladder. REPRODUCTIVE ORGANS: No associated masses. BOWEL: Normal diameter, nonobstructed. Feeding tube tip at the level of the ligament of Treitz. LYMPH NODES: No abnormally enlarged nodes. PERITONEUM/RETROPERITONEUM: Severe pneumoperitoneum extends into the upper abdomen with some of the symmetric multiseptated gas appearing deep to the diaphragm consistent with mild pneumoperitoneum (series 601 was 101). This is likely related to    pneumonia mediastinum dissecting into the abdomen. No fluid collection in the abdomen or pelvis. No ascites. VESSELS: Extensive atherosclerotic calcification of the aorta and iliac arteries. ABDOMINAL WALL: No acute abnormality. BONES: No acute abnormality. Mild chronic L1 compression fracture with previous vertebroplasty. IMPRESSION:      1. Severe pneumomediastinum extends into the upper abdomen with small pneumoperitoneum likely related to extension of pneumoperitoneum into the upper peritoneal cavity. 2. No fluid collection in the abdomen or pelvis. Results were discussed with the surgical team at 7:00 PM on 10/3/2022. XR CHEST PORTABLE   Final Result      Stable appearance of loculated right pneumothorax, with loculated components in the lateral right midlung region and in the right lateral costophrenic sulcus.       Stable scattered areas of atelectasis versus scar, most prominent in the medial right lung base and in the left lateral lung base. XR CHEST PORTABLE   Final Result      Small right basilar pneumothorax. Right basilar chest tube without change. Patchy consolidation in the right mid and lower lung as well as the left lower lung-atelectasis versus pneumonia. Trace left pleural effusion. Normal heart size. Lines and tubes without change. Mild improvement in degree of subcutaneous emphysema in the chest and neck. XR CHEST 1 VIEW   Final Result      1. Stable small right-sided pneumothorax and prominent subcutaneous emphysema along the neck and upper superior chest wall, greater in right lung stable   2. Stable left basilar consolidation and pleural effusion      3. Stable appearing perihilar accentuated markings or airspace disease            XR CHEST PORTABLE   Final Result      Stable small right-sided pneumothorax. More prominent emphysema over the lower neck. No change in bilateral airspace disease. Stable left pleural effusion. XR CHEST PORTABLE   Final Result   1. Bilateral multifocal pneumonia with improvement in the right    pulmonary consolidation since prior study from 9/23/22   2. Mild to moderate left pleural effusion          XR CHEST PORTABLE   Final Result   1. Support lines and tubes are stable. 2.  Stable small right lateral pneumothorax and right basilar    chest tube. 3.  Stable patchy bilateral airspace disease. XR CHEST PORTABLE   Final Result   1. Satisfactory position of right internal jugular central line   2. No interval change in endotracheal tube and nasogastric tube positioning. 3. Extensive bilateral airspace disease with no changes. 4. Left pleural effusion with retrocardiac opacity, unchanged   5. Small stable tiny right lateral pneumothorax and stable position of right chest tube,, Pleurx catheter.                   XR ABDOMEN (KUB) (SINGLE AP VIEW)   Final Result   1. No residual pneumothorax. 2.  Mild patchy airspace disease bilaterally worse on the right than on prior examination. 3.  Non-obstructive bowel gas pattern. Mildly distended stomach. XR CHEST PORTABLE   Final Result   1. No residual pneumothorax. 2.  Mild patchy airspace disease bilaterally worse on the right than on prior examination. 3.  Non-obstructive bowel gas pattern. Mildly distended stomach. XR CHEST PORTABLE   Final Result      1. Small right pneumothorax appears slightly increased. 2. Mild patchy airspace opacity bilaterally. XR CHEST PORTABLE   Final Result     Pleurx catheter at the right lung base. Trace right    pneumothorax is unchanged. XR CHEST PORTABLE   Final Result      Right-sided chest tube evident in the base, its tip medially. Improvement in the right-sided pneumothorax, since earlier today. Possible medial collapse of the right lower lobe. Small left effusion. Patchy airspace density/atelectasis in the lungs bilaterally, with no other significant change. XR CHEST PORTABLE   Final Result   1. Right-sided Pleurx catheter in satisfactory position in the lung bases   2. Right-sided post operative pneumothorax, approximately 10%             ASSESSMENT AND PLAN:   Dejah Marshall is a 65 y.o. male with history of diastolic heart failure, atrial fibrillation, and DM with recurrent pleural effusions s/p R PleurX catheter placement (9/19), POD #22. Neuro:   Analgesia  - Continue oxycodone scheduled. Dilaudid and tylenol PRN    Cardiovascular:   History of paroxysmal atrial fibrillation  -Cont amiodarone  - Restart Eliquis    HFpEF  - Last echo (1/25/22): LVEF = 40-62%, grade 2 diastolic dysfunction    Hyperlipidemia  - Cont Pravastatin 40mg    Pulmonary:   S/P R PleurX catheter placement (9/19), POD #22  - Capped Catheter.  Will drain every other day - drain today  - SW/HHC pending    Acute on chronic respiratory failure  - Mechanically intubated on 9/22. Currently PRVC 14/375/5/40%. Vent management per pulm/crit care. - Tracheostomy 9/30. Tracheostomy leaking - wound care nurse consulte   - Pulmonology following, appreciate recommendations. - Baseline on home O2 3-4L NC   -Critical care conducted bronchoscopy 10/5 - no masses noted, only white mucous secretions in the airways. GI:   -CT A/P completed yesterday. Shows trace residual pneumoperitoneum   -S/p PEG yesterday with GI   - Miralax  -Rectal tube in place, take out today. FEN:    -Replace electrolytes per protocol  - Diet: Diet NPO  ADULT TUBE FEEDING; PEG; Renal Formula; Continuous; 25; Yes; 10; Q 4 hours; 55; 30; Q 8 hours; Protein; 2 bottles Proteinex daily w/ 30 mL FW flushes before and after   -SLP consulted, will inquire about Passy Kenneth valve for speaking and eating. Awaiting FEES. Appreciate recs. /Renal:   - Cont Ansari   - Creatinine 1.0 from 1.1  -  from 98 from 111, 112, 108, 109, 105, 110  - Nephrology consulted. Appreciate recommendations. Hem/ID/wound:   - Hemoglobin 7.2 from 8.1 from 8.2, 6.9 s/p 1u pRBC  - WBC 5.4 from 7.3 from 6.9 from 12.0, 13.3, 16.9, 20.6  - Continue cefepime until 10/16  - BAL cx's no organisms seen - no organisms seen, but sample streaked incorrectly. - Sacral wound per wound care nurse    Endo:   History of DMII  - Last A1C 8.1% (1/24/22)  - Lantus 28 nightly, lispo 5 Q8, and HDSSI    Prophylaxis:   DVT Ppx: Eliquis  GI Ppx: protonix    Access:  Central Access: R IJ CVC, placed 9/22             Ansari Date placed: 9/20  Rectal tube placed: 10/4  PEG: placed 10/11    Mobility:  OOB and activity as tolerated    Dispo:   ICU  Case management assisting with dispo - pt would benefit from LTAC over SNF.      Code Status: Full Code  -----------------------------  Richland Hanane, DO   PGY1, General Surgery  10/12/22  5:39 AM  Pager # [phone]

## 2022-10-12 NOTE — PROGRESS NOTES
10/12/22 0908   Encounter Summary   Encounter Overview/Reason  Attempted Encounter   Service Provided For: Family   Referral/Consult From: [facility] Family members   Last Encounter  10/12/22   Complexity of Encounter Moderate   Begin Time 0825   End Time  0835   Total Time Calculated 10 min   Encounter    Type Follow up   Assessment/Intervention/Outcome   Assessment Hopeful   Intervention Active listening   Outcome Expressed Gratitude   Family just arrived and seemingly in good spirits. No needs at this time.   Staff Annette Wilson MA

## 2022-10-12 NOTE — PROGRESS NOTES
ICU Progress Note    Admit Date: 9/19/2022  Day: 23  Vent Day: None  IV Access:Peripheral  IV Fluids:None  Vasopressors:None                Antibiotics: None  Diet: Diet NPO  ADULT TUBE FEEDING; PEG; Renal Formula; Continuous; 25; Yes; 10; Q 4 hours; 55; 30; Q 8 hours; Protein; 2 bottles Proteinex daily w/ 30 mL FW flushes before and after    CC: Pleural Effusion (right) s/p VATS and Pleural Catheter Placement 09/19    Interval history:     PEG placed yesterday. Started tube feeds. Less shaky today.  Able to follow commands      Medications:     Scheduled Meds:   cefepime  2,000 mg IntraVENous Q8H    oxyCODONE  7.5 mg Oral Q4H    QUEtiapine  25 mg Oral BID    insulin lispro  5 Units SubCUTAneous q8h    insulin glargine  28 Units SubCUTAneous Nightly    albuterol  2.5 mg Nebulization Q4H    pantoprazole  40 mg IntraVENous BID    insulin lispro  0-16 Units SubCUTAneous Q4H    Venelex   Topical BID    amiodarone bolus  150 mg IntraVENous Once    amiodarone  200 mg Oral Daily    chlorhexidine  15 mL Mouth/Throat BID    [Held by provider] apixaban  5 mg Oral BID    sodium chloride flush  5-40 mL IntraVENous 2 times per day    polyethylene glycol  17 g Oral Daily     Continuous Infusions:   sodium chloride      sodium chloride      dextrose Stopped (10/10/22 7489)     PRN Meds:sodium chloride, hydroxypropyl methylcellulose, HYDROmorphone **OR** HYDROmorphone, acetaminophen, sodium chloride flush, sodium chloride, ondansetron **OR** ondansetron, glucose, dextrose bolus **OR** dextrose bolus, glucagon (rDNA), dextrose, hydrALAZINE    Objective:   Vitals:   T-max:  Patient Vitals for the past 8 hrs:   BP Temp Temp src Pulse Resp SpO2   10/12/22 0727 -- -- -- 81 -- 99 %   10/12/22 0726 -- -- -- 91 (!) 0 --   10/12/22 0600 (!) 124/57 -- -- 87 23 --   10/12/22 0530 (!) 91/53 -- -- 91 30 --   10/12/22 0500 (!) 85/47 -- -- 86 29 --   10/12/22 0430 (!) 82/55 -- -- 89 29 --   10/12/22 0400 (!) 97/54 -- Temporal 91 (!) 4 92 % 10/12/22 0330 (!) 104/57 -- -- 84 22 --   10/12/22 0300 (!) 98/53 -- -- 86 25 --   10/12/22 0230 (!) 83/52 -- -- 84 25 --   10/12/22 0200 (!) 98/48 -- -- 94 24 --   10/12/22 0130 (!) 88/56 -- -- 100 18 --   10/12/22 0100 (!) 99/50 -- -- 97 20 --   10/12/22 0030 (!) 116/51 -- -- 96 26 93 %   10/12/22 0015 -- -- -- -- -- 96 %   10/12/22 0000 137/78 97.6 °F (36.4 °C) Temporal (!) 112 10 97 %         Intake/Output Summary (Last 24 hours) at 10/12/2022 0744  Last data filed at 10/12/2022 0500  Gross per 24 hour   Intake 575 ml   Output 1535 ml   Net -960 ml             Physical Exam  Constitutional:       Comments: Less shaky, continues with staring gaze, but does respond to voice   HENT:      Head: Normocephalic and atraumatic. Cardiovascular:      Rate and Rhythm: Normal rate. Rhythm irregular. Comments: Atrial fibrillation  Pulmonary:      Breath sounds: No wheezing, rhonchi or rales. Abdominal:      General: There is no distension. Palpations: Abdomen is soft. Tenderness: There is no abdominal tenderness. There is no guarding. Musculoskeletal:      Right lower leg: Edema present. Left lower leg: Edema present. Comments: Bilateral lower extremity pitting edema persists  2+ PE at the bilateral feet   Neurological:      Comments: Alert but staring off after receiving pain meds  Responsive to commands         LABS:    CBC:   Recent Labs     10/10/22  0547 10/10/22  0635 10/10/22  1800 10/11/22  0457 10/12/22  0508   WBC 6.9  --   --  7.3 5.4   HGB 6.8*   < > 8.2* 8.1* 7.2*   HCT 20.7*   < > 25.4* 25.3* 22.6*     --   --  261 257   MCV 88.5  --   --  87.2 88.0    < > = values in this interval not displayed.        Renal:    Recent Labs     10/10/22  0547 10/11/22  0457 10/12/22  0508    135* 134*   K 3.9 4.3 4.4   CL 97* 97* 96*   CO2 33* 29 26   * 98* 101*   CREATININE 1.1 1.0 1.2   GLUCOSE 68* 154* 272*   CALCIUM 7.6* 7.9* 7.5*   MG 3.10* 3.20* 3.20*   PHOS 2.5 3.2 4.5 ANIONGAP 6 9 12       Hepatic:   Recent Labs     10/10/22  0547 10/11/22  0457 10/12/22  0508   LABALBU 1.8* 1.8* 1.7*       Troponin: No results for input(s): TROPONINI in the last 72 hours. BNP: No results for input(s): BNP in the last 72 hours. Lipids: No results for input(s): CHOL, HDL in the last 72 hours. Invalid input(s): LDLCALCU, TRIGLYCERIDE  ABGs:  No results for input(s): PHART, GZD7NOZ, PO2ART, BFY1UFJ, BEART, THGBART, G5NQQPIC, WDA4RMJ in the last 72 hours. INR: No results for input(s): INR in the last 72 hours. Lactate: No results for input(s): LACTATE in the last 72 hours. Cultures:  -----------------------------------------------------------------  RAD:   CT ABDOMEN PELVIS WO CONTRAST Additional Contrast? None   Final Result      Marked decrease in size of pneumomediastinum. Trace residual pneumoperitoneum. Extensive bilateral lower lobe pulmonary atelectasis with pleural effusions and trace residual right pneumothorax. XR CHEST PORTABLE   Final Result      Tiny right lateral pneumothorax is suspected, 5 mm in maximum thickness. This has decreased in size since 10/3/2022. Right basilar Pleurx catheter noted. Small bilateral pleural effusions. Prominent interstitial markings. Stable cardiac mediastinal silhouette. Lines and tubes without change. Subcutaneous emphysema noted. CT CHEST ABDOMEN PELVIS WO CONTRAST   Final Result      1. Severe pneumomediastinum. 2. Small to moderate right hydropneumothorax with similar fluid and gas components with a right-sided Pleurx catheter. 3. Moderate loculated left pleural effusion with atelectasis of the left lower lobe with patency of the central left lower lobe bronchi. 4. Fluid/material filling the bronchus intermedius and right lower lobe bronchi with complete consolidation and volume loss of the right lower lobe. Differential diagnosis would include mucous plugging or obstructing lesion.     5. Tracheostomy tube tip within the trachea   6. Severe subcutaneous emphysema in the neck. CT ABDOMEN AND PELVIS:      FINDINGS:      LIVER: Normal.      GALLBLADDER AND BILIARY TREE: No calcified gallstones. No gallbladder distention. No intra- or extrahepatic biliary dilatation. PANCREAS: Normal.      SPLEEN: Normal.      ADRENAL GLANDS: Normal.      KIDNEYS AND URETERS: Normal.      URINARY BLADDER: Ansari catheter present within collapsed urinary bladder. REPRODUCTIVE ORGANS: No associated masses. BOWEL: Normal diameter, nonobstructed. Feeding tube tip at the level of the ligament of Treitz. LYMPH NODES: No abnormally enlarged nodes. PERITONEUM/RETROPERITONEUM: Severe pneumoperitoneum extends into the upper abdomen with some of the symmetric multiseptated gas appearing deep to the diaphragm consistent with mild pneumoperitoneum (series 601 was 101). This is likely related to    pneumonia mediastinum dissecting into the abdomen. No fluid collection in the abdomen or pelvis. No ascites. VESSELS: Extensive atherosclerotic calcification of the aorta and iliac arteries. ABDOMINAL WALL: No acute abnormality. BONES: No acute abnormality. Mild chronic L1 compression fracture with previous vertebroplasty. IMPRESSION:      1. Severe pneumomediastinum extends into the upper abdomen with small pneumoperitoneum likely related to extension of pneumoperitoneum into the upper peritoneal cavity. 2. No fluid collection in the abdomen or pelvis. Results were discussed with the surgical team at 7:00 PM on 10/3/2022. XR CHEST PORTABLE   Final Result      Stable appearance of loculated right pneumothorax, with loculated components in the lateral right midlung region and in the right lateral costophrenic sulcus. Stable scattered areas of atelectasis versus scar, most prominent in the medial right lung base and in the left lateral lung base.          XR CHEST PORTABLE Final Result      Small right basilar pneumothorax. Right basilar chest tube without change. Patchy consolidation in the right mid and lower lung as well as the left lower lung-atelectasis versus pneumonia. Trace left pleural effusion. Normal heart size. Lines and tubes without change. Mild improvement in degree of subcutaneous emphysema in the chest and neck. XR CHEST 1 VIEW   Final Result      1. Stable small right-sided pneumothorax and prominent subcutaneous emphysema along the neck and upper superior chest wall, greater in right lung stable   2. Stable left basilar consolidation and pleural effusion      3. Stable appearing perihilar accentuated markings or airspace disease            XR CHEST PORTABLE   Final Result      Stable small right-sided pneumothorax. More prominent emphysema over the lower neck. No change in bilateral airspace disease. Stable left pleural effusion. XR CHEST PORTABLE   Final Result   1. Bilateral multifocal pneumonia with improvement in the right    pulmonary consolidation since prior study from 9/23/22   2. Mild to moderate left pleural effusion          XR CHEST PORTABLE   Final Result   1. Support lines and tubes are stable. 2.  Stable small right lateral pneumothorax and right basilar    chest tube. 3.  Stable patchy bilateral airspace disease. XR CHEST PORTABLE   Final Result   1. Satisfactory position of right internal jugular central line   2. No interval change in endotracheal tube and nasogastric tube positioning. 3. Extensive bilateral airspace disease with no changes. 4. Left pleural effusion with retrocardiac opacity, unchanged   5. Small stable tiny right lateral pneumothorax and stable position of right chest tube,, Pleurx catheter. XR ABDOMEN (KUB) (SINGLE AP VIEW)   Final Result   1. No residual pneumothorax.    2.  Mild patchy airspace disease bilaterally worse on the right than on prior examination. 3.  Non-obstructive bowel gas pattern. Mildly distended stomach. XR CHEST PORTABLE   Final Result   1. No residual pneumothorax. 2.  Mild patchy airspace disease bilaterally worse on the right than on prior examination. 3.  Non-obstructive bowel gas pattern. Mildly distended stomach. XR CHEST PORTABLE   Final Result      1. Small right pneumothorax appears slightly increased. 2. Mild patchy airspace opacity bilaterally. XR CHEST PORTABLE   Final Result     Pleurx catheter at the right lung base. Trace right    pneumothorax is unchanged. XR CHEST PORTABLE   Final Result      Right-sided chest tube evident in the base, its tip medially. Improvement in the right-sided pneumothorax, since earlier today. Possible medial collapse of the right lower lobe. Small left effusion. Patchy airspace density/atelectasis in the lungs bilaterally, with no other significant change. XR CHEST PORTABLE   Final Result   1. Right-sided Pleurx catheter in satisfactory position in the lung bases   2. Right-sided post operative pneumothorax, approximately 10%               Assessment/Plan:   Geetha Lennon is a 65 y.o. male w/PMH HFpEF, carotid artery stenosis, HTN, OA, Restrictive Lung Disease, DM2, HLD who underwent VATS with PleurX catheter placement on 09/19 for recurrent b/l loculated pleural effusions w/subsequent BIPAP failure and hypoxic/hypercapnic respiratory failure requiring Mechanical intubation.      Pulmonary:  #Acute Hypoxic/Hypercapnic Respiratory Failure  W/Recurrent Pleural Effusions s/p VATS and PleurX for pleural fluid management 09/19/22, w/associated unintentional weight loss of 30 Ibs, PFTs w/restrictive defect.   -failed to tolerate BIPAP post-op --> Mechanically intubated  Intubation Status:   -trach    -current vent settings: RR 14, , FiO2 40%, PEEP 5  - PleurX catheter in place  - Trach in place  - CTS following   -10/11: Pluerx cath drained 250 ml out.   - Repeat CT ab 10/10:  Marked decrease in size of pneumomediastinum. Trace residual pneumoperitoneum. Extensive bilateral lower lobe pulmonary atelectasis with pleural effusions and trace residual right pneumothorax  - VSV trial wean    ID:    #Leukocytosis, resolved  -afebrile, WBC 5.4  -Bronch respiratory cx: no WBC or organism  -cefepime day 7, last day      Cardiovascular: #HFpEF  -w/fluid overload  -w/downtrending BNP  -fluid status:  1.5 L output today, -1L on day   -nephro on board: holding off on diuretics due to renal function, continue to appreciate recs    #Hypotension, resolved  -norepinephrine previously which remains off  -monitor BP    Chronic:  #Paroxysmal Atrial Fibrillation   -Eliquis held, amiodarone  #HLD  -pravastatin 40mg    Renal:  Ansari in place w/good urine output  -continue to monitor    #ZANA  -2/2 ATN w/likely etiology of hypoperfusion due to hypovolemia in addition to  -possible contrast-associated nephropathy  -holding off on diuretics per nephro  -UOP good  -creatinine 1.2 <- 1.0 today    #Hypernatremia, resolved  -receiving free water flushes of 300 ml Q4H since 10/3/22  W/improvement of Na     GI:  #Pneumoperitoneum   -Found on CT 10/4   Repeat CT 10/10  Marked decrease in size of pneumomediastinum. Trace residual pneumoperitoneum. #Nutrition  - PEG placed yest, started feeds    Metabolic    #DM2  - glu elevated to 270s after restarting feeds  -HDSS  -Lantus 28U  -5 Lispro, restart  -POCT  -hypoglycemia protocol  -A1c: 8.1 (1/24/22)     Heme    #Anemia  - s/p 2U pRBC transfusion 10/11.  HnH 8.1  - continue to monitor H&H    Neuro:  -analgesia: Tylenol, received dilaudid 0.25 mg overnight, on scheduled oxycodone 5-->7.5 mg    Code Status: FULL CODE  FEN: Diet NPO  ADULT TUBE FEEDING; PEG; Renal Formula; Continuous; 25; Yes; 10; Q 4 hours; 55; 30; Q 8 hours; Protein; 2 bottles Proteinex daily w/ 30 mL FW flushes before and after  PPX: Pepcid  DISPO: ICU    Thanh Rm MD, PGY-1  10/12/22  7:44 AM      Patient seen, examined and discussed with the resident and I agree with the assessment and plan. Vent Mode: AC/PRVC Resp Rate (Set): 14 bmp/Vt (Set, mL): 375 mL/ /FiO2 : 40 %  PEEP 5  No results for input(s): PHART, ASA1TGP, PO2ART in the last 72 hours. S/p PEG on 10/11. Intermittently follows commands, but was somnolent for my evaluation. Opened eyes which were pinpoint and didn't regard me or interact. Had RT check a NIF and VC on patient today and they were 0 and 52 mL respectively. His level of alertness likely played a roll, but this is compatible with his overall weakness, especially respiratory muscle weakness and poor pulmonary compliance. His albumin remains 1.7 so continued nutrition will be vital to regaining strength, but I'm also going to workup possible other causes of weakness. Checking for acetylcholine antibodies. Patient having weakness and fatigue, specifically in the context of breathing for several months. It appeared he was worsening right up to the surgery and has been much worse since the surgery. Unfortunately after the VATS he has not opened up his right lower lobe and he has bibasilar consolidation overall. Tidal volumes of 375 are much lower than we would want in a patient who is 6 foot tall, but since he does not have aeration of either the right or left lower lobes he is working with about half the normal lung volume. On higher tidal volumes his airway pressures are so high that he got a pneumomediastinum. Patient appears sedated for my evaluation today with pinpoint pupils so we are discontinuing any scheduled narcotics. He was on Roxicodone. I would like to see him awake and attempting to interact so he does not get even weaker.   If his acetylcholine receptor antibodies comes back positive I will be very surprised but least we may have a treatable cause for his weakness in addition to nutrition. Critical care time spent reviewing labs/films, examining patient, collaborating with other physicians but excluding procedures for life threatening organ failure is 41 minutes.       Madina Harris MD

## 2022-10-12 NOTE — PROGRESS NOTES
Speech Language Pathology  Attempt Note    Attempted to see pt this AM for follow-up dysphagia and speech treatment. Pt with increased lethargy and unable to safely participate at this time. Will re-attempt later this afternoon. Of note, SLP discussed with wife about completing FEES assessment either Friday or Saturday. Wife in agreement and demonstrated understanding.      Electronically signed by:  Vasyl Prather M.A., [address] [place] [address]  Speech-Language Pathologist  Pg #: [phone]

## 2022-10-12 NOTE — PROGRESS NOTES
Progress Note    Patient Bernadette Wilks  MRN: [id number]  YOB: 1945 Age: 65 y.o. Sex: male  Room: [address] [place] [address]       Admitting Physician: Baxter Schlatter, MD   Date of Admission: 9/19/2022  5:20 AM   Primary Care Physician: Chucky Restrepo MD     Subjective:  Bernadette Wilks was seen and examined. We are following for PEG tube placed yesterday. PEG tube functioning well, flushing easily, tolerating tube feeds, site looks well  --     ROS:  Constitutional: Denies fever, no change in appetite  Respiratory: Denies cough or shortness of breath  Cardiovascular: Denies chest pain or edema    Objective:  Vital Signs:   Vitals:    10/12/22 1209   BP:    Pulse: 87   Resp: 25   Temp:    SpO2: 99%         Physical Exam:  Constitutional: Alert and oriented x 4. No acute distress. Respiratory: Respirations nonlabored, no crepitus  GI: Abdomen nondistended, soft, and nontender. Neurological: No focal deficits noted. No asterixis.     Intake/Output:    Intake/Output Summary (Last 24 hours) at 10/12/2022 1450  Last data filed at 10/12/2022 1000  Gross per 24 hour   Intake 415 ml   Output 935 ml   Net -520 ml        Current Medications:  Current Facility-Administered Medications   Medication Dose Route Frequency Provider Last Rate Last Admin    cefepime (MAXIPIME) 2000 mg IVPB minibag  2,000 mg IntraVENous Q8H Alexa Reinoso, DO        oxyCODONE (ROXICODONE) 5 MG/5ML solution 7.5 mg  7.5 mg Oral Q6H PRN Gerianne Suet, DO        hydroxypropyl methylcellulose (GONIOSOL) 2.5 % ophthalmic solution 1 drop  1 drop Both Eyes PRN Yimi Gun, DO        [Held by provider] HYDROmorphone (DILAUDID) injection 0.5 mg  0.5 mg IntraVENous Q3H PRN Yimi Gun, DO        Or    [Held by provider] HYDROmorphone (DILAUDID) injection 1 mg  1 mg IntraVENous Q3H PRN Yimi Gun, DO   1 mg at 10/10/22 1347    QUEtiapine (SEROQUEL) tablet 25 mg  25 mg Oral BID Ginna Hamilton, DO   25 mg at 10/12/22 9529    insulin lispro (1 Unit Dial) (HUMALOG/ADMELOG) pen 5 Units  5 Units SubCUTAneous q8h  Devoid, DO   5 Units at 10/12/22 1226    insulin glargine (LANTUS;BASAGLAR) injection pen 28 Units  28 Units SubCUTAneous Nightly  Devoid, DO   28 Units at 10/09/22 2014    albuterol (PROVENTIL) nebulizer solution 2.5 mg  2.5 mg Nebulization Q4H Alex Richter MD   2.5 mg at 10/12/22 1149    pantoprazole (PROTONIX) injection 40 mg  40 mg IntraVENous BID Max Meade MD   40 mg at 10/12/22 0924    insulin lispro (1 Unit Dial) (HUMALOG/ADMELOG) pen 0-16 Units  0-16 Units SubCUTAneous Q4H Connie Bolanos MD   4 Units at 10/12/22 1330    Venelex ointment   Topical BID Eddye Alpers, MD   Given at 10/12/22 2546    amiodarone (CORDARONE) 150 mg in dextrose 5 % 100 mL bolus  150 mg IntraVENous Once Eddye Alpers, MD        amiodarone (CORDARONE) tablet 200 mg  200 mg Oral Daily Eddye Alpers, MD   200 mg at 10/12/22 0945    acetaminophen (TYLENOL) 160 MG/5ML solution 650 mg  650 mg Oral Q6H PRN Eddye Alpers, MD   650 mg at 10/04/22 1315    chlorhexidine (PERIDEX) 0.12 % solution 15 mL  15 mL Mouth/Throat BID Eddye Alpers, MD   15 mL at 10/12/22 4418    apixaban (ELIQUIS) tablet 5 mg  5 mg Oral BID Genie Hong MD   5 mg at 10/12/22 1134    sodium chloride flush 0.9 % injection 5-40 mL  5-40 mL IntraVENous 2 times per day Eddye Alpers, MD   10 mL at 10/12/22 6205    sodium chloride flush 0.9 % injection 5-40 mL  5-40 mL IntraVENous PRN Eddye Alpers, MD   10 mL at 10/03/22 0834    0.9 % sodium chloride infusion   IntraVENous PRN Eddye Alpers, MD        polyethylene glycol [facility]) packet 17 g  17 g Oral Daily Eddye Alpers, MD   17 g at 10/12/22 0924    ondansetron (ZOFRAN-ODT) disintegrating tablet 4 mg  4 mg Oral Q8H PRN Eddye Alpers, MD        Or    ondansetron [facility]) injection 4 mg  4 mg IntraVENous Q6H PRN Maria De Jesus Dotson MD Vincent   4 mg at 09/20/22 0510    glucose chewable tablet 16 g  4 tablet Oral PRN Florencia Alford MD        dextrose bolus 10% 125 mL  125 mL IntraVENous PRN Florencia Alford MD   Stopped at 10/10/22 0207    Or    dextrose bolus 10% 250 mL  250 mL IntraVENous PRN Florencia Alford MD        glucagon (rDNA) injection 1 mg  1 mg SubCUTAneous PRN Florencia Alford MD        dextrose 10 % infusion   IntraVENous Continuous PRN Florencia Alford MD   Stopped at 10/10/22 1749    hydrALAZINE (APRESOLINE) injection 5 mg  5 mg IntraVENous Q15 Min PRN Florencia Alford MD   5 mg at 09/19/22 9198         Recent Imaging:   EGD  No dictation   CT ABDOMEN PELVIS WO CONTRAST Additional Contrast? None  Narrative: CT abdomen and pelvis without contrast    HISTORY: Evaluate pneumoperitoneum  COMPARISON: 10/3/2022  CONTRAST:  none    TECHNIQUE: Individualized dose optimization technique was used in order to meet ALARA standards for radiation dose reduction. In addition to vendor specific dose reduction algorithms, the dose reduction techniques vary based on the specific scanner   utilized but frequently include automated exposure control, adjustment of the mA and/or kV according to patient size, and use of iterative reconstruction technique. COMMENTS:     Extensive degenerative changes in the spine. Compression fracture of L1 with vertebral augmentation cement is again noted. Ansari catheter is noted. Right basilar Pleurx catheter is noted. Tip of feeding tube is in the proximal jejunum. Small right and   small-moderate left pleural effusion noted. There is prominent thickening of the left pleura. There is significant bibasilar pulmonary atelectasis. Trace residual right pneumothorax is noted air on image 2-3. There is significant decrease in size of   pneumomediastinum. Trace residual pneumoperitoneum. Subcutaneous third spacing is noted. There is moderate arterial calcification.  Old, calcified aortic dissection is again noted. A rectal catheter is present. Mild colonic diverticulosis. No evidence of   bowel obstruction. Mild dependent free fluid in the pelvis. No lymphadenopathy. Gallbladder is within normal limits. Calcified splenic granulomas. Noncontrast evaluation of the solid abdominal organs is otherwise unremarkable. Bladder and prostate within   normal limits. Impression: Marked decrease in size of pneumomediastinum. Trace residual pneumoperitoneum. Extensive bilateral lower lobe pulmonary atelectasis with pleural effusions and trace residual right pneumothorax. Labs:   Recent Labs     10/10/22  0547 10/10/22  0635 10/10/22  1800 10/11/22  0457 10/12/22  0508   HGB 6.8* 6.9* 8.2* 8.1* 7.2*   WBC 6.9  --   --  7.3 5.4   LABALBU 1.8*  --   --  1.8* 1.7*          Assessment:  Oropharyngeal dysphagia status post PEG placement, PEG site looks normal limits with no erythema or leakage. PEG flushes easily. Patient tolerating tube feeds. Plan:  Continue to keep the PEG site dry and clean  Severe erosive esophagitis noted on recent endoscopy, recommend continued use of IV Protonix 40 mg 2 times a day  Keep the head end of the bed elevated  Flush tube between feeds. Will sign off      Trivedi MD Kizzie Flores    [phone].  Also available via [employer]

## 2022-10-12 NOTE — PROGRESS NOTES
Speech Language Pathology  Facility/Department: [facility] ICU  Dysphagia Daily Treatment Note    NAME: Onel Abdul  : 1945  MRN: [id number]    Patient Diagnosis(es):   Patient Active Problem List    Diagnosis Date Noted    Mucus plugging of bronchi 10/05/2022    ZANA (acute kidney injury) ([address] 2022    Acute on chronic respiratory failure with hypoxia and hypercapnia ([address] 2022    Pleural effusion on right 2022    Chronic heart failure with preserved ejection fraction (HFpEF) ([address] 2022    Hypoxia 2022    Atrial fibrillation with rapid ventricular response ([address] 2022    Exertional dyspnea 2022    Closed displaced fracture of lateral malleolus of left fibula 2021    Spinal stenosis of lumbar region 10/17/2018    Closed compression fracture of L1 lumbar vertebra 10/17/2018    Trigger ring finger of right hand 06/15/2017    Subacromial impingement 2015    Rotator cuff tear 2015    Glenohumeral arthritis 2015    DM type 2 (diabetes mellitus, type 2) ([address] 2013    ED (erectile dysfunction) 2012    OA (osteoarthritis) of knee 10/12/2011    Carotid stenosis, left 10/12/2011    Hearing loss 10/12/2011    HTN (hypertension) 10/07/2011    Hyperlipidemia 10/07/2011     Allergies: Allergies   Allergen Reactions    Torsemide Shortness Of Breath    Dye [Iodides]      1970's - ?? CXR (10/3/22)-  Impression       Stable appearance of loculated right pneumothorax, with loculated components in the lateral right midlung region and in the right lateral costophrenic sulcus. Stable scattered areas of atelectasis versus scar, most prominent in the medial right lung base and in the left lateral lung base. Previous MBS -  N/A    Chart reviewed.     Medical Diagnosis: Pleural effusion, right [J90]  Pleural effusion on right [J90]   Treatment Diagnosis: Oropharyngeal dysphagia    BSE Impression (10/2/22)-  RN states okay to attempt assessment. Dr Eliu Lopez speaking with spouse stating pt most likely still with effects of sedation. Pt was intubated 9/22- 9/30/22. Trach placed 9/30, with pt on ventilator. Pt shook head yes/no intermittently to questions asked. Pt made no attempt to mouth words. Pt exhibiting open mouth posture, did not form labial seal or protrude /lateralize tongue on command. Oral care completed, pt demonstrating no oral response. Placed 2 single ice chips in oral cavity, again with no response - no lingual movement noted. Pt did close lips with tactile stim x 2. No swallow movement was felt upon palpation of anterior neck. O2 sats remained stable and RR remained in mid 20's. Recommend aggressive oral care 2-3 x/day with suction kit. Plan to re-assess as pt appropriate. Dysphagia Diagnosis: Suspected needs further assessment    FEES: planning to complete Friday or Saturday of this week, as schedule allows    Pain: None indicated     Current Diet : ADULT TUBE FEEDING; Nasogastric; Diabetic; Continuous; 10; Yes; 10; Q 4 hours; 45; 300; Q 4 hours; Protein; 2 bottles Proteinex daily w/ 30 mL FW flushes before and after  Diet NPO   Recommended Form of Meds: Via alternative means of nutrition      Treatment:  Pt seen bedside to address the following goals:   1. The patient will tolerate repeat bedside swallowing evaluation when able. 10/3 - Pt positioned upright and aggressive oral care was completed with suction toothbrush. Vitals reading had poor waveform and appeared to be unreliable readings; RN notified and in to assess. The patient demo'd spontaneous swallows without PO with audible air escaping around stoma. Pt tolerated 4/4 ice chips with constant verbal cues for appropriate acceptance and manipulation of ice (I.e.close mouth, chew before you swallow, etc). There was no coughing but not likely clinically relevant due to research indicating high prevalence of SILENT aspiration in cuff inflated tracheostomy patients. Pt not yet appropriate for instrumental swallow evaluation but will require out prior to advancing diet. Notes indicate plan for SBT today. Requested order for PMV which will hopefully be receive and coincide with improved alertness and weaning from ventilator. Continue goal.  10/4: Cleared by RN. No order for PMV yet. Received pt more awake/alert. Wife present. Pt remains on trach/vent. Oral care recently completed. Repositioned pt upright. Targeted swallow function via trials ice chips and small sips h2o; pt with positive oral acceptance, no anterior loss, appropriate and timely oral prep, seemingly positive swallow movement. Attempted to check clearance via oral suction; evidently dry with no material suctioned. As previously noted, no cough, however would be unlikely to occur given inflated cuff tracheostomy. Would recommend waiting on instrumental pending ability to place PMV (assuming order will be received in the near future), as that will likely support pt's swallow function, and show improved function. Cont goal  10/6: Pt positioned upright in bed. Thorough oral care and suctioning provided. Pt continues to be more alert/awake and active in his care/treatment. Family present. Pt completed 20x effortful swallows 5x swallows/ ice chips to reduce further swallow deconditioning. RR & 02 stable across low level ice chip trials. Audible air escape and secretions around trach appreciated. Per RN, Dr. Shara Melara aware. Discussed need for instrumental swallow evaluation with pt and family. FEES to be completed as schedule permits, preferably prior to PEG determination. Cont goal.   10/7: Pt seated upright in bed and SLP assisted with oral care via suction toothbrush. Pt with increased alertness and agreeable to accept trials of ice chips. X25 effortful swallows with ice chips completed this session.  Pt with intermittent air escape and secretions noted around trach, however per discussion with SLP who saw pt yesterday and per family, both looked less. RR and O2 remained stable across all trials. SLP re-discussed possibility of completing FEES and expressed will reach out to other SLP to see if it can be completed prior to PEG placement on Monday. Family and pt demonstrated understanding and agreement at this time. Cont. 10/12: Pt seated upright in bed. SLP provided oral care via suction toothbrush. Clean oral cavity noted. Pt with min brown secretions noted around trach prior to administering ice chips. Pt readily accepted x10 ice chips and demonstrated adequate mastication. Pt with facial grimace when attempting to swallow all ice chips. Pt with stable RR and O2 during ice chips administration. Audible secretions noted x2. Pt with continued lethargy and cues required to maintain alertness. Pin point pupils/reduced visual tracking, however followed basic 1-step commands adequately. SLP re-discussed FEES completion at end of this week (either Friday or Saturday) depending on pt's status to accept PO with increased alertness. ? Pt comprehension, however wife present and demonstrated understanding and agreement. Cont. 2. The patient/caregiver will demonstrate understanding of compensatory strategies for improved swallowing safety. 10/3 - Educated on potential impact of trach on swallow function, rationale for oral care, recommendations for few single ice chips to be given to facilitate oral mucosal integrity and preserve swallow function that is present. Introduced plan for PMV in future, rationales for this. Educated on limitations in pts alertness at this time and need for improvement for completing swallow study, therapeutic effectiveness, etc. Continue goal.   10/4: Discussed swallow function with patient/wife. Also reviewed recommendation for PMV, hopefully in near future (order not yet received). Discussed how PMV may also improve swallow function be supporting necessary pressure for swallowing.  Reviewed recommendation for small amounts ice/small sips h2o with effortful swallow to support swallow function/maintenance. Wife and pt indicated comprehension. Cont goal  10/5: Thorough education this date re: potential adverse affects of trach/vent on swallow function, need for instrumentation, need for PMV & importance of oral care. Wean trial failed this date per Dr. Lyudmila Packer. Audible air/secretion leakage around trach. PMV trials are likely not appropriate at this time and orders have not been provided. Reviewed continued recommendation for low level ice chips s/p oral care to support swallow function/ maintenance, improve pt QOL/ comfort and encourage oral care. 10/7: Discussed education regarding rationale for completing trials ice chips with effortful swallow, rec's for PMV as able, and overall importance of oral care, and possibility of completing further swallow assessment via FEES when able. Pt and family present demonstrated understanding. Cont. 10/12: As noted above with wife present. Discussed rationale for completing ice chips+effortful swallow trials to continue stimulating oropharyngeal swallow function while pt NPO. Pt requires re-education. Wife demonstrated understanding. Cont. 3. Patient will participate in [address] trials when appropriate/ orders received  10/6- Speech/ Communication Treatment: Pt with increased communicative frustration. PMV trials are likely not appropriate at this time and orders have not been provided yet. Family stating they wanted to get him an IPAD with speech lauren. Discussed low tech vs high tech AAC and appropriateness for AAC in different populations. Given trach/vent is suspected to not be long-term and pt continues to be in the acute phase of care, high tech AAC is not recommended at this time. Pt provided communication board. All icons reviewed. Pt was able to spell out \"tired\" on the letter board.  Recommend continued use of communication board, letter board & non-verbal communication (I.e lip reading, pointing, facial expressions etc) to facilitate patient expression of basic wants/needs. Patient and family demonstrated understanding. 10/7: Pt communicated via head nod/shake and use of gestures to communicate wants/needs this session. 10/12: Goal met 10/10. Separate note for targeting goals associated. Patient/Family/Caregiver Education:  Please see goal 2 above    Compensatory Strategies:  Oral care using suction toothbrush/toothette / suction system 3x per day   Single ice chips given by staff when pt awake and in upright position after oral care  Communication board      Plan:  Continue dysphagia/communication treatment with goals per plan of care. Diet recommendations: Single ice chips given by staff when pt awake and in upright position, ONLY after oral care   DC recommendation: Ongoing speech therapy is indicated   Treatment: 20  D/W nursing: Elvin Richardson  Needs met prior to leaving room, call button in reach; Family at bedside.      Electronically signed by:  Brijesh Kruse M.A., [address] [place] [address]  Speech-Language Pathologist  Pg #: [phone]    If patient is discharged prior to next treatment, this note will serve as the discharge summary

## 2022-10-12 NOTE — PLAN OF CARE
Problem: Chronic Conditions and Co-morbidities  Goal: Patient's chronic conditions and co-morbidity symptoms are monitored and maintained or improved  Outcome: Progressing  Flowsheets (Taken 10/12/2022 0800)  Care Plan - Patient's Chronic Conditions and Co-Morbidity Symptoms are Monitored and Maintained or Improved: Collaborate with multidisciplinary team to address chronic and comorbid conditions and prevent exacerbation or deterioration     Problem: Discharge Planning  Goal: Discharge to home or other facility with appropriate resources  Outcome: Progressing  Flowsheets (Taken 10/12/2022 0800)  Discharge to home or other facility with appropriate resources: Identify barriers to discharge with patient and caregiver     Problem: Pain  Goal: Verbalizes/displays adequate comfort level or baseline comfort level  Outcome: Progressing     Problem: Safety - Adult  Goal: Free from fall injury  Outcome: Progressing     Problem: Skin/Tissue Integrity  Goal: Absence of new skin breakdown  Description: 1. Monitor for areas of redness and/or skin breakdown  2. Assess vascular access sites hourly  3. Every 4-6 hours minimum:  Change oxygen saturation probe site  4. Every 4-6 hours:  If on nasal continuous positive airway pressure, respiratory therapy assess nares and determine need for appliance change or resting period. Outcome: Progressing        Problem: Nutrition Deficit:  Goal: Optimize nutritional status  Outcome: Progressing  Flowsheets (Taken 10/12/2022 1324 by Salas Mcintyre, CAREY, LD)  Nutrient intake appropriate for improving, restoring, or maintaining nutritional needs: Recommend, monitor, and adjust tube feedings and TPN/PPN based on assessed needs     Problem: ABCDS Injury Assessment  Goal: Absence of physical injury  Outcome: Progressing     Problem: Confusion  Goal: Confusion, delirium, dementia, or psychosis is improved or at baseline  Description: INTERVENTIONS:  1.  Assess for possible contributors to thought disturbance, including medications, impaired vision or hearing, underlying metabolic abnormalities, dehydration, psychiatric diagnoses, and notify attending LIP  2. [place] high risk fall precautions, as indicated  3. Provide frequent short contacts to provide reality reorientation, refocusing and direction  4. Decrease environmental stimuli, including noise as appropriate  5. Monitor and intervene to maintain adequate nutrition, hydration, elimination, sleep and activity  6. If unable to ensure safety without constant attention obtain sitter and review sitter guidelines with assigned personnel  7.  Initiate Psychosocial CNS and Spiritual Care consult, as indicated  Outcome: Progressing

## 2022-10-12 NOTE — PROGRESS NOTES
Comprehensive Nutrition Assessment    Type and Reason for Visit:  Reassess, Consult (to increase calories and protein in regimen per CT surgery)    Nutrition Recommendations/Plan:   Switch to Jevity 1.5 standard with fiber formula providing 1800 calories and 67 grams of protein + Proteinex Protein supplement bid, flush 30 ml before and after administration providing an additional 208 calories and 52 grams of protein  Will monitor TF intake and tolerance, blood sugar trends, and fluid and electrolyte balances  Monitor for cramping and bloating with addition of fiber to TF regimen, recommend starting at 30 ml/hr and increasing by 10 ml every 4 to goal rate of 50     Malnutrition Assessment:  Malnutrition Status: At risk for malnutrition (Comment) (09/22/22 1409)    Context:  Chronic Illness       Nutrition Assessment:    Follow up:  CT surgery called requested higher calorie TF regimen. Even though blood sugar elevated, with recent diarrhea RD switched formula to Jevity 1.5 (fiber allowed per CT surgery), may be less risk of worsening diarrhea vs diabetic formula. Will continue Proteinex Protein bid due to increased protein needs. Nutrition Related Findings:    Na 134, 's Wound Type: Deep Tissue Injury ([place])       Current Nutrition Intake & Therapies:    Average Meal Intake: NPO  Average Supplements Intake: NPO  Current Tube Feeding (TF) Orders:  Feeding Route: PEG  Formula: Standard without Fiber  Schedule: Continuous  Goal TF & Flush Orders Provides: Jevity 1.5 at goal rate of 50 ml/hr provides 1800 calories, 67 grams of protein, 911 ml free water. Water flush 30 ml every 4 hours. Proteinex Protein supplement adding an additional 208 calories and 52 grams of protein. Anthropometric Measures:  Height: 6' 0.99\" (185.4 cm)  Ideal Body Weight (IBW): 184 lbs (84 kg)       Current Body Weight: 210 lb 15 oz (95.7 kg), 90.7 % IBW.  Weight Source: Bed Scale  Current BMI (kg/m2): 27.8        Weight Adjustment For: No Adjustment                 BMI Categories: Overweight (BMI 25.0-29. 9)    Estimated Daily Nutrient Needs:  Energy Requirements Based On: Kcal/kg (20-25 kcal/kg CBW)  Weight Used for Energy Requirements: Current  Energy (kcal/day): 9404-7283 (20-22 kcal/95.7 kg)  Weight Used for Protein Requirements: Ideal  Protein (g/day): 105-121 (1.3-1.5 g/80.9 kg)  Method Used for Fluid Requirements: 1 ml/kcal  Fluid (ml/day): or per MD    Nutrition Diagnosis:   Increased nutrient needs related to increase demand for energy/nutrients as evidenced by nutrition support - enteral nutrition    Nutrition Interventions:   Food and/or Nutrient Delivery: Modify Tube Feeding  Nutrition Education/Counseling: Education not indicated  Coordination of Nutrition Care: Continue to monitor while inpatient  Plan of Care discussed with: ICU team    Goals:  Previous Goal Met: Progressing toward Goal(s)  Goals: Tolerate nutrition support at goal rate, within 2 days       Nutrition Monitoring and Evaluation:   Behavioral-Environmental Outcomes: None Identified  Food/Nutrient Intake Outcomes: Enteral Nutrition Intake/Tolerance  Physical Signs/Symptoms Outcomes: GI Status, Nutrition Focused Physical Findings, Biochemical Data    Discharge Planning:     Too soon to determine     LESLI, [address] [facility], [address] [place] [address],   Contact: Eboni Camacho

## 2022-10-12 NOTE — PROGRESS NOTES
Point of care note:     PleurX drained at bedside today. 180 mL of yellow, clear pleural fluid drained. Recapped and replaced dressing. Pt tolerated procedure well.      Caro Timmons DO   PGY1, General Surgery  10/12/22  4:19 PM  Pager # [phone]

## 2022-10-12 NOTE — PROGRESS NOTES
Tube feed titrated to goal, 50 mL/hr, 30mL flush q4hr. Formula change to Jevity 1.5 standard with fiber with additional Proteinex Protein supplement BID. VSS, afebrile.

## 2022-10-12 NOTE — PROGRESS NOTES
Palliative Care Chart Review  and Check in Note:     NAME:  Gloria Valdez  Admit Date: 9/19/2022  Hospital Day:  Hospital Day: 24   Current Code status: Full Code    Palliative care is continuing to following Mr. Grace Blackman for symptom management, and goals of care discussion as needed. Patient's chart reviewed today 10/12/22. Saw Rosey Alpers at the bedside. He remains lethargic. Scheduled narcotics were D/Cd per ICU team. Pt has PRN tylenol ordered, but it has not been administered since 10/04. Per pt's wife, Larissa Quinn, he has not been endorsing pain. If pt starts to endorse pain, would recommend scheduling Tylenol to ensure he receives dose and keep opioid medications PRN to improve mental status. Discussed case with Lisa Anders RN case manager. Luanne Ferrari has until 10/21 to deny Rosey Alpers from the [facility]. The appeal process will start after that. Given his insurance benefits it is unlikely an appeal process will be successful. Plan will be for Lisa Anders to discuss what options there are for Larissa Quinn to change Josiah's benefits. Larissa Quinn remains in good spirits. Will cont to support her as needed. The following are the currently established goals/code status, and Symptom management. Goals of care: Continue current medical management.      Code status: Full    Discharge plan: TBD - pending hospital course and insurance issues      NATASHA lEy - CHERIE  10/12/22  1:14 PM

## 2022-10-13 NOTE — PROGRESS NOTES
Speech Language Pathology  Facility/Department: [facility]  Dysphagia Daily Treatment Note    NAME: James Puckett  : 1945  MRN: [id number]    Patient Diagnosis(es):   Patient Active Problem List    Diagnosis Date Noted    Mucus plugging of bronchi 10/05/2022    ZANA (acute kidney injury) ([address] 2022    Acute on chronic respiratory failure with hypoxia and hypercapnia ([address] 2022    Pleural effusion on right 2022    Chronic heart failure with preserved ejection fraction (HFpEF) ([address] 2022    Hypoxia 2022    Atrial fibrillation with rapid ventricular response ([address] 2022    Exertional dyspnea 2022    Closed displaced fracture of lateral malleolus of left fibula 2021    Spinal stenosis of lumbar region 10/17/2018    Closed compression fracture of L1 lumbar vertebra 10/17/2018    Trigger ring finger of right hand 06/15/2017    Subacromial impingement 2015    Rotator cuff tear 2015    Glenohumeral arthritis 2015    DM type 2 (diabetes mellitus, type 2) ([address] 2013    ED (erectile dysfunction) 2012    OA (osteoarthritis) of knee 10/12/2011    Carotid stenosis, left 10/12/2011    Hearing loss 10/12/2011    HTN (hypertension) 10/07/2011    Hyperlipidemia 10/07/2011     Allergies: Allergies   Allergen Reactions    Torsemide Shortness Of Breath    Dye [Iodides]      1970's - ?? CXR (10/3/22)-  Impression       Stable appearance of loculated right pneumothorax, with loculated components in the lateral right midlung region and in the right lateral costophrenic sulcus. Stable scattered areas of atelectasis versus scar, most prominent in the medial right lung base and in the left lateral lung base. Previous MBS -  N/A    Chart reviewed.     Medical Diagnosis: Pleural effusion, right [J90]  Pleural effusion on right [J90]   Treatment Diagnosis: Oropharyngeal dysphagia    BSE Impression (10/2/22)-  RN states okay to attempt assessment. Dr Taurus Barney speaking with spouse stating pt most likely still with effects of sedation. Pt was intubated 9/22- 9/30/22. Trach placed 9/30, with pt on ventilator. Pt shook head yes/no intermittently to questions asked. Pt made no attempt to mouth words. Pt exhibiting open mouth posture, did not form labial seal or protrude /lateralize tongue on command. Oral care completed, pt demonstrating no oral response. Placed 2 single ice chips in oral cavity, again with no response - no lingual movement noted. Pt did close lips with tactile stim x 2. No swallow movement was felt upon palpation of anterior neck. O2 sats remained stable and RR remained in mid 20's. Recommend aggressive oral care 2-3 x/day with suction kit. Plan to re-assess as pt appropriate. Dysphagia Diagnosis: Suspected needs further assessment    FEES: planning to complete Friday or Saturday of this week, as schedule allows    Pain: None indicated     Current Diet : ADULT TUBE FEEDING; Nasogastric; Diabetic; Continuous; 10; Yes; 10; Q 4 hours; 45; 300; Q 4 hours; Protein; 2 bottles Proteinex daily w/ 30 mL FW flushes before and after  Diet NPO   Recommended Form of Meds: Via alternative means of nutrition      Treatment:  Pt seen bedside to address the following goals:   1. The patient will tolerate repeat bedside swallowing evaluation when able. 10/3 - Pt positioned upright and aggressive oral care was completed with suction toothbrush. Vitals reading had poor waveform and appeared to be unreliable readings; RN notified and in to assess. The patient demo'd spontaneous swallows without PO with audible air escaping around stoma. Pt tolerated 4/4 ice chips with constant verbal cues for appropriate acceptance and manipulation of ice (I.e.close mouth, chew before you swallow, etc). There was no coughing but not likely clinically relevant due to research indicating high prevalence of SILENT aspiration in cuff inflated tracheostomy patients. Pt not yet appropriate for instrumental swallow evaluation but will require out prior to advancing diet. Notes indicate plan for SBT today. Requested order for PMV which will hopefully be receive and coincide with improved alertness and weaning from ventilator. Continue goal.  10/4: Cleared by RN. No order for PMV yet. Received pt more awake/alert. Wife present. Pt remains on trach/vent. Oral care recently completed. Repositioned pt upright. Targeted swallow function via trials ice chips and small sips h2o; pt with positive oral acceptance, no anterior loss, appropriate and timely oral prep, seemingly positive swallow movement. Attempted to check clearance via oral suction; evidently dry with no material suctioned. As previously noted, no cough, however would be unlikely to occur given inflated cuff tracheostomy. Would recommend waiting on instrumental pending ability to place PMV (assuming order will be received in the near future), as that will likely support pt's swallow function, and show improved function. Cont goal  10/6: Pt positioned upright in bed. Thorough oral care and suctioning provided. Pt continues to be more alert/awake and active in his care/treatment. Family present. Pt completed 20x effortful swallows 5x swallows/ ice chips to reduce further swallow deconditioning. RR & 02 stable across low level ice chip trials. Audible air escape and secretions around trach appreciated. Per RN, Dr. Isela Cleveland aware. Discussed need for instrumental swallow evaluation with pt and family. FEES to be completed as schedule permits, preferably prior to PEG determination. Cont goal.   10/7: Pt seated upright in bed and SLP assisted with oral care via suction toothbrush. Pt with increased alertness and agreeable to accept trials of ice chips. X25 effortful swallows with ice chips completed this session.  Pt with intermittent air escape and secretions noted around trach, however per discussion with SLP who saw pt yesterday and per family, both looked less. RR and O2 remained stable across all trials. SLP re-discussed possibility of completing FEES and expressed will reach out to other SLP to see if it can be completed prior to PEG placement on Monday. Family and pt demonstrated understanding and agreement at this time. Cont. 10/12: Pt seated upright in bed. SLP provided oral care via suction toothbrush. Clean oral cavity noted. Pt with min brown secretions noted around trach prior to administering ice chips. Pt readily accepted x10 ice chips and demonstrated adequate mastication. Pt with facial grimace when attempting to swallow all ice chips. Pt with stable RR and O2 during ice chips administration. Audible secretions noted x2. Pt with continued lethargy and cues required to maintain alertness. Pin point pupils/reduced visual tracking, however followed basic 1-step commands adequately. SLP re-discussed FEES completion at end of this week (either Friday or Saturday) depending on pt's status to accept PO with increased alertness. ? Pt comprehension, however wife present and demonstrated understanding and agreement. Cont. 10/13: Pt sitting upright in bed, wife present. He is reportedly more alert today though does not always use head nod/facial expressions for answering yes/no questions. Make no attempts to point to items on communication board. Patient accepted oral care via suction toothbrush, oral cavity noted to be clean. Patient accepted ice chips x15 from spoon; Facial grimaces observed with swallow initiation. Increased secretions around stoma during ice chip trials. Stable RR and O2 during trials. Patient does appear appropriate to participate in FEES next date if presentation is similar to this session. Provided education to patient and wife, who demonstrate understanding. Continue goal.     2. The patient/caregiver will demonstrate understanding of compensatory strategies for improved swallowing safety.    10/3 - Educated on potential impact of trach on swallow function, rationale for oral care, recommendations for few single ice chips to be given to facilitate oral mucosal integrity and preserve swallow function that is present. Introduced plan for PMV in future, rationales for this. Educated on limitations in pts alertness at this time and need for improvement for completing swallow study, therapeutic effectiveness, etc. Continue goal.   10/4: Discussed swallow function with patient/wife. Also reviewed recommendation for PMV, hopefully in near future (order not yet received). Discussed how PMV may also improve swallow function be supporting necessary pressure for swallowing. Reviewed recommendation for small amounts ice/small sips h2o with effortful swallow to support swallow function/maintenance. Wife and pt indicated comprehension. Cont goal  10/5: Thorough education this date re: potential adverse affects of trach/vent on swallow function, need for instrumentation, need for PMV & importance of oral care. Wean trial failed this date per Dr. Charle Sicard. Audible air/secretion leakage around trach. PMV trials are likely not appropriate at this time and orders have not been provided. Reviewed continued recommendation for low level ice chips s/p oral care to support swallow function/ maintenance, improve pt QOL/ comfort and encourage oral care. 10/7: Discussed education regarding rationale for completing trials ice chips with effortful swallow, rec's for PMV as able, and overall importance of oral care, and possibility of completing further swallow assessment via FEES when able. Pt and family present demonstrated understanding. Cont. 10/12: As noted above with wife present. Discussed rationale for completing ice chips+effortful swallow trials to continue stimulating oropharyngeal swallow function while pt NPO. Pt requires re-education. Wife demonstrated understanding. Cont.   10/13: Patient provided education re: risk of aspiration at this time, need for FEES to assess safety/ efficiency of swallow function. Wife with good understanding, patient appears to have improved understanding compared to previous sessions. Continue goal.    3. Patient will participate in [id number] [employer] trials when appropriate/ orders received  10/6- Speech/ Communication Treatment: Pt with increased communicative frustration. PMV trials are likely not appropriate at this time and orders have not been provided yet. Family stating they wanted to get him an IPAD with speech lauren. Discussed low tech vs high tech AAC and appropriateness for AAC in different populations. Given trach/vent is suspected to not be long-term and pt continues to be in the acute phase of care, high tech AAC is not recommended at this time. Pt provided communication board. All icons reviewed. Pt was able to spell out \"tired\" on the letter board. Recommend continued use of communication board, letter board & non-verbal communication (I.e lip reading, pointing, facial expressions etc) to facilitate patient expression of basic wants/needs. Patient and family demonstrated understanding. 10/7: Pt communicated via head nod/shake and use of gestures to communicate wants/needs this session. 10/12: Goal met 10/10. Separate note for targeting goals associated. Patient/Family/Caregiver Education:  Please see goal 2 above    Compensatory Strategies:  Oral care using suction toothbrush/toothette / suction system 3x per day   Single ice chips given by staff when pt awake and in upright position after oral care  Communication board      Plan:  Continue dysphagia/communication treatment with goals per plan of care. Diet recommendations: Single ice chips given by staff when pt awake and in upright position, ONLY after oral care   DC recommendation: Ongoing speech therapy is indicated   Treatment: 20  D/W nursing: Kristen Ramp  Needs met prior to leaving room, call button in reach; Family at bedside.      Electronically signed by:  Lily Mota, [id number] [place] [address], .[id number]  Speech-Language Pathologist  Pg #: [phone]    If patient is discharged prior to next treatment, this note will serve as the discharge summary

## 2022-10-13 NOTE — PROGRESS NOTES
Follow up on bilateral lower extremity wounds. No change in treatment at this time.      L Lower Leg:      R Lower Leg:

## 2022-10-13 NOTE — PROGRESS NOTES
Nephrology Progress Note                                                                                                                                                                                                                                                                                                                                                               Office : [phone]     Fax :[phone]    Patient's Name: Betzy Hardwick    10/12/2022    Reason for Consult:   ZANA  Requesting Physician:  Radha Muse MD    Interval History:      Cr stable   Na stable   Good UO   Remains on Trach   PEG placed         Past Medical History:   Diagnosis Date    ZANA (acute kidney injury) ([facility] [address] 9/26/2022    Carotid stenosis, left 10/12/2011    Chronic back pain     Chronic systolic (congestive) heart failure 55/99/3126    Diastolic CHF ([facility] [address]     Erectile dysfunction     Hyperlipidemia     Hypertension     Osteoarthritis     Restrictive lung disease     Type II or unspecified type diabetes mellitus without mention of complication, not stated as uncontrolled        Past Surgical History:   Procedure Laterality Date    CARDIAC CATHETERIZATION  06/2022    GASTROSTOMY TUBE PLACEMENT N/A 10/11/2022    EGD PEG TUBE PLACEMENT performed by Lazarus Churches, MD at [facility] Left     PLEURAL CATH INSERTION N/A 9/19/2022    PLEURAL CATHETER PLACEMENT; INTERCOSTAL NERVE BLOCK X4 performed by Matthew Haley MD at [facility] Bilateral     THORACOSCOPY Right 9/19/2022    RIGHT VIDEO ASSISTED THORASCOPIC SURGERY AND; performed by Matthew Haley MD at [address] N/A 9/30/2022    TRACHEOSTOMY performed by Nedra Tong MD at [address] [place] [address] History   Problem Relation Age of Onset    Hearing Loss Father     Heart Disease Father 70        MI    High Blood Pressure Father     Diabetes Maternal Grandmother         hypoglycemic    Hearing Loss Maternal Grandmother     Arthritis Maternal Grandfather         reports that he quit smoking about 20 years ago. His smoking use included cigarettes. He has a 80.00 pack-year smoking history. He has never used smokeless tobacco. He reports current alcohol use. He reports that he does not use drugs. Allergies:   Torsemide and Dye [iodides]    Current Medications:    oxyCODONE (ROXICODONE) 5 MG/5ML solution 7.5 mg, Q6H PRN  hydroxypropyl methylcellulose (GONIOSOL) 2.5 % ophthalmic solution 1 drop, PRN  [Held by provider] HYDROmorphone (DILAUDID) injection 0.5 mg, Q3H PRN   Or  [Held by provider] HYDROmorphone (DILAUDID) injection 1 mg, Q3H PRN  [Held by provider] QUEtiapine (SEROQUEL) tablet 25 mg, BID  insulin lispro (1 Unit Dial) (HUMALOG/ADMELOG) pen 5 Units, q8h  insulin glargine (LANTUS;BASAGLAR) injection pen 28 Units, Nightly  albuterol (PROVENTIL) nebulizer solution 2.5 mg, Q4H  pantoprazole (PROTONIX) injection 40 mg, BID  insulin lispro (1 Unit Dial) (HUMALOG/ADMELOG) pen 0-16 Units, Q4H  Venelex ointment, BID  amiodarone (CORDARONE) 150 mg in dextrose 5 % 100 mL bolus, Once  amiodarone (CORDARONE) tablet 200 mg, Daily  acetaminophen (TYLENOL) 160 MG/5ML solution 650 mg, Q6H PRN  chlorhexidine (PERIDEX) 0.12 % solution 15 mL, BID  apixaban (ELIQUIS) tablet 5 mg, BID  sodium chloride flush 0.9 % injection 5-40 mL, 2 times per day  sodium chloride flush 0.9 % injection 5-40 mL, PRN  0.9 % sodium chloride infusion, PRN  polyethylene glycol (GLYCOLAX) packet 17 g, Daily  ondansetron (ZOFRAN-ODT) disintegrating tablet 4 mg, Q8H PRN   Or  ondansetron (ZOFRAN) injection 4 mg, Q6H PRN  glucose chewable tablet 16 g, PRN  dextrose bolus 10% 125 mL, PRN   Or  dextrose bolus 10% 250 mL, PRN  glucagon (rDNA) injection 1 mg, PRN  dextrose 10 % infusion, Continuous PRN  hydrALAZINE (APRESOLINE) injection 5 mg, Q15 Min PRN      Review of Systems:   14 point ROS obtained but were negative except mentioned in HPI      Physical exam: Vitals:  BP (!) 132/52   Pulse 86   Temp 97.3 °F (36.3 °C) (Temporal)   Resp 18   Ht 6' 0.99\" (1.854 m)   Wt 210 lb 15.7 oz (95.7 kg)   SpO2 95%   BMI 27.84 kg/m²   Constitutional:  on MV  Skin: no rash, turgor wnl  Heent:  eomi, mmm  Neck: no bruits or jvd noted  Cardiovascular:  S1, S2 without m/r/g  Respiratory: trach  Abdomen:  +bs, soft, nt, nd  Ext: bilateral lower extremity edema R>L  Psychiatric: mood and affect appropriate  Musculoskeletal:  Rom, muscular strength intact    Data:   Labs:  CBC:   Recent Labs     10/10/22  0547 10/10/22  0635 10/10/22  1800 10/11/22  0457 10/12/22  0508   WBC 6.9  --   --  7.3 5.4   HGB 6.8*   < > 8.2* 8.1* 7.2*     --   --  261 257    < > = values in this interval not displayed. BMP:    Recent Labs     10/10/22  0547 10/11/22  0457 10/12/22  0508    135* 134*   K 3.9 4.3 4.4   CL 97* 97* 96*   CO2 33* 29 26   * 98* 101*   CREATININE 1.1 1.0 1.2   GLUCOSE 68* 154* 272*       Ca/Mg/Phos:   Recent Labs     10/10/22  0547 10/11/22  0457 10/12/22  0508   CALCIUM 7.6* 7.9* 7.5*   MG 3.10* 3.20* 3.20*   PHOS 2.5 3.2 4.5       Hepatic: No results for input(s): AST, ALT, ALB, BILITOT, ALKPHOS in the last 72 hours. Troponin: No results for input(s): TROPONINI in the last 72 hours. BNP: No results for input(s): BNP in the last 72 hours. Lipids:   Recent Labs     10/12/22  1054   TRIG 67         ABGs:   No results for input(s): PHART, PO2ART, WTT2LWM in the last 72 hours. INR: No results for input(s): INR in the last 72 hours. UA:No results for input(s): Ileene Sham, GLUCOSEU, BILIRUBINUR, Bessie Rump, BLOODU, PHUR, PROTEINU, UROBILINOGEN, NITRU, LEUKOCYTESUR, LABMICR, URINETYPE in the last 72 hours. Urine Microscopic: No results for input(s): LABCAST, BACTERIA, COMU, HYALCAST, WBCUA, RBCUA, EPIU in the last 72 hours. Urine Culture: No results for input(s): LABURIN in the last 72 hours.   Urine Chemistry:   No results for input(s): Edmar Pérez, PROTEINUR, NAUR in the last 72 hours. IMAGING:  CT ABDOMEN PELVIS WO CONTRAST Additional Contrast? None   Final Result      Marked decrease in size of pneumomediastinum. Trace residual pneumoperitoneum. Extensive bilateral lower lobe pulmonary atelectasis with pleural effusions and trace residual right pneumothorax. XR CHEST PORTABLE   Final Result      Tiny right lateral pneumothorax is suspected, 5 mm in maximum thickness. This has decreased in size since 10/3/2022. Right basilar Pleurx catheter noted. Small bilateral pleural effusions. Prominent interstitial markings. Stable cardiac mediastinal silhouette. Lines and tubes without change. Subcutaneous emphysema noted. CT CHEST ABDOMEN PELVIS WO CONTRAST   Final Result      1. Severe pneumomediastinum. 2. Small to moderate right hydropneumothorax with similar fluid and gas components with a right-sided Pleurx catheter. 3. Moderate loculated left pleural effusion with atelectasis of the left lower lobe with patency of the central left lower lobe bronchi. 4. Fluid/material filling the bronchus intermedius and right lower lobe bronchi with complete consolidation and volume loss of the right lower lobe. Differential diagnosis would include mucous plugging or obstructing lesion. 5. Tracheostomy tube tip within the trachea   6. Severe subcutaneous emphysema in the neck. CT ABDOMEN AND PELVIS:      FINDINGS:      LIVER: Normal.      GALLBLADDER AND BILIARY TREE: No calcified gallstones. No gallbladder distention. No intra- or extrahepatic biliary dilatation. PANCREAS: Normal.      SPLEEN: Normal.      ADRENAL GLANDS: Normal.      KIDNEYS AND URETERS: Normal.      URINARY BLADDER: Ansari catheter present within collapsed urinary bladder. REPRODUCTIVE ORGANS: No associated masses. BOWEL: Normal diameter, nonobstructed. Feeding tube tip at the level of the ligament of Treitz. LYMPH NODES: No abnormally enlarged nodes. PERITONEUM/RETROPERITONEUM: Severe pneumoperitoneum extends into the upper abdomen with some of the symmetric multiseptated gas appearing deep to the diaphragm consistent with mild pneumoperitoneum (series 601 was 101). This is likely related to    pneumonia mediastinum dissecting into the abdomen. No fluid collection in the abdomen or pelvis. No ascites. VESSELS: Extensive atherosclerotic calcification of the aorta and iliac arteries. ABDOMINAL WALL: No acute abnormality. BONES: No acute abnormality. Mild chronic L1 compression fracture with previous vertebroplasty. IMPRESSION:      1. Severe pneumomediastinum extends into the upper abdomen with small pneumoperitoneum likely related to extension of pneumoperitoneum into the upper peritoneal cavity. 2. No fluid collection in the abdomen or pelvis. Results were discussed with the surgical team at 7:00 PM on 10/3/2022. XR CHEST PORTABLE   Final Result      Stable appearance of loculated right pneumothorax, with loculated components in the lateral right midlung region and in the right lateral costophrenic sulcus. Stable scattered areas of atelectasis versus scar, most prominent in the medial right lung base and in the left lateral lung base. XR CHEST PORTABLE   Final Result      Small right basilar pneumothorax. Right basilar chest tube without change. Patchy consolidation in the right mid and lower lung as well as the left lower lung-atelectasis versus pneumonia. Trace left pleural effusion. Normal heart size. Lines and tubes without change. Mild improvement in degree of subcutaneous emphysema in the chest and neck. XR CHEST 1 VIEW   Final Result      1. Stable small right-sided pneumothorax and prominent subcutaneous emphysema along the neck and upper superior chest wall, greater in right lung stable   2.  Stable left basilar consolidation and pleural effusion      3. Stable appearing perihilar accentuated markings or airspace disease            XR CHEST PORTABLE   Final Result      Stable small right-sided pneumothorax. More prominent emphysema over the lower neck. No change in bilateral airspace disease. Stable left pleural effusion. XR CHEST PORTABLE   Final Result   1. Bilateral multifocal pneumonia with improvement in the right    pulmonary consolidation since prior study from 9/23/22   2. Mild to moderate left pleural effusion          XR CHEST PORTABLE   Final Result   1. Support lines and tubes are stable. 2.  Stable small right lateral pneumothorax and right basilar    chest tube. 3.  Stable patchy bilateral airspace disease. XR CHEST PORTABLE   Final Result   1. Satisfactory position of right internal jugular central line   2. No interval change in endotracheal tube and nasogastric tube positioning. 3. Extensive bilateral airspace disease with no changes. 4. Left pleural effusion with retrocardiac opacity, unchanged   5. Small stable tiny right lateral pneumothorax and stable position of right chest tube,, Pleurx catheter. XR ABDOMEN (KUB) (SINGLE AP VIEW)   Final Result   1. No residual pneumothorax. 2.  Mild patchy airspace disease bilaterally worse on the right than on prior examination. 3.  Non-obstructive bowel gas pattern. Mildly distended stomach. XR CHEST PORTABLE   Final Result   1. No residual pneumothorax. 2.  Mild patchy airspace disease bilaterally worse on the right than on prior examination. 3.  Non-obstructive bowel gas pattern. Mildly distended stomach. XR CHEST PORTABLE   Final Result      1. Small right pneumothorax appears slightly increased. 2. Mild patchy airspace opacity bilaterally. XR CHEST PORTABLE   Final Result     Pleurx catheter at the right lung base. Trace right    pneumothorax is unchanged.           XR CHEST PORTABLE Final Result      Right-sided chest tube evident in the base, its tip medially. Improvement in the right-sided pneumothorax, since earlier today. Possible medial collapse of the right lower lobe. Small left effusion. Patchy airspace density/atelectasis in the lungs bilaterally, with no other significant change. XR CHEST PORTABLE   Final Result   1. Right-sided Pleurx catheter in satisfactory position in the lung bases   2. Right-sided post operative pneumothorax, approximately 10%             Assessment/Plan   ZANA  - suspect this is contrast nephropathy  - baseline Cre 0.7. Normal on admission.  - Cr better   - Hold diuretics   - BNP 3k, Protein:Cre 0.3, FENa 0.4%  - closely monitor UOP  - avoid nephrotoxic agent     2. Hypernatremia  - continue free water   - will continue to monitor     3. Hypotension  - pressors as needed     4. Anemia  - daily CBC    5. Acid- base/ Electrolyte imbalance   - optimize lytes    6. Acute hypoxic resp failure  - s/p VATS on 9/19/22 for recurrent pleural effusions  - Intensivist and CTS following    7.  CHF   - EF 65% on 6/16/22 via cardiac cath        Robert Carroll MD

## 2022-10-13 NOTE — PROGRESS NOTES
Patient's trach was slightly protruding out of neck. Ties found to be loose. MD pushed trach in and ties tightened slightly. Pt tolerated well.

## 2022-10-13 NOTE — CARE COORDINATION
Case management is following for discharge planning. The chart was reviewed. [facility] has submitted Mr. Pia Lan case to [facility] . There should be a decision this week.     Electronically signed by DARNELL Vargas RN-[facility]  Case Management  [phone]

## 2022-10-13 NOTE — PROGRESS NOTES
Pt was noted to have spontaneous bleeding out of trach site. Bleeding was enough to saturate dressing around site.  Surgery notified and at bedside

## 2022-10-13 NOTE — PROGRESS NOTES
Palliative Care Chart Review  and Check in Note:     NAME:  Cristóbal Cristobal  Admit Date: 9/19/2022  Hospital Day:  Hospital Day: 25   Current Code status: Full Code    Palliative care is continuing to following Mr. Charlie Matson for symptom management, and goals of care discussion as needed. Patient's chart reviewed today 10/13/22. Saw Jl Salguero at the bedside with his wife, Brittnee Miller, and the ICU team. He remains lethargic. He does open eyes and acknowledge to voice. He denies pain. Discussed with Onur Piedra RN. If pt endorses pain, PRN tylenol will be given before PRN roxicodone. Onur Piedra plans to get pt OOB with lift. PT/OT reordered. Because pt will be admitted for an extended period of time, he will need as much inpatient rehabilitation as possible. The following are the currently established goals/code status, and Symptom management. Goals of care: Continue current medical management. Continue rehab and therapy as able. Improve mental status and lethargy.     Code status: Full    Discharge plan: TBD - pending placement      NATASHA Goode NP  10/13/22  9:37 AM

## 2022-10-13 NOTE — PROGRESS NOTES
ICU Progress Note    Admit Date: 9/19/2022  Day: 24  Vent Day: None  IV Access:Peripheral  IV Fluids:None  Vasopressors:None                Antibiotics: None  Diet: Diet NPO  ADULT TUBE FEEDING; PEG; Standard with Fiber; Continuous; 30; Yes; 10; Q 4 hours; 50; 30; Q 4 hours; Protein; 2 bottles Proteinex daily w/ 30 mL FW flushes before and after    CC: Pleural Effusion (right) s/p VATS and Pleural Catheter Placement 09/19    Interval history:     Cont on PEG. Patient less interactive yesterday so changed scheduled opioids to PRN and reduced other sedating meds. Doing much better today mental status wise. Yest, RT checked NIF and VC which were 0 and 52 mL. Sent for Neuromuscular weakness work up with Ach antibodies. Per Surg, trach was slightly loose and was repositioned slightly. CXR ordered to confrim placement, but patient with no changes in resp status.         Medications:     Scheduled Meds:   [Held by provider] QUEtiapine  25 mg Oral BID    insulin lispro  5 Units SubCUTAneous q8h    insulin glargine  28 Units SubCUTAneous Nightly    albuterol  2.5 mg Nebulization Q4H    pantoprazole  40 mg IntraVENous BID    insulin lispro  0-16 Units SubCUTAneous Q4H    Venelex   Topical BID    amiodarone bolus  150 mg IntraVENous Once    amiodarone  200 mg Oral Daily    chlorhexidine  15 mL Mouth/Throat BID    apixaban  5 mg Oral BID    sodium chloride flush  5-40 mL IntraVENous 2 times per day    polyethylene glycol  17 g Oral Daily     Continuous Infusions:   sodium chloride      dextrose Stopped (10/10/22 3688)     PRN Meds:oxyCODONE, hydroxypropyl methylcellulose, [Held by provider] HYDROmorphone **OR** [Held by provider] HYDROmorphone, acetaminophen, sodium chloride flush, sodium chloride, ondansetron **OR** ondansetron, glucose, dextrose bolus **OR** dextrose bolus, glucagon (rDNA), dextrose, hydrALAZINE    Objective:   Vitals:   T-max:  Patient Vitals for the past 8 hrs:   BP Temp Temp src Pulse Resp SpO2 Weight 10/13/22 0632 -- -- -- 78 -- 98 % --   10/13/22 0630 (!) 99/51 -- -- 77 19 98 % --   10/13/22 0600 (!) 101/48 -- -- 78 20 98 % 180 lb 8.9 oz (81.9 kg)   10/13/22 0530 113/61 -- -- 87 25 97 % --   10/13/22 0500 121/66 -- -- 93 20 98 % --   10/13/22 0430 (!) 129/59 -- -- 91 18 98 % --   10/13/22 0426 -- -- -- 93 20 98 % --   10/13/22 0400 132/63 98.7 °F (37.1 °C) Temporal 92 15 97 % --   10/13/22 0330 (!) 121/53 -- -- 79 22 98 % --   10/13/22 0300 (!) 141/83 -- -- 95 26 97 % --   10/13/22 0230 128/62 -- -- 83 17 95 % --   10/13/22 0200 118/68 -- -- 82 -- 94 % --   10/13/22 0130 (!) 111/51 -- -- 77 -- 93 % --   10/13/22 0100 (!) 111/53 -- -- 84 (!) 5 93 % --   10/13/22 0030 (!) 97/46 -- -- 80 21 92 % --   10/13/22 0022 -- -- -- 98 22 94 % --   10/13/22 0000 136/72 98.2 °F (36.8 °C) Temporal (!) 102 27 95 % --   10/12/22 2330 (!) 124/111 -- -- (!) 101 22 95 % --         Intake/Output Summary (Last 24 hours) at 10/13/2022 9705  Last data filed at 10/13/2022 8279  Gross per 24 hour   Intake 1847 ml   Output 860 ml   Net 987 ml             Physical Exam  Constitutional:       Comments: Much more interactive today. Follows commands, responds to questions   HENT:      Head: Normocephalic and atraumatic. Cardiovascular:      Rate and Rhythm: Normal rate. Rhythm irregular. Comments: Atrial fibrillation  Pulmonary:      Breath sounds: No wheezing, rhonchi or rales. Abdominal:      General: There is no distension. Palpations: Abdomen is soft. Tenderness: There is no abdominal tenderness. There is no guarding. Musculoskeletal:      Right lower leg: Edema present. Left lower leg: Edema present.       Comments: Bilateral lower extremity pitting edema persists  2+ PE at the bilateral feet   Skin:     Comments: New red linear rash on medial left foot   Neurological:      Comments: Much improved mental status  Responsive to commands and questions         LABS:    CBC:   Recent Labs     10/11/22  0457 10/12/22  0508 10/13/22  0420   WBC 7.3 5.4 6.9   HGB 8.1* 7.2* 7.5*   HCT 25.3* 22.6* 23.0*    257 287   MCV 87.2 88.0 87.7       Renal:    Recent Labs     10/11/22  0457 10/12/22  0508 10/13/22  0420   * 134* 139   K 4.3 4.4 4.3   CL 97* 96* 98*   CO2 29 26 31   BUN 98* 101* 107*   CREATININE 1.0 1.2 1.3   GLUCOSE 154* 272* 180*   CALCIUM 7.9* 7.5* 7.7*   MG 3.20* 3.20* 3.60*   PHOS 3.2 4.5 4.2   ANIONGAP 9 12 10       Hepatic:   Recent Labs     10/11/22  0457 10/12/22  0508 10/13/22  0420   LABALBU 1.8* 1.7* 2.0*       Troponin: No results for input(s): TROPONINI in the last 72 hours. BNP: No results for input(s): BNP in the last 72 hours. Lipids: No results for input(s): CHOL, HDL in the last 72 hours. Invalid input(s): LDLCALCU, TRIGLYCERIDE  ABGs:  No results for input(s): PHART, [id number], PO2ART, LLP4QGI, BEART, THGBART, [id number], GVC1OAG in the last 72 hours. INR: No results for input(s): INR in the last 72 hours. Lactate: No results for input(s): LACTATE in the last 72 hours. Cultures:  -----------------------------------------------------------------  RAD:   CT ABDOMEN PELVIS WO CONTRAST Additional Contrast? None   Final Result      Marked decrease in size of pneumomediastinum. Trace residual pneumoperitoneum. Extensive bilateral lower lobe pulmonary atelectasis with pleural effusions and trace residual right pneumothorax. XR CHEST PORTABLE   Final Result      Tiny right lateral pneumothorax is suspected, 5 mm in maximum thickness. This has decreased in size since 10/3/2022. Right basilar Pleurx catheter noted. Small bilateral pleural effusions. Prominent interstitial markings. Stable cardiac mediastinal silhouette. Lines and tubes without change. Subcutaneous emphysema noted. CT CHEST ABDOMEN PELVIS WO CONTRAST   Final Result      1. Severe pneumomediastinum.    2. Small to moderate right hydropneumothorax with similar fluid and gas components with a right-sided Pleurx catheter. 3. Moderate loculated left pleural effusion with atelectasis of the left lower lobe with patency of the central left lower lobe bronchi. 4. Fluid/material filling the bronchus intermedius and right lower lobe bronchi with complete consolidation and volume loss of the right lower lobe. Differential diagnosis would include mucous plugging or obstructing lesion. 5. Tracheostomy tube tip within the trachea   6. Severe subcutaneous emphysema in the neck. CT ABDOMEN AND PELVIS:      FINDINGS:      LIVER: Normal.      GALLBLADDER AND BILIARY TREE: No calcified gallstones. No gallbladder distention. No intra- or extrahepatic biliary dilatation. PANCREAS: Normal.      SPLEEN: Normal.      ADRENAL GLANDS: Normal.      KIDNEYS AND URETERS: Normal.      URINARY BLADDER: Ansari catheter present within collapsed urinary bladder. REPRODUCTIVE ORGANS: No associated masses. BOWEL: Normal diameter, nonobstructed. Feeding tube tip at the level of the ligament of Treitz. LYMPH NODES: No abnormally enlarged nodes. PERITONEUM/RETROPERITONEUM: Severe pneumoperitoneum extends into the upper abdomen with some of the symmetric multiseptated gas appearing deep to the diaphragm consistent with mild pneumoperitoneum (series 601 was 101). This is likely related to    pneumonia mediastinum dissecting into the abdomen. No fluid collection in the abdomen or pelvis. No ascites. VESSELS: Extensive atherosclerotic calcification of the aorta and iliac arteries. ABDOMINAL WALL: No acute abnormality. BONES: No acute abnormality. Mild chronic L1 compression fracture with previous vertebroplasty. IMPRESSION:      1. Severe pneumomediastinum extends into the upper abdomen with small pneumoperitoneum likely related to extension of pneumoperitoneum into the upper peritoneal cavity. 2. No fluid collection in the abdomen or pelvis.       Results were discussed with the surgical team at 7:00 PM on 10/3/2022. XR CHEST PORTABLE   Final Result      Stable appearance of loculated right pneumothorax, with loculated components in the lateral right midlung region and in the right lateral costophrenic sulcus. Stable scattered areas of atelectasis versus scar, most prominent in the medial right lung base and in the left lateral lung base. XR CHEST PORTABLE   Final Result      Small right basilar pneumothorax. Right basilar chest tube without change. Patchy consolidation in the right mid and lower lung as well as the left lower lung-atelectasis versus pneumonia. Trace left pleural effusion. Normal heart size. Lines and tubes without change. Mild improvement in degree of subcutaneous emphysema in the chest and neck. XR CHEST 1 VIEW   Final Result      1. Stable small right-sided pneumothorax and prominent subcutaneous emphysema along the neck and upper superior chest wall, greater in right lung stable   2. Stable left basilar consolidation and pleural effusion      3. Stable appearing perihilar accentuated markings or airspace disease            XR CHEST PORTABLE   Final Result      Stable small right-sided pneumothorax. More prominent emphysema over the lower neck. No change in bilateral airspace disease. Stable left pleural effusion. XR CHEST PORTABLE   Final Result   1. Bilateral multifocal pneumonia with improvement in the right    pulmonary consolidation since prior study from 9/23/22   2. Mild to moderate left pleural effusion          XR CHEST PORTABLE   Final Result   1. Support lines and tubes are stable. 2.  Stable small right lateral pneumothorax and right basilar    chest tube. 3.  Stable patchy bilateral airspace disease. XR CHEST PORTABLE   Final Result   1. Satisfactory position of right internal jugular central line   2.  No interval change in endotracheal tube and nasogastric tube positioning. 3. Extensive bilateral airspace disease with no changes. 4. Left pleural effusion with retrocardiac opacity, unchanged   5. Small stable tiny right lateral pneumothorax and stable position of right chest tube,, Pleurx catheter. XR ABDOMEN (KUB) (SINGLE AP VIEW)   Final Result   1. No residual pneumothorax. 2.  Mild patchy airspace disease bilaterally worse on the right than on prior examination. 3.  Non-obstructive bowel gas pattern. Mildly distended stomach. XR CHEST PORTABLE   Final Result   1. No residual pneumothorax. 2.  Mild patchy airspace disease bilaterally worse on the right than on prior examination. 3.  Non-obstructive bowel gas pattern. Mildly distended stomach. XR CHEST PORTABLE   Final Result      1. Small right pneumothorax appears slightly increased. 2. Mild patchy airspace opacity bilaterally. XR CHEST PORTABLE   Final Result     Pleurx catheter at the right lung base. Trace right    pneumothorax is unchanged. XR CHEST PORTABLE   Final Result      Right-sided chest tube evident in the base, its tip medially. Improvement in the right-sided pneumothorax, since earlier today. Possible medial collapse of the right lower lobe. Small left effusion. Patchy airspace density/atelectasis in the lungs bilaterally, with no other significant change. XR CHEST PORTABLE   Final Result   1. Right-sided Pleurx catheter in satisfactory position in the lung bases   2. Right-sided post operative pneumothorax, approximately 10%               Assessment/Plan:   Betzy Hardwick is a 65 y.o. male w/PMH HFpEF, carotid artery stenosis, HTN, OA, Restrictive Lung Disease, DM2, HLD who underwent VATS with PleurX catheter placement on 09/19 for recurrent b/l loculated pleural effusions w/subsequent BIPAP failure and hypoxic/hypercapnic respiratory failure requiring Mechanical intubation.      Pulmonary:  #Acute Hypoxic/Hypercapnic Respiratory Failure  W/Recurrent Pleural Effusions s/p VATS and PleurX for pleural fluid management 09/19/22, w/associated unintentional weight loss of 30 Ibs, PFTs w/restrictive defect.   -failed to tolerate BIPAP post-op --> Mechanically intubated  Intubation Status:   -trach   -current vent settings: RR 14, , FiO2 40%, PEEP 5  - PleurX catheter in place  - Trach in place  - CTS following   -10/13: Pluerx cath drained 180 ml out.   - VSV trial wean  - await Ach antibodies for neuromuscular weakness workup    ID:    #Leukocytosis, resolved  -afebrile, WBC 6.9  -Bronch respiratory cx: no WBC or organism  - s/p cefepime 7 days    Cardiovascular: #HFpEF  -w/fluid overload  -w/downtrending BNP  -fluid status:  0.86 L output today, +1L on day   -nephro on board: holding off on diuretics due to renal function, continue to appreciate recs    #Hypotension, resolved  -norepinephrine previously which remains off  -monitor BP    Chronic:  #Paroxysmal Atrial Fibrillation   -Eliquis held, amiodarone  #HLD  -pravastatin 40mg    Renal:  Ansari in place w/good urine output  -continue to monitor    #ZANA  -2/2 ATN w/likely etiology of hypoperfusion due to hypovolemia in addition to  -possible contrast-associated nephropathy  -holding off on diuretics per nephro  -UOP slightly decreased today  -creatinine 1.3 <- 1.2 today    #Hypernatremia, resolved  -receiving free water flushes of 30 ml Q4H since 10/3/22  W/improvement of Na     GI:  #Pneumoperitoneum   -Found on CT 10/4   Repeat CT 10/10  Marked decrease in size of pneumomediastinum. Trace residual pneumoperitoneum.      #Nutrition  - PEG in place, started feeds    Metabolic    #DM2  - glu low 200s after restarting feeds  -HDSS  -Lantus 34U today  -5 Lispro  -POCT  -hypoglycemia protocol  -A1c: 8.1 (1/24/22)     Heme    #Anemia  - s/p 2nd U pRBC transfusion 10/11.   - HnH 7.5  - continue to monitor H&H    Neuro:  -analgesia: Tylenol, on prn oxycodone 7.5 mg    Code Status: FULL CODE  FEN: Diet NPO  ADULT TUBE FEEDING; PEG; Standard with Fiber; Continuous; 30; Yes; 10; Q 4 hours; 50; 30; Q 4 hours; Protein; 2 bottles Proteinex daily w/ 30 mL FW flushes before and after  PPX: Pepcid  DISPO: ICU    Cameron Vernon MD, PGY-1  10/13/22  7:28 AM    Patient seen, examined and discussed with the resident and I agree with the assessment and plan. Vent Mode: AC/PRVC Resp Rate (Set): 14 bmp/Vt (Set, mL): 375 mL/ /FiO2 : 40 %  PEEP 5  No results for input(s): PHART, WEF2NMY, PO2ART in the last 72 hours. Patient a bit more alert this morning after stopping scheduled narcotics  Still with very shallow breaths and peak pressures in the mid 30s on VTs that are 4.7 ml/kg. I tried him on PSV 25/5 and he became more tachypneic and his volumes decreased into the mid 250s. I have an acetylcholine antibody study but I would be extremely surprised if it came back positive. If this was a neuromuscular weakness then his lung compliance would be high and instead his compliance is extremely low. What he originally presented for was decortication and pleur-x placement. Unfortunately, despite removal of fluid his RLL remains atelectatic and his left lower lobe is as well. Even if we resected his lower lobes I wouldn't expect his airway pressures to be high on 4.7 mL/kg VTs. We've diuresed him as far as his kidneys would allow and his BUN remains >100 as a result of that and a burst of steroids. I fear patient may be permanently vent dependent as he's made little progress since his original surgery. However, it's unclear how much malnutrition and deconditioning has contributed to his failure. It will likely take months of feeding and rehab to know how much improvement he can make. Will try to keep sedation at a minimum and get him to a chair and working with PT/OT.   [facility] but his health insurance is not making a decision about placement and won't until the 21st of this month. Critical care time spent reviewing labs/films, examining patient, collaborating with other physicians but excluding procedures for life threatening organ failure is 39 minutes.       Alen Angelucci, MD

## 2022-10-13 NOTE — PROGRESS NOTES
Point of care note:     Nurses informed me of tracheostomy tube sticking out (picture below). Repositioned tube back into trachea. A small blood clot (<1.5 cm in diameter) and some bloody secretions came out from tracheostomy site. Tightened trach collar ties, leaving enough room to fit one finger. Pt tolerated. SpO2 was 96% throughout. EtCO2 was approximately 25 throughout. Respiratory partners able to pass suction tube. F/U XR read as no change in position since 10/5 reading.            Roberto Carlos Serna DO   PGY1, General Surgery  10/13/22  10:50 AM  Pager # [phone]

## 2022-10-13 NOTE — PLAN OF CARE
Problem: Chronic Conditions and Co-morbidities  Goal: Patient's chronic conditions and co-morbidity symptoms are monitored and maintained or improved  10/12/2022 2353 by Valentin Hanyes RN  Outcome: Progressing  Flowsheets (Taken 10/12/2022 2000)  Care Plan - Patient's Chronic Conditions and Co-Morbidity Symptoms are Monitored and Maintained or Improved:   Monitor and assess patient's chronic conditions and comorbid symptoms for stability, deterioration, or improvement   Collaborate with multidisciplinary team to address chronic and comorbid conditions and prevent exacerbation or deterioration   Update acute care plan with appropriate goals if chronic or comorbid symptoms are exacerbated and prevent overall improvement and discharge     Problem: Discharge Planning  Goal: Discharge to home or other facility with appropriate resources  10/12/2022 2353 by Valentin Haynes RN  Outcome: Progressing  Flowsheets (Taken 10/12/2022 2000)  Discharge to home or other facility with appropriate resources:   Identify barriers to discharge with patient and caregiver   Arrange for needed discharge resources and transportation as appropriate   Identify discharge learning needs (meds, wound care, etc)   Refer to discharge planning if patient needs post-hospital services based on physician order or complex needs related to functional status, cognitive ability or social support system     Problem: Pain  Goal: Verbalizes/displays adequate comfort level or baseline comfort level  10/12/2022 2353 by Valentin Haynes RN  Outcome: Progressing  Flowsheets (Taken 10/12/2022 2030)  Verbalizes/displays adequate comfort level or baseline comfort level:   Encourage patient to monitor pain and request assistance   Assess pain using appropriate pain scale   Administer analgesics based on type and severity of pain and evaluate response   Implement non-pharmacological measures as appropriate and evaluate response   Consider cultural and social influences on pain and pain management   Notify Licensed Independent Practitioner if interventions unsuccessful or patient reports new pain

## 2022-10-13 NOTE — PROGRESS NOTES
Grandmother     Arthritis Maternal Grandfather         reports that he quit smoking about 20 years ago. His smoking use included cigarettes. He has a 80.00 pack-year smoking history. He has never used smokeless tobacco. He reports current alcohol use. He reports that he does not use drugs. Allergies:   Torsemide and Dye [iodides]    Current Medications:    insulin glargine (LANTUS;BASAGLAR) injection pen 34 Units, Nightly  lansoprazole suspension SUSP 30 mg, BID  oxyCODONE (ROXICODONE) 5 MG/5ML solution 7.5 mg, Q6H PRN  hydroxypropyl methylcellulose (GONIOSOL) 2.5 % ophthalmic solution 1 drop, PRN  [Held by provider] QUEtiapine (SEROQUEL) tablet 25 mg, BID  insulin lispro (1 Unit Dial) (HUMALOG/ADMELOG) pen 5 Units, q8h  albuterol (PROVENTIL) nebulizer solution 2.5 mg, Q4H  insulin lispro (1 Unit Dial) (HUMALOG/ADMELOG) pen 0-16 Units, Q4H  Venelex ointment, BID  amiodarone (CORDARONE) 150 mg in dextrose 5 % 100 mL bolus, Once  amiodarone (CORDARONE) tablet 200 mg, Daily  acetaminophen (TYLENOL) 160 MG/5ML solution 650 mg, Q6H PRN  chlorhexidine (PERIDEX) 0.12 % solution 15 mL, BID  apixaban (ELIQUIS) tablet 5 mg, BID  sodium chloride flush 0.9 % injection 5-40 mL, 2 times per day  sodium chloride flush 0.9 % injection 5-40 mL, PRN  0.9 % sodium chloride infusion, PRN  polyethylene glycol (GLYCOLAX) packet 17 g, Daily  ondansetron (ZOFRAN-ODT) disintegrating tablet 4 mg, Q8H PRN   Or  ondansetron (ZOFRAN) injection 4 mg, Q6H PRN  glucose chewable tablet 16 g, PRN  dextrose bolus 10% 125 mL, PRN   Or  dextrose bolus 10% 250 mL, PRN  glucagon (rDNA) injection 1 mg, PRN  dextrose 10 % infusion, Continuous PRN  hydrALAZINE (APRESOLINE) injection 5 mg, Q15 Min PRN          Physical exam:     Vitals:  BP (!) 101/41   Pulse 72   Temp 98.3 °F (36.8 °C) (Temporal)   Resp 21   Ht 6' 0.99\" (1.854 m)   Wt 180 lb 8.9 oz (81.9 kg)   SpO2 96%   BMI 23.83 kg/m²   Constitutional:  on MV  Skin: no rash, turgor wnl  Heent: eomi, mmm  Neck: no bruits or jvd noted  Cardiovascular:  S1, S2 without m/r/g  Respiratory: trach  Abdomen:  +bs, soft, nt, nd  Ext: bilateral lower extremity edema R>L  Psychiatric: mood and affect appropriate  Musculoskeletal:  Rom, muscular strength intact    Data:   Labs:  CBC:   Recent Labs     10/11/22  0457 10/12/22  0508 10/13/22  0420   WBC 7.3 5.4 6.9   HGB 8.1* 7.2* 7.5*    257 287       BMP:    Recent Labs     10/11/22  0457 10/12/22  0508 10/13/22  0420   * 134* 139   K 4.3 4.4 4.3   CL 97* 96* 98*   CO2 29 26 31   BUN 98* 101* 107*   CREATININE 1.0 1.2 1.3   GLUCOSE 154* 272* 180*       Ca/Mg/Phos:   Recent Labs     10/11/22  0457 10/12/22  0508 10/13/22  0420   CALCIUM 7.9* 7.5* 7.7*   MG 3.20* 3.20* 3.60*   PHOS 3.2 4.5 4.2       Hepatic: No results for input(s): AST, ALT, ALB, BILITOT, ALKPHOS in the last 72 hours. Troponin: No results for input(s): TROPONINI in the last 72 hours. BNP: No results for input(s): BNP in the last 72 hours. Lipids:   Recent Labs     10/12/22  1054   TRIG 67         ABGs:   No results for input(s): PHART, PO2ART, IDE8PSL in the last 72 hours. INR: No results for input(s): INR in the last 72 hours. UA:No results for input(s): Laren Dollar, GLUCOSEU, BILIRUBINUR, [employer], BLOODU, PHUR, PROTEINU, UROBILINOGEN, NITRU, LEUKOCYTESUR, LABMICR, URINETYPE in the last 72 hours. Urine Microscopic: No results for input(s): LABCAST, BACTERIA, COMU, HYALCAST, WBCUA, RBCUA, EPIU in the last 72 hours. Urine Culture: No results for input(s): LABURIN in the last 72 hours. Urine Chemistry:   No results for input(s): Bevely Bolds, PROTEINUR, NAUR in the last 72 hours. IMAGING:  XR CHEST PORTABLE   Final Result      Distal portion of tracheostomy poorly visualized due to overlying medical devices. The position is without significant change since 10/5/2022. Bibasilar pleuroparenchymal consolidation. Stable cardiomediastinal silhouette. Right jugular central venous catheter without change. CT ABDOMEN PELVIS WO CONTRAST Additional Contrast? None   Final Result      Marked decrease in size of pneumomediastinum. Trace residual pneumoperitoneum. Extensive bilateral lower lobe pulmonary atelectasis with pleural effusions and trace residual right pneumothorax. XR CHEST PORTABLE   Final Result      Tiny right lateral pneumothorax is suspected, 5 mm in maximum thickness. This has decreased in size since 10/3/2022. Right basilar Pleurx catheter noted. Small bilateral pleural effusions. Prominent interstitial markings. Stable cardiac mediastinal silhouette. Lines and tubes without change. Subcutaneous emphysema noted. CT CHEST ABDOMEN PELVIS WO CONTRAST   Final Result      1. Severe pneumomediastinum. 2. Small to moderate right hydropneumothorax with similar fluid and gas components with a right-sided Pleurx catheter. 3. Moderate loculated left pleural effusion with atelectasis of the left lower lobe with patency of the central left lower lobe bronchi. 4. Fluid/material filling the bronchus intermedius and right lower lobe bronchi with complete consolidation and volume loss of the right lower lobe. Differential diagnosis would include mucous plugging or obstructing lesion. 5. Tracheostomy tube tip within the trachea   6. Severe subcutaneous emphysema in the neck. CT ABDOMEN AND PELVIS:      FINDINGS:      LIVER: Normal.      GALLBLADDER AND BILIARY TREE: No calcified gallstones. No gallbladder distention. No intra- or extrahepatic biliary dilatation. PANCREAS: Normal.      SPLEEN: Normal.      ADRENAL GLANDS: Normal.      KIDNEYS AND URETERS: Normal.      URINARY BLADDER: Ansari catheter present within collapsed urinary bladder. REPRODUCTIVE ORGANS: No associated masses. BOWEL: Normal diameter, nonobstructed. Feeding tube tip at the level of the ligament of Treitz.       LYMPH NODES: No abnormally enlarged nodes. PERITONEUM/RETROPERITONEUM: Severe pneumoperitoneum extends into the upper abdomen with some of the symmetric multiseptated gas appearing deep to the diaphragm consistent with mild pneumoperitoneum (series 601 was 101). This is likely related to    pneumonia mediastinum dissecting into the abdomen. No fluid collection in the abdomen or pelvis. No ascites. VESSELS: Extensive atherosclerotic calcification of the aorta and iliac arteries. ABDOMINAL WALL: No acute abnormality. BONES: No acute abnormality. Mild chronic L1 compression fracture with previous vertebroplasty. IMPRESSION:      1. Severe pneumomediastinum extends into the upper abdomen with small pneumoperitoneum likely related to extension of pneumoperitoneum into the upper peritoneal cavity. 2. No fluid collection in the abdomen or pelvis. Results were discussed with the surgical team at 7:00 PM on 10/3/2022. XR CHEST PORTABLE   Final Result      Stable appearance of loculated right pneumothorax, with loculated components in the lateral right midlung region and in the right lateral costophrenic sulcus. Stable scattered areas of atelectasis versus scar, most prominent in the medial right lung base and in the left lateral lung base. XR CHEST PORTABLE   Final Result      Small right basilar pneumothorax. Right basilar chest tube without change. Patchy consolidation in the right mid and lower lung as well as the left lower lung-atelectasis versus pneumonia. Trace left pleural effusion. Normal heart size. Lines and tubes without change. Mild improvement in degree of subcutaneous emphysema in the chest and neck. XR CHEST 1 VIEW   Final Result      1. Stable small right-sided pneumothorax and prominent subcutaneous emphysema along the neck and upper superior chest wall, greater in right lung stable   2.  Stable left basilar consolidation and pleural effusion      3. Stable appearing perihilar accentuated markings or airspace disease            XR CHEST PORTABLE   Final Result      Stable small right-sided pneumothorax. More prominent emphysema over the lower neck. No change in bilateral airspace disease. Stable left pleural effusion. XR CHEST PORTABLE   Final Result   1. Bilateral multifocal pneumonia with improvement in the right    pulmonary consolidation since prior study from 9/23/22   2. Mild to moderate left pleural effusion          XR CHEST PORTABLE   Final Result   1. Support lines and tubes are stable. 2.  Stable small right lateral pneumothorax and right basilar    chest tube. 3.  Stable patchy bilateral airspace disease. XR CHEST PORTABLE   Final Result   1. Satisfactory position of right internal jugular central line   2. No interval change in endotracheal tube and nasogastric tube positioning. 3. Extensive bilateral airspace disease with no changes. 4. Left pleural effusion with retrocardiac opacity, unchanged   5. Small stable tiny right lateral pneumothorax and stable position of right chest tube,, Pleurx catheter. XR ABDOMEN (KUB) (SINGLE AP VIEW)   Final Result   1. No residual pneumothorax. 2.  Mild patchy airspace disease bilaterally worse on the right than on prior examination. 3.  Non-obstructive bowel gas pattern. Mildly distended stomach. XR CHEST PORTABLE   Final Result   1. No residual pneumothorax. 2.  Mild patchy airspace disease bilaterally worse on the right than on prior examination. 3.  Non-obstructive bowel gas pattern. Mildly distended stomach. XR CHEST PORTABLE   Final Result      1. Small right pneumothorax appears slightly increased. 2. Mild patchy airspace opacity bilaterally. XR CHEST PORTABLE   Final Result     Pleurx catheter at the right lung base. Trace right    pneumothorax is unchanged.           XR CHEST PORTABLE   Final Result Right-sided chest tube evident in the base, its tip medially. Improvement in the right-sided pneumothorax, since earlier today. Possible medial collapse of the right lower lobe. Small left effusion. Patchy airspace density/atelectasis in the lungs bilaterally, with no other significant change. XR CHEST PORTABLE   Final Result   1. Right-sided Pleurx catheter in satisfactory position in the lung bases   2. Right-sided post operative pneumothorax, approximately 10%             Assessment/Plan   ZANA  - suspect this is contrast nephropathy  - baseline Cre 0.7. Normal on admission.  - Cr stable   - Hold diuretics   - BNP 3k, Protein:Cre 0.3, FENa 0.4%  - closely monitor UOP  - avoid nephrotoxic agent     2. Hypernatremia  - continue free water   - will continue to monitor     3. Hypotension  - pressors as needed     4. Anemia  - daily CBC    5. Acid- base/ Electrolyte imbalance   - optimize lytes    6. Acute hypoxic resp failure  - s/p VATS on 9/19/22 for recurrent pleural effusions  - Intensivist and CTS following    7.  CHF   - EF 65% on 6/16/22 via cardiac cath        Liz Harrell MD

## 2022-10-13 NOTE — PROGRESS NOTES
ICU CT SURGERY DAILY PROGRESS NOTE    CC: Pleural effusions s/p R PleurX catheter placement    SUBJECTIVE:   Interval Hx:   Pt had bloody secretions from tracheostomy overnight, and the development of tremors and small, repetitive opening-closing movements of his mouth concerning for EPS. Seroquel was held by overnight team. Pt appeared somnolent yesterday, but was still following commands and interacting with staff. PleuX drained yesterday. ROS: A 14 point review of systems was conducted, significant findings as noted above. All other systems negative. OBJECTIVE:   Vitals:   Vitals:    10/13/22 0530 10/13/22 0600 10/13/22 0630 10/13/22 0632   BP: 113/61 (!) 101/48 (!) 99/51    Pulse: 87 78 77 78   Resp: 25 20 19    Temp:       TempSrc:       SpO2: 97% 98% 98% 98%   Weight:  180 lb 8.9 oz (81.9 kg)     Height:           I/O:   Intake/Output Summary (Last 24 hours) at 10/13/2022 0707  Last data filed at 10/13/2022 0527  Gross per 24 hour   Intake 1847 ml   Output 860 ml   Net 987 ml       I/O last 3 completed shifts: In: 2072 [I.V.:263; NG/GT:1809]  Out: 4193 [Urine:1395]    Diet: Diet NPO  ADULT TUBE FEEDING; PEG; Standard with Fiber; Continuous; 30; Yes; 10; Q 4 hours; 50; 30; Q 4 hours; Protein; 2 bottles Proteinex daily w/ 30 mL FW flushes before and after      Physical Examination:   General appearance: Mechanically ventilated. Attempts to mouth words but unable to project voice. Will occassionally point and shake/nod his head for communication. Neurological: Follows some commands (tracking finger with eyes), while others are performed incorrectly (moves right hand when asked to move left hand). Unable to hold out arms for pronator drift exam.    HEENT: NC/AT, EOMI, trachea midline, no JVD. Tracheostomy leaking and with minimal erythema on edges. Chest/Lungs:  On mechanical ventilation via tracheostomy; R PleurX catheter capped with dressing C/D/I  Cardiovascular: Atrial fibrillation rhythm with rate WNL's  Abdomen: Soft, non-tender, non-distended. PEG tube dressing C/D/I. PEG site with minimal dried blood, no erythema. Genitourinary/Anorectal: Ansari in place with clear yellow urine. Skin: Warm and dry. Sacral decubitus ulcer covered with Mepilex   Extremities: Pitting edema of the b/l feet. No cyanosis. Legs ulcers from SCDs covered with meplixex. Labs:  CBC:   Recent Labs     10/11/22  0457 10/12/22  0508 10/13/22  0420   WBC 7.3 5.4 6.9   HGB 8.1* 7.2* 7.5*   HCT 25.3* 22.6* 23.0*    257 287         BMP:   Recent Labs     10/11/22  0457 10/12/22  0508 10/13/22  0420   * 134* 139   K 4.3 4.4 4.3   CL 97* 96* 98*   CO2 29 26 31   BUN 98* 101* 107*   CREATININE 1.0 1.2 1.3   GLUCOSE 154* 272* 180*       LFT's:   No results for input(s): AST, ALT, ALB, BILITOT, ALKPHOS in the last 72 hours. Troponin: No results for input(s): TROPONINI in the last 72 hours. BNP: No results for input(s): BNP in the last 72 hours. ABGs:   No results for input(s): PHART, [id number], PO2ART in the last 72 hours. INR:   No results for input(s): INR in the last 72 hours. U/A:No results for input(s): NITRITE, COLORU, PHUR, LABCAST, WBCUA, RBCUA, MUCUS, TRICHOMONAS, YEAST, BACTERIA, CLARITYU, SPECGRAV, LEUKOCYTESUR, UROBILINOGEN, BILIRUBINUR, BLOODU, GLUCOSEU, AMORPHOUS in the last 72 hours. Invalid input(s): Natty Wellington     Rad:   CT ABDOMEN PELVIS WO CONTRAST Additional Contrast? None   Final Result      Marked decrease in size of pneumomediastinum. Trace residual pneumoperitoneum. Extensive bilateral lower lobe pulmonary atelectasis with pleural effusions and trace residual right pneumothorax. XR CHEST PORTABLE   Final Result      Tiny right lateral pneumothorax is suspected, 5 mm in maximum thickness. This has decreased in size since 10/3/2022. Right basilar Pleurx catheter noted. Small bilateral pleural effusions. Prominent interstitial markings.       Stable cardiac mediastinal silhouette. Lines and tubes without change. Subcutaneous emphysema noted. CT CHEST ABDOMEN PELVIS WO CONTRAST   Final Result      1. Severe pneumomediastinum. 2. Small to moderate right hydropneumothorax with similar fluid and gas components with a right-sided Pleurx catheter. 3. Moderate loculated left pleural effusion with atelectasis of the left lower lobe with patency of the central left lower lobe bronchi. 4. Fluid/material filling the bronchus intermedius and right lower lobe bronchi with complete consolidation and volume loss of the right lower lobe. Differential diagnosis would include mucous plugging or obstructing lesion. 5. Tracheostomy tube tip within the trachea   6. Severe subcutaneous emphysema in the neck. CT ABDOMEN AND PELVIS:      FINDINGS:      LIVER: Normal.      GALLBLADDER AND BILIARY TREE: No calcified gallstones. No gallbladder distention. No intra- or extrahepatic biliary dilatation. PANCREAS: Normal.      SPLEEN: Normal.      ADRENAL GLANDS: Normal.      KIDNEYS AND URETERS: Normal.      URINARY BLADDER: Ansari catheter present within collapsed urinary bladder. REPRODUCTIVE ORGANS: No associated masses. BOWEL: Normal diameter, nonobstructed. Feeding tube tip at the level of the ligament of Treitz. LYMPH NODES: No abnormally enlarged nodes. PERITONEUM/RETROPERITONEUM: Severe pneumoperitoneum extends into the upper abdomen with some of the symmetric multiseptated gas appearing deep to the diaphragm consistent with mild pneumoperitoneum (series 601 was 101). This is likely related to    pneumonia mediastinum dissecting into the abdomen. No fluid collection in the abdomen or pelvis. No ascites. VESSELS: Extensive atherosclerotic calcification of the aorta and iliac arteries. ABDOMINAL WALL: No acute abnormality. BONES: No acute abnormality. Mild chronic L1 compression fracture with previous vertebroplasty. IMPRESSION:      1. Severe pneumomediastinum extends into the upper abdomen with small pneumoperitoneum likely related to extension of pneumoperitoneum into the upper peritoneal cavity. 2. No fluid collection in the abdomen or pelvis. Results were discussed with the surgical team at 7:00 PM on 10/3/2022. XR CHEST PORTABLE   Final Result      Stable appearance of loculated right pneumothorax, with loculated components in the lateral right midlung region and in the right lateral costophrenic sulcus. Stable scattered areas of atelectasis versus scar, most prominent in the medial right lung base and in the left lateral lung base. XR CHEST PORTABLE   Final Result      Small right basilar pneumothorax. Right basilar chest tube without change. Patchy consolidation in the right mid and lower lung as well as the left lower lung-atelectasis versus pneumonia. Trace left pleural effusion. Normal heart size. Lines and tubes without change. Mild improvement in degree of subcutaneous emphysema in the chest and neck. XR CHEST 1 VIEW   Final Result      1. Stable small right-sided pneumothorax and prominent subcutaneous emphysema along the neck and upper superior chest wall, greater in right lung stable   2. Stable left basilar consolidation and pleural effusion      3. Stable appearing perihilar accentuated markings or airspace disease            XR CHEST PORTABLE   Final Result      Stable small right-sided pneumothorax. More prominent emphysema over the lower neck. No change in bilateral airspace disease. Stable left pleural effusion. XR CHEST PORTABLE   Final Result   1. Bilateral multifocal pneumonia with improvement in the right    pulmonary consolidation since prior study from 9/23/22   2. Mild to moderate left pleural effusion          XR CHEST PORTABLE   Final Result   1. Support lines and tubes are stable.    2.  Stable small right lateral pneumothorax and right basilar    chest tube. 3.  Stable patchy bilateral airspace disease. XR CHEST PORTABLE   Final Result   1. Satisfactory position of right internal jugular central line   2. No interval change in endotracheal tube and nasogastric tube positioning. 3. Extensive bilateral airspace disease with no changes. 4. Left pleural effusion with retrocardiac opacity, unchanged   5. Small stable tiny right lateral pneumothorax and stable position of right chest tube,, Pleurx catheter. XR ABDOMEN (KUB) (SINGLE AP VIEW)   Final Result   1. No residual pneumothorax. 2.  Mild patchy airspace disease bilaterally worse on the right than on prior examination. 3.  Non-obstructive bowel gas pattern. Mildly distended stomach. XR CHEST PORTABLE   Final Result   1. No residual pneumothorax. 2.  Mild patchy airspace disease bilaterally worse on the right than on prior examination. 3.  Non-obstructive bowel gas pattern. Mildly distended stomach. XR CHEST PORTABLE   Final Result      1. Small right pneumothorax appears slightly increased. 2. Mild patchy airspace opacity bilaterally. XR CHEST PORTABLE   Final Result     Pleurx catheter at the right lung base. Trace right    pneumothorax is unchanged. XR CHEST PORTABLE   Final Result      Right-sided chest tube evident in the base, its tip medially. Improvement in the right-sided pneumothorax, since earlier today. Possible medial collapse of the right lower lobe. Small left effusion. Patchy airspace density/atelectasis in the lungs bilaterally, with no other significant change. XR CHEST PORTABLE   Final Result   1. Right-sided Pleurx catheter in satisfactory position in the lung bases   2.  Right-sided post operative pneumothorax, approximately 10%             ASSESSMENT AND PLAN:   Madhuri Palencia is a 65 y.o. male with history of diastolic heart failure, atrial fibrillation, and DM with recurrent pleural effusions s/p R PleurX catheter placement (9/19). Neuro:   Analgesia  - Continue oxycodone and tylenol PRN. Dilaudid held currently for concern of weakness.  -Seroquel held for concern of EPS. -Per Crit care, investigating Ach receptor antibodies for possible cause of weakness. Will f/u results. Cardiovascular:   History of paroxysmal atrial fibrillation  -Cont amiodarone - rate controlled currently. - Cont Eliquis    HFpEF  - Last echo (1/25/22): LVEF = 59-66%, grade 2 diastolic dysfunction    Hyperlipidemia  - Cont Pravastatin 40mg    Pulmonary:   S/P R PleurX catheter placement (9/19)  - Capped Catheter. Will drain every other day - drained yesterday  - SW/HHC pending    Acute on chronic respiratory failure  - Mechanically intubated on 9/22. Currently PRVC 14/375/5/40%. Vent management per pulm/crit care. - Tracheostomy 9/30. Tracheostomy leaking blood this am. Has stopped. wound care nurse assisting with care. - Pulmonology following, appreciate recommendations. - Baseline on home O2 3-4L NC   -Critical care conducted bronchoscopy 10/5 - no masses noted, only white mucous secretions in the airways. GI:   -S/p PEG 10/11 with GI. TF's per PEG.   - Miralax  -Rectal tube d/c'd yesterday. FEN:    -Replace electrolytes per protocol  - Diet: Diet NPO  ADULT TUBE FEEDING; PEG; Standard with Fiber; Continuous; 30; Yes; 10; Q 4 hours; 50; 30; Q 4 hours; Protein; 2 bottles Proteinex daily w/ 30 mL FW flushes before and after   -SLP consulted, will inquire about Passy Kenneth valve for speaking and eating. Awaiting FEES (possibly at the end of this week). Appreciate recs. -Weekly nutritional markers to be collected: prealbumin, transferrin, and CRP. CRP this am 172.2 (increased). Prealbumin 6.2 (decreased). /Renal:   - Cont Ansari   - Creatinine 1.3 from 1.2 from 1.0  -  from 101, 98, 111, 112,   - Nephrology consulted. Appreciate recommendations. Hem/ID/wound:   - Hemoglobin 7.5 from 7.2 from 8.1 from 8.2,   - WBC 6.9 from 5.4 from 7.3 from 6.9 from 12.0  - Cefepime (10/6-10/12) and vancomycin (10/6-10/8). - BAL cx's no organisms seen - no organisms seen, but sample streaked incorrectly. - Sacral wound per wound care nurse    Endo:   History of DMII  - Last A1C 8.1% (1/24/22). - 24 hour glucose lispro 39. Will change Insulin Glargine from 28 to 34 nightly. Cont lispo 5 Q8 and HDSSI    Prophylaxis:   DVT Ppx: Eliquis  GI Ppx: protonix    Access:  Central Access: R IJ CVC, placed 9/22             Ansari Date placed: 9/20  Rectal tube placed: 10/4  PEG: placed 10/11    Mobility:  OOB and activity as tolerated    Dispo:   ICU  Case management assisting with dispo - pt would benefit from LTAC over SNF.      Code Status: Full Code  -----------------------------  Nadja Barcenas DO   PGY1, General Surgery  10/13/22  7:07 AM  Pager # [phone]

## 2022-10-13 NOTE — PLAN OF CARE
Problem: Chronic Conditions and Co-morbidities  Goal: Patient's chronic conditions and co-morbidity symptoms are monitored and maintained or improved  Outcome: Progressing  Flowsheets (Taken 10/13/2022 0800)  Care Plan - Patient's Chronic Conditions and Co-Morbidity Symptoms are Monitored and Maintained or Improved: Collaborate with multidisciplinary team to address chronic and comorbid conditions and prevent exacerbation or deterioration     Problem: Discharge Planning  Goal: Discharge to home or other facility with appropriate resources  Outcome: Progressing  Flowsheets (Taken 10/13/2022 0800)  Discharge to home or other facility with appropriate resources: Identify barriers to discharge with patient and caregiver     Problem: Pain  Goal: Verbalizes/displays adequate comfort level or baseline comfort level  Outcome: Progressing     Problem: Safety - Adult  Goal: Free from fall injury  Outcome: Progressing     Problem: Skin/Tissue Integrity  Goal: Absence of new skin breakdown  Description: 1. Monitor for areas of redness and/or skin breakdown  2. Assess vascular access sites hourly  3. Every 4-6 hours minimum:  Change oxygen saturation probe site  4. Every 4-6 hours:  If on nasal continuous positive airway pressure, respiratory therapy assess nares and determine need for appliance change or resting period. Outcome: Progressing     Problem: Safety - Medical Restraint  Goal: Remains free of injury from restraints (Restraint for Interference with Medical Device)  Description: INTERVENTIONS:  1. Determine that other, less restrictive measures have been tried or would not be effective before applying the restraint  2. Evaluate the patient's condition at the time of restraint application  3. Inform patient/family regarding the reason for restraint  4.  Q2H: Monitor safety, psychosocial status, comfort, nutrition and hydration  Outcome: Progressing     Problem: Nutrition Deficit:  Goal: Optimize nutritional

## 2022-10-13 NOTE — PROGRESS NOTES
CTS point of care note:    Pt noted to have saturated dressing around tracheostomy. Loosened trach ties, cleaned the area, and replaced with clean, dry guaze. Pt tolerated. Jill Toni ties tightened and secured. SpO2 stable at 97%, and EtCO2 b/w 26 and 28 at the end of encounter.      Semaj Peres DO   PGY1, General Surgery  10/13/22  2:24 PM  Pager # [phone]

## 2022-10-14 PROBLEM — E46 MALNUTRITION (HCC): Chronic | Status: ACTIVE | Noted: 2022-01-01

## 2022-10-14 NOTE — PROGRESS NOTES
Comprehensive Nutrition Assessment    Type and Reason for Visit:  Reassess    Nutrition Recommendations/Plan:   Initiate Vital AF 1.2 (Peptide formula) at 10 mL/hr and as tolerated, increase by 10 mL/hr q 4 hours until goal of 65 mL/hr is met via PEG  Recommend 30 mL H20 flush q 4 hours. Monitor IVF infusion, Na labs and need for adjustments in water flush  Recommend 1 Bottle Proteinex P2Go daily via syringe. Flush with 30 mL H20 before and after  Consider prophylactic prokinetic agent (such as reglan, erythromycin) to promote EN tolerance as appropriate   Consider daily micronutrient supplementation: 2000 IU Vitamin D3, MVM, + Probiotic   Monitor TF tolerance (abd distention, bowel habits, N/V, cramping)  Monitor nutrition adequacy, pertinent labs, bowel habits, wt changes, and clinical progress     Malnutrition Assessment:  Malnutrition Status: Moderate malnutrition (10/14/22 1128)    Context:  Chronic Illness     Findings of the 6 clinical characteristics of malnutrition:  Energy Intake:  No significant decrease in energy intake  Weight Loss:  Greater than 10% over 6 months     Body Fat Loss:  Mild body fat loss     Muscle Mass Loss:  Mild muscle mass loss Temples (temporalis), Calf (gastrocnemius), Thigh (quadraceps)  Fluid Accumulation:  No significant fluid accumulation      Nutrition Assessment:    Follow up: Discussed pt calorie and protein needs w/ ICU team. Will modify TF to Vital AF 1.2 + 1 bottle Proteinex. New TF will provide pt w/ 1976 kcal total, 14g more of protein (1.7g/kg CBW), 86 g less of carbohydrates. Pt BG has decreased, however insulin regimen has been increased, new TF formula will likely lower insulin need. Will continue to monitor. Nutrition Related Findings:    . Mg 35. . Active bowel sounds. +BM 10/14.  Wound Type: Deep Tissue Injury (Sacrum)       Current Nutrition Intake & Therapies:    Average Meal Intake: NPO  Average Supplements Intake: NPO  Diet NPO  ADULT TUBE FEEDING; PEG; Standard with Fiber; Continuous; 30; Yes; 10; Q 4 hours; 50; 30; Q 4 hours; Protein; 2 bottles Proteinex daily w/ 30 mL FW flushes before and after  Current Tube Feeding (TF) Orders:  Feeding Route: PEG  Formula: Peptide Based  Schedule: Continuous  Additives/Modulars: Protein  Goal TF & Flush Orders Provides: Vital AF 1.2 @ 65 mL/hr to provide: 1560 mL TV, 1872 kcal, 117 g/pro and 1265 mL FW + FW flushes of 30 mL q4 + 1 bottle Proteinex daily to provide an additional 104 kcal and 26 g/pro. Anthropometric Measures:  Height: 6' 0.99\" (185.4 cm)  Ideal Body Weight (IBW): 184 lbs (84 kg)       Current Body Weight: 210 lb 15 oz (95.7 kg), 90.7 % IBW. Weight Source: Bed Scale  Current BMI (kg/m2): 27.8        Weight Adjustment For: No Adjustment                 BMI Categories: Overweight (BMI 25.0-29. 9)    Estimated Daily Nutrient Needs:  Energy Requirements Based On: Kcal/kg (20-25 kcal/kg CBW)  Weight Used for Energy Requirements: Current  Energy (kcal/day): 8433-1618 (20-22 kcal/95.7 kg)  Weight Used for Protein Requirements: Ideal (IBW 83.64 kg)  Protein (g/day): 101-168  Method Used for Fluid Requirements: 1 ml/kcal  Fluid (ml/day): or per MD    Nutrition Diagnosis:   Increased nutrient needs related to increase demand for energy/nutrients as evidenced by nutrition support - enteral nutrition    Nutrition Interventions:   Food and/or Nutrient Delivery: Modify Tube Feeding, Continue NPO  Nutrition Education/Counseling: Education not indicated  Coordination of Nutrition Care: Continue to monitor while inpatient  Plan of Care discussed with: ICU team    Goals:  Previous Goal Met: Progressing toward Goal(s)  Goals:  Tolerate nutrition support at goal rate, within 2 days       Nutrition Monitoring and Evaluation:   Behavioral-Environmental Outcomes: None Identified  Food/Nutrient Intake Outcomes: Enteral Nutrition Intake/Tolerance  Physical Signs/Symptoms Outcomes: Biochemical Data, Nutrition Focused Physical Findings, Hemodynamic Status    Discharge Planning:     Too soon to determine     Walter Kumari, [address] [place] [address], LD  Contact: [phone]

## 2022-10-14 NOTE — PROGRESS NOTES
Palliative Care Chart Review  and Check in Note:     NAME:  Bridgett Donovan  Admit Date: 9/19/2022  Hospital Day:  Hospital Day: 26   Current Code status: Full Code    Palliative care is continuing to following Mr. Denzel Riley for symptom management, and goals of care discussion as needed. Patient's chart reviewed today 10/14/22. Saw Eddie Novoa and his wife at the bedside with the ICU team. He was working with therapy, sitting on the edge of the bed. He is much more interactive, able to wave and gesture, and track with eyes. No changes in plan of care. Pt needs rehab and nutrition at this point. The following are the currently established goals/code status, and Symptom management. Goals of care: Continue current medical management. Increase activity and alertness. Promote healthy sleep/wake cycle with lights and TV on during the day to decrease risk of delirium.     Code status: Full    Discharge plan: TBD - pending placement      NATASHA Valentine NP  10/14/22  2:10 PM

## 2022-10-14 NOTE — PROGRESS NOTES
ICU CT SURGERY DAILY PROGRESS NOTE    CC: Pleural effusions s/p R PleurX catheter placement    SUBJECTIVE:   Interval Hx:   Had no other episodes of bloody drainage from tracheostomy overnight; only has mucus secretions now. Per nurse, did not have any tremors to LUE overnight. Otherwise, had no issues overnight. ROS: A 14 point review of systems was conducted, significant findings as noted above. All other systems negative. OBJECTIVE:   Vitals:   Vitals:    10/14/22 0500 10/14/22 0530 10/14/22 0600 10/14/22 0647   BP: (!) 111/54 (!) 121/46 (!) 101/45 (!) 100/41   Pulse: 70 75 70 67   Resp: (!) 32 24 (!) 31 (!) 33   Temp:    97.8 °F (36.6 °C)   TempSrc:    Temporal   SpO2: 94% 96% 96% 95%   Weight:       Height:           I/O:   Intake/Output Summary (Last 24 hours) at 10/14/2022 0657  Last data filed at 10/14/2022 0500  Gross per 24 hour   Intake 947 ml   Output 1475 ml   Net -528 ml       I/O last 3 completed shifts: In: 2369 [I.V.:263; NG/GT:2106]  Out: 1560 [Urine:1560]    Diet: Diet NPO  ADULT TUBE FEEDING; PEG; Standard with Fiber; Continuous; 30; Yes; 10; Q 4 hours; 50; 30; Q 4 hours; Protein; 2 bottles Proteinex daily w/ 30 mL FW flushes before and after      Physical Examination:   General appearance: Mechanically ventilated. Attempts to mouth words but unable to project voice. Will occassionally point and shake/nod his head for communication. Neurological: Follows some commands, no focal deficits  HEENT: NC/AT, EOMI, trachea midline, no JVD. Tracheostomy leaking and with minimal erythema on edges. Chest/Lungs: On mechanical ventilation via tracheostomy; R PleurX catheter capped with dressing C/D/I  Cardiovascular: Atrial fibrillation rhythm with rate WNL's  Abdomen: Soft, non-tender, non-distended. PEG tube dressing C/D/I. PEG site with minimal dried blood, no erythema. Genitourinary/Anorectal: Ansari in place with clear yellow urine. Skin: Warm and dry.  Sacral decubitus ulcer covered with Mepilex. Extremities: Pitting edema of the b/l feet. No cyanosis. Legs ulcers from SCDs covered with meplixex. Labs:  CBC:   Recent Labs     10/12/22  0508 10/13/22  0420 10/14/22  0357   WBC 5.4 6.9 7.1   HGB 7.2* 7.5* 6.9*   HCT 22.6* 23.0* 21.7*    287 295         BMP:   Recent Labs     10/12/22  0508 10/13/22  0420 10/14/22  0357   * 139 138   K 4.4 4.3 4.0   CL 96* 98* 100   CO2 26 31 32   * 107* 103*   CREATININE 1.2 1.3 1.1   GLUCOSE 272* 180* 151*       LFT's:   No results for input(s): AST, ALT, ALB, BILITOT, ALKPHOS in the last 72 hours. Troponin: No results for input(s): TROPONINI in the last 72 hours. BNP: No results for input(s): BNP in the last 72 hours. ABGs:   No results for input(s): PHART, TMY2PAB, PO2ART in the last 72 hours. INR:   No results for input(s): INR in the last 72 hours. U/A:No results for input(s): NITRITE, COLORU, PHUR, LABCAST, WBCUA, RBCUA, MUCUS, TRICHOMONAS, YEAST, BACTERIA, CLARITYU, SPECGRAV, LEUKOCYTESUR, UROBILINOGEN, BILIRUBINUR, BLOODU, GLUCOSEU, AMORPHOUS in the last 72 hours. Invalid input(s): Josiane Johansen     Rad:   XR CHEST PORTABLE   Final Result      Distal portion of tracheostomy poorly visualized due to overlying medical devices. The position is without significant change since 10/5/2022. Bibasilar pleuroparenchymal consolidation. Stable cardiomediastinal silhouette. Right jugular central venous catheter without change. CT ABDOMEN PELVIS WO CONTRAST Additional Contrast? None   Final Result      Marked decrease in size of pneumomediastinum. Trace residual pneumoperitoneum. Extensive bilateral lower lobe pulmonary atelectasis with pleural effusions and trace residual right pneumothorax. XR CHEST PORTABLE   Final Result      Tiny right lateral pneumothorax is suspected, 5 mm in maximum thickness. This has decreased in size since 10/3/2022. Right basilar Pleurx catheter noted.       Small bilateral pleural effusions. Prominent interstitial markings. Stable cardiac mediastinal silhouette. Lines and tubes without change. Subcutaneous emphysema noted. CT CHEST ABDOMEN PELVIS WO CONTRAST   Final Result      1. Severe pneumomediastinum. 2. Small to moderate right hydropneumothorax with similar fluid and gas components with a right-sided Pleurx catheter. 3. Moderate loculated left pleural effusion with atelectasis of the left lower lobe with patency of the central left lower lobe bronchi. 4. Fluid/material filling the bronchus intermedius and right lower lobe bronchi with complete consolidation and volume loss of the right lower lobe. Differential diagnosis would include mucous plugging or obstructing lesion. 5. Tracheostomy tube tip within the trachea   6. Severe subcutaneous emphysema in the neck. CT ABDOMEN AND PELVIS:      FINDINGS:      LIVER: Normal.      GALLBLADDER AND BILIARY TREE: No calcified gallstones. No gallbladder distention. No intra- or extrahepatic biliary dilatation. PANCREAS: Normal.      SPLEEN: Normal.      ADRENAL GLANDS: Normal.      KIDNEYS AND URETERS: Normal.      URINARY BLADDER: Ansari catheter present within collapsed urinary bladder. REPRODUCTIVE ORGANS: No associated masses. BOWEL: Normal diameter, nonobstructed. Feeding tube tip at the level of the ligament of Treitz. LYMPH NODES: No abnormally enlarged nodes. PERITONEUM/RETROPERITONEUM: Severe pneumoperitoneum extends into the upper abdomen with some of the symmetric multiseptated gas appearing deep to the diaphragm consistent with mild pneumoperitoneum (series 601 was 101). This is likely related to    pneumonia mediastinum dissecting into the abdomen. No fluid collection in the abdomen or pelvis. No ascites. VESSELS: Extensive atherosclerotic calcification of the aorta and iliac arteries. ABDOMINAL WALL: No acute abnormality.       BONES: No acute abnormality. Mild chronic L1 compression fracture with previous vertebroplasty. IMPRESSION:      1. Severe pneumomediastinum extends into the upper abdomen with small pneumoperitoneum likely related to extension of pneumoperitoneum into the upper peritoneal cavity. 2. No fluid collection in the abdomen or pelvis. Results were discussed with the surgical team at 7:00 PM on 10/3/2022. XR CHEST PORTABLE   Final Result      Stable appearance of loculated right pneumothorax, with loculated components in the lateral right midlung region and in the right lateral costophrenic sulcus. Stable scattered areas of atelectasis versus scar, most prominent in the medial right lung base and in the left lateral lung base. XR CHEST PORTABLE   Final Result      Small right basilar pneumothorax. Right basilar chest tube without change. Patchy consolidation in the right mid and lower lung as well as the left lower lung-atelectasis versus pneumonia. Trace left pleural effusion. Normal heart size. Lines and tubes without change. Mild improvement in degree of subcutaneous emphysema in the chest and neck. XR CHEST 1 VIEW   Final Result      1. Stable small right-sided pneumothorax and prominent subcutaneous emphysema along the neck and upper superior chest wall, greater in right lung stable   2. Stable left basilar consolidation and pleural effusion      3. Stable appearing perihilar accentuated markings or airspace disease            XR CHEST PORTABLE   Final Result      Stable small right-sided pneumothorax. More prominent emphysema over the lower neck. No change in bilateral airspace disease. Stable left pleural effusion. XR CHEST PORTABLE   Final Result   1. Bilateral multifocal pneumonia with improvement in the right    pulmonary consolidation since prior study from 9/23/22   2.   Mild to moderate left pleural effusion          XR CHEST PORTABLE   Final Result   1. Support lines and tubes are stable. 2.  Stable small right lateral pneumothorax and right basilar    chest tube. 3.  Stable patchy bilateral airspace disease. XR CHEST PORTABLE   Final Result   1. Satisfactory position of right internal jugular central line   2. No interval change in endotracheal tube and nasogastric tube positioning. 3. Extensive bilateral airspace disease with no changes. 4. Left pleural effusion with retrocardiac opacity, unchanged   5. Small stable tiny right lateral pneumothorax and stable position of right chest tube,, Pleurx catheter. XR ABDOMEN (KUB) (SINGLE AP VIEW)   Final Result   1. No residual pneumothorax. 2.  Mild patchy airspace disease bilaterally worse on the right than on prior examination. 3.  Non-obstructive bowel gas pattern. Mildly distended stomach. XR CHEST PORTABLE   Final Result   1. No residual pneumothorax. 2.  Mild patchy airspace disease bilaterally worse on the right than on prior examination. 3.  Non-obstructive bowel gas pattern. Mildly distended stomach. XR CHEST PORTABLE   Final Result      1. Small right pneumothorax appears slightly increased. 2. Mild patchy airspace opacity bilaterally. XR CHEST PORTABLE   Final Result     Pleurx catheter at the right lung base. Trace right    pneumothorax is unchanged. XR CHEST PORTABLE   Final Result      Right-sided chest tube evident in the base, its tip medially. Improvement in the right-sided pneumothorax, since earlier today. Possible medial collapse of the right lower lobe. Small left effusion. Patchy airspace density/atelectasis in the lungs bilaterally, with no other significant change. XR CHEST PORTABLE   Final Result   1. Right-sided Pleurx catheter in satisfactory position in the lung bases   2.  Right-sided post operative pneumothorax, approximately 10%             ASSESSMENT AND PLAN:   Jennifer fish 65 y.o. male with history of diastolic heart failure, atrial fibrillation, and DM with recurrent pleural effusions s/p R PleurX catheter placement (9/19). Neuro:   Analgesia  - Continue oxycodone and tylenol PRN. Dilaudid held currently for concern of weakness.  - Continue to hold Seroquel for concern of extrapyramidal symptoms  - Per crit care, investigating ACh receptor antibodies for possible cause of weakness. Will f/u results    Cardiovascular:   History of paroxysmal atrial fibrillation  - Cont amiodarone - rate controlled currently  - Cont Eliquis    HFpEF  - Last echo (1/25/22): LVEF = 52-77%, grade 2 diastolic dysfunction    Hyperlipidemia  - Cont Pravastatin 40mg    Pulmonary:   S/P R PleurX catheter placement (9/19)  - Capped Catheter. Will drain every other day - drain today  - SW/HHC pending    Acute on chronic respiratory failure  - Mechanically intubated on 9/22. Currently PRVC 14/375/5/40%. Vent management per pulm/crit care. - Tracheostomy 9/30. Tracheostomy leaking blood this am. No other episodes  - Pulmonology following, appreciate recommendations. - Baseline on home O2 3-4L NC   - Critical care conducted bronchoscopy 10/5 - no masses noted, only white mucous secretions in the airways. GI:   - S/p PEG 10/11 with GI. TF's per PEG.   - Miralax  - Rectal tube d/c'd yesterday. FEN:    -Replace electrolytes per protocol  - Diet: Diet NPO  ADULT TUBE FEEDING; PEG; Standard with Fiber; Continuous; 30; Yes; 10; Q 4 hours; 50; 30; Q 4 hours; Protein; 2 bottles Proteinex daily w/ 30 mL FW flushes before and after   -SLP consulted, will inquire about Passy Kenneth valve for speaking and eating. Awaiting FEES (possibly at the end of this week). Appreciate recs. -Weekly nutritional markers to be collected: prealbumin, transferrin, and CRP. CRP this am 172.2 (increased).  Prealbumin 6.2 (decreased)  - willl discuss increase in fiber to decrease stool frequency    /Renal:   - Cont Ansari   - Creatinine 1.1 from 1.3, 1.2 1.0, 1.1  -  from 107, 101, 98, 111, 112,   - Nephrology consulted. Appreciate recommendations. Hem/ID/wound:   - Hemoglobin 6.9 from 7.5, 7.2, 8.1  - Receiving 1 U pRBCs this AM; will f/u post-transfusion labs   - WBC 7.1 from 6.9, 5.4, 7.3 from 6.9 from 12.0  - Cefepime (10/6-10/12) and vancomycin (10/6-10/8). - BAL cx's no organisms seen - no organisms seen, but sample streaked incorrectly. - Sacral wound per wound care nurse    Endo:   History of DMII  - Last A1C 8.1% (1/24/22). - 24 hour glucose lispro 39. Will change Insulin Glargine from 28 to 34 nightly. Cont lispo 5 Q8 and HDSSI    Prophylaxis:   DVT Ppx: Eliquis  GI Ppx: protonix    Access:  Central Access: R IJ CVC, placed 9/22             Ansari Date placed: 9/20  Rectal tube placed: 10/4  PEG: placed 10/11    Mobility:  OOB and activity as tolerated    Dispo:   ICU  Case management assisting with dispo - patient will need to go to LTAC, awaiting decision from [facility].  Discussed with SW    Code Status: Full Code  -----------------------------  Jefry Mejias DO, MSMEd  PGY1, General Surgery  10/14/22  7:09 AM  [facility]  Pager: [phone]

## 2022-10-14 NOTE — PLAN OF CARE
Problem: Safety - Medical Restraint  Goal: Remains free of injury from restraints (Restraint for Interference with Medical Device)  Outcome: Progressing    Problem: ABCDS Injury Assessment  Goal: Absence of physical injury    Problem: Confusion  Goal: Confusion, delirium, dementia, or psychosis is improved or at baseline  Description: INTERVENTIONS:

## 2022-10-14 NOTE — PLAN OF CARE
Problem: Chronic Conditions and Co-morbidities  Goal: Patient's chronic conditions and co-morbidity symptoms are monitored and maintained or improved  Outcome: Progressing     Problem: Discharge Planning  Goal: Discharge to home or other facility with appropriate resources  Outcome: Progressing     Problem: Pain  Goal: Verbalizes/displays adequate comfort level or baseline comfort level  Outcome: Progressing  Flowsheets (Taken 10/14/2022 1600)  Verbalizes/displays adequate comfort level or baseline comfort level: Assess pain using appropriate pain scale     Problem: Safety - Adult  Goal: Free from fall injury  Outcome: Progressing     Problem: Skin/Tissue Integrity  Goal: Absence of new skin breakdown  Description: 1. Monitor for areas of redness and/or skin breakdown  2. Assess vascular access sites hourly  3. Every 4-6 hours minimum:  Change oxygen saturation probe site  4. Every 4-6 hours:  If on nasal continuous positive airway pressure, respiratory therapy assess nares and determine need for appliance change or resting period. Outcome: Progressing     Problem: Nutrition Deficit:  Goal: Optimize nutritional status  Outcome: Progressing  Flowsheets (Taken 10/14/2022 1115 by Megha Spence RD)  Nutrient intake appropriate for improving, restoring, or maintaining nutritional needs: Recommend, monitor, and adjust tube feedings and TPN/PPN based on assessed needs     Problem: ABCDS Injury Assessment  Goal: Absence of physical injury  Outcome: Progressing     Problem: Confusion  Goal: Confusion, delirium, dementia, or psychosis is improved or at baseline  Description: INTERVENTIONS:  1. Assess for possible contributors to thought disturbance, including medications, impaired vision or hearing, underlying metabolic abnormalities, dehydration, psychiatric diagnoses, and notify attending LIP  2. [place] high risk fall precautions, as indicated  3.  Provide frequent short contacts to provide reality reorientation, refocusing and direction  4. Decrease environmental stimuli, including noise as appropriate  5. Monitor and intervene to maintain adequate nutrition, hydration, elimination, sleep and activity  6. If unable to ensure safety without constant attention obtain sitter and review sitter guidelines with assigned personnel  7.  Initiate Psychosocial CNS and Spiritual Care consult, as indicated  Outcome: Progressing

## 2022-10-14 NOTE — CARE COORDINATION
CM met with Mrs. Betty Marcus at the bedside to discuss the barriers to discharge. Explained the [id number] benefit for long-term acute care. There is no LTACH benefit unless he has a new trach to vent AND new hemodialysis. Explained Dr. Rosanne Terrell and his team have requested a referral to [facility], regardless. The plan is for Dr. Rosanne Terrell to have a [id number] discussion with a medical director to appeal the case. CM spoke with the [facility] LTACH liaison, Netta. Janelle called and requested documented denials from skilled nursing facilities. Mrs. Betty Marcus stated it was ok to submit referrals to SNF's. CM will reach out to skilled nursing facilities this morning and request chart review for admission. Electronically signed by DARNELL Redd RN-[facility]  Case Management  [address] Phone: [phone]. The liaison stated the pt is still too medically complex for admission. [facility] [phone]. Spoke with Bebe in admissions. Mr. Betty Marcus is too medically complex for admission to the facility at this time  [facility] [phone]. Spoke with Linsey in admissions. She stated Mr. Betty Marcus is too medically complex for the facility at this time. [address] [place] [address] [phone]. Placed a call to Carlos Natarajan in admissions. She stated the pt is too medically complex for the facility. Notified Netta at [facility] of the barriers to SNF. Waiting to hear back from [id number].

## 2022-10-14 NOTE — PROGRESS NOTES
Occupational Therapy  Facility/Department: [facility] ICU  Occupational Therapy Initial Assessment/tx    Name: Óscar Esposito  :   MRN: [id number]  Date of Service: 10/14/2022    Discharge Recommendations:  Óscar Esposito scored a 7/24 on the AM-PAC ADL Inpatient form. Current research shows that an AM-PAC score of 17 or less is typically not associated with a discharge to the patient's home setting. Based on the patient's AM-PAC score and their current ADL deficits, it is recommended that the patient have 3-5 sessions per week of Occupational Therapy at d/c to increase the patient's independence. Please see assessment section for further patient specific details. If patient discharges prior to next session this note will serve as a discharge summary. Please see below for the latest assessment towards goals. OT Equipment Recommendations  Other: defer to next level of care       Patient Diagnosis(es): The primary encounter diagnosis was Chronic heart failure with preserved ejection fraction (HFpEF) ([address] Diagnoses of Hypoxia and Acute on chronic respiratory failure with hypoxia and hypercapnia (HCC) were also pertinent to this visit. Past Medical History:  has a past medical history of ZANA (acute kidney injury) ([address] Carotid stenosis, left, Chronic back pain, Chronic systolic (congestive) heart failure, Diastolic CHF ([address] Erectile dysfunction, Hyperlipidemia, Hypertension, Osteoarthritis, Restrictive lung disease, and Type II or unspecified type diabetes mellitus without mention of complication, not stated as uncontrolled. Past Surgical History:  has a past surgical history that includes Rotator cuff repair (Bilateral); knee surgery (Left); Cardiac catheterization (2022); Thoracoscopy (Right, 2022); Pleural Cath Insertion (N/A, 2022); tracheostomy (N/A, 2022); and Gastrostomy tube placement (N/A, 10/11/2022).     Treatment Diagnosis: impaired ADLs and transfers      Assessment Performance deficits / Impairments: Decreased functional mobility ; Decreased ADL status; Decreased endurance;Decreased ROM; Decreased strength  Assessment: Pt admitted to hospital 9/19/22, with pleural effusion, s/p R VATS, pleural cath placed and had  post op respiratory difficulties. Sanjuanita Coburn He is on trach vent currently. Pt is dep of 2 for supine><sit, and max A/dep for static sitting on side of bed. Prior to admission, pt was required Scott at times with ADLs, ambulated by holding on to walls and furniture, and did majority of cooking. Recommend LTAC at discharge to increase functional status.   Treatment Diagnosis: impaired ADLs and transfers  Prognosis: Good  Decision Making: High Complexity  REQUIRES OT FOLLOW-UP: Yes  Activity Tolerance  Activity Tolerance: Patient limited by fatigue        Plan   Occupational Therapy Plan  Times Per Week: 2-5  Current Treatment Recommendations: Balance training, ROM, Functional mobility training, Endurance training, Self-Care / ADL     Restrictions  Position Activity Restriction  Other position/activity restrictions: up in chair, ambulate pt    Subjective   General  Patient assessed for rehabilitation services?: Yes  Additional Pertinent Hx: PMH:  carotid stenosis, CHF, DM, restrictive lung disease, B rotator cuff repair  Family / Caregiver Present: Yes (wife)  Referring Practitioner: Dr. Becki Giordano  Diagnosis: Pt admitted 9/19/22 with pleural effusion, to OR for R VATS, pleural cath placed, nerve block x4, 9/22 intubated, 9/30 trach vent, 10/5 bronch, 10/11 PEG trube placement-multiple CXRs, most recent=bibasilar pleuroparenchymal consolidation, abd CT=decrease in size of pneumomediastinum  General Comment  Comments: Pt has central line, trach vent, catheter, PEG tube, R plerux catheter     Social/Functional History  Social/Functional History  Lives With: Spouse  Type of Home: House  Home Layout: One level  Home Access:  (1 step in from garage)  Bathroom Shower/Tub: Tub/Shower unit  Bathroom Toilet: Standard (sink next to commode)  Home Equipment: Walker, 4 wheeled, Jocelyn Anne, [address]  ADL Assistance:  (assist drying after shower, dressed with min A)  Homemaking Assistance:  (pt did alot of cooking, wife did all other tasks)  Ambulation Assistance: Independent (furniture and wall walking)  Transfer Assistance: Independent  Active : Yes  Leisure & Hobbies: watching Star [employer]  Additional Comments: Social history obtained from wife. She reports he was slowly declining at home, and then 2 days previous to admission became severely week. He has had ~3 falls in last few months. Objective                Safety Devices  Type of Devices: Left in bed;Nurse notified;Call light within reach; Bed alarm in place (wife present)  Balance  Sitting:  (Pt sat on side of bed for ~5 minutes with max A to dep, pt initially leaning poster. progressing to severe L lean.   In sitting pt in posterior pelvic tilt with head flexed, he brought head up briefly to look at Dr. Marcie Khan.)     AROM:  (B elbows to hands=WFL, no AROM noted in B shoulders)  Strength:  (3/5 elbows to hand, 0/5 shoulders)           Bed mobility  Rolling to Left: 2 Person assistance (dep of 2)  Supine to Sit: 2 Person assistance (dep of 2)  Sit to Supine:  (dep of 2)     Vision  Vision: Impaired  Vision Exceptions: Wears glasses at all times  Cognition  Overall Cognitive Status:  (pt unable to speak, is alert, nods yes and no, gestures)  Orientation  Overall Orientation Status:  (unable to assess, pt has trach vent)                  Education Given To: Patient  Education Provided: Role of Therapy  Education Method: Verbal  Education Outcome: Continued education needed                        G-Code     OutComes Score                                                  AM-PAC Score        AM-PAC Inpatient Daily Activity Raw Score: 7 (10/14/22 1127)  AM-PAC Inpatient ADL T-Scale Score : 20.13 (10/14/22 1127)  ADL Inpatient CMS 0-100% Score: 92.44 (10/14/22 1127)  ADL Inpatient CMS G-Code Modifier : CM (10/14/22 1127)           Goals  Short Term Goals  Time Frame for Short Term Goals: discharge  Short Term Goal 1: pt to wash hands with cloth and SBA  Short Term Goal 2: pt to move supine><sit with max A of 2  Short Term Goal 3: pt to sit in midline for 6-8 minutes with mod A  Short Term Goal 4: pt to tolerate 5-10 reps B UE AAROM exerc to increase activity tolerance  Patient Goals   Patient goals : not able to stated       Therapy Time   Individual Concurrent Group Co-treatment   Time In 1032         Time Out 1110         Minutes 38             Timed Code Treatment Minutes:   23    Total Treatment Minutes:   Lyndsey OTR/L [address] 19

## 2022-10-14 NOTE — PROGRESS NOTES
ICU Progress Note    Admit Date: 9/19/2022  Day: 25  Vent Day: None  IV Access: Peripheral, R IJ triple lumen  IV Fluids:None  Vasopressors:None                Antibiotics: None  Diet: Diet NPO  ADULT TUBE FEEDING; PEG; Standard with Fiber; Continuous; 30; Yes; 10; Q 4 hours; 50; 30; Q 4 hours; Protein; 2 bottles Proteinex daily w/ 30 mL FW flushes before and after    CC: Pleural Effusion (right) s/p VATS and Pleural Catheter Placement 09/19    Interval history:     No acute events overnight. More alert. Very weak. Denies pain    Hgb this morn 6.9, received a unit RBC. Cont on PEG.         Medications:     Scheduled Meds:   lansoprazole  30 mg Per G Tube BID    insulin glargine  43 Units SubCUTAneous Nightly    albumin human  12.5 g IntraVENous Q8H    albuterol  2.5 mg Nebulization Q4H    insulin lispro  0-16 Units SubCUTAneous Q4H    Venelex   Topical BID    amiodarone bolus  150 mg IntraVENous Once    amiodarone  200 mg Oral Daily    chlorhexidine  15 mL Mouth/Throat BID    apixaban  5 mg Oral BID    sodium chloride flush  5-40 mL IntraVENous 2 times per day    polyethylene glycol  17 g Oral Daily     Continuous Infusions:   sodium chloride      sodium chloride      dextrose Stopped (10/10/22 1749)     PRN Meds:sodium chloride, oxyCODONE, hydroxypropyl methylcellulose, acetaminophen, sodium chloride flush, sodium chloride, ondansetron **OR** ondansetron, glucose, dextrose bolus **OR** dextrose bolus, glucagon (rDNA), dextrose, hydrALAZINE    Objective:   Vitals:   T-max:  Patient Vitals for the past 8 hrs:   BP Temp Temp src Pulse Resp SpO2   10/14/22 0730 (!) 121/54 -- -- 72 26 98 %   10/14/22 0728 -- -- -- 72 (!) 31 98 %   10/14/22 0715 (!) 107/44 97.8 °F (36.6 °C) Temporal 67 30 96 %   10/14/22 0703 (!) 106/47 97.8 °F (36.6 °C) Temporal 67 (!) 34 96 %   10/14/22 0647 (!) 100/41 97.8 °F (36.6 °C) Temporal 67 (!) 33 95 %   10/14/22 0600 (!) 101/45 -- -- 70 (!) 31 96 %   10/14/22 0530 (!) 121/46 -- -- 75 24 96 %   10/14/22 0500 (!) 111/54 -- -- 70 (!) 32 94 %   10/14/22 0414 -- -- -- 68 29 95 %   10/14/22 0400 (!) 125/49 97.9 °F (36.6 °C) Temporal 70 (!) 35 97 %   10/14/22 0330 (!) 99/45 -- -- 67 30 96 %   10/14/22 0300 (!) 106/48 -- -- 68 (!) 33 96 %   10/14/22 0230 (!) 111/44 -- -- 70 (!) 35 96 %   10/14/22 0200 (!) 96/45 -- -- 68 (!) 34 94 %   10/14/22 0130 (!) 98/46 -- -- 69 (!) 33 95 %   10/14/22 0100 (!) 107/48 -- -- 72 (!) 36 95 %   10/14/22 0030 (!) 115/51 -- -- 75 22 96 %   10/14/22 0026 -- -- -- 72 (!) 32 95 %   10/14/22 0000 (!) 111/53 98.1 °F (36.7 °C) Temporal 73 29 96 %   10/13/22 2357 -- -- -- 66 -- 95 %         Intake/Output Summary (Last 24 hours) at 10/14/2022 0750  Last data filed at 10/14/2022 0500  Gross per 24 hour   Intake 947 ml   Output 1475 ml   Net -528 ml             Physical Exam  Constitutional:       Comments: Sleepy this morning. Follows commands, responds to questions   HENT:      Head: Normocephalic and atraumatic. Cardiovascular:      Rate and Rhythm: Normal rate. Rhythm irregular. Comments: Atrial fibrillation  Pulmonary:      Breath sounds: No wheezing, rhonchi or rales. Abdominal:      General: There is no distension. Palpations: Abdomen is soft. Tenderness: There is no abdominal tenderness. There is no guarding. Musculoskeletal:      Right lower leg: Edema present. Left lower leg: Edema present.       Comments: Bilateral lower extremity pitting edema persists  2+ PE at the bilateral feet   Skin:     Comments: red linear rash on medial left foot without progression   Neurological:      Comments: Much improved mental status  Responsive to commands and questions         LABS:    CBC:   Recent Labs     10/12/22  0508 10/13/22  0420 10/14/22  0357   WBC 5.4 6.9 7.1   HGB 7.2* 7.5* 6.9*   HCT 22.6* 23.0* 21.7*    287 295   MCV 88.0 87.7 87.0       Renal:    Recent Labs     10/12/22  0508 10/13/22  0420 10/14/22  0357   * 139 138   K 4.4 4.3 4.0   CL 96* 98* 100   CO2 26 31 32   * 107* 103*   CREATININE 1.2 1.3 1.1   GLUCOSE 272* 180* 151*   CALCIUM 7.5* 7.7* 7.8*   MG 3.20* 3.60* 3.50*   PHOS 4.5 4.2 2.8   ANIONGAP 12 10 6       Hepatic:   Recent Labs     10/12/22  0508 10/13/22  0420 10/14/22  0357   LABALBU 1.7* 2.0* 2.2*       Troponin: No results for input(s): TROPONINI in the last 72 hours. BNP: No results for input(s): BNP in the last 72 hours. Lipids: No results for input(s): CHOL, HDL in the last 72 hours. Invalid input(s): LDLCALCU, TRIGLYCERIDE  ABGs:  No results for input(s): PHART, GLS4NCG, PO2ART, IOR6FNW, BEART, THGBART, G8RUURJD, HDR9BFI in the last 72 hours. INR: No results for input(s): INR in the last 72 hours. Lactate: No results for input(s): LACTATE in the last 72 hours. Cultures:  -----------------------------------------------------------------  RAD:   XR CHEST PORTABLE   Final Result      Distal portion of tracheostomy poorly visualized due to overlying medical devices. The position is without significant change since 10/5/2022. Bibasilar pleuroparenchymal consolidation. Stable cardiomediastinal silhouette. Right jugular central venous catheter without change. CT ABDOMEN PELVIS WO CONTRAST Additional Contrast? None   Final Result      Marked decrease in size of pneumomediastinum. Trace residual pneumoperitoneum. Extensive bilateral lower lobe pulmonary atelectasis with pleural effusions and trace residual right pneumothorax. XR CHEST PORTABLE   Final Result      Tiny right lateral pneumothorax is suspected, 5 mm in maximum thickness. This has decreased in size since 10/3/2022. Right basilar Pleurx catheter noted. Small bilateral pleural effusions. Prominent interstitial markings. Stable cardiac mediastinal silhouette. Lines and tubes without change. Subcutaneous emphysema noted. CT CHEST ABDOMEN PELVIS WO CONTRAST   Final Result      1. Severe pneumomediastinum. 2. Small to moderate right hydropneumothorax with similar fluid and gas components with a right-sided Pleurx catheter. 3. Moderate loculated left pleural effusion with atelectasis of the left lower lobe with patency of the central left lower lobe bronchi. 4. Fluid/material filling the bronchus intermedius and right lower lobe bronchi with complete consolidation and volume loss of the right lower lobe. Differential diagnosis would include mucous plugging or obstructing lesion. 5. Tracheostomy tube tip within the trachea   6. Severe subcutaneous emphysema in the neck. CT ABDOMEN AND PELVIS:      FINDINGS:      LIVER: Normal.      GALLBLADDER AND BILIARY TREE: No calcified gallstones. No gallbladder distention. No intra- or extrahepatic biliary dilatation. PANCREAS: Normal.      SPLEEN: Normal.      ADRENAL GLANDS: Normal.      KIDNEYS AND URETERS: Normal.      URINARY BLADDER: Ansari catheter present within collapsed urinary bladder. REPRODUCTIVE ORGANS: No associated masses. BOWEL: Normal diameter, nonobstructed. Feeding tube tip at the level of the ligament of Treitz. LYMPH NODES: No abnormally enlarged nodes. PERITONEUM/RETROPERITONEUM: Severe pneumoperitoneum extends into the upper abdomen with some of the symmetric multiseptated gas appearing deep to the diaphragm consistent with mild pneumoperitoneum (series 601 was 101). This is likely related to    pneumonia mediastinum dissecting into the abdomen. No fluid collection in the abdomen or pelvis. No ascites. VESSELS: Extensive atherosclerotic calcification of the aorta and iliac arteries. ABDOMINAL WALL: No acute abnormality. BONES: No acute abnormality. Mild chronic L1 compression fracture with previous vertebroplasty. IMPRESSION:      1.  Severe pneumomediastinum extends into the upper abdomen with small pneumoperitoneum likely related to extension of pneumoperitoneum into the upper peritoneal cavity. 2. No fluid collection in the abdomen or pelvis. Results were discussed with the surgical team at 7:00 PM on 10/3/2022. XR CHEST PORTABLE   Final Result      Stable appearance of loculated right pneumothorax, with loculated components in the lateral right midlung region and in the right lateral costophrenic sulcus. Stable scattered areas of atelectasis versus scar, most prominent in the medial right lung base and in the left lateral lung base. XR CHEST PORTABLE   Final Result      Small right basilar pneumothorax. Right basilar chest tube without change. Patchy consolidation in the right mid and lower lung as well as the left lower lung-atelectasis versus pneumonia. Trace left pleural effusion. Normal heart size. Lines and tubes without change. Mild improvement in degree of subcutaneous emphysema in the chest and neck. XR CHEST 1 VIEW   Final Result      1. Stable small right-sided pneumothorax and prominent subcutaneous emphysema along the neck and upper superior chest wall, greater in right lung stable   2. Stable left basilar consolidation and pleural effusion      3. Stable appearing perihilar accentuated markings or airspace disease            XR CHEST PORTABLE   Final Result      Stable small right-sided pneumothorax. More prominent emphysema over the lower neck. No change in bilateral airspace disease. Stable left pleural effusion. XR CHEST PORTABLE   Final Result   1. Bilateral multifocal pneumonia with improvement in the right    pulmonary consolidation since prior study from 9/23/22   2. Mild to moderate left pleural effusion          XR CHEST PORTABLE   Final Result   1. Support lines and tubes are stable. 2.  Stable small right lateral pneumothorax and right basilar    chest tube. 3.  Stable patchy bilateral airspace disease. XR CHEST PORTABLE   Final Result   1.  Satisfactory position of right internal jugular central line   2. No interval change in endotracheal tube and nasogastric tube positioning. 3. Extensive bilateral airspace disease with no changes. 4. Left pleural effusion with retrocardiac opacity, unchanged   5. Small stable tiny right lateral pneumothorax and stable position of right chest tube,, Pleurx catheter. XR ABDOMEN (KUB) (SINGLE AP VIEW)   Final Result   1. No residual pneumothorax. 2.  Mild patchy airspace disease bilaterally worse on the right than on prior examination. 3.  Non-obstructive bowel gas pattern. Mildly distended stomach. XR CHEST PORTABLE   Final Result   1. No residual pneumothorax. 2.  Mild patchy airspace disease bilaterally worse on the right than on prior examination. 3.  Non-obstructive bowel gas pattern. Mildly distended stomach. XR CHEST PORTABLE   Final Result      1. Small right pneumothorax appears slightly increased. 2. Mild patchy airspace opacity bilaterally. XR CHEST PORTABLE   Final Result     Pleurx catheter at the right lung base. Trace right    pneumothorax is unchanged. XR CHEST PORTABLE   Final Result      Right-sided chest tube evident in the base, its tip medially. Improvement in the right-sided pneumothorax, since earlier today. Possible medial collapse of the right lower lobe. Small left effusion. Patchy airspace density/atelectasis in the lungs bilaterally, with no other significant change. XR CHEST PORTABLE   Final Result   1. Right-sided Pleurx catheter in satisfactory position in the lung bases   2.  Right-sided post operative pneumothorax, approximately 10%               Assessment/Plan:   Vlad Harper is a 65 y.o. male w/PMH HFpEF, carotid artery stenosis, HTN, OA, Restrictive Lung Disease, DM2, HLD who underwent VATS with PleurX catheter placement on 09/19 for recurrent b/l loculated pleural effusions w/subsequent BIPAP failure and hypoxic/hypercapnic respiratory failure requiring Mechanical intubation. Pulmonary:  #Acute Hypoxic/Hypercapnic Respiratory Failure  W/Recurrent Pleural Effusions s/p VATS and PleurX for pleural fluid management 09/19/22, w/associated unintentional weight loss of 30 Ibs, PFTs w/restrictive defect.   -failed to tolerate BIPAP post-op --> Mechanically intubated  Intubation Status:   -trach   -current vent settings: RR 14, , FiO2 40%, PEEP 5  - PleurX catheter in place  - Trach in place  - CTS following   -10/13: Pluerx cath drained 180 ml out.   - VSV trial wean  - await Ach antibodies for neuromuscular weakness workup    ID:    #Leukocytosis, resolved  -afebrile, WBC 7.1  -Bronch respiratory cx: no WBC or organism  - s/p cefepime 7 days    Cardiovascular:     #HFpEF  -w/fluid overload  -w/downtrending BNP  -fluid status:  1.5 L output today, -0.5L on day   -nephro on board: holding off on diuretics due to renal function, continue to appreciate recs    #Hypotension, resolved  -norepinephrine previously which remains off  -monitor BP    Chronic:  #Paroxysmal Atrial Fibrillation   -Eliquis held, amiodarone  #HLD  -pravastatin 40mg    Renal:  Ansari in place w/good urine output  -continue to monitor    #ZANA  -2/2 ATN w/likely etiology of hypoperfusion due to hypovolemia in addition to  -possible contrast-associated nephropathy  -holding off on diuretics per nephro  -UOP slightly decreased today  -creatinine 1.1 <- 1.3 today    #Hypernatremia, resolved  -receiving free water flushes of 30 ml Q4H since 10/3/22  W/improvement of Na     GI:  #Nutrition  - PEG in place    Metabolic    #DM2  - glu better controlled this morning  -HDSS  -Lantus 43u  -POCT  -hypoglycemia protocol  -A1c: 8.1 (1/24/22)     Heme    #Anemia  - s/p 3rd U pRBC transfusion 10/14.   - await post transfusion H&H  - continue to monitor H&H    Neuro:  -analgesia: Tylenol, on prn oxycodone 7.5 mg    Wound:  Sacral wound  - wound care following      Code Status: FULL CODE  FEN: Diet NPO  ADULT TUBE FEEDING; PEG; Standard with Fiber; Continuous; 30; Yes; 10; Q 4 hours; 50; 30; Q 4 hours; Protein; 2 bottles Proteinex daily w/ 30 mL FW flushes before and after  PPX: Pepcid  DISPO: ICU    This patient was seen and discussed with Dr Leonie Pabon. Berta Pruitt MD, PGY-1  10/14/22  7:50 AM    Patient seen, examined and discussed with the resident and I agree with the assessment and plan. Vent Mode: AC/PRVC Resp Rate (Set): 14 bmp/Vt (Set, mL): 375 mL/ /FiO2 : 40 %  PEEP 5  No results for input(s): PHART, HTQ7OOU, PO2ART in the last 72 hours. Working with PT/OT this morning and is so weak he can barely lift his head. His personality appears to be in tact. Started on IV albumin by primary team.  Unfortunately we have a national shortage of albumin and it will only transiently correct his oncotic pressure until we can get his nutritional status improved. With his debility and inflammation it's going to take months to get that corrected. We are changing his TF formula to a higher protein mix.       Daksha Crawford MD

## 2022-10-14 NOTE — PROCEDURES
[facility] THERAPY  Fiberoptic Endoscopic Evaluation of Swallowing  (FEES) / Dysphagia Tx      Name: Onel Abdul  YOB: 1945  Gender: male  MRN: [id number]  Account #: [de-identified]  Referring Physician: Kacie Nelson  Diagnosis: oropharyngeal dysphagia  Onset Date: 9/19/22  History of Present Illness/Injury: Per HPI, Karen Dunn is a 64 yo Male with a PMH of CHF (CHFpEF), carotid stenosis, HLD, HTN, OA, Restrictive Lung Disease, T2DM who underwent VATS with Pleurx catheter placement on 9/19/2022 for recurrent bilateral loculated pleural effusions and has had hypoxia/hypercapnia since that time now requiring intubation. The etiology of his recurrent pleural effusions is unclear. Possibly malignancy. In addition to his recurrent bilateral loculated pleural effusions, he has lost over 100 lbs over the last year, some of which his wife attributes to intentional weight loss. Initially, he was started on BiPAP post-procedure for rapid shallow breathing and slow clearance of sedation. He wasn't tolerating the BiPAP well, trying to remove it. After discussion with family and patient (while on a break from BiPAP), the decision was made to intubate and confirmed that he's OK with tracheostomy if necessary. \" Tracheostomy was placed 9/30 and PEG placed 10/11.      Patient Active Problem List    Diagnosis Date Noted    Malnutrition ([address] 10/14/2022    Mucus plugging of bronchi 10/05/2022    ZANA (acute kidney injury) ([address] 09/26/2022    Acute on chronic respiratory failure with hypoxia and hypercapnia ([address] 09/22/2022    Pleural effusion on right 09/19/2022    Chronic heart failure with preserved ejection fraction (HFpEF) ([address] 06/07/2022    Hypoxia 04/03/2022    Atrial fibrillation with rapid ventricular response ([address] 03/04/2022    Exertional dyspnea 01/27/2022    Closed displaced fracture of lateral malleolus of left fibula 11/14/2021    Spinal stenosis of lumbar region 10/17/2018 Closed compression fracture of L1 lumbar vertebra 10/17/2018    Trigger ring finger of right hand 06/15/2017    Subacromial impingement 04/03/2015    Rotator cuff tear 02/27/2015    Glenohumeral arthritis 02/27/2015    DM type 2 (diabetes mellitus, type 2) ([facility] 11/18/2013    ED (erectile dysfunction) 01/12/2012    OA (osteoarthritis) of knee 10/12/2011    Carotid stenosis, left 10/12/2011    Hearing loss 10/12/2011    HTN (hypertension) 10/07/2011    Hyperlipidemia 10/07/2011     Date of Exam: 10/14/22    Recent Chest X-ray (10/13)-   Impression       Distal portion of tracheostomy poorly visualized due to overlying medical devices. The position is without significant change since 10/5/2022. Bibasilar pleuroparenchymal consolidation. Stable cardiomediastinal silhouette. Right jugular central venous catheter without change. Previous SLP Evaluations-  BSE Impressions (10/2)  Dysphagia Diagnosis: Suspected needs further assessment  RN states okay to attempt assessment. Dr Rivera [place] speaking with spouse stating pt most likely still with effects of sedation. Pt was intubated 9/22- 9/30/22. Trach placed 9/30, with pt on ventilator. Pt shook head yes/no intermittently to questions asked. Pt made no attempt to mouth words. Pt exhibiting open mouth posture, did not form labial seal or protrude /lateralize tongue on command. Oral care completed, pt demonstrating no oral response. Placed 2 single ice chips in oral cavity, again with no response - no lingual movement noted. Pt did close lips with tactile stim x 2. No swallow movement was felt upon palpation of anterior neck. O2 sats remained stable and RR remained in mid 20's. Recommend aggressive oral care 2-3 x/day with suction kit. Plan to re-assess as pt appropriate. Dysphagia Diagnosis: Suspected needs further assessment  Dysphagia Outcome Severity Scale: Level 1: Severe dysphagia- NPO.  Unable to tolerate any PO safely      Patient Complaints/Reason for Referral:  Marylin Lebron was referred for a FEES to assess the efficiency of his/her swallow function, assess for aspiration, and to make recommendations regarding safe dietary consistencies, effective compensatory strategies, and safe eating environment. Patient complaints: none expressed  Behavior / Cognition: alert, cues to follow commands, readily accepted trials presented    SUBJECTIVE: Pt seated upright in bed with wife and brother present. Pt with increased alertness and engagement compared to past few days. Mod verbal cues required for following commands. IMPRESSIONS:   Pt with relatively non-functional swallow at this time and met 'bail out' criteria (i.e. full protocol not completed d/t severity of impairments and pt safety). Severely impaired airway protection marked by pattern of penetration and aspiration of ice chips, thin liquids and mildly thick liquids during and after the swallow. Severely impaired swallow peristalsis marked by pattern of severe residue after completion of swallow remaining in the vallecula, lateral channels, and pyriforms. Physiological impairments c/w these findings include reduced tongue base retraction, decreased hyolaryngeal elevation/excursion with incomplete epiglottic inversion. RECOMMENDATIONS:  Diet: [x] NPO   [x] Alternative means of nutrition / hydration  [] PO  Solid Consistency: NPO  Liquid Consistency: NPO  Liquid Administration via: alternate means   Medication Administration: alternate means  Pt currently receiving tube feeds through PEG.   Allow ice chips and small tsps water ONLY AFTER completing oral care with suction   Compensatory Techniques: Hard and fast swallows, seated fully upright  Positioning/Supervision: Upright  Referral to: [x] SLP (Dysphagia Tx)   [] ENT  [] GI    Current Diet Order:Diet NPO  ADULT TUBE FEEDING; PEG; Peptide Based; Continuous; 30; Yes; 10; Q 4 hours; 65; 30; Q 4 hours; Protein; 1 bottles Proteinex daily w/ 30 mL FW flushes before and after    Pain:  none indicated prior to start of FEES    ORAL MOTOR EXAMINATION:  Alert : [x] Yes  [] No    Functional Comm : [] intact  [x] impaired [] absent  Voice Quality : [] normal  [x] hoarse  [x] wet  [] aphonic  [] breathy  [] Strained : when assessed with PMV in-line in place  Tracheotomy : [x] Yes  [] No    Ventilator Dependent : [x] Yes  [] No   Velopharyngeal competence : [] intact  [] impaired [] Absent [x] DNT : unable to assess d/t PMV not in place when passing scope   Gag : LEFT: [] intact  [] impaired [] absent  [x] DNT   RIGHT: [] intact  [] impaired [] Absent [x] DNT  Volitional Cough : [] intact  [x] impaired [] absent   Volitional Swallow : [] intact  [x] impaired [] absent : mod-max cues and wait time for volitional swallow initiation   Spontaneous Swallow : [] intact  [x] impaired [] absent   Laryngeal Elevation : [] intact  [x] impaired [] absent    ENDOSCOPIC EXAMINATION:  Vocal Fold adduction : [] Complete  [x] Incomplete   Interarytenoid/Post-com Edema : [] Yes  [x] No   Arytenoid Edema : [] Yes  [x] No   Arytenoid Erythema : [] Yes  [x] No   Vocal Fold Edema : [] Yes  [x] No   Pharyngeal Squeeze : LEFT: [] intact  [x] impaired [] absent      RIGHT:[] intact  [x] impaired [] absent    SWALLOWING EVALUATION:  Testing position :[] chair, upright  [] chair, 45 degrees  [x] bed, upright     [] bed, 45 degrees [] fully upright  Baseline Pooling of Secretions : [x] Yes  [] No   Baseline Penetration of Secretions : [x] Yes  [] No   Baseline Aspiration of Secretions : [] Yes  [x] No  Backflow of Secretions : [x] Yes  [] No    CONSISTENCIES TRIALED :  Thin liquid - ice chips, 1/2 tsp, 1 tsp - Aspiration across all trials during/after the swallow with severe pooling of secretions/residue after completion of swallow in the vallecula, lateral channels and in the pyriforms.  Aspiration confirmed with placing of PMV and green dye secretions pooling out through the trach/stoma, required suction. Nectar thick liquid - 1/2 tsp - Aspiration during the swallow with additional severe pooling of secretions mixed with residue after completion of swallow in the vallecula, lateral channels and in the pyriforms. Aspiration confirmed with placing of PMV and green dye secretions pooling out through the trach/stoma, required suction. SLP completed treatment after completion of trials. PMV in-line was placed by respiratory with initial cough. Pt required increased wait time and continuous cues to attempt a volitional cough and complete x10 hard and fast volitional swallows, both of which were unsuccessful at clearing residuals/secretions remaining in laryngeal space. Pt with increased, yet weak gravely voicing this session with PMV. COMPENSATORY TECHNIQUES FOR INCREASED SAFETY:  Postural Changes : [x] Yes  [] No   Comments: seated fully upright when accepting trials of ice chips/tsps water     INCREASED RISK OF ASPIRATION DUE TO:  Poor Oral control and/or copious oral residue : [] Yes  [x] No   Redcued laryngopharyngeal sensation : [x] Yes  [] No  Premature spillage of bolus : [] Yes  [x] No  Inability to clear material from valleculae, pharynx, pyriform sinus or endolarynx : [x] Yes  [] No   Backflow to Pharynx : [x] Yes  [] No     Other: n/a    Endoscope was removed without incident, no adverse reactions.     PENETRATION-ASPIRATION SCALE (PAS)  [x] 8 Material enters the airway, passes below the vocal folds, and no effort is made to eject  [] 7 Material enters the airway, passes below the vocal folds, and is not ejected from the trachea despite effort  [] 6 Material enters the airway, passes below the vocal folds, and is ejected into the larynx or out of the airway  [] 5 Material enters the airway, contacts the vocal folds, and is not ejected into the airway  [] 4 Material enters the airway, contacts the vocal folds, and is ejected from the airway  [] 3 Material enters the airway, remains above the vocal folds, and is not ejected from airway  [] 2 Material enters the airway, remains above the vocal folds, and is ejected from airway  [] 1 Material does not enter the airway    Dysphagia Treatment Goals  Goal 1: Pt will complete effortful swallows with trials of ice chips and tsps water after completion of oral care. RECOMMENDED COMPENSATORY STRATEGIES / POSTURAL CHANGES:  Seated fully upright with ice chips and tsps water, hard and fast swallows. EDUCATION:  Educated to FEES procedure, risks associated with procedure (including but not limited to discomfort and bleeding), rationale for completing, results of session, and recommendations. Plan:  Continued dysphagia tx Duration of Treatment: 1-2x per week for length of stay  Diet recommendations: NPO, alternate means of nutrition/hydration/medication   -allow ice chips and tsps water ONLY AFTER oral care with suction     DC recommendation: TBD  Treatment: 30  D/W nursing: Bryon  Needs met prior to leaving room, call button in reach.     Electronically signed by:  Becki Morfin M.A., [address] [place] [address]  Speech-Language Pathologist  Pg #: [phone]

## 2022-10-14 NOTE — PROGRESS NOTES
Speech Language Pathology  Facility/Department: [address] ICU  Dysphagia Daily Treatment Note    NAME: Rosario Long  : 1945  MRN: [id number]    Patient Diagnosis(es):   Patient Active Problem List    Diagnosis Date Noted    Mucus plugging of bronchi 10/05/2022    ZANA (acute kidney injury) ([facility] 2022    Acute on chronic respiratory failure with hypoxia and hypercapnia ([address] 2022    Pleural effusion on right 2022    Chronic heart failure with preserved ejection fraction (HFpEF) ([address] 2022    Hypoxia 2022    Atrial fibrillation with rapid ventricular response ([address] 2022    Exertional dyspnea 2022    Closed displaced fracture of lateral malleolus of left fibula 2021    Spinal stenosis of lumbar region 10/17/2018    Closed compression fracture of L1 lumbar vertebra 10/17/2018    Trigger ring finger of right hand 06/15/2017    Subacromial impingement 2015    Rotator cuff tear 2015    Glenohumeral arthritis 2015    DM type 2 (diabetes mellitus, type 2) ([address] 2013    ED (erectile dysfunction) 2012    OA (osteoarthritis) of knee 10/12/2011    Carotid stenosis, left 10/12/2011    Hearing loss 10/12/2011    HTN (hypertension) 10/07/2011    Hyperlipidemia 10/07/2011     Allergies: Allergies   Allergen Reactions    Torsemide Shortness Of Breath    Dye [Iodides]      1970's - ?? CXR (10/3/22)-  Impression       Stable appearance of loculated right pneumothorax, with loculated components in the lateral right midlung region and in the right lateral costophrenic sulcus. Stable scattered areas of atelectasis versus scar, most prominent in the medial right lung base and in the left lateral lung base. Previous MBS -  N/A    Chart reviewed.     Medical Diagnosis: Pleural effusion, right [J90]  Pleural effusion on right [J90]   Treatment Diagnosis: Oropharyngeal dysphagia    BSE Impression (10/2/22)-  RN states okay to attempt assessment. Dr Taurus Barney speaking with spouse stating pt most likely still with effects of sedation. Pt was intubated 9/22- 9/30/22. Trach placed 9/30, with pt on ventilator. Pt shook head yes/no intermittently to questions asked. Pt made no attempt to mouth words. Pt exhibiting open mouth posture, did not form labial seal or protrude /lateralize tongue on command. Oral care completed, pt demonstrating no oral response. Placed 2 single ice chips in oral cavity, again with no response - no lingual movement noted. Pt did close lips with tactile stim x 2. No swallow movement was felt upon palpation of anterior neck. O2 sats remained stable and RR remained in mid 20's. Recommend aggressive oral care 2-3 x/day with suction kit. Plan to re-assess as pt appropriate. Dysphagia Diagnosis: Suspected needs further assessment    FEES: planning to complete Friday or Saturday of this week, as schedule allows    Pain: None indicated     Current Diet : ADULT TUBE FEEDING; Nasogastric; Diabetic; Continuous; 10; Yes; 10; Q 4 hours; 45; 300; Q 4 hours; Protein; 2 bottles Proteinex daily w/ 30 mL FW flushes before and after  Diet NPO   Recommended Form of Meds: Via alternative means of nutrition      Treatment:  Pt seen bedside to address the following goals:   1. The patient will tolerate repeat bedside swallowing evaluation when able. 10/3 - Pt positioned upright and aggressive oral care was completed with suction toothbrush. Vitals reading had poor waveform and appeared to be unreliable readings; RN notified and in to assess. The patient demo'd spontaneous swallows without PO with audible air escaping around stoma. Pt tolerated 4/4 ice chips with constant verbal cues for appropriate acceptance and manipulation of ice (I.e.close mouth, chew before you swallow, etc). There was no coughing but not likely clinically relevant due to research indicating high prevalence of SILENT aspiration in cuff inflated tracheostomy patients. Pt not yet appropriate for instrumental swallow evaluation but will require out prior to advancing diet. Notes indicate plan for SBT today. Requested order for PMV which will hopefully be receive and coincide with improved alertness and weaning from ventilator. Continue goal.  10/4: Cleared by RN. No order for PMV yet. Received pt more awake/alert. Wife present. Pt remains on trach/vent. Oral care recently completed. Repositioned pt upright. Targeted swallow function via trials ice chips and small sips h2o; pt with positive oral acceptance, no anterior loss, appropriate and timely oral prep, seemingly positive swallow movement. Attempted to check clearance via oral suction; evidently dry with no material suctioned. As previously noted, no cough, however would be unlikely to occur given inflated cuff tracheostomy. Would recommend waiting on instrumental pending ability to place PMV (assuming order will be received in the near future), as that will likely support pt's swallow function, and show improved function. Cont goal  10/6: Pt positioned upright in bed. Thorough oral care and suctioning provided. Pt continues to be more alert/awake and active in his care/treatment. Family present. Pt completed 20x effortful swallows 5x swallows/ ice chips to reduce further swallow deconditioning. RR & 02 stable across low level ice chip trials. Audible air escape and secretions around trach appreciated. Per RN, Dr. Miguel Hill aware. Discussed need for instrumental swallow evaluation with pt and family. FEES to be completed as schedule permits, preferably prior to PEG determination. Cont goal.   10/7: Pt seated upright in bed and SLP assisted with oral care via suction toothbrush. Pt with increased alertness and agreeable to accept trials of ice chips. X25 effortful swallows with ice chips completed this session.  Pt with intermittent air escape and secretions noted around trach, however per discussion with SLP who saw pt yesterday and per family, both looked less. RR and O2 remained stable across all trials. SLP re-discussed possibility of completing FEES and expressed will reach out to other SLP to see if it can be completed prior to PEG placement on Monday. Family and pt demonstrated understanding and agreement at this time. Cont. 10/12: Pt seated upright in bed. SLP provided oral care via suction toothbrush. Clean oral cavity noted. Pt with min brown secretions noted around trach prior to administering ice chips. Pt readily accepted x10 ice chips and demonstrated adequate mastication. Pt with facial grimace when attempting to swallow all ice chips. Pt with stable RR and O2 during ice chips administration. Audible secretions noted x2. Pt with continued lethargy and cues required to maintain alertness. Pin point pupils/reduced visual tracking, however followed basic 1-step commands adequately. SLP re-discussed FEES completion at end of this week (either Friday or Saturday) depending on pt's status to accept PO with increased alertness. ? Pt comprehension, however wife present and demonstrated understanding and agreement. Cont. 10/13: Pt sitting upright in bed, wife present. He is reportedly more alert today though does not always use head nod/facial expressions for answering yes/no questions. Make no attempts to point to items on communication board. Patient accepted oral care via suction toothbrush, oral cavity noted to be clean. Patient accepted ice chips x15 from spoon; Facial grimaces observed with swallow initiation. Increased secretions around stoma during ice chip trials. Stable RR and O2 during trials. Patient does appear appropriate to participate in FEES next date if presentation is similar to this session. Provided education to patient and wife, who demonstrate understanding. Continue goal.   10/14: Pt seated upright in bed with wife present. SLP assisted with with oral care via suction toothbrush. Clean oral cavity noted.  Pt with increased alertness and engagement across entire session this date. Pt readily accepted x15 ice chips and completed effortful swallows across all. Intermittent secretions around stoma required suctioning at times. SLP expressed FEES to be completed later this afternoon. Pt in wife in agreement and demonstrated understanding. Cont. 2. The patient/caregiver will demonstrate understanding of compensatory strategies for improved swallowing safety. 10/3 - Educated on potential impact of trach on swallow function, rationale for oral care, recommendations for few single ice chips to be given to facilitate oral mucosal integrity and preserve swallow function that is present. Introduced plan for PMV in future, rationales for this. Educated on limitations in pts alertness at this time and need for improvement for completing swallow study, therapeutic effectiveness, etc. Continue goal.   10/4: Discussed swallow function with patient/wife. Also reviewed recommendation for PMV, hopefully in near future (order not yet received). Discussed how PMV may also improve swallow function be supporting necessary pressure for swallowing. Reviewed recommendation for small amounts ice/small sips h2o with effortful swallow to support swallow function/maintenance. Wife and pt indicated comprehension. Cont goal  10/5: Thorough education this date re: potential adverse affects of trach/vent on swallow function, need for instrumentation, need for PMV & importance of oral care. Wean trial failed this date per Dr. Sherren Conner. Audible air/secretion leakage around trach. PMV trials are likely not appropriate at this time and orders have not been provided. Reviewed continued recommendation for low level ice chips s/p oral care to support swallow function/ maintenance, improve pt QOL/ comfort and encourage oral care.    10/7: Discussed education regarding rationale for completing trials ice chips with effortful swallow, rec's for PMV as able, and overall importance of oral care, and possibility of completing further swallow assessment via FEES when able. Pt and family present demonstrated understanding. Cont. 10/12: As noted above with wife present. Discussed rationale for completing ice chips+effortful swallow trials to continue stimulating oropharyngeal swallow function while pt NPO. Pt requires re-education. Wife demonstrated understanding. Cont. 10/13: Patient provided education re: risk of aspiration at this time, need for FEES to assess safety/ efficiency of swallow function. Wife with good understanding, patient appears to have improved understanding compared to previous sessions. Continue goal.  10/14: As noted above, discussed rationale for FEES to be completed this date, procedure details and trials to be assessed. Wife with adequate understanding, pt requires some reinforcement however continued improvement from previous sessions. Cont. 3. Patient will participate in [address] [employer] trials when appropriate/ orders received  10/6- Speech/ Communication Treatment: Pt with increased communicative frustration. PMV trials are likely not appropriate at this time and orders have not been provided yet. Family stating they wanted to get him an IPAD with speech lauren. Discussed low tech vs high tech AAC and appropriateness for AAC in different populations. Given trach/vent is suspected to not be long-term and pt continues to be in the acute phase of care, high tech AAC is not recommended at this time. Pt provided communication board. All icons reviewed. Pt was able to spell out \"tired\" on the letter board. Recommend continued use of communication board, letter board & non-verbal communication (I.e lip reading, pointing, facial expressions etc) to facilitate patient expression of basic wants/needs. Patient and family demonstrated understanding. 10/7: Pt communicated via head nod/shake and use of gestures to communicate wants/needs this session.    10/12: Goal met 10/10. Separate note for targeting goals associated. Patient/Family/Caregiver Education:  Please see goal 2 above    Compensatory Strategies:  Oral care using suction toothbrush/toothette / suction system 3x per day   Single ice chips given by staff when pt awake and in upright position after oral care  Communication board      Plan:  Continue dysphagia/communication treatment with goals per plan of care. Diet recommendations: Single ice chips given by staff when pt awake and in upright position, ONLY after oral care   -FEES scheduled to be completed this afternoon   DC recommendation: Ongoing speech therapy is indicated   Treatment: 20  D/W nursing: Niko Mosqueda  Needs met prior to leaving room, call button in reach; Family at bedside.      Electronically signed by:  Yogesh Leon M.A., [address] [place] [address]  Speech-Language Pathologist  Pg #: [phone]    If patient is discharged prior to next treatment, this note will serve as the discharge summary

## 2022-10-14 NOTE — PROGRESS NOTES
Physical Therapy  Facility/Department: [address] [place] [address] ICU  Physical Therapy Initial Assessment and treatment    Name: Austyn Monge  :   MRN: [id number]  Date of Service: 10/14/2022    Discharge Recommendations:Cornelio Tavera scored a / on the AM-PAC short mobility form. Current research shows that an AM-PAC score of 17 or less is typically not associated with a discharge to the patient's home setting. Based on the patient's AM-PAC score and their current functional mobility deficits, it is recommended that the patient have 3-5 sessions per week of Physical Therapy at d/c to increase the patient's independence. Please see assessment section for further patient specific details. If patient discharges prior to next session this note will serve as a discharge summary. Please see below for the latest assessment towards goals. PT Equipment Recommendations  Equipment Needed:  (defer)      Patient Diagnosis(es): The primary encounter diagnosis was Chronic heart failure with preserved ejection fraction (HFpEF) ([address] Diagnoses of Hypoxia and Acute on chronic respiratory failure with hypoxia and hypercapnia (HCC) were also pertinent to this visit. Past Medical History:  has a past medical history of ZANA (acute kidney injury) ([address] Carotid stenosis, left, Chronic back pain, Chronic systolic (congestive) heart failure, Diastolic CHF ([address] Erectile dysfunction, Hyperlipidemia, Hypertension, Osteoarthritis, Restrictive lung disease, and Type II or unspecified type diabetes mellitus without mention of complication, not stated as uncontrolled. Past Surgical History:  has a past surgical history that includes Rotator cuff repair (Bilateral); knee surgery (Left); Cardiac catheterization (2022); Thoracoscopy (Right, 2022); Pleural Cath Insertion (N/A, 2022); tracheostomy (N/A, 2022); and Gastrostomy tube placement (N/A, 10/11/2022).     Assessment   Body Structures, Functions, Activity Limitations Requiring Skilled Therapeutic Intervention: Decreased functional mobility   Assessment: The pt is a 66 yo male who presents with pleural effusion, s/p R VATS, pleural cath placed and had post op respiratory difficulties. He is on trach vent and heavy assist of 2 with mobility. Unable to stand today. Pt with gross weakness though some muscle contraction in B LE R>L. Pt with impaired sitting balance and fatigue with mobility. He would benefit from further IP PT to improve his independence and safety with mobility. Treatment Diagnosis: impaired mobility 2/2 pleural effusion  Therapy Prognosis: Fair  Decision Making: High Complexity  Requires PT Follow-Up: Yes  Activity Tolerance  Activity Tolerance: Patient limited by fatigue;Patient limited by endurance     Plan   Physcial Therapy Plan  General Plan:  (2-5)  Current Treatment Recommendations: Strengthening, Balance training, Functional mobility training, Transfer training, Neuromuscular re-education, Safety education & training, Patient/Caregiver education & training  Safety Devices  Type of Devices: Left in bed, Nurse notified, Call light within reach, Bed alarm in place (wife present)     Restrictions  Position Activity Restriction  Other position/activity restrictions: up in chair, ambulate pt     Subjective   General  Chart Reviewed: Yes  Patient assessed for rehabilitation services?: Yes  Additional Pertinent Hx: Anders Moreno is a 65 y.o. male w/PMH HFpEF, carotid artery stenosis, HTN, OA, Restrictive Lung Disease, DM2, HLD who underwent VATS with PleurX catheter placement on 09/19 for recurrent b/l loculated pleural effusions w/subsequent BIPAP failure and hypoxic/hypercapnic respiratory failure requiring Mechanical intubation. Pt now on trach. Family / Caregiver Present: Yes (spouse)  Referring Practitioner: Nadia Ramesh DO  Diagnosis: pleural effusion  Follows Commands: Within Functional Limits  General Comment  Comments:  The pt presents supine in bed and willing to work with therapist.  Subjective  Subjective: Pt on trach but able to nod his head appropraitely to questions. Social/Functional History  Social/Functional History  Lives With: Spouse  Type of Home: House  Home Layout: One level  Home Access:  (1 step in from garage)  Bathroom Shower/Tub: Tub/Shower unit  Bathroom Toilet: Standard (sink next to commode)  Home Equipment: Walker, 4 wheeled, [address], [address]  ADL Assistance:  (assist drying after shower, dressed with min A)  Homemaking Assistance:  (pt did alot of cooking, wife did all other tasks)  Ambulation Assistance: Independent (furniture and wall walking)  Transfer Assistance: Independent  Active : Yes  Leisure & Hobbies: watching [employer]  Additional Comments: Social history obtained from wife. She reports he was slowly declining at home, and then 2 days previous to admission became severely week. He has had ~3 falls in last few months. Vision/Hearing  Vision  Vision: Impaired  Vision Exceptions: Wears glasses at all times    Cognition   Orientation  Overall Orientation Status:  (unable to assess, pt has trach vent)  Cognition  Overall Cognitive Status:  (pt unable to speak, is alert, nods yes and no, gestures)     Objective   Heart Rate: 69  Heart Rate Source: Monitor  BP: (!) 111/53  BP Location: Right upper arm  BP Method: Automatic  Patient Position: Semi fowlers  MAP (Calculated): 72.33  Resp: (!) 33  SpO2: 100 %  O2 Device: Ventilator    Strength RLE  Comment: 2-/5 DF. 1/5 knee extensors, 1/5 knee flexors  Strength LLE  Comment: 1/5 DF- did not detect other movement with MMT        Balance  Sitting:  (Pt sat on side of bed for ~5 minutes with max A to dep, pt initially leaning poster. progressing to severe L lean.   In sitting pt in posterior pelvic tilt with head flexed, he brought head up briefly to look at Dr. Shereen Spears.)  Bed mobility  Rolling to Left: 2 Person assistance (dep x 2)  Supine to Sit: 2 Person assistance (dep x 2)  Sit to Supine:  (dep of 2)           Balance  Sitting - Static: Poor;-  Comments: Pt sat EOB x 5 min with max A and posterior; L lean. Pt with difficulty lifting his head up but does attempt with cueing. Pt attempting LAQ with R LE and able to move extremity slightly     Treatment:  Functional mobility training and pt education      AM-PAC Score  AM-PAC Inpatient Mobility Raw Score : 6 (10/14/22 1447)  AM-PAC Inpatient T-Scale Score : 23.55 (10/14/22 1447)  Mobility Inpatient CMS 0-100% Score: 100 (10/14/22 1447)  Mobility Inpatient CMS G-Code Modifier : CN (10/14/22 1447)       Goals  Short Term Goals  Time Frame for Short Term Goals: By discharge  Short Term Goal 1: The pt will perform supine to sit with max A x 1  Short Term Goal 2: The pt will sit EOB x 10 min with mod A x 1  Short Term Goal 3: The pt will be able to attempt stand with nacho hassan. Patient Goals   Patient Goals : To get better per wife       Education  Patient Education  Education Given To: Patient; Family  Education Provided: Role of Therapy;Plan of Care  Education Provided Comments: importance of mobility  Education Method: Verbal  Barriers to Learning: Hearing  Education Outcome: Continued education needed;Verbalized understanding (wife verbalized understanding)      Therapy Time   Individual Concurrent Group Co-treatment   Time In  1032         Time Out  1110         Minutes  38          Timed Code Treatment Minutes:   23 minutes    Total Treatment Minutes:  38 minutes         Uche Gentile, PT

## 2022-10-15 NOTE — PROGRESS NOTES
ICU Progress Note    Admit Date: 9/19/2022  Day: 26  Vent Day: None  IV Access: Peripheral, R IJ triple lumen  IV Fluids:None  Vasopressors:None                Antibiotics: None  Diet: Diet NPO  ADULT TUBE FEEDING; PEG; Peptide Based; Continuous; 30; Yes; 10; Q 4 hours; 65; 30; Q 4 hours; Protein; 1 bottles Proteinex daily w/ 30 mL FW flushes before and after  Trach: AC/PRVC Resp Rate (Set): 14 bmp/Vt (Set, mL): 375 mL/ /FiO2 : 40 %  PEEP 5    CC: Pleural Effusion (right) s/p VATS and Pleural Catheter Placement 09/19    Interval history:   No acute events overnight. More alert. Very weak. Denies pain. Hgb this morn 7.9. Cont on PEG.       Medications:     Scheduled Meds:   insulin glargine  15 Units SubCUTAneous Nightly    lansoprazole  30 mg Per G Tube BID    albumin human  12.5 g IntraVENous Q8H    albuterol  2.5 mg Nebulization Q4H    insulin lispro  0-16 Units SubCUTAneous Q4H    Venelex   Topical BID    amiodarone bolus  150 mg IntraVENous Once    amiodarone  200 mg Oral Daily    chlorhexidine  15 mL Mouth/Throat BID    apixaban  5 mg Oral BID    sodium chloride flush  5-40 mL IntraVENous 2 times per day    polyethylene glycol  17 g Oral Daily     Continuous Infusions:   sodium chloride      sodium chloride      dextrose Stopped (10/10/22 1749)     PRN Meds:sodium chloride, oxyCODONE, hydroxypropyl methylcellulose, acetaminophen, sodium chloride flush, sodium chloride, ondansetron **OR** ondansetron, glucose, dextrose bolus **OR** dextrose bolus, glucagon (rDNA), dextrose, hydrALAZINE    Objective:   Vitals:   T-max:  Patient Vitals for the past 8 hrs:   BP Temp Temp src Pulse Resp SpO2 Weight   10/15/22 0600 (!) 114/52 -- -- 61 29 96 % 192 lb 0.3 oz (87.1 kg)   10/15/22 0500 (!) 118/56 -- -- 78 15 95 % --   10/15/22 0407 -- -- -- 61 30 -- --   10/15/22 0401 -- -- -- 62 30 100 % --   10/15/22 0400 (!) 121/55 98.2 °F (36.8 °C) Temporal 63 27 100 % --   10/15/22 0300 (!) 116/55 -- -- 70 29 99 % --   10/15/22 0200 (!) 102/49 -- -- 65 (!) 33 100 % --   10/15/22 0100 (!) 105/55 -- -- 63 28 99 % --   10/15/22 0001 -- -- -- 72 (!) 35 100 % --   10/15/22 0000 130/63 98 °F (36.7 °C) Temporal 72 21 100 % --   10/14/22 2341 -- -- -- 71 -- -- --   10/14/22 2300 (!) 91/42 -- -- -- (!) 0 -- --         Intake/Output Summary (Last 24 hours) at 10/15/2022 0652  Last data filed at 10/15/2022 0641  Gross per 24 hour   Intake 3341.5 ml   Output 1980 ml   Net 1361.5 ml         Physical Exam  Constitutional:       Comments: Sleepy this morning. Follows commands, responds to questions   HENT:      Head: Normocephalic and atraumatic. Cardiovascular:      Rate and Rhythm: Normal rate. Rhythm irregular. Comments: Atrial fibrillation  Pulmonary:      Breath sounds: No wheezing, rhonchi or rales. Abdominal:      General: There is no distension. Palpations: Abdomen is soft. Tenderness: There is no abdominal tenderness. There is no guarding. Musculoskeletal:      Right lower leg: Edema present. Left lower leg: Edema present.       Comments: Bilateral lower extremity pitting edema persists  2+ PE at the bilateral feet   Skin:     Comments: red linear rash on medial left foot without progression   Neurological:      Comments: Much improved mental status  Responsive to commands and questions       LABS:    CBC:   Recent Labs     10/13/22  0420 10/14/22  0357 10/14/22  1759 10/15/22  0420   WBC 6.9 7.1  --  8.9   HGB 7.5* 6.9* 8.0* 7.9*   HCT 23.0* 21.7* 24.0* 24.3*    295  --  312   MCV 87.7 87.0  --  87.5       Renal:    Recent Labs     10/13/22  0420 10/14/22  0357 10/15/22  0421    138 144   K 4.3 4.0 3.8   CL 98* 100 105   CO2 31 32 31   * 103* 94*   CREATININE 1.3 1.1 1.0   GLUCOSE 180* 151* 89   CALCIUM 7.7* 7.8* 7.9*   MG 3.60* 3.50* 3.50*   PHOS 4.2 2.8 2.7   ANIONGAP 10 6 8       Hepatic:   Recent Labs     10/13/22  0420 10/14/22  0357 10/15/22  0421   LABALBU 2.0* 2.2* 2.5* -----------------------------------------------------------------  RAD:   XR CHEST PORTABLE   Final Result      Distal portion of tracheostomy poorly visualized due to overlying medical devices. The position is without significant change since 10/5/2022. Bibasilar pleuroparenchymal consolidation. Stable cardiomediastinal silhouette. Right jugular central venous catheter without change. CT ABDOMEN PELVIS WO CONTRAST Additional Contrast? None   Final Result      Marked decrease in size of pneumomediastinum. Trace residual pneumoperitoneum. Extensive bilateral lower lobe pulmonary atelectasis with pleural effusions and trace residual right pneumothorax. XR CHEST PORTABLE   Final Result      Tiny right lateral pneumothorax is suspected, 5 mm in maximum thickness. This has decreased in size since 10/3/2022. Right basilar Pleurx catheter noted. Small bilateral pleural effusions. Prominent interstitial markings. Stable cardiac mediastinal silhouette. Lines and tubes without change. Subcutaneous emphysema noted. CT CHEST ABDOMEN PELVIS WO CONTRAST   Final Result      1. Severe pneumomediastinum. 2. Small to moderate right hydropneumothorax with similar fluid and gas components with a right-sided Pleurx catheter. 3. Moderate loculated left pleural effusion with atelectasis of the left lower lobe with patency of the central left lower lobe bronchi. 4. Fluid/material filling the bronchus intermedius and right lower lobe bronchi with complete consolidation and volume loss of the right lower lobe. Differential diagnosis would include mucous plugging or obstructing lesion. 5. Tracheostomy tube tip within the trachea   6. Severe subcutaneous emphysema in the neck. CT ABDOMEN AND PELVIS:      FINDINGS:      LIVER: Normal.      GALLBLADDER AND BILIARY TREE: No calcified gallstones. No gallbladder distention.   No intra- or extrahepatic biliary dilatation. PANCREAS: Normal.      SPLEEN: Normal.      ADRENAL GLANDS: Normal.      KIDNEYS AND URETERS: Normal.      URINARY BLADDER: Ansari catheter present within collapsed urinary bladder. REPRODUCTIVE ORGANS: No associated masses. BOWEL: Normal diameter, nonobstructed. Feeding tube tip at the level of the ligament of Treitz. LYMPH NODES: No abnormally enlarged nodes. PERITONEUM/RETROPERITONEUM: Severe pneumoperitoneum extends into the upper abdomen with some of the symmetric multiseptated gas appearing deep to the diaphragm consistent with mild pneumoperitoneum (series 601 was 101). This is likely related to    pneumonia mediastinum dissecting into the abdomen. No fluid collection in the abdomen or pelvis. No ascites. VESSELS: Extensive atherosclerotic calcification of the aorta and iliac arteries. ABDOMINAL WALL: No acute abnormality. BONES: No acute abnormality. Mild chronic L1 compression fracture with previous vertebroplasty. IMPRESSION:      1. Severe pneumomediastinum extends into the upper abdomen with small pneumoperitoneum likely related to extension of pneumoperitoneum into the upper peritoneal cavity. 2. No fluid collection in the abdomen or pelvis. Results were discussed with the surgical team at 7:00 PM on 10/3/2022. XR CHEST PORTABLE   Final Result      Stable appearance of loculated right pneumothorax, with loculated components in the lateral right midlung region and in the right lateral costophrenic sulcus. Stable scattered areas of atelectasis versus scar, most prominent in the medial right lung base and in the left lateral lung base. XR CHEST PORTABLE   Final Result      Small right basilar pneumothorax. Right basilar chest tube without change. Patchy consolidation in the right mid and lower lung as well as the left lower lung-atelectasis versus pneumonia. Trace left pleural effusion. Normal heart size. Lines and tubes without change. Mild improvement in degree of subcutaneous emphysema in the chest and neck. XR CHEST 1 VIEW   Final Result      1. Stable small right-sided pneumothorax and prominent subcutaneous emphysema along the neck and upper superior chest wall, greater in right lung stable   2. Stable left basilar consolidation and pleural effusion      3. Stable appearing perihilar accentuated markings or airspace disease            XR CHEST PORTABLE   Final Result      Stable small right-sided pneumothorax. More prominent emphysema over the lower neck. No change in bilateral airspace disease. Stable left pleural effusion. XR CHEST PORTABLE   Final Result   1. Bilateral multifocal pneumonia with improvement in the right    pulmonary consolidation since prior study from 9/23/22   2. Mild to moderate left pleural effusion          XR CHEST PORTABLE   Final Result   1. Support lines and tubes are stable. 2.  Stable small right lateral pneumothorax and right basilar    chest tube. 3.  Stable patchy bilateral airspace disease. XR CHEST PORTABLE   Final Result   1. Satisfactory position of right internal jugular central line   2. No interval change in endotracheal tube and nasogastric tube positioning. 3. Extensive bilateral airspace disease with no changes. 4. Left pleural effusion with retrocardiac opacity, unchanged   5. Small stable tiny right lateral pneumothorax and stable position of right chest tube,, Pleurx catheter. XR ABDOMEN (KUB) (SINGLE AP VIEW)   Final Result   1. No residual pneumothorax. 2.  Mild patchy airspace disease bilaterally worse on the right than on prior examination. 3.  Non-obstructive bowel gas pattern. Mildly distended stomach. XR CHEST PORTABLE   Final Result   1. No residual pneumothorax. 2.  Mild patchy airspace disease bilaterally worse on the right than on prior examination.    3.  Non-obstructive bowel gas pattern. Mildly distended stomach. XR CHEST PORTABLE   Final Result      1. Small right pneumothorax appears slightly increased. 2. Mild patchy airspace opacity bilaterally. XR CHEST PORTABLE   Final Result     Pleurx catheter at the right lung base. Trace right    pneumothorax is unchanged. XR CHEST PORTABLE   Final Result      Right-sided chest tube evident in the base, its tip medially. Improvement in the right-sided pneumothorax, since earlier today. Possible medial collapse of the right lower lobe. Small left effusion. Patchy airspace density/atelectasis in the lungs bilaterally, with no other significant change. XR CHEST PORTABLE   Final Result   1. Right-sided Pleurx catheter in satisfactory position in the lung bases   2. Right-sided post operative pneumothorax, approximately 10%               Assessment/Plan:   Nora Cerda is a 65 y.o. male w/PMH HFpEF, carotid artery stenosis, HTN, OA, Restrictive Lung Disease, DM2, HLD who underwent VATS with PleurX catheter placement on 09/19 for recurrent b/l loculated pleural effusions w/subsequent BIPAP failure and hypoxic/hypercapnic respiratory failure requiring Mechanical intubation. Pulmonary:  #Acute Hypoxic/Hypercapnic Respiratory Failure  W/Recurrent Pleural Effusions s/p VATS and PleurX for pleural fluid management 09/19/22, w/associated unintentional weight loss of 30 Ibs, PFTs w/restrictive defect. - PleurX catheter in place  - Trach in place  - CTS following   -10/13: Pluerx cath drained 180 ml out.   - await Ach antibodies for neuromuscular weakness workup    ID:  #Leukocytosis, resolved  -afebrile, WBC 7.1  -Bronch respiratory cx: no WBC or organism  - s/p cefepime 7 days    Cardiovascular:   #HFpEF  -w/fluid overload  -w/downtrending BNP  -fluid status in past day: +1.361L   -nephro on board: holding off on diuretics due to renal function, continue to appreciate recs    #Hypotension, resolved  -norepinephrine previously which remains off  -monitor BP    Chronic:  #Paroxysmal Atrial Fibrillation   -Eliquis held, amiodarone  #HLD  -pravastatin 40mg    Renal:  Ansari in place w/good urine output  -continue to monitor    #ZANA - resolved   -2/2 ATN w/likely etiology of hypoperfusion due to hypovolemia in addition to  -possible contrast-associated nephropathy  -holding off on diuretics per nephro  -creatinine 1.0 <-- 1.1    #Hypernatremia, resolved  -receiving free water flushes of 30 ml Q4H since 10/3/22  W/improvement of Na     GI:  #Nutrition  - PEG in place    Metabolic    #DM2  -HDSS  -Lantus 43u  -POCT  -hypoglycemia protocol  -A1c: 8.1 (1/24/22)     Heme    #Anemia  - s/p 3rd U pRBC transfusion 10/14.   - continue to monitor H&H    Neuro:  -analgesia: Tylenol, on prn oxycodone 7.5 mg    Wound:  Sacral wound  - wound care following      Code Status: FULL CODE  FEN: Diet NPO  ADULT TUBE FEEDING; PEG; Peptide Based; Continuous; 30; Yes; 10; Q 4 hours; 65; 30; Q 4 hours; Protein; 1 bottles Proteinex daily w/ 30 mL FW flushes before and after  PPX: Pepcid  DISPO: ICU    This patient was seen and discussed with Dr Mary Anne Dennis. Gadiel Carney MD.    Keisha Licona MD, PGY-1  10/15/22  6:52 AM    Patient seen, examined and discussed with the resident and I agree with the assessment and plan as edited above. Patient looking more alert this morning even than yesterday. He is sitting on chair with his wife. Remains on the same vent settings and waveforms still looks very restricted. He does state that his breathing feels okay. He is getting appropriate supportive care he needs more time was physical therapy and nutrition to get himself back up. He is an outstanding candidate for a LTAC but as yet his insurance has not approved him for one. Right-sided Pleurx is in place and surgery continues to drain it every other day.   Now that he's normotensive we could transition off the amiodarone and onto other rate controlling medications that aren't associated with pulmonary toxicity.     Lynda Sandoval MD

## 2022-10-15 NOTE — PLAN OF CARE
Problem: Chronic Conditions and Co-morbidities  Goal: Patient's chronic conditions and co-morbidity symptoms are monitored and maintained or improved  10/15/2022 1825 by Skyla Morrison RN  Outcome: Progressing  Flowsheets (Taken 10/15/2022 0800)  Care Plan - Patient's Chronic Conditions and Co-Morbidity Symptoms are Monitored and Maintained or Improved:   Monitor and assess patient's chronic conditions and comorbid symptoms for stability, deterioration, or improvement   Collaborate with multidisciplinary team to address chronic and comorbid conditions and prevent exacerbation or deterioration   Update acute care plan with appropriate goals if chronic or comorbid symptoms are exacerbated and prevent overall improvement and discharge    Problem: Pain  Goal: Verbalizes/displays adequate comfort level or baseline comfort level  10/15/2022 1825 by Skyla Morrison RN  Outcome: Progressing     Problem: Safety - Adult  Goal: Free from fall injury  10/15/2022 1825 by Skyla Morrison RN  Outcome: Progressing  Flowsheets (Taken 10/15/2022 0926)  Free From Fall Injury:   Based on caregiver fall risk screen, instruct family/caregiver to ask for assistance with transferring infant if caregiver noted to have fall risk factors   Instruct family/caregiver on patient safety     Problem: Skin/Tissue Integrity  Goal: Absence of new skin breakdown  Description: 1. Monitor for areas of redness and/or skin breakdown  2. Assess vascular access sites hourly  3. Every 4-6 hours minimum:  Change oxygen saturation probe site  4. Every 4-6 hours:  If on nasal continuous positive airway pressure, respiratory therapy assess nares and determine need for appliance change or resting period.   10/15/2022 1825 by Skyla Morrison RN  Outcome: Progressing     Problem: Nutrition Deficit:  Goal: Optimize nutritional status  10/15/2022 1825 by Skyla Morrison RN  Outcome: Progressing     Problem: ABCDS Injury Assessment  Goal: Absence of physical injury  10/15/2022 1825 by Brenna Bradshaw, RN  Outcome: Progressing  Flowsheets (Taken 10/15/2022 3315)  Absence of Physical Injury: Implement safety measures based on patient assessment     Problem: Confusion  Goal: Confusion, delirium, dementia, or psychosis is improved or at baseline  Description: INTERVENTIONS:  1. Assess for possible contributors to thought disturbance, including medications, impaired vision or hearing, underlying metabolic abnormalities, dehydration, psychiatric diagnoses, and notify attending LIP  2. [place] high risk fall precautions, as indicated  3. Provide frequent short contacts to provide reality reorientation, refocusing and direction  4. Decrease environmental stimuli, including noise as appropriate  5. Monitor and intervene to maintain adequate nutrition, hydration, elimination, sleep and activity  6. If unable to ensure safety without constant attention obtain sitter and review sitter guidelines with assigned personnel  7.  Initiate Psychosocial CNS and Spiritual Care consult, as indicated  Outcome: Progressing  Flowsheets (Taken 10/15/2022 0800)  Effect of thought disturbance (confusion, delirium, dementia, or psychosis) are managed with adequate functional status:   Assess for contributors to thought disturbance, including medications, impaired vision or hearing, underlying metabolic abnormalities, dehydration, psychiatric diagnoses, notify [facility] high risk fall precautions, as indicated   Provide frequent short contacts to provide reality reorientation, refocusing and direction   Decrease environmental stimuli, including noise as appropriate   Monitor and intervene to maintain adequate nutrition, hydration, elimination, sleep and activity   Problem: Discharge Planning  Goal: Discharge to home or other facility with appropriate resources  10/15/2022 1825 by Brenna Bradshaw, RN  Outcome: Progressing  Flowsheets (Taken 10/15/2022 0800)  Discharge to home or other facility with appropriate resources:   Identify barriers to discharge with patient and caregiver   Arrange for needed discharge resources and transportation as appropriate   Identify discharge learning needs (meds, wound care, etc)   Arrange for interpreters to assist at discharge as needed   Refer to discharge planning if patient needs post-hospital services based on physician order or complex needs related to functional status, cognitive ability or social support system

## 2022-10-15 NOTE — PROGRESS NOTES
ICU CT SURGERY DAILY PROGRESS NOTE    CC: Pleural effusions s/p R PleurX catheter placement    SUBJECTIVE:   Interval Hx:   Doing well, no pain, glucose controlled. Having diarrhea    ROS: A 14 point review of systems was conducted, significant findings as noted above. All other systems negative. OBJECTIVE:   Vitals:   Vitals:    10/15/22 0401 10/15/22 0407 10/15/22 0500 10/15/22 0600   BP:   (!) 118/56 (!) 114/52   Pulse: 62 61 78 61   Resp: 30 30 15 29   Temp:       TempSrc:       SpO2: 100%  95% 96%   Weight:    192 lb 0.3 oz (87.1 kg)   Height:           I/O:   Intake/Output Summary (Last 24 hours) at 10/15/2022 0731  Last data filed at 10/15/2022 0641  Gross per 24 hour   Intake 3341.5 ml   Output 1980 ml   Net 1361.5 ml       I/O last 3 completed shifts: In: 3766.5 [I.V.:1353; Blood:322.5; NG/GT:2041; IV Piggyback:50]  Out: 7515 [Urine:2755]    Diet: Diet NPO  ADULT TUBE FEEDING; PEG; Peptide Based; Continuous; 30; Yes; 10; Q 4 hours; 65; 30; Q 4 hours; Protein; 1 bottles Proteinex daily w/ 30 mL FW flushes before and after      Physical Examination:   General appearance: Mechanically ventilated. Attempts to mouth words but unable to project voice. Will occassionally point and shake/nod his head for communication. Neurological: Follows some commands, no focal deficits  HEENT: NC/AT, EOMI, trachea midline, no JVD. Tracheostomy leaking and with minimal erythema on edges. Chest/Lungs: On mechanical ventilation via tracheostomy; R PleurX catheter capped with dressing C/D/I  Cardiovascular: Atrial fibrillation rhythm with rate WNL's  Abdomen: Soft, non-tender, non-distended. PEG tube dressing C/D/I. PEG site with minimal dried blood, no erythema. Genitourinary/Anorectal: Ansari in place with clear yellow urine. Skin: Warm and dry. Sacral decubitus ulcer covered with Mepilex. Extremities: Pitting edema of the b/l feet. No cyanosis. Legs ulcers from SCDs covered with meplixex.     Labs:  CBC:   Recent Labs 10/13/22  0420 10/14/22  0357 10/14/22  1759 10/15/22  0420   WBC 6.9 7.1  --  8.9   HGB 7.5* 6.9* 8.0* 7.9*   HCT 23.0* 21.7* 24.0* 24.3*    295  --  312         BMP:   Recent Labs     10/13/22  0420 10/14/22  0357 10/15/22  0421    138 144   K 4.3 4.0 3.8   CL 98* 100 105   CO2 31 32 31   * 103* 94*   CREATININE 1.3 1.1 1.0   GLUCOSE 180* 151* 89       LFT's:   No results for input(s): AST, ALT, ALB, BILITOT, ALKPHOS in the last 72 hours. Troponin: No results for input(s): TROPONINI in the last 72 hours. BNP: No results for input(s): BNP in the last 72 hours. ABGs:   No results for input(s): PHART, TEP2JKG, PO2ART in the last 72 hours. INR:   No results for input(s): INR in the last 72 hours. U/A:No results for input(s): NITRITE, COLORU, PHUR, LABCAST, WBCUA, RBCUA, MUCUS, TRICHOMONAS, YEAST, BACTERIA, CLARITYU, SPECGRAV, LEUKOCYTESUR, UROBILINOGEN, BILIRUBINUR, BLOODU, GLUCOSEU, AMORPHOUS in the last 72 hours. Invalid input(s): Floresita Burns     Rad:   XR CHEST PORTABLE   Final Result      Distal portion of tracheostomy poorly visualized due to overlying medical devices. The position is without significant change since 10/5/2022. Bibasilar pleuroparenchymal consolidation. Stable cardiomediastinal silhouette. Right jugular central venous catheter without change. CT ABDOMEN PELVIS WO CONTRAST Additional Contrast? None   Final Result      Marked decrease in size of pneumomediastinum. Trace residual pneumoperitoneum. Extensive bilateral lower lobe pulmonary atelectasis with pleural effusions and trace residual right pneumothorax. XR CHEST PORTABLE   Final Result      Tiny right lateral pneumothorax is suspected, 5 mm in maximum thickness. This has decreased in size since 10/3/2022. Right basilar Pleurx catheter noted. Small bilateral pleural effusions. Prominent interstitial markings. Stable cardiac mediastinal silhouette.       Lines and tubes without change. Subcutaneous emphysema noted. CT CHEST ABDOMEN PELVIS WO CONTRAST   Final Result      1. Severe pneumomediastinum. 2. Small to moderate right hydropneumothorax with similar fluid and gas components with a right-sided Pleurx catheter. 3. Moderate loculated left pleural effusion with atelectasis of the left lower lobe with patency of the central left lower lobe bronchi. 4. Fluid/material filling the bronchus intermedius and right lower lobe bronchi with complete consolidation and volume loss of the right lower lobe. Differential diagnosis would include mucous plugging or obstructing lesion. 5. Tracheostomy tube tip within the trachea   6. Severe subcutaneous emphysema in the neck. CT ABDOMEN AND PELVIS:      FINDINGS:      LIVER: Normal.      GALLBLADDER AND BILIARY TREE: No calcified gallstones. No gallbladder distention. No intra- or extrahepatic biliary dilatation. PANCREAS: Normal.      SPLEEN: Normal.      ADRENAL GLANDS: Normal.      KIDNEYS AND URETERS: Normal.      URINARY BLADDER: Ansari catheter present within collapsed urinary bladder. REPRODUCTIVE ORGANS: No associated masses. BOWEL: Normal diameter, nonobstructed. Feeding tube tip at the level of the ligament of Treitz. LYMPH NODES: No abnormally enlarged nodes. PERITONEUM/RETROPERITONEUM: Severe pneumoperitoneum extends into the upper abdomen with some of the symmetric multiseptated gas appearing deep to the diaphragm consistent with mild pneumoperitoneum (series 601 was 101). This is likely related to    pneumonia mediastinum dissecting into the abdomen. No fluid collection in the abdomen or pelvis. No ascites. VESSELS: Extensive atherosclerotic calcification of the aorta and iliac arteries. ABDOMINAL WALL: No acute abnormality. BONES: No acute abnormality. Mild chronic L1 compression fracture with previous vertebroplasty. IMPRESSION:      1.  Severe pneumomediastinum extends into the upper abdomen with small pneumoperitoneum likely related to extension of pneumoperitoneum into the upper peritoneal cavity. 2. No fluid collection in the abdomen or pelvis. Results were discussed with the surgical team at 7:00 PM on 10/3/2022. XR CHEST PORTABLE   Final Result      Stable appearance of loculated right pneumothorax, with loculated components in the lateral right midlung region and in the right lateral costophrenic sulcus. Stable scattered areas of atelectasis versus scar, most prominent in the medial right lung base and in the left lateral lung base. XR CHEST PORTABLE   Final Result      Small right basilar pneumothorax. Right basilar chest tube without change. Patchy consolidation in the right mid and lower lung as well as the left lower lung-atelectasis versus pneumonia. Trace left pleural effusion. Normal heart size. Lines and tubes without change. Mild improvement in degree of subcutaneous emphysema in the chest and neck. XR CHEST 1 VIEW   Final Result      1. Stable small right-sided pneumothorax and prominent subcutaneous emphysema along the neck and upper superior chest wall, greater in right lung stable   2. Stable left basilar consolidation and pleural effusion      3. Stable appearing perihilar accentuated markings or airspace disease            XR CHEST PORTABLE   Final Result      Stable small right-sided pneumothorax. More prominent emphysema over the lower neck. No change in bilateral airspace disease. Stable left pleural effusion. XR CHEST PORTABLE   Final Result   1. Bilateral multifocal pneumonia with improvement in the right    pulmonary consolidation since prior study from 9/23/22   2. Mild to moderate left pleural effusion          XR CHEST PORTABLE   Final Result   1. Support lines and tubes are stable.    2.  Stable small right lateral pneumothorax and right basilar chest tube. 3.  Stable patchy bilateral airspace disease. XR CHEST PORTABLE   Final Result   1. Satisfactory position of right internal jugular central line   2. No interval change in endotracheal tube and nasogastric tube positioning. 3. Extensive bilateral airspace disease with no changes. 4. Left pleural effusion with retrocardiac opacity, unchanged   5. Small stable tiny right lateral pneumothorax and stable position of right chest tube,, Pleurx catheter. XR ABDOMEN (KUB) (SINGLE AP VIEW)   Final Result   1. No residual pneumothorax. 2.  Mild patchy airspace disease bilaterally worse on the right than on prior examination. 3.  Non-obstructive bowel gas pattern. Mildly distended stomach. XR CHEST PORTABLE   Final Result   1. No residual pneumothorax. 2.  Mild patchy airspace disease bilaterally worse on the right than on prior examination. 3.  Non-obstructive bowel gas pattern. Mildly distended stomach. XR CHEST PORTABLE   Final Result      1. Small right pneumothorax appears slightly increased. 2. Mild patchy airspace opacity bilaterally. XR CHEST PORTABLE   Final Result     Pleurx catheter at the right lung base. Trace right    pneumothorax is unchanged. XR CHEST PORTABLE   Final Result      Right-sided chest tube evident in the base, its tip medially. Improvement in the right-sided pneumothorax, since earlier today. Possible medial collapse of the right lower lobe. Small left effusion. Patchy airspace density/atelectasis in the lungs bilaterally, with no other significant change. XR CHEST PORTABLE   Final Result   1. Right-sided Pleurx catheter in satisfactory position in the lung bases   2.  Right-sided post operative pneumothorax, approximately 10%             ASSESSMENT AND PLAN:   Deborah Ken is a 65 y.o. male with history of diastolic heart failure, atrial fibrillation, and DM with recurrent pleural effusions s/p R PleurX catheter placement (9/19). Neuro:   Analgesia  - Continue oxycodone and tylenol PRN. Dilaudid held currently for concern of weakness.  - Continue to hold Seroquel for concern of extrapyramidal symptoms  - Per crit care, investigating ACh receptor antibodies for possible cause of weakness. Will f/u results    Cardiovascular:   History of paroxysmal atrial fibrillation  - Cont amiodarone - rate controlled currently  - Cont Eliquis    HFpEF  - Last echo (1/25/22): LVEF = 62-25%, grade 2 diastolic dysfunction    Hyperlipidemia  - Cont Pravastatin 40mg    Pulmonary:   S/P R PleurX catheter placement (9/19)  - Capped Catheter. Will drain every other day - drain tomorrow  - SW/HHC pending    Acute on chronic respiratory failure  - Mechanically intubated on 9/22. Currently PRVC 14/375/5/40%. Vent management per pulm/crit care. - Tracheostomy 9/30.  - Pulmonology following, appreciate recommendations. - Baseline on home O2 3-4L NC     GI:   - S/p PEG 10/11 with GI. TF's per PEG.   - Miralax      FEN:    -Replace electrolytes per protocol  - Diet: Diet NPO  ADULT TUBE FEEDING; PEG; Peptide Based; Continuous; 30; Yes; 10; Q 4 hours; 65; 30; Q 4 hours; Protein; 1 bottles Proteinex daily w/ 30 mL FW flushes before and after   -SLP consulted, appreciate recs. -Weekly nutritional markers to be collected: prealbumin, transferrin, and CRP. /Renal:   - Cont Ansari   - Creatinine 1.0 from 1.1 from 1.3, 1.2 1.0, 1.1  - BUN 94 from 103 from 107, 101, 98, 111, 112,   - Nephrology consulted. Appreciate recommendations. Hem/ID/wound:   - Hemoglobin 7.9 from 6.9, s/p 1u PRBC's  - WBC 7.1 from 6.9, 5.4, 7.3 from 6.9 from 12.0  -Receiving 12.5 g albumin Q8 (10/13-10/20)  - Cefepime (10/6-10/12) and vancomycin (10/6-10/8). - Sacral wound per wound care nurse    Endo:   History of DMII  - Last A1C 8.1% (1/24/22). - 24 hour glucose lispro 39. Will change Insulin Glargine 15 .  Cont HDSSI    Prophylaxis:   DVT Ppx: Eliquis  GI Ppx: protonix    Access:  Central Access: R IJ CVC, placed 9/22; ok for removal          Ansari Date placed: 9/20  Rectal tube placed: 10/4  PEG: placed 10/11    Mobility:  OOB and activity as tolerated    Dispo:   ICU; ok for transfer to PCU if bed availability needed  Case management assisting with dispo - patient will need to go to LTAC, awaiting decision from [facility].  Discussed with SW    Code Status: Full Code  -----------------------------

## 2022-10-15 NOTE — PROGRESS NOTES
Patient tolerating current vent settings well, tracheostomy intact. Hemodynamically stable with adequate urine output. Afebrile. Tube feeds with Vital AF at goal rate of 65 mls/hr, tolerated well. Had 3 loose bm's this shift, wounds as charted on flowsheet, venelex applied.

## 2022-10-15 NOTE — PROGRESS NOTES
ICU Progress Note    Admit Date: 9/19/2022  Day: 27  Vent Day: None  IV Access: Peripheral, R IJ triple lumen  IV Fluids:None  Vasopressors:None                Antibiotics: None  Diet: Diet NPO  ADULT TUBE FEEDING; PEG; Peptide Based; Continuous; 30; Yes; 10; Q 4 hours; 65; 30; Q 4 hours; Protein; 1 bottles Proteinex daily w/ 30 mL FW flushes before and after  Trach: AC/PRVC Resp Rate (Set): 14 bmp (26 measured) /Vt (Set, mL): 375 mL/ /FiO2 : 40 %  PEEP 5    CC: Pleural Effusion (right) s/p VATS and Pleural Catheter Placement 09/19    Interval history:   No acute events overnight. Did have some bouts of diarrhea overnight so glycolax held. Ansari was removed yesterday and later in the day required straight cath. More alert. Denies pain. Hgb this morn 7.7 (7.9 yesterday). Cont on PEG. Still waiting on insurance to approve an LTAC.       Medications:     Scheduled Meds:   insulin glargine  15 Units SubCUTAneous Nightly    lansoprazole  30 mg Per G Tube BID    albumin human  12.5 g IntraVENous Q8H    albuterol  2.5 mg Nebulization Q4H    insulin lispro  0-16 Units SubCUTAneous Q4H    Venelex   Topical BID    amiodarone bolus  150 mg IntraVENous Once    amiodarone  200 mg Oral Daily    chlorhexidine  15 mL Mouth/Throat BID    apixaban  5 mg Oral BID    sodium chloride flush  5-40 mL IntraVENous 2 times per day    polyethylene glycol  17 g Oral Daily     Continuous Infusions:   sodium chloride      sodium chloride      dextrose Stopped (10/10/22 5886)     PRN Meds:sodium chloride, oxyCODONE, hydroxypropyl methylcellulose, acetaminophen, sodium chloride flush, sodium chloride, ondansetron **OR** ondansetron, glucose, dextrose bolus **OR** dextrose bolus, glucagon (rDNA), dextrose, hydrALAZINE    Objective:   Vitals:   T-max:  Patient Vitals for the past 8 hrs:   BP Temp Temp src Pulse Resp SpO2 Weight   10/15/22 1208 -- -- -- 70 (!) 35 92 % --   10/15/22 1200 -- 98.5 °F (36.9 °C) Temporal 68 (!) 31 -- --   10/15/22 1100 135/60 -- -- 74 15 97 % --   10/15/22 1000 127/68 -- -- 83 (!) 38 96 % --   10/15/22 0900 (!) 148/69 -- -- 93 20 97 % --   10/15/22 0800 127/67 98.4 °F (36.9 °C) Temporal 74 27 96 % --   10/15/22 0733 -- -- -- 68 29 97 % --   10/15/22 0700 (!) 123/53 -- -- 66 (!) 31 -- --   10/15/22 0600 (!) 114/52 -- -- 61 29 96 % 192 lb 0.3 oz (87.1 kg)   10/15/22 0500 (!) 118/56 -- -- 78 15 95 % --         Intake/Output Summary (Last 24 hours) at 10/15/2022 1216  Last data filed at 10/15/2022 0800  Gross per 24 hour   Intake 1525 ml   Output 1405 ml   Net 120 ml         Physical Exam  Constitutional:       Comments: Sleepy this morning. Follows commands, responds to questions   HENT:      Head: Normocephalic and atraumatic. Cardiovascular:      Rate and Rhythm: Normal rate and regular rhythm. Pulmonary:      Breath sounds: No wheezing, rhonchi or rales. Comments: R PleurX catheter capped with dressing   Abdominal:      General: There is no distension. Palpations: Abdomen is soft. Tenderness: There is no abdominal tenderness. There is no guarding. Musculoskeletal:      Right lower leg: Edema present. Left lower leg: Edema present. Comments:      Skin:     Comments: red linear rash on medial left foot without progression. Sacral decubitus ulcer covered with Mepilex.     Neurological:      Comments: Much improved mental status  Responsive to commands and questions        LABS:    CBC:   Recent Labs     10/13/22  0420 10/14/22  0357 10/14/22  1759 10/15/22  0420   WBC 6.9 7.1  --  8.9   HGB 7.5* 6.9* 8.0* 7.9*   HCT 23.0* 21.7* 24.0* 24.3*    295  --  312   MCV 87.7 87.0  --  87.5       Renal:    Recent Labs     10/13/22  0420 10/14/22  0357 10/15/22  0421    138 144   K 4.3 4.0 3.8   CL 98* 100 105   CO2 31 32 31   * 103* 94*   CREATININE 1.3 1.1 1.0   GLUCOSE 180* 151* 89   CALCIUM 7.7* 7.8* 7.9*   MG 3.60* 3.50* 3.50*   PHOS 4.2 2.8 2.7   ANIONGAP 10 6 8       Hepatic:   Recent Labs     10/13/22  0420 10/14/22  0357 10/15/22  0421   LABALBU 2.0* 2.2* 2.5*       -----------------------------------------------------------------  RAD:   XR CHEST PORTABLE   Final Result      Distal portion of tracheostomy poorly visualized due to overlying medical devices. The position is without significant change since 10/5/2022. Bibasilar pleuroparenchymal consolidation. Stable cardiomediastinal silhouette. Right jugular central venous catheter without change. CT ABDOMEN PELVIS WO CONTRAST Additional Contrast? None   Final Result      Marked decrease in size of pneumomediastinum. Trace residual pneumoperitoneum. Extensive bilateral lower lobe pulmonary atelectasis with pleural effusions and trace residual right pneumothorax. XR CHEST PORTABLE   Final Result      Tiny right lateral pneumothorax is suspected, 5 mm in maximum thickness. This has decreased in size since 10/3/2022. Right basilar Pleurx catheter noted. Small bilateral pleural effusions. Prominent interstitial markings. Stable cardiac mediastinal silhouette. Lines and tubes without change. Subcutaneous emphysema noted. CT CHEST ABDOMEN PELVIS WO CONTRAST   Final Result      1. Severe pneumomediastinum. 2. Small to moderate right hydropneumothorax with similar fluid and gas components with a right-sided Pleurx catheter. 3. Moderate loculated left pleural effusion with atelectasis of the left lower lobe with patency of the central left lower lobe bronchi. 4. Fluid/material filling the bronchus intermedius and right lower lobe bronchi with complete consolidation and volume loss of the right lower lobe. Differential diagnosis would include mucous plugging or obstructing lesion. 5. Tracheostomy tube tip within the trachea   6. Severe subcutaneous emphysema in the neck.          CT ABDOMEN AND PELVIS:      FINDINGS:      LIVER: Normal.      GALLBLADDER AND BILIARY TREE: No calcified gallstones. No gallbladder distention. No intra- or extrahepatic biliary dilatation. PANCREAS: Normal.      SPLEEN: Normal.      ADRENAL GLANDS: Normal.      KIDNEYS AND URETERS: Normal.      URINARY BLADDER: Ansari catheter present within collapsed urinary bladder. REPRODUCTIVE ORGANS: No associated masses. BOWEL: Normal diameter, nonobstructed. Feeding tube tip at the level of the ligament of Treitz. LYMPH NODES: No abnormally enlarged nodes. PERITONEUM/RETROPERITONEUM: Severe pneumoperitoneum extends into the upper abdomen with some of the symmetric multiseptated gas appearing deep to the diaphragm consistent with mild pneumoperitoneum (series 601 was 101). This is likely related to    pneumonia mediastinum dissecting into the abdomen. No fluid collection in the abdomen or pelvis. No ascites. VESSELS: Extensive atherosclerotic calcification of the aorta and iliac arteries. ABDOMINAL WALL: No acute abnormality. BONES: No acute abnormality. Mild chronic L1 compression fracture with previous vertebroplasty. IMPRESSION:      1. Severe pneumomediastinum extends into the upper abdomen with small pneumoperitoneum likely related to extension of pneumoperitoneum into the upper peritoneal cavity. 2. No fluid collection in the abdomen or pelvis. Results were discussed with the surgical team at 7:00 PM on 10/3/2022. XR CHEST PORTABLE   Final Result      Stable appearance of loculated right pneumothorax, with loculated components in the lateral right midlung region and in the right lateral costophrenic sulcus. Stable scattered areas of atelectasis versus scar, most prominent in the medial right lung base and in the left lateral lung base. XR CHEST PORTABLE   Final Result      Small right basilar pneumothorax. Right basilar chest tube without change.       Patchy consolidation in the right mid and lower lung as well as the left lower lung-atelectasis versus pneumonia. Trace left pleural effusion. Normal heart size. Lines and tubes without change. Mild improvement in degree of subcutaneous emphysema in the chest and neck. XR CHEST 1 VIEW   Final Result      1. Stable small right-sided pneumothorax and prominent subcutaneous emphysema along the neck and upper superior chest wall, greater in right lung stable   2. Stable left basilar consolidation and pleural effusion      3. Stable appearing perihilar accentuated markings or airspace disease            XR CHEST PORTABLE   Final Result      Stable small right-sided pneumothorax. More prominent emphysema over the lower neck. No change in bilateral airspace disease. Stable left pleural effusion. XR CHEST PORTABLE   Final Result   1. Bilateral multifocal pneumonia with improvement in the right    pulmonary consolidation since prior study from 9/23/22   2. Mild to moderate left pleural effusion          XR CHEST PORTABLE   Final Result   1. Support lines and tubes are stable. 2.  Stable small right lateral pneumothorax and right basilar    chest tube. 3.  Stable patchy bilateral airspace disease. XR CHEST PORTABLE   Final Result   1. Satisfactory position of right internal jugular central line   2. No interval change in endotracheal tube and nasogastric tube positioning. 3. Extensive bilateral airspace disease with no changes. 4. Left pleural effusion with retrocardiac opacity, unchanged   5. Small stable tiny right lateral pneumothorax and stable position of right chest tube,, Pleurx catheter. XR ABDOMEN (KUB) (SINGLE AP VIEW)   Final Result   1. No residual pneumothorax. 2.  Mild patchy airspace disease bilaterally worse on the right than on prior examination. 3.  Non-obstructive bowel gas pattern. Mildly distended stomach. XR CHEST PORTABLE   Final Result   1. No residual pneumothorax.    2.  Mild patchy airspace disease bilaterally worse on the right than on prior examination. 3.  Non-obstructive bowel gas pattern. Mildly distended stomach. XR CHEST PORTABLE   Final Result      1. Small right pneumothorax appears slightly increased. 2. Mild patchy airspace opacity bilaterally. XR CHEST PORTABLE   Final Result     Pleurx catheter at the right lung base. Trace right    pneumothorax is unchanged. XR CHEST PORTABLE   Final Result      Right-sided chest tube evident in the base, its tip medially. Improvement in the right-sided pneumothorax, since earlier today. Possible medial collapse of the right lower lobe. Small left effusion. Patchy airspace density/atelectasis in the lungs bilaterally, with no other significant change. XR CHEST PORTABLE   Final Result   1. Right-sided Pleurx catheter in satisfactory position in the lung bases   2. Right-sided post operative pneumothorax, approximately 10%               Assessment/Plan:   Christian Bal is a 65 y.o. male w/PMH HFpEF, carotid artery stenosis, HTN, OA, Restrictive Lung Disease, DM2, HLD who underwent VATS with PleurX catheter placement on 09/19 for recurrent b/l loculated pleural effusions w/subsequent BIPAP failure and hypoxic/hypercapnic respiratory failure requiring Mechanical intubation. Pulmonary:  #Acute Hypoxic/Hypercapnic Respiratory Failure  W/Recurrent Pleural Effusions s/p VATS and PleurX for pleural fluid management 09/19/22, w/associated unintentional weight loss of 30 Ibs, PFTs w/restrictive defect. - PleurX catheter in place. CT surgery to drain it today. Ginger Cambric in place: A/C PRVC 375 / 14 (26 measured) / 5 / 40%  - CTS following   -10/13: Pluerx cath drained 180 ml out.   - await Ach antibodies for neuromuscular weakness workup  - s/p cefepime 7 days    Cardiovascular:   #HFpEF  -w/fluid overload  -w/downtrending BNP  -fluid status in past day: -295mL   -nephro on board    Chronic:  #Paroxysmal Atrial Fibrillation   -Eliquis held, amiodarone  #HLD  -pravastatin 40mg    Renal:  Ansari in place w/good urine output  -continue to monitor    GI:  #Nutrition  - PEG in place    Metabolic    #DM2  -HDSS  -Lantus 43u  -POCT  -hypoglycemia protocol  -A1c: 8.1 (1/24/22)     Heme    #Anemia  - s/p 3rd U pRBC transfusion 10/14.   - continue to monitor H&H    Neuro:  -analgesia: Tylenol, on prn oxycodone 7.5 mg    Wound:  Sacral wound  - wound care following      Code Status: FULL CODE  FEN: Diet NPO  ADULT TUBE FEEDING; PEG; Peptide Based; Continuous; 30; Yes; 10; Q 4 hours; 65; 30; Q 4 hours; Protein; 1 bottles Proteinex daily w/ 30 mL FW flushes before and after  PPX: Pepcid  DISPO: ICU until insurance approves an LTAC. This patient was seen and discussed with Dr. Sorin Antony. Uma Bustamante MD.    Aisha Jimenez MD, PGY-1  10/16/22  8:41 AM    Patient seen, examined and discussed with the resident and I agree with the assessment and plan as edited above. Remains alert and vent dependent. Will stop amiodarone today. Continue TFs   He's comfortable on the vent but still with rapid, shallow volumes limited by high airway pressures. Acetylcholine antibodies were negative. Patient is LTACH appropriate. Can down triage to PCU from my perspective.     Vicky Ramírez MD

## 2022-10-15 NOTE — PLAN OF CARE
Problem: Chronic Conditions and Co-morbidities  Goal: Patient's chronic conditions and co-morbidity symptoms are monitored and maintained or improved  10/15/2022 0724 by Sharona Escalera RN  Outcome: Not Progressing       Problem: Discharge Planning  Goal: Discharge to home or other facility with appropriate resources  10/15/2022 0724 by Sharona Escalera RN  Outcome: Not Progressing         Problem: Pain  Goal: Verbalizes/displays adequate comfort level or baseline comfort level  10/15/2022 0724 by Sharona Escalera RN  Outcome: Progressing  Flowsheets  Taken 10/15/2022 0400  Verbalizes/displays adequate comfort level or baseline comfort level:   Assess pain using appropriate pain scale   Administer analgesics based on type and severity of pain and evaluate response   Implement non-pharmacological measures as appropriate and evaluate response  Taken 10/14/2022 2000  Verbalizes/displays adequate comfort level or baseline comfort level:   Assess pain using appropriate pain scale   Encourage patient to monitor pain and request assistance   Administer analgesics based on type and severity of pain and evaluate response   Implement non-pharmacological measures as appropriate and evaluate response   Consider cultural and social influences on pain and pain management   Notify Licensed Independent Practitioner if interventions unsuccessful or patient reports new pain    Flowsheets (Taken 10/14/2022 1600)  Verbalizes/displays adequate comfort level or baseline comfort level: Assess pain using appropriate pain scale     Problem: Safety - Adult  Goal: Free from fall injury  10/15/2022 0724 by Sharona Escalera RN  Outcome: Progressing  Flowsheets (Taken 10/14/2022 2342)  Free From Fall Injury:   Instruct family/caregiver on patient safety   Based on caregiver fall risk screen, instruct family/caregiver to ask for assistance with transferring infant if caregiver noted to have fall risk factors    Outcome: Progressing Problem: Skin/Tissue Integrity  Goal: Absence of new skin breakdown  10/15/2022 0724 by Pernell Jordan RN  Outcome: Progressing  Note: Pt at risk for skin breakdown. See Bryson score. Pt remains on bedrest. Unable to reposition self in bed. Heels elevated off bed. Sacral heart mepilex intact to protect,  site inspected, changed after venelex applied. Will continue to turn and reposition patient every two hours and as needed. Will continue to keep patient clean and dry, applying skin care cream as needed. Pillows used for repositioning q2hs. Will continue to monitor and assess for skin breakdown. Problem: Nutrition Deficit:  Goal: Optimize nutritional status  10/15/2022 0724 by Pernell Jordan RN  Outcome: Progressing    Nutrient intake appropriate for improving, restoring, or maintaining nutritional needs: Recommend, monitor, and adjust tube feedings and TPN/PPN based on assessed needs  Note: Tolerating continuous tube feeds.          Nutrient intake appropriate for improving, restoring, or maintaining nutritional needs: Recommend, monitor, and adjust tube feedings and TPN/PPN based on assessed needs     Problem: ABCDS Injury Assessment  Goal: Absence of physical injury  10/15/2022 0724 by Pernell Jordan RN  Outcome: Progressing  Flowsheets (Taken 10/14/2022 2342)  Absence of Physical Injury: Implement safety measures based on patient assessment  10/14/2022 1844 by Jim Sharp RN  Outcome: Progressing     Problem: Confusion  Goal: Confusion, delirium, dementia, or psychosis is improved or at baseline  10/14/2022 1844 by Jim Sharp RN  Outcome: Progressing     Problem: Chronic Conditions and Co-morbidities  Goal: Patient's chronic conditions and co-morbidity symptoms are monitored and maintained or improved  10/15/2022 0724 by Pernell Jordan RN  Outcome: Not Progressing  10/14/2022 1844 by Jmi Sharp RN  Outcome: Progressing     Problem: Discharge Planning  Goal: Discharge to home or other facility with appropriate resources  10/15/2022 0724 by Lauren Hemphill, RN  Outcome: Not Progressing  10/14/2022 1844 by Gali Babrer, RN  Outcome: Progressing

## 2022-10-16 NOTE — PROGRESS NOTES
Nephrology Progress Note                                                                                                                                                                                                                                                                                                                                                               Office : [phone]     Fax :[phone]    Patient's Name: Trip Anne        Reason for Consult:   ZANA  Requesting Physician:  Yimi Mason MD    Interval History:      Cr stable   Na stable   Good UO   Remains on Trach   PEG placed         Past Medical History:   Diagnosis Date    ZANA (acute kidney injury) ([facility] [address] 9/26/2022    Carotid stenosis, left 10/12/2011    Chronic back pain     Chronic systolic (congestive) heart failure 78/26/9382    Diastolic CHF ([facility] [address]     Erectile dysfunction     Hyperlipidemia     Hypertension     Osteoarthritis     Restrictive lung disease     Type II or unspecified type diabetes mellitus without mention of complication, not stated as uncontrolled        Past Surgical History:   Procedure Laterality Date    CARDIAC CATHETERIZATION  06/2022    GASTROSTOMY TUBE PLACEMENT N/A 10/11/2022    EGD PEG TUBE PLACEMENT performed by Jeronimo Hernadez MD at [facility] Left     PLEURAL CATH INSERTION N/A 9/19/2022    PLEURAL CATHETER PLACEMENT; INTERCOSTAL NERVE BLOCK X4 performed by Cresencio Coe MD at [facility] Bilateral     THORACOSCOPY Right 9/19/2022    RIGHT VIDEO ASSISTED THORASCOPIC SURGERY AND; performed by Cresencio Coe MD at [address] N/A 9/30/2022    TRACHEOSTOMY performed by John Mills MD at [facility] History   Problem Relation Age of Onset    Hearing Loss Father     Heart Disease Father 70        MI    High Blood Pressure Father     Diabetes Maternal Grandmother         hypoglycemic    Hearing Loss Maternal Grandmother Arthritis Maternal Grandfather         reports that he quit smoking about 20 years ago. His smoking use included cigarettes. He has a 80.00 pack-year smoking history. He has never used smokeless tobacco. He reports current alcohol use. He reports that he does not use drugs. Allergies:   Torsemide and Dye [iodides]    Current Medications:    0.9 % sodium chloride infusion, PRN  insulin glargine (LANTUS;BASAGLAR) injection pen 15 Units, Nightly  lansoprazole suspension SUSP 30 mg, BID  albumin human 25 % IV solution 12.5 g, Q8H  oxyCODONE (ROXICODONE) 5 MG/5ML solution 7.5 mg, Q6H PRN  hydroxypropyl methylcellulose (GONIOSOL) 2.5 % ophthalmic solution 1 drop, PRN  albuterol (PROVENTIL) nebulizer solution 2.5 mg, Q4H  insulin lispro (1 Unit Dial) (HUMALOG/ADMELOG) pen 0-16 Units, Q4H  Venelex ointment, BID  amiodarone (CORDARONE) tablet 200 mg, Daily  acetaminophen (TYLENOL) 160 MG/5ML solution 650 mg, Q6H PRN  chlorhexidine (PERIDEX) 0.12 % solution 15 mL, BID  apixaban (ELIQUIS) tablet 5 mg, BID  sodium chloride flush 0.9 % injection 5-40 mL, 2 times per day  sodium chloride flush 0.9 % injection 5-40 mL, PRN  0.9 % sodium chloride infusion, PRN  [Held by provider] polyethylene glycol (GLYCOLAX) packet 17 g, Daily  ondansetron (ZOFRAN-ODT) disintegrating tablet 4 mg, Q8H PRN   Or  ondansetron (ZOFRAN) injection 4 mg, Q6H PRN  glucose chewable tablet 16 g, PRN  dextrose bolus 10% 125 mL, PRN   Or  dextrose bolus 10% 250 mL, PRN  glucagon (rDNA) injection 1 mg, PRN  dextrose 10 % infusion, Continuous PRN  hydrALAZINE (APRESOLINE) injection 5 mg, Q15 Min PRN          Physical exam:     Vitals:  BP (!) 108/54   Pulse 71   Temp 99.7 °F (37.6 °C) (Temporal)   Resp 15   Ht 6' 0.99\" (1.854 m)   Wt 192 lb 0.3 oz (87.1 kg)   SpO2 97%   BMI 25.34 kg/m²   Constitutional:  on MV  Skin: no rash, turgor wnl  Heent:  eomi, mmm  Neck: no bruits or jvd noted  Cardiovascular:  S1, S2 without m/r/g  Respiratory: trach  Abdomen: +bs, soft, nt, nd  Ext: bilateral lower extremity edema R>L  Psychiatric: mood and affect appropriate  Musculoskeletal:  Rom, muscular strength intact    Data:   Labs:  CBC:   Recent Labs     10/13/22  0420 10/14/22  0357 10/14/22  1759 10/15/22  0420   WBC 6.9 7.1  --  8.9   HGB 7.5* 6.9* 8.0* 7.9*    295  --  312       BMP:    Recent Labs     10/13/22  0420 10/14/22  0357 10/15/22  0421    138 144   K 4.3 4.0 3.8   CL 98* 100 105   CO2 31 32 31   * 103* 94*   CREATININE 1.3 1.1 1.0   GLUCOSE 180* 151* 89       Ca/Mg/Phos:   Recent Labs     10/13/22  0420 10/14/22  0357 10/15/22  0421   CALCIUM 7.7* 7.8* 7.9*   MG 3.60* 3.50* 3.50*   PHOS 4.2 2.8 2.7       Hepatic: No results for input(s): AST, ALT, ALB, BILITOT, ALKPHOS in the last 72 hours. Troponin: No results for input(s): TROPONINI in the last 72 hours. BNP: No results for input(s): BNP in the last 72 hours. Lipids:   No results for input(s): CHOL, TRIG, HDL, LDLCALC, LABVLDL in the last 72 hours. ABGs:   No results for input(s): PHART, PO2ART, IMW5FYF in the last 72 hours. INR: No results for input(s): INR in the last 72 hours. UA:No results for input(s): Leal Mace, GLUCOSEU, BILIRUBINUR, [place], BLOODU, PHUR, PROTEINU, UROBILINOGEN, NITRU, LEUKOCYTESUR, LABMICR, URINETYPE in the last 72 hours. Urine Microscopic: No results for input(s): LABCAST, BACTERIA, COMU, HYALCAST, WBCUA, RBCUA, EPIU in the last 72 hours. Urine Culture: No results for input(s): LABURIN in the last 72 hours. Urine Chemistry:   No results for input(s): [place] Lieu, PROTEINUR, NAUR in the last 72 hours. IMAGING:  XR CHEST PORTABLE   Final Result      Distal portion of tracheostomy poorly visualized due to overlying medical devices. The position is without significant change since 10/5/2022. Bibasilar pleuroparenchymal consolidation. Stable cardiomediastinal silhouette.       Right jugular central venous catheter without change. CT ABDOMEN PELVIS WO CONTRAST Additional Contrast? None   Final Result      Marked decrease in size of pneumomediastinum. Trace residual pneumoperitoneum. Extensive bilateral lower lobe pulmonary atelectasis with pleural effusions and trace residual right pneumothorax. XR CHEST PORTABLE   Final Result      Tiny right lateral pneumothorax is suspected, 5 mm in maximum thickness. This has decreased in size since 10/3/2022. Right basilar Pleurx catheter noted. Small bilateral pleural effusions. Prominent interstitial markings. Stable cardiac mediastinal silhouette. Lines and tubes without change. Subcutaneous emphysema noted. CT CHEST ABDOMEN PELVIS WO CONTRAST   Final Result      1. Severe pneumomediastinum. 2. Small to moderate right hydropneumothorax with similar fluid and gas components with a right-sided Pleurx catheter. 3. Moderate loculated left pleural effusion with atelectasis of the left lower lobe with patency of the central left lower lobe bronchi. 4. Fluid/material filling the bronchus intermedius and right lower lobe bronchi with complete consolidation and volume loss of the right lower lobe. Differential diagnosis would include mucous plugging or obstructing lesion. 5. Tracheostomy tube tip within the trachea   6. Severe subcutaneous emphysema in the neck. CT ABDOMEN AND PELVIS:      FINDINGS:      LIVER: Normal.      GALLBLADDER AND BILIARY TREE: No calcified gallstones. No gallbladder distention. No intra- or extrahepatic biliary dilatation. PANCREAS: Normal.      SPLEEN: Normal.      ADRENAL GLANDS: Normal.      KIDNEYS AND URETERS: Normal.      URINARY BLADDER: Ansari catheter present within collapsed urinary bladder. REPRODUCTIVE ORGANS: No associated masses. BOWEL: Normal diameter, nonobstructed. Feeding tube tip at the level of the ligament of Treitz. LYMPH NODES: No abnormally enlarged nodes. PERITONEUM/RETROPERITONEUM: Severe pneumoperitoneum extends into the upper abdomen with some of the symmetric multiseptated gas appearing deep to the diaphragm consistent with mild pneumoperitoneum (series 601 was 101). This is likely related to    pneumonia mediastinum dissecting into the abdomen. No fluid collection in the abdomen or pelvis. No ascites. VESSELS: Extensive atherosclerotic calcification of the aorta and iliac arteries. ABDOMINAL WALL: No acute abnormality. BONES: No acute abnormality. Mild chronic L1 compression fracture with previous vertebroplasty. IMPRESSION:      1. Severe pneumomediastinum extends into the upper abdomen with small pneumoperitoneum likely related to extension of pneumoperitoneum into the upper peritoneal cavity. 2. No fluid collection in the abdomen or pelvis. Results were discussed with the surgical team at 7:00 PM on 10/3/2022. XR CHEST PORTABLE   Final Result      Stable appearance of loculated right pneumothorax, with loculated components in the lateral right midlung region and in the right lateral costophrenic sulcus. Stable scattered areas of atelectasis versus scar, most prominent in the medial right lung base and in the left lateral lung base. XR CHEST PORTABLE   Final Result      Small right basilar pneumothorax. Right basilar chest tube without change. Patchy consolidation in the right mid and lower lung as well as the left lower lung-atelectasis versus pneumonia. Trace left pleural effusion. Normal heart size. Lines and tubes without change. Mild improvement in degree of subcutaneous emphysema in the chest and neck. XR CHEST 1 VIEW   Final Result      1. Stable small right-sided pneumothorax and prominent subcutaneous emphysema along the neck and upper superior chest wall, greater in right lung stable   2. Stable left basilar consolidation and pleural effusion      3.  Stable appearing perihilar accentuated markings or airspace disease            XR CHEST PORTABLE   Final Result      Stable small right-sided pneumothorax. More prominent emphysema over the lower neck. No change in bilateral airspace disease. Stable left pleural effusion. XR CHEST PORTABLE   Final Result   1. Bilateral multifocal pneumonia with improvement in the right    pulmonary consolidation since prior study from 9/23/22   2. Mild to moderate left pleural effusion          XR CHEST PORTABLE   Final Result   1. Support lines and tubes are stable. 2.  Stable small right lateral pneumothorax and right basilar    chest tube. 3.  Stable patchy bilateral airspace disease. XR CHEST PORTABLE   Final Result   1. Satisfactory position of right internal jugular central line   2. No interval change in endotracheal tube and nasogastric tube positioning. 3. Extensive bilateral airspace disease with no changes. 4. Left pleural effusion with retrocardiac opacity, unchanged   5. Small stable tiny right lateral pneumothorax and stable position of right chest tube,, Pleurx catheter. XR ABDOMEN (KUB) (SINGLE AP VIEW)   Final Result   1. No residual pneumothorax. 2.  Mild patchy airspace disease bilaterally worse on the right than on prior examination. 3.  Non-obstructive bowel gas pattern. Mildly distended stomach. XR CHEST PORTABLE   Final Result   1. No residual pneumothorax. 2.  Mild patchy airspace disease bilaterally worse on the right than on prior examination. 3.  Non-obstructive bowel gas pattern. Mildly distended stomach. XR CHEST PORTABLE   Final Result      1. Small right pneumothorax appears slightly increased. 2. Mild patchy airspace opacity bilaterally. XR CHEST PORTABLE   Final Result     Pleurx catheter at the right lung base. Trace right    pneumothorax is unchanged.           XR CHEST PORTABLE   Final Result      Right-sided chest tube evident in the base, its tip medially. Improvement in the right-sided pneumothorax, since earlier today. Possible medial collapse of the right lower lobe. Small left effusion. Patchy airspace density/atelectasis in the lungs bilaterally, with no other significant change. XR CHEST PORTABLE   Final Result   1. Right-sided Pleurx catheter in satisfactory position in the lung bases   2. Right-sided post operative pneumothorax, approximately 10%             Assessment/Plan   ZANA  - suspect this is contrast nephropathy  - baseline Cre 0.7. Normal on admission.  - Cr stable   - Hold diuretics   - BNP 3k, Protein:Cre 0.3, FENa 0.4%  - closely monitor UOP  - avoid nephrotoxic agent     2. Hypernatremia  - continue free water   - will continue to monitor     3. Hypotension  - pressors as needed     4. Anemia  - daily CBC    5. Acid- base/ Electrolyte imbalance   - optimize lytes    6. Acute hypoxic resp failure  - s/p VATS on 9/19/22 for recurrent pleural effusions  - Intensivist and CTS following    7.  CHF   - EF 65% on 6/16/22 via cardiac cath        Raphael Gonzalez MD

## 2022-10-16 NOTE — PROGRESS NOTES
CT Surgery POC Note    Pleurx Drainage Note    R Pleurx catheter drained. 150 cc serous fluid removed. Required stripping of fibrinous clot prior to drainage. Patient tolerated well. New sterile cap and dressing applied.       Omari Casarez DO  PGY4, General Surgery  10/16/22  1:29 PM

## 2022-10-16 NOTE — PROGRESS NOTES
CT SURGERY DAILY PROGRESS NOTE    CC: Pleural effusions s/p R PleurX catheter placement    SUBJECTIVE:   Interval Hx:   Pt remains on vent. CVC and barry removed yesterday. Required straight cath. Continues to have diarrhea, on tube feeds. ROS: A 14 point review of systems was conducted, significant findings as noted above. All other systems negative. OBJECTIVE:   Vitals:   Vitals:    10/16/22 0401 10/16/22 0500 10/16/22 0511 10/16/22 0600   BP:  (!) 111/46  (!) 116/53   Pulse: 80 81  80   Resp: (!) 0 (!) 0  (!) 4   Temp:    98.4 °F (36.9 °C)   TempSrc:    Axillary   SpO2: 98% 97%  97%   Weight:   194 lb 14.2 oz (88.4 kg)    Height:           I/O:   Intake/Output Summary (Last 24 hours) at 10/16/2022 0744  Last data filed at 10/16/2022 0600  Gross per 24 hour   Intake 1905 ml   Output 2200 ml   Net -295 ml       I/O last 3 completed shifts: In: 8813 [I.V.:263; BR/RM:8874; IV Piggyback:117]  Out: 5497 [Urine:3110]    Diet: Diet NPO  ADULT TUBE FEEDING; PEG; Peptide Based; Continuous; 30; Yes; 10; Q 4 hours; 65; 30; Q 4 hours; Protein; 1 bottles Proteinex daily w/ 30 mL FW flushes before and after      Physical Examination:   General appearance: Mechanically ventilated. Attempts to mouth words but unable to project voice. Will occassionally point and shake/nod his head for communication. Neurological: Follows some commands, no focal deficits  HEENT: NC/AT, EOMI, trachea midline, no JVD. Tracheostomy leaking and with minimal erythema on edges. Chest/Lungs: On mechanical ventilation via tracheostomy; R PleurX catheter capped with dressing C/D/I  Cardiovascular: Atrial fibrillation rhythm with rate WNL's  Abdomen: Soft, non-tender, non-distended. PEG tube dressing C/D/I. PEG site with minimal dried blood, no erythema. Skin: Warm and dry. Sacral decubitus ulcer covered with Mepilex. Extremities: Pitting edema of the b/l feet. No cyanosis. Legs ulcers from SCDs covered with meplixex.     Labs:  CBC:   Recent Labs     10/14/22  0357 10/14/22  1759 10/15/22  0420 10/16/22  0421   WBC 7.1  --  8.9 8.9   HGB 6.9* 8.0* 7.9* 7.7*   HCT 21.7* 24.0* 24.3* 24.1*     --  312 324         BMP:   Recent Labs     10/14/22  0357 10/15/22  0421 10/16/22  0421    144 144   K 4.0 3.8 3.4*    105 106   CO2 32 31 28   * 94* 90*   CREATININE 1.1 1.0 1.0   GLUCOSE 151* 89 314*       LFT's:   No results for input(s): AST, ALT, ALB, BILITOT, ALKPHOS in the last 72 hours. Troponin: No results for input(s): TROPONINI in the last 72 hours. BNP: No results for input(s): BNP in the last 72 hours. ABGs:   No results for input(s): PHART, VMV7PHB, PO2ART in the last 72 hours. INR:   No results for input(s): INR in the last 72 hours. U/A:No results for input(s): NITRITE, COLORU, PHUR, LABCAST, WBCUA, RBCUA, MUCUS, TRICHOMONAS, YEAST, BACTERIA, CLARITYU, SPECGRAV, LEUKOCYTESUR, UROBILINOGEN, BILIRUBINUR, BLOODU, GLUCOSEU, AMORPHOUS in the last 72 hours. Invalid input(s): Jeanna [place]     Rad:   XR CHEST PORTABLE   Final Result      Distal portion of tracheostomy poorly visualized due to overlying medical devices. The position is without significant change since 10/5/2022. Bibasilar pleuroparenchymal consolidation. Stable cardiomediastinal silhouette. Right jugular central venous catheter without change. CT ABDOMEN PELVIS WO CONTRAST Additional Contrast? None   Final Result      Marked decrease in size of pneumomediastinum. Trace residual pneumoperitoneum. Extensive bilateral lower lobe pulmonary atelectasis with pleural effusions and trace residual right pneumothorax. XR CHEST PORTABLE   Final Result      Tiny right lateral pneumothorax is suspected, 5 mm in maximum thickness. This has decreased in size since 10/3/2022. Right basilar Pleurx catheter noted. Small bilateral pleural effusions. Prominent interstitial markings. Stable cardiac mediastinal silhouette.       Lines and tubes without change. Subcutaneous emphysema noted. CT CHEST ABDOMEN PELVIS WO CONTRAST   Final Result      1. Severe pneumomediastinum. 2. Small to moderate right hydropneumothorax with similar fluid and gas components with a right-sided Pleurx catheter. 3. Moderate loculated left pleural effusion with atelectasis of the left lower lobe with patency of the central left lower lobe bronchi. 4. Fluid/material filling the bronchus intermedius and right lower lobe bronchi with complete consolidation and volume loss of the right lower lobe. Differential diagnosis would include mucous plugging or obstructing lesion. 5. Tracheostomy tube tip within the trachea   6. Severe subcutaneous emphysema in the neck. CT ABDOMEN AND PELVIS:      FINDINGS:      LIVER: Normal.      GALLBLADDER AND BILIARY TREE: No calcified gallstones. No gallbladder distention. No intra- or extrahepatic biliary dilatation. PANCREAS: Normal.      SPLEEN: Normal.      ADRENAL GLANDS: Normal.      KIDNEYS AND URETERS: Normal.      URINARY BLADDER: Ansari catheter present within collapsed urinary bladder. REPRODUCTIVE ORGANS: No associated masses. BOWEL: Normal diameter, nonobstructed. Feeding tube tip at the level of the ligament of Treitz. LYMPH NODES: No abnormally enlarged nodes. PERITONEUM/RETROPERITONEUM: Severe pneumoperitoneum extends into the upper abdomen with some of the symmetric multiseptated gas appearing deep to the diaphragm consistent with mild pneumoperitoneum (series 601 was 101). This is likely related to    pneumonia mediastinum dissecting into the abdomen. No fluid collection in the abdomen or pelvis. No ascites. VESSELS: Extensive atherosclerotic calcification of the aorta and iliac arteries. ABDOMINAL WALL: No acute abnormality. BONES: No acute abnormality. Mild chronic L1 compression fracture with previous vertebroplasty. IMPRESSION:      1.  Severe pneumomediastinum extends into the upper abdomen with small pneumoperitoneum likely related to extension of pneumoperitoneum into the upper peritoneal cavity. 2. No fluid collection in the abdomen or pelvis. Results were discussed with the surgical team at 7:00 PM on 10/3/2022. XR CHEST PORTABLE   Final Result      Stable appearance of loculated right pneumothorax, with loculated components in the lateral right midlung region and in the right lateral costophrenic sulcus. Stable scattered areas of atelectasis versus scar, most prominent in the medial right lung base and in the left lateral lung base. XR CHEST PORTABLE   Final Result      Small right basilar pneumothorax. Right basilar chest tube without change. Patchy consolidation in the right mid and lower lung as well as the left lower lung-atelectasis versus pneumonia. Trace left pleural effusion. Normal heart size. Lines and tubes without change. Mild improvement in degree of subcutaneous emphysema in the chest and neck. XR CHEST 1 VIEW   Final Result      1. Stable small right-sided pneumothorax and prominent subcutaneous emphysema along the neck and upper superior chest wall, greater in right lung stable   2. Stable left basilar consolidation and pleural effusion      3. Stable appearing perihilar accentuated markings or airspace disease            XR CHEST PORTABLE   Final Result      Stable small right-sided pneumothorax. More prominent emphysema over the lower neck. No change in bilateral airspace disease. Stable left pleural effusion. XR CHEST PORTABLE   Final Result   1. Bilateral multifocal pneumonia with improvement in the right    pulmonary consolidation since prior study from 9/23/22   2. Mild to moderate left pleural effusion          XR CHEST PORTABLE   Final Result   1. Support lines and tubes are stable.    2.  Stable small right lateral pneumothorax and right basilar chest tube. 3.  Stable patchy bilateral airspace disease. XR CHEST PORTABLE   Final Result   1. Satisfactory position of right internal jugular central line   2. No interval change in endotracheal tube and nasogastric tube positioning. 3. Extensive bilateral airspace disease with no changes. 4. Left pleural effusion with retrocardiac opacity, unchanged   5. Small stable tiny right lateral pneumothorax and stable position of right chest tube,, Pleurx catheter. XR ABDOMEN (KUB) (SINGLE AP VIEW)   Final Result   1. No residual pneumothorax. 2.  Mild patchy airspace disease bilaterally worse on the right than on prior examination. 3.  Non-obstructive bowel gas pattern. Mildly distended stomach. XR CHEST PORTABLE   Final Result   1. No residual pneumothorax. 2.  Mild patchy airspace disease bilaterally worse on the right than on prior examination. 3.  Non-obstructive bowel gas pattern. Mildly distended stomach. XR CHEST PORTABLE   Final Result      1. Small right pneumothorax appears slightly increased. 2. Mild patchy airspace opacity bilaterally. XR CHEST PORTABLE   Final Result     Pleurx catheter at the right lung base. Trace right    pneumothorax is unchanged. XR CHEST PORTABLE   Final Result      Right-sided chest tube evident in the base, its tip medially. Improvement in the right-sided pneumothorax, since earlier today. Possible medial collapse of the right lower lobe. Small left effusion. Patchy airspace density/atelectasis in the lungs bilaterally, with no other significant change. XR CHEST PORTABLE   Final Result   1. Right-sided Pleurx catheter in satisfactory position in the lung bases   2.  Right-sided post operative pneumothorax, approximately 10%             ASSESSMENT AND PLAN:   Deborah Ken is a 65 y.o. male with history of diastolic heart failure, atrial fibrillation, and DM with recurrent pleural effusions s/p R PleurX catheter placement (9/19). - Continue oxycodone and tylenol PRN. Dilaudid held currently for concern of weakness.  - Continue to hold Seroquel for concern of extrapyramidal symptoms  - Per crit care, investigating ACh receptor antibodies for possible cause of weakness. Will f/u results  - Pleurx cath to be drained today  - SW/HHC pending  -Pulmonology/Crit care managing vent. -Weekly nutritional markers to be collected: prealbumin, transferrin, and CRP.   - Void check at 8 am, if bladder scan >400 cc, please reinsert barry catheter. -D/c bowel regimen order, will discuss adding fiber to TF's with dietary.   -Receiving 12.5 g albumin Q8 (10/13-10/20)  - Sacral wound per wound care nurse      Code Status: Full Code  -----------------------------  Delfina Giles, DO  PGY1, General Surgery  10/16/22  7:58 AM    I am post-call today and will not be on campus. Please contact Dr. Fernando Grace at [phone] or Dr. Sergio Majano at [phone] for questions or concerns regarding this patient.

## 2022-10-16 NOTE — PLAN OF CARE
Problem: Chronic Conditions and Co-morbidities  Goal: Patient's chronic conditions and co-morbidity symptoms are monitored and maintained or improved  10/15/2022 2226 by Kayley Larson RN  Outcome: Progressing  Flowsheets  Taken 10/15/2022 2226  Care Plan - Patient's Chronic Conditions and Co-Morbidity Symptoms are Monitored and Maintained or Improved:   Monitor and assess patient's chronic conditions and comorbid symptoms for stability, deterioration, or improvement   Collaborate with multidisciplinary team to address chronic and comorbid conditions and prevent exacerbation or deterioration   Update acute care plan with appropriate goals if chronic or comorbid symptoms are exacerbated and prevent overall improvement and discharge    Problem: Skin/Tissue Integrity  Goal: Absence of new skin breakdown  Description: 1. Monitor for areas of redness and/or skin breakdown  2. Assess vascular access sites hourly  3. Every 4-6 hours minimum:  Change oxygen saturation probe site  4. Every 4-6 hours:  If on nasal continuous positive airway pressure, respiratory therapy assess nares and determine need for appliance change or resting period. 10/15/2022 2232 by Kayley Larson RN  Outcome: Progressing  Note: No new skin ulcers noted; drying skin ulcers noted.      Problem: Discharge Planning  Goal: Discharge to home or other facility with appropriate resources  Recent Flowsheet Documentation  Taken 10/15/2022 2000 by Kayley Larson RN  Discharge to home or other facility with appropriate resources: Identify barriers to discharge with patient and caregiver  Outcome: Progressing

## 2022-10-16 NOTE — PROGRESS NOTES
Urinary retention noted; Bladder scan (500ml)   Had bouts of diarrhea. Dr. Lenny Waterman made aware. PLAN: Straight catheterization, hold Glycolax.

## 2022-10-16 NOTE — PROGRESS NOTES
Nephrology Progress Note                                                                                                                                                                                                                                                                                                                                                               Office : [phone]     Fax :[phone]    Patient's Name: Cristóbal [facility]        Reason for Consult:   ZANA  Requesting Physician:  Lulu Golden MD    Interval History:      Cr stable   Na stable   Good UO   Remains on Trach   PEG placed         Past Medical History:   Diagnosis Date    ZANA (acute kidney injury) ([facility] [address] 9/26/2022    Carotid stenosis, left 10/12/2011    Chronic back pain     Chronic systolic (congestive) heart failure 61/93/0464    Diastolic CHF ([facility] [address]     Erectile dysfunction     Hyperlipidemia     Hypertension     Osteoarthritis     Restrictive lung disease     Type II or unspecified type diabetes mellitus without mention of complication, not stated as uncontrolled        Past Surgical History:   Procedure Laterality Date    CARDIAC CATHETERIZATION  06/2022    GASTROSTOMY TUBE PLACEMENT N/A 10/11/2022    EGD PEG TUBE PLACEMENT performed by Romina Easley MD at [facility] Left     PLEURAL CATH INSERTION N/A 9/19/2022    PLEURAL CATHETER PLACEMENT; INTERCOSTAL NERVE BLOCK X4 performed by Belle Henao MD at [facility] Bilateral     THORACOSCOPY Right 9/19/2022    RIGHT VIDEO ASSISTED THORASCOPIC SURGERY AND; performed by Belle Henao MD at [address] N/A 9/30/2022    TRACHEOSTOMY performed by Florencia Alford MD at [address] [place] [address] History   Problem Relation Age of Onset    Hearing Loss Father     Heart Disease Father 70        MI    High Blood Pressure Father     Diabetes Maternal Grandmother         hypoglycemic    Hearing Loss Maternal Grandmother Arthritis Maternal Grandfather         reports that he quit smoking about 20 years ago. His smoking use included cigarettes. He has a 80.00 pack-year smoking history. He has never used smokeless tobacco. He reports current alcohol use. He reports that he does not use drugs. Allergies:   Torsemide and Dye [iodides]    Current Medications:    0.9 % sodium chloride infusion, PRN  insulin glargine (LANTUS;BASAGLAR) injection pen 15 Units, Nightly  lansoprazole suspension SUSP 30 mg, BID  albumin human 25 % IV solution 12.5 g, Q8H  oxyCODONE (ROXICODONE) 5 MG/5ML solution 7.5 mg, Q6H PRN  hydroxypropyl methylcellulose (GONIOSOL) 2.5 % ophthalmic solution 1 drop, PRN  albuterol (PROVENTIL) nebulizer solution 2.5 mg, Q4H  insulin lispro (1 Unit Dial) (HUMALOG/ADMELOG) pen 0-16 Units, Q4H  Venelex ointment, BID  amiodarone (CORDARONE) tablet 200 mg, Daily  acetaminophen (TYLENOL) 160 MG/5ML solution 650 mg, Q6H PRN  chlorhexidine (PERIDEX) 0.12 % solution 15 mL, BID  apixaban (ELIQUIS) tablet 5 mg, BID  sodium chloride flush 0.9 % injection 5-40 mL, 2 times per day  sodium chloride flush 0.9 % injection 5-40 mL, PRN  0.9 % sodium chloride infusion, PRN  [Held by provider] polyethylene glycol (GLYCOLAX) packet 17 g, Daily  ondansetron (ZOFRAN-ODT) disintegrating tablet 4 mg, Q8H PRN   Or  ondansetron (ZOFRAN) injection 4 mg, Q6H PRN  glucose chewable tablet 16 g, PRN  dextrose bolus 10% 125 mL, PRN   Or  dextrose bolus 10% 250 mL, PRN  glucagon (rDNA) injection 1 mg, PRN  dextrose 10 % infusion, Continuous PRN  hydrALAZINE (APRESOLINE) injection 5 mg, Q15 Min PRN          Physical exam:     Vitals:  BP (!) 108/54   Pulse 71   Temp 99.7 °F (37.6 °C) (Temporal)   Resp 15   Ht 6' 0.99\" (1.854 m)   Wt 192 lb 0.3 oz (87.1 kg)   SpO2 97%   BMI 25.34 kg/m²   Constitutional:  on MV  Skin: no rash, turgor wnl  Heent:  eomi, mmm  Neck: no bruits or jvd noted  Cardiovascular:  S1, S2 without m/r/g  Respiratory: trach  Abdomen: +bs, soft, nt, nd  Ext: bilateral lower extremity edema R>L  Psychiatric: mood and affect appropriate  Musculoskeletal:  Rom, muscular strength intact    Data:   Labs:  CBC:   Recent Labs     10/13/22  0420 10/14/22  0357 10/14/22  1759 10/15/22  0420   WBC 6.9 7.1  --  8.9   HGB 7.5* 6.9* 8.0* 7.9*    295  --  312       BMP:    Recent Labs     10/13/22  0420 10/14/22  0357 10/15/22  0421    138 144   K 4.3 4.0 3.8   CL 98* 100 105   CO2 31 32 31   * 103* 94*   CREATININE 1.3 1.1 1.0   GLUCOSE 180* 151* 89       Ca/Mg/Phos:   Recent Labs     10/13/22  0420 10/14/22  0357 10/15/22  0421   CALCIUM 7.7* 7.8* 7.9*   MG 3.60* 3.50* 3.50*   PHOS 4.2 2.8 2.7       Hepatic: No results for input(s): AST, ALT, ALB, BILITOT, ALKPHOS in the last 72 hours. Troponin: No results for input(s): TROPONINI in the last 72 hours. BNP: No results for input(s): BNP in the last 72 hours. Lipids:   No results for input(s): CHOL, TRIG, HDL, LDLCALC, LABVLDL in the last 72 hours. ABGs:   No results for input(s): PHART, PO2ART, AMJ3IWZ in the last 72 hours. INR: No results for input(s): INR in the last 72 hours. UA:No results for input(s): Flores Munch, GLUCOSEU, BILIRUBINUR, Sushila Maw, BLOODU, PHUR, PROTEINU, UROBILINOGEN, NITRU, LEUKOCYTESUR, LABMICR, URINETYPE in the last 72 hours. Urine Microscopic: No results for input(s): LABCAST, BACTERIA, COMU, HYALCAST, WBCUA, RBCUA, EPIU in the last 72 hours. Urine Culture: No results for input(s): LABURIN in the last 72 hours. Urine Chemistry:   No results for input(s): Heriberto Gails, PROTEINUR, NAUR in the last 72 hours. IMAGING:  XR CHEST PORTABLE   Final Result      Distal portion of tracheostomy poorly visualized due to overlying medical devices. The position is without significant change since 10/5/2022. Bibasilar pleuroparenchymal consolidation. Stable cardiomediastinal silhouette.       Right jugular central venous catheter without change. CT ABDOMEN PELVIS WO CONTRAST Additional Contrast? None   Final Result      Marked decrease in size of pneumomediastinum. Trace residual pneumoperitoneum. Extensive bilateral lower lobe pulmonary atelectasis with pleural effusions and trace residual right pneumothorax. XR CHEST PORTABLE   Final Result      Tiny right lateral pneumothorax is suspected, 5 mm in maximum thickness. This has decreased in size since 10/3/2022. Right basilar Pleurx catheter noted. Small bilateral pleural effusions. Prominent interstitial markings. Stable cardiac mediastinal silhouette. Lines and tubes without change. Subcutaneous emphysema noted. CT CHEST ABDOMEN PELVIS WO CONTRAST   Final Result      1. Severe pneumomediastinum. 2. Small to moderate right hydropneumothorax with similar fluid and gas components with a right-sided Pleurx catheter. 3. Moderate loculated left pleural effusion with atelectasis of the left lower lobe with patency of the central left lower lobe bronchi. 4. Fluid/material filling the bronchus intermedius and right lower lobe bronchi with complete consolidation and volume loss of the right lower lobe. Differential diagnosis would include mucous plugging or obstructing lesion. 5. Tracheostomy tube tip within the trachea   6. Severe subcutaneous emphysema in the neck. CT ABDOMEN AND PELVIS:      FINDINGS:      LIVER: Normal.      GALLBLADDER AND BILIARY TREE: No calcified gallstones. No gallbladder distention. No intra- or extrahepatic biliary dilatation. PANCREAS: Normal.      SPLEEN: Normal.      ADRENAL GLANDS: Normal.      KIDNEYS AND URETERS: Normal.      URINARY BLADDER: Ansari catheter present within collapsed urinary bladder. REPRODUCTIVE ORGANS: No associated masses. BOWEL: Normal diameter, nonobstructed. Feeding tube tip at the level of the ligament of Treitz. LYMPH NODES: No abnormally enlarged nodes. PERITONEUM/RETROPERITONEUM: Severe pneumoperitoneum extends into the upper abdomen with some of the symmetric multiseptated gas appearing deep to the diaphragm consistent with mild pneumoperitoneum (series 601 was 101). This is likely related to    pneumonia mediastinum dissecting into the abdomen. No fluid collection in the abdomen or pelvis. No ascites. VESSELS: Extensive atherosclerotic calcification of the aorta and iliac arteries. ABDOMINAL WALL: No acute abnormality. BONES: No acute abnormality. Mild chronic L1 compression fracture with previous vertebroplasty. IMPRESSION:      1. Severe pneumomediastinum extends into the upper abdomen with small pneumoperitoneum likely related to extension of pneumoperitoneum into the upper peritoneal cavity. 2. No fluid collection in the abdomen or pelvis. Results were discussed with the surgical team at 7:00 PM on 10/3/2022. XR CHEST PORTABLE   Final Result      Stable appearance of loculated right pneumothorax, with loculated components in the lateral right midlung region and in the right lateral costophrenic sulcus. Stable scattered areas of atelectasis versus scar, most prominent in the medial right lung base and in the left lateral lung base. XR CHEST PORTABLE   Final Result      Small right basilar pneumothorax. Right basilar chest tube without change. Patchy consolidation in the right mid and lower lung as well as the left lower lung-atelectasis versus pneumonia. Trace left pleural effusion. Normal heart size. Lines and tubes without change. Mild improvement in degree of subcutaneous emphysema in the chest and neck. XR CHEST 1 VIEW   Final Result      1. Stable small right-sided pneumothorax and prominent subcutaneous emphysema along the neck and upper superior chest wall, greater in right lung stable   2. Stable left basilar consolidation and pleural effusion      3.  Stable appearing perihilar accentuated markings or airspace disease            XR CHEST PORTABLE   Final Result      Stable small right-sided pneumothorax. More prominent emphysema over the lower neck. No change in bilateral airspace disease. Stable left pleural effusion. XR CHEST PORTABLE   Final Result   1. Bilateral multifocal pneumonia with improvement in the right    pulmonary consolidation since prior study from 9/23/22   2. Mild to moderate left pleural effusion          XR CHEST PORTABLE   Final Result   1. Support lines and tubes are stable. 2.  Stable small right lateral pneumothorax and right basilar    chest tube. 3.  Stable patchy bilateral airspace disease. XR CHEST PORTABLE   Final Result   1. Satisfactory position of right internal jugular central line   2. No interval change in endotracheal tube and nasogastric tube positioning. 3. Extensive bilateral airspace disease with no changes. 4. Left pleural effusion with retrocardiac opacity, unchanged   5. Small stable tiny right lateral pneumothorax and stable position of right chest tube,, Pleurx catheter. XR ABDOMEN (KUB) (SINGLE AP VIEW)   Final Result   1. No residual pneumothorax. 2.  Mild patchy airspace disease bilaterally worse on the right than on prior examination. 3.  Non-obstructive bowel gas pattern. Mildly distended stomach. XR CHEST PORTABLE   Final Result   1. No residual pneumothorax. 2.  Mild patchy airspace disease bilaterally worse on the right than on prior examination. 3.  Non-obstructive bowel gas pattern. Mildly distended stomach. XR CHEST PORTABLE   Final Result      1. Small right pneumothorax appears slightly increased. 2. Mild patchy airspace opacity bilaterally. XR CHEST PORTABLE   Final Result     Pleurx catheter at the right lung base. Trace right    pneumothorax is unchanged.           XR CHEST PORTABLE   Final Result      Right-sided chest tube evident in the base, its tip medially. Improvement in the right-sided pneumothorax, since earlier today. Possible medial collapse of the right lower lobe. Small left effusion. Patchy airspace density/atelectasis in the lungs bilaterally, with no other significant change. XR CHEST PORTABLE   Final Result   1. Right-sided Pleurx catheter in satisfactory position in the lung bases   2. Right-sided post operative pneumothorax, approximately 10%             Assessment/Plan   ZANA  - suspect this is contrast nephropathy  - baseline Cre 0.7. Normal on admission.  - Cr stable   - Hold diuretics   - BNP 3k, Protein:Cre 0.3, FENa 0.4%  - closely monitor UOP  - avoid nephrotoxic agent     2. Hypernatremia  - continue free water   - will continue to monitor     3. Hypotension  - pressors as needed     4. Anemia  - daily CBC    5. Acid- base/ Electrolyte imbalance   - optimize lytes    6. Acute hypoxic resp failure  - s/p VATS on 9/19/22 for recurrent pleural effusions  - Intensivist and CTS following    7.  CHF   - EF 65% on 6/16/22 via cardiac cath        Raphael Gonzalez MD

## 2022-10-16 NOTE — PROGRESS NOTES
12 MN Straight cath done; urine 800ml removed. 0600 Bladder scan done-700 urine removed. Blood sugar 12MN 271mg/dl. Additional Regular insulin 10 units SC given,    0400  mg/dl. Watery loose stool 3x.

## 2022-10-16 NOTE — PLAN OF CARE
Problem: Chronic Conditions and Co-morbidities  Goal: Patient's chronic conditions and co-morbidity symptoms are monitored and maintained or improved  Outcome: Progressing     Problem: Discharge Planning  Goal: Discharge to home or other facility with appropriate resources  Outcome: Progressing     Problem: Pain  Goal: Verbalizes/displays adequate comfort level or baseline comfort level  Outcome: Progressing     Problem: Safety - Adult  Goal: Free from fall injury  Outcome: Progressing     Problem: Skin/Tissue Integrity  Goal: Absence of new skin breakdown  Description: 1. Monitor for areas of redness and/or skin breakdown  2. Assess vascular access sites hourly  3. Every 4-6 hours minimum:  Change oxygen saturation probe site  4. Every 4-6 hours:  If on nasal continuous positive airway pressure, respiratory therapy assess nares and determine need for appliance change or resting period. Outcome: Progressing     Problem: Nutrition Deficit:  Goal: Optimize nutritional status  Outcome: Progressing     Problem: ABCDS Injury Assessment  Goal: Absence of physical injury  Outcome: Progressing     Problem: Confusion  Goal: Confusion, delirium, dementia, or psychosis is improved or at baseline  Description: INTERVENTIONS:  1. Assess for possible contributors to thought disturbance, including medications, impaired vision or hearing, underlying metabolic abnormalities, dehydration, psychiatric diagnoses, and notify attending LIP  2. Norco high risk fall precautions, as indicated  3. Provide frequent short contacts to provide reality reorientation, refocusing and direction  4. Decrease environmental stimuli, including noise as appropriate  5. Monitor and intervene to maintain adequate nutrition, hydration, elimination, sleep and activity  6. If unable to ensure safety without constant attention obtain sitter and review sitter guidelines with assigned personnel  7.  Initiate Psychosocial CNS and Spiritual Care consult, as indicated  Outcome: Progressing

## 2022-10-17 PROBLEM — R29.2 AREFLEXIA: Status: ACTIVE | Noted: 2022-01-01

## 2022-10-17 PROBLEM — R29.2: Status: ACTIVE | Noted: 2022-01-01

## 2022-10-17 PROBLEM — R53.1 GENERALIZED WEAKNESS: Status: ACTIVE | Noted: 2022-01-01

## 2022-10-17 NOTE — PROGRESS NOTES
Occupational Therapy  [facility]  Occupational Therapy Treatment    Name: Madhuri Palnecia  :   MRN: [id number]  Date of Service: 10/17/2022    Discharge Recommendations:     OT Equipment Recommendations  Other: defer to next level of care   Madhuri Palencia scored a 7/24 on the AM-PAC ADL Inpatient form. Current research shows that an AM-PAC score of 17 or less is typically not associated with a discharge to the patient's home setting. Based on the patient's AM-PAC score and their current ADL deficits, it is recommended that the patient have 3-5 sessions per week of Occupational Therapy at d/c to increase the patient's independence. Please see assessment section for further patient specific details. If patient discharges prior to next session this note will serve as a discharge summary. Please see below for the latest assessment towards goals. Patient Diagnosis(es): The primary encounter diagnosis was Chronic heart failure with preserved ejection fraction (HFpEF) ([address] Diagnoses of Hypoxia and Acute on chronic respiratory failure with hypoxia and hypercapnia (HCC) were also pertinent to this visit. Past Medical History:  has a past medical history of ZANA (acute kidney injury) ([address] Carotid stenosis, left, Chronic back pain, Chronic systolic (congestive) heart failure, Diastolic CHF ([address] Erectile dysfunction, Hyperlipidemia, Hypertension, Osteoarthritis, Restrictive lung disease, and Type II or unspecified type diabetes mellitus without mention of complication, not stated as uncontrolled. Past Surgical History:  has a past surgical history that includes Rotator cuff repair (Bilateral); knee surgery (Left); Cardiac catheterization (2022); Thoracoscopy (Right, 2022); Pleural Cath Insertion (N/A, 2022); tracheostomy (N/A, 2022); and Gastrostomy tube placement (N/A, 10/11/2022).     Treatment Diagnosis: impaired ADLs and transfers      Assessment   Performance did alot of cooking, wife did all other tasks)  Ambulation Assistance: Independent (furniture and wall walking)  Transfer Assistance: Independent  Active : Yes  Leisure & Hobbies: watching [employer]  Additional Comments: Social history obtained from wife. She reports he was slowly declining at home, and then 2 days previous to admission became severely week. He has had ~3 falls in last few months. Objective              Safety Devices  Type of Devices: Left in bed;Nurse notified;Call light within reach; Bed alarm in place  Bed Mobility Training  Bed Mobility Training: No  Balance  Sitting: Impaired (min-mod A for seated balance in recliner without back support)  Sitting - Static: Supported sitting  Standing:  (unable to assess)  Transfer Training  Transfer Training: Yes  Sit to Stand: Total assistance;Assist X2 (3 unsuccessful attempts from recliner in stedy made with dep x2.)  Bed to Chair: Total assistance (via brandon lift and assist x3 for line management)  Gait Training  Gait Training: No  Wheelchair Management  Wheelchair Management: No        ADL  Feeding: NPO  Grooming: Setup  Grooming Skilled Clinical Factors: washing face while seated in bedside chair. Encouragement for pt to wash vs pt initially asking for OT to complete for him. LE Dressing: Dependent/Total  LE Dressing Skilled Clinical Factors: donning socks  Toileting: Dependent/Total  Toileting Skilled Clinical Factors: barry catheter     Activity Tolerance  Activity Tolerance: Patient limited by fatigue;Patient limited by endurance  Bed mobility  Bed Mobility Comments: pt sitting in bedside chair upon arrival, brandon lift for chair back to bed  Transfers  Sit to stand: Unable to assess  Transfer Comments: multiple attempts to stand to [address] Pt required total A x 2 however was unable to clear buttocks off chair.      Cognition  Overall Cognitive Status:  (pt unable to speak, is alert, nods yes and no, gestures or uses lip speaking)  Orientation  Overall Orientation Status:  (unable to assess, pt has trach vent)  Orientation Level: Oriented X4               Exercise Treatment: Pt completed the following exercises x 5 reps with increased encouragement: grasp/ release, chest press (AAROM 2/2 decreased shoulder flexion ROM), and elbow flexion/ extension  Education Given To: Patient  Education Provided: Role of Therapy  Education Method: Verbal  Education Outcome: Continued education needed                 Goals  Short Term Goals  Time Frame for Short Term Goals: discharge  Short Term Goal 1: pt to wash hands with cloth and SBA- ongoing, continue  Short Term Goal 2: pt to move supine><sit with max A of 2- ongoing, continue  Short Term Goal 3: pt to sit in midline for 6-8 minutes with mod A- ongoing, continue  Short Term Goal 4: pt to tolerate 5-10 reps B UE AAROM exerc to increase activity tolerance- ongoing, continue  Patient Goals   Patient goals : not able to stated       Therapy Time   Individual Concurrent Group Co-treatment   Time In 1136         Time Out 1214         Minutes 38         Timed Code Treatment Minutes: [address] [facility]  [address] [facility], OTR/L [id number]

## 2022-10-17 NOTE — PROGRESS NOTES
ICU Progress Note    Admit Date: 9/19/2022  Day: 27  Vent Day: None  IV Access: Peripheral, R IJ triple lumen  IV Fluids: None  Vasopressors:None                Antibiotics: None  Diet: Diet NPO  ADULT TUBE FEEDING; PEG; Peptide Based; Continuous; 30; Yes; 10; Q 4 hours; 65; 30; Q 4 hours; Protein; 1 bottles Proteinex daily w/ 30 mL FW flushes before and after  Trach: AC/PRVC Resp Rate (Set): 14 bmp (26 measured) /Vt (Set, mL): 375 mL/ /FiO2 : 40 %  PEEP 5    CC: Pleural Effusion (right) s/p VATS and Pleural Catheter Placement 09/19    Interval history:   - No acute events overnight  - Alert & oriented, responsive to commands. Communicates that he wants a breathing trial to get off trach and use speaking valve.   - Ansari removed, then bladder scanned 600ml, straight cathed and then Ansari replaced  - Having bouts of watery diarrhea  - Hgb 7.7> 7.9  - Still waiting on insurance to approve an LTAC    Medications:     Scheduled Meds:   potassium phosphate IVPB  20 mmol IntraVENous Once    insulin glargine  30 Units SubCUTAneous Nightly    doxazosin  1 mg Oral Daily    clotrimazole   Topical BID    lansoprazole  30 mg Per G Tube BID    albumin human  12.5 g IntraVENous Q8H    albuterol  2.5 mg Nebulization Q4H    insulin lispro  0-16 Units SubCUTAneous Q4H    Venelex   Topical BID    chlorhexidine  15 mL Mouth/Throat BID    apixaban  5 mg Oral BID    sodium chloride flush  5-40 mL IntraVENous 2 times per day     Continuous Infusions:   sodium chloride      sodium chloride      dextrose Stopped (10/10/22 9739)     PRN Meds:sodium chloride, oxyCODONE, hydroxypropyl methylcellulose, acetaminophen, sodium chloride flush, sodium chloride, ondansetron **OR** ondansetron, glucose, dextrose bolus **OR** dextrose bolus, glucagon (rDNA), dextrose, hydrALAZINE    Objective:   Vitals:   T-max:  Patient Vitals for the past 8 hrs:   BP Temp Temp src Pulse Resp SpO2   10/17/22 0600 (!) 109/51 -- -- 69 14 97 %   10/17/22 0500 (!) 115/49 -- -- 78 11 --   10/17/22 0401 -- -- -- 71 (!) 8 --   10/17/22 0400 (!) 102/53 -- Temporal 71 24 96 %   10/17/22 0300 (!) 112/58 -- -- 65 25 --   10/17/22 0200 108/60 -- -- 69 16 96 %   10/17/22 0100 (!) 97/50 -- -- 69 (!) 35 --   10/17/22 0001 -- -- -- 72 16 97 %   10/17/22 0000 (!) 120/56 98.1 °F (36.7 °C) Temporal 74 17 98 %         Intake/Output Summary (Last 24 hours) at 10/17/2022 0736  Last data filed at 10/17/2022 0441  Gross per 24 hour   Intake 1204 ml   Output 705 ml   Net 499 ml       Physical Exam  Constitutional:       Comments: Follows commands, responds to questions   HENT:      Head: Normocephalic and atraumatic. Cardiovascular:      Rate and Rhythm: Normal rate and regular rhythm. Pulmonary:      Breath sounds: No wheezing, rhonchi or rales. Comments: R PleurX catheter capped with dressing   Abdominal:      General: There is no distension. Palpations: Abdomen is soft. Tenderness: There is no abdominal tenderness. There is no guarding. Musculoskeletal:      Right lower leg: Edema present. Left lower leg: Edema present. Comments: 3+ pitting edema of bilateral legs     Skin:     Comments: red linear rash on medial left foot without progression. Sacral decubitus ulcer covered with Mepilex. Neurological:      Mental Status: He is alert.       Comments: Much improved mental status  Responsive to commands and questions     LABS:    CBC:   Recent Labs     10/15/22  0420 10/16/22  0421 10/17/22  0528   WBC 8.9 8.9 10.4   HGB 7.9* 7.7* 7.9*   HCT 24.3* 24.1* 24.6*    324 354   MCV 87.5 89.0 88.5       Renal:    Recent Labs     10/15/22  0421 10/16/22  0421 10/17/22  0528    144 148*   K 3.8 3.4* 4.2    106 108   CO2 31 28 32   BUN 94* 90* 86*   CREATININE 1.0 1.0 0.9   GLUCOSE 89 314* 157*   CALCIUM 7.9* 7.9* 8.4   MG 3.50* 3.30* 3.30*   PHOS 2.7 2.0* 1.5*   ANIONGAP 8 10 8       Hepatic:   Recent Labs     10/15/22  0421 10/16/22  0421 10/17/22  7179 LABALBU 2.5* 2.7* 2.9*       -----------------------------------------------------------------  RAD:   XR CHEST PORTABLE   Final Result      Distal portion of tracheostomy poorly visualized due to overlying medical devices. The position is without significant change since 10/5/2022. Bibasilar pleuroparenchymal consolidation. Stable cardiomediastinal silhouette. Right jugular central venous catheter without change. CT ABDOMEN PELVIS WO CONTRAST Additional Contrast? None   Final Result      Marked decrease in size of pneumomediastinum. Trace residual pneumoperitoneum. Extensive bilateral lower lobe pulmonary atelectasis with pleural effusions and trace residual right pneumothorax. XR CHEST PORTABLE   Final Result      Tiny right lateral pneumothorax is suspected, 5 mm in maximum thickness. This has decreased in size since 10/3/2022. Right basilar Pleurx catheter noted. Small bilateral pleural effusions. Prominent interstitial markings. Stable cardiac mediastinal silhouette. Lines and tubes without change. Subcutaneous emphysema noted. CT CHEST ABDOMEN PELVIS WO CONTRAST   Final Result      1. Severe pneumomediastinum. 2. Small to moderate right hydropneumothorax with similar fluid and gas components with a right-sided Pleurx catheter. 3. Moderate loculated left pleural effusion with atelectasis of the left lower lobe with patency of the central left lower lobe bronchi. 4. Fluid/material filling the bronchus intermedius and right lower lobe bronchi with complete consolidation and volume loss of the right lower lobe. Differential diagnosis would include mucous plugging or obstructing lesion. 5. Tracheostomy tube tip within the trachea   6. Severe subcutaneous emphysema in the neck. CT ABDOMEN AND PELVIS:      FINDINGS:      LIVER: Normal.      GALLBLADDER AND BILIARY TREE: No calcified gallstones. No gallbladder distention.   No intra- or extrahepatic biliary dilatation. PANCREAS: Normal.      SPLEEN: Normal.      ADRENAL GLANDS: Normal.      KIDNEYS AND URETERS: Normal.      URINARY BLADDER: Ansari catheter present within collapsed urinary bladder. REPRODUCTIVE ORGANS: No associated masses. BOWEL: Normal diameter, nonobstructed. Feeding tube tip at the level of the ligament of Treitz. LYMPH NODES: No abnormally enlarged nodes. PERITONEUM/RETROPERITONEUM: Severe pneumoperitoneum extends into the upper abdomen with some of the symmetric multiseptated gas appearing deep to the diaphragm consistent with mild pneumoperitoneum (series 601 was 101). This is likely related to    pneumonia mediastinum dissecting into the abdomen. No fluid collection in the abdomen or pelvis. No ascites. VESSELS: Extensive atherosclerotic calcification of the aorta and iliac arteries. ABDOMINAL WALL: No acute abnormality. BONES: No acute abnormality. Mild chronic L1 compression fracture with previous vertebroplasty. IMPRESSION:      1. Severe pneumomediastinum extends into the upper abdomen with small pneumoperitoneum likely related to extension of pneumoperitoneum into the upper peritoneal cavity. 2. No fluid collection in the abdomen or pelvis. Results were discussed with the surgical team at 7:00 PM on 10/3/2022. XR CHEST PORTABLE   Final Result      Stable appearance of loculated right pneumothorax, with loculated components in the lateral right midlung region and in the right lateral costophrenic sulcus. Stable scattered areas of atelectasis versus scar, most prominent in the medial right lung base and in the left lateral lung base. XR CHEST PORTABLE   Final Result      Small right basilar pneumothorax. Right basilar chest tube without change. Patchy consolidation in the right mid and lower lung as well as the left lower lung-atelectasis versus pneumonia. Trace left pleural effusion. Normal heart size. Lines and tubes without change. Mild improvement in degree of subcutaneous emphysema in the chest and neck. XR CHEST 1 VIEW   Final Result      1. Stable small right-sided pneumothorax and prominent subcutaneous emphysema along the neck and upper superior chest wall, greater in right lung stable   2. Stable left basilar consolidation and pleural effusion      3. Stable appearing perihilar accentuated markings or airspace disease            XR CHEST PORTABLE   Final Result      Stable small right-sided pneumothorax. More prominent emphysema over the lower neck. No change in bilateral airspace disease. Stable left pleural effusion. XR CHEST PORTABLE   Final Result   1. Bilateral multifocal pneumonia with improvement in the right    pulmonary consolidation since prior study from 9/23/22   2. Mild to moderate left pleural effusion          XR CHEST PORTABLE   Final Result   1. Support lines and tubes are stable. 2.  Stable small right lateral pneumothorax and right basilar    chest tube. 3.  Stable patchy bilateral airspace disease. XR CHEST PORTABLE   Final Result   1. Satisfactory position of right internal jugular central line   2. No interval change in endotracheal tube and nasogastric tube positioning. 3. Extensive bilateral airspace disease with no changes. 4. Left pleural effusion with retrocardiac opacity, unchanged   5. Small stable tiny right lateral pneumothorax and stable position of right chest tube,, Pleurx catheter. XR ABDOMEN (KUB) (SINGLE AP VIEW)   Final Result   1. No residual pneumothorax. 2.  Mild patchy airspace disease bilaterally worse on the right than on prior examination. 3.  Non-obstructive bowel gas pattern. Mildly distended stomach. XR CHEST PORTABLE   Final Result   1. No residual pneumothorax. 2.  Mild patchy airspace disease bilaterally worse on the right than on prior examination. 3.  Non-obstructive bowel gas pattern. Mildly distended stomach. XR CHEST PORTABLE   Final Result      1. Small right pneumothorax appears slightly increased. 2. Mild patchy airspace opacity bilaterally. XR CHEST PORTABLE   Final Result     Pleurx catheter at the right lung base. Trace right    pneumothorax is unchanged. XR CHEST PORTABLE   Final Result      Right-sided chest tube evident in the base, its tip medially. Improvement in the right-sided pneumothorax, since earlier today. Possible medial collapse of the right lower lobe. Small left effusion. Patchy airspace density/atelectasis in the lungs bilaterally, with no other significant change. XR CHEST PORTABLE   Final Result   1. Right-sided Pleurx catheter in satisfactory position in the lung bases   2. Right-sided post operative pneumothorax, approximately 10%           Assessment/Plan:   Rina Jackson is a 65 y.o. male w/PMH HFpEF, carotid artery stenosis, HTN, OA, Restrictive Lung Disease, DM2, HLD who underwent VATS with PleurX catheter placement on 09/19 for recurrent b/l loculated pleural effusions w/subsequent BIPAP failure and hypoxic/hypercapnic respiratory failure requiring mechanical intubation. Pulmonary:    Acute Hypoxic/Hypercapnic Respiratory Failure  W/Recurrent Pleural Effusions s/p VATS and PleurX for pleural fluid management 09/19/22, w/associated unintentional weight loss of 30 Ibs, PFTs w/restrictive defect. - PleurX catheter in place.  - Trach in place: A/C PRVC FiO2 40% / Vt 375 / R 14 / PEEP 5  - CTS primary  - 10/13: PleurX cath drained 180 ml out.   - 10/16: PleurX drained 150cc  - Negative ACh antibodies for neuromuscular weakness workup  - S/p cefepime 7 days  - SBT trial today  - ABG    Cardiovascular:    HFpEF  - w/fluid overload  - w/downtrending BNP  - Fluid status in past day: -295mL   - Nephro on board    Paroxysmal Atrial Fibrillation   - Eliquis held, amiodarone    HLD  - Pravastatin 40mg    Renal:    Urinary Retention   Bladder scan 406ml after barry removed. - Barry replaced 10/16, draining yellow urine  - Continue to monitor  - Doxazosin    GI:    Nutrition  - PEG in place  - Consistent, watery stools. CTS will discuss fiber diet with Nutrition. Metabolic    Q1ZZ  BG in 845R requiring 94u insulin in 24 hours. - HDSSI  - Lantus 30u  - POCT  - Hypoglycemia protocol  - A1c: 8.1 (1/24/22)     Heme    Anemia  - s/p 3rd U pRBC transfusion 10/14.   - continue to monitor H&H    Neuro:  -analgesia: Tylenol, on prn oxycodone 7.5 mg    Wound:  Sacral wound  - Wound care following    Code Status: FULL CODE  FEN: Diet NPO  ADULT TUBE FEEDING; PEG; Peptide Based; Continuous; 30; Yes; 10; Q 4 hours; 65; 30; Q 4 hours; Protein; 1 bottles Proteinex daily w/ 30 mL FW flushes before and after  PPX: Lasoprazole   DISPO: ICU until insurance approves an LTAC. This patient was seen and discussed with Dr. Olga Corona MD.    Zach Melara MD, PGY-1  10/17/22  7:36 AM    Patient was seen, examined and discussed with Dr. Fredi Stewart. I agree with the interval history. My physical exam confirms the findings listed below  Chart was reviewed including labs and medical records confirm the findings noted    Acute respiratory failure on mechanical vent support. , FiO2 40, RR 14, PEEP 5 s/p trach day #17  Neuromuscular weakness is considered but acetylcholine Ab are negative. Clinical findings are not consistent with demyelinating polyneuropathy. 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 RR increased from 31 to 39.   VC was < 400  TF

## 2022-10-17 NOTE — PROGRESS NOTES
Nephrology Progress Note                                                                                                                                                                                                                                                                                                                                                               Office : [phone]     Fax :[phone]    Patient's Name: Vlad Harper        Reason for Consult:   ZANA  Requesting Physician:  Tiffanie Gill MD    Interval History:      Cr stable   Na stable   Good UO   Remains on Trach   PEG placed         Past Medical History:   Diagnosis Date    ZANA (acute kidney injury) ([facility] [address] 9/26/2022    Carotid stenosis, left 10/12/2011    Chronic back pain     Chronic systolic (congestive) heart failure 55/14/1043    Diastolic CHF ([facility] [address]     Erectile dysfunction     Hyperlipidemia     Hypertension     Osteoarthritis     Restrictive lung disease     Type II or unspecified type diabetes mellitus without mention of complication, not stated as uncontrolled        Past Surgical History:   Procedure Laterality Date    CARDIAC CATHETERIZATION  06/2022    GASTROSTOMY TUBE PLACEMENT N/A 10/11/2022    EGD PEG TUBE PLACEMENT performed by Otilia Reich MD at [facility] Left     PLEURAL CATH INSERTION N/A 9/19/2022    PLEURAL CATHETER PLACEMENT; INTERCOSTAL NERVE BLOCK X4 performed by Dixie Menon MD at [facility] Bilateral     THORACOSCOPY Right 9/19/2022    RIGHT VIDEO ASSISTED THORASCOPIC SURGERY AND; performed by Dixie Menon MD at [address] N/A 9/30/2022    TRACHEOSTOMY performed by Howard Pineda MD at [address] [place] [address] History   Problem Relation Age of Onset    Hearing Loss Father     Heart Disease Father 70        MI    High Blood Pressure Father     Diabetes Maternal Grandmother         hypoglycemic    Hearing Loss Maternal Grandmother Arthritis Maternal Grandfather         reports that he quit smoking about 20 years ago. His smoking use included cigarettes. He has a 80.00 pack-year smoking history. He has never used smokeless tobacco. He reports current alcohol use. He reports that he does not use drugs. Allergies:   Torsemide and Dye [iodides]    Current Medications:    potassium phosphate 30 mmol in sodium chloride 0.9 % 250 mL IVPB, Once  psyllium (HYDROCIL) 95 % packet 1 packet, Daily  insulin glargine (LANTUS;BASAGLAR) injection pen 30 Units, Nightly  doxazosin (CARDURA) tablet 1 mg, Daily  clotrimazole (LOTRIMIN) 1 % cream, BID  0.9 % sodium chloride infusion, PRN  lansoprazole suspension SUSP 30 mg, BID  albumin human 25 % IV solution 12.5 g, Q8H  oxyCODONE (ROXICODONE) 5 MG/5ML solution 7.5 mg, Q6H PRN  hydroxypropyl methylcellulose (GONIOSOL) 2.5 % ophthalmic solution 1 drop, PRN  albuterol (PROVENTIL) nebulizer solution 2.5 mg, Q4H  insulin lispro (1 Unit Dial) (HUMALOG/ADMELOG) pen 0-16 Units, Q4H  Venelex ointment, BID  acetaminophen (TYLENOL) 160 MG/5ML solution 650 mg, Q6H PRN  chlorhexidine (PERIDEX) 0.12 % solution 15 mL, BID  apixaban (ELIQUIS) tablet 5 mg, BID  sodium chloride flush 0.9 % injection 5-40 mL, 2 times per day  sodium chloride flush 0.9 % injection 5-40 mL, PRN  0.9 % sodium chloride infusion, PRN  ondansetron (ZOFRAN-ODT) disintegrating tablet 4 mg, Q8H PRN   Or  ondansetron (ZOFRAN) injection 4 mg, Q6H PRN  glucose chewable tablet 16 g, PRN  dextrose bolus 10% 125 mL, PRN   Or  dextrose bolus 10% 250 mL, PRN  glucagon (rDNA) injection 1 mg, PRN  dextrose 10 % infusion, Continuous PRN  hydrALAZINE (APRESOLINE) injection 5 mg, Q15 Min PRN          Physical exam:     Vitals:  /76   Pulse 83   Temp 98 °F (36.7 °C)   Resp 14   Ht 6' 0.99\" (1.854 m)   Wt 194 lb 14.2 oz (88.4 kg)   SpO2 97%   BMI 25.72 kg/m²   Constitutional:  on MV  Skin: no rash, turgor wnl  Heent:  eomi, mmm  Neck: no bruits or jvd noted  Cardiovascular:  S1, S2 without m/r/g  Respiratory: trach  Abdomen:  +bs, soft, nt, nd  Ext: bilateral lower extremity edema R>L  Psychiatric: mood and affect appropriate  Musculoskeletal:  Rom, muscular strength intact    Data:   Labs:  CBC:   Recent Labs     10/15/22  0420 10/16/22  0421 10/17/22  0528   WBC 8.9 8.9 10.4   HGB 7.9* 7.7* 7.9*    324 354       BMP:    Recent Labs     10/15/22  0421 10/16/22  0421 10/17/22  0528    144 148*   K 3.8 3.4* 4.2    106 108   CO2 31 28 32   BUN 94* 90* 86*   CREATININE 1.0 1.0 0.9   GLUCOSE 89 314* 157*       Ca/Mg/Phos:   Recent Labs     10/15/22  0421 10/16/22  0421 10/17/22  0528   CALCIUM 7.9* 7.9* 8.4   MG 3.50* 3.30* 3.30*   PHOS 2.7 2.0* 1.5*       Hepatic: No results for input(s): AST, ALT, ALB, BILITOT, ALKPHOS in the last 72 hours. Troponin: No results for input(s): TROPONINI in the last 72 hours. BNP: No results for input(s): BNP in the last 72 hours. Lipids:   No results for input(s): CHOL, TRIG, HDL, LDLCALC, LABVLDL in the last 72 hours. ABGs:   No results for input(s): PHART, PO2ART, FSC7GTT in the last 72 hours. INR: No results for input(s): INR in the last 72 hours. UA:No results for input(s): Flores Munch, GLUCOSEU, BILIRUBINUR, Sushila Maw, BLOODU, PHUR, PROTEINU, UROBILINOGEN, NITRU, LEUKOCYTESUR, LABMICR, URINETYPE in the last 72 hours. Urine Microscopic: No results for input(s): LABCAST, BACTERIA, COMU, HYALCAST, WBCUA, RBCUA, EPIU in the last 72 hours. Urine Culture: No results for input(s): LABURIN in the last 72 hours. Urine Chemistry:   No results for input(s): Heriberto Gails, PROTEINUR, NAUR in the last 72 hours. IMAGING:  XR CHEST PORTABLE   Final Result      Distal portion of tracheostomy poorly visualized due to overlying medical devices. The position is without significant change since 10/5/2022. Bibasilar pleuroparenchymal consolidation. Stable cardiomediastinal silhouette. Right jugular central venous catheter without change. CT ABDOMEN PELVIS WO CONTRAST Additional Contrast? None   Final Result      Marked decrease in size of pneumomediastinum. Trace residual pneumoperitoneum. Extensive bilateral lower lobe pulmonary atelectasis with pleural effusions and trace residual right pneumothorax. XR CHEST PORTABLE   Final Result      Tiny right lateral pneumothorax is suspected, 5 mm in maximum thickness. This has decreased in size since 10/3/2022. Right basilar Pleurx catheter noted. Small bilateral pleural effusions. Prominent interstitial markings. Stable cardiac mediastinal silhouette. Lines and tubes without change. Subcutaneous emphysema noted. CT CHEST ABDOMEN PELVIS WO CONTRAST   Final Result      1. Severe pneumomediastinum. 2. Small to moderate right hydropneumothorax with similar fluid and gas components with a right-sided Pleurx catheter. 3. Moderate loculated left pleural effusion with atelectasis of the left lower lobe with patency of the central left lower lobe bronchi. 4. Fluid/material filling the bronchus intermedius and right lower lobe bronchi with complete consolidation and volume loss of the right lower lobe. Differential diagnosis would include mucous plugging or obstructing lesion. 5. Tracheostomy tube tip within the trachea   6. Severe subcutaneous emphysema in the neck. CT ABDOMEN AND PELVIS:      FINDINGS:      LIVER: Normal.      GALLBLADDER AND BILIARY TREE: No calcified gallstones. No gallbladder distention. No intra- or extrahepatic biliary dilatation. PANCREAS: Normal.      SPLEEN: Normal.      ADRENAL GLANDS: Normal.      KIDNEYS AND URETERS: Normal.      URINARY BLADDER: Ansari catheter present within collapsed urinary bladder. REPRODUCTIVE ORGANS: No associated masses. BOWEL: Normal diameter, nonobstructed. Feeding tube tip at the level of the ligament of Treitz.       LYMPH NODES: No abnormally enlarged nodes. PERITONEUM/RETROPERITONEUM: Severe pneumoperitoneum extends into the upper abdomen with some of the symmetric multiseptated gas appearing deep to the diaphragm consistent with mild pneumoperitoneum (series 601 was 101). This is likely related to    pneumonia mediastinum dissecting into the abdomen. No fluid collection in the abdomen or pelvis. No ascites. VESSELS: Extensive atherosclerotic calcification of the aorta and iliac arteries. ABDOMINAL WALL: No acute abnormality. BONES: No acute abnormality. Mild chronic L1 compression fracture with previous vertebroplasty. IMPRESSION:      1. Severe pneumomediastinum extends into the upper abdomen with small pneumoperitoneum likely related to extension of pneumoperitoneum into the upper peritoneal cavity. 2. No fluid collection in the abdomen or pelvis. Results were discussed with the surgical team at 7:00 PM on 10/3/2022. XR CHEST PORTABLE   Final Result      Stable appearance of loculated right pneumothorax, with loculated components in the lateral right midlung region and in the right lateral costophrenic sulcus. Stable scattered areas of atelectasis versus scar, most prominent in the medial right lung base and in the left lateral lung base. XR CHEST PORTABLE   Final Result      Small right basilar pneumothorax. Right basilar chest tube without change. Patchy consolidation in the right mid and lower lung as well as the left lower lung-atelectasis versus pneumonia. Trace left pleural effusion. Normal heart size. Lines and tubes without change. Mild improvement in degree of subcutaneous emphysema in the chest and neck. XR CHEST 1 VIEW   Final Result      1. Stable small right-sided pneumothorax and prominent subcutaneous emphysema along the neck and upper superior chest wall, greater in right lung stable   2.  Stable left basilar consolidation and pleural effusion      3. Stable appearing perihilar accentuated markings or airspace disease            XR CHEST PORTABLE   Final Result      Stable small right-sided pneumothorax. More prominent emphysema over the lower neck. No change in bilateral airspace disease. Stable left pleural effusion. XR CHEST PORTABLE   Final Result   1. Bilateral multifocal pneumonia with improvement in the right    pulmonary consolidation since prior study from 9/23/22   2. Mild to moderate left pleural effusion          XR CHEST PORTABLE   Final Result   1. Support lines and tubes are stable. 2.  Stable small right lateral pneumothorax and right basilar    chest tube. 3.  Stable patchy bilateral airspace disease. XR CHEST PORTABLE   Final Result   1. Satisfactory position of right internal jugular central line   2. No interval change in endotracheal tube and nasogastric tube positioning. 3. Extensive bilateral airspace disease with no changes. 4. Left pleural effusion with retrocardiac opacity, unchanged   5. Small stable tiny right lateral pneumothorax and stable position of right chest tube,, Pleurx catheter. XR ABDOMEN (KUB) (SINGLE AP VIEW)   Final Result   1. No residual pneumothorax. 2.  Mild patchy airspace disease bilaterally worse on the right than on prior examination. 3.  Non-obstructive bowel gas pattern. Mildly distended stomach. XR CHEST PORTABLE   Final Result   1. No residual pneumothorax. 2.  Mild patchy airspace disease bilaterally worse on the right than on prior examination. 3.  Non-obstructive bowel gas pattern. Mildly distended stomach. XR CHEST PORTABLE   Final Result      1. Small right pneumothorax appears slightly increased. 2. Mild patchy airspace opacity bilaterally. XR CHEST PORTABLE   Final Result     Pleurx catheter at the right lung base. Trace right    pneumothorax is unchanged.           XR CHEST PORTABLE   Final Result Right-sided chest tube evident in the base, its tip medially. Improvement in the right-sided pneumothorax, since earlier today. Possible medial collapse of the right lower lobe. Small left effusion. Patchy airspace density/atelectasis in the lungs bilaterally, with no other significant change. XR CHEST PORTABLE   Final Result   1. Right-sided Pleurx catheter in satisfactory position in the lung bases   2. Right-sided post operative pneumothorax, approximately 10%         MRI BRAIN WO CONTRAST    (Results Pending)   MRI Cervical Spine WO Contrast    (Results Pending)   MRI THORACIC SPINE WO CONTRAST    (Results Pending)   MRI LUMBAR SPINE WO CONTRAST    (Results Pending)       Assessment/Plan   ZANA  - suspect this is contrast nephropathy  - baseline Cre 0.7. Normal on admission.  - Cr stable   - BNP 3k, Protein:Cre 0.3, FENa 0.4%  - closely monitor UOP  - avoid nephrotoxic agent     2. Hypernatremia  - continue free water   - will continue to monitor     3. Hypotension  - pressors as needed     4. Anemia  - daily CBC    5. Acid- base/ Electrolyte imbalance   - optimize lytes    6. Acute hypoxic resp failure  - s/p VATS on 9/19/22 for recurrent pleural effusions  - Intensivist and CTS following    7.  CHF   - EF 65% on 6/16/22 via cardiac cath        Maritza Burgos MD

## 2022-10-17 NOTE — CARE COORDINATION
Case management is following for discharge planning. Received a call from Avery Ashley at [place] [id number]. The facility is unable to accept Mr. Cynthia Delcid for admission. He is too medically complex. A call was placed to [facility] to notify. Waiting for a final decision from [facility].        PT AM-PAC: 6 / 24 per last evaluation on: 10/17    OT AM-PAC: 7 / 24 per last evaluation on: 10/17    Electronically signed by Karishma Sahni, MSN RN-[facility]  Case Management  [phone]

## 2022-10-17 NOTE — PROGRESS NOTES
Speech Language Pathology  Facility/Department: [facility] ICU  Dysphagia Daily Treatment Note    NAME: Shanon Elena  : 1945  MRN: [id number]    Patient Diagnosis(es):   Patient Active Problem List    Diagnosis Date Noted    Generalized weakness 10/17/2022    Areflexia 10/17/2022    Asymmetrical deep tendon reflexes 10/17/2022    Malnutrition ([address] 10/14/2022    Mucus plugging of bronchi 10/05/2022    ZANA (acute kidney injury) ([address] 2022    Acute on chronic respiratory failure with hypoxia and hypercapnia ([address] 2022    Pleural effusion on right 2022    Chronic heart failure with preserved ejection fraction (HFpEF) ([address] 2022    Hypoxia 2022    Atrial fibrillation with rapid ventricular response ([address] 2022    Exertional dyspnea 2022    Closed displaced fracture of lateral malleolus of left fibula 2021    Spinal stenosis of lumbar region 10/17/2018    Closed compression fracture of L1 lumbar vertebra 10/17/2018    Trigger ring finger of right hand 06/15/2017    Subacromial impingement 2015    Rotator cuff tear 2015    Glenohumeral arthritis 2015    DM type 2 (diabetes mellitus, type 2) ([address] 2013    ED (erectile dysfunction) 2012    OA (osteoarthritis) of knee 10/12/2011    Carotid stenosis, left 10/12/2011    Hearing loss 10/12/2011    HTN (hypertension) 10/07/2011    Hyperlipidemia 10/07/2011     Allergies: Allergies   Allergen Reactions    Torsemide Shortness Of Breath    Dye [Iodides]      1970's - ?? CXR (10/3/22)-  Impression       Stable appearance of loculated right pneumothorax, with loculated components in the lateral right midlung region and in the right lateral costophrenic sulcus. Stable scattered areas of atelectasis versus scar, most prominent in the medial right lung base and in the left lateral lung base. Previous MBS -  N/A    Chart reviewed.     Medical Diagnosis: Pleural effusion, right [J90]  Pleural effusion on right [J90]   Treatment Diagnosis: Oropharyngeal dysphagia    BSE Impression (10/2/22)-  RN states okay to attempt assessment. Dr Kacie Schafer speaking with spouse stating pt most likely still with effects of sedation. Pt was intubated 9/22- 9/30/22. Trach placed 9/30, with pt on ventilator. Pt shook head yes/no intermittently to questions asked. Pt made no attempt to mouth words. Pt exhibiting open mouth posture, did not form labial seal or protrude /lateralize tongue on command. Oral care completed, pt demonstrating no oral response. Placed 2 single ice chips in oral cavity, again with no response - no lingual movement noted. Pt did close lips with tactile stim x 2. No swallow movement was felt upon palpation of anterior neck. O2 sats remained stable and RR remained in mid 20's. Recommend aggressive oral care 2-3 x/day with suction kit. Plan to re-assess as pt appropriate. Dysphagia Diagnosis: Suspected needs further assessment    FEES (10/14/22): Pt with relatively non-functional swallow at this time and met 'bail out' criteria (i.e. full protocol not completed d/t severity of impairments and pt safety). Severely impaired airway protection marked by pattern of penetration and aspiration of ice chips, thin liquids and mildly thick liquids during and after the swallow. Severely impaired swallow peristalsis marked by pattern of severe residue after completion of swallow remaining in the vallecula, lateral channels, and pyriforms. Physiological impairments c/w these findings include reduced tongue base retraction, decreased hyolaryngeal elevation/excursion with incomplete epiglottic inversion.      Pain: None indicated     Current Diet : ADULT TUBE FEEDING; Nasogastric; Diabetic; Continuous; 10; Yes; 10; Q 4 hours; 45; 300; Q 4 hours; Protein; 2 bottles Proteinex daily w/ 30 mL FW flushes before and after  Diet NPO   Recommended Form of Meds: Via alternative means of nutrition Treatment:  Pt seen bedside to address the following goals:   1. The patient will tolerate repeat bedside swallowing evaluation when able. 10/3 - Pt positioned upright and aggressive oral care was completed with suction toothbrush. Vitals reading had poor waveform and appeared to be unreliable readings; RN notified and in to assess. The patient demo'd spontaneous swallows without PO with audible air escaping around stoma. Pt tolerated 4/4 ice chips with constant verbal cues for appropriate acceptance and manipulation of ice (I.e.close mouth, chew before you swallow, etc). There was no coughing but not likely clinically relevant due to research indicating high prevalence of SILENT aspiration in cuff inflated tracheostomy patients. Pt not yet appropriate for instrumental swallow evaluation but will require out prior to advancing diet. Notes indicate plan for SBT today. Requested order for PMV which will hopefully be receive and coincide with improved alertness and weaning from ventilator. Continue goal.  10/4: Cleared by RN. No order for PMV yet. Received pt more awake/alert. Wife present. Pt remains on trach/vent. Oral care recently completed. Repositioned pt upright. Targeted swallow function via trials ice chips and small sips h2o; pt with positive oral acceptance, no anterior loss, appropriate and timely oral prep, seemingly positive swallow movement. Attempted to check clearance via oral suction; evidently dry with no material suctioned. As previously noted, no cough, however would be unlikely to occur given inflated cuff tracheostomy. Would recommend waiting on instrumental pending ability to place PMV (assuming order will be received in the near future), as that will likely support pt's swallow function, and show improved function. Cont goal  10/6: Pt positioned upright in bed. Thorough oral care and suctioning provided. Pt continues to be more alert/awake and active in his care/treatment. Family present. Pt completed 20x effortful swallows 5x swallows/ ice chips to reduce further swallow deconditioning. RR & 02 stable across low level ice chip trials. Audible air escape and secretions around trach appreciated. Per RN, Dr. Ryann Garcia aware. Discussed need for instrumental swallow evaluation with pt and family. FEES to be completed as schedule permits, preferably prior to PEG determination. Cont goal.   10/7: Pt seated upright in bed and SLP assisted with oral care via suction toothbrush. Pt with increased alertness and agreeable to accept trials of ice chips. X25 effortful swallows with ice chips completed this session. Pt with intermittent air escape and secretions noted around trach, however per discussion with SLP who saw pt yesterday and per family, both looked less. RR and O2 remained stable across all trials. SLP re-discussed possibility of completing FEES and expressed will reach out to other SLP to see if it can be completed prior to PEG placement on Monday. Family and pt demonstrated understanding and agreement at this time. Cont. 10/12: Pt seated upright in bed. SLP provided oral care via suction toothbrush. Clean oral cavity noted. Pt with min brown secretions noted around trach prior to administering ice chips. Pt readily accepted x10 ice chips and demonstrated adequate mastication. Pt with facial grimace when attempting to swallow all ice chips. Pt with stable RR and O2 during ice chips administration. Audible secretions noted x2. Pt with continued lethargy and cues required to maintain alertness. Pin point pupils/reduced visual tracking, however followed basic 1-step commands adequately. SLP re-discussed FEES completion at end of this week (either Friday or Saturday) depending on pt's status to accept PO with increased alertness. ? Pt comprehension, however wife present and demonstrated understanding and agreement. Cont. 10/13: Pt sitting upright in bed, wife present.  He is reportedly more alert today though does not always use head nod/facial expressions for answering yes/no questions. Make no attempts to point to items on communication board. Patient accepted oral care via suction toothbrush, oral cavity noted to be clean. Patient accepted ice chips x15 from spoon; Facial grimaces observed with swallow initiation. Increased secretions around stoma during ice chip trials. Stable RR and O2 during trials. Patient does appear appropriate to participate in FEES next date if presentation is similar to this session. Provided education to patient and wife, who demonstrate understanding. Continue goal.   10/14: Pt seated upright in bed with wife present. SLP assisted with with oral care via suction toothbrush. Clean oral cavity noted. Pt with increased alertness and engagement across entire session this date. Pt readily accepted x15 ice chips and completed effortful swallows across all. Intermittent secretions around stoma required suctioning at times. SLP expressed FEES to be completed later this afternoon. Pt in wife in agreement and demonstrated understanding. Cont. 10/17: D/C goal.     2. The patient/caregiver will demonstrate understanding of compensatory strategies for improved swallowing safety. 10/3 - Educated on potential impact of trach on swallow function, rationale for oral care, recommendations for few single ice chips to be given to facilitate oral mucosal integrity and preserve swallow function that is present. Introduced plan for PMV in future, rationales for this. Educated on limitations in pts alertness at this time and need for improvement for completing swallow study, therapeutic effectiveness, etc. Continue goal.   10/4: Discussed swallow function with patient/wife. Also reviewed recommendation for PMV, hopefully in near future (order not yet received). Discussed how PMV may also improve swallow function be supporting necessary pressure for swallowing.  Reviewed recommendation for small amounts ice/small sips h2o with effortful swallow to support swallow function/maintenance. Wife and pt indicated comprehension. Cont goal  10/5: Thorough education this date re: potential adverse affects of trach/vent on swallow function, need for instrumentation, need for PMV & importance of oral care. Wean trial failed this date per Dr. Corby Torres. Audible air/secretion leakage around trach. PMV trials are likely not appropriate at this time and orders have not been provided. Reviewed continued recommendation for low level ice chips s/p oral care to support swallow function/ maintenance, improve pt QOL/ comfort and encourage oral care. 10/7: Discussed education regarding rationale for completing trials ice chips with effortful swallow, rec's for PMV as able, and overall importance of oral care, and possibility of completing further swallow assessment via FEES when able. Pt and family present demonstrated understanding. Cont. 10/12: As noted above with wife present. Discussed rationale for completing ice chips+effortful swallow trials to continue stimulating oropharyngeal swallow function while pt NPO. Pt requires re-education. Wife demonstrated understanding. Cont. 10/13: Patient provided education re: risk of aspiration at this time, need for FEES to assess safety/ efficiency of swallow function. Wife with good understanding, patient appears to have improved understanding compared to previous sessions. Continue goal.  10/14: As noted above, discussed rationale for FEES to be completed this date, procedure details and trials to be assessed. Wife with adequate understanding, pt requires some reinforcement however continued improvement from previous sessions. Cont. 10/17: Goal met on 10/14 via FEES completion. 3. Patient will participate in [employer] trials when appropriate/ orders received  10/6- Speech/ Communication Treatment: Pt with increased communicative frustration.  PMV trials are likely not appropriate at this time position after oral care  Communication board      Plan:  Continue dysphagia/communication treatment with goals per plan of care. Diet recommendations: Only single ice chips and tsps water given by staff when pt awake and in upright position, ONLY after oral care   DC recommendation: Ongoing speech therapy is indicated   Treatment: 25  D/W nursing: Cordelia  Needs met prior to leaving room, call button in reach; Family at bedside.      Electronically signed by:  Shy Benedict M.A., [address] [place] [address]  Speech-Language Pathologist  Pg #: [phone]    If patient is discharged prior to next treatment, this note will serve as the discharge summary

## 2022-10-17 NOTE — PROGRESS NOTES
Pt transferred to room 4454 with assistance of RT x2. All belongings taken. Wife at bedside. Phone report given to Gabino Clay and then met at bedside. Skin assessment completed at transfer. Pt tolerated well.

## 2022-10-17 NOTE — CONSULTS
Chart reviewed, events noted, and I have personally performed a face-to-face diagnostic evaluation on this patient. I have personally reviewed CNP's note and confirmed the documented past medical history, family history, social history, allergies, home medications, review of systems, vital signs, laboratory values, and hospital medications via my own chart review, in person with the patient, and/or in person with his family members as appropriate.   My findings are as follows:    Assessment  - 67yo man with severe CHF with frequent hospitalizations and VATS procedure one month ago who has remained admitted since that time and developed severe weakness of all limbs, legs > arms  - In light of his weakness that was present prior to this last month of hospitalization and his prolonged bedrest, feel that most likely this is deconditioning  - He does have afib and has had his apixaban held and so stroke cannot be excluded given his asymmetric weakness in his arms  - However, stroke would not explain his other weakness and stroke to the cord is very unlikely in this clinical scenario  - No clear evidence of neuromuscular disease including myasthenia or acute inflammatory demyelinating polyneuropathy (AIDP)   - Asymmetric weakness and reflexes (absent in right arm and present in right) raises possibility of cervical myelopathy, but this seems less likely  - Old traumatic injury to lumbar spine with chronic back pain makes myelopathy here a possibility, but with absent reflexes and overall course, feel this is unlikely  - Less likely a polyneuropathy due to metabolic derangement or nutritional deficiencies     Recommendations  - Agree with MRI scans  - Agree with serologies  - If the above are all negative, then this is secondary to deconditioning and suggest intense PT/OT  - Will need EMG/NCS as outpt  - CSF studies looking for evidence of AIDP are extremely low yield  - CNP note, to which this attestation is attached, contains remainder of assessment details & recommendations. History of Present Illness:  67yo man with DM; afib on apixaban; ZANA; CHF; chronic low back pain s/p remote trauma. Admitted 9/19 with CHF exacerbation. He has been hospitalized for the same many times over the last 9 months. He is now with a tracheostomy and on the vent. Prior to hospitalization he had been having worsening strength and increasing weakness in all four limbs. He was barely able to stand when he was admitted one month ago. He has essentially been bed-bound for this amount of time as well. It was felt that his weakness prior to this most recent hospitalization was related to his CHF and overall deconditioning. He denies any double vision or drooping eye lids. he is unsure what would have precipitated these symptoms, other than the above. he feels that nothing makes them better and nothing makes them worse. he rates the symptoms as severe. he denies any other associated symptoms including no HA, no speech/language difficulties, no swallowing difficulties, no visual changes, no diplopia, no hearing loss, no tinnitus, no vertigo, no imbalance, no light-headedness, no focal weakness, no sensory changes, no nausea or vomiting. he has never had this problem before all of his acute illnesses have worsened. There is no history of this problem or anything similar in his family members.      Neurological Exam (performed on 10/17/2022 at 1120):  -Mental status: Alert; grossly oriented (cannot fully assess due to trach)  -Memory: Recent & remote memory grossly intact  -Attention: Normal  -Fund of Knowledge: Grossly normal  -Speech & Language: trach present, but able to mouth some answers to questions appropriately, nods yes/no appropriately  -Cranial nerves: pupils 3mm->2mm bilaterally; fields grossly intact; unable to visualize fundi; EOMI, no nystagmus; able to maintain upgaze for >30 seconds; sensation intact V1, V2, V3; no facial his right arm is weaker than his left. He has associated symptoms of urinary retention and respiratory failure. He denies any paresthesias. Prior to this he had been chronically ill, in and out of hospitalizations for the last 9 months with CHF exacerbations. He could barely stand upon his admission 1 month ago, and prior to this he was only able to walk short distances. His wife and son at bedside provide most of the history given his trach and they feel it is a combination of deconditioning and malnutrition. He has a PEG in place but has lost significant amount of weight (30 lbs). He has a sacral decubitus ulcer. He has been having watery stools. REVIEW OF SYSTEMS:   Constitutional- + weight loss or fevers   Eyes- + diplopia. No photophobia. Ears/nose/throat- No dysphagia. No Dysarthria   Cardiovascular- No palpitations. No chest pain   Respiratory- No dyspnea. No Cough   Gastrointestinal- No Abdominal pain. No Vomiting. Genitourinary- No incontinence. +urinary retention   Musculoskeletal- No myalgia. No arthralgia   Skin- No rash. No easy bruising. Psychiatric- No depression. No anxiety   Endocrine- No diabetes. No thyroid issues. Hematologic- No bleeding difficulty.  No fatigue   Neurologic- No aphasia, + weakness    Past Medical, Surgical, Family, and Social History   PAST MEDICAL HISTORY:  Past Medical History:   Diagnosis Date    ZANA (acute kidney injury) ([address] 9/26/2022    Carotid stenosis, left 10/12/2011    Chronic back pain     Chronic systolic (congestive) heart failure 54/94/2155    Diastolic CHF ([address]     Erectile dysfunction     Hyperlipidemia     Hypertension     Osteoarthritis     Restrictive lung disease     Type II or unspecified type diabetes mellitus without mention of complication, not stated as uncontrolled      SURGICAL HISTORY:  Past Surgical History:   Procedure Laterality Date    CARDIAC CATHETERIZATION  06/2022    GASTROSTOMY TUBE PLACEMENT N/A 10/11/2022 EGD PEG TUBE PLACEMENT performed by Aric Valente MD at [address] Left     PLEURAL CATH INSERTION N/A 2022    PLEURAL CATHETER PLACEMENT; INTERCOSTAL NERVE BLOCK X4 performed by Víctor Burgos MD at [address] Bilateral     THORACOSCOPY Right 2022    RIGHT VIDEO ASSISTED THORASCOPIC SURGERY AND; performed by V[facility]ctor Burgos [facility] at [facility] N/A 2022    TRACHEOSTOMY performed by Jericho Padilla MD at [address]:  Family history non-contributory  Family History   Problem Relation Age of Onset    Hearing Loss Father     Heart Disease Father 70        MI    High Blood Pressure Father     Diabetes Maternal Grandmother         hypoglycemic    Hearing Loss Maternal Grandmother     Arthritis Maternal Grandfather      Social History     Tobacco Use    Smoking status: Former     Packs/day: 2.00     Years: 40.00     Pack years: 80.00     Types: Cigarettes     Quit date: 10/24/2001     Years since quittin.9    Smokeless tobacco: Never   Vaping Use    Vaping Use: Never used   Substance Use Topics    Alcohol use: Yes     Alcohol/week: 0.0 standard drinks     Comment: rarely    Drug use: No         Allergies & Outpatient Medications   ALLERGIES:  Allergies   Allergen Reactions    Torsemide Shortness Of Breath    Dye [Iodides]      1970's - ?? HOME MEDICATIONS:  Current Discharge Medication List        CONTINUE these medications which have NOT CHANGED    Details   ELIQUIS 5 MG TABS tablet TAKE 1 TABLET BY MOUTH TWO TIMES A DAY  Qty: 60 tablet, Refills: 2    Associated Diagnoses: Atrial fibrillation, unspecified type (HCC)      umeclidinium-vilanterol (ANORO ELLIPTA) 62.5-25 MCG/INH AEPB inhaler Inhale 1 puff into the lungs daily  Qty: 1 each, Refills: 2      bumetanide (BUMEX) 1 MG tablet Take 1 tablet by mouth in the morning and 1 tablet before bedtime.   Qty: 180 tablet, Refills: 3 magnesium oxide (MAG-OX) 400 MG tablet Take 1 tablet by mouth in the morning and 1 tablet before bedtime. Qty: 180 tablet, Refills: 3      dapagliflozin (FARXIGA) 10 MG tablet TAKE 1 TABLET EVERY MORNING  Qty: 90 tablet, Refills: 1    Associated Diagnoses: Type 2 diabetes mellitus without complication, without long-term current use of insulin (HCC)      sildenafil (VIAGRA) 100 MG tablet Take 1 tablet by mouth as needed for Erectile Dysfunction Max daily dose 100mg. Qty: 16 tablet, Refills: 0      metoprolol succinate (TOPROL XL) 50 MG extended release tablet Take 1 tablet by mouth in the morning and at bedtime  Qty: 180 tablet, Refills: 3      Insulin Pen Needle (PEN NEEDLES) 31G X 6 MM MISC 1 each by Does not apply route daily  Qty: 100 each, Refills: 3      Continuous Blood Gluc Sensor (FREESTYLE LIZA 14 DAY SENSOR) MISC Check bs tidac and hs  Qty: 6 each, Refills: 1      Continuous Blood Gluc  (FREESTYLE LIZA 14 DAY READER) SOFIYA Check bs tidac and hs  Qty: 6 each, Refills: 1      Insulin Degludec (TRESIBA FLEXTOUCH) 200 UNIT/ML SOPN 10 units subcut qam, increase 4 units every 4 days until am blood sugar < 120.  Max 100 units  Qty: 9 pen, Refills: 1      albuterol sulfate HFA (VENTOLIN HFA) 108 (90 Base) MCG/ACT inhaler Inhale 2 puffs into the lungs 4 times daily as needed for Wheezing  Qty: 18 g, Refills: 5      pravastatin (PRAVACHOL) 40 MG tablet Take 1 tablet by mouth daily  Qty: 90 tablet, Refills: 3    Associated Diagnoses: Mixed hyperlipidemia               Physical Exam   PHYSICAL EXAM:  Vitals:    10/17/22 0500 10/17/22 0600 10/17/22 0700 10/17/22 0800   BP: (!) 115/49 (!) 109/51 (!) 144/68 (!) 120/57   Pulse: 78 69 80 69   Resp: 11 14 24 (!) 0   Temp:    98 °F (36.7 °C)   TempSrc:       SpO2:  97% 99% 97%   Weight:       Height:             General: Alert, no distress, well-nourished  Neurologic  Mental status: alert  orientation to person, place, time, situation   Attention intact as able 354      Other Acetylcholine binding/blocking ab negative  .2  ESR 70       CURRENT SCHEDULED MEDICATIONS   Inpatient Medications     potassium phosphate IVPB, 30 mmol, IntraVENous, Once    psyllium, 1 packet, Oral, Daily    insulin glargine, 30 Units, SubCUTAneous, Nightly    doxazosin, 1 mg, Oral, Daily    clotrimazole, , Topical, BID    lansoprazole, 30 mg, Per G Tube, BID    albumin human, 12.5 g, IntraVENous, Q8H    albuterol, 2.5 mg, Nebulization, Q4H    insulin lispro, 0-16 Units, SubCUTAneous, Q4H    Venelex, , Topical, BID    chlorhexidine, 15 mL, Mouth/Throat, BID    apixaban, 5 mg, Oral, BID    sodium chloride flush, 5-40 mL, IntraVENous, 2 times per day   Infusions    sodium chloride      sodium chloride      dextrose Stopped (10/10/22 6499)          IMPRESSION:  Mr. Bradley Perez is a 65year old man with a history of CHF and recent VATS procedure (1 month ago) who presents with progressive weakness over the last 9 months, but more precipitous over the last month. His family suspects deconditioning and malnutrition, and I'm inclined to suspect this is the case. Critical illness neuropathy is also a consideration. Given his asymmetric upper limb weakness, it is possible he may have had stroke in the setting of holding Eliquis. His pattern of weakness is most consistent with neuropathy (areflexia, BLE>BUE involvement). Acetylcholine ab tests were negative. RECOMMENDATIONS:  - MRI w/o jamey of neuroaxis to exclude focal ischemic lesion (given afib) or myelopathy (has history of lumbar disease)  - Serum protein electrophoresis, B12, folate, anti-nuclear antibody, RPR, HbA1c.  Additional testing, if history is suggestive: HIV serology, RH factor, Sjogren's syndrome (anti-Ro, Anti-La antibodies), thiamine, homocysteine, methylamalonic acid, hepatitis B and C serologies  - EMG/NCS as outpatient should be very helpful  - PT/OT/Rehab    NATASHA Parson - CNP   Neurology & Neurocritical Care   10/17/2022 11:13 AM    ICU Patients:   Neurocritical Care Line: [phone]  [facility]: [facility]    Time independently spent by Nurse Practitioner reviewing chart and prior testing, obtaining history from patient, examining patient, ordering appropriate medications / diagnostics, reviewing results of diagnostics, counseling and educating patient / family, communicating plan with other providers and documenting clinical information in the EMR was approximately 45 minutes.

## 2022-10-17 NOTE — PROGRESS NOTES
CT SURGERY DAILY PROGRESS NOTE    CC: Pleural effusions s/p R PleurX catheter placement    SUBJECTIVE:   Interval Hx:   Pt more alert and interactive, this am. Indicated his pain is controlled, but primarily irritating with regards to his sacrum and left foot. Ansari was reinserted yesterday after he showed >400cc for a PRV now on doxazosin. Sugars have also been elevated, with POC glc's in the 300-400's, now on lantus 30. PleurX drained yesterday with output 150cc. ROS: A 14 point review of systems was conducted, significant findings as noted above. All other systems negative. OBJECTIVE:   Vitals:   Vitals:    10/17/22 0400 10/17/22 0401 10/17/22 0500 10/17/22 0600   BP: (!) 102/53  (!) 115/49 (!) 109/51   Pulse: 71 71 78 69   Resp: 24 (!) 8 11 14   Temp:       TempSrc: Temporal      SpO2: 96%   97%   Weight:       Height:           I/O:   Intake/Output Summary (Last 24 hours) at 10/17/2022 0717  Last data filed at 10/17/2022 0441  Gross per 24 hour   Intake 1204 ml   Output 705 ml   Net 499 ml       I/O last 3 completed shifts: In: 2387 [I.V.:50; NG/GT:2337]  Out: 1089 [Urine:2205]    Diet: Diet NPO  ADULT TUBE FEEDING; PEG; Peptide Based; Continuous; 30; Yes; 10; Q 4 hours; 65; 30; Q 4 hours; Protein; 1 bottles Proteinex daily w/ 30 mL FW flushes before and after      Physical Examination:   General appearance: Mechanically ventilated. Attempts to mouth words but unable to project voice. Will occassionally point and shake/nod his head for communication. Neurological: Follows some commands, no focal deficits  HEENT: NC/AT, EOMI, trachea midline, no JVD. Tracheostomy in place, leaking improved. Chest/Lungs: On mechanical ventilation via tracheostomy; R PleurX catheter capped with dressing C/D/I  Cardiovascular: RRR  Abdomen: Soft, non-tender, non-distended. PEG tube with some dried blood at base. PEG site with minimal coagulated blood, mild erythema. Skin: Warm and dry.  Sacral decubitus ulcer covered with Mepilex. Left foot with tinea pedis  Extremities: Pitting edema of the b/l feet. No cyanosis. Legs ulcers from SCDs covered with meplixex. Labs:  CBC:   Recent Labs     10/15/22  0420 10/16/22  0421 10/17/22  0528   WBC 8.9 8.9 10.4   HGB 7.9* 7.7* 7.9*   HCT 24.3* 24.1* 24.6*    324 354         BMP:   Recent Labs     10/15/22  0421 10/16/22  0421 10/17/22  0528    144 148*   K 3.8 3.4* 4.2    106 108   CO2 31 28 32   BUN 94* 90* 86*   CREATININE 1.0 1.0 0.9   GLUCOSE 89 314* 157*       LFT's:   No results for input(s): AST, ALT, ALB, BILITOT, ALKPHOS in the last 72 hours. Troponin: No results for input(s): TROPONINI in the last 72 hours. BNP: No results for input(s): BNP in the last 72 hours. ABGs:   No results for input(s): PHART, EIY7PMK, PO2ART in the last 72 hours. INR:   No results for input(s): INR in the last 72 hours. U/A:No results for input(s): NITRITE, COLORU, PHUR, LABCAST, WBCUA, RBCUA, MUCUS, TRICHOMONAS, YEAST, BACTERIA, CLARITYU, SPECGRAV, LEUKOCYTESUR, UROBILINOGEN, BILIRUBINUR, BLOODU, GLUCOSEU, AMORPHOUS in the last 72 hours. Invalid input(s): Duana Spurling     Rad:   XR CHEST PORTABLE   Final Result      Distal portion of tracheostomy poorly visualized due to overlying medical devices. The position is without significant change since 10/5/2022. Bibasilar pleuroparenchymal consolidation. Stable cardiomediastinal silhouette. Right jugular central venous catheter without change. CT ABDOMEN PELVIS WO CONTRAST Additional Contrast? None   Final Result      Marked decrease in size of pneumomediastinum. Trace residual pneumoperitoneum. Extensive bilateral lower lobe pulmonary atelectasis with pleural effusions and trace residual right pneumothorax. XR CHEST PORTABLE   Final Result      Tiny right lateral pneumothorax is suspected, 5 mm in maximum thickness. This has decreased in size since 10/3/2022.  Right basilar Pleurx catheter noted.      Small bilateral pleural effusions. Prominent interstitial markings. Stable cardiac mediastinal silhouette. Lines and tubes without change. Subcutaneous emphysema noted. CT CHEST ABDOMEN PELVIS WO CONTRAST   Final Result      1. Severe pneumomediastinum. 2. Small to moderate right hydropneumothorax with similar fluid and gas components with a right-sided Pleurx catheter. 3. Moderate loculated left pleural effusion with atelectasis of the left lower lobe with patency of the central left lower lobe bronchi. 4. Fluid/material filling the bronchus intermedius and right lower lobe bronchi with complete consolidation and volume loss of the right lower lobe. Differential diagnosis would include mucous plugging or obstructing lesion. 5. Tracheostomy tube tip within the trachea   6. Severe subcutaneous emphysema in the neck. CT ABDOMEN AND PELVIS:      FINDINGS:      LIVER: Normal.      GALLBLADDER AND BILIARY TREE: No calcified gallstones. No gallbladder distention. No intra- or extrahepatic biliary dilatation. PANCREAS: Normal.      SPLEEN: Normal.      ADRENAL GLANDS: Normal.      KIDNEYS AND URETERS: Normal.      URINARY BLADDER: Ansari catheter present within collapsed urinary bladder. REPRODUCTIVE ORGANS: No associated masses. BOWEL: Normal diameter, nonobstructed. Feeding tube tip at the level of the ligament of Treitz. LYMPH NODES: No abnormally enlarged nodes. PERITONEUM/RETROPERITONEUM: Severe pneumoperitoneum extends into the upper abdomen with some of the symmetric multiseptated gas appearing deep to the diaphragm consistent with mild pneumoperitoneum (series 601 was 101). This is likely related to    pneumonia mediastinum dissecting into the abdomen. No fluid collection in the abdomen or pelvis. No ascites. VESSELS: Extensive atherosclerotic calcification of the aorta and iliac arteries.        ABDOMINAL WALL: No acute abnormality. BONES: No acute abnormality. Mild chronic L1 compression fracture with previous vertebroplasty. IMPRESSION:      1. Severe pneumomediastinum extends into the upper abdomen with small pneumoperitoneum likely related to extension of pneumoperitoneum into the upper peritoneal cavity. 2. No fluid collection in the abdomen or pelvis. Results were discussed with the surgical team at 7:00 PM on 10/3/2022. XR CHEST PORTABLE   Final Result      Stable appearance of loculated right pneumothorax, with loculated components in the lateral right midlung region and in the right lateral costophrenic sulcus. Stable scattered areas of atelectasis versus scar, most prominent in the medial right lung base and in the left lateral lung base. XR CHEST PORTABLE   Final Result      Small right basilar pneumothorax. Right basilar chest tube without change. Patchy consolidation in the right mid and lower lung as well as the left lower lung-atelectasis versus pneumonia. Trace left pleural effusion. Normal heart size. Lines and tubes without change. Mild improvement in degree of subcutaneous emphysema in the chest and neck. XR CHEST 1 VIEW   Final Result      1. Stable small right-sided pneumothorax and prominent subcutaneous emphysema along the neck and upper superior chest wall, greater in right lung stable   2. Stable left basilar consolidation and pleural effusion      3. Stable appearing perihilar accentuated markings or airspace disease            XR CHEST PORTABLE   Final Result      Stable small right-sided pneumothorax. More prominent emphysema over the lower neck. No change in bilateral airspace disease. Stable left pleural effusion. XR CHEST PORTABLE   Final Result   1. Bilateral multifocal pneumonia with improvement in the right    pulmonary consolidation since prior study from 9/23/22   2.   Mild to moderate left pleural effusion XR CHEST PORTABLE   Final Result   1. Support lines and tubes are stable. 2.  Stable small right lateral pneumothorax and right basilar    chest tube. 3.  Stable patchy bilateral airspace disease. XR CHEST PORTABLE   Final Result   1. Satisfactory position of right internal jugular central line   2. No interval change in endotracheal tube and nasogastric tube positioning. 3. Extensive bilateral airspace disease with no changes. 4. Left pleural effusion with retrocardiac opacity, unchanged   5. Small stable tiny right lateral pneumothorax and stable position of right chest tube,, Pleurx catheter. XR ABDOMEN (KUB) (SINGLE AP VIEW)   Final Result   1. No residual pneumothorax. 2.  Mild patchy airspace disease bilaterally worse on the right than on prior examination. 3.  Non-obstructive bowel gas pattern. Mildly distended stomach. XR CHEST PORTABLE   Final Result   1. No residual pneumothorax. 2.  Mild patchy airspace disease bilaterally worse on the right than on prior examination. 3.  Non-obstructive bowel gas pattern. Mildly distended stomach. XR CHEST PORTABLE   Final Result      1. Small right pneumothorax appears slightly increased. 2. Mild patchy airspace opacity bilaterally. XR CHEST PORTABLE   Final Result     Pleurx catheter at the right lung base. Trace right    pneumothorax is unchanged. XR CHEST PORTABLE   Final Result      Right-sided chest tube evident in the base, its tip medially. Improvement in the right-sided pneumothorax, since earlier today. Possible medial collapse of the right lower lobe. Small left effusion. Patchy airspace density/atelectasis in the lungs bilaterally, with no other significant change. XR CHEST PORTABLE   Final Result   1. Right-sided Pleurx catheter in satisfactory position in the lung bases   2.  Right-sided post operative pneumothorax, approximately 10%             ASSESSMENT AND PLAN:   Trip Anne is a 65 y.o. male with history of diastolic heart failure, atrial fibrillation, and DM with recurrent pleural effusions s/p R PleurX catheter placement (9/19). -Surgicel around base of PEG tube. - Continue oxycodone and tylenol PRN. -ACh receptor antibodies negative  - Pleurx cath to be drained tomorrow  - SW/HHC pending   -Pulmonology/Crit care managing vent. -Persistent watery stools, will discuss adding fiber to TF's with dietary. Will add lomotil if fiber not able to mitigate diarrhea.      -Receiving 12.5 g albumin Q8 (10/13-10/20)  - Sacral wound per wound care nurse  -Consult PT/OT      Code Status: Full Code  -----------------------------  Jody Hernandez DO   PGY1, General Surgery  10/17/22  7:42 AM  Pager # [phone]

## 2022-10-17 NOTE — PROGRESS NOTES
Physical Therapy  Facility/Department: [facility] ICU  Daily Treatment Note  NAME: Gloria Valdez  : [id number]  MRN: [id number]    Date of Service: 10/17/2022    Discharge Recommendations: Gloria Valdez scored a 6/24 on the AM-PAC short mobility form. Current research shows that an AM-PAC score of 17 or less is typically not associated with a discharge to the patient's home setting. Based on the patient's AM-PAC score and their current functional mobility deficits, it is recommended that the patient have 3-5 sessions per week of Physical Therapy at d/c to increase the patient's independence. Please see assessment section for further patient specific details. If patient discharges prior to next session this note will serve as a discharge summary. Please see below for the latest assessment towards goals. PT Equipment Recommendations  Other: defer    Patient Diagnosis(es): The primary encounter diagnosis was Chronic heart failure with preserved ejection fraction (HFpEF) ([facility] [address] Diagnoses of Hypoxia and Acute on chronic respiratory failure with hypoxia and hypercapnia (HCC) were also pertinent to this visit. Assessment   Assessment: pt continues to be below baseline function, attempted sit <> stands in stedy on this date however unsuccessful with dep x2 after 3 trials. pt motivated to participate however limited in fatigue and generalized weakness requiring inc time to complete all tasks and frequent rest breaks. pt continues to demo poor postural control requiring assist for seated balance and is dependent upon brandon lift for transfers to/from bed.  pt will benefit from further IP PT upon dc to maximize independence Continue PT POC  Activity Tolerance: Patient limited by fatigue;Patient limited by endurance  Other: defer     Plan    Physcial Therapy Plan  General Plan:  (2-5)  Current Treatment Recommendations: Strengthening;Balance training;Functional mobility training;Transfer training;Neuromuscular re-education; Safety education & training;Patient/Caregiver education & training     Restrictions  Position Activity Restriction  Other position/activity restrictions: up in chair, ambulate pt     Subjective    Subjective  Subjective: pt up in chair and agreeable to PT via head nod- requesting to return to bed at end of session  Pain: reports 7/10 pain in bottom  Orientation  Overall Orientation Status:  (unable to assess, pt has trach vent)  Orientation Level: Oriented X4  Cognition  Overall Cognitive Status:  (pt unable to speak, is alert, nods yes and no, gestures or uses lip speaking)     Objective   Vitals     Bed Mobility Training  Bed Mobility Training: No  Balance  Sitting: Impaired (min-mod A for seated balance in recliner without back support)  Sitting - Static: Supported sitting  Standing:  (unable to assess)  Transfer Training  Transfer Training: Yes  Sit to Stand: Total assistance;Assist X2 (3 unsuccessful attempts from recliner in stedy made with dep x2.)  Bed to Chair: Total assistance (via brandon lift and assist x3 for line management)  Gait Training  Gait Training: No  Wheelchair Management  Wheelchair Management: No     PT Exercises  Exercise Treatment: pt performed partial range heel raises, marches, and LAQ in recliner x5 reps B with cues for form, initiation     Safety Devices  Type of Devices: Left in bed;Nurse notified;Call light within reach; Bed alarm in place (wife present)       Goals  Short Term Goals  Time Frame for Short Term Goals: By discharge  Short Term Goal 1: The pt will perform supine to sit with max A x 1  Short Term Goal 2: The pt will sit EOB x 10 min with mod A x 1  Short Term Goal 3: The pt will be able to attempt stand with nacho stedy. Patient Goals   Patient Goals : To get better per wife    Education  Patient Education  Education Given To: Patient; Family  Education Provided: Role of Therapy;Plan of Care  Education Provided Comments: importance of mobility  Education Method: Verbal  Barriers to Learning: Hearing  Education Outcome: Continued education needed;Verbalized understanding (wife verbalized understanding)    Therapy Time   Individual Concurrent Group Co-treatment   Time In [facility]         Time Out 1214         Minutes [address], PT

## 2022-10-17 NOTE — PROGRESS NOTES
Nephrology Progress Note                                                                                                                                                                                                                                                                                                                                                               Office : [phone]     Fax :[phone]    Patient's Name: [facility]        Reason for Consult:   ZANA  Requesting Physician:  Lulu Golden MD    Interval History:      Cr stable   Na stable   Good UO   Remains on Trach   PEG placed         Past Medical History:   Diagnosis Date    ZANA (acute kidney injury) ([facility] [address] 9/26/2022    Carotid stenosis, left 10/12/2011    Chronic back pain     Chronic systolic (congestive) heart failure 39/02/1518    Diastolic CHF ([facility] [address]     Erectile dysfunction     Hyperlipidemia     Hypertension     Osteoarthritis     Restrictive lung disease     Type II or unspecified type diabetes mellitus without mention of complication, not stated as uncontrolled        Past Surgical History:   Procedure Laterality Date    CARDIAC CATHETERIZATION  06/2022    GASTROSTOMY TUBE PLACEMENT N/A 10/11/2022    EGD PEG TUBE PLACEMENT performed by Romina Easley MD at [facility] Left     PLEURAL CATH INSERTION N/A 9/19/2022    PLEURAL CATHETER PLACEMENT; INTERCOSTAL NERVE BLOCK X4 performed by Belle Henao MD at [facility] Bilateral     THORACOSCOPY Right 9/19/2022    RIGHT VIDEO ASSISTED THORASCOPIC SURGERY AND; performed by Belle Henao MD at [address] N/A 9/30/2022    TRACHEOSTOMY performed by Florencia Alford MD at [address] [place] [address] History   Problem Relation Age of Onset    Hearing Loss Father     Heart Disease Father 70        MI    High Blood Pressure Father     Diabetes Maternal Grandmother         hypoglycemic    Hearing Loss Maternal Grandmother Arthritis Maternal Grandfather         reports that he quit smoking about 20 years ago. His smoking use included cigarettes. He has a 80.00 pack-year smoking history. He has never used smokeless tobacco. He reports current alcohol use. He reports that he does not use drugs. Allergies:   Torsemide and Dye [iodides]    Current Medications:    potassium phosphate 30 mmol in sodium chloride 0.9 % 250 mL IVPB, Once  insulin glargine (LANTUS;BASAGLAR) injection pen 30 Units, Nightly  doxazosin (CARDURA) tablet 1 mg, Daily  clotrimazole (LOTRIMIN) 1 % cream, BID  0.9 % sodium chloride infusion, PRN  lansoprazole suspension SUSP 30 mg, BID  albumin human 25 % IV solution 12.5 g, Q8H  oxyCODONE (ROXICODONE) 5 MG/5ML solution 7.5 mg, Q6H PRN  hydroxypropyl methylcellulose (GONIOSOL) 2.5 % ophthalmic solution 1 drop, PRN  albuterol (PROVENTIL) nebulizer solution 2.5 mg, Q4H  insulin lispro (1 Unit Dial) (HUMALOG/ADMELOG) pen 0-16 Units, Q4H  Venelex ointment, BID  acetaminophen (TYLENOL) 160 MG/5ML solution 650 mg, Q6H PRN  chlorhexidine (PERIDEX) 0.12 % solution 15 mL, BID  apixaban (ELIQUIS) tablet 5 mg, BID  sodium chloride flush 0.9 % injection 5-40 mL, 2 times per day  sodium chloride flush 0.9 % injection 5-40 mL, PRN  0.9 % sodium chloride infusion, PRN  ondansetron (ZOFRAN-ODT) disintegrating tablet 4 mg, Q8H PRN   Or  ondansetron (ZOFRAN) injection 4 mg, Q6H PRN  glucose chewable tablet 16 g, PRN  dextrose bolus 10% 125 mL, PRN   Or  dextrose bolus 10% 250 mL, PRN  glucagon (rDNA) injection 1 mg, PRN  dextrose 10 % infusion, Continuous PRN  hydrALAZINE (APRESOLINE) injection 5 mg, Q15 Min PRN          Physical exam:     Vitals:  BP (!) 109/51   Pulse 69   Temp 98.1 °F (36.7 °C) (Temporal)   Resp 14   Ht 6' 0.99\" (1.854 m)   Wt 194 lb 14.2 oz (88.4 kg)   SpO2 97%   BMI 25.72 kg/m²   Constitutional:  on MV  Skin: no rash, turgor wnl  Heent:  eomi, mmm  Neck: no bruits or jvd noted  Cardiovascular:  S1, S2 without m/r/g  Respiratory: trach  Abdomen:  +bs, soft, nt, nd  Ext: bilateral lower extremity edema R>L  Psychiatric: mood and affect appropriate  Musculoskeletal:  Rom, muscular strength intact    Data:   Labs:  CBC:   Recent Labs     10/15/22  0420 10/16/22  0421 10/17/22  0528   WBC 8.9 8.9 10.4   HGB 7.9* 7.7* 7.9*    324 354       BMP:    Recent Labs     10/15/22  0421 10/16/22  0421 10/17/22  0528    144 148*   K 3.8 3.4* 4.2    106 108   CO2 31 28 32   BUN 94* 90* 86*   CREATININE 1.0 1.0 0.9   GLUCOSE 89 314* 157*       Ca/Mg/Phos:   Recent Labs     10/15/22  0421 10/16/22  0421 10/17/22  0528   CALCIUM 7.9* 7.9* 8.4   MG 3.50* 3.30* 3.30*   PHOS 2.7 2.0* 1.5*       Hepatic: No results for input(s): AST, ALT, ALB, BILITOT, ALKPHOS in the last 72 hours. Troponin: No results for input(s): TROPONINI in the last 72 hours. BNP: No results for input(s): BNP in the last 72 hours. Lipids:   No results for input(s): CHOL, TRIG, HDL, LDLCALC, LABVLDL in the last 72 hours. ABGs:   No results for input(s): PHART, PO2ART, UWT5XYS in the last 72 hours. INR: No results for input(s): INR in the last 72 hours. UA:No results for input(s): Ileene Sham, GLUCOSEU, BILIRUBINUR, Bessie Rump, BLOODU, PHUR, PROTEINU, UROBILINOGEN, NITRU, LEUKOCYTESUR, LABMICR, URINETYPE in the last 72 hours. Urine Microscopic: No results for input(s): LABCAST, BACTERIA, COMU, HYALCAST, WBCUA, RBCUA, EPIU in the last 72 hours. Urine Culture: No results for input(s): LABURIN in the last 72 hours. Urine Chemistry:   No results for input(s): Justin White, PROTEINUR, NAUR in the last 72 hours. IMAGING:  XR CHEST PORTABLE   Final Result      Distal portion of tracheostomy poorly visualized due to overlying medical devices. The position is without significant change since 10/5/2022. Bibasilar pleuroparenchymal consolidation. Stable cardiomediastinal silhouette.       Right jugular central venous catheter without change. CT ABDOMEN PELVIS WO CONTRAST Additional Contrast? None   Final Result      Marked decrease in size of pneumomediastinum. Trace residual pneumoperitoneum. Extensive bilateral lower lobe pulmonary atelectasis with pleural effusions and trace residual right pneumothorax. XR CHEST PORTABLE   Final Result      Tiny right lateral pneumothorax is suspected, 5 mm in maximum thickness. This has decreased in size since 10/3/2022. Right basilar Pleurx catheter noted. Small bilateral pleural effusions. Prominent interstitial markings. Stable cardiac mediastinal silhouette. Lines and tubes without change. Subcutaneous emphysema noted. CT CHEST ABDOMEN PELVIS WO CONTRAST   Final Result      1. Severe pneumomediastinum. 2. Small to moderate right hydropneumothorax with similar fluid and gas components with a right-sided Pleurx catheter. 3. Moderate loculated left pleural effusion with atelectasis of the left lower lobe with patency of the central left lower lobe bronchi. 4. Fluid/material filling the bronchus intermedius and right lower lobe bronchi with complete consolidation and volume loss of the right lower lobe. Differential diagnosis would include mucous plugging or obstructing lesion. 5. Tracheostomy tube tip within the trachea   6. Severe subcutaneous emphysema in the neck. CT ABDOMEN AND PELVIS:      FINDINGS:      LIVER: Normal.      GALLBLADDER AND BILIARY TREE: No calcified gallstones. No gallbladder distention. No intra- or extrahepatic biliary dilatation. PANCREAS: Normal.      SPLEEN: Normal.      ADRENAL GLANDS: Normal.      KIDNEYS AND URETERS: Normal.      URINARY BLADDER: Ansari catheter present within collapsed urinary bladder. REPRODUCTIVE ORGANS: No associated masses. BOWEL: Normal diameter, nonobstructed. Feeding tube tip at the level of the ligament of Treitz.       LYMPH NODES: No abnormally Stable appearing perihilar accentuated markings or airspace disease            XR CHEST PORTABLE   Final Result      Stable small right-sided pneumothorax. More prominent emphysema over the lower neck. No change in bilateral airspace disease. Stable left pleural effusion. XR CHEST PORTABLE   Final Result   1. Bilateral multifocal pneumonia with improvement in the right    pulmonary consolidation since prior study from 9/23/22   2. Mild to moderate left pleural effusion          XR CHEST PORTABLE   Final Result   1. Support lines and tubes are stable. 2.  Stable small right lateral pneumothorax and right basilar    chest tube. 3.  Stable patchy bilateral airspace disease. XR CHEST PORTABLE   Final Result   1. Satisfactory position of right internal jugular central line   2. No interval change in endotracheal tube and nasogastric tube positioning. 3. Extensive bilateral airspace disease with no changes. 4. Left pleural effusion with retrocardiac opacity, unchanged   5. Small stable tiny right lateral pneumothorax and stable position of right chest tube,, Pleurx catheter. XR ABDOMEN (KUB) (SINGLE AP VIEW)   Final Result   1. No residual pneumothorax. 2.  Mild patchy airspace disease bilaterally worse on the right than on prior examination. 3.  Non-obstructive bowel gas pattern. Mildly distended stomach. XR CHEST PORTABLE   Final Result   1. No residual pneumothorax. 2.  Mild patchy airspace disease bilaterally worse on the right than on prior examination. 3.  Non-obstructive bowel gas pattern. Mildly distended stomach. XR CHEST PORTABLE   Final Result      1. Small right pneumothorax appears slightly increased. 2. Mild patchy airspace opacity bilaterally. XR CHEST PORTABLE   Final Result     Pleurx catheter at the right lung base. Trace right    pneumothorax is unchanged.           XR CHEST PORTABLE   Final Result      Right-sided chest tube evident in the base, its tip medially. Improvement in the right-sided pneumothorax, since earlier today. Possible medial collapse of the right lower lobe. Small left effusion. Patchy airspace density/atelectasis in the lungs bilaterally, with no other significant change. XR CHEST PORTABLE   Final Result   1. Right-sided Pleurx catheter in satisfactory position in the lung bases   2. Right-sided post operative pneumothorax, approximately 10%             Assessment/Plan   ZANA  - suspect this is contrast nephropathy  - baseline Cre 0.7. Normal on admission.  - Cr stable   - BNP 3k, Protein:Cre 0.3, FENa 0.4%  - closely monitor UOP  - avoid nephrotoxic agent     2. Hypernatremia  - continue free water   - will continue to monitor     3. Hypotension  - pressors as needed     4. Anemia  - daily CBC    5. Acid- base/ Electrolyte imbalance   - optimize lytes    6. Acute hypoxic resp failure  - s/p VATS on 9/19/22 for recurrent pleural effusions  - Intensivist and CTS following    7.  CHF   - EF 65% on 6/16/22 via cardiac cath        Samuel Pete MD

## 2022-10-17 NOTE — PROGRESS NOTES
4 Eyes Admission Assessment     I agree as the admission nurse that 2 RN's have performed a thorough Head to Toe Skin Assessment on the patient. ALL assessment sites listed below have been assessed on admission. Areas assessed by both nurses: Eduin Place  [x]   Head, Face, and Ears   [x]   Shoulders, Back, and Chest  [x]   Arms, Elbows, and Hands   [x]   Coccyx, Sacrum, and Ischium - DTI opened to stage 2 on cocyx. [x]   Legs, Feet, and Heels - Rash on L foot, opened blister right calf, 2 stage 1 L calf. Does the Patient have Skin Breakdown?   Yes a wound was noted on the Admission Assessment and an LDA was Initiated documentation include the Nery-wound, Wound Assessment, Measurements, Dressing Treatment, Drainage, and Color\",         Bryson Prevention initiated:  Yes   Wound Care Orders initiated:  Yes      [id number]  nurse consulted for Pressure Injury (Stage 3,4, Unstageable, DTI, NWPT, and Complex wounds) or Bryson score 18 or lower:  Yes      Nurse 1 eSignature: Electronically signed by Anabella Ron RN on 10/17/22 at 3:39 PM EDT    **SHARE this note so that the co-signing nurse is able to place an eSignature**    Nurse 2 eSignature: Electronically signed by Darrel Amin RN on 10/17/22 at 8:53 PM EDT

## 2022-10-18 NOTE — PROGRESS NOTES
Present for evaluation and PMV assessment with Speech. When RT lowered the cuff the patient indicated he felt like his throat was burning and he was drowning. Cuff reinflated, vitals stable. Decision to end session. RN at bedside.

## 2022-10-18 NOTE — PROGRESS NOTES
Nephrology Progress Note                                                                                                                                                                                                                                                                                                                                                               Office : [phone]     Fax :[phone]    Patient's Name: Dejah Marshall        Reason for Consult:   ZANA  Requesting Physician:  Luz Stone MD    Interval History:      Cr stable   Na stable   Good UO   Remains on Trach   PEG placed         Past Medical History:   Diagnosis Date    ZANA (acute kidney injury) ([facility] 9/26/2022    Carotid stenosis, left 10/12/2011    Chronic back pain     Chronic systolic (congestive) heart failure 39/94/3549    Diastolic CHF ([facility] [address]     Erectile dysfunction     Hyperlipidemia     Hypertension     Osteoarthritis     Restrictive lung disease     Type II or unspecified type diabetes mellitus without mention of complication, not stated as uncontrolled        Past Surgical History:   Procedure Laterality Date    CARDIAC CATHETERIZATION  06/2022    GASTROSTOMY TUBE PLACEMENT N/A 10/11/2022    EGD PEG TUBE PLACEMENT performed by Abdoul Narayan MD at [facility] Left     PLEURAL CATH INSERTION N/A 9/19/2022    PLEURAL CATHETER PLACEMENT; INTERCOSTAL NERVE BLOCK X4 performed by Crescencio Lua MD at [facility] Bilateral     THORACOSCOPY Right 9/19/2022    RIGHT VIDEO ASSISTED THORASCOPIC SURGERY AND; performed by Crescencio Lua MD at [address] N/A 9/30/2022    TRACHEOSTOMY performed by Nico Conway MD at [facility] History   Problem Relation Age of Onset    Hearing Loss Father     Heart Disease Father 70        MI    High Blood Pressure Father     Diabetes Maternal Grandmother         hypoglycemic    Hearing Loss Maternal Grandmother Arthritis Maternal Grandfather         reports that he quit smoking about 20 years ago. His smoking use included cigarettes. He has a 80.00 pack-year smoking history. He has never used smokeless tobacco. He reports current alcohol use. He reports that he does not use drugs. Allergies:   Torsemide and Dye [iodides]    Current Medications:    psyllium (HYDROCIL) 95 % packet 1 packet, Daily  insulin glargine (LANTUS;BASAGLAR) injection pen 30 Units, Nightly  doxazosin (CARDURA) tablet 1 mg, Daily  clotrimazole (LOTRIMIN) 1 % cream, BID  0.9 % sodium chloride infusion, PRN  lansoprazole suspension SUSP 30 mg, BID  albumin human 25 % IV solution 12.5 g, Q8H  oxyCODONE (ROXICODONE) 5 MG/5ML solution 7.5 mg, Q6H PRN  hydroxypropyl methylcellulose (GONIOSOL) 2.5 % ophthalmic solution 1 drop, PRN  albuterol (PROVENTIL) nebulizer solution 2.5 mg, Q4H  insulin lispro (1 Unit Dial) (HUMALOG/ADMELOG) pen 0-16 Units, Q4H  Venelex ointment, BID  acetaminophen (TYLENOL) 160 MG/5ML solution 650 mg, Q6H PRN  chlorhexidine (PERIDEX) 0.12 % solution 15 mL, BID  apixaban (ELIQUIS) tablet 5 mg, BID  sodium chloride flush 0.9 % injection 5-40 mL, 2 times per day  sodium chloride flush 0.9 % injection 5-40 mL, PRN  0.9 % sodium chloride infusion, PRN  ondansetron (ZOFRAN-ODT) disintegrating tablet 4 mg, Q8H PRN   Or  ondansetron (ZOFRAN) injection 4 mg, Q6H PRN  glucose chewable tablet 16 g, PRN  dextrose bolus 10% 125 mL, PRN   Or  dextrose bolus 10% 250 mL, PRN  glucagon (rDNA) injection 1 mg, PRN  dextrose 10 % infusion, Continuous PRN  hydrALAZINE (APRESOLINE) injection 5 mg, Q15 Min PRN          Physical exam:     Vitals:  BP (!) 113/50   Pulse 84   Temp 97.8 °F (36.6 °C) (Axillary)   Resp 26   Ht 6' 0.99\" (1.854 m)   Wt 190 lb 11.2 oz (86.5 kg)   SpO2 97%   BMI 25.17 kg/m²   Constitutional:  on MV  Skin: no rash, turgor wnl  Heent:  eomi, mmm  Neck: no bruits or jvd noted  Cardiovascular:  S1, S2 without m/r/g  Respiratory: trach  Abdomen:  +bs, soft, nt, nd  Ext: bilateral lower extremity edema R>L  Psychiatric: mood and affect appropriate  Musculoskeletal:  Rom, muscular strength intact    Data:   Labs:  CBC:   Recent Labs     10/16/22  0421 10/17/22  0528 10/18/22  0429   WBC 8.9 10.4 9.5   HGB 7.7* 7.9* 7.5*    354 372       BMP:    Recent Labs     10/16/22  0421 10/17/22  0528 10/18/22  0429    148* 149*   K 3.4* 4.2 4.1    108 107   CO2 28 32 34*   BUN 90* 86* 79*   CREATININE 1.0 0.9 0.8   GLUCOSE 314* 157* 187*       Ca/Mg/Phos:   Recent Labs     10/16/22  0421 10/17/22  0528 10/17/22  1439 10/18/22  0429   CALCIUM 7.9* 8.4  --  8.4   MG 3.30* 3.30*  --  3.10*   PHOS 2.0* 1.5* 3.0 2.6       Hepatic: No results for input(s): AST, ALT, ALB, BILITOT, ALKPHOS in the last 72 hours. Troponin: No results for input(s): TROPONINI in the last 72 hours. BNP: No results for input(s): BNP in the last 72 hours. Lipids:   No results for input(s): CHOL, TRIG, HDL, LDLCALC, LABVLDL in the last 72 hours. ABGs:   No results for input(s): PHART, PO2ART, QGU5QRN in the last 72 hours. INR: No results for input(s): INR in the last 72 hours. UA:No results for input(s): Squire Cap, GLUCOSEU, BILIRUBINUR, Julia Agreste, BLOODU, PHUR, PROTEINU, UROBILINOGEN, NITRU, LEUKOCYTESUR, LABMICR, URINETYPE in the last 72 hours. Urine Microscopic: No results for input(s): LABCAST, BACTERIA, COMU, HYALCAST, WBCUA, RBCUA, EPIU in the last 72 hours. Urine Culture: No results for input(s): LABURIN in the last 72 hours. Urine Chemistry:   No results for input(s): BIANCA Barron NAUR in the last 72 hours. IMAGING:  XR CHEST PORTABLE   Final Result      Distal portion of tracheostomy poorly visualized due to overlying medical devices. The position is without significant change since 10/5/2022. Bibasilar pleuroparenchymal consolidation. Stable cardiomediastinal silhouette.       Right jugular central venous catheter without change. CT ABDOMEN PELVIS WO CONTRAST Additional Contrast? None   Final Result      Marked decrease in size of pneumomediastinum. Trace residual pneumoperitoneum. Extensive bilateral lower lobe pulmonary atelectasis with pleural effusions and trace residual right pneumothorax. XR CHEST PORTABLE   Final Result      Tiny right lateral pneumothorax is suspected, 5 mm in maximum thickness. This has decreased in size since 10/3/2022. Right basilar Pleurx catheter noted. Small bilateral pleural effusions. Prominent interstitial markings. Stable cardiac mediastinal silhouette. Lines and tubes without change. Subcutaneous emphysema noted. CT CHEST ABDOMEN PELVIS WO CONTRAST   Final Result      1. Severe pneumomediastinum. 2. Small to moderate right hydropneumothorax with similar fluid and gas components with a right-sided Pleurx catheter. 3. Moderate loculated left pleural effusion with atelectasis of the left lower lobe with patency of the central left lower lobe bronchi. 4. Fluid/material filling the bronchus intermedius and right lower lobe bronchi with complete consolidation and volume loss of the right lower lobe. Differential diagnosis would include mucous plugging or obstructing lesion. 5. Tracheostomy tube tip within the trachea   6. Severe subcutaneous emphysema in the neck. CT ABDOMEN AND PELVIS:      FINDINGS:      LIVER: Normal.      GALLBLADDER AND BILIARY TREE: No calcified gallstones. No gallbladder distention. No intra- or extrahepatic biliary dilatation. PANCREAS: Normal.      SPLEEN: Normal.      ADRENAL GLANDS: Normal.      KIDNEYS AND URETERS: Normal.      URINARY BLADDER: Ansari catheter present within collapsed urinary bladder. REPRODUCTIVE ORGANS: No associated masses. BOWEL: Normal diameter, nonobstructed. Feeding tube tip at the level of the ligament of Treitz.       LYMPH NODES: No abnormally enlarged nodes. PERITONEUM/RETROPERITONEUM: Severe pneumoperitoneum extends into the upper abdomen with some of the symmetric multiseptated gas appearing deep to the diaphragm consistent with mild pneumoperitoneum (series 601 was 101). This is likely related to    pneumonia mediastinum dissecting into the abdomen. No fluid collection in the abdomen or pelvis. No ascites. VESSELS: Extensive atherosclerotic calcification of the aorta and iliac arteries. ABDOMINAL WALL: No acute abnormality. BONES: No acute abnormality. Mild chronic L1 compression fracture with previous vertebroplasty. IMPRESSION:      1. Severe pneumomediastinum extends into the upper abdomen with small pneumoperitoneum likely related to extension of pneumoperitoneum into the upper peritoneal cavity. 2. No fluid collection in the abdomen or pelvis. Results were discussed with the surgical team at 7:00 PM on 10/3/2022. XR CHEST PORTABLE   Final Result      Stable appearance of loculated right pneumothorax, with loculated components in the lateral right midlung region and in the right lateral costophrenic sulcus. Stable scattered areas of atelectasis versus scar, most prominent in the medial right lung base and in the left lateral lung base. XR CHEST PORTABLE   Final Result      Small right basilar pneumothorax. Right basilar chest tube without change. Patchy consolidation in the right mid and lower lung as well as the left lower lung-atelectasis versus pneumonia. Trace left pleural effusion. Normal heart size. Lines and tubes without change. Mild improvement in degree of subcutaneous emphysema in the chest and neck. XR CHEST 1 VIEW   Final Result      1. Stable small right-sided pneumothorax and prominent subcutaneous emphysema along the neck and upper superior chest wall, greater in right lung stable   2. Stable left basilar consolidation and pleural effusion      3. Stable appearing perihilar accentuated markings or airspace disease            XR CHEST PORTABLE   Final Result      Stable small right-sided pneumothorax. More prominent emphysema over the lower neck. No change in bilateral airspace disease. Stable left pleural effusion. XR CHEST PORTABLE   Final Result   1. Bilateral multifocal pneumonia with improvement in the right    pulmonary consolidation since prior study from 9/23/22   2. Mild to moderate left pleural effusion          XR CHEST PORTABLE   Final Result   1. Support lines and tubes are stable. 2.  Stable small right lateral pneumothorax and right basilar    chest tube. 3.  Stable patchy bilateral airspace disease. XR CHEST PORTABLE   Final Result   1. Satisfactory position of right internal jugular central line   2. No interval change in endotracheal tube and nasogastric tube positioning. 3. Extensive bilateral airspace disease with no changes. 4. Left pleural effusion with retrocardiac opacity, unchanged   5. Small stable tiny right lateral pneumothorax and stable position of right chest tube,, Pleurx catheter. XR ABDOMEN (KUB) (SINGLE AP VIEW)   Final Result   1. No residual pneumothorax. 2.  Mild patchy airspace disease bilaterally worse on the right than on prior examination. 3.  Non-obstructive bowel gas pattern. Mildly distended stomach. XR CHEST PORTABLE   Final Result   1. No residual pneumothorax. 2.  Mild patchy airspace disease bilaterally worse on the right than on prior examination. 3.  Non-obstructive bowel gas pattern. Mildly distended stomach. XR CHEST PORTABLE   Final Result      1. Small right pneumothorax appears slightly increased. 2. Mild patchy airspace opacity bilaterally. XR CHEST PORTABLE   Final Result     Pleurx catheter at the right lung base. Trace right    pneumothorax is unchanged.           XR CHEST PORTABLE   Final Result      Right-sided chest tube evident in the base, its tip medially. Improvement in the right-sided pneumothorax, since earlier today. Possible medial collapse of the right lower lobe. Small left effusion. Patchy airspace density/atelectasis in the lungs bilaterally, with no other significant change. XR CHEST PORTABLE   Final Result   1. Right-sided Pleurx catheter in satisfactory position in the lung bases   2. Right-sided post operative pneumothorax, approximately 10%         MRI BRAIN WO CONTRAST    (Results Pending)   MRI Cervical Spine WO Contrast    (Results Pending)   MRI THORACIC SPINE WO CONTRAST    (Results Pending)   MRI LUMBAR SPINE WO CONTRAST    (Results Pending)       Assessment/Plan   ZANA  - suspect this is contrast nephropathy  - baseline Cre 0.7. Normal on admission.  - Cr stable   - BNP 3k, Protein:Cre 0.3, FENa 0.4%  - closely monitor UOP  - avoid nephrotoxic agent     2. Hypernatremia  - continue free water   - will continue to monitor     3. Hypotension  - pressors as needed     4. Anemia  - daily CBC    5. Acid- base/ Electrolyte imbalance   - optimize lytes    6. Acute hypoxic resp failure  - s/p VATS on 9/19/22 for recurrent pleural effusions  - Intensivist and CTS following    7.  CHF   - EF 65% on 6/16/22 via cardiac cath        Tayla Pepper MD

## 2022-10-18 NOTE — PLAN OF CARE
Problem: Chronic Conditions and Co-morbidities  Goal: Patient's chronic conditions and co-morbidity symptoms are monitored and maintained or improved  Outcome: Progressing     Problem: Safety - Adult  Goal: Free from fall injury  Outcome: Progressing     Problem: Skin/Tissue Integrity  Goal: Absence of new skin breakdown  Description: 1. Monitor for areas of redness and/or skin breakdown  2. Assess vascular access sites hourly  3. Every 4-6 hours minimum:  Change oxygen saturation probe site  4. Every 4-6 hours:  If on nasal continuous positive airway pressure, respiratory therapy assess nares and determine need for appliance change or resting period.   Outcome: Progressing     Problem: Nutrition Deficit:  Goal: Optimize nutritional status  Outcome: Progressing     Problem: ABCDS Injury Assessment  Goal: Absence of physical injury  Outcome: Progressing

## 2022-10-18 NOTE — PROGRESS NOTES
hours.  LIVER PROFILE: No results for input(s): AST, ALT, LIPASE, BILIDIR, BILITOT, ALKPHOS in the last 72 hours. Invalid input(s): AMYLASE,  ALB    CXR REVIEWED BY ME AND SHOWED:       ASSESSMENT/PLAN:  This is a 65 y.o. male with acute on chronic, hypercapnic and hypoxemic respiratory failure, vent dependence, bilateral pleural effusion s/p pleurx placement on the right. Vent Mode: AC/PRVC Resp Rate (Set): 14 bmp/Vt (Set, mL): 375 mL/ /FiO2 : 40 %  PEEP 5  No results for input(s): PHART, [id number], PO2ART in the last 72 hours. Remains on the above settings and has a restricted pattern on flow volume loops as well as higher airway pressures than would be expected for the 4.7 ml/kg IBW that we have him set to. Draining small amounts of fluid from his pleurx every other day but neither the right nor left lower lobes have re-expanded despite being on the vent for a month. Neurology consulted yesterday to workup a possible neuromuscular weakness syndrome. Acetylcholine receptor antibodies were negative. Nutritional status may be improving finally as his phos is normal and BUN is down trending.        Lynda Sandoval MD

## 2022-10-18 NOTE — CARE COORDINATION
SEAN spoke with Lisa Poole from [facility] . Patient's insurance asking for clinicals to be faxed to [phone]. CM prepared form and sent information needed. After received they will request a peer to peer with MD.      SEAN spoke with patient's wife regarding plan. Will continue to follow.     Ariella Suarez RN, BSN,   4th Floor Progressive Care Unit  [phone]

## 2022-10-18 NOTE — PROGRESS NOTES
Speech Language Pathology  Facility/Department: [facility] ICU  Dysphagia Daily Treatment Note    NAME: Nora Cerda  : 1945  MRN: [id number]    Patient Diagnosis(es):   Patient Active Problem List    Diagnosis Date Noted    Generalized weakness 10/17/2022    Areflexia 10/17/2022    Asymmetrical deep tendon reflexes 10/17/2022    Malnutrition ([address] 10/14/2022    Mucus plugging of bronchi 10/05/2022    ZANA (acute kidney injury) ([address] 2022    Acute on chronic respiratory failure with hypoxia and hypercapnia ([address] 2022    Pleural effusion on right 2022    Chronic heart failure with preserved ejection fraction (HFpEF) ([address] 2022    Hypoxia 2022    Atrial fibrillation with rapid ventricular response ([address] 2022    Exertional dyspnea 2022    Closed displaced fracture of lateral malleolus of left fibula 2021    Spinal stenosis of lumbar region 10/17/2018    Closed compression fracture of L1 lumbar vertebra 10/17/2018    Trigger ring finger of right hand 06/15/2017    Subacromial impingement 2015    Rotator cuff tear 2015    Glenohumeral arthritis 2015    DM type 2 (diabetes mellitus, type 2) ([address] 2013    ED (erectile dysfunction) 2012    OA (osteoarthritis) of knee 10/12/2011    Carotid stenosis, left 10/12/2011    Hearing loss 10/12/2011    HTN (hypertension) 10/07/2011    Hyperlipidemia 10/07/2011     Allergies: Allergies   Allergen Reactions    Torsemide Shortness Of Breath    Dye [Iodides]      1970's - ?? CXR (10/3/22)-  Impression       Stable appearance of loculated right pneumothorax, with loculated components in the lateral right midlung region and in the right lateral costophrenic sulcus. Stable scattered areas of atelectasis versus scar, most prominent in the medial right lung base and in the left lateral lung base. Previous MBS -  N/A    Chart reviewed.     Medical Diagnosis: Pleural effusion, right [J90]  Pleural effusion on right [J90]   Treatment Diagnosis: Oropharyngeal dysphagia    BSE Impression (10/2/22)-  RN states okay to attempt assessment. Dr Shara Melara speaking with spouse stating pt most likely still with effects of sedation. Pt was intubated 9/22- 9/30/22. Trach placed 9/30, with pt on ventilator. Pt shook head yes/no intermittently to questions asked. Pt made no attempt to mouth words. Pt exhibiting open mouth posture, did not form labial seal or protrude /lateralize tongue on command. Oral care completed, pt demonstrating no oral response. Placed 2 single ice chips in oral cavity, again with no response - no lingual movement noted. Pt did close lips with tactile stim x 2. No swallow movement was felt upon palpation of anterior neck. O2 sats remained stable and RR remained in mid 20's. Recommend aggressive oral care 2-3 x/day with suction kit. Plan to re-assess as pt appropriate. Dysphagia Diagnosis: Suspected needs further assessment    FEES (10/14/22): Pt with relatively non-functional swallow at this time and met 'bail out' criteria (i.e. full protocol not completed d/t severity of impairments and pt safety). Severely impaired airway protection marked by pattern of penetration and aspiration of ice chips, thin liquids and mildly thick liquids during and after the swallow. Severely impaired swallow peristalsis marked by pattern of severe residue after completion of swallow remaining in the vallecula, lateral channels, and pyriforms. Physiological impairments c/w these findings include reduced tongue base retraction, decreased hyolaryngeal elevation/excursion with incomplete epiglottic inversion.      Pain: None indicated     Current Diet : ADULT TUBE FEEDING; Nasogastric; Diabetic; Continuous; 10; Yes; 10; Q 4 hours; 45; 300; Q 4 hours; Protein; 2 bottles Proteinex daily w/ 30 mL FW flushes before and after  Diet NPO   Recommended Form of Meds: Via alternative means of nutrition Treatment:  Pt seen bedside to address the following goals:   1. The patient will tolerate repeat bedside swallowing evaluation when able. 10/3 - Pt positioned upright and aggressive oral care was completed with suction toothbrush. Vitals reading had poor waveform and appeared to be unreliable readings; RN notified and in to assess. The patient demo'd spontaneous swallows without PO with audible air escaping around stoma. Pt tolerated 4/4 ice chips with constant verbal cues for appropriate acceptance and manipulation of ice (I.e.close mouth, chew before you swallow, etc). There was no coughing but not likely clinically relevant due to research indicating high prevalence of SILENT aspiration in cuff inflated tracheostomy patients. Pt not yet appropriate for instrumental swallow evaluation but will require out prior to advancing diet. Notes indicate plan for SBT today. Requested order for PMV which will hopefully be receive and coincide with improved alertness and weaning from ventilator. Continue goal.  10/4: Cleared by RN. No order for PMV yet. Received pt more awake/alert. Wife present. Pt remains on trach/vent. Oral care recently completed. Repositioned pt upright. Targeted swallow function via trials ice chips and small sips h2o; pt with positive oral acceptance, no anterior loss, appropriate and timely oral prep, seemingly positive swallow movement. Attempted to check clearance via oral suction; evidently dry with no material suctioned. As previously noted, no cough, however would be unlikely to occur given inflated cuff tracheostomy. Would recommend waiting on instrumental pending ability to place PMV (assuming order will be received in the near future), as that will likely support pt's swallow function, and show improved function. Cont goal  10/6: Pt positioned upright in bed. Thorough oral care and suctioning provided. Pt continues to be more alert/awake and active in his care/treatment. Family present. Pt completed 20x effortful swallows 5x swallows/ ice chips to reduce further swallow deconditioning. RR & 02 stable across low level ice chip trials. Audible air escape and secretions around trach appreciated. Per RN, Dr. Ryann Garcia aware. Discussed need for instrumental swallow evaluation with pt and family. FEES to be completed as schedule permits, preferably prior to PEG determination. Cont goal.   10/7: Pt seated upright in bed and SLP assisted with oral care via suction toothbrush. Pt with increased alertness and agreeable to accept trials of ice chips. X25 effortful swallows with ice chips completed this session. Pt with intermittent air escape and secretions noted around trach, however per discussion with SLP who saw pt yesterday and per family, both looked less. RR and O2 remained stable across all trials. SLP re-discussed possibility of completing FEES and expressed will reach out to other SLP to see if it can be completed prior to PEG placement on Monday. Family and pt demonstrated understanding and agreement at this time. Cont. 10/12: Pt seated upright in bed. SLP provided oral care via suction toothbrush. Clean oral cavity noted. Pt with min brown secretions noted around trach prior to administering ice chips. Pt readily accepted x10 ice chips and demonstrated adequate mastication. Pt with facial grimace when attempting to swallow all ice chips. Pt with stable RR and O2 during ice chips administration. Audible secretions noted x2. Pt with continued lethargy and cues required to maintain alertness. Pin point pupils/reduced visual tracking, however followed basic 1-step commands adequately. SLP re-discussed FEES completion at end of this week (either Friday or Saturday) depending on pt's status to accept PO with increased alertness. ? Pt comprehension, however wife present and demonstrated understanding and agreement. Cont. 10/13: Pt sitting upright in bed, wife present.  He is reportedly more alert today though does not always use head nod/facial expressions for answering yes/no questions. Make no attempts to point to items on communication board. Patient accepted oral care via suction toothbrush, oral cavity noted to be clean. Patient accepted ice chips x15 from spoon; Facial grimaces observed with swallow initiation. Increased secretions around stoma during ice chip trials. Stable RR and O2 during trials. Patient does appear appropriate to participate in FEES next date if presentation is similar to this session. Provided education to patient and wife, who demonstrate understanding. Continue goal.   10/14: Pt seated upright in bed with wife present. SLP assisted with with oral care via suction toothbrush. Clean oral cavity noted. Pt with increased alertness and engagement across entire session this date. Pt readily accepted x15 ice chips and completed effortful swallows across all. Intermittent secretions around stoma required suctioning at times. SLP expressed FEES to be completed later this afternoon. Pt in wife in agreement and demonstrated understanding. Cont. 10/17: D/C goal.     2. The patient/caregiver will demonstrate understanding of compensatory strategies for improved swallowing safety. 10/3 - Educated on potential impact of trach on swallow function, rationale for oral care, recommendations for few single ice chips to be given to facilitate oral mucosal integrity and preserve swallow function that is present. Introduced plan for PMV in future, rationales for this. Educated on limitations in pts alertness at this time and need for improvement for completing swallow study, therapeutic effectiveness, etc. Continue goal.   10/4: Discussed swallow function with patient/wife. Also reviewed recommendation for PMV, hopefully in near future (order not yet received). Discussed how PMV may also improve swallow function be supporting necessary pressure for swallowing.  Reviewed recommendation for small amounts ice/small sips h2o with effortful swallow to support swallow function/maintenance. Wife and pt indicated comprehension. Cont goal  10/5: Thorough education this date re: potential adverse affects of trach/vent on swallow function, need for instrumentation, need for PMV & importance of oral care. Wean trial failed this date per Dr. Natalee Ivy. Audible air/secretion leakage around trach. PMV trials are likely not appropriate at this time and orders have not been provided. Reviewed continued recommendation for low level ice chips s/p oral care to support swallow function/ maintenance, improve pt QOL/ comfort and encourage oral care. 10/7: Discussed education regarding rationale for completing trials ice chips with effortful swallow, rec's for PMV as able, and overall importance of oral care, and possibility of completing further swallow assessment via FEES when able. Pt and family present demonstrated understanding. Cont. 10/12: As noted above with wife present. Discussed rationale for completing ice chips+effortful swallow trials to continue stimulating oropharyngeal swallow function while pt NPO. Pt requires re-education. Wife demonstrated understanding. Cont. 10/13: Patient provided education re: risk of aspiration at this time, need for FEES to assess safety/ efficiency of swallow function. Wife with good understanding, patient appears to have improved understanding compared to previous sessions. Continue goal.  10/14: As noted above, discussed rationale for FEES to be completed this date, procedure details and trials to be assessed. Wife with adequate understanding, pt requires some reinforcement however continued improvement from previous sessions. Cont. 10/17: Goal met on 10/14 via FEES completion. 3. Patient will participate in [address] trials when appropriate/ orders received  10/6- Speech/ Communication Treatment: Pt with increased communicative frustration.  PMV trials are likely not appropriate at this time and orders have not been provided yet. Family stating they wanted to get him an IPAD with speech lauren. Discussed low tech vs high tech AAC and appropriateness for AAC in different populations. Given trach/vent is suspected to not be long-term and pt continues to be in the acute phase of care, high tech AAC is not recommended at this time. Pt provided communication board. All icons reviewed. Pt was able to spell out \"tired\" on the letter board. Recommend continued use of communication board, letter board & non-verbal communication (I.e lip reading, pointing, facial expressions etc) to facilitate patient expression of basic wants/needs. Patient and family demonstrated understanding. 10/7: Pt communicated via head nod/shake and use of gestures to communicate wants/needs this session. 10/12: Goal met 10/10. Separate note for targeting goals associated. New Goal s/p FEES 10/14:  Goal 1: Pt will complete effortful swallows with trials of ice chips and tsps water after completion of oral care  10/17: Pt with increased alertness, engagement, mouthing of words for communication, facial expressions, seated upright in bed. Pt chose to complete oral care with s/u assistance from SLP for suction toothbrush. SLP demonstrated how to word (place thumb over suction) and pt completed with MI. Pt self fed all trials of tsps thin liquids and ice chips x30. SLP cued to complete hard and fast swallows across all. Pt with adequate command following to implement strategy. Of note, pt unable to complete PMV trials at this time (RT unavailable), however appeared to be very appropriate this date with hopes to trial tomorrow as able per respiratory. Pt and wife in agreement and demonstrated understanding. Cont. 10/18-  pt alert, wife present. Pt mouthing words to communicate. Performed oral care with suction. Pt and wife educated to the importance of oral care prior to trials with ice chips.  When asked pt what he should do when swallowing, pt mouthed \"swallow hard\". Pt provided with 3 ice chips with pt utilizing effortful swallow. RT arrived for PMV trial. When RT deflated cuff, pt c/o significant discomfort and burning sensation. Cuff re-inflated and pt reported relief. Unable to attempt PMV trials at this time due to discomfort with deflated cuff. Did not attempt further trials with ice chips as RN needing to provide nursing care. Encouraged RN to provide ice chips again later. Con't goal     Patient/Family/Caregiver Education:  Please see goal 2 above    Compensatory Strategies:  Oral care using suction toothbrush/toothette / suction system 3x per day   Single ice chips given by staff when pt awake and in upright position after oral care  Communication board      Plan:  Continue dysphagia/communication treatment with goals per plan of care. Diet recommendations: Only single ice chips and tsps water given by staff when pt awake and in upright position, ONLY after oral care   DC recommendation: Ongoing speech therapy is indicated   Treatment: 15  D/W nursing: [place]   Needs met prior to leaving room, call button in reach; Family at bedside.        Ziggy Grade, [address]  Speech-Language Pathologist  Pager [phone]     If patient is discharged prior to next treatment, this note will serve as the discharge summary

## 2022-10-18 NOTE — PROGRESS NOTES
Palliative Care Chart Review  and Check in Note:     NAME:  Mirian Luna  Admit Date: 9/19/2022  Hospital Day:  Hospital Day: 30   Current Code status: Full Code    Palliative care is continuing to following Mr. Sanchez Bravo for symptom management, and goals of care discussion as needed. Patient's chart reviewed today 10/18/22. Ambrocio Mazariegos was being transported outside of the room for imaging in MRI, so he was not assessed. Spoke with his wife, Jennifer Charles, and son at the bedside. They remain hopeful and are waiting to hear more from the insurance company about placement. Emotional support provided. The following are the currently established goals/code status, and Symptom management. Goals of care: Continue current medical management.      Code status: Full    Discharge plan: TBD - pending hospital course       NATASHA Boykin NP  10/18/22  4:27 PM

## 2022-10-18 NOTE — PROGRESS NOTES
Comprehensive Nutrition Assessment    Type and Reason for Visit:  Reassess    Nutrition Recommendations/Plan:   Continue TF of Vital AF 1.2 @ 65 mL/hr + 1 bottle Proteinex   Continue FW flushes of 150 mL q4 during hypernatremia  Continue psyllium powder QID  Monitor nutrition adequacy, pertinent labs, bowel habits, wt changes, and clinical progress     Malnutrition Assessment:  Malnutrition Status: Moderate malnutrition (10/14/22 1128)    Context:  Chronic Illness     Findings of the 6 clinical characteristics of malnutrition:  Energy Intake:  No significant decrease in energy intake  Weight Loss:  Greater than 10% over 6 months     Body Fat Loss:  Mild body fat loss     Muscle Mass Loss:  Mild muscle mass loss Temples (temporalis), Calf (gastrocnemius), Thigh (quadraceps)  Fluid Accumulation:  No significant fluid accumulation        Nutrition Assessment:    Follow up: Pt transferred out of ICU. Remains vent dependent, continues of TF of Vital AF 1.2 @ 65 mL/hr. Pt had continuing frequent loose BMs, spoke w/ surgery and added psyllium powder QID. Psyllium powder given 10/17 & 10/18, frequency of BM improved, only one recorded today. Pt continues w/ hypermagnesia, BG remains elevated, insulin regimen continues. Na has increased, FW flushes increased to 150 mL q4. Will continue to monitor. Nutrition Related Findings:    . Na 149. Mg 3.1. Active bowel sounds. +BM 10/18.  Wound Type: Deep Tissue Injury ([place])       Current Nutrition Intake & Therapies:    Average Meal Intake: NPO  Average Supplements Intake: NPO  Diet NPO  ADULT TUBE FEEDING; PEG; Peptide Based; Continuous; 200; Yes; 10; Q 4 hours; 65; 150; Q 4 hours; Protein; 1 bottles Proteinex daily w/ 30 mL FW flushes before and after  Current Tube Feeding (TF) Orders:  Feeding Route: PEG  Formula: Peptide Based  Schedule: Continuous  Additives/Modulars: Protein  Goal TF & Flush Orders Provides: Vital AF 1.2 @ 65 mL/hr to provide: 1560 mL TV, 1872 kcal, 117 g/pro and 1265 mL FW + FW flushes of 30 mL q4 + 1 bottle Proteinex daily to provide an additional 104 kcal and 26 g/pro. Anthropometric Measures:  Height: 6' 0.99\" (185.4 cm)  Ideal Body Weight (IBW): 184 lbs (84 kg)       Current Body Weight: 210 lb 15 oz (95.7 kg), 90.7 % IBW. Weight Source: Bed Scale  Current BMI (kg/m2): 27.8        Weight Adjustment For: No Adjustment                 BMI Categories: Overweight (BMI 25.0-29. 9)    Estimated Daily Nutrient Needs:  Energy Requirements Based On: Kcal/kg (20-25 kcal/kg CBW)  Weight Used for Energy Requirements: Current  Energy (kcal/day): 6741-4849 (20-22 kcal/95.7 kg)  Weight Used for Protein Requirements: Ideal (IBW 83.64 kg)  Protein (g/day): 101-168  Method Used for Fluid Requirements: 1 ml/kcal  Fluid (ml/day): or per MD    Nutrition Diagnosis:   Increased nutrient needs related to increase demand for energy/nutrients as evidenced by nutrition support - enteral nutrition    Nutrition Interventions:   Food and/or Nutrient Delivery: Continue Current Tube Feeding, Continue NPO  Nutrition Education/Counseling: Education not indicated  Coordination of Nutrition Care: Continue to monitor while inpatient  Plan of Care discussed with: ICU team    Goals:  Previous Goal Met: Goal(s) Achieved  Goals: Tolerate nutrition support at goal rate       Nutrition Monitoring and Evaluation:   Behavioral-Environmental Outcomes: None Identified  Food/Nutrient Intake Outcomes: Enteral Nutrition Intake/Tolerance  Physical Signs/Symptoms Outcomes: Biochemical Data, Nutrition Focused Physical Findings, Weight, GI Status, Diarrhea, Hemodynamic Status    Discharge Planning:     Too soon to determine     Rocael Nolasco, [address] [place] [address],   Contact: [phone]

## 2022-10-18 NOTE — PROGRESS NOTES
Pt transported to MRI. Pt accompanied by RT Radha Christopher and Polina.  Electronically signed by Veronika Oquendo RN on 10/18/2022 at 3:30 PM

## 2022-10-18 NOTE — PROGRESS NOTES
CT SURGERY DAILY PROGRESS NOTE    CC: Pleural effusions s/p R PleurX catheter placement  SUBJECTIVE:   Interval Hx:   No acute issues overnight. HDS, afebrile. Remains on vent minmial settings. Out of ICU yesterday. Denies complaints this AM    ROS: A 14 point review of systems was conducted, significant findings as noted above. All other systems negative. OBJECTIVE:   Vitals:   Vitals:    10/18/22 0021 10/18/22 0200 10/18/22 0322 10/18/22 0500   BP:  (!) 115/56 (!) 123/59    Pulse: 70 72 75 75   Resp: (!) 32 28 30 (!) 0   Temp:   98.3 °F (36.8 °C)    TempSrc:   Oral    SpO2: 99% 100% 100% 98%   Weight:   190 lb 11.2 oz (86.5 kg)    Height:           I/O:   Intake/Output Summary (Last 24 hours) at 10/18/2022 0638  Last data filed at 10/18/2022 0322  Gross per 24 hour   Intake 1638 ml   Output 625 ml   Net 1013 ml       I/O last 3 completed shifts: In: 3299 [NG/GT:1204]  Out: 705 [Urine:705]    Diet: Diet NPO  ADULT TUBE FEEDING; PEG; Peptide Based; Continuous; 30; Yes; 10; Q 4 hours; 65; 150; Q 4 hours; Protein; 1 bottles Proteinex daily w/ 30 mL FW flushes before and after      Physical Examination:   General appearance: Alert, following commands. Neurological: no focal deficits  HEENT: EOMI, trachea midline, no JVD. Tracheostomy in place with some mucus drainage  Chest/Lungs: On mechanical ventilation via tracheostomy; R PleurX catheter capped with dressing C/D/I  Cardiovascular: RRR  Abdomen: Soft, non-tender, non-distended. PEG tube. PEG site with minimal coagulated blood, mild erythema. Skin: Warm and dry. Sacral decubitus ulcer covered with Mepilex. Extremities: Pitting edema of the b/l feet. No cyanosis. Legs ulcers from SCDs covered with meplixex.      Labs:  CBC:   Recent Labs     10/16/22  0421 10/17/22  0528 10/18/22  0429   WBC 8.9 10.4 9.5   HGB 7.7* 7.9* 7.5*   HCT 24.1* 24.6* 23.6*    354 372         BMP:   Recent Labs     10/16/22  0421 10/17/22  0528 10/18/22  0429    148* 149* K 3.4* 4.2 4.1    108 107   CO2 28 32 34*   BUN 90* 86* 79*   CREATININE 1.0 0.9 0.8   GLUCOSE 314* 157* 187*       LFT's:   No results for input(s): AST, ALT, ALB, BILITOT, ALKPHOS in the last 72 hours. Troponin: No results for input(s): TROPONINI in the last 72 hours. BNP: No results for input(s): BNP in the last 72 hours. ABGs:   No results for input(s): PHART, XNR0JIE, PO2ART in the last 72 hours. INR:   No results for input(s): INR in the last 72 hours. U/A:No results for input(s): NITRITE, COLORU, PHUR, LABCAST, WBCUA, RBCUA, MUCUS, TRICHOMONAS, YEAST, BACTERIA, CLARITYU, SPECGRAV, LEUKOCYTESUR, UROBILINOGEN, BILIRUBINUR, BLOODU, GLUCOSEU, AMORPHOUS in the last 72 hours. Invalid input(s): Yohannes Roth         ASSESSMENT AND PLAN:   Dejah Marshall is a 65 y.o. male with history of diastolic heart failure, atrial fibrillation, and DM with recurrent pleural effusions s/p R PleurX catheter placement (9/19). - pain meds  -ACh receptor antibodies negative  - Pleurx cath to be drained today  -Pulmonology/Crit care managing vent.    - diarrhea resolved, continue fiber  - glucose control somewhat improved overnight     -Receiving 12.5 g albumin Q8 (10/13-10/20)  - Sacral wound per wound care nurse  -Consult PT/OT  - [facility] pending     Code Status: Full Code  -----------------------------  Darius Dickson CNP  10/18/2022  6:40 AM  perfectserve

## 2022-10-18 NOTE — PLAN OF CARE
Problem: Pain  Goal: Verbalizes/displays adequate comfort level or baseline comfort level  Outcome: Progressing  Flowsheets (Taken 10/18/2022 0302)  Verbalizes/displays adequate comfort level or baseline comfort level:   Encourage patient to monitor pain and request assistance   Assess pain using appropriate pain scale   Administer analgesics based on type and severity of pain and evaluate response   Implement non-pharmacological measures as appropriate and evaluate response   Notify Licensed Independent Practitioner if interventions unsuccessful or patient reports new pain  Note: Pt has denied pain during shift. Problem: Safety - Adult  Goal: Free from fall injury  10/18/2022 0302 by Erin Hess  Outcome: Progressing  Flowsheets (Taken 10/18/2022 0302)  Free From Fall Injury: Instruct family/caregiver on patient safety  Note: Pt will remain free from accidental injury this shift. Pt has fall risk measures (fall risk bracelet, fall risk sign, fall risk cloth, non-slip socks, dome light on) in place. Pt bed is in low position, bed alarm on, bed wheels locked, call light within reach, bedside table within reach, chair wheels locked, chair alarm on. Problem: Skin/Tissue Integrity  Goal: Absence of new skin breakdown  Description: 1. Monitor for areas of redness and/or skin breakdown  2. Assess vascular access sites hourly  3. Every 4-6 hours minimum:  Change oxygen saturation probe site  4. Every 4-6 hours:  If on nasal continuous positive airway pressure, respiratory therapy assess nares and determine need for appliance change or resting period. 10/18/2022 0302 by Erin Hess  Outcome: Progressing  Note: Pt is being turned every two hours and shows no new skin breakdown.      Problem: Nutrition Deficit:  Goal: Optimize nutritional status  10/18/2022 0302 by Erin Hess  Outcome: Progressing  Flowsheets (Taken 10/18/2022 0302)  Nutrient intake appropriate for improving, restoring, or maintaining nutritional needs:   Assess nutritional status and recommend course of action   Monitor oral intake, labs, and treatment plans   Recommend appropriate diets, oral nutritional supplements, and vitamin/mineral supplements   Recommend, monitor, and adjust tube feedings and TPN/PPN based on assessed needs  Note: Pt is on continuous TF at 65 ml/hr with 30 ml water flushes every 4 hours. Problem: ABCDS Injury Assessment  Goal: Absence of physical injury  10/18/2022 0302 by Diony Carter  Outcome: Progressing  Flowsheets (Taken 10/18/2022 0302)  Absence of Physical Injury: Implement safety measures based on patient assessment     Problem: Respiratory - Adult  Goal: Achieves optimal ventilation and oxygenation  Outcome: Progressing  Flowsheets (Taken 10/18/2022 0302)  Achieves optimal ventilation and oxygenation:   Assess for changes in respiratory status   Position to facilitate oxygenation and minimize respiratory effort   Assess the need for suctioning and aspirate as needed   Respiratory therapy support as indicated   Assess for changes in mentation and behavior   Oxygen supplementation based on oxygen saturation or arterial blood gases   Encourage broncho-pulmonary hygiene including cough, deep breathe, incentive spirometry   Assess and instruct to report shortness of breath or any respiratory difficulty  Note: Pt is V/T with FiO2 40% and is on continuous SpO2 monitoring. Oral care is being performed every four hours and as needed.      Problem: Cardiovascular - Adult  Goal: Maintains optimal cardiac output and hemodynamic stability  Outcome: Progressing  Flowsheets (Taken 10/18/2022 0302)  Maintains optimal cardiac output and hemodynamic stability:   Monitor blood pressure and heart rate   Monitor urine output and notify Licensed Independent Practitioner for values outside of normal range   Assess for signs of decreased cardiac output   Administer fluid and/or volume expanders as ordered  Note: Pt is on continuous telemetry and heart rhythm is NSR with PACs. Problem: Genitourinary - Adult  Goal: Urinary catheter remains patent  Outcome: Progressing  Flowsheets (Taken 10/18/2022 0302)  Urinary catheter remains patent: Assess patency of urinary catheter  Note: Ansari catheter remains in place. Problem: Metabolic/Fluid and Electrolytes - Adult  Goal: Glucose maintained within prescribed range  Outcome: Progressing  Flowsheets (Taken 10/18/2022 0302)  Glucose maintained within prescribed range:   Monitor blood glucose as ordered   Assess for signs and symptoms of hyperglycemia and hypoglycemia   Administer ordered medications to maintain glucose within target range  Note: Pt's blood sugar is being checked every 4 hours and being covered with insulin per protocol.

## 2022-10-18 NOTE — PROGRESS NOTES
L Pleurx catheter drained. 100 ml serous drainage out. Pt tolerated well.      Alicja Stain, CNP  10/18/2022  9:42 AM  [employer]

## 2022-10-18 NOTE — PLAN OF CARE
Problem: Chronic Conditions and Co-morbidities  Goal: Patient's chronic conditions and co-morbidity symptoms are monitored and maintained or improved  10/18/2022 0941 by Aria Marcelino RN  Outcome: Progressing  Flowsheets (Taken 10/18/2022 0818)  Care Plan - Patient's Chronic Conditions and Co-Morbidity Symptoms are Monitored and Maintained or Improved: Monitor and assess patient's chronic conditions and comorbid symptoms for stability, deterioration, or improvement     Problem: Discharge Planning  Goal: Discharge to home or other facility with appropriate resources  Outcome: Progressing  Flowsheets (Taken 10/18/2022 0818)  Discharge to home or other facility with appropriate resources: Identify barriers to discharge with patient and caregiver     Problem: Pain  Goal: Verbalizes/displays adequate comfort level or baseline comfort level  10/18/2022 0941 by Aria Marcelino RN  Flowsheets   Verbalizes/displays adequate comfort level or baseline comfort level:   Encourage patient to monitor pain and request assistance   Assess pain using appropriate pain scale   Administer analgesics based on type and severity of pain and evaluate response   Implement non-pharmacological measures as appropriate and evaluate response   Notify Licensed Independent Practitioner if interventions unsuccessful or patient reports new pain    Problem: Safety - Adult  Goal: Free from fall injury  10/18/2022 0941 by Aria Marcelino RN  Flowsheets   Free From Fall Injury: Instruct family/caregiver on patient safety    Problem: Skin/Tissue Integrity  Goal: Absence of new skin breakdown  Description: 1. Monitor for areas of redness and/or skin breakdown  2. Assess vascular access sites hourly  3. Every 4-6 hours minimum:  Change oxygen saturation probe site  4. Every 4-6 hours:  If on nasal continuous positive airway pressure, respiratory therapy assess nares and determine need for appliance change or resting period.   10/18/2022 0941 by Abdirahman Huff RN  Outcome: Progressing     Problem: Nutrition Deficit:  Goal: Optimize nutritional status  10/18/2022 0941 by Abdirahman Huff RN  Flowsheets  Nutrient intake appropriate for improving, restoring, or maintaining nutritional needs:   Assess nutritional status and recommend course of action   Monitor oral intake, labs, and treatment plans   Recommend appropriate diets, oral nutritional supplements, and vitamin/mineral supplements   Recommend, monitor, and adjust tube feedings and TPN/PPN based on assessed needs    Problem: ABCDS Injury Assessment  Goal: Absence of physical injury  10/18/2022 0941 by Abdirahman Huff RN  Outcome: Progressing  Flowsheets  Absence of Physical Injury: Implement safety measures based on patient assessment    Problem: Respiratory - Adult  Goal: Achieves optimal ventilation and oxygenation  10/18/2022 0941 by Abdirahman Huff RN  Outcome: Progressing  Flowsheets  Achieves optimal ventilation and oxygenation:   Assess for changes in respiratory status   Position to facilitate oxygenation and minimize respiratory effort   Assess the need for suctioning and aspirate as needed   Respiratory therapy support as indicated   Assess for changes in mentation and behavior   Oxygen supplementation based on oxygen saturation or arterial blood gases   Encourage broncho-pulmonary hygiene including cough, deep breathe, incentive spirometry   Assess and instruct to report shortness of breath or any respiratory difficulty     Problem: Cardiovascular - Adult  Goal: Maintains optimal cardiac output and hemodynamic stability  10/18/2022 0941 by Abdirahman Huff RN  Outcome: Progressing  Flowsheets (Taken 10/18/2022 0818)  Maintains optimal cardiac output and hemodynamic stability: Monitor blood pressure and heart rate     Problem: Genitourinary - Adult  Goal: Urinary catheter remains patent  10/18/2022 0941 by Abdirahman Huff RN  Outcome: Progressing  Flowsheets (Taken 10/18/2022 9350)  Urinary catheter remains patent: Assess patency of urinary catheter    Problem: Metabolic/Fluid and Electrolytes - Adult  Goal: Glucose maintained within prescribed range  Recent Flowsheet Documentation  Taken 10/18/2022 0818 by Alvin Crowell RN  Glucose maintained within prescribed range: Monitor blood glucose as ordered

## 2022-10-19 NOTE — PROGRESS NOTES
Speech Language Pathology  Facility/Department: [facility]  Daily PMV/Speech Treatment Note  NAME: Madhuri Palencia  :   MRN: [id number]    Date of Eval: 10/19/2022  Evaluating Therapist: SURESH Salomon    Patient Diagnosis(es): has HTN (hypertension); Hyperlipidemia; OA (osteoarthritis) of knee; Carotid stenosis, left; Hearing loss; ED (erectile dysfunction); DM type 2 (diabetes mellitus, type 2) ([address] Rotator cuff tear; Glenohumeral arthritis; Subacromial impingement; Trigger ring finger of right hand; Spinal stenosis of lumbar region; Closed compression fracture of L1 lumbar vertebra; Closed displaced fracture of lateral malleolus of left fibula; Exertional dyspnea; Atrial fibrillation with rapid ventricular response ([address] Hypoxia; Chronic heart failure with preserved ejection fraction (HFpEF) ([address] Pleural effusion on right; Acute on chronic respiratory failure with hypoxia and hypercapnia ([address] ZANA (acute kidney injury) ([address] Mucus plugging of bronchi; Malnutrition ([address] Generalized weakness; Areflexia; and Asymmetrical deep tendon reflexes on their problem list.  Onset Date: 22    Chart reviewed. Pain: none indicated    Initial Assessment: 10/10  Diagnosis: Pt presents with a severe communication impairment secondary to trach/vent status. Order obtained and pt appropriate this date per ventilator settings and report. The patient was awake but drowsy (spouse reports recent dilaudid). The patient allowed oral care but demo'd what appeared to be reflexive mouth closing for any presentation of oral care utensils despite multiple attempts and verbal cues. Procedure explained to patient and spouse. Pt did not demo any comprehension of education / not attending. RT arrived and trach cuf deflated.  Pt without coughing and airflow was appreciated but difficult to detect due to inability to voice on command; a warm continuous air could be felt leaking from open oral cavity although pt could not follow command to exhale forcefully and x1 instance of weak strained phonation was appreciated om command. Pt had drop in PIP and no stridor noted so decision was made to place PMV. Pt without coughing or overt anxiety noted with placement. x2 instances of weak strained vocal quality on command for single word utterances. During repositioning (typical flexed neck posturing), there was a phonation achieved that is suspected to be non-purposeful. The pt demo'd suspected attempts at cough with audible gurggling suspected to be from glottis region / pharynx vs trach with inability to expectorate. The patient was unable to expectorate secretions to oral cavity. There was no active diaphragm movement appreciated when assessed for attempts at controlled phonation. The patient tolerated for 7.5 minutes but given limited interactiveness and no voicing it was decided to remove PMV. RT replaced t-piece and resumed care of the patient. Pts vitals generally stable throughout, no outward anxiety or discomfort (no more than his baseline). Pt did demo spontenous swallows with grimace. PMV was removed and left to air dry below RN computer on tray and RN was notified of this as well as spouse with instructions to place adaptor and PMV into ziplock and keep with ventilator. The PMV is single use and is now dispensed to the patient and should DC with the patient if he is to DC from hospital. A denture case was labele \"PMV cleaning\" and kept with the PMV equipment, cleaning instructions discussed briefly with pts spouse for initiation of education for self care when that becomes appropriate. The PMV should only be placed with RT / SLP both present for pt safety at this time. PMV safety sticker as there should be no donning of PMV without RT/SLP.     Medical diagnosis: Pleural effusion, right [J90]  Pleural effusion on right [J90]  Treatment diagnosis: severe communication impairment d/t trach/vent status    Treatment:  Pt seen to address the following goals:     Short Term Goals  Goal 1: Pt will tolerate PMV placement for duration of session without change in respiratory status and without discomfort. 10/19: Pt seated upright in bed with RN and RT present. Prior to placement of PMV pt sating at SpO2=98%, Resp=20, Pulse=75. RT provided deep suction prior to placing PMV in-line. Pt with gravely cough when cuff deflated. PMV placed and pt immediately impulsively trying to speak in long phrases/utterances. Required cues to pause and take a few breaths to allow for steady vital readings. Pt tolerated PMV well for 5.25 minutes, prior to SpO2 beginning to drop to 86 and was removed. After removal pt sating at IhH4=513, Resp=22, Pulse=80. Pt reported no discomfort across entire session while wearing PMV. After removal of PMV RT provided suction with noted white/tan, thick secretions. Cont. Goal 2: Pt will improve breath-phonation coordination via phonation for single syllable words on 50% of opportunities. 10/19: Pt with increased voicing this date. Required mod cues to take breaths in between words to assist with respiration/phonation. Pt expressed single CVC words accurately across 90% of attempts. Pt attempting to speak in longer utterances, with reduced breath support across multi-syllabic words and short phrases. Increased breathiness noted when pt attempting these. Goal met, however continue for carry over/training of strategy for increased voicing. Cont. Goal 3: Pt/caregiver will demonstrate comprehension of PMV safety instructions with mod I.  10/19: Prior to placing PMV, SLP discussed how it works, steps to take and cues to be provided by SLP and RT. Pt nodding head in comprehension, however mod cues as noted above to follow directions to continue taking breaths for increased respiration/phonation support. Pt's wife not present at this time.  Pt communicated she is supposed to be coming in ~2 PM. SLP communicated may stop by then to discuss PMV trial with her. Pt in agreement. Cont. Goal 4: Pt will tolerate ongoing communication as indicated. 10/19: Increased impulsivity of speaking this date with reduced use of breath support, negatively impacting Sp02 tolerance. Pt with increased mouthing of words prior to PMV and after PMV removal. With PMV on, improved voicing and vocal quality. Pt expressed \"I can't hear you all\". SLP confirmed pt wanting to have staff be aware that increased loudness required for pt comprehension, as pt normally wears hearing aids at baseline and they are not present during hospital stay. Pt discussed with RN and wrote on whiteboard for staff to be aware for increased pt participation in care. Cont. Education:  As noted above. Plan:  Continue speech/language therapy to address above goals, 3-5 x/week x LOS  DC recommendations: TBD  D/W nursing: Sheryle Pinon  Needs met prior to leaving room, call button in reach.   Treatment time: 20      Electronically signed by:  Kassnadra Plasencia M.A., [address] [place] [address]  Speech-Language Pathologist  Pg #: [phone]    If patient is discharged prior to next treatment, this note will serve as the discharge summary

## 2022-10-19 NOTE — CONSULTS
NEUROSURGERY CONSULT NOTE    Иван Purvis  [id number]   1945   10/19/2022    Requesting physician: Shannon Saavedra MD    Reason for consultation: lumbar stenosis    History of present illness: Patient is a 65 y.o. male w/ PMH of ZANA, severe CHF, HLD, HTN, osteoarthritis, DM2 who is s/p VATS procedure on 9/30/2022. Patient has remained inpatient since the procedure and developed severe weakness in all limbs, legs > arms. Prior to [facility] patient had been having an increase in weakness in all 4 extremities, and was barely able to stand when he was admitted. Prior to admission the weakness was attributed to his CHF. Since admission is weakness has been contributed mostly to his deconditioning and poor nutrition. Per family, the patient is down 30lbs. Patient currently vent dependent with a trach and a PEG tube for nutrition. ROS:   GENERAL:  Denies fever or recent illness. Denies weight changes   EYES:  Denies vision change or diplopia  EARS:  Denies hearing loss  CARDIAC:  Denies chest pain  RESPIRATORY:  Denies shortness of breath  SKIN:  Denies rash or lesions   HEM:  Denies excessive bruising  PSYCH:  Denies anxiety or depression  NEURO:  Denies headache. C/o generalized weakness and baseline neuropathy to both feet. :  Denies urinary difficulty  GI: Denies nausea, vomiting, diarrhea or constipation  MUSCULOSKELETAL:  No arthralgias    Allergies   Allergen Reactions    Torsemide Shortness Of Breath    Dye [Iodides]      1970's - ??        Past Medical History:   Diagnosis Date    ZANA (acute kidney injury) ([facility] [address] 9/26/2022    Carotid stenosis, left 10/12/2011    Chronic back pain     Chronic systolic (congestive) heart failure 00/53/7444    Diastolic CHF ([facility] [address]     Erectile dysfunction     Hyperlipidemia     Hypertension     Osteoarthritis     Restrictive lung disease     Type II or unspecified type diabetes mellitus without mention of complication, not stated as uncontrolled Past Surgical History:   Procedure Laterality Date    CARDIAC CATHETERIZATION  2022    GASTROSTOMY TUBE PLACEMENT N/A 10/11/2022    EGD PEG TUBE PLACEMENT performed by Author Opal MD at [facility] Left     PLEURAL CATH INSERTION N/A 2022    PLEURAL CATHETER PLACEMENT; INTERCOSTAL NERVE BLOCK X4 performed by Laxmi Last MD at  [facility] Bilateral     THORACOSCOPY Right 2022    RIGHT VIDEO ASSISTED THORASCOPIC SURGERY AND; performed by Laxmi Last MD at [address] N/A 2022    TRACHEOSTOMY performed by Liz Holloway MD at [address] [facility]     Occupational History    Not on file   Tobacco Use    Smoking status: Former     Packs/day: 2.00     Years: 40.00     Pack years: 80.00     Types: Cigarettes     Quit date: 10/24/2001     Years since quittin.0    Smokeless tobacco: Never   Vaping Use    Vaping Use: Never used   Substance and Sexual Activity    Alcohol use: Yes     Alcohol/week: 0.0 standard drinks     Comment: rarely    Drug use: No    Sexual activity: Yes     Partners: Female        Family History   Problem Relation Age of Onset    Hearing Loss Father     Heart Disease Father 70        MI    High Blood Pressure Father     Diabetes Maternal Grandmother         hypoglycemic    Hearing Loss Maternal Grandmother     Arthritis Maternal Grandfather         No outpatient medications have been marked as taking for the 22 encounter [facility] Encounter).         Current Facility-Administered Medications   Medication Dose Route Frequency Provider Last Rate Last Admin    collagenase ointment   Topical Daily Kay Taylor MD        insulin glargine (LANTUS;BASAGLAR) injection pen 40 Units  40 Units SubCUTAneous Nightly Oscar Sharpe MD   40 Units at 10/18/22 2155    psyllium (HYDROCIL) 95 % packet 1 packet  1 packet Oral Daily Edd Anson, DO   1 packet at 10/19/22 8457 doxazosin (CARDURA) tablet 1 mg  1 mg Oral Daily Lopez Alcantara, DO   1 mg at 10/19/22 1852    clotrimazole (LOTRIMIN) 1 % cream   Topical BID Tyler Chamorro MD   Given at 10/19/22 0905    0.9 % sodium chloride infusion   IntraVENous PRN Lopez Alcantara DO        lansoprazole suspension SUSP 30 mg  30 mg Per G Tube BID Missy Storey MD   30 mg at 10/19/22 5873    albumin human 25 % IV solution 12.5 g  12.5 g IntraVENous Q8H NATASHA Paige - CNP 50 mL/hr at 10/19/22 0903 12.5 g at 10/19/22 0903    oxyCODONE (ROXICODONE) 5 MG/5ML solution 7.5 mg  7.5 mg Oral Q6H PRN James Sorto DO   7.5 mg at 10/19/22 3783    hydroxypropyl methylcellulose (GONIOSOL) 2.5 % ophthalmic solution 1 drop  1 drop Both Eyes PRN Burgess Qi DO        albuterol (PROVENTIL) nebulizer solution 2.5 mg  2.5 mg Nebulization Q4H Haroon Castillo MD   2.5 mg at 10/19/22 1236    insulin lispro (1 Unit Dial) (HUMALOG/ADMELOG) pen 0-16 Units  0-16 Units SubCUTAneous Q4H Delgado Cai MD   8 Units at 10/18/22 1749    Venelex ointment   Topical BID Alexander Restrepo MD   Given at 10/19/22 6557    acetaminophen (TYLENOL) 160 MG/5ML solution 650 mg  650 mg Oral Q6H PRN Tawny Ba MD   650 mg at 10/19/22 0500    chlorhexidine (PERIDEX) 0.12 % solution 15 mL  15 mL Mouth/Throat BID Tawny Ba MD   15 mL at 10/19/22 0902    apixaban (ELIQUIS) tablet 5 mg  5 mg Oral BID Olga Mclaughlin MD   5 mg at 10/19/22 3151    sodium chloride flush 0.9 % injection 5-40 mL  5-40 mL IntraVENous 2 times per day Tawny Ba MD   10 mL at 10/19/22 0907    sodium chloride flush 0.9 % injection 5-40 mL  5-40 mL IntraVENous PRN Tawny Ba MD   10 mL at 10/03/22 0834    0.9 % sodium chloride infusion   IntraVENous PRN Tawny Ba  mL/hr at 10/19/22 0029 25 mL at 10/19/22 0029    ondansetron (ZOFRAN-ODT) disintegrating tablet 4 mg  4 mg Oral Q8H PRN Tawny Ba MD        Or ondansetron (ZOFRAN) injection 4 mg  4 mg IntraVENous Q6H PRN Liliane Pham MD   4 mg at 09/20/22 0510    glucose chewable tablet 16 g  4 tablet Oral PRN Liliane Pham MD        dextrose bolus 10% 125 mL  125 mL IntraVENous PRN Liliane Pham MD   Stopped at 10/10/22 4485    Or    dextrose bolus 10% 250 mL  250 mL IntraVENous PRN Liliane Pham MD        glucagon (rDNA) injection 1 mg  1 mg SubCUTAneous PRN Liliane Pham MD        dextrose 10 % infusion   IntraVENous Continuous PRN Liliane Pham MD   Stopped at 10/10/22 1749    hydrALAZINE (APRESOLINE) injection 5 mg  5 mg IntraVENous Q15 Min PRN Liliane Pham MD   5 mg at 09/19/22 0918        Objective:  /64   Pulse 81   Temp 97.8 °F (36.6 °C) (Axillary)   Resp 22   Ht 6' (1.829 m)   Wt 196 lb 10.4 oz (89.2 kg)   SpO2 95%   BMI 26.67 kg/m²     Physical Exam:   Patient seen and examined  GCS:  4 - Opens eyes on own  5 - Alert and oriented  6 - Follows simple motor commands  General: Well developed. Alert and cooperative in no acute distress. HENT: atraumatic, neck supple  Eyes: Optic discs: Not tested  Pulmonary: unlabored respiratory effort, trach in place, on vent  Cardiovascular:  Warm well perfused. No peripheral edema  Gastrointestinal: abdomen soft, NT, ND, PEG tube in place for nutrition.       Neurological:  Mental Status: Awake, alert, oriented x 4, trach in place, but able to mouth words appropriately   Attention: Intact  Language: No aphasia or dysarthria noted  Sensation: baseline neuropathy in BLE  Coordination: Intact  DTRs: absent in all 4 limbs    Cranial Nerves:  II: Visual acuity not tested, denies new visual changes / diplopia  III, IV, VI: PERRL, 3 mm bilaterally, EOMI, no nystagmus noted  V: Facial sensation intact bilaterally to touch  VII: Face symmetric  VIII: Hearing intact bilaterally to spoken voice  IX: Palate movement equal bilaterally  XI: Shoulder shrug stronger on R than L  XII: Tongue midline    Musculoskeletal:   Gait: Not tested   Assist devices: None   Tone: diminished in all 4 extremities  Motor strength:    Right  Left    Right  Left    Deltoid  3 0  Hip Flex  1 1   Biceps  3 3+  Knee Extensors  2 1   Triceps  3 3+  Knee Flexors  1 1   Wrist Ext  3 3  Ankle Dorsiflex. 3 1   Wrist Flex  3 3  Ankle Plantarflex. 3 2   Handgrip  3+ 3+  Ext Jude Longus  1 1   Thumb Ext  3 3         Radiological Findings:  MRI brain wo contrast:  Impression   1. No acute hemorrhage, mass or acute ischemia. 2.  Mild burden of nonspecific multifocal white matter disease compatible with chronic small vessel ischemia. 3.  Mild atrophy. 4.  Bilateral mastoid effusions. MRI cervical, thoracic, and lumbar spine wo contrast:  1. Motion compromised study. 2.  No cord compression. 3.  Osseous lesions in the cervical and thoracic spine suspicious for metastatic disease. No extraosseous mass. 4.  Severe multilevel degenerative disc disease in the lumbar spine. Moderate to severe canal stenosis at L2-L3 and L4-L5. Moderate canal stenosis at L3-L4. Multiple levels of moderate to severe canal stenosis from L2-L3 to L4-L5     CT cervical spine wo contrast:  Impression   1. Osseous lesions in C2, C7 and T1 vertebral body seen on recent MRI dated 10/18/2022 are not well-visualized on CT. Bone density is slightly heterogenous on CT. There is a 8 mm radiolucent lesion in the inferior posterior aspect of C2 vertebral body    which may correspond to C2 lesion seen on MRI. 2. Mildly expansile lytic lesion in left lamina of C4 concerning for metastasis. 3. Multilevel degenerative disc disease, most pronounced at C6-7 where there is disc osteophyte complex causing mild-to-moderate spinal canal stenosis. 4. Multilevel foraminal stenosis, most pronounced at C5-6, moderate to severe right and moderate left.      Labs:  Recent Labs     10/19/22  0527   WBC 9.7   HGB 7.4*   HCT 23.3*    Recent Labs     10/19/22  0527   *   K 3.9      CO2 32   BUN 77*   CREATININE 0.8   GLUCOSE 177*   CALCIUM 8.5   PHOS 2.7   MG 3.00*       No results for input(s): PROTIME, INR, APTT in the last 72 hours. Patient Active Problem List    Diagnosis Date Noted    Generalized weakness 10/17/2022    Areflexia 10/17/2022    Asymmetrical deep tendon reflexes 10/17/2022    Malnutrition ([address] 10/14/2022    Mucus plugging of bronchi 10/05/2022    ZANA (acute kidney injury) ([address] 09/26/2022    Acute on chronic respiratory failure with hypoxia and hypercapnia ([address] 09/22/2022    Pleural effusion on right 09/19/2022    Chronic heart failure with preserved ejection fraction (HFpEF) ([address] 06/07/2022    Hypoxia 04/03/2022    Atrial fibrillation with rapid ventricular response ([address] 03/04/2022    Exertional dyspnea 01/27/2022    Closed displaced fracture of lateral malleolus of left fibula 11/14/2021    Spinal stenosis of lumbar region 10/17/2018    Closed compression fracture of L1 lumbar vertebra 10/17/2018    Trigger ring finger of right hand 06/15/2017    Subacromial impingement 04/03/2015    Rotator cuff tear 02/27/2015    Glenohumeral arthritis 02/27/2015    DM type 2 (diabetes mellitus, type 2) ([address] 11/18/2013    ED (erectile dysfunction) 01/12/2012    OA (osteoarthritis) of knee 10/12/2011    Carotid stenosis, left 10/12/2011    Hearing loss 10/12/2011    HTN (hypertension) 10/07/2011    Hyperlipidemia 10/07/2011       Assessment:  Patient is a 65 y.o. male s/p VATS procedure w/ severe spinal cord stenosis in the lumbar spine likely 2/2 degenerative changes with significant tightness on the L side vs the R side, and possible osseous lesions in the cervical spine. Plan:  No emergent neurosurgical intervention indicated  Neurologic exams frequency:  - Floor: Q4H  For change in exam MUST contact neurosurgery team along with critical care.    Pain control: Managed by medical team  PT/OT consulted, appreciate recs  Advance diet / activity per primary team  Thank you for consult. Will follow inpatient. Please call with any questions or decline in neurological status    DISPO: Remain inpatient from neurosurgery standpoint. Dispo timing to be determined by primary team once patient is medically stable for discharge. Patient was discussed with Dr. Cee Gavin who agrees with above assessment and plan.      Electronically signed by: NATASHA Forrest CNP, APRN-CNP, 10/19/2022 3:44 PM  [phone]

## 2022-10-19 NOTE — PROGRESS NOTES
Palliative Care Chart Review  and Check in Note:     NAME:  Óscar Esposito  Admit Date: 9/19/2022  Hospital Day:  Hospital Day: 31   Current Code status: Full Code    Palliative care is continuing to following Mr. Leonardo Santacruz for symptom management, and goals of care discussion as needed. Patient's chart reviewed today 10/19/22. Saw Geri Sanchez at the bedside. He was resting, but was easily awakened. He complained about being cold, warm blanket applied. He also mouthed \"I want a beer. \" His sense of humor is returning based on what his wife, Shahrzad Montesinos, was saying. Shahrzad Montesinos said he has not been able to get out of bed since moving to PCU. Spoke to [place], [place], about getting him out of bed. She said she will make other therapists aware that he has not been approved for [facility] and will need as much therapy as possible while here. Would recommend nursing staff use maxi-lift to get pt to the chair when able as well. Discussed with Sheryle Pinon, RN. The following are the currently established goals/code status, and Symptom management. Goals of care: Continue current medical management.      Code status: Full    Discharge plan: TBD - pending insurance approval      NATASHA Tate NP  10/19/22  2:21 PM

## 2022-10-19 NOTE — PROGRESS NOTES
Pulmonary Followup Note    CC: acute on chronic, hypercapnic and hypoxemic respiratory failure   Subjective:  Patient sleeping comfortably for my exam.  Had his head to pelvis MRI yesterday. Denied acute complaints this morning. Remains vent dependent    ROS:  Denies headache, nausea or chest pain. 24HR INTAKE/OUTPUT:    Intake/Output Summary (Last 24 hours) at 10/19/2022 1205  Last data filed at 10/19/2022 1372  Gross per 24 hour   Intake 2066 ml   Output 1100 ml   Net 966 ml          collagenase   Topical Daily    insulin glargine  40 Units SubCUTAneous Nightly    psyllium  1 packet Oral Daily    doxazosin  1 mg Oral Daily    clotrimazole   Topical BID    lansoprazole  30 mg Per G Tube BID    albumin human  12.5 g IntraVENous Q8H    albuterol  2.5 mg Nebulization Q4H    insulin lispro  0-16 Units SubCUTAneous Q4H    Venelex   Topical BID    chlorhexidine  15 mL Mouth/Throat BID    apixaban  5 mg Oral BID    sodium chloride flush  5-40 mL IntraVENous 2 times per day           PHYSICAL EXAMINATION:  /64   Pulse 69   Temp 97.8 °F (36.6 °C) (Axillary)   Resp 18   Ht 6' (1.829 m)   Wt 196 lb 10.4 oz (89.2 kg)   SpO2 96%   BMI 26.67 kg/m²   CURRENT PULSE OXIMETRY:  SpO2: 96 %  24HR PULSE OXIMETRY RANGE:  SpO2  Av.8 %  Min: 94 %  Max: 99 %      Gen: no distress. Tracheostomy in place  HEENT: PERRL, EOMI, OP nl  Lung:  decreased breath sounds at the bases. Trached and on vent. CV: RRR without M/R/R  Abd: +BS, soft, NT/ND  Ext: 1-2+ edema.     DATA  CBC:   Recent Labs     10/17/22  0528 10/18/22  0429 10/19/22  0527   WBC 10.4 9.5 9.7   HGB 7.9* 7.5* 7.4*   HCT 24.6* 23.6* 23.3*   MCV 88.5 88.9 89.7    372 335       BMP:   Recent Labs     10/17/22  0528 10/17/22  1439 10/18/22  0429 10/19/22  0527   *  --  149* 148*   K 4.2  --  4.1 3.9     --  107 108   CO2 32  --  34* 32   PHOS 1.5* 3.0 2.6 2.7   BUN 86*  --  79* 77*   CREATININE 0.9  -- 0.8 0.8       No results for input(s): PHART, HXM1CSW, PO2ART in the last 72 hours. LIVER PROFILE: No results for input(s): AST, ALT, LIPASE, BILIDIR, BILITOT, ALKPHOS in the last 72 hours. Invalid input(s): AMYLASE,  ALB         ASSESSMENT/PLAN:  This is a 65 y.o. male with acute on chronic, hypercapnic and hypoxemic respiratory failure, vent dependence, bilateral pleural effusion s/p pleurx placement on the right. Vent Mode: AC/PRVC Resp Rate (Set): 14 bmp/Vt (Set, mL): 375 mL/ /FiO2 : 30 %  PEEP 5  No results for input(s): PHART, GAS7MYO, PO2ART in the last 72 hours. Remains on the above settings and has a restricted pattern based on flow volume loops as well as higher airway pressures than would be expected for the 4.7 ml/kg IBW that we have him set to. I trialed him on VSV and PSV and his inspiratory time is extremely short and the amount of pressure he needs is quite high. This unfortunately is no better than it was a couple weeks ago. Still draining small amounts of fluid from his pleurx every other day but neither the right nor left lower lobes have re-expanded despite being on the vent for a month. Workup for possible neuromuscular syndrome is on going. MRI revealed a couple lytic lesions in his thoracic vertebrae and some thecal sac compression in his cervical spine. CT imaging is pending. If lytic lesions are metastatic cancer, we do not have a primary to point to.       Nitin Marcus MD

## 2022-10-19 NOTE — PROGRESS NOTES
Patient's wife updated at bedside. All questions answered at this time.  Electronically signed by Michael Chavarria RN on 10/19/2022 at 1:18 PM

## 2022-10-19 NOTE — PROGRESS NOTES
Speech Language Pathology  Facility/Department: [address] [place] [address] Ave [address] ICU  Dysphagia Daily Treatment Note    NAME: Bernadette Wilks  : 1945  MRN: [id number]    Patient Diagnosis(es):   Patient Active Problem List    Diagnosis Date Noted    Generalized weakness 10/17/2022    Areflexia 10/17/2022    Asymmetrical deep tendon reflexes 10/17/2022    Malnutrition ([address] 10/14/2022    Mucus plugging of bronchi 10/05/2022    ZANA (acute kidney injury) ([address] 2022    Acute on chronic respiratory failure with hypoxia and hypercapnia ([address] 2022    Pleural effusion on right 2022    Chronic heart failure with preserved ejection fraction (HFpEF) ([address] 2022    Hypoxia 2022    Atrial fibrillation with rapid ventricular response ([address] 2022    Exertional dyspnea 2022    Closed displaced fracture of lateral malleolus of left fibula 2021    Spinal stenosis of lumbar region 10/17/2018    Closed compression fracture of L1 lumbar vertebra 10/17/2018    Trigger ring finger of right hand 06/15/2017    Subacromial impingement 2015    Rotator cuff tear 2015    Glenohumeral arthritis 2015    DM type 2 (diabetes mellitus, type 2) ([address] 2013    ED (erectile dysfunction) 2012    OA (osteoarthritis) of knee 10/12/2011    Carotid stenosis, left 10/12/2011    Hearing loss 10/12/2011    HTN (hypertension) 10/07/2011    Hyperlipidemia 10/07/2011     Allergies: Allergies   Allergen Reactions    Torsemide Shortness Of Breath    Dye [Iodides]      1970's - ?? CXR (10/3/22)-  Impression       Stable appearance of loculated right pneumothorax, with loculated components in the lateral right midlung region and in the right lateral costophrenic sulcus. Stable scattered areas of atelectasis versus scar, most prominent in the medial right lung base and in the left lateral lung base. Previous MBS -  N/A    Chart reviewed.     Medical Diagnosis: Pleural effusion, right [J90]  Pleural effusion on right [J90]   Treatment Diagnosis: Oropharyngeal dysphagia    BSE Impression (10/2/22)-  RN states okay to attempt assessment. Dr Miguel Glasgowr speaking with spouse stating pt most likely still with effects of sedation. Pt was intubated 9/22- 9/30/22. Trach placed 9/30, with pt on ventilator. Pt shook head yes/no intermittently to questions asked. Pt made no attempt to mouth words. Pt exhibiting open mouth posture, did not form labial seal or protrude /lateralize tongue on command. Oral care completed, pt demonstrating no oral response. Placed 2 single ice chips in oral cavity, again with no response - no lingual movement noted. Pt did close lips with tactile stim x 2. No swallow movement was felt upon palpation of anterior neck. O2 sats remained stable and RR remained in mid 20's. Recommend aggressive oral care 2-3 x/day with suction kit. Plan to re-assess as pt appropriate. Dysphagia Diagnosis: Suspected needs further assessment    FEES (10/14/22): Pt with relatively non-functional swallow at this time and met 'bail out' criteria (i.e. full protocol not completed d/t severity of impairments and pt safety). Severely impaired airway protection marked by pattern of penetration and aspiration of ice chips, thin liquids and mildly thick liquids during and after the swallow. Severely impaired swallow peristalsis marked by pattern of severe residue after completion of swallow remaining in the vallecula, lateral channels, and pyriforms. Physiological impairments c/w these findings include reduced tongue base retraction, decreased hyolaryngeal elevation/excursion with incomplete epiglottic inversion.      Pain: None indicated     Current Diet : ADULT TUBE FEEDING; Nasogastric; Diabetic; Continuous; 10; Yes; 10; Q 4 hours; 45; 300; Q 4 hours; Protein; 2 bottles Proteinex daily w/ 30 mL FW flushes before and after  Diet NPO   Recommended Form of Meds: Via alternative means of nutrition Treatment:  Pt seen bedside to address the following goals:   1. The patient will tolerate repeat bedside swallowing evaluation when able. 10/3 - Pt positioned upright and aggressive oral care was completed with suction toothbrush. Vitals reading had poor waveform and appeared to be unreliable readings; RN notified and in to assess. The patient demo'd spontaneous swallows without PO with audible air escaping around stoma. Pt tolerated 4/4 ice chips with constant verbal cues for appropriate acceptance and manipulation of ice (I.e.close mouth, chew before you swallow, etc). There was no coughing but not likely clinically relevant due to research indicating high prevalence of SILENT aspiration in cuff inflated tracheostomy patients. Pt not yet appropriate for instrumental swallow evaluation but will require out prior to advancing diet. Notes indicate plan for SBT today. Requested order for PMV which will hopefully be receive and coincide with improved alertness and weaning from ventilator. Continue goal.  10/4: Cleared by RN. No order for PMV yet. Received pt more awake/alert. Wife present. Pt remains on trach/vent. Oral care recently completed. Repositioned pt upright. Targeted swallow function via trials ice chips and small sips h2o; pt with positive oral acceptance, no anterior loss, appropriate and timely oral prep, seemingly positive swallow movement. Attempted to check clearance via oral suction; evidently dry with no material suctioned. As previously noted, no cough, however would be unlikely to occur given inflated cuff tracheostomy. Would recommend waiting on instrumental pending ability to place PMV (assuming order will be received in the near future), as that will likely support pt's swallow function, and show improved function. Cont goal  10/6: Pt positioned upright in bed. Thorough oral care and suctioning provided. Pt continues to be more alert/awake and active in his care/treatment. Family present. Pt completed 20x effortful swallows 5x swallows/ ice chips to reduce further swallow deconditioning. RR & 02 stable across low level ice chip trials. Audible air escape and secretions around trach appreciated. Per RN, Dr. Tra Somers aware. Discussed need for instrumental swallow evaluation with pt and family. FEES to be completed as schedule permits, preferably prior to PEG determination. Cont goal.   10/7: Pt seated upright in bed and SLP assisted with oral care via suction toothbrush. Pt with increased alertness and agreeable to accept trials of ice chips. X25 effortful swallows with ice chips completed this session. Pt with intermittent air escape and secretions noted around trach, however per discussion with SLP who saw pt yesterday and per family, both looked less. RR and O2 remained stable across all trials. SLP re-discussed possibility of completing FEES and expressed will reach out to other SLP to see if it can be completed prior to PEG placement on Monday. Family and pt demonstrated understanding and agreement at this time. Cont. 10/12: Pt seated upright in bed. SLP provided oral care via suction toothbrush. Clean oral cavity noted. Pt with min brown secretions noted around trach prior to administering ice chips. Pt readily accepted x10 ice chips and demonstrated adequate mastication. Pt with facial grimace when attempting to swallow all ice chips. Pt with stable RR and O2 during ice chips administration. Audible secretions noted x2. Pt with continued lethargy and cues required to maintain alertness. Pin point pupils/reduced visual tracking, however followed basic 1-step commands adequately. SLP re-discussed FEES completion at end of this week (either Friday or Saturday) depending on pt's status to accept PO with increased alertness. ? Pt comprehension, however wife present and demonstrated understanding and agreement. Cont. 10/13: Pt sitting upright in bed, wife present.  He is reportedly more alert today though does not always use head nod/facial expressions for answering yes/no questions. Make no attempts to point to items on communication board. Patient accepted oral care via suction toothbrush, oral cavity noted to be clean. Patient accepted ice chips x15 from spoon; Facial grimaces observed with swallow initiation. Increased secretions around stoma during ice chip trials. Stable RR and O2 during trials. Patient does appear appropriate to participate in FEES next date if presentation is similar to this session. Provided education to patient and wife, who demonstrate understanding. Continue goal.   10/14: Pt seated upright in bed with wife present. SLP assisted with with oral care via suction toothbrush. Clean oral cavity noted. Pt with increased alertness and engagement across entire session this date. Pt readily accepted x15 ice chips and completed effortful swallows across all. Intermittent secretions around stoma required suctioning at times. SLP expressed FEES to be completed later this afternoon. Pt in wife in agreement and demonstrated understanding. Cont. 10/17: D/C goal.     2. The patient/caregiver will demonstrate understanding of compensatory strategies for improved swallowing safety. 10/3 - Educated on potential impact of trach on swallow function, rationale for oral care, recommendations for few single ice chips to be given to facilitate oral mucosal integrity and preserve swallow function that is present. Introduced plan for PMV in future, rationales for this. Educated on limitations in pts alertness at this time and need for improvement for completing swallow study, therapeutic effectiveness, etc. Continue goal.   10/4: Discussed swallow function with patient/wife. Also reviewed recommendation for PMV, hopefully in near future (order not yet received). Discussed how PMV may also improve swallow function be supporting necessary pressure for swallowing.  Reviewed recommendation for small amounts ice/small sips h2o with effortful swallow to support swallow function/maintenance. Wife and pt indicated comprehension. Cont goal  10/5: Thorough education this date re: potential adverse affects of trach/vent on swallow function, need for instrumentation, need for PMV & importance of oral care. Wean trial failed this date per Dr. Corby Torres. Audible air/secretion leakage around trach. PMV trials are likely not appropriate at this time and orders have not been provided. Reviewed continued recommendation for low level ice chips s/p oral care to support swallow function/ maintenance, improve pt QOL/ comfort and encourage oral care. 10/7: Discussed education regarding rationale for completing trials ice chips with effortful swallow, rec's for PMV as able, and overall importance of oral care, and possibility of completing further swallow assessment via FEES when able. Pt and family present demonstrated understanding. Cont. 10/12: As noted above with wife present. Discussed rationale for completing ice chips+effortful swallow trials to continue stimulating oropharyngeal swallow function while pt NPO. Pt requires re-education. Wife demonstrated understanding. Cont. 10/13: Patient provided education re: risk of aspiration at this time, need for FEES to assess safety/ efficiency of swallow function. Wife with good understanding, patient appears to have improved understanding compared to previous sessions. Continue goal.  10/14: As noted above, discussed rationale for FEES to be completed this date, procedure details and trials to be assessed. Wife with adequate understanding, pt requires some reinforcement however continued improvement from previous sessions. Cont. 10/17: Goal met on 10/14 via FEES completion. 3. Patient will participate in [address] trials when appropriate/ orders received  10/6- Speech/ Communication Treatment: Pt with increased communicative frustration.  PMV trials are likely not appropriate at this time and orders have not been provided yet. Family stating they wanted to get him an IPAD with speech lauren. Discussed low tech vs high tech AAC and appropriateness for AAC in different populations. Given trach/vent is suspected to not be long-term and pt continues to be in the acute phase of care, high tech AAC is not recommended at this time. Pt provided communication board. All icons reviewed. Pt was able to spell out \"tired\" on the letter board. Recommend continued use of communication board, letter board & non-verbal communication (I.e lip reading, pointing, facial expressions etc) to facilitate patient expression of basic wants/needs. Patient and family demonstrated understanding. 10/7: Pt communicated via head nod/shake and use of gestures to communicate wants/needs this session. 10/12: Goal met 10/10. Separate note for targeting goals associated. New Goal s/p FEES 10/14:  Goal 1: Pt will complete effortful swallows with trials of ice chips and tsps water after completion of oral care  10/17: Pt with increased alertness, engagement, mouthing of words for communication, facial expressions, seated upright in bed. Pt chose to complete oral care with s/u assistance from SLP for suction toothbrush. SLP demonstrated how to word (place thumb over suction) and pt completed with MI. Pt self fed all trials of tsps thin liquids and ice chips x30. SLP cued to complete hard and fast swallows across all. Pt with adequate command following to implement strategy. Of note, pt unable to complete PMV trials at this time (RT unavailable), however appeared to be very appropriate this date with hopes to trial tomorrow as able per respiratory. Pt and wife in agreement and demonstrated understanding. Cont. 10/18-  pt alert, wife present. Pt mouthing words to communicate. Performed oral care with suction. Pt and wife educated to the importance of oral care prior to trials with ice chips.  When asked pt what he should do when swallowing, pt mouthed \"swallow hard\". Pt provided with 3 ice chips with pt utilizing effortful swallow. RT arrived for PMV trial. When RT deflated cuff, pt c/o significant discomfort and burning sensation. Cuff re-inflated and pt reported relief. Unable to attempt PMV trials at this time due to discomfort with deflated cuff. Did not attempt further trials with ice chips as RN needing to provide nursing care. Encouraged RN to provide ice chips again later. Con't goal   10/19: After completion of PMV trial, SLP assisted pt with getting seated upright with set-up of suction toothbrush. Pt demonstrated adequate use independently to brush teeth prior to presenting trials of ice chips and tsps water. Pt completed x20 hard and fast swallows. Pt with reduced secretions noted from trach, as compared to previous sessions. Recommend continue ice chips/tsps water ONLY after oral care with RN and/or wife present. Pt in agreement and demonstrated understanding. Cont. Patient/Family/Caregiver Education:  Please see goal 2 above    Compensatory Strategies:  Oral care using suction toothbrush/toothette / suction system 3x per day   Single ice chips given by staff when pt awake and in upright position after oral care  Communication board      Plan:  Continue dysphagia/communication treatment with goals per plan of care. Diet recommendations: Only single ice chips and tsps water given by staff when pt awake and in upright position, ONLY after oral care   DC recommendation: Ongoing speech therapy is indicated   Treatment: 15  D/W nursing: Lowanda Cage  Needs met prior to leaving room, call button in reach.       Electronically signed by:  Nahum Bello M.A., [address] [place] [address]  Speech-Language Pathologist  Pg #: [phone]     If patient is discharged prior to next treatment, this note will serve as the discharge summary

## 2022-10-19 NOTE — PLAN OF CARE
Assessment:76yo man with severe CHF with frequent hospitalizations and VATS procedure one month ago who has remained admitted since that time and developed severe weakness of all limbs, legs > arms. MRI read as possible osseous lesions in the cervical and thoracic spine. CT cervical spine done for better characterization and this did not demonstrate the lesions. He does have some multi-level severe lumbar stenosis but this would not account for his arm weakness.        Plan:  -Consult Dr. Gina Jimenez with neurosurgery  -Outpatient EMG     NATASHA Montes-CNP  Neurology & Neurocritical Care   Neurology Line: [phone]  [facility]: [facility] NPs

## 2022-10-19 NOTE — PROGRESS NOTES
CT SURGERY DAILY PROGRESS NOTE    CC: Pleural effusions s/p R PleurX catheter placement  SUBJECTIVE:   Interval Hx:   No acute issues overnight. HDS, afebrile. Remains on vent minmial settings. Out of ICU yesterday. Denies complaints this AM    ROS: A 14 point review of systems was conducted, significant findings as noted above. All other systems negative. OBJECTIVE:   Vitals:   Vitals:    10/19/22 0010 10/19/22 0012 10/19/22 0338 10/19/22 0440   BP:  131/62 (!) 121/55    Pulse: 93 94 91 83   Resp: 22 25 20 20   Temp:  97.5 °F (36.4 °C) 98.2 °F (36.8 °C)    TempSrc:  Oral Oral    SpO2: 99% 98% 99% 99%   Weight:       Height:           I/O:   Intake/Output Summary (Last 24 hours) at 10/19/2022 0644  Last data filed at 10/19/2022 0000  Gross per 24 hour   Intake 1096 ml   Output 950 ml   Net 146 ml       I/O last 3 completed shifts: In: 2524 [P.O.:120; I.V.:60; NG/GT:3075]  Out: 1600 [Urine:1600]    Diet: Diet NPO  ADULT TUBE FEEDING; PEG; Peptide Based; Continuous; 20; Yes; 10; Q 4 hours; 65; 150; Q 4 hours; Protein; 1 bottles Proteinex daily w/ 30 mL FW flushes before and after      Physical Examination:   General appearance: Alert, following commands. Neurological: no focal deficits  HEENT: EOMI, trachea midline, no JVD. Tracheostomy in place with some mucus drainage  Chest/Lungs: On mechanical ventilation via tracheostomy; R PleurX catheter capped with dressing C/D/I  Cardiovascular: RRR  Abdomen: Soft, non-tender, non-distended. PEG tube. PEG, mild erythema. Skin: Warm and dry. Sacral decubitus ulcer covered with Mepilex. Extremities: Pitting edema of the b/l feet. No cyanosis. Legs ulcers from SCDs covered with meplixex.      Labs:  CBC:   Recent Labs     10/17/22  0528 10/18/22  0429 10/19/22  0527   WBC 10.4 9.5 9.7   HGB 7.9* 7.5* 7.4*   HCT 24.6* 23.6* 23.3*    372 335         BMP:   Recent Labs     10/17/22  0528 10/18/22  0429   * 149*   K 4.2 4.1    107   CO2 32 34*   BUN 86* 79* CREATININE 0.9 0.8   GLUCOSE 157* 187*       LFT's:   No results for input(s): AST, ALT, ALB, BILITOT, ALKPHOS in the last 72 hours. Troponin: No results for input(s): TROPONINI in the last 72 hours. BNP: No results for input(s): BNP in the last 72 hours. ABGs:   No results for input(s): PHART, ZTA3XHI, PO2ART in the last 72 hours. INR:   No results for input(s): INR in the last 72 hours. U/A:No results for input(s): NITRITE, COLORU, PHUR, LABCAST, WBCUA, RBCUA, MUCUS, TRICHOMONAS, YEAST, BACTERIA, CLARITYU, SPECGRAV, LEUKOCYTESUR, UROBILINOGEN, BILIRUBINUR, BLOODU, GLUCOSEU, AMORPHOUS in the last 72 hours. Invalid input(s): Judeth Plate         ASSESSMENT AND PLAN:   Laure Rayo is a 65 y.o. male with history of diastolic heart failure, atrial fibrillation, and DM with recurrent pleural effusions s/p R PleurX catheter placement (9/19). - completed MRI yesterday F/U neuro recs. - pain meds  -ACh receptor antibodies negative  - Pleurx cath with 100 serous fluid out yesterday.   -Pulmonology/Crit care managing vent.    - diarrhea resolved, continue fiber  - glucose control somewhat improved overnight     -Receiving 12.5 g albumin Q8 (10/13-10/20)  - Sacral wound per wound care nurse  -Consult PT/OT  - [facility] pending; P2P will be needed in the coming days      Code Status: Full Code  -----------------------------  Silvia Harding CNP  10/19/2022  6:44 AM  [facility]

## 2022-10-19 NOTE — CARE COORDINATION
SEAN spoke with Randy at [address] who has updates from insurance. Sakina Miles is asking for p2p within 2 business days, MD to call [phone].     Lulú Pineda, RN, BSN,   4th Floor Progressive Care Unit  [phone]

## 2022-10-19 NOTE — PLAN OF CARE
Problem: Discharge Planning  Goal: Discharge to home or other facility with appropriate resources  Outcome: Progressing  Flowsheets (Taken 10/19/2022 0438)  Discharge to home or other facility with appropriate resources: Identify barriers to discharge with patient and caregiver     Problem: Pain  Goal: Verbalizes/displays adequate comfort level or baseline comfort level  Outcome: Progressing  Flowsheets (Taken 10/19/2022 0438)  Verbalizes/displays adequate comfort level or baseline comfort level:   Encourage patient to monitor pain and request assistance   Assess pain using appropriate pain scale   Administer analgesics based on type and severity of pain and evaluate response   Implement non-pharmacological measures as appropriate and evaluate response   Consider cultural and social influences on pain and pain management  Note: Pt has been having between 5-7/10 pain on coccyx from pressure ulcer. Pt has Tylenol and Roxycodone ordered PRN q 6. Received 2 doses of each throughout shift (see eMAR). Problem: Safety - Adult  Goal: Free from fall injury  Outcome: Progressing  Flowsheets (Taken 10/19/2022 0438)  Free From Fall Injury: Instruct family/caregiver on patient safety  Note: Bed in low position with alarm on and call light within reach. Problem: Confusion  Goal: Confusion, delirium, dementia, or psychosis is improved or at baseline  Description: INTERVENTIONS:  1. Assess for possible contributors to thought disturbance, including medications, impaired vision or hearing, underlying metabolic abnormalities, dehydration, psychiatric diagnoses, and notify attending LIP  2. [place] high risk fall precautions, as indicated  3. Provide frequent short contacts to provide reality reorientation, refocusing and direction  4. Decrease environmental stimuli, including noise as appropriate  5. Monitor and intervene to maintain adequate nutrition, hydration, elimination, sleep and activity  6.  If unable to ensure safety without constant attention obtain sitter and review sitter guidelines with assigned personnel  7. Initiate Psychosocial CNS and Spiritual Care consult, as indicated  Outcome: Progressing  Flowsheets (Taken 10/19/2022 0438)  Effect of thought disturbance (confusion, delirium, dementia, or psychosis) are managed with adequate functional status:   [place] high risk fall precautions, as indicated   Assess for contributors to thought disturbance, including medications, impaired vision or hearing, underlying metabolic abnormalities, dehydration, psychiatric diagnoses, notify LIP   Monitor and intervene to maintain adequate nutrition, hydration, elimination, sleep and activity  Note: Pt alert and oriented and answers questions appropriately. Problem: Respiratory - Adult  Goal: Achieves optimal ventilation and oxygenation  Outcome: Progressing  Flowsheets (Taken 10/19/2022 0438)  Achieves optimal ventilation and oxygenation:   Assess for changes in respiratory status   Assess for changes in mentation and behavior   Position to facilitate oxygenation and minimize respiratory effort   Oxygen supplementation based on oxygen saturation or arterial blood gases   Assess the need for suctioning and aspirate as needed   Assess and instruct to report shortness of breath or any respiratory difficulty   Respiratory therapy support as indicated  Note: Pt O2 sat above 95% on vented trach with 40% FiO2 5 PEEP AC/PRVC. No complaints of SOB but does become tachypneic with exertion. Problem: Genitourinary - Adult  Goal: Urinary catheter remains patent  Outcome: Progressing  Flowsheets (Taken 10/19/2022 0441)  Urinary catheter remains patent: Assess patency of urinary catheter  Note: Pt barry catheter remains patent. Draining well.       Problem: Metabolic/Fluid and Electrolytes - Adult  Goal: Glucose maintained within prescribed range  Outcome: Progressing  Flowsheets (Taken 10/19/2022 0441)  Glucose maintained within prescribed range:   Monitor blood glucose as ordered   Assess for signs and symptoms of hyperglycemia and hypoglycemia   Administer ordered medications to maintain glucose within target range   Assess barriers to adequate nutritional intake and initiate nutrition consult as needed  Note: Pt is q4 BS while on tube feed

## 2022-10-19 NOTE — PROGRESS NOTES
[facility] Wound Ostomy Continence Nurse  Follow-up Progress Note       NAME:  [address] RECORD NUMBER:  [id number]  AGE:  65 y.o. GENDER:  male  :  1945  TODAY'S DATE:  10/19/2022    Subjective:   Wound Identification:  Wound Type: pressure  Contributing Factors: diabetes, chronic pressure, decreased mobility, and shear force        Patient Goal of Care:  [] Wound Healing  [] Odor Control  [] Palliative Care  [] Pain Control   [] Other:     Objective:    BP (!) 106/50   Pulse 74   Temp 97.8 °F (36.6 °C) (Axillary)   Resp 20   Ht 6' (1.829 m)   Wt 196 lb 10.4 oz (89.2 kg)   SpO2 98%   BMI 26.67 kg/m²   Bryson Risk Score: Bryson Scale Score: 13  Assessment: Sacrum - evolved into an unstageable with yellow slough covering wound bed. Red/purple ring round the slough. Fragile   Measurements:  Wound 10/03/22 Sacrum (Active)   Wound Image   10/19/22 1027   Wound Etiology Pressure Unstageable 10/19/22 1027   Dressing Status Intact 10/19/22 1027   Wound Cleansed Not Cleansed 10/19/22 1027   Dressing/Treatment Pharmaceutical agent (see MAR) 10/19/22 1027   Dressing Change Due 10/19/22 10/19/22 1027   Wound Length (cm) 10 cm 10/19/22 1027   Wound Width (cm) 12 cm 10/19/22 1027   Wound Depth (cm) 0 cm 10/19/22 1027   Wound Surface Area (cm^2) 120 cm^2 10/19/22 1027   Change in Wound Size % (l*w) -275 10/19/22 1027   Wound Volume (cm^3) 0 cm^3 10/19/22 1027   Wound Healing % 100 10/19/22 1027   Wound Assessment Purple/maroon;Slough 10/19/22 1027   Drainage Amount Small 10/19/22 1027   Drainage Description Serosanguinous 10/19/22 1027   Odor None 10/19/22 1027   Nery-wound Assessment Blanchable erythema;Fragile 10/19/22 1027   Margins Attached edges; Defined edges 10/19/22 1027   Wound Thickness Description not for Pressure Injury Partial thickness 10/16/22 0600   Number of days: 16    Sacrum:       Wound 10/10/22 Leg Left;Lateral;Lower;Proximal (Active)   Wound Image 10/10/22 1135   Wound Etiology Deep tissue/Injury 10/19/22 0830   Dressing Status Clean;Dry 10/19/22 0830   Wound Cleansed Not Cleansed 10/19/22 0830   Dressing/Treatment Pharmaceutical agent (see MAR); Foam 10/19/22 0830   Dressing Change Due 10/16/22 10/16/22 0600   Wound Length (cm) 1 cm 10/16/22 0400   Wound Width (cm) 2 cm 10/16/22 0400   Wound Depth (cm) 0 cm 10/10/22 1135   Wound Surface Area (cm^2) 2 cm^2 10/16/22 0400   Change in Wound Size % (l*w) 0 10/16/22 0400   Wound Volume (cm^3) 0 cm^3 10/10/22 1135   Wound Assessment Purple/maroon 10/18/22 1520   Drainage Amount None 10/18/22 1520   Odor None 10/17/22 0400   Enry-wound Assessment Intact;Dry/flaky 10/17/22 0400   Margins Attached edges 10/17/22 0400   Number of days: 8       Wound 10/10/22 Leg Left; Lower; Lateral;Mid (Active)   Wound Image   10/10/22 1135   Wound Etiology Deep tissue/Injury 10/19/22 0830   Dressing Status Dry; Intact 10/19/22 0830   Wound Cleansed Not Cleansed 10/19/22 0830   Dressing/Treatment Pharmaceutical agent (see MAR); Foam 10/19/22 0830   Dressing Change Due 10/16/22 10/16/22 0600   Wound Length (cm) 4 cm 10/16/22 0400   Wound Width (cm) 1.5 cm 10/16/22 0400   Wound Depth (cm) 0 cm 10/10/22 1135   Wound Surface Area (cm^2) 6 cm^2 10/16/22 0400   Change in Wound Size % (l*w) 0 10/16/22 0400   Wound Volume (cm^3) 0 cm^3 10/10/22 1135   Wound Assessment Purple/maroon 10/18/22 1520   Drainage Amount None 10/18/22 1520   Odor None 10/16/22 0600   Nery-wound Assessment Non-blanchable erythema 10/16/22 0600   Margins Attached edges 10/16/22 0600   Number of days: 8       Wound 10/10/22 Left; Lower; Lateral;Distal (Active)   Wound Image   10/10/22 1135   Wound Etiology Pressure Stage 2 10/19/22 0830   Dressing Status Clean;Dry; Intact 10/19/22 0830   Wound Cleansed Cleansed with saline 10/19/22 0830   Dressing/Treatment Pharmaceutical agent (see MAR) 10/19/22 0830   Dressing Change Due 10/16/22 10/16/22 0600   Wound Length (cm) 5 cm 10/16/22 0400   Wound Width (cm) 1 cm 10/16/22 0400   Wound Depth (cm) 0 cm 10/10/22 1135   Wound Surface Area (cm^2) 5 cm^2 10/16/22 0400   Change in Wound Size % (l*w) 0 10/16/22 0400   Wound Volume (cm^3) 0 cm^3 10/10/22 1135   Wound Assessment Purple/maroon 10/19/22 0830   Drainage Amount None 10/19/22 0830   Odor None 10/17/22 0400   Nery-wound Assessment Dry/flaky 10/17/22 0400   Margins Attached edges 10/17/22 0400   Number of days: 8       Wound 10/10/22 Right; Lower; Lateral (Active)   Wound Image   10/10/22 1135   Wound Etiology Pressure Stage 2 10/19/22 0830   Dressing Status Clean;Dry 10/19/22 0830   Wound Cleansed Not Cleansed 10/19/22 0830   Dressing/Treatment Foam;Pharmaceutical agent (see MAR) 10/19/22 0830   Dressing Change Due 10/16/22 10/16/22 0600   Wound Length (cm) 5 cm 10/16/22 0400   Wound Width (cm) 2 cm 10/16/22 0400   Wound Depth (cm) 0 cm 10/10/22 1135   Wound Surface Area (cm^2) 10 cm^2 10/16/22 0400   Change in Wound Size % (l*w) 0 10/16/22 0400   Wound Volume (cm^3) 0 cm^3 10/10/22 1135   Wound Assessment Pink/red;Dry 10/19/22 0830   Drainage Amount None 10/19/22 0830   Drainage Description Serous 10/19/22 0830   Odor None 10/19/22 0830   Nery-wound Assessment Dry/flaky 10/19/22 0830   Margins Unattached edges 10/19/22 0830   Number of days: 8       Response to treatment:  Well tolerated by patient. Pain Assessment:  Severity:  0 / 10  Quality of pain: N/A  Wound Pain Timing/Severity: none  Premedicated: No  Plan:   Plan of Care: Wound 10/03/22 Sacrum-Dressing/Treatment: Pharmaceutical agent (see MAR) (Santyl)  Wound 10/10/22 Leg Left;Lateral;Lower;Proximal-Dressing/Treatment: Pharmaceutical agent (see MAR), Foam  Wound 10/10/22 Leg Left; Lower; Lateral;Mid-Dressing/Treatment: Pharmaceutical agent (see MAR), Foam  Wound 10/10/22 Left; Lower; Lateral;Distal-Dressing/Treatment: Pharmaceutical agent (see MAR)  Wound 10/10/22 Right; Lower; Lateral-Dressing/Treatment: Foam, Pharmaceutical agent (see MAR)  Updated Orders: Sacrum - clean with NS, apply nickel thick Santyl, foam dressing, change daily.      Specialty Bed Required : Yes   [x] Low Air Loss   [x] Pressure Redistribution  [] Fluid Immersion  [] Bariatric  [] Total Pressure Relief  [] Other:     Current Diet: Diet NPO  ADULT TUBE FEEDING; PEG; Peptide Based; Continuous; 20; Yes; 10; Q 4 hours; 65; 150; Q 4 hours; Protein; 1 bottles Proteinex daily w/ 30 mL FW flushes before and after  Dietician consult:  Yes    Discharge Plan:  Placement for patient upon discharge: intermediate care facility   Patient appropriate for Outpatient [address]: Yes    Referrals:  [x]   [] [facility]  [] Supplies  [] Other    Patient/Caregiver Teaching:  Level of patient/caregiver understanding able to:   [] Indicates understanding       [] Needs reinforcement  [] Unsuccessful      [] Verbal Understanding  [] Demonstrated understanding       [] No evidence of learning  [] Refused teaching         [] N/A       Electronically signed by Kirill Alvarado RN, CWOCN on 10/19/2022 at 11:22 AM

## 2022-10-19 NOTE — PLAN OF CARE
Problem: Chronic Conditions and Co-morbidities  Goal: Patient's chronic conditions and co-morbidity symptoms are monitored and maintained or improved  Outcome: Progressing  Flowsheets (Taken 10/19/2022 1320)  Care Plan - Patient's Chronic Conditions and Co-Morbidity Symptoms are Monitored and Maintained or Improved:   Monitor and assess patient's chronic conditions and comorbid symptoms for stability, deterioration, or improvement   Collaborate with multidisciplinary team to address chronic and comorbid conditions and prevent exacerbation or deterioration   Update acute care plan with appropriate goals if chronic or comorbid symptoms are exacerbated and prevent overall improvement and discharge     Problem: Discharge Planning  Goal: Discharge to home or other facility with appropriate resources  10/19/2022 1320 by Hao El RN  Outcome: Progressing  Flowsheets (Taken 10/19/2022 1320)  Discharge to home or other facility with appropriate resources:   Identify barriers to discharge with patient and caregiver   Arrange for needed discharge resources and transportation as appropriate   Identify discharge learning needs (meds, wound care, etc)   Arrange for interpreters to assist at discharge as needed   Refer to discharge planning if patient needs post-hospital services based on physician order or complex needs related to functional status, cognitive ability or social support system     Problem: Pain  Goal: Verbalizes/displays adequate comfort level or baseline comfort level  10/19/2022 1320 by Hao El RN  Outcome: Progressing  Flowsheets (Taken 10/19/2022 1320)  Verbalizes/displays adequate comfort level or baseline comfort level:   Encourage patient to monitor pain and request assistance   Assess pain using appropriate pain scale   Administer analgesics based on type and severity of pain and evaluate response   Implement non-pharmacological measures as appropriate and evaluate response   Consider cultural and social influences on pain and pain management   Notify Licensed Independent Practitioner if interventions unsuccessful or patient reports new pain     Problem: Safety - Adult  Goal: Free from fall injury  10/19/2022 1320 by Kait Dale RN  Outcome: Progressing  Flowsheets (Taken 10/19/2022 1320)  Free From Fall Injury:   Instruct family/caregiver on patient safety   Based on caregiver fall risk screen, instruct family/caregiver to ask for assistance with transferring infant if caregiver noted to have fall risk factors     Problem: Skin/Tissue Integrity  Goal: Absence of new skin breakdown  Description: 1. Monitor for areas of redness and/or skin breakdown  2. Assess vascular access sites hourly  3. Every 4-6 hours minimum:  Change oxygen saturation probe site  4. Every 4-6 hours:  If on nasal continuous positive airway pressure, respiratory therapy assess nares and determine need for appliance change or resting period. Outcome: Progressing  Note: Patient is resting on specialty air loss mattress and is assisted to turn and reposition every two hours or more frequently as indicated.       Problem: Nutrition Deficit:  Goal: Optimize nutritional status  Outcome: Progressing  Flowsheets (Taken 10/19/2022 1320)  Nutrient intake appropriate for improving, restoring, or maintaining nutritional needs:   Assess nutritional status and recommend course of action   Monitor oral intake, labs, and treatment plans   Recommend appropriate diets, oral nutritional supplements, and vitamin/mineral supplements   Provide specific nutrition education to patient or family as appropriate     Problem: ABCDS Injury Assessment  Goal: Absence of physical injury  Outcome: Progressing  Flowsheets (Taken 10/19/2022 1320)  Absence of Physical Injury: Implement safety measures based on patient assessment     Problem: Confusion  Goal: Confusion, delirium, dementia, or psychosis is improved or at baseline  Description: INTERVENTIONS:  1. Assess for possible contributors to thought disturbance, including medications, impaired vision or hearing, underlying metabolic abnormalities, dehydration, psychiatric diagnoses, and notify attending LIP  2. [place] high risk fall precautions, as indicated  3. Provide frequent short contacts to provide reality reorientation, refocusing and direction  4. Decrease environmental stimuli, including noise as appropriate  5. Monitor and intervene to maintain adequate nutrition, hydration, elimination, sleep and activity  6. If unable to ensure safety without constant attention obtain sitter and review sitter guidelines with assigned personnel  7.  Initiate Psychosocial CNS and Spiritual Care consult, as indicated  10/19/2022 1320 by Nikolas Yen RN  Outcome: Progressing  Flowsheets (Taken 10/19/2022 1320)  Effect of thought disturbance (confusion, delirium, dementia, or psychosis) are managed with adequate functional status:   [place] high risk fall precautions, as indicated   Decrease environmental stimuli, including noise as appropriate     Problem: Respiratory - Adult  Goal: Achieves optimal ventilation and oxygenation  10/19/2022 1320 by Nikolas Yen RN  Outcome: Progressing  Flowsheets (Taken 10/19/2022 1320)  Achieves optimal ventilation and oxygenation:   Assess for changes in respiratory status   Assess for changes in mentation and behavior   Position to facilitate oxygenation and minimize respiratory effort   Oxygen supplementation based on oxygen saturation or arterial blood gases   Encourage broncho-pulmonary hygiene including cough, deep breathe, incentive spirometry   Assess the need for suctioning and aspirate as needed   Assess and instruct to report shortness of breath or any respiratory difficulty   Respiratory therapy support as indicated     Problem: Cardiovascular - Adult  Goal: Maintains optimal cardiac output and hemodynamic stability  10/19/2022 1320 by Nikolas Yen RN  Outcome: Progressing  Flowsheets (Taken 10/19/2022 1320)  Maintains optimal cardiac output and hemodynamic stability:   Monitor blood pressure and heart rate   Monitor urine output and notify Licensed Independent Practitioner for values outside of normal range   Assess for signs of decreased cardiac output   Administer fluid and/or volume expanders as ordered   Administer vasoactive medications as ordered     Problem: Genitourinary - Adult  Goal: Urinary catheter remains patent  10/19/2022 1320 by Alin Bailey RN  Flowsheets (Taken 10/19/2022 1320)  Urinary catheter remains patent: Assess patency of urinary catheter     Problem: Metabolic/Fluid and Electrolytes - Adult  Goal: Glucose maintained within prescribed range  10/19/2022 1320 by Alin Bailey RN  Outcome: Progressing  Flowsheets (Taken 10/19/2022 1320)  Glucose maintained within prescribed range:   Monitor blood glucose as ordered   Assess for signs and symptoms of hyperglycemia and hypoglycemia   Administer ordered medications to maintain glucose within target range   Assess barriers to adequate nutritional intake and initiate nutrition consult as needed   Instruct patient on self management of diabetes and initiate consult as needed

## 2022-10-20 NOTE — PLAN OF CARE
Problem: Chronic Conditions and Co-morbidities  Goal: Patient's chronic conditions and co-morbidity symptoms are monitored and maintained or improved  Outcome: Progressing  Flowsheets (Taken 10/19/2022 1320 by Rajesh Sheldon RN)  Care Plan - Patient's Chronic Conditions and Co-Morbidity Symptoms are Monitored and Maintained or Improved:   Monitor and assess patient's chronic conditions and comorbid symptoms for stability, deterioration, or improvement   Collaborate with multidisciplinary team to address chronic and comorbid conditions and prevent exacerbation or deterioration   Update acute care plan with appropriate goals if chronic or comorbid symptoms are exacerbated and prevent overall improvement and discharge     Problem: Discharge Planning  Goal: Discharge to home or other facility with appropriate resources  Outcome: Progressing  Flowsheets (Taken 10/19/2022 1320 by Rajesh Sheldon RN)  Discharge to home or other facility with appropriate resources:   Identify barriers to discharge with patient and caregiver   Arrange for needed discharge resources and transportation as appropriate   Identify discharge learning needs (meds, wound care, etc)   Arrange for interpreters to assist at discharge as needed   Refer to discharge planning if patient needs post-hospital services based on physician order or complex needs related to functional status, cognitive ability or social support system  Note: Working on getting pre cert for LTach     Problem: Pain  Goal: Verbalizes/displays adequate comfort level or baseline comfort level  Outcome: Progressing  Flowsheets (Taken 10/19/2022 1320 by Rajesh Sheldon RN)  Verbalizes/displays adequate comfort level or baseline comfort level:   Encourage patient to monitor pain and request assistance   Assess pain using appropriate pain scale   Administer analgesics based on type and severity of pain and evaluate response   Implement non-pharmacological measures as appropriate and evaluate response   Consider cultural and social influences on pain and pain management   Notify Licensed Independent Practitioner if interventions unsuccessful or patient reports new pain  Note: No complaints this shift. Problem: Safety - Adult  Goal: Free from fall injury  Outcome: Progressing  Note: V/T. High fall risk. Calls out appropriately. Problem: Nutrition Deficit:  Goal: Optimize nutritional status  Outcome: Progressing  Flowsheets (Taken 10/19/2022 1320 by Alin Bailey RN)  Nutrient intake appropriate for improving, restoring, or maintaining nutritional needs:   Assess nutritional status and recommend course of action   Monitor oral intake, labs, and treatment plans   Recommend appropriate diets, oral nutritional supplements, and vitamin/mineral supplements   Provide specific nutrition education to patient or family as appropriate  Note: Continous TF and approved to eat ice chips and small sips of water. Problem: Respiratory - Adult  Goal: Achieves optimal ventilation and oxygenation  Outcome: Progressing  Flowsheets (Taken 10/19/2022 1320 by Alin Bailey RN)  Achieves optimal ventilation and oxygenation:   Assess for changes in respiratory status   Assess for changes in mentation and behavior   Position to facilitate oxygenation and minimize respiratory effort   Oxygen supplementation based on oxygen saturation or arterial blood gases   Encourage broncho-pulmonary hygiene including cough, deep breathe, incentive spirometry   Assess the need for suctioning and aspirate as needed   Assess and instruct to report shortness of breath or any respiratory difficulty   Respiratory therapy support as indicated  Note: V/T. FiO2 30 and peep 5. Trach care completed by RT this morning.       Problem: Genitourinary - Adult  Goal: Urinary catheter remains patent  Outcome: Progressing  Flowsheets (Taken 10/19/2022 1320 by Alin Bailey RN)  Urinary catheter remains patent: Assess patency of urinary catheter  Note: [place] care completed.

## 2022-10-20 NOTE — PROGRESS NOTES
CT SURGERY DAILY PROGRESS NOTE    CC: Pleural effusions s/p R PleurX catheter placement  SUBJECTIVE:   Interval Hx:   No acute issues overnight. HDS, afebrile. Remains on vent minmial settings. Denies complaints this AM    ROS: A 14 point review of systems was conducted, significant findings as noted above. All other systems negative. OBJECTIVE:   Vitals:   Vitals:    10/20/22 0416 10/20/22 0420 10/20/22 0541 10/20/22 0601   BP: 119/72      Pulse: 87 88  89   Resp: 24 24     Temp: 98.6 °F (37 °C)      TempSrc: Axillary      SpO2: 97% 96%  95%   Weight:   196 lb 13.9 oz (89.3 kg)    Height:           I/O:   Intake/Output Summary (Last 24 hours) at 10/20/2022 0648  Last data filed at 10/20/2022 0416  Gross per 24 hour   Intake 2391 ml   Output 1375 ml   Net 1016 ml       I/O last 3 completed shifts: In: 2737 [I.V.:50; NG/GT:2687]  Out: 1500 [Urine:1500]    Diet: Diet NPO  ADULT TUBE FEEDING; PEG; Peptide Based; Continuous; 20; Yes; 10; Q 4 hours; 65; 200; Q 4 hours; Protein; 1 bottles Proteinex daily w/ 30 mL FW flushes before and after      Physical Examination:   General appearance: Alert, following commands. Neurological: no focal deficits  HEENT: EOMI, trachea midline, no JVD. Tracheostomy in place with some mucus drainage  Chest/Lungs: On mechanical ventilation via tracheostomy; R PleurX catheter capped with dressing C/D/I  Cardiovascular: RRR  Abdomen: Soft, non-tender, non-distended. PEG tube. PEG, mild erythema. Skin: Warm and dry. Sacral decubitus ulcer covered with Mepilex. Extremities: Pitting edema of the b/l feet. No cyanosis. Legs ulcers from SCDs covered with meplixex.      Labs:  CBC:   Recent Labs     10/18/22  0429 10/19/22  0527 10/20/22  0507   WBC 9.5 9.7 13.6*   HGB 7.5* 7.4* 7.3*   HCT 23.6* 23.3* 23.9*    335 403         BMP:   Recent Labs     10/18/22  0429 10/19/22  0527 10/20/22  0507   * 148* 147*   K 4.1 3.9 3.7    108 107   CO2 34* 32 32   BUN 79* 77* 74* CREATININE 0.8 0.8 0.8   GLUCOSE 187* 177* 157*       LFT's:   No results for input(s): AST, ALT, ALB, BILITOT, ALKPHOS in the last 72 hours. Troponin: No results for input(s): TROPONINI in the last 72 hours. BNP: No results for input(s): BNP in the last 72 hours. ABGs:   No results for input(s): PHART, JDC6VYT, PO2ART in the last 72 hours. INR:   No results for input(s): INR in the last 72 hours. U/A:No results for input(s): NITRITE, COLORU, PHUR, LABCAST, WBCUA, RBCUA, MUCUS, TRICHOMONAS, YEAST, BACTERIA, CLARITYU, SPECGRAV, LEUKOCYTESUR, UROBILINOGEN, BILIRUBINUR, BLOODU, GLUCOSEU, AMORPHOUS in the last 72 hours. Invalid input(s): Johan Esteban         ASSESSMENT AND PLAN:   Jennifer Pierre is a 65 y.o. male with history of diastolic heart failure, atrial fibrillation, and DM with recurrent pleural effusions s/p R PleurX catheter placement (9/19). - completed MRI yesterday F/U neuro recs. - pain meds  -ACh receptor antibodies negative  - Pleurx cath drain today  -Pulmonology/Crit care managing vent. - diarrhea resolved, continue fiber  - glucose control improved  -Receiving 12.5 g albumin Q8 (10/13-10/20) last day today  - Sacral wound per wound care nurse  -Consult PT/OT  - [facility] pending; P2P will need to be completed today with insurance.   ([phone])      Code Status: Full Code  -----------------------------  Sharona Rm CNP  10/20/2022  6:48 AM  perfectserve

## 2022-10-20 NOTE — PROGRESS NOTES
Pulmonary Followup Note    CC: acute on chronic, hypercapnic and hypoxemic respiratory failure   Subjective:  Patient was asleep for my evaluation initially today but he woke easily. He was breathing 18 times a minute when I came in the room which is the lowest respiratory rate I seen him half this entire admission. He did complain of some abdominal pain and burning on the right side. ROS:  Denies headache, nausea or chest pain. 24HR INTAKE/OUTPUT:    Intake/Output Summary (Last 24 hours) at 10/20/2022 1631  Last data filed at 10/20/2022 1312  Gross per 24 hour   Intake 3297 ml   Output 1150 ml   Net 2147 ml          collagenase   Topical Daily    methocarbamol  500 mg PEG Tube 4x Daily    insulin glargine  40 Units SubCUTAneous Nightly    doxazosin  1 mg Oral Daily    clotrimazole   Topical BID    lansoprazole  30 mg Per G Tube BID    albuterol  2.5 mg Nebulization Q4H    insulin lispro  0-16 Units SubCUTAneous Q4H    Venelex   Topical BID    chlorhexidine  15 mL Mouth/Throat BID    apixaban  5 mg Oral BID    sodium chloride flush  5-40 mL IntraVENous 2 times per day           PHYSICAL EXAMINATION:  /61   Pulse 87   Temp 97.8 °F (36.6 °C) (Oral)   Resp 22   Ht 6' (1.829 m)   Wt 196 lb 13.9 oz (89.3 kg)   SpO2 97%   BMI 26.70 kg/m²   CURRENT PULSE OXIMETRY:  SpO2: 97 %  24HR PULSE OXIMETRY RANGE:  SpO2  Av.4 %  Min: 94 %  Max: 97 %      Gen: no distress. Tracheostomy in place  HEENT: PERRL, EOMI, OP nl  Lung:  decreased breath sounds at the bases. Trached and on vent. CV: RRR without M/R/R  Abd: +BS, soft, NT/ND  Ext: 1+ edema.     DATA  CBC:   Recent Labs     10/18/22  0429 10/19/22  0527 10/20/22  0507   WBC 9.5 9.7 13.6*   HGB 7.5* 7.4* 7.3*   HCT 23.6* 23.3* 23.9*   MCV 88.9 89.7 89.2    335 403       BMP:   Recent Labs     10/18/22  0429 10/19/22  0527 10/20/22  0507   * 148* 147*   K 4.1 3.9 3.7    108 107   CO2 34* 32 32 PHOS 2.6 2.7 2.4*   BUN 79* 77* 74*   CREATININE 0.8 0.8 0.8       No results for input(s): PHART, QIP3KLM, PO2ART in the last 72 hours. LIVER PROFILE: No results for input(s): AST, ALT, LIPASE, BILIDIR, BILITOT, ALKPHOS in the last 72 hours. Invalid input(s): AMYLASE,  ALB         ASSESSMENT/PLAN:  This is a 65 y.o. male with acute on chronic, hypercapnic and hypoxemic respiratory failure, vent dependence, bilateral pleural effusion s/p pleurx placement on the right. Vent Mode: AC/PRVC Resp Rate (Set): 14 bmp/Vt (Set, mL): 375 mL/ /FiO2 : 30 %  PEEP 5  No results for input(s): PHART, YIG6YHA, PO2ART in the last 72 hours. Remains on the above settings and has a restricted pattern based on flow volume loops as well as higher airway pressures than would be expected for the 4.7 ml/kg IBW tidal volume. I trialed him on VSV and PSV as well as bilevel and he did a little bit better today than he has on other days. I was able to decrease the inspiratory pressure to 25 and he was able to intermittently pull volumes commensurate with what we have prescribed. His respiratory rate did increase to the low 30s but he did not appear to be in any distress. This is the first sign of improvement I have seen this admission. Still draining small amounts of fluid from his pleurx every other day but neither the right nor left lower lobes have re-expanded despite being on the vent for a month. Workup for possible neuromuscular syndrome is on going. There are possible lytic lesions in his cervical vertebral bodies we do not have an identified primary malignancy. We are still working on trying to get him to a long-term acute care hospital.  The surgical NP today works to get a peer to peer conference with Dr. Cherelle Blake myself and the insurance company today at 3:00 but the insurance company never called.   Patient is LTAC appropriate and if they have a PMNR program that could do EMG that would help with our work-up for neuromuscular weakness.     Harrison Quiroz MD

## 2022-10-20 NOTE — PROGRESS NOTES
Pleurx catheter drained at 1300 for a total of 110 cc serous output. Pt tolerated procedure well.      Katie Velasquez CNP  10/20/2022  3:28 PM  olga

## 2022-10-20 NOTE — PROGRESS NOTES
Nephrology Progress Note                                                                                                                                                                                                                                                                                                                                                               Office : [phone]     Fax :[phone]    Patient's Name: Christian Bal        Reason for Consult:   ZANA  Requesting Physician:  Evan Boxer, MD    Interval History:      Cr stable   Na stable   Good UO   Remains on Trach   PEG placed         Past Medical History:   Diagnosis Date    ZANA (acute kidney injury) ([facility] [address] 9/26/2022    Carotid stenosis, left 10/12/2011    Chronic back pain     Chronic systolic (congestive) heart failure 93/62/1273    Diastolic CHF ([facility] [address]     Erectile dysfunction     Hyperlipidemia     Hypertension     Osteoarthritis     Restrictive lung disease     Type II or unspecified type diabetes mellitus without mention of complication, not stated as uncontrolled        Past Surgical History:   Procedure Laterality Date    CARDIAC CATHETERIZATION  06/2022    GASTROSTOMY TUBE PLACEMENT N/A 10/11/2022    EGD PEG TUBE PLACEMENT performed by Charlie Gamez MD at [facility] Left     PLEURAL CATH INSERTION N/A 9/19/2022    PLEURAL CATHETER PLACEMENT; INTERCOSTAL NERVE BLOCK X4 performed by Mansi Braswell MD at [facility] Bilateral     THORACOSCOPY Right 9/19/2022    RIGHT VIDEO ASSISTED THORASCOPIC SURGERY AND; performed by Mansi Braswell MD at [address] N/A 9/30/2022    TRACHEOSTOMY performed by Jack Weir MD at [facility] History   Problem Relation Age of Onset    Hearing Loss Father     Heart Disease Father 70        MI    High Blood Pressure Father     Diabetes Maternal Grandmother         hypoglycemic    Hearing Loss Maternal Grandmother Arthritis Maternal Grandfather         reports that he quit smoking about 21 years ago. His smoking use included cigarettes. He has a 80.00 pack-year smoking history. He has never used smokeless tobacco. He reports current alcohol use. He reports that he does not use drugs. Allergies:   Torsemide and Dye [iodides]    Current Medications:    collagenase ointment, Daily  methocarbamol (ROBAXIN) tablet 500 mg, 4x Daily  insulin glargine (LANTUS;BASAGLAR) injection pen 40 Units, Nightly  psyllium (HYDROCIL) 95 % packet 1 packet, Daily  doxazosin (CARDURA) tablet 1 mg, Daily  clotrimazole (LOTRIMIN) 1 % cream, BID  0.9 % sodium chloride infusion, PRN  lansoprazole suspension SUSP 30 mg, BID  albumin human 25 % IV solution 12.5 g, Q8H  oxyCODONE (ROXICODONE) 5 MG/5ML solution 7.5 mg, Q6H PRN  hydroxypropyl methylcellulose (GONIOSOL) 2.5 % ophthalmic solution 1 drop, PRN  albuterol (PROVENTIL) nebulizer solution 2.5 mg, Q4H  insulin lispro (1 Unit Dial) (HUMALOG/ADMELOG) pen 0-16 Units, Q4H  Venelex ointment, BID  acetaminophen (TYLENOL) 160 MG/5ML solution 650 mg, Q6H PRN  chlorhexidine (PERIDEX) 0.12 % solution 15 mL, BID  apixaban (ELIQUIS) tablet 5 mg, BID  sodium chloride flush 0.9 % injection 5-40 mL, 2 times per day  sodium chloride flush 0.9 % injection 5-40 mL, PRN  0.9 % sodium chloride infusion, PRN  ondansetron (ZOFRAN-ODT) disintegrating tablet 4 mg, Q8H PRN   Or  ondansetron (ZOFRAN) injection 4 mg, Q6H PRN  glucose chewable tablet 16 g, PRN  dextrose bolus 10% 125 mL, PRN   Or  dextrose bolus 10% 250 mL, PRN  glucagon (rDNA) injection 1 mg, PRN  dextrose 10 % infusion, Continuous PRN  hydrALAZINE (APRESOLINE) injection 5 mg, Q15 Min PRN          Physical exam:     Vitals:  BP (!) 116/55   Pulse 84   Temp 97.6 °F (36.4 °C) (Axillary)   Resp 22   Ht 6' (1.829 m)   Wt 196 lb 10.4 oz (89.2 kg)   SpO2 95%   BMI 26.67 kg/m²   Constitutional:  on MV  Skin: no rash, turgor wnl  Heent:  eomi, mmm  Neck: no bruits or jvd noted  Cardiovascular:  S1, S2 without m/r/g  Respiratory: trach  Abdomen:  +bs, soft, nt, nd  Ext: bilateral lower extremity edema R>L  Psychiatric: mood and affect appropriate  Musculoskeletal:  Rom, muscular strength intact    Data:   Labs:  CBC:   Recent Labs     10/17/22  0528 10/18/22  0429 10/19/22  0527   WBC 10.4 9.5 9.7   HGB 7.9* 7.5* 7.4*    372 335       BMP:    Recent Labs     10/17/22  0528 10/18/22  0429 10/19/22  0527   * 149* 148*   K 4.2 4.1 3.9    107 108   CO2 32 34* 32   BUN 86* 79* 77*   CREATININE 0.9 0.8 0.8   GLUCOSE 157* 187* 177*       Ca/Mg/Phos:   Recent Labs     10/17/22  0528 10/17/22  1439 10/18/22  0429 10/19/22  0527   CALCIUM 8.4  --  8.4 8.5   MG 3.30*  --  3.10* 3.00*   PHOS 1.5* 3.0 2.6 2.7       Hepatic: No results for input(s): AST, ALT, ALB, BILITOT, ALKPHOS in the last 72 hours. Troponin: No results for input(s): TROPONINI in the last 72 hours. BNP: No results for input(s): BNP in the last 72 hours. Lipids:   No results for input(s): CHOL, TRIG, HDL, LDLCALC, LABVLDL in the last 72 hours. ABGs:   No results for input(s): PHART, PO2ART, YTA2FIB in the last 72 hours. INR: No results for input(s): INR in the last 72 hours. UA:No results for input(s): Laren Dollar, GLUCOSEU, BILIRUBINUR, [facility], BLOODU, PHUR, PROTEINU, UROBILINOGEN, NITRU, LEUKOCYTESUR, LABMICR, URINETYPE in the last 72 hours. Urine Microscopic: No results for input(s): LABCAST, BACTERIA, COMU, HYALCAST, WBCUA, RBCUA, EPIU in the last 72 hours. Urine Culture: No results for input(s): LABURIN in the last 72 hours. Urine Chemistry:   No results for input(s): Bevely Bolds, PROTEINUR, NAUR in the last 72 hours. IMAGING:  CT CERVICAL SPINE WO CONTRAST   Final Result      1. Osseous lesions in C2, C7 and T1 vertebral body seen on recent MRI dated 10/18/2022 are not well-visualized on CT. Bone density is slightly heterogenous on CT.  There is a 8 mm radiolucent lesion in the inferior posterior aspect of C2 vertebral body    which may correspond to C2 lesion seen on MRI. 2. Mildly expansile lytic lesion in left lamina of C4 concerning for metastasis. 3. Multilevel degenerative disc disease, most pronounced at C6-7 where there is disc osteophyte complex causing mild-to-moderate spinal canal stenosis. 4. Multilevel foraminal stenosis, most pronounced at C5-6, moderate to severe right and moderate left. MRI LUMBAR SPINE WO CONTRAST   Final Result      1. Motion compromised study. 2.  No cord compression. 3.  Osseous lesions in the cervical and thoracic spine suspicious for metastatic disease. No extraosseous mass. 4.  Severe multilevel degenerative disc disease in the lumbar spine. Moderate to severe canal stenosis at L2-L3 and L4-L5. Moderate canal stenosis at L3-L4. Multiple levels of moderate to severe canal stenosis from L2-L3 to L4-L5      MRI THORACIC SPINE WO CONTRAST   Final Result      1. Motion compromised study. 2.  No cord compression. 3.  Osseous lesions in the cervical and thoracic spine suspicious for metastatic disease. No extraosseous mass. 4.  Severe multilevel degenerative disc disease in the lumbar spine. Moderate to severe canal stenosis at L2-L3 and L4-L5. Moderate canal stenosis at L3-L4. Multiple levels of moderate to severe canal stenosis from L2-L3 to L4-L5      MRI Cervical Spine WO Contrast   Final Result      1. Motion compromised study. 2.  No cord compression. 3.  Osseous lesions in the cervical and thoracic spine suspicious for metastatic disease. No extraosseous mass. 4.  Severe multilevel degenerative disc disease in the lumbar spine. Moderate to severe canal stenosis at L2-L3 and L4-L5. Moderate canal stenosis at L3-L4. Multiple levels of moderate to severe canal stenosis from L2-L3 to L4-L5      MRI BRAIN WO CONTRAST   Final Result      1. No acute hemorrhage, mass or acute ischemia.    2.  Mild burden of nonspecific multifocal white matter disease compatible with chronic small vessel ischemia. 3.  Mild atrophy. 4.  Bilateral mastoid effusions. XR CHEST PORTABLE   Final Result      Distal portion of tracheostomy poorly visualized due to overlying medical devices. The position is without significant change since 10/5/2022. Bibasilar pleuroparenchymal consolidation. Stable cardiomediastinal silhouette. Right jugular central venous catheter without change. CT ABDOMEN PELVIS WO CONTRAST Additional Contrast? None   Final Result      Marked decrease in size of pneumomediastinum. Trace residual pneumoperitoneum. Extensive bilateral lower lobe pulmonary atelectasis with pleural effusions and trace residual right pneumothorax. XR CHEST PORTABLE   Final Result      Tiny right lateral pneumothorax is suspected, 5 mm in maximum thickness. This has decreased in size since 10/3/2022. Right basilar Pleurx catheter noted. Small bilateral pleural effusions. Prominent interstitial markings. Stable cardiac mediastinal silhouette. Lines and tubes without change. Subcutaneous emphysema noted. CT CHEST ABDOMEN PELVIS WO CONTRAST   Final Result      1. Severe pneumomediastinum. 2. Small to moderate right hydropneumothorax with similar fluid and gas components with a right-sided Pleurx catheter. 3. Moderate loculated left pleural effusion with atelectasis of the left lower lobe with patency of the central left lower lobe bronchi. 4. Fluid/material filling the bronchus intermedius and right lower lobe bronchi with complete consolidation and volume loss of the right lower lobe. Differential diagnosis would include mucous plugging or obstructing lesion. 5. Tracheostomy tube tip within the trachea   6. Severe subcutaneous emphysema in the neck.          CT ABDOMEN AND PELVIS:      FINDINGS:      LIVER: Normal.      GALLBLADDER AND BILIARY TREE: No calcified gallstones. No gallbladder distention. No intra- or extrahepatic biliary dilatation. PANCREAS: Normal.      SPLEEN: Normal.      ADRENAL GLANDS: Normal.      KIDNEYS AND URETERS: Normal.      URINARY BLADDER: Ansari catheter present within collapsed urinary bladder. REPRODUCTIVE ORGANS: No associated masses. BOWEL: Normal diameter, nonobstructed. Feeding tube tip at the level of the ligament of Treitz. LYMPH NODES: No abnormally enlarged nodes. PERITONEUM/RETROPERITONEUM: Severe pneumoperitoneum extends into the upper abdomen with some of the symmetric multiseptated gas appearing deep to the diaphragm consistent with mild pneumoperitoneum (series 601 was 101). This is likely related to    pneumonia mediastinum dissecting into the abdomen. No fluid collection in the abdomen or pelvis. No ascites. VESSELS: Extensive atherosclerotic calcification of the aorta and iliac arteries. ABDOMINAL WALL: No acute abnormality. BONES: No acute abnormality. Mild chronic L1 compression fracture with previous vertebroplasty. IMPRESSION:      1. Severe pneumomediastinum extends into the upper abdomen with small pneumoperitoneum likely related to extension of pneumoperitoneum into the upper peritoneal cavity. 2. No fluid collection in the abdomen or pelvis. Results were discussed with the surgical team at 7:00 PM on 10/3/2022. XR CHEST PORTABLE   Final Result      Stable appearance of loculated right pneumothorax, with loculated components in the lateral right midlung region and in the right lateral costophrenic sulcus. Stable scattered areas of atelectasis versus scar, most prominent in the medial right lung base and in the left lateral lung base. XR CHEST PORTABLE   Final Result      Small right basilar pneumothorax. Right basilar chest tube without change.       Patchy consolidation in the right mid and lower lung as well as the left lower lung-atelectasis versus pneumonia. Trace left pleural effusion. Normal heart size. Lines and tubes without change. Mild improvement in degree of subcutaneous emphysema in the chest and neck. XR CHEST 1 VIEW   Final Result      1. Stable small right-sided pneumothorax and prominent subcutaneous emphysema along the neck and upper superior chest wall, greater in right lung stable   2. Stable left basilar consolidation and pleural effusion      3. Stable appearing perihilar accentuated markings or airspace disease            XR CHEST PORTABLE   Final Result      Stable small right-sided pneumothorax. More prominent emphysema over the lower neck. No change in bilateral airspace disease. Stable left pleural effusion. XR CHEST PORTABLE   Final Result   1. Bilateral multifocal pneumonia with improvement in the right    pulmonary consolidation since prior study from 9/23/22   2. Mild to moderate left pleural effusion          XR CHEST PORTABLE   Final Result   1. Support lines and tubes are stable. 2.  Stable small right lateral pneumothorax and right basilar    chest tube. 3.  Stable patchy bilateral airspace disease. XR CHEST PORTABLE   Final Result   1. Satisfactory position of right internal jugular central line   2. No interval change in endotracheal tube and nasogastric tube positioning. 3. Extensive bilateral airspace disease with no changes. 4. Left pleural effusion with retrocardiac opacity, unchanged   5. Small stable tiny right lateral pneumothorax and stable position of right chest tube,, Pleurx catheter. XR ABDOMEN (KUB) (SINGLE AP VIEW)   Final Result   1. No residual pneumothorax. 2.  Mild patchy airspace disease bilaterally worse on the right than on prior examination. 3.  Non-obstructive bowel gas pattern. Mildly distended stomach. XR CHEST PORTABLE   Final Result   1. No residual pneumothorax.    2.  Mild patchy airspace disease bilaterally worse on the right than on prior examination. 3.  Non-obstructive bowel gas pattern. Mildly distended stomach. XR CHEST PORTABLE   Final Result      1. Small right pneumothorax appears slightly increased. 2. Mild patchy airspace opacity bilaterally. XR CHEST PORTABLE   Final Result     Pleurx catheter at the right lung base. Trace right    pneumothorax is unchanged. XR CHEST PORTABLE   Final Result      Right-sided chest tube evident in the base, its tip medially. Improvement in the right-sided pneumothorax, since earlier today. Possible medial collapse of the right lower lobe. Small left effusion. Patchy airspace density/atelectasis in the lungs bilaterally, with no other significant change. XR CHEST PORTABLE   Final Result   1. Right-sided Pleurx catheter in satisfactory position in the lung bases   2. Right-sided post operative pneumothorax, approximately 10%             Assessment/Plan   ZANA  - suspect this is contrast nephropathy  - baseline Cre 0.7. Normal on admission.  - Cr stable   - BNP 3k, Protein:Cre 0.3, FENa 0.4%  - closely monitor UOP  - avoid nephrotoxic agent     2. Hypernatremia  - continue free water   - will continue to monitor     3. Hypotension  - pressors as needed     4. Anemia  - daily CBC    5. Acid- base/ Electrolyte imbalance   - optimize lytes    6. Acute hypoxic resp failure  - s/p VATS on 9/19/22 for recurrent pleural effusions  - Intensivist and CTS following    7.  CHF   - EF 65% on 6/16/22 via cardiac cath        Guerita Joseph MD

## 2022-10-20 NOTE — PROGRESS NOTES
NEUROSURGERY BRIEF PROGRESS NOTE       Patient Name: Marah Chavez YOB: 1945   Sex: Male Age: 65 yrs     ASSESSMENT & RECOMMENDATIONS   Patient is a 65 y.o. male s/p VATS procedure w/ severe lumbar spinal cord stenosis 2/2 degenerative changes with moderate to severe canal stenosis at L2-L3 and L4-L5. Moderate canal stenosis at L3-L4. Multiple levels of moderate to severe canal stenosis from L2-L3 to L4-L5. No surgical intervention warranted at this time. Patient will need to follow up in clinic once recovered from current medical conditions for further evaluation and discussion. Thank you for consult. We will sign off. Please call with questions.        NATASHA De La Cruz CNP   10/20/22  8:28 AM

## 2022-10-20 NOTE — PROGRESS NOTES
Physical Therapy  Daily Treatment Note    Discharge Recommendations: Geetha Lennon scored a 6/24 on the AM-PAC short mobility form. Current research shows that an AM-PAC score of 17 or less is typically not associated with a discharge to the patient's home setting. Based on the patient's AM-PAC score and their current functional mobility deficits, it is recommended that the patient have 3-5 sessions per week of Physical Therapy at d/c to increase the patient's independence. Please see assessment section for further patient specific details. Assessment:  Pt with good participation for exercises today. Pt declining EOB activities due to abdominal discomfort. Pt needing up to Mod assist for LE ex and heavy assist x 2 for mobility at this time. Would benefit from continued IP PT at D/C prior to returning home. Equipment Needs: Defer    Chart Reviewed: Yes     Other position/activity restrictions: up in chair, ambulate pt   Additional Pertinent Hx: Geetha Lennon is a 65 y.o. male w/PMH HFpEF, carotid artery stenosis, HTN, OA, Restrictive Lung Disease, DM2, HLD who underwent VATS with PleurX catheter placement on 09/19 for recurrent b/l loculated pleural effusions w/subsequent BIPAP failure and hypoxic/hypercapnic respiratory failure requiring Mechanical intubation. Pt now on trach. Diagnosis: pleural effusion   Treatment Diagnosis: impaired mobility 2/2 pleural effusion    Subjective: Pt in bed. Wife present. Pt with c/o abdominal discomfort today. Agreeable to exercises, but declining EOB today. Pain: Abdominal discomfort. RN and medical team aware. Objective:    Exercises  10 reps B ankle pumps (dependent for dorsiflexion), SAQ (mod assist), heel slides (mod assist), hip adductor squeezes (with pillow)  5 reps B gentle heel cord stretch  Other: Rest breaks between sets    Other  Dependent assist x 2 to position/scoot hips in bed. Patient Education  Role of PT. Expressed understanding. Safety Devices  Pt left with needs in reach. In bed with bed alarm on. Wife present. RN updated. AM-PAC score  AM-PAC Inpatient Mobility Raw Score : 6  AM-PAC Inpatient T-Scale Score : 23.55  Mobility Inpatient CMS 0-100% Score: 100  Mobility Inpatient CMS G-Code Modifier : CN    Goals: (as determined and assessed by primary PT)  Time Frame for Short Term Goals: By discharge  Short Term Goal 1: The pt will perform supine to sit with max A x 1   Short Term Goal 2: The pt will sit EOB x 10 min with mod A x 1   Short Term Goal 3: The pt will be able to attempt stand with nacho hassan. Plan:  Plan:  (2-5)  Current Treatment Recommendations: Strengthening, Balance training, Functional mobility training, Transfer training, Neuromuscular re-education, Safety education & training, Patient/Caregiver education & training    Therapy Time    Individual  Concurrent  Group  Co-treatment    Time In  1308            Time Out  1338            Minutes  30              Timed Code Treatment Minutes: 30  Total Treatment Minutes: 30    Will continue per plan of care as pt tolerates. If patient is discharged prior to next treatment, this note will serve as the discharge summary.     Haley Chu #[phone]

## 2022-10-20 NOTE — PROGRESS NOTES
POC:     Was ask to evaluate pt for abdominal pain. Pt was seen at bedside with wife. He stated he had lower abdominal pain since yesterday that is now worsens, 7/10. Denies nausea or vomiting. Had soft stool this AM. HDS, afebrile. Abd is soft, non distended with mild-moderate tenderness to palpation in the suprapubic area. Non-peritoneal. Will order AXR to further evaluate pt.      Aldo Odell MD  General Surgery Resident  10/20/22 12:17 PM  [phone]

## 2022-10-20 NOTE — PROGRESS NOTES
Speech Language Pathology  Facility/Department: [facility] PCU  Daily PMV/Speech Treatment Note  NAME: Nora Cerda  :   MRN: [id number]    Date of Eval: 10/20/2022  Evaluating Therapist: SURESH Koo    Patient Diagnosis(es): has HTN (hypertension); Hyperlipidemia; OA (osteoarthritis) of knee; Carotid stenosis, left; Hearing loss; ED (erectile dysfunction); DM type 2 (diabetes mellitus, type 2) ([address] Rotator cuff tear; Glenohumeral arthritis; Subacromial impingement; Trigger ring finger of right hand; Spinal stenosis of lumbar region; Closed compression fracture of L1 lumbar vertebra; Closed displaced fracture of lateral malleolus of left fibula; Exertional dyspnea; Atrial fibrillation with rapid ventricular response ([address] Hypoxia; Chronic heart failure with preserved ejection fraction (HFpEF) ([address] Pleural effusion on right; Acute on chronic respiratory failure with hypoxia and hypercapnia ([address] ZNAA (acute kidney injury) ([address] Mucus plugging of bronchi; Malnutrition ([address] Generalized weakness; Areflexia; and Asymmetrical deep tendon reflexes on their problem list.  Onset Date: 22    Chart reviewed. Pain: none indicated    Initial Assessment: 10/10  Diagnosis: Pt presents with a severe communication impairment secondary to trach/vent status. Order obtained and pt appropriate this date per ventilator settings and report. The patient was awake but drowsy (spouse reports recent dilaudid). The patient allowed oral care but demo'd what appeared to be reflexive mouth closing for any presentation of oral care utensils despite multiple attempts and verbal cues. Procedure explained to patient and spouse. Pt did not demo any comprehension of education / not attending. RT arrived and trach cuf deflated.  Pt without coughing and airflow was appreciated but difficult to detect due to inability to voice on command; a warm continuous air could be felt leaking from open oral cavity although pt could not follow command to exhale forcefully and x1 instance of weak strained phonation was appreciated om command. Pt had drop in PIP and no stridor noted so decision was made to place PMV. Pt without coughing or overt anxiety noted with placement. x2 instances of weak strained vocal quality on command for single word utterances. During repositioning (typical flexed neck posturing), there was a phonation achieved that is suspected to be non-purposeful. The pt demo'd suspected attempts at cough with audible gurggling suspected to be from glottis region / pharynx vs trach with inability to expectorate. The patient was unable to expectorate secretions to oral cavity. There was no active diaphragm movement appreciated when assessed for attempts at controlled phonation. The patient tolerated for 7.5 minutes but given limited interactiveness and no voicing it was decided to remove PMV. RT replaced t-piece and resumed care of the patient. Pts vitals generally stable throughout, no outward anxiety or discomfort (no more than his baseline). Pt did demo spontenous swallows with grimace. PMV was removed and left to air dry below RN computer on tray and RN was notified of this as well as spouse with instructions to place adaptor and PMV into ziplock and keep with ventilator. The PMV is single use and is now dispensed to the patient and should DC with the patient if he is to DC from hospital. A denture case was labele \"PMV cleaning\" and kept with the PMV equipment, cleaning instructions discussed briefly with pts spouse for initiation of education for self care when that becomes appropriate. The PMV should only be placed with RT / SLP both present for pt safety at this time. PMV safety sticker as there should be no donning of PMV without RT/SLP.     Medical diagnosis: Pleural effusion, right [J90]  Pleural effusion on right [J90]  Treatment diagnosis: severe communication impairment d/t trach/vent status    Treatment:  Pt seen to address the following goals:     Short Term Goals  Goal 1: Pt will tolerate PMV placement for duration of session without change in respiratory status and without discomfort. 10/19: Pt seated upright in bed with RN and RT present. Prior to placement of PMV pt sating at SpO2=98%, Resp=20, Pulse=75. RT provided deep suction prior to placing PMV in-line. Pt with gravely cough when cuff deflated. PMV placed and pt immediately impulsively trying to speak in long phrases/utterances. Required cues to pause and take a few breaths to allow for steady vital readings. Pt tolerated PMV well for 5.25 minutes, prior to SpO2 beginning to drop to 86 and was removed. After removal pt sating at UhZ0=800, Resp=22, Pulse=80. Pt reported no discomfort across entire session while wearing PMV. After removal of PMV RT provided suction with noted white/tan, thick secretions. Cont. 10/20: Pt seated upright in bed, RT present for session. Prior to RT donning PMV, pt with 100% SPO2, 22 RR. RT provided inline suction and cuff deflation. Patient with congested cough s/p cuff deflation. Pt tolerated placement without change in vital signs for 4.5 minutes, when he reports fatigue and wishes to discontinue session. Following PMV removal, pt with 98% SPO2, RR 20. Continue goal    Goal 2: Pt will improve breath-phonation coordination via phonation for single syllable words on 50% of opportunities. 10/19: Pt with increased voicing this date. Required mod cues to take breaths in between words to assist with respiration/phonation. Pt expressed single CVC words accurately across 90% of attempts. Pt attempting to speak in longer utterances, with reduced breath support across multi-syllabic words and short phrases. Increased breathiness noted when pt attempting these. Goal met, however continue for carry over/training of strategy for increased voicing. Cont.    10/20: Pt able to produce voice this date with rough vocal quality, mildly reduced intensity. He completed counting exercise with appropriate breath-phonation coordination; He attempts to speak in phrase /sentences to answer questions despite frequent aphonia with vent inspirations. Patient required mod-max cues to shorten utterances and coordinate with provided breaths. Continue goal.    Goal 3: Pt/caregiver will demonstrate comprehension of PMV safety instructions with mod I.  10/19: Prior to placing PMV, SLP discussed how it works, steps to take and cues to be provided by SLP and RT. Pt nodding head in comprehension, however mod cues as noted above to follow directions to continue taking breaths for increased respiration/phonation support. Pt's wife not present at this time. Pt communicated she is supposed to be coming in ~2 PM. SLP communicated may stop by then to discuss PMV trial with her. Pt in agreement. Cont. 10/20: Provided education regarding PMV and redirection of airflow, though suspect limited comprehension given level of fatigue. Goal 4: Pt will tolerate ongoing communication as indicated. 10/19: Increased impulsivity of speaking this date with reduced use of breath support, negatively impacting Sp02 tolerance. Pt with increased mouthing of words prior to PMV and after PMV removal. With PMV on, improved voicing and vocal quality. Pt expressed \"I can't hear you all\". SLP confirmed pt wanting to have staff be aware that increased loudness required for pt comprehension, as pt normally wears hearing aids at baseline and they are not present during hospital stay. Pt discussed with RN and wrote on whiteboard for staff to be aware for increased pt participation in care. Cont. 10/20: Patient with improved mouthing of words without placement of PMV, able to indicate he does not want to complete dysphagia tx and end session given exhaustion. Continue goal.     Education:  As noted above.      Plan:  Continue speech/language therapy to address above goals, 3-5 x/week x LOS  DC recommendations: TBD  D/W nursing: Shadi Lama  Needs met prior to leaving room, call button in reach.   Treatment time: 20    Electronically signed by:  Lily Goldman, [id number] [facility], [address] [place] [address]  Speech-Language Pathologist  Pg #: [phone]    If patient is discharged prior to next treatment, this note will serve as the discharge summary

## 2022-10-20 NOTE — PROGRESS NOTES
Comprehensive Nutrition Assessment    Type and Reason for Visit:  Reassess    Nutrition Recommendations/Plan:   Continue TF of Vital AF 1.2 @ 65 mL/hr  Continue FW flushes of 200 mL q4  Continue psyllium powder QID as needed for diarrhea  Monitor nutrition adequacy, pertinent labs, bowel habits, wt changes, and clinical progress     Malnutrition Assessment:  Malnutrition Status: Moderate malnutrition (10/14/22 1128)    Context:  Chronic Illness     Findings of the 6 clinical characteristics of malnutrition:  Energy Intake:  No significant decrease in energy intake  Weight Loss:  Greater than 10% over 6 months     Body Fat Loss:  Mild body fat loss     Muscle Mass Loss:  Mild muscle mass loss Temples (temporalis), Calf (gastrocnemius), Thigh (quadraceps)  Fluid Accumulation:  No significant fluid accumulation     Strength:  Not Performed    Nutrition Assessment:    Follow up: Pt continues w/ trach. On TF of Vital AF 1.2 @ goal rate of 65 mL/hr. Continuing psyllium powder QID, diarrhea has resolved. Pt has had c/o lower abdominal pain for the past two days, having bowel movements, will monitor need to hold TF if distention occurs. Hypernatremia persists, FW flushes now increased to 200 mL q4. Hypermagnesia persists. BG elevated @ 157, if >180 will modify TF to diabetic formula, previously pt had not tolerated Glucerna but can reassess now that psyllium is on board. Will continue to monitor. Nutrition Related Findings:    Na 147. Mg 3.1. . Phos 2.4. Active bowel sounds. +BM 10/20.  Wound Type: Deep Tissue Injury ([place])       Current Nutrition Intake & Therapies:    Average Meal Intake: NPO  Average Supplements Intake: NPO  Diet NPO  ADULT TUBE FEEDING; PEG; Peptide Based; Continuous; 20; Yes; 10; Q 4 hours; 65; 200; Q 4 hours; Protein; 1 bottles Proteinex daily w/ 30 mL FW flushes before and after  Current Tube Feeding (TF) Orders:  Feeding Route: PEG  Formula: Peptide Based  Schedule: Continuous  Additives/Modulars: Protein  Goal TF & Flush Orders Provides: Vital AF 1.2 @ 65 mL/hr to provide: 1560 mL TV, 1872 kcal, 117 g/pro and 1265 mL FW + FW flushes of 30 mL q4 + 1 bottle Proteinex daily to provide an additional 104 kcal and 26 g/pro. Anthropometric Measures:  Height: 6' (182.9 cm)  Ideal Body Weight (IBW): 178 lbs (81 kg)       Current Body Weight: 210 lb 15 oz (95.7 kg), 90.7 % IBW. Weight Source: Bed Scale  Current BMI (kg/m2): 27.8        Weight Adjustment For: No Adjustment                 BMI Categories: Overweight (BMI 25.0-29. 9)    Estimated Daily Nutrient Needs:  Energy Requirements Based On: Kcal/kg (20-25 kcal/kg CBW)  Weight Used for Energy Requirements: Current  Energy (kcal/day): 1898-5551 (20-22 kcal/95.7 kg)  Weight Used for Protein Requirements: Ideal (IBW 83.64 kg)  Protein (g/day): 101-168  Method Used for Fluid Requirements: 1 ml/kcal  Fluid (ml/day): or per MD    Nutrition Diagnosis:   Increased nutrient needs related to increase demand for energy/nutrients as evidenced by nutrition support - enteral nutrition    Nutrition Interventions:   Food and/or Nutrient Delivery: Continue NPO, Continue Current Tube Feeding  Nutrition Education/Counseling: Education not indicated  Coordination of Nutrition Care: Continue to monitor while inpatient  Plan of Care discussed with: ICU team    Goals:  Previous Goal Met: Goal(s) Achieved  Goals: Tolerate nutrition support at goal rate, within 2 days       Nutrition Monitoring and Evaluation:   Behavioral-Environmental Outcomes: None Identified  Food/Nutrient Intake Outcomes: Enteral Nutrition Intake/Tolerance  Physical Signs/Symptoms Outcomes: Biochemical Data, Nutrition Focused Physical Findings, Weight, Diarrhea, GI Status, Hemodynamic Status    Discharge Planning:     Too soon to determine     Josie Pavno, [address], [place]  Contact: [phone]

## 2022-10-20 NOTE — PLAN OF CARE
Problem: Chronic Conditions and Co-morbidities  Goal: Patient's chronic conditions and co-morbidity symptoms are monitored and maintained or improved  10/20/2022 1528 by Ella Dumont RN  Outcome: Progressing  Flowsheets (Taken 10/20/2022 1528)  Care Plan - Patient's Chronic Conditions and Co-Morbidity Symptoms are Monitored and Maintained or Improved:   Monitor and assess patient's chronic conditions and comorbid symptoms for stability, deterioration, or improvement   Collaborate with multidisciplinary team to address chronic and comorbid conditions and prevent exacerbation or deterioration   Update acute care plan with appropriate goals if chronic or comorbid symptoms are exacerbated and prevent overall improvement and discharge     Problem: Discharge Planning  Goal: Discharge to home or other facility with appropriate resources  10/20/2022 1528 by Ella Dumont RN  Outcome: Progressing  Flowsheets (Taken 10/20/2022 1528)  Discharge to home or other facility with appropriate resources:   Identify barriers to discharge with patient and caregiver   Arrange for needed discharge resources and transportation as appropriate   Identify discharge learning needs (meds, wound care, etc)   Arrange for interpreters to assist at discharge as needed   Refer to discharge planning if patient needs post-hospital services based on physician order or complex needs related to functional status, cognitive ability or social support system     Problem: Pain  Goal: Verbalizes/displays adequate comfort level or baseline comfort level  10/20/2022 1528 by Ella Dumont RN  Outcome: Progressing  Flowsheets (Taken 10/20/2022 1528)  Verbalizes/displays adequate comfort level or baseline comfort level:   Encourage patient to monitor pain and request assistance   Assess pain using appropriate pain scale   Administer analgesics based on type and severity of pain and evaluate response   Implement non-pharmacological measures as appropriate and evaluate response   Consider cultural and social influences on pain and pain management   Notify Licensed Independent Practitioner if interventions unsuccessful or patient reports new pain     Problem: Safety - Adult  Goal: Free from fall injury  10/20/2022 1528 by Alfredo Robb RN  Outcome: Progressing  Flowsheets (Taken 10/20/2022 1528)  Free From Fall Injury: Lenore Brunson family/caregiver on patient safety     Problem: Skin/Tissue Integrity  Goal: Absence of new skin breakdown  Description: 1. Monitor for areas of redness and/or skin breakdown  2. Assess vascular access sites hourly  3. Every 4-6 hours minimum:  Change oxygen saturation probe site  4. Every 4-6 hours:  If on nasal continuous positive airway pressure, respiratory therapy assess nares and determine need for appliance change or resting period.   Outcome: Progressing     Problem: Nutrition Deficit:  Goal: Optimize nutritional status  10/20/2022 1528 by Alfredo Robb RN  Outcome: Progressing  Flowsheets  Taken 10/20/2022 1528 by Alfredo Robb RN  Nutrient intake appropriate for improving, restoring, or maintaining nutritional needs:   Assess nutritional status and recommend course of action   Provide specific nutrition education to patient or family as appropriate   Recommend, monitor, and adjust tube feedings and TPN/PPN based on assessed needs   Monitor oral intake, labs, and treatment plans  Taken 10/20/2022 1445 by Radha Mccallum RD  Nutrient intake appropriate for improving, restoring, or maintaining nutritional needs:   Recommend, monitor, and adjust tube feedings and TPN/PPN based on assessed needs   Assess nutritional status and recommend course of action     Problem: ABCDS Injury Assessment  Goal: Absence of physical injury  Outcome: Progressing  Flowsheets (Taken 10/20/2022 1528)  Absence of Physical Injury: Implement safety measures based on patient assessment     Problem: Confusion  Goal: Confusion, delirium, dementia, or psychosis is improved or at baseline  Description: INTERVENTIONS:  1. Assess for possible contributors to thought disturbance, including medications, impaired vision or hearing, underlying metabolic abnormalities, dehydration, psychiatric diagnoses, and notify attending LIP  2. [place] high risk fall precautions, as indicated  3. Provide frequent short contacts to provide reality reorientation, refocusing and direction  4. Decrease environmental stimuli, including noise as appropriate  5. Monitor and intervene to maintain adequate nutrition, hydration, elimination, sleep and activity  6. If unable to ensure safety without constant attention obtain sitter and review sitter guidelines with assigned personnel  7.  Initiate Psychosocial CNS and Spiritual Care consult, as indicated  Outcome: Progressing  Flowsheets (Taken 10/20/2022 1528)  Effect of thought disturbance (confusion, delirium, dementia, or psychosis) are managed with adequate functional status:   [place] high risk fall precautions, as indicated   Assess for contributors to thought disturbance, including medications, impaired vision or hearing, underlying metabolic abnormalities, dehydration, psychiatric diagnoses, notify LIP   Decrease environmental stimuli, including noise as appropriate   Monitor and intervene to maintain adequate nutrition, hydration, elimination, sleep and activity     Problem: Respiratory - Adult  Goal: Achieves optimal ventilation and oxygenation  10/20/2022 1528 by Pratima Branham RN  Flowsheets (Taken 10/20/2022 1528)  Achieves optimal ventilation and oxygenation:   Assess for changes in respiratory status   Assess for changes in mentation and behavior   Position to facilitate oxygenation and minimize respiratory effort   Oxygen supplementation based on oxygen saturation or arterial blood gases   Encourage broncho-pulmonary hygiene including cough, deep breathe, incentive spirometry   Assess the need for suctioning and aspirate as needed   Assess and instruct to report shortness of breath or any respiratory difficulty   Respiratory therapy support as indicated     Problem: Cardiovascular - Adult  Goal: Maintains optimal cardiac output and hemodynamic stability  Outcome: Progressing  Flowsheets (Taken 10/20/2022 1528)  Maintains optimal cardiac output and hemodynamic stability:   Monitor blood pressure and heart rate   Monitor urine output and notify Licensed Independent Practitioner for values outside of normal range   Assess for signs of decreased cardiac output   Administer fluid and/or volume expanders as ordered   Administer vasoactive medications as ordered     Problem: Genitourinary - Adult  Goal: Urinary catheter remains patent  10/20/2022 1528 by Nikolas Yen RN  Outcome: Progressing  Flowsheets (Taken 10/20/2022 1528)  Urinary catheter remains patent: Assess patency of urinary catheter     Problem: Metabolic/Fluid and Electrolytes - Adult  Goal: Glucose maintained within prescribed range  Outcome: Progressing  Flowsheets (Taken 10/20/2022 1528)  Glucose maintained within prescribed range:   Monitor blood glucose as ordered   Assess for signs and symptoms of hyperglycemia and hypoglycemia

## 2022-10-20 NOTE — CARE COORDINATION
SEAN spoke with Selvin at [facility] to arrange a P2P call for today. [phone]. Provided a clinical summary. Provided the phone number of the physician to call around 3p today. Waiting for determination from the p2p. Electronically signed by Lupe Moreno, MSN RN-[facility]  Case Management  [phone]    Addendum [phone]    SEAN received an e-mail confirmation from [facility] at [facility]. She confirmed receipt of the phone number to call for the P2P today with Dr. Rolando Chauhan and Dr. Katy Gomez. She planned to pass on the information to the medical director who would call today at 3p. Addendum [phone]    Per Dr. Rolando Chauhan NP, Fawn Story did not call at 3p as planned. The physicians are understandably quite frustrated with the process for appeals with [facility]. Case management will reach out to [employer] regarding the situation.

## 2022-10-20 NOTE — PROGRESS NOTES
Occupational Therapy  Facility/Department: [facility] [address] [facility]  Occupational Therapy Treatment    Name: Christian Bal  :   MRN: [id number]  Date of Service: 10/20/2022    Discharge Recommendations: Christian Bal scored a 7/24 on the AM-PAC ADL Inpatient form. Current research shows that an AM-PAC score of 17 or less is typically not associated with a discharge to the patient's home setting. Based on the patient's AM-PAC score and their current ADL deficits, it is recommended that the patient have 3-5 sessions per week of Occupational Therapy at d/c to increase the patient's independence. Please see assessment section for further patient specific details. If patient discharges prior to next session this note will serve as a discharge summary. Please see below for the latest assessment towards goals. OT Equipment Recommendations  Other: defer to next level of care       Patient Diagnosis(es): The primary encounter diagnosis was Chronic heart failure with preserved ejection fraction (HFpEF) ([address] Diagnoses of Hypoxia and Acute on chronic respiratory failure with hypoxia and hypercapnia (HCC) were also pertinent to this visit. Past Medical History:  has a past medical history of ZANA (acute kidney injury) ([address] Carotid stenosis, left, Chronic back pain, Chronic systolic (congestive) heart failure, Diastolic CHF ([address] Erectile dysfunction, Hyperlipidemia, Hypertension, Osteoarthritis, Restrictive lung disease, and Type II or unspecified type diabetes mellitus without mention of complication, not stated as uncontrolled. Past Surgical History:  has a past surgical history that includes Rotator cuff repair (Bilateral); knee surgery (Left); Cardiac catheterization (2022); Thoracoscopy (Right, 2022); Pleural Cath Insertion (N/A, 2022); tracheostomy (N/A, 2022); and Gastrostomy tube placement (N/A, 10/11/2022).     Treatment Diagnosis: impaired ADLs and transfers      Assessment Performance deficits / Impairments: Decreased functional mobility ; Decreased ADL status; Decreased endurance;Decreased ROM; Decreased strength  Assessment: Pt limited this date 2/2 pain and fatigue. Pt completing UE LE exercises in bed with A/AROM. Assist with scooting and repositioning in bed MaxA x2. Increased time and effort for all tasks. Assist with simple grooming. Pt would benefit from cont skilled OT while in acute care and inpt at [facility]. Pt remains on trache/vent but alert and able to participate. Treatment Diagnosis: impaired ADLs and transfers  REQUIRES OT FOLLOW-UP: Yes  Activity Tolerance  Activity Tolerance: Patient limited by fatigue;Patient limited by pain        Plan   Occupational Therapy Plan  Times Per Week: 2-5  Current Treatment Recommendations: Balance training, ROM, Functional mobility training, Endurance training, Self-Care / ADL     Restrictions  Position Activity Restriction  Other position/activity restrictions: up in chair, ambulate pt    Subjective   General  Chart Reviewed: Yes  Patient assessed for rehabilitation services?: Yes  Additional Pertinent Hx: Pt admitted 9/19/22 with pleural effusion, to OR for R VATS, pleural cath placed, nerve block x4, 9/22 intubated, 9/30 trach vent, 10/5 bronch, 10/11 PEG trube placement-multiple CXRs, most recent=bibasilar pleuroparenchymal consolidation, abd CT=decrease in size of pneumomediastinum. PMH:  carotid stenosis, CHF, DM, restrictive lung disease, B rotator cuff repair  Family / Caregiver Present: Yes (wife and son)  Referring Practitioner: Dr. Adelaida Purvis  Diagnosis: R PE  General Comment  Comments:  In bed agreeable to session, reporting pain in abdomen RN and MD aware, pt declining sitting EOB this date     Social/Functional History  Social/Functional History  Lives With: Spouse  Type of Home: House  Home Layout: One level  Home Access:  (1 step in from garage)  Bathroom Shower/Tub: Tub/Shower unit  Bathroom Toilet: Standard (sink next to commode)  Home Equipment: Anjelica Rouse, 4 wheeled, [address], [address]  ADL Assistance:  (assist drying after shower, dressed with min A)  Homemaking Assistance:  (pt did alot of cooking, wife did all other tasks)  Ambulation Assistance: Independent (furniture and wall walking)  Transfer Assistance: Independent  Active : Yes  Leisure & Hobbies: watching [employer]  Additional Comments: Social history obtained from wife. She reports he was slowly declining at home, and then 2 days previous to admission became severely week. He has had ~3 falls in last few months. Objective   Heart Rate: 87  Heart Rate Source: Monitor  BP: 119/61  BP Location: Right upper arm  BP Method: Automatic  Patient Position: Semi fowlers  MAP (Calculated): 80.33  Resp: 22  SpO2: 97 %  O2 Device: Ventilator             Safety Devices  Type of Devices: Left in bed;Nurse notified;Call light within reach; Bed alarm in place  Bed Mobility Training  Bed Mobility Training: No (pt declined this date)  Transfer Training  Transfer Training: No        ADL  Grooming: Minimal assistance (wipe face in bed)  LE Dressing: Dependent/Total  Toileting: Dependent/Total                 Orientation  Overall Orientation Status: Within Functional Limits               A/AROM Exercises: x10: AAROM shoulder flex/ext, adduction/abduction. AROM elbow flex/ext, wrist flex/ext, open and close hands ++ time and effort, rest breaks  Education Given To: Patient; Family  Education Provided: Role of Therapy;Plan of Care;Home Exercise Program  Education Method: Verbal;Demonstration  Education Outcome: Continued education needed                       AM-PAC Score        AM-PAC Inpatient Daily Activity Raw Score: 7 (10/20/22 1505)  AM-PAC Inpatient ADL T-Scale Score : 20.13 (10/20/22 1505)  ADL Inpatient CMS 0-100% Score: 92.44 (10/20/22 1505)  ADL Inpatient CMS G-Code Modifier : CM (10/20/22 1505)        Goals  Short Term Goals  Time Frame for Short Term Goals: discharge  Short Term Goal 1: pt to wash hands with cloth and SBA- ongoing, continue  Short Term Goal 2: pt to move supine><sit with max A of 2- not addressed  Short Term Goal 3: pt to sit in midline for 6-8 minutes with mod A- not addressed  Short Term Goal 4: pt to tolerate 5-10 reps B UE AAROM exerc to increase activity tolerance- ongoing, continue  Patient Goals   Patient goals : not able to stated       Therapy Time   Individual Concurrent Group Co-treatment   Time In 1308         Time Out 1335         Minutes 27          Timed Code Treatment Minutes:   27    Total Treatment Minutes:  [address], OT

## 2022-10-21 NOTE — PROGRESS NOTES
Blood glucose at 12;30 58  dextrose bolus given per order  follow up glucose 69  Dr notified, no further orders received

## 2022-10-21 NOTE — PLAN OF CARE
Problem: Discharge Planning  Goal: Discharge to home or other facility with appropriate resources  10/20/2022 2230 by Morro Mahoney RN  Outcome: Progressing  Flowsheets (Taken 10/20/2022 1528 by Nelly Patel RN)  Discharge to home or other facility with appropriate resources:   Identify barriers to discharge with patient and caregiver   Arrange for needed discharge resources and transportation as appropriate   Identify discharge learning needs (meds, wound care, etc)   Arrange for interpreters to assist at discharge as needed   Refer to discharge planning if patient needs post-hospital services based on physician order or complex needs related to functional status, cognitive ability or social support system  Note: Needs placement. Problem: Pain  Goal: Verbalizes/displays adequate comfort level or baseline comfort level  10/20/2022 2230 by Morro Mahoney RN  Outcome: Progressing  Flowsheets (Taken 10/20/2022 1528 by Nelly Patel RN)  Verbalizes/displays adequate comfort level or baseline comfort level:   Encourage patient to monitor pain and request assistance   Assess pain using appropriate pain scale   Administer analgesics based on type and severity of pain and evaluate response   Implement non-pharmacological measures as appropriate and evaluate response   Consider cultural and social influences on pain and pain management   Notify Licensed Independent Practitioner if interventions unsuccessful or patient reports new pain  Note: No complaints of pain this shift. Problem: Safety - Adult  Goal: Free from fall injury  10/20/2022 2230 by Morro Mahoney RN  Outcome: Progressing  Note: V/T. Aniya fall risk. Calls out appropriately. Problem: Skin/Tissue Integrity  Goal: Absence of new skin breakdown  Description: 1. Monitor for areas of redness and/or skin breakdown  2. Assess vascular access sites hourly  3. Every 4-6 hours minimum:  Change oxygen saturation probe site  4.   Every 4-6 hours:  If on nasal continuous positive airway pressure, respiratory therapy assess nares and determine need for appliance change or resting period. 10/20/2022 2230 by Cathy Trimble RN  Outcome: Progressing  Note: Multiple wounds, see LDA. Q2 turning and frequent check and change. Problem: Respiratory - Adult  Goal: Achieves optimal ventilation and oxygenation  10/20/2022 2230 by Cathy Trimble RN  Outcome: Progressing  Flowsheets (Taken 10/20/2022 1528 by Meeta Lo RN)  Achieves optimal ventilation and oxygenation:   Assess for changes in respiratory status   Assess for changes in mentation and behavior   Position to facilitate oxygenation and minimize respiratory effort   Oxygen supplementation based on oxygen saturation or arterial blood gases   Encourage broncho-pulmonary hygiene including cough, deep breathe, incentive spirometry   Assess the need for suctioning and aspirate as needed   Assess and instruct to report shortness of breath or any respiratory difficulty   Respiratory therapy support as indicated  Note: V/T. FiO2 30L, Peep 5. O2 in mid to high 90s.

## 2022-10-21 NOTE — PLAN OF CARE
Problem: Chronic Conditions and Co-morbidities  Goal: Patient's chronic conditions and co-morbidity symptoms are monitored and maintained or improved  Outcome: Progressing     Problem: Discharge Planning  Goal: Discharge to home or other facility with appropriate resources  Outcome: Progressing     Problem: Safety - Adult  Goal: Free from fall injury  Outcome: Progressing  Note: Bed low with wheels locked  bed alarm on  call light in reach at all times  increased rounding frequency     Problem: ABCDS Injury Assessment  Goal: Absence of physical injury  Outcome: Progressing     Problem: Confusion  Goal: Confusion, delirium, dementia, or psychosis is improved or at baseline  Description: INTERVENTIONS:  1. Assess for possible contributors to thought disturbance, including medications, impaired vision or hearing, underlying metabolic abnormalities, dehydration, psychiatric diagnoses, and notify attending LIP  2. [place] high risk fall precautions, as indicated  3. Provide frequent short contacts to provide reality reorientation, refocusing and direction  4. Decrease environmental stimuli, including noise as appropriate  5. Monitor and intervene to maintain adequate nutrition, hydration, elimination, sleep and activity  6. If unable to ensure safety without constant attention obtain sitter and review sitter guidelines with assigned personnel  7.  Initiate Psychosocial CNS and Spiritual Care consult, as indicated  Outcome: Progressing  Flowsheets (Taken 10/21/2022 1607)  Effect of thought disturbance (confusion, delirium, dementia, or psychosis) are managed with adequate functional status:   Decrease environmental stimuli, including noise as appropriate   Monitor and intervene to maintain adequate nutrition, hydration, elimination, sleep and activity     Problem: Respiratory - Adult  Goal: Achieves optimal ventilation and oxygenation  Outcome: Progressing  Note: RR 16-18 nonlabored  ventilator via trach per order minimal secretions via trach  lungs faint crackles with auscultation       Problem: Cardiovascular - Adult  Goal: Maintains optimal cardiac output and hemodynamic stability  Outcome: Progressing     Problem: Genitourinary - Adult  Goal: Urinary catheter remains patent  Outcome: Progressing  Note: Ansari patient draining clear yellow urine     Problem: Pain  Goal: Verbalizes/displays adequate comfort level or baseline comfort level  Flowsheets (Taken 10/21/2022 1607)  Verbalizes/displays adequate comfort level or baseline comfort level:   Administer analgesics based on type and severity of pain and evaluate response   Assess pain using appropriate pain scale   Implement non-pharmacological measures as appropriate and evaluate response     Problem: Skin/Tissue Integrity  Goal: Absence of new skin breakdown  Description: 1. Monitor for areas of redness and/or skin breakdown  2. Assess vascular access sites hourly  3. Every 4-6 hours minimum:  Change oxygen saturation probe site  4. Every 4-6 hours:  If on nasal continuous positive airway pressure, respiratory therapy assess nares and determine need for appliance change or resting period.   Note: Wound care per order  turn and reposition with use of pillow support every two hour and prn  russell care per staff  continue assess tissue integrity     Problem: Nutrition Deficit:  Goal: Optimize nutritional status  Flowsheets (Taken 10/21/2022 1607)  Nutrient intake appropriate for improving, restoring, or maintaining nutritional needs: Recommend, monitor, and adjust tube feedings and TPN/PPN based on assessed needs     Problem: Metabolic/Fluid and Electrolytes - Adult  Goal: Glucose maintained within prescribed range  Note: Blood sugar every 4hr per order  patient with two episodes hypoglycemia today  hypoglycemic protocol per order with dextrose bolus given  continue monitor and assess blood glucose

## 2022-10-21 NOTE — PROGRESS NOTES
Speech Language Pathology  Facility/Department: [facility]  Daily PMV/Speech Treatment Note   Electrolaynx Assessment   NAME: Rosario Long  :   MRN: [id number]    Date of Eval: 10/21/2022  Evaluating Therapist: Allison Berry, SLP    Patient Diagnosis(es): has HTN (hypertension); Hyperlipidemia; OA (osteoarthritis) of knee; Carotid stenosis, left; Hearing loss; ED (erectile dysfunction); DM type 2 (diabetes mellitus, type 2) ([address] Rotator cuff tear; Glenohumeral arthritis; Subacromial impingement; Trigger ring finger of right hand; Spinal stenosis of lumbar region; Closed compression fracture of L1 lumbar vertebra; Closed displaced fracture of lateral malleolus of left fibula; Exertional dyspnea; Atrial fibrillation with rapid ventricular response ([address] Hypoxia; Chronic heart failure with preserved ejection fraction (HFpEF) ([address] Pleural effusion on right; Acute on chronic respiratory failure with hypoxia and hypercapnia ([address] ZANA (acute kidney injury) ([address] Mucus plugging of bronchi; Malnutrition ([address] Generalized weakness; Areflexia; and Asymmetrical deep tendon reflexes on their problem list.  Onset Date: 22    Chart reviewed. Pain: none indicated    Initial Assessment: 10/10  Diagnosis: Pt presents with a severe communication impairment secondary to trach/vent status. Order obtained and pt appropriate this date per ventilator settings and report. The patient was awake but drowsy (spouse reports recent dilaudid). The patient allowed oral care but demo'd what appeared to be reflexive mouth closing for any presentation of oral care utensils despite multiple attempts and verbal cues. Procedure explained to patient and spouse. Pt did not demo any comprehension of education / not attending. RT arrived and trach cuf deflated.  Pt without coughing and airflow was appreciated but difficult to detect due to inability to voice on command; a warm continuous air could be felt leaking from open oral cavity although pt could not follow command to exhale forcefully and x1 instance of weak strained phonation was appreciated om command. Pt had drop in PIP and no stridor noted so decision was made to place PMV. Pt without coughing or overt anxiety noted with placement. x2 instances of weak strained vocal quality on command for single word utterances. During repositioning (typical flexed neck posturing), there was a phonation achieved that is suspected to be non-purposeful. The pt demo'd suspected attempts at cough with audible gurggling suspected to be from glottis region / pharynx vs trach with inability to expectorate. The patient was unable to expectorate secretions to oral cavity. There was no active diaphragm movement appreciated when assessed for attempts at controlled phonation. The patient tolerated for 7.5 minutes but given limited interactiveness and no voicing it was decided to remove PMV. RT replaced t-piece and resumed care of the patient. Pts vitals generally stable throughout, no outward anxiety or discomfort (no more than his baseline). Pt did demo spontenous swallows with grimace. PMV was removed and left to air dry below RN computer on tray and RN was notified of this as well as spouse with instructions to place adaptor and PMV into ziplock and keep with ventilator. The PMV is single use and is now dispensed to the patient and should DC with the patient if he is to DC from hospital. A denture case was labele \"PMV cleaning\" and kept with the PMV equipment, cleaning instructions discussed briefly with pts spouse for initiation of education for self care when that becomes appropriate. The PMV should only be placed with RT / SLP both present for pt safety at this time. PMV safety sticker as there should be no donning of PMV without RT/SLP.     Medical diagnosis: Pleural effusion, right [J90]  Pleural effusion on right [J90]  Treatment diagnosis: severe communication impairment d/t trach/vent status    Treatment:     Short Term Goals  Goal 1: Pt will tolerate PMV placement for duration of session without change in respiratory status and without discomfort. 10/19: Pt seated upright in bed with RN and RT present. Prior to placement of PMV pt sating at SpO2=98%, Resp=20, Pulse=75. RT provided deep suction prior to placing PMV in-line. Pt with gravely cough when cuff deflated. PMV placed and pt immediately impulsively trying to speak in long phrases/utterances. Required cues to pause and take a few breaths to allow for steady vital readings. Pt tolerated PMV well for 5.25 minutes, prior to SpO2 beginning to drop to 86 and was removed. After removal pt sating at IzL0=204, Resp=22, Pulse=80. Pt reported no discomfort across entire session while wearing PMV. After removal of PMV RT provided suction with noted white/tan, thick secretions. Cont. 10/20: Pt seated upright in bed, RT present for session. Prior to RT donning PMV, pt with 100% SPO2, 22 RR. RT provided inline suction and cuff deflation. Patient with congested cough s/p cuff deflation. Pt tolerated placement without change in vital signs for 4.5 minutes, when he reports fatigue and wishes to discontinue session. Following PMV removal, pt with 98% SPO2, RR 20. Continue goal  10/21 - Pt declined PMV this date x2 mouthing \"no\" and \"I'm so tired\". RT presented to room to assist but was not needed so not present for remainder of session. Goal 2: Pt will improve breath-phonation coordination via phonation for single syllable words on 50% of opportunities. 10/19: Pt with increased voicing this date. Required mod cues to take breaths in between words to assist with respiration/phonation. Pt expressed single CVC words accurately across 90% of attempts. Pt attempting to speak in longer utterances, with reduced breath support across multi-syllabic words and short phrases. Increased breathiness noted when pt attempting these.  Goal met, however continue for carry over/training of strategy for increased voicing. Cont. 10/20: Pt able to produce voice this date with rough vocal quality, mildly reduced intensity. He completed counting exercise with appropriate breath-phonation coordination; He attempts to speak in phrase /sentences to answer questions despite frequent aphonia with vent inspirations. Patient required mod-max cues to shorten utterances and coordinate with provided breaths. Continue goal.    Goal 3: Pt/caregiver will demonstrate comprehension of PMV safety instructions with mod I.  10/19: Prior to placing PMV, SLP discussed how it works, steps to take and cues to be provided by SLP and RT. Pt nodding head in comprehension, however mod cues as noted above to follow directions to continue taking breaths for increased respiration/phonation support. Pt's wife not present at this time. Pt communicated she is supposed to be coming in ~2 PM. SLP communicated may stop by then to discuss PMV trial with her. Pt in agreement. Cont. 10/20: Provided education regarding PMV and redirection of airflow, though suspect limited comprehension given level of fatigue. Goal 4: Pt will tolerate ongoing communication as indicated. 10/19: Increased impulsivity of speaking this date with reduced use of breath support, negatively impacting Sp02 tolerance. Pt with increased mouthing of words prior to PMV and after PMV removal. With PMV on, improved voicing and vocal quality. Pt expressed \"I can't hear you all\". SLP confirmed pt wanting to have staff be aware that increased loudness required for pt comprehension, as pt normally wears hearing aids at baseline and they are not present during hospital stay. Pt discussed with RN and wrote on whiteboard for staff to be aware for increased pt participation in care. Cont. 10/20: Patient with improved mouthing of words without placement of PMV, able to indicate he does not want to complete dysphagia tx and end session given exhaustion. Continue goal.   10/21 - Pt completed evaluated for use of electrolarynx. The patient had good comprehensibility with min cues with SLP placing electrolarynx on left lateral neck against larynx. Pt required moderate verbal and tactile assist for placement. Pts spouse completed training and was able to place appropriately for communication exchanges. Educated on target sound (muffled vibration indicating skin contact achieved), ceasing vibration between utterances to allow pt break and to assist communication partner with identifying sentence boundaries (educated pt on use for this to assist in breath support when speaking but pt did not demo comprehension). Sign hung up in patient room re: storage location of PMV, spouse trained, pt placement on L lateral neck. Utilizing electrolarynx in glove to protect from becoming soiled. Continue goal.       Education:  As above. Plan:  Continue speech/language therapy to address above goals, 3-5 x/week x LOS  DC recommendations: Ongoing communication training needed  D/W nursing: Kelly Stevens  Needs met prior to leaving room, call button in reach. Treatment time: 20 minutes    Electronically signed by:  Forest Fleischer, M.A., [place]  Speech-Language Pathologist  Pg #: [phone]    If patient is discharged prior to next treatment, this note will serve as the discharge summary    Patient has been loaned electrolarynx:  Pt required assistance to place/use electro larynx independently  Pts spouse has been trained on electrolarynx and can assist independent of staff on use  Electrolarynx is property of rehab department and is NOT to be dispense to patient at discharge. Electrolaynx can be placed on left lateral neck during oral movement to achieve speech production.

## 2022-10-21 NOTE — PROGRESS NOTES
Palliative Care Chart Review  and Check in Note:     NAME:  James Echevarria  Admit Date: 9/19/2022  Hospital Day:  Hospital Day: 35   Current Code status: Full Code    Palliative care is continuing to following Mr. Michael Torre for symptom management,  and goals of care discussion as needed. Patient's chart reviewed today 10/21/22. Pt is resting comfortably in bed with eyes closed. Wife reports he is tired, did not sleep well last night. She reports yesterday pt had told her he was ready to go home to [facility]. She does report that he had been having a bad day, however understands that he has been suffering over the last nine months and has a long way to go as far as recovery. She reports she asked him again today and he reported that he wasn't sure if he was still feeling the same. Pt is drowsy today and she does not want to wake them. They and their sons are going to continue to discuss over the weekend. Plan to follow up on Monday. The following are the currently established goals/code status, and Symptom management. Goals of care: Family is considering changing goals of care, however at this time want to continue with current management. Code status: Full Code    Discharge plan: TBD pending hospital course, pt is LTACH appropriate however there have been issues with his insurance.        NATASHA Greene CNP  10/21/22  12:48 PM

## 2022-10-21 NOTE — PROGRESS NOTES
Pulmonary Progress Note    Admit Date: 9/19/2022  Diet: Diet NPO  ADULT TUBE FEEDING; PEG; Peptide Based; Continuous; 20; Yes; 10; Q 4 hours; 65; 200; Q 4 hours; Protein; 1 bottles Proteinex daily w/ 30 mL FW flushes before and after    CC: SOB, pleural effusion    Interval history: Patient seen and examined at bedside. No acute events overnight. CXR this morning revealing progressive bibasilar lung opacities. Pleurx catheter in place  Trach in place  Vent mode: AC/PRVC, set RR 14, , 30% FiO2, PEEP 5 satting 97%.     Medications:     Scheduled Meds:   albuterol  2.5 mg Nebulization 4x daily    bisacodyl  10 mg Rectal Daily    potassium phosphate IVPB  10 mmol IntraVENous Once    collagenase   Topical Daily    methocarbamol  500 mg PEG Tube 4x Daily    [Held by provider] insulin glargine  40 Units SubCUTAneous Nightly    doxazosin  1 mg Oral Daily    clotrimazole   Topical BID    lansoprazole  30 mg Per G Tube BID    [Held by provider] insulin lispro  0-16 Units SubCUTAneous Q4H    Venelex   Topical BID    chlorhexidine  15 mL Mouth/Throat BID    apixaban  5 mg Oral BID    sodium chloride flush  5-40 mL IntraVENous 2 times per day     Continuous Infusions:   sodium chloride      sodium chloride 25 mL (10/19/22 0029)    dextrose Stopped (10/10/22 1749)     PRN Meds:sodium chloride, oxyCODONE, hydroxypropyl methylcellulose, acetaminophen, sodium chloride flush, sodium chloride, ondansetron **OR** ondansetron, glucose, dextrose bolus **OR** dextrose bolus, glucagon (rDNA), dextrose, hydrALAZINE    Objective:   Vitals:   T-max:  Patient Vitals for the past 8 hrs:   BP Temp Temp src Pulse Resp SpO2 Weight   10/21/22 0810 -- -- -- 70 14 97 % --   10/21/22 0800 (!) 99/50 97.7 °F (36.5 °C) Axillary 62 14 97 % --   10/21/22 0640 -- -- -- 96 29 96 % --   10/21/22 0535 -- -- -- -- -- -- 197 lb 5 oz (89.5 kg)   10/21/22 0434 -- -- -- 96 (!) 3 -- --   10/21/22 0400 (!) 113/52 98.4 °F (36.9 °C) Axillary 91 (!) 9 96 % -- Intake/Output Summary (Last 24 hours) at 10/21/2022 1031  Last data filed at 10/21/2022 0534  Gross per 24 hour   Intake 949 ml   Output 1350 ml   Net -401 ml           Physical Exam  Constitutional:       General: He is in acute distress. Appearance: He is ill-appearing and toxic-appearing. Comments: Lethargic, opens eyes to voice    HENT:      Head: Normocephalic and atraumatic. Eyes:      Extraocular Movements: Extraocular movements intact. Conjunctiva/sclera: Conjunctivae normal.      Pupils: Pupils are equal, round, and reactive to light. Cardiovascular:      Heart sounds: Normal heart sounds. Pulmonary:      Effort: Pulmonary effort is normal.      Breath sounds: Normal breath sounds. Abdominal:      General: Bowel sounds are normal.   Musculoskeletal:      Cervical back: Normal range of motion. Neurological:      Comments: Lethargic         LABS:    CBC:   Recent Labs     10/19/22  0527 10/20/22  0507 10/21/22  0455   WBC 9.7 13.6* 14.5*   HGB 7.4* 7.3* 7.7*   HCT 23.3* 23.9* 23.6*    403 444   MCV 89.7 89.2 86.5     Renal:    Recent Labs     10/19/22  0527 10/20/22  0507 10/21/22  0455   * 147* 148*   K 3.9 3.7 3.9    107 106   CO2 32 32 33*   BUN 77* 74* 71*   CREATININE 0.8 0.8 0.8   GLUCOSE 177* 157* 50*   CALCIUM 8.5 8.2* 8.2*   MG 3.00* 3.10* 2.70*   PHOS 2.7 2.4* 2.6   ANIONGAP 8 8 9     Hepatic:   Recent Labs     10/19/22  0527 10/20/22  0507 10/21/22  0455   LABALBU 3.1* 3.2* 3.3*     Troponin: No results for input(s): TROPONINI in the last 72 hours. BNP: No results for input(s): BNP in the last 72 hours. Lipids: No results for input(s): CHOL, HDL in the last 72 hours. Invalid input(s): LDLCALCU, TRIGLYCERIDE  ABGs:  No results for input(s): PHART, TPA4JFR, PO2ART, LRY4IAT, BEART, THGBART, G6PVLFLX, DIE2HET in the last 72 hours. INR: No results for input(s): INR in the last 72 hours.   Lactate: No results for input(s): LACTATE in the last 72 hours.  Cultures:  -----------------------------------------------------------------  RAD:   XR ABDOMEN (KUB) (SINGLE AP VIEW)   Final Result   Impression:       Slightly increased gas dilation of transverse colon since the prior study. No evidence of small bowel obstruction. Moderate colonic stool burden, unchanged from prior study. XR CHEST PORTABLE   Final Result   Impression:       Slight progression of bibasilar lung opacities, especially on the left. Persistence of small bilateral pleural effusions. XR ABDOMEN (2 VIEWS)   Final Result   1. Moderate amount of stool in the descending colon with the transverse colon mildly dilated with air. CT CERVICAL SPINE WO CONTRAST   Final Result      1. Osseous lesions in C2, C7 and T1 vertebral body seen on recent MRI dated 10/18/2022 are not well-visualized on CT. Bone density is slightly heterogenous on CT. There is a 8 mm radiolucent lesion in the inferior posterior aspect of C2 vertebral body    which may correspond to C2 lesion seen on MRI. 2. Mildly expansile lytic lesion in left lamina of C4 concerning for metastasis. 3. Multilevel degenerative disc disease, most pronounced at C6-7 where there is disc osteophyte complex causing mild-to-moderate spinal canal stenosis. 4. Multilevel foraminal stenosis, most pronounced at C5-6, moderate to severe right and moderate left. MRI LUMBAR SPINE WO CONTRAST   Final Result      1. Motion compromised study. 2.  No cord compression. 3.  Osseous lesions in the cervical and thoracic spine suspicious for metastatic disease. No extraosseous mass. 4.  Severe multilevel degenerative disc disease in the lumbar spine. Moderate to severe canal stenosis at L2-L3 and L4-L5. Moderate canal stenosis at L3-L4. Multiple levels of moderate to severe canal stenosis from L2-L3 to L4-L5      MRI THORACIC SPINE WO CONTRAST   Final Result      1. Motion compromised study. 2.  No cord compression. 3.  Osseous lesions in the cervical and thoracic spine suspicious for metastatic disease. No extraosseous mass. 4.  Severe multilevel degenerative disc disease in the lumbar spine. Moderate to severe canal stenosis at L2-L3 and L4-L5. Moderate canal stenosis at L3-L4. Multiple levels of moderate to severe canal stenosis from L2-L3 to L4-L5      MRI Cervical Spine WO Contrast   Final Result      1. Motion compromised study. 2.  No cord compression. 3.  Osseous lesions in the cervical and thoracic spine suspicious for metastatic disease. No extraosseous mass. 4.  Severe multilevel degenerative disc disease in the lumbar spine. Moderate to severe canal stenosis at L2-L3 and L4-L5. Moderate canal stenosis at L3-L4. Multiple levels of moderate to severe canal stenosis from L2-L3 to L4-L5      MRI BRAIN WO CONTRAST   Final Result      1. No acute hemorrhage, mass or acute ischemia. 2.  Mild burden of nonspecific multifocal white matter disease compatible with chronic small vessel ischemia. 3.  Mild atrophy. 4.  Bilateral mastoid effusions. XR CHEST PORTABLE   Final Result      Distal portion of tracheostomy poorly visualized due to overlying medical devices. The position is without significant change since 10/5/2022. Bibasilar pleuroparenchymal consolidation. Stable cardiomediastinal silhouette. Right jugular central venous catheter without change. CT ABDOMEN PELVIS WO CONTRAST Additional Contrast? None   Final Result      Marked decrease in size of pneumomediastinum. Trace residual pneumoperitoneum. Extensive bilateral lower lobe pulmonary atelectasis with pleural effusions and trace residual right pneumothorax. XR CHEST PORTABLE   Final Result      Tiny right lateral pneumothorax is suspected, 5 mm in maximum thickness. This has decreased in size since 10/3/2022. Right basilar Pleurx catheter noted. Small bilateral pleural effusions.       Prominent interstitial markings. Stable cardiac mediastinal silhouette. Lines and tubes without change. Subcutaneous emphysema noted. CT CHEST ABDOMEN PELVIS WO CONTRAST   Final Result      1. Severe pneumomediastinum. 2. Small to moderate right hydropneumothorax with similar fluid and gas components with a right-sided Pleurx catheter. 3. Moderate loculated left pleural effusion with atelectasis of the left lower lobe with patency of the central left lower lobe bronchi. 4. Fluid/material filling the bronchus intermedius and right lower lobe bronchi with complete consolidation and volume loss of the right lower lobe. Differential diagnosis would include mucous plugging or obstructing lesion. 5. Tracheostomy tube tip within the trachea   6. Severe subcutaneous emphysema in the neck. CT ABDOMEN AND PELVIS:      FINDINGS:      LIVER: Normal.      GALLBLADDER AND BILIARY TREE: No calcified gallstones. No gallbladder distention. No intra- or extrahepatic biliary dilatation. PANCREAS: Normal.      SPLEEN: Normal.      ADRENAL GLANDS: Normal.      KIDNEYS AND URETERS: Normal.      URINARY BLADDER: Ansari catheter present within collapsed urinary bladder. REPRODUCTIVE ORGANS: No associated masses. BOWEL: Normal diameter, nonobstructed. Feeding tube tip at the level of the ligament of Treitz. LYMPH NODES: No abnormally enlarged nodes. PERITONEUM/RETROPERITONEUM: Severe pneumoperitoneum extends into the upper abdomen with some of the symmetric multiseptated gas appearing deep to the diaphragm consistent with mild pneumoperitoneum (series 601 was 101). This is likely related to    pneumonia mediastinum dissecting into the abdomen. No fluid collection in the abdomen or pelvis. No ascites. VESSELS: Extensive atherosclerotic calcification of the aorta and iliac arteries. ABDOMINAL WALL: No acute abnormality. BONES: No acute abnormality.  Mild chronic L1 compression fracture with previous vertebroplasty. IMPRESSION:      1. Severe pneumomediastinum extends into the upper abdomen with small pneumoperitoneum likely related to extension of pneumoperitoneum into the upper peritoneal cavity. 2. No fluid collection in the abdomen or pelvis. Results were discussed with the surgical team at 7:00 PM on 10/3/2022. XR CHEST PORTABLE   Final Result      Stable appearance of loculated right pneumothorax, with loculated components in the lateral right midlung region and in the right lateral costophrenic sulcus. Stable scattered areas of atelectasis versus scar, most prominent in the medial right lung base and in the left lateral lung base. XR CHEST PORTABLE   Final Result      Small right basilar pneumothorax. Right basilar chest tube without change. Patchy consolidation in the right mid and lower lung as well as the left lower lung-atelectasis versus pneumonia. Trace left pleural effusion. Normal heart size. Lines and tubes without change. Mild improvement in degree of subcutaneous emphysema in the chest and neck. XR CHEST 1 VIEW   Final Result      1. Stable small right-sided pneumothorax and prominent subcutaneous emphysema along the neck and upper superior chest wall, greater in right lung stable   2. Stable left basilar consolidation and pleural effusion      3. Stable appearing perihilar accentuated markings or airspace disease            XR CHEST PORTABLE   Final Result      Stable small right-sided pneumothorax. More prominent emphysema over the lower neck. No change in bilateral airspace disease. Stable left pleural effusion. XR CHEST PORTABLE   Final Result   1. Bilateral multifocal pneumonia with improvement in the right    pulmonary consolidation since prior study from 9/23/22   2. Mild to moderate left pleural effusion          XR CHEST PORTABLE   Final Result   1.   Support lines and tubes are stable. 2.  Stable small right lateral pneumothorax and right basilar    chest tube. 3.  Stable patchy bilateral airspace disease. XR CHEST PORTABLE   Final Result   1. Satisfactory position of right internal jugular central line   2. No interval change in endotracheal tube and nasogastric tube positioning. 3. Extensive bilateral airspace disease with no changes. 4. Left pleural effusion with retrocardiac opacity, unchanged   5. Small stable tiny right lateral pneumothorax and stable position of right chest tube,, Pleurx catheter. XR ABDOMEN (KUB) (SINGLE AP VIEW)   Final Result   1. No residual pneumothorax. 2.  Mild patchy airspace disease bilaterally worse on the right than on prior examination. 3.  Non-obstructive bowel gas pattern. Mildly distended stomach. XR CHEST PORTABLE   Final Result   1. No residual pneumothorax. 2.  Mild patchy airspace disease bilaterally worse on the right than on prior examination. 3.  Non-obstructive bowel gas pattern. Mildly distended stomach. XR CHEST PORTABLE   Final Result      1. Small right pneumothorax appears slightly increased. 2. Mild patchy airspace opacity bilaterally. XR CHEST PORTABLE   Final Result     Pleurx catheter at the right lung base. Trace right    pneumothorax is unchanged. XR CHEST PORTABLE   Final Result      Right-sided chest tube evident in the base, its tip medially. Improvement in the right-sided pneumothorax, since earlier today. Possible medial collapse of the right lower lobe. Small left effusion. Patchy airspace density/atelectasis in the lungs bilaterally, with no other significant change. XR CHEST PORTABLE   Final Result   1. Right-sided Pleurx catheter in satisfactory position in the lung bases   2.  Right-sided post operative pneumothorax, approximately 10%             Assessment/Plan:       Recurrent pleural effusions s/p R Pleurx catheter placed on 9/19  - Vent mode: AC/PRVC, set RR 14, , 30% FiO2, PEEP 5 satting 97%. - Breathing treatment with scheduled albuterol  -Pleurx in place, draining small amounts of fluid daily approximately 110 cc serous output yesterday. -CXR 10/21: Slight progression of bibasilar lung opacities especially on L. Persistent small BL pleural effusions. - consult palliative care     Hyperglycemia   POCT glucose 35 > 112  - hypoglycemia treatment protocol     Code Status: Full code  FEN: Tube feeds per PEG tube  PPX: Eliquis  DISPO: Lucía Lopez MD, PGY-2  10/21/22  10:31 AM    This patient has been staffed and discussed with Dr. Loree David MD.     Patient seen, examined and discussed with the resident and I agree with the assessment and plan as edited above. Patient had more abdominal pain yesterday and was found to have a KUB showing a large stool burden. He received an enema yesterday and was going to get another this morning. He was somnolent for my evaluation. Patient's wife Cal Martinez was at bedside and indicated that patient had talked about stopping the fight and transitioning to comfort care. They were going to have more discussions about it but she had detailed questions about what I thought the expectations for his recovery would be. Unfortunately my advice is somewhat limited because I do not have a clear diagnosis as to why he is vent dependent. I knew Janie Jay from the outpatient setting and and referred him to Dr. Ryann Garcia for a VATS and Pleurx placement. Unfortunately the postoperative course has not gone as planned, but without a clear reason for why, or obvious complication of the surgery. My suspicion is that we caught him at the steepest slope of his decline and he has not been able to recover. My hope for him with that if we were able to get him more nutrition and physical therapy and his strength would improve.   Thus far that has not been the case after 32 days in the hospital but was complicated by some infections and septic shock. He is undergoing a work-up for neuromuscular weakness that has raised the specter of possible metastatic disease but we do not have a primary source of malignancy. I think the likelihood based on his performance on the ventilator and the lack of recruitment of the lower lobes despite our intervention is relatively low. And while patient is lucid and comfortable he can function fairly well and has all his cognitive faculties I do not know if this is going to be a long-term life that he would want. I have no way of knowing if patient will ever recover to a lifestyle that he would find acceptable. we had talked about hospice as an option before he went to surgery but he wanted to give it a shot to see if there would be some improvement. None of us are going the way that has gone. I will fully support what ever decision Cachorro Mendoza and Stephanie Rentreia make.     Aric Grace MD

## 2022-10-21 NOTE — PROGRESS NOTES
Point of Care Note  General Surgery        Time: 0045  Date: 10/21/2022    Patient reported to have vomited followed by increased work of breathing. Tube feeds were turned off. At bedside, the tracheostomy tube was suctioned and the ventilator was placed on hyperoxygenation settings for 10 minutes. Work of breathing and O2 saturation improved. Patient complained of nausea and LUQ pain during this time. On exam, the LUQ was focally tender to palpation, otherwise moderately distended, soft, no guarding or rigidity. A KUB and CXR showed no acute processes, however, there is an increasing sigmoid/ transverse colon distention. The decision was made to keep tube feeds off and place the patients PEG tube to gravity. The patient had no further issues following this event and improved with the above interventions. Will continue to monitor closely.      Mario Cardoso DO  PGY1, General Surgery  10/21/22  4:08 AM  [place]  Pager: [phone]

## 2022-10-21 NOTE — PROGRESS NOTES
Cardiothoracic Surgery Daily Progress Note      CC: Pleural effusions s/p R pleurX placement    Subjective :  Had abdominal pain yesterday and x-ray which shows large stool burden distally. Had a large bowel movement with suppository. Had an episode of vomiting and aspiration overnight. Improved with PEG tube decompression. Resting comfortably this morning. Denies abdominal pain nausea or vomiting. Stated he continues to pass flatus. Objective     Exam:  Vitals:    10/21/22 0400 10/21/22 0434 10/21/22 0535 10/21/22 0640   BP: (!) 113/52      Pulse: 91 96  96   Resp: (!) 9 (!) 3  29   Temp: 98.4 °F (36.9 °C)      TempSrc: Axillary      SpO2: 96%   96%   Weight:   197 lb 5 oz (89.5 kg)    Height:           Physical Examination:   General appearance: Alert, following commands. Neurological: no focal deficits  HEENT: EOMI, trachea midline, no JVD. Tracheostomy in place with some mucus drainage  Chest/Lungs: On mechanical ventilation via tracheostomy; R PleurX catheter capped with dressing C/D/I  Cardiovascular: RRR  Abdomen: Soft, non-tender, non-distended. PEG tube to gravity with minimal output  Skin: Warm and dry. Sacral decubitus ulcer covered with Mepilex. Extremities: Pitting edema of the b/l feet. No cyanosis. Legs ulcers from SCDs covered with meplixex. ASSESSMENT/PLAN:   Marah Chavez is a 65 y.o. male with history of diastolic heart failure, atrial fibrillation, and DM with recurrent pleural effusions s/p R PleurX catheter placement (9/19). - Pleurx cath drain yesterday with 100 cc output. Next Pleurx drainages tomorrow. Continue every other day Pleurx drainage.  - Pulmonology/Crit care managing vent. - diarrhea resolved, patient now have constipation. Fiber was discontinued.   Daily suppository and smog enema once today  - Resume tube feed this AM  - glucose control improved  - Sacral wound per wound care nurse  -Continue PT/OT  - [facility] pending; insurance company did not called physician for to peer to peer as scheduled yesterday. [facility]. Anticipate patient will stay in-house until the end of the month.     Shaka Bolaños MD  General Surgery Resident  10/21/22 8:28 AM  [phone]

## 2022-10-21 NOTE — PROGRESS NOTES
Blood glucose at 1020 35  patient increased lethargy, opens eyes to verbal stimulation  dextrose bolus admiinistered per order  follow up 15 minute blood glucose 121  dr notified via perfect serve

## 2022-10-21 NOTE — CARE COORDINATION
CM following for discharge planning needs. Patient denied LTACH benefits from insurance. CM spoke with patient's wife regarding this situation. She is planning to change her insurance providers. CM discussed with team, NP Alicja Arizmendi and CHERIE Welsh. Will continue to follow.     Jared Avila RN, BSN,   4th Floor Progressive Care Unit  [phone]

## 2022-10-21 NOTE — PROGRESS NOTES
Nephrology Progress Note                                                                                                                                                                                                                                                                                                                                                               Office : [phone]     Fax :[phone]    Patient's Name: Austyn Monge        Reason for Consult:   ZANA  Requesting Physician:  Junior Johnson MD    Interval History:      Cr stable   Na stable   Good UO   Remains on Trach   PEG placed         Past Medical History:   Diagnosis Date    ZANA (acute kidney injury) ([facility] [address] 9/26/2022    Carotid stenosis, left 10/12/2011    Chronic back pain     Chronic systolic (congestive) heart failure 31/45/6635    Diastolic CHF ([facility] [address]     Erectile dysfunction     Hyperlipidemia     Hypertension     Osteoarthritis     Restrictive lung disease     Type II or unspecified type diabetes mellitus without mention of complication, not stated as uncontrolled        Past Surgical History:   Procedure Laterality Date    CARDIAC CATHETERIZATION  06/2022    GASTROSTOMY TUBE PLACEMENT N/A 10/11/2022    EGD PEG TUBE PLACEMENT performed by Vianey Frost MD at [facility] Left     PLEURAL CATH INSERTION N/A 9/19/2022    PLEURAL CATHETER PLACEMENT; INTERCOSTAL NERVE BLOCK X4 performed by Kayla Romero MD at [facility], [place], operated by [facility] Bilateral     THORACOSCOPY Right 9/19/2022    RIGHT VIDEO ASSISTED THORASCOPIC SURGERY AND; performed by Kayla Romero MD at [address] N/A 9/30/2022    TRACHEOSTOMY performed by Med Mead MD at [facility] History   Problem Relation Age of Onset    Hearing Loss Father     Heart Disease Father 70        MI    High Blood Pressure Father     Diabetes Maternal Grandmother         hypoglycemic    Hearing Loss Maternal Grandmother Arthritis Maternal Grandfather         reports that he quit smoking about 21 years ago. His smoking use included cigarettes. He has a 80.00 pack-year smoking history. He has never used smokeless tobacco. He reports current alcohol use. He reports that he does not use drugs. Allergies:   Torsemide and Dye [iodides]    Current Medications:    albuterol (PROVENTIL) nebulizer solution 2.5 mg, 4x daily  bisacodyl (DULCOLAX) suppository 10 mg, Daily  collagenase ointment, Daily  methocarbamol (ROBAXIN) tablet 500 mg, 4x Daily  [Held by provider] insulin glargine (LANTUS;BASAGLAR) injection pen 40 Units, Nightly  doxazosin (CARDURA) tablet 1 mg, Daily  clotrimazole (LOTRIMIN) 1 % cream, BID  0.9 % sodium chloride infusion, PRN  lansoprazole suspension SUSP 30 mg, BID  oxyCODONE (ROXICODONE) 5 MG/5ML solution 7.5 mg, Q6H PRN  hydroxypropyl methylcellulose (GONIOSOL) 2.5 % ophthalmic solution 1 drop, PRN  [Held by provider] insulin lispro (1 Unit Dial) (HUMALOG/ADMELOG) pen 0-16 Units, Q4H  Venelex ointment, BID  acetaminophen (TYLENOL) 160 MG/5ML solution 650 mg, Q6H PRN  chlorhexidine (PERIDEX) 0.12 % solution 15 mL, BID  apixaban (ELIQUIS) tablet 5 mg, BID  sodium chloride flush 0.9 % injection 5-40 mL, 2 times per day  sodium chloride flush 0.9 % injection 5-40 mL, PRN  0.9 % sodium chloride infusion, PRN  ondansetron (ZOFRAN-ODT) disintegrating tablet 4 mg, Q8H PRN   Or  ondansetron (ZOFRAN) injection 4 mg, Q6H PRN  glucose chewable tablet 16 g, PRN  dextrose bolus 10% 125 mL, PRN   Or  dextrose bolus 10% 250 mL, PRN  glucagon (rDNA) injection 1 mg, PRN  dextrose 10 % infusion, Continuous PRN  hydrALAZINE (APRESOLINE) injection 5 mg, Q15 Min PRN          Physical exam:     Vitals:  BP (!) 117/49   Pulse 84   Temp 97.5 °F (36.4 °C) (Axillary)   Resp 16   Ht 6' (1.829 m)   Wt 197 lb 5 oz (89.5 kg)   SpO2 98%   BMI 26.76 kg/m²   Constitutional:  on MV  Skin: no rash, turgor wnl  Heent:  eomi, mmm  Neck: no bruits or jvd noted  Cardiovascular:  S1, S2 without m/r/g  Respiratory: trach  Abdomen:  +bs, soft, nt, nd  Ext: bilateral lower extremity edema R>L  Psychiatric: mood and affect appropriate  Musculoskeletal:  Rom, muscular strength intact    Data:   Labs:  CBC:   Recent Labs     10/19/22  0527 10/20/22  0507 10/21/22  0455   WBC 9.7 13.6* 14.5*   HGB 7.4* 7.3* 7.7*    403 444       BMP:    Recent Labs     10/19/22  0527 10/20/22  0507 10/21/22  0455   * 147* 148*   K 3.9 3.7 3.9    107 106   CO2 32 32 33*   BUN 77* 74* 71*   CREATININE 0.8 0.8 0.8   GLUCOSE 177* 157* 50*       Ca/Mg/Phos:   Recent Labs     10/19/22  0527 10/20/22  0507 10/21/22  0455   CALCIUM 8.5 8.2* 8.2*   MG 3.00* 3.10* 2.70*   PHOS 2.7 2.4* 2.6       Hepatic: No results for input(s): AST, ALT, ALB, BILITOT, ALKPHOS in the last 72 hours. Troponin: No results for input(s): TROPONINI in the last 72 hours. BNP: No results for input(s): BNP in the last 72 hours. Lipids:   No results for input(s): CHOL, TRIG, HDL, LDLCALC, LABVLDL in the last 72 hours. ABGs:   No results for input(s): PHART, PO2ART, MFZ5AOC in the last 72 hours. INR: No results for input(s): INR in the last 72 hours. UA:No results for input(s): Christella Plants, GLUCOSEU, BILIRUBINUR, [place], BLOODU, PHUR, PROTEINU, UROBILINOGEN, NITRU, LEUKOCYTESUR, LABMICR, URINETYPE in the last 72 hours. Urine Microscopic: No results for input(s): LABCAST, BACTERIA, COMU, HYALCAST, WBCUA, RBCUA, EPIU in the last 72 hours. Urine Culture: No results for input(s): LABURIN in the last 72 hours. Urine Chemistry:   No results for input(s): Man [place], BIANCA, NAFREDERICK in the last 72 hours. IMAGING:  XR ABDOMEN (KUB) (SINGLE AP VIEW)   Final Result   Impression:       Slightly increased gas dilation of transverse colon since the prior study. No evidence of small bowel obstruction. Moderate colonic stool burden, unchanged from prior study. XR CHEST PORTABLE   Final Result   Impression:       Slight progression of bibasilar lung opacities, especially on the left. Persistence of small bilateral pleural effusions. XR ABDOMEN (2 VIEWS)   Final Result   1. Moderate amount of stool in the descending colon with the transverse colon mildly dilated with air. CT CERVICAL SPINE WO CONTRAST   Final Result      1. Osseous lesions in C2, C7 and T1 vertebral body seen on recent MRI dated 10/18/2022 are not well-visualized on CT. Bone density is slightly heterogenous on CT. There is a 8 mm radiolucent lesion in the inferior posterior aspect of C2 vertebral body    which may correspond to C2 lesion seen on MRI. 2. Mildly expansile lytic lesion in left lamina of C4 concerning for metastasis. 3. Multilevel degenerative disc disease, most pronounced at C6-7 where there is disc osteophyte complex causing mild-to-moderate spinal canal stenosis. 4. Multilevel foraminal stenosis, most pronounced at C5-6, moderate to severe right and moderate left. MRI LUMBAR SPINE WO CONTRAST   Final Result      1. Motion compromised study. 2.  No cord compression. 3.  Osseous lesions in the cervical and thoracic spine suspicious for metastatic disease. No extraosseous mass. 4.  Severe multilevel degenerative disc disease in the lumbar spine. Moderate to severe canal stenosis at L2-L3 and L4-L5. Moderate canal stenosis at L3-L4. Multiple levels of moderate to severe canal stenosis from L2-L3 to L4-L5      MRI THORACIC SPINE WO CONTRAST   Final Result      1. Motion compromised study. 2.  No cord compression. 3.  Osseous lesions in the cervical and thoracic spine suspicious for metastatic disease. No extraosseous mass. 4.  Severe multilevel degenerative disc disease in the lumbar spine. Moderate to severe canal stenosis at L2-L3 and L4-L5. Moderate canal stenosis at L3-L4.  Multiple levels of moderate to severe canal stenosis from L2-L3 to L4-L5 MRI Cervical Spine WO Contrast   Final Result      1. Motion compromised study. 2.  No cord compression. 3.  Osseous lesions in the cervical and thoracic spine suspicious for metastatic disease. No extraosseous mass. 4.  Severe multilevel degenerative disc disease in the lumbar spine. Moderate to severe canal stenosis at L2-L3 and L4-L5. Moderate canal stenosis at L3-L4. Multiple levels of moderate to severe canal stenosis from L2-L3 to L4-L5      MRI BRAIN WO CONTRAST   Final Result      1. No acute hemorrhage, mass or acute ischemia. 2.  Mild burden of nonspecific multifocal white matter disease compatible with chronic small vessel ischemia. 3.  Mild atrophy. 4.  Bilateral mastoid effusions. XR CHEST PORTABLE   Final Result      Distal portion of tracheostomy poorly visualized due to overlying medical devices. The position is without significant change since 10/5/2022. Bibasilar pleuroparenchymal consolidation. Stable cardiomediastinal silhouette. Right jugular central venous catheter without change. CT ABDOMEN PELVIS WO CONTRAST Additional Contrast? None   Final Result      Marked decrease in size of pneumomediastinum. Trace residual pneumoperitoneum. Extensive bilateral lower lobe pulmonary atelectasis with pleural effusions and trace residual right pneumothorax. XR CHEST PORTABLE   Final Result      Tiny right lateral pneumothorax is suspected, 5 mm in maximum thickness. This has decreased in size since 10/3/2022. Right basilar Pleurx catheter noted. Small bilateral pleural effusions. Prominent interstitial markings. Stable cardiac mediastinal silhouette. Lines and tubes without change. Subcutaneous emphysema noted. CT CHEST ABDOMEN PELVIS WO CONTRAST   Final Result      1. Severe pneumomediastinum. 2. Small to moderate right hydropneumothorax with similar fluid and gas components with a right-sided Pleurx catheter.    3. Moderate loculated left pleural effusion with atelectasis of the left lower lobe with patency of the central left lower lobe bronchi. 4. Fluid/material filling the bronchus intermedius and right lower lobe bronchi with complete consolidation and volume loss of the right lower lobe. Differential diagnosis would include mucous plugging or obstructing lesion. 5. Tracheostomy tube tip within the trachea   6. Severe subcutaneous emphysema in the neck. CT ABDOMEN AND PELVIS:      FINDINGS:      LIVER: Normal.      GALLBLADDER AND BILIARY TREE: No calcified gallstones. No gallbladder distention. No intra- or extrahepatic biliary dilatation. PANCREAS: Normal.      SPLEEN: Normal.      ADRENAL GLANDS: Normal.      KIDNEYS AND URETERS: Normal.      URINARY BLADDER: Ansari catheter present within collapsed urinary bladder. REPRODUCTIVE ORGANS: No associated masses. BOWEL: Normal diameter, nonobstructed. Feeding tube tip at the level of the ligament of Treitz. LYMPH NODES: No abnormally enlarged nodes. PERITONEUM/RETROPERITONEUM: Severe pneumoperitoneum extends into the upper abdomen with some of the symmetric multiseptated gas appearing deep to the diaphragm consistent with mild pneumoperitoneum (series 601 was 101). This is likely related to    pneumonia mediastinum dissecting into the abdomen. No fluid collection in the abdomen or pelvis. No ascites. VESSELS: Extensive atherosclerotic calcification of the aorta and iliac arteries. ABDOMINAL WALL: No acute abnormality. BONES: No acute abnormality. Mild chronic L1 compression fracture with previous vertebroplasty. IMPRESSION:      1. Severe pneumomediastinum extends into the upper abdomen with small pneumoperitoneum likely related to extension of pneumoperitoneum into the upper peritoneal cavity. 2. No fluid collection in the abdomen or pelvis.       Results were discussed with the surgical team at 7:00 PM on 10/3/2022. XR CHEST PORTABLE   Final Result      Stable appearance of loculated right pneumothorax, with loculated components in the lateral right midlung region and in the right lateral costophrenic sulcus. Stable scattered areas of atelectasis versus scar, most prominent in the medial right lung base and in the left lateral lung base. XR CHEST PORTABLE   Final Result      Small right basilar pneumothorax. Right basilar chest tube without change. Patchy consolidation in the right mid and lower lung as well as the left lower lung-atelectasis versus pneumonia. Trace left pleural effusion. Normal heart size. Lines and tubes without change. Mild improvement in degree of subcutaneous emphysema in the chest and neck. XR CHEST 1 VIEW   Final Result      1. Stable small right-sided pneumothorax and prominent subcutaneous emphysema along the neck and upper superior chest wall, greater in right lung stable   2. Stable left basilar consolidation and pleural effusion      3. Stable appearing perihilar accentuated markings or airspace disease            XR CHEST PORTABLE   Final Result      Stable small right-sided pneumothorax. More prominent emphysema over the lower neck. No change in bilateral airspace disease. Stable left pleural effusion. XR CHEST PORTABLE   Final Result   1. Bilateral multifocal pneumonia with improvement in the right    pulmonary consolidation since prior study from 9/23/22   2. Mild to moderate left pleural effusion          XR CHEST PORTABLE   Final Result   1. Support lines and tubes are stable. 2.  Stable small right lateral pneumothorax and right basilar    chest tube. 3.  Stable patchy bilateral airspace disease. XR CHEST PORTABLE   Final Result   1. Satisfactory position of right internal jugular central line   2. No interval change in endotracheal tube and nasogastric tube positioning.    3. Extensive bilateral airspace disease with no changes. 4. Left pleural effusion with retrocardiac opacity, unchanged   5. Small stable tiny right lateral pneumothorax and stable position of right chest tube,, Pleurx catheter. XR ABDOMEN (KUB) (SINGLE AP VIEW)   Final Result   1. No residual pneumothorax. 2.  Mild patchy airspace disease bilaterally worse on the right than on prior examination. 3.  Non-obstructive bowel gas pattern. Mildly distended stomach. XR CHEST PORTABLE   Final Result   1. No residual pneumothorax. 2.  Mild patchy airspace disease bilaterally worse on the right than on prior examination. 3.  Non-obstructive bowel gas pattern. Mildly distended stomach. XR CHEST PORTABLE   Final Result      1. Small right pneumothorax appears slightly increased. 2. Mild patchy airspace opacity bilaterally. XR CHEST PORTABLE   Final Result     Pleurx catheter at the right lung base. Trace right    pneumothorax is unchanged. XR CHEST PORTABLE   Final Result      Right-sided chest tube evident in the base, its tip medially. Improvement in the right-sided pneumothorax, since earlier today. Possible medial collapse of the right lower lobe. Small left effusion. Patchy airspace density/atelectasis in the lungs bilaterally, with no other significant change. XR CHEST PORTABLE   Final Result   1. Right-sided Pleurx catheter in satisfactory position in the lung bases   2. Right-sided post operative pneumothorax, approximately 10%             Assessment/Plan   ZANA  - sec to contrast nephropathy  - Cr stable   - BNP 3k, Protein:Cre 0.3, FENa 0.4%  - closely monitor UOP  - avoid nephrotoxic agent     2. Hypernatremia  - continue free water   - will continue to monitor     3. Hypotension  - pressors as needed     4. Anemia  - daily CBC    5. Acid- base/ Electrolyte imbalance   - optimize lytes    6.  Acute hypoxic resp failure  - s/p VATS on 9/19/22 for recurrent pleural effusions  - Intensivist and CTS following    7.  CHF   - EF 65% on 6/16/22 via cardiac cath        Vitaliy Pineda MD

## 2022-10-21 NOTE — PROGRESS NOTES
Nephrology Progress Note                                                                                                                                                                                                                                                                                                                                                               Office : [phone]     Fax :[phone]    Patient's Name: Loraine Marie        Reason for Consult:   ZANA  Requesting Physician:  Chelsie Chawla MD    Interval History:      Cr stable   Na stable   Good UO   Remains on Trach   PEG placed         Past Medical History:   Diagnosis Date    ZANA (acute kidney injury) ([facility] [address] 9/26/2022    Carotid stenosis, left 10/12/2011    Chronic back pain     Chronic systolic (congestive) heart failure 52/03/6149    Diastolic CHF ([facility] [address]     Erectile dysfunction     Hyperlipidemia     Hypertension     Osteoarthritis     Restrictive lung disease     Type II or unspecified type diabetes mellitus without mention of complication, not stated as uncontrolled        Past Surgical History:   Procedure Laterality Date    CARDIAC CATHETERIZATION  06/2022    GASTROSTOMY TUBE PLACEMENT N/A 10/11/2022    EGD PEG TUBE PLACEMENT performed by Daryl Bryson MD at [facility] Left     PLEURAL CATH INSERTION N/A 9/19/2022    PLEURAL CATHETER PLACEMENT; INTERCOSTAL NERVE BLOCK X4 performed by Cosme Ayala MD at [facility] Bilateral     THORACOSCOPY Right 9/19/2022    RIGHT VIDEO ASSISTED THORASCOPIC SURGERY AND; performed by Cosme Ayala MD at [address] N/A 9/30/2022    TRACHEOSTOMY performed by Mathew Galeano MD at [facility] History   Problem Relation Age of Onset    Hearing Loss Father     Heart Disease Father 70        MI    High Blood Pressure Father     Diabetes Maternal Grandmother         hypoglycemic    Hearing Loss Maternal Grandmother Arthritis Maternal Grandfather         reports that he quit smoking about 21 years ago. His smoking use included cigarettes. He has a 80.00 pack-year smoking history. He has never used smokeless tobacco. He reports current alcohol use. He reports that he does not use drugs. Allergies:   Torsemide and Dye [iodides]    Current Medications:    bisacodyl (DULCOLAX) suppository 10 mg, Daily PRN  collagenase ointment, Daily  methocarbamol (ROBAXIN) tablet 500 mg, 4x Daily  insulin glargine (LANTUS;BASAGLAR) injection pen 40 Units, Nightly  doxazosin (CARDURA) tablet 1 mg, Daily  clotrimazole (LOTRIMIN) 1 % cream, BID  0.9 % sodium chloride infusion, PRN  lansoprazole suspension SUSP 30 mg, BID  oxyCODONE (ROXICODONE) 5 MG/5ML solution 7.5 mg, Q6H PRN  hydroxypropyl methylcellulose (GONIOSOL) 2.5 % ophthalmic solution 1 drop, PRN  albuterol (PROVENTIL) nebulizer solution 2.5 mg, Q4H  insulin lispro (1 Unit Dial) (HUMALOG/ADMELOG) pen 0-16 Units, Q4H  Venelex ointment, BID  acetaminophen (TYLENOL) 160 MG/5ML solution 650 mg, Q6H PRN  chlorhexidine (PERIDEX) 0.12 % solution 15 mL, BID  apixaban (ELIQUIS) tablet 5 mg, BID  sodium chloride flush 0.9 % injection 5-40 mL, 2 times per day  sodium chloride flush 0.9 % injection 5-40 mL, PRN  0.9 % sodium chloride infusion, PRN  ondansetron (ZOFRAN-ODT) disintegrating tablet 4 mg, Q8H PRN   Or  ondansetron (ZOFRAN) injection 4 mg, Q6H PRN  glucose chewable tablet 16 g, PRN  dextrose bolus 10% 125 mL, PRN   Or  dextrose bolus 10% 250 mL, PRN  glucagon (rDNA) injection 1 mg, PRN  dextrose 10 % infusion, Continuous PRN  hydrALAZINE (APRESOLINE) injection 5 mg, Q15 Min PRN          Physical exam:     Vitals:  /60   Pulse 98   Temp 98.7 °F (37.1 °C) (Oral)   Resp 19   Ht 6' (1.829 m)   Wt 196 lb 13.9 oz (89.3 kg)   SpO2 96%   BMI 26.70 kg/m²   Constitutional:  on MV  Skin: no rash, turgor wnl  Heent:  eomi, mmm  Neck: no bruits or jvd noted  Cardiovascular:  S1, S2 without m/r/g  Respiratory: trach  Abdomen:  +bs, soft, nt, nd  Ext: bilateral lower extremity edema R>L  Psychiatric: mood and affect appropriate  Musculoskeletal:  Rom, muscular strength intact    Data:   Labs:  CBC:   Recent Labs     10/18/22  0429 10/19/22  0527 10/20/22  0507   WBC 9.5 9.7 13.6*   HGB 7.5* 7.4* 7.3*    335 403       BMP:    Recent Labs     10/18/22  0429 10/19/22  0527 10/20/22  0507   * 148* 147*   K 4.1 3.9 3.7    108 107   CO2 34* 32 32   BUN 79* 77* 74*   CREATININE 0.8 0.8 0.8   GLUCOSE 187* 177* 157*       Ca/Mg/Phos:   Recent Labs     10/18/22  0429 10/19/22  0527 10/20/22  0507   CALCIUM 8.4 8.5 8.2*   MG 3.10* 3.00* 3.10*   PHOS 2.6 2.7 2.4*       Hepatic: No results for input(s): AST, ALT, ALB, BILITOT, ALKPHOS in the last 72 hours. Troponin: No results for input(s): TROPONINI in the last 72 hours. BNP: No results for input(s): BNP in the last 72 hours. Lipids:   No results for input(s): CHOL, TRIG, HDL, LDLCALC, LABVLDL in the last 72 hours. ABGs:   No results for input(s): PHART, PO2ART, VHC6HRO in the last 72 hours. INR: No results for input(s): INR in the last 72 hours. UA:No results for input(s): [place], GLUCOSEU, BILIRUBINUR, Phoebe Bolls, BLOODU, PHUR, PROTEINU, UROBILINOGEN, NITRU, LEUKOCYTESUR, LABMICR, URINETYPE in the last 72 hours. Urine Microscopic: No results for input(s): LABCAST, BACTERIA, COMU, HYALCAST, WBCUA, RBCUA, EPIU in the last 72 hours. Urine Culture: No results for input(s): LABURIN in the last 72 hours. Urine Chemistry:   No results for input(s): Blane Necessary, PROTEINUR, NAUR in the last 72 hours. IMAGING:  XR ABDOMEN (2 VIEWS)   Final Result   1. Moderate amount of stool in the descending colon with the transverse colon mildly dilated with air. CT CERVICAL SPINE WO CONTRAST   Final Result      1.  Osseous lesions in C2, C7 and T1 vertebral body seen on recent MRI dated 10/18/2022 are not well-visualized on CT. Bone density is slightly heterogenous on CT. There is a 8 mm radiolucent lesion in the inferior posterior aspect of C2 vertebral body    which may correspond to C2 lesion seen on MRI. 2. Mildly expansile lytic lesion in left lamina of C4 concerning for metastasis. 3. Multilevel degenerative disc disease, most pronounced at C6-7 where there is disc osteophyte complex causing mild-to-moderate spinal canal stenosis. 4. Multilevel foraminal stenosis, most pronounced at C5-6, moderate to severe right and moderate left. MRI LUMBAR SPINE WO CONTRAST   Final Result      1. Motion compromised study. 2.  No cord compression. 3.  Osseous lesions in the cervical and thoracic spine suspicious for metastatic disease. No extraosseous mass. 4.  Severe multilevel degenerative disc disease in the lumbar spine. Moderate to severe canal stenosis at L2-L3 and L4-L5. Moderate canal stenosis at L3-L4. Multiple levels of moderate to severe canal stenosis from L2-L3 to L4-L5      MRI THORACIC SPINE WO CONTRAST   Final Result      1. Motion compromised study. 2.  No cord compression. 3.  Osseous lesions in the cervical and thoracic spine suspicious for metastatic disease. No extraosseous mass. 4.  Severe multilevel degenerative disc disease in the lumbar spine. Moderate to severe canal stenosis at L2-L3 and L4-L5. Moderate canal stenosis at L3-L4. Multiple levels of moderate to severe canal stenosis from L2-L3 to L4-L5      MRI Cervical Spine WO Contrast   Final Result      1. Motion compromised study. 2.  No cord compression. 3.  Osseous lesions in the cervical and thoracic spine suspicious for metastatic disease. No extraosseous mass. 4.  Severe multilevel degenerative disc disease in the lumbar spine. Moderate to severe canal stenosis at L2-L3 and L4-L5. Moderate canal stenosis at L3-L4.  Multiple levels of moderate to severe canal stenosis from L2-L3 to L4-L5      MRI BRAIN WO CONTRAST   Final Result      1. No acute hemorrhage, mass or acute ischemia. 2.  Mild burden of nonspecific multifocal white matter disease compatible with chronic small vessel ischemia. 3.  Mild atrophy. 4.  Bilateral mastoid effusions. XR CHEST PORTABLE   Final Result      Distal portion of tracheostomy poorly visualized due to overlying medical devices. The position is without significant change since 10/5/2022. Bibasilar pleuroparenchymal consolidation. Stable cardiomediastinal silhouette. Right jugular central venous catheter without change. CT ABDOMEN PELVIS WO CONTRAST Additional Contrast? None   Final Result      Marked decrease in size of pneumomediastinum. Trace residual pneumoperitoneum. Extensive bilateral lower lobe pulmonary atelectasis with pleural effusions and trace residual right pneumothorax. XR CHEST PORTABLE   Final Result      Tiny right lateral pneumothorax is suspected, 5 mm in maximum thickness. This has decreased in size since 10/3/2022. Right basilar Pleurx catheter noted. Small bilateral pleural effusions. Prominent interstitial markings. Stable cardiac mediastinal silhouette. Lines and tubes without change. Subcutaneous emphysema noted. CT CHEST ABDOMEN PELVIS WO CONTRAST   Final Result      1. Severe pneumomediastinum. 2. Small to moderate right hydropneumothorax with similar fluid and gas components with a right-sided Pleurx catheter. 3. Moderate loculated left pleural effusion with atelectasis of the left lower lobe with patency of the central left lower lobe bronchi. 4. Fluid/material filling the bronchus intermedius and right lower lobe bronchi with complete consolidation and volume loss of the right lower lobe. Differential diagnosis would include mucous plugging or obstructing lesion. 5. Tracheostomy tube tip within the trachea   6. Severe subcutaneous emphysema in the neck.          CT ABDOMEN AND PELVIS:      FINDINGS:      LIVER: Normal.      GALLBLADDER AND BILIARY TREE: No calcified gallstones. No gallbladder distention. No intra- or extrahepatic biliary dilatation. PANCREAS: Normal.      SPLEEN: Normal.      ADRENAL GLANDS: Normal.      KIDNEYS AND URETERS: Normal.      URINARY BLADDER: Ansari catheter present within collapsed urinary bladder. REPRODUCTIVE ORGANS: No associated masses. BOWEL: Normal diameter, nonobstructed. Feeding tube tip at the level of the ligament of Treitz. LYMPH NODES: No abnormally enlarged nodes. PERITONEUM/RETROPERITONEUM: Severe pneumoperitoneum extends into the upper abdomen with some of the symmetric multiseptated gas appearing deep to the diaphragm consistent with mild pneumoperitoneum (series 601 was 101). This is likely related to    pneumonia mediastinum dissecting into the abdomen. No fluid collection in the abdomen or pelvis. No ascites. VESSELS: Extensive atherosclerotic calcification of the aorta and iliac arteries. ABDOMINAL WALL: No acute abnormality. BONES: No acute abnormality. Mild chronic L1 compression fracture with previous vertebroplasty. IMPRESSION:      1. Severe pneumomediastinum extends into the upper abdomen with small pneumoperitoneum likely related to extension of pneumoperitoneum into the upper peritoneal cavity. 2. No fluid collection in the abdomen or pelvis. Results were discussed with the surgical team at 7:00 PM on 10/3/2022. XR CHEST PORTABLE   Final Result      Stable appearance of loculated right pneumothorax, with loculated components in the lateral right midlung region and in the right lateral costophrenic sulcus. Stable scattered areas of atelectasis versus scar, most prominent in the medial right lung base and in the left lateral lung base. XR CHEST PORTABLE   Final Result      Small right basilar pneumothorax. Right basilar chest tube without change.       Patchy consolidation in the right mid and lower lung as well as the left lower lung-atelectasis versus pneumonia. Trace left pleural effusion. Normal heart size. Lines and tubes without change. Mild improvement in degree of subcutaneous emphysema in the chest and neck. XR CHEST 1 VIEW   Final Result      1. Stable small right-sided pneumothorax and prominent subcutaneous emphysema along the neck and upper superior chest wall, greater in right lung stable   2. Stable left basilar consolidation and pleural effusion      3. Stable appearing perihilar accentuated markings or airspace disease            XR CHEST PORTABLE   Final Result      Stable small right-sided pneumothorax. More prominent emphysema over the lower neck. No change in bilateral airspace disease. Stable left pleural effusion. XR CHEST PORTABLE   Final Result   1. Bilateral multifocal pneumonia with improvement in the right    pulmonary consolidation since prior study from 9/23/22   2. Mild to moderate left pleural effusion          XR CHEST PORTABLE   Final Result   1. Support lines and tubes are stable. 2.  Stable small right lateral pneumothorax and right basilar    chest tube. 3.  Stable patchy bilateral airspace disease. XR CHEST PORTABLE   Final Result   1. Satisfactory position of right internal jugular central line   2. No interval change in endotracheal tube and nasogastric tube positioning. 3. Extensive bilateral airspace disease with no changes. 4. Left pleural effusion with retrocardiac opacity, unchanged   5. Small stable tiny right lateral pneumothorax and stable position of right chest tube,, Pleurx catheter. XR ABDOMEN (KUB) (SINGLE AP VIEW)   Final Result   1. No residual pneumothorax. 2.  Mild patchy airspace disease bilaterally worse on the right than on prior examination. 3.  Non-obstructive bowel gas pattern. Mildly distended stomach.       XR CHEST PORTABLE   Final Result   1. No residual pneumothorax. 2.  Mild patchy airspace disease bilaterally worse on the right than on prior examination. 3.  Non-obstructive bowel gas pattern. Mildly distended stomach. XR CHEST PORTABLE   Final Result      1. Small right pneumothorax appears slightly increased. 2. Mild patchy airspace opacity bilaterally. XR CHEST PORTABLE   Final Result     Pleurx catheter at the right lung base. Trace right    pneumothorax is unchanged. XR CHEST PORTABLE   Final Result      Right-sided chest tube evident in the base, its tip medially. Improvement in the right-sided pneumothorax, since earlier today. Possible medial collapse of the right lower lobe. Small left effusion. Patchy airspace density/atelectasis in the lungs bilaterally, with no other significant change. XR CHEST PORTABLE   Final Result   1. Right-sided Pleurx catheter in satisfactory position in the lung bases   2. Right-sided post operative pneumothorax, approximately 10%         XR ABDOMEN (KUB) (SINGLE AP VIEW)    (Results Pending)   XR CHEST PORTABLE    (Results Pending)       Assessment/Plan   ZANA  - sec to contrast nephropathy  - Cr stable   - BNP 3k, Protein:Cre 0.3, FENa 0.4%  - closely monitor UOP  - avoid nephrotoxic agent     2. Hypernatremia  - continue free water   - will continue to monitor     3. Hypotension  - pressors as needed     4. Anemia  - daily CBC    5. Acid- base/ Electrolyte imbalance   - optimize lytes    6. Acute hypoxic resp failure  - s/p VATS on 9/19/22 for recurrent pleural effusions  - Intensivist and CTS following    7.  CHF   - EF 65% on 6/16/22 via cardiac cath        Rylie Mitchell MD

## 2022-10-22 NOTE — PROGRESS NOTES
Cardiothoracic Surgery Daily Progress Note      CC: Pleural effusions s/p R pleurX placement    Subjective :  Afebrile, HDS. Had episodes of hypoglycemia to POC glucose of 58 yesterday, given dextrose bolus x2, now resolved. Complaining of pain with turning this morning, otherwise stable. Two smaller liquid bowel movement yesterday, decreased from previous day. Some nausea with PO intake, but overall tolerating tube feeds. Ansari in place. Objective     Exam:  Vitals:    10/21/22 2300 10/22/22 0114 10/22/22 0300 10/22/22 0332   BP: (!) 115/48  119/61    Pulse: 84 86 70 84   Resp: 13 13 20 16   Temp: 98 °F (36.7 °C)  98.1 °F (36.7 °C)    TempSrc: Axillary  Axillary    SpO2: 97% 95% 96% 96%   Weight:   192 lb 7.4 oz (87.3 kg)    Height:           Physical Examination:   General appearance: Alert, following commands. Neurological: no focal deficits  HEENT: EOMI, trachea midline, no JVD. Tracheostomy in place with some mucus drainage  Chest/Lungs: On mechanical ventilation via tracheostomy; R PleurX catheter capped with dressing C/D/I  Cardiovascular: RRR  Abdomen: Soft, non-tender, non-distended. PEG tube with tube feeds running at 65   Skin: Warm and dry. Sacral decubitus ulcer covered with Mepilex. Extremities: Pitting edema of the b/l feet. No cyanosis. Legs ulcers from SCDs covered with meplixex. ASSESSMENT/PLAN:   Laure Rayo is a 65 y.o. male with history of diastolic heart failure, atrial fibrillation, and DM with recurrent pleural effusions s/p R PleurX catheter placement (9/19). - Pleurx drainage today. Continue every other day Pleurx drainage.  - Pulmonology/Crit care managing vent. - Diarrhea resolved. Stool frequency has decreased. Fiber was discontinued. Will go one more day, then will consider re-starting fiber.  Continue daily suppository   - Continue tube feeds  - glucose control improved  - Sacral wound per wound care nurse  -Continue PT/OT  - [facility] pending; insurance company did not called physician for to peer to peer as scheduled yesterday. We will follow-up with  regarding to [facility] placement. Anticipate patient will stay in-house until the end of the month.     Porfirio Farrar MD  PGY1, General Surgery  10/22/22  7:35 AM  [facility]#[phone]

## 2022-10-22 NOTE — PLAN OF CARE
Problem: Chronic Conditions and Co-morbidities  Goal: Patient's chronic conditions and co-morbidity symptoms are monitored and maintained or improved  10/22/2022 0556 by Jorge Duvall RN  Outcome: Progressing  Flowsheets (Taken 10/21/2022 2000)  Care Plan - Patient's Chronic Conditions and Co-Morbidity Symptoms are Monitored and Maintained or Improved:   Monitor and assess patient's chronic conditions and comorbid symptoms for stability, deterioration, or improvement   Collaborate with multidisciplinary team to address chronic and comorbid conditions and prevent exacerbation or deterioration   Update acute care plan with appropriate goals if chronic or comorbid symptoms are exacerbated and prevent overall improvement and discharge  10/21/2022 1607 by Malika Benedict RN  Outcome: Progressing     Problem: Discharge Planning  Goal: Discharge to home or other facility with appropriate resources  10/22/2022 0556 by Jorge Duvall RN  Outcome: Florencia Labor (Taken 10/21/2022 2000)  Discharge to home or other facility with appropriate resources:   Identify barriers to discharge with patient and caregiver   Arrange for needed discharge resources and transportation as appropriate   Identify discharge learning needs (meds, wound care, etc)   Refer to discharge planning if patient needs post-hospital services based on physician order or complex needs related to functional status, cognitive ability or social support system  10/21/2022 1607 by Malika Benedict RN  Outcome: Progressing     Problem: Pain  Goal: Verbalizes/displays adequate comfort level or baseline comfort level  10/22/2022 0556 by Jorge Duvall RN  Outcome: Progressing  Flowsheets (Taken 10/21/2022 2000)  Verbalizes/displays adequate comfort level or baseline comfort level:   Encourage patient to monitor pain and request assistance   Assess pain using appropriate pain scale   Administer analgesics based on type and severity of pain and evaluate response   Implement non-pharmacological measures as appropriate and evaluate response   Notify Licensed Independent Practitioner if interventions unsuccessful or patient reports new pain  10/21/2022 1607 by Megan Centeno RN  Flowsheets (Taken 10/21/2022 1607)  Verbalizes/displays adequate comfort level or baseline comfort level:   Administer analgesics based on type and severity of pain and evaluate response   Assess pain using appropriate pain scale   Implement non-pharmacological measures as appropriate and evaluate response     Problem: Safety - Adult  Goal: Free from fall injury  10/22/2022 0556 by Sayra Jean Baptiste RN  Outcome: Progressing  Flowsheets (Taken 10/21/2022 2000)  Free From Fall Injury:   Instruct family/caregiver on patient safety   Based on caregiver fall risk screen, instruct family/caregiver to ask for assistance with transferring infant if caregiver noted to have fall risk factors  10/21/2022 1607 by Megan Centeno RN  Outcome: Progressing  Note: Bed low with wheels locked  bed alarm on  call light in reach at all times  increased rounding frequency     Problem: Skin/Tissue Integrity  Goal: Absence of new skin breakdown  Description: 1. Monitor for areas of redness and/or skin breakdown  2. Assess vascular access sites hourly  3. Every 4-6 hours minimum:  Change oxygen saturation probe site  4. Every 4-6 hours:  If on nasal continuous positive airway pressure, respiratory therapy assess nares and determine need for appliance change or resting period.   10/22/2022 0556 by Sayra Jean Baptiste RN  Outcome: Progressing  10/21/2022 1607 by Megan Centeno RN  Note: Wound care per order  turn and reposition with use of pillow support every two hour and prn  russell care per staff  continue assess tissue integrity     Problem: Nutrition Deficit:  Goal: Optimize nutritional status  10/22/2022 0556 by Sayra Jean Baptiste RN  Outcome: Progressing  10/21/2022 1607 by Felix Rodríguez RN  Flowsheets (Taken 10/21/2022 1607)  Nutrient intake appropriate for improving, restoring, or maintaining nutritional needs: Recommend, monitor, and adjust tube feedings and TPN/PPN based on assessed needs     Problem: ABCDS Injury Assessment  Goal: Absence of physical injury  10/22/2022 0556 by Nalini Maier RN  Outcome: Progressing  Flowsheets (Taken 10/21/2022 2000)  Absence of Physical Injury: Implement safety measures based on patient assessment  10/21/2022 1607 by Felix Rodríguez RN  Outcome: Progressing     Problem: Confusion  Goal: Confusion, delirium, dementia, or psychosis is improved or at baseline  Description: INTERVENTIONS:  1. Assess for possible contributors to thought disturbance, including medications, impaired vision or hearing, underlying metabolic abnormalities, dehydration, psychiatric diagnoses, and notify attending LIP  2. [place] high risk fall precautions, as indicated  3. Provide frequent short contacts to provide reality reorientation, refocusing and direction  4. Decrease environmental stimuli, including noise as appropriate  5. Monitor and intervene to maintain adequate nutrition, hydration, elimination, sleep and activity  6. If unable to ensure safety without constant attention obtain sitter and review sitter guidelines with assigned personnel  7.  Initiate Psychosocial CNS and Spiritual Care consult, as indicated  10/22/2022 0556 by Nalini Maier RN  Outcome: Progressing  Flowsheets (Taken 10/21/2022 2000)  Effect of thought disturbance (confusion, delirium, dementia, or psychosis) are managed with adequate functional status:   Assess for contributors to thought disturbance, including medications, impaired vision or hearing, underlying metabolic abnormalities, dehydration, psychiatric diagnoses, notify [facility] high risk fall precautions, as indicated   Provide frequent short contacts to provide reality reorientation, refocusing and direction   Decrease environmental stimuli, including noise as appropriate   Monitor and intervene to maintain adequate nutrition, hydration, elimination, sleep and activity   If unable to ensure safety without constant attention obtain sitter and review sitter guidelines with assigned personnel   Initiate Psychosocial Clinical Nurse Specialist and [facility] consult, as indicated  10/21/2022 1607 by Irais Rodriguez RN  Outcome: Progressing  Flowsheets (Taken 10/21/2022 1607)  Effect of thought disturbance (confusion, delirium, dementia, or psychosis) are managed with adequate functional status:   Decrease environmental stimuli, including noise as appropriate   Monitor and intervene to maintain adequate nutrition, hydration, elimination, sleep and activity     Problem: Respiratory - Adult  Goal: Achieves optimal ventilation and oxygenation  10/22/2022 0556 by Swati Stafford RN  Outcome: Progressing  Flowsheets (Taken 10/21/2022 2000)  Achieves optimal ventilation and oxygenation:   Assess for changes in respiratory status   Assess for changes in mentation and behavior   Position to facilitate oxygenation and minimize respiratory effort   Oxygen supplementation based on oxygen saturation or arterial blood gases   Encourage broncho-pulmonary hygiene including cough, deep breathe, incentive spirometry   Assess the need for suctioning and aspirate as needed   Assess and instruct to report shortness of breath or any respiratory difficulty   Respiratory therapy support as indicated  10/21/2022 1607 by Irais Rodriguez RN  Outcome: Progressing  Note: RR 16-18 nonlabored  ventilator via trach per order  minimal secretions via trach  lungs faint crackles with auscultation       Problem: Cardiovascular - Adult  Goal: Maintains optimal cardiac output and hemodynamic stability  10/22/2022 0556 by Swati Stafford RN  Outcome: Progressing  Flowsheets (Taken 10/21/2022 2000)  Maintains optimal cardiac output and hemodynamic stability:   Monitor blood pressure and heart rate   Monitor urine output and notify Licensed Independent Practitioner for values outside of normal range   Assess for signs of decreased cardiac output  10/21/2022 1607 by Niki Egan RN  Outcome: Progressing     Problem: Genitourinary - Adult  Goal: Urinary catheter remains patent  10/22/2022 0556 by Jaylen Menchaca RN  Outcome: Progressing  10/21/2022 1607 by Niki Egan RN  Outcome: Progressing  Note: [place] patient draining clear yellow urine     Problem: Metabolic/Fluid and Electrolytes - Adult  Goal: Glucose maintained within prescribed range  10/22/2022 0556 by Jaylen Menchaca RN  Outcome: Progressing  Flowsheets (Taken 10/21/2022 2000)  Glucose maintained within prescribed range:   Monitor blood glucose as ordered   Assess for signs and symptoms of hyperglycemia and hypoglycemia   Administer ordered medications to maintain glucose within target range   Assess barriers to adequate nutritional intake and initiate nutrition consult as needed   Instruct patient on self management of diabetes and initiate consult as needed  10/21/2022 1607 by Niki Egan RN  Note: Blood sugar every 4hr per order  patient with two episodes hypoglycemia today  hypoglycemic protocol per order with dextrose bolus given  continue monitor and assess blood glucose

## 2022-10-22 NOTE — PLAN OF CARE
Problem: Discharge Planning  Goal: Discharge to home or other facility with appropriate resources  10/22/2022 1657 by Link Fleming RN  Outcome: Progressing  Flowsheets (Taken 10/22/2022 1657)  Discharge to home or other facility with appropriate resources: Identify barriers to discharge with patient and caregiver     Problem: Pain  Goal: Verbalizes/displays adequate comfort level or baseline comfort level  10/22/2022 1657 by Link Fleming RN  Outcome: Progressing  Flowsheets (Taken 10/22/2022 1657)  Verbalizes/displays adequate comfort level or baseline comfort level:   Encourage patient to monitor pain and request assistance   Assess pain using appropriate pain scale   Administer analgesics based on type and severity of pain and evaluate response     Problem: Safety - Adult  Goal: Free from fall injury  10/22/2022 1657 by Link Fleming RN  Outcome: Progressing  Flowsheets (Taken 10/22/2022 1657)  Free From Fall Injury: Instruct family/caregiver on patient safety     Problem: Skin/Tissue Integrity  Goal: Absence of new skin breakdown  Description: 1. Monitor for areas of redness and/or skin breakdown  2. Assess vascular access sites hourly  3. Every 4-6 hours minimum:  Change oxygen saturation probe site  4. Every 4-6 hours:  If on nasal continuous positive airway pressure, respiratory therapy assess nares and determine need for appliance change or resting period.   10/22/2022 1657 by Link Fleming RN  Outcome: Progressing     Problem: Respiratory - Adult  Goal: Achieves optimal ventilation and oxygenation  10/22/2022 1657 by Link Fleming RN  Outcome: Progressing  Flowsheets (Taken 10/22/2022 1657)  Achieves optimal ventilation and oxygenation:   Assess for changes in respiratory status   Assess for changes in mentation and behavior   Position to facilitate oxygenation and minimize respiratory effort   Oxygen supplementation based on oxygen saturation or arterial blood gases   Assess the need for suctioning and aspirate as needed   Assess and instruct to report shortness of breath or any respiratory difficulty   Respiratory therapy support as indicated     Problem: Cardiovascular - Adult  Goal: Maintains optimal cardiac output and hemodynamic stability  10/22/2022 1657 by Higinio Gomez RN  Outcome: Progressing  Flowsheets (Taken 10/22/2022 1657)  Maintains optimal cardiac output and hemodynamic stability:   Monitor blood pressure and heart rate   Monitor urine output and notify Licensed Independent Practitioner for values outside of normal range   Assess for signs of decreased cardiac output     Problem: Genitourinary - Adult  Goal: Urinary catheter remains patent  10/22/2022 1657 by Higinio Gomez RN  Outcome: Progressing  Flowsheets (Taken 10/22/2022 1657)  Urinary catheter remains patent: Assess patency of urinary catheter

## 2022-10-22 NOTE — PROGRESS NOTES
Nephrology Progress Note                                                                                                                                                                                                                                                                                                                                                               Office : [phone]     Fax :[phone]    Patient's Name: Deborah Ken        Reason for Consult:   ZANA  Requesting Physician:  Noemí Huber MD    Interval History:      Cr stable   Na better   Good UO   Remains on Trach   PEG placed - on TF         Past Medical History:   Diagnosis Date    ZANA (acute kidney injury) ([facility] [address] 9/26/2022    Carotid stenosis, left 10/12/2011    Chronic back pain     Chronic systolic (congestive) heart failure 15/54/1549    Diastolic CHF ([facility] [address]     Erectile dysfunction     Hyperlipidemia     Hypertension     Osteoarthritis     Restrictive lung disease     Type II or unspecified type diabetes mellitus without mention of complication, not stated as uncontrolled        Past Surgical History:   Procedure Laterality Date    CARDIAC CATHETERIZATION  06/2022    GASTROSTOMY TUBE PLACEMENT N/A 10/11/2022    EGD PEG TUBE PLACEMENT performed by Miriam Leblanc MD at [facility] Left     PLEURAL CATH INSERTION N/A 9/19/2022    PLEURAL CATHETER PLACEMENT; INTERCOSTAL NERVE BLOCK X4 performed by Dominique Oliver MD at [facility] Bilateral     THORACOSCOPY Right 9/19/2022    RIGHT VIDEO ASSISTED THORASCOPIC SURGERY AND; performed by Dominique Oliver MD at [address] N/A 9/30/2022    TRACHEOSTOMY performed by Janis Kaur MD at [facility] History   Problem Relation Age of Onset    Hearing Loss Father     Heart Disease Father 70        MI    High Blood Pressure Father     Diabetes Maternal Grandmother         hypoglycemic    Hearing Loss Maternal Grandmother     Arthritis Maternal Grandfather         reports that he quit smoking about 21 years ago. His smoking use included cigarettes. He has a 80.00 pack-year smoking history. He has never used smokeless tobacco. He reports current alcohol use. He reports that he does not use drugs. Allergies:   Torsemide and Dye [iodides]    Current Medications:    guar gum packet 1 packet, Daily  albuterol (PROVENTIL) nebulizer solution 2.5 mg, 4x daily  bisacodyl (DULCOLAX) suppository 10 mg, Daily  collagenase ointment, Daily  methocarbamol (ROBAXIN) tablet 500 mg, 4x Daily  [Held by provider] insulin glargine (LANTUS;BASAGLAR) injection pen 40 Units, Nightly  doxazosin (CARDURA) tablet 1 mg, Daily  clotrimazole (LOTRIMIN) 1 % cream, BID  0.9 % sodium chloride infusion, PRN  lansoprazole suspension SUSP 30 mg, BID  oxyCODONE (ROXICODONE) 5 MG/5ML solution 7.5 mg, Q6H PRN  hydroxypropyl methylcellulose (GONIOSOL) 2.5 % ophthalmic solution 1 drop, PRN  [Held by provider] insulin lispro (1 Unit Dial) (HUMALOG/ADMELOG) pen 0-16 Units, Q4H  Venelex ointment, BID  acetaminophen (TYLENOL) 160 MG/5ML solution 650 mg, Q6H PRN  chlorhexidine (PERIDEX) 0.12 % solution 15 mL, BID  apixaban (ELIQUIS) tablet 5 mg, BID  sodium chloride flush 0.9 % injection 5-40 mL, 2 times per day  sodium chloride flush 0.9 % injection 5-40 mL, PRN  0.9 % sodium chloride infusion, PRN  ondansetron (ZOFRAN-ODT) disintegrating tablet 4 mg, Q8H PRN   Or  ondansetron (ZOFRAN) injection 4 mg, Q6H PRN  glucose chewable tablet 16 g, PRN  dextrose bolus 10% 125 mL, PRN   Or  dextrose bolus 10% 250 mL, PRN  glucagon (rDNA) injection 1 mg, PRN  dextrose 10 % infusion, Continuous PRN  hydrALAZINE (APRESOLINE) injection 5 mg, Q15 Min PRN          Physical exam:     Vitals:  /63   Pulse 88   Temp 99 °F (37.2 °C) (Axillary)   Resp (!) 0   Ht 6' (1.829 m)   Wt 192 lb 7.4 oz (87.3 kg)   SpO2 95%   BMI 26.10 kg/m²   Constitutional:  on MV  Skin: no rash, turgor wnl  Heent:  eomi, mmm  Neck: no bruits or jvd noted  Cardiovascular:  S1, S2 without m/r/g  Respiratory: trach  Abdomen:  +bs, soft, nt, nd  Ext: bilateral lower extremity edema R>L  Psychiatric: mood and affect appropriate  Musculoskeletal:  Rom, muscular strength intact    Data:   Labs:  CBC:   Recent Labs     10/20/22  0507 10/21/22  0455 10/22/22  0432   WBC 13.6* 14.5* 14.7*   HGB 7.3* 7.7* 7.6*    444 435       BMP:    Recent Labs     10/20/22  0507 10/21/22  0455 10/22/22  0432   * 148* 144   K 3.7 3.9 4.0    106 102   CO2 32 33* 33*   BUN 74* 71* 72*   CREATININE 0.8 0.8 0.8   GLUCOSE 157* 50* 85       Ca/Mg/Phos:   Recent Labs     10/20/22  0507 10/21/22  0455 10/22/22  0432   CALCIUM 8.2* 8.2* 8.7   MG 3.10* 2.70* 2.70*   PHOS 2.4* 2.6 3.5       Hepatic: No results for input(s): AST, ALT, ALB, BILITOT, ALKPHOS in the last 72 hours. Troponin: No results for input(s): TROPONINI in the last 72 hours. BNP: No results for input(s): BNP in the last 72 hours. Lipids:   No results for input(s): CHOL, TRIG, HDL, LDLCALC, LABVLDL in the last 72 hours. ABGs:   No results for input(s): PHART, PO2ART, RCN4UPP in the last 72 hours. INR: No results for input(s): INR in the last 72 hours. UA:No results for input(s): Janyce Lathe, GLUCOSEU, BILIRUBINUR, Svitlana Dano, BLOODU, PHUR, PROTEINU, UROBILINOGEN, NITRU, LEUKOCYTESUR, LABMICR, URINETYPE in the last 72 hours. Urine Microscopic: No results for input(s): LABCAST, BACTERIA, COMU, HYALCAST, WBCUA, RBCUA, EPIU in the last 72 hours. Urine Culture: No results for input(s): LABURIN in the last 72 hours. Urine Chemistry:   No results for input(s): Carlos Oms, PROTEINUR, NAUR in the last 72 hours. IMAGING:  XR ABDOMEN (KUB) (SINGLE AP VIEW)   Final Result   Impression:       Slightly increased gas dilation of transverse colon since the prior study. No evidence of small bowel obstruction.       Moderate colonic stool burden, unchanged from prior study. XR CHEST PORTABLE   Final Result   Impression:       Slight progression of bibasilar lung opacities, especially on the left. Persistence of small bilateral pleural effusions. XR ABDOMEN (2 VIEWS)   Final Result   1. Moderate amount of stool in the descending colon with the transverse colon mildly dilated with air. CT CERVICAL SPINE WO CONTRAST   Final Result      1. Osseous lesions in C2, C7 and T1 vertebral body seen on recent MRI dated 10/18/2022 are not well-visualized on CT. Bone density is slightly heterogenous on CT. There is a 8 mm radiolucent lesion in the inferior posterior aspect of C2 vertebral body    which may correspond to C2 lesion seen on MRI. 2. Mildly expansile lytic lesion in left lamina of C4 concerning for metastasis. 3. Multilevel degenerative disc disease, most pronounced at C6-7 where there is disc osteophyte complex causing mild-to-moderate spinal canal stenosis. 4. Multilevel foraminal stenosis, most pronounced at C5-6, moderate to severe right and moderate left. MRI LUMBAR SPINE WO CONTRAST   Final Result      1. Motion compromised study. 2.  No cord compression. 3.  Osseous lesions in the cervical and thoracic spine suspicious for metastatic disease. No extraosseous mass. 4.  Severe multilevel degenerative disc disease in the lumbar spine. Moderate to severe canal stenosis at L2-L3 and L4-L5. Moderate canal stenosis at L3-L4. Multiple levels of moderate to severe canal stenosis from L2-L3 to L4-L5      MRI THORACIC SPINE WO CONTRAST   Final Result      1. Motion compromised study. 2.  No cord compression. 3.  Osseous lesions in the cervical and thoracic spine suspicious for metastatic disease. No extraosseous mass. 4.  Severe multilevel degenerative disc disease in the lumbar spine. Moderate to severe canal stenosis at L2-L3 and L4-L5. Moderate canal stenosis at L3-L4.  Multiple levels of moderate to severe canal stenosis from L2-L3 to L4-L5      MRI Cervical Spine WO Contrast   Final Result      1. Motion compromised study. 2.  No cord compression. 3.  Osseous lesions in the cervical and thoracic spine suspicious for metastatic disease. No extraosseous mass. 4.  Severe multilevel degenerative disc disease in the lumbar spine. Moderate to severe canal stenosis at L2-L3 and L4-L5. Moderate canal stenosis at L3-L4. Multiple levels of moderate to severe canal stenosis from L2-L3 to L4-L5      MRI BRAIN WO CONTRAST   Final Result      1. No acute hemorrhage, mass or acute ischemia. 2.  Mild burden of nonspecific multifocal white matter disease compatible with chronic small vessel ischemia. 3.  Mild atrophy. 4.  Bilateral mastoid effusions. XR CHEST PORTABLE   Final Result      Distal portion of tracheostomy poorly visualized due to overlying medical devices. The position is without significant change since 10/5/2022. Bibasilar pleuroparenchymal consolidation. Stable cardiomediastinal silhouette. Right jugular central venous catheter without change. CT ABDOMEN PELVIS WO CONTRAST Additional Contrast? None   Final Result      Marked decrease in size of pneumomediastinum. Trace residual pneumoperitoneum. Extensive bilateral lower lobe pulmonary atelectasis with pleural effusions and trace residual right pneumothorax. XR CHEST PORTABLE   Final Result      Tiny right lateral pneumothorax is suspected, 5 mm in maximum thickness. This has decreased in size since 10/3/2022. Right basilar Pleurx catheter noted. Small bilateral pleural effusions. Prominent interstitial markings. Stable cardiac mediastinal silhouette. Lines and tubes without change. Subcutaneous emphysema noted. CT CHEST ABDOMEN PELVIS WO CONTRAST   Final Result      1. Severe pneumomediastinum.    2. Small to moderate right hydropneumothorax with similar fluid and gas components with a right-sided Pleurx catheter. 3. Moderate loculated left pleural effusion with atelectasis of the left lower lobe with patency of the central left lower lobe bronchi. 4. Fluid/material filling the bronchus intermedius and right lower lobe bronchi with complete consolidation and volume loss of the right lower lobe. Differential diagnosis would include mucous plugging or obstructing lesion. 5. Tracheostomy tube tip within the trachea   6. Severe subcutaneous emphysema in the neck. CT ABDOMEN AND PELVIS:      FINDINGS:      LIVER: Normal.      GALLBLADDER AND BILIARY TREE: No calcified gallstones. No gallbladder distention. No intra- or extrahepatic biliary dilatation. PANCREAS: Normal.      SPLEEN: Normal.      ADRENAL GLANDS: Normal.      KIDNEYS AND URETERS: Normal.      URINARY BLADDER: Ansari catheter present within collapsed urinary bladder. REPRODUCTIVE ORGANS: No associated masses. BOWEL: Normal diameter, nonobstructed. Feeding tube tip at the level of the ligament of Treitz. LYMPH NODES: No abnormally enlarged nodes. PERITONEUM/RETROPERITONEUM: Severe pneumoperitoneum extends into the upper abdomen with some of the symmetric multiseptated gas appearing deep to the diaphragm consistent with mild pneumoperitoneum (series 601 was 101). This is likely related to    pneumonia mediastinum dissecting into the abdomen. No fluid collection in the abdomen or pelvis. No ascites. VESSELS: Extensive atherosclerotic calcification of the aorta and iliac arteries. ABDOMINAL WALL: No acute abnormality. BONES: No acute abnormality. Mild chronic L1 compression fracture with previous vertebroplasty. IMPRESSION:      1. Severe pneumomediastinum extends into the upper abdomen with small pneumoperitoneum likely related to extension of pneumoperitoneum into the upper peritoneal cavity. 2. No fluid collection in the abdomen or pelvis.       Results were discussed with the surgical team at 7:00 PM on 10/3/2022. XR CHEST PORTABLE   Final Result      Stable appearance of loculated right pneumothorax, with loculated components in the lateral right midlung region and in the right lateral costophrenic sulcus. Stable scattered areas of atelectasis versus scar, most prominent in the medial right lung base and in the left lateral lung base. XR CHEST PORTABLE   Final Result      Small right basilar pneumothorax. Right basilar chest tube without change. Patchy consolidation in the right mid and lower lung as well as the left lower lung-atelectasis versus pneumonia. Trace left pleural effusion. Normal heart size. Lines and tubes without change. Mild improvement in degree of subcutaneous emphysema in the chest and neck. XR CHEST 1 VIEW   Final Result      1. Stable small right-sided pneumothorax and prominent subcutaneous emphysema along the neck and upper superior chest wall, greater in right lung stable   2. Stable left basilar consolidation and pleural effusion      3. Stable appearing perihilar accentuated markings or airspace disease            XR CHEST PORTABLE   Final Result      Stable small right-sided pneumothorax. More prominent emphysema over the lower neck. No change in bilateral airspace disease. Stable left pleural effusion. XR CHEST PORTABLE   Final Result   1. Bilateral multifocal pneumonia with improvement in the right    pulmonary consolidation since prior study from 9/23/22   2. Mild to moderate left pleural effusion          XR CHEST PORTABLE   Final Result   1. Support lines and tubes are stable. 2.  Stable small right lateral pneumothorax and right basilar    chest tube. 3.  Stable patchy bilateral airspace disease. XR CHEST PORTABLE   Final Result   1. Satisfactory position of right internal jugular central line   2.  No interval change in endotracheal tube and nasogastric tube positioning. 3. Extensive bilateral airspace disease with no changes. 4. Left pleural effusion with retrocardiac opacity, unchanged   5. Small stable tiny right lateral pneumothorax and stable position of right chest tube,, Pleurx catheter. XR ABDOMEN (KUB) (SINGLE AP VIEW)   Final Result   1. No residual pneumothorax. 2.  Mild patchy airspace disease bilaterally worse on the right than on prior examination. 3.  Non-obstructive bowel gas pattern. Mildly distended stomach. XR CHEST PORTABLE   Final Result   1. No residual pneumothorax. 2.  Mild patchy airspace disease bilaterally worse on the right than on prior examination. 3.  Non-obstructive bowel gas pattern. Mildly distended stomach. XR CHEST PORTABLE   Final Result      1. Small right pneumothorax appears slightly increased. 2. Mild patchy airspace opacity bilaterally. XR CHEST PORTABLE   Final Result     Pleurx catheter at the right lung base. Trace right    pneumothorax is unchanged. XR CHEST PORTABLE   Final Result      Right-sided chest tube evident in the base, its tip medially. Improvement in the right-sided pneumothorax, since earlier today. Possible medial collapse of the right lower lobe. Small left effusion. Patchy airspace density/atelectasis in the lungs bilaterally, with no other significant change. XR CHEST PORTABLE   Final Result   1. Right-sided Pleurx catheter in satisfactory position in the lung bases   2. Right-sided post operative pneumothorax, approximately 10%             Assessment/Plan   ZANA  - sec to contrast nephropathy  - Cr stable   - BNP 3k, Protein:Cre 0.3, FENa 0.4%  - closely monitor UOP  - avoid nephrotoxic agent     2. Hypernatremia  - continue free water   - will continue to monitor     3. Hypotension  - pressors as needed     4. Anemia  - daily CBC    5. Acid- base/ Electrolyte imbalance   - optimize lytes    6.  Acute hypoxic resp failure  - s/p VATS on 9/19/22 for recurrent pleural effusions  - Intensivist and CTS following    7.  CHF   - EF 65% on 6/16/22 via cardiac cath        Martine Jones MD

## 2022-10-22 NOTE — CONSULTS
Past Surgical History:   Procedure Laterality Date    CARDIAC CATHETERIZATION  2022    GASTROSTOMY TUBE PLACEMENT N/A 10/11/2022    EGD PEG TUBE PLACEMENT performed by Gal Montoya MD at [facility] Left     PLEURAL CATH INSERTION N/A 2022    PLEURAL CATHETER PLACEMENT; INTERCOSTAL NERVE BLOCK X4 performed by Lin Rojas MD at  [facility] Bilateral     THORACOSCOPY Right 2022    RIGHT VIDEO ASSISTED THORASCOPIC SURGERY AND; performed by Lin Rojas MD at [address] N/A 2022    TRACHEOSTOMY performed by Tawny Ba MD at [address] [facility]     Occupational History    Not on file   Tobacco Use    Smoking status: Former     Packs/day: 2.00     Years: 40.00     Pack years: 80.00     Types: Cigarettes     Quit date: 10/24/2001     Years since quittin.0    Smokeless tobacco: Never   Vaping Use    Vaping Use: Never used   Substance and Sexual Activity    Alcohol use: Yes     Alcohol/week: 0.0 standard drinks     Comment: rarely    Drug use: No    Sexual activity: Yes     Partners: Female        Family History   Problem Relation Age of Onset    Hearing Loss Father     Heart Disease Father 70        MI    High Blood Pressure Father     Diabetes Maternal Grandmother         hypoglycemic    Hearing Loss Maternal Grandmother     Arthritis Maternal Grandfather         No outpatient medications have been marked as taking for the 22 encounter [facility] Encounter).         Current Facility-Administered Medications   Medication Dose Route Frequency Provider Last Rate Last Admin    albuterol (PROVENTIL) nebulizer solution 2.5 mg  2.5 mg Nebulization 4x daily Jasmyne Combs MD   2.5 mg at 10/21/22 2020    bisacodyl (DULCOLAX) suppository 10 mg  10 mg Rectal Daily Saravanan Zapien MD        collagenase ointment   Topical Daily Alexander Restrepo MD   Given at 10/21/22 1015    methocarbamol (ROBAXIN) tablet 500 mg  500 mg PEG Tube 4x Daily Norman Ford, DO   500 mg at 10/21/22 1755    [Held by provider] insulin glargine (LANTUS;BASAGLAR) injection pen 40 Units  40 Units SubCUTAneous Nightly Yessy Lai MD   40 Units at 10/20/22 2214    doxazosin (CARDURA) tablet 1 mg  1 mg Oral Daily Annabel Alcantara, DO   1 mg at 10/20/22 1007    clotrimazole (LOTRIMIN) 1 % cream   Topical BID Farhan Pelayo MD   Given at 10/21/22 1015    0.9 % sodium chloride infusion   IntraVENous PRN Annabel Alcantara, DO        lansoprazole suspension SUSP 30 mg  30 mg Per G Tube BID Miranda Gutierrez MD   30 mg at 10/21/22 1019    oxyCODONE (ROXICODONE) 5 MG/5ML solution 7.5 mg  7.5 mg Oral Q6H PRN King Sabi DO   7.5 mg at 10/20/22 1445    hydroxypropyl methylcellulose (GONIOSOL) 2.5 % ophthalmic solution 1 drop  1 drop Both Eyes PRN Eran Pugh DO        [Held by provider] insulin lispro (1 Unit Dial) (HUMALOG/ADMELOG) pen 0-16 Units  0-16 Units SubCUTAneous Q4H July Quick MD   8 Units at 10/18/22 1749    Venelex ointment   Topical BID Raphael Liu MD   Given at 10/21/22 1014    acetaminophen (TYLENOL) 160 MG/5ML solution 650 mg  650 mg Oral Q6H PRN Alphonse Cardona MD   650 mg at 10/19/22 0500    chlorhexidine (PERIDEX) 0.12 % solution 15 mL  15 mL Mouth/Throat BID Alphonse Cardona MD   15 mL at 10/21/22 1014    apixaban (ELIQUIS) tablet 5 mg  5 mg Oral BID Jose Rosa MD   5 mg at 10/21/22 1014    sodium chloride flush 0.9 % injection 5-40 mL  5-40 mL IntraVENous 2 times per day Alphonse Cardona MD   10 mL at 10/21/22 1012    sodium chloride flush 0.9 % injection 5-40 mL  5-40 mL IntraVENous PRN Alphonse Cardona MD   10 mL at 10/03/22 0834    0.9 % sodium chloride infusion   IntraVENous PRN Alphonse Cardona  mL/hr at 10/19/22 0029 25 mL at 10/19/22 0029    ondansetron (ZOFRAN-ODT) disintegrating tablet 4 mg  4 mg Oral Q8H PRN Alphonse Cardona MD Or    ondansetron (ZOFRAN) injection 4 mg  4 mg IntraVENous Q6H PRN Nico Conway MD   4 mg at 10/21/22 0111    glucose chewable tablet 16 g  4 tablet Oral PRN Nico Conway MD        dextrose bolus 10% 125 mL  125 mL IntraVENous PRN Nico Conway MD   Stopped at 10/21/22 1813    Or    dextrose bolus 10% 250 mL  250 mL IntraVENous PRN Nico Conway MD   Stopped at 10/21/22 0513    glucagon (rDNA) injection 1 mg  1 mg SubCUTAneous PRN Nico Conway MD        dextrose 10 % infusion   IntraVENous Continuous PRN Nico Conway MD   Stopped at 10/10/22 1749    hydrALAZINE (APRESOLINE) injection 5 mg  5 mg IntraVENous Q15 Min PRN Nico Conway MD   5 mg at 09/19/22 0918        Objective:  /60   Pulse 80   Temp 97.5 °F (36.4 °C) (Axillary)   Resp 12   Ht 6' (1.829 m)   Wt 197 lb 5 oz (89.5 kg)   SpO2 96%   BMI 26.76 kg/m²     Physical Exam:   Patient seen and examined  GCS:  4 - Opens eyes on own  5 - Alert and oriented  6 - Follows simple motor commands  General: Well developed. Alert and cooperative in no acute distress. HENT: atraumatic, neck supple  Eyes: Optic discs: Not tested  Pulmonary: unlabored respiratory effort, trach in place, on vent  Cardiovascular:  Warm well perfused. No peripheral edema  Gastrointestinal: abdomen soft, NT, ND, PEG tube in place for nutrition.       Neurological:  Mental Status: Awake, alert, oriented x 4, trach in place, but able to mouth words appropriately   Attention: Intact  Language: No aphasia or dysarthria noted  Sensation: baseline neuropathy in BLE  Coordination: Intact  DTRs: absent in all 4 limbs    Cranial Nerves:  II: Visual acuity not tested, denies new visual changes / diplopia  III, IV, VI: PERRL, 3 mm bilaterally, EOMI, no nystagmus noted  V: Facial sensation intact bilaterally to touch  VII: Face symmetric  VIII: Hearing intact bilaterally to spoken voice  IX: Palate movement equal bilaterally  XI: Shoulder shrug stronger on R than L  XII: Tongue midline    Musculoskeletal:   Gait: Not tested   Assist devices: None   Tone: diminished in all 4 extremities  Motor strength:    Right  Left    Right  Left    Deltoid  3 0  Hip Flex  1 1   Biceps  3 3+  Knee Extensors  2 1   Triceps  3 3+  Knee Flexors  1 1   Wrist Ext  3 3  Ankle Dorsiflex. 3 1   Wrist Flex  3 3  Ankle Plantarflex. 3 2   Handgrip  3+ 3+  Ext Jude Longus  1 1   Thumb Ext  3 3         Radiological Findings:    I personally reviewed the patient's imaging which consists of an MRI brain, cervical, thoracic, and lumbar spines dated 10/18/2022. These demonstrate multilevel spondylosis in the lumbar spine with only moderate central stenosis extending from L2-L5. In the cervical spine, there are lesions involving the C2, C4, C7, and T1 vertebral bodies with an associated lytic lesion. Mild spondylosis without associated severe stenosis or evidence of T2 signal change. Labs:  Recent Labs     10/21/22  0455   WBC 14.5*   HGB 7.7*   HCT 23.6*            Recent Labs     10/21/22  0455   *   K 3.9      CO2 33*   BUN 71*   CREATININE 0.8   GLUCOSE 50*   CALCIUM 8.2*   PHOS 2.6   MG 2.70*         No results for input(s): PROTIME, INR, APTT in the last 72 hours.     Patient Active Problem List    Diagnosis Date Noted    Generalized weakness 10/17/2022    Areflexia 10/17/2022    Asymmetrical deep tendon reflexes 10/17/2022    Malnutrition ([address] 10/14/2022    Mucus plugging of bronchi 10/05/2022    ZANA (acute kidney injury) ([address] 09/26/2022    Acute on chronic respiratory failure with hypoxia and hypercapnia ([address] 09/22/2022    Pleural effusion on right 09/19/2022    Chronic heart failure with preserved ejection fraction (HFpEF) ([address] 06/07/2022    Hypoxia 04/03/2022    Atrial fibrillation with rapid ventricular response ([address] 03/04/2022    Exertional dyspnea 01/27/2022    Closed displaced fracture of lateral malleolus of left fibula 11/14/2021 Spinal stenosis of lumbar region 10/17/2018    Closed compression fracture of L1 lumbar vertebra 10/17/2018    Trigger ring finger of right hand 06/15/2017    Subacromial impingement 04/03/2015    Rotator cuff tear 02/27/2015    Glenohumeral arthritis 02/27/2015    DM type 2 (diabetes mellitus, type 2) ([facility] 11/18/2013    ED (erectile dysfunction) 01/12/2012    OA (osteoarthritis) of knee 10/12/2011    Carotid stenosis, left 10/12/2011    Hearing loss 10/12/2011    HTN (hypertension) 10/07/2011    Hyperlipidemia 10/07/2011       Assessment:  Patient is a 65 y.o. male s/p VATS procedure w/ severe spinal cord stenosis in the lumbar spine likely 2/2 degenerative changes with significant tightness on the L side vs the R side, and possible osseous lesions in the cervical spine. Plan:  No role for neurosurgical interventio. Lumbar findings do not explain quadriparesis and cervical MRI without evidence of central stenosis. Defer to neurology regarding further work up. Neurologic exams frequency:  - Floor: Q4H  For change in exam MUST contact neurosurgery team along with critical care. PT/OT consulted, appreciate recs  Thank you for consult. Will follow peripherally while in house.   Please call with any questions or decline in neurological status    Rosalie Mcmahan MD, PhD  [address], [address] [phone] (c), [phone] (o)

## 2022-10-23 NOTE — PROGRESS NOTES
Nephrology Progress Note                                                                                                                                                                                                                                                                                                                                                               Office : [phone]     Fax :[phone]    Patient's Name: Madhuri Palencia        Reason for Consult:   ZANA  Requesting Physician:  Elmo Mi MD    Interval History:      Cr stable   Na better   Good UO   Remains on Trach   PEG placed - on TF         Past Medical History:   Diagnosis Date    ZANA (acute kidney injury) ([facility] [address] 9/26/2022    Carotid stenosis, left 10/12/2011    Chronic back pain     Chronic systolic (congestive) heart failure 89/13/8020    Diastolic CHF ([facility] [address]     Erectile dysfunction     Hyperlipidemia     Hypertension     Osteoarthritis     Restrictive lung disease     Type II or unspecified type diabetes mellitus without mention of complication, not stated as uncontrolled        Past Surgical History:   Procedure Laterality Date    CARDIAC CATHETERIZATION  06/2022    GASTROSTOMY TUBE PLACEMENT N/A 10/11/2022    EGD PEG TUBE PLACEMENT performed by Amber Nur MD at [facility] Left     PLEURAL CATH INSERTION N/A 9/19/2022    PLEURAL CATHETER PLACEMENT; INTERCOSTAL NERVE BLOCK X4 performed by Moses Solomon MD at [facility], [place], operated by [facility] Bilateral     THORACOSCOPY Right 9/19/2022    RIGHT VIDEO ASSISTED THORASCOPIC SURGERY AND; performed by Moses Solomon MD at [address] N/A 9/30/2022    TRACHEOSTOMY performed by Jack Rodriguez MD at [address] [place] [address] History   Problem Relation Age of Onset    Hearing Loss Father     Heart Disease Father 70        MI    High Blood Pressure Father     Diabetes Maternal Grandmother         hypoglycemic    Hearing Loss Maternal Grandmother     Arthritis Maternal Grandfather         reports that he quit smoking about 21 years ago. His smoking use included cigarettes. He has a 80.00 pack-year smoking history. He has never used smokeless tobacco. He reports current alcohol use. He reports that he does not use drugs. Allergies:   Torsemide and Dye [iodides]    Current Medications:    guar gum packet 1 packet, BID  glucose chewable tablet 16 g, PRN  dextrose bolus 10% 125 mL, PRN   Or  dextrose bolus 10% 250 mL, PRN  glucagon (rDNA) injection 1 mg, PRN  dextrose 10 % infusion, Continuous PRN  albuterol (PROVENTIL) nebulizer solution 2.5 mg, 4x daily  bisacodyl (DULCOLAX) suppository 10 mg, Daily  collagenase ointment, Daily  methocarbamol (ROBAXIN) tablet 500 mg, 4x Daily  [Held by provider] insulin glargine (LANTUS;BASAGLAR) injection pen 40 Units, Nightly  doxazosin (CARDURA) tablet 1 mg, Daily  clotrimazole (LOTRIMIN) 1 % cream, BID  0.9 % sodium chloride infusion, PRN  lansoprazole suspension SUSP 30 mg, BID  oxyCODONE (ROXICODONE) 5 MG/5ML solution 7.5 mg, Q6H PRN  hydroxypropyl methylcellulose (GONIOSOL) 2.5 % ophthalmic solution 1 drop, PRN  insulin lispro (1 Unit Dial) (HUMALOG/ADMELOG) pen 0-16 Units, Q4H  Venelex ointment, BID  acetaminophen (TYLENOL) 160 MG/5ML solution 650 mg, Q6H PRN  chlorhexidine (PERIDEX) 0.12 % solution 15 mL, BID  apixaban (ELIQUIS) tablet 5 mg, BID  sodium chloride flush 0.9 % injection 5-40 mL, 2 times per day  sodium chloride flush 0.9 % injection 5-40 mL, PRN  0.9 % sodium chloride infusion, PRN  ondansetron (ZOFRAN-ODT) disintegrating tablet 4 mg, Q8H PRN   Or  ondansetron (ZOFRAN) injection 4 mg, Q6H PRN  glucose chewable tablet 16 g, PRN  dextrose bolus 10% 125 mL, PRN   Or  dextrose bolus 10% 250 mL, PRN  glucagon (rDNA) injection 1 mg, PRN  dextrose 10 % infusion, Continuous PRN  hydrALAZINE (APRESOLINE) injection 5 mg, Q15 Min PRN          Physical exam:     Vitals:  /65   Pulse 74   Temp 98.1 °F (36.7 °C) (Oral)   Resp 23   Ht 6' (1.829 m)   Wt 192 lb 7.4 oz (87.3 kg)   SpO2 97%   BMI 26.10 kg/m²   Constitutional:  on MV  Skin: no rash, turgor wnl  Heent:  eomi, mmm  Neck: no bruits or jvd noted  Cardiovascular:  S1, S2 without m/r/g  Respiratory: trach  Abdomen:  +bs, soft, nt, nd  Ext: bilateral lower extremity edema R>L  Psychiatric: mood and affect appropriate  Musculoskeletal:  Rom, muscular strength intact    Data:   Labs:  CBC:   Recent Labs     10/21/22  0455 10/22/22  0432 10/23/22  0510   WBC 14.5* 14.7* 11.6*   HGB 7.7* 7.6* 7.0*    435 449       BMP:    Recent Labs     10/21/22  0455 10/22/22  0432 10/23/22  0510   * 144 139   K 3.9 4.0 3.8    102 100   CO2 33* 33* 30   BUN 71* 72* 72*   CREATININE 0.8 0.8 0.8   GLUCOSE 50* 85 227*       Ca/Mg/Phos:   Recent Labs     10/21/22  0455 10/22/22  0432 10/23/22  0510   CALCIUM 8.2* 8.7 8.2*   MG 2.70* 2.70* 2.40   PHOS 2.6 3.5 3.4       Hepatic: No results for input(s): AST, ALT, ALB, BILITOT, ALKPHOS in the last 72 hours. Troponin: No results for input(s): TROPONINI in the last 72 hours. BNP: No results for input(s): BNP in the last 72 hours. Lipids:   No results for input(s): CHOL, TRIG, HDL, LDLCALC, LABVLDL in the last 72 hours. ABGs:   No results for input(s): PHART, PO2ART, LTB3OGY in the last 72 hours. INR: No results for input(s): INR in the last 72 hours. UA:No results for input(s): Rosalinda Cheikh, GLUCOSEU, BILIRUBINUR, Karene Specter, BLOODU, PHUR, PROTEINU, UROBILINOGEN, NITRU, LEUKOCYTESUR, LABMICR, URINETYPE in the last 72 hours. Urine Microscopic: No results for input(s): LABCAST, BACTERIA, COMU, HYALCAST, WBCUA, RBCUA, EPIU in the last 72 hours. Urine Culture: No results for input(s): LABURIN in the last 72 hours. Urine Chemistry:   No results for input(s): Thomas White, PROTEINUR, NAUR in the last 72 hours.           IMAGING:  XR ABDOMEN (KUB) (SINGLE AP VIEW)   Final Result Impression:       Slightly increased gas dilation of transverse colon since the prior study. No evidence of small bowel obstruction. Moderate colonic stool burden, unchanged from prior study. XR CHEST PORTABLE   Final Result   Impression:       Slight progression of bibasilar lung opacities, especially on the left. Persistence of small bilateral pleural effusions. XR ABDOMEN (2 VIEWS)   Final Result   1. Moderate amount of stool in the descending colon with the transverse colon mildly dilated with air. CT CERVICAL SPINE WO CONTRAST   Final Result      1. Osseous lesions in C2, C7 and T1 vertebral body seen on recent MRI dated 10/18/2022 are not well-visualized on CT. Bone density is slightly heterogenous on CT. There is a 8 mm radiolucent lesion in the inferior posterior aspect of C2 vertebral body    which may correspond to C2 lesion seen on MRI. 2. Mildly expansile lytic lesion in left lamina of C4 concerning for metastasis. 3. Multilevel degenerative disc disease, most pronounced at C6-7 where there is disc osteophyte complex causing mild-to-moderate spinal canal stenosis. 4. Multilevel foraminal stenosis, most pronounced at C5-6, moderate to severe right and moderate left. MRI LUMBAR SPINE WO CONTRAST   Final Result      1. Motion compromised study. 2.  No cord compression. 3.  Osseous lesions in the cervical and thoracic spine suspicious for metastatic disease. No extraosseous mass. 4.  Severe multilevel degenerative disc disease in the lumbar spine. Moderate to severe canal stenosis at L2-L3 and L4-L5. Moderate canal stenosis at L3-L4. Multiple levels of moderate to severe canal stenosis from L2-L3 to L4-L5      MRI THORACIC SPINE WO CONTRAST   Final Result      1. Motion compromised study. 2.  No cord compression. 3.  Osseous lesions in the cervical and thoracic spine suspicious for metastatic disease. No extraosseous mass.    4.  Severe multilevel degenerative disc disease in the lumbar spine. Moderate to severe canal stenosis at L2-L3 and L4-L5. Moderate canal stenosis at L3-L4. Multiple levels of moderate to severe canal stenosis from L2-L3 to L4-L5      MRI Cervical Spine WO Contrast   Final Result      1. Motion compromised study. 2.  No cord compression. 3.  Osseous lesions in the cervical and thoracic spine suspicious for metastatic disease. No extraosseous mass. 4.  Severe multilevel degenerative disc disease in the lumbar spine. Moderate to severe canal stenosis at L2-L3 and L4-L5. Moderate canal stenosis at L3-L4. Multiple levels of moderate to severe canal stenosis from L2-L3 to L4-L5      MRI BRAIN WO CONTRAST   Final Result      1. No acute hemorrhage, mass or acute ischemia. 2.  Mild burden of nonspecific multifocal white matter disease compatible with chronic small vessel ischemia. 3.  Mild atrophy. 4.  Bilateral mastoid effusions. XR CHEST PORTABLE   Final Result      Distal portion of tracheostomy poorly visualized due to overlying medical devices. The position is without significant change since 10/5/2022. Bibasilar pleuroparenchymal consolidation. Stable cardiomediastinal silhouette. Right jugular central venous catheter without change. CT ABDOMEN PELVIS WO CONTRAST Additional Contrast? None   Final Result      Marked decrease in size of pneumomediastinum. Trace residual pneumoperitoneum. Extensive bilateral lower lobe pulmonary atelectasis with pleural effusions and trace residual right pneumothorax. XR CHEST PORTABLE   Final Result      Tiny right lateral pneumothorax is suspected, 5 mm in maximum thickness. This has decreased in size since 10/3/2022. Right basilar Pleurx catheter noted. Small bilateral pleural effusions. Prominent interstitial markings. Stable cardiac mediastinal silhouette. Lines and tubes without change. Subcutaneous emphysema noted.       CT CHEST ABDOMEN PELVIS WO CONTRAST   Final Result      1. Severe pneumomediastinum. 2. Small to moderate right hydropneumothorax with similar fluid and gas components with a right-sided Pleurx catheter. 3. Moderate loculated left pleural effusion with atelectasis of the left lower lobe with patency of the central left lower lobe bronchi. 4. Fluid/material filling the bronchus intermedius and right lower lobe bronchi with complete consolidation and volume loss of the right lower lobe. Differential diagnosis would include mucous plugging or obstructing lesion. 5. Tracheostomy tube tip within the trachea   6. Severe subcutaneous emphysema in the neck. CT ABDOMEN AND PELVIS:      FINDINGS:      LIVER: Normal.      GALLBLADDER AND BILIARY TREE: No calcified gallstones. No gallbladder distention. No intra- or extrahepatic biliary dilatation. PANCREAS: Normal.      SPLEEN: Normal.      ADRENAL GLANDS: Normal.      KIDNEYS AND URETERS: Normal.      URINARY BLADDER: Ansari catheter present within collapsed urinary bladder. REPRODUCTIVE ORGANS: No associated masses. BOWEL: Normal diameter, nonobstructed. Feeding tube tip at the level of the ligament of Treitz. LYMPH NODES: No abnormally enlarged nodes. PERITONEUM/RETROPERITONEUM: Severe pneumoperitoneum extends into the upper abdomen with some of the symmetric multiseptated gas appearing deep to the diaphragm consistent with mild pneumoperitoneum (series 601 was 101). This is likely related to    pneumonia mediastinum dissecting into the abdomen. No fluid collection in the abdomen or pelvis. No ascites. VESSELS: Extensive atherosclerotic calcification of the aorta and iliac arteries. ABDOMINAL WALL: No acute abnormality. BONES: No acute abnormality. Mild chronic L1 compression fracture with previous vertebroplasty. IMPRESSION:      1.  Severe pneumomediastinum extends into the upper abdomen with small pneumoperitoneum likely related to extension of pneumoperitoneum into the upper peritoneal cavity. 2. No fluid collection in the abdomen or pelvis. Results were discussed with the surgical team at 7:00 PM on 10/3/2022. XR CHEST PORTABLE   Final Result      Stable appearance of loculated right pneumothorax, with loculated components in the lateral right midlung region and in the right lateral costophrenic sulcus. Stable scattered areas of atelectasis versus scar, most prominent in the medial right lung base and in the left lateral lung base. XR CHEST PORTABLE   Final Result      Small right basilar pneumothorax. Right basilar chest tube without change. Patchy consolidation in the right mid and lower lung as well as the left lower lung-atelectasis versus pneumonia. Trace left pleural effusion. Normal heart size. Lines and tubes without change. Mild improvement in degree of subcutaneous emphysema in the chest and neck. XR CHEST 1 VIEW   Final Result      1. Stable small right-sided pneumothorax and prominent subcutaneous emphysema along the neck and upper superior chest wall, greater in right lung stable   2. Stable left basilar consolidation and pleural effusion      3. Stable appearing perihilar accentuated markings or airspace disease            XR CHEST PORTABLE   Final Result      Stable small right-sided pneumothorax. More prominent emphysema over the lower neck. No change in bilateral airspace disease. Stable left pleural effusion. XR CHEST PORTABLE   Final Result   1. Bilateral multifocal pneumonia with improvement in the right    pulmonary consolidation since prior study from 9/23/22   2. Mild to moderate left pleural effusion          XR CHEST PORTABLE   Final Result   1. Support lines and tubes are stable. 2.  Stable small right lateral pneumothorax and right basilar    chest tube. 3.  Stable patchy bilateral airspace disease. XR CHEST PORTABLE   Final Result   1. Satisfactory position of right internal jugular central line   2. No interval change in endotracheal tube and nasogastric tube positioning. 3. Extensive bilateral airspace disease with no changes. 4. Left pleural effusion with retrocardiac opacity, unchanged   5. Small stable tiny right lateral pneumothorax and stable position of right chest tube,, Pleurx catheter. XR ABDOMEN (KUB) (SINGLE AP VIEW)   Final Result   1. No residual pneumothorax. 2.  Mild patchy airspace disease bilaterally worse on the right than on prior examination. 3.  Non-obstructive bowel gas pattern. Mildly distended stomach. XR CHEST PORTABLE   Final Result   1. No residual pneumothorax. 2.  Mild patchy airspace disease bilaterally worse on the right than on prior examination. 3.  Non-obstructive bowel gas pattern. Mildly distended stomach. XR CHEST PORTABLE   Final Result      1. Small right pneumothorax appears slightly increased. 2. Mild patchy airspace opacity bilaterally. XR CHEST PORTABLE   Final Result     Pleurx catheter at the right lung base. Trace right    pneumothorax is unchanged. XR CHEST PORTABLE   Final Result      Right-sided chest tube evident in the base, its tip medially. Improvement in the right-sided pneumothorax, since earlier today. Possible medial collapse of the right lower lobe. Small left effusion. Patchy airspace density/atelectasis in the lungs bilaterally, with no other significant change. XR CHEST PORTABLE   Final Result   1. Right-sided Pleurx catheter in satisfactory position in the lung bases   2. Right-sided post operative pneumothorax, approximately 10%             Assessment/Plan   ZANA  - sec to contrast nephropathy  - Cr stable   - BNP 3k, Protein:Cre 0.3, FENa 0.4%  - closely monitor UOP  - avoid nephrotoxic agent     2.  Hypernatremia  - continue free water   - will continue to monitor     3. Hypotension  - pressors as needed     4. Anemia  - daily CBC    5. Acid- base/ Electrolyte imbalance   - optimize lytes    6. Acute hypoxic resp failure  - s/p VATS on 9/19/22 for recurrent pleural effusions  - Intensivist and CTS following    7.  CHF   - EF 65% on 6/16/22 via cardiac cath        Cynthia Greenwood MD

## 2022-10-23 NOTE — PROGRESS NOTES
CT Surgery POC Note    Pleur-x Catheter drained in sterile manner. About 125 cc of serous fluid drained with additional foamy drainage. Patient tolerated well. Sterile cap and dressing replaced.     Josephine Gilliland DO  PGY4, General Surgery  10/22/22

## 2022-10-23 NOTE — PROGRESS NOTES
Pulmonary & Critical Care Medicine    Admit Date: 2022  PCP: Elmo Mi MD    CC:  respiratory failure   Events of Last 24 hours:   No major events over the past 24 hours. There is no increase in cough or sputum production. He is alert and communicating. He is asking his wife to help exercising his legs. Vitals:  Tmax:  VITALS:  BP (!) 126/53   Pulse 88   Temp 98.1 °F (36.7 °C) (Oral)   Resp 22   Ht 6' (1.829 m)   Wt 192 lb 7.4 oz (87.3 kg)   SpO2 95%   BMI 26.10 kg/m²   24HR INTAKE/OUTPUT:    Intake/Output Summary (Last 24 hours) at 10/23/2022 1555  Last data filed at 10/23/2022 1300  Gross per 24 hour   Intake 4783 ml   Output 1150 ml   Net 3633 ml     CURRENT PULSE OXIMETRY:  SpO2: 95 %  24HR PULSE OXIMETRY RANGE:  SpO2  Av %  Min: 93 %  Max: 99 %    EXAM:  General: No distress. Alert. Eyes: PERRL. No sclera icterus. No conjunctival injection. ENT: trach tube in place. Neck: Trachea midline. Neck is supple   Resp: No accessory muscle use. No crackles. No wheezing. No rhonchi. CV: Regular rate. Regular rhythm. No mumur or rub. Bilateral edema    GI: Non-tender. Non-distended. Normal bowel sounds. Neuro: Awake. Speech is clear. Psych:  No anxiety or agitation.      Medications:    IV:   dextrose      sodium chloride      sodium chloride 25 mL (10/19/22 002)    dextrose Stopped (10/10/22 1749)         Scheduled Meds:   guar gum  1 packet Oral TID    albuterol  2.5 mg Nebulization 4x daily    bisacodyl  10 mg Rectal Daily    collagenase   Topical Daily    methocarbamol  500 mg PEG Tube 4x Daily    insulin glargine  40 Units SubCUTAneous Nightly    doxazosin  1 mg Oral Daily    clotrimazole   Topical BID    lansoprazole  30 mg Per G Tube BID    insulin lispro  0-16 Units SubCUTAneous Q4H    Venelex   Topical BID    chlorhexidine  15 mL Mouth/Throat BID    apixaban  5 mg Oral BID    sodium chloride flush  5-40 mL IntraVENous 2 times per day         Diet: Diet NPO  ADULT TUBE FEEDING; PEG; Peptide Based; Continuous; 20; Yes; 10; Q 4 hours; 65; 200; Q 4 hours; Protein; 1 bottles Proteinex daily w/ 30 mL FW flushes before and after     Results:  CBC:   Recent Labs     10/21/22  0455 10/22/22  0432 10/23/22  0510   WBC 14.5* 14.7* 11.6*   HGB 7.7* 7.6* 7.0*   HCT 23.6* 23.2* 22.8*   MCV 86.5 86.6 88.9    435 449     BMP:   Recent Labs     10/21/22  0455 10/22/22  0432 10/23/22  0510   * 144 139   K 3.9 4.0 3.8    102 100   CO2 33* 33* 30   PHOS 2.6 3.5 3.4   BUN 71* 72* 72*   CREATININE 0.8 0.8 0.8     LIVER PROFILE: No results for input(s): AST, ALT, LIPASE, BILIDIR, BILITOT, ALKPHOS in the last 72 hours. Invalid input(s): AMYLASE,  ALB  PT/INR: No results for input(s): PROTIME, INR in the last 72 hours. APTT: No results for input(s): APTT in the last 72 hours. UA:No results for input(s): NITRITE, COLORU, PHUR, LABCAST, WBCUA, RBCUA, MUCUS, TRICHOMONAS, YEAST, BACTERIA, CLARITYU, SPECGRAV, LEUKOCYTESUR, UROBILINOGEN, BILIRUBINUR, BLOODU, GLUCOSEU, AMORPHOUS in the last 72 hours. Invalid input(s): [facility]      Assessment/Plan:  65 y.o. male with     Acute on chronic hypercapnic and hypoxic respiratory failure s/p trach  Pleural effusion s/p pleurx cath placement. A/C PRVC , RR 14, FiO2 30, PEEP 5. I tried him on SBT with PS 25. He tolerated it with VT in upper 200s. Breathing was not labored. Pleurx cath is drained every other day. Leukocytosis is trending down. He is a good candidate for LTAC for vent weaning.   Awaiting insurance approval.    SBT daily     Charissa Mustafa MD

## 2022-10-23 NOTE — PLAN OF CARE
Problem: Chronic Conditions and Co-morbidities  Goal: Patient's chronic conditions and co-morbidity symptoms are monitored and maintained or improved  Outcome: Progressing     Problem: Pain  Goal: Verbalizes/displays adequate comfort level or baseline comfort level  Outcome: Progressing     Problem: Skin/Tissue Integrity  Goal: Absence of new skin breakdown  Description: 1. Monitor for areas of redness and/or skin breakdown  2. Assess vascular access sites hourly  3. Every 4-6 hours minimum:  Change oxygen saturation probe site  4. Every 4-6 hours:  If on nasal continuous positive airway pressure, respiratory therapy assess nares and determine need for appliance change or resting period.   Outcome: Progressing

## 2022-10-23 NOTE — PROGRESS NOTES
Cardiothoracic Surgery Daily Progress Note      CC: Pleural effusions s/p R pleurX placement    Subjective :  Patient rested well overnight. Given spot dose of 5U of insulin for hyperglycemia. Increased fiber with multiple bowel movements over the day yesterday. Endorses some abdominal soreness, but overall improved. Objective     Exam:  Vitals:    10/23/22 0403 10/23/22 0601 10/23/22 0640 10/23/22 0641   BP:  121/65     Pulse: 81 74 80 74   Resp: 18 19 23 23   Temp:  98.1 °F (36.7 °C)     TempSrc:  Oral     SpO2: 98% 99% 96% 97%   Weight:       Height:           Physical Examination:   General appearance: Alert, following commands. Neurological: no focal deficits  HEENT: EOMI, trachea midline, no JVD. Tracheostomy in place with some mucus drainage  Chest/Lungs: On mechanical ventilation via tracheostomy; R PleurX catheter capped with dressing C/D/I  Cardiovascular: RRR  Abdomen: Soft, non-tender, non-distended. PEG tube with tube feeds running at 65   Skin: Warm and dry. Sacral decubitus ulcer covered with Mepilex. Extremities: Pitting edema of the b/l feet. No cyanosis. Legs ulcers from SCDs covered with meplixex. ASSESSMENT/PLAN:   Mirian Luna is a 65 y.o. male with history of diastolic heart failure, atrial fibrillation, and DM with recurrent pleural effusions s/p R PleurX catheter placement (9/19). - Continue every other day Pleurx drainage. Last drainage yesterday (125cc out)  - Pulmonology/Crit care managing vent. - Stool frequency has decreased. Fiber was restarted with improvement of diarrhea. - Continue tube feeds  - glucose control improved  - Sacral wound per wound care nurse  -Continue PT/OT  - Will touch base with SW on LTAC, wife working on [employer], as patient was denied placement on current insurance. Will follow up today vs. Tomorrow.     Rajinder Schulte DO, [address]  PGY2, General Surgery  10/23/22  7:05 AM  [phone]

## 2022-10-24 NOTE — PROGRESS NOTES
Comprehensive Nutrition Assessment    Type and Reason for Visit:  Reassess    Nutrition Recommendations/Plan:   Nutrition Support:  Recommend EN formula Standard Formula with Fiber  Jevity 1.5 @ goal rate 55 ml/hr   Initiate EN @ 20 mL/hr and as tolerated, increase by 10 mL/hr q 4 hours until goal of 55 mL/hr is met. Recommend water bolus 30ml every 4 hours   Recommend protein modular QID via PEG. Flush with 30 ml water before/after administration. Do not mix with tube feeding formula. 2.   Recommend d/c psyllium powder   3. New TF formula will increase carbs, recommend monitoring BG and adjusting insulin coverage as needed     Malnutrition Assessment:  Malnutrition Status: Moderate malnutrition (10/14/22 1128)    Context:  Chronic Illness     Findings of the 6 clinical characteristics of malnutrition:  Energy Intake:  No significant decrease in energy intake  Weight Loss:  Greater than 10% over 6 months     Body Fat Loss:  Mild body fat loss     Muscle Mass Loss:  Mild muscle mass loss Temples (temporalis), Calf (gastrocnemius), Thigh (quadraceps)  Fluid Accumulation:  No significant fluid accumulation      Nutrition Assessment:    Follow up: Pt continues on TF of Vital AF 1.2 @ 65 mL//hr + 1 bottle Proteinex daily. Pt has been having frequent bowel  movements for entirety of admission. Psyllium powder started QID last week. Spoke w/ RN, BM have decreased, however pt is having bloating and passing frequent gas. Will modify TF formula to Jevity 1.5 @ 55 mL/hr, and d/c psyllium powder. New formula will provide pt w/ more fiber, will need to monitor BG as pt BG have been high throughout admission, currently @ 135, but new TF formula will provide pt w/ more carbs. Will continue to monitor. Nutrition Related Findings:    Mg 2.5. . Active bowel sounds. +BM 10/23. +9.3L.  Wound Type: Deep Tissue Injury ([place])       Current Nutrition Intake & Therapies:    Average Meal Intake: NPO  Average Supplements Intake: NPO  Diet NPO  ADULT TUBE FEEDING; PEG; Peptide Based; Continuous; 20; Yes; 10; Q 4 hours; 65; 200; Q 4 hours; Protein; 1 bottles Proteinex daily w/ 30 mL FW flushes before and after  Current Tube Feeding (TF) Orders:  Feeding Route: PEG  Formula: Standard with Fiber  Schedule: Continuous  Additives/Modulars: Protein  Current TF & Flush Orders Provides: Vital AF 1.2 @ 65 mL/hr to provide: 1560 mL TV, 1872 kcal, 117 g/pro and 1265 mL FW + FW flushes of 30 mL q4 + 1 bottle Proteinex daily to provide an additional 104 kcal and 26 g/pro. Goal TF & Flush Orders Provides: Jevity 1.5 @ 55 mL/hr tto provide: 1320 mL TV, 1980 kcal, 84 g/pro and 1003 mL FW + FW flushes of 30 mL q4 + 1 bottle Proteinex daily to provide an additional 104 kcal and 26 g/pro    Anthropometric Measures:  Height: 6' (182.9 cm)  Ideal Body Weight (IBW): 178 lbs (81 kg)       Current Body Weight: 210 lb 15 oz (95.7 kg), 90.7 % IBW. Weight Source: Bed Scale  Current BMI (kg/m2): 27.8        Weight Adjustment For: No Adjustment                 BMI Categories: Overweight (BMI 25.0-29. 9)    Estimated Daily Nutrient Needs:  Energy Requirements Based On: Kcal/kg (20-25 kcal/kg CBW)  Weight Used for Energy Requirements: Current  Energy (kcal/day): 6812-1738 (20-22 kcal/95.7 kg)  Weight Used for Protein Requirements: Ideal (IBW 83.64 kg)  Protein (g/day): 101-168  Method Used for Fluid Requirements: 1 ml/kcal  Fluid (ml/day): or per MD    Nutrition Diagnosis:   Increased nutrient needs related to increase demand for energy/nutrients as evidenced by nutrition support - enteral nutrition    Nutrition Interventions:   Food and/or Nutrient Delivery: Continue NPO, Modify Tube Feeding  Nutrition Education/Counseling: Education not indicated  Coordination of Nutrition Care: Continue to monitor while inpatient  Plan of Care discussed with: RN    Goals:  Previous Goal Met: Progressing toward Goal(s)  Goals:  Tolerate nutrition support at goal rate, within 2 days       Nutrition Monitoring and Evaluation:   Behavioral-Environmental Outcomes: None Identified  Food/Nutrient Intake Outcomes: Enteral Nutrition Intake/Tolerance  Physical Signs/Symptoms Outcomes: Biochemical Data, Nutrition Focused Physical Findings, Weight, Diarrhea, GI Status    Discharge Planning:     Too soon to determine     Ronen Hardwick, [address] [place] [address],   Contact: [phone]

## 2022-10-24 NOTE — PROGRESS NOTES
Speech Language Pathology  Facility/Department: [facility] 4 PCU  Daily PMV/Electrolarynx/Speech Treatment Note     NAME: Trip Anne  :   MRN: [id number]    Date of Eval: 10/24/2022  Evaluating Therapist: SURESH Sunshine    Patient Diagnosis(es): has HTN (hypertension); Hyperlipidemia; OA (osteoarthritis) of knee; Carotid stenosis, left; Hearing loss; ED (erectile dysfunction); DM type 2 (diabetes mellitus, type 2) ([address] Rotator cuff tear; Glenohumeral arthritis; Subacromial impingement; Trigger ring finger of right hand; Spinal stenosis of lumbar region; Closed compression fracture of L1 lumbar vertebra; Closed displaced fracture of lateral malleolus of left fibula; Exertional dyspnea; Atrial fibrillation with rapid ventricular response ([address] Hypoxia; Chronic heart failure with preserved ejection fraction (HFpEF) ([address] Pleural effusion on right; Acute on chronic respiratory failure with hypoxia and hypercapnia ([address] ZANA (acute kidney injury) ([address] Mucus plugging of bronchi; Malnutrition ([address] Generalized weakness; Areflexia; and Asymmetrical deep tendon reflexes on their problem list.  Onset Date: 22    Chart reviewed. Pain: none indicated    Initial Assessment: 10/10  Diagnosis: Pt presents with a severe communication impairment secondary to trach/vent status. Order obtained and pt appropriate this date per ventilator settings and report. The patient was awake but drowsy (spouse reports recent dilaudid). The patient allowed oral care but demo'd what appeared to be reflexive mouth closing for any presentation of oral care utensils despite multiple attempts and verbal cues. Procedure explained to patient and spouse. Pt did not demo any comprehension of education / not attending. RT arrived and trach cuf deflated.  Pt without coughing and airflow was appreciated but difficult to detect due to inability to voice on command; a warm continuous air could be felt leaking from open oral cavity although pt could not follow command to exhale forcefully and x1 instance of weak strained phonation was appreciated om command. Pt had drop in PIP and no stridor noted so decision was made to place PMV. Pt without coughing or overt anxiety noted with placement. x2 instances of weak strained vocal quality on command for single word utterances. During repositioning (typical flexed neck posturing), there was a phonation achieved that is suspected to be non-purposeful. The pt demo'd suspected attempts at cough with audible gurggling suspected to be from glottis region / pharynx vs trach with inability to expectorate. The patient was unable to expectorate secretions to oral cavity. There was no active diaphragm movement appreciated when assessed for attempts at controlled phonation. The patient tolerated for 7.5 minutes but given limited interactiveness and no voicing it was decided to remove PMV. RT replaced t-piece and resumed care of the patient. Pts vitals generally stable throughout, no outward anxiety or discomfort (no more than his baseline). Pt did demo spontenous swallows with grimace. PMV was removed and left to air dry below RN computer on tray and RN was notified of this as well as spouse with instructions to place adaptor and PMV into ziplock and keep with ventilator. The PMV is single use and is now dispensed to the patient and should DC with the patient if he is to DC from hospital. A denture case was labele \"PMV cleaning\" and kept with the PMV equipment, cleaning instructions discussed briefly with pts spouse for initiation of education for self care when that becomes appropriate. The PMV should only be placed with RT / SLP both present for pt safety at this time. PMV safety sticker as there should be no donning of PMV without RT/SLP.     Medical diagnosis: Pleural effusion, right [J90]  Pleural effusion on right [J90]  Treatment diagnosis: severe communication impairment d/t trach/vent status    Treatment:  Short Term Goals  Goal 1: Pt will tolerate PMV placement for duration of session without change in respiratory status and without discomfort. 10/19: Pt seated upright in bed with RN and RT present. Prior to placement of PMV pt sating at SpO2=98%, Resp=20, Pulse=75. RT provided deep suction prior to placing PMV in-line. Pt with gravely cough when cuff deflated. PMV placed and pt immediately impulsively trying to speak in long phrases/utterances. Required cues to pause and take a few breaths to allow for steady vital readings. Pt tolerated PMV well for 5.25 minutes, prior to SpO2 beginning to drop to 86 and was removed. After removal pt sating at ZjK5=220, Resp=22, Pulse=80. Pt reported no discomfort across entire session while wearing PMV. After removal of PMV RT provided suction with noted white/tan, thick secretions. Cont. 10/20: Pt seated upright in bed, RT present for session. Prior to RT donning PMV, pt with 100% SPO2, 22 RR. RT provided inline suction and cuff deflation. Patient with congested cough s/p cuff deflation. Pt tolerated placement without change in vital signs for 4.5 minutes, when he reports fatigue and wishes to discontinue session. Following PMV removal, pt with 98% SPO2, RR 20. Continue goal  10/21 - Pt declined PMV this date x2 mouthing \"no\" and \"I'm so tired\". RT presented to room to assist but was not needed so not present for remainder of session. 10/24 - Pt with significant coughing episode with cuff deflation and donning PMV. Pt tolerated without significant fluctuations in vital signs (except RR increased with coughing and recovery). Pt did indicate discomfort while wearing PMV but unable to explain fully his perceptual sensations; was somewhat relieved that his vitals were stable. Attempted voicing trials but pt had little to no phonation achieved and therefore after ~5 mins and in combination with his perceptual discomfort, PMV was removed.      Goal 2: Pt will improve breath-phonation coordination via phonation for single syllable words on 50% of opportunities. 10/19: Pt with increased voicing this date. Required mod cues to take breaths in between words to assist with respiration/phonation. Pt expressed single CVC words accurately across 90% of attempts. Pt attempting to speak in longer utterances, with reduced breath support across multi-syllabic words and short phrases. Increased breathiness noted when pt attempting these. Goal met, however continue for carry over/training of strategy for increased voicing. Cont. 10/20: Pt able to produce voice this date with rough vocal quality, mildly reduced intensity. He completed counting exercise with appropriate breath-phonation coordination; He attempts to speak in phrase /sentences to answer questions despite frequent aphonia with vent inspirations. Patient required mod-max cues to shorten utterances and coordinate with provided breaths. Continue goal.  10/24: Pt with severely impaired vocal intensity with only phonation achieved at end of attempted utterances. Following removal of PMV and reinflation of cuff the patient demo'd improved phonation. Question if potential etiology could be the valve impeding airflow and therefore not allowing air to pass to vocal folds (valve integrity appeared adequate when examined) or question if air trapping could have occurred between cuff and glottis after reinflation. Pt could not increase phonation despite cues for counting to achieve breath coupling. Only improvement to comprehensibility was due to whisper achieved vs pure mouthing. Due to pt perceived discomfort and no voicing achieved, valve was removed. Continue goal.     Goal 3: Pt/caregiver will demonstrate comprehension of PMV safety instructions with mod I.  10/19: Prior to placing PMV, SLP discussed how it works, steps to take and cues to be provided by SLP and RT.  Pt nodding head in comprehension, however mod cues as noted above to follow directions to continue taking breaths for increased respiration/phonation support. Pt's wife not present at this time. Pt communicated she is supposed to be coming in ~2 PM. SLP communicated may stop by then to discuss PMV trial with her. Pt in agreement. Cont. 10/20: Provided education regarding PMV and redirection of airflow, though suspect limited comprehension given level of fatigue. 10/24:  Educated on impact of PMV on ability to bear down which can improve physical abilities in addition to swallow and speech. Did not discuss safety of donning/doffing as pt remains mechanically ventilated. Continue goal.     Goal 4: Pt will tolerate ongoing communication as indicated. 10/19: Increased impulsivity of speaking this date with reduced use of breath support, negatively impacting Sp02 tolerance. Pt with increased mouthing of words prior to PMV and after PMV removal. With PMV on, improved voicing and vocal quality. Pt expressed \"I can't hear you all\". SLP confirmed pt wanting to have staff be aware that increased loudness required for pt comprehension, as pt normally wears hearing aids at baseline and they are not present during hospital stay. Pt discussed with RN and wrote on whiteboard for staff to be aware for increased pt participation in care. Cont. 10/20: Patient with improved mouthing of words without placement of PMV, able to indicate he does not want to complete dysphagia tx and end session given exhaustion. Continue goal.   10/21 - Pt completed evaluated for use of electrolarynx. The patient had good comprehensibility with min cues with SLP placing electrolarynx on left lateral neck against larynx. Pt required moderate verbal and tactile assist for placement. Pts spouse completed training and was able to place appropriately for communication exchanges.  Educated on target sound (muffled vibration indicating skin contact achieved), ceasing vibration between utterances to allow pt break and to assist communication partner with identifying sentence boundaries (educated pt on use for this to assist in breath support when speaking but pt did not demo comprehension). Sign hung up in patient room re: storage location of PMV, spouse trained, pt placement on L lateral neck. Utilizing electrolarynx in glove to protect from becoming soiled. Continue goal.   10/24 - Spouse reports pt independently asking for electrolaynx over weekend to communicate but did decline it a few times when she offered. Pt able to be ~85% comprehensible during session with use of electrolarynx with SLP placing. Continues to demonstrate reduced pausing to indicate sentence boundaries in the absence of phonation. Pt is able to repeat back on incomprehensible portions of utterance. Poor tolerance / voice achieved with PMV as above. Pt with good ability to overarticulate when communication breakdown occurred. Continue goal.     Education:  Education ongoing re: use of devices for communication as above. Plan:  Continue speech/language therapy to address above goals, 3-5 x/week x LOS  DC recommendations: Ongoing communication training needed  D/W nursing: Blake Oliveros  Needs met prior to leaving room, call button in reach. Treatment time: 15 minutes    Electronically signed by:  Gordon Segovia M.A., [place]  Speech-Language Pathologist  Pg #: [phone]    If patient is discharged prior to next treatment, this note will serve as the discharge summary    Patient has been loaned electrolarynx:  Pt required assistance to place/use electro larynx independently  Pts spouse has been trained on electrolarynx and can assist independent of staff on use  Electrolarynx is property of rehab department and is NOT to be dispense to patient at discharge. Electrolaynx can be placed on left lateral neck during oral movement to achieve speech production.

## 2022-10-24 NOTE — PROGRESS NOTES
Speech Language Pathology  Facility/Department: [facility] ICU  Dysphagia Daily Treatment Note    NAME: Ivonne Restrepo  : 1945  MRN: [id number]    Patient Diagnosis(es):   Patient Active Problem List    Diagnosis Date Noted    Generalized weakness 10/17/2022    Areflexia 10/17/2022    Asymmetrical deep tendon reflexes 10/17/2022    Malnutrition ([address] 10/14/2022    Mucus plugging of bronchi 10/05/2022    ZANA (acute kidney injury) ([address] 2022    Acute on chronic respiratory failure with hypoxia and hypercapnia ([address] 2022    Pleural effusion on right 2022    Chronic heart failure with preserved ejection fraction (HFpEF) ([address] 2022    Hypoxia 2022    Atrial fibrillation with rapid ventricular response ([address] 2022    Exertional dyspnea 2022    Closed displaced fracture of lateral malleolus of left fibula 2021    Spinal stenosis of lumbar region 10/17/2018    Closed compression fracture of L1 lumbar vertebra 10/17/2018    Trigger ring finger of right hand 06/15/2017    Subacromial impingement 2015    Rotator cuff tear 2015    Glenohumeral arthritis 2015    DM type 2 (diabetes mellitus, type 2) ([address] 2013    ED (erectile dysfunction) 2012    OA (osteoarthritis) of knee 10/12/2011    Carotid stenosis, left 10/12/2011    Hearing loss 10/12/2011    HTN (hypertension) 10/07/2011    Hyperlipidemia 10/07/2011     Allergies: Allergies   Allergen Reactions    Torsemide Shortness Of Breath    Dye [Iodides]      1970's - ?? CXR (10/3/22)-  Impression       Stable appearance of loculated right pneumothorax, with loculated components in the lateral right midlung region and in the right lateral costophrenic sulcus. Stable scattered areas of atelectasis versus scar, most prominent in the medial right lung base and in the left lateral lung base. Previous MBS -  N/A    Chart reviewed.     Medical Diagnosis: Pleural effusion, right [J90]  Pleural effusion on right [J90]   Treatment Diagnosis: Oropharyngeal dysphagia    BSE Impression (10/2/22)-  RN states okay to attempt assessment. Dr Eliu Lopez speaking with spouse stating pt most likely still with effects of sedation. Pt was intubated 9/22- 9/30/22. Trach placed 9/30, with pt on ventilator. Pt shook head yes/no intermittently to questions asked. Pt made no attempt to mouth words. Pt exhibiting open mouth posture, did not form labial seal or protrude /lateralize tongue on command. Oral care completed, pt demonstrating no oral response. Placed 2 single ice chips in oral cavity, again with no response - no lingual movement noted. Pt did close lips with tactile stim x 2. No swallow movement was felt upon palpation of anterior neck. O2 sats remained stable and RR remained in mid 20's. Recommend aggressive oral care 2-3 x/day with suction kit. Plan to re-assess as pt appropriate. Dysphagia Diagnosis: Suspected needs further assessment    FEES (10/14/22): Pt with relatively non-functional swallow at this time and met 'bail out' criteria (i.e. full protocol not completed d/t severity of impairments and pt safety). Severely impaired airway protection marked by pattern of penetration and aspiration of ice chips, thin liquids and mildly thick liquids during and after the swallow. Severely impaired swallow peristalsis marked by pattern of severe residue after completion of swallow remaining in the vallecula, lateral channels, and pyriforms. Physiological impairments c/w these findings include reduced tongue base retraction, decreased hyolaryngeal elevation/excursion with incomplete epiglottic inversion. Pain: Pt reported x2 discomfort underneath him feeling like he was sitting on something; pulled padding and mattress tight to remove ripples which was effective.     Current Diet : ADULT TUBE FEEDING; Nasogastric; Diabetic; Continuous; 10; Yes; 10; Q 4 hours; 45; 300; Q 4 hours; Protein; 2 bottles Proteinex daily w/ 30 mL FW flushes before and after  Diet NPO   Recommended Form of Meds: Via alternative means of nutrition      Treatment:  Pt seen bedside to address the following goals:   1. The patient will tolerate repeat bedside swallowing evaluation when able. 10/3 - Pt positioned upright and aggressive oral care was completed with suction toothbrush. Vitals reading had poor waveform and appeared to be unreliable readings; RN notified and in to assess. The patient demo'd spontaneous swallows without PO with audible air escaping around stoma. Pt tolerated 4/4 ice chips with constant verbal cues for appropriate acceptance and manipulation of ice (I.e.close mouth, chew before you swallow, etc). There was no coughing but not likely clinically relevant due to research indicating high prevalence of SILENT aspiration in cuff inflated tracheostomy patients. Pt not yet appropriate for instrumental swallow evaluation but will require out prior to advancing diet. Notes indicate plan for SBT today. Requested order for PMV which will hopefully be receive and coincide with improved alertness and weaning from ventilator. Continue goal.  10/4: Cleared by RN. No order for PMV yet. Received pt more awake/alert. Wife present. Pt remains on trach/vent. Oral care recently completed. Repositioned pt upright. Targeted swallow function via trials ice chips and small sips h2o; pt with positive oral acceptance, no anterior loss, appropriate and timely oral prep, seemingly positive swallow movement. Attempted to check clearance via oral suction; evidently dry with no material suctioned. As previously noted, no cough, however would be unlikely to occur given inflated cuff tracheostomy. Would recommend waiting on instrumental pending ability to place PMV (assuming order will be received in the near future), as that will likely support pt's swallow function, and show improved function.   Cont goal  10/6: Pt positioned upright in PMV may also improve swallow function be supporting necessary pressure for swallowing. Reviewed recommendation for small amounts ice/small sips h2o with effortful swallow to support swallow function/maintenance. Wife and pt indicated comprehension. Cont goal  10/5: Thorough education this date re: potential adverse affects of trach/vent on swallow function, need for instrumentation, need for PMV & importance of oral care. Wean trial failed this date per Dr. Kimber Clay. Audible air/secretion leakage around trach. PMV trials are likely not appropriate at this time and orders have not been provided. Reviewed continued recommendation for low level ice chips s/p oral care to support swallow function/ maintenance, improve pt QOL/ comfort and encourage oral care. 10/7: Discussed education regarding rationale for completing trials ice chips with effortful swallow, rec's for PMV as able, and overall importance of oral care, and possibility of completing further swallow assessment via FEES when able. Pt and family present demonstrated understanding. Cont. 10/12: As noted above with wife present. Discussed rationale for completing ice chips+effortful swallow trials to continue stimulating oropharyngeal swallow function while pt NPO. Pt requires re-education. Wife demonstrated understanding. Cont. 10/13: Patient provided education re: risk of aspiration at this time, need for FEES to assess safety/ efficiency of swallow function. Wife with good understanding, patient appears to have improved understanding compared to previous sessions. Continue goal.  10/14: As noted above, discussed rationale for FEES to be completed this date, procedure details and trials to be assessed. Wife with adequate understanding, pt requires some reinforcement however continued improvement from previous sessions. Cont. 10/17: Goal met on 10/14 via FEES completion.      3. Patient will participate in [address] trials when appropriate/ orders received  10/6- Speech/ Communication Treatment: Pt with increased communicative frustration. PMV trials are likely not appropriate at this time and orders have not been provided yet. Family stating they wanted to get him an IPAD with speech lauren. Discussed low tech vs high tech AAC and appropriateness for AAC in different populations. Given trach/vent is suspected to not be long-term and pt continues to be in the acute phase of care, high tech AAC is not recommended at this time. Pt provided communication board. All icons reviewed. Pt was able to spell out \"tired\" on the letter board. Recommend continued use of communication board, letter board & non-verbal communication (I.e lip reading, pointing, facial expressions etc) to facilitate patient expression of basic wants/needs. Patient and family demonstrated understanding. 10/7: Pt communicated via head nod/shake and use of gestures to communicate wants/needs this session. 10/12: Goal met 10/10. Separate note for targeting goals associated. New Goal s/p FEES 10/14:  Goal 1: Pt will complete effortful swallows with trials of ice chips and tsps water after completion of oral care  10/17: Pt with increased alertness, engagement, mouthing of words for communication, facial expressions, seated upright in bed. Pt chose to complete oral care with s/u assistance from SLP for suction toothbrush. SLP demonstrated how to word (place thumb over suction) and pt completed with MI. Pt self fed all trials of tsps thin liquids and ice chips x30. SLP cued to complete hard and fast swallows across all. Pt with adequate command following to implement strategy. Of note, pt unable to complete PMV trials at this time (RT unavailable), however appeared to be very appropriate this date with hopes to trial tomorrow as able per respiratory. Pt and wife in agreement and demonstrated understanding. Cont. 10/18-  pt alert, wife present. Pt mouthing words to communicate. Performed oral care with suction. Pt and wife educated to the importance of oral care prior to trials with ice chips. When asked pt what he should do when swallowing, pt mouthed \"swallow hard\". Pt provided with 3 ice chips with pt utilizing effortful swallow. RT arrived for PMV trial. When RT deflated cuff, pt c/o significant discomfort and burning sensation. Cuff re-inflated and pt reported relief. Unable to attempt PMV trials at this time due to discomfort with deflated cuff. Did not attempt further trials with ice chips as RN needing to provide nursing care. Encouraged RN to provide ice chips again later. Con't goal   10/19: After completion of PMV trial, SLP assisted pt with getting seated upright with set-up of suction toothbrush. Pt demonstrated adequate use independently to brush teeth prior to presenting trials of ice chips and tsps water. Pt completed x20 hard and fast swallows. Pt with reduced secretions noted from trach, as compared to previous sessions. Recommend continue ice chips/tsps water ONLY after oral care with RN and/or wife present. Pt in agreement and demonstrated understanding. Cont. 10/24 - Completed oral care with suction toothbrush/toothpaste. Lingual coating with yellow tinge at base of tongue is noted. Notified pts spouse of this. Completed trials of ice chips / thins via tsp with cued fast/effortful swallows. Pt only completed x5 bolus swallows (+ additional bolus) before declining additional trials stating \"I'm done\". Encouraged pt to complete more trial reps with spouse later this date. Pt presents with loud and audible swallows for all and belching immediately following; denies discomfort. Continue goal.     Patient/Family/Caregiver Education:  Education re: ongoing ice chips and thins via tsp. Pts spouse reports encouraging the pt to take ice chips to practice so that he can eventually get something other than ice chips and water.      Compensatory Strategies:  Oral care using suction toothbrush/toothette / suction system 3x per day   Single ice chips given by staff when pt awake and in upright position after oral care     Plan:  Continue dysphagia/communication treatment with goals per plan of care. Diet recommendations: Only single ice chips and tsps water given by staff when pt awake and in upright position, ONLY after oral care   DC recommendation: Ongoing speech therapy is indicated   Treatment: 10 minutes  D/W nursing: Elma Cr prior to session  Needs met prior to leaving room, call button in reach.     Electronically signed by:  Andrea Andrea M.A., [place]  Speech-Language Pathologist  Pg #: [phone]     If patient is discharged prior to next treatment, this note will serve as the discharge summary

## 2022-10-24 NOTE — PROGRESS NOTES
Cardiothoracic Surgery Daily Progress Note      CC: Pleural effusions s/p R pleur-X placement    Subjective :  Patient rested well overnight. Remains afebrile and HDS. Complains of stable abdominal pain. Denies N/V. Passing gas and having BM. Tolerating TFs. Objective     Exam:  Vitals:    10/24/22 0026 10/24/22 0035 10/24/22 0400 10/24/22 0430   BP:   117/71    Pulse:  84 86 85   Resp: 26 25 19 14   Temp:   98.2 °F (36.8 °C)    TempSrc:   Oral    SpO2:  97% 96% 97%   Weight:       Height:           Physical Examination:   General appearance: Alert, following commands. Neurological: no focal deficits  HEENT: EOMI, trachea midline, no JVD. Tracheostomy in place with some mucus drainage  Chest/Lungs: On mechanical ventilation via tracheostomy; R PleurX catheter capped with dressing C/D/I  Cardiovascular: RRR  Abdomen: Soft, non-tender, minimally-distended. PEG tube with tube feeds running at 65   Skin: Warm and dry. Sacral decubitus ulcer covered with Mepilex. Extremities: Pitting edema of the b/l feet. No cyanosis. Legs ulcers from SCDs covered with Mepilex      ASSESSMENT/PLAN:   Ivonne Restrepo is a 65 y.o. male with history of diastolic heart failure, atrial fibrillation, and DM with recurrent pleural effusions s/p R PleurX catheter placement (9/19). - Continue every other day Pleurx drainage. Will drain today  - Pulmonology/Crit care managing vent  - Stool frequency has decreased. Fiber was restarted with improvement of diarrhea. Restart psyllium.  - Continue tube feeds  - glucose control improved  - Sacral wound per wound care nurse  -Continue PT/OT  - Will touch base with SW on LTAC, wife working on [employer], as patient was denied placement on current insurance. Will follow up today vs. Tomorrow.       Chaparrita Crowell DO, [address]  PGY1, General Surgery  10/24/22  6:42 AM  [employer]  Pager: [phone]

## 2022-10-24 NOTE — PLAN OF CARE
Problem: Chronic Conditions and Co-morbidities  Goal: Patient's chronic conditions and co-morbidity symptoms are monitored and maintained or improved  Outcome: Progressing     Problem: Discharge Planning  Goal: Discharge to home or other facility with appropriate resources  Outcome: Progressing     Problem: Pain  Goal: Verbalizes/displays adequate comfort level or baseline comfort level  Outcome: Progressing  Note: Patient C/O abdominal discomfort related to bloating  abdomen soft, nondistended  simethicone per prn order  continue monitor and assess     Problem: Skin/Tissue Integrity  Goal: Absence of new skin breakdown  Description: 1. Monitor for areas of redness and/or skin breakdown  2. Assess vascular access sites hourly  3. Every 4-6 hours minimum:  Change oxygen saturation probe site  4. Every 4-6 hours:  If on nasal continuous positive airway pressure, respiratory therapy assess nares and determine need for appliance change or resting period.   Outcome: Progressing  Note: Turn and reposition every 2hr and prn  low air loss mattress wound care per order  continue assess tissue integrity     Problem: Nutrition Deficit:  Goal: Optimize nutritional status  Outcome: Progressing  Flowsheets (Taken 10/24/2022 0911 by Alyssa Ralph RD)  Nutrient intake appropriate for improving, restoring, or maintaining nutritional needs:   Recommend, monitor, and adjust tube feedings and TPN/PPN based on assessed needs   Assess nutritional status and recommend course of action     Problem: ABCDS Injury Assessment  Goal: Absence of physical injury  Outcome: Progressing     Problem: Metabolic/Fluid and Electrolytes - Adult  Goal: Glucose maintained within prescribed range  Outcome: Progressing     Problem: Safety - Adult  Goal: Free from fall injury  Outcome: Adequate for Discharge  Note: Bed low with wheels locked  bed alarm on  side rail up x3  call light in reach at all times     Problem: Confusion  Goal: Confusion, delirium, dementia, or psychosis is improved or at baseline  Description: INTERVENTIONS:  1. Assess for possible contributors to thought disturbance, including medications, impaired vision or hearing, underlying metabolic abnormalities, dehydration, psychiatric diagnoses, and notify attending LIP  2. [place] high risk fall precautions, as indicated  3. Provide frequent short contacts to provide reality reorientation, refocusing and direction  4. Decrease environmental stimuli, including noise as appropriate  5. Monitor and intervene to maintain adequate nutrition, hydration, elimination, sleep and activity  6. If unable to ensure safety without constant attention obtain sitter and review sitter guidelines with assigned personnel  7.  Initiate Psychosocial CNS and Spiritual Care consult, as indicated  Outcome: Adequate for Discharge  Note: Alert and orient x4  continue assess     Problem: Respiratory - Adult  Goal: Achieves optimal ventilation and oxygenation  Outcome: Adequate for Discharge  Flowsheets (Taken 10/24/2022 1325)  Achieves optimal ventilation and oxygenation:   Assess for changes in respiratory status   Position to facilitate oxygenation and minimize respiratory effort   Assess the need for suctioning and aspirate as needed   Respiratory therapy support as indicated   Assess for changes in mentation and behavior     Problem: Respiratory - Adult  Goal: Achieves optimal ventilation and oxygenation  Outcome: Adequate for Discharge  Flowsheets (Taken 10/24/2022 1325)  Achieves optimal ventilation and oxygenation:   Assess for changes in respiratory status   Position to facilitate oxygenation and minimize respiratory effort   Assess the need for suctioning and aspirate as needed   Respiratory therapy support as indicated   Assess for changes in mentation and behavior     Problem: Cardiovascular - Adult  Goal: Maintains optimal cardiac output and hemodynamic stability  Outcome: Adequate for Discharge  Flowsheets (Taken 10/24/2022 1325)  Maintains optimal cardiac output and hemodynamic stability:   Assess for signs of decreased cardiac output   Monitor blood pressure and heart rate     Problem: Genitourinary - Adult  Goal: Urinary catheter remains patent  Outcome: Adequate for Discharge

## 2022-10-24 NOTE — PROGRESS NOTES
Point of Care Note:  Cardiothoracic Surgery  Ivonne Restrepo    7:44 AM  10/24/2022    Pleur-X drainage    Patient was informed of the procedure and consented. Previous dressings were removed prior to drainage. Pleur-X was then drained in a sterile fashion. 100 cc of serous fluid was emptied into the vacuum container. Patient tolerated the procedure well without complications.       Chaparrita Crowell DO, [address] [facility]  PGY1, General Surgery  10/24/22  7:58 AM  [employer]  Pager: [phone]

## 2022-10-24 NOTE — PROGRESS NOTES
Palliative Care Chart Review  and Check in Note:     NAME:  Christian Bal  Admit Date: 9/19/2022  Hospital Day:  Hospital Day: 39   Current Code status: Full Code    Palliative care is continuing to following Mr. Delton Carrel for symptom management, and goals of care discussion as needed. Patient's chart reviewed today 10/24/22. Saw Jonel Valenzuela at the bedside. He is awake and alert. He is complaining of abd cramping/gas pain. Simethicone Q6H PRN ordered. Spoke with pt's wife, Christa Galeano. She explained how Jonel Valenzuela was wanting to [place] [facility] last week, but has since changed his mind. Their youngest son had a discussion with him about continuing to work towards getting better. Josiah's mood has continued to wax and wane. This morning he is feeling \"down\" again because he did not sleep well, per Christa Galeano. Discussed case with Shraddha Becerra RN case manager, who will talk more with Christa Brownie about switching her insurance. New coverage would start on 11/1. Would encourage as much physical movement and time OOB as possible, since pt will remain in the hospital until new coverage has started. The following are the currently established goals/code status, and Symptom management. Goals of care: Continue current medical management.      Code status: Full    Discharge plan: placement pending following new insurance coverage    NATASHA Paulson NP  10/24/22  10:51 AM

## 2022-10-24 NOTE — CARE COORDINATION
CM spoke with patient and wife Rose Mary Wakefield, at bedside. Patient continues with new trach to vent, peg tube to feeds. We discussed his [employer] coverage and ability to change his coverage due to life changing event. CM provided Rose Mary Wakefield with phone number for [employer] for help with navigating this process.       Terri Goldsmith RN, BSN,   4th Floor [facility] Care Unit  [phone]

## 2022-10-25 NOTE — PROGRESS NOTES
Patient's wife and two sons updated at bedside on plan of care. All questions answered at this time.  Electronically signed by Tai Moreno RN on 10/25/2022 at 6:54 PM

## 2022-10-25 NOTE — PROGRESS NOTES
Speech Language Pathology  Facility/Department: [facility]  Dysphagia Daily Treatment Note    NAME: Cristóbal Cristobal  : 1945  MRN: [id number]    Patient Diagnosis(es):   Patient Active Problem List    Diagnosis Date Noted    Generalized weakness 10/17/2022    Areflexia 10/17/2022    Asymmetrical deep tendon reflexes 10/17/2022    Malnutrition ([address] 10/14/2022    Mucus plugging of bronchi 10/05/2022    ZANA (acute kidney injury) ([address] 2022    Acute on chronic respiratory failure with hypoxia and hypercapnia ([address] 2022    Pleural effusion on right 2022    Chronic heart failure with preserved ejection fraction (HFpEF) ([address] 2022    Hypoxia 2022    Atrial fibrillation with rapid ventricular response ([address] 2022    Exertional dyspnea 2022    Closed displaced fracture of lateral malleolus of left fibula 2021    Spinal stenosis of lumbar region 10/17/2018    Closed compression fracture of L1 lumbar vertebra 10/17/2018    Trigger ring finger of right hand 06/15/2017    Subacromial impingement 2015    Rotator cuff tear 2015    Glenohumeral arthritis 2015    DM type 2 (diabetes mellitus, type 2) ([address] 2013    ED (erectile dysfunction) 2012    OA (osteoarthritis) of knee 10/12/2011    Carotid stenosis, left 10/12/2011    Hearing loss 10/12/2011    HTN (hypertension) 10/07/2011    Hyperlipidemia 10/07/2011     Allergies: Allergies   Allergen Reactions    Torsemide Shortness Of Breath    Dye [Iodides]      1970's - ?? CXR (10/3/22)-  Impression       Stable appearance of loculated right pneumothorax, with loculated components in the lateral right midlung region and in the right lateral costophrenic sulcus. Stable scattered areas of atelectasis versus scar, most prominent in the medial right lung base and in the left lateral lung base. Previous MBS -  N/A    Chart reviewed.     Medical Diagnosis: Pleural effusion, right [J90]  Pleural effusion on right [J90]   Treatment Diagnosis: Oropharyngeal dysphagia    BSE Impression (10/2/22)-  RN states okay to attempt assessment. Dr Clif Rosario speaking with spouse stating pt most likely still with effects of sedation. Pt was intubated 9/22- 9/30/22. Trach placed 9/30, with pt on ventilator. Pt shook head yes/no intermittently to questions asked. Pt made no attempt to mouth words. Pt exhibiting open mouth posture, did not form labial seal or protrude /lateralize tongue on command. Oral care completed, pt demonstrating no oral response. Placed 2 single ice chips in oral cavity, again with no response - no lingual movement noted. Pt did close lips with tactile stim x 2. No swallow movement was felt upon palpation of anterior neck. O2 sats remained stable and RR remained in mid 20's. Recommend aggressive oral care 2-3 x/day with suction kit. Plan to re-assess as pt appropriate. Dysphagia Diagnosis: Suspected needs further assessment    FEES (10/14/22): Pt with relatively non-functional swallow at this time and met 'bail out' criteria (i.e. full protocol not completed d/t severity of impairments and pt safety). Severely impaired airway protection marked by pattern of penetration and aspiration of ice chips, thin liquids and mildly thick liquids during and after the swallow. Severely impaired swallow peristalsis marked by pattern of severe residue after completion of swallow remaining in the vallecula, lateral channels, and pyriforms. Physiological impairments c/w these findings include reduced tongue base retraction, decreased hyolaryngeal elevation/excursion with incomplete epiglottic inversion. Pain: Pt reported x2 discomfort underneath him feeling like he was sitting on something; pulled padding and mattress tight to remove ripples which was effective.     Current Diet : ADULT TUBE FEEDING; Nasogastric; Diabetic; Continuous; 10; Yes; 10; Q 4 hours; 45; 300; Q 4 hours; Protein; 2 bottles Proteinex daily w/ 30 mL FW flushes before and after  Diet NPO   Recommended Form of Meds: Via alternative means of nutrition      Treatment:  Pt seen bedside to address the following goals:   1. The patient will tolerate repeat bedside swallowing evaluation when able. 10/3 - Pt positioned upright and aggressive oral care was completed with suction toothbrush. Vitals reading had poor waveform and appeared to be unreliable readings; RN notified and in to assess. The patient demo'd spontaneous swallows without PO with audible air escaping around stoma. Pt tolerated 4/4 ice chips with constant verbal cues for appropriate acceptance and manipulation of ice (I.e.close mouth, chew before you swallow, etc). There was no coughing but not likely clinically relevant due to research indicating high prevalence of SILENT aspiration in cuff inflated tracheostomy patients. Pt not yet appropriate for instrumental swallow evaluation but will require out prior to advancing diet. Notes indicate plan for SBT today. Requested order for PMV which will hopefully be receive and coincide with improved alertness and weaning from ventilator. Continue goal.  10/4: Cleared by RN. No order for PMV yet. Received pt more awake/alert. Wife present. Pt remains on trach/vent. Oral care recently completed. Repositioned pt upright. Targeted swallow function via trials ice chips and small sips h2o; pt with positive oral acceptance, no anterior loss, appropriate and timely oral prep, seemingly positive swallow movement. Attempted to check clearance via oral suction; evidently dry with no material suctioned. As previously noted, no cough, however would be unlikely to occur given inflated cuff tracheostomy. Would recommend waiting on instrumental pending ability to place PMV (assuming order will be received in the near future), as that will likely support pt's swallow function, and show improved function.   Cont goal  10/6: Pt positioned upright in bed. Thorough oral care and suctioning provided. Pt continues to be more alert/awake and active in his care/treatment. Family present. Pt completed 20x effortful swallows 5x swallows/ ice chips to reduce further swallow deconditioning. RR & 02 stable across low level ice chip trials. Audible air escape and secretions around trach appreciated. Per RN, Dr. Gita Hernandez aware. Discussed need for instrumental swallow evaluation with pt and family. FEES to be completed as schedule permits, preferably prior to PEG determination. Cont goal.   10/7: Pt seated upright in bed and SLP assisted with oral care via suction toothbrush. Pt with increased alertness and agreeable to accept trials of ice chips. X25 effortful swallows with ice chips completed this session. Pt with intermittent air escape and secretions noted around trach, however per discussion with SLP who saw pt yesterday and per family, both looked less. RR and O2 remained stable across all trials. SLP re-discussed possibility of completing FEES and expressed will reach out to other SLP to see if it can be completed prior to PEG placement on Monday. Family and pt demonstrated understanding and agreement at this time. Cont. 10/12: Pt seated upright in bed. SLP provided oral care via suction toothbrush. Clean oral cavity noted. Pt with min brown secretions noted around trach prior to administering ice chips. Pt readily accepted x10 ice chips and demonstrated adequate mastication. Pt with facial grimace when attempting to swallow all ice chips. Pt with stable RR and O2 during ice chips administration. Audible secretions noted x2. Pt with continued lethargy and cues required to maintain alertness. Pin point pupils/reduced visual tracking, however followed basic 1-step commands adequately. SLP re-discussed FEES completion at end of this week (either Friday or Saturday) depending on pt's status to accept PO with increased alertness.  ? Pt comprehension, however wife present and demonstrated understanding and agreement. Cont. 10/13: Pt sitting upright in bed, wife present. He is reportedly more alert today though does not always use head nod/facial expressions for answering yes/no questions. Make no attempts to point to items on communication board. Patient accepted oral care via suction toothbrush, oral cavity noted to be clean. Patient accepted ice chips x15 from spoon; Facial grimaces observed with swallow initiation. Increased secretions around stoma during ice chip trials. Stable RR and O2 during trials. Patient does appear appropriate to participate in FEES next date if presentation is similar to this session. Provided education to patient and wife, who demonstrate understanding. Continue goal.   10/14: Pt seated upright in bed with wife present. SLP assisted with with oral care via suction toothbrush. Clean oral cavity noted. Pt with increased alertness and engagement across entire session this date. Pt readily accepted x15 ice chips and completed effortful swallows across all. Intermittent secretions around stoma required suctioning at times. SLP expressed FEES to be completed later this afternoon. Pt in wife in agreement and demonstrated understanding. Cont. 10/17: D/C goal.     2. The patient/caregiver will demonstrate understanding of compensatory strategies for improved swallowing safety. 10/3 - Educated on potential impact of trach on swallow function, rationale for oral care, recommendations for few single ice chips to be given to facilitate oral mucosal integrity and preserve swallow function that is present. Introduced plan for PMV in future, rationales for this. Educated on limitations in pts alertness at this time and need for improvement for completing swallow study, therapeutic effectiveness, etc. Continue goal.   10/4: Discussed swallow function with patient/wife. Also reviewed recommendation for PMV, hopefully in near future (order not yet received).  Discussed how PMV may also improve swallow function be supporting necessary pressure for swallowing. Reviewed recommendation for small amounts ice/small sips h2o with effortful swallow to support swallow function/maintenance. Wife and pt indicated comprehension. Cont goal  10/5: Thorough education this date re: potential adverse affects of trach/vent on swallow function, need for instrumentation, need for PMV & importance of oral care. Wean trial failed this date per Dr. Jazmin Dale. Audible air/secretion leakage around trach. PMV trials are likely not appropriate at this time and orders have not been provided. Reviewed continued recommendation for low level ice chips s/p oral care to support swallow function/ maintenance, improve pt QOL/ comfort and encourage oral care. 10/7: Discussed education regarding rationale for completing trials ice chips with effortful swallow, rec's for PMV as able, and overall importance of oral care, and possibility of completing further swallow assessment via FEES when able. Pt and family present demonstrated understanding. Cont. 10/12: As noted above with wife present. Discussed rationale for completing ice chips+effortful swallow trials to continue stimulating oropharyngeal swallow function while pt NPO. Pt requires re-education. Wife demonstrated understanding. Cont. 10/13: Patient provided education re: risk of aspiration at this time, need for FEES to assess safety/ efficiency of swallow function. Wife with good understanding, patient appears to have improved understanding compared to previous sessions. Continue goal.  10/14: As noted above, discussed rationale for FEES to be completed this date, procedure details and trials to be assessed. Wife with adequate understanding, pt requires some reinforcement however continued improvement from previous sessions. Cont. 10/17: Goal met on 10/14 via FEES completion.      3. Patient will participate in [employer] trials when appropriate/ orders received  10/6- Speech/ Communication Treatment: Pt with increased communicative frustration. PMV trials are likely not appropriate at this time and orders have not been provided yet. Family stating they wanted to get him an IPAD with speech lauren. Discussed low tech vs high tech AAC and appropriateness for AAC in different populations. Given trach/vent is suspected to not be long-term and pt continues to be in the acute phase of care, high tech AAC is not recommended at this time. Pt provided communication board. All icons reviewed. Pt was able to spell out \"tired\" on the letter board. Recommend continued use of communication board, letter board & non-verbal communication (I.e lip reading, pointing, facial expressions etc) to facilitate patient expression of basic wants/needs. Patient and family demonstrated understanding. 10/7: Pt communicated via head nod/shake and use of gestures to communicate wants/needs this session. 10/12: Goal met 10/10. Separate note for targeting goals associated. New Goal s/p FEES 10/14:  Goal 1: Pt will complete effortful swallows with trials of ice chips and tsps water after completion of oral care  10/17: Pt with increased alertness, engagement, mouthing of words for communication, facial expressions, seated upright in bed. Pt chose to complete oral care with s/u assistance from SLP for suction toothbrush. SLP demonstrated how to word (place thumb over suction) and pt completed with MI. Pt self fed all trials of tsps thin liquids and ice chips x30. SLP cued to complete hard and fast swallows across all. Pt with adequate command following to implement strategy. Of note, pt unable to complete PMV trials at this time (RT unavailable), however appeared to be very appropriate this date with hopes to trial tomorrow as able per respiratory. Pt and wife in agreement and demonstrated understanding. Cont. 10/18-  pt alert, wife present. Pt mouthing words to communicate. Performed oral care with suction. Pt and wife educated to the importance of oral care prior to trials with ice chips. When asked pt what he should do when swallowing, pt mouthed \"swallow hard\". Pt provided with 3 ice chips with pt utilizing effortful swallow. RT arrived for PMV trial. When RT deflated cuff, pt c/o significant discomfort and burning sensation. Cuff re-inflated and pt reported relief. Unable to attempt PMV trials at this time due to discomfort with deflated cuff. Did not attempt further trials with ice chips as RN needing to provide nursing care. Encouraged RN to provide ice chips again later. Con't goal   10/19: After completion of PMV trial, SLP assisted pt with getting seated upright with set-up of suction toothbrush. Pt demonstrated adequate use independently to brush teeth prior to presenting trials of ice chips and tsps water. Pt completed x20 hard and fast swallows. Pt with reduced secretions noted from trach, as compared to previous sessions. Recommend continue ice chips/tsps water ONLY after oral care with RN and/or wife present. Pt in agreement and demonstrated understanding. Cont. 10/24 - Completed oral care with suction toothbrush/toothpaste. Lingual coating with yellow tinge at base of tongue is noted. Notified pts spouse of this. Completed trials of ice chips / thins via tsp with cued fast/effortful swallows. Pt only completed x5 bolus swallows (+ additional bolus) before declining additional trials stating \"I'm done\". Encouraged pt to complete more trial reps with spouse later this date. Pt presents with loud and audible swallows for all and belching immediately following; denies discomfort. Continue goal.   10/25: Spouse reported pt with increased intake of ice chips and tsp of water-at times drinking sips of water. Education provided on risks of aspiration and encouraged only occasional ice chips and tsp of thin liquids. Pt trialed ice chips x3 this date with audible swallow (on vent so expected to be audible).  No belching noted this date. Laryngeal elevation noted upon observation. Decreased secretions noted this date around trach site. Spouse reported decreased need for subglottic suctioning. Respiratory present and placed pt on breathing trial. Education provided to pt and spouse regarding POC and possibility of repeat FEES in next 1-2 days. Continue goal    Patient/Family/Caregiver Education:  Education re: ongoing ice chips and thins via tsp. Pts spouse reports encouraging the pt to take ice chips to practice so that he can eventually get something other than ice chips and water. POC reviewed with spouse and pt. Compensatory Strategies:  Oral care using suction toothbrush/toothette / suction system 3x per day   Single ice chips given by staff when pt awake and in upright position after oral care     Plan:  Continue dysphagia/communication treatment with goals per plan of care. Diet recommendations: Only single ice chips and tsps water given by staff when pt awake and in upright position, ONLY after oral care (Education completed with pt and spouse as spouse reported pt has been drinking more independently. DC recommendation: Ongoing speech therapy is indicated   Treatment: 10 minutes  D/W nursing: Chelly Hernández prior to session  Needs met prior to leaving room, call button in reach. Electronically signed by:  Sean Diaz S Josh Mccoy  [id number]       If patient is discharged prior to next treatment, this note will serve as the discharge summary

## 2022-10-25 NOTE — PROGRESS NOTES
Nephrology Progress Note                                                                                                                                                                                                                                                                                                                                                               Office : [phone]     Fax :[phone]    Patient's Name: Trip Anne        Reason for Consult:   ZANA  Requesting Physician:  Yimi Mason MD    Interval History:      Cr stable   Na better   Good UO   Remains on Trach   PEG placed - on TF         Past Medical History:   Diagnosis Date    ZANA (acute kidney injury) ([facility] [address] 9/26/2022    Carotid stenosis, left 10/12/2011    Chronic back pain     Chronic systolic (congestive) heart failure 71/55/3886    Diastolic CHF ([facility] [address]     Erectile dysfunction     Hyperlipidemia     Hypertension     Osteoarthritis     Restrictive lung disease     Type II or unspecified type diabetes mellitus without mention of complication, not stated as uncontrolled        Past Surgical History:   Procedure Laterality Date    CARDIAC CATHETERIZATION  06/2022    GASTROSTOMY TUBE PLACEMENT N/A 10/11/2022    EGD PEG TUBE PLACEMENT performed by Jeronimo Hernadez MD at [facility] Left     PLEURAL CATH INSERTION N/A 9/19/2022    PLEURAL CATHETER PLACEMENT; INTERCOSTAL NERVE BLOCK X4 performed by Cresencio Coe MD at [facility] Bilateral     THORACOSCOPY Right 9/19/2022    RIGHT VIDEO ASSISTED THORASCOPIC SURGERY AND; performed by Cresencio Coe MD at [address] N/A 9/30/2022    TRACHEOSTOMY performed by John Mills MD at [address] [facility] History   Problem Relation Age of Onset    Hearing Loss Father     Heart Disease Father 70        MI    High Blood Pressure Father     Diabetes Maternal Grandmother         hypoglycemic    Hearing Loss Maternal Grandmother     Arthritis Maternal Grandfather         reports that he quit smoking about 21 years ago. His smoking use included cigarettes. He has a 80.00 pack-year smoking history. He has never used smokeless tobacco. He reports current alcohol use. He reports that he does not use drugs. Allergies:   Torsemide and Dye [iodides]    Current Medications:    insulin glargine (LANTUS;BASAGLAR) injection pen 30 Units, Nightly  simethicone (MYLICON) chewable tablet 80 mg, Q6H PRN  acetaminophen (TYLENOL) 160 MG/5ML solution 650 mg, Q6H PRN  guar gum packet 1 packet, TID  glucose chewable tablet 16 g, PRN  dextrose bolus 10% 125 mL, PRN   Or  dextrose bolus 10% 250 mL, PRN  glucagon (rDNA) injection 1 mg, PRN  dextrose 10 % infusion, Continuous PRN  albuterol (PROVENTIL) nebulizer solution 2.5 mg, 4x daily  bisacodyl (DULCOLAX) suppository 10 mg, Daily  collagenase ointment, Daily  methocarbamol (ROBAXIN) tablet 500 mg, 4x Daily  doxazosin (CARDURA) tablet 1 mg, Daily  clotrimazole (LOTRIMIN) 1 % cream, BID  0.9 % sodium chloride infusion, PRN  lansoprazole suspension SUSP 30 mg, BID  oxyCODONE (ROXICODONE) 5 MG/5ML solution 7.5 mg, Q6H PRN  hydroxypropyl methylcellulose (GONIOSOL) 2.5 % ophthalmic solution 1 drop, PRN  insulin lispro (1 Unit Dial) (HUMALOG/ADMELOG) pen 0-16 Units, Q4H  Venelex ointment, BID  chlorhexidine (PERIDEX) 0.12 % solution 15 mL, BID  apixaban (ELIQUIS) tablet 5 mg, BID  sodium chloride flush 0.9 % injection 5-40 mL, 2 times per day  sodium chloride flush 0.9 % injection 5-40 mL, PRN  0.9 % sodium chloride infusion, PRN  ondansetron (ZOFRAN-ODT) disintegrating tablet 4 mg, Q8H PRN   Or  ondansetron (ZOFRAN) injection 4 mg, Q6H PRN  hydrALAZINE (APRESOLINE) injection 5 mg, Q15 Min PRN          Physical exam:     Vitals:  /75   Pulse 90   Temp 98.3 °F (36.8 °C) (Oral)   Resp (!) 0   Ht 6' (1.829 m)   Wt 192 lb 7.4 oz (87.3 kg)   SpO2 94%   BMI 26.10 kg/m²   Constitutional:  on MV  Skin: no rash, turgor wnl  Heent:  eomi, mmm  Neck: no bruits or jvd noted  Cardiovascular:  S1, S2 without m/r/g  Respiratory: trach  Abdomen:  +bs, soft, nt, nd  Ext: bilateral lower extremity edema R>L  Psychiatric: mood and affect appropriate  Musculoskeletal:  Rom, muscular strength intact    Data:   Labs:  CBC:   Recent Labs     10/22/22  0432 10/23/22  0510 10/24/22  0517   WBC 14.7* 11.6* 16.4*   HGB 7.6* 7.0* 7.9*    449 498*       BMP:    Recent Labs     10/22/22  0432 10/23/22  0510 10/24/22  0517    139 139   K 4.0 3.8 4.1    100 100   CO2 33* 30 31   BUN 72* 72* 71*   CREATININE 0.8 0.8 0.8   GLUCOSE 85 227* 135*       Ca/Mg/Phos:   Recent Labs     10/22/22  0432 10/23/22  0510 10/24/22  0517   CALCIUM 8.7 8.2* 8.7   MG 2.70* 2.40 2.50*   PHOS 3.5 3.4 2.9       Hepatic: No results for input(s): AST, ALT, ALB, BILITOT, ALKPHOS in the last 72 hours. Troponin: No results for input(s): TROPONINI in the last 72 hours. BNP: No results for input(s): BNP in the last 72 hours. Lipids:   No results for input(s): CHOL, TRIG, HDL, LDLCALC, LABVLDL in the last 72 hours. ABGs:   No results for input(s): PHART, PO2ART, RVA9OCI in the last 72 hours. INR: No results for input(s): INR in the last 72 hours. UA:No results for input(s): Phillips Pean, GLUCOSEU, BILIRUBINUR, Phoebe Bolls, BLOODU, PHUR, PROTEINU, UROBILINOGEN, NITRU, LEUKOCYTESUR, LABMICR, URINETYPE in the last 72 hours. Urine Microscopic: No results for input(s): LABCAST, BACTERIA, COMU, HYALCAST, WBCUA, RBCUA, EPIU in the last 72 hours. Urine Culture: No results for input(s): LABURIN in the last 72 hours. Urine Chemistry:   No results for input(s): Blane Necessary, PROTEINUR, NAUR in the last 72 hours. IMAGING:  XR ABDOMEN (KUB) (SINGLE AP VIEW)   Final Result   Impression:       Slightly increased gas dilation of transverse colon since the prior study. No evidence of small bowel obstruction. Moderate colonic stool burden, unchanged from prior study. XR CHEST PORTABLE   Final Result   Impression:       Slight progression of bibasilar lung opacities, especially on the left. Persistence of small bilateral pleural effusions. XR ABDOMEN (2 VIEWS)   Final Result   1. Moderate amount of stool in the descending colon with the transverse colon mildly dilated with air. CT CERVICAL SPINE WO CONTRAST   Final Result      1. Osseous lesions in C2, C7 and T1 vertebral body seen on recent MRI dated 10/18/2022 are not well-visualized on CT. Bone density is slightly heterogenous on CT. There is a 8 mm radiolucent lesion in the inferior posterior aspect of C2 vertebral body    which may correspond to C2 lesion seen on MRI. 2. Mildly expansile lytic lesion in left lamina of C4 concerning for metastasis. 3. Multilevel degenerative disc disease, most pronounced at C6-7 where there is disc osteophyte complex causing mild-to-moderate spinal canal stenosis. 4. Multilevel foraminal stenosis, most pronounced at C5-6, moderate to severe right and moderate left. MRI LUMBAR SPINE WO CONTRAST   Final Result      1. Motion compromised study. 2.  No cord compression. 3.  Osseous lesions in the cervical and thoracic spine suspicious for metastatic disease. No extraosseous mass. 4.  Severe multilevel degenerative disc disease in the lumbar spine. Moderate to severe canal stenosis at L2-L3 and L4-L5. Moderate canal stenosis at L3-L4. Multiple levels of moderate to severe canal stenosis from L2-L3 to L4-L5      MRI THORACIC SPINE WO CONTRAST   Final Result      1. Motion compromised study. 2.  No cord compression. 3.  Osseous lesions in the cervical and thoracic spine suspicious for metastatic disease. No extraosseous mass. 4.  Severe multilevel degenerative disc disease in the lumbar spine. Moderate to severe canal stenosis at L2-L3 and L4-L5. Moderate canal stenosis at L3-L4.  Multiple levels of moderate to severe canal stenosis from L2-L3 to L4-L5      MRI Cervical Spine WO Contrast   Final Result      1. Motion compromised study. 2.  No cord compression. 3.  Osseous lesions in the cervical and thoracic spine suspicious for metastatic disease. No extraosseous mass. 4.  Severe multilevel degenerative disc disease in the lumbar spine. Moderate to severe canal stenosis at L2-L3 and L4-L5. Moderate canal stenosis at L3-L4. Multiple levels of moderate to severe canal stenosis from L2-L3 to L4-L5      MRI BRAIN WO CONTRAST   Final Result      1. No acute hemorrhage, mass or acute ischemia. 2.  Mild burden of nonspecific multifocal white matter disease compatible with chronic small vessel ischemia. 3.  Mild atrophy. 4.  Bilateral mastoid effusions. XR CHEST PORTABLE   Final Result      Distal portion of tracheostomy poorly visualized due to overlying medical devices. The position is without significant change since 10/5/2022. Bibasilar pleuroparenchymal consolidation. Stable cardiomediastinal silhouette. Right jugular central venous catheter without change. CT ABDOMEN PELVIS WO CONTRAST Additional Contrast? None   Final Result      Marked decrease in size of pneumomediastinum. Trace residual pneumoperitoneum. Extensive bilateral lower lobe pulmonary atelectasis with pleural effusions and trace residual right pneumothorax. XR CHEST PORTABLE   Final Result      Tiny right lateral pneumothorax is suspected, 5 mm in maximum thickness. This has decreased in size since 10/3/2022. Right basilar Pleurx catheter noted. Small bilateral pleural effusions. Prominent interstitial markings. Stable cardiac mediastinal silhouette. Lines and tubes without change. Subcutaneous emphysema noted. CT CHEST ABDOMEN PELVIS WO CONTRAST   Final Result      1. Severe pneumomediastinum.    2. Small to moderate right hydropneumothorax with similar fluid and gas components with a right-sided Pleurx catheter. 3. Moderate loculated left pleural effusion with atelectasis of the left lower lobe with patency of the central left lower lobe bronchi. 4. Fluid/material filling the bronchus intermedius and right lower lobe bronchi with complete consolidation and volume loss of the right lower lobe. Differential diagnosis would include mucous plugging or obstructing lesion. 5. Tracheostomy tube tip within the trachea   6. Severe subcutaneous emphysema in the neck. CT ABDOMEN AND PELVIS:      FINDINGS:      LIVER: Normal.      GALLBLADDER AND BILIARY TREE: No calcified gallstones. No gallbladder distention. No intra- or extrahepatic biliary dilatation. PANCREAS: Normal.      SPLEEN: Normal.      ADRENAL GLANDS: Normal.      KIDNEYS AND URETERS: Normal.      URINARY BLADDER: Ansari catheter present within collapsed urinary bladder. REPRODUCTIVE ORGANS: No associated masses. BOWEL: Normal diameter, nonobstructed. Feeding tube tip at the level of the ligament of Treitz. LYMPH NODES: No abnormally enlarged nodes. PERITONEUM/RETROPERITONEUM: Severe pneumoperitoneum extends into the upper abdomen with some of the symmetric multiseptated gas appearing deep to the diaphragm consistent with mild pneumoperitoneum (series 601 was 101). This is likely related to    pneumonia mediastinum dissecting into the abdomen. No fluid collection in the abdomen or pelvis. No ascites. VESSELS: Extensive atherosclerotic calcification of the aorta and iliac arteries. ABDOMINAL WALL: No acute abnormality. BONES: No acute abnormality. Mild chronic L1 compression fracture with previous vertebroplasty. IMPRESSION:      1. Severe pneumomediastinum extends into the upper abdomen with small pneumoperitoneum likely related to extension of pneumoperitoneum into the upper peritoneal cavity. 2. No fluid collection in the abdomen or pelvis. Results were discussed with the surgical team at 7:00 PM on 10/3/2022. XR CHEST PORTABLE   Final Result      Stable appearance of loculated right pneumothorax, with loculated components in the lateral right midlung region and in the right lateral costophrenic sulcus. Stable scattered areas of atelectasis versus scar, most prominent in the medial right lung base and in the left lateral lung base. XR CHEST PORTABLE   Final Result      Small right basilar pneumothorax. Right basilar chest tube without change. Patchy consolidation in the right mid and lower lung as well as the left lower lung-atelectasis versus pneumonia. Trace left pleural effusion. Normal heart size. Lines and tubes without change. Mild improvement in degree of subcutaneous emphysema in the chest and neck. XR CHEST 1 VIEW   Final Result      1. Stable small right-sided pneumothorax and prominent subcutaneous emphysema along the neck and upper superior chest wall, greater in right lung stable   2. Stable left basilar consolidation and pleural effusion      3. Stable appearing perihilar accentuated markings or airspace disease            XR CHEST PORTABLE   Final Result      Stable small right-sided pneumothorax. More prominent emphysema over the lower neck. No change in bilateral airspace disease. Stable left pleural effusion. XR CHEST PORTABLE   Final Result   1. Bilateral multifocal pneumonia with improvement in the right    pulmonary consolidation since prior study from 9/23/22   2. Mild to moderate left pleural effusion          XR CHEST PORTABLE   Final Result   1. Support lines and tubes are stable. 2.  Stable small right lateral pneumothorax and right basilar    chest tube. 3.  Stable patchy bilateral airspace disease. XR CHEST PORTABLE   Final Result   1. Satisfactory position of right internal jugular central line   2.  No interval change in endotracheal tube and nasogastric tube positioning. 3. Extensive bilateral airspace disease with no changes. 4. Left pleural effusion with retrocardiac opacity, unchanged   5. Small stable tiny right lateral pneumothorax and stable position of right chest tube,, Pleurx catheter. XR ABDOMEN (KUB) (SINGLE AP VIEW)   Final Result   1. No residual pneumothorax. 2.  Mild patchy airspace disease bilaterally worse on the right than on prior examination. 3.  Non-obstructive bowel gas pattern. Mildly distended stomach. XR CHEST PORTABLE   Final Result   1. No residual pneumothorax. 2.  Mild patchy airspace disease bilaterally worse on the right than on prior examination. 3.  Non-obstructive bowel gas pattern. Mildly distended stomach. XR CHEST PORTABLE   Final Result      1. Small right pneumothorax appears slightly increased. 2. Mild patchy airspace opacity bilaterally. XR CHEST PORTABLE   Final Result     Pleurx catheter at the right lung base. Trace right    pneumothorax is unchanged. XR CHEST PORTABLE   Final Result      Right-sided chest tube evident in the base, its tip medially. Improvement in the right-sided pneumothorax, since earlier today. Possible medial collapse of the right lower lobe. Small left effusion. Patchy airspace density/atelectasis in the lungs bilaterally, with no other significant change. XR CHEST PORTABLE   Final Result   1. Right-sided Pleurx catheter in satisfactory position in the lung bases   2. Right-sided post operative pneumothorax, approximately 10%             Assessment/Plan   ZANA  - sec to contrast nephropathy  - Cr stable   - BNP 3k, Protein:Cre 0.3, FENa 0.4%  - closely monitor UOP  - avoid nephrotoxic agent     2. Hypernatremia  - continue free water   - will continue to monitor     3. Hypotension  - pressors as needed     4. Anemia  - daily CBC    5. Acid- base/ Electrolyte imbalance   - optimize lytes    6.  Acute hypoxic resp failure  - s/p VATS on 9/19/22 for recurrent pleural effusions  - Intensivist and CTS following    7.  CHF   - EF 65% on 6/16/22 via cardiac cath        Liz Harrell MD

## 2022-10-25 NOTE — PROGRESS NOTES
Patient found to have increased WBC this AM. Patient with known sacral wound. Evaluated at bedside with no signs of infection. Wound care continues to apply santyl daily. Continued dressing changes per nursing.           Ioana Larson DO PGY-3  General Surgery  10/25/22  5:40 AM  [phone]

## 2022-10-25 NOTE — PROGRESS NOTES
Occupational Therapy  Facility/Department: [place] [address] [facility]  Daily Treatment Note  NAME: Gloria Valdez  :   MRN: [id number]    Date of Service: 10/25/2022    Discharge Recommendations: Gloria Valdez scored a 7/24 on the AM-PAC ADL Inpatient form. Current research shows that an AM-PAC score of 17 or less is typically not associated with a discharge to the patient's home setting. Based on the patient's AM-PAC score and their current ADL deficits, it is recommended that the patient have 3-5 sessions per week of Occupational Therapy at d/c to increase the patient's independence. Please see assessment section for further patient specific details. If patient discharges prior to next session this note will serve as a discharge summary. Please see below for the latest assessment towards goals. OT Equipment Recommendations  Other: defer to next level of care      Patient Diagnosis(es): The primary encounter diagnosis was Chronic heart failure with preserved ejection fraction (HFpEF) ([facility] [address] Diagnoses of Hypoxia and Acute on chronic respiratory failure with hypoxia and hypercapnia (HCC) were also pertinent to this visit. Assessment    Assessment: Pt tolerated seated EOB for ~20s today before reporting that he needed to lay down due to not being able to breath. Pt required max Ax2 for supine <> sit and max A to roll today. Pt was able to complete supine BLE exercises but is very limited by generalized weakness in both BUEs and BLEs. Pt appears to be having some difficulty coping w/ his situation and realizes the importance of therapy but states it is so painful for him to move. Pt is functioning well below his baseline and would benefit from continued OT to maximize functional independence. Cont OT per POC  Activity Tolerance: Patient limited by fatigue;Patient limited by endurance; Patient limited by pain  Other: defer to next level of care      Plan   Occupational Therapy Plan  Times Per Week: 2-5  Current Treatment Recommendations: Balance training;ROM; Functional mobility training; Endurance training;Self-Care / ADL     Restrictions       Subjective   Subjective  Subjective: Pt was semi supine in bed upon arrival w/ wife present. Per wife pt \"is feeling grumpy today\". Pt frustrated w/ being in the hospital and being in pain but was agreeable to OT treatment. Pain: Pt c/o \"all over\" w/ movement. RN aware  Orientation  Overall Orientation Status: Within Functional Limits    Cognition  Cognition Comment: Pt w/ difficulty communicating but did use a whiteboard to communicate to OT/PT. Pt appears to be struggling w/ his situation, stating \"I know what you want me to do but everytime I move it hurts\". Pt required emotional support and encouragement. Objective    Vitals       Bed Mobility Training  Bed Mobility Training: Yes  Rolling: Maximum assistance (Max A to roll to the R. Assistance to advance RUE across body to reach for bed rail)  Supine to Sit: Assist X2 (Max Ax2)  Sit to Supine: Assist X2 (Max Ax2)  Scooting: Maximum assistance (Max A to scoot hips to edge of bed)  Balance  Sitting: Impaired (Pt tolerated EOB for less than 20s before requesting to lay back down. Pt required max A to sit up at the EOB. Pt c/o not being able to breath and pain. Spo2 was reading 93%. )       ADL  LE Dressing: Dependent/Total  LE Dressing Skilled Clinical Factors: Total A to don and doff socks        Safety Devices  Type of Devices: Call light within reach; Bed alarm in place; Left in bed;Nurse notified       Patient Education  Education Given To: Patient; Family  Education Provided: Role of Therapy;Plan of Care;Home Exercise Program;Transfer Training;Precautions; ADL Adaptive Strategies  Education Provided Comments: importance of trying to sit up at the EOB and increase tolerance  Education Method: Verbal;Demonstration  Education Outcome: Continued education needed    Goals  Short Term Goals  Time Frame for Short Term Goals: discharge  Short Term Goal 1: pt to wash hands with cloth and SBA- ongoing, continue  Short Term Goal 2: pt to move supine><sit with max A of 2- goal met 10/25  Short Term Goal 3: pt to sit in midline for 6-8 minutes with mod A- not addressed  Short Term Goal 4: pt to tolerate 5-10 reps B UE AAROM exerc to increase activity tolerance- ongoing, continue  Patient Goals   Patient goals : not able to stated       Therapy Time   Individual Concurrent Group Co-treatment   Time In 1143         Time Out 1210         Minutes 27         Timed Code Treatment Minutes: 4295  Tehamalou Angeles, OT  If patient discharges prior to next treatment, this note will serve as discharge summary. WiIll continue per plan of care if patient does not discharge.

## 2022-10-25 NOTE — PLAN OF CARE
pain management   Notify Licensed Independent Practitioner if interventions unsuccessful or patient reports new pain     Problem: Safety - Adult  Goal: Free from fall injury  Outcome: Progressing  Flowsheets (Taken 10/25/2022 1850)  Free From Fall Injury: Instruct family/caregiver on patient safety     Problem: Skin/Tissue Integrity  Goal: Absence of new skin breakdown  Description: 1. Monitor for areas of redness and/or skin breakdown  2. Assess vascular access sites hourly  3. Every 4-6 hours minimum:  Change oxygen saturation probe site  4. Every 4-6 hours:  If on nasal continuous positive airway pressure, respiratory therapy assess nares and determine need for appliance change or resting period.   10/25/2022 1850 by Alin Bailey RN  Outcome: Progressing     Problem: Nutrition Deficit:  Goal: Optimize nutritional status  10/25/2022 1850 by Alin Bailey RN  Outcome: Progressing  Flowsheets (Taken 10/25/2022 1850)  Nutrient intake appropriate for improving, restoring, or maintaining nutritional needs:   Assess nutritional status and recommend course of action   Monitor oral intake, labs, and treatment plans   Recommend appropriate diets, oral nutritional supplements, and vitamin/mineral supplements   Order, calculate, and assess calorie counts as needed   Recommend, monitor, and adjust tube feedings and TPN/PPN based on assessed needs   Provide specific nutrition education to patient or family as appropriate     Problem: ABCDS Injury Assessment  Goal: Absence of physical injury  Outcome: Progressing  Flowsheets (Taken 10/25/2022 1850)  Absence of Physical Injury: Implement safety measures based on patient assessment     Problem: Respiratory - Adult  Goal: Achieves optimal ventilation and oxygenation  10/25/2022 1850 by Alin Bailey RN  Outcome: Progressing  Flowsheets (Taken 10/25/2022 1850)  Achieves optimal ventilation and oxygenation:   Assess for changes in respiratory status   Assess for changes in mentation and behavior   Position to facilitate oxygenation and minimize respiratory effort   Oxygen supplementation based on oxygen saturation or arterial blood gases   Initiate smoking cessation protocol as indicated   Encourage broncho-pulmonary hygiene including cough, deep breathe, incentive spirometry   Assess and instruct to report shortness of breath or any respiratory difficulty   Assess the need for suctioning and aspirate as needed   Respiratory therapy support as indicated     Problem: Cardiovascular - Adult  Goal: Maintains optimal cardiac output and hemodynamic stability  Outcome: Progressing  Flowsheets (Taken 10/25/2022 1850)  Maintains optimal cardiac output and hemodynamic stability:   Monitor blood pressure and heart rate   Assess for signs of decreased cardiac output   Monitor urine output and notify Licensed Independent Practitioner for values outside of normal range   Administer fluid and/or volume expanders as ordered   Administer vasoactive medications as ordered     Problem: Metabolic/Fluid and Electrolytes - Adult  Goal: Glucose maintained within prescribed range  Outcome: Progressing  Flowsheets (Taken 10/25/2022 1850)  Glucose maintained within prescribed range:   Monitor blood glucose as ordered   Assess for signs and symptoms of hyperglycemia and hypoglycemia   Administer ordered medications to maintain glucose within target range   Assess barriers to adequate nutritional intake and initiate nutrition consult as needed   Instruct patient on self management of diabetes and initiate consult as needed

## 2022-10-25 NOTE — PROGRESS NOTES
Cardiothoracic Surgery Daily Progress Note      CC: Pleural effusions s/p R pleur-X placement    Subjective :  Patient rested well overnight. Remains afebrile and HDS. Complains of stable abdominal pain. Denies N/V. Passing gas and having diarrhea. Objective     Exam:  Vitals:    10/25/22 0357 10/25/22 0450 10/25/22 0520 10/25/22 0630   BP: (!) 117/51      Pulse: 79 78 76    Resp: 20 17     Temp: 98.3 °F (36.8 °C)      TempSrc: Oral      SpO2: 97% 96% 98%    Weight:    194 lb 7.1 oz (88.2 kg)   Height:           Physical Examination:   General appearance: Alert, following commands. Neurological: no focal deficits  HEENT: EOMI, trachea midline, no JVD. Tracheostomy in place with some mucus drainage  Chest/Lungs: On mechanical ventilation via tracheostomy; R PleurX catheter capped with dressing C/D/I  Cardiovascular: RRR  Abdomen: Soft, non-tender, minimally-distended. PEG tube with tube feeds running at 65   Skin: Warm and dry. Sacral decubitus ulcer covered with Mepilex. Extremities: Pitting edema of the b/l feet. No cyanosis. Legs ulcers from SCDs covered with Mepilex      ASSESSMENT/PLAN:   Nicholas Sherman is a 65 y.o. male with history of diastolic heart failure, atrial fibrillation, and DM with recurrent pleural effusions s/p R PleurX catheter placement (9/19). - Continue every other day Pleurx drainage.  - Pulmonology/Crit care managing vent  - Stool frequency has decreased. Fiber was restarted with improvement of diarrhea. - Continue tube feeds  - glucose control improved  - Sacral wound per wound care nurse  -Continue PT/OT  - Will touch base with SW on LTAC, wife working on [employer], as patient was denied placement on current insurance.      Orville Church CNP  10/25/2022  7:12 AM  olga

## 2022-10-25 NOTE — PLAN OF CARE
Problem: Chronic Conditions and Co-morbidities  Goal: Patient's chronic conditions and co-morbidity symptoms are monitored and maintained or improved  Outcome: Progressing  Note: Monitoring Blood Sugars Q4 hours. Sliding scale insulin and Lantus as ordered. Problem: Skin/Tissue Integrity  Goal: Absence of new skin breakdown  Description: 1. Monitor for areas of redness and/or skin breakdown  2. Assess vascular access sites hourly  3. Every 4-6 hours minimum:  Change oxygen saturation probe site  4. Every 4-6 hours:  If on nasal continuous positive airway pressure, respiratory therapy assess nares and determine need for appliance change or resting period. Outcome: Progressing  Note: Bryson scale score 15. Turning and repositioning Q2 hours and PRN as patient allows. Pericare as needed and barrier creams applied. Dressings intact. Heels elevated off mattress with pillows and Mepilex to heals. Remains on low airloss mattress. Problem: Nutrition Deficit:  Goal: Optimize nutritional status  Flowsheets (Taken 10/25/2022 0542)  Nutrient intake appropriate for improving, restoring, or maintaining nutritional needs: Recommend, monitor, and adjust tube feedings and TPN/PPN based on assessed needs     Problem: Confusion  Goal: Confusion, delirium, dementia, or psychosis is improved or at baseline  Description: INTERVENTIONS:  1. Assess for possible contributors to thought disturbance, including medications, impaired vision or hearing, underlying metabolic abnormalities, dehydration, psychiatric diagnoses, and notify attending LIP  2. Windfall high risk fall precautions, as indicated  3. Provide frequent short contacts to provide reality reorientation, refocusing and direction  4. Decrease environmental stimuli, including noise as appropriate  5. Monitor and intervene to maintain adequate nutrition, hydration, elimination, sleep and activity  6.  If unable to ensure safety without constant attention obtain sitter and review sitter guidelines with assigned personnel  7. Initiate Psychosocial CNS and Spiritual Care consult, as indicated  Outcome: Completed  Note: Patient is alert and oriented. No confusion noted. Problem: Respiratory - Adult  Goal: Achieves optimal ventilation and oxygenation  Flowsheets (Taken 10/25/2022 0542)  Achieves optimal ventilation and oxygenation:   Assess for changes in respiratory status   Assess for changes in mentation and behavior   Position to facilitate oxygenation and minimize respiratory effort  Note: Trach with vent at 30% FiO2. SpO2 98%. Suctioning as needed for tan thick secretions. Rhonchi right, diminished left. Trach care provided. Problem: Genitourinary - Adult  Goal: Urinary catheter remains patent  Flowsheets (Taken 10/25/2022 0542)  Urinary catheter remains patent: Assess patency of urinary catheter  Note: Catheter patent and intact. Ansari care provided.

## 2022-10-25 NOTE — PROGRESS NOTES
Physical Therapy  Facility/Department: [facility] [address] [facility]  Physical Therapy Daily Treatment    Name: Betzy Hardwick  :   MRN: [id number]  Date of Service: 10/25/2022    Discharge Recommendations: Betzy Hardwick scored a / on the AM-PAC short mobility form. Current research shows that an AM-PAC score of 17 or less is typically not associated with a discharge to the patient's home setting. Based on the patient's AM-PAC score and their current functional mobility deficits, it is recommended that the patient have 3-5 sessions per week of Physical Therapy at d/c to increase the patient's independence. Please see assessment section for further patient specific details. If patient discharges prior to next session this note will serve as a discharge summary. Please see below for the latest assessment towards goals. PT Equipment Recommendations  Equipment Needed: No  Other: defer      Patient Diagnosis(es): The primary encounter diagnosis was Chronic heart failure with preserved ejection fraction (HFpEF) ([address] Diagnoses of Hypoxia and Acute on chronic respiratory failure with hypoxia and hypercapnia (HCC) were also pertinent to this visit. Past Medical History:  has a past medical history of ZANA (acute kidney injury) ([address] Carotid stenosis, left, Chronic back pain, Chronic systolic (congestive) heart failure, Diastolic CHF ([address] Erectile dysfunction, Hyperlipidemia, Hypertension, Osteoarthritis, Restrictive lung disease, and Type II or unspecified type diabetes mellitus without mention of complication, not stated as uncontrolled. Past Surgical History:  has a past surgical history that includes Rotator cuff repair (Bilateral); knee surgery (Left); Cardiac catheterization (2022); Thoracoscopy (Right, 2022); Pleural Cath Insertion (N/A, 2022); tracheostomy (N/A, 2022); and Gastrostomy tube placement (N/A, 10/11/2022).     Assessment   Assessment: Pt continues to be two person assist for all bed mobility, scooting and sitting EOB. Once sitting EOB, pt mouthed that he could not breathe and was only able to sit EOB for less than 10 seconds. Pt then wrote on his communication board his feelings about working with therapy. Pt is understanding that movement will help with his strength, but going to fast gives him anixety. Pt continues to show gross weakness but able to show muscle contract of the LLe more than the RLE. Pt continues to have impaired bed mobility, sitting balance and increased anxiety with mobility. He would benefit from further IP PT to improve his independence and safety with mobility will continue to follow. Requires PT Follow-Up: Yes  Activity Tolerance  Activity Tolerance: Patient limited by fatigue;Patient limited by endurance     Plan   Physcial Therapy Plan  General Plan:  (2-5)  Current Treatment Recommendations: Strengthening, Balance training, Functional mobility training, Transfer training, Neuromuscular re-education, Safety education & training, Patient/Caregiver education & training  Safety Devices  Type of Devices: Left in bed, Nurse notified, Call light within reach, Bed alarm in place     Restrictions  Position Activity Restriction  Other position/activity restrictions: up in chair, ambulate pt     Subjective   General  Chart Reviewed: Yes  Patient assessed for rehabilitation services?: Yes  Additional Pertinent Hx: Laure Rayo is a 65 y.o. male w/PMH HFpEF, carotid artery stenosis, HTN, OA, Restrictive Lung Disease, DM2, HLD who underwent VATS with PleurX catheter placement on 09/19 for recurrent b/l loculated pleural effusions w/subsequent BIPAP failure and hypoxic/hypercapnic respiratory failure requiring Mechanical intubation. Pt now on trach. Family / Caregiver Present: Yes (spouse)  General Comment  Comments:  The pt presents supine in bed and willing to work with therapist. Pt currently only answer to yes no with head nods and writing on the comminucation board. Social/Functional History  Social/Functional History  Lives With: Spouse  Type of Home: House  Home Layout: One level  Home Access:  (1 step in from garage)  Bathroom Shower/Tub: Tub/Shower unit  Bathroom Toilet: Standard (sink next to commode)  Home Equipment: Walker, 4 wheeled, Foote Mala, [employer]  ADL Assistance:  (assist drying after shower, dressed with min A)  Homemaking Assistance:  (pt did alot of cooking, wife did all other tasks)  Ambulation Assistance: Independent (furniture and wall walking)  Transfer Assistance: Independent  Active : Yes  Leisure & Hobbies: watching Star Trek  Additional Comments: Social history obtained from wife. She reports he was slowly declining at home, and then 2 days previous to admission became severely week. He has had ~3 falls in last few months.   Vision/Hearing       Cognition         Objective   Heart Rate: 85  Heart Rate Source: Monitor  BP: (!) 145/67  BP Location: Right upper arm  BP Method: Automatic  Patient Position: Semi fowlers  MAP (Calculated): 93  Resp: 19  SpO2: 97 %  O2 Device: Ventilator                             Bed mobility  Supine to Sit: 2 Person assistance;Dependent/Total (Dependent of 2, able to move slightly with LE's and reach with UE to the handrail)  Sit to Supine: Dependent/Total;2 Person assistance  Transfers  Comment: Unable to assess due to safety concerns  Ambulation  WB Status: Unable to assess due to safety concerns     Balance  Sitting - Static: Poor;- (EOB ~10 secs Max A)  Comments: Increased anxiety when sitting EOB, mouthed he could not breathe  Exercise Treatment: Pt performed supine heel raises, Knee to chest with assist        OutComes Score                                                  AM-PAC Score  AM-PAC Inpatient Mobility Raw Score : 6 (10/25/22 1401)  AM-PAC Inpatient T-Scale Score : 23.55 (10/25/22 1401)  Mobility Inpatient CMS 0-100% Score: 100 (10/25/22 1401)  Mobility Inpatient CMS G-Code Modifier : CN (10/25/22 2324)          Tinneti Score       Goals  Short Term Goals  Time Frame for Short Term Goals: By discharge- on going  Short Term Goal 1: The pt will perform supine to sit with max A x 1  Short Term Goal 2: The pt will sit EOB x 10 min with mod A x 1  Short Term Goal 3: The pt will be able to attempt stand with nacho sonya. Patient Goals   Patient Goals : To get better per wife       Education  Patient Education  Education Given To: Patient; Family  Education Provided: Role of Therapy;Plan of Care  Education Provided Comments: importance of mobility  Education Method: Verbal  Barriers to Learning: Hearing  Education Outcome: Continued education needed;Verbalized understanding      Therapy Time   Individual Concurrent Group Co-treatment   Time In [phone]         Time Out 1210         Minutes 27          Timed Code Treatment Minutes:   27    Total Treatment Minutes: Melquiades  , [place], [place] #[id number]

## 2022-10-25 NOTE — PROGRESS NOTES
Palliative Care Chart Review  and Check in Note:     NAME:  Onel Abdul  Admit Date: 9/19/2022  Hospital Day:  Hospital Day: 37   Current Code status: Full Code    Palliative care is continuing to following Mr. Clive Garrett for symptom management, and goals of care discussion as needed. Patient's chart reviewed today 10/25/22. Preston Lee was sleeping, so I did not disturb. Spoke with his wife, Zelalem Walker. She continues to work towards getting Preston Lee the proper insurance coverage. [employer] was not able to help her with the process, they referred her to [address] [place] [address]. [employer] sent her to the voicemail of a supervisor, so she has not been able to make any changes to Josiah's coverage. Discussed with Jose Carmona RN case manager. Discussed case with Shadi Lama RN. Encouraged time of out bed using maxilift. According to Meghann Wade has his days and nights mixed up. Would also encourage delirium prevention, lights and tv on during the day, moving as much as possible. The following are the currently established goals/code status, and Symptom management. Goals of care: Continue current medical management.      Code status: Full    Discharge plan: pending placement      Joni DecemberNATASHA - CHERIE  10/25/22  3:39 PM

## 2022-10-25 NOTE — DISCHARGE INSTR - COC
Continuity of Care Form    Patient Name: Madhuri Palencia   :    MRN:  [id number]    Admit date:  2022  Discharge date:  ***    Code Status Order: Full Code   Advance Directives:   Advance Care Flowsheet Documentation       Date/Time Healthcare Directive Type of Healthcare Directive Copy in [address] Agent's Name Healthcare Agent's Phone Number    22 0818 Yes, patient has an advance directive for healthcare treatment Living will No, copy requested from family -- -- --            Admitting Physician:  Moses Solomon MD  PCP: Elmo Mi MD    Discharging Nurse: [facility] Unit/Room#: 9857/8471-99  Discharging Unit Phone Number: ***    Emergency Contact:   Extended Emergency Contact Information  Primary Emergency Contact: Tatiana Starks  Address: [address] [place], [place] [address] [address] Phone: [phone]  Relation: Spouse  Secondary Emergency Contact: [address] Phone: [phone]  Mobile Phone: [phone]  Relation: Child    Past Surgical History:  Past Surgical History:   Procedure Laterality Date    CARDIAC CATHETERIZATION  2022    GASTROSTOMY TUBE PLACEMENT N/A 10/11/2022    EGD PEG TUBE PLACEMENT performed by Amber Nur MD at [facility] Left     PLEURAL CATH INSERTION N/A 2022    PLEURAL CATHETER PLACEMENT; INTERCOSTAL NERVE BLOCK X4 performed by Moses Solomon MD at  [facility] Bilateral     THORACOSCOPY Right 2022    RIGHT VIDEO ASSISTED THORASCOPIC SURGERY AND; performed by Moses Solomon MD at [address] N/A 2022    TRACHEOSTOMY performed by Jack Rodriguez MD at [address]       Immunization History:   Immunization History   Administered Date(s) Administered    COVID-19, PFIZER PURPLE top, DILUTE for use, (age 15 y+), 30mcg/0.3mL 2021, 2021, 01/15/2022    Influenza 10/24/2011 Influenza Virus Vaccine 10/27/2014    Influenza, High Dose (Fluzone 65 yrs and older) 10/15/2015, 10/10/2016, 11/11/2017, 09/12/2018, 11/06/2019    Pneumococcal Conjugate 13-valent (Tsdadup26) 06/05/2015    Pneumococcal Conjugate 7-valent (Prevnar7) 09/03/2013    Pneumococcal Polysaccharide (Htxkkpegu44) 09/03/2013    Tdap (Boostrix, Adacel) 08/20/2012    Zoster Live (Zostavax) 09/01/2013       Active Problems:  Patient Active Problem List   Diagnosis Code    HTN (hypertension) I10    Hyperlipidemia E78.5    OA (osteoarthritis) of knee M17.9    Carotid stenosis, left I65.22    Hearing loss H91.90    ED (erectile dysfunction) N52.9    DM type 2 (diabetes mellitus, type 2) ([facility]) E11.9    Rotator cuff tear M75.100    Glenohumeral arthritis M19.019    Subacromial impingement M75.40    Trigger ring finger of right hand M65.341    Spinal stenosis of lumbar region M48.061    Closed compression fracture of L1 lumbar vertebra S32.010A    Closed displaced fracture of lateral malleolus of left fibula S82. 62XA    Exertional dyspnea R06.09    Atrial fibrillation with rapid ventricular response ([facility]) I48.91    Hypoxia R09.02    Chronic heart failure with preserved ejection fraction (HFpEF) ([facility]) I50.32    Pleural effusion on right J90    Acute on chronic respiratory failure with hypoxia and hypercapnia ([facility]) J96.21, J96.22    ZANA (acute kidney injury) ([facility]) N17.9    Mucus plugging of bronchi T17.500A    Malnutrition ([facility]) E46    Generalized weakness R53.1    Areflexia R29.2    Asymmetrical deep tendon reflexes R29.2       Isolation/Infection:   Isolation            No Isolation          Patient Infection Status       None to display            Nurse Assessment:  Last Vital Signs: BP (!) 108/52   Pulse 82   Temp 97.9 °F (36.6 °C) (Axillary)   Resp 18   Ht 6' (1.829 m)   Wt 194 lb 7.1 oz (88.2 kg)   SpO2 97%   BMI 26.37 kg/m²     Last documented pain score (0-10 scale): Pain Level: 0  Last Weight:   Wt Readings from Last 1 Encounters:   10/25/22 194 lb 7.1 oz (88.2 kg)     Mental Status:  {IP PT MENTAL STATUS:20030}    IV Access:  { ANGEL IV ACCESS:666968650}    Nursing Mobility/ADLs:  Walking   {CHP DME PQPS:818003499}  Transfer  {CHP DME RLGV:504025217}  Bathing  {CHP DME CNTV:859729748}  Dressing  {CHP DME JUOL:550083270}  Toileting  {CHP DME MNIB:783392670}  Feeding  {CHP DME FQTU:868165508}  Med Admin  {CHP DME KWESI:238392070}  Med Delivery   { ANGEL MED Delivery:900206633}    Wound Care Documentation and Therapy:  Wound 10/03/22 Sacrum (Active)   Wound Image   10/19/22 1027   Wound Etiology Pressure Unstageable 10/25/22 1600   Dressing Status Clean;Dry; Intact 10/25/22 1600   Wound Cleansed Not Cleansed 10/24/22 0400   Dressing/Treatment Pharmaceutical agent (see MAR); Foam 10/25/22 1600   Dressing Change Due 10/25/22 10/25/22 1600   Wound Length (cm) 10 cm 10/19/22 1027   Wound Width (cm) 12 cm 10/19/22 1027   Wound Depth (cm) 0 cm 10/19/22 1027   Wound Surface Area (cm^2) 120 cm^2 10/19/22 1027   Change in Wound Size % (l*w) -275 10/19/22 1027   Wound Volume (cm^3) 0 cm^3 10/19/22 1027   Wound Healing % 100 10/19/22 1027   Wound Assessment [facility] 10/24/22 1507   Drainage Amount Scant 10/25/22 1600   Drainage Description Serosanguinous 10/21/22 1530   Odor None 10/25/22 1600   Nery-wound Assessment Blanchable erythema;Fragile 10/23/22 0336   Margins Attached edges; Defined edges 10/21/22 0640   Wound Thickness Description not for Pressure Injury Partial thickness 10/21/22 0640   Number of days: 22       Wound 10/10/22 Leg Left;Lateral;Lower;Proximal (Active)   Wound Image   10/10/22 1135   Wound Etiology Deep tissue/Injury 10/25/22 1600   Dressing Status Dry; Intact 10/24/22 0400   Wound Cleansed Not Cleansed 10/24/22 0400   Dressing/Treatment Pharmaceutical agent (see MAR); Open to air 10/25/22 1600   Dressing Change Due 10/16/22 10/16/22 0600   Wound Length (cm) 1 cm 10/16/22 0400   Wound Width (cm) 2 cm 10/16/22 0400   Wound Depth (cm) 0 cm 10/10/22 1135   Wound Surface Area (cm^2) 2 cm^2 10/16/22 0400   Change in Wound Size % (l*w) 0 10/16/22 0400   Wound Volume (cm^3) 0 cm^3 10/10/22 1135   Wound Assessment Purple/maroon 10/25/22 1600   Drainage Amount None 10/25/22 1600   Odor None 10/25/22 1600   Nery-wound Assessment Fragile; Intact 10/25/22 1600   Margins Attached edges 10/21/22 0640   Number of days: 15       Wound 10/10/22 Leg Left; Lower; Lateral;Mid (Active)   Wound Image   10/10/22 1135   Wound Etiology Deep tissue/Injury 10/25/22 1600   Dressing Status Dry; Intact 10/25/22 1600   Wound Cleansed Cleansed with saline 10/23/22 4671   Dressing/Treatment Pharmaceutical agent (see MAR); Open to air 10/25/22 1600   Dressing Change Due 10/16/22 10/16/22 0600   Wound Length (cm) 4 cm 10/16/22 0400   Wound Width (cm) 1.5 cm 10/16/22 0400   Wound Depth (cm) 0 cm 10/10/22 1135   Wound Surface Area (cm^2) 6 cm^2 10/16/22 0400   Change in Wound Size % (l*w) 0 10/16/22 0400   Wound Volume (cm^3) 0 cm^3 10/10/22 1135   Wound Assessment Purple/maroon 10/25/22 1600   Drainage Amount None 10/25/22 1600   Odor None 10/25/22 1600   Nery-wound Assessment Intact; Non-blanchable erythema 10/25/22 1600   Margins Attached edges 10/21/22 0640   Number of days: 15       Wound 10/10/22 Left; Lower; Lateral;Distal (Active)   Wound Image   10/10/22 1135   Wound Etiology Pressure Stage 2 10/25/22 1600   Dressing Status Dry; Intact 10/25/22 1600   Wound Cleansed Not Cleansed 10/24/22 0400   Dressing/Treatment Pharmaceutical agent (see MAR) 10/25/22 1600   Dressing Change Due 10/16/22 10/16/22 0600   Wound Length (cm) 5 cm 10/16/22 0400   Wound Width (cm) 1 cm 10/16/22 0400   Wound Depth (cm) 0 cm 10/10/22 1135   Wound Surface Area (cm^2) 5 cm^2 10/16/22 0400   Change in Wound Size % (l*w) 0 10/16/22 0400   Wound Volume (cm^3) 0 cm^3 10/10/22 1135   Wound Assessment Pink/red 10/25/22 1600   Drainage Amount None 10/25/22 1600   Odor None 10/25/22 1600   Nery-wound Assessment Intact;Fragile 10/25/22 1600   Margins Attached edges 10/17/22 0400   Number of days: 15       Wound 10/10/22 Right; Lower; Lateral (Active)   Wound Image   10/10/22 1135   Wound Etiology Deep tissue/Injury 10/25/22 1600   Dressing Status Clean;Dry; Intact 10/25/22 1600   Wound Cleansed Not Cleansed 10/24/22 0400   Dressing/Treatment Foam;Pharmaceutical agent (see MAR) 10/25/22 1600   Dressing Change Due 10/16/22 10/16/22 0600   Wound Length (cm) 5 cm 10/16/22 0400   Wound Width (cm) 2 cm 10/16/22 0400   Wound Depth (cm) 0 cm 10/10/22 1135   Wound Surface Area (cm^2) 10 cm^2 10/16/22 0400   Change in Wound Size % (l*w) 0 10/16/22 0400   Wound Volume (cm^3) 0 cm^3 10/10/22 1135   Wound Assessment Pink/red;Purple/maroon 10/25/22 1600   Drainage Amount None 10/25/22 1600   Drainage Description Serous 10/19/22 1320   Odor None 10/25/22 1600   Nery-wound Assessment Intact 10/25/22 1600   Margins Unattached edges 10/19/22 1320   Number of days: 15       Incision 09/19/22 Chest Lateral;Right;Upper (Active)   Dressing Status Clean;Dry; Intact 10/25/22 1600   Incision Cleansed Not Cleansed 10/24/22 0400   Dressing/Treatment Dry dressing 10/25/22 1600   Closure Sutures 10/23/22 0336   Margins Approximated 10/23/22 0336   Incision Assessment Dry 10/23/22 0336   Drainage Amount None 10/25/22 1600   Odor None 10/25/22 1600   Nery-incision Assessment Intact 10/25/22 1600   Number of days: 36        Elimination:  Continence:    Bowel: {YES / TF:97424}  Bladder: {YES / HB:00239}  Urinary Catheter: {Urinary Catheter:726735412}   Colostomy/Ileostomy/Ileal Conduit: {YES / RN:05411}  [REMOVED] Rectal Tube-Stool Appearance: Loose  [REMOVED] Rectal Tube-Stool Color: Brown  [REMOVED] Rectal Tube-Stool Amount: Medium    Date of Last BM: ***    Intake/Output Summary (Last 24 hours) at 10/25/2022 1718  Last data filed at 10/25/2022 1600  Gross per 24 hour   Intake 1962 ml   Output 1200 ml   Net 762 ml     I/O last 3 completed shifts: In: 9254 [I.V.:10; NG/GT:3035]  Out: 1985 [YXMTF:5507]    Safety Concerns:     [employer] Safety Concerns:483872311}    Impairments/Disabilities:      [employer] Impairments/Disabilities:624847093}    Nutrition Therapy:  Current Nutrition Therapy:   [address] [employer] Diet List:244196450}    Routes of Feeding: {CHP DME Other Feedings:759713512}  Liquids: {Slp liquid thickness:56464}  Daily Fluid Restriction: {CHP DME Yes amt example:239661007}  Last Modified Barium Swallow with Video (Video Swallowing Test): {Done Not Done YKWD:250496105}    Treatments at the Time of Hospital Discharge:   Respiratory Treatments: ***  Oxygen Therapy:  {Therapy; copd oxygen:46855}  Ventilator:    {MH CC Vent XFPS:116343327}    Heart Failure Instructions for Daily Management  Patient was treated for chronic diastolic heart failure. he  will require the following:    Please weigh daily on the same scale and approximately the same time of day. Report weight gain of 3 pounds/day or 5 pounds/week to : facility MD and [facility] [employer] [phone]. Please use hospital discharge weight as baseline reference. Please monitor for signs and symptoms of and report to MD:  Worsening Heart Failure: sudden weight gain, shortness of breath, lower extremity or general edema/swelling, abdominal bloating/swelling, inability to lie flat, intolerance to usual activity, or cough (especially at night). Report these finding even if no increase in weight. Dehydration:  having difficulty or a decrease in urination, dizziness, worsening fatigue, or new onset/worsening of generalized weakness. Please continue a LOW SODIUM diet and LIMIT fluid intake to 48 - 64 ounces ( 1.5 - 2 liters) per day. Call facility MD and [facility] [employer] [phone] with any questions or concerns. Please continue heart failure education to patient and family/support system.   See After Visit Summary for hospital follow up appointment details. Consider spiritual care referral for support and/or completion of advance directives . Consider: having the facility MD complete required 7 day follow up. Patient's primary cardiologist is Dr Alex Medina          Rehab Therapies: {THERAPEUTIC INTERVENTION:0863788220}  Weight Bearing Status/Restrictions: 50[facility]bara Natar[facility] Weight Bearin}  Other Medical Equipment (for information only, NOT a DME order):  {EQUIPMENT:879805731}  Other Treatments: ***    Patient's personal belongings (please select all that are sent with patient):  {P DME Belongings:082420626}    RN SIGNATURE:  {Esignature:579054156}    CASE MANAGEMENT/SOCIAL WORK SECTION    Inpatient Status Date: ***    Readmission Risk Assessment Score:  Readmission Risk              Risk of Unplanned Readmission:  28           Discharging to Facility/ Agency   Name:   Address:  Phone:  Fax:    Dialysis Facility (if applicable)   Name:  Address:  Dialysis Schedule:  Phone:  Fax:    / signature: {Esignature:721270864}    PHYSICIAN SECTION    Prognosis: {Prognosis:3666202670}    Condition at Discharge: 50Barbara Natarajan Patient Condition:353142427}    Rehab Potential (if transferring to Rehab): {Prognosis:9033026844}    Recommended Labs or Other Treatments After Discharge: ***    Physician Certification: I certify the above information and transfer of Geetha Lennon  is necessary for the continuing treatment of the diagnosis listed and that he requires {Admit to Appropriate Level of Care:87187} for {GREATER/LESS:585861045} 30 days.      Update Admission H&P: {CHP DME Changes in IBESF:196700656}    PHYSICIAN SIGNATURE:  {Esignature:603201230}

## 2022-10-25 NOTE — PROGRESS NOTES
Nephrology Progress Note                                                                                                                                                                                                                                                                                                                                                               Office : [phone]     Fax :[phone]    Patient's Name: Óscar Esposito        Reason for Consult:   ZANA  Requesting Physician:  Donna Valdes MD    Interval History:      Cr stable   Na better   Good UO   Remains on Trach   PEG placed - on TF         Past Medical History:   Diagnosis Date    ZANA (acute kidney injury) ([facility] [address] 9/26/2022    Carotid stenosis, left 10/12/2011    Chronic back pain     Chronic systolic (congestive) heart failure 11/95/5633    Diastolic CHF ([facility] [address]     Erectile dysfunction     Hyperlipidemia     Hypertension     Osteoarthritis     Restrictive lung disease     Type II or unspecified type diabetes mellitus without mention of complication, not stated as uncontrolled        Past Surgical History:   Procedure Laterality Date    CARDIAC CATHETERIZATION  06/2022    GASTROSTOMY TUBE PLACEMENT N/A 10/11/2022    EGD PEG TUBE PLACEMENT performed by Pacheco Maldonado MD at [facility] Left     PLEURAL CATH INSERTION N/A 9/19/2022    PLEURAL CATHETER PLACEMENT; INTERCOSTAL NERVE BLOCK X4 performed by Escobar Bhandari MD at [address] [facility] [place] Bilateral     THORACOSCOPY Right 9/19/2022    RIGHT VIDEO ASSISTED THORASCOPIC SURGERY AND; performed by Escobar Bhandari MD at [address] N/A 9/30/2022    TRACHEOSTOMY performed by Amanda Avila MD at [address] [facility] History   Problem Relation Age of Onset    Hearing Loss Father     Heart Disease Father 70        MI    High Blood Pressure Father     Diabetes Maternal Grandmother         hypoglycemic    Hearing Loss Maternal Grandmother     Arthritis Maternal Grandfather         reports that he quit smoking about 21 years ago. His smoking use included cigarettes. He has a 80.00 pack-year smoking history. He has never used smokeless tobacco. He reports current alcohol use. He reports that he does not use drugs. Allergies:   Torsemide and Dye [iodides]    Current Medications:    insulin glargine (LANTUS;BASAGLAR) injection pen 30 Units, Nightly  simethicone (MYLICON) chewable tablet 80 mg, Q6H PRN  acetaminophen (TYLENOL) 160 MG/5ML solution 650 mg, Q6H PRN  guar gum packet 1 packet, TID  glucose chewable tablet 16 g, PRN  dextrose bolus 10% 125 mL, PRN   Or  dextrose bolus 10% 250 mL, PRN  glucagon (rDNA) injection 1 mg, PRN  dextrose 10 % infusion, Continuous PRN  albuterol (PROVENTIL) nebulizer solution 2.5 mg, 4x daily  bisacodyl (DULCOLAX) suppository 10 mg, Daily  collagenase ointment, Daily  methocarbamol (ROBAXIN) tablet 500 mg, 4x Daily  doxazosin (CARDURA) tablet 1 mg, Daily  clotrimazole (LOTRIMIN) 1 % cream, BID  0.9 % sodium chloride infusion, PRN  lansoprazole suspension SUSP 30 mg, BID  oxyCODONE (ROXICODONE) 5 MG/5ML solution 7.5 mg, Q6H PRN  hydroxypropyl methylcellulose (GONIOSOL) 2.5 % ophthalmic solution 1 drop, PRN  insulin lispro (1 Unit Dial) (HUMALOG/ADMELOG) pen 0-16 Units, Q4H  Venelex ointment, BID  chlorhexidine (PERIDEX) 0.12 % solution 15 mL, BID  [Held by provider] apixaban (ELIQUIS) tablet 5 mg, BID  sodium chloride flush 0.9 % injection 5-40 mL, 2 times per day  sodium chloride flush 0.9 % injection 5-40 mL, PRN  0.9 % sodium chloride infusion, PRN  ondansetron (ZOFRAN-ODT) disintegrating tablet 4 mg, Q8H PRN   Or  ondansetron (ZOFRAN) injection 4 mg, Q6H PRN  hydrALAZINE (APRESOLINE) injection 5 mg, Q15 Min PRN          Physical exam:     Vitals:  BP (!) 145/67   Pulse 85   Temp 97.8 °F (36.6 °C) (Axillary)   Resp 19   Ht 6' (1.829 m)   Wt 194 lb 7.1 oz (88.2 kg)   SpO2 97%   BMI 26.37 kg/m² bowel obstruction. Moderate colonic stool burden, unchanged from prior study. XR CHEST PORTABLE   Final Result   Impression:       Slight progression of bibasilar lung opacities, especially on the left. Persistence of small bilateral pleural effusions. XR ABDOMEN (2 VIEWS)   Final Result   1. Moderate amount of stool in the descending colon with the transverse colon mildly dilated with air. CT CERVICAL SPINE WO CONTRAST   Final Result      1. Osseous lesions in C2, C7 and T1 vertebral body seen on recent MRI dated 10/18/2022 are not well-visualized on CT. Bone density is slightly heterogenous on CT. There is a 8 mm radiolucent lesion in the inferior posterior aspect of C2 vertebral body    which may correspond to C2 lesion seen on MRI. 2. Mildly expansile lytic lesion in left lamina of C4 concerning for metastasis. 3. Multilevel degenerative disc disease, most pronounced at C6-7 where there is disc osteophyte complex causing mild-to-moderate spinal canal stenosis. 4. Multilevel foraminal stenosis, most pronounced at C5-6, moderate to severe right and moderate left. MRI LUMBAR SPINE WO CONTRAST   Final Result      1. Motion compromised study. 2.  No cord compression. 3.  Osseous lesions in the cervical and thoracic spine suspicious for metastatic disease. No extraosseous mass. 4.  Severe multilevel degenerative disc disease in the lumbar spine. Moderate to severe canal stenosis at L2-L3 and L4-L5. Moderate canal stenosis at L3-L4. Multiple levels of moderate to severe canal stenosis from L2-L3 to L4-L5      MRI THORACIC SPINE WO CONTRAST   Final Result      1. Motion compromised study. 2.  No cord compression. 3.  Osseous lesions in the cervical and thoracic spine suspicious for metastatic disease. No extraosseous mass. 4.  Severe multilevel degenerative disc disease in the lumbar spine. Moderate to severe canal stenosis at L2-L3 and L4-L5.  Moderate canal stenosis at L3-L4. Multiple levels of moderate to severe canal stenosis from L2-L3 to L4-L5      MRI Cervical Spine WO Contrast   Final Result      1. Motion compromised study. 2.  No cord compression. 3.  Osseous lesions in the cervical and thoracic spine suspicious for metastatic disease. No extraosseous mass. 4.  Severe multilevel degenerative disc disease in the lumbar spine. Moderate to severe canal stenosis at L2-L3 and L4-L5. Moderate canal stenosis at L3-L4. Multiple levels of moderate to severe canal stenosis from L2-L3 to L4-L5      MRI BRAIN WO CONTRAST   Final Result      1. No acute hemorrhage, mass or acute ischemia. 2.  Mild burden of nonspecific multifocal white matter disease compatible with chronic small vessel ischemia. 3.  Mild atrophy. 4.  Bilateral mastoid effusions. XR CHEST PORTABLE   Final Result      Distal portion of tracheostomy poorly visualized due to overlying medical devices. The position is without significant change since 10/5/2022. Bibasilar pleuroparenchymal consolidation. Stable cardiomediastinal silhouette. Right jugular central venous catheter without change. CT ABDOMEN PELVIS WO CONTRAST Additional Contrast? None   Final Result      Marked decrease in size of pneumomediastinum. Trace residual pneumoperitoneum. Extensive bilateral lower lobe pulmonary atelectasis with pleural effusions and trace residual right pneumothorax. XR CHEST PORTABLE   Final Result      Tiny right lateral pneumothorax is suspected, 5 mm in maximum thickness. This has decreased in size since 10/3/2022. Right basilar Pleurx catheter noted. Small bilateral pleural effusions. Prominent interstitial markings. Stable cardiac mediastinal silhouette. Lines and tubes without change. Subcutaneous emphysema noted. CT CHEST ABDOMEN PELVIS WO CONTRAST   Final Result      1. Severe pneumomediastinum.    2. Small to moderate right hydropneumothorax with similar fluid and gas components with a right-sided Pleurx catheter. 3. Moderate loculated left pleural effusion with atelectasis of the left lower lobe with patency of the central left lower lobe bronchi. 4. Fluid/material filling the bronchus intermedius and right lower lobe bronchi with complete consolidation and volume loss of the right lower lobe. Differential diagnosis would include mucous plugging or obstructing lesion. 5. Tracheostomy tube tip within the trachea   6. Severe subcutaneous emphysema in the neck. CT ABDOMEN AND PELVIS:      FINDINGS:      LIVER: Normal.      GALLBLADDER AND BILIARY TREE: No calcified gallstones. No gallbladder distention. No intra- or extrahepatic biliary dilatation. PANCREAS: Normal.      SPLEEN: Normal.      ADRENAL GLANDS: Normal.      KIDNEYS AND URETERS: Normal.      URINARY BLADDER: Ansari catheter present within collapsed urinary bladder. REPRODUCTIVE ORGANS: No associated masses. BOWEL: Normal diameter, nonobstructed. Feeding tube tip at the level of the ligament of Treitz. LYMPH NODES: No abnormally enlarged nodes. PERITONEUM/RETROPERITONEUM: Severe pneumoperitoneum extends into the upper abdomen with some of the symmetric multiseptated gas appearing deep to the diaphragm consistent with mild pneumoperitoneum (series 601 was 101). This is likely related to    pneumonia mediastinum dissecting into the abdomen. No fluid collection in the abdomen or pelvis. No ascites. VESSELS: Extensive atherosclerotic calcification of the aorta and iliac arteries. ABDOMINAL WALL: No acute abnormality. BONES: No acute abnormality. Mild chronic L1 compression fracture with previous vertebroplasty. IMPRESSION:      1. Severe pneumomediastinum extends into the upper abdomen with small pneumoperitoneum likely related to extension of pneumoperitoneum into the upper peritoneal cavity.    2. No fluid collection in the abdomen or pelvis. Results were discussed with the surgical team at 7:00 PM on 10/3/2022. XR CHEST PORTABLE   Final Result      Stable appearance of loculated right pneumothorax, with loculated components in the lateral right midlung region and in the right lateral costophrenic sulcus. Stable scattered areas of atelectasis versus scar, most prominent in the medial right lung base and in the left lateral lung base. XR CHEST PORTABLE   Final Result      Small right basilar pneumothorax. Right basilar chest tube without change. Patchy consolidation in the right mid and lower lung as well as the left lower lung-atelectasis versus pneumonia. Trace left pleural effusion. Normal heart size. Lines and tubes without change. Mild improvement in degree of subcutaneous emphysema in the chest and neck. XR CHEST 1 VIEW   Final Result      1. Stable small right-sided pneumothorax and prominent subcutaneous emphysema along the neck and upper superior chest wall, greater in right lung stable   2. Stable left basilar consolidation and pleural effusion      3. Stable appearing perihilar accentuated markings or airspace disease            XR CHEST PORTABLE   Final Result      Stable small right-sided pneumothorax. More prominent emphysema over the lower neck. No change in bilateral airspace disease. Stable left pleural effusion. XR CHEST PORTABLE   Final Result   1. Bilateral multifocal pneumonia with improvement in the right    pulmonary consolidation since prior study from 9/23/22   2. Mild to moderate left pleural effusion          XR CHEST PORTABLE   Final Result   1. Support lines and tubes are stable. 2.  Stable small right lateral pneumothorax and right basilar    chest tube. 3.  Stable patchy bilateral airspace disease. XR CHEST PORTABLE   Final Result   1. Satisfactory position of right internal jugular central line   2.  No interval change in endotracheal tube and nasogastric tube positioning. 3. Extensive bilateral airspace disease with no changes. 4. Left pleural effusion with retrocardiac opacity, unchanged   5. Small stable tiny right lateral pneumothorax and stable position of right chest tube,, Pleurx catheter. XR ABDOMEN (KUB) (SINGLE AP VIEW)   Final Result   1. No residual pneumothorax. 2.  Mild patchy airspace disease bilaterally worse on the right than on prior examination. 3.  Non-obstructive bowel gas pattern. Mildly distended stomach. XR CHEST PORTABLE   Final Result   1. No residual pneumothorax. 2.  Mild patchy airspace disease bilaterally worse on the right than on prior examination. 3.  Non-obstructive bowel gas pattern. Mildly distended stomach. XR CHEST PORTABLE   Final Result      1. Small right pneumothorax appears slightly increased. 2. Mild patchy airspace opacity bilaterally. XR CHEST PORTABLE   Final Result     Pleurx catheter at the right lung base. Trace right    pneumothorax is unchanged. XR CHEST PORTABLE   Final Result      Right-sided chest tube evident in the base, its tip medially. Improvement in the right-sided pneumothorax, since earlier today. Possible medial collapse of the right lower lobe. Small left effusion. Patchy airspace density/atelectasis in the lungs bilaterally, with no other significant change. XR CHEST PORTABLE   Final Result   1. Right-sided Pleurx catheter in satisfactory position in the lung bases   2. Right-sided post operative pneumothorax, approximately 10%             Assessment/Plan   ZANA  - sec to contrast nephropathy  - Cr stable   - BNP 3k, Protein:Cre 0.3, FENa 0.4%  - closely monitor UOP  - avoid nephrotoxic agent     2. Hypernatremia  - continue free water   - will continue to monitor     3. Hypotension  - pressors as needed     4. Anemia  - daily CBC    5.  Acid- base/ Electrolyte imbalance   - optimize lytes    6. Acute hypoxic resp failure  - s/p VATS on 9/19/22 for recurrent pleural effusions  - Intensivist and CTS following    7.  CHF   - EF 65% on 6/16/22 via cardiac cath        Veronica Gasca MD

## 2022-10-25 NOTE — PROGRESS NOTES
Speech Language Pathology  Facility/Department: [facility] 4 PCU  Daily PMV/Electrolarynx/Speech Treatment Note     NAME: Austyn Monge  : [birth year]  MRN: [id number]    Date of Eval: 10/25/2022  Evaluating Therapist: Lanette Story SLP    Patient Diagnosis(es): has HTN (hypertension); Hyperlipidemia; OA (osteoarthritis) of knee; Carotid stenosis, left; Hearing loss; ED (erectile dysfunction); DM type 2 (diabetes mellitus, type 2) ([address] Rotator cuff tear; Glenohumeral arthritis; Subacromial impingement; Trigger ring finger of right hand; Spinal stenosis of lumbar region; Closed compression fracture of L1 lumbar vertebra; Closed displaced fracture of lateral malleolus of left fibula; Exertional dyspnea; Atrial fibrillation with rapid ventricular response ([address] Hypoxia; Chronic heart failure with preserved ejection fraction (HFpEF) ([address] Pleural effusion on right; Acute on chronic respiratory failure with hypoxia and hypercapnia ([address] ZANA (acute kidney injury) ([address] Mucus plugging of bronchi; Malnutrition ([address] Generalized weakness; Areflexia; and Asymmetrical deep tendon reflexes on their problem list.  Onset Date: 22    Chart reviewed. Pain: none indicated    Initial Assessment: 10/10  Diagnosis: Pt presents with a severe communication impairment secondary to trach/vent status. Order obtained and pt appropriate this date per ventilator settings and report. The patient was awake but drowsy (spouse reports recent dilaudid). The patient allowed oral care but demo'd what appeared to be reflexive mouth closing for any presentation of oral care utensils despite multiple attempts and verbal cues. Procedure explained to patient and spouse. Pt did not demo any comprehension of education / not attending. RT arrived and trach cuf deflated.  Pt without coughing and airflow was appreciated but difficult to detect due to inability to voice on command; a warm continuous air could be felt leaking from open oral cavity although pt could not follow command to exhale forcefully and x1 instance of weak strained phonation was appreciated om command. Pt had drop in PIP and no stridor noted so decision was made to place PMV. Pt without coughing or overt anxiety noted with placement. x2 instances of weak strained vocal quality on command for single word utterances. During repositioning (typical flexed neck posturing), there was a phonation achieved that is suspected to be non-purposeful. The pt demo'd suspected attempts at cough with audible gurggling suspected to be from glottis region / pharynx vs trach with inability to expectorate. The patient was unable to expectorate secretions to oral cavity. There was no active diaphragm movement appreciated when assessed for attempts at controlled phonation. The patient tolerated for 7.5 minutes but given limited interactiveness and no voicing it was decided to remove PMV. RT replaced t-piece and resumed care of the patient. Pts vitals generally stable throughout, no outward anxiety or discomfort (no more than his baseline). Pt did demo spontenous swallows with grimace. PMV was removed and left to air dry below RN computer on tray and RN was notified of this as well as spouse with instructions to place adaptor and PMV into ziplock and keep with ventilator. The PMV is single use and is now dispensed to the patient and should DC with the patient if he is to DC from hospital. A denture case was labele \"PMV cleaning\" and kept with the PMV equipment, cleaning instructions discussed briefly with pts spouse for initiation of education for self care when that becomes appropriate. The PMV should only be placed with RT / SLP both present for pt safety at this time. PMV safety sticker as there should be no donning of PMV without RT/SLP.     Medical diagnosis: Pleural effusion, right [J90]  Pleural effusion on right [J90]  Treatment diagnosis: severe communication impairment d/t trach/vent status    Treatment:  Short Term Goals  Goal 1: Pt will tolerate PMV placement for duration of session without change in respiratory status and without discomfort. 10/19: Pt seated upright in bed with RN and RT present. Prior to placement of PMV pt sating at SpO2=98%, Resp=20, Pulse=75. RT provided deep suction prior to placing PMV in-line. Pt with gravely cough when cuff deflated. PMV placed and pt immediately impulsively trying to speak in long phrases/utterances. Required cues to pause and take a few breaths to allow for steady vital readings. Pt tolerated PMV well for 5.25 minutes, prior to SpO2 beginning to drop to 86 and was removed. After removal pt sating at [id number], Resp=22, Pulse=80. Pt reported no discomfort across entire session while wearing PMV. After removal of PMV RT provided suction with noted white/tan, thick secretions. Cont. 10/20: Pt seated upright in bed, RT present for session. Prior to RT donning PMV, pt with 100% SPO2, 22 RR. RT provided inline suction and cuff deflation. Patient with congested cough s/p cuff deflation. Pt tolerated placement without change in vital signs for 4.5 minutes, when he reports fatigue and wishes to discontinue session. Following PMV removal, pt with 98% SPO2, RR 20. Continue goal  10/21 - Pt declined PMV this date x2 mouthing \"no\" and \"I'm so tired\". RT presented to room to assist but was not needed so not present for remainder of session. 10/24 - Pt with significant coughing episode with cuff deflation and donning PMV. Pt tolerated without significant fluctuations in vital signs (except RR increased with coughing and recovery). Pt did indicate discomfort while wearing PMV but unable to explain fully his perceptual sensations; was somewhat relieved that his vitals were stable. Attempted voicing trials but pt had little to no phonation achieved and therefore after ~5 mins and in combination with his perceptual discomfort, PMV was removed.    10/25: not addressed this date as pt declined PMV trial. Respiratory present and initiated weaning trials    Goal 2: Pt will improve breath-phonation coordination via phonation for single syllable words on 50% of opportunities. 10/19: Pt with increased voicing this date. Required mod cues to take breaths in between words to assist with respiration/phonation. Pt expressed single CVC words accurately across 90% of attempts. Pt attempting to speak in longer utterances, with reduced breath support across multi-syllabic words and short phrases. Increased breathiness noted when pt attempting these. Goal met, however continue for carry over/training of strategy for increased voicing. Cont. 10/20: Pt able to produce voice this date with rough vocal quality, mildly reduced intensity. He completed counting exercise with appropriate breath-phonation coordination; He attempts to speak in phrase /sentences to answer questions despite frequent aphonia with vent inspirations. Patient required mod-max cues to shorten utterances and coordinate with provided breaths. Continue goal.  10/24: Pt with severely impaired vocal intensity with only phonation achieved at end of attempted utterances. Following removal of PMV and reinflation of cuff the patient demo'd improved phonation. Question if potential etiology could be the valve impeding airflow and therefore not allowing air to pass to vocal folds (valve integrity appeared adequate when examined) or question if air trapping could have occurred between cuff and glottis after reinflation. Pt could not increase phonation despite cues for counting to achieve breath coupling. Only improvement to comprehensibility was due to whisper achieved vs pure mouthing. Due to pt perceived discomfort and no voicing achieved, valve was removed.  Continue goal.   10/25: Goal not addressed this date    Goal 3: Pt/caregiver will demonstrate comprehension of PMV safety instructions with mod I.  10/19: Prior to placing PMV, SLP discussed how it works, steps to take and cues to be provided by SLP and RT. Pt nodding head in comprehension, however mod cues as noted above to follow directions to continue taking breaths for increased respiration/phonation support. Pt's wife not present at this time. Pt communicated she is supposed to be coming in ~2 PM. SLP communicated may stop by then to discuss PMV trial with her. Pt in agreement. Cont. 10/20: Provided education regarding PMV and redirection of airflow, though suspect limited comprehension given level of fatigue. 10/24:  Educated on impact of PMV on ability to bear down which can improve physical abilities in addition to swallow and speech. Did not discuss safety of donning/doffing as pt remains mechanically ventilated. Continue goal.   10/25: Goal not addressed this date    Goal 4: Pt will tolerate ongoing communication as indicated. 10/19: Increased impulsivity of speaking this date with reduced use of breath support, negatively impacting Sp02 tolerance. Pt with increased mouthing of words prior to PMV and after PMV removal. With PMV on, improved voicing and vocal quality. Pt expressed \"I can't hear you all\". SLP confirmed pt wanting to have staff be aware that increased loudness required for pt comprehension, as pt normally wears hearing aids at baseline and they are not present during hospital stay. Pt discussed with RN and wrote on whiteboard for staff to be aware for increased pt participation in care. Cont. 10/20: Patient with improved mouthing of words without placement of PMV, able to indicate he does not want to complete dysphagia tx and end session given exhaustion. Continue goal.   10/21 - Pt completed evaluated for use of electrolarynx. The patient had good comprehensibility with min cues with SLP placing electrolarynx on left lateral neck against larynx. Pt required moderate verbal and tactile assist for placement.  Pts spouse completed training and was able to place appropriately for communication exchanges. Educated on target sound (muffled vibration indicating skin contact achieved), ceasing vibration between utterances to allow pt break and to assist communication partner with identifying sentence boundaries (educated pt on use for this to assist in breath support when speaking but pt did not demo comprehension). Sign hung up in patient room re: storage location of PMV, spouse trained, pt placement on L lateral neck. Utilizing electrolarynx in glove to protect from becoming soiled. Continue goal.   10/24 - Spouse reports pt independently asking for electrolaynx over weekend to communicate but did decline it a few times when she offered. Pt able to be ~85% comprehensible during session with use of electrolarynx with SLP placing. Continues to demonstrate reduced pausing to indicate sentence boundaries in the absence of phonation. Pt is able to repeat back on incomprehensible portions of utterance. Poor tolerance / voice achieved with PMV as above. Pt with good ability to overarticulate when communication breakdown occurred. Continue goal.   10/25: Pt agreeable to electrolarynx trials this date. ST placed on left lateral neck with pt successfully communicating sentences with 95% intelligibility. Spouse present and reported she had used device a few times however stated she did not have as much success. Placement of electrolarynx reviewed with spouse. ST offered to have spouse practice, however she politely declined this date. RN reported she communicates with pt appropriately via lip reading and written communication vs electrolarynx. Pt able to demonstrate appropriate written communication and gestures this date. Education:  Education ongoing re: use of devices for communication as above. Education completed on possible barriers to PMV including size of trach (#8).  Education provided regarding possible coarse of care including weaning trials, possible trach mask, possible downsize with improved PMV tolerance. Pt declined PMV trials this date and communicated discomfort with prior trials. Plan:  Continue speech/language therapy to address above goals, 3-5 x/week x LOS  DC recommendations: Ongoing communication training needed  D/W nursin [facility]  Needs met prior to leaving room, call button in reach. Treatment time: 15 minutes    Electronically signed by:  Bryson Diaz S Josh Godinez PRN  [id number]      If patient is discharged prior to next treatment, this note will serve as the discharge summary    Patient has been loaned electrolarynx:  Pt required assistance to place/use electro larynx independently  Pts spouse has been trained on electrolarynx and can assist independent of staff on use  Electrolarynx is property of rehab department and is NOT to be dispense to patient at discharge. Electrolaynx can be placed on left lateral neck during oral movement to achieve speech production.

## 2022-10-25 NOTE — CONSULTS
General Surgery   Resident Consult Note    Reason for Consult: unstageable sacral wound    History of Present Illness:   Emerson Bower is a 65 y.o. male with complicated admission hx, including pleurX placement for recurrent pleural effusion complicated by acute respiratory failure requiring reintubation followed by tracheostomy and PEG placement. He since has developed a sacral ulcer in need of debridement. He is on eliquis for afib and awaiting placement to LTAC complicated by insurance issues. He remains on the vent. Past Medical History:        Diagnosis Date    ZANA (acute kidney injury) ([facility] [address] 9/26/2022    Carotid stenosis, left 10/12/2011    Chronic back pain     Chronic systolic (congestive) heart failure 47/01/4469    Diastolic CHF (HCC)     Erectile dysfunction     Hyperlipidemia     Hypertension     Osteoarthritis     Restrictive lung disease     Type II or unspecified type diabetes mellitus without mention of complication, not stated as uncontrolled        Past Surgical History:           Procedure Laterality Date    CARDIAC CATHETERIZATION  06/2022    GASTROSTOMY TUBE PLACEMENT N/A 10/11/2022    EGD PEG TUBE PLACEMENT performed by Rosario Hinkle MD at [facility] Left     PLEURAL CATH INSERTION N/A 9/19/2022    PLEURAL CATHETER PLACEMENT; INTERCOSTAL NERVE BLOCK X4 performed by Bonnie Iraheta MD at [facility] Bilateral     THORACOSCOPY Right 9/19/2022    RIGHT VIDEO ASSISTED THORASCOPIC SURGERY AND; performed by Bonnie Iraheta MD at [address] N/A 9/30/2022    TRACHEOSTOMY performed by Guy Doe MD at [address]: Torsemide and Dye [iodides]    Medications:   Home Meds  No current facility-administered medications on file prior to encounter.      Current Outpatient Medications on File Prior to Encounter   Medication Sig Dispense Refill    ELIQUIS 5 MG TABS tablet TAKE 1 TABLET BY MOUTH TWO TIMES A DAY 60 tablet 2 umeclidinium-vilanterol (ANORO ELLIPTA) 62.5-25 MCG/INH AEPB inhaler Inhale 1 puff into the lungs daily 1 each 2    bumetanide (BUMEX) 1 MG tablet Take 1 tablet by mouth in the morning and 1 tablet before bedtime. 180 tablet 3    magnesium oxide (MAG-OX) 400 MG tablet Take 1 tablet by mouth in the morning and 1 tablet before bedtime. 180 tablet 3    dapagliflozin (FARXIGA) 10 MG tablet TAKE 1 TABLET EVERY MORNING 90 tablet 1    sildenafil (VIAGRA) 100 MG tablet Take 1 tablet by mouth as needed for Erectile Dysfunction Max daily dose 100mg. 16 tablet 0    metoprolol succinate (TOPROL XL) 50 MG extended release tablet Take 1 tablet by mouth in the morning and at bedtime 180 tablet 3    Insulin Pen Needle (PEN NEEDLES) 31G X 6 MM MISC 1 each by Does not apply route daily 100 each 3    Continuous Blood Gluc Sensor (FREESTYLE LIZA 14 DAY SENSOR) MISC Check bs tidac and hs 6 each 1    Continuous Blood Gluc  (FREESTYLE LIZA 14 DAY READER) SOFIYA Check bs tidac and hs 6 each 1    Insulin Degludec (TRESIBA FLEXTOUCH) 200 UNIT/ML SOPN 10 units subcut qam, increase 4 units every 4 days until am blood sugar < 120. Max 100 units (Patient taking differently: Inject 12 Units into the skin daily 10 units subcut qam, increase 4 units every 4 days until am blood sugar < 120.  Max 100 units) 9 pen 1    albuterol sulfate HFA (VENTOLIN HFA) 108 (90 Base) MCG/ACT inhaler Inhale 2 puffs into the lungs 4 times daily as needed for Wheezing 18 g 5    pravastatin (PRAVACHOL) 40 MG tablet Take 1 tablet by mouth daily 90 tablet 3       Current Meds  insulin glargine (LANTUS;BASAGLAR) injection pen 30 Units, Nightly  simethicone (MYLICON) chewable tablet 80 mg, Q6H PRN  acetaminophen (TYLENOL) 160 MG/5ML solution 650 mg, Q6H PRN  guar gum packet 1 packet, TID  glucose chewable tablet 16 g, PRN  dextrose bolus 10% 125 mL, PRN   Or  dextrose bolus 10% 250 mL, PRN  glucagon (rDNA) injection 1 mg, PRN  dextrose 10 % infusion, Continuous PRN  albuterol (PROVENTIL) nebulizer solution 2.5 mg, 4x daily  bisacodyl (DULCOLAX) suppository 10 mg, Daily  collagenase ointment, Daily  methocarbamol (ROBAXIN) tablet 500 mg, 4x Daily  doxazosin (CARDURA) tablet 1 mg, Daily  clotrimazole (LOTRIMIN) 1 % cream, BID  0.9 % sodium chloride infusion, PRN  lansoprazole suspension SUSP 30 mg, BID  oxyCODONE (ROXICODONE) 5 MG/5ML solution 7.5 mg, Q6H PRN  hydroxypropyl methylcellulose (GONIOSOL) 2.5 % ophthalmic solution 1 drop, PRN  insulin lispro (1 Unit Dial) (HUMALOG/ADMELOG) pen 0-16 Units, Q4H  Venelex ointment, BID  chlorhexidine (PERIDEX) 0.12 % solution 15 mL, BID  apixaban (ELIQUIS) tablet 5 mg, BID  sodium chloride flush 0.9 % injection 5-40 mL, 2 times per day  sodium chloride flush 0.9 % injection 5-40 mL, PRN  0.9 % sodium chloride infusion, PRN  ondansetron (ZOFRAN-ODT) disintegrating tablet 4 mg, Q8H PRN   Or  ondansetron (ZOFRAN) injection 4 mg, Q6H PRN  hydrALAZINE (APRESOLINE) injection 5 mg, Q15 Min PRN        Family History:   Family History   Problem Relation Age of Onset    Hearing Loss Father     Heart Disease Father 70        MI    High Blood Pressure Father     Diabetes Maternal Grandmother         hypoglycemic    Hearing Loss Maternal Grandmother     Arthritis Maternal Grandfather        Social History:   TOBACCO:   reports that he quit smoking about 21 years ago. His smoking use included cigarettes. He has a 80.00 pack-year smoking history. He has never used smokeless tobacco.  ETOH:   reports current alcohol use. DRUGS:   reports no history of drug use. ROS: A 14 point review of systems was conducted, significant findings as noted in HPI. All other systems negative.     Physical exam:    Vitals:    10/25/22 0520 10/25/22 0630 10/25/22 0835 10/25/22 0915   BP:    (!) 104/49   Pulse: 76  69 86   Resp:   20 26   Temp:    97.8 °F (36.6 °C)   TempSrc:    Axillary   SpO2: 98%  99% 98%   Weight:  194 lb 7.1 oz (88.2 kg)     Height: Physical Examination:   General appearance: Alert, following commands. Neurological: no focal deficits  HEENT: EOMI, trachea midline, no JVD. Tracheostomy in place with some mucus drainage  Chest/Lungs: On mechanical ventilation via tracheostomy; R PleurX catheter capped with dressing C/D/I  Cardiovascular: RRR  Abdomen: Soft, non-tender, minimally-distended. PEG tube with tube feeds running at 65   Skin: Warm and dry. Sacral decubitus ulcer covered with Mepilex. Extremities: Pitting edema of the b/l feet. No cyanosis. Legs ulcers from SCDs covered with Mepilex       Labs:    CBC:   Recent Labs     10/23/22  0510 10/24/22  0517 10/25/22  0438   WBC 11.6* 16.4* 13.5*   HGB 7.0* 7.9* 7.2*   HCT 22.8* 24.8* 23.0*   MCV 88.9 87.1 87.0    498* 454*     BMP:   Recent Labs     10/23/22  0510 10/24/22  0517 10/25/22  0438    139 141   K 3.8 4.1 4.0    100 99   CO2 30 31 33*   PHOS 3.4 2.9 2.9   BUN 72* 71* 69*   CREATININE 0.8 0.8 0.7*     PT/INR: No results for input(s): PROTIME, INR in the last 72 hours. APTT: No results for input(s): APTT in the last 72 hours.   Liver Profile:  Lab Results   Component Value Date/Time    AST 10 09/19/2022 06:10 AM    ALT <5 09/19/2022 06:10 AM    BILIDIR <0.2 09/19/2022 06:10 AM    BILITOT 0.3 09/19/2022 06:10 AM    ALKPHOS 91 09/19/2022 06:10 AM     Lab Results   Component Value Date/Time    CHOL 198 01/24/2022 10:29 AM    HDL 81 01/24/2022 10:29 AM    TRIG 67 10/12/2022 10:54 AM     UA:   Lab Results   Component Value Date/Time    NITRITE neg 09/03/2013 09:10 AM    COLORU YELLOW 07/11/2014 11:34 AM    PHUR 5.5 07/11/2014 11:34 AM    LABCAST NONE SEEN 07/11/2014 11:34 AM    LABCAST NONE SEEN 07/11/2014 11:34 AM    WBCUA 0 07/11/2014 11:34 AM    RBCUA 0 07/11/2014 11:34 AM    YEAST NONE SEEN 07/11/2014 11:34 AM    BACTERIA NONE 07/11/2014 11:34 AM    CLARITYU clear 09/03/2013 09:10 AM    SPECGRAV 1.020 07/11/2014 11:34 AM    LEUKOCYTESUR NEGATIVE 07/11/2014 11:34 AM    UROBILINOGEN 1.0 07/11/2014 11:34 AM    BILIRUBINUR NEGATIVE 07/11/2014 11:34 AM    BILIRUBINUR small 09/03/2013 09:10 AM    BLOODU NEGATIVE 07/11/2014 11:34 AM    GLUCOSEU 100 mg/dl 09/03/2013 09:10 AM       Imaging:   XR ABDOMEN (KUB) (SINGLE AP VIEW)   Final Result   Impression:       Slightly increased gas dilation of transverse colon since the prior study. No evidence of small bowel obstruction. Moderate colonic stool burden, unchanged from prior study. XR CHEST PORTABLE   Final Result   Impression:       Slight progression of bibasilar lung opacities, especially on the left. Persistence of small bilateral pleural effusions. XR ABDOMEN (2 VIEWS)   Final Result   1. Moderate amount of stool in the descending colon with the transverse colon mildly dilated with air. CT CERVICAL SPINE WO CONTRAST   Final Result      1. Osseous lesions in C2, C7 and T1 vertebral body seen on recent MRI dated 10/18/2022 are not well-visualized on CT. Bone density is slightly heterogenous on CT. There is a 8 mm radiolucent lesion in the inferior posterior aspect of C2 vertebral body    which may correspond to C2 lesion seen on MRI. 2. Mildly expansile lytic lesion in left lamina of C4 concerning for metastasis. 3. Multilevel degenerative disc disease, most pronounced at C6-7 where there is disc osteophyte complex causing mild-to-moderate spinal canal stenosis. 4. Multilevel foraminal stenosis, most pronounced at C5-6, moderate to severe right and moderate left. MRI LUMBAR SPINE WO CONTRAST   Final Result      1. Motion compromised study. 2.  No cord compression. 3.  Osseous lesions in the cervical and thoracic spine suspicious for metastatic disease. No extraosseous mass. 4.  Severe multilevel degenerative disc disease in the lumbar spine. Moderate to severe canal stenosis at L2-L3 and L4-L5. Moderate canal stenosis at L3-L4.  Multiple levels of moderate to severe canal stenosis from L2-L3 to L4-L5      MRI THORACIC SPINE WO CONTRAST   Final Result      1. Motion compromised study. 2.  No cord compression. 3.  Osseous lesions in the cervical and thoracic spine suspicious for metastatic disease. No extraosseous mass. 4.  Severe multilevel degenerative disc disease in the lumbar spine. Moderate to severe canal stenosis at L2-L3 and L4-L5. Moderate canal stenosis at L3-L4. Multiple levels of moderate to severe canal stenosis from L2-L3 to L4-L5      MRI Cervical Spine WO Contrast   Final Result      1. Motion compromised study. 2.  No cord compression. 3.  Osseous lesions in the cervical and thoracic spine suspicious for metastatic disease. No extraosseous mass. 4.  Severe multilevel degenerative disc disease in the lumbar spine. Moderate to severe canal stenosis at L2-L3 and L4-L5. Moderate canal stenosis at L3-L4. Multiple levels of moderate to severe canal stenosis from L2-L3 to L4-L5      MRI BRAIN WO CONTRAST   Final Result      1. No acute hemorrhage, mass or acute ischemia. 2.  Mild burden of nonspecific multifocal white matter disease compatible with chronic small vessel ischemia. 3.  Mild atrophy. 4.  Bilateral mastoid effusions. XR CHEST PORTABLE   Final Result      Distal portion of tracheostomy poorly visualized due to overlying medical devices. The position is without significant change since 10/5/2022. Bibasilar pleuroparenchymal consolidation. Stable cardiomediastinal silhouette. Right jugular central venous catheter without change. CT ABDOMEN PELVIS WO CONTRAST Additional Contrast? None   Final Result      Marked decrease in size of pneumomediastinum. Trace residual pneumoperitoneum. Extensive bilateral lower lobe pulmonary atelectasis with pleural effusions and trace residual right pneumothorax. XR CHEST PORTABLE   Final Result      Tiny right lateral pneumothorax is suspected, 5 mm in maximum thickness.  This has decreased in size since 10/3/2022. Right basilar Pleurx catheter noted. Small bilateral pleural effusions. Prominent interstitial markings. Stable cardiac mediastinal silhouette. Lines and tubes without change. Subcutaneous emphysema noted. CT CHEST ABDOMEN PELVIS WO CONTRAST   Final Result      1. Severe pneumomediastinum. 2. Small to moderate right hydropneumothorax with similar fluid and gas components with a right-sided Pleurx catheter. 3. Moderate loculated left pleural effusion with atelectasis of the left lower lobe with patency of the central left lower lobe bronchi. 4. Fluid/material filling the bronchus intermedius and right lower lobe bronchi with complete consolidation and volume loss of the right lower lobe. Differential diagnosis would include mucous plugging or obstructing lesion. 5. Tracheostomy tube tip within the trachea   6. Severe subcutaneous emphysema in the neck. CT ABDOMEN AND PELVIS:      FINDINGS:      LIVER: Normal.      GALLBLADDER AND BILIARY TREE: No calcified gallstones. No gallbladder distention. No intra- or extrahepatic biliary dilatation. PANCREAS: Normal.      SPLEEN: Normal.      ADRENAL GLANDS: Normal.      KIDNEYS AND URETERS: Normal.      URINARY BLADDER: Ansari catheter present within collapsed urinary bladder. REPRODUCTIVE ORGANS: No associated masses. BOWEL: Normal diameter, nonobstructed. Feeding tube tip at the level of the ligament of Treitz. LYMPH NODES: No abnormally enlarged nodes. PERITONEUM/RETROPERITONEUM: Severe pneumoperitoneum extends into the upper abdomen with some of the symmetric multiseptated gas appearing deep to the diaphragm consistent with mild pneumoperitoneum (series 601 was 101). This is likely related to    pneumonia mediastinum dissecting into the abdomen. No fluid collection in the abdomen or pelvis. No ascites.       VESSELS: Extensive atherosclerotic calcification of the aorta and iliac arteries. ABDOMINAL WALL: No acute abnormality. BONES: No acute abnormality. Mild chronic L1 compression fracture with previous vertebroplasty. IMPRESSION:      1. Severe pneumomediastinum extends into the upper abdomen with small pneumoperitoneum likely related to extension of pneumoperitoneum into the upper peritoneal cavity. 2. No fluid collection in the abdomen or pelvis. Results were discussed with the surgical team at 7:00 PM on 10/3/2022. XR CHEST PORTABLE   Final Result      Stable appearance of loculated right pneumothorax, with loculated components in the lateral right midlung region and in the right lateral costophrenic sulcus. Stable scattered areas of atelectasis versus scar, most prominent in the medial right lung base and in the left lateral lung base. XR CHEST PORTABLE   Final Result      Small right basilar pneumothorax. Right basilar chest tube without change. Patchy consolidation in the right mid and lower lung as well as the left lower lung-atelectasis versus pneumonia. Trace left pleural effusion. Normal heart size. Lines and tubes without change. Mild improvement in degree of subcutaneous emphysema in the chest and neck. XR CHEST 1 VIEW   Final Result      1. Stable small right-sided pneumothorax and prominent subcutaneous emphysema along the neck and upper superior chest wall, greater in right lung stable   2. Stable left basilar consolidation and pleural effusion      3. Stable appearing perihilar accentuated markings or airspace disease            XR CHEST PORTABLE   Final Result      Stable small right-sided pneumothorax. More prominent emphysema over the lower neck. No change in bilateral airspace disease. Stable left pleural effusion. XR CHEST PORTABLE   Final Result   1.   Bilateral multifocal pneumonia with improvement in the right    pulmonary consolidation since prior study from 9/23/22   2. Mild to moderate left pleural effusion          XR CHEST PORTABLE   Final Result   1. Support lines and tubes are stable. 2.  Stable small right lateral pneumothorax and right basilar    chest tube. 3.  Stable patchy bilateral airspace disease. XR CHEST PORTABLE   Final Result   1. Satisfactory position of right internal jugular central line   2. No interval change in endotracheal tube and nasogastric tube positioning. 3. Extensive bilateral airspace disease with no changes. 4. Left pleural effusion with retrocardiac opacity, unchanged   5. Small stable tiny right lateral pneumothorax and stable position of right chest tube,, Pleurx catheter. XR ABDOMEN (KUB) (SINGLE AP VIEW)   Final Result   1. No residual pneumothorax. 2.  Mild patchy airspace disease bilaterally worse on the right than on prior examination. 3.  Non-obstructive bowel gas pattern. Mildly distended stomach. XR CHEST PORTABLE   Final Result   1. No residual pneumothorax. 2.  Mild patchy airspace disease bilaterally worse on the right than on prior examination. 3.  Non-obstructive bowel gas pattern. Mildly distended stomach. XR CHEST PORTABLE   Final Result      1. Small right pneumothorax appears slightly increased. 2. Mild patchy airspace opacity bilaterally. XR CHEST PORTABLE   Final Result     Pleurx catheter at the right lung base. Trace right    pneumothorax is unchanged. XR CHEST PORTABLE   Final Result      Right-sided chest tube evident in the base, its tip medially. Improvement in the right-sided pneumothorax, since earlier today. Possible medial collapse of the right lower lobe. Small left effusion. Patchy airspace density/atelectasis in the lungs bilaterally, with no other significant change. XR CHEST PORTABLE   Final Result   1. Right-sided Pleurx catheter in satisfactory position in the lung bases   2.  Right-sided post operative pneumothorax, approximately 10%                Assessment/Plan: This is a 65 y.o. male with history of diastolic heart failure, atrial fibrillation, and DM with recurrent pleural effusions s/p R PleurX catheter placement (9/19). s/p tracheostomy and PEG tube. Has now developed a unstageable sacral ulcer for which general surgery consulted.      - hold eliquis x 48 hrs  - continue local wound care via wound care nurse and santyl  - will plan on debridement Thursday  - patient otherwise stable, will follow along   - will discuss with Dr. Romy Mcarthur MD  Surgical Resident PGY 5  10/25/22  10:13 AM  [phone]

## 2022-10-26 PROBLEM — L89.150 PRESSURE ULCER OF SACRAL REGION, UNSTAGEABLE (HCC): Status: ACTIVE | Noted: 2022-01-01

## 2022-10-26 NOTE — PROGRESS NOTES
Nephrology Progress Note                                                                                                                                                                                                                                                                                                                                                               Office : [phone]     Fax :[phone]    Patient's Name: Christian Bal        Reason for Consult:   ZANA  Requesting Physician:  Evan Boxer, MD    Interval History:      Cr stable   Na better   Good UO   Remains on Trach   PEG placed - on TF         Past Medical History:   Diagnosis Date    ZANA (acute kidney injury) ([facility] [address] 9/26/2022    Carotid stenosis, left 10/12/2011    Chronic back pain     Chronic systolic (congestive) heart failure 55/03/3440    Diastolic CHF ([facility] [address]     Erectile dysfunction     Hyperlipidemia     Hypertension     Osteoarthritis     Restrictive lung disease     Type II or unspecified type diabetes mellitus without mention of complication, not stated as uncontrolled        Past Surgical History:   Procedure Laterality Date    CARDIAC CATHETERIZATION  06/2022    GASTROSTOMY TUBE PLACEMENT N/A 10/11/2022    EGD PEG TUBE PLACEMENT performed by Charlie Gamez MD at [facility] Left     PLEURAL CATH INSERTION N/A 9/19/2022    PLEURAL CATHETER PLACEMENT; INTERCOSTAL NERVE BLOCK X4 performed by Mansi Braswell MD at [facility] Bilateral     THORACOSCOPY Right 9/19/2022    RIGHT VIDEO ASSISTED THORASCOPIC SURGERY AND; performed by Mansi Braswell MD at [address] N/A 9/30/2022    TRACHEOSTOMY performed by Jack Weir MD at [address] [facility] History   Problem Relation Age of Onset    Hearing Loss Father     Heart Disease Father 70        MI    High Blood Pressure Father     Diabetes Maternal Grandmother         hypoglycemic    Hearing Loss Maternal Grandmother     Arthritis Maternal Grandfather         reports that he quit smoking about 21 years ago. His smoking use included cigarettes. He has a 80.00 pack-year smoking history. He has never used smokeless tobacco. He reports current alcohol use. He reports that he does not use drugs. Allergies:   Torsemide and Dye [iodides]    Current Medications:    insulin glargine (LANTUS;BASAGLAR) injection pen 35 Units, Nightly  simethicone (MYLICON) chewable tablet 80 mg, Q6H PRN  acetaminophen (TYLENOL) 160 MG/5ML solution 650 mg, Q6H PRN  guar gum packet 1 packet, TID  glucose chewable tablet 16 g, PRN  dextrose bolus 10% 125 mL, PRN   Or  dextrose bolus 10% 250 mL, PRN  glucagon (rDNA) injection 1 mg, PRN  dextrose 10 % infusion, Continuous PRN  albuterol (PROVENTIL) nebulizer solution 2.5 mg, 4x daily  bisacodyl (DULCOLAX) suppository 10 mg, Daily  collagenase ointment, Daily  methocarbamol (ROBAXIN) tablet 500 mg, 4x Daily  doxazosin (CARDURA) tablet 1 mg, Daily  clotrimazole (LOTRIMIN) 1 % cream, BID  0.9 % sodium chloride infusion, PRN  lansoprazole suspension SUSP 30 mg, BID  oxyCODONE (ROXICODONE) 5 MG/5ML solution 7.5 mg, Q6H PRN  hydroxypropyl methylcellulose (GONIOSOL) 2.5 % ophthalmic solution 1 drop, PRN  insulin lispro (1 Unit Dial) (HUMALOG/ADMELOG) pen 0-16 Units, Q4H  Venelex ointment, BID  chlorhexidine (PERIDEX) 0.12 % solution 15 mL, BID  [Held by provider] apixaban (ELIQUIS) tablet 5 mg, BID  sodium chloride flush 0.9 % injection 5-40 mL, 2 times per day  sodium chloride flush 0.9 % injection 5-40 mL, PRN  0.9 % sodium chloride infusion, PRN  ondansetron (ZOFRAN-ODT) disintegrating tablet 4 mg, Q8H PRN   Or  ondansetron (ZOFRAN) injection 4 mg, Q6H PRN  hydrALAZINE (APRESOLINE) injection 5 mg, Q15 Min PRN          Physical exam:     Vitals:  /61   Pulse 68   Temp 97.8 °F (36.6 °C) (Axillary)   Resp (!) 9   Ht 6' (1.829 m)   Wt 197 lb 15.6 oz (89.8 kg)   SpO2 97%   BMI 26.85 kg/m² Constitutional:  on MV  Skin: no rash, turgor wnl  Heent:  eomi, mmm  Neck: no bruits or jvd noted  Cardiovascular:  S1, S2 without m/r/g  Respiratory: trach  Abdomen:  +bs, soft, nt, nd  Ext: bilateral lower extremity edema R>L  Psychiatric: mood and affect appropriate  Musculoskeletal:  Rom, muscular strength intact    Data:   Labs:  CBC:   Recent Labs     10/24/22  0517 10/25/22  0438 10/26/22  0519   WBC 16.4* 13.5* 14.7*   HGB 7.9* 7.2* 7.1*   * 454* 453*       BMP:    Recent Labs     10/24/22  0517 10/25/22  0438 10/26/22  0519    141 138   K 4.1 4.0 4.6    99 100   CO2 31 33* 29   BUN 71* 69* 62*   CREATININE 0.8 0.7* 0.7*   GLUCOSE 135* 180* 140*       Ca/Mg/Phos:   Recent Labs     10/24/22  0517 10/25/22  0438 10/26/22  0519   CALCIUM 8.7 8.5 8.3   MG 2.50* 2.40 2.40   PHOS 2.9 2.9 2.8       Hepatic: No results for input(s): AST, ALT, ALB, BILITOT, ALKPHOS in the last 72 hours. Troponin: No results for input(s): TROPONINI in the last 72 hours. BNP: No results for input(s): BNP in the last 72 hours. Lipids:   No results for input(s): CHOL, TRIG, HDL, LDLCALC, LABVLDL in the last 72 hours. ABGs:   No results for input(s): PHART, PO2ART, POM0SUR in the last 72 hours. INR: No results for input(s): INR in the last 72 hours. UA:No results for input(s): Carlton Pears, GLUCOSEU, BILIRUBINUR, Josh Nares, BLOODU, PHUR, PROTEINU, UROBILINOGEN, NITRU, LEUKOCYTESUR, LABMICR, URINETYPE in the last 72 hours. Urine Microscopic: No results for input(s): LABCAST, BACTERIA, COMU, HYALCAST, WBCUA, RBCUA, EPIU in the last 72 hours. Urine Culture: No results for input(s): LABURIN in the last 72 hours. Urine Chemistry:   No results for input(s): Lunda Flair, PROTEINUR, NAUR in the last 72 hours. IMAGING:  XR ABDOMEN (KUB) (SINGLE AP VIEW)   Final Result   Impression:       Slightly increased gas dilation of transverse colon since the prior study.       No evidence of small bowel obstruction. Moderate colonic stool burden, unchanged from prior study. XR CHEST PORTABLE   Final Result   Impression:       Slight progression of bibasilar lung opacities, especially on the left. Persistence of small bilateral pleural effusions. XR ABDOMEN (2 VIEWS)   Final Result   1. Moderate amount of stool in the descending colon with the transverse colon mildly dilated with air. CT CERVICAL SPINE WO CONTRAST   Final Result      1. Osseous lesions in C2, C7 and T1 vertebral body seen on recent MRI dated 10/18/2022 are not well-visualized on CT. Bone density is slightly heterogenous on CT. There is a 8 mm radiolucent lesion in the inferior posterior aspect of C2 vertebral body    which may correspond to C2 lesion seen on MRI. 2. Mildly expansile lytic lesion in left lamina of C4 concerning for metastasis. 3. Multilevel degenerative disc disease, most pronounced at C6-7 where there is disc osteophyte complex causing mild-to-moderate spinal canal stenosis. 4. Multilevel foraminal stenosis, most pronounced at C5-6, moderate to severe right and moderate left. MRI LUMBAR SPINE WO CONTRAST   Final Result      1. Motion compromised study. 2.  No cord compression. 3.  Osseous lesions in the cervical and thoracic spine suspicious for metastatic disease. No extraosseous mass. 4.  Severe multilevel degenerative disc disease in the lumbar spine. Moderate to severe canal stenosis at L2-L3 and L4-L5. Moderate canal stenosis at L3-L4. Multiple levels of moderate to severe canal stenosis from L2-L3 to L4-L5      MRI THORACIC SPINE WO CONTRAST   Final Result      1. Motion compromised study. 2.  No cord compression. 3.  Osseous lesions in the cervical and thoracic spine suspicious for metastatic disease. No extraosseous mass. 4.  Severe multilevel degenerative disc disease in the lumbar spine. Moderate to severe canal stenosis at L2-L3 and L4-L5.  Moderate canal stenosis at L3-L4. Multiple levels of moderate to severe canal stenosis from L2-L3 to L4-L5      MRI Cervical Spine WO Contrast   Final Result      1. Motion compromised study. 2.  No cord compression. 3.  Osseous lesions in the cervical and thoracic spine suspicious for metastatic disease. No extraosseous mass. 4.  Severe multilevel degenerative disc disease in the lumbar spine. Moderate to severe canal stenosis at L2-L3 and L4-L5. Moderate canal stenosis at L3-L4. Multiple levels of moderate to severe canal stenosis from L2-L3 to L4-L5      MRI BRAIN WO CONTRAST   Final Result      1. No acute hemorrhage, mass or acute ischemia. 2.  Mild burden of nonspecific multifocal white matter disease compatible with chronic small vessel ischemia. 3.  Mild atrophy. 4.  Bilateral mastoid effusions. XR CHEST PORTABLE   Final Result      Distal portion of tracheostomy poorly visualized due to overlying medical devices. The position is without significant change since 10/5/2022. Bibasilar pleuroparenchymal consolidation. Stable cardiomediastinal silhouette. Right jugular central venous catheter without change. CT ABDOMEN PELVIS WO CONTRAST Additional Contrast? None   Final Result      Marked decrease in size of pneumomediastinum. Trace residual pneumoperitoneum. Extensive bilateral lower lobe pulmonary atelectasis with pleural effusions and trace residual right pneumothorax. XR CHEST PORTABLE   Final Result      Tiny right lateral pneumothorax is suspected, 5 mm in maximum thickness. This has decreased in size since 10/3/2022. Right basilar Pleurx catheter noted. Small bilateral pleural effusions. Prominent interstitial markings. Stable cardiac mediastinal silhouette. Lines and tubes without change. Subcutaneous emphysema noted. CT CHEST ABDOMEN PELVIS WO CONTRAST   Final Result      1. Severe pneumomediastinum.    2. Small to moderate right hydropneumothorax with similar fluid and gas components with a right-sided Pleurx catheter. 3. Moderate loculated left pleural effusion with atelectasis of the left lower lobe with patency of the central left lower lobe bronchi. 4. Fluid/material filling the bronchus intermedius and right lower lobe bronchi with complete consolidation and volume loss of the right lower lobe. Differential diagnosis would include mucous plugging or obstructing lesion. 5. Tracheostomy tube tip within the trachea   6. Severe subcutaneous emphysema in the neck. CT ABDOMEN AND PELVIS:      FINDINGS:      LIVER: Normal.      GALLBLADDER AND BILIARY TREE: No calcified gallstones. No gallbladder distention. No intra- or extrahepatic biliary dilatation. PANCREAS: Normal.      SPLEEN: Normal.      ADRENAL GLANDS: Normal.      KIDNEYS AND URETERS: Normal.      URINARY BLADDER: Ansari catheter present within collapsed urinary bladder. REPRODUCTIVE ORGANS: No associated masses. BOWEL: Normal diameter, nonobstructed. Feeding tube tip at the level of the ligament of Treitz. LYMPH NODES: No abnormally enlarged nodes. PERITONEUM/RETROPERITONEUM: Severe pneumoperitoneum extends into the upper abdomen with some of the symmetric multiseptated gas appearing deep to the diaphragm consistent with mild pneumoperitoneum (series 601 was 101). This is likely related to    pneumonia mediastinum dissecting into the abdomen. No fluid collection in the abdomen or pelvis. No ascites. VESSELS: Extensive atherosclerotic calcification of the aorta and iliac arteries. ABDOMINAL WALL: No acute abnormality. BONES: No acute abnormality. Mild chronic L1 compression fracture with previous vertebroplasty. IMPRESSION:      1. Severe pneumomediastinum extends into the upper abdomen with small pneumoperitoneum likely related to extension of pneumoperitoneum into the upper peritoneal cavity.    2. No fluid collection in the abdomen or pelvis. Results were discussed with the surgical team at 7:00 PM on 10/3/2022. XR CHEST PORTABLE   Final Result      Stable appearance of loculated right pneumothorax, with loculated components in the lateral right midlung region and in the right lateral costophrenic sulcus. Stable scattered areas of atelectasis versus scar, most prominent in the medial right lung base and in the left lateral lung base. XR CHEST PORTABLE   Final Result      Small right basilar pneumothorax. Right basilar chest tube without change. Patchy consolidation in the right mid and lower lung as well as the left lower lung-atelectasis versus pneumonia. Trace left pleural effusion. Normal heart size. Lines and tubes without change. Mild improvement in degree of subcutaneous emphysema in the chest and neck. XR CHEST 1 VIEW   Final Result      1. Stable small right-sided pneumothorax and prominent subcutaneous emphysema along the neck and upper superior chest wall, greater in right lung stable   2. Stable left basilar consolidation and pleural effusion      3. Stable appearing perihilar accentuated markings or airspace disease            XR CHEST PORTABLE   Final Result      Stable small right-sided pneumothorax. More prominent emphysema over the lower neck. No change in bilateral airspace disease. Stable left pleural effusion. XR CHEST PORTABLE   Final Result   1. Bilateral multifocal pneumonia with improvement in the right    pulmonary consolidation since prior study from 9/23/22   2. Mild to moderate left pleural effusion          XR CHEST PORTABLE   Final Result   1. Support lines and tubes are stable. 2.  Stable small right lateral pneumothorax and right basilar    chest tube. 3.  Stable patchy bilateral airspace disease. XR CHEST PORTABLE   Final Result   1. Satisfactory position of right internal jugular central line   2.  No interval change in endotracheal tube and nasogastric tube positioning. 3. Extensive bilateral airspace disease with no changes. 4. Left pleural effusion with retrocardiac opacity, unchanged   5. Small stable tiny right lateral pneumothorax and stable position of right chest tube,, Pleurx catheter. XR ABDOMEN (KUB) (SINGLE AP VIEW)   Final Result   1. No residual pneumothorax. 2.  Mild patchy airspace disease bilaterally worse on the right than on prior examination. 3.  Non-obstructive bowel gas pattern. Mildly distended stomach. XR CHEST PORTABLE   Final Result   1. No residual pneumothorax. 2.  Mild patchy airspace disease bilaterally worse on the right than on prior examination. 3.  Non-obstructive bowel gas pattern. Mildly distended stomach. XR CHEST PORTABLE   Final Result      1. Small right pneumothorax appears slightly increased. 2. Mild patchy airspace opacity bilaterally. XR CHEST PORTABLE   Final Result     Pleurx catheter at the right lung base. Trace right    pneumothorax is unchanged. XR CHEST PORTABLE   Final Result      Right-sided chest tube evident in the base, its tip medially. Improvement in the right-sided pneumothorax, since earlier today. Possible medial collapse of the right lower lobe. Small left effusion. Patchy airspace density/atelectasis in the lungs bilaterally, with no other significant change. XR CHEST PORTABLE   Final Result   1. Right-sided Pleurx catheter in satisfactory position in the lung bases   2. Right-sided post operative pneumothorax, approximately 10%             Assessment/Plan   ZANA  - sec to contrast nephropathy  - Cr stable   - BNP 3k, Protein:Cre 0.3, FENa 0.4%  - closely monitor UOP  - avoid nephrotoxic agent     2. Hypernatremia  - continue free water   - will continue to monitor     3. Hypotension  - pressors as needed     4. Anemia  - daily CBC    5.  Acid- base/ Electrolyte imbalance   - optimize lytes    6. Acute hypoxic resp failure  - s/p VATS on 9/19/22 for recurrent pleural effusions  - Intensivist and CTS following    7.  CHF   - EF 65% on 6/16/22 via cardiac cath        Leanna Bello MD

## 2022-10-26 NOTE — PROGRESS NOTES
Cardiothoracic Surgery Daily Progress Note      CC: Pleural effusions s/p R pleur-X placement    Subjective :  Patient rested well overnight. Remains afebrile and HDS. Complains of stable abdominal pain. Denies N/V. Passing gas and having diarrhea. Objective     Exam:  Vitals:    10/26/22 0429 10/26/22 0455 10/26/22 0529 10/26/22 0623   BP: (!) 128/54      Pulse: 82 85     Resp: 19 16     Temp: 97.7 °F (36.5 °C)      TempSrc: Oral      SpO2: 98% 97% 98%    Weight:    197 lb 15.6 oz (89.8 kg)   Height:           Physical Examination:   General appearance: Alert, following commands. Neurological: no focal deficits  HEENT: EOMI, trachea midline, no JVD. Tracheostomy in place with some mucus drainage  Chest/Lungs: On mechanical ventilation via tracheostomy; R PleurX catheter capped with dressing C/D/I  Cardiovascular: RRR  Abdomen: Soft, non-tender, minimally-distended. PEG tube with tube feeds running at 65   Skin: Warm and dry. Sacral decubitus ulcer covered with Mepilex. Extremities: Pitting edema of the b/l feet. No cyanosis. Legs ulcers from SCDs covered with Mepilex      ASSESSMENT/PLAN:   Anders Moreno is a 65 y.o. male with history of diastolic heart failure, atrial fibrillation, and DM with recurrent pleural effusions s/p R PleurX catheter placement (9/19). - Persistent leukocytosis noted. General surgery will plan on sacral decubitus debridement  - Continue every other day Pleurx drainage.  - Pulmonology/Crit care managing vent  - Stool frequency has decreased. Fiber was restarted with improvement of diarrhea. - Continue tube feeds  - Glucose control improved  - Sacral wound per wound care nurse and general surgery  - Continue PT/OT  - Will touch base with SW on LTAC, wife working on [employer], as patient was denied placement on current insurance.      Delia Weaver DO, [address] [facility]  PGY1, General Surgery  10/26/22  6:49 AM  [employer]  Pager: [phone]

## 2022-10-26 NOTE — PROGRESS NOTES
Speech Language Pathology  Facility/Department: [facility] 4 PCU  Daily PMV/Electrolarynx/Speech Treatment Note     NAME: Geetha Lennon  :   MRN: [id number]    Date of Eval: 10/26/2022  Evaluating Therapist: SURESH Betancourt    Patient Diagnosis(es): has HTN (hypertension); Hyperlipidemia; OA (osteoarthritis) of knee; Carotid stenosis, left; Hearing loss; ED (erectile dysfunction); DM type 2 (diabetes mellitus, type 2) ([address] Rotator cuff tear; Glenohumeral arthritis; Subacromial impingement; Trigger ring finger of right hand; Spinal stenosis of lumbar region; Closed compression fracture of L1 lumbar vertebra; Closed displaced fracture of lateral malleolus of left fibula; Exertional dyspnea; Atrial fibrillation with rapid ventricular response ([address] Hypoxia; Chronic heart failure with preserved ejection fraction (HFpEF) ([address] Pleural effusion on right; Acute on chronic respiratory failure with hypoxia and hypercapnia ([address] ZANA (acute kidney injury) ([address] Mucus plugging of bronchi; Malnutrition ([address] Generalized weakness; Areflexia; Asymmetrical deep tendon reflexes; and Pressure ulcer of sacral region, unstageable [facility]) on their problem list.  Onset Date: 22    Chart reviewed. Pain: none indicated    Initial Assessment: 10/10  Diagnosis: Pt presents with a severe communication impairment secondary to trach/vent status. Order obtained and pt appropriate this date per ventilator settings and report. The patient was awake but drowsy (spouse reports recent dilaudid). The patient allowed oral care but demo'd what appeared to be reflexive mouth closing for any presentation of oral care utensils despite multiple attempts and verbal cues. Procedure explained to patient and spouse. Pt did not demo any comprehension of education / not attending. RT arrived and trach cuf deflated.  Pt without coughing and airflow was appreciated but difficult to detect due to inability to voice on command; a warm continuous air could be felt leaking from open oral cavity although pt could not follow command to exhale forcefully and x1 instance of weak strained phonation was appreciated om command. Pt had drop in PIP and no stridor noted so decision was made to place PMV. Pt without coughing or overt anxiety noted with placement. x2 instances of weak strained vocal quality on command for single word utterances. During repositioning (typical flexed neck posturing), there was a phonation achieved that is suspected to be non-purposeful. The pt demo'd suspected attempts at cough with audible gurggling suspected to be from glottis region / pharynx vs trach with inability to expectorate. The patient was unable to expectorate secretions to oral cavity. There was no active diaphragm movement appreciated when assessed for attempts at controlled phonation. The patient tolerated for 7.5 minutes but given limited interactiveness and no voicing it was decided to remove PMV. RT replaced t-piece and resumed care of the patient. Pts vitals generally stable throughout, no outward anxiety or discomfort (no more than his baseline). Pt did demo spontenous swallows with grimace. PMV was removed and left to air dry below RN computer on tray and RN was notified of this as well as spouse with instructions to place adaptor and PMV into ziplock and keep with ventilator. The PMV is single use and is now dispensed to the patient and should DC with the patient if he is to DC from hospital. A denture case was labele \"PMV cleaning\" and kept with the PMV equipment, cleaning instructions discussed briefly with pts spouse for initiation of education for self care when that becomes appropriate. The PMV should only be placed with RT / SLP both present for pt safety at this time. PMV safety sticker as there should be no donning of PMV without RT/SLP.     Medical diagnosis: Pleural effusion, right [J90]  Pleural effusion on right [J90]  Treatment diagnosis: severe communication impairment d/t trach/vent status    Treatment:  Short Term Goals  Goal 1: Pt will tolerate PMV placement for duration of session without change in respiratory status and without discomfort. 10/19: Pt seated upright in bed with RN and RT present. Prior to placement of PMV pt sating at SpO2=98%, Resp=20, Pulse=75. RT provided deep suction prior to placing PMV in-line. Pt with gravely cough when cuff deflated. PMV placed and pt immediately impulsively trying to speak in long phrases/utterances. Required cues to pause and take a few breaths to allow for steady vital readings. Pt tolerated PMV well for 5.25 minutes, prior to SpO2 beginning to drop to 86 and was removed. After removal pt sating at OcN1=654, Resp=22, Pulse=80. Pt reported no discomfort across entire session while wearing PMV. After removal of PMV RT provided suction with noted white/tan, thick secretions. Cont. 10/20: Pt seated upright in bed, RT present for session. Prior to RT donning PMV, pt with 100% SPO2, 22 RR. RT provided inline suction and cuff deflation. Patient with congested cough s/p cuff deflation. Pt tolerated placement without change in vital signs for 4.5 minutes, when he reports fatigue and wishes to discontinue session. Following PMV removal, pt with 98% SPO2, RR 20. Continue goal  10/21 - Pt declined PMV this date x2 mouthing \"no\" and \"I'm so tired\". RT presented to room to assist but was not needed so not present for remainder of session. 10/24 - Pt with significant coughing episode with cuff deflation and donning PMV. Pt tolerated without significant fluctuations in vital signs (except RR increased with coughing and recovery). Pt did indicate discomfort while wearing PMV but unable to explain fully his perceptual sensations; was somewhat relieved that his vitals were stable.  Attempted voicing trials but pt had little to no phonation achieved and therefore after ~5 mins and in combination with his perceptual discomfort, PMV was removed. 10/25: not addressed this date as pt declined PMV trial. Respiratory present and initiated weaning trials  10/26: Not targeted this session. Goal 2: Pt will improve breath-phonation coordination via phonation for single syllable words on 50% of opportunities. 10/19: Pt with increased voicing this date. Required mod cues to take breaths in between words to assist with respiration/phonation. Pt expressed single CVC words accurately across 90% of attempts. Pt attempting to speak in longer utterances, with reduced breath support across multi-syllabic words and short phrases. Increased breathiness noted when pt attempting these. Goal met, however continue for carry over/training of strategy for increased voicing. Cont. 10/20: Pt able to produce voice this date with rough vocal quality, mildly reduced intensity. He completed counting exercise with appropriate breath-phonation coordination; He attempts to speak in phrase /sentences to answer questions despite frequent aphonia with vent inspirations. Patient required mod-max cues to shorten utterances and coordinate with provided breaths. Continue goal.  10/24: Pt with severely impaired vocal intensity with only phonation achieved at end of attempted utterances. Following removal of PMV and reinflation of cuff the patient demo'd improved phonation. Question if potential etiology could be the valve impeding airflow and therefore not allowing air to pass to vocal folds (valve integrity appeared adequate when examined) or question if air trapping could have occurred between cuff and glottis after reinflation. Pt could not increase phonation despite cues for counting to achieve breath coupling. Only improvement to comprehensibility was due to whisper achieved vs pure mouthing. Due to pt perceived discomfort and no voicing achieved, valve was removed.  Continue goal.   10/25: Goal not addressed this date  10/26: Not targeted this session. Goal 3: Pt/caregiver will demonstrate comprehension of PMV safety instructions with mod I.  10/19: Prior to placing PMV, SLP discussed how it works, steps to take and cues to be provided by SLP and RT. Pt nodding head in comprehension, however mod cues as noted above to follow directions to continue taking breaths for increased respiration/phonation support. Pt's wife not present at this time. Pt communicated she is supposed to be coming in ~2 PM. SLP communicated may stop by then to discuss PMV trial with her. Pt in agreement. Cont. 10/20: Provided education regarding PMV and redirection of airflow, though suspect limited comprehension given level of fatigue. 10/24:  Educated on impact of PMV on ability to bear down which can improve physical abilities in addition to swallow and speech. Did not discuss safety of donning/doffing as pt remains mechanically ventilated. Continue goal.   10/25: Goal not addressed this date  10/26: Not targeted this session. Goal 4: Pt will tolerate ongoing communication as indicated. 10/19: Increased impulsivity of speaking this date with reduced use of breath support, negatively impacting Sp02 tolerance. Pt with increased mouthing of words prior to PMV and after PMV removal. With PMV on, improved voicing and vocal quality. Pt expressed \"I can't hear you all\". SLP confirmed pt wanting to have staff be aware that increased loudness required for pt comprehension, as pt normally wears hearing aids at baseline and they are not present during hospital stay. Pt discussed with RN and wrote on whiteboard for staff to be aware for increased pt participation in care. Cont. 10/20: Patient with improved mouthing of words without placement of PMV, able to indicate he does not want to complete dysphagia tx and end session given exhaustion. Continue goal.   10/21 - Pt completed evaluated for use of electrolarynx.  The patient had good comprehensibility with min cues with SLP placing electrolarynx on left lateral neck against larynx. Pt required moderate verbal and tactile assist for placement. Pts spouse completed training and was able to place appropriately for communication exchanges. Educated on target sound (muffled vibration indicating skin contact achieved), ceasing vibration between utterances to allow pt break and to assist communication partner with identifying sentence boundaries (educated pt on use for this to assist in breath support when speaking but pt did not demo comprehension). Sign hung up in patient room re: storage location of PMV, spouse trained, pt placement on L lateral neck. Utilizing electrolarynx in glove to protect from becoming soiled. Continue goal.   10/24 - Spouse reports pt independently asking for electrolaynx over weekend to communicate but did decline it a few times when she offered. Pt able to be ~85% comprehensible during session with use of electrolarynx with SLP placing. Continues to demonstrate reduced pausing to indicate sentence boundaries in the absence of phonation. Pt is able to repeat back on incomprehensible portions of utterance. Poor tolerance / voice achieved with PMV as above. Pt with good ability to overarticulate when communication breakdown occurred. Continue goal.   10/25: Pt agreeable to electrolarynx trials this date. ST placed on left lateral neck with pt successfully communicating sentences with 95% intelligibility. Spouse present and reported she had used device a few times however stated she did not have as much success. Placement of electrolarynx reviewed with spouse. ST offered to have spouse practice, however she politely declined this date. RN reported she communicates with pt appropriately via lip reading and written communication vs electrolarynx. Pt able to demonstrate appropriate written communication and gestures this date. 10/26: Pt provided with electrolarynx this session.  Prior to use, pt requesting to put in hearing aids with assistance from SLP. SLP demonstrated placement and use of pressing button to assist in communicating. Pt benefited from initial min, fading to MI for expressing short sentences with 90% intelligibility. Pt's wife continues to express wants/needs via lip reading for pt communication. Discussed trialing PMV tomorrow again. Pt in agreement. Cont. Education:  Education ongoing re: use of devices for communication as above. Education completed on possible barriers to PMV including size of trach (#8). Education provided regarding possible coarse of care including weaning trials, possible trach mask, possible downsize with improved PMV tolerance. Pt declined PMV trials this date and communicated discomfort with prior trials. Patient has been loaned electrolarynx:  Pt required assistance to place/use electro larynx independently  Pts spouse has been trained on electrolarynx and can assist independent of staff on use  Electrolarynx is property of rehab department and is NOT to be dispense to patient at discharge. Electrolaynx can be placed on left lateral neck during oral movement to achieve speech production. Plan:  Continue speech/language therapy to address above goals, 3-5 x/week x LOS  DC recommendations: Ongoing communication training needed  D/W nursin [facility]  Needs met prior to leaving room, call button in reach.   Treatment time: 10 minutes    Electronically signed by:  Jim Pruitt M.A., [address] [place] [address]  Speech-Language Pathologist  Pg #: [phone]      If patient is discharged prior to next treatment, this note will serve as the discharge summary

## 2022-10-26 NOTE — PLAN OF CARE
Problem: Discharge Planning  Goal: Discharge to home or other facility with appropriate resources  10/26/2022 0546 by Donato Libman, RN  Outcome: Progressing  Note: Case management consulted and working on D/C planning. Problem: Skin/Tissue Integrity  Goal: Absence of new skin breakdown  Description: 1. Monitor for areas of redness and/or skin breakdown  2. Assess vascular access sites hourly  3. Every 4-6 hours minimum:  Change oxygen saturation probe site  4. Every 4-6 hours:  If on nasal continuous positive airway pressure, respiratory therapy assess nares and determine need for appliance change or resting period. 10/26/2022 0546 by Donato Libman, RN  Note: Turning and repositioning to maintain skin integrity. Remains on Low airloss mattress. Heels elevated off bed. Wound care as ordered. Problem: Nutrition Deficit:  Goal: Optimize nutritional status  10/26/2022 0546 by Donato Libman, RN  Note: Tolerating TF at goal rate. No residual.  C/O abdominal cramping this AM.  No Bm's overnight. Problem: Respiratory - Adult  Goal: Achieves optimal ventilation and oxygenation  10/26/2022 0546 by Donato Libman, RN  Outcome: Progressing  Flowsheets (Taken 10/26/2022 0546)  Achieves optimal ventilation and oxygenation:   Assess for changes in respiratory status   Assess for changes in mentation and behavior  Note: Lungs diminished with crackles. Vented trach, FIo2 30%. SpO2 mid to high 90's. Trach care done per RT. Inline suction as needed for tan secretions. Problem: Cardiovascular - Adult  Goal: Maintains optimal cardiac output and hemodynamic stability  10/26/2022 0546 by Donato Libman, RN  Flowsheets (Taken 10/26/2022 2581)  Maintains optimal cardiac output and hemodynamic stability: Monitor blood pressure and heart rate  Note: SR with PAC's. VSS overnight.

## 2022-10-26 NOTE — CARE COORDINATION
CM spoke with patient and wife at bedside today. Pt's wife Rebeca Dotson is working on getting insurance changed. Patient with new trach to vent, has pleur-x drain, peg tube to feeds. Patient getting SBT with pulmnology, up to edge of bed yesterday with therapy, in good spirits today. Wife and family very involved. Will continue to follow in hopes of getting pt to [facility] soon.     Jared Avila RN, BSN,   4th Floor Progressive Care Unit  [phone]

## 2022-10-26 NOTE — PROGRESS NOTES
Palliative Care Chart Review  and Check in Note:     NAME:  Shanon Elena  Admit Date: 9/19/2022  Hospital Day:  Hospital Day: 45   Current Code status: Full Code    Palliative care is continuing to following Mr. Rosibel Terrell for symptom management, and goals of care discussion as needed. Patient's chart reviewed today 10/26/22. Umesh Jones was sitting comfortably in bed, alert today. Hard of hearing, as per baseline,and with tracheostomy tube present able to communicate by mouthing that he's having a little pain in his back/butt. When asked if there's anything we can do for him, patient stated \"Not that I know of.' Spoke with his wife, Ingrid George. She states he's been more alert today than yesterday, and that he had a little nausea this morning that he was given a PRN for. She's pleased that he was able to use the electrolarynx today. She's continuing to work on finding appropriate insurance coverage for him. The following are the currently established goals/code status, and Symptom management.      Goals of care: Continue current medical management    Code status: Full    Discharge plan: Pending placement    Logan Ballard MD  10/26/22  2:53 PM

## 2022-10-26 NOTE — PROGRESS NOTES
Speech Language Pathology  Facility/Department: [address] ICU  Dysphagia Daily Treatment Note    NAME: Ivonne Restrepo  : [id number]  MRN: [id number]    Patient Diagnosis(es):   Patient Active Problem List    Diagnosis Date Noted    Pressure ulcer of sacral region, unstageable ([address] 10/26/2022    Generalized weakness 10/17/2022    Areflexia 10/17/2022    Asymmetrical deep tendon reflexes 10/17/2022    Malnutrition ([address] 10/14/2022    Mucus plugging of bronchi 10/05/2022    ZANA (acute kidney injury) ([address] 2022    Acute on chronic respiratory failure with hypoxia and hypercapnia ([address] 2022    Pleural effusion on right 2022    Chronic heart failure with preserved ejection fraction (HFpEF) ([address] 2022    Hypoxia 2022    Atrial fibrillation with rapid ventricular response ([address] 2022    Exertional dyspnea 2022    Closed displaced fracture of lateral malleolus of left fibula 2021    Spinal stenosis of lumbar region 10/17/2018    Closed compression fracture of L1 lumbar vertebra 10/17/2018    Trigger ring finger of right hand 06/15/2017    Subacromial impingement 2015    Rotator cuff tear 2015    Glenohumeral arthritis 2015    DM type 2 (diabetes mellitus, type 2) ([address] 2013    ED (erectile dysfunction) 2012    OA (osteoarthritis) of knee 10/12/2011    Carotid stenosis, left 10/12/2011    Hearing loss 10/12/2011    HTN (hypertension) 10/07/2011    Hyperlipidemia 10/07/2011     Allergies: Allergies   Allergen Reactions    Torsemide Shortness Of Breath    Dye [Iodides]      1970's - ?? CXR (10/3/22)-  Impression       Stable appearance of loculated right pneumothorax, with loculated components in the lateral right midlung region and in the right lateral costophrenic sulcus. Stable scattered areas of atelectasis versus scar, most prominent in the medial right lung base and in the left lateral lung base.          Previous MBS -  N/A    Chart reviewed. Medical Diagnosis: Pleural effusion, right [J90]  Pleural effusion on right [J90]   Treatment Diagnosis: Oropharyngeal dysphagia    BSE Impression (10/2/22)-  RN states okay to attempt assessment. Dr Rolando Chauhan speaking with spouse stating pt most likely still with effects of sedation. Pt was intubated 9/22- 9/30/22. Trach placed 9/30, with pt on ventilator. Pt shook head yes/no intermittently to questions asked. Pt made no attempt to mouth words. Pt exhibiting open mouth posture, did not form labial seal or protrude /lateralize tongue on command. Oral care completed, pt demonstrating no oral response. Placed 2 single ice chips in oral cavity, again with no response - no lingual movement noted. Pt did close lips with tactile stim x 2. No swallow movement was felt upon palpation of anterior neck. O2 sats remained stable and RR remained in mid 20's. Recommend aggressive oral care 2-3 x/day with suction kit. Plan to re-assess as pt appropriate. Dysphagia Diagnosis: Suspected needs further assessment    FEES (10/14/22): Pt with relatively non-functional swallow at this time and met 'bail out' criteria (i.e. full protocol not completed d/t severity of impairments and pt safety). Severely impaired airway protection marked by pattern of penetration and aspiration of ice chips, thin liquids and mildly thick liquids during and after the swallow. Severely impaired swallow peristalsis marked by pattern of severe residue after completion of swallow remaining in the vallecula, lateral channels, and pyriforms. Physiological impairments c/w these findings include reduced tongue base retraction, decreased hyolaryngeal elevation/excursion with incomplete epiglottic inversion. Pain: Pt reported x2 discomfort underneath him feeling like he was sitting on something; pulled padding and mattress tight to remove ripples which was effective.     Current Diet : ADULT TUBE FEEDING; Nasogastric; Diabetic; Continuous; 10; Yes; 10; Q 4 hours; 45; 300; Q 4 hours; Protein; 2 bottles Proteinex daily w/ 30 mL FW flushes before and after  Diet NPO   Recommended Form of Meds: Via alternative means of nutrition      Treatment:  Pt seen bedside to address the following goals:   1. The patient will tolerate repeat bedside swallowing evaluation when able. 10/3 - Pt positioned upright and aggressive oral care was completed with suction toothbrush. Vitals reading had poor waveform and appeared to be unreliable readings; RN notified and in to assess. The patient demo'd spontaneous swallows without PO with audible air escaping around stoma. Pt tolerated 4/4 ice chips with constant verbal cues for appropriate acceptance and manipulation of ice (I.e.close mouth, chew before you swallow, etc). There was no coughing but not likely clinically relevant due to research indicating high prevalence of SILENT aspiration in cuff inflated tracheostomy patients. Pt not yet appropriate for instrumental swallow evaluation but will require out prior to advancing diet. Notes indicate plan for SBT today. Requested order for PMV which will hopefully be receive and coincide with improved alertness and weaning from ventilator. Continue goal.  10/4: Cleared by RN. No order for PMV yet. Received pt more awake/alert. Wife present. Pt remains on trach/vent. Oral care recently completed. Repositioned pt upright. Targeted swallow function via trials ice chips and small sips h2o; pt with positive oral acceptance, no anterior loss, appropriate and timely oral prep, seemingly positive swallow movement. Attempted to check clearance via oral suction; evidently dry with no material suctioned. As previously noted, no cough, however would be unlikely to occur given inflated cuff tracheostomy.  Would recommend waiting on instrumental pending ability to place PMV (assuming order will be received in the near future), as that will likely support pt's swallow function, and show improved function. Cont goal  10/6: Pt positioned upright in bed. Thorough oral care and suctioning provided. Pt continues to be more alert/awake and active in his care/treatment. Family present. Pt completed 20x effortful swallows 5x swallows/ ice chips to reduce further swallow deconditioning. RR & 02 stable across low level ice chip trials. Audible air escape and secretions around trach appreciated. Per RN, Dr. Yanely Yang aware. Discussed need for instrumental swallow evaluation with pt and family. FEES to be completed as schedule permits, preferably prior to PEG determination. Cont goal.   10/7: Pt seated upright in bed and SLP assisted with oral care via suction toothbrush. Pt with increased alertness and agreeable to accept trials of ice chips. X25 effortful swallows with ice chips completed this session. Pt with intermittent air escape and secretions noted around trach, however per discussion with SLP who saw pt yesterday and per family, both looked less. RR and O2 remained stable across all trials. SLP re-discussed possibility of completing FEES and expressed will reach out to other SLP to see if it can be completed prior to PEG placement on Monday. Family and pt demonstrated understanding and agreement at this time. Cont. 10/12: Pt seated upright in bed. SLP provided oral care via suction toothbrush. Clean oral cavity noted. Pt with min brown secretions noted around trach prior to administering ice chips. Pt readily accepted x10 ice chips and demonstrated adequate mastication. Pt with facial grimace when attempting to swallow all ice chips. Pt with stable RR and O2 during ice chips administration. Audible secretions noted x2. Pt with continued lethargy and cues required to maintain alertness. Pin point pupils/reduced visual tracking, however followed basic 1-step commands adequately.  SLP re-discussed FEES completion at end of this week (either Friday or Saturday) depending on pt's status to accept PO with increased alertness. ? Pt comprehension, however wife present and demonstrated understanding and agreement. Cont. 10/13: Pt sitting upright in bed, wife present. He is reportedly more alert today though does not always use head nod/facial expressions for answering yes/no questions. Make no attempts to point to items on communication board. Patient accepted oral care via suction toothbrush, oral cavity noted to be clean. Patient accepted ice chips x15 from spoon; Facial grimaces observed with swallow initiation. Increased secretions around stoma during ice chip trials. Stable RR and O2 during trials. Patient does appear appropriate to participate in FEES next date if presentation is similar to this session. Provided education to patient and wife, who demonstrate understanding. Continue goal.   10/14: Pt seated upright in bed with wife present. SLP assisted with with oral care via suction toothbrush. Clean oral cavity noted. Pt with increased alertness and engagement across entire session this date. Pt readily accepted x15 ice chips and completed effortful swallows across all. Intermittent secretions around stoma required suctioning at times. SLP expressed FEES to be completed later this afternoon. Pt in wife in agreement and demonstrated understanding. Cont. 10/17: D/C goal.     2. The patient/caregiver will demonstrate understanding of compensatory strategies for improved swallowing safety. 10/3 - Educated on potential impact of trach on swallow function, rationale for oral care, recommendations for few single ice chips to be given to facilitate oral mucosal integrity and preserve swallow function that is present. Introduced plan for PMV in future, rationales for this. Educated on limitations in pts alertness at this time and need for improvement for completing swallow study, therapeutic effectiveness, etc. Continue goal.   10/4: Discussed swallow function with patient/wife.  Also reviewed recommendation for PMV, hopefully in near future (order not yet received). Discussed how PMV may also improve swallow function be supporting necessary pressure for swallowing. Reviewed recommendation for small amounts ice/small sips h2o with effortful swallow to support swallow function/maintenance. Wife and pt indicated comprehension. Cont goal  10/5: Thorough education this date re: potential adverse affects of trach/vent on swallow function, need for instrumentation, need for PMV & importance of oral care. Wean trial failed this date per Dr. Brii Richard. Audible air/secretion leakage around trach. PMV trials are likely not appropriate at this time and orders have not been provided. Reviewed continued recommendation for low level ice chips s/p oral care to support swallow function/ maintenance, improve pt QOL/ comfort and encourage oral care. 10/7: Discussed education regarding rationale for completing trials ice chips with effortful swallow, rec's for PMV as able, and overall importance of oral care, and possibility of completing further swallow assessment via FEES when able. Pt and family present demonstrated understanding. Cont. 10/12: As noted above with wife present. Discussed rationale for completing ice chips+effortful swallow trials to continue stimulating oropharyngeal swallow function while pt NPO. Pt requires re-education. Wife demonstrated understanding. Cont. 10/13: Patient provided education re: risk of aspiration at this time, need for FEES to assess safety/ efficiency of swallow function. Wife with good understanding, patient appears to have improved understanding compared to previous sessions. Continue goal.  10/14: As noted above, discussed rationale for FEES to be completed this date, procedure details and trials to be assessed. Wife with adequate understanding, pt requires some reinforcement however continued improvement from previous sessions. Cont. 10/17: Goal met on 10/14 via FEES completion.      3. Patient will participate in [employer] trials when appropriate/ orders received  10/6- Speech/ Communication Treatment: Pt with increased communicative frustration. PMV trials are likely not appropriate at this time and orders have not been provided yet. Family stating they wanted to get him an IPAD with speech lauren. Discussed low tech vs high tech AAC and appropriateness for AAC in different populations. Given trach/vent is suspected to not be long-term and pt continues to be in the acute phase of care, high tech AAC is not recommended at this time. Pt provided communication board. All icons reviewed. Pt was able to spell out \"tired\" on the letter board. Recommend continued use of communication board, letter board & non-verbal communication (I.e lip reading, pointing, facial expressions etc) to facilitate patient expression of basic wants/needs. Patient and family demonstrated understanding. 10/7: Pt communicated via head nod/shake and use of gestures to communicate wants/needs this session. 10/12: Goal met 10/10. Separate note for targeting goals associated. New Goal s/p FEES 10/14:  Goal 1: Pt will complete effortful swallows with trials of ice chips and tsps water after completion of oral care  10/17: Pt with increased alertness, engagement, mouthing of words for communication, facial expressions, seated upright in bed. Pt chose to complete oral care with s/u assistance from SLP for suction toothbrush. SLP demonstrated how to word (place thumb over suction) and pt completed with MI. Pt self fed all trials of tsps thin liquids and ice chips x30. SLP cued to complete hard and fast swallows across all. Pt with adequate command following to implement strategy. Of note, pt unable to complete PMV trials at this time (RT unavailable), however appeared to be very appropriate this date with hopes to trial tomorrow as able per respiratory. Pt and wife in agreement and demonstrated understanding. Cont. 10/18-  pt alert, wife present. Pt mouthing words to communicate. Performed oral care with suction. Pt and wife educated to the importance of oral care prior to trials with ice chips. When asked pt what he should do when swallowing, pt mouthed \"swallow hard\". Pt provided with 3 ice chips with pt utilizing effortful swallow. RT arrived for PMV trial. When RT deflated cuff, pt c/o significant discomfort and burning sensation. Cuff re-inflated and pt reported relief. Unable to attempt PMV trials at this time due to discomfort with deflated cuff. Did not attempt further trials with ice chips as RN needing to provide nursing care. Encouraged RN to provide ice chips again later. Con't goal   10/19: After completion of PMV trial, SLP assisted pt with getting seated upright with set-up of suction toothbrush. Pt demonstrated adequate use independently to brush teeth prior to presenting trials of ice chips and tsps water. Pt completed x20 hard and fast swallows. Pt with reduced secretions noted from trach, as compared to previous sessions. Recommend continue ice chips/tsps water ONLY after oral care with RN and/or wife present. Pt in agreement and demonstrated understanding. Cont. 10/24 - Completed oral care with suction toothbrush/toothpaste. Lingual coating with yellow tinge at base of tongue is noted. Notified pts spouse of this. Completed trials of ice chips / thins via tsp with cued fast/effortful swallows. Pt only completed x5 bolus swallows (+ additional bolus) before declining additional trials stating \"I'm done\". Encouraged pt to complete more trial reps with spouse later this date. Pt presents with loud and audible swallows for all and belching immediately following; denies discomfort. Continue goal.   10/25: Spouse reported pt with increased intake of ice chips and tsp of water-at times drinking sips of water. Education provided on risks of aspiration and encouraged only occasional ice chips and tsp of thin liquids.  Pt trialed ice chips x3 this date with audible swallow (on vent so expected to be audible). No belching noted this date. Laryngeal elevation noted upon observation. Decreased secretions noted this date around trach site. Spouse reported decreased need for subglottic suctioning. Respiratory present and placed pt on breathing trial. Education provided to pt and spouse regarding POC and possibility of repeat FEES in next 1-2 days. Continue goal  10/26: Pt completed x10 hard and fast swallows with no secretions noted around trach. Pt's wife expressed continued completion of oral care with RN, PCA and SLP, prior to providing pt with any ice chips or tsps water. Wife demonstrates adequate understanding and cues to provide to target swallow strengthening via effortful swallows. Cont. Patient/Family/Caregiver Education:  Education re: ongoing ice chips and thins via tsp. Pts spouse reports encouraging the pt to take ice chips to practice so that he can eventually get something other than ice chips and water. POC reviewed with spouse and pt. Compensatory Strategies:  Oral care using suction toothbrush/toothette / suction system 3x per day   Single ice chips given by staff when pt awake and in upright position after oral care     Plan:  Continue dysphagia/communication treatment with goals per plan of care. Diet recommendations: Only single ice chips and tsps water given by staff when pt awake and in upright position, ONLY after oral care   DC recommendation: Ongoing speech therapy is indicated   Treatment: 10 minutes  D/W nursing: Lisa Anders   Needs met prior to leaving room, call button in reach.     Electronically signed by:  Vasyl Prather M.A., [address] [place] [address]  Speech-Language Pathologist  Pg #: [phone]      If patient is discharged prior to next treatment, this note will serve as the discharge summary

## 2022-10-26 NOTE — PROGRESS NOTES
Pulmonary & Critical Care Medicine    Admit Date: 2022  PCP: Jeff Lorenzo MD    CC:  respiratory failure   Events of Last 24 hours:   No major events over the past 24 hours. Overall stable general condition     Vitals:  Tmax:  VITALS:  /61   Pulse 68   Temp 97.8 °F (36.6 °C) (Axillary)   Resp (!) 9   Ht 6' (1.829 m)   Wt 197 lb 15.6 oz (89.8 kg)   SpO2 97%   BMI 26.85 kg/m²   24HR INTAKE/OUTPUT:    Intake/Output Summary (Last 24 hours) at 10/26/2022 1135  Last data filed at 10/26/2022 0845  Gross per 24 hour   Intake 2447 ml   Output 1400 ml   Net 1047 ml     CURRENT PULSE OXIMETRY:  SpO2: 97 %  24HR PULSE OXIMETRY RANGE:  SpO2  Av.2 %  Min: 94 %  Max: 100 %    EXAM:  General: No distress. Alert. Eyes: PERRL. No sclera icterus. No conjunctival injection. ENT: trach tube in place. Neck: Trachea midline. Neck is supple   Resp: No accessory muscle use. No crackles. No wheezing. No rhonchi. CV: Regular rate. Regular rhythm. No mumur or rub. Bilateral edema    GI: Non-tender. Non-distended. Normal bowel sounds. Neuro: Awake. Speech is clear. Psych:  No anxiety or agitation.      Medications:    IV:   dextrose      sodium chloride      sodium chloride 25 mL (10/19/22 0029)         Scheduled Meds:   insulin glargine  35 Units SubCUTAneous Nightly    guar gum  1 packet Oral TID    albuterol  2.5 mg Nebulization 4x daily    bisacodyl  10 mg Rectal Daily    collagenase   Topical Daily    methocarbamol  500 mg PEG Tube 4x Daily    doxazosin  1 mg Oral Daily    clotrimazole   Topical BID    lansoprazole  30 mg Per G Tube BID    insulin lispro  0-16 Units SubCUTAneous Q4H    Venelex   Topical BID    chlorhexidine  15 mL Mouth/Throat BID    [Held by provider] apixaban  5 mg Oral BID    sodium chloride flush  5-40 mL IntraVENous 2 times per day         Diet: Diet NPO  ADULT TUBE FEEDING; PEG; Standard with Fiber; Continuous; 20; Yes; 10; Q 4 hours; 55; 200; Q 4 hours; Protein; 1 bottles Proteinex daily w/ 30 mL FW flushes before and after     Results:  CBC:   Recent Labs     10/24/22  0517 10/25/22  0438 10/26/22  0519   WBC 16.4* 13.5* 14.7*   HGB 7.9* 7.2* 7.1*   HCT 24.8* 23.0* 23.7*   MCV 87.1 87.0 90.8   * 454* 453*     BMP:   Recent Labs     10/24/22  0517 10/25/22  0438 10/26/22  0519    141 138   K 4.1 4.0 4.6    99 100   CO2 31 33* 29   PHOS 2.9 2.9 2.8   BUN 71* 69* 62*   CREATININE 0.8 0.7* 0.7*     LIVER PROFILE: No results for input(s): AST, ALT, LIPASE, BILIDIR, BILITOT, ALKPHOS in the last 72 hours. Invalid input(s): AMYLASE,  ALB  PT/INR: No results for input(s): PROTIME, INR in the last 72 hours. APTT: No results for input(s): APTT in the last 72 hours. UA:No results for input(s): NITRITE, COLORU, PHUR, LABCAST, WBCUA, RBCUA, MUCUS, TRICHOMONAS, YEAST, BACTERIA, CLARITYU, SPECGRAV, LEUKOCYTESUR, UROBILINOGEN, BILIRUBINUR, BLOODU, GLUCOSEU, AMORPHOUS in the last 72 hours. Invalid input(s): Sarah Pierre      Assessment/Plan:  65 y.o. male with     Acute on chronic hypercapnic and hypoxic respiratory failure s/p trach  Pleural effusion s/p pleurx cath placement. A/C PRVC , RR 14/26, FiO2 30, PEEP 5. Pleurx cath is drained every other day. Minute ventilation is ~ 10  Leukocytosis is trending down. PS trial on 20 cmH2O was not successful on 10/25  Will attempt another trial on PS 25. Continue SBT daily   Discussed with SW on 10/24.   Awaiting approval for new insurance which will start on 11/1    Becky Valle MD

## 2022-10-26 NOTE — PROGRESS NOTES
Point of Care Note:  Cardiothoracic Surgery  Liliana Box    10:54 AM  10/26/2022    Pleur-X drainage    Patient was informed of the procedure and consented. Previous dressings were removed prior to drainage. Pleur-X was then drained in a sterile fashion. 65 cc of serous fluid was emptied into the vacuum container. Patient tolerated the procedure well without complications.       Destiny Oden DO, [facility]  PGY1, General Surgery  10/26/22  10:54 AM  [facility]  Pager: [phone]

## 2022-10-27 NOTE — PROGRESS NOTES
Call placed to wife Jerman Middleton for updates. No answer and was an unidentifiable voicemail so message not left at this time.

## 2022-10-27 NOTE — CARE COORDINATION
CM spoke with Sabrina Acosta, pt's wife, at bedside today. She was able to talk with [facility] over the phone. They told patient that she is not able to complete cancellation of secondary insurance over the phone. They have mailed her the form. CM gave Sabrina Acosta the [facility] fax number if needed and offered assistance with form if she receives in a timely manner, hopefully can get it faxed back in. Will continue to follow.     Addison Nails, RN, BSN,   4th Floor Progressive Care Unit  [phone]

## 2022-10-27 NOTE — PLAN OF CARE
Problem: Discharge Planning  Goal: Discharge to home or other facility with appropriate resources  10/27/2022 1837 by Leah Dowd RN  Outcome: Not Progressing  Flowsheets (Taken 10/27/2022 1837)  Discharge to home or other facility with appropriate resources:   Identify barriers to discharge with patient and caregiver   Arrange for needed discharge resources and transportation as appropriate     Problem: Chronic Conditions and Co-morbidities  Goal: Patient's chronic conditions and co-morbidity symptoms are monitored and maintained or improved  Outcome: Progressing     Problem: Pain  Goal: Verbalizes/displays adequate comfort level or baseline comfort level  10/27/2022 1837 by Leah Dowd RN  Outcome: Progressing  Note: PRN pain meds given as needed. Will continue to monitor. Problem: Safety - Adult  Goal: Free from fall injury  Outcome: Progressing  Note: No falls noted thus far this shift, bed in lowest position, alarm on, non-skid socks on, call light within reach, hourly checks, safety maintained, will continue to monitor. Problem: Nutrition Deficit:  Goal: Optimize nutritional status  Outcome: Progressing    Problem: Respiratory - Adult  Goal: Achieves optimal ventilation and oxygenation  10/27/2022 1837 by Leah Dowd RN  Outcome: Progressing  Flowsheets (Taken 10/27/2022 1837)  Achieves optimal ventilation and oxygenation:   Assess for changes in respiratory status   Assess for changes in mentation and behavior   Position to facilitate oxygenation and minimize respiratory effort     Nutrient intake appropriate for improving, restoring, or maintaining nutritional needs:   Recommend, monitor, and adjust tube feedings and TPN/PPN based on assessed needs   Assess nutritional status and recommend course of action  Note: TF @ goal of 55mL/hr. Will continue to monitor.      Problem: Discharge Planning  Goal: Discharge to home or other facility with appropriate resources  10/27/2022 1837 by Lora Marquis RN  Outcome: Not Progressing  Flowsheets (Taken 10/27/2022 1837)  Discharge to home or other facility with appropriate resources:   Identify barriers to discharge with patient and caregiver   Arrange for needed discharge resources and transportation as appropriate  10/27/2022 0610 by Gentry Chaudhari RN  Outcome: Not Progressing  Note: Case management involved for D/C planning.

## 2022-10-27 NOTE — PROGRESS NOTES
Comprehensive Nutrition Assessment    RECOMMENDATIONS:  EN: Restart TF of Jevity 1.5 @ 55 mL/hr + 1 bottle Proteinex daily when medically feasible  Continue psyllium powder PRN  Nutrition Education: Education not indicated   Monitor nutrition adequacy, pertinent labs, bowel habits, wt changes, and clinical progress    NUTRITION ASSESSMENT:   Nutritional summary & status: Follow up: Pt TF held overnight per surgery for Pleural effusions s/p R pleur-X placement. Pt has been receiving TF of Jevity 1.5 @ 55 mL/hr + 1 bottle Proteinex. TF modified to provide pt w/ increased fiber to help improve diarrhea. Psyllium powder still being received intermittently, diarrhea has improved. Pt is still having frequent gas. Pt has had a difficult time tolerating any of the TF formulas during admission. Currently TF formula appears to have best results for improved diarrhea. BG continues to be elevated, but is being controlled through insulin coverage. Will continue to monitor. Admission/PMH: Pleural effusion. PMHx; ZANA, T2DM    MALNUTRITION ASSESSMENT  Context of Malnutrition: Chronic Illness   Malnutrition Status:  Moderate malnutrition  Findings of the 6 clinical characteristics of malnutrition (Minimum of 2 out of 6 clinical characteristics is required to make the diagnosis of moderate or severe Protein Calorie Malnutrition based on AND/ASPEN Guidelines):  Energy Intake:  No significant decrease in energy intake  Weight Loss:  Greater than 10% over 6 months     Body Fat Loss:  Mild body fat loss     Muscle Mass Loss:  Mild muscle mass loss Temples (temporalis), Calf (gastrocnemius), Thigh (quadraceps)  Fluid Accumulation:  No significant fluid accumulation     Strength:  Not Performed    NUTRITION DIAGNOSIS   Increased nutrient needs related to increase demand for energy/nutrients as evidenced by nutrition support - enteral nutrition    Nutrition Monitoring and Evaluation:   Food/Nutrient Intake Outcomes:  Enteral Nutrition Intake/Tolerance  Physical Signs/Symptoms Outcomes:  Biochemical Data, Nutrition Focused Physical Findings, Weight, GI Status, Diarrhea     OBJECTIVE DATA: Significant to nutrition assessment  Nutrition Related Findings: Na 135. Active bowel sounds. +BM 10/26. +2 pitting RLE/LLE/LUE edema. Wounds: Unstageable, Pressure Injury (Sacrum)  Nutrition Goals: Initiate nutrition support, within 2 days     CURRENT NUTRITION THERAPIES  Diet NPO  ADULT TUBE FEEDING; PEG; Standard with Fiber; Continuous; 20; Yes; 10; Q 4 hours; 55; 200; Q 4 hours; Protein; 1 bottles Proteinex daily w/ 30 mL FW flushes before and after     Current Tube Feeding (TF) Orders:  Feeding Route: PEG  Formula: Standard with Fiber  Schedule: Continuous  Additives/Modulars: Protein  Goal TF & Flush Orders Provides: Jevity 1.5 @ 55 mL/hr tto provide: 1320 mL TV, 1980 kcal, 84 g/pro and 1003 mL FW + FW flushes of 30 mL q4 + 1 bottle Proteinex daily to provide an additional 104 kcal and 26 g/pro  PO Intake: NPO   PO Supplement Intake:NPO  Additional Sources of Calories/IVF:N/A     ANTHROPOMETRICS  Current Height: 6' (182.9 cm)  Current Weight: 205 lb 7.5 oz (93.2 kg)    Admission weight: 174 lb (78.9 kg)  Ideal Body Weight (IBW): 178 lbs  (81 kg)        BMI: 27.9    COMPARATIVE STANDARDS  Energy (kcal):  3438-7318 (20-22 kcal/95.7 kg)     Protein (g):  101-168 (1.2-2.0 g/kg IBW)       Fluid (mL/day):  1 mL/kcal or per MD    The patient will be monitored per nutrition standards of care. Consult dietitian if additional nutrition interventions are needed prior to RD reassessment.      Miki Castillo, [address] [place] [address], [address] [place] [address] Drive:  [phone]  Office:  [phone]

## 2022-10-27 NOTE — PROGRESS NOTES
Pulmonary & Critical Care Medicine    Admit Date: 2022  PCP: Levi Hall MD    CC:  respiratory failure   Events of Last 24 hours:   No major events over the past 24 hours. Overall stable general condition     Vitals:  Tmax:  VITALS:  BP (!) 129/53   Pulse 76   Temp 98.2 °F (36.8 °C) (Oral)   Resp 20   Ht 6' (1.829 m)   Wt 205 lb 7.5 oz (93.2 kg)   SpO2 95%   BMI 27.87 kg/m²   24HR INTAKE/OUTPUT:    Intake/Output Summary (Last 24 hours) at 10/27/2022 1355  Last data filed at 10/27/2022 1000  Gross per 24 hour   Intake 1782 ml   Output 1275 ml   Net 507 ml     CURRENT PULSE OXIMETRY:  SpO2: 95 %  24HR PULSE OXIMETRY RANGE:  SpO2  Av.5 %  Min: 92 %  Max: 100 %    EXAM:  General: No distress. Alert. Eyes: PERRL. No sclera icterus. No conjunctival injection. ENT: trach tube in place. Neck: Trachea midline. Neck is supple   Resp: No accessory muscle use. No crackles. No wheezing. No rhonchi. CV: Regular rate. Regular rhythm. No mumur or rub. Bilateral edema    GI: Non-tender. Non-distended. Normal bowel sounds. Neuro: Awake. Speech is clear. Psych:  No anxiety or agitation.      Medications:    IV:   dextrose      sodium chloride      sodium chloride 25 mL (10/19/22 0029)         Scheduled Meds:   lidocaine-EPINEPHrine  20 mL IntraDERmal Once    insulin glargine  35 Units SubCUTAneous Nightly    guar gum  1 packet Oral TID    albuterol  2.5 mg Nebulization 4x daily    bisacodyl  10 mg Rectal Daily    collagenase   Topical Daily    methocarbamol  500 mg PEG Tube 4x Daily    doxazosin  1 mg Oral Daily    clotrimazole   Topical BID    lansoprazole  30 mg Per G Tube BID    insulin lispro  0-16 Units SubCUTAneous Q4H    Venelex   Topical BID    chlorhexidine  15 mL Mouth/Throat BID    [Held by provider] apixaban  5 mg Oral BID    sodium chloride flush  5-40 mL IntraVENous 2 times per day         Diet: Diet NPO  ADULT TUBE FEEDING; PEG; Standard with Fiber; Continuous; 20; Yes; 10; Q 4 hours; 55; 200; Q 4 hours; Protein; 1 bottles Proteinex daily w/ 30 mL FW flushes before and after     Results:  CBC:   Recent Labs     10/25/22  0438 10/26/22  0519 10/27/22  0506   WBC 13.5* 14.7* 15.4*   HGB 7.2* 7.1* 7.6*   HCT 23.0* 23.7* 25.1*   MCV 87.0 90.8 86.6   * 453* 486*     BMP:   Recent Labs     10/25/22  0438 10/26/22  0519 10/27/22  0506    138 135*   K 4.0 4.6 4.2   CL 99 100 98*   CO2 33* 29 29   PHOS 2.9 2.8 2.9   BUN 69* 62* 60*   CREATININE 0.7* 0.7* 0.8     LIVER PROFILE: No results for input(s): AST, ALT, LIPASE, BILIDIR, BILITOT, ALKPHOS in the last 72 hours. Invalid input(s): AMYLASE,  ALB  PT/INR:   Recent Labs     10/27/22  0505   PROTIME 16.4*   INR 1.33*     APTT:   Recent Labs     10/27/22  0505   APTT 39.0*     UA:No results for input(s): NITRITE, COLORU, PHUR, LABCAST, WBCUA, RBCUA, MUCUS, TRICHOMONAS, YEAST, BACTERIA, CLARITYU, SPECGRAV, LEUKOCYTESUR, UROBILINOGEN, BILIRUBINUR, BLOODU, GLUCOSEU, AMORPHOUS in the last 72 hours. Invalid input(s): Jose Robbie      Assessment/Plan:  65 y.o. male with     Acute on chronic hypercapnic and hypoxic respiratory failure s/p trach  Pleural effusion s/p pleurx cath placement. A/C PRVC , RR 14/26, FiO2 30, PEEP 5. Pleurx cath is drained every other day. Minute ventilation is ~ 10  Leukocytosis, slightly higher today. He has decub ulcer. Improved tracheal secretions. No change in dynamic compliance over the past few day. Continue SBT daily. Will attempt it today with PS 25. Awaiting approval for LTAC placement.       Yasmani Dooley MD

## 2022-10-27 NOTE — PROGRESS NOTES
Nephrology Progress Note                                                                                                                                                                                                                                                                                                                                                               Office : [phone]     Fax :[phone]    Patient's Name: Laure Rayo        Reason for Consult:   ZANA  Requesting Physician:  Chantal Osorio MD    Interval History:      Cr stable   Na better   Good UO   Remains on Trach   PEG placed - on TF         Past Medical History:   Diagnosis Date    ZANA (acute kidney injury) ([facility] [address] 9/26/2022    Carotid stenosis, left 10/12/2011    Chronic back pain     Chronic systolic (congestive) heart failure 41/57/3144    Diastolic CHF ([facility] [address]     Erectile dysfunction     Hyperlipidemia     Hypertension     Osteoarthritis     Restrictive lung disease     Type II or unspecified type diabetes mellitus without mention of complication, not stated as uncontrolled        Past Surgical History:   Procedure Laterality Date    CARDIAC CATHETERIZATION  06/2022    GASTROSTOMY TUBE PLACEMENT N/A 10/11/2022    EGD PEG TUBE PLACEMENT performed by Karlene Watkins MD at [facility] Left     PLEURAL CATH INSERTION N/A 9/19/2022    PLEURAL CATHETER PLACEMENT; INTERCOSTAL NERVE BLOCK X4 performed by Rudy Conner MD at [facility] Bilateral     THORACOSCOPY Right 9/19/2022    RIGHT VIDEO ASSISTED THORASCOPIC SURGERY AND; performed by Rudy Conner MD at [address] N/A 9/30/2022    TRACHEOSTOMY performed by Rupert Estes MD at [address] [place] [address] History   Problem Relation Age of Onset    Hearing Loss Father     Heart Disease Father 70        MI    High Blood Pressure Father     Diabetes Maternal Grandmother         hypoglycemic    Hearing Loss Maternal Grandmother     Arthritis Maternal Grandfather         reports that he quit smoking about 21 years ago. His smoking use included cigarettes. He has a 80.00 pack-year smoking history. He has never used smokeless tobacco. He reports current alcohol use. He reports that he does not use drugs. Allergies:   Torsemide and Dye [iodides]    Current Medications:    insulin glargine (LANTUS;BASAGLAR) injection pen 35 Units, Nightly  simethicone (MYLICON) chewable tablet 80 mg, Q6H PRN  acetaminophen (TYLENOL) 160 MG/5ML solution 650 mg, Q6H PRN  guar gum packet 1 packet, TID  glucose chewable tablet 16 g, PRN  dextrose bolus 10% 125 mL, PRN   Or  dextrose bolus 10% 250 mL, PRN  glucagon (rDNA) injection 1 mg, PRN  dextrose 10 % infusion, Continuous PRN  albuterol (PROVENTIL) nebulizer solution 2.5 mg, 4x daily  bisacodyl (DULCOLAX) suppository 10 mg, Daily  collagenase ointment, Daily  methocarbamol (ROBAXIN) tablet 500 mg, 4x Daily  doxazosin (CARDURA) tablet 1 mg, Daily  clotrimazole (LOTRIMIN) 1 % cream, BID  0.9 % sodium chloride infusion, PRN  lansoprazole suspension SUSP 30 mg, BID  oxyCODONE (ROXICODONE) 5 MG/5ML solution 7.5 mg, Q6H PRN  hydroxypropyl methylcellulose (GONIOSOL) 2.5 % ophthalmic solution 1 drop, PRN  insulin lispro (1 Unit Dial) (HUMALOG/ADMELOG) pen 0-16 Units, Q4H  Venelex ointment, BID  chlorhexidine (PERIDEX) 0.12 % solution 15 mL, BID  [Held by provider] apixaban (ELIQUIS) tablet 5 mg, BID  sodium chloride flush 0.9 % injection 5-40 mL, 2 times per day  sodium chloride flush 0.9 % injection 5-40 mL, PRN  0.9 % sodium chloride infusion, PRN  ondansetron (ZOFRAN-ODT) disintegrating tablet 4 mg, Q8H PRN   Or  ondansetron (ZOFRAN) injection 4 mg, Q6H PRN  hydrALAZINE (APRESOLINE) injection 5 mg, Q15 Min PRN          Physical exam:     Vitals:  /66   Pulse 72   Temp 98.3 °F (36.8 °C) (Oral)   Resp 20   Ht 6' (1.829 m)   Wt 205 lb 7.5 oz (93.2 kg)   SpO2 100%   BMI 27.87 kg/m² Constitutional:  on MV  Skin: no rash, turgor wnl  Heent:  eomi, mmm  Neck: no bruits or jvd noted  Cardiovascular:  S1, S2 without m/r/g  Respiratory: trach  Abdomen:  +bs, soft, nt, nd  Ext: bilateral lower extremity edema R>L  Psychiatric: mood and affect appropriate  Musculoskeletal:  Rom, muscular strength intact    Data:   Labs:  CBC:   Recent Labs     10/25/22  0438 10/26/22  0519 10/27/22  0506   WBC 13.5* 14.7* 15.4*   HGB 7.2* 7.1* 7.6*   * 453* 486*       BMP:    Recent Labs     10/25/22  0438 10/26/22  0519 10/27/22  0506    138 135*   K 4.0 4.6 4.2   CL 99 100 98*   CO2 33* 29 29   BUN 69* 62* 60*   CREATININE 0.7* 0.7* 0.8   GLUCOSE 180* 140* 99       Ca/Mg/Phos:   Recent Labs     10/25/22  0438 10/26/22  0519 10/27/22  0506   CALCIUM 8.5 8.3 8.4   MG 2.40 2.40 2.40   PHOS 2.9 2.8 2.9       Hepatic: No results for input(s): AST, ALT, ALB, BILITOT, ALKPHOS in the last 72 hours. Troponin: No results for input(s): TROPONINI in the last 72 hours. BNP: No results for input(s): BNP in the last 72 hours. Lipids:   No results for input(s): CHOL, TRIG, HDL, LDLCALC, LABVLDL in the last 72 hours. ABGs:   No results for input(s): PHART, PO2ART, HDN1FNG in the last 72 hours. INR:   Recent Labs     10/27/22  0505   INR 1.33*     UA:No results for input(s): Jaziel Crown, GLUCOSEU, BILIRUBINUR, [place] Kilts, BLOODU, PHUR, PROTEINU, UROBILINOGEN, NITRU, LEUKOCYTESUR, Carmen Coil in the last 72 hours. Urine Microscopic: No results for input(s): LABCAST, BACTERIA, COMU, HYALCAST, WBCUA, RBCUA, EPIU in the last 72 hours. Urine Culture: No results for input(s): LABURIN in the last 72 hours. Urine Chemistry:   No results for input(s): Peola Core, PROTEINUR, NAUR in the last 72 hours. IMAGING:  XR ABDOMEN (KUB) (SINGLE AP VIEW)   Final Result   Impression:       Slightly increased gas dilation of transverse colon since the prior study.       No evidence of small bowel obstruction. Moderate colonic stool burden, unchanged from prior study. XR CHEST PORTABLE   Final Result   Impression:       Slight progression of bibasilar lung opacities, especially on the left. Persistence of small bilateral pleural effusions. XR ABDOMEN (2 VIEWS)   Final Result   1. Moderate amount of stool in the descending colon with the transverse colon mildly dilated with air. CT CERVICAL SPINE WO CONTRAST   Final Result      1. Osseous lesions in C2, C7 and T1 vertebral body seen on recent MRI dated 10/18/2022 are not well-visualized on CT. Bone density is slightly heterogenous on CT. There is a 8 mm radiolucent lesion in the inferior posterior aspect of C2 vertebral body    which may correspond to C2 lesion seen on MRI. 2. Mildly expansile lytic lesion in left lamina of C4 concerning for metastasis. 3. Multilevel degenerative disc disease, most pronounced at C6-7 where there is disc osteophyte complex causing mild-to-moderate spinal canal stenosis. 4. Multilevel foraminal stenosis, most pronounced at C5-6, moderate to severe right and moderate left. MRI LUMBAR SPINE WO CONTRAST   Final Result      1. Motion compromised study. 2.  No cord compression. 3.  Osseous lesions in the cervical and thoracic spine suspicious for metastatic disease. No extraosseous mass. 4.  Severe multilevel degenerative disc disease in the lumbar spine. Moderate to severe canal stenosis at L2-L3 and L4-L5. Moderate canal stenosis at L3-L4. Multiple levels of moderate to severe canal stenosis from L2-L3 to L4-L5      MRI THORACIC SPINE WO CONTRAST   Final Result      1. Motion compromised study. 2.  No cord compression. 3.  Osseous lesions in the cervical and thoracic spine suspicious for metastatic disease. No extraosseous mass. 4.  Severe multilevel degenerative disc disease in the lumbar spine. Moderate to severe canal stenosis at L2-L3 and L4-L5.  Moderate canal stenosis at L3-L4. Multiple levels of moderate to severe canal stenosis from L2-L3 to L4-L5      MRI Cervical Spine WO Contrast   Final Result      1. Motion compromised study. 2.  No cord compression. 3.  Osseous lesions in the cervical and thoracic spine suspicious for metastatic disease. No extraosseous mass. 4.  Severe multilevel degenerative disc disease in the lumbar spine. Moderate to severe canal stenosis at L2-L3 and L4-L5. Moderate canal stenosis at L3-L4. Multiple levels of moderate to severe canal stenosis from L2-L3 to L4-L5      MRI BRAIN WO CONTRAST   Final Result      1. No acute hemorrhage, mass or acute ischemia. 2.  Mild burden of nonspecific multifocal white matter disease compatible with chronic small vessel ischemia. 3.  Mild atrophy. 4.  Bilateral mastoid effusions. XR CHEST PORTABLE   Final Result      Distal portion of tracheostomy poorly visualized due to overlying medical devices. The position is without significant change since 10/5/2022. Bibasilar pleuroparenchymal consolidation. Stable cardiomediastinal silhouette. Right jugular central venous catheter without change. CT ABDOMEN PELVIS WO CONTRAST Additional Contrast? None   Final Result      Marked decrease in size of pneumomediastinum. Trace residual pneumoperitoneum. Extensive bilateral lower lobe pulmonary atelectasis with pleural effusions and trace residual right pneumothorax. XR CHEST PORTABLE   Final Result      Tiny right lateral pneumothorax is suspected, 5 mm in maximum thickness. This has decreased in size since 10/3/2022. Right basilar Pleurx catheter noted. Small bilateral pleural effusions. Prominent interstitial markings. Stable cardiac mediastinal silhouette. Lines and tubes without change. Subcutaneous emphysema noted. CT CHEST ABDOMEN PELVIS WO CONTRAST   Final Result      1. Severe pneumomediastinum.    2. Small to moderate right hydropneumothorax with similar fluid and gas components with a right-sided Pleurx catheter. 3. Moderate loculated left pleural effusion with atelectasis of the left lower lobe with patency of the central left lower lobe bronchi. 4. Fluid/material filling the bronchus intermedius and right lower lobe bronchi with complete consolidation and volume loss of the right lower lobe. Differential diagnosis would include mucous plugging or obstructing lesion. 5. Tracheostomy tube tip within the trachea   6. Severe subcutaneous emphysema in the neck. CT ABDOMEN AND PELVIS:      FINDINGS:      LIVER: Normal.      GALLBLADDER AND BILIARY TREE: No calcified gallstones. No gallbladder distention. No intra- or extrahepatic biliary dilatation. PANCREAS: Normal.      SPLEEN: Normal.      ADRENAL GLANDS: Normal.      KIDNEYS AND URETERS: Normal.      URINARY BLADDER: Ansari catheter present within collapsed urinary bladder. REPRODUCTIVE ORGANS: No associated masses. BOWEL: Normal diameter, nonobstructed. Feeding tube tip at the level of the ligament of Treitz. LYMPH NODES: No abnormally enlarged nodes. PERITONEUM/RETROPERITONEUM: Severe pneumoperitoneum extends into the upper abdomen with some of the symmetric multiseptated gas appearing deep to the diaphragm consistent with mild pneumoperitoneum (series 601 was 101). This is likely related to    pneumonia mediastinum dissecting into the abdomen. No fluid collection in the abdomen or pelvis. No ascites. VESSELS: Extensive atherosclerotic calcification of the aorta and iliac arteries. ABDOMINAL WALL: No acute abnormality. BONES: No acute abnormality. Mild chronic L1 compression fracture with previous vertebroplasty. IMPRESSION:      1. Severe pneumomediastinum extends into the upper abdomen with small pneumoperitoneum likely related to extension of pneumoperitoneum into the upper peritoneal cavity.    2. No fluid collection in the abdomen or pelvis. Results were discussed with the surgical team at 7:00 PM on 10/3/2022. XR CHEST PORTABLE   Final Result      Stable appearance of loculated right pneumothorax, with loculated components in the lateral right midlung region and in the right lateral costophrenic sulcus. Stable scattered areas of atelectasis versus scar, most prominent in the medial right lung base and in the left lateral lung base. XR CHEST PORTABLE   Final Result      Small right basilar pneumothorax. Right basilar chest tube without change. Patchy consolidation in the right mid and lower lung as well as the left lower lung-atelectasis versus pneumonia. Trace left pleural effusion. Normal heart size. Lines and tubes without change. Mild improvement in degree of subcutaneous emphysema in the chest and neck. XR CHEST 1 VIEW   Final Result      1. Stable small right-sided pneumothorax and prominent subcutaneous emphysema along the neck and upper superior chest wall, greater in right lung stable   2. Stable left basilar consolidation and pleural effusion      3. Stable appearing perihilar accentuated markings or airspace disease            XR CHEST PORTABLE   Final Result      Stable small right-sided pneumothorax. More prominent emphysema over the lower neck. No change in bilateral airspace disease. Stable left pleural effusion. XR CHEST PORTABLE   Final Result   1. Bilateral multifocal pneumonia with improvement in the right    pulmonary consolidation since prior study from 9/23/22   2. Mild to moderate left pleural effusion          XR CHEST PORTABLE   Final Result   1. Support lines and tubes are stable. 2.  Stable small right lateral pneumothorax and right basilar    chest tube. 3.  Stable patchy bilateral airspace disease. XR CHEST PORTABLE   Final Result   1. Satisfactory position of right internal jugular central line   2.  No interval change in endotracheal tube and nasogastric tube positioning. 3. Extensive bilateral airspace disease with no changes. 4. Left pleural effusion with retrocardiac opacity, unchanged   5. Small stable tiny right lateral pneumothorax and stable position of right chest tube,, Pleurx catheter. XR ABDOMEN (KUB) (SINGLE AP VIEW)   Final Result   1. No residual pneumothorax. 2.  Mild patchy airspace disease bilaterally worse on the right than on prior examination. 3.  Non-obstructive bowel gas pattern. Mildly distended stomach. XR CHEST PORTABLE   Final Result   1. No residual pneumothorax. 2.  Mild patchy airspace disease bilaterally worse on the right than on prior examination. 3.  Non-obstructive bowel gas pattern. Mildly distended stomach. XR CHEST PORTABLE   Final Result      1. Small right pneumothorax appears slightly increased. 2. Mild patchy airspace opacity bilaterally. XR CHEST PORTABLE   Final Result     Pleurx catheter at the right lung base. Trace right    pneumothorax is unchanged. XR CHEST PORTABLE   Final Result      Right-sided chest tube evident in the base, its tip medially. Improvement in the right-sided pneumothorax, since earlier today. Possible medial collapse of the right lower lobe. Small left effusion. Patchy airspace density/atelectasis in the lungs bilaterally, with no other significant change. XR CHEST PORTABLE   Final Result   1. Right-sided Pleurx catheter in satisfactory position in the lung bases   2. Right-sided post operative pneumothorax, approximately 10%             Assessment/Plan   ZANA  - sec to contrast nephropathy  - Cr stable   - BNP 3k, Protein:Cre 0.3, FENa 0.4%  - closely monitor UOP  - avoid nephrotoxic agent     2. Hypernatremia  - continue free water   - will continue to monitor     3. Hypotension  - pressors as needed     4. Anemia  - daily CBC    5.  Acid- base/ Electrolyte imbalance   - optimize lytes    6. Acute hypoxic resp failure  - s/p VATS on 9/19/22 for recurrent pleural effusions  - Intensivist and CTS following    7.  CHF   - EF 65% on 6/16/22 via cardiac cath        Lc Andersen MD

## 2022-10-27 NOTE — PLAN OF CARE
Problem: Discharge Planning  Goal: Discharge to home or other facility with appropriate resources  10/27/2022 0610 by Travis Figueroa RN  Outcome: Progressing  Problem: Pain  Goal: Verbalizes/displays adequate comfort level or baseline comfort level  10/27/2022 0610 by Travis Figueroa RN  Outcome: Progressing  Flowsheets (Taken 10/27/2022 0610)  Verbalizes/displays adequate comfort level or baseline comfort level:   Encourage patient to monitor pain and request assistance   Assess pain using appropriate pain scale  Note: Patient with complaints of pain to buttocks. Repositioning for comfort and to maintain skin integrity. Medicated with Tylenol and Oxycodone as needed. Problem: Skin/Tissue Integrity  Goal: Absence of new skin breakdown  Description: 1. Monitor for areas of redness and/or skin breakdown  2. Assess vascular access sites hourly  3. Every 4-6 hours minimum:  Change oxygen saturation probe site  4. Every 4-6 hours:  If on nasal continuous positive airway pressure, respiratory therapy assess nares and determine need for appliance change or resting period. 10/27/2022 0610 by Travis Figueroa RN  Outcome: Progressing  Note: Turning and repositioning Q 2 hours as patient allows. Heels elevated off mattress with pillows. Remains on low airloss mattress. Pericare provided after BM's and Ansari care completed. Problem: Respiratory - Adult  Goal: Achieves optimal ventilation and oxygenation  10/27/2022 0610 by Travis Figueroa RN  Flowsheets (Taken 10/27/2022 2286)  Achieves optimal ventilation and oxygenation: Oxygen supplementation based on oxygen saturation or arterial blood gases  Note: SpO2 92-98%. Trach/vent Fio2 30%. Inline suction as needed for tan thick secretions. Trach care provided. Lungs diminished with Rhonchi as times.       Problem: Cardiovascular - Adult  Goal: Maintains optimal cardiac output and hemodynamic stability  10/27/2022 0610 by Travis Figueroa RN  Outcome: Progressing  Flowsheets (Taken 10/27/2022 0610)  Maintains optimal cardiac output and hemodynamic stability: Monitor blood pressure and heart rate  Note: VSS. SR with PAC's on Tele. Note: Case management involved for D/C planning.

## 2022-10-27 NOTE — PROGRESS NOTES
Cardiothoracic Surgery Daily Progress Note      CC: Pleural effusions s/p R pleur-X placement    Subjective :  Patient rested well overnight. Remains afebrile and HDS. Passing gas and having diarrhea. Pleur-X was drained yesterday. Objective     Exam:  Vitals:    10/27/22 0201 10/27/22 0318 10/27/22 0426 10/27/22 0606   BP:   133/69    Pulse:  72 83    Resp:  15 22    Temp:   98.3 °F (36.8 °C)    TempSrc:   Oral    SpO2: 93% 96% 94% 95%   Weight:       Height:           Physical Examination:   General appearance: Sleeping, resting comfortably  Neurological: No focal deficits  HEENT: EOMI, trachea midline, no JVD. Tracheostomy in place with some mucus drainage  Chest/Lungs: On mechanical ventilation via tracheostomy; R PleurX catheter capped with dressing C/D/I  Cardiovascular: RRR  Abdomen: Soft, non-tender, minimally-distended. PEG tube with tube feeds running at 65   Skin: Warm and dry. Sacral decubitus ulcer covered with Mepilex. Extremities: Pitting edema of the b/l feet. No cyanosis. Legs ulcers from SCDs covered with Mepilex      ASSESSMENT/PLAN:   Loraine Marie is a 65 y.o. male with history of diastolic heart failure, atrial fibrillation, and DM with recurrent pleural effusions s/p R PleurX catheter placement (9/19). - Persistent leukocytosis noted 15.4 from 14.7  - Will consider CT chest in the setting of persistent leukocytosis  - General surgery will plan on sacral decubitus debridement today  - Resume Eliquis after debridement  - Continue every other day Pleurx drainage; drained yesterday  - Pulmonology/Crit care managing vent  - Stool frequency has decreased. Fiber was restarted with improvement of diarrhea. - Continue tube feeds  - Glucose control improved  - Sacral wound per wound care nurse and general surgery  - Continue PT/OT  - Will touch base with SW on LTAC, wife working on [employer], as patient was denied placement on current insurance.        Priyank Fung, DO, [address]  PGY1, General Surgery  10/27/22  6:45 AM  [employer]  Pager: [phone]

## 2022-10-27 NOTE — PROGRESS NOTES
General Surg  POC    Sacral decubitus Debridement Note      After informed consent was obtained,the sacral area was prepped and draped in the usual sterile fashion. 1% lidocaine with epinephrine was injected to the affected area. # 11 scalpel was used to create to deroof the eschar overlying the sacral wound. The entire wound was tan fibrinous material. No purulence expressed. Debrided down to healthy tissue as much as tolerable sharply. Stage 3. Pressure held, silver nitrate sticks used to achieve hemostasis. Some generalized oozing. Dressing was applied. Sacral heart applied Patient tolerated procedure well without any immediate complications. Will continue BID dressing changes with NS wet to dry for now. Will consider restarting eliquis tomorrow vs sat.   Ok to resume TF     Coretta Jon MD  10/27/22  3:10 PM  [phone]

## 2022-10-27 NOTE — PROGRESS NOTES
Surgery Daily Progress Note  Nora Nearing  CC:  sacral wound     Subjective : No overnight events. Patients wife rowan states patient has been improving - baby steps- but improving. VSS, Afebrile. Objective    Infusions:   dextrose      sodium chloride      sodium chloride 25 mL (10/19/22 0029)        I/O:I/O last 3 completed shifts: In: 1668 [I.V.:40; NG/GT:3696]  Out: 2240 [Urine:2175; Chest Tube:65]           Wt Readings from Last 1 Encounters:   10/27/22 205 lb 7.5 oz (93.2 kg)               LABS:    Recent Labs     10/26/22  0519 10/27/22  0506   WBC 14.7* 15.4*   HGB 7.1* 7.6*   HCT 23.7* 25.1*   MCV 90.8 86.6   * 486*        Recent Labs     10/26/22  0519 10/27/22  0506    135*   K 4.6 4.2    98*   CO2 29 29   PHOS 2.8 2.9   BUN 62* 60*   CREATININE 0.7* 0.8      No results for input(s): AST, ALT, ALB, BILIDIR, BILITOT, ALKPHOS in the last 72 hours. No results for input(s): LIPASE, AMYLASE in the last 72 hours. Recent Labs     10/27/22  0505   INR 1.33*   APTT 39.0*      No results for input(s): CKTOTAL, CKMB, CKMBINDEX, TROPONINI in the last 72 hours. Exam:/66   Pulse 69   Temp 98.3 °F (36.8 °C) (Oral)   Resp 17   Ht 6' (1.829 m)   Wt 205 lb 7.5 oz (93.2 kg)   SpO2 94%   BMI 27.87 kg/m²     General appearance: resting in bed, NAD  Neck: trach in place with scant secretions   Heart: regular rate and rhythm,   Lungs: vent compliant, trach   Skin: warm and dry, no rashes  Extremities: pitting edema BLE. Abdomen: soft, non-tender, PEG tube with TF running   Sacral wound: covered with Mepilex       ASSESSMENT/PLAN: Pt. is a 65 y.o. male with hx diastolic heart failure, atrial fibrillation, and DM with recurrent pleural effusions s/p R PleurX catheter placement (9/19).      -  bedside sacral debridement today  -  Hold tube feeds for procedure       NATASHA Alvarez CNP 10/27/2022 12:47 PM   Available via [employer] [phone] Mon-Thurs this week ATTENDING ATTESTATION    Patient independently examined. Management discussed and I agree with resident/midlevel provider documentation. CC: Sacral decubitus ulcer, unstageable    No acute issues overnight per nursing staff. Vitals:    10/27/22 1213   BP: (!) 129/53   Pulse: 76   Resp: 20   Temp: 98.2 °F (36.8 °C)   SpO2: 95%     Sacral decubitus ulcer, unstageable, ulcerated  Labs reviewed, incl CBC, BMP    Sacral decubitus ulcer, ulcerated, unstageable  Plan for bedside debridement today    We will continue to follow.     Lara Beckham MD

## 2022-10-27 NOTE — PROGRESS NOTES
CPAP 5 with PS of 25  From 1546 to 1614    Pt seemed to ursula.  Cpap of 5 and PS of 25 well until he complained of pain    RSBI ranged  from 77 to 98  RR from 27 to 35  TVs from 347 to 487    Pt returned to [place] settings

## 2022-10-27 NOTE — PROGRESS NOTES
Speech Language Pathology  Facility/Department: [place] [facility] [id number] 4 PCU  Daily PMV/Electrolarynx/Speech Treatment Note     NAME: Nicholas Sherman  :   MRN: [id number]    Date of Eval: 10/27/2022  Evaluating Therapist: Dany Suazo SLP    Patient Diagnosis(es): has HTN (hypertension); Hyperlipidemia; OA (osteoarthritis) of knee; Carotid stenosis, left; Hearing loss; ED (erectile dysfunction); DM type 2 (diabetes mellitus, type 2) ([address] Rotator cuff tear; Glenohumeral arthritis; Subacromial impingement; Trigger ring finger of right hand; Spinal stenosis of lumbar region; Closed compression fracture of L1 lumbar vertebra; Closed displaced fracture of lateral malleolus of left fibula; Exertional dyspnea; Atrial fibrillation with rapid ventricular response ([address] Hypoxia; Chronic heart failure with preserved ejection fraction (HFpEF) ([address] Pleural effusion on right; Acute on chronic respiratory failure with hypoxia and hypercapnia ([address] ZANA (acute kidney injury) ([address] Mucus plugging of bronchi; Malnutrition ([address] Generalized weakness; Areflexia; Asymmetrical deep tendon reflexes; and Pressure ulcer of sacral region, unstageable [facility]) on their problem list.  Onset Date: 22    Chart reviewed. Pain: none indicated    Initial Assessment: 10/10  Diagnosis: Pt presents with a severe communication impairment secondary to trach/vent status. Order obtained and pt appropriate this date per ventilator settings and report. The patient was awake but drowsy (spouse reports recent dilaudid). The patient allowed oral care but demo'd what appeared to be reflexive mouth closing for any presentation of oral care utensils despite multiple attempts and verbal cues. Procedure explained to patient and spouse. Pt did not demo any comprehension of education / not attending. RT arrived and trach cuf deflated.  Pt without coughing and airflow was appreciated but difficult to detect due to inability to voice on command; a warm continuous air could be felt leaking from open oral cavity although pt could not follow command to exhale forcefully and x1 instance of weak strained phonation was appreciated om command. Pt had drop in PIP and no stridor noted so decision was made to place PMV. Pt without coughing or overt anxiety noted with placement. x2 instances of weak strained vocal quality on command for single word utterances. During repositioning (typical flexed neck posturing), there was a phonation achieved that is suspected to be non-purposeful. The pt demo'd suspected attempts at cough with audible gurggling suspected to be from glottis region / pharynx vs trach with inability to expectorate. The patient was unable to expectorate secretions to oral cavity. There was no active diaphragm movement appreciated when assessed for attempts at controlled phonation. The patient tolerated for 7.5 minutes but given limited interactiveness and no voicing it was decided to remove PMV. RT replaced t-piece and resumed care of the patient. Pts vitals generally stable throughout, no outward anxiety or discomfort (no more than his baseline). Pt did demo spontenous swallows with grimace. PMV was removed and left to air dry below RN computer on tray and RN was notified of this as well as spouse with instructions to place adaptor and PMV into ziplock and keep with ventilator. The PMV is single use and is now dispensed to the patient and should DC with the patient if he is to DC from hospital. A denture case was labele \"PMV cleaning\" and kept with the PMV equipment, cleaning instructions discussed briefly with pts spouse for initiation of education for self care when that becomes appropriate. The PMV should only be placed with RT / SLP both present for pt safety at this time. PMV safety sticker as there should be no donning of PMV without RT/SLP.     Medical diagnosis: Pleural effusion, right [J90]  Pleural effusion on right [J90]  Treatment diagnosis: severe communication impairment d/t trach/vent status    Treatment:  Short Term Goals  Goal 1: Pt will tolerate PMV placement for duration of session without change in respiratory status and without discomfort. 10/19: Pt seated upright in bed with RN and RT present. Prior to placement of PMV pt sating at SpO2=98%, Resp=20, Pulse=75. RT provided deep suction prior to placing PMV in-line. Pt with gravely cough when cuff deflated. PMV placed and pt immediately impulsively trying to speak in long phrases/utterances. Required cues to pause and take a few breaths to allow for steady vital readings. Pt tolerated PMV well for 5.25 minutes, prior to SpO2 beginning to drop to 86 and was removed. After removal pt sating at PzJ6=004, Resp=22, Pulse=80. Pt reported no discomfort across entire session while wearing PMV. After removal of PMV RT provided suction with noted white/tan, thick secretions. Cont. 10/20: Pt seated upright in bed, RT present for session. Prior to RT donning PMV, pt with 100% SPO2, 22 RR. RT provided inline suction and cuff deflation. Patient with congested cough s/p cuff deflation. Pt tolerated placement without change in vital signs for 4.5 minutes, when he reports fatigue and wishes to discontinue session. Following PMV removal, pt with 98% SPO2, RR 20. Continue goal  10/21 - Pt declined PMV this date x2 mouthing \"no\" and \"I'm so tired\". RT presented to room to assist but was not needed so not present for remainder of session. 10/24 - Pt with significant coughing episode with cuff deflation and donning PMV. Pt tolerated without significant fluctuations in vital signs (except RR increased with coughing and recovery). Pt did indicate discomfort while wearing PMV but unable to explain fully his perceptual sensations; was somewhat relieved that his vitals were stable.  Attempted voicing trials but pt had little to no phonation achieved and therefore after ~5 mins and in combination with his perceptual discomfort, PMV was removed. 10/25: not addressed this date as pt declined PMV trial. Respiratory present and initiated weaning trials  10/26: Not targeted this session. 10/27: Prior to PMV placement, pt sating at RR=22 and O2=94. Pt with RT present and suctioned+deflated cuff prior to PMV placement. Pt with significant cough after cuff deflation. Pt with initial ? Discomfort and RR reaching 35, however stable O2 and RR eventually dropped back down to 28 given cues from SLP. Pt expressed \"I feel like I can't breath\", despite O2 sating well at 93, after ~3 minutes. Pt becoming increasingly frustrated and agitated, yelled at SLP and RT to remove due to discomfort. ? If pt with increased anxiety while wearing PMV, as all vitals remained steady for duration of pt wearing. After removal, pt at RR=27 and O2=95. Cont. Goal 2: Pt will improve breath-phonation coordination via phonation for single syllable words on 50% of opportunities. 10/19: Pt with increased voicing this date. Required mod cues to take breaths in between words to assist with respiration/phonation. Pt expressed single CVC words accurately across 90% of attempts. Pt attempting to speak in longer utterances, with reduced breath support across multi-syllabic words and short phrases. Increased breathiness noted when pt attempting these. Goal met, however continue for carry over/training of strategy for increased voicing. Cont. 10/20: Pt able to produce voice this date with rough vocal quality, mildly reduced intensity. He completed counting exercise with appropriate breath-phonation coordination; He attempts to speak in phrase /sentences to answer questions despite frequent aphonia with vent inspirations. Patient required mod-max cues to shorten utterances and coordinate with provided breaths. Continue goal.  10/24: Pt with severely impaired vocal intensity with only phonation achieved at end of attempted utterances.  Following removal of PMV and reinflation of cuff the patient demo'd improved phonation. Question if potential etiology could be the valve impeding airflow and therefore not allowing air to pass to vocal folds (valve integrity appeared adequate when examined) or question if air trapping could have occurred between cuff and glottis after reinflation. Pt could not increase phonation despite cues for counting to achieve breath coupling. Only improvement to comprehensibility was due to whisper achieved vs pure mouthing. Due to pt perceived discomfort and no voicing achieved, valve was removed. Continue goal.   10/25: Goal not addressed this date  10/26: Not targeted this session. 10/27: Pt able to phonate CVC words, name, wife name and short phrases with 95% intelligibility. Intermittent breathiness noted. Cues to increase breath support for increased vocalizations. Cont. Goal 3: Pt/caregiver will demonstrate comprehension of PMV safety instructions with mod I.  10/19: Prior to placing PMV, SLP discussed how it works, steps to take and cues to be provided by SLP and RT. Pt nodding head in comprehension, however mod cues as noted above to follow directions to continue taking breaths for increased respiration/phonation support. Pt's wife not present at this time. Pt communicated she is supposed to be coming in ~2 PM. SLP communicated may stop by then to discuss PMV trial with her. Pt in agreement. Cont. 10/20: Provided education regarding PMV and redirection of airflow, though suspect limited comprehension given level of fatigue. 10/24:  Educated on impact of PMV on ability to bear down which can improve physical abilities in addition to swallow and speech. Did not discuss safety of donning/doffing as pt remains mechanically ventilated. Continue goal.   10/25: Goal not addressed this date  10/26: Not targeted this session. 10/27: Re-educated pt and wife anatomy/airflow with placement of PMV. Pt and wife demonstrated understanding.  Expressed possible discomfort due to redirection of airflow while wearing PMV. Cont. Goal 4: Pt will tolerate ongoing communication as indicated. 10/19: Increased impulsivity of speaking this date with reduced use of breath support, negatively impacting Sp02 tolerance. Pt with increased mouthing of words prior to PMV and after PMV removal. With PMV on, improved voicing and vocal quality. Pt expressed \"I can't hear you all\". SLP confirmed pt wanting to have staff be aware that increased loudness required for pt comprehension, as pt normally wears hearing aids at baseline and they are not present during hospital stay. Pt discussed with RN and wrote on whiteboard for staff to be aware for increased pt participation in care. Cont. 10/20: Patient with improved mouthing of words without placement of PMV, able to indicate he does not want to complete dysphagia tx and end session given exhaustion. Continue goal.   10/21 - Pt completed evaluated for use of electrolarynx. The patient had good comprehensibility with min cues with SLP placing electrolarynx on left lateral neck against larynx. Pt required moderate verbal and tactile assist for placement. Pts spouse completed training and was able to place appropriately for communication exchanges. Educated on target sound (muffled vibration indicating skin contact achieved), ceasing vibration between utterances to allow pt break and to assist communication partner with identifying sentence boundaries (educated pt on use for this to assist in breath support when speaking but pt did not demo comprehension). Sign hung up in patient room re: storage location of PMV, spouse trained, pt placement on L lateral neck. Utilizing electrolarynx in glove to protect from becoming soiled. Continue goal.   10/24 - Spouse reports pt independently asking for electrolaynx over weekend to communicate but did decline it a few times when she offered.  Pt able to be ~85% comprehensible during session with use of electrolarynx with SLP placing. Continues to demonstrate reduced pausing to indicate sentence boundaries in the absence of phonation. Pt is able to repeat back on incomprehensible portions of utterance. Poor tolerance / voice achieved with PMV as above. Pt with good ability to overarticulate when communication breakdown occurred. Continue goal.   10/25: Pt agreeable to electrolarynx trials this date. ST placed on left lateral neck with pt successfully communicating sentences with 95% intelligibility. Spouse present and reported she had used device a few times however stated she did not have as much success. Placement of electrolarynx reviewed with spouse. ST offered to have spouse practice, however she politely declined this date. RN reported she communicates with pt appropriately via lip reading and written communication vs electrolarynx. Pt able to demonstrate appropriate written communication and gestures this date. 10/26: Pt provided with electrolarynx this session. Prior to use, pt requesting to put in hearing aids with assistance from SLP. SLP demonstrated placement and use of pressing button to assist in communicating. Pt benefited from initial min, fading to [place] for expressing short sentences with 90% intelligibility. Pt's wife continues to express wants/needs via lip reading for pt communication. Discussed trialing PMV tomorrow again. Pt in agreement. Cont. Education:  Education ongoing re: use of devices for communication as above. Education completed on possible barriers to PMV including size of trach (#8). Education provided regarding possible coarse of care including weaning trials, possible trach mask, possible downsize with improved PMV tolerance.    Patient has been loaned electrolarynx:  Pt required assistance to place/use electro larynx independently  Pts spouse has been trained on electrolarynx and can assist independent of staff on use  Electrolarynx is property of rehab department and is NOT to be dispense to patient at discharge. Electrolaynx can be placed on left lateral neck during oral movement to achieve speech production. Plan:  Continue speech/language therapy to address above goals, 3-5 x/week x LOS  DC recommendations: Ongoing communication training needed  D/W nursin[address] [place] [address] met prior to leaving room, call button in reach.   Treatment time: 20    Electronically signed by:  Annia Zimmerman M.A., [address] [place] [address]  Speech-Language Pathologist  Pg #: [phone]      If patient is discharged prior to next treatment, this note will serve as the discharge summary

## 2022-10-28 NOTE — PROGRESS NOTES
Pulmonary & Critical Care Medicine    Admit Date: 2022  PCP: Fahad Jj MD    CC:  respiratory failure   Events of Last 24 hours:   No major events over the past 24 hours. Overall stable general condition     Vitals:  Tmax:  VITALS:  BP (!) 110/49   Pulse 72   Temp 98.4 °F (36.9 °C) (Oral)   Resp 19   Ht 6' (1.829 m)   Wt 203 lb 11.3 oz (92.4 kg)   SpO2 93%   BMI 27.63 kg/m²   24HR INTAKE/OUTPUT:    Intake/Output Summary (Last 24 hours) at 10/28/2022 1342  Last data filed at 10/28/2022 0943  Gross per 24 hour   Intake 1555 ml   Output 900 ml   Net 655 ml     CURRENT PULSE OXIMETRY:  SpO2: 93 %  24HR PULSE OXIMETRY RANGE:  SpO2  Av.7 %  Min: 7 %  Max: 98 %    EXAM:  General: No distress. Alert. Eyes: PERRL. No sclera icterus. No conjunctival injection. ENT: trach tube in place. Neck: Trachea midline. Neck is supple   Resp: No accessory muscle use. No crackles. No wheezing. No rhonchi. CV: Regular rate. Regular rhythm. No mumur or rub. Bilateral edema    GI: Non-tender. Non-distended. Normal bowel sounds. Neuro: Awake. Speech is clear. Psych:  No anxiety or agitation.      Medications:    IV:   dextrose Stopped (10/27/22 2037)    sodium chloride      sodium chloride 25 mL (10/19/22 0029)         Scheduled Meds:   silver nitrate applicators  1 each Topical Once    insulin glargine  20 Units SubCUTAneous Nightly    guar gum  1 packet Oral TID    albuterol  2.5 mg Nebulization 4x daily    bisacodyl  10 mg Rectal Daily    collagenase   Topical Daily    methocarbamol  500 mg PEG Tube 4x Daily    doxazosin  1 mg Oral Daily    clotrimazole   Topical BID    lansoprazole  30 mg Per G Tube BID    insulin lispro  0-16 Units SubCUTAneous Q4H    Venelex   Topical BID    chlorhexidine  15 mL Mouth/Throat BID    apixaban  5 mg Oral BID    sodium chloride flush  5-40 mL IntraVENous 2 times per day         Diet: Diet NPO  ADULT TUBE FEEDING; PEG; Standard with Fiber; Continuous; 20; Yes; 10; Q 4 hours; 65; 200; Q 4 hours; Protein; 1 bottles Proteinex daily w/ 30 mL FW flushes before and after     Results:  CBC:   Recent Labs     10/26/22  0519 10/27/22  0506 10/28/22  0346   WBC 14.7* 15.4* 19.6*   HGB 7.1* 7.6* 7.0*   HCT 23.7* 25.1* 22.4*   MCV 90.8 86.6 88.2   * 486* 533*     BMP:   Recent Labs     10/26/22  0519 10/27/22  0506 10/28/22  0346    135* 138   K 4.6 4.2 4.7    98* 97*   CO2 29 29 31   PHOS 2.8 2.9 3.5   BUN 62* 60* 56*   CREATININE 0.7* 0.8 0.7*     LIVER PROFILE: No results for input(s): AST, ALT, LIPASE, BILIDIR, BILITOT, ALKPHOS in the last 72 hours. Invalid input(s): AMYLASE,  ALB  PT/INR:   Recent Labs     10/27/22  0505   PROTIME 16.4*   INR 1.33*     APTT:   Recent Labs     10/27/22  0505   APTT 39.0*     UA:No results for input(s): NITRITE, COLORU, PHUR, LABCAST, WBCUA, RBCUA, MUCUS, TRICHOMONAS, YEAST, BACTERIA, CLARITYU, SPECGRAV, LEUKOCYTESUR, UROBILINOGEN, BILIRUBINUR, BLOODU, GLUCOSEU, AMORPHOUS in the last 72 hours. Invalid input(s): Duana Spurling      Assessment/Plan:  65 y.o. male with     Acute on chronic hypercapnic and hypoxic respiratory failure s/p trach  Pleural effusion s/p pleurx cath placement. A/C PRVC , RR 14/26, FiO2 30, PEEP 5. Pleurx cath is drained every other day. Minute ventilation is ~ 10  Leukocytosis, continue to trend up. He has decub ulcer. Improved tracheal secretions. No change in dynamic compliance over the past few day. Tolerated 1/2h of SBT with PS of 25 on 10/27. Will attempt SBT today with PS of 25 for one hour if possible. Awaiting approval for LTAC placement.       Gino Correa MD

## 2022-10-28 NOTE — SIGNIFICANT EVENT
At 1451 rapid response was called for the patient and ICU team responded shortly thereafter. Patient's nurse reported that the patient was down getting a CT scan looking for sources of infection given an increase in his white count noted on labs. Shortly after receiving the scan the nurse noted that the patient had increased symptoms of anxiety, increased work of breathing, and was unable to answer questions normally and he stopped responding in his usual manner. On physical exam patient had some increased work of breathing and sounded congested, but was satting well on his normal ventilator settings. He had a nonfocal neuro exam. ABG was obtained, though felt to likely be contaminated with venous blood. However, given the acute change in apparent mental status and the patient called having a history of atrial fibrillation, there was concern for potential stroke. Therefore patient was taken once again to receive a CT scan of the head to rule out stroke though the patient had begun to improve symptomatically. While receiving the scan and lying flat, the patient had increased work of breathing and cough. Once again, after sitting the patient up and take him back to his room he started to feel better and was back to his baseline. Most likely diagnosis for this constellation of symptoms is exacerbation of his diastolic heart failure. The patient experiences increased cough, and shortness of breath with lying flat for the scans. We ordered BNP, chest x-ray, and ordered a dose of IV Lasix. CXR with Moderate pulmonary edema. Small bilateral pleural effusions. CT head with no acute intracranial pathology.     Matteo Torrez MD Internal Medicine PGY-1  10/28/2022  4:06 PM

## 2022-10-28 NOTE — PROGRESS NOTES
Palliative Care Chart Review  and Check in Note:     NAME:  Nicholas Sherman  Admit Date: 9/19/2022  Hospital Day:  Hospital Day: 40   Current Code status: Full Code    Palliative care is continuing to following Mr. Oliver Kendrick for symptom management, and goals of care discussion as needed. Patient's chart reviewed today 10/28/22. Rapid response called this afternoon. Went to the bedside to offer support to pt's wife, Linda Menendez. Ultimately, response team determined Radha Matson to be in an acute heart failure exacerbation. Radha Matson is now responsive, interacting with staff and his family. Linda Menendez and their oldest son are updated. iLnda Menendez asked if this would affect Josiah's ability to go to an [facility]. I explained that it should not. Linda Menendez continues to work on obtaining the [employer], although she said she does not think Radha Matson will qualify for supplemental medicare because of his current medical condition. The following are the currently established goals/code status, and Symptom management. Goals of care: Continue current medical management.      Code status: Full    Discharge plan: TBD - pending hospital course       NATASHA Chambers NP  10/28/22  4:04 PM

## 2022-10-28 NOTE — PROGRESS NOTES
Spoke with nurse. Pt currently on Jevity 1.5 @ 55 ml/hr with one proteinex. Jevity 1.5 is currently out of stock. TF will be switched to Jevity 1.2. EN Recommendations:  New goal rate should be 65 ml/hr x 24 hrs with one proteinex. This provides 1635 ml TV, 1976 kcals, 113 g protein, 1259 ml free water. Continue current water flushes. Switch back to Jevity 1.5 @ 55 ml/hr when available.      Ziggy Dyer, [address] [facility]

## 2022-10-28 NOTE — PROGRESS NOTES
Grandmother     Arthritis Maternal Grandfather         reports that he quit smoking about 21 years ago. His smoking use included cigarettes. He has a 80.00 pack-year smoking history. He has never used smokeless tobacco. He reports current alcohol use. He reports that he does not use drugs. Allergies:   Torsemide and Dye [iodides]    Current Medications:    silver nitrate applicators applicator 1 each, Once  insulin glargine (LANTUS;BASAGLAR) injection pen 20 Units, Nightly  simethicone (MYLICON) chewable tablet 80 mg, Q6H PRN  acetaminophen (TYLENOL) 160 MG/5ML solution 650 mg, Q6H PRN  guar gum packet 1 packet, TID  glucose chewable tablet 16 g, PRN  dextrose bolus 10% 125 mL, PRN   Or  dextrose bolus 10% 250 mL, PRN  glucagon (rDNA) injection 1 mg, PRN  dextrose 10 % infusion, Continuous PRN  albuterol (PROVENTIL) nebulizer solution 2.5 mg, 4x daily  bisacodyl (DULCOLAX) suppository 10 mg, Daily  collagenase ointment, Daily  methocarbamol (ROBAXIN) tablet 500 mg, 4x Daily  doxazosin (CARDURA) tablet 1 mg, Daily  clotrimazole (LOTRIMIN) 1 % cream, BID  0.9 % sodium chloride infusion, PRN  lansoprazole suspension SUSP 30 mg, BID  oxyCODONE (ROXICODONE) 5 MG/5ML solution 7.5 mg, Q6H PRN  hydroxypropyl methylcellulose (GONIOSOL) 2.5 % ophthalmic solution 1 drop, PRN  insulin lispro (1 Unit Dial) (HUMALOG/ADMELOG) pen 0-16 Units, Q4H  Venelex ointment, BID  chlorhexidine (PERIDEX) 0.12 % solution 15 mL, BID  apixaban (ELIQUIS) tablet 5 mg, BID  sodium chloride flush 0.9 % injection 5-40 mL, 2 times per day  sodium chloride flush 0.9 % injection 5-40 mL, PRN  0.9 % sodium chloride infusion, PRN  ondansetron (ZOFRAN-ODT) disintegrating tablet 4 mg, Q8H PRN   Or  ondansetron (ZOFRAN) injection 4 mg, Q6H PRN  hydrALAZINE (APRESOLINE) injection 5 mg, Q15 Min PRN          Physical exam:     Vitals:  /65   Pulse 73   Temp 97.6 °F (36.4 °C) (Oral)   Resp 22   Ht 6' (1.829 m)   Wt 203 lb 11.3 oz (92.4 kg)   SpO2 97%   BMI 27.63 kg/m²   Constitutional:  on MV  Skin: no rash, turgor wnl  Heent:  eomi, mmm  Neck: no bruits or jvd noted  Cardiovascular:  S1, S2 without m/r/g  Respiratory: trach  Abdomen:  +bs, soft, nt, nd  Ext: bilateral lower extremity edema R>L  Psychiatric: mood and affect appropriate  Musculoskeletal:  Rom, muscular strength intact    Data:   Labs:  CBC:   Recent Labs     10/26/22  0519 10/27/22  0506 10/28/22  0346   WBC 14.7* 15.4* 19.6*   HGB 7.1* 7.6* 7.0*   * 486* 533*       BMP:    Recent Labs     10/26/22  0519 10/27/22  0506 10/28/22  0346    135* 138   K 4.6 4.2 4.7    98* 97*   CO2 29 29 31   BUN 62* 60* 56*   CREATININE 0.7* 0.8 0.7*   GLUCOSE 140* 99 71       Ca/Mg/Phos:   Recent Labs     10/26/22  0519 10/27/22  0506 10/28/22  0346   CALCIUM 8.3 8.4 8.3   MG 2.40 2.40 2.40   PHOS 2.8 2.9 3.5       Hepatic: No results for input(s): AST, ALT, ALB, BILITOT, ALKPHOS in the last 72 hours. Troponin: No results for input(s): TROPONINI in the last 72 hours. BNP: No results for input(s): BNP in the last 72 hours. Lipids:   No results for input(s): CHOL, TRIG, HDL, LDLCALC, LABVLDL in the last 72 hours. ABGs:   No results for input(s): PHART, PO2ART, JEM6CHA in the last 72 hours. INR:   Recent Labs     10/27/22  0505   INR 1.33*       UA:No results for input(s): Flores Munch, GLUCOSEU, BILIRUBINUR, Sushila Maw, BLOODU, PHUR, PROTEINU, UROBILINOGEN, NITRU, LEUKOCYTESUR, Lazarus Martyr in the last 72 hours. Urine Microscopic: No results for input(s): LABCAST, BACTERIA, COMU, HYALCAST, WBCUA, RBCUA, EPIU in the last 72 hours. Urine Culture: No results for input(s): LABURIN in the last 72 hours. Urine Chemistry:   No results for input(s): Heriberto Gails, PROTEINUR, NAUR in the last 72 hours. IMAGING:  XR ABDOMEN (KUB) (SINGLE AP VIEW)   Final Result   Impression:       Slightly increased gas dilation of transverse colon since the prior study.       No evidence of small bowel obstruction. Moderate colonic stool burden, unchanged from prior study. XR CHEST PORTABLE   Final Result   Impression:       Slight progression of bibasilar lung opacities, especially on the left. Persistence of small bilateral pleural effusions. XR ABDOMEN (2 VIEWS)   Final Result   1. Moderate amount of stool in the descending colon with the transverse colon mildly dilated with air. CT CERVICAL SPINE WO CONTRAST   Final Result      1. Osseous lesions in C2, C7 and T1 vertebral body seen on recent MRI dated 10/18/2022 are not well-visualized on CT. Bone density is slightly heterogenous on CT. There is a 8 mm radiolucent lesion in the inferior posterior aspect of C2 vertebral body    which may correspond to C2 lesion seen on MRI. 2. Mildly expansile lytic lesion in left lamina of C4 concerning for metastasis. 3. Multilevel degenerative disc disease, most pronounced at C6-7 where there is disc osteophyte complex causing mild-to-moderate spinal canal stenosis. 4. Multilevel foraminal stenosis, most pronounced at C5-6, moderate to severe right and moderate left. MRI LUMBAR SPINE WO CONTRAST   Final Result      1. Motion compromised study. 2.  No cord compression. 3.  Osseous lesions in the cervical and thoracic spine suspicious for metastatic disease. No extraosseous mass. 4.  Severe multilevel degenerative disc disease in the lumbar spine. Moderate to severe canal stenosis at L2-L3 and L4-L5. Moderate canal stenosis at L3-L4. Multiple levels of moderate to severe canal stenosis from L2-L3 to L4-L5      MRI THORACIC SPINE WO CONTRAST   Final Result      1. Motion compromised study. 2.  No cord compression. 3.  Osseous lesions in the cervical and thoracic spine suspicious for metastatic disease. No extraosseous mass. 4.  Severe multilevel degenerative disc disease in the lumbar spine. Moderate to severe canal stenosis at L2-L3 and L4-L5.  Moderate canal stenosis at L3-L4. Multiple levels of moderate to severe canal stenosis from L2-L3 to L4-L5      MRI Cervical Spine WO Contrast   Final Result      1. Motion compromised study. 2.  No cord compression. 3.  Osseous lesions in the cervical and thoracic spine suspicious for metastatic disease. No extraosseous mass. 4.  Severe multilevel degenerative disc disease in the lumbar spine. Moderate to severe canal stenosis at L2-L3 and L4-L5. Moderate canal stenosis at L3-L4. Multiple levels of moderate to severe canal stenosis from L2-L3 to L4-L5      MRI BRAIN WO CONTRAST   Final Result      1. No acute hemorrhage, mass or acute ischemia. 2.  Mild burden of nonspecific multifocal white matter disease compatible with chronic small vessel ischemia. 3.  Mild atrophy. 4.  Bilateral mastoid effusions. XR CHEST PORTABLE   Final Result      Distal portion of tracheostomy poorly visualized due to overlying medical devices. The position is without significant change since 10/5/2022. Bibasilar pleuroparenchymal consolidation. Stable cardiomediastinal silhouette. Right jugular central venous catheter without change. CT ABDOMEN PELVIS WO CONTRAST Additional Contrast? None   Final Result      Marked decrease in size of pneumomediastinum. Trace residual pneumoperitoneum. Extensive bilateral lower lobe pulmonary atelectasis with pleural effusions and trace residual right pneumothorax. XR CHEST PORTABLE   Final Result      Tiny right lateral pneumothorax is suspected, 5 mm in maximum thickness. This has decreased in size since 10/3/2022. Right basilar Pleurx catheter noted. Small bilateral pleural effusions. Prominent interstitial markings. Stable cardiac mediastinal silhouette. Lines and tubes without change. Subcutaneous emphysema noted. CT CHEST ABDOMEN PELVIS WO CONTRAST   Final Result      1. Severe pneumomediastinum.    2. Small to moderate right hydropneumothorax with similar fluid and gas components with a right-sided Pleurx catheter. 3. Moderate loculated left pleural effusion with atelectasis of the left lower lobe with patency of the central left lower lobe bronchi. 4. Fluid/material filling the bronchus intermedius and right lower lobe bronchi with complete consolidation and volume loss of the right lower lobe. Differential diagnosis would include mucous plugging or obstructing lesion. 5. Tracheostomy tube tip within the trachea   6. Severe subcutaneous emphysema in the neck. CT ABDOMEN AND PELVIS:      FINDINGS:      LIVER: Normal.      GALLBLADDER AND BILIARY TREE: No calcified gallstones. No gallbladder distention. No intra- or extrahepatic biliary dilatation. PANCREAS: Normal.      SPLEEN: Normal.      ADRENAL GLANDS: Normal.      KIDNEYS AND URETERS: Normal.      URINARY BLADDER: Ansari catheter present within collapsed urinary bladder. REPRODUCTIVE ORGANS: No associated masses. BOWEL: Normal diameter, nonobstructed. Feeding tube tip at the level of the ligament of Treitz. LYMPH NODES: No abnormally enlarged nodes. PERITONEUM/RETROPERITONEUM: Severe pneumoperitoneum extends into the upper abdomen with some of the symmetric multiseptated gas appearing deep to the diaphragm consistent with mild pneumoperitoneum (series 601 was 101). This is likely related to    pneumonia mediastinum dissecting into the abdomen. No fluid collection in the abdomen or pelvis. No ascites. VESSELS: Extensive atherosclerotic calcification of the aorta and iliac arteries. ABDOMINAL WALL: No acute abnormality. BONES: No acute abnormality. Mild chronic L1 compression fracture with previous vertebroplasty. IMPRESSION:      1. Severe pneumomediastinum extends into the upper abdomen with small pneumoperitoneum likely related to extension of pneumoperitoneum into the upper peritoneal cavity.    2. No fluid collection in the abdomen or pelvis. Results were discussed with the surgical team at 7:00 PM on 10/3/2022. XR CHEST PORTABLE   Final Result      Stable appearance of loculated right pneumothorax, with loculated components in the lateral right midlung region and in the right lateral costophrenic sulcus. Stable scattered areas of atelectasis versus scar, most prominent in the medial right lung base and in the left lateral lung base. XR CHEST PORTABLE   Final Result      Small right basilar pneumothorax. Right basilar chest tube without change. Patchy consolidation in the right mid and lower lung as well as the left lower lung-atelectasis versus pneumonia. Trace left pleural effusion. Normal heart size. Lines and tubes without change. Mild improvement in degree of subcutaneous emphysema in the chest and neck. XR CHEST 1 VIEW   Final Result      1. Stable small right-sided pneumothorax and prominent subcutaneous emphysema along the neck and upper superior chest wall, greater in right lung stable   2. Stable left basilar consolidation and pleural effusion      3. Stable appearing perihilar accentuated markings or airspace disease            XR CHEST PORTABLE   Final Result      Stable small right-sided pneumothorax. More prominent emphysema over the lower neck. No change in bilateral airspace disease. Stable left pleural effusion. XR CHEST PORTABLE   Final Result   1. Bilateral multifocal pneumonia with improvement in the right    pulmonary consolidation since prior study from 9/23/22   2. Mild to moderate left pleural effusion          XR CHEST PORTABLE   Final Result   1. Support lines and tubes are stable. 2.  Stable small right lateral pneumothorax and right basilar    chest tube. 3.  Stable patchy bilateral airspace disease. XR CHEST PORTABLE   Final Result   1. Satisfactory position of right internal jugular central line   2. No interval change in endotracheal tube and nasogastric tube positioning. 3. Extensive bilateral airspace disease with no changes. 4. Left pleural effusion with retrocardiac opacity, unchanged   5. Small stable tiny right lateral pneumothorax and stable position of right chest tube,, Pleurx catheter. XR ABDOMEN (KUB) (SINGLE AP VIEW)   Final Result   1. No residual pneumothorax. 2.  Mild patchy airspace disease bilaterally worse on the right than on prior examination. 3.  Non-obstructive bowel gas pattern. Mildly distended stomach. XR CHEST PORTABLE   Final Result   1. No residual pneumothorax. 2.  Mild patchy airspace disease bilaterally worse on the right than on prior examination. 3.  Non-obstructive bowel gas pattern. Mildly distended stomach. XR CHEST PORTABLE   Final Result      1. Small right pneumothorax appears slightly increased. 2. Mild patchy airspace opacity bilaterally. XR CHEST PORTABLE   Final Result     Pleurx catheter at the right lung base. Trace right    pneumothorax is unchanged. XR CHEST PORTABLE   Final Result      Right-sided chest tube evident in the base, its tip medially. Improvement in the right-sided pneumothorax, since earlier today. Possible medial collapse of the right lower lobe. Small left effusion. Patchy airspace density/atelectasis in the lungs bilaterally, with no other significant change. XR CHEST PORTABLE   Final Result   1. Right-sided Pleurx catheter in satisfactory position in the lung bases   2. Right-sided post operative pneumothorax, approximately 10%             Assessment/Plan   ZANA  - sec to contrast nephropathy  - Cr stable   - BNP 3k, Protein:Cre 0.3, FENa 0.4%  - closely monitor UOP  - avoid nephrotoxic agent     2. Hypernatremia  - continue free water   - will continue to monitor     3. Hypotension  - pressors as needed     4. Anemia  - daily CBC    5.  Acid- base/ Electrolyte imbalance   - optimize lytes    6. Acute hypoxic resp failure  - s/p VATS on 9/19/22 for recurrent pleural effusions  - Intensivist and CTS following    7.  CHF   - EF 65% on 6/16/22 via cardiac cath        Rowan Galeano MD

## 2022-10-28 NOTE — PROGRESS NOTES
Physical Therapy/Occupational Therapy Hold    Hold patient for treatment today. Around 1500 pt underwent a rapid response, then a few minutes later under went a code stroke. Will f/u as schedule permits.     Jonatan Blank , PT, DPT Jordan Grimes, OTR/L, [facility]

## 2022-10-28 NOTE — PROGRESS NOTES
General Surgery Daily Progress Note      CC: Sacral wound     Subjective :  Patient rested well overnight. Remains afebrile and HDS. Pain improved since debridement yesterday. Passing gas and having diarrhea. Objective     Exam:  Vitals:    10/28/22 0023 10/28/22 0047 10/28/22 0054 10/28/22 0422   BP: (!) 112/55      Pulse: 71 74  85   Resp: 18 (!) 87  22   Temp: 97.9 °F (36.6 °C)      TempSrc: Oral      SpO2: 97% 97% (!) 7% 94%   Weight:       Height:           Physical Examination:   General appearance: Sleeping, resting comfortably  Neurological: No focal deficits  HEENT: EOMI, trachea midline, no JVD. Tracheostomy in place with some mucus drainage  Chest/Lungs: On mechanical ventilation via tracheostomy; R PleurX catheter capped with dressing C/D/I  Cardiovascular: RRR  Abdomen: Soft, non-tender, minimally-distended. PEG tube with tube feeds running at 65   Skin: Warm and dry. Sacral decubitus ulcer covered with wet to dry dressing   Extremities: Pitting edema of the b/l feet. No cyanosis. Legs ulcers from SCDs covered with Mepilex      ASSESSMENT/PLAN:   Emerson Bower is a 65 y.o. male with history of diastolic heart failure, atrial fibrillation, and DM with recurrent pleural effusions s/p R PleurX catheter placement (9/19). Since has developed an unstageable sacral ulcer in need of debridement.  S/p bedside debridement 10/27.    - ok Resume Eliquis today   - Persistent leukocytosis noted 19.6 from 15.4  - Hgb 7.0 from 7.6  - continue wet to dry dressing changes BID to sacral wound,  wound care nurse following   - gen surg to do am change, nursing to do evening change  - dispo per primary team      Chiquis Mooney MD  PGY1, General Surgery  10/28/22  7:00 AM  SYMONE#[phone]

## 2022-10-28 NOTE — PROGRESS NOTES
Pleurx catheter drained. 140 cc clear serous fluid out.  Specimen sent for culture/cell count    Dianna Poole CNP  10/28/2022  12:32 PM  olga

## 2022-10-28 NOTE — PROGRESS NOTES
Cardiothoracic Surgery Daily Progress Note      CC: Pleural effusions s/p R pleur-X placement    Subjective :  Patient rested well overnight. Remains afebrile and HDS. Passing gas and having diarrhea. Objective     Exam:  Vitals:    10/28/22 0023 10/28/22 0047 10/28/22 0054 10/28/22 0422   BP: (!) 112/55      Pulse: 71 74  85   Resp: 18 (!) 87  22   Temp: 97.9 °F (36.6 °C)      TempSrc: Oral      SpO2: 97% 97% (!) 7% 94%   Weight:       Height:           Physical Examination:   General appearance: Sleeping, resting comfortably  Neurological: No focal deficits  HEENT: EOMI, trachea midline, no JVD. Tracheostomy in place with some mucus drainage  Chest/Lungs: On mechanical ventilation via tracheostomy; R PleurX catheter capped with dressing C/D/I  Cardiovascular: RRR  Abdomen: Soft, non-tender, minimally-distended. PEG tube with tube feeds running at 65   Skin: Warm and dry. Sacral decubitus ulcer covered with Mepilex. Extremities: Pitting edema of the b/l feet. No cyanosis. Legs ulcers from SCDs covered with Mepilex      ASSESSMENT/PLAN:   Onel Abdul is a 65 y.o. male with history of diastolic heart failure, atrial fibrillation, and DM with recurrent pleural effusions s/p R PleurX catheter placement (9/19). - Persistent leukocytosis noted 19.6 from 15.4  - CT chest/abd/pelvis with IV contrast today in the setting of persistent leukocytosis  - Hgb 7.0 from 7.6  - Sacral decubitus debridement yesterday with general surgery  - Resume Eliquis today per general surgery  - Continue every other day Pleurx drainage; will drain today  - Pulmonology/Crit care managing vent  - Stool frequency has decreased. Fiber was restarted with improvement of diarrhea  - Continue tube feeds  - Glucose control improved  - Sacral wound per wound care nurse and general surgery  - Continue PT/OT  - Will touch base with SW on LTAC, wife working on [employer], as patient was denied placement on current insurance.        Milagro Kid Awa Sanchez, [facility] [place] [facility]  PGY1, General Surgery  10/28/22  6:42 AM  [employer]  Pager: [phone]

## 2022-10-28 NOTE — PROGRESS NOTES
Speech Language Pathology    Attempted speech therapy session. Spoke with pt's wife, who requests hold, as patient has been in pain and is finally sleeping. Will re-attempt follow-up at later date/time. Lily Liu Runkelen, [phone]  Pg.  # [phone]

## 2022-10-29 PROBLEM — K52.9 COLITIS: Status: ACTIVE | Noted: 2022-10-29

## 2022-10-29 NOTE — PROGRESS NOTES
Bariatric Surgery Daily Progress Note      CC: Sacral wound    Subjective :  Rapid response was called for concern for stroke. CTH was negative for acute changes and pt was thought to have hypoxic event from fluid overload/CHF. Was given lasix with some response. This AM, he denies any pain on exam.     Objective     Exam:  Vitals:    10/29/22 0026 10/29/22 0038 10/29/22 0200 10/29/22 0314   BP: (!) 116/53   105/62   Pulse: 85 84 87 70   Resp: 24 19   Temp: 97.8 °F (36.6 °C)   97.9 °F (36.6 °C)   TempSrc: Axillary   Axillary   SpO2: 100% 100% 100% 100%   Weight:       Height:           Physical Examination:   General appearance: Sleeping, resting comfortably  Neurological: No focal deficits  HEENT: EOMI, trachea midline, no JVD. Tracheostomy in place with some mucus drainage  Chest/Lungs: On mechanical ventilation via tracheostomy; R PleurX catheter capped with dressing C/D/I  Cardiovascular: RRR  Abdomen: Soft, non-tender, minimally-distended. PEG tube with tube feeds running at 65   Skin: Warm and dry. Sacral decubitus ulcer covered with Mepilex. Extremities: Pitting edema of the b/l feet. No cyanosis. Legs ulcers from SCDs covered with Mepilex      ASSESSMENT/PLAN:   Rina Jackson is a 65 y.o. male with history of diastolic heart failure, atrial fibrillation, and DM with recurrent pleural effusions s/p R PleurX catheter placement (9/19). - Leukocytosis 24.1 from 19.6  - Hgb 6.6 this AM. Transfusing 1U pRBC.  Will follow up post-transfusion H/H  - CT chest/abd/pelvis with IV contrast in the setting of persistent leukocytosis was consistent with multilobular pneumonia with left pleural effusion and ascending colitis  - Continue Zosyn for 7 days for ascending colitis  - Continue wet to dry dressing BID, AM per residents, PM per [id number] [facility], , [address]  PGY1, General Surgery  10/29/22  8:35 AM  [employer]  Pager: [phone]

## 2022-10-29 NOTE — PROGRESS NOTES
Cardiothoracic Surgery Daily Progress Note      CC: Pleural effusions s/p R pleur-X placement    Subjective :  Rapid response was called for concern for stroke. CTH was negative for acute changes and pt was thought to have hypoxic event from fluid overload/CHF. Was given lasix with some response. Tolerated PD cath drainage. Objective     Exam:  Vitals:    10/28/22 1533 10/28/22 1552 10/28/22 1700 10/28/22 2010   BP: (!) 152/99   125/63   Pulse: (!) 104   86   Resp:    18   Temp:    97.9 °F (36.6 °C)   TempSrc:    Oral   SpO2: 100% 96% 100% 96%   Weight:       Height:           Physical Examination:   General appearance: Sleeping, resting comfortably  Neurological: No focal deficits  HEENT: EOMI, trachea midline, no JVD. Tracheostomy in place with some mucus drainage  Chest/Lungs: On mechanical ventilation via tracheostomy; R PleurX catheter capped with dressing C/D/I  Cardiovascular: RRR  Abdomen: Soft, non-tender, minimally-distended. PEG tube with tube feeds running at 65   Skin: Warm and dry. Sacral decubitus ulcer covered with Mepilex. Extremities: Pitting edema of the b/l feet. No cyanosis. Legs ulcers from SCDs covered with Mepilex      ASSESSMENT/PLAN:   Luis Ramirez is a 65 y.o. male with history of diastolic heart failure, atrial fibrillation, and DM with recurrent pleural effusions s/p R PleurX catheter placement (9/19). CT chest/abd/pelvis with IV contrast 10/28 showed multilobular pneumonia with left pleural effusion and ascending colitis. - Hgb this AM 6.6. Will give 1U pRBC.  F/u post-transfusion H/H  - Persistent leukocytosis 24.1 from 19.6  - Will discuss ascending colitis management with general surgery  - Start Zosyn for ascending colitis and pneumonia  - Sacral decubitus debridement per general surgery  - Eliquis resumed  - Continue every other day Pleurx drainage; will drain tomorrow  - Follow up Pleur-X drain cultures  - Pulmonology/Crit care managing vent  - Stool frequency has decreased. Fiber was restarted with improvement of diarrhea  - Continue tube feeds  - Hypoglycemia, lantus dose decreased  - Continue PT/OT  - Will touch base with SW on LTAC, wife working on [facility] [employer], as patient was denied placement on current insurance.        Rocio Huber DO, MSM[facility]  PGY1, General Surgery  10/29/22  8:34 AM  [employer]  Pager: [phone]    Chart reviewed including CT chest. Discussed with resident team.  Agree with above plan    Mariah Goel MD  10/29/2022  11:05 AM

## 2022-10-29 NOTE — PLAN OF CARE
Problem: Pain  Goal: Verbalizes/displays adequate comfort level or baseline comfort level  10/29/2022 1842 by Lyubov Elaine RN  Outcome: Progressing     Problem: Safety - Adult  Goal: Free from fall injury  10/29/2022 1842 by Lyubov Elaine RN  Outcome: Progressing     Problem: ABCDS Injury Assessment  Goal: Absence of physical injury  10/29/2022 1842 by Lyubov Elaine RN  Outcome: Progressing

## 2022-10-29 NOTE — PROGRESS NOTES
10/29/22 1721   RT Protocol   History Pulmonary Disease 2   Respiratory pattern 0   Breath sounds 2   Cough 1   Indications for Bronchodilator Therapy Decreased or absent breath sounds   Bronchodilator Assessment Score 5

## 2022-10-29 NOTE — PROGRESS NOTES
Pharmacy Note - Extended Infusion Beta-Lactam Adjustment    Piperacillin/Tazobactam 3375 mg every 8 hours ordered for patient. Per [facility] Extended Infusion Beta-Lactam Policy, order will be changed to 4500 mg x1; followed by 3375 mg every 8 hours. Estimated Creatinine Clearance: Estimated Creatinine Clearance: 95 mL/min (A) (based on SCr of 0.7 mg/dL (L)). Dialysis Status, ZANA, CKD:   BMI: Body mass index is 27.63 kg/m². Rationale for Adjustment: Agent demonstrates time-dependent effect on bacterial eradication. Extended-infusion dosing strategy aims to enhance microbiologic and clinical efficacy. Pharmacy will continue to monitor cultures and sensitivities (where available) and adjust dose as necessary. Please call with any questions.     Bhavesh Garcia, PharmD, BCPS

## 2022-10-29 NOTE — PLAN OF CARE
Problem: Chronic Conditions and Co-morbidities  Goal: Patient's chronic conditions and co-morbidity symptoms are monitored and maintained or improved  Outcome: Progressing     Problem: Discharge Planning  Goal: Discharge to home or other facility with appropriate resources  Outcome: Progressing     Problem: Pain  Goal: Verbalizes/displays adequate comfort level or baseline comfort level  Outcome: Progressing  Flowsheets (Taken 10/29/2022 0559)  Verbalizes/displays adequate comfort level or baseline comfort level:   Encourage patient to monitor pain and request assistance   Assess pain using appropriate pain scale   Administer analgesics based on type and severity of pain and evaluate response   Notify Licensed Independent Practitioner if interventions unsuccessful or patient reports new pain  Note: Pt reported increased pain tonight. PRN medications given for pain. Will continue to monitor. Problem: Safety - Adult  Goal: Free from fall injury  Outcome: Progressing  Flowsheets (Taken 10/26/2022 1709 by Rajesh Sheldon RN)  Free From Fall Injury: Instruct family/caregiver on patient safety  Note: Bed in lowest position, call light within reach, bed alarm in use. Problem: Skin/Tissue Integrity  Goal: Absence of new skin breakdown  Description: 1. Monitor for areas of redness and/or skin breakdown  2. Assess vascular access sites hourly  3. Every 4-6 hours minimum:  Change oxygen saturation probe site  4. Every 4-6 hours:  If on nasal continuous positive airway pressure, respiratory therapy assess nares and determine need for appliance change or resting period. Outcome: Progressing  Note: No new skin breakdown assessed. PM dressing change complete.       Problem: Nutrition Deficit:  Goal: Optimize nutritional status  Outcome: Progressing     Problem: ABCDS Injury Assessment  Goal: Absence of physical injury  Outcome: Progressing  Flowsheets (Taken 10/26/2022 1709 by Rajesh Sheldon RN)  Absence of Physical Injury: Implement safety measures based on patient assessment     Problem: Respiratory - Adult  Goal: Achieves optimal ventilation and oxygenation  Outcome: Progressing  Flowsheets (Taken 10/29/2022 0559)  Achieves optimal ventilation and oxygenation:   Assess for changes in respiratory status   Position to facilitate oxygenation and minimize respiratory effort   Assess for changes in mentation and behavior   Respiratory therapy support as indicated  Note: Patient O2 >95% on ventilator. Problem: Cardiovascular - Adult  Goal: Maintains optimal cardiac output and hemodynamic stability  Outcome: Progressing  Flowsheets (Taken 10/29/2022 0559)  Maintains optimal cardiac output and hemodynamic stability:   Monitor blood pressure and heart rate   Monitor urine output and notify Licensed Independent Practitioner for values outside of normal range   Assess for signs of decreased cardiac output  Note: VSS. NSR on telemetry.       Problem: Genitourinary - Adult  Goal: Urinary catheter remains patent  Outcome: Progressing  Flowsheets (Taken 10/29/2022 0559)  Urinary catheter remains patent:   Assess patency of urinary catheter   Irrigate catheter per Licensed Independent Practitioner order if indicated and notify Licensed Independent Practitioner if unable to irrigate     Problem: Metabolic/Fluid and Electrolytes - Adult  Goal: Glucose maintained within prescribed range  Outcome: Progressing  Flowsheets (Taken 10/29/2022 0559)  Glucose maintained within prescribed range:   Monitor blood glucose as ordered   Assess for signs and symptoms of hyperglycemia and hypoglycemia

## 2022-10-29 NOTE — PROGRESS NOTES
Nephrology Progress Note                                                                                                                                                                                                                                                                                                                                                               Office : [phone]     Fax :[phone]    Patient's Name: Loraine Marie        Reason for Consult:   ZANA  Requesting Physician:  Chelsie Chawla MD    Interval History:      Cr stable   Na stable   Good UO   Remains on Trach   PEG placed - on TF         Past Medical History:   Diagnosis Date    ZANA (acute kidney injury) ([facility] [address] 9/26/2022    Carotid stenosis, left 10/12/2011    Chronic back pain     Chronic systolic (congestive) heart failure 98/56/4099    Diastolic CHF ([facility] [address]     Erectile dysfunction     Hyperlipidemia     Hypertension     Osteoarthritis     Restrictive lung disease     Type II or unspecified type diabetes mellitus without mention of complication, not stated as uncontrolled        Past Surgical History:   Procedure Laterality Date    CARDIAC CATHETERIZATION  06/2022    GASTROSTOMY TUBE PLACEMENT N/A 10/11/2022    EGD PEG TUBE PLACEMENT performed by Daryl Bryson MD at [facility] Left     PLEURAL CATH INSERTION N/A 9/19/2022    PLEURAL CATHETER PLACEMENT; INTERCOSTAL NERVE BLOCK X4 performed by Cosme Ayala MD at [facility] Bilateral     THORACOSCOPY Right 9/19/2022    RIGHT VIDEO ASSISTED THORASCOPIC SURGERY AND; performed by Cosme Ayala MD at [address] N/A 9/30/2022    TRACHEOSTOMY performed by Mathew Galeano MD at [facility] History   Problem Relation Age of Onset    Hearing Loss Father     Heart Disease Father 70        MI    High Blood Pressure Father     Diabetes Maternal Grandmother         hypoglycemic    Hearing Loss Maternal Grandmother     Arthritis Maternal Grandfather         reports that he quit smoking about 21 years ago. His smoking use included cigarettes. He has a 80.00 pack-year smoking history. He has never used smokeless tobacco. He reports current alcohol use. He reports that he does not use drugs. Allergies:   Torsemide and Dye [iodides]    Current Medications:    acetaminophen (TYLENOL) 160 MG/5ML solution 650 mg, Q6H  0.9 % sodium chloride infusion, PRN  docusate sodium (COLACE) capsule 100 mg, BID  HYDROmorphone (DILAUDID) injection 0.25 mg, Q4H PRN  insulin glargine (LANTUS;BASAGLAR) injection pen 10 Units, Nightly  piperacillin-tazobactam (ZOSYN) 3,375 mg in dextrose 5 % 50 mL IVPB (mini-bag), Q8H  silver nitrate applicators applicator 1 each, Once  simethicone (MYLICON) chewable tablet 80 mg, Q6H PRN  guar gum packet 1 packet, TID  glucose chewable tablet 16 g, PRN  dextrose bolus 10% 125 mL, PRN   Or  dextrose bolus 10% 250 mL, PRN  glucagon (rDNA) injection 1 mg, PRN  dextrose 10 % infusion, Continuous PRN  albuterol (PROVENTIL) nebulizer solution 2.5 mg, 4x daily  bisacodyl (DULCOLAX) suppository 10 mg, Daily  collagenase ointment, Daily  methocarbamol (ROBAXIN) tablet 500 mg, 4x Daily  doxazosin (CARDURA) tablet 1 mg, Daily  clotrimazole (LOTRIMIN) 1 % cream, BID  0.9 % sodium chloride infusion, PRN  lansoprazole suspension SUSP 30 mg, BID  oxyCODONE (ROXICODONE) 5 MG/5ML solution 7.5 mg, Q6H PRN  hydroxypropyl methylcellulose (GONIOSOL) 2.5 % ophthalmic solution 1 drop, PRN  insulin lispro (1 Unit Dial) (HUMALOG/ADMELOG) pen 0-16 Units, Q4H  Venelex ointment, BID  chlorhexidine (PERIDEX) 0.12 % solution 15 mL, BID  apixaban (ELIQUIS) tablet 5 mg, BID  sodium chloride flush 0.9 % injection 5-40 mL, 2 times per day  sodium chloride flush 0.9 % injection 5-40 mL, PRN  0.9 % sodium chloride infusion, PRN  ondansetron (ZOFRAN-ODT) disintegrating tablet 4 mg, Q8H PRN   Or  ondansetron (ZOFRAN) injection 4 mg, Q6H PRN  hydrALAZINE (APRESOLINE) injection 5 mg, Q15 Min PRN          Physical exam:     Vitals:  /63   Pulse 64   Temp 97.6 °F (36.4 °C) (Oral)   Resp 19   Ht 6' (1.829 m)   Wt 203 lb 4.2 oz (92.2 kg)   SpO2 100%   BMI 27.57 kg/m²   Constitutional:  on MV  Skin: no rash, turgor wnl  Heent:  eomi, mmm  Neck: no bruits or jvd noted  Cardiovascular:  S1, S2 without m/r/g  Respiratory: trach  Abdomen:  +bs, soft, nt, nd  Ext: bilateral lower extremity edema R>L  Psychiatric: mood and affect appropriate  Musculoskeletal:  Rom, muscular strength intact    Data:   Labs:  CBC:   Recent Labs     10/27/22  0506 10/28/22  0346 10/29/22  0637   WBC 15.4* 19.6* 24.1*   HGB 7.6* 7.0* 6.6*   * 533* 491*       BMP:    Recent Labs     10/27/22  0506 10/28/22  0346 10/29/22  0637   * 138 133*   K 4.2 4.7 5.2*   CL 98* 97* 94*   CO2 29 31 33*   BUN 60* 56* 54*   CREATININE 0.8 0.7* 0.8   GLUCOSE 99 71 114*       Ca/Mg/Phos:   Recent Labs     10/27/22  0506 10/28/22  0346 10/29/22  0637   CALCIUM 8.4 8.3 8.3   MG 2.40 2.40 2.40   PHOS 2.9 3.5 4.4       Hepatic: No results for input(s): AST, ALT, ALB, BILITOT, ALKPHOS in the last 72 hours. Troponin: No results for input(s): TROPONINI in the last 72 hours. BNP: No results for input(s): BNP in the last 72 hours. Lipids:   No results for input(s): CHOL, TRIG, HDL, LDLCALC, LABVLDL in the last 72 hours. ABGs:   Recent Labs     10/28/22  1503   PHART 7.274*   PO2ART 45.2*   KNS4QSN 78.1*       INR:   Recent Labs     10/27/22  0505   INR 1.33*       UA:No results for input(s): Sydell Southerly, GLUCOSEU, BILIRUBINUR, Lianna Sheeba, BLOODU, PHUR, PROTEINU, UROBILINOGEN, NITRU, LEUKOCYTESUR, Rhesa Sauger in the last 72 hours. Urine Microscopic: No results for input(s): LABCAST, BACTERIA, COMU, HYALCAST, WBCUA, RBCUA, EPIU in the last 72 hours. Urine Culture: No results for input(s): LABURIN in the last 72 hours.   Urine Chemistry:   No results for input(s): CLUR, LABCREA, PROTEINUR, NAUR in the last 72 hours. IMAGING:  XR CHEST PORTABLE   Final Result      Moderate pulmonary edema. Small bilateral pleural effusions. Normal heart size. Tracheostomy noted. CT HEAD WO CONTRAST   Final Result      No acute intracranial pathology        Mild to moderate atrophy      Mastoids are opacified. Mastoiditis on excluded. CT CHEST ABDOMEN PELVIS W CONTRAST Additional Contrast? None   Final Result      CHEST:      1. Multilobar pneumonia. 2. Small bilateral pleural effusions. ABDOMEN/PELVIS:      1. Ascending colitis. XR ABDOMEN (KUB) (SINGLE AP VIEW)   Final Result   Impression:       Slightly increased gas dilation of transverse colon since the prior study. No evidence of small bowel obstruction. Moderate colonic stool burden, unchanged from prior study. XR CHEST PORTABLE   Final Result   Impression:       Slight progression of bibasilar lung opacities, especially on the left. Persistence of small bilateral pleural effusions. XR ABDOMEN (2 VIEWS)   Final Result   1. Moderate amount of stool in the descending colon with the transverse colon mildly dilated with air. CT CERVICAL SPINE WO CONTRAST   Final Result      1. Osseous lesions in C2, C7 and T1 vertebral body seen on recent MRI dated 10/18/2022 are not well-visualized on CT. Bone density is slightly heterogenous on CT. There is a 8 mm radiolucent lesion in the inferior posterior aspect of C2 vertebral body    which may correspond to C2 lesion seen on MRI. 2. Mildly expansile lytic lesion in left lamina of C4 concerning for metastasis. 3. Multilevel degenerative disc disease, most pronounced at C6-7 where there is disc osteophyte complex causing mild-to-moderate spinal canal stenosis. 4. Multilevel foraminal stenosis, most pronounced at C5-6, moderate to severe right and moderate left.       MRI LUMBAR SPINE WO CONTRAST   Final Result 1. Motion compromised study. 2.  No cord compression. 3.  Osseous lesions in the cervical and thoracic spine suspicious for metastatic disease. No extraosseous mass. 4.  Severe multilevel degenerative disc disease in the lumbar spine. Moderate to severe canal stenosis at L2-L3 and L4-L5. Moderate canal stenosis at L3-L4. Multiple levels of moderate to severe canal stenosis from L2-L3 to L4-L5      MRI THORACIC SPINE WO CONTRAST   Final Result      1. Motion compromised study. 2.  No cord compression. 3.  Osseous lesions in the cervical and thoracic spine suspicious for metastatic disease. No extraosseous mass. 4.  Severe multilevel degenerative disc disease in the lumbar spine. Moderate to severe canal stenosis at L2-L3 and L4-L5. Moderate canal stenosis at L3-L4. Multiple levels of moderate to severe canal stenosis from L2-L3 to L4-L5      MRI Cervical Spine WO Contrast   Final Result      1. Motion compromised study. 2.  No cord compression. 3.  Osseous lesions in the cervical and thoracic spine suspicious for metastatic disease. No extraosseous mass. 4.  Severe multilevel degenerative disc disease in the lumbar spine. Moderate to severe canal stenosis at L2-L3 and L4-L5. Moderate canal stenosis at L3-L4. Multiple levels of moderate to severe canal stenosis from L2-L3 to L4-L5      MRI BRAIN WO CONTRAST   Final Result      1. No acute hemorrhage, mass or acute ischemia. 2.  Mild burden of nonspecific multifocal white matter disease compatible with chronic small vessel ischemia. 3.  Mild atrophy. 4.  Bilateral mastoid effusions. XR CHEST PORTABLE   Final Result      Distal portion of tracheostomy poorly visualized due to overlying medical devices. The position is without significant change since 10/5/2022. Bibasilar pleuroparenchymal consolidation. Stable cardiomediastinal silhouette. Right jugular central venous catheter without change.       CT ABDOMEN PELVIS WO CONTRAST Additional Contrast? None   Final Result      Marked decrease in size of pneumomediastinum. Trace residual pneumoperitoneum. Extensive bilateral lower lobe pulmonary atelectasis with pleural effusions and trace residual right pneumothorax. XR CHEST PORTABLE   Final Result      Tiny right lateral pneumothorax is suspected, 5 mm in maximum thickness. This has decreased in size since 10/3/2022. Right basilar Pleurx catheter noted. Small bilateral pleural effusions. Prominent interstitial markings. Stable cardiac mediastinal silhouette. Lines and tubes without change. Subcutaneous emphysema noted. CT CHEST ABDOMEN PELVIS WO CONTRAST   Final Result      1. Severe pneumomediastinum. 2. Small to moderate right hydropneumothorax with similar fluid and gas components with a right-sided Pleurx catheter. 3. Moderate loculated left pleural effusion with atelectasis of the left lower lobe with patency of the central left lower lobe bronchi. 4. Fluid/material filling the bronchus intermedius and right lower lobe bronchi with complete consolidation and volume loss of the right lower lobe. Differential diagnosis would include mucous plugging or obstructing lesion. 5. Tracheostomy tube tip within the trachea   6. Severe subcutaneous emphysema in the neck. CT ABDOMEN AND PELVIS:      FINDINGS:      LIVER: Normal.      GALLBLADDER AND BILIARY TREE: No calcified gallstones. No gallbladder distention. No intra- or extrahepatic biliary dilatation. PANCREAS: Normal.      SPLEEN: Normal.      ADRENAL GLANDS: Normal.      KIDNEYS AND URETERS: Normal.      URINARY BLADDER: Ansari catheter present within collapsed urinary bladder. REPRODUCTIVE ORGANS: No associated masses. BOWEL: Normal diameter, nonobstructed. Feeding tube tip at the level of the ligament of Treitz. LYMPH NODES: No abnormally enlarged nodes.       PERITONEUM/RETROPERITONEUM: Severe pneumoperitoneum extends into the upper abdomen with some of the symmetric multiseptated gas appearing deep to the diaphragm consistent with mild pneumoperitoneum (series 601 was 101). This is likely related to    pneumonia mediastinum dissecting into the abdomen. No fluid collection in the abdomen or pelvis. No ascites. VESSELS: Extensive atherosclerotic calcification of the aorta and iliac arteries. ABDOMINAL WALL: No acute abnormality. BONES: No acute abnormality. Mild chronic L1 compression fracture with previous vertebroplasty. IMPRESSION:      1. Severe pneumomediastinum extends into the upper abdomen with small pneumoperitoneum likely related to extension of pneumoperitoneum into the upper peritoneal cavity. 2. No fluid collection in the abdomen or pelvis. Results were discussed with the surgical team at 7:00 PM on 10/3/2022. XR CHEST PORTABLE   Final Result      Stable appearance of loculated right pneumothorax, with loculated components in the lateral right midlung region and in the right lateral costophrenic sulcus. Stable scattered areas of atelectasis versus scar, most prominent in the medial right lung base and in the left lateral lung base. XR CHEST PORTABLE   Final Result      Small right basilar pneumothorax. Right basilar chest tube without change. Patchy consolidation in the right mid and lower lung as well as the left lower lung-atelectasis versus pneumonia. Trace left pleural effusion. Normal heart size. Lines and tubes without change. Mild improvement in degree of subcutaneous emphysema in the chest and neck. XR CHEST 1 VIEW   Final Result      1. Stable small right-sided pneumothorax and prominent subcutaneous emphysema along the neck and upper superior chest wall, greater in right lung stable   2. Stable left basilar consolidation and pleural effusion      3.  Stable appearing perihilar accentuated markings or airspace disease            XR CHEST PORTABLE   Final Result      Stable small right-sided pneumothorax. More prominent emphysema over the lower neck. No change in bilateral airspace disease. Stable left pleural effusion. XR CHEST PORTABLE   Final Result   1. Bilateral multifocal pneumonia with improvement in the right    pulmonary consolidation since prior study from 9/23/22   2. Mild to moderate left pleural effusion          XR CHEST PORTABLE   Final Result   1. Support lines and tubes are stable. 2.  Stable small right lateral pneumothorax and right basilar    chest tube. 3.  Stable patchy bilateral airspace disease. XR CHEST PORTABLE   Final Result   1. Satisfactory position of right internal jugular central line   2. No interval change in endotracheal tube and nasogastric tube positioning. 3. Extensive bilateral airspace disease with no changes. 4. Left pleural effusion with retrocardiac opacity, unchanged   5. Small stable tiny right lateral pneumothorax and stable position of right chest tube,, Pleurx catheter. XR ABDOMEN (KUB) (SINGLE AP VIEW)   Final Result   1. No residual pneumothorax. 2.  Mild patchy airspace disease bilaterally worse on the right than on prior examination. 3.  Non-obstructive bowel gas pattern. Mildly distended stomach. XR CHEST PORTABLE   Final Result   1. No residual pneumothorax. 2.  Mild patchy airspace disease bilaterally worse on the right than on prior examination. 3.  Non-obstructive bowel gas pattern. Mildly distended stomach. XR CHEST PORTABLE   Final Result      1. Small right pneumothorax appears slightly increased. 2. Mild patchy airspace opacity bilaterally. XR CHEST PORTABLE   Final Result     Pleurx catheter at the right lung base. Trace right    pneumothorax is unchanged. XR CHEST PORTABLE   Final Result      Right-sided chest tube evident in the base, its tip medially. Improvement in the right-sided pneumothorax, since earlier today. Possible medial collapse of the right lower lobe. Small left effusion. Patchy airspace density/atelectasis in the lungs bilaterally, with no other significant change. XR CHEST PORTABLE   Final Result   1. Right-sided Pleurx catheter in satisfactory position in the lung bases   2. Right-sided post operative pneumothorax, approximately 10%             Assessment/Plan   ZANA  - sec to contrast nephropathy  - Cr stable   - BNP 3k, Protein:Cre 0.3, FENa 0.4%  - closely monitor UOP  - avoid nephrotoxic agent     2. Hypernatremia  - continue free water   - will continue to monitor     3. Hypotension  - pressors as needed     4. Anemia  - daily CBC    5. Acid- base/ Electrolyte imbalance   - optimize lytes    6. Acute hypoxic resp failure  - s/p VATS on 9/19/22 for recurrent pleural effusions  - Intensivist and CTS following    7.  CHF   - EF 65% on 6/16/22 via cardiac cath        Kalyani Blackman MD

## 2022-10-30 NOTE — PROGRESS NOTES
Cardiothoracic Daily Progress Note      CC: Pleural effusions s/p R pleur-X placement    Subjective :  Patient rested well overnight with no acute events. Denies abdominal pain. Objective     Exam:  Vitals:    10/30/22 0400 10/30/22 0417 10/30/22 0539 10/30/22 0600   BP:  (!) 121/56     Pulse: 73 77  66   Resp:  16     Temp:  97.8 °F (36.6 °C)     TempSrc:  Oral     SpO2: 98% 98%  100%   Weight:   203 lb 11.3 oz (92.4 kg)    Height:           Physical Examination:   General appearance: Sleeping, resting comfortably  Neurological: No focal deficits  HEENT: EOMI, trachea midline, no JVD. Tracheostomy in place with some mucus drainage  Chest/Lungs: On mechanical ventilation via tracheostomy; R PleurX catheter capped with dressing C/D/I  Cardiovascular: RRR, well perfused   Abdomen: Soft, non-tender, minimally-distended. PEG tube with tube feeds running at 65   Skin: Warm and dry. Sacral decubitus ulcer covered with Mepilex. Extremities: Pitting edema of the b/l feet. No cyanosis. Legs ulcers from SCDs covered with Mepilex      ASSESSMENT/PLAN:   Nicholas Sherman is a 65 y.o. male with history of diastolic heart failure, atrial fibrillation, and DM with recurrent pleural effusions s/p R PleurX catheter placement (9/19). - Leukocytosis 19.0 from 24.1  - Hgb 8.1 this AM.   - Hgb 6.6 yesterday, 1U pRBC transfused, responded to post-transfusion Hgb 7.5  - CT chest/abd/pelvis with IV contrast in the setting of persistent leukocytosis was consistent with multilobular pneumonia with left pleural effusion and ascending colitis  - Continue Zosyn for 7 days for ascending colitis  - Will drain PleurX catheter today     Sean Ramos MD  PGY1, General Surgery  10/30/22  7:58 AM  XJUMO#[phone]      Discussed with resident team.  Cont qod pleurX drainage.     Navin Lucas MD  10/30/2022  9:30 AM

## 2022-10-30 NOTE — CARE COORDINATION
Charge nurse called for this patient, now, requesting a hospice consult to palli, but palli not available. Patient is full code and charge nurse is contacting the resident to talk to the resident about request of change of code status. This CM called palliative care NPs to follow up with discussion of code change( if needed) and hospice and for NPs to communicate to the family as well as the medical/surgical team regarding outlook and treatment for hospice. TEAM messaged and called Palliative care team this information.  Electronically signed by Joellen Staley RN on 10/30/2022 at 2:55 PM

## 2022-10-30 NOTE — PROGRESS NOTES
Uneventfully removed current barry as urine sample is needed. Placed # 16 fr Barry catheter X 1 attempt. Draining yellow urine with sediment noted.

## 2022-10-30 NOTE — PROGRESS NOTES
Nephrology Progress Note                                                                                                                                                                                                                                                                                                                                                               Office : [phone]     Fax :[phone]    Patient's Name: Jennifer Pierre        Reason for Consult:   ZANA  Requesting Physician:  David Barrios MD    Interval History:      Cr stable   Na stable   Good UO   Remains on Trach   PEG placed - on TF         Past Medical History:   Diagnosis Date    ZANA (acute kidney injury) ([facility] [address] 9/26/2022    Carotid stenosis, left 10/12/2011    Chronic back pain     Chronic systolic (congestive) heart failure 42/06/0328    Diastolic CHF ([facility] [address]     Erectile dysfunction     Hyperlipidemia     Hypertension     Osteoarthritis     Restrictive lung disease     Type II or unspecified type diabetes mellitus without mention of complication, not stated as uncontrolled        Past Surgical History:   Procedure Laterality Date    CARDIAC CATHETERIZATION  06/2022    GASTROSTOMY TUBE PLACEMENT N/A 10/11/2022    EGD PEG TUBE PLACEMENT performed by Nay Crespo MD at [facility] Left     PLEURAL CATH INSERTION N/A 9/19/2022    PLEURAL CATHETER PLACEMENT; INTERCOSTAL NERVE BLOCK X4 performed by Jodee Spain MD at [facility] Bilateral     THORACOSCOPY Right 9/19/2022    RIGHT VIDEO ASSISTED THORASCOPIC SURGERY AND; performed by Jodee Spain MD at [address] N/A 9/30/2022    TRACHEOSTOMY performed by Radha Ortega MD at [facility] History   Problem Relation Age of Onset    Hearing Loss Father     Heart Disease Father 70        MI    High Blood Pressure Father     Diabetes Maternal Grandmother         hypoglycemic    Hearing Loss Maternal Grandmother     Arthritis Maternal Grandfather         reports that he quit smoking about 21 years ago. His smoking use included cigarettes. He has a 80.00 pack-year smoking history. He has never used smokeless tobacco. He reports current alcohol use. He reports that he does not use drugs. Allergies:   Torsemide and Dye [iodides]    Current Medications:    acetaminophen (TYLENOL) 160 MG/5ML solution 650 mg, Q6H  0.9 % sodium chloride infusion, PRN  docusate sodium (COLACE) capsule 100 mg, BID  insulin glargine (LANTUS;BASAGLAR) injection pen 12 Units, Nightly  HYDROmorphone (DILAUDID) injection 0.25 mg, Q4H PRN  piperacillin-tazobactam (ZOSYN) 3,375 mg in dextrose 5 % 50 mL IVPB (mini-bag), Q8H  silver nitrate applicators applicator 1 each, Once  simethicone (MYLICON) chewable tablet 80 mg, Q6H PRN  guar gum packet 1 packet, TID  glucose chewable tablet 16 g, PRN  dextrose bolus 10% 125 mL, PRN   Or  dextrose bolus 10% 250 mL, PRN  glucagon (rDNA) injection 1 mg, PRN  dextrose 10 % infusion, Continuous PRN  albuterol (PROVENTIL) nebulizer solution 2.5 mg, 4x daily  bisacodyl (DULCOLAX) suppository 10 mg, Daily  collagenase ointment, Daily  methocarbamol (ROBAXIN) tablet 500 mg, 4x Daily  doxazosin (CARDURA) tablet 1 mg, Daily  clotrimazole (LOTRIMIN) 1 % cream, BID  0.9 % sodium chloride infusion, PRN  lansoprazole suspension SUSP 30 mg, BID  oxyCODONE (ROXICODONE) 5 MG/5ML solution 7.5 mg, Q6H PRN  hydroxypropyl methylcellulose (GONIOSOL) 2.5 % ophthalmic solution 1 drop, PRN  insulin lispro (1 Unit Dial) (HUMALOG/ADMELOG) pen 0-16 Units, Q4H  Venelex ointment, BID  chlorhexidine (PERIDEX) 0.12 % solution 15 mL, BID  apixaban (ELIQUIS) tablet 5 mg, BID  sodium chloride flush 0.9 % injection 5-40 mL, 2 times per day  sodium chloride flush 0.9 % injection 5-40 mL, PRN  0.9 % sodium chloride infusion, PRN  ondansetron (ZOFRAN-ODT) disintegrating tablet 4 mg, Q8H PRN   Or  ondansetron (ZOFRAN) injection 4 mg, Q6H PRN  hydrALAZINE (APRESOLINE) injection 5 mg, Q15 Min PRN          Physical exam:     Vitals:  BP (!) 121/56   Pulse 66   Temp 97.8 °F (36.6 °C) (Oral)   Resp 16   Ht 6' (1.829 m)   Wt 203 lb 11.3 oz (92.4 kg)   SpO2 100%   BMI 27.63 kg/m²   Constitutional:  on MV  Skin: no rash, turgor wnl  Heent:  eomi, mmm  Neck: no bruits or jvd noted  Cardiovascular:  S1, S2 without m/r/g  Respiratory: trach  Abdomen:  +bs, soft, nt, nd  Ext: bilateral lower extremity edema R>L  Psychiatric: mood and affect appropriate  Musculoskeletal:  Rom, muscular strength intact    Data:   Labs:  CBC:   Recent Labs     10/28/22  0346 10/29/22  0637 10/29/22  1923 10/30/22  0549   WBC 19.6* 24.1*  --  19.0*   HGB 7.0* 6.6* 7.5* 8.1*   * 491*  --  481*     BMP:    Recent Labs     10/28/22  0346 10/29/22  0637 10/30/22  0549    133* 135*   K 4.7 5.2* 4.8   CL 97* 94* 93*   CO2 31 33* 31   BUN 56* 54* 57*   CREATININE 0.7* 0.8 0.9   GLUCOSE 71 114* 192*     Ca/Mg/Phos:   Recent Labs     10/28/22  0346 10/29/22  0637 10/30/22  0549   CALCIUM 8.3 8.3 8.3   MG 2.40 2.40 2.40   PHOS 3.5 4.4 3.8     Hepatic: No results for input(s): AST, ALT, ALB, BILITOT, ALKPHOS in the last 72 hours. Troponin: No results for input(s): TROPONINI in the last 72 hours. BNP: No results for input(s): BNP in the last 72 hours. Lipids:   No results for input(s): CHOL, TRIG, HDL, LDLCALC, LABVLDL in the last 72 hours. ABGs:   Recent Labs     10/28/22  1503   PHART 7.274*   PO2ART 45.2*   DFW1TIV 78.1*       INR: No results for input(s): INR in the last 72 hours. UA:No results for input(s): [place], GLUCOSEU, BILIRUBINUR, Phoebe Bolls, BLOODU, PHUR, PROTEINU, UROBILINOGEN, NITRU, LEUKOCYTESUR, LABMICR, URINETYPE in the last 72 hours. Urine Microscopic: No results for input(s): LABCAST, BACTERIA, COMU, HYALCAST, WBCUA, RBCUA, EPIU in the last 72 hours. Urine Culture: No results for input(s): LABURIN in the last 72 hours.   Urine Chemistry:   No results for input(s): [place] Serge, PROTEINUR, NAUR in the last 72 hours. IMAGING:  XR CHEST PORTABLE   Final Result      Moderate pulmonary edema. Small bilateral pleural effusions. Normal heart size. Tracheostomy noted. CT HEAD WO CONTRAST   Final Result      No acute intracranial pathology        Mild to moderate atrophy      Mastoids are opacified. Mastoiditis on excluded. CT CHEST ABDOMEN PELVIS W CONTRAST Additional Contrast? None   Final Result      CHEST:      1. Multilobar pneumonia. 2. Small bilateral pleural effusions. ABDOMEN/PELVIS:      1. Ascending colitis. XR ABDOMEN (KUB) (SINGLE AP VIEW)   Final Result   Impression:       Slightly increased gas dilation of transverse colon since the prior study. No evidence of small bowel obstruction. Moderate colonic stool burden, unchanged from prior study. XR CHEST PORTABLE   Final Result   Impression:       Slight progression of bibasilar lung opacities, especially on the left. Persistence of small bilateral pleural effusions. XR ABDOMEN (2 VIEWS)   Final Result   1. Moderate amount of stool in the descending colon with the transverse colon mildly dilated with air. CT CERVICAL SPINE WO CONTRAST   Final Result      1. Osseous lesions in C2, C7 and T1 vertebral body seen on recent MRI dated 10/18/2022 are not well-visualized on CT. Bone density is slightly heterogenous on CT. There is a 8 mm radiolucent lesion in the inferior posterior aspect of C2 vertebral body    which may correspond to C2 lesion seen on MRI. 2. Mildly expansile lytic lesion in left lamina of C4 concerning for metastasis. 3. Multilevel degenerative disc disease, most pronounced at C6-7 where there is disc osteophyte complex causing mild-to-moderate spinal canal stenosis. 4. Multilevel foraminal stenosis, most pronounced at C5-6, moderate to severe right and moderate left.       MRI LUMBAR SPINE WO CONTRAST   Final Result      1. Motion compromised study. 2.  No cord compression. 3.  Osseous lesions in the cervical and thoracic spine suspicious for metastatic disease. No extraosseous mass. 4.  Severe multilevel degenerative disc disease in the lumbar spine. Moderate to severe canal stenosis at L2-L3 and L4-L5. Moderate canal stenosis at L3-L4. Multiple levels of moderate to severe canal stenosis from L2-L3 to L4-L5      MRI THORACIC SPINE WO CONTRAST   Final Result      1. Motion compromised study. 2.  No cord compression. 3.  Osseous lesions in the cervical and thoracic spine suspicious for metastatic disease. No extraosseous mass. 4.  Severe multilevel degenerative disc disease in the lumbar spine. Moderate to severe canal stenosis at L2-L3 and L4-L5. Moderate canal stenosis at L3-L4. Multiple levels of moderate to severe canal stenosis from L2-L3 to L4-L5      MRI Cervical Spine WO Contrast   Final Result      1. Motion compromised study. 2.  No cord compression. 3.  Osseous lesions in the cervical and thoracic spine suspicious for metastatic disease. No extraosseous mass. 4.  Severe multilevel degenerative disc disease in the lumbar spine. Moderate to severe canal stenosis at L2-L3 and L4-L5. Moderate canal stenosis at L3-L4. Multiple levels of moderate to severe canal stenosis from L2-L3 to L4-L5      MRI BRAIN WO CONTRAST   Final Result      1. No acute hemorrhage, mass or acute ischemia. 2.  Mild burden of nonspecific multifocal white matter disease compatible with chronic small vessel ischemia. 3.  Mild atrophy. 4.  Bilateral mastoid effusions. XR CHEST PORTABLE   Final Result      Distal portion of tracheostomy poorly visualized due to overlying medical devices. The position is without significant change since 10/5/2022. Bibasilar pleuroparenchymal consolidation. Stable cardiomediastinal silhouette.       Right jugular central venous catheter without change. CT ABDOMEN PELVIS WO CONTRAST Additional Contrast? None   Final Result      Marked decrease in size of pneumomediastinum. Trace residual pneumoperitoneum. Extensive bilateral lower lobe pulmonary atelectasis with pleural effusions and trace residual right pneumothorax. XR CHEST PORTABLE   Final Result      Tiny right lateral pneumothorax is suspected, 5 mm in maximum thickness. This has decreased in size since 10/3/2022. Right basilar Pleurx catheter noted. Small bilateral pleural effusions. Prominent interstitial markings. Stable cardiac mediastinal silhouette. Lines and tubes without change. Subcutaneous emphysema noted. CT CHEST ABDOMEN PELVIS WO CONTRAST   Final Result      1. Severe pneumomediastinum. 2. Small to moderate right hydropneumothorax with similar fluid and gas components with a right-sided Pleurx catheter. 3. Moderate loculated left pleural effusion with atelectasis of the left lower lobe with patency of the central left lower lobe bronchi. 4. Fluid/material filling the bronchus intermedius and right lower lobe bronchi with complete consolidation and volume loss of the right lower lobe. Differential diagnosis would include mucous plugging or obstructing lesion. 5. Tracheostomy tube tip within the trachea   6. Severe subcutaneous emphysema in the neck. CT ABDOMEN AND PELVIS:      FINDINGS:      LIVER: Normal.      GALLBLADDER AND BILIARY TREE: No calcified gallstones. No gallbladder distention. No intra- or extrahepatic biliary dilatation. PANCREAS: Normal.      SPLEEN: Normal.      ADRENAL GLANDS: Normal.      KIDNEYS AND URETERS: Normal.      URINARY BLADDER: Ansari catheter present within collapsed urinary bladder. REPRODUCTIVE ORGANS: No associated masses. BOWEL: Normal diameter, nonobstructed. Feeding tube tip at the level of the ligament of Treitz. LYMPH NODES: No abnormally enlarged nodes. PERITONEUM/RETROPERITONEUM: Severe pneumoperitoneum extends into the upper abdomen with some of the symmetric multiseptated gas appearing deep to the diaphragm consistent with mild pneumoperitoneum (series 601 was 101). This is likely related to    pneumonia mediastinum dissecting into the abdomen. No fluid collection in the abdomen or pelvis. No ascites. VESSELS: Extensive atherosclerotic calcification of the aorta and iliac arteries. ABDOMINAL WALL: No acute abnormality. BONES: No acute abnormality. Mild chronic L1 compression fracture with previous vertebroplasty. IMPRESSION:      1. Severe pneumomediastinum extends into the upper abdomen with small pneumoperitoneum likely related to extension of pneumoperitoneum into the upper peritoneal cavity. 2. No fluid collection in the abdomen or pelvis. Results were discussed with the surgical team at 7:00 PM on 10/3/2022. XR CHEST PORTABLE   Final Result      Stable appearance of loculated right pneumothorax, with loculated components in the lateral right midlung region and in the right lateral costophrenic sulcus. Stable scattered areas of atelectasis versus scar, most prominent in the medial right lung base and in the left lateral lung base. XR CHEST PORTABLE   Final Result      Small right basilar pneumothorax. Right basilar chest tube without change. Patchy consolidation in the right mid and lower lung as well as the left lower lung-atelectasis versus pneumonia. Trace left pleural effusion. Normal heart size. Lines and tubes without change. Mild improvement in degree of subcutaneous emphysema in the chest and neck. XR CHEST 1 VIEW   Final Result      1. Stable small right-sided pneumothorax and prominent subcutaneous emphysema along the neck and upper superior chest wall, greater in right lung stable   2. Stable left basilar consolidation and pleural effusion      3.  Stable appearing perihilar accentuated markings or airspace disease            XR CHEST PORTABLE   Final Result      Stable small right-sided pneumothorax. More prominent emphysema over the lower neck. No change in bilateral airspace disease. Stable left pleural effusion. XR CHEST PORTABLE   Final Result   1. Bilateral multifocal pneumonia with improvement in the right    pulmonary consolidation since prior study from 9/23/22   2. Mild to moderate left pleural effusion          XR CHEST PORTABLE   Final Result   1. Support lines and tubes are stable. 2.  Stable small right lateral pneumothorax and right basilar    chest tube. 3.  Stable patchy bilateral airspace disease. XR CHEST PORTABLE   Final Result   1. Satisfactory position of right internal jugular central line   2. No interval change in endotracheal tube and nasogastric tube positioning. 3. Extensive bilateral airspace disease with no changes. 4. Left pleural effusion with retrocardiac opacity, unchanged   5. Small stable tiny right lateral pneumothorax and stable position of right chest tube,, Pleurx catheter. XR ABDOMEN (KUB) (SINGLE AP VIEW)   Final Result   1. No residual pneumothorax. 2.  Mild patchy airspace disease bilaterally worse on the right than on prior examination. 3.  Non-obstructive bowel gas pattern. Mildly distended stomach. XR CHEST PORTABLE   Final Result   1. No residual pneumothorax. 2.  Mild patchy airspace disease bilaterally worse on the right than on prior examination. 3.  Non-obstructive bowel gas pattern. Mildly distended stomach. XR CHEST PORTABLE   Final Result      1. Small right pneumothorax appears slightly increased. 2. Mild patchy airspace opacity bilaterally. XR CHEST PORTABLE   Final Result     Pleurx catheter at the right lung base. Trace right    pneumothorax is unchanged.           XR CHEST PORTABLE   Final Result      Right-sided chest tube evident in the base, its tip medially. Improvement in the right-sided pneumothorax, since earlier today. Possible medial collapse of the right lower lobe. Small left effusion. Patchy airspace density/atelectasis in the lungs bilaterally, with no other significant change. XR CHEST PORTABLE   Final Result   1. Right-sided Pleurx catheter in satisfactory position in the lung bases   2. Right-sided post operative pneumothorax, approximately 10%             Assessment/Plan   ZANA  - sec to contrast nephropathy  - Cr stable   - BNP 3k, Protein:Cre 0.3, FENa 0.4%  - closely monitor UOP  - avoid nephrotoxic agent     2. Hypernatremia  - continue free water   - will continue to monitor     3. Hypotension  - pressors as needed     4. Anemia  - daily CBC    5. Acid- base/ Electrolyte imbalance   - optimize lytes    6. Acute hypoxic resp failure  - s/p VATS on 9/19/22 for recurrent pleural effusions  - Intensivist and CTS following    7.  CHF   - EF 65% on 6/16/22 via cardiac cath        Maritza Burgos MD

## 2022-10-30 NOTE — PLAN OF CARE
Problem: Respiratory - Adult  Goal: Achieves optimal ventilation and oxygenation  Outcome: Progressing  Flowsheets (Taken 10/30/2022 0400)  Achieves optimal ventilation and oxygenation:   Assess for changes in respiratory status   Position to facilitate oxygenation and minimize respiratory effort   Assess for changes in mentation and behavior  Note: Pt's SPO2 >92% via ventilator 40% FIO2 and 5 PEEP. Suctioning pt PRN. Will continue to monitor. Problem: Cardiovascular - Adult  Goal: Maintains optimal cardiac output and hemodynamic stability  Outcome: Progressing  Flowsheets (Taken 10/30/2022 0400)  Maintains optimal cardiac output and hemodynamic stability:   Monitor blood pressure and heart rate   Assess for signs of decreased cardiac output   Administer fluid and/or volume expanders as ordered     Problem: Pain  Goal: Verbalizes/displays adequate comfort level or baseline comfort level  10/30/2022 0400 by Carlen Lesches, RN  Outcome: Progressing  Flowsheets (Taken 10/30/2022 0355)  Verbalizes/displays adequate comfort level or baseline comfort level:   Encourage patient to monitor pain and request assistance   Assess pain using appropriate pain scale   Administer analgesics based on type and severity of pain and evaluate response  Note: Pt has continuous aching buttocks pain. Pain medication given per MD order. Will continue to monitor. Problem: Safety - Adult  Goal: Free from fall injury  10/30/2022 0400 by Carlen Lesches, RN  Outcome: Progressing  Flowsheets (Taken 10/30/2022 0357)  Free From Fall Injury: Instruct family/caregiver on patient safety  Note: Fall precautions are in place. Bed alarm is on and in lowest position. Pt is using call light appropriately. Will continue to monitor.        Problem: Nutrition Deficit:  Goal: Optimize nutritional status  Outcome: Progressing  Flowsheets (Taken 10/30/2022 0357)  Nutrient intake appropriate for improving, restoring, or maintaining nutritional needs: Assess nutritional status and recommend course of action   Recommend appropriate diets, oral nutritional supplements, and vitamin/mineral supplements  Note: Pt's tube feed is at a goal of 65ml/hr. Pt receiving 200ml flushes Q4H. Problem: Skin/Tissue Integrity  Goal: Absence of new skin breakdown  Description: 1. Monitor for areas of redness and/or skin breakdown  2. Assess vascular access sites hourly  3. Every 4-6 hours minimum:  Change oxygen saturation probe site  4. Every 4-6 hours:  If on nasal continuous positive airway pressure, respiratory therapy assess nares and determine need for appliance change or resting period. Outcome: Progressing  Note: Changed dressing to R calf. Encouraging pt to turn, but pt refusing turns at this time. Will continue to monitor.

## 2022-10-30 NOTE — PROGRESS NOTES
Surgery POC     PleurX Catheter Drainage     PleurX catheter drained. 125 cc clear serous fluid out.      Shahana Anderson MD  PGY1, General Surgery  10/30/22  9:20 AM  IAQPL#[phone]

## 2022-10-30 NOTE — PROGRESS NOTES
Bariatric Surgery Daily Progress Note      CC: Sacral wound    Subjective :  Patient rested well overnight with no acute events. Denies abdominal pain. Objective     Exam:  Vitals:    10/30/22 0400 10/30/22 0417 10/30/22 0539 10/30/22 0600   BP:  (!) 121/56     Pulse: 73 77  66   Resp:  16     Temp:  97.8 °F (36.6 °C)     TempSrc:  Oral     SpO2: 98% 98%  100%   Weight:   203 lb 11.3 oz (92.4 kg)    Height:           Physical Examination:   General appearance: Sleeping, resting comfortably  Neurological: No focal deficits  HEENT: EOMI, trachea midline, no JVD. Tracheostomy in place with some mucus drainage  Chest/Lungs: On mechanical ventilation via tracheostomy; R PleurX catheter capped with dressing C/D/I  Cardiovascular: RRR, well perfused   Abdomen: Soft, non-tender, minimally-distended. PEG tube with tube feeds running at 65   Skin: Warm and dry. Sacral decubitus ulcer covered with Mepilex. Extremities: Pitting edema of the b/l feet. No cyanosis. Legs ulcers from SCDs covered with Mepilex            ASSESSMENT/PLAN:   Shanon Elena is a 65 y.o. male with history of diastolic heart failure, atrial fibrillation, and DM with recurrent pleural effusions s/p R PleurX catheter placement (9/19). - Leukocytosis 19.0 from 24.1  - Hgb 8.1 this AM.   - Hgb 6.6 yesterday, 1U pRBC transfused, responded to post-transfusion Hgb 7.5  - CT chest/abd/pelvis with IV contrast in the setting of persistent leukocytosis was consistent with multilobular pneumonia with left pleural effusion and ascending colitis  - Continue Zosyn for 7 days for ascending colitis  - Continue wet to dry dressing BID, AM per residents, PM per nursing   - Patient may benefit from further debridement of sacral wound. Continue Santyl for now.  Will hold Eliquis today for further debridement tomorrow     Balaji Dwyer MD  PGY1, General Surgery  10/30/22  7:41 AM  LQWUG#[phone]

## 2022-10-30 NOTE — PLAN OF CARE
Problem: Chronic Conditions and Co-morbidities  Goal: Patient's chronic conditions and co-morbidity symptoms are monitored and maintained or improved  Outcome: Progressing  Flowsheets (Taken 10/30/2022 1047)  Care Plan - Patient's Chronic Conditions and Co-Morbidity Symptoms are Monitored and Maintained or Improved:   Monitor and assess patient's chronic conditions and comorbid symptoms for stability, deterioration, or improvement   Collaborate with multidisciplinary team to address chronic and comorbid conditions and prevent exacerbation or deterioration     Problem: Discharge Planning  Goal: Discharge to home or other facility with appropriate resources  Outcome: Progressing  Flowsheets (Taken 10/30/2022 1047)  Discharge to home or other facility with appropriate resources:   Identify barriers to discharge with patient and caregiver   Arrange for needed discharge resources and transportation as appropriate   Identify discharge learning needs (meds, wound care, etc)     Problem: Pain  Goal: Verbalizes/displays adequate comfort level or baseline comfort level  10/30/2022 1047 by Tanisha Santos RN  Outcome: Progressing  Flowsheets (Taken 10/30/2022 1047)  Verbalizes/displays adequate comfort level or baseline comfort level:   Encourage patient to monitor pain and request assistance   Assess pain using appropriate pain scale   Administer analgesics based on type and severity of pain and evaluate response   Implement non-pharmacological measures as appropriate and evaluate response     Problem: Safety - Adult  Goal: Free from fall injury  10/30/2022 1047 by Tanisha Santos RN  Outcome: Progressing  Flowsheets (Taken 10/30/2022 1047)  Free From Fall Injury: Instruct family/caregiver on patient safety     Problem: Skin/Tissue Integrity  Goal: Absence of new skin breakdown  Description: 1. Monitor for areas of redness and/or skin breakdown  2. Assess vascular access sites hourly  3.   Every 4-6 hours minimum:  Change oxygen saturation probe site  4. Every 4-6 hours:  If on nasal continuous positive airway pressure, respiratory therapy assess nares and determine need for appliance change or resting period. 10/30/2022 1047 by Cait Gastelum RN  Outcome: Progressing     Problem: ABCDS Injury Assessment  Goal: Absence of physical injury  Outcome: Progressing  Flowsheets (Taken 10/30/2022 1047)  Absence of Physical Injury: Implement safety measures based on patient assessment     Problem: Respiratory - Adult  Goal: Achieves optimal ventilation and oxygenation  10/30/2022 0400 by Rylie Rivera RN  Outcome: Progressing  Flowsheets (Taken 10/30/2022 0400)  Achieves optimal ventilation and oxygenation:   Assess for changes in respiratory status   Position to facilitate oxygenation and minimize respiratory effort   Assess for changes in mentation and behavior  Note: Pt's SPO2 >92% via ventilator 40% FIO2 and 5 PEEP. Suctioning pt PRN. Will continue to monitor.       Problem: Cardiovascular - Adult  Goal: Maintains optimal cardiac output and hemodynamic stability  10/30/2022 1047 by Cait Gastelum RN  Outcome: Progressing  Flowsheets (Taken 10/30/2022 1047)  Maintains optimal cardiac output and hemodynamic stability:   Monitor blood pressure and heart rate   Monitor urine output and notify Licensed Independent Practitioner for values outside of normal range   Assess for signs of decreased cardiac output   Administer fluid and/or volume expanders as ordered   Administer vasoactive medications as ordered     Problem: Genitourinary - Adult  Goal: Urinary catheter remains patent  Outcome: Progressing  Flowsheets (Taken 10/30/2022 1047)  Urinary catheter remains patent: Assess patency of urinary catheter     Problem: Metabolic/Fluid and Electrolytes - Adult  Goal: Glucose maintained within prescribed range  Outcome: Progressing  Flowsheets (Taken 10/30/2022 1047)  Glucose maintained within prescribed range: Monitor blood glucose as ordered   Assess for signs and symptoms of hyperglycemia and hypoglycemia   Administer ordered medications to maintain glucose within target range

## 2022-10-31 NOTE — PLAN OF CARE
Problem: Pain  Goal: Verbalizes/displays adequate comfort level or baseline comfort level  Outcome: Progressing  Flowsheets (Taken 10/31/2022 1712)  Verbalizes/displays adequate comfort level or baseline comfort level:   Encourage patient to monitor pain and request assistance   Assess pain using appropriate pain scale   Administer analgesics based on type and severity of pain and evaluate response   Implement non-pharmacological measures as appropriate and evaluate response  Note: Pt able to utilize numeric pain scale to rate pain. Repositioning and environmental changes have been used to help manage pain. Pt has responded well to pharmaceutical intervention. Problem: Skin/Tissue Integrity  Goal: Absence of new skin breakdown  Description: 1. Monitor for areas of redness and/or skin breakdown  2. Assess vascular access sites hourly  3. Every 4-6 hours minimum:  Change oxygen saturation probe site  4. Every 4-6 hours:  If on nasal continuous positive airway pressure, respiratory therapy assess nares and determine need for appliance change or resting period. Outcome: Progressing  Note: Pt offered Q2 and PRN repositioning. Discussed the importance of repositioning regularly. Heels elevated off bed. Topical meds applied to affected areas per [facility]. Dressing on Coccyx changed. No new signs of skin breakdown seen.      Problem: Respiratory - Adult  Goal: Achieves optimal ventilation and oxygenation  Outcome: Progressing  Flowsheets (Taken 10/31/2022 1712)  Achieves optimal ventilation and oxygenation:   Assess for changes in respiratory status   Assess for changes in mentation and behavior   Position to facilitate oxygenation and minimize respiratory effort   Oxygen supplementation based on oxygen saturation or arterial blood gases   Assess the need for suctioning and aspirate as needed   Assess and instruct to report shortness of breath or any respiratory difficulty  Note: Pt reports difficulty breathing. Respiration rate in the 40s at times prior to suctioning. SpO2 has been > 95% throughout shift. FiO2 35 PEEP 5.

## 2022-10-31 NOTE — PROGRESS NOTES
Nephrology Progress Note                                                                                                                                                                                                                                                                                                                                                               Office : [phone]     Fax :[phone]    Patient's Name: Shanon Elena        Reason for Consult:   ZANA  Requesting Physician:  Dillan Benton MD    Interval History:      Cr stable   Na stable   Good UO   Remains on Trach   PEG placed - on TF         Past Medical History:   Diagnosis Date    ZANA (acute kidney injury) ([facility] [address] 9/26/2022    Carotid stenosis, left 10/12/2011    Chronic back pain     Chronic systolic (congestive) heart failure 63/23/8330    Diastolic CHF ([facility] [address]     Erectile dysfunction     Hyperlipidemia     Hypertension     Osteoarthritis     Restrictive lung disease     Type II or unspecified type diabetes mellitus without mention of complication, not stated as uncontrolled        Past Surgical History:   Procedure Laterality Date    CARDIAC CATHETERIZATION  06/2022    GASTROSTOMY TUBE PLACEMENT N/A 10/11/2022    EGD PEG TUBE PLACEMENT performed by Sosa Perez MD at [facility] Left     PLEURAL CATH INSERTION N/A 9/19/2022    PLEURAL CATHETER PLACEMENT; INTERCOSTAL NERVE BLOCK X4 performed by Gallito Singh MD at [facility] Bilateral     THORACOSCOPY Right 9/19/2022    RIGHT VIDEO ASSISTED THORASCOPIC SURGERY AND; performed by Gallito Singh MD at [address] N/A 9/30/2022    TRACHEOSTOMY performed by Hilary Gottlieb MD at [facility] History   Problem Relation Age of Onset    Hearing Loss Father     Heart Disease Father 70        MI    High Blood Pressure Father     Diabetes Maternal Grandmother         hypoglycemic    Hearing Loss Maternal Grandmother     Arthritis Maternal Grandfather         reports that he quit smoking about 21 years ago. His smoking use included cigarettes. He has a 80.00 pack-year smoking history. He has never used smokeless tobacco. He reports current alcohol use. He reports that he does not use drugs. Allergies:   Torsemide and Dye [iodides]    Current Medications:    docusate (COLACE) 50 MG/5ML liquid 100 mg, BID  HYDROmorphone (DILAUDID) injection 0.25 mg, Q3H PRN   Or  HYDROmorphone (DILAUDID) injection 0.5 mg, Q3H PRN  melatonin disintegrating tablet 5 mg, Nightly  oxyCODONE (ROXICODONE) 5 MG/5ML solution 7.5 mg, Q4H PRN   Or  oxyCODONE (ROXICODONE) 5 MG/5ML solution 10 mg, Q4H PRN  acetaminophen (TYLENOL) 160 MG/5ML solution 650 mg, Q6H  insulin glargine (LANTUS;BASAGLAR) injection pen 12 Units, Nightly  piperacillin-tazobactam (ZOSYN) 3,375 mg in dextrose 5 % 50 mL IVPB (mini-bag), Q8H  silver nitrate applicators applicator 1 each, Once  simethicone (MYLICON) chewable tablet 80 mg, Q6H PRN  guar gum packet 1 packet, TID  glucose chewable tablet 16 g, PRN  dextrose bolus 10% 125 mL, PRN   Or  dextrose bolus 10% 250 mL, PRN  glucagon (rDNA) injection 1 mg, PRN  dextrose 10 % infusion, Continuous PRN  albuterol (PROVENTIL) nebulizer solution 2.5 mg, 4x daily  bisacodyl (DULCOLAX) suppository 10 mg, Daily  collagenase ointment, Daily  methocarbamol (ROBAXIN) tablet 500 mg, 4x Daily  doxazosin (CARDURA) tablet 1 mg, Daily  clotrimazole (LOTRIMIN) 1 % cream, BID  lansoprazole suspension SUSP 30 mg, BID  hydroxypropyl methylcellulose (GONIOSOL) 2.5 % ophthalmic solution 1 drop, PRN  insulin lispro (1 Unit Dial) (HUMALOG/ADMELOG) pen 0-16 Units, Q4H  Venelex ointment, BID  chlorhexidine (PERIDEX) 0.12 % solution 15 mL, BID  [Held by provider] apixaban (ELIQUIS) tablet 5 mg, BID  sodium chloride flush 0.9 % injection 5-40 mL, 2 times per day  sodium chloride flush 0.9 % injection 5-40 mL, PRN  0.9 % sodium chloride infusion, PRN  ondansetron (ZOFRAN-ODT) disintegrating tablet 4 mg, Q8H PRN   Or  ondansetron (ZOFRAN) injection 4 mg, Q6H PRN  hydrALAZINE (APRESOLINE) injection 5 mg, Q15 Min PRN          Physical exam:     Vitals:  BP (!) 114/91   Pulse 89   Temp 98 °F (36.7 °C) (Axillary)   Resp (!) 42   Ht 6' (1.829 m)   Wt 206 lb 5.6 oz (93.6 kg)   SpO2 99%   BMI 27.99 kg/m²   Constitutional:  on MV  Skin: no rash, turgor wnl  Heent:  eomi, mmm  Neck: no bruits or jvd noted  Cardiovascular:  S1, S2 without m/r/g  Respiratory: trach  Abdomen:  +bs, soft, nt, nd  Ext: bilateral lower extremity edema R>L  Psychiatric: mood and affect appropriate  Musculoskeletal:  Rom, muscular strength intact    Data:   Labs:  CBC:   Recent Labs     10/29/22  0637 10/29/22  1923 10/30/22  0549 10/31/22  0449   WBC 24.1*  --  19.0* 17.1*   HGB 6.6* 7.5* 8.1* 7.8*   *  --  481* 520*       BMP:    Recent Labs     10/29/22  0637 10/30/22  0549 10/31/22  0449   * 135* 131*   K 5.2* 4.8 5.1   CL 94* 93* 92*   CO2 33* 31 31   BUN 54* 57* 57*   CREATININE 0.8 0.9 0.9   GLUCOSE 114* 192* 325*       Ca/Mg/Phos:   Recent Labs     10/29/22  0637 10/30/22  0549 10/31/22  0449   CALCIUM 8.3 8.3 8.1*   MG 2.40 2.40 2.30   PHOS 4.4 3.8 3.9       Hepatic: No results for input(s): AST, ALT, ALB, BILITOT, ALKPHOS in the last 72 hours. Troponin: No results for input(s): TROPONINI in the last 72 hours. BNP: No results for input(s): BNP in the last 72 hours. Lipids:   No results for input(s): CHOL, TRIG, HDL, LDLCALC, LABVLDL in the last 72 hours. ABGs:   Recent Labs     10/28/22  1503   PHART 7.274*   PO2ART 45.2*   PQP6KWT 78.1*         INR: No results for input(s): INR in the last 72 hours. UA:No results for input(s): Toya Miller, GLUCOSEU, BILIRUBINUR, Manuel Werner, BLOODU, PHUR, PROTEINU, UROBILINOGEN, NITRU, LEUKOCYTESUR, LABMICR, URINETYPE in the last 72 hours.    Urine Microscopic: No results for input(s): LABCAST, BACTERIA, COMU, HYALCAST, WBCUA, RBCUA, EPIU in the last 72 hours. Urine Culture: No results for input(s): LABURIN in the last 72 hours. Urine Chemistry:   No results for input(s): Heriberto Gails, PROTEINUR, NAUR in the last 72 hours. IMAGING:  XR CHEST PORTABLE   Final Result      Moderate pulmonary edema. Small bilateral pleural effusions. Normal heart size. Tracheostomy noted. CT HEAD WO CONTRAST   Final Result      No acute intracranial pathology        Mild to moderate atrophy      Mastoids are opacified. Mastoiditis on excluded. CT CHEST ABDOMEN PELVIS W CONTRAST Additional Contrast? None   Final Result      CHEST:      1. Multilobar pneumonia. 2. Small bilateral pleural effusions. ABDOMEN/PELVIS:      1. Ascending colitis. XR ABDOMEN (KUB) (SINGLE AP VIEW)   Final Result   Impression:       Slightly increased gas dilation of transverse colon since the prior study. No evidence of small bowel obstruction. Moderate colonic stool burden, unchanged from prior study. XR CHEST PORTABLE   Final Result   Impression:       Slight progression of bibasilar lung opacities, especially on the left. Persistence of small bilateral pleural effusions. XR ABDOMEN (2 VIEWS)   Final Result   1. Moderate amount of stool in the descending colon with the transverse colon mildly dilated with air. CT CERVICAL SPINE WO CONTRAST   Final Result      1. Osseous lesions in C2, C7 and T1 vertebral body seen on recent MRI dated 10/18/2022 are not well-visualized on CT. Bone density is slightly heterogenous on CT. There is a 8 mm radiolucent lesion in the inferior posterior aspect of C2 vertebral body    which may correspond to C2 lesion seen on MRI. 2. Mildly expansile lytic lesion in left lamina of C4 concerning for metastasis.    3. Multilevel degenerative disc disease, most pronounced at C6-7 where there is disc osteophyte complex causing mild-to-moderate spinal canal stenosis. 4. Multilevel foraminal stenosis, most pronounced at C5-6, moderate to severe right and moderate left. MRI LUMBAR SPINE WO CONTRAST   Final Result      1. Motion compromised study. 2.  No cord compression. 3.  Osseous lesions in the cervical and thoracic spine suspicious for metastatic disease. No extraosseous mass. 4.  Severe multilevel degenerative disc disease in the lumbar spine. Moderate to severe canal stenosis at L2-L3 and L4-L5. Moderate canal stenosis at L3-L4. Multiple levels of moderate to severe canal stenosis from L2-L3 to L4-L5      MRI THORACIC SPINE WO CONTRAST   Final Result      1. Motion compromised study. 2.  No cord compression. 3.  Osseous lesions in the cervical and thoracic spine suspicious for metastatic disease. No extraosseous mass. 4.  Severe multilevel degenerative disc disease in the lumbar spine. Moderate to severe canal stenosis at L2-L3 and L4-L5. Moderate canal stenosis at L3-L4. Multiple levels of moderate to severe canal stenosis from L2-L3 to L4-L5      MRI Cervical Spine WO Contrast   Final Result      1. Motion compromised study. 2.  No cord compression. 3.  Osseous lesions in the cervical and thoracic spine suspicious for metastatic disease. No extraosseous mass. 4.  Severe multilevel degenerative disc disease in the lumbar spine. Moderate to severe canal stenosis at L2-L3 and L4-L5. Moderate canal stenosis at L3-L4. Multiple levels of moderate to severe canal stenosis from L2-L3 to L4-L5      MRI BRAIN WO CONTRAST   Final Result      1. No acute hemorrhage, mass or acute ischemia. 2.  Mild burden of nonspecific multifocal white matter disease compatible with chronic small vessel ischemia. 3.  Mild atrophy. 4.  Bilateral mastoid effusions. XR CHEST PORTABLE   Final Result      Distal portion of tracheostomy poorly visualized due to overlying medical devices. The position is without significant change since 10/5/2022. Bibasilar pleuroparenchymal consolidation. Stable cardiomediastinal silhouette. Right jugular central venous catheter without change. CT ABDOMEN PELVIS WO CONTRAST Additional Contrast? None   Final Result      Marked decrease in size of pneumomediastinum. Trace residual pneumoperitoneum. Extensive bilateral lower lobe pulmonary atelectasis with pleural effusions and trace residual right pneumothorax. XR CHEST PORTABLE   Final Result      Tiny right lateral pneumothorax is suspected, 5 mm in maximum thickness. This has decreased in size since 10/3/2022. Right basilar Pleurx catheter noted. Small bilateral pleural effusions. Prominent interstitial markings. Stable cardiac mediastinal silhouette. Lines and tubes without change. Subcutaneous emphysema noted. CT CHEST ABDOMEN PELVIS WO CONTRAST   Final Result      1. Severe pneumomediastinum. 2. Small to moderate right hydropneumothorax with similar fluid and gas components with a right-sided Pleurx catheter. 3. Moderate loculated left pleural effusion with atelectasis of the left lower lobe with patency of the central left lower lobe bronchi. 4. Fluid/material filling the bronchus intermedius and right lower lobe bronchi with complete consolidation and volume loss of the right lower lobe. Differential diagnosis would include mucous plugging or obstructing lesion. 5. Tracheostomy tube tip within the trachea   6. Severe subcutaneous emphysema in the neck. CT ABDOMEN AND PELVIS:      FINDINGS:      LIVER: Normal.      GALLBLADDER AND BILIARY TREE: No calcified gallstones. No gallbladder distention. No intra- or extrahepatic biliary dilatation. PANCREAS: Normal.      SPLEEN: Normal.      ADRENAL GLANDS: Normal.      KIDNEYS AND URETERS: Normal.      URINARY BLADDER: Ansari catheter present within collapsed urinary bladder. REPRODUCTIVE ORGANS: No associated masses.       BOWEL: Normal diameter, nonobstructed. Feeding tube tip at the level of the ligament of Treitz. LYMPH NODES: No abnormally enlarged nodes. PERITONEUM/RETROPERITONEUM: Severe pneumoperitoneum extends into the upper abdomen with some of the symmetric multiseptated gas appearing deep to the diaphragm consistent with mild pneumoperitoneum (series 601 was 101). This is likely related to    pneumonia mediastinum dissecting into the abdomen. No fluid collection in the abdomen or pelvis. No ascites. VESSELS: Extensive atherosclerotic calcification of the aorta and iliac arteries. ABDOMINAL WALL: No acute abnormality. BONES: No acute abnormality. Mild chronic L1 compression fracture with previous vertebroplasty. IMPRESSION:      1. Severe pneumomediastinum extends into the upper abdomen with small pneumoperitoneum likely related to extension of pneumoperitoneum into the upper peritoneal cavity. 2. No fluid collection in the abdomen or pelvis. Results were discussed with the surgical team at 7:00 PM on 10/3/2022. XR CHEST PORTABLE   Final Result      Stable appearance of loculated right pneumothorax, with loculated components in the lateral right midlung region and in the right lateral costophrenic sulcus. Stable scattered areas of atelectasis versus scar, most prominent in the medial right lung base and in the left lateral lung base. XR CHEST PORTABLE   Final Result      Small right basilar pneumothorax. Right basilar chest tube without change. Patchy consolidation in the right mid and lower lung as well as the left lower lung-atelectasis versus pneumonia. Trace left pleural effusion. Normal heart size. Lines and tubes without change. Mild improvement in degree of subcutaneous emphysema in the chest and neck. XR CHEST 1 VIEW   Final Result      1.  Stable small right-sided pneumothorax and prominent subcutaneous emphysema along the neck and upper superior chest wall, greater in right lung stable   2. Stable left basilar consolidation and pleural effusion      3. Stable appearing perihilar accentuated markings or airspace disease            XR CHEST PORTABLE   Final Result      Stable small right-sided pneumothorax. More prominent emphysema over the lower neck. No change in bilateral airspace disease. Stable left pleural effusion. XR CHEST PORTABLE   Final Result   1. Bilateral multifocal pneumonia with improvement in the right    pulmonary consolidation since prior study from 9/23/22   2. Mild to moderate left pleural effusion          XR CHEST PORTABLE   Final Result   1. Support lines and tubes are stable. 2.  Stable small right lateral pneumothorax and right basilar    chest tube. 3.  Stable patchy bilateral airspace disease. XR CHEST PORTABLE   Final Result   1. Satisfactory position of right internal jugular central line   2. No interval change in endotracheal tube and nasogastric tube positioning. 3. Extensive bilateral airspace disease with no changes. 4. Left pleural effusion with retrocardiac opacity, unchanged   5. Small stable tiny right lateral pneumothorax and stable position of right chest tube,, Pleurx catheter. XR ABDOMEN (KUB) (SINGLE AP VIEW)   Final Result   1. No residual pneumothorax. 2.  Mild patchy airspace disease bilaterally worse on the right than on prior examination. 3.  Non-obstructive bowel gas pattern. Mildly distended stomach. XR CHEST PORTABLE   Final Result   1. No residual pneumothorax. 2.  Mild patchy airspace disease bilaterally worse on the right than on prior examination. 3.  Non-obstructive bowel gas pattern. Mildly distended stomach. XR CHEST PORTABLE   Final Result      1. Small right pneumothorax appears slightly increased. 2. Mild patchy airspace opacity bilaterally.             XR CHEST PORTABLE   Final Result     Pleurx catheter at the right lung base. Trace right    pneumothorax is unchanged. XR CHEST PORTABLE   Final Result      Right-sided chest tube evident in the base, its tip medially. Improvement in the right-sided pneumothorax, since earlier today. Possible medial collapse of the right lower lobe. Small left effusion. Patchy airspace density/atelectasis in the lungs bilaterally, with no other significant change. XR CHEST PORTABLE   Final Result   1. Right-sided Pleurx catheter in satisfactory position in the lung bases   2. Right-sided post operative pneumothorax, approximately 10%             Assessment/Plan   ZANA  - sec to contrast nephropathy  - Cr stable   - BNP 3k, Protein:Cre 0.3, FENa 0.4%  - closely monitor UOP  - avoid nephrotoxic agent     2. Hypernatremia  - dec free water   - will continue to monitor     3. Hypotension  - resolved     4. Anemia  - daily CBC    5. Acid- base/ Electrolyte imbalance   - optimize lytes    6. Acute hypoxic resp failure  - s/p VATS on 9/19/22 for recurrent pleural effusions  - Intensivist and CTS following    7.  CHF   - EF 65% on 6/16/22 via cardiac cath        Erum Steele MD

## 2022-10-31 NOTE — PROGRESS NOTES
Colorectal Surgery Daily Progress Note      CC: Sacral wound    Subjective :  Afebrile, HDS. Patient somewhat uncomfortable over the weekend, indicating desire to go home. Family and patient expressed interest over the weekend in meeting with Hospice today for a goals of care conversation. Asked for deferral of exam and to come back later. Feels okay this morning. Objective     Exam:  Vitals:    10/31/22 0335 10/31/22 0419 10/31/22 0500 10/31/22 0600   BP:       Pulse:  76 79 87   Resp:  16 20 21   Temp:       TempSrc:       SpO2:  97% 99% 99%   Weight: 206 lb 5.6 oz (93.6 kg)      Height:           Physical Examination:   Deferred this morning per patient request. Will return later today. ASSESSMENT/PLAN:   Vlad Harper is a 65 y.o. male with history of diastolic heart failure, atrial fibrillation, and DM with recurrent pleural effusions s/p R PleurX catheter placement (9/19). CT chest/abd/pelvis with IV contrast on 10/28 in the setting of persistent leukocytosis was consistent with multilobular pneumonia with left pleural effusion and ascending colitis    - Will defer goals of care conversation to primary cardiothoracic team   - Leukocytosis continues to downtrend, 17.1 this AM from 19.0  - Hgb 7.8 from 8.1  - Continue Zosyn for 7 days for ascending colitis  - Continue wet to dry dressing BID, AM per residents, PM per nursing  - Eliquis currently held. Will plan on possible bedside debridement of sacral wound later today, pending goals of care discussion. Continue Santyl for now. - Dispo planning per cardiothoracic team       Porfirio Farrar  PGY1, General Surgery  10/31/22  6:44 AM    I am post-call today and will not be on campus. Please contact Dr. Manjit Krishna at [phone] for questions or concerns regarding this patient.

## 2022-10-31 NOTE — PROGRESS NOTES
Pulmonology progress note      CC: Chronic hypoxic respiratory failure requiring ventilator support has a trach    Interval history:  Patient seen and examined with family at the bedside patient was tachypneic to 42 was called at the bedside for evaluation, but on my evaluation patient's tachypnea improved      Bella Salas is a 64 yo Male with a PMH of CHF (CHFpEF), carotid stenosis, HLD, HTN, OA, Restrictive Lung Disease, T2DM who underwent VATS with Pleurx catheter placement on 9/19/2022 for recurrent bilateral loculated pleural effusions and has had hypoxia/hypercapnia since that time now requiring intubation. In addition to his recurrent bilateral loculated pleural effusions, he has lost over 100 lbs over the last year, some of which his wife attributes to intentional weight loss. Initially, he was started on BiPAP post-procedure for rapid shallow breathing and slow clearance of sedation. He wasn't tolerating the BiPAP well, trying to remove it. After discussion with family and patient (while on a break from BiPAP), the decision was made to intubate and confirmed that he's OK with tracheostomy if necessary.     Medications:     Scheduled Meds:   docusate  100 mg Oral BID    melatonin  5 mg Oral Nightly    acetaminophen  650 mg Oral Q6H    insulin glargine  12 Units SubCUTAneous Nightly    piperacillin-tazobactam  3,375 mg IntraVENous Q8H    silver nitrate applicators  1 each Topical Once    guar gum  1 packet Oral TID    albuterol  2.5 mg Nebulization 4x daily    bisacodyl  10 mg Rectal Daily    collagenase   Topical Daily    methocarbamol  500 mg PEG Tube 4x Daily    doxazosin  1 mg Oral Daily    clotrimazole   Topical BID    lansoprazole  30 mg Per G Tube BID    insulin lispro  0-16 Units SubCUTAneous Q4H    Venelex   Topical BID    chlorhexidine  15 mL Mouth/Throat BID    [Held by provider] apixaban  5 mg Oral BID    sodium chloride flush  5-40 mL IntraVENous 2 times per day     Continuous Infusions:   dextrose Stopped (10/27/22 2037)    sodium chloride 25 mL/hr at 10/30/22 0042     PRN Meds:HYDROmorphone **OR** HYDROmorphone, oxyCODONE **OR** oxyCODONE, simethicone, glucose, dextrose bolus **OR** dextrose bolus, glucagon (rDNA), dextrose, hydroxypropyl methylcellulose, sodium chloride flush, sodium chloride, ondansetron **OR** ondansetron, hydrALAZINE    Objective:   Vitals:   T-max:  Patient Vitals for the past 8 hrs:   BP Temp Temp src Pulse Resp SpO2 Weight   10/31/22 0736 -- -- -- 74 14 100 % --   10/31/22 0600 -- -- -- 87 21 99 % --   10/31/22 0500 -- -- -- 79 20 99 % --   10/31/22 0419 -- -- -- 76 16 97 % --   10/31/22 0335 -- -- -- -- -- -- 206 lb 5.6 oz (93.6 kg)   10/31/22 0330 (!) 122/56 97.7 °F (36.5 °C) Axillary 77 16 99 % --   10/31/22 0300 -- -- -- 78 17 99 % --   10/31/22 0100 -- -- -- 81 13 99 % --       Intake/Output Summary (Last 24 hours) at 10/31/2022 0847  Last data filed at 10/31/2022 0330  Gross per 24 hour   Intake 2350 ml   Output 1375 ml   Net 975 ml       Review of Systems  12 point review system negative except described above    Physical Exam  Vitals and nursing note reviewed. Constitutional:       Appearance: Normal appearance. HENT:      Head: Normocephalic. Nose: Nose normal.   Eyes:      General: Lids are normal.      Extraocular Movements: Extraocular movements intact. Pupils: Pupils are equal, round, and reactive to light. Cardiovascular:      Rate and Rhythm: Regular rhythm. Pulmonary:      Effort: Pulmonary effort is normal. Tachypnea present. Breath sounds: Decreased air movement present. Abdominal:      General: Bowel sounds are normal.   Musculoskeletal:         General: Normal range of motion. Cervical back: Normal range of motion and neck supple. Skin:     General: Skin is warm. Neurological:      General: No focal deficit present. Mental Status: He is alert and oriented to person, place, and time. Psychiatric:         Mood and Affect: Mood normal.         Behavior: Behavior normal.         LABS:    CBC:   Recent Labs     10/29/22  0637 10/29/22  1923 10/30/22  0549 10/31/22  0449   WBC 24.1*  --  19.0* 17.1*   HGB 6.6* 7.5* 8.1* 7.8*   HCT 21.3* 24.1* 24.8* 24.5*   *  --  481* 520*   MCV 86.8  --  85.7 86.9     Renal:    Recent Labs     10/29/22  0637 10/30/22  0549 10/31/22  0449   * 135* 131*   K 5.2* 4.8 5.1   CL 94* 93* 92*   CO2 33* 31 31   BUN 54* 57* 57*   CREATININE 0.8 0.9 0.9   GLUCOSE 114* 192* 325*   CALCIUM 8.3 8.3 8.1*   MG 2.40 2.40 2.30   PHOS 4.4 3.8 3.9   ANIONGAP 6 11 8     Hepatic:   Recent Labs     10/29/22  0637 10/30/22  0549 10/31/22  0449   LABALBU 2.4* 2.4* 2.4*     Troponin: No results for input(s): TROPONINI in the last 72 hours. BNP: No results for input(s): BNP in the last 72 hours. Lipids: No results for input(s): CHOL, HDL in the last 72 hours. Invalid input(s): LDLCALCU, TRIGLYCERIDE  ABGs:    Recent Labs     10/28/22  1503   PHART 7.274*   ATL0LZE 78.1*   PO2ART 45.2*   PKQ9PYC 36.2*   BEART 9*   N4BSIAAS 72*   QKM5YIS 39       INR: No results for input(s): INR in the last 72 hours. Lactate:   Recent Labs     10/28/22  1503   LACTATE 0.72     Cultures:  -----------------------------------------------------------------  RAD:   XR CHEST PORTABLE   Final Result      Moderate pulmonary edema. Small bilateral pleural effusions. Normal heart size. Tracheostomy noted. CT HEAD WO CONTRAST   Final Result      No acute intracranial pathology        Mild to moderate atrophy      Mastoids are opacified. Mastoiditis on excluded. CT CHEST ABDOMEN PELVIS W CONTRAST Additional Contrast? None   Final Result      CHEST:      1. Multilobar pneumonia. 2. Small bilateral pleural effusions. ABDOMEN/PELVIS:      1. Ascending colitis.       XR ABDOMEN (KUB) (SINGLE AP VIEW)   Final Result   Impression:       Slightly increased gas dilation of severe canal stenosis at L2-L3 and L4-L5. Moderate canal stenosis at L3-L4. Multiple levels of moderate to severe canal stenosis from L2-L3 to L4-L5      MRI Cervical Spine WO Contrast   Final Result      1. Motion compromised study. 2.  No cord compression. 3.  Osseous lesions in the cervical and thoracic spine suspicious for metastatic disease. No extraosseous mass. 4.  Severe multilevel degenerative disc disease in the lumbar spine. Moderate to severe canal stenosis at L2-L3 and L4-L5. Moderate canal stenosis at L3-L4. Multiple levels of moderate to severe canal stenosis from L2-L3 to L4-L5      MRI BRAIN WO CONTRAST   Final Result      1. No acute hemorrhage, mass or acute ischemia. 2.  Mild burden of nonspecific multifocal white matter disease compatible with chronic small vessel ischemia. 3.  Mild atrophy. 4.  Bilateral mastoid effusions. XR CHEST PORTABLE   Final Result      Distal portion of tracheostomy poorly visualized due to overlying medical devices. The position is without significant change since 10/5/2022. Bibasilar pleuroparenchymal consolidation. Stable cardiomediastinal silhouette. Right jugular central venous catheter without change. CT ABDOMEN PELVIS WO CONTRAST Additional Contrast? None   Final Result      Marked decrease in size of pneumomediastinum. Trace residual pneumoperitoneum. Extensive bilateral lower lobe pulmonary atelectasis with pleural effusions and trace residual right pneumothorax. XR CHEST PORTABLE   Final Result      Tiny right lateral pneumothorax is suspected, 5 mm in maximum thickness. This has decreased in size since 10/3/2022. Right basilar Pleurx catheter noted. Small bilateral pleural effusions. Prominent interstitial markings. Stable cardiac mediastinal silhouette. Lines and tubes without change. Subcutaneous emphysema noted. CT CHEST ABDOMEN PELVIS WO CONTRAST   Final Result      1. Severe pneumomediastinum. 2. Small to moderate right hydropneumothorax with similar fluid and gas components with a right-sided Pleurx catheter. 3. Moderate loculated left pleural effusion with atelectasis of the left lower lobe with patency of the central left lower lobe bronchi. 4. Fluid/material filling the bronchus intermedius and right lower lobe bronchi with complete consolidation and volume loss of the right lower lobe. Differential diagnosis would include mucous plugging or obstructing lesion. 5. Tracheostomy tube tip within the trachea   6. Severe subcutaneous emphysema in the neck. CT ABDOMEN AND PELVIS:      FINDINGS:      LIVER: Normal.      GALLBLADDER AND BILIARY TREE: No calcified gallstones. No gallbladder distention. No intra- or extrahepatic biliary dilatation. PANCREAS: Normal.      SPLEEN: Normal.      ADRENAL GLANDS: Normal.      KIDNEYS AND URETERS: Normal.      URINARY BLADDER: Ansari catheter present within collapsed urinary bladder. REPRODUCTIVE ORGANS: No associated masses. BOWEL: Normal diameter, nonobstructed. Feeding tube tip at the level of the ligament of Treitz. LYMPH NODES: No abnormally enlarged nodes. PERITONEUM/RETROPERITONEUM: Severe pneumoperitoneum extends into the upper abdomen with some of the symmetric multiseptated gas appearing deep to the diaphragm consistent with mild pneumoperitoneum (series 601 was 101). This is likely related to    pneumonia mediastinum dissecting into the abdomen. No fluid collection in the abdomen or pelvis. No ascites. VESSELS: Extensive atherosclerotic calcification of the aorta and iliac arteries. ABDOMINAL WALL: No acute abnormality. BONES: No acute abnormality. Mild chronic L1 compression fracture with previous vertebroplasty. IMPRESSION:      1.  Severe pneumomediastinum extends into the upper abdomen with small pneumoperitoneum likely related to extension of pneumoperitoneum into the upper peritoneal cavity. 2. No fluid collection in the abdomen or pelvis. Results were discussed with the surgical team at 7:00 PM on 10/3/2022. XR CHEST PORTABLE   Final Result      Stable appearance of loculated right pneumothorax, with loculated components in the lateral right midlung region and in the right lateral costophrenic sulcus. Stable scattered areas of atelectasis versus scar, most prominent in the medial right lung base and in the left lateral lung base. XR CHEST PORTABLE   Final Result      Small right basilar pneumothorax. Right basilar chest tube without change. Patchy consolidation in the right mid and lower lung as well as the left lower lung-atelectasis versus pneumonia. Trace left pleural effusion. Normal heart size. Lines and tubes without change. Mild improvement in degree of subcutaneous emphysema in the chest and neck. XR CHEST 1 VIEW   Final Result      1. Stable small right-sided pneumothorax and prominent subcutaneous emphysema along the neck and upper superior chest wall, greater in right lung stable   2. Stable left basilar consolidation and pleural effusion      3. Stable appearing perihilar accentuated markings or airspace disease            XR CHEST PORTABLE   Final Result      Stable small right-sided pneumothorax. More prominent emphysema over the lower neck. No change in bilateral airspace disease. Stable left pleural effusion. XR CHEST PORTABLE   Final Result   1. Bilateral multifocal pneumonia with improvement in the right    pulmonary consolidation since prior study from 9/23/22   2. Mild to moderate left pleural effusion          XR CHEST PORTABLE   Final Result   1. Support lines and tubes are stable. 2.  Stable small right lateral pneumothorax and right basilar    chest tube. 3.  Stable patchy bilateral airspace disease. XR CHEST PORTABLE   Final Result   1.  Satisfactory position of right internal jugular central line   2. No interval change in endotracheal tube and nasogastric tube positioning. 3. Extensive bilateral airspace disease with no changes. 4. Left pleural effusion with retrocardiac opacity, unchanged   5. Small stable tiny right lateral pneumothorax and stable position of right chest tube,, Pleurx catheter. XR ABDOMEN (KUB) (SINGLE AP VIEW)   Final Result   1. No residual pneumothorax. 2.  Mild patchy airspace disease bilaterally worse on the right than on prior examination. 3.  Non-obstructive bowel gas pattern. Mildly distended stomach. XR CHEST PORTABLE   Final Result   1. No residual pneumothorax. 2.  Mild patchy airspace disease bilaterally worse on the right than on prior examination. 3.  Non-obstructive bowel gas pattern. Mildly distended stomach. XR CHEST PORTABLE   Final Result      1. Small right pneumothorax appears slightly increased. 2. Mild patchy airspace opacity bilaterally. XR CHEST PORTABLE   Final Result     Pleurx catheter at the right lung base. Trace right    pneumothorax is unchanged. XR CHEST PORTABLE   Final Result      Right-sided chest tube evident in the base, its tip medially. Improvement in the right-sided pneumothorax, since earlier today. Possible medial collapse of the right lower lobe. Small left effusion. Patchy airspace density/atelectasis in the lungs bilaterally, with no other significant change. XR CHEST PORTABLE   Final Result   1. Right-sided Pleurx catheter in satisfactory position in the lung bases   2.  Right-sided post operative pneumothorax, approximately 10%               Assessment/Plan:   Acute on chronic hypoxic respiratory failure requiring intubation and then chronic trach placement quite ICU admission during hospital mission secondary to right-sided pleural effusion status post VATS and Pleurx catheter placed on 9/19  -Very unfortunate complex medical history with VAT procedure done on 9/19 patient requiring BiPAP or intubation with ICU admission with a placement of trach  -Patient was tachypneic, 10/31 still having bilateral pleural effusions requiring diuresis got 20 of Lasix yesterday  - Family considering hospice palliative care on board CODE STATUS was changed to DNR CC today  -Given family considering hospice will do further management as per goals of care.       Amarjit Celeste MD, PGY-II  Pulmonology consult service only   Patient is not resident covered   10/31/22  8:47 AM    Discussed case with Dr. Nitin Smith

## 2022-10-31 NOTE — PROGRESS NOTES
Hospice [facility]:  Met with the pt's wife  and son Mary Jane Sahu at bedside. Reviewed hospice services, philosophy and levels of care; GIP and hospice Inpatient. Wife states pt. Has been telling them he is ready to \"go\" for some time but since he has had medications today that have made him \"loopy\" they want to talk to him once the medications have worn off to ask him again if he wants to stop treatment and come off the ventilator. Wife agreeable to checking in on them tomorrow for follow up.      Thank you,  Marian Martel RN [facility] [phone]

## 2022-10-31 NOTE — PROGRESS NOTES
Palliative Care Chart Review  and Check in Note:     NAME:  Laure Rayo  Admit Date: 9/19/2022  Hospital Day:  Hospital Day: 37   Current Code status: DNR-CC    Palliative care is continuing to following Mr. Helen Patricia for symptom management, and goals of care discussion as needed. Patient's chart reviewed today 10/31/22. Spoke with Josiah's wife, Raul Bell, and son, Florencio Boles, about hospice/comfort care. They are in agreement to consult hospice. We discussed an [facility] vs home hospice. Raul Bell feels an [place] [facility] would be a better option. They are aware that Aman Baird may need to come off of the ventilator before transport and would be fine with him staying in the hospital if he is unstable enough to go. However, they have family that would like to visit him and an [place] [facility] may be more conducive to that. Code status changed to [facility]. Kelin Herrmann RN case manager, aware of hospice referral.     The following are the currently established goals/code status, and Symptom management.      Goals of care: comfort    Code status: DNR-CC    Discharge plan: hospice      NATASHA Lima NP  10/31/22  12:56 PM

## 2022-10-31 NOTE — PROGRESS NOTES
Blood sugar 383.  16 units humalog and 5 units regular IV insulin administered per order. Perfect served surgical residents to make aware.

## 2022-10-31 NOTE — CARE COORDINATION
CM spoke with family at bedside. They would like hospice referral to [address]. CM spoke with intake and faxed referral to [phone].     Madhu Venegas, RN, BSN,   4th Floor Progressive Care Unit  [phone]

## 2022-10-31 NOTE — PROGRESS NOTES
Occupational Therapy/Physical Therapy  Discharge    Pt and family planning for hospice. Discharge from therapy.      [address] [place] [address], Ne KAYLIN/SAM Daniel PT  [phone]

## 2022-10-31 NOTE — PLAN OF CARE
Problem: Chronic Conditions and Co-morbidities  Goal: Patient's chronic conditions and co-morbidity symptoms are monitored and maintained or improved  10/31/2022 0023 by Bibiana Monson RN  Outcome: Progressing     Problem: Discharge Planning  Goal: Discharge to home or other facility with appropriate resources  10/31/2022 0023 by Bibiana Monson RN  Outcome: Progressing   Patient to be discharged when medically stable. Problem: Pain  Goal: Verbalizes/displays adequate comfort level or baseline comfort level  10/31/2022 0023 by Bibiana Monson RN  Outcome: Progressing   C/o pain in buttock rating 6-8/10. Patient stating pain is uncontrolled. Messaged surgical residents with new pain medicine orders. Continue to monitor pain. Problem: Safety - Adult  Goal: Free from fall injury  10/31/2022 0023 by Bibiana Monson RN  Outcome: Progressing   Fall precautions in place. Bed alarm activated, low position, wheels locked. Call light and belongings within reach. Patient encouraged to call with needs and concerns. Problem: Skin/Tissue Integrity  Goal: Absence of new skin breakdown  Description: 1. Monitor for areas of redness and/or skin breakdown  2. Assess vascular access sites hourly  3. Every 4-6 hours minimum:  Change oxygen saturation probe site  4. Every 4-6 hours:  If on nasal continuous positive airway pressure, respiratory therapy assess nares and determine need for appliance change or resting period. 10/31/2022 0023 by Bibiana Monson RN  Outcome: Progressing   Patient refusing repositioning stating he is comfortable. Patient educated on reason for positioning but continues to refuse at times. Heels elevated with pillow support. Continue to monitor skin integrity. Problem: Nutrition Deficit:  Goal: Optimize nutritional status  Outcome: Progressing   Patient receiving TF at goal and tolerating without issues.     Problem: Respiratory - Adult  Goal: Achieves optimal ventilation and oxygenation  Outcome: Progressing   Vent-trach 35% FiO2 5 PEEP. SpO2 100%. Lungs with scattered rhonchi. Problem: Cardiovascular - Adult  Goal: Maintains optimal cardiac output and hemodynamic stability  10/31/2022 0023 by Cele Caceres RN  Outcome: Progressing   Vitals stable, afebrile. SR on tele. Problem: Genitourinary - Adult  Goal: Urinary catheter remains patent  10/31/2022 0023 by Cele Caceres RN  Outcome: Progressing   Ansari intact draining clear, yellow urine. Problem: Metabolic/Fluid and Electrolytes - Adult  Goal: Glucose maintained within prescribed range  10/31/2022 0023 by Cele Caceres RN  Outcome: Progressing   Monitoring blood sugars every 4 hours.

## 2022-10-31 NOTE — PROGRESS NOTES
Cardiothoracic Daily Progress Note      CC: Pleural effusions s/p R pleur-X placement    Subjective :  Patient rested well overnight with no acute events. HDS, afebrile. Denies abdominal pain. Bm overnight per pt      Objective     Exam:  Vitals:    10/31/22 0335 10/31/22 0419 10/31/22 0500 10/31/22 0600   BP:       Pulse:  76 79 87   Resp:  16 20 21   Temp:       TempSrc:       SpO2:  97% 99% 99%   Weight: 206 lb 5.6 oz (93.6 kg)      Height:           Physical Examination:   General appearance: Sleeping, resting comfortably  Neurological: No focal deficits  HEENT: EOMI, trachea midline, no JVD. Tracheostomy in place with some mucus drainage  Chest/Lungs: On mechanical ventilation via tracheostomy; R PleurX catheter capped with dressing C/D/I  Cardiovascular: RRR, well perfused   Abdomen: Soft, non-tender, minimally-distended. PEG tube with tube feeds running at 65   Skin: Warm and dry. Sacral decubitus ulcer covered with Mepilex. Extremities: Pitting edema of the b/l feet. No cyanosis. Legs ulcers from SCDs covered with Mepilex      ASSESSMENT/PLAN:   Winston Alcazar is a 65 y.o. male with history of diastolic heart failure, atrial fibrillation, and DM with recurrent pleural effusions s/p R PleurX catheter placement (9/19).      - Leukocytosis slowly improving.  - CT chest/abd/pelvis with IV contrast in the setting of persistent leukocytosis was consistent with multilobular pneumonia with left pleural effusion and ascending colitis; Continue Zosyn for 7 days for ascending colitis  - Will drain PleurX catheter today    -pt and wife requested discussion for code status change and goals of care    NATASHA Sykes NP  10/31/2022  6:51 AM

## 2022-11-01 NOTE — PROGRESS NOTES
Palliative Care Chart Review  and Check in Note:     NAME:  Bernadette Wilks  Admit Date: 9/19/2022  Hospital Day:  Hospital Day: 40   Current Code status: DNR-CC    Palliative care is continuing to following Mr. Kaelyn Rivera for symptom management, and goals of care discussion as needed. Patient's chart reviewed today 11/1/22. Spoke with Josiah's family at the bedside. He was resting, so I did not disturb him. They said they will most likely be ready to take [place] [facility] off of the vent tomorrow, but they want him to be awake enough to talk with him and make sure that is okay with him. They suspect he will be fine with that. We discussed symptom management at the end of life. Currently, 1 mg IV morphine Q4H PRN and 7.5 or 10 mg oxycodone Q4H PRN are ordered for pain. If pt has uncontrolled pain, would recommend increasing morphine dose to 2 mg or 4 mg. Discussed case with MOUSTAPHA Quintero. Ingrid is aware of the need for pt to be awake enough for family to discuss withdraw of care with him. The following are the currently established goals/code status, and Symptom management. Goals of care: comfort    Code status: [facility]    Discharge plan: terminal, hospice is following for GIP vs IPU.  Most likely will be GIP, as pt cannot travel to [facility] with ventilator and needs to be off the vent prior to enrolling with hospice      NATASHA Ruiz NP  11/01/22  4:24 PM

## 2022-11-01 NOTE — PROGRESS NOTES
11/01/22 1215   Encounter Summary   Encounter Overview/Reason  Initial Encounter   Service Provided For: Family   Support System Family members; Sabianism/cory community   Complexity of Encounter Moderate   Begin Time 1145   End Time  1210   Total Time Calculated 25 min       Patient appeared asleep . .. wife, son and what appeared to be granddaughter in room. Wife talked about their cory community - member of their Spiritism had cooked them/her lasagne. Talked about his pain from his bedsore(s) and treatment of that. Allison Siemens CPE intern.

## 2022-11-01 NOTE — PROGRESS NOTES
Colorectal Surgery Daily Progress Note      CC: Sacral wound    Subjective :  Afebrile, HDS. No acute events overnight. Resting comfortably this morning. Family continues discussions with Hospice. Pt refused dressing change last night. Objective     Exam:  Vitals:    11/01/22 0315 11/01/22 0316 11/01/22 0600 11/01/22 0635   BP: (!) 111/55      Pulse: 80 77 78    Resp: 24 16     Temp: 98.8 °F (37.1 °C)      TempSrc: Oral      SpO2: 98% 99% 96%    Weight:    206 lb 5.6 oz (93.6 kg)   Height:           Physical Examination:   General appearance: Sleeping, resting comfortably  Neurological: No focal deficits  HEENT: EOMI, trachea midline, no JVD. Tracheostomy in place  Chest/Lungs: On mechanical ventilation via tracheostomy; R PleurX catheter capped with dressing C/D/I  Cardiovascular: RRR, well perfused   Abdomen: Soft, non-tender, minimally-distended. PEG tube with tube feeds running at 65   Skin: Warm and dry. Sacral decubitus ulcer covered with Mepilex. Extremities: Pitting edema of the b/l feet. No cyanosis. Legs ulcers from SCDs covered with Mepilex        ASSESSMENT/PLAN:   Bridgett Donovan is a 65 y.o. male with history of diastolic heart failure, atrial fibrillation, and DM with recurrent pleural effusions s/p R PleurX catheter placement (9/19).  CT chest/abd/pelvis with IV contrast on 10/28 in the setting of persistent leukocytosis was consistent with multilobular pneumonia with left pleural effusion and ascending colitis    - Patient considering Hospice care at this time   - Has asked to hold off on any further debridements of sacral wound   - Will continue BID wet to dry dressing changes   - Will have nursing staff do dressing changes with rolls for minimal disturbance for patient   - Medical management per cardiothoracic team   - please call back with questions      Arielle Chacon MD  PGY1, General Surgery  11/01/22  6:57 AM  [id number]#[phone]

## 2022-11-01 NOTE — PROGRESS NOTES
Pleurx drained. 100 cc clear yellow fluid drained. Pt tolerated well.     Bita Webb CNP  11/1/2022  1:11 PM  perfectserve

## 2022-11-01 NOTE — PROGRESS NOTES
[facility]:  Met with the pt's wife, son Christiano Arthur and daughter in law at bedside to answer questions. They state they would still like to talk to the patient and explain significance of coming off of the ventilator and if he voices understanding they are not sure about timing of withdrawal of care. Wife states pt. Did not tolerate Lorazepam yesterday and is requesting different medication for anxiety. RN and CM updated on meeting.      Thank you,  Evelyn De Jesus RN [address] [place] [address] [phone]

## 2022-11-01 NOTE — PROGRESS NOTES
Nephrology Progress Note                                                                                                                                                                                                                                                                                                                                                               Office : [phone]     Fax :[phone]    Patient's Name: Liliana Box        Reason for Consult:   ZANA  Requesting Physician:  Jl Torres MD    Interval History:      Cr stable   Na stable   Good UO   Remains on Trach   PEG placed - on TF         Past Medical History:   Diagnosis Date    ZANA (acute kidney injury) ([facility] [address] 9/26/2022    Carotid stenosis, left 10/12/2011    Chronic back pain     Chronic systolic (congestive) heart failure 88/48/1336    Diastolic CHF ([facility] [address]     Erectile dysfunction     Hyperlipidemia     Hypertension     Osteoarthritis     Restrictive lung disease     Type II or unspecified type diabetes mellitus without mention of complication, not stated as uncontrolled        Past Surgical History:   Procedure Laterality Date    CARDIAC CATHETERIZATION  06/2022    GASTROSTOMY TUBE PLACEMENT N/A 10/11/2022    EGD PEG TUBE PLACEMENT performed by Christina Harper MD at [facility] Left     PLEURAL CATH INSERTION N/A 9/19/2022    PLEURAL CATHETER PLACEMENT; INTERCOSTAL NERVE BLOCK X4 performed by Sheila Oneal MD at [facility] Bilateral     THORACOSCOPY Right 9/19/2022    RIGHT VIDEO ASSISTED THORASCOPIC SURGERY AND; performed by Sheila Oneal MD at [address] N/A 9/30/2022    TRACHEOSTOMY performed by Jero Silva MD at [facility] History   Problem Relation Age of Onset    Hearing Loss Father     Heart Disease Father 70        MI    High Blood Pressure Father     Diabetes Maternal Grandmother         hypoglycemic    Hearing Loss Maternal Grandmother     Arthritis Maternal Grandfather         reports that he quit smoking about 21 years ago. His smoking use included cigarettes. He has a 80.00 pack-year smoking history. He has never used smokeless tobacco. He reports current alcohol use. He reports that he does not use drugs. Allergies:   Torsemide and Dye [iodides]    Current Medications:    scopolamine (TRANSDERM-SCOP) transdermal patch 1 patch, Q72H  LORazepam (ATIVAN) 2 MG/ML concentrated solution 1 mg, Q2H PRN  HYDROmorphone (DILAUDID) injection 0.25 mg, Q3H PRN   Or  HYDROmorphone (DILAUDID) injection 0.5 mg, Q3H PRN  insulin glargine (LANTUS;BASAGLAR) injection pen 20 Units, Nightly  docusate (COLACE) 50 MG/5ML liquid 100 mg, BID  melatonin disintegrating tablet 5 mg, Nightly  oxyCODONE (ROXICODONE) 5 MG/5ML solution 7.5 mg, Q4H PRN   Or  oxyCODONE (ROXICODONE) 5 MG/5ML solution 10 mg, Q4H PRN  acetaminophen (TYLENOL) 160 MG/5ML solution 650 mg, Q6H  piperacillin-tazobactam (ZOSYN) 3,375 mg in dextrose 5 % 50 mL IVPB (mini-bag), Q8H  silver nitrate applicators applicator 1 each, Once  simethicone (MYLICON) chewable tablet 80 mg, Q6H PRN  guar gum packet 1 packet, TID  glucose chewable tablet 16 g, PRN  dextrose bolus 10% 125 mL, PRN   Or  dextrose bolus 10% 250 mL, PRN  glucagon (rDNA) injection 1 mg, PRN  dextrose 10 % infusion, Continuous PRN  albuterol (PROVENTIL) nebulizer solution 2.5 mg, 4x daily  bisacodyl (DULCOLAX) suppository 10 mg, Daily  collagenase ointment, Daily  methocarbamol (ROBAXIN) tablet 500 mg, 4x Daily  doxazosin (CARDURA) tablet 1 mg, Daily  clotrimazole (LOTRIMIN) 1 % cream, BID  lansoprazole suspension SUSP 30 mg, BID  hydroxypropyl methylcellulose (GONIOSOL) 2.5 % ophthalmic solution 1 drop, PRN  insulin lispro (1 Unit Dial) (HUMALOG/ADMELOG) pen 0-16 Units, Q4H  Venelex ointment, BID  chlorhexidine (PERIDEX) 0.12 % solution 15 mL, BID  apixaban (ELIQUIS) tablet 5 mg, BID  sodium chloride flush 0.9 % injection 5-40 mL, 2 times per day  sodium chloride flush 0.9 % injection 5-40 mL, PRN  0.9 % sodium chloride infusion, PRN  ondansetron (ZOFRAN-ODT) disintegrating tablet 4 mg, Q8H PRN   Or  ondansetron (ZOFRAN) injection 4 mg, Q6H PRN  hydrALAZINE (APRESOLINE) injection 5 mg, Q15 Min PRN          Physical exam:     Vitals:  BP (!) 111/54   Pulse 72   Temp 99.1 °F (37.3 °C) (Axillary)   Resp 14   Ht 6' (1.829 m)   Wt 206 lb 5.6 oz (93.6 kg)   SpO2 98%   BMI 27.99 kg/m²   Constitutional:  on MV  Skin: no rash, turgor wnl  Heent:  eomi, mmm  Neck: no bruits or jvd noted  Cardiovascular:  S1, S2 without m/r/g  Respiratory: trach  Abdomen:  +bs, soft, nt, nd  Ext: bilateral lower extremity edema R>L  Psychiatric: mood and affect appropriate  Musculoskeletal:  Rom, muscular strength intact    Data:   Labs:  CBC:   Recent Labs     10/30/22  0549 10/31/22  0449 11/01/22  0608   WBC 19.0* 17.1* 15.4*   HGB 8.1* 7.8* 7.5*   * 520* 454*       BMP:    Recent Labs     10/30/22  0549 10/31/22  0449 11/01/22  0608   * 131* 131*   K 4.8 5.1 4.8   CL 93* 92* 91*   CO2 31 31 32   BUN 57* 57* 58*   CREATININE 0.9 0.9 1.0   GLUCOSE 192* 325* 112*       Ca/Mg/Phos:   Recent Labs     10/30/22  0549 10/31/22  0449 11/01/22  0608   CALCIUM 8.3 8.1* 8.2*   MG 2.40 2.30 2.40   PHOS 3.8 3.9 3.7       Hepatic: No results for input(s): AST, ALT, ALB, BILITOT, ALKPHOS in the last 72 hours. Troponin: No results for input(s): TROPONINI in the last 72 hours. BNP: No results for input(s): BNP in the last 72 hours. Lipids:   No results for input(s): CHOL, TRIG, HDL, LDLCALC, LABVLDL in the last 72 hours. ABGs:   No results for input(s): PHART, PO2ART, MAX2BZZ in the last 72 hours. INR: No results for input(s): INR in the last 72 hours.   UA:  Recent Labs     10/31/22  1731   COLORU Yellow   CLARITYU Clear   GLUCOSEU 250*   BILIRUBINUR Negative   KETUA Negative   SPECGRAV 1.015   BLOODU LARGE*   PHUR 6.0   PROTEINU 30* UROBILINOGEN 0.2   NITRU Negative   LEUKOCYTESUR Negative   LABMICR YES   Vianca Garcia NotGiven        Urine Microscopic:   Recent Labs     10/31/22  1731   WBCUA 3-5   RBCUA 3-4     Urine Culture: No results for input(s): Ferol Sieve in the last 72 hours. Urine Chemistry:   No results for input(s): Threasa Jim, PROTEINUR, NAUR in the last 72 hours. IMAGING:  XR CHEST PORTABLE   Final Result      Mild lateral pleural effusions appear similar to prior study with bilateral airspace disease suggesting edema versus pneumonia. XR CHEST PORTABLE   Final Result      Moderate pulmonary edema. Small bilateral pleural effusions. Normal heart size. Tracheostomy noted. CT HEAD WO CONTRAST   Final Result      No acute intracranial pathology        Mild to moderate atrophy      Mastoids are opacified. Mastoiditis on excluded. CT CHEST ABDOMEN PELVIS W CONTRAST Additional Contrast? None   Final Result      CHEST:      1. Multilobar pneumonia. 2. Small bilateral pleural effusions. ABDOMEN/PELVIS:      1. Ascending colitis. XR ABDOMEN (KUB) (SINGLE AP VIEW)   Final Result   Impression:       Slightly increased gas dilation of transverse colon since the prior study. No evidence of small bowel obstruction. Moderate colonic stool burden, unchanged from prior study. XR CHEST PORTABLE   Final Result   Impression:       Slight progression of bibasilar lung opacities, especially on the left. Persistence of small bilateral pleural effusions. XR ABDOMEN (2 VIEWS)   Final Result   1. Moderate amount of stool in the descending colon with the transverse colon mildly dilated with air. CT CERVICAL SPINE WO CONTRAST   Final Result      1. Osseous lesions in C2, C7 and T1 vertebral body seen on recent MRI dated 10/18/2022 are not well-visualized on CT. Bone density is slightly heterogenous on CT.  There is a 8 mm radiolucent lesion in the inferior posterior aspect of C2 vertebral body    which may correspond to C2 lesion seen on MRI. 2. Mildly expansile lytic lesion in left lamina of C4 concerning for metastasis. 3. Multilevel degenerative disc disease, most pronounced at C6-7 where there is disc osteophyte complex causing mild-to-moderate spinal canal stenosis. 4. Multilevel foraminal stenosis, most pronounced at C5-6, moderate to severe right and moderate left. MRI LUMBAR SPINE WO CONTRAST   Final Result      1. Motion compromised study. 2.  No cord compression. 3.  Osseous lesions in the cervical and thoracic spine suspicious for metastatic disease. No extraosseous mass. 4.  Severe multilevel degenerative disc disease in the lumbar spine. Moderate to severe canal stenosis at L2-L3 and L4-L5. Moderate canal stenosis at L3-L4. Multiple levels of moderate to severe canal stenosis from L2-L3 to L4-L5      MRI THORACIC SPINE WO CONTRAST   Final Result      1. Motion compromised study. 2.  No cord compression. 3.  Osseous lesions in the cervical and thoracic spine suspicious for metastatic disease. No extraosseous mass. 4.  Severe multilevel degenerative disc disease in the lumbar spine. Moderate to severe canal stenosis at L2-L3 and L4-L5. Moderate canal stenosis at L3-L4. Multiple levels of moderate to severe canal stenosis from L2-L3 to L4-L5      MRI Cervical Spine WO Contrast   Final Result      1. Motion compromised study. 2.  No cord compression. 3.  Osseous lesions in the cervical and thoracic spine suspicious for metastatic disease. No extraosseous mass. 4.  Severe multilevel degenerative disc disease in the lumbar spine. Moderate to severe canal stenosis at L2-L3 and L4-L5. Moderate canal stenosis at L3-L4. Multiple levels of moderate to severe canal stenosis from L2-L3 to L4-L5      MRI BRAIN WO CONTRAST   Final Result      1. No acute hemorrhage, mass or acute ischemia.    2.  Mild burden of nonspecific multifocal white matter disease compatible with chronic small vessel ischemia. 3.  Mild atrophy. 4.  Bilateral mastoid effusions. XR CHEST PORTABLE   Final Result      Distal portion of tracheostomy poorly visualized due to overlying medical devices. The position is without significant change since 10/5/2022. Bibasilar pleuroparenchymal consolidation. Stable cardiomediastinal silhouette. Right jugular central venous catheter without change. CT ABDOMEN PELVIS WO CONTRAST Additional Contrast? None   Final Result      Marked decrease in size of pneumomediastinum. Trace residual pneumoperitoneum. Extensive bilateral lower lobe pulmonary atelectasis with pleural effusions and trace residual right pneumothorax. XR CHEST PORTABLE   Final Result      Tiny right lateral pneumothorax is suspected, 5 mm in maximum thickness. This has decreased in size since 10/3/2022. Right basilar Pleurx catheter noted. Small bilateral pleural effusions. Prominent interstitial markings. Stable cardiac mediastinal silhouette. Lines and tubes without change. Subcutaneous emphysema noted. CT CHEST ABDOMEN PELVIS WO CONTRAST   Final Result      1. Severe pneumomediastinum. 2. Small to moderate right hydropneumothorax with similar fluid and gas components with a right-sided Pleurx catheter. 3. Moderate loculated left pleural effusion with atelectasis of the left lower lobe with patency of the central left lower lobe bronchi. 4. Fluid/material filling the bronchus intermedius and right lower lobe bronchi with complete consolidation and volume loss of the right lower lobe. Differential diagnosis would include mucous plugging or obstructing lesion. 5. Tracheostomy tube tip within the trachea   6. Severe subcutaneous emphysema in the neck. CT ABDOMEN AND PELVIS:      FINDINGS:      LIVER: Normal.      GALLBLADDER AND BILIARY TREE: No calcified gallstones. No gallbladder distention.   No intra- or extrahepatic biliary dilatation. PANCREAS: Normal.      SPLEEN: Normal.      ADRENAL GLANDS: Normal.      KIDNEYS AND URETERS: Normal.      URINARY BLADDER: Ansari catheter present within collapsed urinary bladder. REPRODUCTIVE ORGANS: No associated masses. BOWEL: Normal diameter, nonobstructed. Feeding tube tip at the level of the ligament of Treitz. LYMPH NODES: No abnormally enlarged nodes. PERITONEUM/RETROPERITONEUM: Severe pneumoperitoneum extends into the upper abdomen with some of the symmetric multiseptated gas appearing deep to the diaphragm consistent with mild pneumoperitoneum (series 601 was 101). This is likely related to    pneumonia mediastinum dissecting into the abdomen. No fluid collection in the abdomen or pelvis. No ascites. VESSELS: Extensive atherosclerotic calcification of the aorta and iliac arteries. ABDOMINAL WALL: No acute abnormality. BONES: No acute abnormality. Mild chronic L1 compression fracture with previous vertebroplasty. IMPRESSION:      1. Severe pneumomediastinum extends into the upper abdomen with small pneumoperitoneum likely related to extension of pneumoperitoneum into the upper peritoneal cavity. 2. No fluid collection in the abdomen or pelvis. Results were discussed with the surgical team at 7:00 PM on 10/3/2022. XR CHEST PORTABLE   Final Result      Stable appearance of loculated right pneumothorax, with loculated components in the lateral right midlung region and in the right lateral costophrenic sulcus. Stable scattered areas of atelectasis versus scar, most prominent in the medial right lung base and in the left lateral lung base. XR CHEST PORTABLE   Final Result      Small right basilar pneumothorax. Right basilar chest tube without change. Patchy consolidation in the right mid and lower lung as well as the left lower lung-atelectasis versus pneumonia. Trace left pleural effusion. Normal heart size. Lines and tubes without change. Mild improvement in degree of subcutaneous emphysema in the chest and neck. XR CHEST 1 VIEW   Final Result      1. Stable small right-sided pneumothorax and prominent subcutaneous emphysema along the neck and upper superior chest wall, greater in right lung stable   2. Stable left basilar consolidation and pleural effusion      3. Stable appearing perihilar accentuated markings or airspace disease            XR CHEST PORTABLE   Final Result      Stable small right-sided pneumothorax. More prominent emphysema over the lower neck. No change in bilateral airspace disease. Stable left pleural effusion. XR CHEST PORTABLE   Final Result   1. Bilateral multifocal pneumonia with improvement in the right    pulmonary consolidation since prior study from 9/23/22   2. Mild to moderate left pleural effusion          XR CHEST PORTABLE   Final Result   1. Support lines and tubes are stable. 2.  Stable small right lateral pneumothorax and right basilar    chest tube. 3.  Stable patchy bilateral airspace disease. XR CHEST PORTABLE   Final Result   1. Satisfactory position of right internal jugular central line   2. No interval change in endotracheal tube and nasogastric tube positioning. 3. Extensive bilateral airspace disease with no changes. 4. Left pleural effusion with retrocardiac opacity, unchanged   5. Small stable tiny right lateral pneumothorax and stable position of right chest tube,, Pleurx catheter. XR ABDOMEN (KUB) (SINGLE AP VIEW)   Final Result   1. No residual pneumothorax. 2.  Mild patchy airspace disease bilaterally worse on the right than on prior examination. 3.  Non-obstructive bowel gas pattern. Mildly distended stomach. XR CHEST PORTABLE   Final Result   1. No residual pneumothorax. 2.  Mild patchy airspace disease bilaterally worse on the right than on prior examination. 3.  Non-obstructive bowel gas pattern. Mildly distended stomach. XR CHEST PORTABLE   Final Result      1. Small right pneumothorax appears slightly increased. 2. Mild patchy airspace opacity bilaterally. XR CHEST PORTABLE   Final Result     Pleurx catheter at the right lung base. Trace right    pneumothorax is unchanged. XR CHEST PORTABLE   Final Result      Right-sided chest tube evident in the base, its tip medially. Improvement in the right-sided pneumothorax, since earlier today. Possible medial collapse of the right lower lobe. Small left effusion. Patchy airspace density/atelectasis in the lungs bilaterally, with no other significant change. XR CHEST PORTABLE   Final Result   1. Right-sided Pleurx catheter in satisfactory position in the lung bases   2. Right-sided post operative pneumothorax, approximately 10%             Assessment/Plan   ZANA  - sec to contrast nephropathy  - Cr stable   - BNP 3k, Protein:Cre 0.3, FENa 0.4%  - closely monitor UOP  - avoid nephrotoxic agent     2. Hypernatremia  - dec free water   - will continue to monitor     3. Hypotension  - resolved     4. Anemia  - daily CBC    5. Acid- base/ Electrolyte imbalance   - optimize lytes    6. Acute hypoxic resp failure  - s/p VATS on 9/19/22 for recurrent pleural effusions  - Intensivist and CTS following    7.  CHF   - EF 65% on 6/16/22 via cardiac cath        Raphael Gonzalez MD

## 2022-11-01 NOTE — PROGRESS NOTES
Speech Language Pathology    Attempted therapy follow-up Reviewed chart. Noted family is in process of deciding re: hospice. Spoke with RN, who requests hold, and will page if family wishes patient to be seen. Lily Liu, [id number] [facility], .[phone]  Pg.  # [phone]

## 2022-11-01 NOTE — CARE COORDINATION
CM spoke with patient's wife at bedside today. They are waiting to talk with patient today when he is more alert, so a decision can be made about vent and hospice care. [facility] following. Please call [facility] or [facility] with any changes.       Addison Nails RN, BSN,   4th Floor Progressive Care Unit  [phone]

## 2022-11-01 NOTE — PLAN OF CARE
Problem: Pain  Goal: Verbalizes/displays adequate comfort level or baseline comfort level  11/1/2022 0153 by Jabier Anders RN  Outcome: Progressing  Flowsheets (Taken 11/1/2022 0153)  Verbalizes/displays adequate comfort level or baseline comfort level:   Encourage patient to monitor pain and request assistance   Assess pain using appropriate pain scale  Note: 8/10 pain reported from pt. PRN pain medications given. Pt repositioned to fruther help with discomfort. Will continue to monitor. Problem: Safety - Adult  Goal: Free from fall injury  11/1/2022 0153 by Jabier Anders RN  Outcome: Progressing  Flowsheets (Taken 10/30/2022 1047 by Jacek Riley RN)  Free From Fall Injury: Instruct family/caregiver on patient safety  Note: Bed in lowest position, call light within reach, bed alarm in use. Problem: Skin/Tissue Integrity  Goal: Absence of new skin breakdown  Description: 1. Monitor for areas of redness and/or skin breakdown  2. Assess vascular access sites hourly  3. Every 4-6 hours minimum:  Change oxygen saturation probe site  4. Every 4-6 hours:  If on nasal continuous positive airway pressure, respiratory therapy assess nares and determine need for appliance change or resting period. 11/1/2022 0153 by Jabier Anders RN  Outcome: Progressing  Note: No new skin breakdown assessed. Dressing change on coccyx completed. Heels floating off the bed. Problem: Nutrition Deficit:  Goal: Optimize nutritional status  11/1/2022 0153 by Jabier Anders RN  Outcome: Progressing  Flowsheets (Taken 11/1/2022 0153)  Nutrient intake appropriate for improving, restoring, or maintaining nutritional needs:   Assess nutritional status and recommend course of action   Monitor oral intake, labs, and treatment plans   Recommend, monitor, and adjust tube feedings and TPN/PPN based on assessed needs  Note: TF running at 65. Pt tolerating well.       Problem: ABCDS Injury Assessment  Goal: Absence of physical injury  11/1/2022 0153 by Gilma Sullivan RN  Outcome: Progressing  Flowsheets (Taken 10/30/2022 1047 by Manuel Bolivar RN)  Absence of Physical Injury: Implement safety measures based on patient assessment  Note: Safety measures in place. Pt calls out appropriately. Problem: Respiratory - Adult  Goal: Achieves optimal ventilation and oxygenation  11/1/2022 0153 by Gilma Sullivan RN  Outcome: Progressing  Flowsheets (Taken 11/1/2022 0153)  Achieves optimal ventilation and oxygenation:   Assess for changes in respiratory status   Assess for changes in mentation and behavior   Respiratory therapy support as indicated  Note: Inner cannula changed per respiratory. Trach care completed. Pt reports some SOB and discomfort. Suctioned frequently. Vent- PEEP 5 FiO2 35%. O2 >95%. Problem: Cardiovascular - Adult  Goal: Maintains optimal cardiac output and hemodynamic stability  11/1/2022 0153 by Gilma Sullivan RN  Outcome: Progressing  Flowsheets (Taken 11/1/2022 0153)  Maintains optimal cardiac output and hemodynamic stability:   Monitor blood pressure and heart rate   Monitor urine output and notify Licensed Independent Practitioner for values outside of normal range  Note: NSR on telemetry.       Problem: Genitourinary - Adult  Goal: Urinary catheter remains patent  11/1/2022 0153 by Gilma Sullivan RN  Outcome: Progressing  Flowsheets (Taken 11/1/2022 0153)  Urinary catheter remains patent: Assess patency of urinary catheter     Problem: Metabolic/Fluid and Electrolytes - Adult  Goal: Glucose maintained within prescribed range  11/1/2022 0153 by Gilma Sullivan RN  Flowsheets (Taken 11/1/2022 0153)  Glucose maintained within prescribed range:   Monitor blood glucose as ordered   Assess for signs and symptoms of hyperglycemia and hypoglycemia   Administer ordered medications to maintain glucose within target range

## 2022-11-01 NOTE — PLAN OF CARE
Problem: Pain  Goal: Verbalizes/displays adequate comfort level or baseline comfort level  11/1/2022 1133 by Omari Macias RN  Outcome: Progressing  Flowsheets (Taken 11/1/2022 1133)  Verbalizes/displays adequate comfort level or baseline comfort level:   Encourage patient to monitor pain and request assistance   Assess pain using appropriate pain scale   Implement non-pharmacological measures as appropriate and evaluate response   Administer analgesics based on type and severity of pain and evaluate response  Note: Pt able to verbalize pain. Pain medication administered with some pain relief noted. Primary non-pharmaceutical pain interventions utilized is environmental changes and repositioning Q2 and PRN. Problem: Skin/Tissue Integrity  Goal: Absence of new skin breakdown  Description: 1. Monitor for areas of redness and/or skin breakdown  2. Assess vascular access sites hourly  3. Every 4-6 hours minimum:  Change oxygen saturation probe site  4. Every 4-6 hours:  If on nasal continuous positive airway pressure, respiratory therapy assess nares and determine need for appliance change or resting period. 11/1/2022 1133 by Omari Macias RN  Outcome: Progressing  Note: Dressing change on coccyx performed this am.  Heels elevated off bed. Pt repositioned Q2 and PRN. Trach and pulse oximetry sites assessed Q2. No new signs of skin breakdown seen.      Problem: Respiratory - Adult  Goal: Achieves optimal ventilation and oxygenation  11/1/2022 1133 by Omari Macias RN  Outcome: Progressing  Flowsheets (Taken 11/1/2022 1133)  Achieves optimal ventilation and oxygenation:   Assess for changes in respiratory status   Assess for changes in mentation and behavior   Position to facilitate oxygenation and minimize respiratory effort   Oxygen supplementation based on oxygen saturation or arterial blood gases   Assess the need for suctioning and aspirate as needed   Assess and instruct to report shortness of breath or any respiratory difficulty  Note: Pt able to communicate SOB and dyspnea. Suctioned PRN per pt. Pt repositioned regularly to optimize respiratory status. Pt has some intermittent confusion but is alert and oriented. SpO2 maintained greater that 90%.

## 2022-11-01 NOTE — PROGRESS NOTES
Cardiothoracic Daily Progress Note      CC: Pleural effusions s/p R pleur-X placement    Subjective :  Patient rested well overnight with no acute events. HDS, afebrile. Denies abdominal pain. Objective     Exam:  Vitals:    11/01/22 0200 11/01/22 0315 11/01/22 0316 11/01/22 0600   BP:  (!) 111/55     Pulse: 78 80 77 78   Resp: 19 24 16    Temp:  98.8 °F (37.1 °C)     TempSrc:  Oral     SpO2: 99% 98% 99% 96%   Weight:       Height:           Physical Examination:   General appearance: Sleeping, resting comfortably  Neurological: No focal deficits  HEENT: EOMI, trachea midline, no JVD. Tracheostomy in place with some mucus drainage  Chest/Lungs: On mechanical ventilation via tracheostomy; R PleurX catheter capped with dressing C/D/I  Cardiovascular: RRR, well perfused   Abdomen: Soft, non-tender, minimally-distended. PEG tube with tube feeds running at 65   Skin: Warm and dry. Sacral decubitus ulcer covered with Mepilex. Extremities: Pitting edema of the b/l feet. No cyanosis. Legs ulcers from SCDs covered with Mepilex      ASSESSMENT/PLAN:   Nora Cerda is a 65 y.o. male with history of diastolic heart failure, atrial fibrillation, and DM with recurrent pleural effusions s/p R PleurX catheter placement (9/19).      - Code statues changed to DNR/CC yesterday  - Pt and family pursuing hospice; Continued conversation with hospice today for planning  - If pt to remain inpt he will need additional IV access today  - Will drain PleurX catheter today        NATASHA Bianchi NP  11/1/2022  6:18 AM

## 2022-11-02 PROBLEM — I50.43 CHF (CONGESTIVE HEART FAILURE), NYHA CLASS I, ACUTE ON CHRONIC, COMBINED (HCC): Status: ACTIVE | Noted: 2022-01-01

## 2022-11-02 NOTE — PROGRESS NOTES
Nephrology Progress Note                                                                                                                                                                                                                                                                                                                                                               Office : [phone]     Fax :[phone]    Patient's Name: Christian Bal        Reason for Consult:   ZANA  Requesting Physician:  Evan Boxer, MD    Interval History:      Cr stable   Na low   Good UO   Remains on Trach   PEG placed - on TF         Past Medical History:   Diagnosis Date    ZANA (acute kidney injury) ([facility] [address] 9/26/2022    Carotid stenosis, left 10/12/2011    Chronic back pain     Chronic systolic (congestive) heart failure 50/18/4623    Diastolic CHF ([facility] [address]     Erectile dysfunction     Hyperlipidemia     Hypertension     Osteoarthritis     Restrictive lung disease     Type II or unspecified type diabetes mellitus without mention of complication, not stated as uncontrolled        Past Surgical History:   Procedure Laterality Date    CARDIAC CATHETERIZATION  06/2022    GASTROSTOMY TUBE PLACEMENT N/A 10/11/2022    EGD PEG TUBE PLACEMENT performed by Charlie Gamez MD at [facility] Left     PLEURAL CATH INSERTION N/A 9/19/2022    PLEURAL CATHETER PLACEMENT; INTERCOSTAL NERVE BLOCK X4 performed by Mansi Braswell MD at [facility] Bilateral     THORACOSCOPY Right 9/19/2022    RIGHT VIDEO ASSISTED THORASCOPIC SURGERY AND; performed by Mansi Braswell MD at [address] N/A 9/30/2022    TRACHEOSTOMY performed by Jack Weir MD at [facility] History   Problem Relation Age of Onset    Hearing Loss Father     Heart Disease Father 70        MI    High Blood Pressure Father     Diabetes Maternal Grandmother         hypoglycemic    Hearing Loss Maternal Grandmother Arthritis Maternal Grandfather         reports that he quit smoking about 21 years ago. His smoking use included cigarettes. He has a 80.00 pack-year smoking history. He has never used smokeless tobacco. He reports current alcohol use. He reports that he does not use drugs. Allergies:   Torsemide and Dye [iodides]    Current Medications:    morphine (PF) injection 1 mg, Q4H PRN  scopolamine (TRANSDERM-SCOP) transdermal patch 1 patch, Q72H  LORazepam (ATIVAN) 2 MG/ML concentrated solution 1 mg, Q2H PRN  insulin glargine (LANTUS;BASAGLAR) injection pen 20 Units, Nightly  docusate (COLACE) 50 MG/5ML liquid 100 mg, BID  melatonin disintegrating tablet 5 mg, Nightly  oxyCODONE (ROXICODONE) 5 MG/5ML solution 7.5 mg, Q4H PRN   Or  oxyCODONE (ROXICODONE) 5 MG/5ML solution 10 mg, Q4H PRN  acetaminophen (TYLENOL) 160 MG/5ML solution 650 mg, Q6H  piperacillin-tazobactam (ZOSYN) 3,375 mg in dextrose 5 % 50 mL IVPB (mini-bag), Q8H  silver nitrate applicators applicator 1 each, Once  simethicone (MYLICON) chewable tablet 80 mg, Q6H PRN  guar gum packet 1 packet, TID  glucose chewable tablet 16 g, PRN  dextrose bolus 10% 125 mL, PRN   Or  dextrose bolus 10% 250 mL, PRN  glucagon (rDNA) injection 1 mg, PRN  dextrose 10 % infusion, Continuous PRN  albuterol (PROVENTIL) nebulizer solution 2.5 mg, 4x daily  bisacodyl (DULCOLAX) suppository 10 mg, Daily  collagenase ointment, Daily  methocarbamol (ROBAXIN) tablet 500 mg, 4x Daily  doxazosin (CARDURA) tablet 1 mg, Daily  clotrimazole (LOTRIMIN) 1 % cream, BID  lansoprazole suspension SUSP 30 mg, BID  hydroxypropyl methylcellulose (GONIOSOL) 2.5 % ophthalmic solution 1 drop, PRN  insulin lispro (1 Unit Dial) (HUMALOG/ADMELOG) pen 0-16 Units, Q4H  Venelex ointment, BID  chlorhexidine (PERIDEX) 0.12 % solution 15 mL, BID  apixaban (ELIQUIS) tablet 5 mg, BID  sodium chloride flush 0.9 % injection 5-40 mL, 2 times per day  sodium chloride flush 0.9 % injection 5-40 mL, PRN  0.9 % sodium chloride infusion, PRN  ondansetron (ZOFRAN-ODT) disintegrating tablet 4 mg, Q8H PRN   Or  ondansetron (ZOFRAN) injection 4 mg, Q6H PRN  hydrALAZINE (APRESOLINE) injection 5 mg, Q15 Min PRN          Physical exam:     Vitals:  BP (!) 121/56   Pulse 81   Temp 98.6 °F (37 °C) (Axillary)   Resp 20   Ht 6' (1.829 m)   Wt 222 lb 14.2 oz (101.1 kg)   SpO2 95%   BMI 30.23 kg/m²   Constitutional:  on MV  Skin: no rash, turgor wnl  Heent:  eomi, mmm  Neck: no bruits or jvd noted  Cardiovascular:  S1, S2 without m/r/g  Respiratory: trach  Abdomen:  +bs, soft, nt, nd  Ext: bilateral lower extremity edema R>L  Psychiatric: mood and affect appropriate  Musculoskeletal:  Rom, muscular strength intact    Data:   Labs:  CBC:   Recent Labs     10/31/22  0449 11/01/22  0608 11/02/22  0517   WBC 17.1* 15.4* 22.4*   HGB 7.8* 7.5* 7.5*   * 454* 445       BMP:    Recent Labs     10/31/22  0449 11/01/22  0608 11/02/22  0517   * 131* 129*   K 5.1 4.8 4.7   CL 92* 91* 90*   CO2 31 32 32   BUN 57* 58* 58*   CREATININE 0.9 1.0 0.9   GLUCOSE 325* 112* 123*       Ca/Mg/Phos:   Recent Labs     10/31/22  0449 11/01/22  0608 11/02/22  0517   CALCIUM 8.1* 8.2* 8.2*   MG 2.30 2.40 2.60*   PHOS 3.9 3.7 3.8       Hepatic: No results for input(s): AST, ALT, ALB, BILITOT, ALKPHOS in the last 72 hours. Troponin: No results for input(s): TROPONINI in the last 72 hours. BNP: No results for input(s): BNP in the last 72 hours. Lipids:   No results for input(s): CHOL, TRIG, HDL, LDLCALC, LABVLDL in the last 72 hours. ABGs:   No results for input(s): PHART, PO2ART, LLE9VGB in the last 72 hours. INR: No results for input(s): INR in the last 72 hours.   UA:  Recent Labs     10/31/22  1731   COLORU Yellow   CLARITYU Clear   GLUCOSEU 250*   BILIRUBINUR Negative   KETUA Negative   SPECGRAV 1.015   BLOODU LARGE*   PHUR 6.0   PROTEINU 30*   UROBILINOGEN 0.2   NITRU Negative   LEUKOCYTESUR Negative   LABMICR YES   URINETYPE NotGiven Urine Microscopic:   Recent Labs     10/31/22  1731   WBCUA 3-5   RBCUA 3-4       Urine Culture: No results for input(s): LABURIN in the last 72 hours. Urine Chemistry:   No results for input(s): [place], PROTEINUR, NAUR in the last 72 hours. IMAGING:  XR CHEST PORTABLE   Final Result      Mild lateral pleural effusions appear similar to prior study with bilateral airspace disease suggesting edema versus pneumonia. XR CHEST PORTABLE   Final Result      Moderate pulmonary edema. Small bilateral pleural effusions. Normal heart size. Tracheostomy noted. CT HEAD WO CONTRAST   Final Result      No acute intracranial pathology        Mild to moderate atrophy      Mastoids are opacified. Mastoiditis on excluded. CT CHEST ABDOMEN PELVIS W CONTRAST Additional Contrast? None   Final Result      CHEST:      1. Multilobar pneumonia. 2. Small bilateral pleural effusions. ABDOMEN/PELVIS:      1. Ascending colitis. XR ABDOMEN (KUB) (SINGLE AP VIEW)   Final Result   Impression:       Slightly increased gas dilation of transverse colon since the prior study. No evidence of small bowel obstruction. Moderate colonic stool burden, unchanged from prior study. XR CHEST PORTABLE   Final Result   Impression:       Slight progression of bibasilar lung opacities, especially on the left. Persistence of small bilateral pleural effusions. XR ABDOMEN (2 VIEWS)   Final Result   1. Moderate amount of stool in the descending colon with the transverse colon mildly dilated with air. CT CERVICAL SPINE WO CONTRAST   Final Result      1. Osseous lesions in C2, C7 and T1 vertebral body seen on recent MRI dated 10/18/2022 are not well-visualized on CT. Bone density is slightly heterogenous on CT. There is a 8 mm radiolucent lesion in the inferior posterior aspect of C2 vertebral body    which may correspond to C2 lesion seen on MRI.    2. Mildly expansile lytic lesion in left lamina of C4 concerning for metastasis. 3. Multilevel degenerative disc disease, most pronounced at C6-7 where there is disc osteophyte complex causing mild-to-moderate spinal canal stenosis. 4. Multilevel foraminal stenosis, most pronounced at C5-6, moderate to severe right and moderate left. MRI LUMBAR SPINE WO CONTRAST   Final Result      1. Motion compromised study. 2.  No cord compression. 3.  Osseous lesions in the cervical and thoracic spine suspicious for metastatic disease. No extraosseous mass. 4.  Severe multilevel degenerative disc disease in the lumbar spine. Moderate to severe canal stenosis at L2-L3 and L4-L5. Moderate canal stenosis at L3-L4. Multiple levels of moderate to severe canal stenosis from L2-L3 to L4-L5      MRI THORACIC SPINE WO CONTRAST   Final Result      1. Motion compromised study. 2.  No cord compression. 3.  Osseous lesions in the cervical and thoracic spine suspicious for metastatic disease. No extraosseous mass. 4.  Severe multilevel degenerative disc disease in the lumbar spine. Moderate to severe canal stenosis at L2-L3 and L4-L5. Moderate canal stenosis at L3-L4. Multiple levels of moderate to severe canal stenosis from L2-L3 to L4-L5      MRI Cervical Spine WO Contrast   Final Result      1. Motion compromised study. 2.  No cord compression. 3.  Osseous lesions in the cervical and thoracic spine suspicious for metastatic disease. No extraosseous mass. 4.  Severe multilevel degenerative disc disease in the lumbar spine. Moderate to severe canal stenosis at L2-L3 and L4-L5. Moderate canal stenosis at L3-L4. Multiple levels of moderate to severe canal stenosis from L2-L3 to L4-L5      MRI BRAIN WO CONTRAST   Final Result      1. No acute hemorrhage, mass or acute ischemia. 2.  Mild burden of nonspecific multifocal white matter disease compatible with chronic small vessel ischemia. 3.  Mild atrophy.    4.  Bilateral mastoid effusions. XR CHEST PORTABLE   Final Result      Distal portion of tracheostomy poorly visualized due to overlying medical devices. The position is without significant change since 10/5/2022. Bibasilar pleuroparenchymal consolidation. Stable cardiomediastinal silhouette. Right jugular central venous catheter without change. CT ABDOMEN PELVIS WO CONTRAST Additional Contrast? None   Final Result      Marked decrease in size of pneumomediastinum. Trace residual pneumoperitoneum. Extensive bilateral lower lobe pulmonary atelectasis with pleural effusions and trace residual right pneumothorax. XR CHEST PORTABLE   Final Result      Tiny right lateral pneumothorax is suspected, 5 mm in maximum thickness. This has decreased in size since 10/3/2022. Right basilar Pleurx catheter noted. Small bilateral pleural effusions. Prominent interstitial markings. Stable cardiac mediastinal silhouette. Lines and tubes without change. Subcutaneous emphysema noted. CT CHEST ABDOMEN PELVIS WO CONTRAST   Final Result      1. Severe pneumomediastinum. 2. Small to moderate right hydropneumothorax with similar fluid and gas components with a right-sided Pleurx catheter. 3. Moderate loculated left pleural effusion with atelectasis of the left lower lobe with patency of the central left lower lobe bronchi. 4. Fluid/material filling the bronchus intermedius and right lower lobe bronchi with complete consolidation and volume loss of the right lower lobe. Differential diagnosis would include mucous plugging or obstructing lesion. 5. Tracheostomy tube tip within the trachea   6. Severe subcutaneous emphysema in the neck. CT ABDOMEN AND PELVIS:      FINDINGS:      LIVER: Normal.      GALLBLADDER AND BILIARY TREE: No calcified gallstones. No gallbladder distention. No intra- or extrahepatic biliary dilatation.       PANCREAS: Normal.      SPLEEN: Normal. ADRENAL GLANDS: Normal.      KIDNEYS AND URETERS: Normal.      URINARY BLADDER: Ansari catheter present within collapsed urinary bladder. REPRODUCTIVE ORGANS: No associated masses. BOWEL: Normal diameter, nonobstructed. Feeding tube tip at the level of the ligament of Treitz. LYMPH NODES: No abnormally enlarged nodes. PERITONEUM/RETROPERITONEUM: Severe pneumoperitoneum extends into the upper abdomen with some of the symmetric multiseptated gas appearing deep to the diaphragm consistent with mild pneumoperitoneum (series 601 was 101). This is likely related to    pneumonia mediastinum dissecting into the abdomen. No fluid collection in the abdomen or pelvis. No ascites. VESSELS: Extensive atherosclerotic calcification of the aorta and iliac arteries. ABDOMINAL WALL: No acute abnormality. BONES: No acute abnormality. Mild chronic L1 compression fracture with previous vertebroplasty. IMPRESSION:      1. Severe pneumomediastinum extends into the upper abdomen with small pneumoperitoneum likely related to extension of pneumoperitoneum into the upper peritoneal cavity. 2. No fluid collection in the abdomen or pelvis. Results were discussed with the surgical team at 7:00 PM on 10/3/2022. XR CHEST PORTABLE   Final Result      Stable appearance of loculated right pneumothorax, with loculated components in the lateral right midlung region and in the right lateral costophrenic sulcus. Stable scattered areas of atelectasis versus scar, most prominent in the medial right lung base and in the left lateral lung base. XR CHEST PORTABLE   Final Result      Small right basilar pneumothorax. Right basilar chest tube without change. Patchy consolidation in the right mid and lower lung as well as the left lower lung-atelectasis versus pneumonia. Trace left pleural effusion. Normal heart size. Lines and tubes without change.       Mild improvement in degree of subcutaneous emphysema in the chest and neck. XR CHEST 1 VIEW   Final Result      1. Stable small right-sided pneumothorax and prominent subcutaneous emphysema along the neck and upper superior chest wall, greater in right lung stable   2. Stable left basilar consolidation and pleural effusion      3. Stable appearing perihilar accentuated markings or airspace disease            XR CHEST PORTABLE   Final Result      Stable small right-sided pneumothorax. More prominent emphysema over the lower neck. No change in bilateral airspace disease. Stable left pleural effusion. XR CHEST PORTABLE   Final Result   1. Bilateral multifocal pneumonia with improvement in the right    pulmonary consolidation since prior study from 9/23/22   2. Mild to moderate left pleural effusion          XR CHEST PORTABLE   Final Result   1. Support lines and tubes are stable. 2.  Stable small right lateral pneumothorax and right basilar    chest tube. 3.  Stable patchy bilateral airspace disease. XR CHEST PORTABLE   Final Result   1. Satisfactory position of right internal jugular central line   2. No interval change in endotracheal tube and nasogastric tube positioning. 3. Extensive bilateral airspace disease with no changes. 4. Left pleural effusion with retrocardiac opacity, unchanged   5. Small stable tiny right lateral pneumothorax and stable position of right chest tube,, Pleurx catheter. XR ABDOMEN (KUB) (SINGLE AP VIEW)   Final Result   1. No residual pneumothorax. 2.  Mild patchy airspace disease bilaterally worse on the right than on prior examination. 3.  Non-obstructive bowel gas pattern. Mildly distended stomach. XR CHEST PORTABLE   Final Result   1. No residual pneumothorax. 2.  Mild patchy airspace disease bilaterally worse on the right than on prior examination. 3.  Non-obstructive bowel gas pattern. Mildly distended stomach.       XR CHEST PORTABLE   Final Result      1. Small right pneumothorax appears slightly increased. 2. Mild patchy airspace opacity bilaterally. XR CHEST PORTABLE   Final Result     Pleurx catheter at the right lung base. Trace right    pneumothorax is unchanged. XR CHEST PORTABLE   Final Result      Right-sided chest tube evident in the base, its tip medially. Improvement in the right-sided pneumothorax, since earlier today. Possible medial collapse of the right lower lobe. Small left effusion. Patchy airspace density/atelectasis in the lungs bilaterally, with no other significant change. XR CHEST PORTABLE   Final Result   1. Right-sided Pleurx catheter in satisfactory position in the lung bases   2. Right-sided post operative pneumothorax, approximately 10%             Assessment/Plan   ZANA  - sec to contrast nephropathy  - Cr stable   - BNP 3k, Protein:Cre 0.3, FENa 0.4%  - closely monitor UOP  - avoid nephrotoxic agent     2. Hypernatremia  - dec free water   - will continue to monitor     3. Hypotension  - resolved     4. Anemia  - daily CBC    5. Acid- base/ Electrolyte imbalance   - optimize lytes    6. Acute hypoxic resp failure  - s/p VATS on 9/19/22 for recurrent pleural effusions  - Intensivist and CTS following    7.  CHF   - EF 65% on 6/16/22 via cardiac cath        Stephanie Molina MD

## 2022-11-02 NOTE — PROGRESS NOTES
Per pts wishes, currently refusing to be repositioned and refusing dressing change to BLE. Pt clean and dry, no incontinence of stool. Will continue to check and monitor throughout night in effort to keep pt comfortable.

## 2022-11-02 NOTE — PROGRESS NOTES
Pt was removed from ventilator and placed on 24% venturi set up to trach for comfort. Pt suctioned for small-moderate amount of white/cloudy secretions.

## 2022-11-02 NOTE — PROGRESS NOTES
Palliative Care Chart Review  and Check in Note:     NAME:  Bridgett Donovan  Admit Date: 9/19/2022  Hospital Day:  Hospital Day: 39   Current Code status: DNR-CC    Palliative care is continuing to following Mr. Denzel Riley for symptom management, and goals of care discussion as needed. Patient's chart reviewed today 11/2/22. Spoke with Josiah's family at the bedside. They are ready to terminally disconnect him from the ventilator. Discussed with Mateusz Tovar of [address] [place] [address]. Comfort medications ordered. Dr. Kwesi Gallagher aware. Discussed with Jacqueline Ellison, BILLY. Ventilator orders D/Cd. The following are the currently established goals/code status, and Symptom management.      Goals of care: Comfort    Code status: DNR-CC    Discharge plan: GIP with hospice of [address] NATASHA Mahmood NP  11/02/22  2:23 PM

## 2022-11-02 NOTE — PROGRESS NOTES
Cardiothoracic Daily Progress Note      CC: Pleural effusions s/p R pleur-X placement    Subjective :  No acute events overnight. HDS, afebrile. Alert this AM.  Family would like to hold off on terminal extubation until more alert    Objective     Exam:  Vitals:    11/01/22 2356 11/02/22 0055 11/02/22 0349 11/02/22 0435   BP: (!) 116/58  (!) 121/56    Pulse: 92 80 84 81   Resp: (!) 31 14 25 20   Temp: 99 °F (37.2 °C)  98.6 °F (37 °C)    TempSrc: Axillary  Axillary    SpO2: 96% 96% 95% 95%   Weight:   222 lb 14.2 oz (101.1 kg)    Height:           Physical Examination:   General appearance: Sleeping, resting comfortably  Neurological: No focal deficits  HEENT: EOMI, trachea midline, no JVD. Tracheostomy in place with some mucus drainage  Chest/Lungs: On mechanical ventilation via tracheostomy; R PleurX catheter capped with dressing C/D/I  Cardiovascular: RRR, well perfused   Abdomen: Soft, non-tender, minimally-distended. PEG tube with tube feeds running at 65   Skin: Warm and dry. Sacral decubitus ulcer covered with Mepilex. Extremities: Pitting edema of the b/l feet. No cyanosis. Legs ulcers from SCDs covered with Mepilex      ASSESSMENT/PLAN:   Mirian Luna is a 65 y.o. male with history of diastolic heart failure, atrial fibrillation, and DM with recurrent pleural effusions s/p R PleurX catheter placement (9/19). - Code statues changed to DNR/CC   - Pt and family pursuing hospice; Continued conversation with hospice today for planning of removal of mechanical ventilation  - family requested pt be more alert for discussion with hospice and decision making on timing of removal of vent  - Wound care deferred by pt for comfort  - leukocytosis worsening.  Continue NATASHA Solares NP  11/2/2022  6:38 AM

## 2022-11-02 NOTE — PLAN OF CARE
Problem: Pain  Goal: Verbalizes/displays adequate comfort level or baseline comfort level  Outcome: Progressing  Note: Pt complains of generalized pain rating 7/10 this shift. PRN and scheduled pain medication administered per orders. Upon reassessment, pt sleeping comfortably with RR>10. Will continue to monitor. Problem: Skin/Tissue Integrity  Goal: Absence of new skin breakdown  Description: 1. Monitor for areas of redness and/or skin breakdown  2. Assess vascular access sites hourly  3. Every 4-6 hours minimum:  Change oxygen saturation probe site  4. Every 4-6 hours:  If on nasal continuous positive airway pressure, respiratory therapy assess nares and determine need for appliance change or resting period. Outcome: Progressing  Note: Pt refusing Q2 turns this shift. RN educates on importance, but adhering to patients wishes. Will continue to monitor to make sure patient is clean and dry. Problem: Respiratory - Adult  Goal: Achieves optimal ventilation and oxygenation  Outcome: Progressing  Note: Pt remains >95% on V/T this shift. PRN tracheal suctioning conducted by RN and RT. Will continue to monitor. Problem: Cardiovascular - Adult  Goal: Maintains optimal cardiac output and hemodynamic stability  Outcome: Progressing  Note: VSS this shift.

## 2022-11-03 LAB
BLOOD CULTURE, ROUTINE: NORMAL
CULTURE, BLOOD 2: NORMAL

## 2022-11-03 NOTE — DISCHARGE SUMMARY
Physician Discharge Summary     Patient ID:  Austyn Monge  [id number]  65 y.o.  1945    Admit date: 2022    Discharge date and time: 2022  2:50 PM     Admitting Physician: Kayla Romero MD     Discharge Physician: same    Admission Diagnoses: Pleural effusion, right [J90]  Pleural effusion on right [J90]    Discharge Diagnoses:     Admission Condition: stable    Discharged Condition:     Indication for Admission: Post op from R VATS, Pleural catheter placement, intercostal nerve block x4    Hospital Course: Pt was admitted after the procedure above. Originally the pt was ordered to be admitted to PCU, however, in PACU it was noted he had increased work of breathing requiring bipap. It was determined he would be best served with admission to ICU for close monitoring. Pleurx to -20 mm hg. Pulmonary was consulted for management of Bipap and continued surveillance of respiratory status. The pt did have a hx of 02 requirement at home due to COPD. He denied pain at that time. Mr. Shi Luke remained on Bipap with some improvement of his ABG. He did have an episode of emesis causing transition to HFNC. He also was having episodes of confusion. ABG was ordered PRN for draw when the pt became confused. This revealed a PH of 7.14 and CO2 of 150. At this point it was unclear why the pt was so hypercapnic. He was started on diuretic and steroids. Ansari was placed on POD 1. The following day provided more back and forth from confusion and clear mentation. It was decided on POD 3 he would need to be re intubated and feeding tube placed so he could regain his strength. At this time, the pt was also agreeable to trach placement if it came to that. A R IJ and A line was also placed for better access and hemodynamic monitoring. From this point the pt seemed to stay stable with no progress in recovery. He did has some hemodynamic instability requiring some pressors this resolved.  He was also dealing with an ZANA for which nephrology was on board. POD 8 discussion of trach was again addressed with eventual trach placed on 9/30, POD 10.     10/01-10/03: Patient required levophed  10/04: CT chest/abd showed pneumomediastinum extending to pneumoperitoneum  10/05: Bronchoscopy was done showing thick, white purulent secretions throughout the bilateral airways R > L, lower lobes > upper lobes. 10/06: Failed VSV after 2 hours for tachypnea. 10/07: Patient received 1U of pRBCs for Hgb 6.6  10/09: Failed VSV with RR in mid 35s  10/10: Repeat CT showed marked decrease in size of pneumomediastinum with trace residual pneumoperitoneum  10/11: PEG tube placed by GI for nutrition. Started TF 4 hours post-placement. IV Protonix started  10/12: ZANA resolved, Cr stable and making good urine output  10/13: Had bloody secretions from tracheostomy site, resolved during AM rounds. Developed tremors concerning for extrapyramidal symptoms, so Seroquel was held. No other episodes of tremors  10/15: CVC and Ansari removed. Required straight catheterization  10/17: Sugars elevated up to 300-400s. Lantus increased to 30. Neurology consulted and recommended MRI to exclude focal ischemic lesion or myelopathy. Transferred out of ICU  10/18: ACh receptor antibodies negative  10/19: MRI revealed lytic lesions in thoracic vertebrae and some thecal sac compression in his cervical spine. Neurosurgery consulted, no emergent neurosurgical intervention indicated  10/21: Episode of emesis followed by increased work of breathing; TF were turned off. KUB and CXR showed no acute process. PEG tube was placed to gravity. TF resumed during the day  10/24: LTAC was denied. Wife working on new insurance. Patient had increased WBC, known sacral wound being managed by WOCN  10/25: General surgery consulted for unstageable pressure ulceration of sacrum. Eliquis and TF held  10/27: General surgery performed bedside debridement of sacral wound.  TF restarted  10/28: CT chest/abd/pelvis scan done looking for sources of leukocytosis. Shortly after receiving scan, patient had increased symptoms of anxiety, increased work of breathing, was unable to answer questions normally and stopped responding in his usual manner. There was concern for potential stroke so CT head was done to rule out stroke. CT head showed no acute intracranial pathology. CXR shows moderate pulmonary edema, small bilateral pleural effusions. CT chest multilobular PNA with L pleural effusion, ascending colitis  10/29: Oletta Albina started for ascending colitis. Received 1U pRBC for Hgb 6.6  10/31: Patient and family considering hospice. Code status was change to DNR CC today  11/02: Hospice planning for removal of mechanical ventilation. Ventilator discontinued. Patient passed away at 2027 due to acute respiratory failure. Family at bedside. Consults: pulmonary/intensive care, GI, nephrology, neurology, general surgery, Pharmacy, wound care, Internal medicine, ENT, Dietitian, Palliative    Treatments: surgery: as above    Discharge Exam:  General appearance: Sleeping, resting comfortably  Neurological: No focal deficits  HEENT: trachea midline. Tracheostomy in place with some mucus drainage  Chest/Lungs: On mechanical ventilation via tracheostomy; R PleurX catheter capped with dressing C/D/I  Cardiovascular: RRR, well perfused   Abdomen: Soft, non-tender, minimally-distended. PEG tube with tube feeds running at 65   Skin: Warm and dry. Sacral decubitus ulcer covered with Mepilex. Extremities: Pitting edema of the b/l feet. No cyanosis.  Legs ulcers from SCDs covered with Mepitel    Disposition: DC to hospice    In process/preliminary results:  Outstanding Order Results       Date and Time Order Name Status Description    10/30/2022 11:31 AM Culture, Blood 1 Preliminary     10/30/2022 11:28 AM Culture, Blood 2 Preliminary             Patient Instructions:   Discharge Medication List as of 11/2/2022  2:54 PM CONTINUE these medications which have NOT CHANGED    Details   ELIQUIS 5 MG TABS tablet TAKE 1 TABLET BY MOUTH TWO TIMES A DAY, Disp-60 tablet, R-2Normal      umeclidinium-vilanterol (ANORO ELLIPTA) 62.5-25 MCG/INH AEPB inhaler Inhale 1 puff into the lungs daily, Disp-1 each, R-2Normal      bumetanide (BUMEX) 1 MG tablet Take 1 tablet by mouth in the morning and 1 tablet before bedtime. , Disp-180 tablet, R-3Normal      magnesium oxide (MAG-OX) 400 MG tablet Take 1 tablet by mouth in the morning and 1 tablet before bedtime. , Disp-180 tablet, R-3Normal      dapagliflozin (FARXIGA) 10 MG tablet TAKE 1 TABLET EVERY MORNING, Disp-90 tablet, R-1Normal      sildenafil (VIAGRA) 100 MG tablet Take 1 tablet by mouth as needed for Erectile Dysfunction Max daily dose 100mg., Disp-16 tablet, R-0Normal      metoprolol succinate (TOPROL XL) 50 MG extended release tablet Take 1 tablet by mouth in the morning and at bedtime, Disp-180 tablet, R-3Normal      Insulin Pen Needle (PEN NEEDLES) 31G X 6 MM MISC DAILY Starting Tue 6/14/2022, Disp-100 each, R-3, Normal      Continuous Blood Gluc Sensor (FREESTYLE LIZA 14 DAY SENSOR) MISC Check bs tidac and hs, Disp-6 each, R-1Print      Continuous Blood Gluc  (FREESTYLE LIZA 14 DAY READER) SOFIYA Check bs tidac and hs, Disp-6 each, R-1Print      Insulin Degludec (TRESIBA FLEXTOUCH) 200 UNIT/ML SOPN 10 units subcut qam, increase 4 units every 4 days until am blood sugar < 120.  Max 100 units, Disp-9 pen, R-1Normal      albuterol sulfate HFA (VENTOLIN HFA) 108 (90 Base) MCG/ACT inhaler Inhale 2 puffs into the lungs 4 times daily as needed for Wheezing, Disp-18 g, R-5Normal      pravastatin (PRAVACHOL) 40 MG tablet Take 1 tablet by mouth daily, Disp-90 tablet, R-3Normal           Nanette Kenney CNP  11/3/2022  2:51 PM  perfectserve

## 2022-11-03 NOTE — DEATH NOTES
Death Pronouncement Note  Patient's Name: Luis Ramirez   Patient's YOB: 1945  MRN Number: [id number]    Admitting Provider: Milagro Jarrett MD  Attending Provider: Milagro Jarrett MD    Patient was examined and the following were absent: Pulses, Blood Pressure, and Respiratory effort    I declared the patient dead on 11/2/2022 at 8:27 PM.    Preliminary Cause of Death: Acute respiratory failure     Electronically signed by [address] [place] [address] GROUPDO on 11/2/22 at 8:28 PM EDT

## 2022-11-03 NOTE — PROGRESS NOTES
Paged on call surgical residents to pronounce BEAR. BEAR pronounced at 2027. This RN witness to signs of death, family at bedside at time of passing.

## 2022-11-03 NOTE — PROGRESS NOTES
Hospice RN Lauryn Gill notified of pts passing     [facility] at . Awaiting release of body to  home. Referral number [address] [facility] contacted per family wishes at . Will call back when body is released from OPO.

## 2022-11-03 NOTE — PROGRESS NOTES
Post-mortem care completed. Upon cleaning room, found patient belonging bag with dark grey tshirt and smart watch. Called pts wife Sukumar Jones and she says she does not believe it belongs to patient but will check with sons tomorrow. Sukumar Jones instructed that bag will be kept at  on unit.

## 2022-11-06 NOTE — DISCHARGE SUMMARY
Discharge Summary      Patient:    Liliana Box    Admit Date:   2022  3:11 PM    Discharge Date:   2022   10:44 PM    Admitting Physician:   Sheila Oneal MD     Discharge Physician:   same    Admitting Diagnosis:  CHF (congestive heart failure), NYHA class I, acute on chronic, combined ([facility] R pleural effusion    Discharge Diagnosis:       Past Medical History:   Diagnosis Date    ZANA (acute kidney injury) ([facility] 2022    Carotid stenosis, left 10/12/2011    Chronic back pain     Chronic systolic (congestive) heart failure     Diastolic CHF ([facility]     Erectile dysfunction     Hyperlipidemia     Hypertension     Osteoarthritis     Restrictive lung disease     Type II or unspecified type diabetes mellitus without mention of complication, not stated as uncontrolled         Indication for Admission:   Post op from R VATS, Pleural catheter placement, intercostal nerve block x4    Hospital Course:   Patient was admitted after the procedure above. Originally the patient was ordered to be admitted to PCU, however, in PACU it was noted he had increased work of breathing requiring BIPAP. It was determined he would be best served with admission to ICU for close monitoring. Pleur-X catheter placed to -20 mm Hg and will be drained every other day. Pulmonary service was consulted for management of BIPAP and continued surveillance of respiratory status. The patient did have a history of oxygen requirement at home due to COPD. He denied pain at that time. Mr. Hailey Melara remained on BIPAP  with some improvement of his ABG. He did have an episode of emesis causing transition to HFNC. He also was having episodes of confusion. ABG was ordered PRN for draw when the patient became confused. This revealed a pH of 7.14 and CO2 of 150. At this point, it was unclear why the patient was so hypercapnic. He was started on diuretic and steroids. Ansari was placed on POD 1. The following day provided more back and forth from confusion and clear mentation. It was decided on POD 3 he would need to be re-intubated and feeding tube placed so he could regain his strength. At this time, the patient was also agreeable to tracheostomy placement if it came to that. A R IJ and A line was also placed for better access and hemodynamic monitoring. From this point, the patient seemed to remain stable with no progress in recovery. He did have some hemodynamic instability requiring some pressors for a short time but resolved. He was also dealing with an ZANA for which nephrology was on board. POD 8 discussion of tracheostomy was again addressed with eventual tracheostomy placed on 9/30, POD 10.     10/01-10/03: Patient required levophed  10/04: CT chest/abd showed pneumomediastinum extending to pneumoperitoneum  10/05: Bronchoscopy was done showing thick, white purulent secretions throughout the bilateral airways R > L, lower lobes > upper lobes. 10/06: Failed VSV after 2 hours for tachypnea. 10/07: Patient received 1U of pRBCs for Hgb 6.6  10/09: Failed VSV with RR in mid 35s  10/10: Repeat CT showed marked decrease in size of pneumomediastinum with trace residual pneumoperitoneum  10/11: PEG tube placed by GI for nutrition. Started TF 4 hours post-placement. IV Protonix started  10/12: ZANA resolved, Cr stable and making good urine output  10/13: Had bloody secretions from tracheostomy site, resolved during AM rounds. Developed tremors concerning for extrapyramidal symptoms, so Seroquel was held. No other episodes of tremors  10/15: CVC and Ansari removed. Required straight catheterization  10/17: Sugars elevated up to 300-400s. Lantus increased to 30. Neurology consulted and recommended MRI to exclude focal ischemic lesion or myelopathy. Transferred out of ICU  10/18: ACh receptor antibodies negative  10/19: MRI revealed lytic lesions in thoracic vertebrae and some thecal sac compression in his cervical spine.  Neurosurgery Mepilex    Disposition:    Home    Condition at discharge:  Stable    Discharge Instructions:  See separate form    Patient Instructions:      Medication List        CONTINUE taking these medications      FreeStyle Sb 14 Day [place] Porsha Mattock  Check bs tidac and hs     FreeStyle Sb 14 Day Sensor Misc  Check bs tidac and hs     Pen Needles 31G X 6 MM Misc  1 each by Does not apply route daily            ASK your doctor about these medications      albuterol sulfate  (90 Base) MCG/ACT inhaler  Commonly known as: Ventolin HFA  Inhale 2 puffs into the lungs 4 times daily as needed for Wheezing     bumetanide 1 MG tablet  Commonly known as: BUMEX  Take 1 tablet by mouth in the morning and 1 tablet before bedtime. dapagliflozin 10 MG tablet  Commonly known as: Farxiga  TAKE 1 TABLET EVERY MORNING     Eliquis 5 MG Tabs tablet  Generic drug: apixaban  TAKE 1 TABLET BY MOUTH TWO TIMES A DAY     magnesium oxide 400 MG tablet  Commonly known as: MAG-OX  Take 1 tablet by mouth in the morning and 1 tablet before bedtime. metoprolol succinate 50 MG extended release tablet  Commonly known as: Toprol XL  Take 1 tablet by mouth in the morning and at bedtime     pravastatin 40 MG tablet  Commonly known as: PRAVACHOL  Take 1 tablet by mouth daily     sildenafil 100 MG tablet  Commonly known as: Viagra  Take 1 tablet by mouth as needed for Erectile Dysfunction Max daily dose 100mg. [place] FlexTouch 200 UNIT/ML Sopn  Generic drug: Insulin Degludec  10 units subcut qam, increase 4 units every 4 days until am blood sugar < 120.  Max 100 units     umeclidinium-vilanterol 62.5-25 MCG/INH Aepb inhaler  Commonly known as: ANORO ELLIPTA  Inhale 1 puff into the lungs daily              Kelin Whyte DO  11/06/22  2:39 PM

## 2022-11-08 NOTE — H&P
performed by Alphonse Cardona MD at [address]: Torsemide and Dye [iodides]    Medications:   Home Meds  No current facility-administered medications on file prior to encounter. Current Outpatient Medications on File Prior to Encounter   Medication Sig Dispense Refill    ELIQUIS 5 MG TABS tablet TAKE 1 TABLET BY MOUTH TWO TIMES A DAY 60 tablet 2    umeclidinium-vilanterol (ANORO ELLIPTA) 62.5-25 MCG/INH AEPB inhaler Inhale 1 puff into the lungs daily 1 each 2    bumetanide (BUMEX) 1 MG tablet Take 1 tablet by mouth in the morning and 1 tablet before bedtime. 180 tablet 3    magnesium oxide (MAG-OX) 400 MG tablet Take 1 tablet by mouth in the morning and 1 tablet before bedtime. 180 tablet 3    dapagliflozin (FARXIGA) 10 MG tablet TAKE 1 TABLET EVERY MORNING 90 tablet 1    sildenafil (VIAGRA) 100 MG tablet Take 1 tablet by mouth as needed for Erectile Dysfunction Max daily dose 100mg. 16 tablet 0    metoprolol succinate (TOPROL XL) 50 MG extended release tablet Take 1 tablet by mouth in the morning and at bedtime 180 tablet 3    Insulin Pen Needle (PEN NEEDLES) 31G X 6 MM MISC 1 each by Does not apply route daily 100 each 3    Continuous Blood Gluc Sensor (FREESTYLE LIZA 14 DAY SENSOR) MISC Check bs tidac and hs 6 each 1    Continuous Blood Gluc  (FREESTYLE LIZA 14 DAY READER) SOFIYA Check bs tidac and hs 6 each 1    Insulin Degludec (TRESIBA FLEXTOUCH) 200 UNIT/ML SOPN 10 units subcut qam, increase 4 units every 4 days until am blood sugar < 120. Max 100 units (Patient taking differently: Inject 12 Units into the skin daily 10 units subcut qam, increase 4 units every 4 days until am blood sugar < 120.  Max 100 units) 9 pen 1    albuterol sulfate HFA (VENTOLIN HFA) 108 (90 Base) MCG/ACT inhaler Inhale 2 puffs into the lungs 4 times daily as needed for Wheezing 18 g 5    pravastatin (PRAVACHOL) 40 MG tablet Take 1 tablet by mouth daily 90 tablet 3       Current Meds  No current facility-administered medications for this encounter. Family History:   Family History   Problem Relation Age of Onset    Hearing Loss Father     Heart Disease Father 70        MI    High Blood Pressure Father     Diabetes Maternal Grandmother         hypoglycemic    Hearing Loss Maternal Grandmother     Arthritis Maternal Grandfather        Social History:   TOBACCO:   reports that he quit smoking about 21 years ago. His smoking use included cigarettes. He has a 80.00 pack-year smoking history. He has never used smokeless tobacco.  ETOH:   reports current alcohol use. DRUGS:   reports no history of drug use. Review of Systems:      Constitutional: Negative. HENT: Negative. Eyes: Negative. Respiratory: Tracheostomy, Pleur-X drain  Cardiovascular: Negative. Gastrointestinal: Negative. Genitourinary: Negative. Musculoskeletal: Negative. Skin: Negative. Endocrine: Negative. Allergic/Immunologic: Negative. Neurological: Negative. Hematological: Negative. Psychiatric/Behavioral: Negative. Physical exam:    Vitals:    11/02/22 1751 11/02/22 1831 11/02/22 1932 11/02/22 2023   BP:       Pulse: 66 71 82 (!) 0   Resp: 18 17 18 (!) 0   Temp:       TempSrc:       SpO2:           General appearance: alert, no acute distress, grooming appropriate  Neurological: No focal deficits  HEENT: EOMI, trachea midline, no JVD. Tracheostomy in place with some mucus drainage  Chest/Lungs: On mechanical ventilation via tracheostomy; R PleurX catheter capped with dressing C/D/I  Cardiovascular: RRR, well perfused   Abdomen: Soft, non-tender, minimally-distended. PEG tube with tube feeds running at 65   Skin: Warm and dry. Sacral decubitus ulcer covered with Mepilex. Extremities: Pitting edema of the b/l feet. No cyanosis. Legs ulcers from SCDs covered with Mepilex    Labs:    CBC: No results for input(s): WBC, HGB, HCT, MCV, PLT in the last 72 hours.   BMP: No results for input(s): NA, K, CL, CO2, PHOS, BUN, CREATININE, CA in the last 72 hours. PT/INR: No results for input(s): PROTIME, INR in the last 72 hours. APTT: No results for input(s): APTT in the last 72 hours. Liver Profile:   Lab Results   Component Value Date/Time    AST 10 09/19/2022 06:10 AM    ALT <5 09/19/2022 06:10 AM    BILIDIR <0.2 09/19/2022 06:10 AM    BILITOT 0.3 09/19/2022 06:10 AM    ALKPHOS 91 09/19/2022 06:10 AM     Lab Results   Component Value Date/Time    CHOL 198 01/24/2022 10:29 AM    HDL 81 01/24/2022 10:29 AM    TRIG 67 10/12/2022 10:54 AM     UA:   Lab Results   Component Value Date/Time    NITRITE neg 09/03/2013 09:10 AM    COLORU Yellow 10/31/2022 05:31 PM    PHUR 6.0 10/31/2022 05:31 PM    LABCAST NONE SEEN 07/11/2014 11:34 AM    LABCAST NONE SEEN 07/11/2014 11:34 AM    WBCUA 3-5 10/31/2022 05:31 PM    RBCUA 3-4 10/31/2022 05:31 PM    RBCUA 0 07/11/2014 11:34 AM    YEAST NONE SEEN 07/11/2014 11:34 AM    BACTERIA NONE 07/11/2014 11:34 AM    CLARITYU Clear 10/31/2022 05:31 PM    SPECGRAV 1.015 10/31/2022 05:31 PM    LEUKOCYTESUR Negative 10/31/2022 05:31 PM    UROBILINOGEN 0.2 10/31/2022 05:31 PM    BILIRUBINUR Negative 10/31/2022 05:31 PM    BILIRUBINUR small 09/03/2013 09:10 AM    BLOODU LARGE 10/31/2022 05:31 PM    GLUCOSEU 250 10/31/2022 05:31 PM       Imaging:   No orders to display       Assessment/Plan:  Óscar Esposito is a 65 y.o. male with history of diastolic heart failure, atrial fibrillation, and DM with recurrent pleural effusions s/p R PleurX catheter placement (9/19) and tracheostomy placement on 09/30. He has had a long, complicated hospital course without signs of clinical improvement.  Patient and family has decided to pursue hospice care and changed his code status to DNR/CC.    - Removal of mechanical ventilation TBD  - Family request patient to be more alert for discussion with hospice and decision making on timing of removal of ventilator  - Wound care deferred by patient for comfort  - Pain control and anti-anxiety meds ordered for brad Whyte DO  11/08/22  12:51 PM

## 2023-01-31 NOTE — PLAN OF CARE
Problem: Chronic Conditions and Co-morbidities  Goal: Patient's chronic conditions and co-morbidity symptoms are monitored and maintained or improved  Outcome: Progressing  Flowsheets (Taken 10/26/2022 1709)  Care Plan - Patient's Chronic Conditions and Co-Morbidity Symptoms are Monitored and Maintained or Improved:   Monitor and assess patient's chronic conditions and comorbid symptoms for stability, deterioration, or improvement   Collaborate with multidisciplinary team to address chronic and comorbid conditions and prevent exacerbation or deterioration   Update acute care plan with appropriate goals if chronic or comorbid symptoms are exacerbated and prevent overall improvement and discharge     Problem: Discharge Planning  Goal: Discharge to home or other facility with appropriate resources  10/26/2022 1709 by Lo Roland RN  Outcome: Progressing  Flowsheets (Taken 10/26/2022 1709)  Discharge to home or other facility with appropriate resources:   Identify barriers to discharge with patient and caregiver   Arrange for needed discharge resources and transportation as appropriate   Identify discharge learning needs (meds, wound care, etc)   Arrange for interpreters to assist at discharge as needed   Refer to discharge planning if patient needs post-hospital services based on physician order or complex needs related to functional status, cognitive ability or social support system     Problem: Pain  Goal: Verbalizes/displays adequate comfort level or baseline comfort level  Outcome: Progressing  Flowsheets (Taken 10/26/2022 1709)  Verbalizes/displays adequate comfort level or baseline comfort level:   Encourage patient to monitor pain and request assistance   Assess pain using appropriate pain scale   Administer analgesics based on type and severity of pain and evaluate response   Implement non-pharmacological measures as appropriate and evaluate response   Consider cultural and social influences on pain and pain management   Notify Licensed Independent Practitioner if interventions unsuccessful or patient reports new pain     Problem: Safety - Adult  Goal: Free from fall injury  Outcome: Progressing  Flowsheets (Taken 10/26/2022 1709)  Free From Fall Injury: Instruct family/caregiver on patient safety     Problem: Skin/Tissue Integrity  Goal: Absence of new skin breakdown  Description: 1. Monitor for areas of redness and/or skin breakdown  2. Assess vascular access sites hourly  3. Every 4-6 hours minimum:  Change oxygen saturation probe site  4. Every 4-6 hours:  If on nasal continuous positive airway pressure, respiratory therapy assess nares and determine need for appliance change or resting period.   10/26/2022 1709 by Nelly Patel RN  Outcome: Progressing     Problem: Nutrition Deficit:  Goal: Optimize nutritional status  10/26/2022 1709 by Nelly Patel RN  Outcome: Progressing  Flowsheets (Taken 10/26/2022 1709)  Nutrient intake appropriate for improving, restoring, or maintaining nutritional needs:   Assess nutritional status and recommend course of action   Monitor oral intake, labs, and treatment plans   Order, calculate, and assess calorie counts as needed   Recommend appropriate diets, oral nutritional supplements, and vitamin/mineral supplements   Recommend, monitor, and adjust tube feedings and TPN/PPN based on assessed needs   Provide specific nutrition education to patient or family as appropriate     Problem: ABCDS Injury Assessment  Goal: Absence of physical injury  Outcome: Progressing  Flowsheets (Taken 10/26/2022 1709)  Absence of Physical Injury: Implement safety measures based on patient assessment     Problem: Respiratory - Adult  Goal: Achieves optimal ventilation and oxygenation  10/26/2022 1709 by Nelly Patel RN  Outcome: Progressing  Flowsheets (Taken 10/26/2022 1709)  Achieves optimal ventilation and oxygenation:   Assess for changes in respiratory status   Position to facilitate oxygenation and minimize respiratory effort   Assess for changes in mentation and behavior   Oxygen supplementation based on oxygen saturation or arterial blood gases   Encourage broncho-pulmonary hygiene including cough, deep breathe, incentive spirometry   Assess the need for suctioning and aspirate as needed     Problem: Cardiovascular - Adult  Goal: Maintains optimal cardiac output and hemodynamic stability  10/26/2022 1709 by Quan Ferrer RN  Outcome: Progressing  Flowsheets (Taken 10/26/2022 1709)  Maintains optimal cardiac output and hemodynamic stability:   Monitor blood pressure and heart rate   Monitor urine output and notify Licensed Independent Practitioner for values outside of normal range   Assess for signs of decreased cardiac output   Administer fluid and/or volume expanders as ordered   Administer vasoactive medications as ordered     Problem: Genitourinary - Adult  Goal: Urinary catheter remains patent  Outcome: Progressing  Flowsheets (Taken 10/26/2022 1709)  Urinary catheter remains patent: Assess patency of urinary catheter     Problem: Metabolic/Fluid and Electrolytes - Adult  Goal: Glucose maintained within prescribed range  Outcome: Progressing  Flowsheets (Taken 10/26/2022 1709)  Glucose maintained within prescribed range:   Monitor blood glucose as ordered   Assess for signs and symptoms of hyperglycemia and hypoglycemia   Administer ordered medications to maintain glucose within target range   Assess barriers to adequate nutritional intake and initiate nutrition consult as needed   Instruct patient on self management of diabetes and initiate consult as needed Diet: regular, CC  DVT PPx: restarted on home eliquis  Dispo: pending clinical improvement

## 2025-06-14 NOTE — PLAN OF CARE
period.   Outcome: Progressing

## (undated) DEVICE — SUTURE MCRYL SZ 4-0 L27IN ABSRB UD L19MM PS-2 1/2 CIR PRIM Y426H

## (undated) DEVICE — SUTURE ETHLN SZ 3-0 L18IN NONABSORBABLE BLK PS-2 L19MM 3/8 1669H

## (undated) DEVICE — PEG KIT STD 20 FR PUL W/ ENFIT ENDOVIVE

## (undated) DEVICE — ADHESIVE SKIN CLSR 0.7ML TOP DERMBND ADV

## (undated) DEVICE — TROCAR: Brand: KII FIOS FIRST ENTRY

## (undated) DEVICE — SPONGE,PEANUT,XRAY,ST,SM,3/8",5/CARD: Brand: MEDLINE INDUSTRIES, INC.

## (undated) DEVICE — ELECTROSURGICAL PENCIL ROCKER SWITCH NON COATED BLADE ELECTRODE 10 FT (3 M) CORD HOLSTER: Brand: MEGADYNE

## (undated) DEVICE — GENERAL: Brand: MEDLINE INDUSTRIES, INC.

## (undated) DEVICE — STANDARD HYPODERMIC NEEDLE,POLYPROPYLENE HUB: Brand: MONOJECT

## (undated) DEVICE — 3M™ STERI-DRAPE™ INSTRUMENT POUCH 1018: Brand: STERI-DRAPE™

## (undated) DEVICE — TUBING, SUCTION, 1/4" X 12', STRAIGHT: Brand: MEDLINE

## (undated) DEVICE — TROCAR ENDOSCP L80MM DIA10/12MM THOR RIG SL DISP FLXPATH

## (undated) DEVICE — SUTURE PERMAHAND SZ 0 L30IN NONABSORBABLE BLK SILK BRAID A306H

## (undated) DEVICE — SUTURE PERMAHAND SZ 2-0 L30IN NONABSORBABLE BLK SILK W/O A305H

## (undated) DEVICE — KIT STRT 1000ML PT DISCHRG BTL DSG GZ PD BLU WRAPPING INFO

## (undated) DEVICE — STAPLER INT L340MM 45MM STD 12 FIRING B FRM PWR + GRIPPING

## (undated) DEVICE — SUTURE NONABSORBABLE MONOFILAMENT 4-0 RB-1 36 IN BLU PROLENE 8557H

## (undated) DEVICE — TOWEL,STOP FLAG GOLD N-W: Brand: MEDLINE

## (undated) DEVICE — HM34SPU @ HOVERMATT, SNGL USE, 34X78: Brand: MEDLINE

## (undated) DEVICE — ELECTRODE PT RET AD L9FT HI MOIST COND ADH HYDRGEL CORDED

## (undated) DEVICE — GOWN,SIRUS,POLYRNF,BRTHSLV,LG,30/CS: Brand: MEDLINE

## (undated) DEVICE — GLOVE SURG SZ 75 L12IN FNGR THK94MIL TRNSLUC YEL LTX

## (undated) DEVICE — APPLICATOR PREP 26ML 0.7% IOD POVACRYLEX 74% ISO ALC ST

## (undated) DEVICE — KIT CATH PLEUR CHLORAPREP FEN DRP FLTR STRW FOAM CATH PD

## (undated) DEVICE — SPONGE,DRAIN,NONWVN,4"X4",6PLY,STRL,LF: Brand: MEDLINE

## (undated) DEVICE — SUTURE VCRL SZ 3-0 L27IN ABSRB UD L26MM SH 1/2 CIR J416H

## (undated) DEVICE — CONNECTOR TBNG WHT PLAS SUCT STR 5IN1 LTWT W/ M CONN

## (undated) DEVICE — SPONGE GZ W4XL4IN COT 12 PLY TYP VII WVN C FLD DSGN

## (undated) DEVICE — SHEET, ORTHO, SPLIT, STERILE: Brand: MEDLINE

## (undated) DEVICE — COVER LT HNDL CAM BLU DISP W/ SURG CTRL

## (undated) DEVICE — APPLICATOR MEDICATED 26 CC SOLUTION HI LT ORNG CHLORAPREP

## (undated) DEVICE — DRAPE,T,LAPARO,TRANS,STERILE: Brand: MEDLINE

## (undated) DEVICE — ENT MINOR: Brand: MEDLINE INDUSTRIES, INC.

## (undated) DEVICE — SOLUTION IV 1000ML 0.9% SOD CHL

## (undated) DEVICE — TUBE TRACH L105MM OD13.3MM ID8MM CUF DST EXT LEN HI VOL LO

## (undated) DEVICE — NEEDLE 21GAX0.75X12IN COLLECT BLD SFTY

## (undated) DEVICE — ROYALSILK SURGICAL GOWN, L: Brand: CONVERTORS

## (undated) DEVICE — SUTURE PROL SZ 3-0 L36IN NONABSORBABLE BLU L26MM SH 1/2 CIR 8522H

## (undated) DEVICE — GLOVE ORANGE PI 7 1/2   MSG9075

## (undated) DEVICE — 3M™ IOBAN™ 2 ANTIMICROBIAL INCISE DRAPE 6648EZ: Brand: IOBAN™ 2

## (undated) DEVICE — TOTAL TRAY, 16FR 10ML SIL FOLEY, URN: Brand: MEDLINE

## (undated) DEVICE — SYRINGE MED 30ML STD CLR PLAS LUERLOCK TIP N CTRL DISP

## (undated) DEVICE — E-Z CLEAN, NON-STICK, PTFE COATED, ELECTROSURGICAL BLADE ELECTRODE, 2.5 INCH (6.35 CM): Brand: EZ CLEAN

## (undated) DEVICE — TUBE TRACH AD L105MM OD13.3MM ID8MM PROX EXTN CUF HI VOL LO

## (undated) DEVICE — DISSECTOR LAP DIA5MM BLNT TIP ENDOPATH

## (undated) DEVICE — BLANKET WRM W40.2XL55.9IN IORT LO BODY + MISTRAL AIR

## (undated) DEVICE — DRAIN SURG SGL COLL PT TB FOR ATS BG OASIS

## (undated) DEVICE — E-Z CLEAN, NON-STICK, PTFE COATED, ELECTROSURGICAL BLADE ELECTRODE, MODIFIED EXTENDED INSULATION, 6.5 INCH (16.5 CM): Brand: MEGADYNE

## (undated) DEVICE — TOWEL,OR,DSP,ST,WHITE,DLX,4/PK,20PK/CS: Brand: MEDLINE

## (undated) DEVICE — KIT DRNGE 1000ML VAC BTL FOR PLEUR EFFUSIONS MALIG ASCITES

## (undated) DEVICE — SUTURE VCRL SZ 2 L27IN ABSRB VLT L65MM TP-1 1/2 CIR J649G

## (undated) DEVICE — SUTURE ABSORBABLE BRAIDED 2-0 CT-1 27 IN UD VICRYL J259H

## (undated) DEVICE — SUTURE PDS II SZ 0 L27IN ABSRB VLT L36MM CT-1 1/2 CIR Z340H

## (undated) DEVICE — TOWEL SURG W16XL26IN WHT NONFENESTRATED ST 2 PER PK

## (undated) DEVICE — BRONCHOSCOPE EXAM L23.6IN OD0.15IN ID0.047IN DISP 4 BRONCH 065476001000] TRI ANIM HEALTH SERVICES]

## (undated) DEVICE — PROTECTOR ULN NRV PUR FOAM HK LOOP STRP ANATOMICALLY

## (undated) DEVICE — SHEET, T, LAPAROTOMY, STERILE: Brand: MEDLINE

## (undated) DEVICE — 3M™ TEGADERM™ TRANSPARENT FILM DRESSING FRAME STYLE, 1628, 6 IN X 8 IN (15 CM X 20 CM), 10/CT 8CT/CASE: Brand: 3M™ TEGADERM™

## (undated) DEVICE — SOLUTION ANTIFOG VIS SYS CLEARIFY LAPSCP

## (undated) DEVICE — CATHETER THORACENTESIS STR 28 FRX23 IN 6 EYELET TAPR TIP LF

## (undated) DEVICE — BLADE ES ELASTOMERIC COAT INSUL DURABLE BEND UPTO 90DEG

## (undated) DEVICE — TOWEL,OR,DSP,ST,BLUE,DLX,8/PK,10PK/CS: Brand: MEDLINE

## (undated) DEVICE — ANTI-FOG SOLUTION WITH FOAM PAD: Brand: DEVON